# Patient Record
Sex: FEMALE | Race: WHITE | NOT HISPANIC OR LATINO | URBAN - METROPOLITAN AREA
[De-identification: names, ages, dates, MRNs, and addresses within clinical notes are randomized per-mention and may not be internally consistent; named-entity substitution may affect disease eponyms.]

---

## 2017-12-04 ENCOUNTER — OUTPATIENT (OUTPATIENT)
Dept: OUTPATIENT SERVICES | Facility: HOSPITAL | Age: 47
LOS: 1 days | Discharge: HOME | End: 2017-12-04

## 2017-12-04 ENCOUNTER — APPOINTMENT (OUTPATIENT)
Dept: INTERNAL MEDICINE | Facility: HOSPITAL | Age: 47
End: 2017-12-04

## 2017-12-04 VITALS
RESPIRATION RATE: 16 BRPM | BODY MASS INDEX: 26.06 KG/M2 | SYSTOLIC BLOOD PRESSURE: 108 MMHG | DIASTOLIC BLOOD PRESSURE: 68 MMHG | HEART RATE: 78 BPM | HEIGHT: 67.5 IN | WEIGHT: 168 LBS

## 2017-12-04 DIAGNOSIS — E11.9 TYPE 2 DIABETES MELLITUS WITHOUT COMPLICATIONS: ICD-10-CM

## 2017-12-04 DIAGNOSIS — E03.9 HYPOTHYROIDISM, UNSPECIFIED: ICD-10-CM

## 2017-12-04 DIAGNOSIS — Z80.3 FAMILY HISTORY OF MALIGNANT NEOPLASM OF BREAST: ICD-10-CM

## 2017-12-04 DIAGNOSIS — Z87.19 PERSONAL HISTORY OF OTHER DISEASES OF THE DIGESTIVE SYSTEM: ICD-10-CM

## 2017-12-04 DIAGNOSIS — J45.909 UNSPECIFIED ASTHMA, UNCOMPLICATED: ICD-10-CM

## 2017-12-04 RX ORDER — BLOOD-GLUCOSE METER
EACH MISCELLANEOUS
Qty: 100 | Refills: 0 | Status: DISCONTINUED | COMMUNITY
Start: 2017-03-31

## 2017-12-04 RX ORDER — INSULIN GLARGINE 100 [IU]/ML
100 INJECTION, SOLUTION SUBCUTANEOUS
Qty: 3 | Refills: 0 | Status: DISCONTINUED | COMMUNITY
Start: 2017-04-12

## 2017-12-04 RX ORDER — ASPIRIN ENTERIC COATED TABLETS 81 MG 81 MG/1
81 TABLET, DELAYED RELEASE ORAL
Qty: 30 | Refills: 0 | Status: DISCONTINUED | COMMUNITY
Start: 2017-03-31

## 2017-12-04 RX ORDER — FLUOXETINE HYDROCHLORIDE 40 MG/1
40 CAPSULE ORAL
Qty: 30 | Refills: 0 | Status: DISCONTINUED | COMMUNITY
Start: 2017-04-24

## 2017-12-04 RX ORDER — BLOOD-GLUCOSE METER
70 EACH MISCELLANEOUS
Qty: 100 | Refills: 0 | Status: DISCONTINUED | COMMUNITY
Start: 2017-03-31

## 2017-12-04 RX ORDER — BLOOD SUGAR DIAGNOSTIC
STRIP MISCELLANEOUS
Qty: 100 | Refills: 0 | Status: DISCONTINUED | COMMUNITY
Start: 2017-06-24

## 2018-01-23 ENCOUNTER — OUTPATIENT (OUTPATIENT)
Dept: OUTPATIENT SERVICES | Facility: HOSPITAL | Age: 48
LOS: 1 days | Discharge: HOME | End: 2018-01-23

## 2018-01-23 DIAGNOSIS — F11.20 OPIOID DEPENDENCE, UNCOMPLICATED: ICD-10-CM

## 2018-03-05 ENCOUNTER — APPOINTMENT (OUTPATIENT)
Dept: INTERNAL MEDICINE | Facility: HOSPITAL | Age: 48
End: 2018-03-05

## 2018-03-05 ENCOUNTER — OUTPATIENT (OUTPATIENT)
Dept: OUTPATIENT SERVICES | Facility: HOSPITAL | Age: 48
LOS: 1 days | Discharge: HOME | End: 2018-03-05

## 2018-03-05 VITALS
RESPIRATION RATE: 16 BRPM | HEART RATE: 80 BPM | HEIGHT: 67 IN | TEMPERATURE: 98.2 F | BODY MASS INDEX: 27 KG/M2 | SYSTOLIC BLOOD PRESSURE: 100 MMHG | WEIGHT: 172 LBS | DIASTOLIC BLOOD PRESSURE: 68 MMHG

## 2018-03-05 RX ORDER — GABAPENTIN 300 MG/1
300 CAPSULE ORAL
Refills: 0 | Status: DISCONTINUED | COMMUNITY
End: 2018-03-05

## 2018-03-05 RX ORDER — TRAZODONE HYDROCHLORIDE 100 MG/1
100 TABLET ORAL
Refills: 0 | Status: DISCONTINUED | COMMUNITY
Start: 2017-06-21 | End: 2018-03-05

## 2018-03-06 DIAGNOSIS — E03.9 HYPOTHYROIDISM, UNSPECIFIED: ICD-10-CM

## 2018-03-06 DIAGNOSIS — E11.9 TYPE 2 DIABETES MELLITUS WITHOUT COMPLICATIONS: ICD-10-CM

## 2018-03-12 LAB
ESTIMATED AVERAGE GLUCOSE: 137 MG/DL
HBA1C MFR BLD HPLC: 6.4 %

## 2018-03-14 ENCOUNTER — OTHER (OUTPATIENT)
Age: 48
End: 2018-03-14

## 2018-03-15 LAB
25(OH)D3 SERPL-MCNC: 7.7 NG/ML
ALBUMIN SERPL ELPH-MCNC: 4.5 G/DL
ALP BLD-CCNC: 75 U/L
ALT SERPL-CCNC: 6 U/L
ANION GAP SERPL CALC-SCNC: 17 MMOL/L
AST SERPL-CCNC: 26 U/L
BASOPHILS # BLD AUTO: 0.03 K/UL
BASOPHILS NFR BLD AUTO: 0.4 %
BILIRUB SERPL-MCNC: 0.4 MG/DL
BUN SERPL-MCNC: 10 MG/DL
CALCIUM SERPL-MCNC: 9.2 MG/DL
CHLORIDE SERPL-SCNC: 97 MMOL/L
CHOLEST SERPL-MCNC: 148 MG/DL
CHOLEST/HDLC SERPL: 3 RATIO
CO2 SERPL-SCNC: 25 MMOL/L
CREAT SERPL-MCNC: 0.9 MG/DL
EOSINOPHIL # BLD AUTO: 0.15 K/UL
EOSINOPHIL NFR BLD AUTO: 1.9 %
GLUCOSE SERPL-MCNC: 126 MG/DL
HCT VFR BLD CALC: 38.2 %
HDLC SERPL-MCNC: 49 MG/DL
HGB BLD-MCNC: 12.3 G/DL
IMM GRANULOCYTES NFR BLD AUTO: 0.1 %
LDLC SERPL CALC-MCNC: 84 MG/DL
LYMPHOCYTES # BLD AUTO: 1.45 K/UL
LYMPHOCYTES NFR BLD AUTO: 18.2 %
MAN DIFF?: NORMAL
MCHC RBC-ENTMCNC: 29.2 PG
MCHC RBC-ENTMCNC: 32.2 GM/DL
MCV RBC AUTO: 90.7 FL
MONOCYTES # BLD AUTO: 0.32 K/UL
MONOCYTES NFR BLD AUTO: 4 %
NEUTROPHILS # BLD AUTO: 5.99 K/UL
NEUTROPHILS NFR BLD AUTO: 75.4 %
PLATELET # BLD AUTO: 245 K/UL
POTASSIUM SERPL-SCNC: 4.3 MMOL/L
PROT SERPL-MCNC: 7.7 G/DL
RBC # BLD: 4.21 M/UL
RBC # FLD: 14.5 %
SODIUM SERPL-SCNC: 139 MMOL/L
TRIGL SERPL-MCNC: 145 MG/DL
TSH SERPL-ACNC: 46.46 UIU/ML
WBC # FLD AUTO: 7.95 K/UL

## 2018-04-17 ENCOUNTER — RX RENEWAL (OUTPATIENT)
Age: 48
End: 2018-04-17

## 2018-05-21 ENCOUNTER — RX RENEWAL (OUTPATIENT)
Age: 48
End: 2018-05-21

## 2018-06-02 ENCOUNTER — TRANSCRIPTION ENCOUNTER (OUTPATIENT)
Age: 48
End: 2018-06-02

## 2018-06-11 ENCOUNTER — TRANSCRIPTION ENCOUNTER (OUTPATIENT)
Age: 48
End: 2018-06-11

## 2018-10-15 ENCOUNTER — OUTPATIENT (OUTPATIENT)
Dept: OUTPATIENT SERVICES | Facility: HOSPITAL | Age: 48
LOS: 1 days | Discharge: HOME | End: 2018-10-15

## 2018-10-15 DIAGNOSIS — F11.20 OPIOID DEPENDENCE, UNCOMPLICATED: ICD-10-CM

## 2018-10-23 ENCOUNTER — APPOINTMENT (OUTPATIENT)
Dept: INTERNAL MEDICINE | Facility: HOSPITAL | Age: 48
End: 2018-10-23

## 2018-12-04 ENCOUNTER — OUTPATIENT (OUTPATIENT)
Dept: OUTPATIENT SERVICES | Facility: HOSPITAL | Age: 48
LOS: 1 days | Discharge: HOME | End: 2018-12-04

## 2018-12-04 ENCOUNTER — APPOINTMENT (OUTPATIENT)
Dept: INTERNAL MEDICINE | Facility: HOSPITAL | Age: 48
End: 2018-12-04

## 2018-12-04 VITALS
HEIGHT: 67 IN | HEART RATE: 60 BPM | SYSTOLIC BLOOD PRESSURE: 110 MMHG | DIASTOLIC BLOOD PRESSURE: 70 MMHG | BODY MASS INDEX: 27 KG/M2 | TEMPERATURE: 98.6 F | WEIGHT: 172 LBS

## 2018-12-04 DIAGNOSIS — E03.9 HYPOTHYROIDISM, UNSPECIFIED: ICD-10-CM

## 2018-12-04 DIAGNOSIS — B19.20 UNSPECIFIED VIRAL HEPATITIS C WITHOUT HEPATIC COMA: ICD-10-CM

## 2018-12-04 DIAGNOSIS — E11.9 TYPE 2 DIABETES MELLITUS WITHOUT COMPLICATIONS: ICD-10-CM

## 2018-12-04 DIAGNOSIS — E55.9 VITAMIN D DEFICIENCY, UNSPECIFIED: ICD-10-CM

## 2018-12-04 DIAGNOSIS — Z23 ENCOUNTER FOR IMMUNIZATION: ICD-10-CM

## 2018-12-04 DIAGNOSIS — B18.1 CHRONIC VIRAL HEPATITIS B WITHOUT DELTA-AGENT: ICD-10-CM

## 2018-12-04 DIAGNOSIS — Z87.898 PERSONAL HISTORY OF OTHER SPECIFIED CONDITIONS: ICD-10-CM

## 2018-12-04 DIAGNOSIS — Z87.891 PERSONAL HISTORY OF NICOTINE DEPENDENCE: ICD-10-CM

## 2018-12-04 RX ORDER — BUPRENORPHINE HYDROCHLORIDE, NALOXONE HYDROCHLORIDE 8; 2 MG/1; MG/1
8-2 FILM, SOLUBLE BUCCAL; SUBLINGUAL
Refills: 0 | Status: ACTIVE | COMMUNITY
Start: 2017-06-12

## 2018-12-04 RX ORDER — ERGOCALCIFEROL 1.25 MG/1
1.25 MG CAPSULE, LIQUID FILLED ORAL
Qty: 4 | Refills: 1 | Status: DISCONTINUED | COMMUNITY
Start: 2018-03-14 | End: 2018-12-04

## 2018-12-04 RX ORDER — BUDESONIDE AND FORMOTEROL FUMARATE DIHYDRATE 160; 4.5 UG/1; UG/1
160-4.5 AEROSOL RESPIRATORY (INHALATION)
Qty: 10 | Refills: 0 | Status: DISCONTINUED | COMMUNITY
Start: 2017-03-16 | End: 2018-12-04

## 2018-12-04 RX ORDER — ALBUTEROL SULFATE 2.5 MG/3ML
(2.5 MG/3ML) SOLUTION RESPIRATORY (INHALATION)
Qty: 300 | Refills: 0 | Status: DISCONTINUED | COMMUNITY
Start: 2017-06-01 | End: 2018-12-04

## 2018-12-04 RX ORDER — UBIDECARENONE/VIT E ACET 100MG-5
25 MCG CAPSULE ORAL
Qty: 30 | Refills: 2 | Status: DISCONTINUED | COMMUNITY
Start: 2018-03-14 | End: 2018-12-04

## 2018-12-04 NOTE — HISTORY OF PRESENT ILLNESS
[FreeTextEntry1] : Pt. is a 48yo female, was last seen by nurse practitioner 3/18.  Pt. non complaint with follow up, had appointment 10/23/18 which pt. did not show up for her appointment.  Pt. here for follow up her hypothyroidism, and diabetes.  Pt. has h/o DM, hypothyroidism, vitamin d deficiency, anxiety/depression, asthma, Hep. C, h/o substance abuse (heroin).  Pt. states she has been taking her levothyroxine 150mcg daily, and has been off her diabetic medication since her HgA1C level was in prediabetic range.  Pt. denies any chest pain, no shortness of breath, no palpitation, no polyuria/polydipsia/polyphagia, no tingling/numbness/weakness of b/l lower extremities, no abdominal pain, no n/v/diarrhea, no constipation.  Pt. was given screening mammogram and recommended pt. to see Ob/Gyn; however, pt. never completed task.

## 2018-12-04 NOTE — PHYSICAL EXAM
[No Acute Distress] : no acute distress [Well Nourished] : well nourished [Well Developed] : well developed [Well-Appearing] : well-appearing [Normal Sclera/Conjunctiva] : normal sclera/conjunctiva [PERRL] : pupils equal round and reactive to light [EOMI] : extraocular movements intact [Normal Outer Ear/Nose] : the outer ears and nose were normal in appearance [Normal Oropharynx] : the oropharynx was normal [Normal TMs] : both tympanic membranes were normal [Normal Nasal Mucosa] : the nasal mucosa was normal [No JVD] : no jugular venous distention [Supple] : supple [No Lymphadenopathy] : no lymphadenopathy [No Respiratory Distress] : no respiratory distress  [Clear to Auscultation] : lungs were clear to auscultation bilaterally [No Accessory Muscle Use] : no accessory muscle use [Normal Rate] : normal rate  [Regular Rhythm] : with a regular rhythm [Normal S1, S2] : normal S1 and S2 [No Carotid Bruits] : no carotid bruits [No Abdominal Bruit] : a ~M bruit was not heard ~T in the abdomen [Pedal Pulses Present] : the pedal pulses are present [No Edema] : there was no peripheral edema [No Extremity Clubbing/Cyanosis] : no extremity clubbing/cyanosis [No Palpable Aorta] : no palpable aorta [Soft] : abdomen soft [Non Tender] : non-tender [Non-distended] : non-distended [No Masses] : no abdominal mass palpated [No HSM] : no HSM [Normal Bowel Sounds] : normal bowel sounds [Normal Posterior Cervical Nodes] : no posterior cervical lymphadenopathy [Normal Anterior Cervical Nodes] : no anterior cervical lymphadenopathy [No CVA Tenderness] : no CVA  tenderness [No Spinal Tenderness] : no spinal tenderness [No Joint Swelling] : no joint swelling [Grossly Normal Strength/Tone] : grossly normal strength/tone [No Rash] : no rash [No Skin Lesions] : no skin lesions [Acne] : no acne [Normal Gait] : normal gait [Coordination Grossly Intact] : coordination grossly intact [No Focal Deficits] : no focal deficits [Deep Tendon Reflexes (DTR)] : deep tendon reflexes were 2+ and symmetric [Alert and Oriented x3] : oriented to person, place, and time

## 2018-12-04 NOTE — COUNSELING
[Weight management counseling provided] : Weight management [Healthy eating counseling provided] : healthy eating [Activity counseling provided] : activity [Low Fat Diet] : Low fat diet [Decrease Portions] : Decrease food portions [Low Salt Diet] : Low salt diet [Walking] : Walking [de-identified] : 1800 calories low sodium, low fat/chol. high fiber ADA diet\par

## 2018-12-04 NOTE — HEALTH RISK ASSESSMENT
[] : Yes [No falls in past year] : Patient reported no falls in the past year [de-identified] : advised pt. to quit marijuana

## 2018-12-04 NOTE — ASSESSMENT
[FreeTextEntry1] : 01.  Hypothyroidism: on levothyroxine 150mcg daily\par a.  continue levothyroxine 150mcg daily for now\par b.  fasting CMP, CBC, lipid profile, HgA1C, TSH, Hep. C PCR, Hep. B PCR, alpha fetoprotein, vitamin d\par c.  follow up visit in 2 weeks\par 02.  Diabetes mellitus: off medication, last HgA1C 6.4 (3/18)\par a.  urine microalbumin\par b.  1800 calories low sodium, low fat/chol. high fiber ADA diet\par c.  optometry referral\par 03.  Vitamin d deficiency: non compliant with vitamin d supplement\par a.  will check vitamin d level, if low, will restart vitamin d supplement\par 04.  h/o Hep. C/Hep. B\par a.  Hep. C PCR, Hep. B PCR, alpha feto protein\par 05.  HCM\par a.  flu vaccine ordered\par b.  screening mammogram\par c.  Ob/Gyn\par d.  EKG ordered

## 2018-12-11 LAB
AFP-TM SERPL-MCNC: 1.2 NG/ML
ALBUMIN SERPL ELPH-MCNC: 4.7 G/DL
ALP BLD-CCNC: 94 U/L
ALT SERPL-CCNC: 5 U/L
ANION GAP SERPL CALC-SCNC: 14 MMOL/L
AST SERPL-CCNC: 19 U/L
BASOPHILS # BLD AUTO: 0.05 K/UL
BASOPHILS NFR BLD AUTO: 0.5 %
BILIRUB SERPL-MCNC: 0.3 MG/DL
BUN SERPL-MCNC: 8 MG/DL
CALCIUM SERPL-MCNC: 9.2 MG/DL
CHLORIDE SERPL-SCNC: 101 MMOL/L
CHOLEST SERPL-MCNC: 149 MG/DL
CHOLEST/HDLC SERPL: 3.6 RATIO
CO2 SERPL-SCNC: 26 MMOL/L
CREAT SERPL-MCNC: 0.7 MG/DL
CREAT SPEC-SCNC: 272 MG/DL
EOSINOPHIL # BLD AUTO: 0.21 K/UL
EOSINOPHIL NFR BLD AUTO: 2.3 %
ESTIMATED AVERAGE GLUCOSE: 131 MG/DL
GLUCOSE SERPL-MCNC: 137 MG/DL
HBA1C MFR BLD HPLC: 6.2 %
HCT VFR BLD CALC: 37.5 %
HDLC SERPL-MCNC: 41 MG/DL
HGB BLD-MCNC: 12.3 G/DL
IMM GRANULOCYTES NFR BLD AUTO: 0.4 %
LDLC SERPL CALC-MCNC: 103 MG/DL
LYMPHOCYTES # BLD AUTO: 1.71 K/UL
LYMPHOCYTES NFR BLD AUTO: 18.4 %
MAN DIFF?: NORMAL
MCHC RBC-ENTMCNC: 28.9 PG
MCHC RBC-ENTMCNC: 32.8 G/DL
MCV RBC AUTO: 88 FL
MICROALBUMIN 24H UR DL<=1MG/L-MCNC: 1.2 MG/DL
MICROALBUMIN/CREAT 24H UR-RTO: 4 MG/G
MONOCYTES # BLD AUTO: 0.55 K/UL
MONOCYTES NFR BLD AUTO: 5.9 %
NEUTROPHILS # BLD AUTO: 6.75 K/UL
NEUTROPHILS NFR BLD AUTO: 72.5 %
PLATELET # BLD AUTO: 212 K/UL
POTASSIUM SERPL-SCNC: 4.1 MMOL/L
PROT SERPL-MCNC: 7.1 G/DL
RBC # BLD: 4.26 M/UL
RBC # FLD: 13.6 %
SODIUM SERPL-SCNC: 141 MMOL/L
T3 SERPL-MCNC: 89 NG/DL
T4 SERPL-MCNC: 7.5 UG/DL
TRIGL SERPL-MCNC: 107 MG/DL
TSH SERPL-ACNC: 1.8 UIU/ML
WBC # FLD AUTO: 9.31 K/UL

## 2018-12-12 LAB
25(OH)D3 SERPL-MCNC: 12 NG/ML
HBV DNA # SERPL NAA+PROBE: NOT DETECTED IU/ML
HEPB DNA PCR LOG: NOT DETECTED LOGIU/ML

## 2018-12-13 LAB
HCV RNA SERPL NAA DL=5-ACNC: NOT DETECTED IU/ML
HCV RNA SERPL NAA+PROBE-LOG IU: NOT DETECTED LOGIU/ML

## 2018-12-17 ENCOUNTER — OUTPATIENT (OUTPATIENT)
Dept: OUTPATIENT SERVICES | Facility: HOSPITAL | Age: 48
LOS: 1 days | Discharge: HOME | End: 2018-12-17

## 2018-12-17 DIAGNOSIS — F11.20 OPIOID DEPENDENCE, UNCOMPLICATED: ICD-10-CM

## 2018-12-17 DIAGNOSIS — J44.9 CHRONIC OBSTRUCTIVE PULMONARY DISEASE, UNSPECIFIED: ICD-10-CM

## 2018-12-17 DIAGNOSIS — K29.70 GASTRITIS, UNSPECIFIED, WITHOUT BLEEDING: ICD-10-CM

## 2018-12-17 DIAGNOSIS — F13.10 SEDATIVE, HYPNOTIC OR ANXIOLYTIC ABUSE, UNCOMPLICATED: ICD-10-CM

## 2018-12-17 DIAGNOSIS — K70.30 ALCOHOLIC CIRRHOSIS OF LIVER WITHOUT ASCITES: ICD-10-CM

## 2018-12-17 DIAGNOSIS — B19.10 UNSPECIFIED VIRAL HEPATITIS B WITHOUT HEPATIC COMA: ICD-10-CM

## 2018-12-17 DIAGNOSIS — K27.9 PEPTIC ULCER, SITE UNSPECIFIED, UNSPECIFIED AS ACUTE OR CHRONIC, WITHOUT HEMORRHAGE OR PERFORATION: ICD-10-CM

## 2018-12-17 DIAGNOSIS — E03.9 HYPOTHYROIDISM, UNSPECIFIED: ICD-10-CM

## 2018-12-19 ENCOUNTER — OUTPATIENT (OUTPATIENT)
Dept: OUTPATIENT SERVICES | Facility: HOSPITAL | Age: 48
LOS: 1 days | Discharge: HOME | End: 2018-12-19

## 2018-12-19 ENCOUNTER — APPOINTMENT (OUTPATIENT)
Dept: INTERNAL MEDICINE | Facility: HOSPITAL | Age: 48
End: 2018-12-19

## 2018-12-19 VITALS
SYSTOLIC BLOOD PRESSURE: 100 MMHG | BODY MASS INDEX: 27.62 KG/M2 | WEIGHT: 176 LBS | HEIGHT: 67 IN | TEMPERATURE: 97 F | DIASTOLIC BLOOD PRESSURE: 59 MMHG | HEART RATE: 68 BPM

## 2018-12-19 DIAGNOSIS — F32.9 ANXIETY DISORDER, UNSPECIFIED: ICD-10-CM

## 2018-12-19 DIAGNOSIS — F41.9 ANXIETY DISORDER, UNSPECIFIED: ICD-10-CM

## 2018-12-19 RX ORDER — CHOLECALCIFEROL (VITAMIN D3) 1250 MCG
1.25 MG CAPSULE ORAL
Qty: 8 | Refills: 0 | Status: COMPLETED | COMMUNITY
Start: 2018-12-19 | End: 2019-02-13

## 2018-12-19 NOTE — HISTORY OF PRESENT ILLNESS
[Family Member] : family member [Other: _____] : [unfilled] [FreeTextEntry1] : Pt. is a 49yo female, here for follow up for her hypothyroidism, anxiety/depression.  Pt. states she has been doing well with her anxiety/depression with fluoxetine, denies any suicidal/homicidal ideation.  And she has been comply with her levothyroxine, and other med.  Denies any chest pain, no shortness of breath, no polyuria/polydipsia/polyphagia.

## 2018-12-19 NOTE — PHYSICAL EXAM
[Normal Nasal Mucosa] : the nasal mucosa was normal [No Respiratory Distress] : no respiratory distress  [Clear to Auscultation] : lungs were clear to auscultation bilaterally [No Accessory Muscle Use] : no accessory muscle use [Normal Rate] : normal rate  [Regular Rhythm] : with a regular rhythm [Normal S1, S2] : normal S1 and S2 [No Carotid Bruits] : no carotid bruits [No Abdominal Bruit] : a ~M bruit was not heard ~T in the abdomen [Pedal Pulses Present] : the pedal pulses are present [No Edema] : there was no peripheral edema [No Extremity Clubbing/Cyanosis] : no extremity clubbing/cyanosis [No Palpable Aorta] : no palpable aorta [Soft] : abdomen soft [Non Tender] : non-tender [Non-distended] : non-distended [No Masses] : no abdominal mass palpated [No HSM] : no HSM [Normal Bowel Sounds] : normal bowel sounds [No Joint Swelling] : no joint swelling [Grossly Normal Strength/Tone] : grossly normal strength/tone

## 2018-12-19 NOTE — COUNSELING
[Weight management counseling provided] : Weight management [Healthy eating counseling provided] : healthy eating [Activity counseling provided] : activity [Low Fat Diet] : Low fat diet [Decrease Portions] : Decrease food portions [Low Salt Diet] : Low salt diet [Walking] : Walking [de-identified] : 1800 calories low sodium, low fat/chol. high fiber ADA diet

## 2018-12-19 NOTE — ASSESSMENT
[FreeTextEntry1] : 01.  Vitamin D deficiency\par a.  start Vitamin D2 44279 unit once weekly for 8 weeks; then\par b.  Vitamin D3 2000 unit daily after completed 8 weeks of Vitamin D2 32081 unit\par 02.  COPD/asthma: on ventolin prn\par a.  continue ventolin prn\par 03.  Anxiety/depression: stable on fluoxetine\par a.  continue fluoxetine\par b.  follow up visit in 3 months\par \par

## 2018-12-20 DIAGNOSIS — F41.9 ANXIETY DISORDER, UNSPECIFIED: ICD-10-CM

## 2018-12-20 DIAGNOSIS — J45.909 UNSPECIFIED ASTHMA, UNCOMPLICATED: ICD-10-CM

## 2018-12-20 DIAGNOSIS — E55.9 VITAMIN D DEFICIENCY, UNSPECIFIED: ICD-10-CM

## 2018-12-20 DIAGNOSIS — J44.9 CHRONIC OBSTRUCTIVE PULMONARY DISEASE, UNSPECIFIED: ICD-10-CM

## 2019-01-15 ENCOUNTER — OUTPATIENT (OUTPATIENT)
Dept: OUTPATIENT SERVICES | Facility: HOSPITAL | Age: 49
LOS: 1 days | Discharge: HOME | End: 2019-01-15

## 2019-01-15 DIAGNOSIS — F11.20 OPIOID DEPENDENCE, UNCOMPLICATED: ICD-10-CM

## 2019-02-15 ENCOUNTER — OUTPATIENT (OUTPATIENT)
Dept: OUTPATIENT SERVICES | Facility: HOSPITAL | Age: 49
LOS: 1 days | Discharge: HOME | End: 2019-02-15

## 2019-02-15 DIAGNOSIS — F11.20 OPIOID DEPENDENCE, UNCOMPLICATED: ICD-10-CM

## 2019-03-14 ENCOUNTER — OUTPATIENT (OUTPATIENT)
Dept: OUTPATIENT SERVICES | Facility: HOSPITAL | Age: 49
LOS: 1 days | Discharge: HOME | End: 2019-03-14

## 2019-03-14 ENCOUNTER — APPOINTMENT (OUTPATIENT)
Dept: INTERNAL MEDICINE | Facility: HOSPITAL | Age: 49
End: 2019-03-14

## 2019-03-14 VITALS
HEART RATE: 64 BPM | RESPIRATION RATE: 16 BRPM | TEMPERATURE: 97.9 F | DIASTOLIC BLOOD PRESSURE: 76 MMHG | BODY MASS INDEX: 26.53 KG/M2 | HEIGHT: 67 IN | WEIGHT: 169 LBS | SYSTOLIC BLOOD PRESSURE: 126 MMHG

## 2019-03-14 DIAGNOSIS — L70.0 ACNE VULGARIS: ICD-10-CM

## 2019-03-14 DIAGNOSIS — B18.1 CHRONIC VIRAL HEPATITIS B W/OUT DELTA-AGENT: ICD-10-CM

## 2019-03-14 DIAGNOSIS — Z86.19 PERSONAL HISTORY OF OTHER INFECTIOUS AND PARASITIC DISEASES: ICD-10-CM

## 2019-03-14 NOTE — ASSESSMENT
[FreeTextEntry1] : 01.  Asthma: stable on ventolin prn\par a.  continue current plan\par 02.  Hypothyroidism: on levothyroxine\par a.  continue current dose of levothyroxine\par b.  followup visit in 3 months\par 03.  Vitamin d deficiency: not taking vitamin d 2000unit daily\par a.  advised pt. to take vitamin d supplement\par 04.  HCM\par a.  pt. did not do mammogram, advised pt. to complete mammogram

## 2019-03-14 NOTE — HISTORY OF PRESENT ILLNESS
[FreeTextEntry1] : Pt. is a 47yo female, here for followup asthma and vitamin d.  Pt. completed 8 weeks of vitamin d 66268zlre weekly for 8 weeks.  Pt. did not start vitamin d3 2000 unit daily since unable to afford it.  Pt. denies any asthma attack, no shortness of breath, no wheezes, no chest pain, no palpitation, no n/v/diarrhea

## 2019-03-15 ENCOUNTER — OUTPATIENT (OUTPATIENT)
Dept: OUTPATIENT SERVICES | Facility: HOSPITAL | Age: 49
LOS: 1 days | Discharge: HOME | End: 2019-03-15

## 2019-03-15 DIAGNOSIS — F11.20 OPIOID DEPENDENCE, UNCOMPLICATED: ICD-10-CM

## 2019-03-18 DIAGNOSIS — F11.21 OPIOID DEPENDENCE, IN REMISSION: ICD-10-CM

## 2019-03-19 ENCOUNTER — RX RENEWAL (OUTPATIENT)
Age: 49
End: 2019-03-19

## 2019-03-19 RX ORDER — ERYTHROMYCIN AND BENZOYL PEROXIDE 3 %-5 %
5-3 KIT TOPICAL
Qty: 1 | Refills: 1 | Status: ACTIVE | COMMUNITY
Start: 1900-01-01 | End: 1900-01-01

## 2019-03-19 RX ORDER — FLUOXETINE HYDROCHLORIDE 40 MG/1
40 CAPSULE ORAL
Qty: 30 | Refills: 2 | Status: ACTIVE | COMMUNITY
Start: 1900-01-01 | End: 1900-01-01

## 2019-04-16 ENCOUNTER — OUTPATIENT (OUTPATIENT)
Dept: OUTPATIENT SERVICES | Facility: HOSPITAL | Age: 49
LOS: 1 days | Discharge: HOME | End: 2019-04-16

## 2019-04-16 DIAGNOSIS — F11.20 OPIOID DEPENDENCE, UNCOMPLICATED: ICD-10-CM

## 2019-05-14 ENCOUNTER — OUTPATIENT (OUTPATIENT)
Dept: OUTPATIENT SERVICES | Facility: HOSPITAL | Age: 49
LOS: 1 days | Discharge: HOME | End: 2019-05-14
Payer: MEDICAID

## 2019-05-14 DIAGNOSIS — F11.20 OPIOID DEPENDENCE, UNCOMPLICATED: ICD-10-CM

## 2019-05-14 PROCEDURE — 99212 OFFICE O/P EST SF 10 MIN: CPT

## 2019-06-11 ENCOUNTER — APPOINTMENT (OUTPATIENT)
Dept: INTERNAL MEDICINE | Facility: HOSPITAL | Age: 49
End: 2019-06-11

## 2019-06-11 ENCOUNTER — OUTPATIENT (OUTPATIENT)
Dept: OUTPATIENT SERVICES | Facility: HOSPITAL | Age: 49
LOS: 1 days | Discharge: HOME | End: 2019-06-11

## 2019-06-11 VITALS
RESPIRATION RATE: 16 BRPM | WEIGHT: 170 LBS | SYSTOLIC BLOOD PRESSURE: 124 MMHG | BODY MASS INDEX: 26.68 KG/M2 | HEIGHT: 67 IN | TEMPERATURE: 98.5 F | DIASTOLIC BLOOD PRESSURE: 62 MMHG | HEART RATE: 72 BPM

## 2019-06-11 DIAGNOSIS — R73.03 PREDIABETES: ICD-10-CM

## 2019-06-11 DIAGNOSIS — E55.9 VITAMIN D DEFICIENCY, UNSPECIFIED: ICD-10-CM

## 2019-06-11 DIAGNOSIS — R73.03 PREDIABETES.: ICD-10-CM

## 2019-06-11 DIAGNOSIS — Z00.00 ENCOUNTER FOR GENERAL ADULT MEDICAL EXAMINATION W/OUT ABNORMAL FINDINGS: ICD-10-CM

## 2019-06-11 DIAGNOSIS — J44.9 CHRONIC OBSTRUCTIVE PULMONARY DISEASE, UNSPECIFIED: ICD-10-CM

## 2019-06-11 DIAGNOSIS — J45.909 UNSPECIFIED ASTHMA, UNCOMPLICATED: ICD-10-CM

## 2019-06-11 DIAGNOSIS — E03.9 HYPOTHYROIDISM, UNSPECIFIED: ICD-10-CM

## 2019-06-11 RX ORDER — ALBUTEROL SULFATE 90 UG/1
108 (90 BASE) AEROSOL, METERED RESPIRATORY (INHALATION)
Qty: 1 | Refills: 2 | Status: ACTIVE | COMMUNITY
Start: 2017-06-01 | End: 1900-01-01

## 2019-06-11 RX ORDER — LEVOTHYROXINE SODIUM 0.15 MG/1
150 TABLET ORAL
Qty: 30 | Refills: 2 | Status: ACTIVE | COMMUNITY
Start: 2018-04-17 | End: 1900-01-01

## 2019-06-11 NOTE — PHYSICAL EXAM
[No Respiratory Distress] : no respiratory distress  [Normal Nasal Mucosa] : the nasal mucosa was normal [Clear to Auscultation] : lungs were clear to auscultation bilaterally [No Accessory Muscle Use] : no accessory muscle use [Normal Rate] : normal rate  [Regular Rhythm] : with a regular rhythm [Normal S1, S2] : normal S1 and S2 [No Abdominal Bruit] : a ~M bruit was not heard ~T in the abdomen [No Carotid Bruits] : no carotid bruits [Pedal Pulses Present] : the pedal pulses are present [No Edema] : there was no peripheral edema [Soft] : abdomen soft [No Palpable Aorta] : no palpable aorta [No Extremity Clubbing/Cyanosis] : no extremity clubbing/cyanosis [Non Tender] : non-tender [No HSM] : no HSM [Non-distended] : non-distended [No Masses] : no abdominal mass palpated [Normal Bowel Sounds] : normal bowel sounds [No Joint Swelling] : no joint swelling [Grossly Normal Strength/Tone] : grossly normal strength/tone

## 2019-06-11 NOTE — HISTORY OF PRESENT ILLNESS
[FreeTextEntry1] : Pt. is a 47yo female, here for follow up her COPD/asthma and hypothyroidism.  Pt. denies any complaint, no SOB, no wheezes, no palpitation, no n/v/diarrhea

## 2019-06-11 NOTE — COUNSELING
[Healthy eating counseling provided] : healthy eating [Weight management counseling provided] : Weight management [Activity counseling provided] : activity [Low Fat Diet] : Low fat diet [Decrease Portions] : Decrease food portions [Low Salt Diet] : Low salt diet [Walking] : Walking [de-identified] : 1800 calories low sodium, low fat/chol. high fiber ADA diet

## 2019-06-17 ENCOUNTER — OUTPATIENT (OUTPATIENT)
Dept: OUTPATIENT SERVICES | Facility: HOSPITAL | Age: 49
LOS: 1 days | Discharge: HOME | End: 2019-06-17

## 2019-06-17 DIAGNOSIS — F11.20 OPIOID DEPENDENCE, UNCOMPLICATED: ICD-10-CM

## 2019-07-11 ENCOUNTER — INPATIENT (INPATIENT)
Facility: HOSPITAL | Age: 49
LOS: 11 days | Discharge: HOME | End: 2019-07-23
Attending: STUDENT IN AN ORGANIZED HEALTH CARE EDUCATION/TRAINING PROGRAM | Admitting: STUDENT IN AN ORGANIZED HEALTH CARE EDUCATION/TRAINING PROGRAM
Payer: MEDICAID

## 2019-07-11 VITALS
TEMPERATURE: 98 F | RESPIRATION RATE: 23 BRPM | HEART RATE: 98 BPM | SYSTOLIC BLOOD PRESSURE: 120 MMHG | OXYGEN SATURATION: 78 % | DIASTOLIC BLOOD PRESSURE: 84 MMHG

## 2019-07-11 DIAGNOSIS — Z90.49 ACQUIRED ABSENCE OF OTHER SPECIFIED PARTS OF DIGESTIVE TRACT: Chronic | ICD-10-CM

## 2019-07-11 DIAGNOSIS — J44.9 CHRONIC OBSTRUCTIVE PULMONARY DISEASE, UNSPECIFIED: ICD-10-CM

## 2019-07-11 DIAGNOSIS — F19.11 OTHER PSYCHOACTIVE SUBSTANCE ABUSE, IN REMISSION: ICD-10-CM

## 2019-07-11 DIAGNOSIS — J18.1 LOBAR PNEUMONIA, UNSPECIFIED ORGANISM: ICD-10-CM

## 2019-07-11 DIAGNOSIS — E03.9 HYPOTHYROIDISM, UNSPECIFIED: ICD-10-CM

## 2019-07-11 DIAGNOSIS — Z90.89 ACQUIRED ABSENCE OF OTHER ORGANS: Chronic | ICD-10-CM

## 2019-07-11 LAB
ALBUMIN SERPL ELPH-MCNC: 4.3 G/DL — SIGNIFICANT CHANGE UP (ref 3.5–5.2)
ALP SERPL-CCNC: 114 U/L — SIGNIFICANT CHANGE UP (ref 30–115)
ALT FLD-CCNC: <5 U/L — SIGNIFICANT CHANGE UP (ref 0–41)
ANION GAP SERPL CALC-SCNC: 16 MMOL/L — HIGH (ref 7–14)
ANION GAP SERPL CALC-SCNC: 20 MMOL/L — HIGH (ref 7–14)
AST SERPL-CCNC: 22 U/L — SIGNIFICANT CHANGE UP (ref 0–41)
BASE EXCESS BLDV CALC-SCNC: 3.6 MMOL/L — HIGH (ref -2–2)
BASOPHILS # BLD AUTO: 0.01 K/UL — SIGNIFICANT CHANGE UP (ref 0–0.2)
BASOPHILS # BLD AUTO: 0.02 K/UL — SIGNIFICANT CHANGE UP (ref 0–0.2)
BASOPHILS NFR BLD AUTO: 0.1 % — SIGNIFICANT CHANGE UP (ref 0–1)
BASOPHILS NFR BLD AUTO: 0.1 % — SIGNIFICANT CHANGE UP (ref 0–1)
BILIRUB SERPL-MCNC: 0.8 MG/DL — SIGNIFICANT CHANGE UP (ref 0.2–1.2)
BUN SERPL-MCNC: 7 MG/DL — LOW (ref 10–20)
BUN SERPL-MCNC: 8 MG/DL — LOW (ref 10–20)
CA-I SERPL-SCNC: 1.09 MMOL/L — LOW (ref 1.12–1.3)
CALCIUM SERPL-MCNC: 8.8 MG/DL — SIGNIFICANT CHANGE UP (ref 8.5–10.1)
CALCIUM SERPL-MCNC: 8.9 MG/DL — SIGNIFICANT CHANGE UP (ref 8.5–10.1)
CHLORIDE SERPL-SCNC: 101 MMOL/L — SIGNIFICANT CHANGE UP (ref 98–110)
CHLORIDE SERPL-SCNC: 93 MMOL/L — LOW (ref 98–110)
CO2 SERPL-SCNC: 18 MMOL/L — SIGNIFICANT CHANGE UP (ref 17–32)
CO2 SERPL-SCNC: 27 MMOL/L — SIGNIFICANT CHANGE UP (ref 17–32)
CREAT SERPL-MCNC: 0.6 MG/DL — LOW (ref 0.7–1.5)
CREAT SERPL-MCNC: 0.8 MG/DL — SIGNIFICANT CHANGE UP (ref 0.7–1.5)
EOSINOPHIL # BLD AUTO: 0 K/UL — SIGNIFICANT CHANGE UP (ref 0–0.7)
EOSINOPHIL # BLD AUTO: 0 K/UL — SIGNIFICANT CHANGE UP (ref 0–0.7)
EOSINOPHIL NFR BLD AUTO: 0 % — SIGNIFICANT CHANGE UP (ref 0–8)
EOSINOPHIL NFR BLD AUTO: 0 % — SIGNIFICANT CHANGE UP (ref 0–8)
GAS PNL BLDV: 134 MMOL/L — LOW (ref 136–145)
GAS PNL BLDV: SIGNIFICANT CHANGE UP
GLUCOSE SERPL-MCNC: 201 MG/DL — HIGH (ref 70–99)
GLUCOSE SERPL-MCNC: 262 MG/DL — HIGH (ref 70–99)
HCO3 BLDV-SCNC: 27 MMOL/L — SIGNIFICANT CHANGE UP (ref 22–29)
HCT VFR BLD CALC: 37.3 % — SIGNIFICANT CHANGE UP (ref 37–47)
HCT VFR BLD CALC: 37.7 % — SIGNIFICANT CHANGE UP (ref 37–47)
HCT VFR BLDA CALC: 59.5 % — HIGH (ref 34–44)
HGB BLD CALC-MCNC: 19.4 G/DL — HIGH (ref 14–18)
HGB BLD-MCNC: 12.3 G/DL — SIGNIFICANT CHANGE UP (ref 12–16)
HGB BLD-MCNC: 12.4 G/DL — SIGNIFICANT CHANGE UP (ref 12–16)
HOROWITZ INDEX BLDV+IHG-RTO: 21 — SIGNIFICANT CHANGE UP
IMM GRANULOCYTES NFR BLD AUTO: 0.5 % — HIGH (ref 0.1–0.3)
IMM GRANULOCYTES NFR BLD AUTO: 0.6 % — HIGH (ref 0.1–0.3)
LACTATE BLDV-MCNC: 1.8 MMOL/L — HIGH (ref 0.5–1.6)
LYMPHOCYTES # BLD AUTO: 0.15 K/UL — LOW (ref 1.2–3.4)
LYMPHOCYTES # BLD AUTO: 0.64 K/UL — LOW (ref 1.2–3.4)
LYMPHOCYTES # BLD AUTO: 1.2 % — LOW (ref 20.5–51.1)
LYMPHOCYTES # BLD AUTO: 4.8 % — LOW (ref 20.5–51.1)
MCHC RBC-ENTMCNC: 28.1 PG — SIGNIFICANT CHANGE UP (ref 27–31)
MCHC RBC-ENTMCNC: 28.2 PG — SIGNIFICANT CHANGE UP (ref 27–31)
MCHC RBC-ENTMCNC: 32.6 G/DL — SIGNIFICANT CHANGE UP (ref 32–37)
MCHC RBC-ENTMCNC: 33.2 G/DL — SIGNIFICANT CHANGE UP (ref 32–37)
MCV RBC AUTO: 85 FL — SIGNIFICANT CHANGE UP (ref 81–99)
MCV RBC AUTO: 86.1 FL — SIGNIFICANT CHANGE UP (ref 81–99)
MONOCYTES # BLD AUTO: 0.18 K/UL — SIGNIFICANT CHANGE UP (ref 0.1–0.6)
MONOCYTES # BLD AUTO: 0.45 K/UL — SIGNIFICANT CHANGE UP (ref 0.1–0.6)
MONOCYTES NFR BLD AUTO: 1.5 % — LOW (ref 1.7–9.3)
MONOCYTES NFR BLD AUTO: 3.4 % — SIGNIFICANT CHANGE UP (ref 1.7–9.3)
NEUTROPHILS # BLD AUTO: 11.83 K/UL — HIGH (ref 1.4–6.5)
NEUTROPHILS # BLD AUTO: 12.2 K/UL — HIGH (ref 1.4–6.5)
NEUTROPHILS NFR BLD AUTO: 91.2 % — HIGH (ref 42.2–75.2)
NEUTROPHILS NFR BLD AUTO: 96.6 % — HIGH (ref 42.2–75.2)
NRBC # BLD: 0 /100 WBCS — SIGNIFICANT CHANGE UP (ref 0–0)
NRBC # BLD: 0 /100 WBCS — SIGNIFICANT CHANGE UP (ref 0–0)
NRBC # BLD: 0 /100 — SIGNIFICANT CHANGE UP (ref 0–0)
PCO2 BLDV: 37 MMHG — LOW (ref 41–51)
PH BLDV: 7.47 — HIGH (ref 7.26–7.43)
PLAT MORPH BLD: NORMAL — SIGNIFICANT CHANGE UP
PLATELET # BLD AUTO: 189 K/UL — SIGNIFICANT CHANGE UP (ref 130–400)
PLATELET # BLD AUTO: 262 K/UL — SIGNIFICANT CHANGE UP (ref 130–400)
PLATELET COUNT - ESTIMATE: NORMAL — SIGNIFICANT CHANGE UP
PO2 BLDV: 56 MMHG — HIGH (ref 20–40)
POTASSIUM BLDV-SCNC: 3.8 MMOL/L — SIGNIFICANT CHANGE UP (ref 3.3–5.6)
POTASSIUM SERPL-MCNC: 4.1 MMOL/L — SIGNIFICANT CHANGE UP (ref 3.5–5)
POTASSIUM SERPL-MCNC: 4.1 MMOL/L — SIGNIFICANT CHANGE UP (ref 3.5–5)
POTASSIUM SERPL-SCNC: 4.1 MMOL/L — SIGNIFICANT CHANGE UP (ref 3.5–5)
POTASSIUM SERPL-SCNC: 4.1 MMOL/L — SIGNIFICANT CHANGE UP (ref 3.5–5)
PROT SERPL-MCNC: 7.9 G/DL — SIGNIFICANT CHANGE UP (ref 6–8)
RBC # BLD: 4.38 M/UL — SIGNIFICANT CHANGE UP (ref 4.2–5.4)
RBC # BLD: 4.39 M/UL — SIGNIFICANT CHANGE UP (ref 4.2–5.4)
RBC # FLD: 12.9 % — SIGNIFICANT CHANGE UP (ref 11.5–14.5)
RBC # FLD: 13 % — SIGNIFICANT CHANGE UP (ref 11.5–14.5)
RBC BLD AUTO: NORMAL — SIGNIFICANT CHANGE UP
SAO2 % BLDV: 89 % — SIGNIFICANT CHANGE UP
SODIUM SERPL-SCNC: 136 MMOL/L — SIGNIFICANT CHANGE UP (ref 135–146)
SODIUM SERPL-SCNC: 139 MMOL/L — SIGNIFICANT CHANGE UP (ref 135–146)
TROPONIN T SERPL-MCNC: <0.01 NG/ML — SIGNIFICANT CHANGE UP
WBC # BLD: 12.24 K/UL — HIGH (ref 4.8–10.8)
WBC # BLD: 13.38 K/UL — HIGH (ref 4.8–10.8)
WBC # FLD AUTO: 12.24 K/UL — HIGH (ref 4.8–10.8)
WBC # FLD AUTO: 13.38 K/UL — HIGH (ref 4.8–10.8)

## 2019-07-11 PROCEDURE — 99285 EMERGENCY DEPT VISIT HI MDM: CPT | Mod: 25

## 2019-07-11 PROCEDURE — 71045 X-RAY EXAM CHEST 1 VIEW: CPT | Mod: 26

## 2019-07-11 PROCEDURE — 36410 VNPNXR 3YR/> PHY/QHP DX/THER: CPT

## 2019-07-11 RX ORDER — IPRATROPIUM/ALBUTEROL SULFATE 18-103MCG
3 AEROSOL WITH ADAPTER (GRAM) INHALATION ONCE
Refills: 0 | Status: COMPLETED | OUTPATIENT
Start: 2019-07-11 | End: 2019-07-11

## 2019-07-11 RX ORDER — CEFTRIAXONE 500 MG/1
1000 INJECTION, POWDER, FOR SOLUTION INTRAMUSCULAR; INTRAVENOUS EVERY 24 HOURS
Refills: 0 | Status: COMPLETED | OUTPATIENT
Start: 2019-07-12 | End: 2019-07-14

## 2019-07-11 RX ORDER — IPRATROPIUM/ALBUTEROL SULFATE 18-103MCG
3 AEROSOL WITH ADAPTER (GRAM) INHALATION
Qty: 0 | Refills: 0 | DISCHARGE

## 2019-07-11 RX ORDER — ALBUTEROL 90 UG/1
1 AEROSOL, METERED ORAL EVERY 6 HOURS
Refills: 0 | Status: DISCONTINUED | OUTPATIENT
Start: 2019-07-11 | End: 2019-07-11

## 2019-07-11 RX ORDER — AZITHROMYCIN 500 MG/1
500 TABLET, FILM COATED ORAL ONCE
Refills: 0 | Status: COMPLETED | OUTPATIENT
Start: 2019-07-11 | End: 2019-07-11

## 2019-07-11 RX ORDER — LEVOTHYROXINE SODIUM 125 MCG
25 TABLET ORAL DAILY
Refills: 0 | Status: DISCONTINUED | OUTPATIENT
Start: 2019-07-11 | End: 2019-07-23

## 2019-07-11 RX ORDER — AZITHROMYCIN 500 MG/1
500 TABLET, FILM COATED ORAL EVERY 24 HOURS
Refills: 0 | Status: DISCONTINUED | OUTPATIENT
Start: 2019-07-11 | End: 2019-07-16

## 2019-07-11 RX ORDER — ONDANSETRON 8 MG/1
4 TABLET, FILM COATED ORAL ONCE
Refills: 0 | Status: COMPLETED | OUTPATIENT
Start: 2019-07-11 | End: 2019-07-11

## 2019-07-11 RX ORDER — CEFTRIAXONE 500 MG/1
1000 INJECTION, POWDER, FOR SOLUTION INTRAMUSCULAR; INTRAVENOUS ONCE
Refills: 0 | Status: COMPLETED | OUTPATIENT
Start: 2019-07-11 | End: 2019-07-11

## 2019-07-11 RX ORDER — BUPRENORPHINE AND NALOXONE 2; .5 MG/1; MG/1
2 TABLET SUBLINGUAL DAILY
Refills: 0 | Status: DISCONTINUED | OUTPATIENT
Start: 2019-07-11 | End: 2019-07-18

## 2019-07-11 RX ORDER — SODIUM CHLORIDE 9 MG/ML
1000 INJECTION INTRAMUSCULAR; INTRAVENOUS; SUBCUTANEOUS ONCE
Refills: 0 | Status: COMPLETED | OUTPATIENT
Start: 2019-07-11 | End: 2019-07-11

## 2019-07-11 RX ORDER — SODIUM CHLORIDE 9 MG/ML
1000 INJECTION INTRAMUSCULAR; INTRAVENOUS; SUBCUTANEOUS
Refills: 0 | Status: DISCONTINUED | OUTPATIENT
Start: 2019-07-11 | End: 2019-07-16

## 2019-07-11 RX ORDER — IPRATROPIUM/ALBUTEROL SULFATE 18-103MCG
3 AEROSOL WITH ADAPTER (GRAM) INHALATION EVERY 6 HOURS
Refills: 0 | Status: DISCONTINUED | OUTPATIENT
Start: 2019-07-11 | End: 2019-07-23

## 2019-07-11 RX ORDER — IPRATROPIUM/ALBUTEROL SULFATE 18-103MCG
3 AEROSOL WITH ADAPTER (GRAM) INHALATION EVERY 4 HOURS
Refills: 0 | Status: DISCONTINUED | OUTPATIENT
Start: 2019-07-11 | End: 2019-07-23

## 2019-07-11 RX ADMIN — SODIUM CHLORIDE 1000 MILLILITER(S): 9 INJECTION INTRAMUSCULAR; INTRAVENOUS; SUBCUTANEOUS at 12:38

## 2019-07-11 RX ADMIN — SODIUM CHLORIDE 75 MILLILITER(S): 9 INJECTION INTRAMUSCULAR; INTRAVENOUS; SUBCUTANEOUS at 14:43

## 2019-07-11 RX ADMIN — Medication 3 MILLILITER(S): at 11:46

## 2019-07-11 RX ADMIN — Medication 3 MILLILITER(S): at 20:08

## 2019-07-11 RX ADMIN — Medication 125 MILLIGRAM(S): at 11:37

## 2019-07-11 RX ADMIN — AZITHROMYCIN 255 MILLIGRAM(S): 500 TABLET, FILM COATED ORAL at 13:45

## 2019-07-11 RX ADMIN — Medication 3 MILLILITER(S): at 11:37

## 2019-07-11 RX ADMIN — Medication 3 MILLILITER(S): at 11:25

## 2019-07-11 RX ADMIN — SODIUM CHLORIDE 1000 MILLILITER(S): 9 INJECTION INTRAMUSCULAR; INTRAVENOUS; SUBCUTANEOUS at 11:38

## 2019-07-11 RX ADMIN — CEFTRIAXONE 100 MILLIGRAM(S): 500 INJECTION, POWDER, FOR SOLUTION INTRAMUSCULAR; INTRAVENOUS at 13:15

## 2019-07-11 RX ADMIN — ONDANSETRON 4 MILLIGRAM(S): 8 TABLET, FILM COATED ORAL at 11:37

## 2019-07-11 NOTE — H&P ADULT - NSHPPHYSICALEXAM_GEN_ALL_CORE
Vital Signs Last 24 Hrs  T(C): 36.8 (11 Jul 2019 09:16), Max: 36.8 (11 Jul 2019 09:16)  T(F): 98.3 (11 Jul 2019 09:16), Max: 98.3 (11 Jul 2019 09:16)  HR: 105 (11 Jul 2019 13:19) (98 - 113)  BP: 146/80 (11 Jul 2019 11:52) (120/84 - 146/80)  BP(mean): --  RR: 18 (11 Jul 2019 13:19) (18 - 23)  SpO2: 94% (11 Jul 2019 13:19) (78% - 94%)    T(C): 36.8 (07-11-19 @ 09:16), Max: 36.8 (07-11-19 @ 09:16)  HR: 105 (07-11-19 @ 13:19) (98 - 113)  BP: 146/80 (07-11-19 @ 11:52) (120/84 - 146/80)  RR: 18 (07-11-19 @ 13:19) (18 - 23)  SpO2: 94% (07-11-19 @ 13:19) (78% - 94%)    PHYSICAL EXAM:  GENERAL: NAD, well-developed, well nourished, looks stated age  HEAD:  Atraumatic, Normocephalic  EYES: EOMI, PERRLA, conjunctiva and sclera clear  NECK: Supple, No JVD, no bruits, no masses, no thyroid enlargement  ENMT: No tonsillar erythema, exudated or enlargement, moist mucous membranes  CHEST/LUNG: + bibasilar crackles  HEART: S1,S2 Regular rate and rhythm; No murmurs, rubs, or gallops  ABDOMEN: Soft, nontender, nondistended, no rebound tenderness; No palpable masses, no hernias, no organomegaly; Bowel sounds present and normoactive  EXTREMITIES:  2+ peripheral pulses bilaterally and symmetrically, no clubbing, cyanosis, or edema  LYMPH: No lymphadenopathy  PSYCH: AAOx3, normal mood and affect  NEUROLOGY: non-focal, muscle strength 5/5 all extremities, DTRs 2+ symmetrically  SKIN: No rashes or lesions

## 2019-07-11 NOTE — ED ADULT NURSE NOTE - ED STAT RN HANDOFF DETAILS
Patient left unit with azithromycin infusing via pump through left 20 gauge IV access on left AC and 3 liters nasal cannula. Patient left in stable condition, satting 95% on 3 liters nasal cannula.

## 2019-07-11 NOTE — H&P ADULT - ASSESSMENT
49 yo female with h/o COPD presents with pneumonia                      D/W Dr Gregorio 49 yo female with h/o COPD presents with SOB and chest tightness and found to be hyoxic        ASSESSMENT and PLANS:    1. Acute hypoxic respiratory failure due to COPD exacerbation with underlying PNA  -IV Solumedrol  -Duoneb  -Pulse ox monitoring  -O2 supplement as needed    2. Suspect gram negative PNA  -IV Zithromax and ceftriaxone    3. Hypothyroidism  -Resume synthroid    4. Ex substance abuser  -monitor    DVT prophylaxis  Lovenox    #Progress Note Handoff  Pending (specify):  Consults_Pulmonologist  Family discussion: None  Disposition: Home __      D/W Dr Gregorio 49 yo female with h/o COPD presents with SOB and chest tightness and found to be hyoxic        ASSESSMENT and PLANS:    1. Acute hypoxic respiratory failure due to COPD exacerbation with underlying PNA  -IV Solumedrol  -Duoneb  -Pulse ox monitoring  -O2 supplement as needed    2. Suspect CAP  -IV Zithromax and ceftriaxone    3. Hypothyroidism  -Resume synthroid    4. Ex substance abuser  -monitor    DVT prophylaxis  Lovenox    #Progress Note Handoff  Pending (specify):  Consults_Pulmonologist  Family discussion: None  Disposition: Home __      D/W Dr Gregorio

## 2019-07-11 NOTE — ED PROVIDER NOTE - PROGRESS NOTE DETAILS
LASHAE: Multiple attempts were made by several nurses and this provider for IV access including ultrasound guidance. Patient a difficult stick, decision made to place central line for IV access, fluid, medications, and lab draw. During procedure, patient agitated, moving, asking for procedure to be stopped. Will give patient a break and assess for access again. No respiratory distress at this time, O2 sats 97% on NRB. LASHAE: Multiple attempts were made by several nurses and this provider for IV access including ultrasound guidance. Patient a difficult stick, decision made to place central line for IV access, fluid, medications, and lab draw. During procedure, patient agitated, moving, asking for procedure to be stopped. Will give patient a break and assess for access again. Repeat CXR ordered. No respiratory distress at this time, O2 sats 97% on NRB. IV access obtained, labs sent. antibiotics, fluids, steroids, zofran ordered. patient in no respiratory distress, 95% on NRB patient sitting comfortably, no respiratory distress, on 4 LPM NC sats 92-94%. lungs with increased air movement throughout. coarse breath sounds in b/l bases. labs pending. discussed cxr results w patient, likely admit for hypoxia and pneumonia. patient agreeable with plan of care.

## 2019-07-11 NOTE — ED PROVIDER NOTE - ATTENDING CONTRIBUTION TO CARE
Pt with cough/sob, on exam hypoxic and dyspneic with minimal exertion but comfortable at rest on 100% NRB, conj pink dry mm neck supple cor reg lungs with diffuse wheeze and rhonchi with fair air entry, abd snt calves nontender, ED course prolonged given difficult IV access including aborted right IJ attempt as pt noncompliant, eventual access obtained, pt feels better after nebs and steroids, still requiring supplemental O2, +pna, will admit, stable for floor

## 2019-07-11 NOTE — H&P ADULT - NSICDXPASTMEDICALHX_GEN_ALL_CORE_FT
PAST MEDICAL HISTORY:  Asthma with COPD     H/O: substance abuse     History of pancreatitis     Hypothyroidism PAST MEDICAL HISTORY:  Asthma with COPD     H/O: substance abuse     Hepatitis-C     History of pancreatitis     Hypothyroidism

## 2019-07-11 NOTE — ED PROVIDER NOTE - NS ED ROS FT
CONSTITUTIONAL: (+) tactile fevers, (+) chills, (+) decreased appetite  ENT: (-) congestion, (-) rhinorrhea, (-) sore throat  CARDIO: (+) chest pain, (-) palpitations, (-) edema  PULM: (+) cough, (+) sputum, (+) shortness of breath, (+) wheezing, (-) hemoptysis, (-) stridor  GI: (+) nausea, (+) vomiting, (-) diarrhea, (-) abdominal pain  : (-) dysuria, (-) hematuria, (-) frequency, (-) flank pain  MSK: (-) myalgias, (-) joint swelling, (-) joint stiffness  SKIN: (-) rashes, (-) wounds, (-) pallor, (-) ecchymosis, (-) jaundice  NEURO: (-) headache, (-) dizziness, (-) lightheadedness, (-) syncope, (-) weakness  *all other systems negative except as documented above and in the HPI*

## 2019-07-11 NOTE — ED PROVIDER NOTE - CARE PLAN
Principal Discharge DX:	Pneumonia  Secondary Diagnosis:	Dyspnea  Secondary Diagnosis:	COPD exacerbation Principal Discharge DX:	Pneumonia  Secondary Diagnosis:	Dyspnea  Secondary Diagnosis:	COPD exacerbation  Secondary Diagnosis:	Hypoxia

## 2019-07-11 NOTE — ED ADULT NURSE NOTE - NSIMPLEMENTINTERV_GEN_ALL_ED
Implemented All Fall Risk Interventions:  [place] to call system. Call bell, personal items and telephone within reach. Instruct patient to call for assistance. Room bathroom lighting operational. Non-slip footwear when patient is off stretcher. Physically safe environment: no spills, clutter or unnecessary equipment. Stretcher in lowest position, wheels locked, appropriate side rails in place. Provide visual cue, wrist band, yellow gown, etc. Monitor gait and stability. Monitor for mental status changes and reorient to person, place, and time. Review medications for side effects contributing to fall risk. Reinforce activity limits and safety measures with patient and family.

## 2019-07-11 NOTE — H&P ADULT - NSHPREVIEWOFSYSTEMS_GEN_ALL_CORE
REVIEW OF SYSTEMS:    CONSTITUTIONAL: POSITIVE weakness, fevers or chills  EYES/ENT: No visual changes;  No vertigo or throat pain   NECK: No pain or stiffness  RESPIRATORY: POSITIVE cough, wheezing, and shortness of breath  CARDIOVASCULAR: No chest pain or palpitations  GASTROINTESTINAL: No abdominal or epigastric pain. No nausea, vomiting, or hematemesis; No diarrhea or constipation. No melena or hematochezia.  GENITOURINARY: No dysuria, frequency or hematuria  NEUROLOGICAL: No numbness or weakness  SKIN: No itching, rashes

## 2019-07-11 NOTE — H&P ADULT - HISTORY OF PRESENT ILLNESS
49 yo female with PMHx of COPD/Asthma, hypothyroid and substance abuse presents with c/o 4 days of SOB and chest tightness. Pt has been using inhalers and nebs without much improvement. Pt admits to fever to 101 and nonproductive cough. Pt denies any sick close contacts.

## 2019-07-11 NOTE — H&P ADULT - NSHPLABSRESULTS_GEN_ALL_CORE
12.4   13.38 )-----------( 262      ( 11 Jul 2019 09:28 )             37.3       07-11    136  |  93<L>  |  8<L>  ----------------------------<  201<H>  4.1   |  27  |  0.6<L>    Ca    8.8      11 Jul 2019 09:28    TPro  7.9  /  Alb  4.3  /  TBili  0.8  /  DBili  x   /  AST  22  /  ALT  <5  /  AlkPhos  114  07-11                          Lactate Trend      CARDIAC MARKERS ( 11 Jul 2019 09:28 )  x     / <0.01 ng/mL / x     / x     / x          < from: Xray Chest 1 View-PORTABLE IMMEDIATE (07.11.19 @ 09:51) >      EXAM:  XR CHEST PORTABLE IMMED 1V            PROCEDURE DATE:  07/11/2019            INTERPRETATION:  Clinical History / Reason for exam: Shortness of breath   and chest pain    Comparison : Chest radiograph 6/11/2018.    Technique/Positioning: Single AP portable view.    Findings:    Support devices: None.    Cardiac/mediastinum/hilum: Stable.    Lung parenchyma/Pleura: Bibasilar airspace opacities suspicious for   pneumonia. No large effusion or pneumothorax.    Skeleton/soft tissues: Stable.    Impression:      Bibasilar opacities suspicious for pneumonia                  AIMEE BARNETT M.D., ATTENDING RADIOLOGIST  This document has been electronically signed. Jul 11 2019 12:19PM                < end of copied text >

## 2019-07-11 NOTE — ED PROVIDER NOTE - PHYSICAL EXAMINATION
VITALS:  I have reviewed the initial vital signs.  GENERAL: Well-developed, well-nourished, in no acute distress. On nonrebreather, nontoxic appearing.  HEENT: Sclera clear. No conjunctival injection. EOMI, PERRLA. Mucous membranes moist, oropharynx nonerythematous without swelling or lesions. Tonsils 2+ bilaterally and w/o exudate. Uvula midline and without edema.  NECK: supple w FROM. No cervical adenopathy.  CARDIO: RRR, nl S1 and S2. No murmurs, rubs, or gallops. No peripheral edema. 2+ radial pulses bilaterally. No chest wall tenderness.  PULM: Normal effort. No tachypnea or retractions. Expiratory wheezing with coarse rhonchi in the bases b/l. Decreased breath sounds throughout.  MSK: No joint swelling, erythema, or ttp.  GI: Abdomen soft and non-distended. Nontender in all four quadrants without rebound or guarding.  : No CVA tenderness b/l.  SKIN: Warm, dry. No pallor or rashes. Capillary refill <2 seconds.  NEURO: A&Ox3. Speech clear. No gross motor/sensory deficit.

## 2019-07-11 NOTE — ED ADULT NURSE NOTE - OBJECTIVE STATEMENT
Patient received at 11Am from nurse Quijano, alert and oriented x 4, on nonrebreather, with O2 sat of 92 % , sinus tachycardic on cardiac monitor, 20 gauge saline lock placed on left AC, blood drawn and sent including blood cultures. Medications administered as prescribed. Will follow up.

## 2019-07-11 NOTE — ED PROVIDER NOTE - OBJECTIVE STATEMENT
48 year old female w hx of COPD not on home O2, opioid dependence on Suboxone, hypothyroidism presents to the ED via EMS for evaluation of moderate, constant, progressively worsening shortness of breath x 2 days. Patient states her symptoms came on gradually with some non-radiating mid chest pain, as well as an increase in her usual cough with yellow sputum production. Yesterday patient began to experience nausea and vomiting, as well as subjective fevers and chills at home. Pt tried her albuterol neb at home w/o relief. Per EMS, patient was hypoxic in the 80s on room air upon their arrival, improved to 85-89 with 15 LPM NRB. Denies abd pain, diarrhea, leg pain/swelling, hemoptysis, recent travel/hospitalizations/immobilizations. Denies prior history of DVT/PE, MI/CAD.

## 2019-07-12 LAB
ALBUMIN SERPL ELPH-MCNC: 3.5 G/DL — SIGNIFICANT CHANGE UP (ref 3.5–5.2)
ALP SERPL-CCNC: 92 U/L — SIGNIFICANT CHANGE UP (ref 30–115)
ALT FLD-CCNC: <5 U/L — SIGNIFICANT CHANGE UP (ref 0–41)
ANION GAP SERPL CALC-SCNC: 15 MMOL/L — HIGH (ref 7–14)
AST SERPL-CCNC: 19 U/L — SIGNIFICANT CHANGE UP (ref 0–41)
BILIRUB SERPL-MCNC: 0.3 MG/DL — SIGNIFICANT CHANGE UP (ref 0.2–1.2)
BUN SERPL-MCNC: 8 MG/DL — LOW (ref 10–20)
CALCIUM SERPL-MCNC: 8.1 MG/DL — LOW (ref 8.5–10.1)
CHLORIDE SERPL-SCNC: 99 MMOL/L — SIGNIFICANT CHANGE UP (ref 98–110)
CO2 SERPL-SCNC: 23 MMOL/L — SIGNIFICANT CHANGE UP (ref 17–32)
CREAT SERPL-MCNC: 0.5 MG/DL — LOW (ref 0.7–1.5)
GLUCOSE SERPL-MCNC: 327 MG/DL — HIGH (ref 70–99)
HCT VFR BLD CALC: 32.1 % — LOW (ref 37–47)
HGB BLD-MCNC: 10.5 G/DL — LOW (ref 12–16)
MCHC RBC-ENTMCNC: 27.9 PG — SIGNIFICANT CHANGE UP (ref 27–31)
MCHC RBC-ENTMCNC: 32.7 G/DL — SIGNIFICANT CHANGE UP (ref 32–37)
MCV RBC AUTO: 85.1 FL — SIGNIFICANT CHANGE UP (ref 81–99)
NRBC # BLD: 0 /100 WBCS — SIGNIFICANT CHANGE UP (ref 0–0)
PLATELET # BLD AUTO: 228 K/UL — SIGNIFICANT CHANGE UP (ref 130–400)
POTASSIUM SERPL-MCNC: 3.6 MMOL/L — SIGNIFICANT CHANGE UP (ref 3.5–5)
POTASSIUM SERPL-SCNC: 3.6 MMOL/L — SIGNIFICANT CHANGE UP (ref 3.5–5)
PROT SERPL-MCNC: 6.6 G/DL — SIGNIFICANT CHANGE UP (ref 6–8)
RBC # BLD: 3.77 M/UL — LOW (ref 4.2–5.4)
RBC # FLD: 13.2 % — SIGNIFICANT CHANGE UP (ref 11.5–14.5)
SODIUM SERPL-SCNC: 137 MMOL/L — SIGNIFICANT CHANGE UP (ref 135–146)
WBC # BLD: 14.41 K/UL — HIGH (ref 4.8–10.8)
WBC # FLD AUTO: 14.41 K/UL — HIGH (ref 4.8–10.8)

## 2019-07-12 PROCEDURE — 99233 SBSQ HOSP IP/OBS HIGH 50: CPT

## 2019-07-12 RX ORDER — KETOROLAC TROMETHAMINE 30 MG/ML
30 SYRINGE (ML) INJECTION ONCE
Refills: 0 | Status: DISCONTINUED | OUTPATIENT
Start: 2019-07-12 | End: 2019-07-12

## 2019-07-12 RX ORDER — FLUOXETINE HCL 10 MG
40 CAPSULE ORAL DAILY
Refills: 0 | Status: DISCONTINUED | OUTPATIENT
Start: 2019-07-12 | End: 2019-07-23

## 2019-07-12 RX ORDER — ACETAMINOPHEN 500 MG
975 TABLET ORAL ONCE
Refills: 0 | Status: COMPLETED | OUTPATIENT
Start: 2019-07-12 | End: 2019-07-12

## 2019-07-12 RX ORDER — KETOROLAC TROMETHAMINE 30 MG/ML
30 SYRINGE (ML) INJECTION EVERY 6 HOURS
Refills: 0 | Status: DISCONTINUED | OUTPATIENT
Start: 2019-07-12 | End: 2019-07-17

## 2019-07-12 RX ORDER — TRAZODONE HCL 50 MG
25 TABLET ORAL ONCE
Refills: 0 | Status: COMPLETED | OUTPATIENT
Start: 2019-07-12 | End: 2019-07-13

## 2019-07-12 RX ORDER — ENOXAPARIN SODIUM 100 MG/ML
40 INJECTION SUBCUTANEOUS DAILY
Refills: 0 | Status: DISCONTINUED | OUTPATIENT
Start: 2019-07-12 | End: 2019-07-23

## 2019-07-12 RX ADMIN — Medication 30 MILLIGRAM(S): at 21:41

## 2019-07-12 RX ADMIN — SODIUM CHLORIDE 75 MILLILITER(S): 9 INJECTION INTRAMUSCULAR; INTRAVENOUS; SUBCUTANEOUS at 16:10

## 2019-07-12 RX ADMIN — SODIUM CHLORIDE 75 MILLILITER(S): 9 INJECTION INTRAMUSCULAR; INTRAVENOUS; SUBCUTANEOUS at 03:31

## 2019-07-12 RX ADMIN — AZITHROMYCIN 255 MILLIGRAM(S): 500 TABLET, FILM COATED ORAL at 16:10

## 2019-07-12 RX ADMIN — Medication 40 MILLIGRAM(S): at 21:42

## 2019-07-12 RX ADMIN — Medication 3 MILLILITER(S): at 07:30

## 2019-07-12 RX ADMIN — BUPRENORPHINE AND NALOXONE 2 TABLET(S): 2; .5 TABLET SUBLINGUAL at 11:43

## 2019-07-12 RX ADMIN — CEFTRIAXONE 100 MILLIGRAM(S): 500 INJECTION, POWDER, FOR SOLUTION INTRAMUSCULAR; INTRAVENOUS at 15:17

## 2019-07-12 RX ADMIN — Medication 3 MILLILITER(S): at 02:13

## 2019-07-12 RX ADMIN — ENOXAPARIN SODIUM 40 MILLIGRAM(S): 100 INJECTION SUBCUTANEOUS at 15:18

## 2019-07-12 RX ADMIN — Medication 25 MICROGRAM(S): at 05:03

## 2019-07-12 RX ADMIN — Medication 40 MILLIGRAM(S): at 15:17

## 2019-07-12 RX ADMIN — Medication 40 MILLIGRAM(S): at 05:03

## 2019-07-12 RX ADMIN — Medication 40 MILLIGRAM(S): at 16:38

## 2019-07-12 RX ADMIN — Medication 3 MILLILITER(S): at 13:18

## 2019-07-12 NOTE — PROGRESS NOTE ADULT - SUBJECTIVE AND OBJECTIVE BOX
EZRA BARCLAY  48y, Female  Allergy: No Known Allergies    HPI:  47 yo female with PMHx of COPD/Asthma, hypothyroid and substance abuse presents with c/o 4 days of SOB and chest tightness. Pt has been using inhalers and nebs without much improvement. Pt admits to fever to 101 and nonproductive cough. Pt denies any sick close contacts.     OVERNIGHT EVENTS:  Patient seen and evaluated earlier at bedside, she was complaining of pleuritic chest pain otherwise improving slowly.     PAST MEDICAL & SURGICAL HISTORY:  Hepatitis-C  History of pancreatitis  H/O: substance abuse  Hypothyroidism  Asthma with COPD  History of tonsillectomy  History of appendectomy      VITALS:  T(F): 96.9 (07-12-19 @ 05:44), Max: 97.4 (07-11-19 @ 14:01)  HR: 96 (07-12-19 @ 08:09)  BP: 122/67 (07-12-19 @ 05:44) (122/67 - 146/80)  RR: 16 (07-12-19 @ 08:09)  SpO2: 92% (07-12-19 @ 08:09)    General: WN/WD NAD,  Neurology: A&Ox3, nonfocal, LEMUS x 4  Eyes: PERRLA/ EOMI, Gross vision intact  ENT/Neck: Neck supple, trachea midline, No JVD, Gross hearing intact  Respiratory: scattered wheezes  CV: RRR, S1S2, no murmurs, rubs or gallops  Abdominal: Soft, NT, ND +BS,   Extremities: No edema, + peripheral pulses  Skin: No Rashes, Hematoma, Ecchymosis      TESTS & MEASUREMENTS:  Height (cm): 172.72 (07-11-19 @ 14:01)  Weight (kg): 74 (07-11-19 @ 14:01)  BMI (kg/m2): 24.8 (07-11-19 @ 14:01)                          10.5   14.41 )-----------( 228      ( 12 Jul 2019 09:34 )             32.1       07-12    137  |  99  |  8<L>  ----------------------------<  327<H>  3.6   |  23  |  0.5<L>    Ca    8.1<L>      12 Jul 2019 09:34    TPro  6.6  /  Alb  3.5  /  TBili  0.3  /  DBili  x   /  AST  19  /  ALT  <5  /  AlkPhos  92  07-12    LIVER FUNCTIONS - ( 12 Jul 2019 09:34 )  Alb: 3.5 g/dL / Pro: 6.6 g/dL / ALK PHOS: 92 U/L / ALT: <5 U/L / AST: 19 U/L / GGT: x           CARDIAC MARKERS ( 11 Jul 2019 09:28 )  x     / <0.01 ng/mL / x     / x     / x              RADIOLOGY & ADDITIONAL TESTS:    MEDICATIONS:  MEDICATIONS  (STANDING):  ALBUTerol/ipratropium for Nebulization 3 milliLiter(s) Nebulizer every 6 hours  azithromycin  IVPB 500 milliGRAM(s) IV Intermittent every 24 hours  buprenorphine 2 mG/naloxone 0.5 mG SL  Tablet 2 Tablet(s) SubLingual daily  buprenorphine 8 mG/naloxone 2 mG SL  Tablet 2 Tablet(s) SubLingual daily  cefTRIAXone   IVPB 1000 milliGRAM(s) IV Intermittent every 24 hours  levothyroxine 25 MICROGram(s) Oral daily  methylPREDNISolone sodium succinate Injectable 40 milliGRAM(s) IV Push every 8 hours  sodium chloride 0.9%. 1000 milliLiter(s) (75 mL/Hr) IV Continuous <Continuous>    MEDICATIONS  (PRN):  ALBUTerol/ipratropium for Nebulization. 3 milliLiter(s) Nebulizer every 4 hours PRN Shortness of Breath and/or Wheezing      HEALTH ISSUES - PROBLEM Dx:  H/O: substance abuse: H/O: substance abuse  Hypothyroidism, unspecified type: Hypothyroidism, unspecified type  Asthma with COPD: Asthma with COPD  Pneumonia of both lower lobes due to infectious organism: Pneumonia of both lower lobes due to infectious organism

## 2019-07-12 NOTE — CHART NOTE - NSCHARTNOTEFT_GEN_A_CORE
Patient complaining of severe chest pain especially with inspiration. Also now coughing up blood. Pulse ox during episode is 85% on N/C. Actually started looking better during conversation. Some blood streaking seen in napkin. Will give toradol and give VM oxygen for now. I reviewed CXR

## 2019-07-12 NOTE — PROGRESS NOTE ADULT - ASSESSMENT
49 yo female with h/o COPD presents with SOB and chest tightness and found to be hypoxic        ASSESSMENT and PLANS:    1. Acute hypoxic respiratory failure due to COPD exacerbation with underlying PNA  -C/w IV Solumedrol  -Duoneb  -Pulse ox monitoring  -O2 supplement as needed  -may require home O2 when ready for discharge  -Pulmonary consult pending, f/u    2. CAP  -C/w IV Zithromax and ceftriaxone  - monitor vitals    3. Hypothyroidism  -C/w synthroid    4. Ex-substance abuser  -monitor    DVT prophylaxis  Lovenox    #Progress Note Handoff  Pending (specify):  Consults_Pulmonologist  Family discussion: None  Disposition: Home when stable for discharge. Still acutely ill. 49 yo female with h/o COPD presents with SOB and chest tightness and found to be hypoxic        ASSESSMENT and PLANS:    1. Acute hypoxic respiratory failure due to COPD exacerbation with underlying PNA  -C/w IV Solumedrol  -Duoneb  -Pulse ox monitoring  -O2 supplement as needed  -may require home O2 when ready for discharge. ambulate patient on and off oxygen  -Pulmonary consult pending, f/u  -ID consulted    2. CAP  -C/w IV Zithromax and ceftriaxone  - monitor vitals    3. Hypothyroidism  -C/w synthroid    4. Ex-substance abuser  -monitor    DVT prophylaxis  Lovenox    #Progress Note Handoff  Pending (specify):  Consults_Pulmonologist and ID  Family discussion: None  Disposition: Home when stable for discharge. Still acutely ill.

## 2019-07-13 LAB
ALBUMIN SERPL ELPH-MCNC: 3.3 G/DL — LOW (ref 3.5–5.2)
ALP SERPL-CCNC: 89 U/L — SIGNIFICANT CHANGE UP (ref 30–115)
ALT FLD-CCNC: 8 U/L — SIGNIFICANT CHANGE UP (ref 0–41)
AMPHET UR-MCNC: NEGATIVE — SIGNIFICANT CHANGE UP
ANION GAP SERPL CALC-SCNC: 13 MMOL/L — SIGNIFICANT CHANGE UP (ref 7–14)
AST SERPL-CCNC: 29 U/L — SIGNIFICANT CHANGE UP (ref 0–41)
BARBITURATES UR SCN-MCNC: NEGATIVE — SIGNIFICANT CHANGE UP
BENZODIAZ UR-MCNC: NEGATIVE — SIGNIFICANT CHANGE UP
BILIRUB SERPL-MCNC: 0.2 MG/DL — SIGNIFICANT CHANGE UP (ref 0.2–1.2)
BUN SERPL-MCNC: 17 MG/DL — SIGNIFICANT CHANGE UP (ref 10–20)
CALCIUM SERPL-MCNC: 8.3 MG/DL — LOW (ref 8.5–10.1)
CHLORIDE SERPL-SCNC: 103 MMOL/L — SIGNIFICANT CHANGE UP (ref 98–110)
CO2 SERPL-SCNC: 24 MMOL/L — SIGNIFICANT CHANGE UP (ref 17–32)
COCAINE METAB.OTHER UR-MCNC: NEGATIVE — SIGNIFICANT CHANGE UP
CREAT SERPL-MCNC: 0.6 MG/DL — LOW (ref 0.7–1.5)
DRUG SCREEN 1, URINE RESULT: SIGNIFICANT CHANGE UP
GLUCOSE BLDC GLUCOMTR-MCNC: 105 MG/DL — HIGH (ref 70–99)
GLUCOSE BLDC GLUCOMTR-MCNC: 274 MG/DL — HIGH (ref 70–99)
GLUCOSE BLDC GLUCOMTR-MCNC: 359 MG/DL — HIGH (ref 70–99)
GLUCOSE BLDC GLUCOMTR-MCNC: 361 MG/DL — HIGH (ref 70–99)
GLUCOSE BLDC GLUCOMTR-MCNC: 423 MG/DL — HIGH (ref 70–99)
GLUCOSE SERPL-MCNC: 123 MG/DL — HIGH (ref 70–99)
GRAM STN FLD: SIGNIFICANT CHANGE UP
HCG UR QL: NEGATIVE — SIGNIFICANT CHANGE UP
HCT VFR BLD CALC: 29.3 % — LOW (ref 37–47)
HGB BLD-MCNC: 9.8 G/DL — LOW (ref 12–16)
MCHC RBC-ENTMCNC: 29 PG — SIGNIFICANT CHANGE UP (ref 27–31)
MCHC RBC-ENTMCNC: 33.4 G/DL — SIGNIFICANT CHANGE UP (ref 32–37)
MCV RBC AUTO: 86.7 FL — SIGNIFICANT CHANGE UP (ref 81–99)
METHADONE UR-MCNC: NEGATIVE — SIGNIFICANT CHANGE UP
NRBC # BLD: 0 /100 WBCS — SIGNIFICANT CHANGE UP (ref 0–0)
OPIATES UR-MCNC: NEGATIVE — SIGNIFICANT CHANGE UP
PCP UR-MCNC: NEGATIVE — SIGNIFICANT CHANGE UP
PLATELET # BLD AUTO: 261 K/UL — SIGNIFICANT CHANGE UP (ref 130–400)
POTASSIUM SERPL-MCNC: 3.5 MMOL/L — SIGNIFICANT CHANGE UP (ref 3.5–5)
POTASSIUM SERPL-SCNC: 3.5 MMOL/L — SIGNIFICANT CHANGE UP (ref 3.5–5)
PROPOXYPHENE QUALITATIVE URINE RESULT: NEGATIVE — SIGNIFICANT CHANGE UP
PROT SERPL-MCNC: 6.3 G/DL — SIGNIFICANT CHANGE UP (ref 6–8)
RBC # BLD: 3.38 M/UL — LOW (ref 4.2–5.4)
RBC # FLD: 13.3 % — SIGNIFICANT CHANGE UP (ref 11.5–14.5)
SODIUM SERPL-SCNC: 140 MMOL/L — SIGNIFICANT CHANGE UP (ref 135–146)
SPECIMEN SOURCE: SIGNIFICANT CHANGE UP
THC UR QL: POSITIVE
WBC # BLD: 12.51 K/UL — HIGH (ref 4.8–10.8)
WBC # FLD AUTO: 12.51 K/UL — HIGH (ref 4.8–10.8)

## 2019-07-13 PROCEDURE — 93970 EXTREMITY STUDY: CPT | Mod: 26

## 2019-07-13 PROCEDURE — 99233 SBSQ HOSP IP/OBS HIGH 50: CPT

## 2019-07-13 PROCEDURE — 99221 1ST HOSP IP/OBS SF/LOW 40: CPT

## 2019-07-13 PROCEDURE — 71250 CT THORAX DX C-: CPT | Mod: 26

## 2019-07-13 RX ORDER — DEXTROSE 50 % IN WATER 50 %
12.5 SYRINGE (ML) INTRAVENOUS ONCE
Refills: 0 | Status: DISCONTINUED | OUTPATIENT
Start: 2019-07-13 | End: 2019-07-23

## 2019-07-13 RX ORDER — INSULIN LISPRO 100/ML
VIAL (ML) SUBCUTANEOUS
Refills: 0 | Status: DISCONTINUED | OUTPATIENT
Start: 2019-07-13 | End: 2019-07-23

## 2019-07-13 RX ORDER — DEXTROSE 50 % IN WATER 50 %
25 SYRINGE (ML) INTRAVENOUS ONCE
Refills: 0 | Status: DISCONTINUED | OUTPATIENT
Start: 2019-07-13 | End: 2019-07-23

## 2019-07-13 RX ORDER — PANTOPRAZOLE SODIUM 20 MG/1
40 TABLET, DELAYED RELEASE ORAL
Refills: 0 | Status: DISCONTINUED | OUTPATIENT
Start: 2019-07-13 | End: 2019-07-23

## 2019-07-13 RX ORDER — SODIUM CHLORIDE 9 MG/ML
1000 INJECTION, SOLUTION INTRAVENOUS
Refills: 0 | Status: DISCONTINUED | OUTPATIENT
Start: 2019-07-13 | End: 2019-07-23

## 2019-07-13 RX ORDER — INSULIN LISPRO 100/ML
VIAL (ML) SUBCUTANEOUS AT BEDTIME
Refills: 0 | Status: DISCONTINUED | OUTPATIENT
Start: 2019-07-13 | End: 2019-07-23

## 2019-07-13 RX ORDER — INSULIN LISPRO 100/ML
6 VIAL (ML) SUBCUTANEOUS ONCE
Refills: 0 | Status: COMPLETED | OUTPATIENT
Start: 2019-07-13 | End: 2019-07-13

## 2019-07-13 RX ORDER — INSULIN LISPRO 100/ML
12 VIAL (ML) SUBCUTANEOUS ONCE
Refills: 0 | Status: COMPLETED | OUTPATIENT
Start: 2019-07-13 | End: 2019-07-13

## 2019-07-13 RX ORDER — DEXTROSE 50 % IN WATER 50 %
15 SYRINGE (ML) INTRAVENOUS ONCE
Refills: 0 | Status: DISCONTINUED | OUTPATIENT
Start: 2019-07-13 | End: 2019-07-23

## 2019-07-13 RX ORDER — GLUCAGON INJECTION, SOLUTION 0.5 MG/.1ML
1 INJECTION, SOLUTION SUBCUTANEOUS ONCE
Refills: 0 | Status: DISCONTINUED | OUTPATIENT
Start: 2019-07-13 | End: 2019-07-23

## 2019-07-13 RX ORDER — INSULIN LISPRO 100/ML
5 VIAL (ML) SUBCUTANEOUS
Refills: 0 | Status: DISCONTINUED | OUTPATIENT
Start: 2019-07-13 | End: 2019-07-16

## 2019-07-13 RX ADMIN — Medication 30 MILLIGRAM(S): at 13:13

## 2019-07-13 RX ADMIN — Medication 30 MILLIGRAM(S): at 21:23

## 2019-07-13 RX ADMIN — Medication 60 MILLIGRAM(S): at 21:19

## 2019-07-13 RX ADMIN — Medication 40 MILLIGRAM(S): at 06:56

## 2019-07-13 RX ADMIN — Medication 25 MICROGRAM(S): at 06:55

## 2019-07-13 RX ADMIN — SODIUM CHLORIDE 75 MILLILITER(S): 9 INJECTION INTRAMUSCULAR; INTRAVENOUS; SUBCUTANEOUS at 21:18

## 2019-07-13 RX ADMIN — Medication 60 MILLIGRAM(S): at 13:22

## 2019-07-13 RX ADMIN — ENOXAPARIN SODIUM 40 MILLIGRAM(S): 100 INJECTION SUBCUTANEOUS at 11:32

## 2019-07-13 RX ADMIN — BUPRENORPHINE AND NALOXONE 2 TABLET(S): 2; .5 TABLET SUBLINGUAL at 11:34

## 2019-07-13 RX ADMIN — Medication 5 UNIT(S): at 11:33

## 2019-07-13 RX ADMIN — Medication 40 MILLIGRAM(S): at 11:32

## 2019-07-13 RX ADMIN — Medication 3 MILLILITER(S): at 13:05

## 2019-07-13 RX ADMIN — Medication 25 MILLIGRAM(S): at 03:10

## 2019-07-13 RX ADMIN — BUPRENORPHINE AND NALOXONE 2 TABLET(S): 2; .5 TABLET SUBLINGUAL at 11:33

## 2019-07-13 RX ADMIN — Medication 3 MILLILITER(S): at 21:03

## 2019-07-13 RX ADMIN — PANTOPRAZOLE SODIUM 40 MILLIGRAM(S): 20 TABLET, DELAYED RELEASE ORAL at 09:01

## 2019-07-13 RX ADMIN — CEFTRIAXONE 100 MILLIGRAM(S): 500 INJECTION, POWDER, FOR SOLUTION INTRAMUSCULAR; INTRAVENOUS at 16:44

## 2019-07-13 RX ADMIN — SODIUM CHLORIDE 75 MILLILITER(S): 9 INJECTION INTRAMUSCULAR; INTRAVENOUS; SUBCUTANEOUS at 06:55

## 2019-07-13 RX ADMIN — Medication 10: at 11:33

## 2019-07-13 RX ADMIN — Medication 30 MILLIGRAM(S): at 20:09

## 2019-07-13 RX ADMIN — Medication 30 MILLIGRAM(S): at 03:09

## 2019-07-13 RX ADMIN — Medication 6 UNIT(S): at 09:48

## 2019-07-13 RX ADMIN — Medication 3 MILLILITER(S): at 08:56

## 2019-07-13 RX ADMIN — Medication 12 UNIT(S): at 21:19

## 2019-07-13 RX ADMIN — Medication 30 MILLIGRAM(S): at 11:45

## 2019-07-13 RX ADMIN — AZITHROMYCIN 255 MILLIGRAM(S): 500 TABLET, FILM COATED ORAL at 18:04

## 2019-07-13 NOTE — CONSULT NOTE ADULT - ASSESSMENT
ASSESSMENT  47 yo female with PMHx of HCV, COPD/Asthma, hypothyroid and substance abuse presents with c/o 4 days of SOB and chest tightness    IMPRESSION  Sepsis on admission Pulse>90, Resp Rate>20, WBC>12, due to suspected community acquired PNA    met alkalosis    CXR b/l opacificiations    Duplex neg DVT  #HCV, VL UD, Hep B core +, HBV DNA UD      RECOMMENDATIONS  - Ceftriaxone 1g q24h and azithro 500mg IV (x3 d)  - Pending CT chest  - HIV testing, not in sunrise  - When stable d/c on po vantin 200mg BID to complete 7 days total     Spectra [id number] ASSESSMENT  49 yo female with PMHx of HCV, COPD/Asthma, hypothyroid and substance abuse presents with c/o 4 days of SOB and chest tightness    IMPRESSION  Sepsis on admission Pulse>90, Resp Rate>20, WBC>12, due to suspected community acquired PNA    met alkalosis    CXR b/l opacificiations    Duplex neg DVT  #HCV, VL UD cleared without therapy, Hep B core +, HBV DNA UD    RECOMMENDATIONS  - Ceftriaxone 1g q24h and azithro 500mg IV (x3 d)  - Pending CT chest  - sputum cx  - HIV testing, not in sunrise  - When stable likely d/c on po vantin 200mg BID to complete 7 days total     Spectra [id number]

## 2019-07-13 NOTE — CONSULT NOTE ADULT - ASSESSMENT
SOB/ COUGH/ CHEST PAIN THAT INCREASE WITH PALPATION/ BIBASILAR INFILTRATES/ COPD EXACERBATION/ PNA/ ? ASPIRATION/ EKG REVIEWED/ TROP X1 NEG    - IV SOLUMEDROL 60 Q 8 H  - NEB Q 4 H  - IV ABX  - SPUTUM GRAM STAIN / CX  - CHEST CT NC  - LE DOPPLER  - KEEP SAO2 92%  - U TOX  - ECHO  - FS CONTROL  - DVT/ GI PROPHYLAXIS SOB/ COUGH/ CHEST PAIN THAT INCREASE WITH PALPATION/ BIBASILAR INFILTRATES/ COPD EXACERBATION/ PNA/ ? ASPIRATION/ EKG REVIEWED/ TROP X1 NEG    - IV SOLUMEDROL 60 Q 8 H  - NEB Q 4 H  - IV ABX  - SPUTUM GRAM STAIN / CX  - CHEST CT NC  - LE DOPPLER  - KEEP SAO2 92%  - U TOX/ LEGIONELLA/ STREP  - ECHO  - FS CONTROL  - DVT/ GI PROPHYLAXIS

## 2019-07-13 NOTE — CHART NOTE - NSCHARTNOTEFT_GEN_A_CORE
called in by RN that patient no cooperating to be transported to CT scan (several excuses including the current one of visitor in the room); explained the importance of CT scan but patient refused for now. Will continue to convince her for the test. Pt understands the importance of inpatient imaging studies.

## 2019-07-13 NOTE — CONSULT NOTE ADULT - SUBJECTIVE AND OBJECTIVE BOX
Patient is a 48y old  Female who presents with a chief complaint of sob (12 Jul 2019 10:57)      HPI:  47 yo female with PMHx of COPD/Asthma stopped smoking 2 years ago (no pulmonary), hypothyroid and substance abuse (ex heroin user) presents with c/o 4 days of SOB and chest tightness. Pt has been using inhalers and nebs without much improvement. Pt admits to fever to 101 and nonproductive cough. Pt denies any sick close contacts. (11 Jul 2019 13:40), her symptoms worsened came to ED, also reports cough with streaks of blood, was started on abx/ steroids nebulizer, called to evaluate. this AM still c/o chest pain that increase with palpation and productive cough, scant blood.      PAST MEDICAL & SURGICAL HISTORY:  Hepatitis-C  History of pancreatitis  H/O: substance abuse  Hypothyroidism  Asthma with COPD  History of tonsillectomy  History of appendectomy      SOCIAL HX:   Smoking     ex         ex drug abuse    FAMILY HISTORY:      REVIEW OF SYSTEM see hpi  	      Allergies    No Known Allergies    Intolerances        ALBUTerol/ipratropium for Nebulization 3 milliLiter(s) Nebulizer every 6 hours  ALBUTerol/ipratropium for Nebulization. 3 milliLiter(s) Nebulizer every 4 hours PRN  azithromycin  IVPB 500 milliGRAM(s) IV Intermittent every 24 hours  buprenorphine 2 mG/naloxone 0.5 mG SL  Tablet 2 Tablet(s) SubLingual daily  buprenorphine 8 mG/naloxone 2 mG SL  Tablet 2 Tablet(s) SubLingual daily  cefTRIAXone   IVPB 1000 milliGRAM(s) IV Intermittent every 24 hours  enoxaparin Injectable 40 milliGRAM(s) SubCutaneous daily  FLUoxetine 40 milliGRAM(s) Oral daily  ketorolac   Injectable 30 milliGRAM(s) IV Push every 6 hours PRN  levothyroxine 25 MICROGram(s) Oral daily  methylPREDNISolone sodium succinate Injectable 40 milliGRAM(s) IV Push every 8 hours  sodium chloride 0.9%. 1000 milliLiter(s) IV Continuous <Continuous>  : Home Meds:      PHYSICAL EXAM    ICU Vital Signs Last 24 Hrs  T(C): 36.9 (12 Jul 2019 21:50), Max: 36.9 (12 Jul 2019 21:50)  T(F): 98.5 (12 Jul 2019 21:50), Max: 98.5 (12 Jul 2019 21:50)  HR: 90 (12 Jul 2019 21:50) (79 - 96)  BP: 156/108 (12 Jul 2019 21:50) (122/67 - 156/108)  RR: 16 (12 Jul 2019 21:50) (16 - 18)  SpO2: 94% (12 Jul 2019 21:40) (82% - 94%)      General:  HEENT:  UBALDO              Lymph Nodes: No cervical LN   Lungs: Bilateral BS, bibasilar crackles, diffuse wheezing  Cardiovascular: Regular  Abdomen: Soft, Positive BS  Extremities: No clubbing  Skin: Warm  Neurological: Non focal         LABS:                          10.5   14.41 )-----------( 228      ( 12 Jul 2019 09:34 )             32.1                                               07-12    137  |  99  |  8<L>  ----------------------------<  327<H>  3.6   |  23  |  0.5<L>    Ca    8.1<L>      12 Jul 2019 09:34    TPro  6.6  /  Alb  3.5  /  TBili  0.3  /  DBili  x   /  AST  19  /  ALT  <5  /  AlkPhos  92  07-12                                                 CARDIAC MARKERS ( 11 Jul 2019 09:28 )  x     / <0.01 ng/mL / x     / x     / x                                                LIVER FUNCTIONS - ( 12 Jul 2019 09:34 )  Alb: 3.5 g/dL / Pro: 6.6 g/dL / ALK PHOS: 92 U/L / ALT: <5 U/L / AST: 19 U/L / GGT: x                                                  Culture - Blood (collected 11 Jul 2019 13:21)  Source: .Blood Blood-Peripheral  Preliminary Report (12 Jul 2019 22:01):    No growth to date.    Culture - Blood (collected 11 Jul 2019 13:21)  Source: .Blood Blood-Peripheral  Preliminary Report (12 Jul 2019 22:01):    No growth to date.                                                                                           X-Rays reviewed    MEDICATIONS  (STANDING):  ALBUTerol/ipratropium for Nebulization 3 milliLiter(s) Nebulizer every 6 hours  azithromycin  IVPB 500 milliGRAM(s) IV Intermittent every 24 hours  buprenorphine 2 mG/naloxone 0.5 mG SL  Tablet 2 Tablet(s) SubLingual daily  buprenorphine 8 mG/naloxone 2 mG SL  Tablet 2 Tablet(s) SubLingual daily  cefTRIAXone   IVPB 1000 milliGRAM(s) IV Intermittent every 24 hours  enoxaparin Injectable 40 milliGRAM(s) SubCutaneous daily  FLUoxetine 40 milliGRAM(s) Oral daily  levothyroxine 25 MICROGram(s) Oral daily  methylPREDNISolone sodium succinate Injectable 40 milliGRAM(s) IV Push every 8 hours  sodium chloride 0.9%. 1000 milliLiter(s) (75 mL/Hr) IV Continuous <Continuous>    MEDICATIONS  (PRN):  ALBUTerol/ipratropium for Nebulization. 3 milliLiter(s) Nebulizer every 4 hours PRN Shortness of Breath and/or Wheezing  ketorolac   Injectable 30 milliGRAM(s) IV Push every 6 hours PRN Moderate Pain (4 - 6)

## 2019-07-13 NOTE — PROVIDER CONTACT NOTE (OTHER) - SITUATION
Pt crying, c/o chest/pleuritic pain.  P.O. 85% on 4LNC.   Pt also coughing up blood tinged sputum.  Dr. Dinero notified and came to assess patient.

## 2019-07-13 NOTE — CONSULT NOTE ADULT - SUBJECTIVE AND OBJECTIVE BOX
EZRA BARCLAY  48y, Female  Allergy: No Known Allergies      CHIEF COMPLAINT: pna (13 Jul 2019 13:51)      HPI:  49 yo female with PMHx of COPD/Asthma, hypothyroid and substance abuse presents with c/o 4 days of SOB and chest tightness. Pt has been using inhalers and nebs without much improvement. Pt admits to fever to 101 and nonproductive cough. Pt denies any sick close contacts. (11 Jul 2019 13:40)    pt refusing CT    INFECTIOUS DISEASE HISTORY:    PAST MEDICAL & SURGICAL HISTORY:  Hepatitis-C  History of pancreatitis  H/O: substance abuse  Hypothyroidism  Asthma with COPD  History of tonsillectomy  History of appendectomy      FAMILY HISTORY      SOCIAL HISTORY  Social History (marital status, living situation, occupation, tobacco use, alcohol and drug use, and sexual history): denies ETOH, substance abuse - no use x 3 years on suboxone  no smoking      ROS  General: Denies rigors, nightsweats  HEENT: Denies headache, rhinorrhea, sore throat, eye pain  CV: Denies CP, palpitations  PULM: as noted above   GI: Denies abdominal pain, hematochezia/melena  : Denies dysuria, hematuria  MSK: Denies arthralgias, myalgias  SKIN: Denies rash, lesions  NEURO: Denies paresthesias, weakness  PSYCH: Denies depression, anxiety    VITALS:  T(F): 96.5, Max: 98.5 (07-12-19 @ 21:50)  HR: 93  BP: 134/67  RR: 16Vital Signs Last 24 Hrs  T(C): 35.8 (13 Jul 2019 14:43), Max: 36.9 (12 Jul 2019 21:50)  T(F): 96.5 (13 Jul 2019 14:43), Max: 98.5 (12 Jul 2019 21:50)  HR: 93 (13 Jul 2019 14:43) (68 - 93)  BP: 134/67 (13 Jul 2019 14:43) (116/64 - 156/108)  BP(mean): --  RR: 16 (13 Jul 2019 14:43) (16 - 16)  SpO2: 91% (13 Jul 2019 11:47) (85% - 94%)    PHYSICAL EXAM:  Gen: NAD, resting in bed  HEENT: Normocephalic, atraumatic  Neck: supple, no lymphadenopathy  CV: Regular rate & regular rhythm  Lungs: decreased BS at bases, no fremitus  Abdomen: Soft, BS present  Ext: Warm, well perfused  Neuro: non focal, awake  Skin: no rash, no erythema  Lines: no phlebitis    TESTS & MEASUREMENTS:                        10.5   14.41 )-----------( 228      ( 12 Jul 2019 09:34 )             32.1     07-12    137  |  99  |  8<L>  ----------------------------<  327<H>  3.6   |  23  |  0.5<L>    Ca    8.1<L>      12 Jul 2019 09:34    TPro  6.6  /  Alb  3.5  /  TBili  0.3  /  DBili  x   /  AST  19  /  ALT  <5  /  AlkPhos  92  07-12      LIVER FUNCTIONS - ( 12 Jul 2019 09:34 )  Alb: 3.5 g/dL / Pro: 6.6 g/dL / ALK PHOS: 92 U/L / ALT: <5 U/L / AST: 19 U/L / GGT: x               Culture - Blood (collected 07-11-19 @ 13:21)  Source: .Blood Blood-Peripheral  Preliminary Report (07-12-19 @ 22:01):    No growth to date.    Culture - Blood (collected 07-11-19 @ 13:21)  Source: .Blood Blood-Peripheral  Preliminary Report (07-12-19 @ 22:01):    No growth to date.        Blood Gas Venous - Lactate: 1.8 mmoL/L (07-11-19 @ 12:41)      INFECTIOUS DISEASES TESTING      RADIOLOGY & ADDITIONAL TESTS:  I have personally reviewed the last Chest xray  CXR      CT      CARDIOLOGY TESTING  12 Lead ECG:   Ventricular Rate 90 BPM    Atrial Rate 90 BPM    P-R Interval 130 ms    QRS Duration 100 ms    Q-T Interval 382 ms    QTC Calculation(Bezet) 467 ms    P Axis 52 degrees    R Axis 62 degrees    T Axis 42 degrees    Diagnosis Line Normal sinus rhythm  Incomplete right bundle branch block  ST & T wave abnormality, consider anterior ischemia  Abnormal ECG    Confirmed by TERRENCE NEWBY MD ([id number]) on 7/12/2019 9:20:47 AM (07-11-19 @ 22:31)  12 Lead ECG:   Ventricular Rate 83 BPM    Atrial Rate 83 BPM    P-R Interval 112 ms    QRS Duration 100 ms    Q-T Interval 396 ms    QTC Calculation(Bezet) 465 ms    P Axis -6 degrees    R Axis 79 degrees    T Axis 54 degrees    Diagnosis Line Normal sinus rhythm  ST & T wave abnormality, consider anterior ischemia    Confirmed by TERRENCE NEWBY MD ([id number]) on 7/11/2019 12:05:15 PM (07-11-19 @ 09:26)      MEDICATIONS  ALBUTerol/ipratropium for Nebulization 3  azithromycin  IVPB 500  buprenorphine 2 mG/naloxone 0.5 mG SL  Tablet 2  buprenorphine 8 mG/naloxone 2 mG SL  Tablet 2  cefTRIAXone   IVPB 1000  dextrose 5%. 1000  dextrose 50% Injectable 12.5  dextrose 50% Injectable 25  dextrose 50% Injectable 25  enoxaparin Injectable 40  FLUoxetine 40  insulin lispro (HumaLOG) corrective regimen sliding scale   insulin lispro (HumaLOG) corrective regimen sliding scale   insulin lispro Injectable (HumaLOG) 5  levothyroxine 25  methylPREDNISolone sodium succinate Injectable 60  pantoprazole    Tablet 40  sodium chloride 0.9%. 1000      ANTIBIOTICS:  azithromycin  IVPB 500 milliGRAM(s) IV Intermittent every 24 hours  cefTRIAXone   IVPB 1000 milliGRAM(s) IV Intermittent every 24 hours      No Known Allergies EZRA BARCLAY  48y, Female  Allergy: No Known Allergies      CHIEF COMPLAINT: pna (13 Jul 2019 13:51)      HPI:  47 yo female with PMHx of COPD/Asthma, hypothyroid and substance abuse presents with c/o 4 days of SOB and chest tightness. Pt has been using inhalers and nebs without much improvement. Pt admits to fever to 101 and nonproductive cough. Pt denies any sick close contacts. (11 Jul 2019 13:40)    pt refusing CT    INFECTIOUS DISEASE HISTORY:  Reports coughing with phlegm and fever. Reports 1 episode of diarrhea. Reports last night had an episode of hemoptysis. Neg TB testing in the past. No IVDU >3 yrs.   PAST MEDICAL & SURGICAL HISTORY:  Hepatitis-C  History of pancreatitis  H/O: substance abuse  Hypothyroidism  Asthma with COPD  History of tonsillectomy  History of appendectomy      FAMILY HISTORY      SOCIAL HISTORY  Social History (marital status, living situation, occupation, tobacco use, alcohol and drug use, and sexual history): denies ETOH, substance abuse - no use x 3 years on suboxone  no smoking      ROS  General: Denies rigors, nightsweats  HEENT: Denies headache, rhinorrhea, sore throat, eye pain  CV: Denies CP, palpitations  PULM: as noted above   GI: Denies abdominal pain, hematochezia/melena  : Denies dysuria, hematuria  MSK: Denies arthralgias, myalgias  SKIN: Denies rash, lesions  NEURO: Denies paresthesias, weakness  PSYCH: Denies depression, anxiety    VITALS:  T(F): 96.5, Max: 98.5 (07-12-19 @ 21:50)  HR: 93  BP: 134/67  RR: 16Vital Signs Last 24 Hrs  T(C): 35.8 (13 Jul 2019 14:43), Max: 36.9 (12 Jul 2019 21:50)  T(F): 96.5 (13 Jul 2019 14:43), Max: 98.5 (12 Jul 2019 21:50)  HR: 93 (13 Jul 2019 14:43) (68 - 93)  BP: 134/67 (13 Jul 2019 14:43) (116/64 - 156/108)  BP(mean): --  RR: 16 (13 Jul 2019 14:43) (16 - 16)  SpO2: 91% (13 Jul 2019 11:47) (85% - 94%)    PHYSICAL EXAM:  Gen: chronically ill appearing NAD, resting in bed  HEENT: Normocephalic, atraumatic  Neck: supple, no lymphadenopathy  CV: Regular rate & regular rhythm  Lungs: decreased BS at bases  Abdomen: Soft, BS present  Ext: Warm, well perfused  Neuro: non focal, awake  Skin: no rash, no erythema  Lines: no phlebitis    TESTS & MEASUREMENTS:                        10.5   14.41 )-----------( 228      ( 12 Jul 2019 09:34 )             32.1     07-12    137  |  99  |  8<L>  ----------------------------<  327<H>  3.6   |  23  |  0.5<L>    Ca    8.1<L>      12 Jul 2019 09:34    TPro  6.6  /  Alb  3.5  /  TBili  0.3  /  DBili  x   /  AST  19  /  ALT  <5  /  AlkPhos  92  07-12      LIVER FUNCTIONS - ( 12 Jul 2019 09:34 )  Alb: 3.5 g/dL / Pro: 6.6 g/dL / ALK PHOS: 92 U/L / ALT: <5 U/L / AST: 19 U/L / GGT: x               Culture - Blood (collected 07-11-19 @ 13:21)  Source: .Blood Blood-Peripheral  Preliminary Report (07-12-19 @ 22:01):    No growth to date.    Culture - Blood (collected 07-11-19 @ 13:21)  Source: .Blood Blood-Peripheral  Preliminary Report (07-12-19 @ 22:01):    No growth to date.        Blood Gas Venous - Lactate: 1.8 mmoL/L (07-11-19 @ 12:41)      INFECTIOUS DISEASES TESTING      RADIOLOGY & ADDITIONAL TESTS:  I have personally reviewed the last Chest xray  CXR      CT      CARDIOLOGY TESTING  12 Lead ECG:   Ventricular Rate 90 BPM    Atrial Rate 90 BPM    P-R Interval 130 ms    QRS Duration 100 ms    Q-T Interval 382 ms    QTC Calculation(Bezet) 467 ms    P Axis 52 degrees    R Axis 62 degrees    T Axis 42 degrees    Diagnosis Line Normal sinus rhythm  Incomplete right bundle branch block  ST & T wave abnormality, consider anterior ischemia  Abnormal ECG    Confirmed by KEYONNA HEATH, TERRENCE ([id number]) on 7/12/2019 9:20:47 AM (07-11-19 @ 22:31)  12 Lead ECG:   Ventricular Rate 83 BPM    Atrial Rate 83 BPM    P-R Interval 112 ms    QRS Duration 100 ms    Q-T Interval 396 ms    QTC Calculation(Bezet) 465 ms    P Axis -6 degrees    R Axis 79 degrees    T Axis 54 degrees    Diagnosis Line Normal sinus rhythm  ST & T wave abnormality, consider anterior ischemia    Confirmed by TERRENCE NEWBY MD ([id number]) on 7/11/2019 12:05:15 PM (07-11-19 @ 09:26)      MEDICATIONS  ALBUTerol/ipratropium for Nebulization 3  azithromycin  IVPB 500  buprenorphine 2 mG/naloxone 0.5 mG SL  Tablet 2  buprenorphine 8 mG/naloxone 2 mG SL  Tablet 2  cefTRIAXone   IVPB 1000  dextrose 5%. 1000  dextrose 50% Injectable 12.5  dextrose 50% Injectable 25  dextrose 50% Injectable 25  enoxaparin Injectable 40  FLUoxetine 40  insulin lispro (HumaLOG) corrective regimen sliding scale   insulin lispro (HumaLOG) corrective regimen sliding scale   insulin lispro Injectable (HumaLOG) 5  levothyroxine 25  methylPREDNISolone sodium succinate Injectable 60  pantoprazole    Tablet 40  sodium chloride 0.9%. 1000      ANTIBIOTICS:  azithromycin  IVPB 500 milliGRAM(s) IV Intermittent every 24 hours  cefTRIAXone   IVPB 1000 milliGRAM(s) IV Intermittent every 24 hours      No Known Allergies

## 2019-07-13 NOTE — PROGRESS NOTE ADULT - SUBJECTIVE AND OBJECTIVE BOX
EZRA BARCLAY  48y, Female  Allergy: No Known Allergies    47 yo female with PMHx of COPD/Asthma, hypothyroid and substance abuse presents with c/o 4 days of SOB and chest tightness. Pt has been using inhalers and nebs without much improvement. Pt admits to fever to 101 and nonproductive cough. Pt denies any sick close contacts.     OVERNIGHT EVENTS/HPI  pt seen and examined at bedside this morning and later in the afternoon  -dyspnea improved; speaking in full sentences  -Pulmonologist's input/recommendations noted from early this morning; LE Duplex done stat (negative for DVT); ordered CT chest NC but pt refusing for now even though explained and understands the importance of imaging study (re: company in the room and does not want to be disturbed)  -supplemental O2  -monitor inpatient   -NO d/c planning     PAST MEDICAL & SURGICAL HISTORY:  Hepatitis-C  History of pancreatitis  H/O: substance abuse  Hypothyroidism  Asthma with COPD  History of tonsillectomy  History of appendectomy      VITALS:  T(F): 96.9 (07-12-19 @ 05:44), Max: 97.4 (07-11-19 @ 14:01)  HR: 96 (07-12-19 @ 08:09)  BP: 122/67 (07-12-19 @ 05:44) (122/67 - 146/80)  RR: 16 (07-12-19 @ 08:09)  SpO2: 92% (07-12-19 @ 08:09)    PHYSICAL EXAM:  GENERAL: on supplemental O2; but speaking in full sentences   HEAD:  Atraumatic, Normocephalic  EYES: EOMI, PERRLA, conjunctiva and sclera clear  NERVOUS SYSTEM:  Alert & Oriented X 4, Good concentration; Motor Strength 5/5 B/L upper and lower extremities; DTRs 2+ intact and symmetric  CHEST/LUNG: wheezing b/l  CV/HEART: Regular rate and rhythm; No murmurs, rubs, or gallops  GI/ABDOMEN: Soft, Nontender, Nondistended; Bowel sounds present  EXTREMITIES:  2+ Peripheral Pulses, No clubbing, cyanosis, or edema  SKIN: No rashes or lesions    TESTS & MEASUREMENTS:  Height (cm): 172.72 (07-11-19 @ 14:01)  Weight (kg): 74 (07-11-19 @ 14:01)  BMI (kg/m2): 24.8 (07-11-19 @ 14:01)                     10.5   14.41 )-----------( 228      ( 12 Jul 2019 09:34 )             32.1       07-12    137  |  99  |  8<L>  ----------------------------<  327<H>  3.6   |  23  |  0.5<L>    Ca    8.1<L>      12 Jul 2019 09:34    TPro  6.6  /  Alb  3.5  /  TBili  0.3  /  DBili  x   /  AST  19  /  ALT  <5  /  AlkPhos  92  07-12    LIVER FUNCTIONS - ( 12 Jul 2019 09:34 )  Alb: 3.5 g/dL / Pro: 6.6 g/dL / ALK PHOS: 92 U/L / ALT: <5 U/L / AST: 19 U/L / GGT: x           CARDIAC MARKERS ( 11 Jul 2019 09:28 )  x     / <0.01 ng/mL / x     / x     / x          RADIOLOGY & ADDITIONAL TESTS:      HEALTH ISSUES - PROBLEM Dx:  H/O: substance abuse: H/O: substance abuse  Hypothyroidism, unspecified type: Hypothyroidism, unspecified type  Asthma with COPD: Asthma with COPD  Pneumonia of both lower lobes due to infectious organism: Pneumonia of both lower lobes due to infectious organism      MEDICATIONS  (STANDING):  ALBUTerol/ipratropium for Nebulization 3 milliLiter(s) Nebulizer every 6 hours  azithromycin  IVPB 500 milliGRAM(s) IV Intermittent every 24 hours  buprenorphine 2 mG/naloxone 0.5 mG SL  Tablet 2 Tablet(s) SubLingual daily  buprenorphine 8 mG/naloxone 2 mG SL  Tablet 2 Tablet(s) SubLingual daily  cefTRIAXone   IVPB 1000 milliGRAM(s) IV Intermittent every 24 hours  dextrose 5%. 1000 milliLiter(s) (50 mL/Hr) IV Continuous <Continuous>  dextrose 50% Injectable 12.5 Gram(s) IV Push once  dextrose 50% Injectable 25 Gram(s) IV Push once  dextrose 50% Injectable 25 Gram(s) IV Push once  enoxaparin Injectable 40 milliGRAM(s) SubCutaneous daily  FLUoxetine 40 milliGRAM(s) Oral daily  insulin lispro (HumaLOG) corrective regimen sliding scale   SubCutaneous three times a day before meals  insulin lispro (HumaLOG) corrective regimen sliding scale   SubCutaneous at bedtime  insulin lispro Injectable (HumaLOG) 5 Unit(s) SubCutaneous three times a day with meals  levothyroxine 25 MICROGram(s) Oral daily  methylPREDNISolone sodium succinate Injectable 60 milliGRAM(s) IV Push every 8 hours  pantoprazole    Tablet 40 milliGRAM(s) Oral before breakfast  sodium chloride 0.9%. 1000 milliLiter(s) (75 mL/Hr) IV Continuous <Continuous>    MEDICATIONS  (PRN):  ALBUTerol/ipratropium for Nebulization. 3 milliLiter(s) Nebulizer every 4 hours PRN Shortness of Breath and/or Wheezing  dextrose 40% Gel 15 Gram(s) Oral once PRN Blood Glucose LESS THAN 70 milliGRAM(s)/deciliter  glucagon  Injectable 1 milliGRAM(s) IntraMuscular once PRN Glucose LESS THAN 70 milligrams/deciliter  ketorolac   Injectable 30 milliGRAM(s) IV Push every 6 hours PRN Moderate Pain (4 - 6)

## 2019-07-13 NOTE — PROGRESS NOTE ADULT - ASSESSMENT
47 yo female with h/o COPD presents with SOB and chest tightness and found to be hypoxic        ASSESSMENT and PLANS:    1. Acute hypoxic respiratory failure due to COPD exacerbation with underlying PNA  -C/w IV Solumedrol --- increased dose to 60mg q 8 hrs   -Duoneb RTC  -Pulse ox monitoring  -O2 supplement as needed  -Pulmonary consult appreciated --- recommendations noted   -ID consult ordered this afternoon     2. CAP  -C/w IV Zithromax and ceftriaxone  - monitor vitals    3. Hypothyroidism  -C/w synthroid    4. Ex-substance abuser  -monitor  -stable on current suboxone dose 20mg    DVT prophylaxis  Lovenox sq      # Progress Note Handoff  PENDING as follows  consults: Pulm noted, ID consult ordered today   Test: CT chest Non contrast   Family discussion: discussed with patient who comprehends her medical care.  Disposition: Home clinically stable; not ready yet     Attending Physician Dr. Rianna Keating # [id number]

## 2019-07-14 LAB
GLUCOSE BLDC GLUCOMTR-MCNC: 134 MG/DL — HIGH (ref 70–99)
GLUCOSE BLDC GLUCOMTR-MCNC: 216 MG/DL — HIGH (ref 70–99)
GLUCOSE BLDC GLUCOMTR-MCNC: 223 MG/DL — HIGH (ref 70–99)
GLUCOSE BLDC GLUCOMTR-MCNC: 227 MG/DL — HIGH (ref 70–99)
GLUCOSE BLDC GLUCOMTR-MCNC: 66 MG/DL — LOW (ref 70–99)
HIV 1+2 AB+HIV1 P24 AG SERPL QL IA: SIGNIFICANT CHANGE UP
LEGIONELLA AG UR QL: NEGATIVE — SIGNIFICANT CHANGE UP

## 2019-07-14 PROCEDURE — 99233 SBSQ HOSP IP/OBS HIGH 50: CPT

## 2019-07-14 RX ADMIN — AZITHROMYCIN 255 MILLIGRAM(S): 500 TABLET, FILM COATED ORAL at 17:25

## 2019-07-14 RX ADMIN — ENOXAPARIN SODIUM 40 MILLIGRAM(S): 100 INJECTION SUBCUTANEOUS at 11:28

## 2019-07-14 RX ADMIN — Medication 3 MILLILITER(S): at 20:21

## 2019-07-14 RX ADMIN — BUPRENORPHINE AND NALOXONE 2 TABLET(S): 2; .5 TABLET SUBLINGUAL at 11:28

## 2019-07-14 RX ADMIN — PANTOPRAZOLE SODIUM 40 MILLIGRAM(S): 20 TABLET, DELAYED RELEASE ORAL at 06:13

## 2019-07-14 RX ADMIN — Medication 30 MILLIGRAM(S): at 13:33

## 2019-07-14 RX ADMIN — Medication 60 MILLIGRAM(S): at 22:07

## 2019-07-14 RX ADMIN — Medication 60 MILLIGRAM(S): at 17:26

## 2019-07-14 RX ADMIN — Medication 5 UNIT(S): at 11:31

## 2019-07-14 RX ADMIN — CEFTRIAXONE 100 MILLIGRAM(S): 500 INJECTION, POWDER, FOR SOLUTION INTRAMUSCULAR; INTRAVENOUS at 17:25

## 2019-07-14 RX ADMIN — Medication 25 MICROGRAM(S): at 06:13

## 2019-07-14 RX ADMIN — Medication 30 MILLIGRAM(S): at 19:43

## 2019-07-14 RX ADMIN — Medication 3 MILLILITER(S): at 07:59

## 2019-07-14 RX ADMIN — Medication 5 UNIT(S): at 08:07

## 2019-07-14 RX ADMIN — Medication 4: at 08:08

## 2019-07-14 RX ADMIN — Medication 40 MILLIGRAM(S): at 11:29

## 2019-07-14 RX ADMIN — Medication 4: at 11:31

## 2019-07-14 NOTE — CHART NOTE - NSCHARTNOTEFT_GEN_A_CORE
Patient's pulse ox in the 70's, NRB mask applied and patient feels better though still with pleuritic pain (on Toradol). Will monitor

## 2019-07-14 NOTE — PROVIDER CONTACT NOTE (OTHER) - ACTION/TREATMENT ORDERED:
Toradol ordered for pain.  40% VM placed, P.O. mid 90's.  Will continue to monitor.
No interventions at this time, will continue to monitor.

## 2019-07-14 NOTE — PROGRESS NOTE ADULT - ASSESSMENT
47 yo female with h/o COPD presents with SOB and chest tightness and found to be hypoxic    ASSESSMENT and PLANS:    1. Acute hypoxic respiratory failure due to COPD exacerbation with underlying PNA  -C/w IV Solumedrol --- increased dose to 60mg q 8 hrs   -Duoneb RTC  -Pulse ox monitoring  -O2 supplement as needed  -Pulmonary consult appreciated --- recommendations noted --- CT chest noted (check HIV)  -ID consult noted     2. CAP  -C/w IV Zithromax and ceftriaxone --- if no IV access will give oral dose of cefpodoxime   - monitor vitals    3. Hypothyroidism  -C/w synthroid    4. Ex-substance abuser  -monitor  -stable on current suboxone dose 20mg    DVT prophylaxis  Lovenox sq      # Progress Note Handoff  PENDING as follows  consults: ID noted   Test: CT chest Non contrast reviewed   Family discussion: discussed with patient who comprehends her medical care.  Disposition: Home clinically stable; not ready yet     Attending Physician Dr. Rianna Keating # [id number]

## 2019-07-14 NOTE — PROVIDER CONTACT NOTE (OTHER) - SITUATION
Pt hard stick, multiple attempts made by RNs and MORE Miller. Pt due for IV steroids and IV abx this afternoon.

## 2019-07-14 NOTE — PROVIDER CONTACT NOTE (OTHER) - SITUATION
Patient noted with SOB, O2 sat 40% venti 79-81%; patient placed on non-rebreather O2 Sat ^^ 92%. MD Rosette notified. Patient appears more comfortable with no immediate distress.

## 2019-07-15 LAB
ANION GAP SERPL CALC-SCNC: 13 MMOL/L — SIGNIFICANT CHANGE UP (ref 7–14)
BUN SERPL-MCNC: 17 MG/DL — SIGNIFICANT CHANGE UP (ref 10–20)
CALCIUM SERPL-MCNC: 7.5 MG/DL — LOW (ref 8.5–10.1)
CHLORIDE SERPL-SCNC: 98 MMOL/L — SIGNIFICANT CHANGE UP (ref 98–110)
CO2 SERPL-SCNC: 27 MMOL/L — SIGNIFICANT CHANGE UP (ref 17–32)
CREAT SERPL-MCNC: 0.6 MG/DL — LOW (ref 0.7–1.5)
CULTURE RESULTS: SIGNIFICANT CHANGE UP
ESTIMATED AVERAGE GLUCOSE: 140 MG/DL — HIGH (ref 68–114)
GLUCOSE BLDC GLUCOMTR-MCNC: 204 MG/DL — HIGH (ref 70–99)
GLUCOSE BLDC GLUCOMTR-MCNC: 225 MG/DL — HIGH (ref 70–99)
GLUCOSE BLDC GLUCOMTR-MCNC: 229 MG/DL — HIGH (ref 70–99)
GLUCOSE BLDC GLUCOMTR-MCNC: 295 MG/DL — HIGH (ref 70–99)
GLUCOSE SERPL-MCNC: 328 MG/DL — HIGH (ref 70–99)
HBA1C BLD-MCNC: 6.5 % — HIGH (ref 4–5.6)
HCT VFR BLD CALC: 31.5 % — LOW (ref 37–47)
HGB BLD-MCNC: 10.3 G/DL — LOW (ref 12–16)
MCHC RBC-ENTMCNC: 28 PG — SIGNIFICANT CHANGE UP (ref 27–31)
MCHC RBC-ENTMCNC: 32.7 G/DL — SIGNIFICANT CHANGE UP (ref 32–37)
MCV RBC AUTO: 85.6 FL — SIGNIFICANT CHANGE UP (ref 81–99)
NRBC # BLD: 0 /100 WBCS — SIGNIFICANT CHANGE UP (ref 0–0)
PLATELET # BLD AUTO: 292 K/UL — SIGNIFICANT CHANGE UP (ref 130–400)
POTASSIUM SERPL-MCNC: 4.7 MMOL/L — SIGNIFICANT CHANGE UP (ref 3.5–5)
POTASSIUM SERPL-SCNC: 4.7 MMOL/L — SIGNIFICANT CHANGE UP (ref 3.5–5)
RBC # BLD: 3.68 M/UL — LOW (ref 4.2–5.4)
RBC # FLD: 13.3 % — SIGNIFICANT CHANGE UP (ref 11.5–14.5)
S PNEUM AG UR QL: NEGATIVE — SIGNIFICANT CHANGE UP
SODIUM SERPL-SCNC: 138 MMOL/L — SIGNIFICANT CHANGE UP (ref 135–146)
SPECIMEN SOURCE: SIGNIFICANT CHANGE UP
WBC # BLD: 11.57 K/UL — HIGH (ref 4.8–10.8)
WBC # FLD AUTO: 11.57 K/UL — HIGH (ref 4.8–10.8)

## 2019-07-15 PROCEDURE — 99233 SBSQ HOSP IP/OBS HIGH 50: CPT

## 2019-07-15 RX ORDER — CEFTRIAXONE 500 MG/1
1000 INJECTION, POWDER, FOR SOLUTION INTRAMUSCULAR; INTRAVENOUS EVERY 24 HOURS
Refills: 0 | Status: DISCONTINUED | OUTPATIENT
Start: 2019-07-15 | End: 2019-07-16

## 2019-07-15 RX ADMIN — BUPRENORPHINE AND NALOXONE 2 TABLET(S): 2; .5 TABLET SUBLINGUAL at 11:53

## 2019-07-15 RX ADMIN — Medication 30 MILLIGRAM(S): at 11:53

## 2019-07-15 RX ADMIN — Medication 4: at 17:01

## 2019-07-15 RX ADMIN — CEFTRIAXONE 100 MILLIGRAM(S): 500 INJECTION, POWDER, FOR SOLUTION INTRAMUSCULAR; INTRAVENOUS at 23:18

## 2019-07-15 RX ADMIN — Medication 6: at 08:12

## 2019-07-15 RX ADMIN — Medication 30 MILLIGRAM(S): at 18:06

## 2019-07-15 RX ADMIN — Medication 4: at 11:54

## 2019-07-15 RX ADMIN — Medication 60 MILLIGRAM(S): at 21:58

## 2019-07-15 RX ADMIN — Medication 60 MILLIGRAM(S): at 15:00

## 2019-07-15 RX ADMIN — Medication 25 MICROGRAM(S): at 05:04

## 2019-07-15 RX ADMIN — Medication 5 UNIT(S): at 11:54

## 2019-07-15 RX ADMIN — Medication 40 MILLIGRAM(S): at 11:54

## 2019-07-15 RX ADMIN — Medication 5 UNIT(S): at 08:12

## 2019-07-15 RX ADMIN — Medication 5 UNIT(S): at 17:01

## 2019-07-15 RX ADMIN — Medication 3 MILLILITER(S): at 12:01

## 2019-07-15 RX ADMIN — Medication 60 MILLIGRAM(S): at 05:04

## 2019-07-15 RX ADMIN — Medication 30 MILLIGRAM(S): at 05:03

## 2019-07-15 RX ADMIN — AZITHROMYCIN 255 MILLIGRAM(S): 500 TABLET, FILM COATED ORAL at 17:00

## 2019-07-15 RX ADMIN — Medication 3 MILLILITER(S): at 20:16

## 2019-07-15 RX ADMIN — PANTOPRAZOLE SODIUM 40 MILLIGRAM(S): 20 TABLET, DELAYED RELEASE ORAL at 06:06

## 2019-07-15 NOTE — PROGRESS NOTE ADULT - SUBJECTIVE AND OBJECTIVE BOX
EZRA BARCLAY  48y, Female  Allergy: No Known Allergies    49 yo female with PMHx of COPD/Asthma, hypothyroid and substance abuse presents with c/o 4 days of SOB and chest tightness. Pt has been using inhalers and nebs without much improvement. Pt admits to fever to 101 and nonproductive cough. Pt denies any sick close contacts.     OVERNIGHT EVENTS/HPI  7/13/19:  pt seen and examined at bedside this morning and later in the afternoon  -dyspnea improved; speaking in full sentences  -Pulmonologist's input/recommendations noted from early this morning; LE Duplex done stat (negative for DVT); ordered CT chest NC but pt refusing for now even though explained and understands the importance of imaging study (re: company in the room and does not want to be disturbed)  -supplemental O2  -monitor inpatient   -NO d/c planning     7/14/19:  -pt seen and examined at bedside; sitting up in chair   -will continue with current medication regimen   -obtain IV access   -pulmonary follow up   -ID consult appreciated     7/15/19:  -pt seen and examined at bedside; on ventimask FM; weaned to 4 Liters NC; pt non compliant with her treatment and supplemental O2; giving hard time to nursing staff in complying with medical care   -continue with current medical regimen   -discussed with patient's PMD Dr. César Zimmerpulmaury and ID follow up     PAST MEDICAL & SURGICAL HISTORY:  Hepatitis-C  History of pancreatitis  H/O: substance abuse  Hypothyroidism  Asthma with COPD  History of tonsillectomy  History of appendectomy    Vital Signs Last 24 Hrs  T(C): 36.2 (15 Jul 2019 13:58), Max: 37.3 (14 Jul 2019 21:46)  T(F): 97.2 (15 Jul 2019 13:58), Max: 99.2 (14 Jul 2019 21:46)  HR: 105 (15 Jul 2019 13:58) (84 - 105)  BP: 115/74 (15 Jul 2019 13:58) (115/74 - 159/84)  RR: 16 (15 Jul 2019 13:58) (16 - 18)  SpO2: 94% (15 Jul 2019 08:37) (94% - 94%)    PHYSICAL EXAM:  GENERAL: on supplemental O2; but speaking in full sentences   HEAD:  Atraumatic, Normocephalic  EYES: EOMI, PERRLA, conjunctiva and sclera clear  NERVOUS SYSTEM:  Alert & Oriented X 4, Good concentration; Motor Strength 5/5 B/L upper and lower extremities; DTRs 2+ intact and symmetric  CHEST/LUNG: wheezing b/l  CV/HEART: Regular rate and rhythm; No murmurs, rubs, or gallops  GI/ABDOMEN: Soft, Nontender, Nondistended; Bowel sounds present  EXTREMITIES:  2+ Peripheral Pulses, No clubbing, cyanosis, or edema  SKIN: No rashes or lesions    TESTS & MEASUREMENTS:  Height (cm): 172.72 (07-11-19 @ 14:01)  Weight (kg): 74 (07-11-19 @ 14:01)  BMI (kg/m2): 24.8 (07-11-19 @ 14:01)                            10.3   11.57 )-----------( 292      ( 15 Jul 2019 08:30 )             31.5   07-15    138  |  98  |  17  ----------------------------<  328<H>  4.7   |  27  |  0.6<L>    Ca    7.5<L>      15 Jul 2019 08:30    TPro  6.3  /  Alb  3.3<L>  /  TBili  0.2  /  DBili  x   /  AST  29  /  ALT  8   /  AlkPhos  89  07-13      RADIOLOGY & ADDITIONAL TESTS:    CT Chest No Cont (07.13.19 @ 18:43)   IMPRESSION:    Diffuse bilateral groundglass opacities with associated scattered   thin-walled cysts, most prominent in the lower lobes. This likely   reflects infectious/inflammatory etiology; PCP pneumonia is included in   the differential diagnosis.      HEALTH ISSUES - PROBLEM Dx:  H/O: substance abuse: H/O: substance abuse  Hypothyroidism, unspecified type: Hypothyroidism, unspecified type  Asthma with COPD: Asthma with COPD  Pneumonia of both lower lobes due to infectious organism: Pneumonia of both lower lobes due to infectious organism      MEDICATIONS  (STANDING):  ALBUTerol/ipratropium for Nebulization 3 milliLiter(s) Nebulizer every 6 hours  azithromycin  IVPB 500 milliGRAM(s) IV Intermittent every 24 hours  buprenorphine 2 mG/naloxone 0.5 mG SL  Tablet 2 Tablet(s) SubLingual daily  buprenorphine 8 mG/naloxone 2 mG SL  Tablet 2 Tablet(s) SubLingual daily  dextrose 5%. 1000 milliLiter(s) (50 mL/Hr) IV Continuous <Continuous>  dextrose 50% Injectable 12.5 Gram(s) IV Push once  dextrose 50% Injectable 25 Gram(s) IV Push once  dextrose 50% Injectable 25 Gram(s) IV Push once  enoxaparin Injectable 40 milliGRAM(s) SubCutaneous daily  FLUoxetine 40 milliGRAM(s) Oral daily  insulin lispro (HumaLOG) corrective regimen sliding scale   SubCutaneous three times a day before meals  insulin lispro (HumaLOG) corrective regimen sliding scale   SubCutaneous at bedtime  insulin lispro Injectable (HumaLOG) 5 Unit(s) SubCutaneous three times a day with meals  levothyroxine 25 MICROGram(s) Oral daily  methylPREDNISolone sodium succinate Injectable 60 milliGRAM(s) IV Push every 8 hours  pantoprazole    Tablet 40 milliGRAM(s) Oral before breakfast  sodium chloride 0.9%. 1000 milliLiter(s) (75 mL/Hr) IV Continuous <Continuous>    MEDICATIONS  (PRN):  ALBUTerol/ipratropium for Nebulization. 3 milliLiter(s) Nebulizer every 4 hours PRN Shortness of Breath and/or Wheezing  dextrose 40% Gel 15 Gram(s) Oral once PRN Blood Glucose LESS THAN 70 milliGRAM(s)/deciliter  glucagon  Injectable 1 milliGRAM(s) IntraMuscular once PRN Glucose LESS THAN 70 milligrams/deciliter  ketorolac   Injectable 30 milliGRAM(s) IV Push every 6 hours PRN Moderate Pain (4 - 6)

## 2019-07-15 NOTE — PROGRESS NOTE ADULT - ASSESSMENT
47 yo female with h/o COPD presents with SOB and chest tightness and found to be hypoxic    ASSESSMENT and PLANS:    1. Acute hypoxic respiratory failure due to COPD exacerbation with underlying PNA  -C/w IV Solumedrol --- increased dose to 60mg q 8 hrs   -Duoneb RTC  -Pulse ox monitoring  -O2 supplement as needed  -Pulmonary consult appreciated --- recommendations noted --- CT chest noted; HIV negative   -ID follow up today     2. CAP  -C/w IV Zithromax and ceftriaxone  - monitor vitals    3. Hypothyroidism  -C/w synthroid    4. Ex-substance abuser  -monitor  -stable on current suboxone dose 20mg    DVT prophylaxis  Lovenox sq      # Progress Note Handoff  PENDING as follows  consults: ID and Pulm follow up   Test: CT chest Non contrast reviewed   Family discussion: discussed with patient who comprehends her medical care.  Disposition: Home clinically stable; not ready yet     discussed with patient's PMD Dr. Lindsey     Attending Physician Dr. Rianna Keating # [id number]

## 2019-07-15 NOTE — PROGRESS NOTE ADULT - ASSESSMENT
49 yo female with PMHx of HCV, COPD/Asthma, hypothyroid and substance abuse presents with c/o 4 days of SOB and chest tightness    IMPRESSION  Sepsis on admission Pulse>90, Resp Rate>20, WBC>12, due to suspected community acquired PNA    met alkalosis    CXR b/l opacificiations    Duplex neg DVT  #HCV, VL UD cleared without therapy, Hep B core +, HBV DNA UD    RECOMMENDATIONS  - Ceftriaxone 1g q24h and azithro 500mg IV (x3 d)  - Pending CT chest  - sputum cx  - HIV testing, not in sunrise 47 yo female with PMHx of HCV, COPD/Asthma, hypothyroid and substance abuse presents with c/o 4 days of SOB and chest tightness    IMPRESSION  Sepsis on admission Pulse>90, Resp Rate>20, WBC>12, due to suspected community acquired PNA    met alkalosis    CXR b/l opacificiations    Duplex neg DVT  #HCV, VL UD cleared without therapy, Hep B core +, HBV DNA UD      would recommend:    1. Supplemental oxygenation and bronchodilator as needed  2. Continue Ceftriaxone 1g q24h and azithro 500mg IV  3. May change to oral Abx on discharge     - d/w patient

## 2019-07-15 NOTE — PROGRESS NOTE ADULT - SUBJECTIVE AND OBJECTIVE BOX
Patient is seen and examined at the bed side, is afebrile.        REVIEW OF SYSTEMS: All other review systems are negative        Vital Signs Last 24 Hrs  T(C): 36.2 (15 Jul 2019 13:58), Max: 37.3 (14 Jul 2019 21:46)  T(F): 97.2 (15 Jul 2019 13:58), Max: 99.2 (14 Jul 2019 21:46)  HR: 105 (15 Jul 2019 13:58) (84 - 105)  BP: 115/74 (15 Jul 2019 13:58) (115/74 - 159/84)  BP(mean): --  RR: 16 (15 Jul 2019 13:58) (16 - 18)  SpO2: 88% (15 Jul 2019 15:13) (88% - 94%)        PHYSICAL EXAM:  GENERAL: NAD, well-groomed, well-developed  HEAD:  Atraumatic, Normocephalic  EYES: EOMI, PERRLA, conjunctiva and sclera clear  ENMT: No tonsillar erythema, exudates, or enlargement; Moist mucous membranes, Good dentition, No lesions  NECK: Supple, No JVD, Normal thyroid  NERVOUS SYSTEM:  Alert & Oriented X3, Good concentration; Motor Strength 5/5 B/L upper and lower extremities; DTRs 2+ intact and symmetric  CHEST/LUNG: Clear to percussion bilaterally; No rales, rhonchi, wheezing, or rubs  HEART: Regular rate and rhythm; No murmurs, rubs, or gallops  ABDOMEN: Soft, Nontender, Nondistended; Bowel sounds present  EXTREMITIES:  2+ Peripheral Pulses, No clubbing, cyanosis, or edema  LYMPH: No lymphadenopathy noted  SKIN: No rashes or lesions      MEDICATIONS  (STANDING):  ALBUTerol/ipratropium for Nebulization 3 milliLiter(s) Nebulizer every 6 hours  azithromycin  IVPB 500 milliGRAM(s) IV Intermittent every 24 hours  buprenorphine 2 mG/naloxone 0.5 mG SL  Tablet 2 Tablet(s) SubLingual daily  buprenorphine 8 mG/naloxone 2 mG SL  Tablet 2 Tablet(s) SubLingual daily  dextrose 5%. 1000 milliLiter(s) (50 mL/Hr) IV Continuous <Continuous>  dextrose 50% Injectable 12.5 Gram(s) IV Push once  dextrose 50% Injectable 25 Gram(s) IV Push once  dextrose 50% Injectable 25 Gram(s) IV Push once  enoxaparin Injectable 40 milliGRAM(s) SubCutaneous daily  FLUoxetine 40 milliGRAM(s) Oral daily  insulin lispro (HumaLOG) corrective regimen sliding scale   SubCutaneous three times a day before meals  insulin lispro (HumaLOG) corrective regimen sliding scale   SubCutaneous at bedtime  insulin lispro Injectable (HumaLOG) 5 Unit(s) SubCutaneous three times a day with meals  levothyroxine 25 MICROGram(s) Oral daily  methylPREDNISolone sodium succinate Injectable 60 milliGRAM(s) IV Push every 8 hours  pantoprazole    Tablet 40 milliGRAM(s) Oral before breakfast  sodium chloride 0.9%. 1000 milliLiter(s) (75 mL/Hr) IV Continuous <Continuous>    MEDICATIONS  (PRN):  ALBUTerol/ipratropium for Nebulization. 3 milliLiter(s) Nebulizer every 4 hours PRN Shortness of Breath and/or Wheezing  dextrose 40% Gel 15 Gram(s) Oral once PRN Blood Glucose LESS THAN 70 milliGRAM(s)/deciliter  glucagon  Injectable 1 milliGRAM(s) IntraMuscular once PRN Glucose LESS THAN 70 milligrams/deciliter  ketorolac   Injectable 30 milliGRAM(s) IV Push every 6 hours PRN Moderate Pain (4 - 6)        Allergies    No Known Allergies    Intolerances            LABS:                        10.3   11.57 )-----------( 292      ( 15 Jul 2019 08:30 )             31.5         07-15    138  |  98  |  17  ----------------------------<  328<H>  4.7   |  27  |  0.6<L>    Ca    7.5<L>      15 Jul 2019 08:30          CAPILLARY BLOOD GLUCOSE      POCT Blood Glucose.: 225 mg/dL (15 Jul 2019 20:50)  POCT Blood Glucose.: 229 mg/dL (15 Jul 2019 16:37)  POCT Blood Glucose.: 204 mg/dL (15 Jul 2019 11:40)  POCT Blood Glucose.: 295 mg/dL (15 Jul 2019 07:29)      RADIOLOGY & ADDITIONAL TESTS:    Imaging Personally Reviewed:  [ ] YES  [ ] NO    Consultant(s) Notes Reviewed:  [ ] YES  [ ] NO Patient is seen and examined at the bed side, is afebrile. She is still c/o pleuritic chest pain.         REVIEW OF SYSTEMS: All other review systems are negative        Vital Signs Last 24 Hrs  T(C): 36.2 (15 Jul 2019 13:58), Max: 37.3 (14 Jul 2019 21:46)  T(F): 97.2 (15 Jul 2019 13:58), Max: 99.2 (14 Jul 2019 21:46)  HR: 105 (15 Jul 2019 13:58) (84 - 105)  BP: 115/74 (15 Jul 2019 13:58) (115/74 - 159/84)  BP(mean): --  RR: 16 (15 Jul 2019 13:58) (16 - 18)  SpO2: 88% (15 Jul 2019 15:13) (88% - 94%)        PHYSICAL EXAM:  GENERAL: Not in distress, on VM  CHEST/LUNG:  Air enrty bilaterally  HEART: s1 and s2 present  ABDOMEN: Nontender, Nondistended  EXTREMITIES:  2+ Peripheral Pulses, No clubbing, cyanosis, or edema  CNS: Awake and  Alert          MEDICATIONS  (STANDING):  ALBUTerol/ipratropium for Nebulization 3 milliLiter(s) Nebulizer every 6 hours  azithromycin  IVPB 500 milliGRAM(s) IV Intermittent every 24 hours  buprenorphine 2 mG/naloxone 0.5 mG SL  Tablet 2 Tablet(s) SubLingual daily  buprenorphine 8 mG/naloxone 2 mG SL  Tablet 2 Tablet(s) SubLingual daily  dextrose 5%. 1000 milliLiter(s) (50 mL/Hr) IV Continuous <Continuous>  dextrose 50% Injectable 12.5 Gram(s) IV Push once  dextrose 50% Injectable 25 Gram(s) IV Push once  dextrose 50% Injectable 25 Gram(s) IV Push once  enoxaparin Injectable 40 milliGRAM(s) SubCutaneous daily  FLUoxetine 40 milliGRAM(s) Oral daily  insulin lispro (HumaLOG) corrective regimen sliding scale   SubCutaneous three times a day before meals  insulin lispro (HumaLOG) corrective regimen sliding scale   SubCutaneous at bedtime  insulin lispro Injectable (HumaLOG) 5 Unit(s) SubCutaneous three times a day with meals  levothyroxine 25 MICROGram(s) Oral daily  methylPREDNISolone sodium succinate Injectable 60 milliGRAM(s) IV Push every 8 hours  pantoprazole    Tablet 40 milliGRAM(s) Oral before breakfast  sodium chloride 0.9%. 1000 milliLiter(s) (75 mL/Hr) IV Continuous <Continuous>    MEDICATIONS  (PRN):  ALBUTerol/ipratropium for Nebulization. 3 milliLiter(s) Nebulizer every 4 hours PRN Shortness of Breath and/or Wheezing  dextrose 40% Gel 15 Gram(s) Oral once PRN Blood Glucose LESS THAN 70 milliGRAM(s)/deciliter  glucagon  Injectable 1 milliGRAM(s) IntraMuscular once PRN Glucose LESS THAN 70 milligrams/deciliter  ketorolac   Injectable 30 milliGRAM(s) IV Push every 6 hours PRN Moderate Pain (4 - 6)        Allergies    No Known Allergies          LABS:                        10.3   11.57 )-----------( 292      ( 15 Jul 2019 08:30 )             31.5         07-15    138  |  98  |  17  ----------------------------<  328<H>  4.7   |  27  |  0.6<L>    Ca    7.5<L>      15 Jul 2019 08:30          CAPILLARY BLOOD GLUCOSE  POCT Blood Glucose.: 225 mg/dL (15 Jul 2019 20:50)  POCT Blood Glucose.: 229 mg/dL (15 Jul 2019 16:37)  POCT Blood Glucose.: 204 mg/dL (15 Jul 2019 11:40)  POCT Blood Glucose.: 295 mg/dL (15 Jul 2019 07:29)        RADIOLOGY & ADDITIONAL TESTS:    < from: CT Chest No Cont (07.13.19 @ 18:43) >  Diffuse bilateral groundglass opacities with associated scattered   thin-walled cysts, most prominent in the lower lobes. This likely   reflects infectious/inflammatory etiology; PCP pneumonia is included in   the differential diagnosis.    < end of copied text >

## 2019-07-16 LAB
CULTURE RESULTS: SIGNIFICANT CHANGE UP
CULTURE RESULTS: SIGNIFICANT CHANGE UP
GLUCOSE BLDC GLUCOMTR-MCNC: 341 MG/DL — HIGH (ref 70–99)
GLUCOSE BLDC GLUCOMTR-MCNC: 362 MG/DL — HIGH (ref 70–99)
GLUCOSE BLDC GLUCOMTR-MCNC: 89 MG/DL — SIGNIFICANT CHANGE UP (ref 70–99)
SPECIMEN SOURCE: SIGNIFICANT CHANGE UP
SPECIMEN SOURCE: SIGNIFICANT CHANGE UP

## 2019-07-16 PROCEDURE — 99233 SBSQ HOSP IP/OBS HIGH 50: CPT

## 2019-07-16 RX ORDER — CEFTRIAXONE 500 MG/1
2000 INJECTION, POWDER, FOR SOLUTION INTRAMUSCULAR; INTRAVENOUS EVERY 24 HOURS
Refills: 0 | Status: DISCONTINUED | OUTPATIENT
Start: 2019-07-16 | End: 2019-07-19

## 2019-07-16 RX ORDER — INSULIN LISPRO 100/ML
7 VIAL (ML) SUBCUTANEOUS
Refills: 0 | Status: DISCONTINUED | OUTPATIENT
Start: 2019-07-16 | End: 2019-07-19

## 2019-07-16 RX ADMIN — Medication 30 MILLIGRAM(S): at 21:04

## 2019-07-16 RX ADMIN — Medication 25 MICROGRAM(S): at 05:25

## 2019-07-16 RX ADMIN — Medication 60 MILLIGRAM(S): at 05:25

## 2019-07-16 RX ADMIN — Medication 60 MILLIGRAM(S): at 21:05

## 2019-07-16 RX ADMIN — Medication 10: at 12:36

## 2019-07-16 RX ADMIN — Medication 40 MILLIGRAM(S): at 12:34

## 2019-07-16 RX ADMIN — Medication 3 MILLILITER(S): at 07:36

## 2019-07-16 RX ADMIN — CEFTRIAXONE 100 MILLIGRAM(S): 500 INJECTION, POWDER, FOR SOLUTION INTRAMUSCULAR; INTRAVENOUS at 12:38

## 2019-07-16 RX ADMIN — Medication 30 MILLIGRAM(S): at 07:51

## 2019-07-16 RX ADMIN — Medication 30 MILLIGRAM(S): at 21:40

## 2019-07-16 RX ADMIN — BUPRENORPHINE AND NALOXONE 2 TABLET(S): 2; .5 TABLET SUBLINGUAL at 12:34

## 2019-07-16 RX ADMIN — PANTOPRAZOLE SODIUM 40 MILLIGRAM(S): 20 TABLET, DELAYED RELEASE ORAL at 05:25

## 2019-07-16 RX ADMIN — Medication 3 MILLILITER(S): at 14:31

## 2019-07-16 RX ADMIN — Medication 30 MILLIGRAM(S): at 15:01

## 2019-07-16 RX ADMIN — Medication 8: at 07:56

## 2019-07-16 RX ADMIN — Medication 60 MILLIGRAM(S): at 15:02

## 2019-07-16 RX ADMIN — ENOXAPARIN SODIUM 40 MILLIGRAM(S): 100 INJECTION SUBCUTANEOUS at 12:34

## 2019-07-16 RX ADMIN — Medication 3 MILLILITER(S): at 20:08

## 2019-07-16 RX ADMIN — Medication 5 UNIT(S): at 12:32

## 2019-07-16 RX ADMIN — Medication 5 UNIT(S): at 07:55

## 2019-07-16 NOTE — PROGRESS NOTE ADULT - ASSESSMENT
47 yo female with h/o COPD presents with SOB and chest tightness and found to be hypoxic    ASSESSMENT and PLANS:    1. Acute hypoxic respiratory failure due to COPD exacerbation with underlying PNA  -C/w IV Solumedrol --- remains on 60mg q 8 hrs --- re-assess to taper in the next 24 hrs   -Duoneb RTC  -Pulse ox monitoring  -O2 supplement as needed  -Pulmonary consult appreciated --- recommendations noted --- CT chest noted; HIV negative   -ID follow up noted     2. CAP  -C/w IV Zithromax and ceftriaxone (oral vantin upon d/c)  - monitor vitals    3. Hypothyroidism  -C/w synthroid    4. Ex-substance abuser  -monitor  -stable on current suboxone dose 20mg    DVT prophylaxis  Lovenox sq      # Progress Note Handoff  PENDING as follows  consults: ID and Pulm follow up   Test: CT chest Non contrast reviewed   Family discussion: discussed with patient who comprehends her medical care.  Disposition: Home clinically stable; not ready yet     discussed with patient's PMD Dr. Lindsey     Attending Physician Dr. Rianna Keating # [id number]

## 2019-07-16 NOTE — PROGRESS NOTE ADULT - SUBJECTIVE AND OBJECTIVE BOX
EZRA BARCLAY  48y, Female  Allergy: No Known Allergies    49 yo female with PMHx of COPD/Asthma, hypothyroid and substance abuse presents with c/o 4 days of SOB and chest tightness. Pt has been using inhalers and nebs without much improvement. Pt admits to fever to 101 and nonproductive cough. Pt denies any sick close contacts.     OVERNIGHT EVENTS/HPI  7/13/19:  pt seen and examined at bedside this morning and later in the afternoon  -dyspnea improved; speaking in full sentences  -Pulmonologist's input/recommendations noted from early this morning; LE Duplex done stat (negative for DVT); ordered CT chest NC but pt refusing for now even though explained and understands the importance of imaging study (re: company in the room and does not want to be disturbed)  -supplemental O2  -monitor inpatient   -NO d/c planning     7/14/19:  -pt seen and examined at bedside; sitting up in chair   -will continue with current medication regimen   -obtain IV access   -pulmonary follow up   -ID consult appreciated     7/15/19:  -pt seen and examined at bedside; on ventimask FM; weaned to 4 Liters NC; pt non compliant with her treatment and supplemental O2; giving hard time to nursing staff in complying with medical care   -continue with current medical regimen   -discussed with patient's PMD Dr. Lindsey   -pulm and ID follow up     7/16/19:  -pt seen and examined at bedside; remains on supplemental O2 (non-compliant at times)  -continue with IV steroids; dyspneic when ambulates and or when attempts to go to bathroom by herself --- CT chest and LE duplex negative for clots  -pulm follow up  -ID f/u noted    PAST MEDICAL & SURGICAL HISTORY:  Hepatitis-C  History of pancreatitis  H/O: substance abuse  Hypothyroidism  Asthma with COPD  History of tonsillectomy  History of appendectomy    Vital Signs Last 24 Hrs  T(C): 35.9 (16 Jul 2019 05:12), Max: 36.7 (15 Jul 2019 22:39)  T(F): 96.6 (16 Jul 2019 05:12), Max: 98 (15 Jul 2019 22:39)  HR: 80 (16 Jul 2019 05:12) (80 - 105)  BP: 151/67 (16 Jul 2019 05:12) (115/74 - 151/67)  RR: 16 (16 Jul 2019 05:12) (16 - 16)  SpO2: 88% (15 Jul 2019 15:13) (88% - 88%)    PHYSICAL EXAM:  GENERAL: on supplemental O2; but speaking in full sentences   HEAD:  Atraumatic, Normocephalic  EYES: EOMI, PERRLA, conjunctiva and sclera clear  NERVOUS SYSTEM:  Alert & Oriented X 4, Good concentration; Motor Strength 5/5 B/L upper and lower extremities; DTRs 2+ intact and symmetric  CHEST/LUNG: wheezing b/l  CV/HEART: Regular rate and rhythm; No murmurs, rubs, or gallops  GI/ABDOMEN: Soft, Nontender, Nondistended; Bowel sounds present  EXTREMITIES:  2+ Peripheral Pulses, No clubbing, cyanosis, or edema  SKIN: No rashes or lesions    TESTS & MEASUREMENTS:  Height (cm): 172.72 (07-11-19 @ 14:01)  Weight (kg): 74 (07-11-19 @ 14:01)  BMI (kg/m2): 24.8 (07-11-19 @ 14:01)                            10.3   11.57 )-----------( 292      ( 15 Jul 2019 08:30 )             31.5   07-15    138  |  98  |  17  ----------------------------<  328<H>  4.7   |  27  |  0.6<L>    Ca    7.5<L>      15 Jul 2019 08:30    TPro  6.3  /  Alb  3.3<L>  /  TBili  0.2  /  DBili  x   /  AST  29  /  ALT  8   /  AlkPhos  89  07-13      RADIOLOGY & ADDITIONAL TESTS:    CT Chest No Cont (07.13.19 @ 18:43)   IMPRESSION:    Diffuse bilateral groundglass opacities with associated scattered   thin-walled cysts, most prominent in the lower lobes. This likely   reflects infectious/inflammatory etiology; PCP pneumonia is included in   the differential diagnosis.      HEALTH ISSUES - PROBLEM Dx:  H/O: substance abuse: H/O: substance abuse  Hypothyroidism, unspecified type: Hypothyroidism, unspecified type  Asthma with COPD: Asthma with COPD  Pneumonia of both lower lobes due to infectious organism: Pneumonia of both lower lobes due to infectious organism        MEDICATIONS  (STANDING):  ALBUTerol/ipratropium for Nebulization 3 milliLiter(s) Nebulizer every 6 hours  buprenorphine 2 mG/naloxone 0.5 mG SL  Tablet 2 Tablet(s) SubLingual daily  buprenorphine 8 mG/naloxone 2 mG SL  Tablet 2 Tablet(s) SubLingual daily  cefTRIAXone   IVPB 2000 milliGRAM(s) IV Intermittent every 24 hours  dextrose 5%. 1000 milliLiter(s) (50 mL/Hr) IV Continuous <Continuous>  dextrose 50% Injectable 12.5 Gram(s) IV Push once  dextrose 50% Injectable 25 Gram(s) IV Push once  dextrose 50% Injectable 25 Gram(s) IV Push once  enoxaparin Injectable 40 milliGRAM(s) SubCutaneous daily  FLUoxetine 40 milliGRAM(s) Oral daily  insulin lispro (HumaLOG) corrective regimen sliding scale   SubCutaneous three times a day before meals  insulin lispro (HumaLOG) corrective regimen sliding scale   SubCutaneous at bedtime  insulin lispro Injectable (HumaLOG) 7 Unit(s) SubCutaneous three times a day with meals  levothyroxine 25 MICROGram(s) Oral daily  methylPREDNISolone sodium succinate Injectable 60 milliGRAM(s) IV Push every 8 hours  pantoprazole    Tablet 40 milliGRAM(s) Oral before breakfast    MEDICATIONS  (PRN):  ALBUTerol/ipratropium for Nebulization. 3 milliLiter(s) Nebulizer every 4 hours PRN Shortness of Breath and/or Wheezing  dextrose 40% Gel 15 Gram(s) Oral once PRN Blood Glucose LESS THAN 70 milliGRAM(s)/deciliter  glucagon  Injectable 1 milliGRAM(s) IntraMuscular once PRN Glucose LESS THAN 70 milligrams/deciliter  ketorolac   Injectable 30 milliGRAM(s) IV Push every 6 hours PRN Moderate Pain (4 - 6)

## 2019-07-16 NOTE — PROGRESS NOTE ADULT - PROBLEM SELECTOR PLAN 1
acute illness  clinically fair  Reducing wbc / afebrile  DC planning per pulmonary / Primary team   at least 10 days total abx - PO Vantin 200 mg Q12 upon dc  recall if needed

## 2019-07-16 NOTE — PROGRESS NOTE ADULT - SUBJECTIVE AND OBJECTIVE BOX
COLBYEZRA OLIVEROS  48y, Female  Allergy: No Known Allergies      CHIEF COMPLAINT: pna (15 Jul 2019 21:44)      INTERVAL EVENTS/HPI  - No acute events overnight  - T(F): , Max: 98 (07-15-19 @ 22:39)  - Denies any worsening symptoms  - Tolerating medication  - irritable and difficult to examine     ROS  10 system review - cough +     SOCIAL HISTORY    Substance Use (  ) never used  (  ) IVDU (X  ) Other:  Tobacco Usage:  (   ) never smoked   (   ) former smoker   (  X ) current smoker   Alcohol Usage: (  X ) social  (   ) daily use (   ) denies  Sexual History: not relevant       FH noncontributory     VITALS:  T(F): 96.6, Max: 98 (07-15-19 @ 22:39)  HR: 80  BP: 151/67  RR: 16Vital Signs Last 24 Hrs  T(C): 35.9 (16 Jul 2019 05:12), Max: 36.7 (15 Jul 2019 22:39)  T(F): 96.6 (16 Jul 2019 05:12), Max: 98 (15 Jul 2019 22:39)  HR: 80 (16 Jul 2019 05:12) (80 - 105)  BP: 151/67 (16 Jul 2019 05:12) (115/74 - 151/67)  BP(mean): --  RR: 16 (16 Jul 2019 05:12) (16 - 16)  SpO2: 88% (15 Jul 2019 15:13) (88% - 88%)    PHYSICAL EXAM:  Gen: NAD, resting in bed  HEENT: Normocephalic, atraumatic  Neck: supple, no lymphadenopathy  CV: Regular rate & regular rhythm  Lungs: rhonchi +   Abdomen: Soft, BS present  Ext: Warm, well perfused  Neuro: non focal, awake  Skin: no rash      TESTS & MEASUREMENTS:                        10.3   11.57 )-----------( 292      ( 15 Jul 2019 08:30 )             31.5     07-15    138  |  98  |  17  ----------------------------<  328<H>  4.7   |  27  |  0.6<L>    Ca    7.5<L>      15 Jul 2019 08:30              Culture - Sputum (collected 07-13-19 @ 09:20)  Source: .Sputum Sputum  Gram Stain (07-13-19 @ 22:42):    No polymorphonuclear leukocytes per low power field    No Squamous epithelial cells per low power field    Rare Yeast like cells per oil power field  Final Report (07-15-19 @ 16:09):    Normal Respiratory Alyssa present    Culture - Blood (collected 07-11-19 @ 13:21)  Source: .Blood Blood-Peripheral  Preliminary Report (07-12-19 @ 22:01):    No growth to date.    Culture - Blood (collected 07-11-19 @ 13:21)  Source: .Blood Blood-Peripheral  Preliminary Report (07-12-19 @ 22:01):    No growth to date.        Blood Gas Venous - Lactate: 1.8 mmoL/L (07-11-19 @ 12:41)      INFECTIOUS DISEASES TESTING  HIV-1/2 Combo Result: Nonreact (07-14-19 @ 14:06)  Legionella Antigen, Urine: Negative (07-13-19 @ 09:00)  Streptococcus Pneumoniae Ag Urine: Negative (07-13-19 @ 09:00)      RADIOLOGY & ADDITIONAL TESTS:  I have personally reviewed the last Chest xray  CXR      CT  CT Chest No Cont:   EXAM:  CT CHEST            PROCEDURE DATE:  07/13/2019            INTERPRETATION:  Reason for Exam:  Dyspnea.    CT of the chest was performed from the thoracic inlet to the level of the   adrenal glands without contrast injection. Coronal and sagittal images   have been submitted.    Comparison: 1/23/2007    Findings:     Tubes/Lines: None.    Lungs, Pleura, and Airways: The trachea and central airways are patent.   There are extensive bilateral groundglass opacities which are most   prominentin the lower lobes, with associated small thin-walled cysts. No   pleural effusion or pneumothorax is present.     Mediastinum/Lymph Nodes: No evidence of mediastinal, axillary or   supraclavicular lymphadenopathy utilizing CT size criteria.    Heart/Great Vessels: Heart size is normal and there is no pericardial   effusion. Coronary artery calcifications are present. Visualization of   the interventricular septum suggests anemia.    Abdomen: Diffuse pancreatic calcifications compatible with chronic   pancreatitis.    Bones and soft tissues: None      IMPRESSION:    Diffuse bilateral groundglass opacities with associated scattered   thin-walled cysts, most prominent in the lower lobes. This likely   reflects infectious/inflammatory etiology; PCP pneumonia is included in   the differential diagnosis.                  YUMIKO CHENG M.D., ATTENDING RADIOLOGIST  This document has been electronically signed. Jul 14 2019 10:40AM             (07-13-19 @ 18:43)      CARDIOLOGY TESTING  12 Lead ECG:   Ventricular Rate 90 BPM    Atrial Rate 90 BPM    P-R Interval 130 ms    QRS Duration 100 ms    Q-T Interval 382 ms    QTC Calculation(Bezet) 467 ms    P Axis 52 degrees    R Axis 62 degrees    T Axis 42 degrees    Diagnosis Line Normal sinus rhythm  Incomplete right bundle branch block  ST & T wave abnormality, consider anterior ischemia  Abnormal ECG    Confirmed by TERRENCE NEWBY MD ([id number]) on 7/12/2019 9:20:47 AM (07-11-19 @ 22:31)  12 Lead ECG:   Ventricular Rate 83 BPM    Atrial Rate 83 BPM    P-R Interval 112 ms    QRS Duration 100 ms    Q-T Interval 396 ms    QTC Calculation(Bezet) 465 ms    P Axis -6 degrees    R Axis 79 degrees    T Axis 54 degrees    Diagnosis Line Normal sinus rhythm  ST & T wave abnormality, consider anterior ischemia    Confirmed by TERRENCE NEWBY MD ([id number]) on 7/11/2019 12:05:15 PM (07-11-19 @ 09:26)      MEDICATIONS  ALBUTerol/ipratropium for Nebulization 3  buprenorphine 2 mG/naloxone 0.5 mG SL  Tablet 2  buprenorphine 8 mG/naloxone 2 mG SL  Tablet 2  cefTRIAXone   IVPB 2000  dextrose 5%. 1000  dextrose 50% Injectable 12.5  dextrose 50% Injectable 25  dextrose 50% Injectable 25  enoxaparin Injectable 40  FLUoxetine 40  insulin lispro (HumaLOG) corrective regimen sliding scale   insulin lispro (HumaLOG) corrective regimen sliding scale   insulin lispro Injectable (HumaLOG) 5  levothyroxine 25  methylPREDNISolone sodium succinate Injectable 60  pantoprazole    Tablet 40  sodium chloride 0.9%. 1000      ANTIBIOTICS:  cefTRIAXone   IVPB 2000 milliGRAM(s) IV Intermittent every 24 hours

## 2019-07-17 LAB
ANION GAP SERPL CALC-SCNC: 13 MMOL/L — SIGNIFICANT CHANGE UP (ref 7–14)
BUN SERPL-MCNC: 24 MG/DL — HIGH (ref 10–20)
CALCIUM SERPL-MCNC: 7.6 MG/DL — LOW (ref 8.5–10.1)
CHLORIDE SERPL-SCNC: 93 MMOL/L — LOW (ref 98–110)
CO2 SERPL-SCNC: 27 MMOL/L — SIGNIFICANT CHANGE UP (ref 17–32)
CREAT SERPL-MCNC: 0.7 MG/DL — SIGNIFICANT CHANGE UP (ref 0.7–1.5)
GLUCOSE BLDC GLUCOMTR-MCNC: 190 MG/DL — HIGH (ref 70–99)
GLUCOSE BLDC GLUCOMTR-MCNC: 260 MG/DL — HIGH (ref 70–99)
GLUCOSE BLDC GLUCOMTR-MCNC: 383 MG/DL — HIGH (ref 70–99)
GLUCOSE BLDC GLUCOMTR-MCNC: 432 MG/DL — HIGH (ref 70–99)
GLUCOSE BLDC GLUCOMTR-MCNC: 78 MG/DL — SIGNIFICANT CHANGE UP (ref 70–99)
GLUCOSE SERPL-MCNC: 443 MG/DL — HIGH (ref 70–99)
HCT VFR BLD CALC: 32.4 % — LOW (ref 37–47)
HGB BLD-MCNC: 10.4 G/DL — LOW (ref 12–16)
MCHC RBC-ENTMCNC: 27.7 PG — SIGNIFICANT CHANGE UP (ref 27–31)
MCHC RBC-ENTMCNC: 32.1 G/DL — SIGNIFICANT CHANGE UP (ref 32–37)
MCV RBC AUTO: 86.2 FL — SIGNIFICANT CHANGE UP (ref 81–99)
NRBC # BLD: 0 /100 WBCS — SIGNIFICANT CHANGE UP (ref 0–0)
PLATELET # BLD AUTO: 301 K/UL — SIGNIFICANT CHANGE UP (ref 130–400)
POTASSIUM SERPL-MCNC: 4.8 MMOL/L — SIGNIFICANT CHANGE UP (ref 3.5–5)
POTASSIUM SERPL-SCNC: 4.8 MMOL/L — SIGNIFICANT CHANGE UP (ref 3.5–5)
RBC # BLD: 3.76 M/UL — LOW (ref 4.2–5.4)
RBC # FLD: 13.4 % — SIGNIFICANT CHANGE UP (ref 11.5–14.5)
SODIUM SERPL-SCNC: 133 MMOL/L — LOW (ref 135–146)
WBC # BLD: 13.12 K/UL — HIGH (ref 4.8–10.8)
WBC # FLD AUTO: 13.12 K/UL — HIGH (ref 4.8–10.8)

## 2019-07-17 PROCEDURE — 99233 SBSQ HOSP IP/OBS HIGH 50: CPT

## 2019-07-17 RX ORDER — BUDESONIDE AND FORMOTEROL FUMARATE DIHYDRATE 160; 4.5 UG/1; UG/1
2 AEROSOL RESPIRATORY (INHALATION)
Refills: 0 | Status: DISCONTINUED | OUTPATIENT
Start: 2019-07-17 | End: 2019-07-23

## 2019-07-17 RX ORDER — OXYCODONE AND ACETAMINOPHEN 5; 325 MG/1; MG/1
1 TABLET ORAL ONCE
Refills: 0 | Status: DISCONTINUED | OUTPATIENT
Start: 2019-07-17 | End: 2019-07-17

## 2019-07-17 RX ORDER — INSULIN HUMAN 100 [IU]/ML
8 INJECTION, SOLUTION SUBCUTANEOUS ONCE
Refills: 0 | Status: COMPLETED | OUTPATIENT
Start: 2019-07-17 | End: 2019-07-17

## 2019-07-17 RX ADMIN — OXYCODONE AND ACETAMINOPHEN 1 TABLET(S): 5; 325 TABLET ORAL at 23:41

## 2019-07-17 RX ADMIN — BUDESONIDE AND FORMOTEROL FUMARATE DIHYDRATE 2 PUFF(S): 160; 4.5 AEROSOL RESPIRATORY (INHALATION) at 20:13

## 2019-07-17 RX ADMIN — Medication 30 MILLIGRAM(S): at 05:30

## 2019-07-17 RX ADMIN — PANTOPRAZOLE SODIUM 40 MILLIGRAM(S): 20 TABLET, DELAYED RELEASE ORAL at 05:06

## 2019-07-17 RX ADMIN — Medication 7 UNIT(S): at 17:18

## 2019-07-17 RX ADMIN — BUPRENORPHINE AND NALOXONE 2 TABLET(S): 2; .5 TABLET SUBLINGUAL at 11:38

## 2019-07-17 RX ADMIN — Medication 10: at 08:09

## 2019-07-17 RX ADMIN — Medication 60 MILLIGRAM(S): at 17:19

## 2019-07-17 RX ADMIN — Medication 30 MILLIGRAM(S): at 04:46

## 2019-07-17 RX ADMIN — Medication 3 MILLILITER(S): at 07:51

## 2019-07-17 RX ADMIN — Medication 60 MILLIGRAM(S): at 21:42

## 2019-07-17 RX ADMIN — ENOXAPARIN SODIUM 40 MILLIGRAM(S): 100 INJECTION SUBCUTANEOUS at 11:38

## 2019-07-17 RX ADMIN — INSULIN HUMAN 8 UNIT(S): 100 INJECTION, SOLUTION SUBCUTANEOUS at 04:50

## 2019-07-17 RX ADMIN — Medication 30 MILLIGRAM(S): at 18:10

## 2019-07-17 RX ADMIN — Medication 25 MICROGRAM(S): at 05:06

## 2019-07-17 RX ADMIN — Medication 3 MILLILITER(S): at 13:06

## 2019-07-17 RX ADMIN — Medication 60 MILLIGRAM(S): at 05:06

## 2019-07-17 RX ADMIN — Medication 6: at 17:18

## 2019-07-17 RX ADMIN — Medication 40 MILLIGRAM(S): at 11:38

## 2019-07-17 RX ADMIN — Medication 3 MILLILITER(S): at 20:20

## 2019-07-17 RX ADMIN — Medication 30 MILLIGRAM(S): at 11:38

## 2019-07-17 RX ADMIN — Medication 7 UNIT(S): at 08:09

## 2019-07-17 RX ADMIN — CEFTRIAXONE 100 MILLIGRAM(S): 500 INJECTION, POWDER, FOR SOLUTION INTRAMUSCULAR; INTRAVENOUS at 11:46

## 2019-07-17 NOTE — CDI QUERY NOTE - NSCDIOTHERTXTBX_GEN_ALL_CORE_HH
Patient admitted for acute respiratory failure, copd exacerbation, pneumonia.  On IV antibiotics for CAP.     The ID consult and progress note on 7/15/19 indicate a diagnosis of sepsis POA with P > 90, WBC > 12, RR > 20.  Patient has been afebrile as per nursing flowsheets.    The attending hospitalists do not indicate a diagnosis of sepsis.       Can you please clarify:     - sepsis ruled out  - sepsis ruled in  - sepsis ruled in and resolved  - other  - unable to determine.       Thank you.

## 2019-07-17 NOTE — PROGRESS NOTE ADULT - SUBJECTIVE AND OBJECTIVE BOX
Progress Note:  Provider Speciality                            Hospitalist      EZRA BARCLAY MRN-[id number] 48y Female     CHIEF PRESENTING COMPLAINT:  Patient is a 48y old  Female who presents with a chief complaint of pna (16 Jul 2019 13:17)        SUBJECTIVE:  Patient was seen and examined at bedside.   reports she is ok at rest but cant catch her breath when trying to ambulate    No significant overnight events reported.     HISTORY OF PRESENTING ILLNESS:  HPI:  49 yo female with PMHx of COPD/Asthma, hypothyroid and substance abuse presents with c/o 4 days of SOB and chest tightness. Pt has been using inhalers and nebs without much improvement. Pt admits to fever to 101 and nonproductive cough. Pt denies any sick close contacts. (11 Jul 2019 13:40)      PAST MEDICAL & SURGICAL HISTORY:  PAST MEDICAL & SURGICAL HISTORY:  Hepatitis-C  History of pancreatitis  H/O: substance abuse  Hypothyroidism  Asthma with COPD  History of tonsillectomy  History of appendectomy      VITAL SIGNS:  Vital Signs Last 24 Hrs  T(C): 36.6 (17 Jul 2019 13:53), Max: 36.6 (17 Jul 2019 13:53)  T(F): 97.9 (17 Jul 2019 13:53), Max: 97.9 (17 Jul 2019 13:53)  HR: 78 (17 Jul 2019 13:53) (70 - 88)  BP: 118/74 (17 Jul 2019 13:53) (118/74 - 140/71)  BP(mean): --  RR: 16 (17 Jul 2019 13:53) (16 - 16)  SpO2: 95% (17 Jul 2019 08:02) (92% - 95%)    PHYSICAL EXAMINATION:    General: AAOx 3  , not in acute distress  HEENT:  UBALDO, EOMI  Heart: S1+S2 audible, no murmur  Lungs: bilateral  wheezes/ronchi  Abdomen: Soft, non-tender, non-distended   CNS: , no focal deficits.  Extremities:  No edema          REVIEW OF SYSTEMS:    At least 10 systems were reviewed in ROS. All systems reviewed  are within normal limits except for the complaints as described in Subjective.    CONSULTS:  Consultant(s) Notes Reviewed by me.   Care Discussed with Consultants/Other Providers where required.        MEDICATIONS:  MEDICATIONS  (STANDING):  ALBUTerol/ipratropium for Nebulization 3 milliLiter(s) Nebulizer every 6 hours  buprenorphine 2 mG/naloxone 0.5 mG SL  Tablet 2 Tablet(s) SubLingual daily  buprenorphine 8 mG/naloxone 2 mG SL  Tablet 2 Tablet(s) SubLingual daily  cefTRIAXone   IVPB 2000 milliGRAM(s) IV Intermittent every 24 hours  dextrose 5%. 1000 milliLiter(s) (50 mL/Hr) IV Continuous <Continuous>  dextrose 50% Injectable 12.5 Gram(s) IV Push once  dextrose 50% Injectable 25 Gram(s) IV Push once  dextrose 50% Injectable 25 Gram(s) IV Push once  enoxaparin Injectable 40 milliGRAM(s) SubCutaneous daily  FLUoxetine 40 milliGRAM(s) Oral daily  insulin lispro (HumaLOG) corrective regimen sliding scale   SubCutaneous three times a day before meals  insulin lispro (HumaLOG) corrective regimen sliding scale   SubCutaneous at bedtime  insulin lispro Injectable (HumaLOG) 7 Unit(s) SubCutaneous three times a day with meals  levothyroxine 25 MICROGram(s) Oral daily  methylPREDNISolone sodium succinate Injectable 60 milliGRAM(s) IV Push every 8 hours  pantoprazole    Tablet 40 milliGRAM(s) Oral before breakfast    MEDICATIONS  (PRN):  ALBUTerol/ipratropium for Nebulization. 3 milliLiter(s) Nebulizer every 4 hours PRN Shortness of Breath and/or Wheezing  dextrose 40% Gel 15 Gram(s) Oral once PRN Blood Glucose LESS THAN 70 milliGRAM(s)/deciliter  glucagon  Injectable 1 milliGRAM(s) IntraMuscular once PRN Glucose LESS THAN 70 milligrams/deciliter  ketorolac   Injectable 30 milliGRAM(s) IV Push every 6 hours PRN Moderate Pain (4 - 6)      LABOROTORY DATA/MICROBIOLOGY/I & O's:                        10.4   13.12 )-----------( 301      ( 17 Jul 2019 07:44 )             32.4     07-17    133<L>  |  93<L>  |  24<H>  ----------------------------<  443<H>  4.8   |  27  |  0.7    Ca    7.6<L>      17 Jul 2019 07:44          CAPILLARY BLOOD GLUCOSE      POCT Blood Glucose.: 78 mg/dL (17 Jul 2019 11:49)  POCT Blood Glucose.: 383 mg/dL (17 Jul 2019 08:01)  POCT Blood Glucose.: 432 mg/dL (17 Jul 2019 04:36)  POCT Blood Glucose.: 89 mg/dL (16 Jul 2019 16:28)                  ASSESSMENT and PLANS:      49 yo female with h/o COPD presents with SOB and chest tightness and found to be hypoxic.      1. Acute hypoxic respiratory failure due to COPD exacerbation with underlying PNA  still dyspneic with minimal exertion with hypoxia   -c/w supplemental O2 to maintain SPO2>92%  -C/w IV Solumedrol --- remains on 60mg q 8 hrs  -Duoneb RTC  -Pulse ox monitoring/check ambulatory SPO2  -Pulmonary follow up requested  -CT chest shows b/l opacities, HIV negative   -ID following-  at least 10 days of ABX    2. CAP  -C/w IVceftriaxone/completed azithromycin (oral vantin upon d/c)  - monitor vitals    3. Hypothyroidism  -C/w synthroid    4. Ex-substance abuser  -monitor  -stable on current suboxone dose 20mg    DVT prophylaxis  Lovenox sq      # Progress Note Handoff  PENDING : clinical improvement of respiratory status / pulm follow up   Family discussion: discussed with patient who comprehends her medical care.  Disposition: Home when  clinically stable        #Progress Note Handoff  Pending :    Disposition:    Discussion:

## 2019-07-17 NOTE — PROGRESS NOTE ADULT - ASSESSMENT
ASS: PNA ?? r/o opportunistic infection ?? heroin induce  copd exacerbation     plan   - continue IV SOLUMEDROL 60 Q 8 H  - NEB Q 4 H  - ABX as per ID   - SPUTUM GRAM STAIN / CX again check for PCP   - start symbicort 160 mg Q 12 hrs   nebulizer Q 4 hrs and prn   cxr today  - KEEP SAO2 92%  - ECHO  - FS CONTROL  - DVT/ GI PROPHYLAXIS  patient actively wheezing told about the risk of bronchoscopy she is refusing   case discussed with Hospitalist

## 2019-07-17 NOTE — PROGRESS NOTE ADULT - SUBJECTIVE AND OBJECTIVE BOX
Patient is a 48y old  Female who presents with a chief complaint of pna (17 Jul 2019 14:00)      INTERVAL HISTORY/overnight events  still wheezing SOB on exertion       Vital Signs Last 24 Hrs  T(C): 36.6 (17 Jul 2019 13:53), Max: 36.6 (17 Jul 2019 13:53)  T(F): 97.9 (17 Jul 2019 13:53), Max: 97.9 (17 Jul 2019 13:53)  HR: 78 (17 Jul 2019 13:53) (70 - 88)  BP: 118/74 (17 Jul 2019 13:53) (118/74 - 138/63)  BP(mean): --  RR: 16 (17 Jul 2019 13:53) (16 - 16)  SpO2: 92% (17 Jul 2019 08:02) (92% - 92%)  Daily     Daily   I&O's Summary      Physical Examination:    General: No acute distress.  Alert, oriented, interactive, nonfocal    HEENT: Pupils equal, reactive to light.  Symmetric.    PULM: b/l wheeze     CVS: Regular rate and rhythm, no murmurs, rubs, or gallops    ABD: Soft, nondistended, nontender, normoactive bowel sounds, no masses    EXT: No edema, nontender    SKIN: Warm and well perfused, no rashes noted.    Neurology:    Musculoskeletal : Move all extremety         Lab Results:                        10.4   13.12 )-----------( 301      ( 17 Jul 2019 07:44 )             32.4     17 Jul 2019 07:44    133    |  93     |  24     ----------------------------<  443    4.8     |  27     |  0.7      Ca    7.6        17 Jul 2019 07:44                Microbiology      Medication:  MEDICATIONS  (STANDING):  ALBUTerol/ipratropium for Nebulization 3 milliLiter(s) Nebulizer every 6 hours  buprenorphine 2 mG/naloxone 0.5 mG SL  Tablet 2 Tablet(s) SubLingual daily  buprenorphine 8 mG/naloxone 2 mG SL  Tablet 2 Tablet(s) SubLingual daily  cefTRIAXone   IVPB 2000 milliGRAM(s) IV Intermittent every 24 hours  dextrose 5%. 1000 milliLiter(s) (50 mL/Hr) IV Continuous <Continuous>  dextrose 50% Injectable 12.5 Gram(s) IV Push once  dextrose 50% Injectable 25 Gram(s) IV Push once  dextrose 50% Injectable 25 Gram(s) IV Push once  enoxaparin Injectable 40 milliGRAM(s) SubCutaneous daily  FLUoxetine 40 milliGRAM(s) Oral daily  insulin lispro (HumaLOG) corrective regimen sliding scale   SubCutaneous three times a day before meals  insulin lispro (HumaLOG) corrective regimen sliding scale   SubCutaneous at bedtime  insulin lispro Injectable (HumaLOG) 7 Unit(s) SubCutaneous three times a day with meals  levothyroxine 25 MICROGram(s) Oral daily  methylPREDNISolone sodium succinate Injectable 60 milliGRAM(s) IV Push every 8 hours  pantoprazole    Tablet 40 milliGRAM(s) Oral before breakfast    MEDICATIONS  (PRN):  ALBUTerol/ipratropium for Nebulization. 3 milliLiter(s) Nebulizer every 4 hours PRN Shortness of Breath and/or Wheezing  dextrose 40% Gel 15 Gram(s) Oral once PRN Blood Glucose LESS THAN 70 milliGRAM(s)/deciliter  glucagon  Injectable 1 milliGRAM(s) IntraMuscular once PRN Glucose LESS THAN 70 milligrams/deciliter  ketorolac   Injectable 30 milliGRAM(s) IV Push every 6 hours PRN Moderate Pain (4 - 6)        IMAGING STUDIES:

## 2019-07-18 LAB
GLUCOSE BLDC GLUCOMTR-MCNC: 247 MG/DL — HIGH (ref 70–99)
GLUCOSE BLDC GLUCOMTR-MCNC: 375 MG/DL — HIGH (ref 70–99)
GLUCOSE BLDC GLUCOMTR-MCNC: 399 MG/DL — HIGH (ref 70–99)
GLUCOSE BLDC GLUCOMTR-MCNC: 414 MG/DL — HIGH (ref 70–99)
GLUCOSE BLDC GLUCOMTR-MCNC: 489 MG/DL — CRITICAL HIGH (ref 70–99)
RAPID RVP RESULT: SIGNIFICANT CHANGE UP

## 2019-07-18 PROCEDURE — 71045 X-RAY EXAM CHEST 1 VIEW: CPT | Mod: 26

## 2019-07-18 PROCEDURE — 99233 SBSQ HOSP IP/OBS HIGH 50: CPT

## 2019-07-18 RX ORDER — KETOROLAC TROMETHAMINE 30 MG/ML
30 SYRINGE (ML) INJECTION ONCE
Refills: 0 | Status: DISCONTINUED | OUTPATIENT
Start: 2019-07-18 | End: 2019-07-18

## 2019-07-18 RX ORDER — IBUPROFEN 200 MG
400 TABLET ORAL EVERY 6 HOURS
Refills: 0 | Status: DISCONTINUED | OUTPATIENT
Start: 2019-07-18 | End: 2019-07-18

## 2019-07-18 RX ORDER — HYDROXYZINE HCL 10 MG
25 TABLET ORAL EVERY 8 HOURS
Refills: 0 | Status: DISCONTINUED | OUTPATIENT
Start: 2019-07-18 | End: 2019-07-23

## 2019-07-18 RX ADMIN — Medication 400 MILLIGRAM(S): at 12:55

## 2019-07-18 RX ADMIN — Medication 25 MICROGRAM(S): at 06:06

## 2019-07-18 RX ADMIN — Medication 7 UNIT(S): at 11:38

## 2019-07-18 RX ADMIN — Medication 3 MILLILITER(S): at 07:34

## 2019-07-18 RX ADMIN — CEFTRIAXONE 100 MILLIGRAM(S): 500 INJECTION, POWDER, FOR SOLUTION INTRAMUSCULAR; INTRAVENOUS at 13:38

## 2019-07-18 RX ADMIN — Medication 7 UNIT(S): at 17:10

## 2019-07-18 RX ADMIN — Medication 12: at 08:10

## 2019-07-18 RX ADMIN — PANTOPRAZOLE SODIUM 40 MILLIGRAM(S): 20 TABLET, DELAYED RELEASE ORAL at 06:06

## 2019-07-18 RX ADMIN — Medication 3 MILLILITER(S): at 13:38

## 2019-07-18 RX ADMIN — BUPRENORPHINE AND NALOXONE 2 TABLET(S): 2; .5 TABLET SUBLINGUAL at 11:35

## 2019-07-18 RX ADMIN — Medication 40 MILLIGRAM(S): at 11:40

## 2019-07-18 RX ADMIN — Medication 3 MILLILITER(S): at 01:19

## 2019-07-18 RX ADMIN — Medication 10: at 11:38

## 2019-07-18 RX ADMIN — Medication 400 MILLIGRAM(S): at 11:40

## 2019-07-18 RX ADMIN — Medication 25 MILLIGRAM(S): at 23:58

## 2019-07-18 RX ADMIN — Medication 400 MILLIGRAM(S): at 20:41

## 2019-07-18 RX ADMIN — OXYCODONE AND ACETAMINOPHEN 1 TABLET(S): 5; 325 TABLET ORAL at 00:15

## 2019-07-18 RX ADMIN — Medication 60 MILLIGRAM(S): at 22:40

## 2019-07-18 RX ADMIN — BUDESONIDE AND FORMOTEROL FUMARATE DIHYDRATE 2 PUFF(S): 160; 4.5 AEROSOL RESPIRATORY (INHALATION) at 08:11

## 2019-07-18 RX ADMIN — Medication 60 MILLIGRAM(S): at 13:46

## 2019-07-18 RX ADMIN — BUPRENORPHINE AND NALOXONE 2 TABLET(S): 2; .5 TABLET SUBLINGUAL at 11:36

## 2019-07-18 RX ADMIN — Medication 60 MILLIGRAM(S): at 06:06

## 2019-07-18 RX ADMIN — Medication 25 MILLIGRAM(S): at 08:38

## 2019-07-18 RX ADMIN — Medication 7 UNIT(S): at 08:10

## 2019-07-18 RX ADMIN — Medication 30 MILLIGRAM(S): at 23:56

## 2019-07-18 NOTE — DIETITIAN INITIAL EVALUATION ADULT. - PHYSICAL APPEARANCE
BMI: 24.8. Alert & oriented. Tearful. Last BM 7/15. No chewing/swallowing difficulty reported. Skin noted WDL.

## 2019-07-18 NOTE — DIETITIAN INITIAL EVALUATION ADULT. - PERTINENT LABORATORY DATA
7/18: gluc fingersticks-247 (16:25) vs. 375 (11:26) vs. 489 (7:53); 7/17: RBC-3.76, H/H-10.4/32.4, Na-133, Cl-93, BUN-24, gluc-443; 7/14: GyvS2P-4.5%

## 2019-07-18 NOTE — DIETITIAN INITIAL EVALUATION ADULT. - OTHER INFO
Pertinent Medical Information: p/w SOB and chest tightness and found to be hypoxic. Acute hypoxic respiratory failure due to COPD exacerbation with underlying PNA noted still dyspneic with minimal exertion with hypoxia. Sepsis- POA possibly due to underlying  CAP. Ex-substance abuser.    Pertinent Subjective Information: Pt appears anxious at time of RD visit & declined to provide nutrition history at this time. NKFA per chart. No response from patient contacts.

## 2019-07-18 NOTE — DIETITIAN INITIAL EVALUATION ADULT. - ADD RECOMMEND
Recommendation: Add Consistent Carbohydrate diet modifier (no snacks). Consider adjusting insulin regimen if blood glucose levels remain significantly elevated.

## 2019-07-18 NOTE — DIETITIAN INITIAL EVALUATION ADULT. - ENERGY NEEDS
Energy: 9694-6855 kcal/day (MSJx1.2-1.3 AF)    Protein: 59-74 g/day (0.8-1 g/kg ABW)    Fluids: 1 mL/kcal

## 2019-07-18 NOTE — PROGRESS NOTE ADULT - SUBJECTIVE AND OBJECTIVE BOX
Progress Note:  Provider Speciality                            Hospitalist      EZRA BARCLAY [id number] 48y Female     CHIEF PRESENTING COMPLAINT:  Patient is a 48y old  Female who presents with a chief complaint of pna (16 Jul 2019 13:17)        SUBJECTIVE:  Patient was seen and examined at bedside.   anxious/ emotional /upset about respiratory status not improving /    No significant overnight events reported.     HISTORY OF PRESENTING ILLNESS:  HPI:  47 yo female with PMHx of COPD/Asthma, hypothyroid and substance abuse presents with c/o 4 days of SOB and chest tightness. Pt has been using inhalers and nebs without much improvement. Pt admits to fever to 101 and nonproductive cough. Pt denies any sick close contacts. (11 Jul 2019 13:40)      PAST MEDICAL & SURGICAL HISTORY:  PAST MEDICAL & SURGICAL HISTORY:  Hepatitis-C  History of pancreatitis  H/O: substance abuse  Hypothyroidism  Asthma with COPD  History of tonsillectomy  History of appendectomy      VITAL SIGNS:  Vital Signs Last 24 Hrs  T(C): 36.6 (17 Jul 2019 13:53), Max: 36.6 (17 Jul 2019 13:53)  T(F): 97.9 (17 Jul 2019 13:53), Max: 97.9 (17 Jul 2019 13:53)  HR: 78 (17 Jul 2019 13:53) (70 - 88)  BP: 118/74 (17 Jul 2019 13:53) (118/74 - 140/71)  BP(mean): --  RR: 16 (17 Jul 2019 13:53) (16 - 16)  SpO2: 95% (17 Jul 2019 08:02) (92% - 95%)    PHYSICAL EXAMINATION:    General: AAOx 3  , not in acute distress  HEENT:  UBALDO, EOMI  Heart: S1+S2 audible, no murmur  Lungs: bilateral  wheezes/ronchi  Abdomen: Soft, non-tender, non-distended   CNS: , no focal deficits.  Extremities:  No edema          REVIEW OF SYSTEMS:    At least 10 systems were reviewed in ROS. All systems reviewed  are within normal limits except for the complaints as described in Subjective.    CONSULTS:  Consultant(s) Notes Reviewed by me.   Care Discussed with Consultants/Other Providers where required.        MEDICATIONS:  MEDICATIONS  (STANDING):  ALBUTerol/ipratropium for Nebulization 3 milliLiter(s) Nebulizer every 6 hours  buprenorphine 2 mG/naloxone 0.5 mG SL  Tablet 2 Tablet(s) SubLingual daily  buprenorphine 8 mG/naloxone 2 mG SL  Tablet 2 Tablet(s) SubLingual daily  cefTRIAXone   IVPB 2000 milliGRAM(s) IV Intermittent every 24 hours  dextrose 5%. 1000 milliLiter(s) (50 mL/Hr) IV Continuous <Continuous>  dextrose 50% Injectable 12.5 Gram(s) IV Push once  dextrose 50% Injectable 25 Gram(s) IV Push once  dextrose 50% Injectable 25 Gram(s) IV Push once  enoxaparin Injectable 40 milliGRAM(s) SubCutaneous daily  FLUoxetine 40 milliGRAM(s) Oral daily  insulin lispro (HumaLOG) corrective regimen sliding scale   SubCutaneous three times a day before meals  insulin lispro (HumaLOG) corrective regimen sliding scale   SubCutaneous at bedtime  insulin lispro Injectable (HumaLOG) 7 Unit(s) SubCutaneous three times a day with meals  levothyroxine 25 MICROGram(s) Oral daily  methylPREDNISolone sodium succinate Injectable 60 milliGRAM(s) IV Push every 8 hours  pantoprazole    Tablet 40 milliGRAM(s) Oral before breakfast    MEDICATIONS  (PRN):  ALBUTerol/ipratropium for Nebulization. 3 milliLiter(s) Nebulizer every 4 hours PRN Shortness of Breath and/or Wheezing  dextrose 40% Gel 15 Gram(s) Oral once PRN Blood Glucose LESS THAN 70 milliGRAM(s)/deciliter  glucagon  Injectable 1 milliGRAM(s) IntraMuscular once PRN Glucose LESS THAN 70 milligrams/deciliter  ketorolac   Injectable 30 milliGRAM(s) IV Push every 6 hours PRN Moderate Pain (4 - 6)      [facility] DATA/MICROBIOLOGY/I & O's:                        10.4   13.12 )-----------( 301      ( 17 Jul 2019 07:44 )             32.4     07-17    133<L>  |  93<L>  |  24<H>  ----------------------------<  443<H>  4.8   |  27  |  0.7    Ca    7.6<L>      17 Jul 2019 07:44          CAPILLARY BLOOD GLUCOSE      POCT Blood Glucose.: 78 mg/dL (17 Jul 2019 11:49)  POCT Blood Glucose.: 383 mg/dL (17 Jul 2019 08:01)  POCT Blood Glucose.: 432 mg/dL (17 Jul 2019 04:36)  POCT Blood Glucose.: 89 mg/dL (16 Jul 2019 16:28)                  ASSESSMENT and PLANS:      47 yo female with h/o COPD presents with SOB and chest tightness and found to be hypoxic.      1. Acute hypoxic respiratory failure due to COPD exacerbation with underlying PNA  still dyspneic with minimal exertion with hypoxia   -was on ventimask in the morning/ transitioned to 5L NC O2/ tried to walk yesterday/ SPO2 dropped to 70's / c/w supplemental O2 to maintain SPO2>92%  -C/w IV Solumedrol --- remains on 60mg q 8 hrs  -Duoneb RTC  -Pulse ox monitoring  -Pulmonary follow up appreciated- c/w IV steroids/ TTE  viral panel- negative / dc isolation   -CT chest shows b/l opacities, HIV negative   -ID following-  at least 10 days of ABX    2. CAP  -C/w IVceftriaxone/completed azithromycin (oral vantin upon d/c)  - monitor vitals    3. Hypothyroidism  -C/w synthroid    4. Ex-substance abuser  -monitor  -stable on current suboxone dose 20mg    DVT prophylaxis  Lovenox sq      # Progress Note Handoff  PENDING : clinical improvement of respiratory status / pulm follow up   Family discussion: discussed with patient who comprehends her medical care.  Disposition: Home when  clinically stable Progress Note:  Provider Speciality                            Hospitalist      EZRA BARCLAY [id number] 48y Female     CHIEF PRESENTING COMPLAINT:  Patient is a 48y old  Female who presents with a chief complaint of pna (16 Jul 2019 13:17)        SUBJECTIVE:  Patient was seen and examined at bedside.   anxious/ emotional /upset about respiratory status not improving /    No significant overnight events reported.     HISTORY OF PRESENTING ILLNESS:  HPI:  49 yo female with PMHx of COPD/Asthma, hypothyroid and substance abuse presents with c/o 4 days of SOB and chest tightness. Pt has been using inhalers and nebs without much improvement. Pt admits to fever to 101 and nonproductive cough. Pt denies any sick close contacts. (11 Jul 2019 13:40)      PAST MEDICAL & SURGICAL HISTORY:  PAST MEDICAL & SURGICAL HISTORY:  Hepatitis-C  History of pancreatitis  H/O: substance abuse  Hypothyroidism  Asthma with COPD  History of tonsillectomy  History of appendectomy      VITAL SIGNS:  Vital Signs Last 24 Hrs  T(C): 36.6 (17 Jul 2019 13:53), Max: 36.6 (17 Jul 2019 13:53)  T(F): 97.9 (17 Jul 2019 13:53), Max: 97.9 (17 Jul 2019 13:53)  HR: 78 (17 Jul 2019 13:53) (70 - 88)  BP: 118/74 (17 Jul 2019 13:53) (118/74 - 140/71)  BP(mean): --  RR: 16 (17 Jul 2019 13:53) (16 - 16)  SpO2: 95% (17 Jul 2019 08:02) (92% - 95%)    PHYSICAL EXAMINATION:    General: AAOx 3  , not in acute distress  HEENT:  UBALDO, EOMI  Heart: S1+S2 audible, no murmur  Lungs: bilateral  wheezes/ronchi  Abdomen: Soft, non-tender, non-distended   CNS: , no focal deficits.  Extremities:  No edema          REVIEW OF SYSTEMS:    At least 10 systems were reviewed in ROS. All systems reviewed  are within normal limits except for the complaints as described in Subjective.    CONSULTS:  Consultant(s) Notes Reviewed by me.   Care Discussed with Consultants/Other Providers where required.        MEDICATIONS:  MEDICATIONS  (STANDING):  ALBUTerol/ipratropium for Nebulization 3 milliLiter(s) Nebulizer every 6 hours  buprenorphine 2 mG/naloxone 0.5 mG SL  Tablet 2 Tablet(s) SubLingual daily  buprenorphine 8 mG/naloxone 2 mG SL  Tablet 2 Tablet(s) SubLingual daily  cefTRIAXone   IVPB 2000 milliGRAM(s) IV Intermittent every 24 hours  dextrose 5%. 1000 milliLiter(s) (50 mL/Hr) IV Continuous <Continuous>  dextrose 50% Injectable 12.5 Gram(s) IV Push once  dextrose 50% Injectable 25 Gram(s) IV Push once  dextrose 50% Injectable 25 Gram(s) IV Push once  enoxaparin Injectable 40 milliGRAM(s) SubCutaneous daily  FLUoxetine 40 milliGRAM(s) Oral daily  insulin lispro (HumaLOG) corrective regimen sliding scale   SubCutaneous three times a day before meals  insulin lispro (HumaLOG) corrective regimen sliding scale   SubCutaneous at bedtime  insulin lispro Injectable (HumaLOG) 7 Unit(s) SubCutaneous three times a day with meals  levothyroxine 25 MICROGram(s) Oral daily  methylPREDNISolone sodium succinate Injectable 60 milliGRAM(s) IV Push every 8 hours  pantoprazole    Tablet 40 milliGRAM(s) Oral before breakfast    MEDICATIONS  (PRN):  ALBUTerol/ipratropium for Nebulization. 3 milliLiter(s) Nebulizer every 4 hours PRN Shortness of Breath and/or Wheezing  dextrose 40% Gel 15 Gram(s) Oral once PRN Blood Glucose LESS THAN 70 milliGRAM(s)/deciliter  glucagon  Injectable 1 milliGRAM(s) IntraMuscular once PRN Glucose LESS THAN 70 milligrams/deciliter  ketorolac   Injectable 30 milliGRAM(s) IV Push every 6 hours PRN Moderate Pain (4 - 6)      [facility] DATA/MICROBIOLOGY/I & O's:                        10.4   13.12 )-----------( 301      ( 17 Jul 2019 07:44 )             32.4     07-17    133<L>  |  93<L>  |  24<H>  ----------------------------<  443<H>  4.8   |  27  |  0.7    Ca    7.6<L>      17 Jul 2019 07:44          CAPILLARY BLOOD GLUCOSE      POCT Blood Glucose.: 78 mg/dL (17 Jul 2019 11:49)  POCT Blood Glucose.: 383 mg/dL (17 Jul 2019 08:01)  POCT Blood Glucose.: 432 mg/dL (17 Jul 2019 04:36)  POCT Blood Glucose.: 89 mg/dL (16 Jul 2019 16:28)                  ASSESSMENT and PLANS:      49 yo female with h/o COPD presents with SOB and chest tightness and found to be hypoxic.      1. Acute hypoxic respiratory failure due to COPD exacerbation with underlying PNA  still dyspneic with minimal exertion with hypoxia   -was on ventimask in the morning/ transitioned to 5L NC O2/ tried to walk yesterday/ SPO2 dropped to 70's / c/w supplemental O2 to maintain SPO2>92%  -C/w IV Solumedrol --- remains on 60mg q 8 hrs  -Duoneb RTC  -Pulse ox monitoring  -Pulmonary follow up appreciated- c/w IV steroids  TTE completed- follow up report   sputum culture - pending   viral panel- negative / dc isolation   -CT chest shows b/l opacities, HIV negative   -ID following-  at least 10 days of ABX    2. sepsis- POA possibly due to underlying  CAP  -C/w IVceftriaxone/completed azithromycin (oral vantin upon d/c)  - monitor vitals    3. Hypothyroidism  -C/w synthroid    4. Ex-substance abuser  -monitor  -stable on current suboxone dose 20mg    DVT prophylaxis  Lovenox sq      # Progress Note Handoff  PENDING : clinical improvement of respiratory status / pulm follow up   Family discussion: discussed with patient who comprehends her medical care.  Disposition: Home when  clinically stable

## 2019-07-19 LAB
GLUCOSE BLDC GLUCOMTR-MCNC: 266 MG/DL — HIGH (ref 70–99)
GLUCOSE BLDC GLUCOMTR-MCNC: 394 MG/DL — HIGH (ref 70–99)
GLUCOSE BLDC GLUCOMTR-MCNC: 408 MG/DL — HIGH (ref 70–99)
GLUCOSE BLDC GLUCOMTR-MCNC: 438 MG/DL — HIGH (ref 70–99)
GLUCOSE BLDC GLUCOMTR-MCNC: 99 MG/DL — SIGNIFICANT CHANGE UP (ref 70–99)

## 2019-07-19 PROCEDURE — 99233 SBSQ HOSP IP/OBS HIGH 50: CPT

## 2019-07-19 RX ORDER — KETOROLAC TROMETHAMINE 30 MG/ML
15 SYRINGE (ML) INJECTION EVERY 6 HOURS
Refills: 0 | Status: DISCONTINUED | OUTPATIENT
Start: 2019-07-19 | End: 2019-07-19

## 2019-07-19 RX ORDER — INSULIN HUMAN 100 [IU]/ML
10 INJECTION, SOLUTION SUBCUTANEOUS ONCE
Refills: 0 | Status: COMPLETED | OUTPATIENT
Start: 2019-07-19 | End: 2019-07-19

## 2019-07-19 RX ORDER — BUPRENORPHINE AND NALOXONE 2; .5 MG/1; MG/1
2 TABLET SUBLINGUAL DAILY
Refills: 0 | Status: DISCONTINUED | OUTPATIENT
Start: 2019-07-19 | End: 2019-07-19

## 2019-07-19 RX ORDER — INSULIN LISPRO 100/ML
10 VIAL (ML) SUBCUTANEOUS
Refills: 0 | Status: DISCONTINUED | OUTPATIENT
Start: 2019-07-19 | End: 2019-07-20

## 2019-07-19 RX ORDER — BUPRENORPHINE AND NALOXONE 2; .5 MG/1; MG/1
2 TABLET SUBLINGUAL DAILY
Refills: 0 | Status: DISCONTINUED | OUTPATIENT
Start: 2019-07-19 | End: 2019-07-23

## 2019-07-19 RX ORDER — IBUPROFEN 200 MG
400 TABLET ORAL EVERY 8 HOURS
Refills: 0 | Status: DISCONTINUED | OUTPATIENT
Start: 2019-07-19 | End: 2019-07-23

## 2019-07-19 RX ORDER — INSULIN GLARGINE 100 [IU]/ML
10 INJECTION, SOLUTION SUBCUTANEOUS AT BEDTIME
Refills: 0 | Status: DISCONTINUED | OUTPATIENT
Start: 2019-07-19 | End: 2019-07-20

## 2019-07-19 RX ADMIN — BUDESONIDE AND FORMOTEROL FUMARATE DIHYDRATE 2 PUFF(S): 160; 4.5 AEROSOL RESPIRATORY (INHALATION) at 07:54

## 2019-07-19 RX ADMIN — Medication 15 MILLIGRAM(S): at 09:46

## 2019-07-19 RX ADMIN — INSULIN HUMAN 10 UNIT(S): 100 INJECTION, SOLUTION SUBCUTANEOUS at 01:37

## 2019-07-19 RX ADMIN — Medication 40 MILLIGRAM(S): at 11:26

## 2019-07-19 RX ADMIN — Medication 7 UNIT(S): at 11:27

## 2019-07-19 RX ADMIN — Medication 10: at 11:24

## 2019-07-19 RX ADMIN — Medication 8: at 00:03

## 2019-07-19 RX ADMIN — Medication 60 MILLIGRAM(S): at 17:04

## 2019-07-19 RX ADMIN — BUPRENORPHINE AND NALOXONE 2 TABLET(S): 2; .5 TABLET SUBLINGUAL at 11:24

## 2019-07-19 RX ADMIN — Medication 60 MILLIGRAM(S): at 05:31

## 2019-07-19 RX ADMIN — Medication 2: at 21:53

## 2019-07-19 RX ADMIN — PANTOPRAZOLE SODIUM 40 MILLIGRAM(S): 20 TABLET, DELAYED RELEASE ORAL at 05:32

## 2019-07-19 RX ADMIN — INSULIN GLARGINE 10 UNIT(S): 100 INJECTION, SOLUTION SUBCUTANEOUS at 21:41

## 2019-07-19 RX ADMIN — Medication 10 UNIT(S): at 17:03

## 2019-07-19 RX ADMIN — BUDESONIDE AND FORMOTEROL FUMARATE DIHYDRATE 2 PUFF(S): 160; 4.5 AEROSOL RESPIRATORY (INHALATION) at 00:49

## 2019-07-19 RX ADMIN — BUPRENORPHINE AND NALOXONE 2 TABLET(S): 2; .5 TABLET SUBLINGUAL at 11:32

## 2019-07-19 RX ADMIN — Medication 12: at 17:03

## 2019-07-19 RX ADMIN — Medication 15 MILLIGRAM(S): at 10:21

## 2019-07-19 RX ADMIN — Medication 3 MILLILITER(S): at 07:52

## 2019-07-19 RX ADMIN — BUDESONIDE AND FORMOTEROL FUMARATE DIHYDRATE 2 PUFF(S): 160; 4.5 AEROSOL RESPIRATORY (INHALATION) at 19:34

## 2019-07-19 RX ADMIN — CEFTRIAXONE 100 MILLIGRAM(S): 500 INJECTION, POWDER, FOR SOLUTION INTRAMUSCULAR; INTRAVENOUS at 13:46

## 2019-07-19 RX ADMIN — Medication 25 MICROGRAM(S): at 05:32

## 2019-07-19 RX ADMIN — Medication 3 MILLILITER(S): at 19:34

## 2019-07-19 NOTE — CHART NOTE - NSCHARTNOTEFT_GEN_A_CORE
Will try 1 more dose of Toradol for pain (cancel Motrin) and 10 units of reg insulin for FSBS constantly over 400

## 2019-07-19 NOTE — PROGRESS NOTE ADULT - SUBJECTIVE AND OBJECTIVE BOX
Patient is a 48y old  Female who presents with a chief complaint of pna (18 Jul 2019 16:27)      INTERVAL HISTORY/overnight events feel better less wheezing   cxr reviewed decrease the opacity bibasilar         Vital Signs Last 24 Hrs  T(C): 36.1 (19 Jul 2019 05:30), Max: 36.4 (18 Jul 2019 14:18)  T(F): 97 (19 Jul 2019 05:30), Max: 97.6 (18 Jul 2019 14:18)  HR: 62 (19 Jul 2019 05:30) (62 - 85)  BP: 155/78 (19 Jul 2019 05:30) (138/70 - 159/78)  BP(mean): --  RR: 18 (19 Jul 2019 05:30) (16 - 18)  SpO2: --  Daily Height in cm: 172.72 (18 Jul 2019 16:40)    Daily   I&O's Summary      Physical Examination:    General: No acute distress.  Alert, oriented, interactive, nonfocal    HEENT: Pupils equal, reactive to light.  Symmetric.    PULM: mild wheeze     CVS: Regular rate and rhythm, no murmurs, rubs, or gallops    ABD: Soft, nondistended, nontender, normoactive bowel sounds, no masses    EXT: No edema, nontender    SKIN: Warm and well perfused, no rashes noted.    Neurology:  no focal deficit   Musculoskeletal : Move all extremety         Lab Results:                    Microbiology      Medication:  MEDICATIONS  (STANDING):  ALBUTerol/ipratropium for Nebulization 3 milliLiter(s) Nebulizer every 6 hours  buDESOnide 160 MICROgram(s)/formoterol 4.5 MICROgram(s) Inhaler 2 Puff(s) Inhalation two times a day  buprenorphine 2 mG/naloxone 0.5 mG SL  Tablet 2 Tablet(s) SubLingual daily  buprenorphine 8 mG/naloxone 2 mG SL  Tablet 2 Tablet(s) SubLingual daily  cefTRIAXone   IVPB 2000 milliGRAM(s) IV Intermittent every 24 hours  dextrose 5%. 1000 milliLiter(s) (50 mL/Hr) IV Continuous <Continuous>  dextrose 50% Injectable 12.5 Gram(s) IV Push once  dextrose 50% Injectable 25 Gram(s) IV Push once  dextrose 50% Injectable 25 Gram(s) IV Push once  enoxaparin Injectable 40 milliGRAM(s) SubCutaneous daily  FLUoxetine 40 milliGRAM(s) Oral daily  insulin lispro (HumaLOG) corrective regimen sliding scale   SubCutaneous three times a day before meals  insulin lispro (HumaLOG) corrective regimen sliding scale   SubCutaneous at bedtime  insulin lispro Injectable (HumaLOG) 7 Unit(s) SubCutaneous three times a day with meals  levothyroxine 25 MICROGram(s) Oral daily  methylPREDNISolone sodium succinate Injectable 60 milliGRAM(s) IV Push every 8 hours  pantoprazole    Tablet 40 milliGRAM(s) Oral before breakfast    MEDICATIONS  (PRN):  ALBUTerol/ipratropium for Nebulization. 3 milliLiter(s) Nebulizer every 4 hours PRN Shortness of Breath and/or Wheezing  dextrose 40% Gel 15 Gram(s) Oral once PRN Blood Glucose LESS THAN 70 milliGRAM(s)/deciliter  glucagon  Injectable 1 milliGRAM(s) IntraMuscular once PRN Glucose LESS THAN 70 milligrams/deciliter  hydrOXYzine hydrochloride 25 milliGRAM(s) Oral every 8 hours PRN Anxiety  ketorolac   Injectable 15 milliGRAM(s) IV Push every 6 hours PRN Moderate Pain (4 - 6)        IMAGING STUDIES:

## 2019-07-19 NOTE — PROGRESS NOTE ADULT - ASSESSMENT
ASS: PNA ?? r/o opportunistic infection ?? heroin induce  copd exacerbation     plan   - decrease  IV SOLUMEDROL 60 Q 12 H  and switch to prednisone 60 mg marty and taper   - NEB Q 4 H  - ABX as per ID   t symbicort 160 mg Q 12 hrs   nebulizer Q 4 hrs and prn   - KEEP SAO2 92%  - ECHO  - FS CONTROL  - DVT/ GI PROPHYLAXIS

## 2019-07-19 NOTE — PROGRESS NOTE ADULT - SUBJECTIVE AND OBJECTIVE BOX
Progress Note:  Provider Speciality                            Hospitalist      EZRA BARCLAY MRN-[id number] 48y Female     CHIEF PRESENTING COMPLAINT:  Patient is a 48y old  Female who presents with a chief complaint of pna (16 Jul 2019 13:17)        SUBJECTIVE:  Patient was seen and examined at bedside.   very upset in the morning for not getting iv toradol yesterday as oral ibuprofen did not work for her pain/although suboxone was renewed last night.   still c/o sob when trying to walk a little bit.     No significant overnight events reported.     HISTORY OF PRESENTING ILLNESS:  HPI:  49 yo female with PMHx of COPD/Asthma, hypothyroid and substance abuse presents with c/o 4 days of SOB and chest tightness. Pt has been using inhalers and nebs without much improvement. Pt admits to fever to 101 and nonproductive cough. Pt denies any sick close contacts. (11 Jul 2019 13:40)      PAST MEDICAL & SURGICAL HISTORY:  PAST MEDICAL & SURGICAL HISTORY:  Hepatitis-C  History of pancreatitis  H/O: substance abuse  Hypothyroidism  Asthma with COPD  History of tonsillectomy  History of appendectomy      VITAL SIGNS:  Vital Signs Last 24 Hrs  T(C): 36.6 (17 Jul 2019 13:53), Max: 36.6 (17 Jul 2019 13:53)  T(F): 97.9 (17 Jul 2019 13:53), Max: 97.9 (17 Jul 2019 13:53)  HR: 78 (17 Jul 2019 13:53) (70 - 88)  BP: 118/74 (17 Jul 2019 13:53) (118/74 - 140/71)  BP(mean): --  RR: 16 (17 Jul 2019 13:53) (16 - 16)  SpO2: 95% (17 Jul 2019 08:02) (92% - 95%)    PHYSICAL EXAMINATION:    General: AAOx 3  , not in acute distress  HEENT:  UBALDO, EOMI  Heart: S1+S2 audible, no murmur  Lungs: bilateral  wheezes/ronchi- little better   Abdomen: Soft, non-tender, non-distended   CNS: , no focal deficits.  Extremities:  No edema          REVIEW OF SYSTEMS:    At least 10 systems were reviewed in ROS. All systems reviewed  are within normal limits except for the complaints as described in Subjective.    CONSULTS:  Consultant(s) Notes Reviewed by me.   Care Discussed with Consultants/Other Providers where required.        MEDICATIONS:  MEDICATIONS  (STANDING):  ALBUTerol/ipratropium for Nebulization 3 milliLiter(s) Nebulizer every 6 hours  buprenorphine 2 mG/naloxone 0.5 mG SL  Tablet 2 Tablet(s) SubLingual daily  buprenorphine 8 mG/naloxone 2 mG SL  Tablet 2 Tablet(s) SubLingual daily  cefTRIAXone   IVPB 2000 milliGRAM(s) IV Intermittent every 24 hours  dextrose 5%. 1000 milliLiter(s) (50 mL/Hr) IV Continuous <Continuous>  dextrose 50% Injectable 12.5 Gram(s) IV Push once  dextrose 50% Injectable 25 Gram(s) IV Push once  dextrose 50% Injectable 25 Gram(s) IV Push once  enoxaparin Injectable 40 milliGRAM(s) SubCutaneous daily  FLUoxetine 40 milliGRAM(s) Oral daily  insulin lispro (HumaLOG) corrective regimen sliding scale   SubCutaneous three times a day before meals  insulin lispro (HumaLOG) corrective regimen sliding scale   SubCutaneous at bedtime  insulin lispro Injectable (HumaLOG) 7 Unit(s) SubCutaneous three times a day with meals  levothyroxine 25 MICROGram(s) Oral daily  methylPREDNISolone sodium succinate Injectable 60 milliGRAM(s) IV Push every 8 hours  pantoprazole    Tablet 40 milliGRAM(s) Oral before breakfast    MEDICATIONS  (PRN):  ALBUTerol/ipratropium for Nebulization. 3 milliLiter(s) Nebulizer every 4 hours PRN Shortness of Breath and/or Wheezing  dextrose 40% Gel 15 Gram(s) Oral once PRN Blood Glucose LESS THAN 70 milliGRAM(s)/deciliter  glucagon  Injectable 1 milliGRAM(s) IntraMuscular once PRN Glucose LESS THAN 70 milligrams/deciliter  ketorolac   Injectable 30 milliGRAM(s) IV Push every 6 hours PRN Moderate Pain (4 - 6)      [facility] CUPS and some sugar DATA/MICROBIOLOGY/I & O's:                        10.4   13.12 )-----------( 301      ( 17 Jul 2019 07:44 )             32.4     07-17    133<L>  |  93<L>  |  24<H>  ----------------------------<  443<H>  4.8   |  27  |  0.7    Ca    7.6<L>      17 Jul 2019 07:44          CAPILLARY BLOOD GLUCOSE      POCT Blood Glucose.: 78 mg/dL (17 Jul 2019 11:49)  POCT Blood Glucose.: 383 mg/dL (17 Jul 2019 08:01)  POCT Blood Glucose.: 432 mg/dL (17 Jul 2019 04:36)  POCT Blood Glucose.: 89 mg/dL (16 Jul 2019 16:28)                  ASSESSMENT and PLANS:      49 yo female with h/o COPD presents with SOB and chest tightness and found to be hypoxic.      1. Acute hypoxic respiratory failure due to COPD exacerbation with underlying PNA  improving very slowly.   wean down from ventimask to NC O2 and check resting and ambulatory SPO2 and document.   pulm fu appreciated/ dec steroids and taper to po prednsione tmrw.    dec IV Solumedrol to  60mg q 12 hrs  -Duoneb RTC  TTE completed- NL LVSF/ mild MR  sputum culture - pending   viral panel- negative / off isolation   -CT chest shows b/l opacities, HIV negative   -ID evaluated / c/w ABX for at least 10 days.     2. sepsis- POA possibly due to underlying  CAP  -C/w IV ceftriaxone/completed azithromycin (oral vantin upon d/c)  - monitor vitals    3. Hypothyroidism  -C/w synthroid    4. Ex-substance abuser/chronic pain :   -monitor  -stable on current suboxone dose 20mg  - requesting Iv toradol for pain as oral ibuprofen does not work for her     5. Anxiety:   c.w hydroxyzine prn     steroid induced hyperglycemia:   cw FSG monitoring/ Bs elevated   ISS/ basal/ bolus insulin     DVT prophylaxis  Lovenox sq      # Progress Note Handoff  PENDING : clinical improvement of respiratory status   Family discussion: discussed with patient who comprehends her medical care.  Disposition: Home when  clinically stable

## 2019-07-20 LAB
GLUCOSE BLDC GLUCOMTR-MCNC: 132 MG/DL — HIGH (ref 70–99)
GLUCOSE BLDC GLUCOMTR-MCNC: 151 MG/DL — HIGH (ref 70–99)
GLUCOSE BLDC GLUCOMTR-MCNC: 355 MG/DL — HIGH (ref 70–99)
GLUCOSE BLDC GLUCOMTR-MCNC: 400 MG/DL — HIGH (ref 70–99)

## 2019-07-20 PROCEDURE — 99233 SBSQ HOSP IP/OBS HIGH 50: CPT

## 2019-07-20 RX ORDER — INSULIN LISPRO 100/ML
13 VIAL (ML) SUBCUTANEOUS
Refills: 0 | Status: DISCONTINUED | OUTPATIENT
Start: 2019-07-20 | End: 2019-07-23

## 2019-07-20 RX ORDER — INSULIN GLARGINE 100 [IU]/ML
12 INJECTION, SOLUTION SUBCUTANEOUS AT BEDTIME
Refills: 0 | Status: DISCONTINUED | OUTPATIENT
Start: 2019-07-20 | End: 2019-07-23

## 2019-07-20 RX ADMIN — Medication 400 MILLIGRAM(S): at 12:47

## 2019-07-20 RX ADMIN — BUPRENORPHINE AND NALOXONE 2 TABLET(S): 2; .5 TABLET SUBLINGUAL at 12:40

## 2019-07-20 RX ADMIN — Medication 10: at 12:27

## 2019-07-20 RX ADMIN — BUDESONIDE AND FORMOTEROL FUMARATE DIHYDRATE 2 PUFF(S): 160; 4.5 AEROSOL RESPIRATORY (INHALATION) at 08:21

## 2019-07-20 RX ADMIN — Medication 40 MILLIGRAM(S): at 12:47

## 2019-07-20 RX ADMIN — INSULIN GLARGINE 12 UNIT(S): 100 INJECTION, SOLUTION SUBCUTANEOUS at 22:03

## 2019-07-20 RX ADMIN — Medication 10 UNIT(S): at 08:14

## 2019-07-20 RX ADMIN — BUPRENORPHINE AND NALOXONE 2 TABLET(S): 2; .5 TABLET SUBLINGUAL at 12:45

## 2019-07-20 RX ADMIN — PANTOPRAZOLE SODIUM 40 MILLIGRAM(S): 20 TABLET, DELAYED RELEASE ORAL at 06:15

## 2019-07-20 RX ADMIN — Medication 13 UNIT(S): at 12:26

## 2019-07-20 RX ADMIN — Medication 400 MILLIGRAM(S): at 13:20

## 2019-07-20 RX ADMIN — Medication 13 UNIT(S): at 17:10

## 2019-07-20 RX ADMIN — Medication 25 MICROGRAM(S): at 06:15

## 2019-07-20 RX ADMIN — Medication 3 MILLILITER(S): at 07:30

## 2019-07-20 RX ADMIN — Medication 60 MILLIGRAM(S): at 06:15

## 2019-07-20 RX ADMIN — Medication 10: at 08:14

## 2019-07-20 RX ADMIN — Medication 2: at 17:10

## 2019-07-20 NOTE — PROGRESS NOTE ADULT - SUBJECTIVE AND OBJECTIVE BOX
Progress Note:  Provider Speciality                            Hospitalist      EZRA BARCLAY MRN-[id number] 48y Female     CHIEF PRESENTING COMPLAINT:  Patient is a 48y old  Female who presents with a chief complaint of pna (16 Jul 2019 13:17)        SUBJECTIVE:  Patient was seen and examined at bedside.   looked calm/sitting in bed today/ IV access is an issue/ could not be obtained yesterday/ has been switched to po steroid and ABX   still c/o getting short of breath even walking to commode at bedside/agreeable for home O2 if needed now    No significant overnight events reported.     HISTORY OF PRESENTING ILLNESS:  HPI:  47 yo female with PMHx of COPD/Asthma, hypothyroid and substance abuse presents with c/o 4 days of SOB and chest tightness. Pt has been using inhalers and nebs without much improvement. Pt admits to fever to 101 and nonproductive cough. Pt denies any sick close contacts. (11 Jul 2019 13:40)      PAST MEDICAL & SURGICAL HISTORY:  PAST MEDICAL & SURGICAL HISTORY:  Hepatitis-C  History of pancreatitis  H/O: substance abuse  Hypothyroidism  Asthma with COPD  History of tonsillectomy  History of appendectomy      VITAL SIGNS:  Vital Signs Last 24 Hrs  T(C): 36.6 (17 Jul 2019 13:53), Max: 36.6 (17 Jul 2019 13:53)  T(F): 97.9 (17 Jul 2019 13:53), Max: 97.9 (17 Jul 2019 13:53)  HR: 78 (17 Jul 2019 13:53) (70 - 88)  BP: 118/74 (17 Jul 2019 13:53) (118/74 - 140/71)  BP(mean): --  RR: 16 (17 Jul 2019 13:53) (16 - 16)  SpO2: 95% (17 Jul 2019 08:02) (92% - 95%)    PHYSICAL EXAMINATION:    General: AAOx 3  , not in acute distress  HEENT:  UBALDO, EOMI  Heart: S1+S2 audible, no murmur  Lungs: bilateral  wheezes/ronchi- improving slowly    Abdomen: Soft, non-tender, non-distended   CNS: , no focal deficits.  Extremities:  No edema          REVIEW OF SYSTEMS:    At least 10 systems were reviewed in ROS. All systems reviewed  are within normal limits except for the complaints as described in Subjective.    CONSULTS:  Consultant(s) Notes Reviewed by me.   Care Discussed with Consultants/Other Providers where required.        MEDICATIONS:  MEDICATIONS  (STANDING):  ALBUTerol/ipratropium for Nebulization 3 milliLiter(s) Nebulizer every 6 hours  buprenorphine 2 mG/naloxone 0.5 mG SL  Tablet 2 Tablet(s) SubLingual daily  buprenorphine 8 mG/naloxone 2 mG SL  Tablet 2 Tablet(s) SubLingual daily  cefTRIAXone   IVPB 2000 milliGRAM(s) IV Intermittent every 24 hours  dextrose 5%. 1000 milliLiter(s) (50 mL/Hr) IV Continuous <Continuous>  dextrose 50% Injectable 12.5 Gram(s) IV Push once  dextrose 50% Injectable 25 Gram(s) IV Push once  dextrose 50% Injectable 25 Gram(s) IV Push once  enoxaparin Injectable 40 milliGRAM(s) SubCutaneous daily  FLUoxetine 40 milliGRAM(s) Oral daily  insulin lispro (HumaLOG) corrective regimen sliding scale   SubCutaneous three times a day before meals  insulin lispro (HumaLOG) corrective regimen sliding scale   SubCutaneous at bedtime  insulin lispro Injectable (HumaLOG) 7 Unit(s) SubCutaneous three times a day with meals  levothyroxine 25 MICROGram(s) Oral daily  methylPREDNISolone sodium succinate Injectable 60 milliGRAM(s) IV Push every 8 hours  pantoprazole    Tablet 40 milliGRAM(s) Oral before breakfast    MEDICATIONS  (PRN):  ALBUTerol/ipratropium for Nebulization. 3 milliLiter(s) Nebulizer every 4 hours PRN Shortness of Breath and/or Wheezing  dextrose 40% Gel 15 Gram(s) Oral once PRN Blood Glucose LESS THAN 70 milliGRAM(s)/deciliter  glucagon  Injectable 1 milliGRAM(s) IntraMuscular once PRN Glucose LESS THAN 70 milligrams/deciliter  ketorolac   Injectable 30 milliGRAM(s) IV Push every 6 hours PRN Moderate Pain (4 - 6)      [facility] DATA/MICROBIOLOGY/I & O's:                        10.4   13.12 )-----------( 301      ( 17 Jul 2019 07:44 )             32.4     07-17    133<L>  |  93<L>  |  24<H>  ----------------------------<  443<H>  4.8   |  27  |  0.7    Ca    7.6<L>      17 Jul 2019 07:44          CAPILLARY BLOOD GLUCOSE      POCT Blood Glucose.: 78 mg/dL (17 Jul 2019 11:49)  POCT Blood Glucose.: 383 mg/dL (17 Jul 2019 08:01)  POCT Blood Glucose.: 432 mg/dL (17 Jul 2019 04:36)  POCT Blood Glucose.: 89 mg/dL (16 Jul 2019 16:28)                  ASSESSMENT and PLANS:      47 yo female with h/o COPD presents with SOB and chest tightness and found to be hypoxic.      1. Acute hypoxic respiratory failure due to COPD exacerbation with underlying PNA  improving very slowly.   on 3.5L NC O2 /check SPO2 resting and ambulatory to determine home O 2 need  pulm fu appreciated  switched to oral prednisone yesterday/ has no IV access  -Duoneb RTC  TTE completed- NL LVSF/ mild MR  sputum culture - pending /not needed anymore   viral panel- negative / off isolation   -CT chest shows b/l opacities, HIV negative   -ID evaluated / c/w ABX for at least 10 days/to complete 10 days today     2. sepsis- POA possibly due to underlying  CAP  -to complete 10 days of aBX today   - monitor vitals    3. Hypothyroidism  -C/w synthroid    4. Ex-substance abuser/chronic pain :   -monitor  -stable on current suboxone dose 20mg  - requesting Iv toradol for pain as oral ibuprofen does not work for her     5. Anxiety:   c.w hydroxyzine prn     steroid induced hyperglycemia:   cw FSG monitoring/ Bs elevated   ISS/ increase basal/ bolus insulin dose / BS still elevated      DVT prophylaxis  Lovenox sq      # Progress Note Handoff  PENDING : clinical improvement of respiratory status /home O2 arrangement   Family discussion: discussed with patient who comprehends her medical care.  Disposition: Home when  clinically stable

## 2019-07-21 LAB
ANION GAP SERPL CALC-SCNC: 12 MMOL/L — SIGNIFICANT CHANGE UP (ref 7–14)
BASOPHILS # BLD AUTO: 0.11 K/UL — SIGNIFICANT CHANGE UP (ref 0–0.2)
BASOPHILS NFR BLD AUTO: 0.6 % — SIGNIFICANT CHANGE UP (ref 0–1)
BUN SERPL-MCNC: 22 MG/DL — HIGH (ref 10–20)
CALCIUM SERPL-MCNC: 7.8 MG/DL — LOW (ref 8.5–10.1)
CHLORIDE SERPL-SCNC: 99 MMOL/L — SIGNIFICANT CHANGE UP (ref 98–110)
CO2 SERPL-SCNC: 25 MMOL/L — SIGNIFICANT CHANGE UP (ref 17–32)
CREAT SERPL-MCNC: 0.7 MG/DL — SIGNIFICANT CHANGE UP (ref 0.7–1.5)
EOSINOPHIL # BLD AUTO: 0.17 K/UL — SIGNIFICANT CHANGE UP (ref 0–0.7)
EOSINOPHIL NFR BLD AUTO: 1 % — SIGNIFICANT CHANGE UP (ref 0–8)
GLUCOSE BLDC GLUCOMTR-MCNC: 160 MG/DL — HIGH (ref 70–99)
GLUCOSE BLDC GLUCOMTR-MCNC: 241 MG/DL — HIGH (ref 70–99)
GLUCOSE BLDC GLUCOMTR-MCNC: 273 MG/DL — HIGH (ref 70–99)
GLUCOSE BLDC GLUCOMTR-MCNC: 84 MG/DL — SIGNIFICANT CHANGE UP (ref 70–99)
GLUCOSE SERPL-MCNC: 230 MG/DL — HIGH (ref 70–99)
HCT VFR BLD CALC: 37.2 % — SIGNIFICANT CHANGE UP (ref 37–47)
HGB BLD-MCNC: 11.6 G/DL — LOW (ref 12–16)
IMM GRANULOCYTES NFR BLD AUTO: 7 % — HIGH (ref 0.1–0.3)
LYMPHOCYTES # BLD AUTO: 1.31 K/UL — SIGNIFICANT CHANGE UP (ref 1.2–3.4)
LYMPHOCYTES # BLD AUTO: 7.4 % — LOW (ref 20.5–51.1)
MANUAL SMEAR VERIFICATION: SIGNIFICANT CHANGE UP
MCHC RBC-ENTMCNC: 27.8 PG — SIGNIFICANT CHANGE UP (ref 27–31)
MCHC RBC-ENTMCNC: 31.2 G/DL — LOW (ref 32–37)
MCV RBC AUTO: 89.2 FL — SIGNIFICANT CHANGE UP (ref 81–99)
METAMYELOCYTES # FLD: 1 % — HIGH (ref 0–0)
MONOCYTES # BLD AUTO: 0.44 K/UL — SIGNIFICANT CHANGE UP (ref 0.1–0.6)
MONOCYTES NFR BLD AUTO: 2.5 % — SIGNIFICANT CHANGE UP (ref 1.7–9.3)
NEUTROPHILS # BLD AUTO: 14.53 K/UL — HIGH (ref 1.4–6.5)
NEUTROPHILS NFR BLD AUTO: 81.5 % — HIGH (ref 42.2–75.2)
NEUTS BAND # BLD: 1 % — SIGNIFICANT CHANGE UP (ref 0–6)
NRBC # BLD: 0 /100 WBCS — SIGNIFICANT CHANGE UP (ref 0–0)
NRBC # BLD: 0 /100 — SIGNIFICANT CHANGE UP (ref 0–0)
PLAT MORPH BLD: NORMAL — SIGNIFICANT CHANGE UP
PLATELET # BLD AUTO: 210 K/UL — SIGNIFICANT CHANGE UP (ref 130–400)
POTASSIUM SERPL-MCNC: 4.6 MMOL/L — SIGNIFICANT CHANGE UP (ref 3.5–5)
POTASSIUM SERPL-SCNC: 4.6 MMOL/L — SIGNIFICANT CHANGE UP (ref 3.5–5)
RBC # BLD: 4.17 M/UL — LOW (ref 4.2–5.4)
RBC # FLD: 14.3 % — SIGNIFICANT CHANGE UP (ref 11.5–14.5)
RBC BLD AUTO: NORMAL — SIGNIFICANT CHANGE UP
SODIUM SERPL-SCNC: 136 MMOL/L — SIGNIFICANT CHANGE UP (ref 135–146)
WBC # BLD: 17.81 K/UL — HIGH (ref 4.8–10.8)
WBC # FLD AUTO: 17.81 K/UL — HIGH (ref 4.8–10.8)

## 2019-07-21 PROCEDURE — 99233 SBSQ HOSP IP/OBS HIGH 50: CPT

## 2019-07-21 RX ADMIN — Medication 4: at 08:02

## 2019-07-21 RX ADMIN — Medication 13 UNIT(S): at 17:17

## 2019-07-21 RX ADMIN — Medication 3 MILLILITER(S): at 07:36

## 2019-07-21 RX ADMIN — PANTOPRAZOLE SODIUM 40 MILLIGRAM(S): 20 TABLET, DELAYED RELEASE ORAL at 05:44

## 2019-07-21 RX ADMIN — BUPRENORPHINE AND NALOXONE 2 TABLET(S): 2; .5 TABLET SUBLINGUAL at 12:20

## 2019-07-21 RX ADMIN — Medication 25 MICROGRAM(S): at 05:44

## 2019-07-21 RX ADMIN — BUDESONIDE AND FORMOTEROL FUMARATE DIHYDRATE 2 PUFF(S): 160; 4.5 AEROSOL RESPIRATORY (INHALATION) at 07:35

## 2019-07-21 RX ADMIN — Medication 40 MILLIGRAM(S): at 12:25

## 2019-07-21 RX ADMIN — Medication 2: at 17:17

## 2019-07-21 RX ADMIN — Medication 13 UNIT(S): at 08:02

## 2019-07-21 RX ADMIN — Medication 13 UNIT(S): at 12:25

## 2019-07-21 RX ADMIN — Medication 60 MILLIGRAM(S): at 05:44

## 2019-07-21 RX ADMIN — Medication 25 MILLIGRAM(S): at 17:29

## 2019-07-21 RX ADMIN — BUPRENORPHINE AND NALOXONE 2 TABLET(S): 2; .5 TABLET SUBLINGUAL at 12:21

## 2019-07-21 NOTE — PROGRESS NOTE ADULT - SUBJECTIVE AND OBJECTIVE BOX
Progress Note:  Provider Speciality                            Hospitalist      EZRA BARCLAY MRN-[id number] 48y Female     CHIEF PRESENTING COMPLAINT:  Patient is a 48y old  Female who presents with a chief complaint of pna (16 Jul 2019 13:17)        SUBJECTIVE:  Patient was seen and examined at bedside.   looked calm/sitting in bed /c/o being sob when walking to commode and when encouraged to walk to bathroom - reports that would be too far away/ SPO2 also dropped yesterday on ambulation on 3L to 85%/ also reports hemoptysis with dark colored blood today   wants to try Iv access later today when discussed need for repeat CT scan     HISTORY OF PRESENTING ILLNESS:  HPI:  49 yo female with PMHx of COPD/Asthma, hypothyroid and substance abuse presents with c/o 4 days of SOB and chest tightness. Pt has been using inhalers and nebs without much improvement. Pt admits to fever to 101 and nonproductive cough. Pt denies any sick close contacts. (11 Jul 2019 13:40)      PAST MEDICAL & SURGICAL HISTORY:  PAST MEDICAL & SURGICAL HISTORY:  Hepatitis-C  History of pancreatitis  H/O: substance abuse  Hypothyroidism  Asthma with COPD  History of tonsillectomy  History of appendectomy      VITAL SIGNS:  Vital Signs Last 24 Hrs  T(C): 36.6 (17 Jul 2019 13:53), Max: 36.6 (17 Jul 2019 13:53)  T(F): 97.9 (17 Jul 2019 13:53), Max: 97.9 (17 Jul 2019 13:53)  HR: 78 (17 Jul 2019 13:53) (70 - 88)  BP: 118/74 (17 Jul 2019 13:53) (118/74 - 140/71)  BP(mean): --  RR: 16 (17 Jul 2019 13:53) (16 - 16)  SpO2: 95% (17 Jul 2019 08:02) (92% - 95%)    PHYSICAL EXAMINATION:    General: AAOx 3  , not in acute distress  HEENT:  UBALDO, EOMI  Heart: S1+S2 audible, no murmur  Lungs: bilateral  wheezes/ronchi more prominent lower lobes - improving slowly   Abdomen: Soft, non-tender, non-distended   CNS: , no focal deficits.  Extremities:  No edema          REVIEW OF SYSTEMS:    At least 10 systems were reviewed in ROS. All systems reviewed  are within normal limits except for the complaints as described in Subjective.    CONSULTS:  Consultant(s) Notes Reviewed by me.   Care Discussed with Consultants/Other Providers where required.        MEDICATIONS:  MEDICATIONS  (STANDING):  ALBUTerol/ipratropium for Nebulization 3 milliLiter(s) Nebulizer every 6 hours  buprenorphine 2 mG/naloxone 0.5 mG SL  Tablet 2 Tablet(s) SubLingual daily  buprenorphine 8 mG/naloxone 2 mG SL  Tablet 2 Tablet(s) SubLingual daily  cefTRIAXone   IVPB 2000 milliGRAM(s) IV Intermittent every 24 hours  dextrose 5%. 1000 milliLiter(s) (50 mL/Hr) IV Continuous <Continuous>  dextrose 50% Injectable 12.5 Gram(s) IV Push once  dextrose 50% Injectable 25 Gram(s) IV Push once  dextrose 50% Injectable 25 Gram(s) IV Push once  enoxaparin Injectable 40 milliGRAM(s) SubCutaneous daily  FLUoxetine 40 milliGRAM(s) Oral daily  insulin lispro (HumaLOG) corrective regimen sliding scale   SubCutaneous three times a day before meals  insulin lispro (HumaLOG) corrective regimen sliding scale   SubCutaneous at bedtime  insulin lispro Injectable (HumaLOG) 7 Unit(s) SubCutaneous three times a day with meals  levothyroxine 25 MICROGram(s) Oral daily  methylPREDNISolone sodium succinate Injectable 60 milliGRAM(s) IV Push every 8 hours  pantoprazole    Tablet 40 milliGRAM(s) Oral before breakfast    MEDICATIONS  (PRN):  ALBUTerol/ipratropium for Nebulization. 3 milliLiter(s) Nebulizer every 4 hours PRN Shortness of Breath and/or Wheezing  dextrose 40% Gel 15 Gram(s) Oral once PRN Blood Glucose LESS THAN 70 milliGRAM(s)/deciliter  glucagon  Injectable 1 milliGRAM(s) IntraMuscular once PRN Glucose LESS THAN 70 milligrams/deciliter  ketorolac   Injectable 30 milliGRAM(s) IV Push every 6 hours PRN Moderate Pain (4 - 6)      LABOROTORY DATA/MICROBIOLOGY/I & O's:                        10.4   13.12 )-----------( 301      ( 17 Jul 2019 07:44 )             32.4     07-17    133<L>  |  93<L>  |  24<H>  ----------------------------<  443<H>  4.8   |  27  |  0.7    Ca    7.6<L>      17 Jul 2019 07:44          CAPILLARY BLOOD GLUCOSE      POCT Blood Glucose.: 78 mg/dL (17 Jul 2019 11:49)  POCT Blood Glucose.: 383 mg/dL (17 Jul 2019 08:01)  POCT Blood Glucose.: 432 mg/dL (17 Jul 2019 04:36)  POCT Blood Glucose.: 89 mg/dL (16 Jul 2019 16:28)                  ASSESSMENT and PLANS:      49 yo female with h/o COPD presents with SOB and chest tightness and found to be hypoxic.      Acute hypoxic respiratory failure due to COPD exacerbation with underlying PNA  improving very slowly.   on 3.5L NC O2 /check SPO2 resting and ambulatory again today   discussed with pulmonary, Dr. Osorio today - would consider repeat  CT chest tomorrow morning as patient is not getting better.   c/w oral prednisone  -Duoneb RTC  TTE completed- NL LVSF/ mild MR  sputum culture - pending   viral panel- negative / off isolation   -CT chest initially  showed b/l opacities, HIV negative   -ID evaluated / c/w ABX for at least 10 days  will c/w levaquin for 3 more days to complete 2 weeks of ABX as patient shows no significant improvement     hemoptysis:   gave cup to collect specimen for analysis  cbc stable    Pulm initially discussed with patient need for bronchoscopy but patient refused when explained she could be difficult to wean off vent     leukocytosis possibly due to steroids and could be reactive to hemoptysis:   monitor with am labs     sepsis- POA possibly due to underlying  CAP  -c/w levaquin for 3 more days   - monitor vitals    Hypothyroidism  -C/w synthroid     Ex-substance abuser/chronic pain :   -monitor  -stable on current suboxone dose 20mg  - ibuprofen oral prn for pain - helps pain       Anxiety:   c.w hydroxyzine prn     steroid induced hyperglycemia:   cw FSG monitoring/ Bs better controlled today   ISS/ c/w insulin basal/bolus     DVT prophylaxis  Lovenox sq      # Progress Note Handoff  PENDING : clinical improvement of respiratory status/hemoptysis  /pulm follow up   Family discussion: discussed with patient who comprehends her medical care.  Disposition: Home when  clinically stable

## 2019-07-22 LAB
GLUCOSE BLDC GLUCOMTR-MCNC: 187 MG/DL — HIGH (ref 70–99)
GLUCOSE BLDC GLUCOMTR-MCNC: 206 MG/DL — HIGH (ref 70–99)
GLUCOSE BLDC GLUCOMTR-MCNC: 218 MG/DL — HIGH (ref 70–99)
GLUCOSE BLDC GLUCOMTR-MCNC: 426 MG/DL — HIGH (ref 70–99)
GLUCOSE BLDC GLUCOMTR-MCNC: 87 MG/DL — SIGNIFICANT CHANGE UP (ref 70–99)

## 2019-07-22 PROCEDURE — 99233 SBSQ HOSP IP/OBS HIGH 50: CPT

## 2019-07-22 PROCEDURE — 71250 CT THORAX DX C-: CPT | Mod: 26

## 2019-07-22 RX ADMIN — PANTOPRAZOLE SODIUM 40 MILLIGRAM(S): 20 TABLET, DELAYED RELEASE ORAL at 05:17

## 2019-07-22 RX ADMIN — INSULIN GLARGINE 12 UNIT(S): 100 INJECTION, SOLUTION SUBCUTANEOUS at 21:35

## 2019-07-22 RX ADMIN — BUDESONIDE AND FORMOTEROL FUMARATE DIHYDRATE 2 PUFF(S): 160; 4.5 AEROSOL RESPIRATORY (INHALATION) at 11:19

## 2019-07-22 RX ADMIN — BUPRENORPHINE AND NALOXONE 2 TABLET(S): 2; .5 TABLET SUBLINGUAL at 11:18

## 2019-07-22 RX ADMIN — ENOXAPARIN SODIUM 40 MILLIGRAM(S): 100 INJECTION SUBCUTANEOUS at 11:19

## 2019-07-22 RX ADMIN — Medication 12: at 11:42

## 2019-07-22 RX ADMIN — Medication 3 MILLILITER(S): at 07:55

## 2019-07-22 RX ADMIN — Medication 13 UNIT(S): at 11:42

## 2019-07-22 RX ADMIN — Medication 40 MILLIGRAM(S): at 11:19

## 2019-07-22 RX ADMIN — Medication 60 MILLIGRAM(S): at 05:17

## 2019-07-22 RX ADMIN — Medication 25 MICROGRAM(S): at 05:17

## 2019-07-22 RX ADMIN — BUPRENORPHINE AND NALOXONE 2 TABLET(S): 2; .5 TABLET SUBLINGUAL at 11:19

## 2019-07-22 RX ADMIN — Medication 3 MILLILITER(S): at 13:06

## 2019-07-22 NOTE — PROGRESS NOTE ADULT - SUBJECTIVE AND OBJECTIVE BOX
Progress Note:  Provider Speciality                            Hospitalist      EZRA BARCLAY MRN-[id number] 48y Female     CHIEF PRESENTING COMPLAINT:  Patient is a 48y old  Female who presents with a chief complaint of pna (16 Jul 2019 13:17)        SUBJECTIVE:  Patient was seen and examined at bedside.   anxious today morning/ still c/o sob walking a short distance  wants to try Iv access later today when discussed need for repeat CT scan     HISTORY OF PRESENTING ILLNESS:  HPI:  49 yo female with PMHx of COPD/Asthma, hypothyroid and substance abuse presents with c/o 4 days of SOB and chest tightness. Pt has been using inhalers and nebs without much improvement. Pt admits to fever to 101 and nonproductive cough. Pt denies any sick close contacts. (11 Jul 2019 13:40)      PAST MEDICAL & SURGICAL HISTORY:  PAST MEDICAL & SURGICAL HISTORY:  Hepatitis-C  History of pancreatitis  H/O: substance abuse  Hypothyroidism  Asthma with COPD  History of tonsillectomy  History of appendectomy      VITAL SIGNS:  Vital Signs Last 24 Hrs  T(C): 36.6 (17 Jul 2019 13:53), Max: 36.6 (17 Jul 2019 13:53)  T(F): 97.9 (17 Jul 2019 13:53), Max: 97.9 (17 Jul 2019 13:53)  HR: 78 (17 Jul 2019 13:53) (70 - 88)  BP: 118/74 (17 Jul 2019 13:53) (118/74 - 140/71)  BP(mean): --  RR: 16 (17 Jul 2019 13:53) (16 - 16)  SpO2: 95% (17 Jul 2019 08:02) (92% - 95%)    PHYSICAL EXAMINATION:    General: AAOx 3  , not in acute distress  HEENT:  UBALDO, EOMI  Heart: S1+S2 audible, no murmur  Lungs: bilateral  wheezes/ronchi -improving  Abdomen: Soft, non-tender, non-distended   CNS: , no focal deficits.  Extremities:  No edema          REVIEW OF SYSTEMS:    At least 10 systems were reviewed in ROS. All systems reviewed  are within normal limits except for the complaints as described in Subjective.    CONSULTS:  Consultant(s) Notes Reviewed by me.   Care Discussed with Consultants/Other Providers where required.        MEDICATIONS:  MEDICATIONS  (STANDING):  ALBUTerol/ipratropium for Nebulization 3 milliLiter(s) Nebulizer every 6 hours  buprenorphine 2 mG/naloxone 0.5 mG SL  Tablet 2 Tablet(s) SubLingual daily  buprenorphine 8 mG/naloxone 2 mG SL  Tablet 2 Tablet(s) SubLingual daily  cefTRIAXone   IVPB 2000 milliGRAM(s) IV Intermittent every 24 hours  dextrose 5%. 1000 milliLiter(s) (50 mL/Hr) IV Continuous <Continuous>  dextrose 50% Injectable 12.5 Gram(s) IV Push once  dextrose 50% Injectable 25 Gram(s) IV Push once  dextrose 50% Injectable 25 Gram(s) IV Push once  enoxaparin Injectable 40 milliGRAM(s) SubCutaneous daily  FLUoxetine 40 milliGRAM(s) Oral daily  insulin lispro (HumaLOG) corrective regimen sliding scale   SubCutaneous three times a day before meals  insulin lispro (HumaLOG) corrective regimen sliding scale   SubCutaneous at bedtime  insulin lispro Injectable (HumaLOG) 7 Unit(s) SubCutaneous three times a day with meals  levothyroxine 25 MICROGram(s) Oral daily  methylPREDNISolone sodium succinate Injectable 60 milliGRAM(s) IV Push every 8 hours  pantoprazole    Tablet 40 milliGRAM(s) Oral before breakfast    MEDICATIONS  (PRN):  ALBUTerol/ipratropium for Nebulization. 3 milliLiter(s) Nebulizer every 4 hours PRN Shortness of Breath and/or Wheezing  dextrose 40% Gel 15 Gram(s) Oral once PRN Blood Glucose LESS THAN 70 milliGRAM(s)/deciliter  glucagon  Injectable 1 milliGRAM(s) IntraMuscular once PRN Glucose LESS THAN 70 milligrams/deciliter  ketorolac   Injectable 30 milliGRAM(s) IV Push every 6 hours PRN Moderate Pain (4 - 6)      LABOROTORY DATA/MICROBIOLOGY/I & O's:                        10.4   13.12 )-----------( 301      ( 17 Jul 2019 07:44 )             32.4     07-17    133<L>  |  93<L>  |  24<H>  ----------------------------<  443<H>  4.8   |  27  |  0.7    Ca    7.6<L>      17 Jul 2019 07:44          CAPILLARY BLOOD GLUCOSE      POCT Blood Glucose.: 78 mg/dL (17 Jul 2019 11:49)  POCT Blood Glucose.: 383 mg/dL (17 Jul 2019 08:01)  POCT Blood Glucose.: 432 mg/dL (17 Jul 2019 04:36)  POCT Blood Glucose.: 89 mg/dL (16 Jul 2019 16:28)                  ASSESSMENT and PLANS:      49 yo female with h/o COPD presents with SOB and chest tightness and found to be hypoxic.      Acute hypoxic respiratory failure due to COPD exacerbation with underlying  possible GNR PNA  improving very slowly.   on 3L NC O2 / SPO 2 92% on 3L/ 89% on ambulation.    discussed with pulmonary, , CT scan shows significant improvement of PNA  from prior/ doubts PCP infection   c/w oral prednisone current dose as patient improving very slowly   -Duoneb RTC  TTE completed- NL LVSF/ mild MR  sputum culture - pending   viral panel- negative / off isolation   -CT chest initially  showed b/l opacities, HIV negative   -ID evaluated / c/w ABX for at least 10 days  will c/w levaquin for 3 more days to complete 2 weeks of ABX as patient improving very slowly - to complete on 7/24/19.     hemoptysis:   reported by patient one episode/ no further episodes noted.   cbc stable      leukocytosis possibly due to steroids/low suspicion for significant hemoptysis:   monitor with am labs     sepsis- POA possibly due to underlying  CAP  -c/w levaquin for 3 more days   - monitor vitals    Hypothyroidism  -C/w synthroid     Ex-substance abuser/chronic pain :   -monitor  -stable on current suboxone dose 20mg  - ibuprofen oral prn for pain - helps pain       Anxiety:   c.w hydroxyzine prn     steroid induced hyperglycemia:   cw FSG monitoring/ Bs labile/ low of 87 with high of >400  ISS/ c/w insulin basal/bolus     DVT prophylaxis  Lovenox sq      # Progress Note Handoff  PENDING : clinical improvement of respiratory status/improvement of ambulatory SPO2 on O2 for discharge. /needs home O2/ CM aware.   Family discussion: discussed with patient who comprehends her medical care.  Disposition: Home when  clinically stable possibly in 24-48 hours.

## 2019-07-23 ENCOUNTER — TRANSCRIPTION ENCOUNTER (OUTPATIENT)
Age: 49
End: 2019-07-23

## 2019-07-23 VITALS — OXYGEN SATURATION: 90 %

## 2019-07-23 LAB — GLUCOSE BLDC GLUCOMTR-MCNC: 193 MG/DL — HIGH (ref 70–99)

## 2019-07-23 PROCEDURE — 99239 HOSP IP/OBS DSCHRG MGMT >30: CPT

## 2019-07-23 RX ORDER — IPRATROPIUM/ALBUTEROL SULFATE 18-103MCG
3 AEROSOL WITH ADAPTER (GRAM) INHALATION
Qty: 200 | Refills: 0
Start: 2019-07-23 | End: 2019-08-21

## 2019-07-23 RX ORDER — IBUPROFEN 200 MG
1 TABLET ORAL
Qty: 21 | Refills: 0
Start: 2019-07-23 | End: 2019-07-29

## 2019-07-23 RX ORDER — PANTOPRAZOLE SODIUM 20 MG/1
1 TABLET, DELAYED RELEASE ORAL
Qty: 14 | Refills: 0
Start: 2019-07-23 | End: 2019-08-05

## 2019-07-23 RX ORDER — LEVOFLOXACIN 5 MG/ML
1 INJECTION, SOLUTION INTRAVENOUS
Qty: 2 | Refills: 0
Start: 2019-07-23 | End: 2019-07-24

## 2019-07-23 RX ORDER — FLUOXETINE HCL 10 MG
1 CAPSULE ORAL
Qty: 30 | Refills: 0
Start: 2019-07-23 | End: 2019-08-21

## 2019-07-23 RX ORDER — HYDROXYZINE HCL 10 MG
1 TABLET ORAL
Qty: 21 | Refills: 0
Start: 2019-07-23 | End: 2019-07-29

## 2019-07-23 RX ORDER — BUDESONIDE AND FORMOTEROL FUMARATE DIHYDRATE 160; 4.5 UG/1; UG/1
2 AEROSOL RESPIRATORY (INHALATION)
Qty: 1 | Refills: 0
Start: 2019-07-23 | End: 2019-08-21

## 2019-07-23 RX ORDER — IPRATROPIUM/ALBUTEROL SULFATE 18-103MCG
3 AEROSOL WITH ADAPTER (GRAM) INHALATION
Qty: 200 | Refills: 0
Start: 2019-07-23

## 2019-07-23 RX ORDER — ALBUTEROL 90 UG/1
1 AEROSOL, METERED ORAL
Qty: 0 | Refills: 0 | DISCHARGE

## 2019-07-23 RX ORDER — ALBUTEROL 90 UG/1
2 AEROSOL, METERED ORAL
Qty: 1 | Refills: 0
Start: 2019-07-23

## 2019-07-23 RX ORDER — LEVOTHYROXINE SODIUM 125 MCG
1 TABLET ORAL
Qty: 30 | Refills: 0
Start: 2019-07-23 | End: 2019-08-21

## 2019-07-23 RX ORDER — LEVOTHYROXINE SODIUM 125 MCG
1 TABLET ORAL
Qty: 0 | Refills: 0 | DISCHARGE

## 2019-07-23 RX ORDER — LEVOTHYROXINE SODIUM 125 MCG
1 TABLET ORAL
Qty: 30 | Refills: 0
Start: 2019-07-23 | End: 2020-02-04

## 2019-07-23 RX ORDER — IPRATROPIUM/ALBUTEROL SULFATE 18-103MCG
3 AEROSOL WITH ADAPTER (GRAM) INHALATION
Qty: 0 | Refills: 0 | DISCHARGE

## 2019-07-23 RX ORDER — ALBUTEROL 90 UG/1
1 AEROSOL, METERED ORAL
Qty: 1 | Refills: 0
Start: 2019-07-23 | End: 2019-08-21

## 2019-07-23 RX ADMIN — Medication 25 MICROGRAM(S): at 05:30

## 2019-07-23 RX ADMIN — Medication 60 MILLIGRAM(S): at 05:31

## 2019-07-23 RX ADMIN — PANTOPRAZOLE SODIUM 40 MILLIGRAM(S): 20 TABLET, DELAYED RELEASE ORAL at 06:30

## 2019-07-23 RX ADMIN — Medication 40 MILLIGRAM(S): at 12:14

## 2019-07-23 RX ADMIN — Medication 13 UNIT(S): at 08:06

## 2019-07-23 RX ADMIN — BUDESONIDE AND FORMOTEROL FUMARATE DIHYDRATE 2 PUFF(S): 160; 4.5 AEROSOL RESPIRATORY (INHALATION) at 08:07

## 2019-07-23 RX ADMIN — BUPRENORPHINE AND NALOXONE 2 TABLET(S): 2; .5 TABLET SUBLINGUAL at 12:14

## 2019-07-23 RX ADMIN — BUPRENORPHINE AND NALOXONE 2 TABLET(S): 2; .5 TABLET SUBLINGUAL at 12:13

## 2019-07-23 RX ADMIN — Medication 2: at 08:06

## 2019-07-23 NOTE — DISCHARGE NOTE PROVIDER - CARE PROVIDER_API CALL
Govind Lozoya (DO)  Critical Care Medicine; Pulmonary Disease; Sleep Medicine  [address] [place] [address], [place] [address]  Phone: [phone]  Fax: [phone]  Follow Up Time: 1 week

## 2019-07-23 NOTE — DISCHARGE NOTE PROVIDER - HOSPITAL COURSE
49 yo female with PMHx of COPD/Asthma, hypothyroid and substance abuse presents with c/o 4 days of SOB and chest tightness        Patient was admitted for COPD exacerbation due to multifocal pneumonia and treated with IV and later PO ABX for total of 14 days along with nebulizer treatments , IV steroids. Pulmonary evaluated patient and suggested to continue above mentioned management . Patient improved gradually slowly. Discussed with pulmonary, Dr. Lozoya, who reviewed CT chest repeat and does not think its PCP pneumonia . Patient improved clinically. SPO2 checked at rest and on ambulation, SPO2 stable around 96% on RA on rest and around 91% on ambulation on RA- does not meet criteria for home O2.         Patient seen and examined at bedside, reports doing better and ready for dc home today.             Vital Signs Last 24 Hrs    T(C): 35.9 (23 Jul 2019 05:05), Max: 36.8 (22 Jul 2019 14:12)    T(F): 96.7 (23 Jul 2019 05:05), Max: 98.3 (22 Jul 2019 14:12)    HR: 84 (23 Jul 2019 05:05) (68 - 84)    BP: 127/68 (23 Jul 2019 05:05) (106/72 - 135/80)    BP(mean): --    RR: 16 (23 Jul 2019 05:05) (16 - 16)    SpO2: 90% (23 Jul 2019 10:48) (90% - 94%)        Physical Examination:    General: AAO x  3  , NAD.    HEENT: PERRLA, EOMI. NO JVD.    CVS:  S1 & S2 appreciated, regular rate and rhythm, no murmurs.     Lungs: diminshed BS, minimal scattered wheezing- improved significantly     Abdominal Examination: soft, nontender, nondistended, +ve BS.    Neuro: AAO x 3    . no focal deficits.     Extremity Examination: No edema peripherally.        stable for dc home.

## 2019-07-23 NOTE — DISCHARGE NOTE PROVIDER - NSDCCPCAREPLAN_GEN_ALL_CORE_FT
PRINCIPAL DISCHARGE DIAGNOSIS  Diagnosis: Pneumonia  Assessment and Plan of Treatment: with COPD exacerbation. complete ABX /steroid taper at home. c/w inhalers. fu pulmonary as OP      SECONDARY DISCHARGE DIAGNOSES  Diagnosis: Hypoxia  Assessment and Plan of Treatment: due to COPD exacerbation- improved, does not meet criteria for home O2.    Diagnosis: COPD exacerbation  Assessment and Plan of Treatment: improved.

## 2019-07-23 NOTE — PROGRESS NOTE ADULT - PROVIDER SPECIALTY LIST ADULT
Hospitalist
Pulmonology
Infectious Disease
Hospitalist
Infectious Disease

## 2019-07-23 NOTE — PROGRESS NOTE ADULT - SUBJECTIVE AND OBJECTIVE BOX
Patient is a 48y old  Female who presents with a chief complaint of pna (23 Jul 2019 11:31)        Interval Events: No overnight events. Pt very angry and confrontational demands to go home with O2 regardless of saturations "just in case this happens" again.    REVIEW OF SYSTEMS:  Constitutional: No fevers or chills. No weight loss. No fatigue or generalized malaise.  Eyes: No itching or discharge from the eyes  ENT: No ear pain. No ear discharge. No nasal congestion. No post nasal drip. No epistaxis. No throat pain. No sore throat. No difficulty swallowing.   CV: No chest pain. No palpitations. No lightheadedness or dizziness.   Resp: No dyspnea at rest. No dyspnea on exertion. No orthopnea. No wheezing. No cough. No stridor. No sputum production. No chest pain with respiration.  GI: No nausea. No vomiting. No diarrhea.  MSK: No joint pain or pain in any extremities  Integumentary: No skin lesions. No pedal edema.  Neurological: No gross motor weakness. No sensory changes.      OBJECTIVE:  ICU Vital Signs Last 24 Hrs  T(C): 35.9 (23 Jul 2019 05:05), Max: 36.8 (22 Jul 2019 14:12)  T(F): 96.7 (23 Jul 2019 05:05), Max: 98.3 (22 Jul 2019 14:12)  HR: 84 (23 Jul 2019 05:05) (68 - 84)  BP: 127/68 (23 Jul 2019 05:05) (106/72 - 135/80)    RR: 16 (23 Jul 2019 05:05) (16 - 16)  SpO2: 90% (23 Jul 2019 10:48) (90% - 94%)        CAPILLARY BLOOD GLUCOSE      POCT Blood Glucose.: 193 mg/dL (23 Jul 2019 07:19)      PHYSICAL EXAM:  General: Awake, alert, oriented X 3.   HEENT: Atraumatic, normocephalic.                 Mallampatti Grade                 No nasal congestion.                No tonsillar or pharyngeal exudates.  Lymph Nodes: No palpable lymphadenopathy neck, supraclavicular region  Neck: No JVD. No carotid bruit, no thyromegally  Respiratory: Normal chest expansion                         Normal percussion                         Normal and equal air entry                         No wheeze, dry rales.  Cardiovascular: S1 S2 normal. No murmurs, rubs or gallops.   Abdomen: Soft, non-tender, non-distended. No organomegaly.  Extremities: Warm to touch. Peripheral pulse palpable. No pedal edema.   Skin: No rashes or skin lesions, warm dry  Neurological: Motor and sensory examination equal and normal in all four extremities.  Psychiatry: Appropriate mood and affect.    HOSPITAL MEDICATIONS:  MEDICATIONS  (STANDING):  ALBUTerol/ipratropium for Nebulization 3 milliLiter(s) Nebulizer every 6 hours  buDESOnide 160 MICROgram(s)/formoterol 4.5 MICROgram(s) Inhaler 2 Puff(s) Inhalation two times a day  buprenorphine 2 mG/naloxone 0.5 mG SL  Tablet 2 Tablet(s) SubLingual daily  buprenorphine 8 mG/naloxone 2 mG SL  Tablet 2 Tablet(s) SubLingual daily  dextrose 5%. 1000 milliLiter(s) (50 mL/Hr) IV Continuous <Continuous>  dextrose 50% Injectable 12.5 Gram(s) IV Push once  dextrose 50% Injectable 25 Gram(s) IV Push once  dextrose 50% Injectable 25 Gram(s) IV Push once  enoxaparin Injectable 40 milliGRAM(s) SubCutaneous daily  FLUoxetine 40 milliGRAM(s) Oral daily  insulin glargine Injectable (LANTUS) 12 Unit(s) SubCutaneous at bedtime  insulin lispro (HumaLOG) corrective regimen sliding scale   SubCutaneous three times a day before meals  insulin lispro (HumaLOG) corrective regimen sliding scale   SubCutaneous at bedtime  insulin lispro Injectable (HumaLOG) 13 Unit(s) SubCutaneous three times a day with meals  levoFLOXacin  Tablet 500 milliGRAM(s) Oral every 24 hours  levothyroxine 25 MICROGram(s) Oral daily  pantoprazole    Tablet 40 milliGRAM(s) Oral before breakfast  predniSONE   Tablet 60 milliGRAM(s) Oral daily    MEDICATIONS  (PRN):  ALBUTerol/ipratropium for Nebulization. 3 milliLiter(s) Nebulizer every 4 hours PRN Shortness of Breath and/or Wheezing  dextrose 40% Gel 15 Gram(s) Oral once PRN Blood Glucose LESS THAN 70 milliGRAM(s)/deciliter  glucagon  Injectable 1 milliGRAM(s) IntraMuscular once PRN Glucose LESS THAN 70 milligrams/deciliter  hydrOXYzine hydrochloride 25 milliGRAM(s) Oral every 8 hours PRN Anxiety  ibuprofen  Tablet. 400 milliGRAM(s) Oral every 8 hours PRN Temp greater or equal to 38C (100.4F), Moderate Pain (4 - 6)            RADIOLOGY: Radiology personally reviewed. Significant improvement in the current CXR.

## 2019-07-23 NOTE — PROGRESS NOTE ADULT - ASSESSMENT
ASS: improved infiltrates  PNA   doubt  opportunistic infection   ?? inhalation induced  copd exacerbation     Suggest:  oral prednisone 60 mg marty   finish ABX   symbicort 160 mg Q 12 hrs   nebulizer Q 4 hrs and prn    KEEP SAO2 92%  check PO on RA prior to D/C  needs O2 if SaO2 <88%    Stop smoking and using drugs

## 2019-07-23 NOTE — DISCHARGE NOTE NURSING/CASE MANAGEMENT/SOCIAL WORK - NSDCDPATPORTLINK_GEN_ALL_CORE
You can access the [facility] Patient Portal, offered by [facility], by registering with the following website: [email or web address]

## 2019-07-24 LAB
CARBOXYTHC UR CFM-MCNC: 5298 NG/ML — SIGNIFICANT CHANGE UP
CARBOXYTHC UR QL SCN: 228.8 MG/DL — SIGNIFICANT CHANGE UP
CARBOXYTHC UR QL SCN: 2316 — SIGNIFICANT CHANGE UP
THC CREATININE URINE: 228.8 MG/DL — SIGNIFICANT CHANGE UP
THC METABOLITE/CREAT URINE: 2316 — SIGNIFICANT CHANGE UP
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
## 2019-07-30 DIAGNOSIS — Z87.891 PERSONAL HISTORY OF NICOTINE DEPENDENCE: ICD-10-CM

## 2019-07-30 DIAGNOSIS — F11.20 OPIOID DEPENDENCE, UNCOMPLICATED: ICD-10-CM

## 2019-07-30 DIAGNOSIS — Z91.19 PATIENT'S NONCOMPLIANCE WITH OTHER MEDICAL TREATMENT AND REGIMEN: ICD-10-CM

## 2019-07-30 DIAGNOSIS — R04.2 HEMOPTYSIS: ICD-10-CM

## 2019-07-30 DIAGNOSIS — J44.1 CHRONIC OBSTRUCTIVE PULMONARY DISEASE WITH (ACUTE) EXACERBATION: ICD-10-CM

## 2019-07-30 DIAGNOSIS — T38.0X5A ADVERSE EFFECT OF GLUCOCORTICOIDS AND SYNTHETIC ANALOGUES, INITIAL ENCOUNTER: ICD-10-CM

## 2019-07-30 DIAGNOSIS — J18.9 PNEUMONIA, UNSPECIFIED ORGANISM: ICD-10-CM

## 2019-07-30 DIAGNOSIS — J96.01 ACUTE RESPIRATORY FAILURE WITH HYPOXIA: ICD-10-CM

## 2019-07-30 DIAGNOSIS — E87.3 ALKALOSIS: ICD-10-CM

## 2019-07-30 DIAGNOSIS — A41.9 SEPSIS, UNSPECIFIED ORGANISM: ICD-10-CM

## 2019-07-30 DIAGNOSIS — Z99.81 DEPENDENCE ON SUPPLEMENTAL OXYGEN: ICD-10-CM

## 2019-07-30 DIAGNOSIS — J15.9 UNSPECIFIED BACTERIAL PNEUMONIA: ICD-10-CM

## 2019-07-30 DIAGNOSIS — R73.9 HYPERGLYCEMIA, UNSPECIFIED: ICD-10-CM

## 2019-07-30 DIAGNOSIS — F41.9 ANXIETY DISORDER, UNSPECIFIED: ICD-10-CM

## 2019-07-30 DIAGNOSIS — E03.9 HYPOTHYROIDISM, UNSPECIFIED: ICD-10-CM

## 2019-08-02 ENCOUNTER — OUTPATIENT (OUTPATIENT)
Dept: OUTPATIENT SERVICES | Facility: HOSPITAL | Age: 49
LOS: 1 days | Discharge: HOME | End: 2019-08-02

## 2019-08-02 DIAGNOSIS — Z90.89 ACQUIRED ABSENCE OF OTHER ORGANS: Chronic | ICD-10-CM

## 2019-08-02 DIAGNOSIS — F11.20 OPIOID DEPENDENCE, UNCOMPLICATED: ICD-10-CM

## 2019-08-02 DIAGNOSIS — Z90.49 ACQUIRED ABSENCE OF OTHER SPECIFIED PARTS OF DIGESTIVE TRACT: Chronic | ICD-10-CM

## 2019-08-02 DIAGNOSIS — F12.20 CANNABIS DEPENDENCE, UNCOMPLICATED: ICD-10-CM

## 2019-08-02 PROBLEM — B19.20 UNSPECIFIED VIRAL HEPATITIS C WITHOUT HEPATIC COMA: Chronic | Status: ACTIVE | Noted: 2019-07-11

## 2019-08-02 PROBLEM — E03.9 HYPOTHYROIDISM, UNSPECIFIED: Chronic | Status: ACTIVE | Noted: 2019-07-11

## 2019-08-02 PROBLEM — Z87.19 PERSONAL HISTORY OF OTHER DISEASES OF THE DIGESTIVE SYSTEM: Chronic | Status: ACTIVE | Noted: 2019-07-11

## 2019-08-02 PROBLEM — J44.9 CHRONIC OBSTRUCTIVE PULMONARY DISEASE, UNSPECIFIED: Chronic | Status: ACTIVE | Noted: 2019-07-11

## 2019-08-16 NOTE — ED PROCEDURE NOTE - CPROC ED TIME OUT STATEMENT1
Subjective:    Patient ID:  Anum Arnold is a 68 y.o. female who presents for evaluation of No chief complaint on file.      HPI  Here to transfer care from Dr. Summers regarding PAF/CVA/DLP/HTN.Does all ADL's w/o angina. Patient denies palpitations, syncope, presyncope, lightheadedness or dizziness.  No sx's when in AF.       Review of Systems   Constitution: Negative for malaise/fatigue.   Eyes: Negative for blurred vision.   Cardiovascular: Negative for chest pain, claudication, cyanosis, dyspnea on exertion, irregular heartbeat, leg swelling, near-syncope, orthopnea, palpitations, paroxysmal nocturnal dyspnea and syncope.   Respiratory: Negative for cough and shortness of breath.    Hematologic/Lymphatic: Does not bruise/bleed easily.   Musculoskeletal: Negative for back pain, falls, joint pain, muscle cramps, muscle weakness and myalgias.   Gastrointestinal: Negative for abdominal pain, change in bowel habit, nausea and vomiting.   Genitourinary: Negative for urgency.   Neurological: Negative for dizziness, focal weakness and light-headedness.       Past Medical History:   Diagnosis Date    a Chronic Anticoagulation With Eliquis     Dr. Raimundo Summers Is Her Cardiologist    a Left Atrial Enlargement     Dr. Raimundo Summers Is Her Cardiologist; 1/11/16 Echo (Dr. MICHELLE Barrow) = Normal Except For Left Atrial Enlargement    a Paroxysmal Atrial Fibrillation With H/O RFA 02/2017     Dr. Raimundo Summers Is Her Cardiologist; Dr. Sabino Christie; 1/11/16 Echo (Dr. MICHELLE Barrow) = Normal Except For Left Atrial Enlargement    b Hypertension     1/10/19 RXd A Low Salt Diet    b Microalbuminuria     On Prinivil 40 Mg Daily    c Hypercholesterolemia With Low HDL     On Lipitor 40 Mg qHS    c Mild Hypertriglyceridemia     d Type 2 DM On Insulin     ** 12/13/17 Increased Levemir To 8 Units SQ qHS; 6/12/17 Increased 25 Unit Levemir To 35 Units qHS With Repeat HgA1c In 4 Months    e Multinodular Goiter     Dr. Briones  "Beatrous BXd This In 05/2017 And Was Benign; 07/2012 U/S Guided FNA Was Benign    Essential hypertension 1/2/2016    f Osteoporosis     3/27/19 (Phone Log) RXd Prolia 60 Mg SQ Every 6 Months; 3/17/19 RXd Fosamax 70 Mg Weekly (But This Caused S/Es), Continued Her OTC D3 10K IU Daily, And RXd OTC CaCO3 600 Mg Daily    g Recurrent Leukocytosis     i H/O 3/4 PPD X 25 YRs TUD, Quit In 2002     j H/O Sigmoid Polyp Carcinoma-In Situ On 5/10/13 TC     Dr. Alfredito Centeno (After Follow-Up 6/11/14 TC Was Free Of Polyps): "Repeat TC In 3 YRs"    j Sigmoid Diverticulosis     Dr. Jesus Chavez; TC on 10/26/17; Mild Diverticulosis; Repeat TC In 1 YR    j Small Internal Hemorrhoids     Dr. Jesus Chavez; TC On 10/26/17; Repeat TC In 1 YRs    k Bilateral Nephrolithiasis     4/6/17 Referred To Dr. Ramon Gaston; Has Had Lithotripsy And An Extraction In The Past    k H/O Breast Cancer S/P Left Mastectomy In 2007     k Right Breast Fibrocystic Changes     l Bilateral Left > Right Knee Osteoarthritis     Referred To Dr. Marshall headley Chronic Left Shoulder Pain     Referred To Dr. Olivares; She Has Had Outpatient PT For This But This Has Failed    l Chronic Pain Syndrome     4/6/17 Referred To Dr. 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     Physical Exam   Constitutional: She is oriented to person, place, and time. She appears well-developed and well-nourished.   HENT:   Head: Normocephalic.   Eyes: Conjunctivae " “Patient's name, , procedure and correct site were confirmed during the Cash Timeout.” are normal.   Neck: Normal range of motion. Neck supple. No JVD present.   Cardiovascular: Normal rate, regular rhythm, normal heart sounds and intact distal pulses.   Pulses:       Carotid pulses are 2+ on the right side, and 2+ on the left side.       Radial pulses are 2+ on the right side, and 2+ on the left side.        Dorsalis pedis pulses are 2+ on the right side, and 2+ on the left side.        Posterior tibial pulses are 2+ on the right side, and 2+ on the left side.   Pulmonary/Chest: Effort normal and breath sounds normal.   Abdominal: Soft. Bowel sounds are normal.   Musculoskeletal: She exhibits no edema or tenderness.   Neurological: She is alert and oriented to person, place, and time. Gait normal.   Skin: Skin is warm, dry and intact. No cyanosis. Nails show no clubbing.   Psychiatric: She has a normal mood and affect. Her speech is normal and behavior is normal. Thought content normal.   Nursing note and vitals reviewed.            ..    Chemistry        Component Value Date/Time     06/06/2016 1154    K 4.1 06/06/2016 1154     06/06/2016 1154    CO2 28 06/06/2016 1154    BUN 35 (H) 06/06/2016 1154    CREATININE 1.31 (H) 06/06/2016 1154    GLU 52 (L) 06/06/2016 1154        Component Value Date/Time    CALCIUM 9.4 06/06/2016 1154    ALKPHOS 64 06/06/2016 1154    AST 11 06/06/2016 1154    ALT 7 06/06/2016 1154    BILITOT 0.5 06/06/2016 1154    ESTGFRAFRICA 49 (L) 06/06/2016 1154    EGFRNONAA 43 (L) 06/06/2016 1154            ..  Lab Results   Component Value Date    CHOL 106 (L) 06/06/2016    CHOL 137 01/09/2016    CHOL 176 06/09/2015     Lab Results   Component Value Date    HDL 55 05/30/2017    HDL 37 (L) 06/06/2016    HDL 33 (L) 01/09/2016     Lab Results   Component Value Date    LDLCALC 51 05/30/2017    LDLCALC 45 06/06/2016    LDLCALC 70.6 01/09/2016     Lab Results   Component Value Date    TRIG 73 05/30/2017    TRIG 122 06/06/2016    TRIG 167 (H) 01/09/2016     Lab Results    Component Value Date    CHOLHDL 2.9 06/06/2016    CHOLHDL 24.1 01/09/2016    CHOLHDL 3.7 06/09/2015     ..  Lab Results   Component Value Date    WBC 9.33 05/15/2017    HGB 14.7 05/15/2017    HCT 42.8 05/15/2017    MCV 85 05/15/2017     05/15/2017       Test(s) Reviewed  I have reviewed the following in detail:  [] Stress test   [] Angiography   [x] Echocardiogram   [x] Labs   [x] Other:       Assessment:         ICD-10-CM ICD-9-CM   1. Hypercholesterolemia With Low HDL E78.00 272.0   2. Mild Hypertriglyceridemia E78.1 272.1   3. Paroxysmal Atrial Fibrillation With H/O RFA 02/2017 I48.0 427.31   4. Cognitive Deficits Due To CVA In 01/2016 I69.319 438.0   5. H/O 3/4 PPD X 25 YRs TUD, Quit In 2002 Z87.891 V15.82   6. 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 Heart healthy diet and risk factor modification.    See labs and med orders.  Get Dr Summers's records.  holter monitor.     Portions of this note may have been created with voice recognition software.  Grammatical, syntax and spelling errors may be inevitable.

## 2019-09-03 ENCOUNTER — OUTPATIENT (OUTPATIENT)
Dept: OUTPATIENT SERVICES | Facility: HOSPITAL | Age: 49
LOS: 1 days | Discharge: HOME | End: 2019-09-03

## 2019-09-03 DIAGNOSIS — Z90.89 ACQUIRED ABSENCE OF OTHER ORGANS: Chronic | ICD-10-CM

## 2019-09-03 DIAGNOSIS — F11.20 OPIOID DEPENDENCE, UNCOMPLICATED: ICD-10-CM

## 2019-09-03 DIAGNOSIS — Z90.49 ACQUIRED ABSENCE OF OTHER SPECIFIED PARTS OF DIGESTIVE TRACT: Chronic | ICD-10-CM

## 2019-09-10 ENCOUNTER — APPOINTMENT (OUTPATIENT)
Dept: INTERNAL MEDICINE | Facility: HOSPITAL | Age: 49
End: 2019-09-10

## 2019-09-17 ENCOUNTER — APPOINTMENT (OUTPATIENT)
Dept: INTERNAL MEDICINE | Facility: HOSPITAL | Age: 49
End: 2019-09-17

## 2019-09-30 ENCOUNTER — OUTPATIENT (OUTPATIENT)
Dept: OUTPATIENT SERVICES | Facility: HOSPITAL | Age: 49
LOS: 1 days | Discharge: HOME | End: 2019-09-30
Payer: MEDICAID

## 2019-09-30 DIAGNOSIS — F11.20 OPIOID DEPENDENCE, UNCOMPLICATED: ICD-10-CM

## 2019-09-30 DIAGNOSIS — Z90.49 ACQUIRED ABSENCE OF OTHER SPECIFIED PARTS OF DIGESTIVE TRACT: Chronic | ICD-10-CM

## 2019-09-30 DIAGNOSIS — Z90.89 ACQUIRED ABSENCE OF OTHER ORGANS: Chronic | ICD-10-CM

## 2019-09-30 PROCEDURE — 99213 OFFICE O/P EST LOW 20 MIN: CPT

## 2019-10-24 NOTE — PROGRESS NOTE ADULT - SUBJECTIVE AND OBJECTIVE BOX
EZRA BARCLAY  48y, Female  Allergy: No Known Allergies    49 yo female with PMHx of COPD/Asthma, hypothyroid and substance abuse presents with c/o 4 days of SOB and chest tightness. Pt has been using inhalers and nebs without much improvement. Pt admits to fever to 101 and nonproductive cough. Pt denies any sick close contacts.     OVERNIGHT EVENTS/HPI  7/13/19:  pt seen and examined at bedside this morning and later in the afternoon  -dyspnea improved; speaking in full sentences  -Pulmonologist's input/recommendations noted from early this morning; LE Duplex done stat (negative for DVT); ordered CT chest NC but pt refusing for now even though explained and understands the importance of imaging study (re: company in the room and does not want to be disturbed)  -supplemental O2  -monitor inpatient   -NO d/c planning     7/14/19:  -pt seen and examined at bedside; sitting up in chair   -will continue with current medication regimen   -obtain IV access   -pulmonary follow up   -ID consult appreciated     PAST MEDICAL & SURGICAL HISTORY:  Hepatitis-C  History of pancreatitis  H/O: substance abuse  Hypothyroidism  Asthma with COPD  History of tonsillectomy  History of appendectomy      Vital Signs Last 24 Hrs  T(C): 36.6 (14 Jul 2019 06:11), Max: 36.6 (14 Jul 2019 06:11)  T(F): 97.8 (14 Jul 2019 06:11), Max: 97.8 (14 Jul 2019 06:11)  HR: 65 (14 Jul 2019 06:11) (65 - 93)  BP: 133/84 (14 Jul 2019 06:11) (122/77 - 168/72)  RR: 16 (14 Jul 2019 06:11) (16 - 16)  SpO2: 95% (13 Jul 2019 22:16) (95% - 95%)    PHYSICAL EXAM:  GENERAL: on supplemental O2; but speaking in full sentences   HEAD:  Atraumatic, Normocephalic  EYES: EOMI, PERRLA, conjunctiva and sclera clear  NERVOUS SYSTEM:  Alert & Oriented X 4, Good concentration; Motor Strength 5/5 B/L upper and lower extremities; DTRs 2+ intact and symmetric  CHEST/LUNG: wheezing b/l  CV/HEART: Regular rate and rhythm; No murmurs, rubs, or gallops  GI/ABDOMEN: Soft, Nontender, Nondistended; Bowel sounds present  EXTREMITIES:  2+ Peripheral Pulses, No clubbing, cyanosis, or edema  SKIN: No rashes or lesions    TESTS & MEASUREMENTS:  Height (cm): 172.72 (07-11-19 @ 14:01)  Weight (kg): 74 (07-11-19 @ 14:01)  BMI (kg/m2): 24.8 (07-11-19 @ 14:01)                            9.8    12.51 )-----------( 261      ( 13 Jul 2019 15:08 )             29.3   07-13    140  |  103  |  17  ----------------------------<  123<H>  3.5   |  24  |  0.6<L>    Ca    8.3<L>      13 Jul 2019 15:08    TPro  6.3  /  Alb  3.3<L>  /  TBili  0.2  /  DBili  x   /  AST  29  /  ALT  8   /  AlkPhos  89  07-13        RADIOLOGY & ADDITIONAL TESTS:    CT Chest No Cont (07.13.19 @ 18:43)   IMPRESSION:    Diffuse bilateral groundglass opacities with associated scattered   thin-walled cysts, most prominent in the lower lobes. This likely   reflects infectious/inflammatory etiology; PCP pneumonia is included in   the differential diagnosis.      HEALTH ISSUES - PROBLEM Dx:  H/O: substance abuse: H/O: substance abuse  Hypothyroidism, unspecified type: Hypothyroidism, unspecified type  Asthma with COPD: Asthma with COPD  Pneumonia of both lower lobes due to infectious organism: Pneumonia of both lower lobes due to infectious organism      MEDICATIONS  (STANDING):  ALBUTerol/ipratropium for Nebulization 3 milliLiter(s) Nebulizer every 6 hours  azithromycin  IVPB 500 milliGRAM(s) IV Intermittent every 24 hours  buprenorphine 2 mG/naloxone 0.5 mG SL  Tablet 2 Tablet(s) SubLingual daily  buprenorphine 8 mG/naloxone 2 mG SL  Tablet 2 Tablet(s) SubLingual daily  cefTRIAXone   IVPB 1000 milliGRAM(s) IV Intermittent every 24 hours  dextrose 5%. 1000 milliLiter(s) (50 mL/Hr) IV Continuous <Continuous>  dextrose 50% Injectable 12.5 Gram(s) IV Push once  dextrose 50% Injectable 25 Gram(s) IV Push once  dextrose 50% Injectable 25 Gram(s) IV Push once  enoxaparin Injectable 40 milliGRAM(s) SubCutaneous daily  FLUoxetine 40 milliGRAM(s) Oral daily  insulin lispro (HumaLOG) corrective regimen sliding scale   SubCutaneous three times a day before meals  insulin lispro (HumaLOG) corrective regimen sliding scale   SubCutaneous at bedtime  insulin lispro Injectable (HumaLOG) 5 Unit(s) SubCutaneous three times a day with meals  levothyroxine 25 MICROGram(s) Oral daily  methylPREDNISolone sodium succinate Injectable 60 milliGRAM(s) IV Push every 8 hours  pantoprazole    Tablet 40 milliGRAM(s) Oral before breakfast  sodium chloride 0.9%. 1000 milliLiter(s) (75 mL/Hr) IV Continuous <Continuous>    MEDICATIONS  (PRN):  ALBUTerol/ipratropium for Nebulization. 3 milliLiter(s) Nebulizer every 4 hours PRN Shortness of Breath and/or Wheezing  dextrose 40% Gel 15 Gram(s) Oral once PRN Blood Glucose LESS THAN 70 milliGRAM(s)/deciliter  glucagon  Injectable 1 milliGRAM(s) IntraMuscular once PRN Glucose LESS THAN 70 milligrams/deciliter  ketorolac   Injectable 30 milliGRAM(s) IV Push every 6 hours PRN Moderate Pain (4 - 6) none

## 2019-10-30 ENCOUNTER — OUTPATIENT (OUTPATIENT)
Dept: OUTPATIENT SERVICES | Facility: HOSPITAL | Age: 49
LOS: 1 days | Discharge: HOME | End: 2019-10-30
Payer: MEDICAID

## 2019-10-30 DIAGNOSIS — Z90.49 ACQUIRED ABSENCE OF OTHER SPECIFIED PARTS OF DIGESTIVE TRACT: Chronic | ICD-10-CM

## 2019-10-30 DIAGNOSIS — F11.20 OPIOID DEPENDENCE, UNCOMPLICATED: ICD-10-CM

## 2019-10-30 DIAGNOSIS — Z90.89 ACQUIRED ABSENCE OF OTHER ORGANS: Chronic | ICD-10-CM

## 2019-10-30 PROCEDURE — 99212 OFFICE O/P EST SF 10 MIN: CPT

## 2019-11-27 ENCOUNTER — OUTPATIENT (OUTPATIENT)
Dept: OUTPATIENT SERVICES | Facility: HOSPITAL | Age: 49
LOS: 1 days | Discharge: HOME | End: 2019-11-27
Payer: MEDICAID

## 2019-11-27 ENCOUNTER — APPOINTMENT (OUTPATIENT)
Dept: INTERNAL MEDICINE | Facility: HOSPITAL | Age: 49
End: 2019-11-27

## 2019-11-27 DIAGNOSIS — F11.20 OPIOID DEPENDENCE, UNCOMPLICATED: ICD-10-CM

## 2019-11-27 DIAGNOSIS — Z90.89 ACQUIRED ABSENCE OF OTHER ORGANS: Chronic | ICD-10-CM

## 2019-11-27 DIAGNOSIS — Z90.49 ACQUIRED ABSENCE OF OTHER SPECIFIED PARTS OF DIGESTIVE TRACT: Chronic | ICD-10-CM

## 2019-11-27 PROCEDURE — 90792 PSYCH DIAG EVAL W/MED SRVCS: CPT

## 2019-12-23 ENCOUNTER — OUTPATIENT (OUTPATIENT)
Dept: OUTPATIENT SERVICES | Facility: HOSPITAL | Age: 49
LOS: 1 days | Discharge: HOME | End: 2019-12-23

## 2019-12-23 DIAGNOSIS — F11.20 OPIOID DEPENDENCE, UNCOMPLICATED: ICD-10-CM

## 2019-12-23 DIAGNOSIS — Z90.49 ACQUIRED ABSENCE OF OTHER SPECIFIED PARTS OF DIGESTIVE TRACT: Chronic | ICD-10-CM

## 2019-12-23 DIAGNOSIS — Z90.89 ACQUIRED ABSENCE OF OTHER ORGANS: Chronic | ICD-10-CM

## 2020-01-01 ENCOUNTER — OUTPATIENT (OUTPATIENT)
Dept: OUTPATIENT SERVICES | Facility: HOSPITAL | Age: 50
LOS: 1 days | End: 2020-01-01
Payer: MEDICAID

## 2020-01-01 DIAGNOSIS — Z90.49 ACQUIRED ABSENCE OF OTHER SPECIFIED PARTS OF DIGESTIVE TRACT: Chronic | ICD-10-CM

## 2020-01-01 DIAGNOSIS — Z90.89 ACQUIRED ABSENCE OF OTHER ORGANS: Chronic | ICD-10-CM

## 2020-01-01 PROCEDURE — G9001: CPT

## 2020-01-06 ENCOUNTER — EMERGENCY (EMERGENCY)
Facility: HOSPITAL | Age: 50
LOS: 0 days | Discharge: HOME | End: 2020-01-06
Attending: EMERGENCY MEDICINE | Admitting: EMERGENCY MEDICINE
Payer: MEDICAID

## 2020-01-06 VITALS
SYSTOLIC BLOOD PRESSURE: 126 MMHG | DIASTOLIC BLOOD PRESSURE: 76 MMHG | RESPIRATION RATE: 17 BRPM | TEMPERATURE: 96 F | HEART RATE: 58 BPM | OXYGEN SATURATION: 98 %

## 2020-01-06 VITALS
TEMPERATURE: 99 F | WEIGHT: 167.99 LBS | DIASTOLIC BLOOD PRESSURE: 72 MMHG | RESPIRATION RATE: 18 BRPM | HEART RATE: 80 BPM | OXYGEN SATURATION: 99 % | SYSTOLIC BLOOD PRESSURE: 145 MMHG

## 2020-01-06 DIAGNOSIS — Z90.89 ACQUIRED ABSENCE OF OTHER ORGANS: Chronic | ICD-10-CM

## 2020-01-06 DIAGNOSIS — Z90.49 ACQUIRED ABSENCE OF OTHER SPECIFIED PARTS OF DIGESTIVE TRACT: ICD-10-CM

## 2020-01-06 DIAGNOSIS — Z79.899 OTHER LONG TERM (CURRENT) DRUG THERAPY: ICD-10-CM

## 2020-01-06 DIAGNOSIS — R22.0 LOCALIZED SWELLING, MASS AND LUMP, HEAD: ICD-10-CM

## 2020-01-06 DIAGNOSIS — Z87.891 PERSONAL HISTORY OF NICOTINE DEPENDENCE: ICD-10-CM

## 2020-01-06 DIAGNOSIS — Z90.49 ACQUIRED ABSENCE OF OTHER SPECIFIED PARTS OF DIGESTIVE TRACT: Chronic | ICD-10-CM

## 2020-01-06 DIAGNOSIS — R59.0 LOCALIZED ENLARGED LYMPH NODES: ICD-10-CM

## 2020-01-06 DIAGNOSIS — Z79.1 LONG TERM (CURRENT) USE OF NON-STEROIDAL ANTI-INFLAMMATORIES (NSAID): ICD-10-CM

## 2020-01-06 LAB
ALBUMIN SERPL ELPH-MCNC: 4.6 G/DL — SIGNIFICANT CHANGE UP (ref 3.5–5.2)
ALP SERPL-CCNC: 88 U/L — SIGNIFICANT CHANGE UP (ref 30–115)
ALT FLD-CCNC: <5 U/L — SIGNIFICANT CHANGE UP (ref 0–41)
ANION GAP SERPL CALC-SCNC: 12 MMOL/L — SIGNIFICANT CHANGE UP (ref 7–14)
AST SERPL-CCNC: 19 U/L — SIGNIFICANT CHANGE UP (ref 0–41)
BASOPHILS # BLD AUTO: 0.03 K/UL — SIGNIFICANT CHANGE UP (ref 0–0.2)
BASOPHILS NFR BLD AUTO: 0.5 % — SIGNIFICANT CHANGE UP (ref 0–1)
BILIRUB SERPL-MCNC: 0.4 MG/DL — SIGNIFICANT CHANGE UP (ref 0.2–1.2)
BUN SERPL-MCNC: 6 MG/DL — LOW (ref 10–20)
CALCIUM SERPL-MCNC: 9.3 MG/DL — SIGNIFICANT CHANGE UP (ref 8.5–10.1)
CHLORIDE SERPL-SCNC: 98 MMOL/L — SIGNIFICANT CHANGE UP (ref 98–110)
CO2 SERPL-SCNC: 25 MMOL/L — SIGNIFICANT CHANGE UP (ref 17–32)
CREAT SERPL-MCNC: 0.7 MG/DL — SIGNIFICANT CHANGE UP (ref 0.7–1.5)
EOSINOPHIL # BLD AUTO: 0.12 K/UL — SIGNIFICANT CHANGE UP (ref 0–0.7)
EOSINOPHIL NFR BLD AUTO: 2 % — SIGNIFICANT CHANGE UP (ref 0–8)
GLUCOSE SERPL-MCNC: 149 MG/DL — HIGH (ref 70–99)
HCG SERPL QL: NEGATIVE — SIGNIFICANT CHANGE UP
HCT VFR BLD CALC: 34.4 % — LOW (ref 37–47)
HGB BLD-MCNC: 11.1 G/DL — LOW (ref 12–16)
IMM GRANULOCYTES NFR BLD AUTO: 0.3 % — SIGNIFICANT CHANGE UP (ref 0.1–0.3)
LACTATE SERPL-SCNC: 1.9 MMOL/L — SIGNIFICANT CHANGE UP (ref 0.7–2)
LYMPHOCYTES # BLD AUTO: 1.5 K/UL — SIGNIFICANT CHANGE UP (ref 1.2–3.4)
LYMPHOCYTES # BLD AUTO: 25.4 % — SIGNIFICANT CHANGE UP (ref 20.5–51.1)
MCHC RBC-ENTMCNC: 26.1 PG — LOW (ref 27–31)
MCHC RBC-ENTMCNC: 32.3 G/DL — SIGNIFICANT CHANGE UP (ref 32–37)
MCV RBC AUTO: 80.9 FL — LOW (ref 81–99)
MONOCYTES # BLD AUTO: 0.43 K/UL — SIGNIFICANT CHANGE UP (ref 0.1–0.6)
MONOCYTES NFR BLD AUTO: 7.3 % — SIGNIFICANT CHANGE UP (ref 1.7–9.3)
NEUTROPHILS # BLD AUTO: 3.81 K/UL — SIGNIFICANT CHANGE UP (ref 1.4–6.5)
NEUTROPHILS NFR BLD AUTO: 64.5 % — SIGNIFICANT CHANGE UP (ref 42.2–75.2)
NRBC # BLD: 0 /100 WBCS — SIGNIFICANT CHANGE UP (ref 0–0)
PLATELET # BLD AUTO: 271 K/UL — SIGNIFICANT CHANGE UP (ref 130–400)
POTASSIUM SERPL-MCNC: 3.8 MMOL/L — SIGNIFICANT CHANGE UP (ref 3.5–5)
POTASSIUM SERPL-SCNC: 3.8 MMOL/L — SIGNIFICANT CHANGE UP (ref 3.5–5)
PROT SERPL-MCNC: 7.2 G/DL — SIGNIFICANT CHANGE UP (ref 6–8)
RBC # BLD: 4.25 M/UL — SIGNIFICANT CHANGE UP (ref 4.2–5.4)
RBC # FLD: 15.4 % — HIGH (ref 11.5–14.5)
SODIUM SERPL-SCNC: 135 MMOL/L — SIGNIFICANT CHANGE UP (ref 135–146)
WBC # BLD: 5.91 K/UL — SIGNIFICANT CHANGE UP (ref 4.8–10.8)
WBC # FLD AUTO: 5.91 K/UL — SIGNIFICANT CHANGE UP (ref 4.8–10.8)

## 2020-01-06 PROCEDURE — 36000 PLACE NEEDLE IN VEIN: CPT

## 2020-01-06 PROCEDURE — 99284 EMERGENCY DEPT VISIT MOD MDM: CPT | Mod: 25

## 2020-01-06 PROCEDURE — 76937 US GUIDE VASCULAR ACCESS: CPT | Mod: 26

## 2020-01-06 PROCEDURE — 70491 CT SOFT TISSUE NECK W/DYE: CPT | Mod: 26

## 2020-01-06 RX ORDER — LEVOTHYROXINE SODIUM 125 MCG
1 TABLET ORAL
Qty: 10 | Refills: 0
Start: 2020-01-06 | End: 2020-01-15

## 2020-01-06 NOTE — ED PROVIDER NOTE - NSFOLLOWUPINSTRUCTIONS_ED_ALL_ED_FT
Lymphadenopathy    Lymphadenopathy means that your lymph glands are swollen or larger than normal (enlarged). Lymph glands, also called lymph nodes, are collections of tissue that filter bacteria, viruses, and waste from your bloodstream. They are part of your body's disease-fighting system (immune system), which protects your body from germs.    There may be different causes of lymphadenopathy, depending on where it is in your body. Some types go away on their own. Lymphadenopathy can occur anywhere that you have lymph glands, including these areas:  Neck (cervical lymphadenopathy).  Chest (mediastinal lymphadenopathy).  Lungs (hilar lymphadenopathy).  Underarms (axillary lymphadenopathy).  Groin (inguinal lymphadenopathy).  When your immune system responds to germs, infection-fighting cells and fluid build up in your lymph glands. This causes some swelling and enlargement. If the lymph glands do not go back to normal after you have an infection or disease, your health care provider may do tests. These tests help to monitor your condition and find the reason why the glands are still swollen and enlarged.    Follow these instructions at home:  Get plenty of rest.  Take over-the-counter and prescription medicines only as told by your health care provider. Your health care provider may recommend over-the-counter medicines for pain.  If directed, apply heat to swollen lymph glands as often as told by your health care provider. Use the heat source that your health care provider recommends, such as a moist heat pack or a heating pad.  Place a towel between your skin and the heat source.   Leave the heat on for 20–30 minutes.   Remove the heat if your skin turns bright red. This is especially important if you are unable to feel pain, heat, or cold. You may have a greater risk of getting burned.  Check your affected lymph glands every day for changes. Check other lymph gland areas as told by your health care provider. Check for changes such as:  More swelling.  Sudden increase in size.  Redness or pain.  Hardness.  Keep all follow-up visits as told by your health care provider. This is important.  Contact a health care provider if you have:  Swelling that gets worse or spreads to other areas.  Problems with breathing.  Lymph glands that:  Are still swollen after 2 weeks.  Have suddenly gotten bigger.  Are red, painful, or hard.  A fever or chills.  Fatigue.  A sore throat.  Pain in your abdomen.  Weight loss.  Night sweats.  Get help right away if you have:  Fluid leaking from an enlarged lymph gland.  Severe pain.  Chest pain.  Shortness of breath.  Summary  Lymphadenopathy means that your lymph glands are swollen or larger than normal (enlarged).  Lymph glands (also called lymph nodes) are collections of tissue that filter bacteria, viruses, and waste from the bloodstream. They are part of your body's disease-fighting system (immune system).  Lymphadenopathy can occur anywhere that you have lymph glands.  If your enlarged and swollen lymph glands do not go back to normal after you have an infection or disease, your health care provider may do tests to monitor your condition and find the reason why the glands are still swollen and enlarged.  Check your affected lymph glands every day for changes. Check other lymph gland areas as told by your health care provider.  This information is not intended to replace advice given to you by your health care provider. Make sure you discuss any questions you have with your health care provider.

## 2020-01-06 NOTE — ED ADULT NURSE NOTE - PMH
Asthma with COPD    H/O: substance abuse    Hepatitis-C    History of pancreatitis    Hypothyroidism

## 2020-01-06 NOTE — ED PROVIDER NOTE - OBJECTIVE STATEMENT
Patient is a 48 yo F w/ hx of pancreatitis, hypothyroid, Hep C, COPD p/w swelling under the chin. Patient endorses progressive swelling of the submental region x 2 days associated with minimal pain. No fevers, no trauma, no difficulty swallowing/eating, no SOB. Patient denies pain with range of motion of neck.    Also endorses running out of her synthroid x 1.5 weeks ago.

## 2020-01-06 NOTE — ED PROVIDER NOTE - ATTENDING CONTRIBUTION TO CARE
49y female with submental swelling/mild pain, no f/c, no difficulty speaking/swallowing, on exam vital signs appreciated, well appearing normal phonation no drooling no trismus, has poor dentition wtihout drainage or ttp, tongue, posterior op, and floor of mouth unremarkable, has discreet submental swelling, minimally tender, somewhat mobile, with overlying erythema, no crepitus, FROM of neck, will scan

## 2020-01-06 NOTE — ED ADULT TRIAGE NOTE - CHIEF COMPLAINT QUOTE
as per patient "I have a lump under my chin, it started as a bump and got bigger for two days and I've been loosing my hair". Denies fever

## 2020-01-06 NOTE — ED PROVIDER NOTE - NS ED ROS FT
Constitutional: No fevers.   Eyes:  No visual changes, eye pain or discharge.  ENMT:  +submental swelling.   Cardiac:  No chest pain, SOB or edema.   Respiratory:  No cough or respiratory distress. No hemoptysis. hx of COPD.   GI:  No nausea, vomiting, diarrhea or abdominal pain.  :  No dysuria, frequency or burning.  MS:  No myalgia, muscle weakness, joint pain or back pain.  Neuro:  No headache or weakness.  No LOC.  Skin:  hx of facial cysts.   Endo: hx of hypothyroid.

## 2020-01-06 NOTE — ED PROVIDER NOTE - NSFOLLOWUPCLINICS_GEN_ALL_ED_FT
[facility]  Dental  [address] [place] [id number]  Phone: [phone]  Fax:   Follow Up Time: 7-10 Days

## 2020-01-06 NOTE — ED PROVIDER NOTE - PATIENT PORTAL LINK FT
You can access the FollowMyHealth Patient Portal offered by [facility] by registering at the following website: [email or web address]. By joining [employer]’s FollowMyHealth portal, you will also be able to view your health information using other applications (apps) compatible with our system.

## 2020-01-06 NOTE — ED PROVIDER NOTE - CARE PROVIDER_API CALL
Ernesto Lindsey)  Internal Medicine  [address] [place] [address]  Phone: [phone]  Fax: [phone]  Follow Up Time: 1-3 Days

## 2020-01-06 NOTE — ED PROVIDER NOTE - CLINICAL SUMMARY MEDICAL DECISION MAKING FREE TEXT BOX
Pt with submental swelling with mild overlying erythema, labs and studies reviewed, will d/c on abx to f/u with PMD. Patient counseled regarding conditions which should prompt return.

## 2020-01-09 ENCOUNTER — APPOINTMENT (OUTPATIENT)
Dept: INTERNAL MEDICINE | Facility: HOSPITAL | Age: 50
End: 2020-01-09

## 2020-01-17 DIAGNOSIS — Z71.89 OTHER SPECIFIED COUNSELING: ICD-10-CM

## 2020-01-21 ENCOUNTER — OUTPATIENT (OUTPATIENT)
Dept: OUTPATIENT SERVICES | Facility: HOSPITAL | Age: 50
LOS: 1 days | Discharge: HOME | End: 2020-01-21

## 2020-01-21 ENCOUNTER — OUTPATIENT (OUTPATIENT)
Dept: OUTPATIENT SERVICES | Facility: HOSPITAL | Age: 50
LOS: 1 days | Discharge: HOME | End: 2020-01-21
Payer: MEDICAID

## 2020-01-21 DIAGNOSIS — F11.20 OPIOID DEPENDENCE, UNCOMPLICATED: ICD-10-CM

## 2020-01-21 DIAGNOSIS — Z90.49 ACQUIRED ABSENCE OF OTHER SPECIFIED PARTS OF DIGESTIVE TRACT: Chronic | ICD-10-CM

## 2020-01-21 DIAGNOSIS — Z90.89 ACQUIRED ABSENCE OF OTHER ORGANS: Chronic | ICD-10-CM

## 2020-01-21 PROCEDURE — 99214 OFFICE O/P EST MOD 30 MIN: CPT

## 2020-02-19 ENCOUNTER — OUTPATIENT (OUTPATIENT)
Dept: OUTPATIENT SERVICES | Facility: HOSPITAL | Age: 50
LOS: 1 days | Discharge: HOME | End: 2020-02-19
Payer: MEDICAID

## 2020-02-19 DIAGNOSIS — Z90.89 ACQUIRED ABSENCE OF OTHER ORGANS: Chronic | ICD-10-CM

## 2020-02-19 DIAGNOSIS — F11.20 OPIOID DEPENDENCE, UNCOMPLICATED: ICD-10-CM

## 2020-02-19 DIAGNOSIS — Z90.49 ACQUIRED ABSENCE OF OTHER SPECIFIED PARTS OF DIGESTIVE TRACT: Chronic | ICD-10-CM

## 2020-02-19 PROCEDURE — 99214 OFFICE O/P EST MOD 30 MIN: CPT

## 2020-03-31 ENCOUNTER — OUTPATIENT (OUTPATIENT)
Dept: OUTPATIENT SERVICES | Facility: HOSPITAL | Age: 50
LOS: 1 days | Discharge: HOME | End: 2020-03-31

## 2020-03-31 DIAGNOSIS — Z90.49 ACQUIRED ABSENCE OF OTHER SPECIFIED PARTS OF DIGESTIVE TRACT: Chronic | ICD-10-CM

## 2020-03-31 DIAGNOSIS — Z90.89 ACQUIRED ABSENCE OF OTHER ORGANS: Chronic | ICD-10-CM

## 2020-04-06 ENCOUNTER — OUTPATIENT (OUTPATIENT)
Dept: OUTPATIENT SERVICES | Facility: HOSPITAL | Age: 50
LOS: 1 days | Discharge: HOME | End: 2020-04-06

## 2020-04-06 DIAGNOSIS — Z90.49 ACQUIRED ABSENCE OF OTHER SPECIFIED PARTS OF DIGESTIVE TRACT: Chronic | ICD-10-CM

## 2020-04-06 DIAGNOSIS — Z90.89 ACQUIRED ABSENCE OF OTHER ORGANS: Chronic | ICD-10-CM

## 2020-04-08 DIAGNOSIS — F11.20 OPIOID DEPENDENCE, UNCOMPLICATED: ICD-10-CM

## 2020-04-09 DIAGNOSIS — F11.20 OPIOID DEPENDENCE, UNCOMPLICATED: ICD-10-CM

## 2020-05-04 ENCOUNTER — OUTPATIENT (OUTPATIENT)
Dept: OUTPATIENT SERVICES | Facility: HOSPITAL | Age: 50
LOS: 1 days | Discharge: HOME | End: 2020-05-04

## 2020-05-04 DIAGNOSIS — Z90.49 ACQUIRED ABSENCE OF OTHER SPECIFIED PARTS OF DIGESTIVE TRACT: Chronic | ICD-10-CM

## 2020-05-04 DIAGNOSIS — F11.20 OPIOID DEPENDENCE, UNCOMPLICATED: ICD-10-CM

## 2020-05-04 DIAGNOSIS — Z90.89 ACQUIRED ABSENCE OF OTHER ORGANS: Chronic | ICD-10-CM

## 2020-05-12 ENCOUNTER — OUTPATIENT (OUTPATIENT)
Dept: OUTPATIENT SERVICES | Facility: HOSPITAL | Age: 50
LOS: 1 days | Discharge: HOME | End: 2020-05-12

## 2020-05-12 DIAGNOSIS — Z90.49 ACQUIRED ABSENCE OF OTHER SPECIFIED PARTS OF DIGESTIVE TRACT: Chronic | ICD-10-CM

## 2020-05-12 DIAGNOSIS — Z90.89 ACQUIRED ABSENCE OF OTHER ORGANS: Chronic | ICD-10-CM

## 2020-05-13 DIAGNOSIS — F11.20 OPIOID DEPENDENCE, UNCOMPLICATED: ICD-10-CM

## 2020-06-03 ENCOUNTER — OUTPATIENT (OUTPATIENT)
Dept: OUTPATIENT SERVICES | Facility: HOSPITAL | Age: 50
LOS: 1 days | Discharge: HOME | End: 2020-06-03

## 2020-06-03 DIAGNOSIS — F11.20 OPIOID DEPENDENCE, UNCOMPLICATED: ICD-10-CM

## 2020-06-03 DIAGNOSIS — Z90.49 ACQUIRED ABSENCE OF OTHER SPECIFIED PARTS OF DIGESTIVE TRACT: Chronic | ICD-10-CM

## 2020-06-03 DIAGNOSIS — Z90.89 ACQUIRED ABSENCE OF OTHER ORGANS: Chronic | ICD-10-CM

## 2020-06-10 ENCOUNTER — OUTPATIENT (OUTPATIENT)
Dept: OUTPATIENT SERVICES | Facility: HOSPITAL | Age: 50
LOS: 1 days | Discharge: HOME | End: 2020-06-10

## 2020-06-10 DIAGNOSIS — Z90.49 ACQUIRED ABSENCE OF OTHER SPECIFIED PARTS OF DIGESTIVE TRACT: Chronic | ICD-10-CM

## 2020-06-10 DIAGNOSIS — F11.20 OPIOID DEPENDENCE, UNCOMPLICATED: ICD-10-CM

## 2020-06-10 DIAGNOSIS — Z90.89 ACQUIRED ABSENCE OF OTHER ORGANS: Chronic | ICD-10-CM

## 2020-06-22 ENCOUNTER — OUTPATIENT (OUTPATIENT)
Dept: OUTPATIENT SERVICES | Facility: HOSPITAL | Age: 50
LOS: 1 days | Discharge: HOME | End: 2020-06-22

## 2020-06-22 DIAGNOSIS — F11.20 OPIOID DEPENDENCE, UNCOMPLICATED: ICD-10-CM

## 2020-06-22 DIAGNOSIS — Z90.49 ACQUIRED ABSENCE OF OTHER SPECIFIED PARTS OF DIGESTIVE TRACT: Chronic | ICD-10-CM

## 2020-06-22 DIAGNOSIS — Z90.89 ACQUIRED ABSENCE OF OTHER ORGANS: Chronic | ICD-10-CM

## 2020-07-01 ENCOUNTER — OUTPATIENT (OUTPATIENT)
Dept: OUTPATIENT SERVICES | Facility: HOSPITAL | Age: 50
LOS: 1 days | Discharge: HOME | End: 2020-07-01
Payer: MEDICAID

## 2020-07-01 DIAGNOSIS — F11.20 OPIOID DEPENDENCE, UNCOMPLICATED: ICD-10-CM

## 2020-07-01 DIAGNOSIS — Z90.89 ACQUIRED ABSENCE OF OTHER ORGANS: Chronic | ICD-10-CM

## 2020-07-01 DIAGNOSIS — Z90.49 ACQUIRED ABSENCE OF OTHER SPECIFIED PARTS OF DIGESTIVE TRACT: Chronic | ICD-10-CM

## 2020-07-01 PROCEDURE — 99443: CPT

## 2020-07-02 ENCOUNTER — OUTPATIENT (OUTPATIENT)
Dept: OUTPATIENT SERVICES | Facility: HOSPITAL | Age: 50
LOS: 1 days | Discharge: HOME | End: 2020-07-02

## 2020-07-02 DIAGNOSIS — F11.20 OPIOID DEPENDENCE, UNCOMPLICATED: ICD-10-CM

## 2020-07-02 DIAGNOSIS — Z90.89 ACQUIRED ABSENCE OF OTHER ORGANS: Chronic | ICD-10-CM

## 2020-07-02 DIAGNOSIS — Z90.49 ACQUIRED ABSENCE OF OTHER SPECIFIED PARTS OF DIGESTIVE TRACT: Chronic | ICD-10-CM

## 2020-07-07 ENCOUNTER — INPATIENT (INPATIENT)
Facility: HOSPITAL | Age: 50
LOS: 5 days | Discharge: HOME | End: 2020-07-13
Attending: INTERNAL MEDICINE | Admitting: INTERNAL MEDICINE
Payer: MEDICAID

## 2020-07-07 DIAGNOSIS — Z90.89 ACQUIRED ABSENCE OF OTHER ORGANS: Chronic | ICD-10-CM

## 2020-07-07 DIAGNOSIS — Z90.49 ACQUIRED ABSENCE OF OTHER SPECIFIED PARTS OF DIGESTIVE TRACT: Chronic | ICD-10-CM

## 2020-07-07 PROCEDURE — 36000 PLACE NEEDLE IN VEIN: CPT

## 2020-07-07 PROCEDURE — 99285 EMERGENCY DEPT VISIT HI MDM: CPT | Mod: 25

## 2020-07-08 VITALS
HEART RATE: 68 BPM | RESPIRATION RATE: 19 BRPM | DIASTOLIC BLOOD PRESSURE: 81 MMHG | SYSTOLIC BLOOD PRESSURE: 214 MMHG | OXYGEN SATURATION: 98 % | TEMPERATURE: 97 F

## 2020-07-08 DIAGNOSIS — E03.9 HYPOTHYROIDISM, UNSPECIFIED: ICD-10-CM

## 2020-07-08 DIAGNOSIS — J44.9 CHRONIC OBSTRUCTIVE PULMONARY DISEASE, UNSPECIFIED: ICD-10-CM

## 2020-07-08 DIAGNOSIS — I21.4 NON-ST ELEVATION (NSTEMI) MYOCARDIAL INFARCTION: ICD-10-CM

## 2020-07-08 LAB
A1C WITH ESTIMATED AVERAGE GLUCOSE RESULT: 6.7 % — HIGH (ref 4–5.6)
ALBUMIN SERPL ELPH-MCNC: 4.4 G/DL — SIGNIFICANT CHANGE UP (ref 3.5–5.2)
ALP SERPL-CCNC: 66 U/L — SIGNIFICANT CHANGE UP (ref 30–115)
ALT FLD-CCNC: 8 U/L — SIGNIFICANT CHANGE UP (ref 0–41)
AMPHET UR-MCNC: NEGATIVE — SIGNIFICANT CHANGE UP
ANION GAP SERPL CALC-SCNC: 12 MMOL/L — SIGNIFICANT CHANGE UP (ref 7–14)
ANION GAP SERPL CALC-SCNC: 13 MMOL/L — SIGNIFICANT CHANGE UP (ref 7–14)
APTT BLD: 40.8 SEC — HIGH (ref 27–39.2)
APTT BLD: 49.6 SEC — HIGH (ref 27–39.2)
AST SERPL-CCNC: 68 U/L — HIGH (ref 0–41)
BARBITURATES UR SCN-MCNC: NEGATIVE — SIGNIFICANT CHANGE UP
BENZODIAZ UR-MCNC: NEGATIVE — SIGNIFICANT CHANGE UP
BILIRUB SERPL-MCNC: 0.3 MG/DL — SIGNIFICANT CHANGE UP (ref 0.2–1.2)
BUN SERPL-MCNC: 10 MG/DL — SIGNIFICANT CHANGE UP (ref 10–20)
BUN SERPL-MCNC: 9 MG/DL — LOW (ref 10–20)
CALCIUM SERPL-MCNC: 9 MG/DL — SIGNIFICANT CHANGE UP (ref 8.5–10.1)
CALCIUM SERPL-MCNC: 9.1 MG/DL — SIGNIFICANT CHANGE UP (ref 8.5–10.1)
CHLORIDE SERPL-SCNC: 94 MMOL/L — LOW (ref 98–110)
CHLORIDE SERPL-SCNC: 95 MMOL/L — LOW (ref 98–110)
CHOLEST SERPL-MCNC: 247 MG/DL — HIGH (ref 100–200)
CK MB CFR SERPL CALC: 31 NG/ML — HIGH (ref 0.6–6.3)
CK MB CFR SERPL CALC: 32.3 NG/ML — HIGH (ref 0.6–6.3)
CO2 SERPL-SCNC: 26 MMOL/L — SIGNIFICANT CHANGE UP (ref 17–32)
CO2 SERPL-SCNC: 29 MMOL/L — SIGNIFICANT CHANGE UP (ref 17–32)
COCAINE METAB.OTHER UR-MCNC: NEGATIVE — SIGNIFICANT CHANGE UP
CREAT SERPL-MCNC: 1 MG/DL — SIGNIFICANT CHANGE UP (ref 0.7–1.5)
CREAT SERPL-MCNC: 1.1 MG/DL — SIGNIFICANT CHANGE UP (ref 0.7–1.5)
D DIMER BLD IA.RAPID-MCNC: 144 NG/ML DDU — SIGNIFICANT CHANGE UP (ref 0–230)
ESTIMATED AVERAGE GLUCOSE: 146 MG/DL — HIGH (ref 68–114)
ETHANOL SERPL-MCNC: <10 MG/DL — SIGNIFICANT CHANGE UP
GLUCOSE BLDC GLUCOMTR-MCNC: 104 MG/DL — HIGH (ref 70–99)
GLUCOSE SERPL-MCNC: 108 MG/DL — HIGH (ref 70–99)
GLUCOSE SERPL-MCNC: 115 MG/DL — HIGH (ref 70–99)
HCG UR QL: NEGATIVE — SIGNIFICANT CHANGE UP
HCT VFR BLD CALC: 29.3 % — LOW (ref 37–47)
HCT VFR BLD CALC: 29.8 % — LOW (ref 37–47)
HDLC SERPL-MCNC: 31 MG/DL — LOW
HGB BLD-MCNC: 9.3 G/DL — LOW (ref 12–16)
HGB BLD-MCNC: 9.7 G/DL — LOW (ref 12–16)
INR BLD: 1.07 RATIO — SIGNIFICANT CHANGE UP (ref 0.65–1.3)
INR BLD: 1.08 RATIO — SIGNIFICANT CHANGE UP (ref 0.65–1.3)
LIDOCAIN IGE QN: 8 U/L — SIGNIFICANT CHANGE UP (ref 7–60)
LIPID PNL WITH DIRECT LDL SERPL: 165 MG/DL — HIGH (ref 4–129)
MAGNESIUM SERPL-MCNC: 1.9 MG/DL — SIGNIFICANT CHANGE UP (ref 1.8–2.4)
MCHC RBC-ENTMCNC: 28.8 PG — SIGNIFICANT CHANGE UP (ref 27–31)
MCHC RBC-ENTMCNC: 29 PG — SIGNIFICANT CHANGE UP (ref 27–31)
MCHC RBC-ENTMCNC: 31.7 G/DL — LOW (ref 32–37)
MCHC RBC-ENTMCNC: 32.6 G/DL — SIGNIFICANT CHANGE UP (ref 32–37)
MCV RBC AUTO: 89.2 FL — SIGNIFICANT CHANGE UP (ref 81–99)
MCV RBC AUTO: 90.7 FL — SIGNIFICANT CHANGE UP (ref 81–99)
METHADONE UR-MCNC: NEGATIVE — SIGNIFICANT CHANGE UP
NRBC # BLD: 0 /100 WBCS — SIGNIFICANT CHANGE UP (ref 0–0)
NRBC # BLD: 0 /100 WBCS — SIGNIFICANT CHANGE UP (ref 0–0)
NT-PROBNP SERPL-SCNC: 36 PG/ML — SIGNIFICANT CHANGE UP (ref 0–300)
OPIATES UR-MCNC: NEGATIVE — SIGNIFICANT CHANGE UP
PCP SPEC-MCNC: SIGNIFICANT CHANGE UP
PLATELET # BLD AUTO: 199 K/UL — SIGNIFICANT CHANGE UP (ref 130–400)
PLATELET # BLD AUTO: 208 K/UL — SIGNIFICANT CHANGE UP (ref 130–400)
POTASSIUM SERPL-MCNC: 3.9 MMOL/L — SIGNIFICANT CHANGE UP (ref 3.5–5)
POTASSIUM SERPL-MCNC: 4 MMOL/L — SIGNIFICANT CHANGE UP (ref 3.5–5)
POTASSIUM SERPL-SCNC: 3.9 MMOL/L — SIGNIFICANT CHANGE UP (ref 3.5–5)
POTASSIUM SERPL-SCNC: 4 MMOL/L — SIGNIFICANT CHANGE UP (ref 3.5–5)
PROPOXYPHENE QUALITATIVE URINE RESULT: NEGATIVE — SIGNIFICANT CHANGE UP
PROT SERPL-MCNC: 7.6 G/DL — SIGNIFICANT CHANGE UP (ref 6–8)
PROTHROM AB SERPL-ACNC: 12.3 SEC — SIGNIFICANT CHANGE UP (ref 9.95–12.87)
PROTHROM AB SERPL-ACNC: 12.4 SEC — SIGNIFICANT CHANGE UP (ref 9.95–12.87)
RBC # BLD: 3.23 M/UL — LOW (ref 4.2–5.4)
RBC # BLD: 3.34 M/UL — LOW (ref 4.2–5.4)
RBC # FLD: 14.7 % — HIGH (ref 11.5–14.5)
RBC # FLD: 14.7 % — HIGH (ref 11.5–14.5)
SARS-COV-2 RNA SPEC QL NAA+PROBE: SIGNIFICANT CHANGE UP
SODIUM SERPL-SCNC: 134 MMOL/L — LOW (ref 135–146)
SODIUM SERPL-SCNC: 135 MMOL/L — SIGNIFICANT CHANGE UP (ref 135–146)
T4 FREE SERPL-MCNC: <0.1 NG/DL — LOW (ref 0.9–1.8)
TOTAL CHOLESTEROL/HDL RATIO MEASUREMENT: 8 RATIO — HIGH (ref 4–5.5)
TRIGL SERPL-MCNC: 330 MG/DL — HIGH (ref 10–149)
TROPONIN T SERPL-MCNC: 0.1 NG/ML — CRITICAL HIGH
TROPONIN T SERPL-MCNC: 0.12 NG/ML — CRITICAL HIGH
TROPONIN T SERPL-MCNC: 0.13 NG/ML — CRITICAL HIGH
TSH SERPL-MCNC: 207 UIU/ML — HIGH (ref 0.27–4.2)
WBC # BLD: 5.66 K/UL — SIGNIFICANT CHANGE UP (ref 4.8–10.8)
WBC # BLD: 6.85 K/UL — SIGNIFICANT CHANGE UP (ref 4.8–10.8)
WBC # FLD AUTO: 5.66 K/UL — SIGNIFICANT CHANGE UP (ref 4.8–10.8)
WBC # FLD AUTO: 6.85 K/UL — SIGNIFICANT CHANGE UP (ref 4.8–10.8)

## 2020-07-08 PROCEDURE — 70450 CT HEAD/BRAIN W/O DYE: CPT | Mod: 26

## 2020-07-08 PROCEDURE — 99233 SBSQ HOSP IP/OBS HIGH 50: CPT

## 2020-07-08 PROCEDURE — 93970 EXTREMITY STUDY: CPT | Mod: 26

## 2020-07-08 PROCEDURE — 71045 X-RAY EXAM CHEST 1 VIEW: CPT | Mod: 26

## 2020-07-08 PROCEDURE — 99222 1ST HOSP IP/OBS MODERATE 55: CPT

## 2020-07-08 RX ORDER — ATORVASTATIN CALCIUM 80 MG/1
20 TABLET, FILM COATED ORAL AT BEDTIME
Refills: 0 | Status: DISCONTINUED | OUTPATIENT
Start: 2020-07-08 | End: 2020-07-08

## 2020-07-08 RX ORDER — FLUOXETINE HCL 10 MG
40 CAPSULE ORAL DAILY
Refills: 0 | Status: DISCONTINUED | OUTPATIENT
Start: 2020-07-08 | End: 2020-07-13

## 2020-07-08 RX ORDER — ASPIRIN/CALCIUM CARB/MAGNESIUM 324 MG
162 TABLET ORAL ONCE
Refills: 0 | Status: COMPLETED | OUTPATIENT
Start: 2020-07-08 | End: 2020-07-08

## 2020-07-08 RX ORDER — ENOXAPARIN SODIUM 100 MG/ML
60 INJECTION SUBCUTANEOUS ONCE
Refills: 0 | Status: COMPLETED | OUTPATIENT
Start: 2020-07-08 | End: 2020-07-08

## 2020-07-08 RX ORDER — LEVOTHYROXINE SODIUM 125 MCG
150 TABLET ORAL DAILY
Refills: 0 | Status: DISCONTINUED | OUTPATIENT
Start: 2020-07-08 | End: 2020-07-13

## 2020-07-08 RX ORDER — METOPROLOL TARTRATE 50 MG
25 TABLET ORAL EVERY 12 HOURS
Refills: 0 | Status: DISCONTINUED | OUTPATIENT
Start: 2020-07-08 | End: 2020-07-11

## 2020-07-08 RX ORDER — BUPRENORPHINE AND NALOXONE 2; .5 MG/1; MG/1
2 TABLET SUBLINGUAL DAILY
Refills: 0 | Status: DISCONTINUED | OUTPATIENT
Start: 2020-07-09 | End: 2020-07-13

## 2020-07-08 RX ORDER — PANTOPRAZOLE SODIUM 20 MG/1
40 TABLET, DELAYED RELEASE ORAL
Refills: 0 | Status: DISCONTINUED | OUTPATIENT
Start: 2020-07-08 | End: 2020-07-13

## 2020-07-08 RX ORDER — BUPRENORPHINE AND NALOXONE 2; .5 MG/1; MG/1
2.5 TABLET SUBLINGUAL DAILY
Refills: 0 | Status: DISCONTINUED | OUTPATIENT
Start: 2020-07-08 | End: 2020-07-08

## 2020-07-08 RX ORDER — ASPIRIN/CALCIUM CARB/MAGNESIUM 324 MG
325 TABLET ORAL ONCE
Refills: 0 | Status: COMPLETED | OUTPATIENT
Start: 2020-07-08 | End: 2020-07-08

## 2020-07-08 RX ORDER — NITROGLYCERIN 6.5 MG
0.4 CAPSULE, EXTENDED RELEASE ORAL
Refills: 0 | Status: DISCONTINUED | OUTPATIENT
Start: 2020-07-08 | End: 2020-07-11

## 2020-07-08 RX ORDER — ASPIRIN/CALCIUM CARB/MAGNESIUM 324 MG
81 TABLET ORAL DAILY
Refills: 0 | Status: DISCONTINUED | OUTPATIENT
Start: 2020-07-08 | End: 2020-07-13

## 2020-07-08 RX ORDER — BUPRENORPHINE AND NALOXONE 2; .5 MG/1; MG/1
1 TABLET SUBLINGUAL ONCE
Refills: 0 | Status: DISCONTINUED | OUTPATIENT
Start: 2020-07-08 | End: 2020-07-08

## 2020-07-08 RX ORDER — CLOPIDOGREL BISULFATE 75 MG/1
75 TABLET, FILM COATED ORAL DAILY
Refills: 0 | Status: DISCONTINUED | OUTPATIENT
Start: 2020-07-09 | End: 2020-07-13

## 2020-07-08 RX ORDER — LOSARTAN POTASSIUM 100 MG/1
25 TABLET, FILM COATED ORAL DAILY
Refills: 0 | Status: DISCONTINUED | OUTPATIENT
Start: 2020-07-08 | End: 2020-07-11

## 2020-07-08 RX ORDER — CLOPIDOGREL BISULFATE 75 MG/1
300 TABLET, FILM COATED ORAL ONCE
Refills: 0 | Status: COMPLETED | OUTPATIENT
Start: 2020-07-08 | End: 2020-07-08

## 2020-07-08 RX ORDER — CHLORHEXIDINE GLUCONATE 213 G/1000ML
1 SOLUTION TOPICAL EVERY 12 HOURS
Refills: 0 | Status: DISCONTINUED | OUTPATIENT
Start: 2020-07-08 | End: 2020-07-13

## 2020-07-08 RX ORDER — ENOXAPARIN SODIUM 100 MG/ML
60 INJECTION SUBCUTANEOUS EVERY 12 HOURS
Refills: 0 | Status: DISCONTINUED | OUTPATIENT
Start: 2020-07-08 | End: 2020-07-08

## 2020-07-08 RX ORDER — ATORVASTATIN CALCIUM 80 MG/1
80 TABLET, FILM COATED ORAL AT BEDTIME
Refills: 0 | Status: DISCONTINUED | OUTPATIENT
Start: 2020-07-08 | End: 2020-07-11

## 2020-07-08 RX ORDER — ENOXAPARIN SODIUM 100 MG/ML
80 INJECTION SUBCUTANEOUS
Refills: 0 | Status: DISCONTINUED | OUTPATIENT
Start: 2020-07-08 | End: 2020-07-10

## 2020-07-08 RX ORDER — BUPRENORPHINE AND NALOXONE 2; .5 MG/1; MG/1
1 TABLET SUBLINGUAL DAILY
Refills: 0 | Status: DISCONTINUED | OUTPATIENT
Start: 2020-07-08 | End: 2020-07-08

## 2020-07-08 RX ADMIN — Medication 40 MILLIGRAM(S): at 11:05

## 2020-07-08 RX ADMIN — ATORVASTATIN CALCIUM 80 MILLIGRAM(S): 80 TABLET, FILM COATED ORAL at 21:46

## 2020-07-08 RX ADMIN — CHLORHEXIDINE GLUCONATE 1 APPLICATION(S): 213 SOLUTION TOPICAL at 17:03

## 2020-07-08 RX ADMIN — Medication 150 MICROGRAM(S): at 05:18

## 2020-07-08 RX ADMIN — Medication 325 MILLIGRAM(S): at 02:56

## 2020-07-08 RX ADMIN — Medication 81 MILLIGRAM(S): at 11:05

## 2020-07-08 RX ADMIN — CLOPIDOGREL BISULFATE 300 MILLIGRAM(S): 75 TABLET, FILM COATED ORAL at 05:20

## 2020-07-08 RX ADMIN — Medication 25 MILLIGRAM(S): at 05:18

## 2020-07-08 RX ADMIN — PANTOPRAZOLE SODIUM 40 MILLIGRAM(S): 20 TABLET, DELAYED RELEASE ORAL at 06:31

## 2020-07-08 RX ADMIN — BUPRENORPHINE AND NALOXONE 1 TABLET(S): 2; .5 TABLET SUBLINGUAL at 14:57

## 2020-07-08 RX ADMIN — CHLORHEXIDINE GLUCONATE 1 APPLICATION(S): 213 SOLUTION TOPICAL at 06:31

## 2020-07-08 RX ADMIN — BUPRENORPHINE AND NALOXONE 1 TABLET(S): 2; .5 TABLET SUBLINGUAL at 11:05

## 2020-07-08 RX ADMIN — LOSARTAN POTASSIUM 25 MILLIGRAM(S): 100 TABLET, FILM COATED ORAL at 06:31

## 2020-07-08 RX ADMIN — ENOXAPARIN SODIUM 60 MILLIGRAM(S): 100 INJECTION SUBCUTANEOUS at 05:18

## 2020-07-08 NOTE — ED PROVIDER NOTE - OBJECTIVE STATEMENT
49 year old female past medical history of COPD, substance abuse comes to emergency room for chest pain since yesterday intermittently. patient states she has had also had for the last 2 months leg swelling and generalized weakness which is getting worse. Pt also states she feels like her speech has been slurred for last 2 months.

## 2020-07-08 NOTE — PROGRESS NOTE ADULT - SUBJECTIVE AND OBJECTIVE BOX
DAILY PROGRESS NOTE  ===========================================================    Patient Information:  ERZA BARCLAY  /  49y  /  Female  /  MRN#: [id number]    Hospital Day:  1    |:::::::::::::::::::::::::::| SUBJECTIVE |:::::::::::::::::::::::::::|    OVERNIGHT EVENTS:  None reported; See H+P  TODAY: Patient was seen today at bedside. The patient is comfortable in bed this morning. Her chest pain is mild, but reports it really hurts her when she exerts herself. The pain has been occurring for approx. 3 days time and associated with increased BL LE edema x2-3 months. Her chest pain was described as pressure-like, left-sided, and alleviated with rest. There was no radiation. We discussed the possibilities of this being cardiac vs. pulmonary etiology. Review of systems is otherwise negative.    |:::::::::::::::::::::::::::| OBJECTIVE |:::::::::::::::::::::::::::|    VITAL SIGNS: Last 24 Hours  T(C): 36.1 (08 Jul 2020 07:01), Max: 36.6 (08 Jul 2020 04:42)  T(F): 97 (08 Jul 2020 07:01), Max: 97.8 (08 Jul 2020 04:42)  HR: 64 (08 Jul 2020 04:42) (58 - 72)  BP: 122/63 (08 Jul 2020 04:42) (91/59 - 214/81)  BP(mean): 85 (08 Jul 2020 04:42) (85 - 85)  RR: 19 (08 Jul 2020 07:01) (13 - 20)  SpO2: 98% (08 Jul 2020 04:42) (94% - 98%)    07-07-20 @ 07:01  -  07-08-20 @ 07:00  --------------------------------------------------------  IN: 100 mL / OUT: 150 mL / NET: -50 mL    PHYSICAL EXAM:  GENERAL:   Awake, alert; NAD.  HEENT:  Head NC/AT; Conjunctivae pink, Sclera anicteric; Oral mucosa moist.  CARDIO:   Regular rate; Regular rhythm; S1 & S2.  RESP:   Slight BL end-expiratory wheeze.   GI:   Soft; NT/ND; BS; No guarding; No rebound tenderness.  EXT:   Strength UE 5/5; Strength LE 5/5; BL LE pitting edema.   SKIN:   Intact.    LAB RESULTS:                        9.7    6.85  )-----------( 208      ( 08 Jul 2020 05:16 )             29.8     134<L>  |  95<L>  |  9<L>  ----------------------------<  115<H>  3.9   |  26  |  1.0    Ca    9.1      08 Jul 2020 05:16  Mg     1.9     07-08    TPro  7.6  /  Alb  4.4  /  TBili  0.3  /  DBili  x   /  AST  68<H>  /  ALT  8   /  AlkPhos  66  07-08    PT/INR - ( 08 Jul 2020 00:20 )   PT: 12.40 sec;   INR: 1.08 ratio    PTT - ( 08 Jul 2020 00:20 )  PTT:40.8 sec    Troponin T, Serum: 0.13 ng/mL <HH> (07-08-20 @ 00:20)    MICROBIOLOGY:  None to report    RADIOLOGY:  Reviewed; CT Head negative    ALLERGIES:  No Known Allergies    ===========================================================

## 2020-07-08 NOTE — H&P ADULT - NSHPLABSRESULTS_GEN_ALL_CORE
9.3    5.66  )-----------( 199      ( 08 Jul 2020 00:20 )             29.3       07-08    135  |  94<L>  |  10  ----------------------------<  108<H>  4.0   |  29  |  1.1    Ca    9.0      08 Jul 2020 00:20  Mg     1.9     07-08    TPro  7.6  /  Alb  4.4  /  TBili  0.3  /  DBili  x   /  AST  68<H>  /  ALT  8   /  AlkPhos  66  07-08                      Lactate Trend      CARDIAC MARKERS ( 08 Jul 2020 00:20 )  x     / 0.13 ng/mL / x     / x     / x            CAPILLARY BLOOD GLUCOSE      POCT Blood Glucose.: 113 mg/dL (07 Jul 2020 23:39)

## 2020-07-08 NOTE — ED ADULT NURSE REASSESSMENT NOTE - NS ED NURSE REASSESS COMMENT FT1
Pt BIBA, noted with weakness and pallor, Pt stated she has felt weak x 2 mos, with slurred speech x 2 mos which is worsening, along with chest pressure at time of arrival. This writer attempted to place IV line x 2 unsuccessful, pt was placed on cardiac monitor , EKG performed, endorsed to Primary RN., PA aware of inability to obtain IV access.

## 2020-07-08 NOTE — ED PROVIDER NOTE - CARE PLAN
Principal Discharge DX:	Chest pain  Secondary Diagnosis:	Elevated troponin  Secondary Diagnosis:	Slurred speech

## 2020-07-08 NOTE — ED PROVIDER NOTE - CLINICAL SUMMARY MEDICAL DECISION MAKING FREE TEXT BOX
48 yo F, history of polysubstance abuse, now sober, pancreatitis, Hep C, COPD, hypothyroidism, here for assessment of progressively worsening malaise, LE edema, intermittent slurred speech and now CP -- CP is new over last 48 hours, other symptoms ongoing for greater than 1 month. No recent illness, med change, drug use, trauma, sick contacts, travel.     CP described as pressure radiating outward from the center of her chest, associated with cold feeling in both hands and feet. No nausea, diaphoresis, dyspnea. Pain does not radiate to back.     VS initially with markedly elevated BP but rapidly improved. EKG without acute ischemic changes. On exam patient is pale, in no distress, has clear lungs, RRR, soft, NT, ND abdomen, 1+ pitting edema to mid shin, no calf swelling, pain, tenderness.     Labs notable for trop 0.13, previously normal. normal Cr, normal BNP. mild anemia with Hgb 9.3. CXR no widened mediastinum, unchanged bilateral haziness.   Bedside US with good EF, no RH strain, trace effusion.     Unclear etiology of positive trop -- given CP could be ischemic. No signs/sx of PE, no evidence of dissection, no large pericardial effusion. Despite remote IVDA, could still be endocarditis. Would benefit from admission for monitoring, serial trops, EKGs, consideration of formal echo, possibly provocative testing.

## 2020-07-08 NOTE — ED ADULT TRIAGE NOTE - CHIEF COMPLAINT QUOTE
BIBA for bilateral leg edema, and chest pressure 9has not taken her Synthroid in over a month dose 150 mcg)

## 2020-07-08 NOTE — CONSULT NOTE ADULT - ASSESSMENT
Patient with no cardiac history. Hx drug use in past. Three days hoyt. . Recent pleuritic chest pain. All day. Still pleuritic pain. Check thyroid. Check d-dimer. R/O mi. Echo . Lovenox.  May need cta r/o pe. Further rx after above

## 2020-07-08 NOTE — PROGRESS NOTE ADULT - ASSESSMENT
Chest pain; Etiology unclear; Pulmonary embolism vs. ACS  - Patient presenting symptoms non-specific for coronary event, but has some risk factors  - EKG this morning shows TWI in anterior leads, which may also be explained by PE  - Cardiac enzymes were positive; Follow up this morning's CE  - Will continue Plavix, BB, Statin, and ARB; Also, continue Lovenox  - Will get D-dimer, Lipid panel, A1c, TSH/T4  - Follow up with Cardiology recommendations    Hx of Illicit drug abuse; IV drug abuse  - Reports sobriety  - Will obtain Utox and EtOH level    Hx of Former Smoker  - Reports cessation 6 years ago; 30 pack year smoker    Hx of Hypothyroidism  - Stopped taking medications a few months ago  - Obtain TSH and T4; Restarted Levothyroxine on admission    Hx of Hepatitis C  Hx of Pancreatitis  Hx of Depression    GI ppx:   Not indicated  DVT ppx:   Lovenox as above  Activity:   As Tolerated   DISPO:  Patient to be discharged when condition(s) optimized.    CODE STATUS:   FULL

## 2020-07-08 NOTE — PROGRESS NOTE ADULT - ASSESSMENT
50 Y/O F with a past medical history of COPD, hypothyroidism, substance abuse and cigarette use presented to the hospital c/o a few days of substernal pleuritic chest pain.     possible NSTEMI      - aspirin, Lipitor, Lovenox, Lopressor    - check 2DECHO   - check urine tox screen   - monitor in CCU   - cardiology following    - resume home meds, pt is non-compliant    - doubt PE as d-dimer and doppler of LE is negative 48 Y/O F with a past medical history of COPD, hypothyroidism, substance abuse and cigarette use presented to the hospital c/o a few days of substernal pleuritic chest pain.     possible NSTEMI / Hyperlipidemia      - aspirin, Lipitor, Lovenox, Lopressor    - check 2DECHO   - check urine tox screen   - monitor in CCU   - cardiology following    - resume home meds, pt is non-compliant    - doubt PE as d-dimer and doppler of LE is negative

## 2020-07-08 NOTE — CONSULT NOTE ADULT - SUBJECTIVE AND OBJECTIVE BOX
CARDIOLOGY CONSULT NOTE     CHIEF COMPLAINT/REASON FOR CONSULT:    HPI:  50yo W female w/ PMH as noted below.  Pt c/o- 2-3 days progressive left sided CP.  Pain is 7/10 pressure like in nature. that waxes and wanes, it's exacerbated by exertion. Pt denies-chest trauma, active drug or alcohol abuse, fever, chills, hemeoptysis,  ECG shows questionable agonal rhythm. (08 Jul 2020 03:04)      PAST MEDICAL & SURGICAL HISTORY:  Hepatitis-C  History of pancreatitis  H/O: substance abuse: no longer using  Hypothyroidism  Asthma with COPD  History of tonsillectomy  History of appendectomy      Cardiac Risks:   [ ]HTN, [ ] DM, [ ] Smoking, [x ] FH,  [ ] Lipids        MEDICATIONS:  MEDICATIONS  (STANDING):  atorvastatin 20 milliGRAM(s) Oral at bedtime  buprenorphine 8 mG/naloxone 2 mG SL  Tablet 1 Tablet(s) SubLingual daily  chlorhexidine 4% Liquid 1 Application(s) Topical every 12 hours  enoxaparin Injectable 60 milliGRAM(s) SubCutaneous every 12 hours  FLUoxetine 40 milliGRAM(s) Oral daily  levothyroxine 150 MICROGram(s) Oral daily  losartan 25 milliGRAM(s) Oral daily  metoprolol tartrate 25 milliGRAM(s) Oral every 12 hours  pantoprazole    Tablet 40 milliGRAM(s) Oral before breakfast            SOCIAL HISTORY:      [ ] Marital status single  Allergies    No Known Allergies    Intolerances    	    REVIEW OF SYSTEMS:  CONSTITUTIONAL: No fever, weight loss, or fatigue  EYES: No eye pain, visual disturbances, or discharge  ENMT:  No difficulty hearing, tinnitus, vertigo; No sinus or throat pain  NECK: No pain or stiffness  RESPIRATORY: No cough, wheezing, chills or hemoptysis; No Shortness of Breath  CARDIOVASCULAR: See above  GASTROINTESTINAL: No abdominal or epigastric pain. No nausea, vomiting, or hematemesis; No diarrhea or constipation. No melena or hematochezia.  GENITOURINARY: No dysuria, frequency, hematuria, or incontinence  NEUROLOGICAL: No headaches, memory loss, loss of strength, numbness, or tremors  SKIN: No itching, burning, rashes, or lesions   	      PHYSICAL EXAM:  T(C): 36.1 (07-08-20 @ 07:01), Max: 36.6 (07-08-20 @ 04:42)  HR: 64 (07-08-20 @ 04:42) (58 - 72)  BP: 122/63 (07-08-20 @ 04:42) (91/59 - 214/81)  RR: 19 (07-08-20 @ 07:01) (13 - 20)  SpO2: 98% (07-08-20 @ 04:42) (94% - 98%)  Wt(kg): --  I&O's Summary    07 Jul 2020 07:01  -  08 Jul 2020 07:00  --------------------------------------------------------  IN: 100 mL / OUT: 150 mL / NET: -50 mL        Appearance: Normal	  Psychiatry: A & O x 3, Mood & affect appropriate  HEENT:   Normal oral mucosa, PERRL, EOMI	  Lymphatic: No lymphadenopathy  Cardiovascular: Normal S1 S2,RRR, No JVD, No murmurs  Respiratory: Lungs clear to auscultation	  Gastrointestinal:  Soft, Non-tender, + BS	  Skin: No rashes, No ecchymoses, No cyanosis	  Neurologic: Non-focal  Extremities: Normal range of motion, No clubbing, cyanosis or edema  Vascular: Peripheral pulses palpable 2+ bilaterally      ECG:  	nsr ns st     	  LABS:	 	    CARDIAC MARKERS:          Serum Pro-Brain Natriuretic Peptide: 36 pg/mL (07-08 @ 00:20)                            9.7    6.85  )-----------( 208      ( 08 Jul 2020 05:16 )             29.8     07-08    134<L>  |  95<L>  |  9<L>  ----------------------------<  115<H>  3.9   |  26  |  1.0    Ca    9.1      08 Jul 2020 05:16  Mg     1.9     07-08    TPro  7.6  /  Alb  4.4  /  TBili  0.3  /  DBili  x   /  AST  68<H>  /  ALT  8   /  AlkPhos  66  07-08    PT/INR - ( 08 Jul 2020 00:20 )   PT: 12.40 sec;   INR: 1.08 ratio         PTT - ( 08 Jul 2020 00:20 )  PTT:40.8 sec  proBNP: Serum Pro-Brain Natriuretic Peptide: 36 pg/mL (07-08 @ 00:20)

## 2020-07-08 NOTE — ED ADULT NURSE NOTE - CHIEF COMPLAINT QUOTE
BIBA for bilateral leg edema, and chest pressure (has not taken her Synthroid in over a month dose 150 mcg)

## 2020-07-08 NOTE — ED PROVIDER NOTE - NS ED ROS FT
Constitutional: (-) fever  Eyes/ENT: (-) blurry vision, (-) epistaxis  Cardiovascular: (+) chest pain, (-) syncope  Respiratory: (-) cough  Gastrointestinal: (-) vomiting, (-) diarrhea  Musculoskeletal: (-) neck pain, (-) back pain, (-) joint pain  Integumentary: (-) rash, (-) edema  Neurological: (-) headache, (-) altered mental status  Psychiatric: (-) hallucinations  Allergic/Immunologic: (-) pruritus

## 2020-07-08 NOTE — PROGRESS NOTE ADULT - SUBJECTIVE AND OBJECTIVE BOX
Patient denies CP today      T(F): 97 (07-08-20 @ 07:01), Max: 97.8 (07-08-20 @ 04:42)  HR: 59 (07-08-20 @ 07:30)  BP: 110/78 (07-08-20 @ 07:30)  RR: 15 (07-08-20 @ 07:30)  SpO2: 98% (07-08-20 @ 07:30) (94% - 98%)    PHYSICAL EXAM:  GENERAL: NAD  HEAD:  Atraumatic, Normocephalic  NERVOUS SYSTEM:  Alert & Oriented X3, no focal deficits   CHEST/LUNG: Clear to percussion bilaterally; No rales, rhonchi, wheezing, or rubs  HEART: Regular rate and rhythm; No murmurs, rubs, or gallops  ABDOMEN: Soft, Nontender, Nondistended; Bowel sounds present  EXTREMITIES:  2+ Peripheral Pulses, No clubbing, cyanosis, or edema  LYMPH: No lymphadenopathy noted  SKIN: No rashes or lesions    LABS  07-08    134<L>  |  95<L>  |  9<L>  ----------------------------<  115<H>  3.9   |  26  |  1.0    Ca    9.1      08 Jul 2020 05:16  Mg     1.9     07-08    TPro  7.6  /  Alb  4.4  /  TBili  0.3  /  DBili  x   /  AST  68<H>  /  ALT  8   /  AlkPhos  66  07-08                          9.7    6.85  )-----------( 208      ( 08 Jul 2020 05:16 )             29.8     PT/INR - ( 08 Jul 2020 08:12 )   PT: 12.30 sec;   INR: 1.07 ratio         PTT - ( 08 Jul 2020 08:12 )  PTT:49.6 sec    CARDIAC ENZYMES    CKMB Units: 32.3 (07-08 @ 08:12)    Troponin T, Serum: 0.12 ng/mL (07-08-20 @ 08:12)  Troponin T, Serum: 0.13 ng/mL (07-08-20 @ 00:20)    < from: 12 Lead ECG (07.08.20 @ 01:00) >  Diagnosis Line Sinus bradycardia  Nonspecific ST and T wave abnormality  Abnormal ECG    < end of copied text >    D-Dimer Assay, Quantitative (07.08.20 @ 08:12)    D-Dimer Assay, Quantitative: 144      RADIOLOGY  < from: [facility] Duplex Lower Ext Vein Scan, Bilat (07.08.20 @ 12:12) >  Impression:    No evidence of deep venous thrombosis in the bilateral lower extremities.    < end of copied text >  < from: Xray Chest 1 View-PORTABLE IMMEDIATE (07.08.20 @ 02:03) >  Impression:      Bilateral lower lung field predominant opacities with small cysts, similar appearance compared with 7/18/2019. No pleural effusion or pneumothorax.    < end of copied text >  < from: CT Head No Cont (07.08.20 @ 01:05) >    IMPRESSION:     No CT evidence of acute intracranial pathology.       < end of copied text >    MEDICATIONS  (STANDING):  aspirin enteric coated 81 milliGRAM(s) Oral daily  buprenorphine 8 mG/naloxone 2 mG SL  Tablet 1 Tablet(s) SubLingual daily  chlorhexidine 4% Liquid 1 Application(s) Topical every 12 hours  enoxaparin Injectable 80 milliGRAM(s) SubCutaneous two times a day  FLUoxetine 40 milliGRAM(s) Oral daily  levothyroxine 150 MICROGram(s) Oral daily  losartan 25 milliGRAM(s) Oral daily  metoprolol tartrate 25 milliGRAM(s) Oral every 12 hours  pantoprazole    Tablet 40 milliGRAM(s) Oral before breakfast    MEDICATIONS  (PRN):  nitroglycerin     SubLingual 0.4 milliGRAM(s) SubLingual every 5 minutes PRN Chest Pain Patient denies CP today      T(F): 97 (07-08-20 @ 07:01), Max: 97.8 (07-08-20 @ 04:42)  HR: 59 (07-08-20 @ 07:30)  BP: 110/78 (07-08-20 @ 07:30)  RR: 15 (07-08-20 @ 07:30)  SpO2: 98% (07-08-20 @ 07:30) (94% - 98%)    PHYSICAL EXAM:  GENERAL: NAD  HEAD:  Atraumatic, Normocephalic  NERVOUS SYSTEM:  Alert & Oriented X3, no focal deficits   CHEST/LUNG: Clear to percussion bilaterally; No rales, rhonchi, wheezing, or rubs  HEART: Regular rate and rhythm; No murmurs, rubs, or gallops  ABDOMEN: Soft, Nontender, Nondistended; Bowel sounds present  EXTREMITIES:  2+ Peripheral Pulses, No clubbing, cyanosis, or edema  LYMPH: No lymphadenopathy noted  SKIN: No rashes or lesions    LABS  07-08    134<L>  |  95<L>  |  9<L>  ----------------------------<  115<H>  3.9   |  26  |  1.0    Ca    9.1      08 Jul 2020 05:16  Mg     1.9     07-08    TPro  7.6  /  Alb  4.4  /  TBili  0.3  /  DBili  x   /  AST  68<H>  /  ALT  8   /  AlkPhos  66  07-08                          9.7    6.85  )-----------( 208      ( 08 Jul 2020 05:16 )             29.8     PT/INR - ( 08 Jul 2020 08:12 )   PT: 12.30 sec;   INR: 1.07 ratio         PTT - ( 08 Jul 2020 08:12 )  PTT:49.6 sec    Lipid Profile (07.08.20 @ 05:16)    Total Cholesterol/HDL Ratio Measurement: 8.0 Ratio    Cholesterol, Serum: 247 mg/dL    Triglycerides, Serum: 330 mg/dL    HDL Cholesterol, Serum: 31    Direct LDL: 165      CARDIAC ENZYMES    CKMB Units: 32.3 (07-08 @ 08:12)    Troponin T, Serum: 0.12 ng/mL (07-08-20 @ 08:12)  Troponin T, Serum: 0.13 ng/mL (07-08-20 @ 00:20)    < from: 12 Lead ECG (07.08.20 @ 01:00) >  Diagnosis Line Sinus bradycardia  Nonspecific ST and T wave abnormality  Abnormal ECG    < end of copied text >    D-Dimer Assay, Quantitative (07.08.20 @ 08:12)    D-Dimer Assay, Quantitative: 144      RADIOLOGY  < from: [facility] Duplex Lower Ext Vein Scan, Bilat (07.08.20 @ 12:12) >  Impression:    No evidence of deep venous thrombosis in the bilateral lower extremities.    < end of copied text >  < from: Xray Chest 1 View-PORTABLE IMMEDIATE (07.08.20 @ 02:03) >  Impression:      Bilateral lower lung field predominant opacities with small cysts, similar appearance compared with 7/18/2019. No pleural effusion or pneumothorax.    < end of copied text >  < from: CT Head No Cont (07.08.20 @ 01:05) >    IMPRESSION:     No CT evidence of acute intracranial pathology.       < end of copied text >    MEDICATIONS  (STANDING):  aspirin enteric coated 81 milliGRAM(s) Oral daily  buprenorphine 8 mG/naloxone 2 mG SL  Tablet 1 Tablet(s) SubLingual daily  chlorhexidine 4% Liquid 1 Application(s) Topical every 12 hours  enoxaparin Injectable 80 milliGRAM(s) SubCutaneous two times a day  FLUoxetine 40 milliGRAM(s) Oral daily  levothyroxine 150 MICROGram(s) Oral daily  losartan 25 milliGRAM(s) Oral daily  metoprolol tartrate 25 milliGRAM(s) Oral every 12 hours  pantoprazole    Tablet 40 milliGRAM(s) Oral before breakfast    MEDICATIONS  (PRN):  nitroglycerin     SubLingual 0.4 milliGRAM(s) SubLingual every 5 minutes PRN Chest Pain

## 2020-07-08 NOTE — ED PROCEDURE NOTE - PROCEDURE ADDITIONAL DETAILS
no signs of RH strain, EPSS of .51 is upper limit of normal, PC effusion is trace- small without signs of tamponade

## 2020-07-08 NOTE — H&P ADULT - NSICDXPASTMEDICALHX_GEN_ALL_CORE_FT
PAST MEDICAL HISTORY:  Asthma with COPD     H/O: substance abuse no longer using    Hepatitis-C     History of pancreatitis     Hypothyroidism

## 2020-07-08 NOTE — H&P ADULT - HISTORY OF PRESENT ILLNESS
50yo W female w/ PMH as noted below.  Pt c/o- 2-3 days progressive left sided CP.  Pain is 7/10 pressure like in nature. that waxes and wanes, it's exacerbated by exertion. Pt denies-chest trauma, active drug or alcohol abuse, fever, chills, hemeoptysis,  ECG shows 48yo W female w/ PMH as noted below.  Pt c/o- 2-3 days progressive left sided CP.  Pain is 7/10 pressure like in nature. that waxes and wanes, it's exacerbated by exertion. Pt denies-chest trauma, active drug or alcohol abuse, fever, chills, hemeoptysis,  ECG shows questionable agonal rhythm.

## 2020-07-09 LAB
AMPHET UR-MCNC: NEGATIVE — SIGNIFICANT CHANGE UP
ANION GAP SERPL CALC-SCNC: 13 MMOL/L — SIGNIFICANT CHANGE UP (ref 7–14)
APPEARANCE UR: CLEAR — SIGNIFICANT CHANGE UP
BARBITURATES UR SCN-MCNC: NEGATIVE — SIGNIFICANT CHANGE UP
BASOPHILS # BLD AUTO: 0.07 K/UL — SIGNIFICANT CHANGE UP (ref 0–0.2)
BASOPHILS NFR BLD AUTO: 1.4 % — HIGH (ref 0–1)
BENZODIAZ UR-MCNC: NEGATIVE — SIGNIFICANT CHANGE UP
BILIRUB UR-MCNC: NEGATIVE — SIGNIFICANT CHANGE UP
BUN SERPL-MCNC: 7 MG/DL — LOW (ref 10–20)
CALCIUM SERPL-MCNC: 9.6 MG/DL — SIGNIFICANT CHANGE UP (ref 8.5–10.1)
CHLORIDE SERPL-SCNC: 96 MMOL/L — LOW (ref 98–110)
CK MB CFR SERPL CALC: 32.4 NG/ML — HIGH (ref 0.6–6.3)
CK MB CFR SERPL CALC: 42.5 NG/ML — HIGH (ref 0.6–6.3)
CK SERPL-CCNC: 1061 U/L — HIGH (ref 0–225)
CK SERPL-CCNC: 1340 U/L — HIGH (ref 0–225)
CO2 SERPL-SCNC: 25 MMOL/L — SIGNIFICANT CHANGE UP (ref 17–32)
COCAINE METAB.OTHER UR-MCNC: NEGATIVE — SIGNIFICANT CHANGE UP
COLOR SPEC: YELLOW — SIGNIFICANT CHANGE UP
CREAT SERPL-MCNC: 0.9 MG/DL — SIGNIFICANT CHANGE UP (ref 0.7–1.5)
DIFF PNL FLD: NEGATIVE — SIGNIFICANT CHANGE UP
DRUG SCREEN 1, URINE RESULT: SIGNIFICANT CHANGE UP
EOSINOPHIL # BLD AUTO: 0.07 K/UL — SIGNIFICANT CHANGE UP (ref 0–0.7)
EOSINOPHIL NFR BLD AUTO: 1.4 % — SIGNIFICANT CHANGE UP (ref 0–8)
GLUCOSE BLDC GLUCOMTR-MCNC: 218 MG/DL — HIGH (ref 70–99)
GLUCOSE SERPL-MCNC: 115 MG/DL — HIGH (ref 70–99)
GLUCOSE UR QL: NEGATIVE MG/DL — SIGNIFICANT CHANGE UP
HCT VFR BLD CALC: 34.7 % — LOW (ref 37–47)
HGB BLD-MCNC: 11 G/DL — LOW (ref 12–16)
IMM GRANULOCYTES NFR BLD AUTO: 0.4 % — HIGH (ref 0.1–0.3)
KETONES UR-MCNC: NEGATIVE — SIGNIFICANT CHANGE UP
LEUKOCYTE ESTERASE UR-ACNC: NEGATIVE — SIGNIFICANT CHANGE UP
LYMPHOCYTES # BLD AUTO: 1.55 K/UL — SIGNIFICANT CHANGE UP (ref 1.2–3.4)
LYMPHOCYTES # BLD AUTO: 30.3 % — SIGNIFICANT CHANGE UP (ref 20.5–51.1)
MAGNESIUM SERPL-MCNC: 2.2 MG/DL — SIGNIFICANT CHANGE UP (ref 1.8–2.4)
MCHC RBC-ENTMCNC: 28.4 PG — SIGNIFICANT CHANGE UP (ref 27–31)
MCHC RBC-ENTMCNC: 31.7 G/DL — LOW (ref 32–37)
MCV RBC AUTO: 89.4 FL — SIGNIFICANT CHANGE UP (ref 81–99)
METHADONE UR-MCNC: NEGATIVE — SIGNIFICANT CHANGE UP
MONOCYTES # BLD AUTO: 0.23 K/UL — SIGNIFICANT CHANGE UP (ref 0.1–0.6)
MONOCYTES NFR BLD AUTO: 4.5 % — SIGNIFICANT CHANGE UP (ref 1.7–9.3)
NEUTROPHILS # BLD AUTO: 3.18 K/UL — SIGNIFICANT CHANGE UP (ref 1.4–6.5)
NEUTROPHILS NFR BLD AUTO: 62 % — SIGNIFICANT CHANGE UP (ref 42.2–75.2)
NITRITE UR-MCNC: NEGATIVE — SIGNIFICANT CHANGE UP
NRBC # BLD: 0 /100 WBCS — SIGNIFICANT CHANGE UP (ref 0–0)
OPIATES UR-MCNC: NEGATIVE — SIGNIFICANT CHANGE UP
PCP UR-MCNC: NEGATIVE — SIGNIFICANT CHANGE UP
PH UR: 6.5 — SIGNIFICANT CHANGE UP (ref 5–8)
PHOSPHATE SERPL-MCNC: 3.3 MG/DL — SIGNIFICANT CHANGE UP (ref 2.1–4.9)
PLATELET # BLD AUTO: 263 K/UL — SIGNIFICANT CHANGE UP (ref 130–400)
POTASSIUM SERPL-MCNC: 4.5 MMOL/L — SIGNIFICANT CHANGE UP (ref 3.5–5)
POTASSIUM SERPL-SCNC: 4.5 MMOL/L — SIGNIFICANT CHANGE UP (ref 3.5–5)
PROPOXYPHENE QUALITATIVE URINE RESULT: NEGATIVE — SIGNIFICANT CHANGE UP
PROT UR-MCNC: NEGATIVE MG/DL — SIGNIFICANT CHANGE UP
RBC # BLD: 3.88 M/UL — LOW (ref 4.2–5.4)
RBC # FLD: 15 % — HIGH (ref 11.5–14.5)
SARS-COV-2 IGG SERPL QL IA: NEGATIVE — SIGNIFICANT CHANGE UP
SARS-COV-2 IGM SERPL IA-ACNC: <0.1 INDEX — SIGNIFICANT CHANGE UP
SODIUM SERPL-SCNC: 134 MMOL/L — LOW (ref 135–146)
SP GR SPEC: 1.02 — SIGNIFICANT CHANGE UP (ref 1.01–1.03)
THC UR QL: POSITIVE
TROPONIN T SERPL-MCNC: 0.15 NG/ML — CRITICAL HIGH
TROPONIN T SERPL-MCNC: 0.15 NG/ML — CRITICAL HIGH
UROBILINOGEN FLD QL: 0.2 MG/DL — SIGNIFICANT CHANGE UP (ref 0.2–0.2)
WBC # BLD: 5.12 K/UL — SIGNIFICANT CHANGE UP (ref 4.8–10.8)
WBC # FLD AUTO: 5.12 K/UL — SIGNIFICANT CHANGE UP (ref 4.8–10.8)

## 2020-07-09 PROCEDURE — 99233 SBSQ HOSP IP/OBS HIGH 50: CPT

## 2020-07-09 RX ORDER — ONDANSETRON 8 MG/1
4 TABLET, FILM COATED ORAL EVERY 8 HOURS
Refills: 0 | Status: DISCONTINUED | OUTPATIENT
Start: 2020-07-09 | End: 2020-07-13

## 2020-07-09 RX ORDER — SENNA PLUS 8.6 MG/1
2 TABLET ORAL AT BEDTIME
Refills: 0 | Status: DISCONTINUED | OUTPATIENT
Start: 2020-07-09 | End: 2020-07-13

## 2020-07-09 RX ADMIN — Medication 150 MICROGRAM(S): at 06:26

## 2020-07-09 RX ADMIN — ATORVASTATIN CALCIUM 80 MILLIGRAM(S): 80 TABLET, FILM COATED ORAL at 21:29

## 2020-07-09 RX ADMIN — LOSARTAN POTASSIUM 25 MILLIGRAM(S): 100 TABLET, FILM COATED ORAL at 06:26

## 2020-07-09 RX ADMIN — CLOPIDOGREL BISULFATE 75 MILLIGRAM(S): 75 TABLET, FILM COATED ORAL at 11:36

## 2020-07-09 RX ADMIN — CHLORHEXIDINE GLUCONATE 1 APPLICATION(S): 213 SOLUTION TOPICAL at 17:21

## 2020-07-09 RX ADMIN — ONDANSETRON 4 MILLIGRAM(S): 8 TABLET, FILM COATED ORAL at 14:13

## 2020-07-09 RX ADMIN — CHLORHEXIDINE GLUCONATE 1 APPLICATION(S): 213 SOLUTION TOPICAL at 11:35

## 2020-07-09 RX ADMIN — SENNA PLUS 2 TABLET(S): 8.6 TABLET ORAL at 21:29

## 2020-07-09 RX ADMIN — BUPRENORPHINE AND NALOXONE 2 TABLET(S): 2; .5 TABLET SUBLINGUAL at 11:36

## 2020-07-09 RX ADMIN — ENOXAPARIN SODIUM 80 MILLIGRAM(S): 100 INJECTION SUBCUTANEOUS at 17:22

## 2020-07-09 RX ADMIN — Medication 81 MILLIGRAM(S): at 11:35

## 2020-07-09 RX ADMIN — Medication 10 MILLIGRAM(S): at 14:12

## 2020-07-09 RX ADMIN — PANTOPRAZOLE SODIUM 40 MILLIGRAM(S): 20 TABLET, DELAYED RELEASE ORAL at 06:26

## 2020-07-09 RX ADMIN — Medication 40 MILLIGRAM(S): at 11:37

## 2020-07-09 NOTE — PROGRESS NOTE ADULT - ASSESSMENT
48 Y/O F with a past medical history of COPD, hypothyroidism, substance abuse and cigarette use presented to the hospital c/o a few days of substernal pleuritic chest pain.     possible NSTEMI / Hyperlipidemia / hypothyroidism      - aspirin, Lipitor, Lovenox, Lopressor    -  2DECHO is normal   -  urine tox screen positive for THC    - cardiology following    - resume home meds, pt is non-compliant    - doubt PE as d-dimer and doppler of LE is negative

## 2020-07-09 NOTE — PROGRESS NOTE ADULT - SUBJECTIVE AND OBJECTIVE BOX
Patient is a 49y old  Female who presents with a chief complaint of chest pain/ NSTEMI (08 Jul 2020 12:50)      T(F): 95.5 (07-09-20 @ 02:51), Max: 96.5 (07-08-20 @ 15:00)  HR: 51 (07-09-20 @ 06:05)  BP: 123/77 (07-09-20 @ 06:05)  RR: 17 (07-09-20 @ 06:05)  SpO2: 97% (07-08-20 @ 23:13) (95% - 99%)    PHYSICAL EXAM:  GENERAL: NAD, well-groomed, well-developed  HEAD:  Atraumatic, Normocephalic  EYES: EOMI, PERRLA, conjunctiva and sclera clear  ENMT: No tonsillar erythema, exudates, or enlargement; Moist mucous membranes, Good dentition, No lesions  NECK: Supple, No JVD, Normal thyroid  NERVOUS SYSTEM:  Alert & Oriented X3,  Motor Strength 5/5 B/L upper and lower extremities  CHEST/LUNG: Clear to percussion bilaterally; No rales, rhonchi, wheezing, or rubs  HEART: Regular rate and rhythm; No murmurs, rubs, or gallops  ABDOMEN: Soft, Nontender, Nondistended; Bowel sounds present  EXTREMITIES:   No clubbing, cyanosis, or edema  LYMPH: No lymphadenopathy noted  SKIN: No rashes or lesions    labs  07-08    134<L>  |  95<L>  |  9<L>  ----------------------------<  115<H>  3.9   |  26  |  1.0    Ca    9.1      08 Jul 2020 05:16  Mg     1.9     07-08    TPro  7.6  /  Alb  4.4  /  TBili  0.3  /  DBili  x   /  AST  68<H>  /  ALT  8   /  AlkPhos  66  07-08                          11.0   5.12  )-----------( 263      ( 09 Jul 2020 05:30 )             34.7       PT/INR - ( 08 Jul 2020 08:12 )   PT: 12.30 sec;   INR: 1.07 ratio         PTT - ( 08 Jul 2020 08:12 )  PTT:49.6 sec    Troponin T, Serum: 0.10 ng/mL <HH> (07-08-20 @ 15:35)  Troponin T, Serum: 0.12 ng/mL <HH> (07-08-20 @ 08:12)      aspirin enteric coated 81 milliGRAM(s) Oral daily  atorvastatin 80 milliGRAM(s) Oral at bedtime  buprenorphine 8 mG/naloxone 2 mG SL  Tablet 2 Tablet(s) SubLingual daily  chlorhexidine 4% Liquid 1 Application(s) Topical every 12 hours  clopidogrel Tablet 75 milliGRAM(s) Oral daily  enoxaparin Injectable 80 milliGRAM(s) SubCutaneous two times a day  FLUoxetine 40 milliGRAM(s) Oral daily  levothyroxine 150 MICROGram(s) Oral daily  losartan 25 milliGRAM(s) Oral daily  metoprolol tartrate 25 milliGRAM(s) Oral every 12 hours  nitroglycerin     SubLingual 0.4 milliGRAM(s) SubLingual every 5 minutes PRN  pantoprazole    Tablet 40 milliGRAM(s) Oral before breakfast

## 2020-07-09 NOTE — PROGRESS NOTE ADULT - SUBJECTIVE AND OBJECTIVE BOX
Patient is a 49y old  Female who presents with a chief complaint of NSTEMI (09 Jul 2020 07:33)      T(F): 95.7 (07-09-20 @ 07:01), Max: 96 (07-08-20 @ 23:13)  HR: 63 (07-09-20 @ 13:36)  BP: 125/72 (07-09-20 @ 13:36)  RR: 18 (07-09-20 @ 13:36)  SpO2: 98% (07-09-20 @ 08:40) (97% - 99%)    PHYSICAL EXAM:  GENERAL: NAD, well-groomed, well-developed  HEAD:  Atraumatic, Normocephalic  EYES: EOMI, PERRLA, conjunctiva and sclera clear  ENMT: No tonsillar erythema, exudates, or enlargement; Moist mucous membranes, Good dentition, No lesions  NECK: Supple, No JVD, Normal thyroid  NERVOUS SYSTEM:  Alert & Oriented X3, Good concentration; Motor Strength 5/5 B/L upper and lower extremities; DTRs 2+ intact and symmetric  CHEST/LUNG: Clear to percussion bilaterally; No rales, rhonchi, wheezing, or rubs  HEART: Regular rate and rhythm; No murmurs, rubs, or gallops  ABDOMEN: Soft, Nontender, Nondistended; Bowel sounds present  EXTREMITIES:  2+ Peripheral Pulses, No clubbing, cyanosis, or edema  LYMPH: No lymphadenopathy noted  SKIN: No rashes or lesions    LABS  07-09    134<L>  |  96<L>  |  7<L>  ----------------------------<  115<H>  4.5   |  25  |  0.9    Ca    9.6      09 Jul 2020 05:30  Phos  3.3     07-09  Mg     2.2     07-09    TPro  7.6  /  Alb  4.4  /  TBili  0.3  /  DBili  x   /  AST  68<H>  /  ALT  8   /  AlkPhos  66  07-08                          11.0   5.12  )-----------( 263      ( 09 Jul 2020 05:30 )             34.7     PT/INR - ( 08 Jul 2020 08:12 )   PT: 12.30 sec;   INR: 1.07 ratio         PTT - ( 08 Jul 2020 08:12 )  PTT:49.6 sec    CARDIAC ENZYMES  Creatine Kinase, Serum: 1061 (07-09 @ 11:42)    CKMB Units: 32.4 (07-09 @ 11:42)  CKMB Units: 31.0 (07-08 @ 15:35)  CKMB Units: 32.3 (07-08 @ 08:12)    Troponin T, Serum: 0.15 ng/mL (07-09-20 @ 11:42)  Troponin T, Serum: 0.10 ng/mL (07-08-20 @ 15:35)  Troponin T, Serum: 0.12 ng/mL (07-08-20 @ 08:12)  Troponin T, Serum: 0.13 ng/mL (07-08-20 @ 00:20)        RADIOLOGY    MEDICATIONS  (STANDING):  aspirin enteric coated 81 milliGRAM(s) Oral daily  atorvastatin 80 milliGRAM(s) Oral at bedtime  bisacodyl 10 milliGRAM(s) Oral at bedtime  buprenorphine 8 mG/naloxone 2 mG SL  Tablet 2 Tablet(s) SubLingual daily  chlorhexidine 4% Liquid 1 Application(s) Topical every 12 hours  clopidogrel Tablet 75 milliGRAM(s) Oral daily  enoxaparin Injectable 80 milliGRAM(s) SubCutaneous two times a day  FLUoxetine 40 milliGRAM(s) Oral daily  levothyroxine 150 MICROGram(s) Oral daily  losartan 25 milliGRAM(s) Oral daily  metoprolol tartrate 25 milliGRAM(s) Oral every 12 hours  pantoprazole    Tablet 40 milliGRAM(s) Oral before breakfast  senna 2 Tablet(s) Oral at bedtime    MEDICATIONS  (PRN):  nitroglycerin     SubLingual 0.4 milliGRAM(s) SubLingual every 5 minutes PRN Chest Pain  ondansetron Injectable 4 milliGRAM(s) IV Push every 8 hours PRN Nausea and/or Vomiting Patient denies CP today       T(F): 95.7 (07-09-20 @ 07:01), Max: 96 (07-08-20 @ 23:13)  HR: 63 (07-09-20 @ 13:36)  BP: 125/72 (07-09-20 @ 13:36)  RR: 18 (07-09-20 @ 13:36)  SpO2: 98% (07-09-20 @ 08:40) (97% - 99%)    PHYSICAL EXAM:  GENERAL: NAD  HEAD:  Atraumatic, Normocephalic  NERVOUS SYSTEM:  no focal deficits   CHEST/LUNG: Clear to percussion bilaterally; No rales, rhonchi, wheezing, or rubs  HEART: Regular rate and rhythm; No murmurs, rubs, or gallops  ABDOMEN: Soft, Nontender, Nondistended; Bowel sounds present  EXTREMITIES:  2+ Peripheral Pulses, No clubbing, cyanosis, or edema  LYMPH: No lymphadenopathy noted  SKIN: No rashes or lesions    LABS  07-09    THC, Urine Qualitative (07.09.20 @ 07:00)    THC, Urine Qualitative: Positive      134<L>  |  96<L>  |  7<L>  ----------------------------<  115<H>  4.5   |  25  |  0.9    Ca    9.6      09 Jul 2020 05:30  Phos  3.3     07-09  Mg     2.2     07-09    TPro  7.6  /  Alb  4.4  /  TBili  0.3  /  DBili  x   /  AST  68<H>  /  ALT  8   /  AlkPhos  66  07-08                          11.0   5.12  )-----------( 263      ( 09 Jul 2020 05:30 )             34.7     PT/INR - ( 08 Jul 2020 08:12 )   PT: 12.30 sec;   INR: 1.07 ratio         PTT - ( 08 Jul 2020 08:12 )  PTT:49.6 sec    CARDIAC ENZYMES  Creatine Kinase, Serum: 1061 (07-09 @ 11:42)    CKMB Units: 32.4 (07-09 @ 11:42)  CKMB Units: 31.0 (07-08 @ 15:35)  CKMB Units: 32.3 (07-08 @ 08:12)    Troponin T, Serum: 0.15 ng/mL (07-09-20 @ 11:42)  Troponin T, Serum: 0.10 ng/mL (07-08-20 @ 15:35)  Troponin T, Serum: 0.12 ng/mL (07-08-20 @ 08:12)  Troponin T, Serum: 0.13 ng/mL (07-08-20 @ 00:20)    Thyroid Stimulating Hormone, Serum (07.08.20 @ 05:16)    Thyroid Stimulating Hormone, Serum: 207.00: Test Repeated uIU/mL    Free Thyroxine, Serum in AM (07.08.20 @ 05:16)    Free Thyroxine, Serum: <0.1: Test Repeated ng/dL    < from: 12 Lead ECG (07.08.20 @ 07:29) >  Diagnosis Line Sinus bradycardia  Low voltage QRS  Poor R wave progression  Nonspecific ST-T changes    < end of copied text >    2DECHO - normal LV size and function    RADIOLOGY  < from: Xray Chest 1 View-PORTABLE IMMEDIATE (07.08.20 @ 02:03) >  Impression:      Bilateral lower lung field predominant opacities with small cysts, similar appearance compared with 7/18/2019. No pleural effusion or pneumothorax.      < end of copied text >    MEDICATIONS  (STANDING):  aspirin enteric coated 81 milliGRAM(s) Oral daily  atorvastatin 80 milliGRAM(s) Oral at bedtime  bisacodyl 10 milliGRAM(s) Oral at bedtime  buprenorphine 8 mG/naloxone 2 mG SL  Tablet 2 Tablet(s) SubLingual daily  chlorhexidine 4% Liquid 1 Application(s) Topical every 12 hours  clopidogrel Tablet 75 milliGRAM(s) Oral daily  enoxaparin Injectable 80 milliGRAM(s) SubCutaneous two times a day  FLUoxetine 40 milliGRAM(s) Oral daily  levothyroxine 150 MICROGram(s) Oral daily  losartan 25 milliGRAM(s) Oral daily  metoprolol tartrate 25 milliGRAM(s) Oral every 12 hours  pantoprazole    Tablet 40 milliGRAM(s) Oral before breakfast  senna 2 Tablet(s) Oral at bedtime    MEDICATIONS  (PRN):  nitroglycerin     SubLingual 0.4 milliGRAM(s) SubLingual every 5 minutes PRN Chest Pain  ondansetron Injectable 4 milliGRAM(s) IV Push every 8 hours PRN Nausea and/or Vomiting

## 2020-07-09 NOTE — PROGRESS NOTE ADULT - SUBJECTIVE AND OBJECTIVE BOX
DAILY PROGRESS NOTE  ===========================================================    Patient Information:  EZRA BARCLAY  /  49y  /  Female  /  MRN#: [id number]    Hospital Day: 1d     |:::::::::::::::::::::::::::| SUBJECTIVE |:::::::::::::::::::::::::::|    OVERNIGHT EVENTS: None to report  TODAY: Patient was seen today at bedside. Chest pain is improved. Patient is feeling OK, but has not moved much to be able to tell if her condition is any better. We discussed her thyroid levels. A1c, and cholesterol.     |:::::::::::::::::::::::::::| OBJECTIVE |:::::::::::::::::::::::::::|    VITAL SIGNS: Last 24 Hours  T(C): 35.4 (09 Jul 2020 07:01), Max: 35.8 (08 Jul 2020 15:00)  T(F): 95.7 (09 Jul 2020 07:01), Max: 96.5 (08 Jul 2020 15:00)  HR: 51 (09 Jul 2020 06:05) (48 - 69)  BP: 123/77 (09 Jul 2020 06:05) (111/72 - 130/79)  BP(mean): 92 (09 Jul 2020 06:05) (88 - 100)  RR: 17 (09 Jul 2020 07:01) (10 - 23)  SpO2: 97% (08 Jul 2020 23:13) (95% - 99%)    07-08-20 @ 07:01  -  07-09-20 @ 07:00  --------------------------------------------------------  IN: 240 mL / OUT: 225 mL / NET: 15 mL    PHYSICAL EXAM:  GENERAL:   Awake, alert; NAD.  HEENT:  Head NC/AT; Conjunctivae pink, Sclera anicteric; Oral mucosa moist.  CARDIO:   Regular-slow rate; Regular rhythm; S1 & S2.  RESP:   No rales, wheezing, or rhonchi appreciated.  GI:   Soft; NT/ND; BS; No guarding; No rebound tenderness.  EXT:   Strength UE 5/5; Strength LE 5/5; BL LE edema.   SKIN:   Intact.    LAB RESULTS:                        11.0   5.12  )-----------( 263      ( 09 Jul 2020 05:30 )             34.7     07-08    134<L>  |  95<L>  |  9<L>  ----------------------------<  115<H>  3.9   |  26  |  1.0    Ca    9.1      08 Jul 2020 05:16  Mg     1.9     07-08    TPro  7.6  /  Alb  4.4  /  TBili  0.3  /  DBili  x   /  AST  68<H>  /  ALT  8   /  AlkPhos  66  07-08    PT/INR - ( 08 Jul 2020 08:12 )   PT: 12.30 sec;   INR: 1.07 ratio    PTT - ( 08 Jul 2020 08:12 )  PTT:49.6 sec    Troponin T, Serum: 0.10 ng/mL <HH> (07-08-20 @ 15:35)  Troponin T, Serum: 0.12 ng/mL <HH> (07-08-20 @ 08:12)    CARDIAC MARKERS ( 08 Jul 2020 15:35 )  x     / 0.10 ng/mL / x     / x     / 31.0 ng/mL  CARDIAC MARKERS ( 08 Jul 2020 08:12 )  x     / 0.12 ng/mL / x     / x     / 32.3 ng/mL  CARDIAC MARKERS ( 08 Jul 2020 00:20 )  x     / 0.13 ng/mL / x     / x     / x        MICROBIOLOGY:  None to report    RADIOLOGY:  reviewed    ALLERGIES:  No Known Allergies  ===========================================================

## 2020-07-09 NOTE — PROGRESS NOTE ADULT - ASSESSMENT
Chest pain; Likely ACS; possibly late presentation  - Patient has risk factors for CVD; Former smoker, high cholesterol, diabetes  - EKG on presentation showed TWI in anterior leads  - Cardiac enzymes were positive; Stable and elevated  - Will continue Plavix, BB, Statin, and ARB; Also, continue Lovenox  - Follow up with Cardiology recommendations    Severe Hypothyroidism; Hx of Hypothyroidism  - TSH >200 with low T4  - Patient was non-compliant with her medications for some time   - Continue her Synthroid at outpatient dose and follow up repeat TSH as outpatient    Type II Diabetes Mellitus  - Hemoglobin A1c 6.7   - Will need to start Metformin upon discharge  - Advised patient to follow low cholesterol diet  - Advised patient to lose weight via diet/exercise as tolerated when improved    Hyperlipidemia  - Not reported in history but likely chronically elevated  - Continue high dose statin    Hx of Illicit drug abuse; IV drug abuse  - Reports sobriety; Utox negative; EtOH negative    Hx of Former Smoker  - Reports cessation 6 years ago; 30 pack year smoker    Hx of Hepatitis C  Hx of Pancreatitis  Hx of Depression    GI ppx:   Not indicated  DVT ppx:   Lovenox as above  Activity:   As Tolerated   DISPO:  Patient to be discharged when condition(s) optimized.    CODE STATUS:   FULL Chest pain; Likely ACS; possibly late presentation  - Patient has risk factors for CVD; Former smoker, high cholesterol, diabetes  - EKG on presentation showed TWI in anterior leads  - Cardiac enzymes were positive; Stable and elevated  - Will continue Plavix, BB, Statin, and ARB; Also, continue Lovenox  - Follow up with Cardiology recommendations    Severe Hypothyroidism; Hx of Hypothyroidism  - TSH >200 with low T4  - Patient was non-compliant with her medications for some time   - Continue her Synthroid at outpatient dose and follow up repeat TSH as outpatient    Type II Diabetes Mellitus  - Hemoglobin A1c 6.7   - Previously on Metformin, then switched to Lantus; Stopped Lantus after hypoglycemic seizure; Now not taking any medications  - Can try to resume Metformin after discharge with OP follow up with Endo/PCP  - Advised patient to lose weight via diet/exercise as tolerated when improved    Hyperlipidemia  - Not reported in history, but likely chronically elevated vs. 2/2 hypothyroidism  - Continue high dose statin    Hx of Illicit drug abuse; IV drug abuse  - Reports sobriety; Utox negative; EtOH negative    Hx of Former Smoker  - Reports cessation 6 years ago; 30 pack year smoker    Hx of Hepatitis C  Hx of Pancreatitis  Hx of Depression    GI ppx:   Not indicated  DVT ppx:   Lovenox as above  Activity:   As Tolerated   DISPO:  Patient to be discharged when condition(s) optimized.    CODE STATUS:   FULL

## 2020-07-09 NOTE — PROGRESS NOTE ADULT - ASSESSMENT
Patient profoundly hypothyroid. Temp low. Chest pain better. MB increased. Need check cpk . Echo lv normal size and function. Check ekg todayContinue  meds.

## 2020-07-10 LAB
ALBUMIN SERPL ELPH-MCNC: 4.8 G/DL — SIGNIFICANT CHANGE UP (ref 3.5–5.2)
ALP SERPL-CCNC: 80 U/L — SIGNIFICANT CHANGE UP (ref 30–115)
ALT FLD-CCNC: 8 U/L — SIGNIFICANT CHANGE UP (ref 0–41)
ANION GAP SERPL CALC-SCNC: 13 MMOL/L — SIGNIFICANT CHANGE UP (ref 7–14)
APTT BLD: 48.7 SEC — HIGH (ref 27–39.2)
AST SERPL-CCNC: 65 U/L — HIGH (ref 0–41)
BASOPHILS # BLD AUTO: 0.05 K/UL — SIGNIFICANT CHANGE UP (ref 0–0.2)
BASOPHILS NFR BLD AUTO: 0.6 % — SIGNIFICANT CHANGE UP (ref 0–1)
BILIRUB SERPL-MCNC: 0.5 MG/DL — SIGNIFICANT CHANGE UP (ref 0.2–1.2)
BLD GP AB SCN SERPL QL: SIGNIFICANT CHANGE UP
BUN SERPL-MCNC: 10 MG/DL — SIGNIFICANT CHANGE UP (ref 10–20)
CALCIUM SERPL-MCNC: 9.4 MG/DL — SIGNIFICANT CHANGE UP (ref 8.5–10.1)
CHLORIDE SERPL-SCNC: 95 MMOL/L — LOW (ref 98–110)
CK MB CFR SERPL CALC: 39.6 NG/ML — HIGH (ref 0.6–6.3)
CK SERPL-CCNC: 1254 U/L — HIGH (ref 0–225)
CO2 SERPL-SCNC: 27 MMOL/L — SIGNIFICANT CHANGE UP (ref 17–32)
CREAT SERPL-MCNC: 1.3 MG/DL — SIGNIFICANT CHANGE UP (ref 0.7–1.5)
EOSINOPHIL # BLD AUTO: 0.06 K/UL — SIGNIFICANT CHANGE UP (ref 0–0.7)
EOSINOPHIL NFR BLD AUTO: 0.7 % — SIGNIFICANT CHANGE UP (ref 0–8)
GLUCOSE SERPL-MCNC: 108 MG/DL — HIGH (ref 70–99)
HCT VFR BLD CALC: 35.7 % — LOW (ref 37–47)
HGB BLD-MCNC: 11.1 G/DL — LOW (ref 12–16)
IMM GRANULOCYTES NFR BLD AUTO: 0.2 % — SIGNIFICANT CHANGE UP (ref 0.1–0.3)
INR BLD: 1.09 RATIO — SIGNIFICANT CHANGE UP (ref 0.65–1.3)
LYMPHOCYTES # BLD AUTO: 1.01 K/UL — LOW (ref 1.2–3.4)
LYMPHOCYTES # BLD AUTO: 12.5 % — LOW (ref 20.5–51.1)
MAGNESIUM SERPL-MCNC: 2.2 MG/DL — SIGNIFICANT CHANGE UP (ref 1.8–2.4)
MCHC RBC-ENTMCNC: 28.4 PG — SIGNIFICANT CHANGE UP (ref 27–31)
MCHC RBC-ENTMCNC: 31.1 G/DL — LOW (ref 32–37)
MCV RBC AUTO: 91.3 FL — SIGNIFICANT CHANGE UP (ref 81–99)
MONOCYTES # BLD AUTO: 0.17 K/UL — SIGNIFICANT CHANGE UP (ref 0.1–0.6)
MONOCYTES NFR BLD AUTO: 2.1 % — SIGNIFICANT CHANGE UP (ref 1.7–9.3)
NEUTROPHILS # BLD AUTO: 6.74 K/UL — HIGH (ref 1.4–6.5)
NEUTROPHILS NFR BLD AUTO: 83.9 % — HIGH (ref 42.2–75.2)
NRBC # BLD: 0 /100 WBCS — SIGNIFICANT CHANGE UP (ref 0–0)
PLATELET # BLD AUTO: 269 K/UL — SIGNIFICANT CHANGE UP (ref 130–400)
POTASSIUM SERPL-MCNC: 3.9 MMOL/L — SIGNIFICANT CHANGE UP (ref 3.5–5)
POTASSIUM SERPL-SCNC: 3.9 MMOL/L — SIGNIFICANT CHANGE UP (ref 3.5–5)
PROT SERPL-MCNC: 8.2 G/DL — HIGH (ref 6–8)
PROTHROM AB SERPL-ACNC: 12.5 SEC — SIGNIFICANT CHANGE UP (ref 9.95–12.87)
RBC # BLD: 3.91 M/UL — LOW (ref 4.2–5.4)
RBC # FLD: 15.3 % — HIGH (ref 11.5–14.5)
SODIUM SERPL-SCNC: 135 MMOL/L — SIGNIFICANT CHANGE UP (ref 135–146)
TROPONIN T SERPL-MCNC: 0.15 NG/ML — CRITICAL HIGH
WBC # BLD: 8.05 K/UL — SIGNIFICANT CHANGE UP (ref 4.8–10.8)
WBC # FLD AUTO: 8.05 K/UL — SIGNIFICANT CHANGE UP (ref 4.8–10.8)

## 2020-07-10 PROCEDURE — 99233 SBSQ HOSP IP/OBS HIGH 50: CPT

## 2020-07-10 RX ORDER — ACETAMINOPHEN 500 MG
650 TABLET ORAL EVERY 6 HOURS
Refills: 0 | Status: DISCONTINUED | OUTPATIENT
Start: 2020-07-10 | End: 2020-07-13

## 2020-07-10 RX ORDER — ENOXAPARIN SODIUM 100 MG/ML
40 INJECTION SUBCUTANEOUS DAILY
Refills: 0 | Status: DISCONTINUED | OUTPATIENT
Start: 2020-07-11 | End: 2020-07-13

## 2020-07-10 RX ADMIN — Medication 150 MICROGRAM(S): at 05:32

## 2020-07-10 RX ADMIN — Medication 650 MILLIGRAM(S): at 23:32

## 2020-07-10 RX ADMIN — PANTOPRAZOLE SODIUM 40 MILLIGRAM(S): 20 TABLET, DELAYED RELEASE ORAL at 06:05

## 2020-07-10 RX ADMIN — CLOPIDOGREL BISULFATE 75 MILLIGRAM(S): 75 TABLET, FILM COATED ORAL at 11:31

## 2020-07-10 RX ADMIN — Medication 25 MILLIGRAM(S): at 17:33

## 2020-07-10 RX ADMIN — BUPRENORPHINE AND NALOXONE 2 TABLET(S): 2; .5 TABLET SUBLINGUAL at 11:32

## 2020-07-10 RX ADMIN — ATORVASTATIN CALCIUM 80 MILLIGRAM(S): 80 TABLET, FILM COATED ORAL at 21:27

## 2020-07-10 RX ADMIN — Medication 10 MILLIGRAM(S): at 21:27

## 2020-07-10 RX ADMIN — LOSARTAN POTASSIUM 25 MILLIGRAM(S): 100 TABLET, FILM COATED ORAL at 05:32

## 2020-07-10 RX ADMIN — CHLORHEXIDINE GLUCONATE 1 APPLICATION(S): 213 SOLUTION TOPICAL at 17:33

## 2020-07-10 RX ADMIN — CHLORHEXIDINE GLUCONATE 1 APPLICATION(S): 213 SOLUTION TOPICAL at 05:31

## 2020-07-10 RX ADMIN — Medication 25 MILLIGRAM(S): at 06:16

## 2020-07-10 RX ADMIN — SENNA PLUS 2 TABLET(S): 8.6 TABLET ORAL at 21:27

## 2020-07-10 RX ADMIN — Medication 40 MILLIGRAM(S): at 11:31

## 2020-07-10 RX ADMIN — Medication 81 MILLIGRAM(S): at 11:31

## 2020-07-10 RX ADMIN — ENOXAPARIN SODIUM 80 MILLIGRAM(S): 100 INJECTION SUBCUTANEOUS at 05:31

## 2020-07-10 NOTE — PROGRESS NOTE ADULT - SUBJECTIVE AND OBJECTIVE BOX
DAILY PROGRESS NOTE  ===========================================================    Patient Information:  EZRA BARCLAY  /  49y  /  Female  /  MRN#: [id number]    Hospital Day: 2d     |:::::::::::::::::::::::::::| SUBJECTIVE |:::::::::::::::::::::::::::|    OVERNIGHT EVENTS: None reported  TODAY: Patient was seen today at bedside. Her chest pain is improved, however, she still feels "lousy". She understands that her symptoms can be attributed to her severely low thyroid, but also may have underlying cardiovascular disease. ROS was otherwise negative.    |:::::::::::::::::::::::::::| OBJECTIVE |:::::::::::::::::::::::::::|    VITAL SIGNS: Last 24 Hours  T(C): 36.6 (10 Jul 2020 07:14), Max: 36.6 (10 Jul 2020 07:14)  T(F): 97.9 (10 Jul 2020 07:14), Max: 97.9 (10 Jul 2020 07:14)  HR: 70 (10 Jul 2020 07:59) (55 - 77)  BP: 100/64 (10 Jul 2020 07:14) (86/54 - 136/95)  BP(mean): 78 (10 Jul 2020 07:14) (65 - 110)  RR: 11 (10 Jul 2020 07:59) (7 - 37)  SpO2: 94% (10 Jul 2020 07:59) (94% - 97%)    20 @ 07:01  -  07-10-20 @ 07:00  --------------------------------------------------------  IN: 840 mL / OUT: 0 mL / NET: 840 mL    PHYSICAL EXAM:  GENERAL:   Awake, alert; NAD.  HEENT:  Head NC/AT; Conjunctivae pink, Sclera anicteric; Oral mucosa moist.  CARDIO:   Regular rate; Regular rhythm; S1 & S2.  RESP:   No rales, wheezing, or rhonchi appreciated.  GI:   Soft; NT/ND; BS; No guarding; No rebound tenderness.  EXT:   Strength UE 5/5; Strength LE 5/5; LE edema; BL elbow swelling L>R (?bursitis)  SKIN:   Intact.    LAB RESULTS:                        11.1   8.05  )-----------( 269      ( 10 Jul 2020 06:10 )             35.7     135  |  95<L>  |  10  ----------------------------<  108<H>  3.9   |  27  |  1.3    Ca    9.4      10 Jul 2020 06:10  Phos  3.3       Mg     2.2     07-10    TPro  8.2<H>  /  Alb  4.8  /  TBili  0.5  /  DBili  x   /  AST  65<H>  /  ALT  8   /  AlkPhos  80     PT/INR - ( 10 Jul 2020 06:10 )   PT: 12.50 sec;   INR: 1.09 ratio    PTT - ( 10 Jul 2020 06:10 )  PTT:48.7 sec    Urinalysis Basic - ( 2020 07:30 )  Color: Yellow / Appearance: Clear / S.025 / pH: x  Gluc: x / Ketone: Negative  / Bili: Negative / Urobili: 0.2 mg/dL   Blood: x / Protein: Negative mg/dL / Nitrite: Negative   Leuk Esterase: Negative / RBC: x / WBC x   Sq Epi: x / Non Sq Epi: x / Bacteria: x    Troponin T, Serum: 0.15 ng/mL <HH> (07-10-20 @ 06:10)  Creatine Kinase, Serum: 1254 U/L <H> (07-10-20 @ 06:10)  Troponin T, Serum: 0.15 ng/mL <HH> (20 @ 19:38)  Creatine Kinase, Serum: 1340 U/L <H> (20 @ 19:38)  Creatine Kinase, Serum: 1061 U/L <H> (20 @ 11:42)  Troponin T, Serum: 0.15 ng/mL <HH> (20 @ 11:42)    CARDIAC MARKERS ( 10 Jul 2020 06:10 )  x     / 0.15 ng/mL / 1254 U/L / x     / 39.6 ng/mL  CARDIAC MARKERS ( 2020 19:38 )  x     / 0.15 ng/mL / 1340 U/L / x     / 42.5 ng/mL  CARDIAC MARKERS ( 2020 11:42 )  x     / 0.15 ng/mL / 1061 U/L / x     / 32.4 ng/mL  CARDIAC MARKERS ( 2020 15:35 )  x     / 0.10 ng/mL / x     / x     / 31.0 ng/mL      MICROBIOLOGY:  None to report    RADIOLOGY:  None to report    ALLERGIES:  No Known Allergies  ===========================================================

## 2020-07-10 NOTE — PROGRESS NOTE ADULT - SUBJECTIVE AND OBJECTIVE BOX
Patient is a 49y old  Female who presents with a chief complaint of chest pain/ NSTEMI (09 Jul 2020 15:07)      T(F): 95.8 (07-09-20 @ 23:13), Max: 96.5 (07-09-20 @ 15:05)  HR: 77 (07-10-20 @ 06:11)  BP: 119/73 (07-10-20 @ 06:11)  RR: 37 (07-10-20 @ 06:11)  SpO2: 97% (07-09-20 @ 16:54) (97% - 98%)    PHYSICAL EXAM:  GENERAL: NAD, well-groomed, well-developed  HEAD:  Atraumatic, Normocephalic  EYES: EOMI, PERRLA, conjunctiva and sclera clear  ENMT: No tonsillar erythema, exudates, or enlargement; Moist mucous membranes, Good dentition, No lesions  NECK: Supple, No JVD, Normal thyroid  NERVOUS SYSTEM:  Alert & Oriented X3,  Motor Strength 5/5 B/L upper and lower extremities  CHEST/LUNG: Clear to percussion bilaterally; No rales, rhonchi, wheezing, or rubs  HEART: Regular rate and rhythm; No murmurs, rubs, or gallops  ABDOMEN: Soft, Nontender, Nondistended; Bowel sounds present  EXTREMITIES:   No clubbing, cyanosis, or edema  LYMPH: No lymphadenopathy noted  SKIN: No rashes or lesions    labs  07-09    134<L>  |  96<L>  |  7<L>  ----------------------------<  115<H>  4.5   |  25  |  0.9    Ca    9.6      09 Jul 2020 05:30  Phos  3.3     07-09  Mg     2.2     07-09                            11.1   8.05  )-----------( 269      ( 10 Jul 2020 06:10 )             35.7       PT/INR - ( 10 Jul 2020 06:10 )   PT: 12.50 sec;   INR: 1.09 ratio         PTT - ( 10 Jul 2020 06:10 )  PTT:48.7 sec    Troponin T, Serum: 0.15 ng/mL <HH> (07-09-20 @ 19:38)  Creatine Kinase, Serum: 1340 U/L <H> (07-09-20 @ 19:38)  Creatine Kinase, Serum: 1061 U/L <H> (07-09-20 @ 11:42)  Troponin T, Serum: 0.15 ng/mL <HH> (07-09-20 @ 11:42)      aspirin enteric coated 81 milliGRAM(s) Oral daily  atorvastatin 80 milliGRAM(s) Oral at bedtime  bisacodyl 10 milliGRAM(s) Oral at bedtime  buprenorphine 8 mG/naloxone 2 mG SL  Tablet 2 Tablet(s) SubLingual daily  chlorhexidine 4% Liquid 1 Application(s) Topical every 12 hours  clopidogrel Tablet 75 milliGRAM(s) Oral daily  enoxaparin Injectable 80 milliGRAM(s) SubCutaneous two times a day  FLUoxetine 40 milliGRAM(s) Oral daily  levothyroxine 150 MICROGram(s) Oral daily  losartan 25 milliGRAM(s) Oral daily  metoprolol tartrate 25 milliGRAM(s) Oral every 12 hours  nitroglycerin     SubLingual 0.4 milliGRAM(s) SubLingual every 5 minutes PRN  ondansetron Injectable 4 milliGRAM(s) IV Push every 8 hours PRN  pantoprazole    Tablet 40 milliGRAM(s) Oral before breakfast  senna 2 Tablet(s) Oral at bedtime

## 2020-07-10 NOTE — PROGRESS NOTE ADULT - ASSESSMENT
48 Y/O F with a past medical history of COPD, hypothyroidism, substance abuse and cigarette use presented to the hospital c/o a few days of substernal pleuritic chest pain.     possible NSTEMI / Hyperlipidemia / severe hypothyroidism secondary to non compliance with meds       - aspirin, Lipitor, Lovenox, Lopressor    -  2DECHO is normal   -  urine tox screen positive for THC    - synthroid resumed    - cardiology following    -  home meds resumed   - doubt PE as d-dimer and doppler of LE is negative

## 2020-07-10 NOTE — PROGRESS NOTE ADULT - ASSESSMENT
Chest pain; ACS (possibly late presentation) vs. Severe Hypothyroidism  - Patient has risk factors for CVD; Former smoker, high cholesterol, diabetes  - EKG on presentation showed TWI in anterior leads  - CE and CK elevated at same level, which may be attributable to low thyroid more than acute coronary event  - Will continue Plavix, BB, Statin, and ARB; Also, continue Lovenox  - Follow up with Cardiology recommendations about whether or not cath is needed    Severe Hypothyroidism; Hx of Hypothyroidism  - TSH >200 with low T4  - Patient was non-compliant with her medications for some time   - Continue her Synthroid at outpatient dose and follow up repeat TSH as outpatient  - Will refer to Endocrinology for outpatient management    Type II Diabetes Mellitus  - Hemoglobin A1c 6.7   - Previously on Metformin, then switched to Lantus; Stopped Lantus after hypoglycemic seizure; Now not taking any medications  - Can try to resume Metformin after discharge with OP follow up with Endo/PCP  - Advised patient to lose weight via diet/exercise as tolerated when improved    Hyperlipidemia  - Not reported in history, but likely chronically elevated vs. 2/2 hypothyroidism  - Continue high dose statin    Hx of Illicit drug abuse; IV drug abuse  - Reports sobriety; Utox negative; EtOH negative    Hx of Former Smoker  - Reports cessation 6 years ago; 30 pack year smoker    Hx of Hepatitis C  Hx of Pancreatitis  Hx of Depression    GI ppx:   Not indicated  DVT ppx:   Lovenox as above  Activity:   As Tolerated   DISPO:  Patient to be discharged when condition(s) optimized.    CODE STATUS:   FULL Chest pain; ACS (possibly late presentation) vs. Severe Hypothyroidism  - Patient has risk factors for CVD; Former smoker, high cholesterol, diabetes  - EKG on presentation showed TWI in anterior leads  - CE and CK elevated at same level, which may be attributable to low thyroid more than acute coronary event  - Will continue Plavix, BB, Statin, and ARB; Also, continue Lovenox  - Follow up with Cardiology recommendations about whether or not cath is needed    Severe Hypothyroidism; Hx of Hypothyroidism  - TSH >200 with low T4  - Patient was non-compliant with her medications for some time   - Continue her Synthroid at outpatient dose and follow up repeat TSH as outpatient  - Will refer to Endocrinology for outpatient management    BL Bursitis at elbow  - Concerning for autoimmune disease given her hypothyroidism  - Will screen for AI diseases; Monitoring for pain    Type II Diabetes Mellitus  - Hemoglobin A1c 6.7   - Previously on Metformin, then switched to Lantus; Stopped Lantus after hypoglycemic seizure; Now not taking any medications  - Can try to resume Metformin after discharge with OP follow up with Endo/PCP  - Advised patient to lose weight via diet/exercise as tolerated when improved    Hyperlipidemia  - Not reported in history, but likely chronically elevated vs. 2/2 hypothyroidism  - Continue high dose statin    Hx of Illicit drug abuse; IV drug abuse  - Reports sobriety; Utox negative; EtOH negative    Hx of Former Smoker  - Reports cessation 6 years ago; 30 pack year smoker    Hx of Hepatitis C  Hx of Pancreatitis  Hx of Depression    GI ppx:   Not indicated  DVT ppx:   Lovenox as above  Activity:   As Tolerated   DISPO:  Patient to be discharged when condition(s) optimized.    CODE STATUS:   FULL

## 2020-07-10 NOTE — PROGRESS NOTE ADULT - SUBJECTIVE AND OBJECTIVE BOX
Patient is a 49y old  Female who presents with a chief complaint of chest pain/ NSTEMI (10 Jul 2020 09:01)      T(F): 97.9 (07-10-20 @ 07:14), Max: 97.9 (07-10-20 @ 07:14)  HR: 70 (07-10-20 @ 07:59)  BP: 100/64 (07-10-20 @ 07:14)  RR: 16  SpO2: 94% (07-10-20 @ 07:59) (94% - 97%)    PHYSICAL EXAM:  GENERAL: NAD  HEAD:  Atraumatic, Normocephalic  NERVOUS SYSTEM:  Alert & Oriented X3, no focal deficits   CHEST/LUNG: Clear to percussion bilaterally; No rales, rhonchi, wheezing, or rubs  HEART: Regular rate and rhythm; No murmurs, rubs, or gallops  ABDOMEN: Soft, Nontender, Nondistended; Bowel sounds present  EXTREMITIES:  2+ Peripheral Pulses, No clubbing, cyanosis, or edema  LYMPH: No lymphadenopathy noted  SKIN: No rashes or lesions    LABS  07-10    135  |  95<L>  |  10  ----------------------------<  108<H>  3.9   |  27  |  1.3    Ca    9.4      10 Jul 2020 06:10  Phos  3.3     07-09  Mg     2.2     07-10    TPro  8.2<H>  /  Alb  4.8  /  TBili  0.5  /  DBili  x   /  AST  65<H>  /  ALT  8   /  AlkPhos  80  07-10                          11.1   8.05  )-----------( 269      ( 10 Jul 2020 06:10 )             35.7     PT/INR - ( 10 Jul 2020 06:10 )   PT: 12.50 sec;   INR: 1.09 ratio         PTT - ( 10 Jul 2020 06:10 )  PTT:48.7 sec    CARDIAC ENZYMES  Creatine Kinase, Serum: 1254 (07-10 @ 06:10)  Creatine Kinase, Serum: 1340 (07-09 @ 19:38)  Creatine Kinase, Serum: 1061 (07-09 @ 11:42)    CKMB Units: 39.6 (07-10 @ 06:10)  CKMB Units: 42.5 (07-09 @ 19:38)  CKMB Units: 32.4 (07-09 @ 11:42)  CKMB Units: 31.0 (07-08 @ 15:35)    Troponin T, Serum: 0.15 ng/mL (07-10-20 @ 06:10)  Troponin T, Serum: 0.15 ng/mL (07-09-20 @ 19:38)  Troponin T, Serum: 0.15 ng/mL (07-09-20 @ 11:42)  Troponin T, Serum: 0.10 ng/mL (07-08-20 @ 15:35)  Troponin T, Serum: 0.12 ng/mL (07-08-20 @ 08:12)  Troponin T, Serum: 0.13 ng/mL (07-08-20 @ 00:20)    < from: 12 Lead ECG (07.09.20 @ 07:28) >  Diagnosis Line Sinus bradycardia  Nonspecific T wave abnormality  Abnormal ECG    < end of copied text >    MEDICATIONS  (STANDING):  aspirin enteric coated 81 milliGRAM(s) Oral daily  atorvastatin 80 milliGRAM(s) Oral at bedtime  bisacodyl 10 milliGRAM(s) Oral at bedtime  buprenorphine 8 mG/naloxone 2 mG SL  Tablet 2 Tablet(s) SubLingual daily  chlorhexidine 4% Liquid 1 Application(s) Topical every 12 hours  clopidogrel Tablet 75 milliGRAM(s) Oral daily  enoxaparin Injectable 80 milliGRAM(s) SubCutaneous two times a day  FLUoxetine 40 milliGRAM(s) Oral daily  levothyroxine 150 MICROGram(s) Oral daily  losartan 25 milliGRAM(s) Oral daily  metoprolol tartrate 25 milliGRAM(s) Oral every 12 hours  pantoprazole    Tablet 40 milliGRAM(s) Oral before breakfast  senna 2 Tablet(s) Oral at bedtime    MEDICATIONS  (PRN):  nitroglycerin     SubLingual 0.4 milliGRAM(s) SubLingual every 5 minutes PRN Chest Pain  ondansetron Injectable 4 milliGRAM(s) IV Push every 8 hours PRN Nausea and/or Vomiting

## 2020-07-10 NOTE — PROGRESS NOTE ADULT - ASSESSMENT
Patient profoundly hypothyroid. Cardiac enzymes remain high. Repeat. Ambulate . Check ekg today . Continue meds

## 2020-07-11 LAB
ALBUMIN SERPL ELPH-MCNC: 4.4 G/DL — SIGNIFICANT CHANGE UP (ref 3.5–5.2)
ALP SERPL-CCNC: 81 U/L — SIGNIFICANT CHANGE UP (ref 30–115)
ALT FLD-CCNC: 7 U/L — SIGNIFICANT CHANGE UP (ref 0–41)
ANION GAP SERPL CALC-SCNC: 14 MMOL/L — SIGNIFICANT CHANGE UP (ref 7–14)
AST SERPL-CCNC: 63 U/L — HIGH (ref 0–41)
BILIRUB SERPL-MCNC: 0.5 MG/DL — SIGNIFICANT CHANGE UP (ref 0.2–1.2)
BUN SERPL-MCNC: 10 MG/DL — SIGNIFICANT CHANGE UP (ref 10–20)
CALCIUM SERPL-MCNC: 9.2 MG/DL — SIGNIFICANT CHANGE UP (ref 8.5–10.1)
CHLORIDE SERPL-SCNC: 95 MMOL/L — LOW (ref 98–110)
CK MB CFR SERPL CALC: 24.3 NG/ML — HIGH (ref 0.6–6.3)
CK SERPL-CCNC: 1207 U/L — HIGH (ref 0–225)
CO2 SERPL-SCNC: 27 MMOL/L — SIGNIFICANT CHANGE UP (ref 17–32)
CORTIS AM PEAK SERPL-MCNC: 16.1 UG/DL — SIGNIFICANT CHANGE UP (ref 6–18.4)
CREAT SERPL-MCNC: 1.3 MG/DL — SIGNIFICANT CHANGE UP (ref 0.7–1.5)
GLUCOSE SERPL-MCNC: 180 MG/DL — HIGH (ref 70–99)
POTASSIUM SERPL-MCNC: 4.3 MMOL/L — SIGNIFICANT CHANGE UP (ref 3.5–5)
POTASSIUM SERPL-SCNC: 4.3 MMOL/L — SIGNIFICANT CHANGE UP (ref 3.5–5)
PROT SERPL-MCNC: 7.9 G/DL — SIGNIFICANT CHANGE UP (ref 6–8)
RHEUMATOID FACT SERPL-ACNC: 14 IU/ML — HIGH (ref 0–13)
SODIUM SERPL-SCNC: 136 MMOL/L — SIGNIFICANT CHANGE UP (ref 135–146)
THYROPEROXIDASE AB SERPL-ACNC: 1010 IU/ML — HIGH
TROPONIN T SERPL-MCNC: 0.16 NG/ML — CRITICAL HIGH

## 2020-07-11 PROCEDURE — 99233 SBSQ HOSP IP/OBS HIGH 50: CPT

## 2020-07-11 RX ORDER — LIOTHYRONINE SODIUM 25 UG/1
25 TABLET ORAL
Refills: 0 | Status: DISCONTINUED | OUTPATIENT
Start: 2020-07-11 | End: 2020-07-13

## 2020-07-11 RX ORDER — HYDROCORTISONE 20 MG
100 TABLET ORAL EVERY 8 HOURS
Refills: 0 | Status: DISCONTINUED | OUTPATIENT
Start: 2020-07-11 | End: 2020-07-12

## 2020-07-11 RX ORDER — SODIUM CHLORIDE 9 MG/ML
500 INJECTION INTRAMUSCULAR; INTRAVENOUS; SUBCUTANEOUS ONCE
Refills: 0 | Status: COMPLETED | OUTPATIENT
Start: 2020-07-11 | End: 2020-07-11

## 2020-07-11 RX ADMIN — Medication 100 MILLIGRAM(S): at 21:39

## 2020-07-11 RX ADMIN — Medication 100 MILLIGRAM(S): at 12:20

## 2020-07-11 RX ADMIN — Medication 150 MICROGRAM(S): at 05:35

## 2020-07-11 RX ADMIN — ENOXAPARIN SODIUM 40 MILLIGRAM(S): 100 INJECTION SUBCUTANEOUS at 13:06

## 2020-07-11 RX ADMIN — LIOTHYRONINE SODIUM 25 MICROGRAM(S): 25 TABLET ORAL at 17:03

## 2020-07-11 RX ADMIN — CHLORHEXIDINE GLUCONATE 1 APPLICATION(S): 213 SOLUTION TOPICAL at 17:02

## 2020-07-11 RX ADMIN — BUPRENORPHINE AND NALOXONE 2 TABLET(S): 2; .5 TABLET SUBLINGUAL at 12:22

## 2020-07-11 RX ADMIN — LIOTHYRONINE SODIUM 25 MICROGRAM(S): 25 TABLET ORAL at 23:08

## 2020-07-11 RX ADMIN — PANTOPRAZOLE SODIUM 40 MILLIGRAM(S): 20 TABLET, DELAYED RELEASE ORAL at 06:04

## 2020-07-11 RX ADMIN — Medication 81 MILLIGRAM(S): at 12:18

## 2020-07-11 RX ADMIN — SODIUM CHLORIDE 1000 MILLILITER(S): 9 INJECTION INTRAMUSCULAR; INTRAVENOUS; SUBCUTANEOUS at 10:22

## 2020-07-11 RX ADMIN — CLOPIDOGREL BISULFATE 75 MILLIGRAM(S): 75 TABLET, FILM COATED ORAL at 12:24

## 2020-07-11 RX ADMIN — Medication 40 MILLIGRAM(S): at 12:24

## 2020-07-11 RX ADMIN — LIOTHYRONINE SODIUM 25 MICROGRAM(S): 25 TABLET ORAL at 13:08

## 2020-07-11 NOTE — PROGRESS NOTE ADULT - ASSESSMENT
50 Y/O F with a past medical history of COPD, hypothyroidism, substance abuse and cigarette use presented to the hospital c/o a few days of substernal pleuritic chest pain.     possible NSTEMI / Hyperlipidemia / severe hypothyroidism secondary to non compliance with meds       - aspirin, Lipitor, Lovenox   -  2DECHO is normal   -  urine tox screen positive for THC    - synthroid resumed    - cardiology following   - home meds resumed  - Endocrine consult requested  - DC losartan and lorpessor for now  - f/u cortisol levels  - BCX, UCx   - doubt PE as d-dimer and doppler of LE is negative 50 Y/O F with a past medical history of COPD, hypothyroidism, substance abuse and cigarette use presented to the hospital c/o a few days of substernal pleuritic chest pain.     possible NSTEMI / Hyperlipidemia / severe hypothyroidism secondary to non compliance with meds       - aspirin, Lipitor, Lovenox   -  2DECHO is normal   -  urine tox screen positive for THC    - synthroid resumed    - cardiology following   - home meds resumed  - Endocrine consult requested  - DC losartan and lorpessor for now  - f/u cortisol levels, start hydrocortisone 100q8h if persistently hypotensive  - BCX, UCx   - doubt PE as d-dimer and doppler of LE is negative

## 2020-07-11 NOTE — PROGRESS NOTE ADULT - SUBJECTIVE AND OBJECTIVE BOX
DENYEZRA  49y, Female  Allergy: No Known Allergies    Hospital Day: 3d    Patient seen and examined earlier today. No acute events overnight.    PMH/PSH:  PAST MEDICAL & SURGICAL HISTORY:  Hepatitis-C  History of pancreatitis  H/O: substance abuse: no longer using  Hypothyroidism  Asthma with COPD  History of tonsillectomy  History of appendectomy    VITALS:  T(F): 98.5 (07-11-20 @ 07:01), Max: 100.5 (07-10-20 @ 23:00)  HR: 69 (07-11-20 @ 07:26)  BP: 88/47 (07-11-20 @ 07:26) (75/52 - 128/71)  RR: 18 (07-11-20 @ 07:26)  SpO2: 93% (07-10-20 @ 19:00)    TESTS & MEASUREMENTS:  Weight (Kg):   BMI (kg/m2): 31.7 (07-08)    07-09-20 @ 07:01  -  07-10-20 @ 07:00  --------------------------------------------------------  IN: 840 mL / OUT: 0 mL / NET: 840 mL    07-10-20 @ 07:01  -  07-11-20 @ 07:00  --------------------------------------------------------  IN: 100 mL / OUT: 0 mL / NET: 100 mL               11.1   8.05  )-----------( 269      ( 10 Jul 2020 06:10 )             35.7     PT/INR - ( 10 Jul 2020 06:10 )   PT: 12.50 sec;   INR: 1.09 ratio         PTT - ( 10 Jul 2020 06:10 )  PTT:48.7 sec  07-11    136  |  95<L>  |  10  ----------------------------<  180<H>  4.3   |  27  |  1.3    Ca    9.2      11 Jul 2020 06:01  Mg     2.2     07-10    TPro  7.9  /  Alb  4.4  /  TBili  0.5  /  DBili  x   /  AST  63<H>  /  ALT  7   /  AlkPhos  81  07-11    LIVER FUNCTIONS - ( 11 Jul 2020 06:01 )  Alb: 4.4 g/dL / Pro: 7.9 g/dL / ALK PHOS: 81 U/L / ALT: 7 U/L / AST: 63 U/L / GGT: x           CARDIAC MARKERS ( 11 Jul 2020 06:01 )  x     / 0.16 ng/mL / 1207 U/L / x     / 24.3 ng/mL  CARDIAC MARKERS ( 10 Jul 2020 06:10 )  x     / 0.15 ng/mL / 1254 U/L / x     / 39.6 ng/mL  CARDIAC MARKERS ( 09 Jul 2020 19:38 )  x     / 0.15 ng/mL / 1340 U/L / x     / 42.5 ng/mL  CARDIAC MARKERS ( 09 Jul 2020 11:42 )  x     / 0.15 ng/mL / 1061 U/L / x     / 32.4 ng/mL    RECENT DIAGNOSTIC ORDERS:  12 Lead ECG: Repeat From: 10-Jul-2020 14:01 To: 12-Jul-2020 00:00, Every 1 day(s) (07-10-20 @ 14:01)  12 Lead ECG (07-10-20 @ 14:01)  Anti-Nuclear Antibody: AM Sched. Collection: 11-Jul-2020 04:30 (07-10-20 @ 21:27)  Rheumatoid Factor Quant, Serum or Plasma: AM Sched. Collection: 11-Jul-2020 04:30 (07-10-20 @ 21:27)  Cyclic Citrullinated Peptide AB: AM Sched. Collection: 11-Jul-2020 04:30 (07-10-20 @ 21:27)  HLA B27 Antigen Screen: AM Sched. Collection: 11-Jul-2020 04:30 (07-10-20 @ 21:27)  Lupus Profile: AM Sched. Collection: 11-Jul-2020 04:30 (07-10-20 @ 21:27)  Double Stranded DNA Antibody: AM Sched. Collection: 11-Jul-2020 04:30 (07-10-20 @ 21:27)  Thyroperoxidase Antibody: AM Sched. Collection: 11-Jul-2020 04:30 (07-10-20 @ 21:27)  12 Lead ECG (07-11-20 @ 00:01)  12 Lead ECG: Repeat From: 11-Jul-2020 06:46 To: 13-Jul-2020 00:00, Every 1 day(s)  Provider's Contact #: [phone] (07-11-20 @ 06:48)  12 Lead ECG:   Provider's Contact #: [phone] (07-11-20 @ 06:48)  Cortisol AM, Serum: STAT (07-11-20 @ 06:48)  Complete Blood Count: AM Sched. Collection: 12-Jul-2020 04:30 (07-11-20 @ 06:48)  Comprehensive Metabolic Panel: AM Sched. Collection: 12-Jul-2020 04:30 (07-11-20 @ 06:48)  Complete Blood Count + Automated Diff: AM Sched. Collection: 12-Jul-2020 04:30 (07-11-20 @ 09:05)  Basic Metabolic Panel: AM Sched. Collection: 12-Jul-2020 04:30 (07-11-20 @ 09:05)  Magnesium, Serum: AM Sched. Collection: 12-Jul-2020 04:30 (07-11-20 @ 09:05)  ABO Rh Confirmatory Specimen: AM  Sched. Collection:12-Jul-2020 04:30  Addl Info: Conditional: ABO Rh Confirmatory Specimen (07-11-20 @ 09:05)    MEDICATIONS:  MEDICATIONS  (STANDING):  aspirin enteric coated 81 milliGRAM(s) Oral daily  atorvastatin 80 milliGRAM(s) Oral at bedtime  bisacodyl 10 milliGRAM(s) Oral at bedtime  buprenorphine 8 mG/naloxone 2 mG SL  Tablet 2 Tablet(s) SubLingual daily  chlorhexidine 4% Liquid 1 Application(s) Topical every 12 hours  clopidogrel Tablet 75 milliGRAM(s) Oral daily  enoxaparin Injectable 40 milliGRAM(s) SubCutaneous daily  FLUoxetine 40 milliGRAM(s) Oral daily  levothyroxine 150 MICROGram(s) Oral daily  losartan 25 milliGRAM(s) Oral daily  metoprolol tartrate 25 milliGRAM(s) Oral every 12 hours  pantoprazole    Tablet 40 milliGRAM(s) Oral before breakfast  senna 2 Tablet(s) Oral at bedtime  sodium chloride 0.9% Bolus 500 milliLiter(s) IV Bolus once    MEDICATIONS  (PRN):  acetaminophen   Tablet .. 650 milliGRAM(s) Oral every 6 hours PRN Temp greater or equal to 38C (100.4F)  ondansetron Injectable 4 milliGRAM(s) IV Push every 8 hours PRN Nausea and/or Vomiting      HOME MEDICATIONS:  Suboxone 8 mg-2 mg sublingual tablet (01-06)      REVIEW OF SYSTEMS:  All other review of systems is negative unless indicated above.     PHYSICAL EXAM:  GENERAL: NAD  HEENT: No Swelling  CHEST/LUNG: Good air entry, No wheezing  HEART: RRR, No murmurs  ABDOMEN: Soft, Bowel sounds present  EXTREMITIES:  No clubbing

## 2020-07-11 NOTE — PROGRESS NOTE ADULT - ASSESSMENT
Patient profoundly hypothyroid. Cardiac enzymes remain high. Repeat. Ambulate . Patient febrile. Watch for infection. Check cortisol level

## 2020-07-11 NOTE — PROGRESS NOTE ADULT - SUBJECTIVE AND OBJECTIVE BOX
Patient is a 49y old  Female who presents with a chief complaint of chest pain /  NSTEMI (10 Jul 2020 10:13)      T(F): 97.9 (07-11-20 @ 06:00), Max: 100.5 (07-10-20 @ 23:00)  HR: 75 (07-10-20 @ 23:00)  BP: 128/71 (07-10-20 @ 23:01)  RR: 20 (07-11-20 @ 06:00)  SpO2: 93% (07-10-20 @ 19:00) (93% - 96%)    PHYSICAL EXAM:  GENERAL: NAD, well-groomed, well-developed  HEAD:  Atraumatic, Normocephalic  EYES: EOMI, PERRLA, conjunctiva and sclera clear  ENMT: No tonsillar erythema, exudates, or enlargement; Moist mucous membranes, Good dentition, No lesions  NECK: Supple, No JVD, Normal thyroid  NERVOUS SYSTEM:  Alert & Oriented X3,  Motor Strength 5/5 B/L upper and lower extremities  CHEST/LUNG: Clear to percussion bilaterally; No rales, rhonchi, wheezing, or rubs  HEART: Regular rate and rhythm; No murmurs, rubs, or gallops  ABDOMEN: Soft, Nontender, Nondistended; Bowel sounds present  EXTREMITIES:   No clubbing, cyanosis, or edema  LYMPH: No lymphadenopathy noted  SKIN: No rashes or lesions    labs  07-10    135  |  95<L>  |  10  ----------------------------<  108<H>  3.9   |  27  |  1.3    Ca    9.4      10 Jul 2020 06:10  Mg     2.2     07-10    TPro  8.2<H>  /  Alb  4.8  /  TBili  0.5  /  DBili  x   /  AST  65<H>  /  ALT  8   /  AlkPhos  80  07-10                          11.1   8.05  )-----------( 269      ( 10 Jul 2020 06:10 )             35.7       PT/INR - ( 10 Jul 2020 06:10 )   PT: 12.50 sec;   INR: 1.09 ratio         PTT - ( 10 Jul 2020 06:10 )  PTT:48.7 sec        acetaminophen   Tablet .. 650 milliGRAM(s) Oral every 6 hours PRN  aspirin enteric coated 81 milliGRAM(s) Oral daily  atorvastatin 80 milliGRAM(s) Oral at bedtime  bisacodyl 10 milliGRAM(s) Oral at bedtime  buprenorphine 8 mG/naloxone 2 mG SL  Tablet 2 Tablet(s) SubLingual daily  chlorhexidine 4% Liquid 1 Application(s) Topical every 12 hours  clopidogrel Tablet 75 milliGRAM(s) Oral daily  enoxaparin Injectable 40 milliGRAM(s) SubCutaneous daily  FLUoxetine 40 milliGRAM(s) Oral daily  levothyroxine 150 MICROGram(s) Oral daily  losartan 25 milliGRAM(s) Oral daily  metoprolol tartrate 25 milliGRAM(s) Oral every 12 hours  nitroglycerin     SubLingual 0.4 milliGRAM(s) SubLingual every 5 minutes PRN  ondansetron Injectable 4 milliGRAM(s) IV Push every 8 hours PRN  pantoprazole    Tablet 40 milliGRAM(s) Oral before breakfast  senna 2 Tablet(s) Oral at bedtime

## 2020-07-11 NOTE — CHART NOTE - NSCHARTNOTEFT_GEN_A_CORE
patient is persistently hypotensive with MAP of 56.  had an episode of fever 100.5 yesterday, so signs of infection.  patient is mentating well, has no complain, eating breakfast.  s/p 500 cc NS bolus.  discussed with Intensivist and Endocrinology, started on hydrocortisone 100 mg IV q 8 hrs and liothyronine 25 q 8 hrs PO for 3 days for concern of symptomatic hypothyroidism.   blood culture are ordered. patient is persistently hypotensive with MAP of 56.  had an episode of fever 100.5 yesterday, so signs of infection.  patient is mentating well, has no complain, eating breakfast.  s/p 500 cc NS bolus.  discussed with Intensivist and Dr. Ireland Endocrinology, started on hydrocortisone 100 mg IV q 8 hrs and liothyronine 25 q 8 hrs PO for 3 days for concern of symptomatic hypothyroidism.   blood culture are ordered.

## 2020-07-11 NOTE — CONSULT NOTE ADULT - ASSESSMENT
severe hypothyroidism due to non compliance with l-thyroxine. the severe hypothyroidism can cause a myositis, and statin therapy can cause a myositis

## 2020-07-11 NOTE — CONSULT NOTE ADULT - ATTENDING COMMENTS
try liothyronine 3 times daily for 3 days, then stop, continue l-thyroxine 150 mcg. daily. most likely does not have adrenal insufficiency, so most likely hydrocortisone can be stopped. needs more compliance as an outpatient. stop atorvastatin as this can cause worsening myosotis when patient is hypothyroid.

## 2020-07-12 LAB
ABO RH CONFIRMATION: SIGNIFICANT CHANGE UP
ALBUMIN SERPL ELPH-MCNC: 4.3 G/DL — SIGNIFICANT CHANGE UP (ref 3.5–5.2)
ALP SERPL-CCNC: 74 U/L — SIGNIFICANT CHANGE UP (ref 30–115)
ALT FLD-CCNC: 8 U/L — SIGNIFICANT CHANGE UP (ref 0–41)
ANION GAP SERPL CALC-SCNC: 13 MMOL/L — SIGNIFICANT CHANGE UP (ref 7–14)
AST SERPL-CCNC: 57 U/L — HIGH (ref 0–41)
BASOPHILS # BLD AUTO: 0.01 K/UL — SIGNIFICANT CHANGE UP (ref 0–0.2)
BASOPHILS NFR BLD AUTO: 0.1 % — SIGNIFICANT CHANGE UP (ref 0–1)
BILIRUB SERPL-MCNC: 0.3 MG/DL — SIGNIFICANT CHANGE UP (ref 0.2–1.2)
BUN SERPL-MCNC: 15 MG/DL — SIGNIFICANT CHANGE UP (ref 10–20)
CALCIUM SERPL-MCNC: 9.1 MG/DL — SIGNIFICANT CHANGE UP (ref 8.5–10.1)
CHLORIDE SERPL-SCNC: 96 MMOL/L — LOW (ref 98–110)
CK MB CFR SERPL CALC: 21.2 NG/ML — HIGH (ref 0.6–6.3)
CK SERPL-CCNC: 917 U/L — HIGH (ref 0–225)
CO2 SERPL-SCNC: 27 MMOL/L — SIGNIFICANT CHANGE UP (ref 17–32)
CREAT SERPL-MCNC: 1.4 MG/DL — SIGNIFICANT CHANGE UP (ref 0.7–1.5)
EOSINOPHIL # BLD AUTO: 0 K/UL — SIGNIFICANT CHANGE UP (ref 0–0.7)
EOSINOPHIL NFR BLD AUTO: 0 % — SIGNIFICANT CHANGE UP (ref 0–8)
GLUCOSE BLDC GLUCOMTR-MCNC: 146 MG/DL — HIGH (ref 70–99)
GLUCOSE BLDC GLUCOMTR-MCNC: 223 MG/DL — HIGH (ref 70–99)
GLUCOSE BLDC GLUCOMTR-MCNC: 266 MG/DL — HIGH (ref 70–99)
GLUCOSE SERPL-MCNC: 195 MG/DL — HIGH (ref 70–99)
HCT VFR BLD CALC: 29.1 % — LOW (ref 37–47)
HGB BLD-MCNC: 9.1 G/DL — LOW (ref 12–16)
IMM GRANULOCYTES NFR BLD AUTO: 0.3 % — SIGNIFICANT CHANGE UP (ref 0.1–0.3)
LYMPHOCYTES # BLD AUTO: 0.88 K/UL — LOW (ref 1.2–3.4)
LYMPHOCYTES # BLD AUTO: 12.8 % — LOW (ref 20.5–51.1)
MAGNESIUM SERPL-MCNC: 2.2 MG/DL — SIGNIFICANT CHANGE UP (ref 1.8–2.4)
MCHC RBC-ENTMCNC: 28.7 PG — SIGNIFICANT CHANGE UP (ref 27–31)
MCHC RBC-ENTMCNC: 31.3 G/DL — LOW (ref 32–37)
MCV RBC AUTO: 91.8 FL — SIGNIFICANT CHANGE UP (ref 81–99)
MONOCYTES # BLD AUTO: 0.08 K/UL — LOW (ref 0.1–0.6)
MONOCYTES NFR BLD AUTO: 1.2 % — LOW (ref 1.7–9.3)
NEUTROPHILS # BLD AUTO: 5.88 K/UL — SIGNIFICANT CHANGE UP (ref 1.4–6.5)
NEUTROPHILS NFR BLD AUTO: 85.6 % — HIGH (ref 42.2–75.2)
NRBC # BLD: 0 /100 WBCS — SIGNIFICANT CHANGE UP (ref 0–0)
PLATELET # BLD AUTO: 239 K/UL — SIGNIFICANT CHANGE UP (ref 130–400)
POTASSIUM SERPL-MCNC: 4.6 MMOL/L — SIGNIFICANT CHANGE UP (ref 3.5–5)
POTASSIUM SERPL-SCNC: 4.6 MMOL/L — SIGNIFICANT CHANGE UP (ref 3.5–5)
PROT SERPL-MCNC: 7.7 G/DL — SIGNIFICANT CHANGE UP (ref 6–8)
RBC # BLD: 3.17 M/UL — LOW (ref 4.2–5.4)
RBC # FLD: 15.8 % — HIGH (ref 11.5–14.5)
SODIUM SERPL-SCNC: 136 MMOL/L — SIGNIFICANT CHANGE UP (ref 135–146)
TROPONIN T SERPL-MCNC: 0.09 NG/ML — CRITICAL HIGH
WBC # BLD: 6.87 K/UL — SIGNIFICANT CHANGE UP (ref 4.8–10.8)
WBC # FLD AUTO: 6.87 K/UL — SIGNIFICANT CHANGE UP (ref 4.8–10.8)

## 2020-07-12 PROCEDURE — 99232 SBSQ HOSP IP/OBS MODERATE 35: CPT

## 2020-07-12 RX ORDER — METFORMIN HYDROCHLORIDE 850 MG/1
500 TABLET ORAL
Refills: 0 | Status: DISCONTINUED | OUTPATIENT
Start: 2020-07-12 | End: 2020-07-13

## 2020-07-12 RX ADMIN — METFORMIN HYDROCHLORIDE 500 MILLIGRAM(S): 850 TABLET ORAL at 17:45

## 2020-07-12 NOTE — PROGRESS NOTE ADULT - ASSESSMENT
Patient profoundly hypothyroid. Cardiac enzymes remain high. Cortisol level good. Bp low yesterday. Better this am. Afebrile now.  Cardiac enzymes not consistent with mi. Try ambulate

## 2020-07-12 NOTE — PROGRESS NOTE ADULT - SUBJECTIVE AND OBJECTIVE BOX
Patient had low blood pressures overnight, pt feels good, no complaints       T(F): 96.1 (07-11-20 @ 23:10), Max: 97.6 (07-11-20 @ 15:00)  HR: 67 (07-12-20 @ 07:13)  BP: 103/65 (07-12-20 @ 07:13)  RR: 15 (07-12-20 @ 07:13)  SpO2: 99% (07-12-20 @ 07:13) (99% - 99%)    PHYSICAL EXAM:  GENERAL: NAD  HEAD:  Atraumatic, Normocephalic  NERVOUS SYSTEM:  no focal deficits   CHEST/LUNG: Clear to percussion bilaterally; No rales, rhonchi, wheezing, or rubs  HEART: Regular rate and rhythm; No murmurs, rubs, or gallops  ABDOMEN: Soft, Nontender, Nondistended; Bowel sounds present  EXTREMITIES:  2+ Peripheral Pulses, No clubbing, cyanosis, or edema  LYMPH: No lymphadenopathy noted  SKIN: No rashes or lesions    LABS  07-12    136  |  96<L>  |  15  ----------------------------<  195<H>  4.6   |  27  |  1.4    Ca    9.1      12 Jul 2020 05:46  Mg     2.2     07-12    TPro  7.7  /  Alb  4.3  /  TBili  0.3  /  DBili  x   /  AST  57<H>  /  ALT  8   /  AlkPhos  74  07-12                          9.1    6.87  )-----------( 239      ( 12 Jul 2020 05:46 )             29.1         CARDIAC ENZYMES  Creatine Kinase, Serum: 917 (07-12 @ 05:46)  Creatine Kinase, Serum: 1207 (07-11 @ 06:01)  Creatine Kinase, Serum: 1254 (07-10 @ 06:10)  Creatine Kinase, Serum: 1340 (07-09 @ 19:38)    CKMB Units: 21.2 (07-12 @ 05:46)  CKMB Units: 24.3 (07-11 @ 06:01)  CKMB Units: 39.6 (07-10 @ 06:10)  CKMB Units: 42.5 (07-09 @ 19:38)    Troponin T, Serum: 0.09 ng/mL (07-12-20 @ 05:46)  Troponin T, Serum: 0.16 ng/mL (07-11-20 @ 06:01)  Troponin T, Serum: 0.15 ng/mL (07-10-20 @ 06:10)  Troponin T, Serum: 0.15 ng/mL (07-09-20 @ 19:38)  Troponin T, Serum: 0.15 ng/mL (07-09-20 @ 11:42)    < from: 12 Lead ECG (07.11.20 @ 08:32) >  Diagnosis Line Normal sinus rhythm  Low voltage QRS  Nonspecific T wave abnormality  Abnormal ECG    < end of copied text >      MEDICATIONS  (STANDING):  aspirin enteric coated 81 milliGRAM(s) Oral daily  bisacodyl 10 milliGRAM(s) Oral at bedtime  buprenorphine 8 mG/naloxone 2 mG SL  Tablet 2 Tablet(s) SubLingual daily  chlorhexidine 4% Liquid 1 Application(s) Topical every 12 hours  clopidogrel Tablet 75 milliGRAM(s) Oral daily  enoxaparin Injectable 40 milliGRAM(s) SubCutaneous daily  FLUoxetine 40 milliGRAM(s) Oral daily  levothyroxine 150 MICROGram(s) Oral daily  liothyronine 25 MICROGram(s) Oral <User Schedule>  pantoprazole    Tablet 40 milliGRAM(s) Oral before breakfast  senna 2 Tablet(s) Oral at bedtime    MEDICATIONS  (PRN):  acetaminophen   Tablet .. 650 milliGRAM(s) Oral every 6 hours PRN Temp greater or equal to 38C (100.4F)  ondansetron Injectable 4 milliGRAM(s) IV Push every 8 hours PRN Nausea and/or Vomiting

## 2020-07-12 NOTE — PROGRESS NOTE ADULT - SUBJECTIVE AND OBJECTIVE BOX
Reason for Endocrinology Consult: Diabetes    HPI: 49y Female      PAST MEDICAL & SURGICAL HISTORY:  Hepatitis-C  History of pancreatitis  H/O: substance abuse: no longer using  Hypothyroidism  Asthma with COPD  History of tonsillectomy  History of appendectomy    FAMILY HISTORY:      SH:  Smoking  Etoh:  Recreational Drugs:    Home Medications:  Suboxone 8 mg-2 mg sublingual tablet: 2.5 film(s) sublingual once a day (06 Jan 2020 07:40)      Current (Non-Endocrine) Meds:  acetaminophen   Tablet .. 650 milliGRAM(s) Oral every 6 hours PRN  aspirin enteric coated 81 milliGRAM(s) Oral daily  bisacodyl 10 milliGRAM(s) Oral at bedtime  buprenorphine 8 mG/naloxone 2 mG SL  Tablet 2 Tablet(s) SubLingual daily  chlorhexidine 4% Liquid 1 Application(s) Topical every 12 hours  clopidogrel Tablet 75 milliGRAM(s) Oral daily  enoxaparin Injectable 40 milliGRAM(s) SubCutaneous daily  FLUoxetine 40 milliGRAM(s) Oral daily  ondansetron Injectable 4 milliGRAM(s) IV Push every 8 hours PRN  pantoprazole    Tablet 40 milliGRAM(s) Oral before breakfast  senna 2 Tablet(s) Oral at bedtime      Current Endocrine Meds:   levothyroxine 150 MICROGram(s) Oral daily  liothyronine 25 MICROGram(s) Oral <User Schedule>      Allergies:  No Known Allergies      ROS:  Denies the following except as indicated.    General: weight loss/weight gain, decreased appetite, fatigue, fever  Eyes: blurry vision, double vision  ENT: neck swelling, dysphagia, voice changes   CV: palpitations, SOB, chest pain, cough  GI: nausea, vomiting, diarrhea, constipation, abdominal pain  : nocturia,  polyuria, dysuria  Endo: decreased libido, heat/cold intolerance, jitteriness  MSK: arthralgias, myalgias  Skin: rash, dryness, diaphoresis  Neuro: pedal numbness,pedal paresthesias, pedal pain    Height (cm): 162.6 (07-08 @ 04:42)  Weight (kg): 83.9 (07-08 @ 04:42)  BMI (kg/m2): 31.7 (07-08 @ 04:42)    Vital Signs Last 24 Hrs  T(C): 35.6 (11 Jul 2020 23:10), Max: 36.4 (11 Jul 2020 15:00)  T(F): 96.1 (11 Jul 2020 23:10), Max: 97.6 (11 Jul 2020 15:00)  HR: 67 (12 Jul 2020 07:13) (54 - 71)  BP: 103/65 (12 Jul 2020 07:13) (76/43 - 110/72)  BP(mean): 80 (12 Jul 2020 07:13) (55 - 85)  RR: 15 (12 Jul 2020 07:13) (15 - 19)  SpO2: 99% (12 Jul 2020 07:13) (99% - 99%)  Constitutional: WN/WD in NAD.   Neck: no thyromegaly or palpable thyroid nodules   Respiratory: lungs CTAB.  Cardiovascular: regular rate and rhythm, normal S1 and S2, no audible murmurs  GI: soft, NT/ND, no masses/HSM appreciated.  Ext: no edema, no ulcers, pedal pulses palpable bilaterally  Neurology: no tremor, monofilament sensation intact in feet  Psychiatric: A&O x 3, normal affect/mood.        LABS:                        9.1    6.87  )-----------( 239      ( 12 Jul 2020 05:46 )             29.1     07-12    136  |  96<L>  |  15  ----------------------------<  195<H>  4.6   |  27  |  1.4    Ca    9.1      12 Jul 2020 05:46  Mg     2.2     07-12    TPro  7.7  /  Alb  4.3  /  TBili  0.3  /  DBili  x   /  AST  57<H>  /  ALT  8   /  AlkPhos  74  07-12                               Thyroid Stimulating Hormone, Serum: 207.00 (07-08 @ 05:16)    Free Thyroxine, Serum: <0.1 ng/dL (07-08-20 @ 05:16)        RADIOLOGY & ADDITIONAL STUDIES:    A/P:49yFemale

## 2020-07-12 NOTE — PROGRESS NOTE ADULT - ASSESSMENT
50 Y/O F with a past medical history of COPD, hypothyroidism, substance abuse and cigarette use presented to the hospital c/o a few days of substernal pleuritic chest pain.     possible NSTEMI / Hyperlipidemia / severe hypothyroidism secondary to non compliance with meds       - aspirin, Lipitor, Lovenox   -  2DECHO is normal   -  urine tox screen positive for THC    - synthroid resumed    - cardiology following   - home meds resumed  - Endocrine consult appreciated   - dc hydrocortisone   - doubt PE as d-dimer and doppler of LE is negative

## 2020-07-12 NOTE — PROGRESS NOTE ADULT - SUBJECTIVE AND OBJECTIVE BOX
Patient is a 49y old  Female who presents with a chief complaint of chest pain/ NSTEMI (11 Jul 2020 17:28)      T(F): 96.1 (07-11-20 @ 23:10), Max: 98.5 (07-11-20 @ 07:01)  HR: 71 (07-12-20 @ 05:48)  BP: 110/72 (07-12-20 @ 05:48)  RR: 15 (07-11-20 @ 23:10)  SpO2: --    PHYSICAL EXAM:  GENERAL: NAD, well-groomed, well-developed  HEAD:  Atraumatic, Normocephalic  EYES: EOMI, PERRLA, conjunctiva and sclera clear  ENMT: No tonsillar erythema, exudates, or enlargement; Moist mucous membranes, Good dentition, No lesions  NECK: Supple, No JVD, Normal thyroid  NERVOUS SYSTEM:  Alert & Oriented X3,  Motor Strength 5/5 B/L upper and lower extremities  CHEST/LUNG: Clear to percussion bilaterally; No rales, rhonchi, wheezing, or rubs  HEART: Regular rate and rhythm; No murmurs, rubs, or gallops  ABDOMEN: Soft, Nontender, Nondistended; Bowel sounds present  EXTREMITIES:   No clubbing, cyanosis, or edema  LYMPH: No lymphadenopathy noted  SKIN: No rashes or lesions    labs  07-11    136  |  95<L>  |  10  ----------------------------<  180<H>  4.3   |  27  |  1.3    Ca    9.2      11 Jul 2020 06:01    TPro  7.9  /  Alb  4.4  /  TBili  0.5  /  DBili  x   /  AST  63<H>  /  ALT  7   /  AlkPhos  81  07-11                acetaminophen   Tablet .. 650 milliGRAM(s) Oral every 6 hours PRN  aspirin enteric coated 81 milliGRAM(s) Oral daily  bisacodyl 10 milliGRAM(s) Oral at bedtime  buprenorphine 8 mG/naloxone 2 mG SL  Tablet 2 Tablet(s) SubLingual daily  chlorhexidine 4% Liquid 1 Application(s) Topical every 12 hours  clopidogrel Tablet 75 milliGRAM(s) Oral daily  enoxaparin Injectable 40 milliGRAM(s) SubCutaneous daily  FLUoxetine 40 milliGRAM(s) Oral daily  hydrocortisone sodium succinate Injectable 100 milliGRAM(s) IV Push every 8 hours  levothyroxine 150 MICROGram(s) Oral daily  liothyronine 25 MICROGram(s) Oral <User Schedule>  ondansetron Injectable 4 milliGRAM(s) IV Push every 8 hours PRN  pantoprazole    Tablet 40 milliGRAM(s) Oral before breakfast  senna 2 Tablet(s) Oral at bedtime

## 2020-07-12 NOTE — PROGRESS NOTE ADULT - ATTENDING COMMENTS
stop hydrocortisone, continue l-thyroxine. if glucoses remain high, consider starting metformin. anemia probably partly due to hypothyroidism, needs follow up. stop hydrocortisone, continue l-thyroxine. if glucoses remain high, consider starting metformin. anemia probably partly due to hypothyroidism, needs follow up.  high cpk's are due to hypothyroidism, no need to repeat.

## 2020-07-13 ENCOUNTER — TRANSCRIPTION ENCOUNTER (OUTPATIENT)
Age: 50
End: 2020-07-13

## 2020-07-13 VITALS — DIASTOLIC BLOOD PRESSURE: 58 MMHG | SYSTOLIC BLOOD PRESSURE: 98 MMHG | HEART RATE: 90 BPM

## 2020-07-13 LAB
ANION GAP SERPL CALC-SCNC: 15 MMOL/L — HIGH (ref 7–14)
BASOPHILS # BLD AUTO: 0.02 K/UL — SIGNIFICANT CHANGE UP (ref 0–0.2)
BASOPHILS NFR BLD AUTO: 0.2 % — SIGNIFICANT CHANGE UP (ref 0–1)
BUN SERPL-MCNC: 15 MG/DL — SIGNIFICANT CHANGE UP (ref 10–20)
CALCIUM SERPL-MCNC: 9.1 MG/DL — SIGNIFICANT CHANGE UP (ref 8.5–10.1)
CCP IGG SERPL-ACNC: <8 UNITS — SIGNIFICANT CHANGE UP
CHLORIDE SERPL-SCNC: 99 MMOL/L — SIGNIFICANT CHANGE UP (ref 98–110)
CO2 SERPL-SCNC: 25 MMOL/L — SIGNIFICANT CHANGE UP (ref 17–32)
CREAT SERPL-MCNC: 1 MG/DL — SIGNIFICANT CHANGE UP (ref 0.7–1.5)
DSDNA AB SER-ACNC: <12 IU/ML — SIGNIFICANT CHANGE UP
EOSINOPHIL # BLD AUTO: 0.01 K/UL — SIGNIFICANT CHANGE UP (ref 0–0.7)
EOSINOPHIL NFR BLD AUTO: 0.1 % — SIGNIFICANT CHANGE UP (ref 0–8)
GLUCOSE BLDC GLUCOMTR-MCNC: 144 MG/DL — HIGH (ref 70–99)
GLUCOSE SERPL-MCNC: 136 MG/DL — HIGH (ref 70–99)
HCT VFR BLD CALC: 27.9 % — LOW (ref 37–47)
HGB BLD-MCNC: 8.9 G/DL — LOW (ref 12–16)
IMM GRANULOCYTES NFR BLD AUTO: 0.5 % — HIGH (ref 0.1–0.3)
LYMPHOCYTES # BLD AUTO: 1.46 K/UL — SIGNIFICANT CHANGE UP (ref 1.2–3.4)
LYMPHOCYTES # BLD AUTO: 16 % — LOW (ref 20.5–51.1)
MAGNESIUM SERPL-MCNC: 2 MG/DL — SIGNIFICANT CHANGE UP (ref 1.8–2.4)
MCHC RBC-ENTMCNC: 29.1 PG — SIGNIFICANT CHANGE UP (ref 27–31)
MCHC RBC-ENTMCNC: 31.9 G/DL — LOW (ref 32–37)
MCV RBC AUTO: 91.2 FL — SIGNIFICANT CHANGE UP (ref 81–99)
MONOCYTES # BLD AUTO: 0.2 K/UL — SIGNIFICANT CHANGE UP (ref 0.1–0.6)
MONOCYTES NFR BLD AUTO: 2.2 % — SIGNIFICANT CHANGE UP (ref 1.7–9.3)
NEUTROPHILS # BLD AUTO: 7.38 K/UL — HIGH (ref 1.4–6.5)
NEUTROPHILS NFR BLD AUTO: 81 % — HIGH (ref 42.2–75.2)
NRBC # BLD: 0 /100 WBCS — SIGNIFICANT CHANGE UP (ref 0–0)
PHOSPHATE SERPL-MCNC: 2.6 MG/DL — SIGNIFICANT CHANGE UP (ref 2.1–4.9)
PLATELET # BLD AUTO: 239 K/UL — SIGNIFICANT CHANGE UP (ref 130–400)
POTASSIUM SERPL-MCNC: 3.7 MMOL/L — SIGNIFICANT CHANGE UP (ref 3.5–5)
POTASSIUM SERPL-SCNC: 3.7 MMOL/L — SIGNIFICANT CHANGE UP (ref 3.5–5)
RBC # BLD: 3.06 M/UL — LOW (ref 4.2–5.4)
RBC # FLD: 15.9 % — HIGH (ref 11.5–14.5)
RF+CCP IGG SER-IMP: NEGATIVE — SIGNIFICANT CHANGE UP
SODIUM SERPL-SCNC: 139 MMOL/L — SIGNIFICANT CHANGE UP (ref 135–146)
WBC # BLD: 9.12 K/UL — SIGNIFICANT CHANGE UP (ref 4.8–10.8)
WBC # FLD AUTO: 9.12 K/UL — SIGNIFICANT CHANGE UP (ref 4.8–10.8)

## 2020-07-13 PROCEDURE — 99239 HOSP IP/OBS DSCHRG MGMT >30: CPT

## 2020-07-13 RX ORDER — LEVOTHYROXINE SODIUM 125 MCG
1 TABLET ORAL
Qty: 30 | Refills: 2
Start: 2020-07-13 | End: 2020-10-10

## 2020-07-13 RX ORDER — ASPIRIN/CALCIUM CARB/MAGNESIUM 324 MG
1 TABLET ORAL
Qty: 0 | Refills: 0 | DISCHARGE
Start: 2020-07-13

## 2020-07-13 RX ORDER — METFORMIN HYDROCHLORIDE 850 MG/1
1 TABLET ORAL
Qty: 60 | Refills: 2
Start: 2020-07-13 | End: 2020-10-10

## 2020-07-13 RX ORDER — LIOTHYRONINE SODIUM 25 UG/1
1 TABLET ORAL
Qty: 2 | Refills: 0
Start: 2020-07-13

## 2020-07-13 RX ADMIN — METFORMIN HYDROCHLORIDE 500 MILLIGRAM(S): 850 TABLET ORAL at 07:27

## 2020-07-13 NOTE — DISCHARGE NOTE PROVIDER - CARE PROVIDER_API CALL
Navi Angel  Internal Medicine  [address] [facility], [address]  [place] [id number]  Phone: [phone]  Fax: [phone]  Follow Up Time:     Ellen Dowell)  Internal Medicine  [address] [place] [address]  Phone: [phone]  Fax: [phone]  Follow Up Time:     Terry Ireland  Endocrinology, Diabetes and Metabolism  [address] [place] [address]  Phone: [phone]  Fax: [phone]  Follow Up Time: Navi Angel  Internal Medicine  [address] [facility], [place] [address]  Phone: [phone]  Fax: [phone]  Follow Up Time:     Ellen Dowell)  Internal Medicine  [address] [place] [address]  Phone: [phone]  Fax: [phone]  Follow Up Time:     Terry Ireland  Endocrinology, Diabetes and Metabolism  [address] [place] [address]  Phone: [phone]  Fax: [phone]  Follow Up Time:     Nubia Lindsey  [facility]  [address] [facility]; [place] [address]  [place] [address]  Phone: [phone]  Fax: (   )    -  Established Patient  Scheduled Appointment: 07/16/2020 01:30 PM

## 2020-07-13 NOTE — DISCHARGE NOTE NURSING/CASE MANAGEMENT/SOCIAL WORK - PATIENT PORTAL LINK FT
You can access the FollowMyHealth Patient Portal offered by [facility] by registering at the following website: [email or web address]. By joining [employer]’s FollowMyHealth portal, you will also be able to view your health information using other applications (apps) compatible with our system.

## 2020-07-13 NOTE — DISCHARGE NOTE PROVIDER - NSDCMRMEDTOKEN_GEN_ALL_CORE_FT
albuterol 90 mcg/inh inhalation powder: 2 puff(s) inhaled every 6 hours, As Needed -for bronchospasm   amoxicillin-clavulanate 875 mg-125 mg oral tablet: 1 tab(s) orally every 12 hours   budesonide-formoterol 160 mcg-4.5 mcg/inh inhalation aerosol: 2 puff(s) inhaled 2 times a day  FLUoxetine 40 mg oral capsule: 1 cap(s) orally once a day  hydrOXYzine hydrochloride 25 mg oral tablet: 1 tab(s) orally every 8 hours, As needed, Anxiety  ibuprofen 400 mg oral tablet: 1 tab(s) orally every 8 hours, As needed, Temp greater or equal to 38C (100.4F), Moderate Pain (4 - 6)  ipratropium-albuterol 0.5 mg-2.5 mg/3 mLinhalation solution: 3 milliliter(s) by nebulizer every 4 hours, As Needed   pantoprazole 40 mg oral delayed release tablet: 1 tab(s) orally once a day (before a meal)  Suboxone 8 mg-2 mg sublingual tablet: 2.5 film(s) sublingual once a day  Synthroid 150 mcg (0.15 mg) oral tablet: 1 tab(s) orally once a day   Synthroid 25 mcg (0.025 mg) oral tablet: 1 tab(s) orally once a day albuterol 90 mcg/inh inhalation powder: 2 puff(s) inhaled every 6 hours, As Needed -for bronchospasm   aspirin 81 mg oral delayed release tablet: 1 tab(s) orally once a day  budesonide-formoterol 160 mcg-4.5 mcg/inh inhalation aerosol: 2 puff(s) inhaled 2 times a day  FLUoxetine 40 mg oral capsule: 1 cap(s) orally once a day  ipratropium-albuterol 0.5 mg-2.5 mg/3 mLinhalation solution: 3 milliliter(s) by nebulizer every 4 hours, As Needed   levothyroxine 150 mcg (0.15 mg) oral tablet: 1 tab(s) orally once a day  liothyronine 25 mcg oral tablet: 1 tab at 6PM (7/13), followed by 1 tab 12AM  metFORMIN 500 mg oral tablet: 1 tab(s) orally 2 times a day  pantoprazole 40 mg oral delayed release tablet: 1 tab(s) orally once a day (before a meal)  Suboxone 8 mg-2 mg sublingual tablet: 2.5 film(s) sublingual once a day

## 2020-07-13 NOTE — PROGRESS NOTE ADULT - SUBJECTIVE AND OBJECTIVE BOX
Patient feels good, no cp no sob      T(F): 99 (07-13-20 @ 07:01), Max: 99 (07-13-20 @ 07:01)  HR: 90 (07-13-20 @ 07:15)  BP: 98/58 (07-13-20 @ 07:15)  RR: 18 (07-13-20 @ 07:01)  SpO2: 98% (07-12-20 @ 22:47) (98% - 98%)    PHYSICAL EXAM:  GENERAL: NAD  HEAD:  Atraumatic, Normocephalic  NERVOUS SYSTEM:  Alert & Oriented X3, no focal deficits   CHEST/LUNG: Clear to percussion bilaterally; No rales, rhonchi, wheezing, or rubs  HEART: Regular rate and rhythm; No murmurs, rubs, or gallops  ABDOMEN: Soft, Nontender, Nondistended; Bowel sounds present  EXTREMITIES:  2+ Peripheral Pulses, No clubbing, cyanosis, or edema  LYMPH: No lymphadenopathy noted  SKIN: No rashes or lesions    LABS  07-13    139  |  99  |  15  ----------------------------<  136<H>  3.7   |  25  |  1.0    Ca    9.1      13 Jul 2020 05:51  Phos  2.6     07-13  Mg     2.0     07-13    TPro  7.7  /  Alb  4.3  /  TBili  0.3  /  DBili  x   /  AST  57<H>  /  ALT  8   /  AlkPhos  74  07-12                          8.9    9.12  )-----------( 239      ( 13 Jul 2020 05:51 )             27.9         CARDIAC ENZYMES  Creatine Kinase, Serum: 917 (07-12 @ 05:46)  Creatine Kinase, Serum: 1207 (07-11 @ 06:01)    CKMB Units: 21.2 (07-12 @ 05:46)  CKMB Units: 24.3 (07-11 @ 06:01)    Troponin T, Serum: 0.09 ng/mL (07-12-20 @ 05:46)  Troponin T, Serum: 0.16 ng/mL (07-11-20 @ 06:01)    < from: 12 Lead ECG (07.11.20 @ 08:32) >    Diagnosis Line Normal sinus rhythm  Low voltage QRS  Nonspecific T wave abnormality  Abnormal ECG    < end of copied text >      RADIOLOGY  < from: VA Duplex Lower Ext Vein Scan, Bilat (07.08.20 @ 12:12) >  Impression:    No evidence of deep venous thrombosis in the bilateral lower extremities.      < end of copied text >    MEDICATIONS  (STANDING):  aspirin enteric coated 81 milliGRAM(s) Oral daily  bisacodyl 10 milliGRAM(s) Oral at bedtime  buprenorphine 8 mG/naloxone 2 mG SL  Tablet 2 Tablet(s) SubLingual daily  chlorhexidine 4% Liquid 1 Application(s) Topical every 12 hours  clopidogrel Tablet 75 milliGRAM(s) Oral daily  enoxaparin Injectable 40 milliGRAM(s) SubCutaneous daily  FLUoxetine 40 milliGRAM(s) Oral daily  levothyroxine 150 MICROGram(s) Oral daily  liothyronine 25 MICROGram(s) Oral <User Schedule>  metFORMIN 500 milliGRAM(s) Oral two times a day  pantoprazole    Tablet 40 milliGRAM(s) Oral before breakfast  senna 2 Tablet(s) Oral at bedtime    MEDICATIONS  (PRN):  acetaminophen   Tablet .. 650 milliGRAM(s) Oral every 6 hours PRN Temp greater or equal to 38C (100.4F)  ondansetron Injectable 4 milliGRAM(s) IV Push every 8 hours PRN Nausea and/or Vomiting

## 2020-07-13 NOTE — DISCHARGE NOTE PROVIDER - PROVIDER TOKENS
PROVIDER:[TOKEN:[[id number]]],PROVIDER:[TOKEN:[[id number]]],PROVIDER:[TOKEN:[[id number]]] PROVIDER:[TOKEN:[[id number]]],PROVIDER:[TOKEN:[[id number]]],PROVIDER:[TOKEN:[[id number]]],FREE:[LAST:[Lindsey],FIRST:[[place]],PHONE:[phone]],FAX:[(   )    -],ADDRESS:[[address]; [address]  [place] [address]],SCHEDULEDAPPT:[07/16/2020],SCHEDULEDAPPTTIME:[01:30 PM],ESTABLISHEDPATIENT:[T]]

## 2020-07-13 NOTE — PROGRESS NOTE ADULT - ASSESSMENT
50 Y/O F with a past medical history of COPD, hypothyroidism, substance abuse and cigarette use presented to the hospital c/o a few days of substernal pleuritic chest pain.     possible NSTEMI / Hyperlipidemia / severe hypothyroidism secondary to non compliance with meds       - I discussed case with cardiology, endocrinology   - I made pt a follow up appointment with PMD Dr Lindsey for this week   - compliance with medications and medical f/u stressed   -  aspirin, Lipitor, Lovenox   -  2DECHO is normal   -  urine tox screen positive for THC    - synthroid resumed   - home meds resumed   - doubt PE as d-dimer and doppler of LE is negative     - may DC home today 45min spent on DC

## 2020-07-13 NOTE — DISCHARGE NOTE PROVIDER - CARE PROVIDERS DIRECT ADDRESSES
,DirectAddress_Unknown,[email or web address],DirectAddress_Unknown ,DirectAddress_Unknown,[email or web address],DirectAddress_Unknown,DirectAddress_Unknown

## 2020-07-13 NOTE — PROGRESS NOTE ADULT - SUBJECTIVE AND OBJECTIVE BOX
Patient is a 49y old  Female who presents with a chief complaint of chest pain/ NSTEMI (12 Jul 2020 11:20)      T(F): 97.3 (07-12-20 @ 15:49), Max: 97.3 (07-12-20 @ 15:49)  HR: 90 (07-13-20 @ 05:51)  BP: 97/57 (07-13-20 @ 05:51)  RR: 19 (07-12-20 @ 22:47)  SpO2: 98% (07-12-20 @ 22:47) (98% - 99%)    PHYSICAL EXAM:  GENERAL: NAD, well-groomed, well-developed  HEAD:  Atraumatic, Normocephalic  EYES: EOMI, PERRLA, conjunctiva and sclera clear  ENMT: No tonsillar erythema, exudates, or enlargement; Moist mucous membranes, Good dentition, No lesions  NECK: Supple, No JVD, Normal thyroid  NERVOUS SYSTEM:  Alert & Oriented X3,  Motor Strength 5/5 B/L upper and lower extremities  CHEST/LUNG: Clear to percussion bilaterally; No rales, rhonchi, wheezing, or rubs  HEART: Regular rate and rhythm; No murmurs, rubs, or gallops  ABDOMEN: Soft, Nontender, Nondistended; Bowel sounds present  EXTREMITIES:   No clubbing, cyanosis, or edema  LYMPH: No lymphadenopathy noted  SKIN: No rashes or lesions    labs  07-12    136  |  96<L>  |  15  ----------------------------<  195<H>  4.6   |  27  |  1.4    Ca    9.1      12 Jul 2020 05:46  Mg     2.2     07-12    TPro  7.7  /  Alb  4.3  /  TBili  0.3  /  DBili  x   /  AST  57<H>  /  ALT  8   /  AlkPhos  74  07-12                          9.1    6.87  )-----------( 239      ( 12 Jul 2020 05:46 )             29.1               acetaminophen   Tablet .. 650 milliGRAM(s) Oral every 6 hours PRN  aspirin enteric coated 81 milliGRAM(s) Oral daily  bisacodyl 10 milliGRAM(s) Oral at bedtime  buprenorphine 8 mG/naloxone 2 mG SL  Tablet 2 Tablet(s) SubLingual daily  chlorhexidine 4% Liquid 1 Application(s) Topical every 12 hours  clopidogrel Tablet 75 milliGRAM(s) Oral daily  enoxaparin Injectable 40 milliGRAM(s) SubCutaneous daily  FLUoxetine 40 milliGRAM(s) Oral daily  levothyroxine 150 MICROGram(s) Oral daily  liothyronine 25 MICROGram(s) Oral <User Schedule>  metFORMIN 500 milliGRAM(s) Oral two times a day  ondansetron Injectable 4 milliGRAM(s) IV Push every 8 hours PRN  pantoprazole    Tablet 40 milliGRAM(s) Oral before breakfast  senna 2 Tablet(s) Oral at bedtime

## 2020-07-13 NOTE — PROGRESS NOTE ADULT - ASSESSMENT
No complaints. Bp better . Ambulating. Consider in 4- 6 weeks out patient dobutamine se or adenocard thallium

## 2020-07-13 NOTE — DISCHARGE NOTE PROVIDER - NSDCCPCAREPLAN_GEN_ALL_CORE_FT
PRINCIPAL DISCHARGE DIAGNOSIS  Diagnosis: Severe hypothyroidism  Assessment and Plan of Treatment: You presented with symnptoms of lethargy and chest pain. Altjhough your have risk factors for cardiovascular disease (Diabetes, Former smoker), it was determined that your symptoms were most likely due to your extremely low thyroid levels. You were restarted on your Synthroid. Please continue your medication daily and follow up with your PMD shortly after discharge. You should also make an appointment to follow up with Endocrinology to help manage your Thyroid Disorder.  -----  Please follow up with the auto-immune work up ordered that has not resulted by the time of your discharge.      SECONDARY DISCHARGE DIAGNOSES  Diagnosis: Chest pain  Assessment and Plan of Treatment: Although your chest pain was unlikely to be caused by a heart problem, we cannot rule out the possibility of underlying cardiovascular disease given your history of smoking and diabetes. Please follow up with Cardiology for a possible stress test and further evaluation within 6 weeks of discharge.    Diagnosis: Type 2 diabetes mellitus  Assessment and Plan of Treatment: You were previously treated for diabtes in the past with Metformin, and then changed to Insulin. The insulin was stopped due to hypoglycemic seizure. We have resumed Metformin 500mg two times daily as your Hemoglobin A1c was 6.7. Please follow up with your primary care doctor for continued management of your Diabetes.

## 2020-07-13 NOTE — DISCHARGE NOTE PROVIDER - HOSPITAL COURSE
This is a 49 year old female who presented to the ED with a cc/o CHEST PAIN. She described her pain as left-sided and non-radiating. Upon admission, her CE were elevated, however, they remained elevated and stable with non-specific EKG changes; TTE unremarkable. She was also found     to have profound hypothyroidism. She was evaluated by Cardiology and Endocrine, and it was determine that her symptoms and lab findings were likely due to her hypothyroidism. She improved with resuming her Synthroid that she was not taking, as well as with a short course of Cytomel. She is to be discharge with Endocrine and PMD follow up. This is a 49 year old female who presented to the ED with a cc/o CHEST PAIN. She described her pain as left-sided and non-radiating. Upon admission, her CE were elevated, however, they remained elevated and stable with non-specific EKG changes; TTE unremarkable. She was also found to have profound hypothyroidism. She was evaluated by Cardiology and Endocrine, and it was determine that her symptoms and lab findings were likely due to her hypothyroidism. She improved with resuming her Synthroid that she was not taking, as well as with a short course of Cytomel. She is to be discharge with Endocrine and PMD follow up.

## 2020-07-13 NOTE — PROGRESS NOTE ADULT - REASON FOR ADMISSION
chest pain/ NSTEMI
Chest pain
NSTEMI
chest pain /  NSTEMI
chest pain/ NSTEMI

## 2020-07-14 LAB
ANA TITR SER: NEGATIVE — SIGNIFICANT CHANGE UP
HLA-B27 QL: SIGNIFICANT CHANGE UP

## 2020-07-15 ENCOUNTER — OUTPATIENT (OUTPATIENT)
Dept: OUTPATIENT SERVICES | Facility: HOSPITAL | Age: 50
LOS: 1 days | Discharge: HOME | End: 2020-07-15
Payer: MEDICAID

## 2020-07-15 DIAGNOSIS — F11.20 OPIOID DEPENDENCE, UNCOMPLICATED: ICD-10-CM

## 2020-07-15 DIAGNOSIS — Z90.49 ACQUIRED ABSENCE OF OTHER SPECIFIED PARTS OF DIGESTIVE TRACT: Chronic | ICD-10-CM

## 2020-07-15 DIAGNOSIS — Z90.89 ACQUIRED ABSENCE OF OTHER ORGANS: Chronic | ICD-10-CM

## 2020-07-15 PROCEDURE — 99443: CPT

## 2020-07-16 ENCOUNTER — APPOINTMENT (OUTPATIENT)
Dept: INTERNAL MEDICINE | Facility: CLINIC | Age: 50
End: 2020-07-16

## 2020-07-16 LAB
CARBOXYTHC UR CFM-MCNC: 5071 NG/ML — SIGNIFICANT CHANGE UP
CARBOXYTHC UR QL SCN: 140.9 MG/DL — SIGNIFICANT CHANGE UP
CARBOXYTHC UR QL SCN: 3599 — SIGNIFICANT CHANGE UP
DRVVT SCREEN TO CONFIRM RATIO: SIGNIFICANT CHANGE UP
LA NT DPL PPP QL: SIGNIFICANT CHANGE UP
NORMALIZED SCT PPP-RTO: 0.77 RATIO — SIGNIFICANT CHANGE UP (ref 0–1.16)
NORMALIZED SCT PPP-RTO: SIGNIFICANT CHANGE UP
THC CREATININE URINE: 140.9 MG/DL — SIGNIFICANT CHANGE UP
THC METABOLITE/CREAT URINE: 3599 — SIGNIFICANT CHANGE UP

## 2020-07-17 LAB
CULTURE RESULTS: SIGNIFICANT CHANGE UP
SPECIMEN SOURCE: SIGNIFICANT CHANGE UP

## 2020-07-20 DIAGNOSIS — M70.31 OTHER BURSITIS OF ELBOW, RIGHT ELBOW: ICD-10-CM

## 2020-07-20 DIAGNOSIS — R79.89 OTHER SPECIFIED ABNORMAL FINDINGS OF BLOOD CHEMISTRY: ICD-10-CM

## 2020-07-20 DIAGNOSIS — T38.1X6A UNDERDOSING OF THYROID HORMONES AND SUBSTITUTES, INITIAL ENCOUNTER: ICD-10-CM

## 2020-07-20 DIAGNOSIS — Z87.891 PERSONAL HISTORY OF NICOTINE DEPENDENCE: ICD-10-CM

## 2020-07-20 DIAGNOSIS — Z87.898 PERSONAL HISTORY OF OTHER SPECIFIED CONDITIONS: ICD-10-CM

## 2020-07-20 DIAGNOSIS — J44.9 CHRONIC OBSTRUCTIVE PULMONARY DISEASE, UNSPECIFIED: ICD-10-CM

## 2020-07-20 DIAGNOSIS — T38.0X5A ADVERSE EFFECT OF GLUCOCORTICOIDS AND SYNTHETIC ANALOGUES, INITIAL ENCOUNTER: ICD-10-CM

## 2020-07-20 DIAGNOSIS — Z79.899 OTHER LONG TERM (CURRENT) DRUG THERAPY: ICD-10-CM

## 2020-07-20 DIAGNOSIS — E78.00 PURE HYPERCHOLESTEROLEMIA, UNSPECIFIED: ICD-10-CM

## 2020-07-20 DIAGNOSIS — R07.81 PLEURODYNIA: ICD-10-CM

## 2020-07-20 DIAGNOSIS — D63.8 ANEMIA IN OTHER CHRONIC DISEASES CLASSIFIED ELSEWHERE: ICD-10-CM

## 2020-07-20 DIAGNOSIS — Z91.128 PATIENT'S INTENTIONAL UNDERDOSING OF MEDICATION REGIMEN FOR OTHER REASON: ICD-10-CM

## 2020-07-20 DIAGNOSIS — M70.32 OTHER BURSITIS OF ELBOW, LEFT ELBOW: ICD-10-CM

## 2020-07-20 DIAGNOSIS — T38.3X6A UNDERDOSING OF INSULIN AND ORAL HYPOGLYCEMIC [ANTIDIABETIC] DRUGS, INITIAL ENCOUNTER: ICD-10-CM

## 2020-07-20 DIAGNOSIS — E03.9 HYPOTHYROIDISM, UNSPECIFIED: ICD-10-CM

## 2020-07-20 DIAGNOSIS — F32.9 MAJOR DEPRESSIVE DISORDER, SINGLE EPISODE, UNSPECIFIED: ICD-10-CM

## 2020-07-20 DIAGNOSIS — E11.65 TYPE 2 DIABETES MELLITUS WITH HYPERGLYCEMIA: ICD-10-CM

## 2020-07-20 DIAGNOSIS — Z79.51 LONG TERM (CURRENT) USE OF INHALED STEROIDS: ICD-10-CM

## 2020-07-22 ENCOUNTER — OUTPATIENT (OUTPATIENT)
Dept: OUTPATIENT SERVICES | Facility: HOSPITAL | Age: 50
LOS: 1 days | Discharge: HOME | End: 2020-07-22

## 2020-07-22 DIAGNOSIS — F11.20 OPIOID DEPENDENCE, UNCOMPLICATED: ICD-10-CM

## 2020-07-22 DIAGNOSIS — Z90.89 ACQUIRED ABSENCE OF OTHER ORGANS: Chronic | ICD-10-CM

## 2020-07-22 DIAGNOSIS — Z90.49 ACQUIRED ABSENCE OF OTHER SPECIFIED PARTS OF DIGESTIVE TRACT: Chronic | ICD-10-CM

## 2020-07-28 ENCOUNTER — OUTPATIENT (OUTPATIENT)
Dept: OUTPATIENT SERVICES | Facility: HOSPITAL | Age: 50
LOS: 1 days | Discharge: HOME | End: 2020-07-28
Payer: MEDICAID

## 2020-07-28 DIAGNOSIS — F11.20 OPIOID DEPENDENCE, UNCOMPLICATED: ICD-10-CM

## 2020-07-28 DIAGNOSIS — Z90.89 ACQUIRED ABSENCE OF OTHER ORGANS: Chronic | ICD-10-CM

## 2020-07-28 DIAGNOSIS — Z90.49 ACQUIRED ABSENCE OF OTHER SPECIFIED PARTS OF DIGESTIVE TRACT: Chronic | ICD-10-CM

## 2020-07-28 PROBLEM — F19.11 OTHER PSYCHOACTIVE SUBSTANCE ABUSE, IN REMISSION: Chronic | Status: ACTIVE | Noted: 2019-07-11

## 2020-07-28 PROCEDURE — 99443: CPT

## 2020-07-29 NOTE — ED PROVIDER NOTE - PHYSICAL EXAMINATION
Discussed lid hygiene, warm compress and eyelid wash. CONSTITUTIONAL: Well-developed; well-nourished; in no acute distress.   SKIN: warm, dry.  HEAD: Normocephalic; atraumatic.  EYES: PERRL, EOMI, no conjunctival erythema.  ENMT: No nasal discharge; airway clear. Floor of mouth is not raised; non tender, soft. poor dentician without abscesses.   NECK: Supple; non tender. 3x4 cm firm, minimally tender, mobile mass in the submental region slightly left of midline.   CARD: S1, S2 normal; no murmurs, gallops, or rubs. Regular rate and rhythm.   RESP: No wheezes, rales or rhonchi.  ABD: soft ntnd  EXT: Normal ROM.  No clubbing, cyanosis or edema.   NEURO: Alert, oriented, grossly unremarkable  PSYCH: Cooperative, appropriate.

## 2020-08-19 ENCOUNTER — OUTPATIENT (OUTPATIENT)
Dept: OUTPATIENT SERVICES | Facility: HOSPITAL | Age: 50
LOS: 1 days | Discharge: HOME | End: 2020-08-19

## 2020-08-19 DIAGNOSIS — F11.20 OPIOID DEPENDENCE, UNCOMPLICATED: ICD-10-CM

## 2020-08-19 DIAGNOSIS — Z90.49 ACQUIRED ABSENCE OF OTHER SPECIFIED PARTS OF DIGESTIVE TRACT: Chronic | ICD-10-CM

## 2020-08-19 DIAGNOSIS — Z90.89 ACQUIRED ABSENCE OF OTHER ORGANS: Chronic | ICD-10-CM

## 2020-09-08 ENCOUNTER — OUTPATIENT (OUTPATIENT)
Dept: OUTPATIENT SERVICES | Facility: HOSPITAL | Age: 50
LOS: 1 days | Discharge: HOME | End: 2020-09-08

## 2020-09-08 DIAGNOSIS — Z90.89 ACQUIRED ABSENCE OF OTHER ORGANS: Chronic | ICD-10-CM

## 2020-09-08 DIAGNOSIS — F11.20 OPIOID DEPENDENCE, UNCOMPLICATED: ICD-10-CM

## 2020-09-08 DIAGNOSIS — Z90.49 ACQUIRED ABSENCE OF OTHER SPECIFIED PARTS OF DIGESTIVE TRACT: Chronic | ICD-10-CM

## 2020-09-18 ENCOUNTER — OUTPATIENT (OUTPATIENT)
Dept: OUTPATIENT SERVICES | Facility: HOSPITAL | Age: 50
LOS: 1 days | Discharge: HOME | End: 2020-09-18

## 2020-09-18 DIAGNOSIS — Z90.49 ACQUIRED ABSENCE OF OTHER SPECIFIED PARTS OF DIGESTIVE TRACT: Chronic | ICD-10-CM

## 2020-09-18 DIAGNOSIS — F11.20 OPIOID DEPENDENCE, UNCOMPLICATED: ICD-10-CM

## 2020-09-18 DIAGNOSIS — Z90.89 ACQUIRED ABSENCE OF OTHER ORGANS: Chronic | ICD-10-CM

## 2020-10-19 ENCOUNTER — OUTPATIENT (OUTPATIENT)
Dept: OUTPATIENT SERVICES | Facility: HOSPITAL | Age: 50
LOS: 1 days | Discharge: HOME | End: 2020-10-19

## 2020-10-19 DIAGNOSIS — Z90.49 ACQUIRED ABSENCE OF OTHER SPECIFIED PARTS OF DIGESTIVE TRACT: Chronic | ICD-10-CM

## 2020-10-19 DIAGNOSIS — F11.20 OPIOID DEPENDENCE, UNCOMPLICATED: ICD-10-CM

## 2020-10-19 DIAGNOSIS — Z90.89 ACQUIRED ABSENCE OF OTHER ORGANS: Chronic | ICD-10-CM

## 2020-10-20 ENCOUNTER — OUTPATIENT (OUTPATIENT)
Dept: OUTPATIENT SERVICES | Facility: HOSPITAL | Age: 50
LOS: 1 days | Discharge: HOME | End: 2020-10-20

## 2020-10-20 DIAGNOSIS — Z90.49 ACQUIRED ABSENCE OF OTHER SPECIFIED PARTS OF DIGESTIVE TRACT: Chronic | ICD-10-CM

## 2020-10-20 DIAGNOSIS — Z90.89 ACQUIRED ABSENCE OF OTHER ORGANS: Chronic | ICD-10-CM

## 2020-10-20 DIAGNOSIS — F11.20 OPIOID DEPENDENCE, UNCOMPLICATED: ICD-10-CM

## 2020-11-03 ENCOUNTER — OUTPATIENT (OUTPATIENT)
Dept: OUTPATIENT SERVICES | Facility: HOSPITAL | Age: 50
LOS: 1 days | Discharge: HOME | End: 2020-11-03

## 2020-11-03 DIAGNOSIS — F11.20 OPIOID DEPENDENCE, UNCOMPLICATED: ICD-10-CM

## 2020-11-03 DIAGNOSIS — Z90.89 ACQUIRED ABSENCE OF OTHER ORGANS: Chronic | ICD-10-CM

## 2020-11-03 DIAGNOSIS — Z90.49 ACQUIRED ABSENCE OF OTHER SPECIFIED PARTS OF DIGESTIVE TRACT: Chronic | ICD-10-CM

## 2020-11-18 ENCOUNTER — OUTPATIENT (OUTPATIENT)
Dept: OUTPATIENT SERVICES | Facility: HOSPITAL | Age: 50
LOS: 1 days | Discharge: HOME | End: 2020-11-18
Payer: MEDICAID

## 2020-11-18 ENCOUNTER — APPOINTMENT (OUTPATIENT)
Dept: PSYCHIATRY | Facility: CLINIC | Age: 50
End: 2020-11-18

## 2020-11-18 DIAGNOSIS — F11.20 OPIOID DEPENDENCE, UNCOMPLICATED: ICD-10-CM

## 2020-11-18 DIAGNOSIS — Z90.89 ACQUIRED ABSENCE OF OTHER ORGANS: Chronic | ICD-10-CM

## 2020-11-18 DIAGNOSIS — Z90.49 ACQUIRED ABSENCE OF OTHER SPECIFIED PARTS OF DIGESTIVE TRACT: Chronic | ICD-10-CM

## 2020-11-18 PROCEDURE — 99443: CPT

## 2020-12-09 ENCOUNTER — OUTPATIENT (OUTPATIENT)
Dept: OUTPATIENT SERVICES | Facility: HOSPITAL | Age: 50
LOS: 1 days | Discharge: HOME | End: 2020-12-09

## 2020-12-09 DIAGNOSIS — Z90.89 ACQUIRED ABSENCE OF OTHER ORGANS: Chronic | ICD-10-CM

## 2020-12-09 DIAGNOSIS — F11.20 OPIOID DEPENDENCE, UNCOMPLICATED: ICD-10-CM

## 2020-12-09 DIAGNOSIS — Z90.49 ACQUIRED ABSENCE OF OTHER SPECIFIED PARTS OF DIGESTIVE TRACT: Chronic | ICD-10-CM

## 2020-12-10 ENCOUNTER — APPOINTMENT (OUTPATIENT)
Dept: PSYCHIATRY | Facility: CLINIC | Age: 50
End: 2020-12-10

## 2020-12-16 ENCOUNTER — APPOINTMENT (OUTPATIENT)
Dept: PSYCHIATRY | Facility: CLINIC | Age: 50
End: 2020-12-16

## 2020-12-16 ENCOUNTER — OUTPATIENT (OUTPATIENT)
Dept: OUTPATIENT SERVICES | Facility: HOSPITAL | Age: 50
LOS: 1 days | Discharge: HOME | End: 2020-12-16

## 2020-12-16 ENCOUNTER — OUTPATIENT (OUTPATIENT)
Dept: OUTPATIENT SERVICES | Facility: HOSPITAL | Age: 50
LOS: 1 days | Discharge: HOME | End: 2020-12-16
Payer: COMMERCIAL

## 2020-12-16 DIAGNOSIS — Z90.89 ACQUIRED ABSENCE OF OTHER ORGANS: Chronic | ICD-10-CM

## 2020-12-16 DIAGNOSIS — F11.20 OPIOID DEPENDENCE, UNCOMPLICATED: ICD-10-CM

## 2020-12-16 DIAGNOSIS — Z90.49 ACQUIRED ABSENCE OF OTHER SPECIFIED PARTS OF DIGESTIVE TRACT: Chronic | ICD-10-CM

## 2020-12-16 PROCEDURE — 99443: CPT

## 2020-12-24 ENCOUNTER — OUTPATIENT (OUTPATIENT)
Dept: OUTPATIENT SERVICES | Facility: HOSPITAL | Age: 50
LOS: 1 days | Discharge: HOME | End: 2020-12-24

## 2020-12-24 DIAGNOSIS — F11.20 OPIOID DEPENDENCE, UNCOMPLICATED: ICD-10-CM

## 2020-12-24 DIAGNOSIS — Z90.49 ACQUIRED ABSENCE OF OTHER SPECIFIED PARTS OF DIGESTIVE TRACT: Chronic | ICD-10-CM

## 2020-12-24 DIAGNOSIS — Z90.89 ACQUIRED ABSENCE OF OTHER ORGANS: Chronic | ICD-10-CM

## 2020-12-31 ENCOUNTER — OUTPATIENT (OUTPATIENT)
Dept: OUTPATIENT SERVICES | Facility: HOSPITAL | Age: 50
LOS: 1 days | Discharge: HOME | End: 2020-12-31

## 2020-12-31 DIAGNOSIS — Z90.49 ACQUIRED ABSENCE OF OTHER SPECIFIED PARTS OF DIGESTIVE TRACT: Chronic | ICD-10-CM

## 2020-12-31 DIAGNOSIS — F11.20 OPIOID DEPENDENCE, UNCOMPLICATED: ICD-10-CM

## 2020-12-31 DIAGNOSIS — Z90.89 ACQUIRED ABSENCE OF OTHER ORGANS: Chronic | ICD-10-CM

## 2021-01-12 ENCOUNTER — APPOINTMENT (OUTPATIENT)
Dept: PSYCHIATRY | Facility: CLINIC | Age: 51
End: 2021-01-12

## 2021-01-12 ENCOUNTER — OUTPATIENT (OUTPATIENT)
Dept: OUTPATIENT SERVICES | Facility: HOSPITAL | Age: 51
LOS: 1 days | Discharge: HOME | End: 2021-01-12
Payer: COMMERCIAL

## 2021-01-12 DIAGNOSIS — F11.20 OPIOID DEPENDENCE, UNCOMPLICATED: ICD-10-CM

## 2021-01-12 DIAGNOSIS — Z90.89 ACQUIRED ABSENCE OF OTHER ORGANS: Chronic | ICD-10-CM

## 2021-01-12 DIAGNOSIS — Z90.49 ACQUIRED ABSENCE OF OTHER SPECIFIED PARTS OF DIGESTIVE TRACT: Chronic | ICD-10-CM

## 2021-01-12 PROCEDURE — 99443: CPT

## 2021-01-21 ENCOUNTER — OUTPATIENT (OUTPATIENT)
Dept: OUTPATIENT SERVICES | Facility: HOSPITAL | Age: 51
LOS: 1 days | Discharge: HOME | End: 2021-01-21

## 2021-01-21 DIAGNOSIS — Z90.49 ACQUIRED ABSENCE OF OTHER SPECIFIED PARTS OF DIGESTIVE TRACT: Chronic | ICD-10-CM

## 2021-01-21 DIAGNOSIS — F11.20 OPIOID DEPENDENCE, UNCOMPLICATED: ICD-10-CM

## 2021-01-21 DIAGNOSIS — Z90.89 ACQUIRED ABSENCE OF OTHER ORGANS: Chronic | ICD-10-CM

## 2021-02-03 ENCOUNTER — OUTPATIENT (OUTPATIENT)
Dept: OUTPATIENT SERVICES | Facility: HOSPITAL | Age: 51
LOS: 1 days | Discharge: HOME | End: 2021-02-03

## 2021-02-03 DIAGNOSIS — Z90.89 ACQUIRED ABSENCE OF OTHER ORGANS: Chronic | ICD-10-CM

## 2021-02-03 DIAGNOSIS — F11.20 OPIOID DEPENDENCE, UNCOMPLICATED: ICD-10-CM

## 2021-02-03 DIAGNOSIS — Z90.49 ACQUIRED ABSENCE OF OTHER SPECIFIED PARTS OF DIGESTIVE TRACT: Chronic | ICD-10-CM

## 2021-02-08 ENCOUNTER — APPOINTMENT (OUTPATIENT)
Dept: PSYCHIATRY | Facility: CLINIC | Age: 51
End: 2021-02-08

## 2021-02-09 ENCOUNTER — OUTPATIENT (OUTPATIENT)
Dept: OUTPATIENT SERVICES | Facility: HOSPITAL | Age: 51
LOS: 1 days | Discharge: HOME | End: 2021-02-09
Payer: COMMERCIAL

## 2021-02-09 ENCOUNTER — APPOINTMENT (OUTPATIENT)
Dept: PSYCHIATRY | Facility: CLINIC | Age: 51
End: 2021-02-09

## 2021-02-09 DIAGNOSIS — Z90.89 ACQUIRED ABSENCE OF OTHER ORGANS: Chronic | ICD-10-CM

## 2021-02-09 DIAGNOSIS — F11.20 OPIOID DEPENDENCE, UNCOMPLICATED: ICD-10-CM

## 2021-02-09 DIAGNOSIS — Z90.49 ACQUIRED ABSENCE OF OTHER SPECIFIED PARTS OF DIGESTIVE TRACT: Chronic | ICD-10-CM

## 2021-02-09 PROCEDURE — 99212 OFFICE O/P EST SF 10 MIN: CPT

## 2021-02-17 ENCOUNTER — OUTPATIENT (OUTPATIENT)
Dept: OUTPATIENT SERVICES | Facility: HOSPITAL | Age: 51
LOS: 1 days | Discharge: HOME | End: 2021-02-17

## 2021-02-17 DIAGNOSIS — Z90.49 ACQUIRED ABSENCE OF OTHER SPECIFIED PARTS OF DIGESTIVE TRACT: Chronic | ICD-10-CM

## 2021-02-17 DIAGNOSIS — F11.20 OPIOID DEPENDENCE, UNCOMPLICATED: ICD-10-CM

## 2021-02-17 DIAGNOSIS — Z90.89 ACQUIRED ABSENCE OF OTHER ORGANS: Chronic | ICD-10-CM

## 2021-03-01 ENCOUNTER — OUTPATIENT (OUTPATIENT)
Dept: OUTPATIENT SERVICES | Facility: HOSPITAL | Age: 51
LOS: 1 days | Discharge: HOME | End: 2021-03-01
Payer: COMMERCIAL

## 2021-03-01 ENCOUNTER — APPOINTMENT (OUTPATIENT)
Dept: PSYCHIATRY | Facility: CLINIC | Age: 51
End: 2021-03-01

## 2021-03-01 DIAGNOSIS — F11.20 OPIOID DEPENDENCE, UNCOMPLICATED: ICD-10-CM

## 2021-03-01 DIAGNOSIS — Z90.49 ACQUIRED ABSENCE OF OTHER SPECIFIED PARTS OF DIGESTIVE TRACT: Chronic | ICD-10-CM

## 2021-03-01 DIAGNOSIS — Z90.89 ACQUIRED ABSENCE OF OTHER ORGANS: Chronic | ICD-10-CM

## 2021-03-01 PROCEDURE — 99443: CPT

## 2021-03-10 ENCOUNTER — OUTPATIENT (OUTPATIENT)
Dept: OUTPATIENT SERVICES | Facility: HOSPITAL | Age: 51
LOS: 1 days | Discharge: HOME | End: 2021-03-10

## 2021-03-10 DIAGNOSIS — Z90.49 ACQUIRED ABSENCE OF OTHER SPECIFIED PARTS OF DIGESTIVE TRACT: Chronic | ICD-10-CM

## 2021-03-10 DIAGNOSIS — Z90.89 ACQUIRED ABSENCE OF OTHER ORGANS: Chronic | ICD-10-CM

## 2021-03-10 DIAGNOSIS — F11.20 OPIOID DEPENDENCE, UNCOMPLICATED: ICD-10-CM

## 2021-03-30 ENCOUNTER — APPOINTMENT (OUTPATIENT)
Dept: PSYCHIATRY | Facility: CLINIC | Age: 51
End: 2021-03-30

## 2021-03-30 ENCOUNTER — OUTPATIENT (OUTPATIENT)
Dept: OUTPATIENT SERVICES | Facility: HOSPITAL | Age: 51
LOS: 1 days | Discharge: HOME | End: 2021-03-30
Payer: MEDICAID

## 2021-03-30 DIAGNOSIS — F11.20 OPIOID DEPENDENCE, UNCOMPLICATED: ICD-10-CM

## 2021-03-30 DIAGNOSIS — Z90.89 ACQUIRED ABSENCE OF OTHER ORGANS: Chronic | ICD-10-CM

## 2021-03-30 DIAGNOSIS — Z90.49 ACQUIRED ABSENCE OF OTHER SPECIFIED PARTS OF DIGESTIVE TRACT: Chronic | ICD-10-CM

## 2021-03-30 PROCEDURE — 99443: CPT

## 2021-03-31 ENCOUNTER — OUTPATIENT (OUTPATIENT)
Dept: OUTPATIENT SERVICES | Facility: HOSPITAL | Age: 51
LOS: 1 days | Discharge: HOME | End: 2021-03-31

## 2021-03-31 DIAGNOSIS — Z90.49 ACQUIRED ABSENCE OF OTHER SPECIFIED PARTS OF DIGESTIVE TRACT: Chronic | ICD-10-CM

## 2021-03-31 DIAGNOSIS — Z90.89 ACQUIRED ABSENCE OF OTHER ORGANS: Chronic | ICD-10-CM

## 2021-03-31 DIAGNOSIS — F11.20 OPIOID DEPENDENCE, UNCOMPLICATED: ICD-10-CM

## 2021-04-09 ENCOUNTER — APPOINTMENT (OUTPATIENT)
Dept: PSYCHIATRY | Facility: CLINIC | Age: 51
End: 2021-04-09

## 2021-04-14 ENCOUNTER — APPOINTMENT (OUTPATIENT)
Dept: PSYCHIATRY | Facility: CLINIC | Age: 51
End: 2021-04-14

## 2021-04-14 ENCOUNTER — OUTPATIENT (OUTPATIENT)
Dept: OUTPATIENT SERVICES | Facility: HOSPITAL | Age: 51
LOS: 1 days | Discharge: HOME | End: 2021-04-14

## 2021-04-14 DIAGNOSIS — Z90.89 ACQUIRED ABSENCE OF OTHER ORGANS: Chronic | ICD-10-CM

## 2021-04-14 DIAGNOSIS — F11.20 OPIOID DEPENDENCE, UNCOMPLICATED: ICD-10-CM

## 2021-04-14 DIAGNOSIS — Z90.49 ACQUIRED ABSENCE OF OTHER SPECIFIED PARTS OF DIGESTIVE TRACT: Chronic | ICD-10-CM

## 2021-04-27 ENCOUNTER — APPOINTMENT (OUTPATIENT)
Dept: PSYCHIATRY | Facility: CLINIC | Age: 51
End: 2021-04-27

## 2021-04-27 ENCOUNTER — OUTPATIENT (OUTPATIENT)
Dept: OUTPATIENT SERVICES | Facility: HOSPITAL | Age: 51
LOS: 1 days | Discharge: HOME | End: 2021-04-27
Payer: MEDICAID

## 2021-04-27 DIAGNOSIS — F11.20 OPIOID DEPENDENCE, UNCOMPLICATED: ICD-10-CM

## 2021-04-27 DIAGNOSIS — Z90.49 ACQUIRED ABSENCE OF OTHER SPECIFIED PARTS OF DIGESTIVE TRACT: Chronic | ICD-10-CM

## 2021-04-27 DIAGNOSIS — Z90.89 ACQUIRED ABSENCE OF OTHER ORGANS: Chronic | ICD-10-CM

## 2021-04-27 PROCEDURE — 99442: CPT

## 2021-04-29 ENCOUNTER — OUTPATIENT (OUTPATIENT)
Dept: OUTPATIENT SERVICES | Facility: HOSPITAL | Age: 51
LOS: 1 days | Discharge: HOME | End: 2021-04-29

## 2021-04-29 ENCOUNTER — APPOINTMENT (OUTPATIENT)
Dept: PSYCHIATRY | Facility: CLINIC | Age: 51
End: 2021-04-29

## 2021-04-29 DIAGNOSIS — Z90.89 ACQUIRED ABSENCE OF OTHER ORGANS: Chronic | ICD-10-CM

## 2021-04-29 DIAGNOSIS — Z90.49 ACQUIRED ABSENCE OF OTHER SPECIFIED PARTS OF DIGESTIVE TRACT: Chronic | ICD-10-CM

## 2021-04-29 DIAGNOSIS — F11.20 OPIOID DEPENDENCE, UNCOMPLICATED: ICD-10-CM

## 2021-05-12 ENCOUNTER — APPOINTMENT (OUTPATIENT)
Dept: PSYCHIATRY | Facility: CLINIC | Age: 51
End: 2021-05-12

## 2021-05-13 ENCOUNTER — OUTPATIENT (OUTPATIENT)
Dept: OUTPATIENT SERVICES | Facility: HOSPITAL | Age: 51
LOS: 1 days | Discharge: HOME | End: 2021-05-13

## 2021-05-13 ENCOUNTER — APPOINTMENT (OUTPATIENT)
Dept: PSYCHIATRY | Facility: CLINIC | Age: 51
End: 2021-05-13

## 2021-05-13 DIAGNOSIS — F11.20 OPIOID DEPENDENCE, UNCOMPLICATED: ICD-10-CM

## 2021-05-13 DIAGNOSIS — Z90.89 ACQUIRED ABSENCE OF OTHER ORGANS: Chronic | ICD-10-CM

## 2021-05-13 DIAGNOSIS — Z90.49 ACQUIRED ABSENCE OF OTHER SPECIFIED PARTS OF DIGESTIVE TRACT: Chronic | ICD-10-CM

## 2021-05-18 ENCOUNTER — OUTPATIENT (OUTPATIENT)
Dept: OUTPATIENT SERVICES | Facility: HOSPITAL | Age: 51
LOS: 1 days | Discharge: HOME | End: 2021-05-18

## 2021-05-18 ENCOUNTER — APPOINTMENT (OUTPATIENT)
Dept: PSYCHIATRY | Facility: CLINIC | Age: 51
End: 2021-05-18

## 2021-05-18 DIAGNOSIS — Z90.89 ACQUIRED ABSENCE OF OTHER ORGANS: Chronic | ICD-10-CM

## 2021-05-18 DIAGNOSIS — F11.20 OPIOID DEPENDENCE, UNCOMPLICATED: ICD-10-CM

## 2021-05-18 DIAGNOSIS — Z90.49 ACQUIRED ABSENCE OF OTHER SPECIFIED PARTS OF DIGESTIVE TRACT: Chronic | ICD-10-CM

## 2021-05-25 ENCOUNTER — OUTPATIENT (OUTPATIENT)
Dept: OUTPATIENT SERVICES | Facility: HOSPITAL | Age: 51
LOS: 1 days | Discharge: HOME | End: 2021-05-25
Payer: MEDICAID

## 2021-05-25 ENCOUNTER — APPOINTMENT (OUTPATIENT)
Dept: PSYCHIATRY | Facility: CLINIC | Age: 51
End: 2021-05-25

## 2021-05-25 DIAGNOSIS — Z90.89 ACQUIRED ABSENCE OF OTHER ORGANS: Chronic | ICD-10-CM

## 2021-05-25 DIAGNOSIS — Z90.49 ACQUIRED ABSENCE OF OTHER SPECIFIED PARTS OF DIGESTIVE TRACT: Chronic | ICD-10-CM

## 2021-05-25 DIAGNOSIS — F11.20 OPIOID DEPENDENCE, UNCOMPLICATED: ICD-10-CM

## 2021-05-25 PROCEDURE — 99442: CPT

## 2021-06-09 ENCOUNTER — APPOINTMENT (OUTPATIENT)
Dept: PSYCHIATRY | Facility: CLINIC | Age: 51
End: 2021-06-09

## 2021-06-09 ENCOUNTER — OUTPATIENT (OUTPATIENT)
Dept: OUTPATIENT SERVICES | Facility: HOSPITAL | Age: 51
LOS: 1 days | Discharge: HOME | End: 2021-06-09

## 2021-06-09 DIAGNOSIS — Z90.89 ACQUIRED ABSENCE OF OTHER ORGANS: Chronic | ICD-10-CM

## 2021-06-09 DIAGNOSIS — F11.20 OPIOID DEPENDENCE, UNCOMPLICATED: ICD-10-CM

## 2021-06-09 DIAGNOSIS — Z90.49 ACQUIRED ABSENCE OF OTHER SPECIFIED PARTS OF DIGESTIVE TRACT: Chronic | ICD-10-CM

## 2021-06-21 ENCOUNTER — APPOINTMENT (OUTPATIENT)
Dept: PSYCHIATRY | Facility: CLINIC | Age: 51
End: 2021-06-21

## 2021-06-23 ENCOUNTER — APPOINTMENT (OUTPATIENT)
Dept: PSYCHIATRY | Facility: CLINIC | Age: 51
End: 2021-06-23

## 2021-06-30 ENCOUNTER — APPOINTMENT (OUTPATIENT)
Dept: PSYCHIATRY | Facility: CLINIC | Age: 51
End: 2021-06-30

## 2021-06-30 ENCOUNTER — OUTPATIENT (OUTPATIENT)
Dept: OUTPATIENT SERVICES | Facility: HOSPITAL | Age: 51
LOS: 1 days | Discharge: HOME | End: 2021-06-30
Payer: MEDICAID

## 2021-06-30 DIAGNOSIS — F11.20 OPIOID DEPENDENCE, UNCOMPLICATED: ICD-10-CM

## 2021-06-30 DIAGNOSIS — Z90.49 ACQUIRED ABSENCE OF OTHER SPECIFIED PARTS OF DIGESTIVE TRACT: Chronic | ICD-10-CM

## 2021-06-30 DIAGNOSIS — Z90.89 ACQUIRED ABSENCE OF OTHER ORGANS: Chronic | ICD-10-CM

## 2021-06-30 PROCEDURE — 99214 OFFICE O/P EST MOD 30 MIN: CPT | Mod: 95

## 2021-07-28 ENCOUNTER — OUTPATIENT (OUTPATIENT)
Dept: OUTPATIENT SERVICES | Facility: HOSPITAL | Age: 51
LOS: 1 days | Discharge: HOME | End: 2021-07-28
Payer: MEDICAID

## 2021-07-28 ENCOUNTER — APPOINTMENT (OUTPATIENT)
Dept: PSYCHIATRY | Facility: CLINIC | Age: 51
End: 2021-07-28

## 2021-07-28 DIAGNOSIS — F11.20 OPIOID DEPENDENCE, UNCOMPLICATED: ICD-10-CM

## 2021-07-28 DIAGNOSIS — Z90.49 ACQUIRED ABSENCE OF OTHER SPECIFIED PARTS OF DIGESTIVE TRACT: Chronic | ICD-10-CM

## 2021-07-28 DIAGNOSIS — Z90.89 ACQUIRED ABSENCE OF OTHER ORGANS: Chronic | ICD-10-CM

## 2021-07-28 PROCEDURE — 99214 OFFICE O/P EST MOD 30 MIN: CPT

## 2021-08-27 ENCOUNTER — OUTPATIENT (OUTPATIENT)
Dept: OUTPATIENT SERVICES | Facility: HOSPITAL | Age: 51
LOS: 1 days | Discharge: HOME | End: 2021-08-27

## 2021-08-27 ENCOUNTER — APPOINTMENT (OUTPATIENT)
Dept: PSYCHIATRY | Facility: CLINIC | Age: 51
End: 2021-08-27

## 2021-08-27 DIAGNOSIS — F11.20 OPIOID DEPENDENCE, UNCOMPLICATED: ICD-10-CM

## 2021-08-27 DIAGNOSIS — Z90.89 ACQUIRED ABSENCE OF OTHER ORGANS: Chronic | ICD-10-CM

## 2021-08-27 DIAGNOSIS — Z90.49 ACQUIRED ABSENCE OF OTHER SPECIFIED PARTS OF DIGESTIVE TRACT: Chronic | ICD-10-CM

## 2021-08-30 ENCOUNTER — APPOINTMENT (OUTPATIENT)
Dept: PSYCHIATRY | Facility: CLINIC | Age: 51
End: 2021-08-30

## 2021-08-30 ENCOUNTER — OUTPATIENT (OUTPATIENT)
Dept: OUTPATIENT SERVICES | Facility: HOSPITAL | Age: 51
LOS: 1 days | Discharge: HOME | End: 2021-08-30
Payer: COMMERCIAL

## 2021-08-30 DIAGNOSIS — Z90.89 ACQUIRED ABSENCE OF OTHER ORGANS: Chronic | ICD-10-CM

## 2021-08-30 DIAGNOSIS — F11.20 OPIOID DEPENDENCE, UNCOMPLICATED: ICD-10-CM

## 2021-08-30 DIAGNOSIS — Z90.49 ACQUIRED ABSENCE OF OTHER SPECIFIED PARTS OF DIGESTIVE TRACT: Chronic | ICD-10-CM

## 2021-08-30 PROCEDURE — 99214 OFFICE O/P EST MOD 30 MIN: CPT | Mod: 95

## 2021-09-22 ENCOUNTER — APPOINTMENT (OUTPATIENT)
Dept: PSYCHIATRY | Facility: CLINIC | Age: 51
End: 2021-09-22

## 2021-09-22 ENCOUNTER — OUTPATIENT (OUTPATIENT)
Dept: OUTPATIENT SERVICES | Facility: HOSPITAL | Age: 51
LOS: 1 days | Discharge: HOME | End: 2021-09-22
Payer: MEDICAID

## 2021-09-22 DIAGNOSIS — F11.20 OPIOID DEPENDENCE, UNCOMPLICATED: ICD-10-CM

## 2021-09-22 DIAGNOSIS — Z90.89 ACQUIRED ABSENCE OF OTHER ORGANS: Chronic | ICD-10-CM

## 2021-09-22 DIAGNOSIS — Z90.49 ACQUIRED ABSENCE OF OTHER SPECIFIED PARTS OF DIGESTIVE TRACT: Chronic | ICD-10-CM

## 2021-09-22 PROCEDURE — 99214 OFFICE O/P EST MOD 30 MIN: CPT | Mod: 95

## 2021-09-24 ENCOUNTER — APPOINTMENT (OUTPATIENT)
Dept: PSYCHIATRY | Facility: CLINIC | Age: 51
End: 2021-09-24

## 2021-09-24 ENCOUNTER — OUTPATIENT (OUTPATIENT)
Dept: OUTPATIENT SERVICES | Facility: HOSPITAL | Age: 51
LOS: 1 days | Discharge: HOME | End: 2021-09-24

## 2021-09-24 DIAGNOSIS — F11.20 OPIOID DEPENDENCE, UNCOMPLICATED: ICD-10-CM

## 2021-09-24 DIAGNOSIS — Z90.49 ACQUIRED ABSENCE OF OTHER SPECIFIED PARTS OF DIGESTIVE TRACT: Chronic | ICD-10-CM

## 2021-09-24 DIAGNOSIS — Z90.89 ACQUIRED ABSENCE OF OTHER ORGANS: Chronic | ICD-10-CM

## 2021-10-22 ENCOUNTER — OUTPATIENT (OUTPATIENT)
Dept: OUTPATIENT SERVICES | Facility: HOSPITAL | Age: 51
LOS: 1 days | Discharge: HOME | End: 2021-10-22

## 2021-10-22 ENCOUNTER — APPOINTMENT (OUTPATIENT)
Dept: PSYCHIATRY | Facility: CLINIC | Age: 51
End: 2021-10-22

## 2021-10-22 DIAGNOSIS — F11.20 OPIOID DEPENDENCE, UNCOMPLICATED: ICD-10-CM

## 2021-10-22 DIAGNOSIS — Z90.49 ACQUIRED ABSENCE OF OTHER SPECIFIED PARTS OF DIGESTIVE TRACT: Chronic | ICD-10-CM

## 2021-10-22 DIAGNOSIS — Z90.89 ACQUIRED ABSENCE OF OTHER ORGANS: Chronic | ICD-10-CM

## 2021-10-25 ENCOUNTER — OUTPATIENT (OUTPATIENT)
Dept: OUTPATIENT SERVICES | Facility: HOSPITAL | Age: 51
LOS: 1 days | Discharge: HOME | End: 2021-10-25
Payer: MEDICAID

## 2021-10-25 ENCOUNTER — APPOINTMENT (OUTPATIENT)
Dept: PSYCHIATRY | Facility: CLINIC | Age: 51
End: 2021-10-25

## 2021-10-25 DIAGNOSIS — Z90.49 ACQUIRED ABSENCE OF OTHER SPECIFIED PARTS OF DIGESTIVE TRACT: Chronic | ICD-10-CM

## 2021-10-25 DIAGNOSIS — Z90.89 ACQUIRED ABSENCE OF OTHER ORGANS: Chronic | ICD-10-CM

## 2021-10-25 DIAGNOSIS — F11.20 OPIOID DEPENDENCE, UNCOMPLICATED: ICD-10-CM

## 2021-10-25 PROCEDURE — 99214 OFFICE O/P EST MOD 30 MIN: CPT | Mod: 95

## 2021-11-22 ENCOUNTER — APPOINTMENT (OUTPATIENT)
Dept: PSYCHIATRY | Facility: CLINIC | Age: 51
End: 2021-11-22

## 2021-11-22 ENCOUNTER — OUTPATIENT (OUTPATIENT)
Dept: OUTPATIENT SERVICES | Facility: HOSPITAL | Age: 51
LOS: 1 days | Discharge: HOME | End: 2021-11-22
Payer: MEDICAID

## 2021-11-22 DIAGNOSIS — Z90.89 ACQUIRED ABSENCE OF OTHER ORGANS: Chronic | ICD-10-CM

## 2021-11-22 DIAGNOSIS — Z90.49 ACQUIRED ABSENCE OF OTHER SPECIFIED PARTS OF DIGESTIVE TRACT: Chronic | ICD-10-CM

## 2021-11-22 DIAGNOSIS — F11.20 OPIOID DEPENDENCE, UNCOMPLICATED: ICD-10-CM

## 2021-11-22 PROCEDURE — 99214 OFFICE O/P EST MOD 30 MIN: CPT | Mod: 95

## 2021-11-26 ENCOUNTER — APPOINTMENT (OUTPATIENT)
Dept: PSYCHIATRY | Facility: CLINIC | Age: 51
End: 2021-11-26

## 2021-11-26 ENCOUNTER — OUTPATIENT (OUTPATIENT)
Dept: OUTPATIENT SERVICES | Facility: HOSPITAL | Age: 51
LOS: 1 days | Discharge: HOME | End: 2021-11-26

## 2021-11-26 DIAGNOSIS — F11.20 OPIOID DEPENDENCE, UNCOMPLICATED: ICD-10-CM

## 2021-11-26 DIAGNOSIS — Z90.89 ACQUIRED ABSENCE OF OTHER ORGANS: Chronic | ICD-10-CM

## 2021-11-26 DIAGNOSIS — Z90.49 ACQUIRED ABSENCE OF OTHER SPECIFIED PARTS OF DIGESTIVE TRACT: Chronic | ICD-10-CM

## 2021-11-29 NOTE — ED PROCEDURE NOTE - PROCEDURE DATE TIME, MLM
Cisco Zimmerman is a 13 month old female here for 17 month well examination with mother and father. Parental concerns include: Tried to get appointment with [facility] for the nasolacrimal duct obstruction and no one has called her back. .    Past Medical History/Medications/Allergies reviewed and updated per EPIC. DEVELOPMENT:  Finger feeds self?  yes Scribbles? in process  Has functional understanding of objects? yes  5-15 words? yes  Uses gestures to communicate? yes  Communicates pleasure or displeasure? yes    DIET:  on table food and bottle fed without bottle propping  Sippy cup? Yes and regular    ELIMINATION PATTERNS:  Normal wet diapers and bowel movements. SLEEP:  No concerns. Smoke exposure:  none. Social history/Family history reviewed and updated/accepted per EPIC. SAFETY:  Is in a backseat, rear-facing car seat in the car. There are smoke alarms in the home. They are aware of falling, choking, electrocution, drowning, and ingestion hazards, sun and bug safety. PHYSICAL EXAM:  There were no vitals taken for this visit. The baby is alert and vocalizing. The fontanelle is open and soft. There is no significant plagiocephaly. The baby has a conjugate gaze. Cover and uncover test is normal.  Red reflex is normal bilaterally. Conjunctivae appear normal with neither icterus nor subconjunctival hemorrhage. The does have bilateral nasolacrimal duct obstruction. The ear canals and tympanic membranes are normal.  The nasal mucosa is normal.  There is no swelling or discharge from the nose. The mouth is normal, with no thrush. There are no tender lymph nodes in the neck. Thyroid is not palpable. Lungs are clear, there is good air exchange with no increased work of breathing. The heart is regular rate and rhythm. There is no murmur. Femoral pulses are normal.  The abdomen has normal bowel sounds, is soft and non tender, no HSM, no mass, no umbilical hernia.   The genitalia are normal.  The vaginal mucosa is thin and red. There is not a diaper rash. Hips are stable with good range of motion. There is no skin rash. The baby moves all extremities symmetrically. There is normal tone. Deep tendon reflexes are normal and symmetric. Back is straight. ASSESSMENT:  Overall healthy 17 month old with normal growth and development. Bilateral nasolacrimal duct obstruction    PLAN:  Routine anticipatory guidance regarding safety and development of an 21 month old was discussed. Mom will call [facility] again to get appointment with ophthalmology. Patient information handout given. Follow up at 25months of age, sooner if concerns. The supervising physician for this visit is Dr. Florida King. 03-Aug-2019 06:54

## 2021-12-03 ENCOUNTER — RESULT REVIEW (OUTPATIENT)
Age: 51
End: 2021-12-03

## 2021-12-07 ENCOUNTER — APPOINTMENT (OUTPATIENT)
Dept: PSYCHIATRY | Facility: CLINIC | Age: 51
End: 2021-12-07

## 2021-12-22 ENCOUNTER — OUTPATIENT (OUTPATIENT)
Dept: OUTPATIENT SERVICES | Facility: HOSPITAL | Age: 51
LOS: 1 days | Discharge: HOME | End: 2021-12-22
Payer: MEDICAID

## 2021-12-22 ENCOUNTER — APPOINTMENT (OUTPATIENT)
Dept: PSYCHIATRY | Facility: CLINIC | Age: 51
End: 2021-12-22

## 2021-12-22 DIAGNOSIS — F11.20 OPIOID DEPENDENCE, UNCOMPLICATED: ICD-10-CM

## 2021-12-22 DIAGNOSIS — Z90.49 ACQUIRED ABSENCE OF OTHER SPECIFIED PARTS OF DIGESTIVE TRACT: Chronic | ICD-10-CM

## 2021-12-22 DIAGNOSIS — Z90.89 ACQUIRED ABSENCE OF OTHER ORGANS: Chronic | ICD-10-CM

## 2021-12-22 PROCEDURE — 99214 OFFICE O/P EST MOD 30 MIN: CPT | Mod: 95

## 2022-01-18 ENCOUNTER — APPOINTMENT (OUTPATIENT)
Dept: PSYCHIATRY | Facility: CLINIC | Age: 52
End: 2022-01-18

## 2022-01-18 ENCOUNTER — OUTPATIENT (OUTPATIENT)
Dept: OUTPATIENT SERVICES | Facility: HOSPITAL | Age: 52
LOS: 1 days | Discharge: HOME | End: 2022-01-18
Payer: MEDICAID

## 2022-01-18 DIAGNOSIS — Z90.89 ACQUIRED ABSENCE OF OTHER ORGANS: Chronic | ICD-10-CM

## 2022-01-18 DIAGNOSIS — F11.20 OPIOID DEPENDENCE, UNCOMPLICATED: ICD-10-CM

## 2022-01-18 DIAGNOSIS — Z90.49 ACQUIRED ABSENCE OF OTHER SPECIFIED PARTS OF DIGESTIVE TRACT: Chronic | ICD-10-CM

## 2022-01-18 PROCEDURE — 99214 OFFICE O/P EST MOD 30 MIN: CPT | Mod: 95

## 2022-01-21 ENCOUNTER — OUTPATIENT (OUTPATIENT)
Dept: OUTPATIENT SERVICES | Facility: HOSPITAL | Age: 52
LOS: 1 days | Discharge: HOME | End: 2022-01-21

## 2022-01-21 ENCOUNTER — APPOINTMENT (OUTPATIENT)
Dept: PSYCHIATRY | Facility: CLINIC | Age: 52
End: 2022-01-21

## 2022-01-21 DIAGNOSIS — Z90.89 ACQUIRED ABSENCE OF OTHER ORGANS: Chronic | ICD-10-CM

## 2022-01-21 DIAGNOSIS — F11.20 OPIOID DEPENDENCE, UNCOMPLICATED: ICD-10-CM

## 2022-01-21 DIAGNOSIS — Z90.49 ACQUIRED ABSENCE OF OTHER SPECIFIED PARTS OF DIGESTIVE TRACT: Chronic | ICD-10-CM

## 2022-02-14 ENCOUNTER — OUTPATIENT (OUTPATIENT)
Dept: OUTPATIENT SERVICES | Facility: HOSPITAL | Age: 52
LOS: 1 days | Discharge: HOME | End: 2022-02-14
Payer: MEDICAID

## 2022-02-14 ENCOUNTER — APPOINTMENT (OUTPATIENT)
Dept: PSYCHIATRY | Facility: CLINIC | Age: 52
End: 2022-02-14

## 2022-02-14 DIAGNOSIS — F11.20 OPIOID DEPENDENCE, UNCOMPLICATED: ICD-10-CM

## 2022-02-14 DIAGNOSIS — Z90.49 ACQUIRED ABSENCE OF OTHER SPECIFIED PARTS OF DIGESTIVE TRACT: Chronic | ICD-10-CM

## 2022-02-14 DIAGNOSIS — Z90.89 ACQUIRED ABSENCE OF OTHER ORGANS: Chronic | ICD-10-CM

## 2022-02-14 PROCEDURE — 99214 OFFICE O/P EST MOD 30 MIN: CPT | Mod: 95

## 2022-02-25 ENCOUNTER — APPOINTMENT (OUTPATIENT)
Dept: PSYCHIATRY | Facility: CLINIC | Age: 52
End: 2022-02-25

## 2022-02-25 ENCOUNTER — OUTPATIENT (OUTPATIENT)
Dept: OUTPATIENT SERVICES | Facility: HOSPITAL | Age: 52
LOS: 1 days | Discharge: HOME | End: 2022-02-25

## 2022-02-25 DIAGNOSIS — F11.20 OPIOID DEPENDENCE, UNCOMPLICATED: ICD-10-CM

## 2022-02-25 DIAGNOSIS — Z90.89 ACQUIRED ABSENCE OF OTHER ORGANS: Chronic | ICD-10-CM

## 2022-02-25 DIAGNOSIS — Z90.49 ACQUIRED ABSENCE OF OTHER SPECIFIED PARTS OF DIGESTIVE TRACT: Chronic | ICD-10-CM

## 2022-03-15 ENCOUNTER — OUTPATIENT (OUTPATIENT)
Dept: OUTPATIENT SERVICES | Facility: HOSPITAL | Age: 52
LOS: 1 days | Discharge: HOME | End: 2022-03-15
Payer: MEDICAID

## 2022-03-15 ENCOUNTER — APPOINTMENT (OUTPATIENT)
Dept: PSYCHIATRY | Facility: CLINIC | Age: 52
End: 2022-03-15

## 2022-03-15 DIAGNOSIS — F10.20 ALCOHOL DEPENDENCE, UNCOMPLICATED: ICD-10-CM

## 2022-03-15 DIAGNOSIS — Z90.49 ACQUIRED ABSENCE OF OTHER SPECIFIED PARTS OF DIGESTIVE TRACT: Chronic | ICD-10-CM

## 2022-03-15 DIAGNOSIS — Z90.89 ACQUIRED ABSENCE OF OTHER ORGANS: Chronic | ICD-10-CM

## 2022-03-15 PROCEDURE — 99214 OFFICE O/P EST MOD 30 MIN: CPT | Mod: 95

## 2022-04-01 ENCOUNTER — OUTPATIENT (OUTPATIENT)
Dept: OUTPATIENT SERVICES | Facility: HOSPITAL | Age: 52
LOS: 1 days | Discharge: HOME | End: 2022-04-01

## 2022-04-01 ENCOUNTER — APPOINTMENT (OUTPATIENT)
Dept: PSYCHIATRY | Facility: CLINIC | Age: 52
End: 2022-04-01

## 2022-04-01 DIAGNOSIS — F11.20 OPIOID DEPENDENCE, UNCOMPLICATED: ICD-10-CM

## 2022-04-01 DIAGNOSIS — Z90.89 ACQUIRED ABSENCE OF OTHER ORGANS: Chronic | ICD-10-CM

## 2022-04-01 DIAGNOSIS — Z90.49 ACQUIRED ABSENCE OF OTHER SPECIFIED PARTS OF DIGESTIVE TRACT: Chronic | ICD-10-CM

## 2022-04-15 NOTE — ED ADULT TRIAGE NOTE - HEART RATE (BEATS/MIN)
68
acetaminophen 325 mg oral tablet: 2 tab(s) orally every 6 hours, As needed, Temp greater or equal to 38C (100.4F), Mild Pain (1 - 3)  aluminum hydroxide-magnesium hydroxide 200 mg-200 mg/5 mL oral suspension: 30 milliliter(s) orally every 4 hours, As needed, Dyspepsia  amLODIPine 5 mg oral tablet: 1 tab(s) orally once a day  atenolol 25 mg oral tablet: 1 tab(s) orally once a day  melatonin 3 mg oral tablet: 1 tab(s) orally once a day (at bedtime), As needed, Insomnia

## 2022-04-18 ENCOUNTER — APPOINTMENT (OUTPATIENT)
Dept: PSYCHIATRY | Facility: CLINIC | Age: 52
End: 2022-04-18

## 2022-04-18 ENCOUNTER — OUTPATIENT (OUTPATIENT)
Dept: OUTPATIENT SERVICES | Facility: HOSPITAL | Age: 52
LOS: 1 days | Discharge: HOME | End: 2022-04-18
Payer: MEDICAID

## 2022-04-18 DIAGNOSIS — Z90.49 ACQUIRED ABSENCE OF OTHER SPECIFIED PARTS OF DIGESTIVE TRACT: Chronic | ICD-10-CM

## 2022-04-18 DIAGNOSIS — Z90.89 ACQUIRED ABSENCE OF OTHER ORGANS: Chronic | ICD-10-CM

## 2022-04-18 DIAGNOSIS — F11.20 OPIOID DEPENDENCE, UNCOMPLICATED: ICD-10-CM

## 2022-04-18 PROCEDURE — 99214 OFFICE O/P EST MOD 30 MIN: CPT | Mod: 95

## 2022-05-06 ENCOUNTER — APPOINTMENT (OUTPATIENT)
Dept: PSYCHIATRY | Facility: CLINIC | Age: 52
End: 2022-05-06

## 2022-05-13 ENCOUNTER — APPOINTMENT (OUTPATIENT)
Dept: PSYCHIATRY | Facility: CLINIC | Age: 52
End: 2022-05-13

## 2022-05-16 ENCOUNTER — OUTPATIENT (OUTPATIENT)
Dept: OUTPATIENT SERVICES | Facility: HOSPITAL | Age: 52
LOS: 1 days | Discharge: HOME | End: 2022-05-16
Payer: MEDICAID

## 2022-05-16 ENCOUNTER — APPOINTMENT (OUTPATIENT)
Dept: PSYCHIATRY | Facility: CLINIC | Age: 52
End: 2022-05-16

## 2022-05-16 DIAGNOSIS — F11.20 OPIOID DEPENDENCE, UNCOMPLICATED: ICD-10-CM

## 2022-05-16 DIAGNOSIS — Z90.89 ACQUIRED ABSENCE OF OTHER ORGANS: Chronic | ICD-10-CM

## 2022-05-16 DIAGNOSIS — Z90.49 ACQUIRED ABSENCE OF OTHER SPECIFIED PARTS OF DIGESTIVE TRACT: Chronic | ICD-10-CM

## 2022-05-16 PROCEDURE — 99214 OFFICE O/P EST MOD 30 MIN: CPT | Mod: 95

## 2022-06-13 ENCOUNTER — APPOINTMENT (OUTPATIENT)
Dept: PSYCHIATRY | Facility: CLINIC | Age: 52
End: 2022-06-13

## 2022-06-13 ENCOUNTER — OUTPATIENT (OUTPATIENT)
Dept: OUTPATIENT SERVICES | Facility: HOSPITAL | Age: 52
LOS: 1 days | Discharge: HOME | End: 2022-06-13
Payer: MEDICAID

## 2022-06-13 DIAGNOSIS — F11.20 OPIOID DEPENDENCE, UNCOMPLICATED: ICD-10-CM

## 2022-06-13 DIAGNOSIS — Z90.89 ACQUIRED ABSENCE OF OTHER ORGANS: Chronic | ICD-10-CM

## 2022-06-13 DIAGNOSIS — Z90.49 ACQUIRED ABSENCE OF OTHER SPECIFIED PARTS OF DIGESTIVE TRACT: Chronic | ICD-10-CM

## 2022-06-13 PROCEDURE — 99214 OFFICE O/P EST MOD 30 MIN: CPT | Mod: 95

## 2022-06-29 ENCOUNTER — OUTPATIENT (OUTPATIENT)
Dept: OUTPATIENT SERVICES | Facility: HOSPITAL | Age: 52
LOS: 1 days | Discharge: HOME | End: 2022-06-29

## 2022-06-29 ENCOUNTER — APPOINTMENT (OUTPATIENT)
Dept: PSYCHIATRY | Facility: CLINIC | Age: 52
End: 2022-06-29

## 2022-06-29 DIAGNOSIS — Z90.49 ACQUIRED ABSENCE OF OTHER SPECIFIED PARTS OF DIGESTIVE TRACT: Chronic | ICD-10-CM

## 2022-06-29 DIAGNOSIS — F11.20 OPIOID DEPENDENCE, UNCOMPLICATED: ICD-10-CM

## 2022-06-29 DIAGNOSIS — Z90.89 ACQUIRED ABSENCE OF OTHER ORGANS: Chronic | ICD-10-CM

## 2022-07-12 ENCOUNTER — APPOINTMENT (OUTPATIENT)
Dept: PSYCHIATRY | Facility: CLINIC | Age: 52
End: 2022-07-12

## 2022-07-12 ENCOUNTER — OUTPATIENT (OUTPATIENT)
Dept: OUTPATIENT SERVICES | Facility: HOSPITAL | Age: 52
LOS: 1 days | Discharge: HOME | End: 2022-07-12

## 2022-07-12 DIAGNOSIS — F11.20 OPIOID DEPENDENCE, UNCOMPLICATED: ICD-10-CM

## 2022-07-12 DIAGNOSIS — Z90.49 ACQUIRED ABSENCE OF OTHER SPECIFIED PARTS OF DIGESTIVE TRACT: Chronic | ICD-10-CM

## 2022-07-12 DIAGNOSIS — Z90.89 ACQUIRED ABSENCE OF OTHER ORGANS: Chronic | ICD-10-CM

## 2022-07-12 PROCEDURE — 99214 OFFICE O/P EST MOD 30 MIN: CPT | Mod: 95

## 2022-07-27 ENCOUNTER — APPOINTMENT (OUTPATIENT)
Dept: PSYCHIATRY | Facility: CLINIC | Age: 52
End: 2022-07-27

## 2022-07-27 ENCOUNTER — OUTPATIENT (OUTPATIENT)
Dept: OUTPATIENT SERVICES | Facility: HOSPITAL | Age: 52
LOS: 1 days | Discharge: HOME | End: 2022-07-27

## 2022-07-27 DIAGNOSIS — F11.20 OPIOID DEPENDENCE, UNCOMPLICATED: ICD-10-CM

## 2022-07-27 DIAGNOSIS — Z90.49 ACQUIRED ABSENCE OF OTHER SPECIFIED PARTS OF DIGESTIVE TRACT: Chronic | ICD-10-CM

## 2022-07-27 DIAGNOSIS — Z90.89 ACQUIRED ABSENCE OF OTHER ORGANS: Chronic | ICD-10-CM

## 2022-08-09 ENCOUNTER — APPOINTMENT (OUTPATIENT)
Dept: PSYCHIATRY | Facility: CLINIC | Age: 52
End: 2022-08-09

## 2022-08-09 ENCOUNTER — OUTPATIENT (OUTPATIENT)
Dept: OUTPATIENT SERVICES | Facility: HOSPITAL | Age: 52
LOS: 1 days | Discharge: HOME | End: 2022-08-09

## 2022-08-09 DIAGNOSIS — Z90.49 ACQUIRED ABSENCE OF OTHER SPECIFIED PARTS OF DIGESTIVE TRACT: Chronic | ICD-10-CM

## 2022-08-09 DIAGNOSIS — F11.20 OPIOID DEPENDENCE, UNCOMPLICATED: ICD-10-CM

## 2022-08-09 DIAGNOSIS — Z90.89 ACQUIRED ABSENCE OF OTHER ORGANS: Chronic | ICD-10-CM

## 2022-08-09 PROCEDURE — 99214 OFFICE O/P EST MOD 30 MIN: CPT | Mod: 95

## 2022-08-24 ENCOUNTER — APPOINTMENT (OUTPATIENT)
Dept: PSYCHIATRY | Facility: CLINIC | Age: 52
End: 2022-08-24

## 2022-08-30 ENCOUNTER — OUTPATIENT (OUTPATIENT)
Dept: OUTPATIENT SERVICES | Facility: HOSPITAL | Age: 52
LOS: 1 days | Discharge: HOME | End: 2022-08-30

## 2022-08-30 ENCOUNTER — APPOINTMENT (OUTPATIENT)
Dept: PSYCHIATRY | Facility: CLINIC | Age: 52
End: 2022-08-30

## 2022-08-30 DIAGNOSIS — Z90.89 ACQUIRED ABSENCE OF OTHER ORGANS: Chronic | ICD-10-CM

## 2022-08-30 DIAGNOSIS — F11.20 OPIOID DEPENDENCE, UNCOMPLICATED: ICD-10-CM

## 2022-08-30 DIAGNOSIS — Z90.49 ACQUIRED ABSENCE OF OTHER SPECIFIED PARTS OF DIGESTIVE TRACT: Chronic | ICD-10-CM

## 2022-09-06 ENCOUNTER — OUTPATIENT (OUTPATIENT)
Dept: OUTPATIENT SERVICES | Facility: HOSPITAL | Age: 52
LOS: 1 days | Discharge: HOME | End: 2022-09-06

## 2022-09-06 ENCOUNTER — APPOINTMENT (OUTPATIENT)
Dept: PSYCHIATRY | Facility: CLINIC | Age: 52
End: 2022-09-06

## 2022-09-06 DIAGNOSIS — Z90.49 ACQUIRED ABSENCE OF OTHER SPECIFIED PARTS OF DIGESTIVE TRACT: Chronic | ICD-10-CM

## 2022-09-06 DIAGNOSIS — F11.20 OPIOID DEPENDENCE, UNCOMPLICATED: ICD-10-CM

## 2022-09-06 DIAGNOSIS — Z90.89 ACQUIRED ABSENCE OF OTHER ORGANS: Chronic | ICD-10-CM

## 2022-09-06 PROCEDURE — 99214 OFFICE O/P EST MOD 30 MIN: CPT | Mod: 95

## 2022-10-04 ENCOUNTER — OUTPATIENT (OUTPATIENT)
Dept: OUTPATIENT SERVICES | Facility: HOSPITAL | Age: 52
LOS: 1 days | Discharge: HOME | End: 2022-10-04

## 2022-10-04 ENCOUNTER — APPOINTMENT (OUTPATIENT)
Dept: PSYCHIATRY | Facility: CLINIC | Age: 52
End: 2022-10-04

## 2022-10-04 DIAGNOSIS — F11.20 OPIOID DEPENDENCE, UNCOMPLICATED: ICD-10-CM

## 2022-10-04 DIAGNOSIS — Z90.49 ACQUIRED ABSENCE OF OTHER SPECIFIED PARTS OF DIGESTIVE TRACT: Chronic | ICD-10-CM

## 2022-10-04 DIAGNOSIS — Z90.89 ACQUIRED ABSENCE OF OTHER ORGANS: Chronic | ICD-10-CM

## 2022-10-04 PROCEDURE — 99214 OFFICE O/P EST MOD 30 MIN: CPT | Mod: 95

## 2022-11-07 ENCOUNTER — APPOINTMENT (OUTPATIENT)
Dept: PSYCHIATRY | Facility: CLINIC | Age: 52
End: 2022-11-07

## 2022-11-07 ENCOUNTER — OUTPATIENT (OUTPATIENT)
Dept: OUTPATIENT SERVICES | Facility: HOSPITAL | Age: 52
LOS: 1 days | Discharge: HOME | End: 2022-11-07

## 2022-11-07 DIAGNOSIS — Z90.89 ACQUIRED ABSENCE OF OTHER ORGANS: Chronic | ICD-10-CM

## 2022-11-07 DIAGNOSIS — Z90.49 ACQUIRED ABSENCE OF OTHER SPECIFIED PARTS OF DIGESTIVE TRACT: Chronic | ICD-10-CM

## 2022-11-07 DIAGNOSIS — F11.20 OPIOID DEPENDENCE, UNCOMPLICATED: ICD-10-CM

## 2022-11-07 PROCEDURE — 99214 OFFICE O/P EST MOD 30 MIN: CPT | Mod: 95

## 2022-11-08 ENCOUNTER — APPOINTMENT (OUTPATIENT)
Dept: PSYCHIATRY | Facility: CLINIC | Age: 52
End: 2022-11-08

## 2022-11-08 ENCOUNTER — OUTPATIENT (OUTPATIENT)
Dept: OUTPATIENT SERVICES | Facility: HOSPITAL | Age: 52
LOS: 1 days | Discharge: HOME | End: 2022-11-08

## 2022-11-08 DIAGNOSIS — F11.20 OPIOID DEPENDENCE, UNCOMPLICATED: ICD-10-CM

## 2022-11-08 DIAGNOSIS — Z90.89 ACQUIRED ABSENCE OF OTHER ORGANS: Chronic | ICD-10-CM

## 2022-11-08 DIAGNOSIS — Z90.49 ACQUIRED ABSENCE OF OTHER SPECIFIED PARTS OF DIGESTIVE TRACT: Chronic | ICD-10-CM

## 2022-11-09 NOTE — ED PROVIDER NOTE - ATTESTATION, MLM
c/w home med simvastatin Hx of HTN  home meds - Toprol, amlodipine, olmesartan, Lasix  BP monitoring  continue Toprol, Amlodipine, Losartan  hold Lasix for now.  monitor vs. I have reviewed and confirmed nurses' notes for patient's medications, allergies, medical history, and surgical history.

## 2022-12-06 ENCOUNTER — APPOINTMENT (OUTPATIENT)
Dept: PSYCHIATRY | Facility: CLINIC | Age: 52
End: 2022-12-06

## 2022-12-06 ENCOUNTER — OUTPATIENT (OUTPATIENT)
Dept: OUTPATIENT SERVICES | Facility: HOSPITAL | Age: 52
LOS: 1 days | Discharge: HOME | End: 2022-12-06

## 2022-12-06 DIAGNOSIS — Z90.89 ACQUIRED ABSENCE OF OTHER ORGANS: Chronic | ICD-10-CM

## 2022-12-06 DIAGNOSIS — F11.20 OPIOID DEPENDENCE, UNCOMPLICATED: ICD-10-CM

## 2022-12-06 DIAGNOSIS — Z90.49 ACQUIRED ABSENCE OF OTHER SPECIFIED PARTS OF DIGESTIVE TRACT: Chronic | ICD-10-CM

## 2022-12-07 ENCOUNTER — APPOINTMENT (OUTPATIENT)
Dept: PSYCHIATRY | Facility: CLINIC | Age: 52
End: 2022-12-07

## 2022-12-07 ENCOUNTER — OUTPATIENT (OUTPATIENT)
Dept: OUTPATIENT SERVICES | Facility: HOSPITAL | Age: 52
LOS: 1 days | Discharge: HOME | End: 2022-12-07

## 2022-12-07 DIAGNOSIS — Z90.49 ACQUIRED ABSENCE OF OTHER SPECIFIED PARTS OF DIGESTIVE TRACT: Chronic | ICD-10-CM

## 2022-12-07 DIAGNOSIS — Z90.89 ACQUIRED ABSENCE OF OTHER ORGANS: Chronic | ICD-10-CM

## 2022-12-07 DIAGNOSIS — F11.20 OPIOID DEPENDENCE, UNCOMPLICATED: ICD-10-CM

## 2022-12-07 PROCEDURE — 99214 OFFICE O/P EST MOD 30 MIN: CPT | Mod: 95

## 2023-01-03 ENCOUNTER — OUTPATIENT (OUTPATIENT)
Dept: OUTPATIENT SERVICES | Facility: HOSPITAL | Age: 53
LOS: 1 days | Discharge: HOME | End: 2023-01-03

## 2023-01-03 ENCOUNTER — APPOINTMENT (OUTPATIENT)
Dept: PSYCHIATRY | Facility: CLINIC | Age: 53
End: 2023-01-03

## 2023-01-03 DIAGNOSIS — F11.20 OPIOID DEPENDENCE, UNCOMPLICATED: ICD-10-CM

## 2023-01-03 DIAGNOSIS — Z90.89 ACQUIRED ABSENCE OF OTHER ORGANS: Chronic | ICD-10-CM

## 2023-01-03 DIAGNOSIS — Z90.49 ACQUIRED ABSENCE OF OTHER SPECIFIED PARTS OF DIGESTIVE TRACT: Chronic | ICD-10-CM

## 2023-01-04 ENCOUNTER — OUTPATIENT (OUTPATIENT)
Dept: OUTPATIENT SERVICES | Facility: HOSPITAL | Age: 53
LOS: 1 days | Discharge: HOME | End: 2023-01-04
Payer: MEDICAID

## 2023-01-04 ENCOUNTER — APPOINTMENT (OUTPATIENT)
Dept: PSYCHIATRY | Facility: CLINIC | Age: 53
End: 2023-01-04

## 2023-01-04 DIAGNOSIS — Z90.49 ACQUIRED ABSENCE OF OTHER SPECIFIED PARTS OF DIGESTIVE TRACT: Chronic | ICD-10-CM

## 2023-01-04 DIAGNOSIS — F10.20 ALCOHOL DEPENDENCE, UNCOMPLICATED: ICD-10-CM

## 2023-01-04 DIAGNOSIS — Z90.89 ACQUIRED ABSENCE OF OTHER ORGANS: Chronic | ICD-10-CM

## 2023-01-04 PROCEDURE — 99214 OFFICE O/P EST MOD 30 MIN: CPT | Mod: 95

## 2023-02-01 ENCOUNTER — OUTPATIENT (OUTPATIENT)
Dept: OUTPATIENT SERVICES | Facility: HOSPITAL | Age: 53
LOS: 1 days | Discharge: HOME | End: 2023-02-01
Payer: MEDICAID

## 2023-02-01 ENCOUNTER — APPOINTMENT (OUTPATIENT)
Dept: PSYCHIATRY | Facility: CLINIC | Age: 53
End: 2023-02-01

## 2023-02-01 DIAGNOSIS — F11.20 OPIOID DEPENDENCE, UNCOMPLICATED: ICD-10-CM

## 2023-02-01 DIAGNOSIS — Z90.49 ACQUIRED ABSENCE OF OTHER SPECIFIED PARTS OF DIGESTIVE TRACT: Chronic | ICD-10-CM

## 2023-02-01 DIAGNOSIS — Z90.89 ACQUIRED ABSENCE OF OTHER ORGANS: Chronic | ICD-10-CM

## 2023-02-01 PROCEDURE — 99214 OFFICE O/P EST MOD 30 MIN: CPT | Mod: 95

## 2023-02-07 ENCOUNTER — APPOINTMENT (OUTPATIENT)
Dept: PSYCHIATRY | Facility: CLINIC | Age: 53
End: 2023-02-07

## 2023-02-07 ENCOUNTER — OUTPATIENT (OUTPATIENT)
Dept: OUTPATIENT SERVICES | Facility: HOSPITAL | Age: 53
LOS: 1 days | End: 2023-02-07
Payer: COMMERCIAL

## 2023-02-07 ENCOUNTER — APPOINTMENT (OUTPATIENT)
Age: 53
End: 2023-02-07

## 2023-02-07 DIAGNOSIS — Z90.89 ACQUIRED ABSENCE OF OTHER ORGANS: Chronic | ICD-10-CM

## 2023-02-07 DIAGNOSIS — F11.20 OPIOID DEPENDENCE, UNCOMPLICATED: ICD-10-CM

## 2023-02-07 DIAGNOSIS — F10.20 ALCOHOL DEPENDENCE, UNCOMPLICATED: ICD-10-CM

## 2023-02-07 DIAGNOSIS — Z90.49 ACQUIRED ABSENCE OF OTHER SPECIFIED PARTS OF DIGESTIVE TRACT: Chronic | ICD-10-CM

## 2023-02-07 PROCEDURE — 90832 PSYTX W PT 30 MINUTES: CPT | Mod: 95

## 2023-02-28 ENCOUNTER — APPOINTMENT (OUTPATIENT)
Dept: PSYCHIATRY | Facility: CLINIC | Age: 53
End: 2023-02-28

## 2023-03-01 ENCOUNTER — APPOINTMENT (OUTPATIENT)
Dept: PSYCHIATRY | Facility: CLINIC | Age: 53
End: 2023-03-01
Payer: COMMERCIAL

## 2023-03-01 ENCOUNTER — APPOINTMENT (OUTPATIENT)
Dept: PSYCHIATRY | Facility: CLINIC | Age: 53
End: 2023-03-01

## 2023-03-01 ENCOUNTER — OUTPATIENT (OUTPATIENT)
Dept: OUTPATIENT SERVICES | Facility: HOSPITAL | Age: 53
LOS: 1 days | End: 2023-03-01
Payer: COMMERCIAL

## 2023-03-01 DIAGNOSIS — Z90.89 ACQUIRED ABSENCE OF OTHER ORGANS: Chronic | ICD-10-CM

## 2023-03-01 DIAGNOSIS — F19.10 OTHER PSYCHOACTIVE SUBSTANCE ABUSE, UNCOMPLICATED: ICD-10-CM

## 2023-03-01 DIAGNOSIS — F11.20 OPIOID DEPENDENCE, UNCOMPLICATED: ICD-10-CM

## 2023-03-01 DIAGNOSIS — Z90.49 ACQUIRED ABSENCE OF OTHER SPECIFIED PARTS OF DIGESTIVE TRACT: Chronic | ICD-10-CM

## 2023-03-01 PROCEDURE — 99214 OFFICE O/P EST MOD 30 MIN: CPT | Mod: 95

## 2023-03-02 DIAGNOSIS — F11.20 OPIOID DEPENDENCE, UNCOMPLICATED: ICD-10-CM

## 2023-03-08 ENCOUNTER — OUTPATIENT (OUTPATIENT)
Dept: OUTPATIENT SERVICES | Facility: HOSPITAL | Age: 53
LOS: 1 days | End: 2023-03-08
Payer: COMMERCIAL

## 2023-03-08 ENCOUNTER — APPOINTMENT (OUTPATIENT)
Dept: PSYCHIATRY | Facility: CLINIC | Age: 53
End: 2023-03-08

## 2023-03-08 DIAGNOSIS — F11.20 OPIOID DEPENDENCE, UNCOMPLICATED: ICD-10-CM

## 2023-03-08 DIAGNOSIS — Z90.89 ACQUIRED ABSENCE OF OTHER ORGANS: Chronic | ICD-10-CM

## 2023-03-08 DIAGNOSIS — Z90.49 ACQUIRED ABSENCE OF OTHER SPECIFIED PARTS OF DIGESTIVE TRACT: Chronic | ICD-10-CM

## 2023-03-08 PROCEDURE — 90832 PSYTX W PT 30 MINUTES: CPT | Mod: 95

## 2023-03-28 ENCOUNTER — OUTPATIENT (OUTPATIENT)
Dept: OUTPATIENT SERVICES | Facility: HOSPITAL | Age: 53
LOS: 1 days | End: 2023-03-28
Payer: COMMERCIAL

## 2023-03-28 ENCOUNTER — APPOINTMENT (OUTPATIENT)
Dept: PSYCHIATRY | Facility: CLINIC | Age: 53
End: 2023-03-28

## 2023-03-28 DIAGNOSIS — F11.20 OPIOID DEPENDENCE, UNCOMPLICATED: ICD-10-CM

## 2023-03-28 DIAGNOSIS — Z90.89 ACQUIRED ABSENCE OF OTHER ORGANS: Chronic | ICD-10-CM

## 2023-03-28 DIAGNOSIS — Z90.49 ACQUIRED ABSENCE OF OTHER SPECIFIED PARTS OF DIGESTIVE TRACT: Chronic | ICD-10-CM

## 2023-03-28 PROCEDURE — 90832 PSYTX W PT 30 MINUTES: CPT | Mod: 95

## 2023-03-29 ENCOUNTER — APPOINTMENT (OUTPATIENT)
Dept: PSYCHIATRY | Facility: CLINIC | Age: 53
End: 2023-03-29
Payer: COMMERCIAL

## 2023-03-29 ENCOUNTER — OUTPATIENT (OUTPATIENT)
Dept: OUTPATIENT SERVICES | Facility: HOSPITAL | Age: 53
LOS: 1 days | End: 2023-03-29
Payer: COMMERCIAL

## 2023-03-29 DIAGNOSIS — F19.10 OTHER PSYCHOACTIVE SUBSTANCE ABUSE, UNCOMPLICATED: ICD-10-CM

## 2023-03-29 DIAGNOSIS — F11.20 OPIOID DEPENDENCE, UNCOMPLICATED: ICD-10-CM

## 2023-03-29 PROCEDURE — 99214 OFFICE O/P EST MOD 30 MIN: CPT | Mod: 95

## 2023-03-30 DIAGNOSIS — F19.10 OTHER PSYCHOACTIVE SUBSTANCE ABUSE, UNCOMPLICATED: ICD-10-CM

## 2023-04-26 ENCOUNTER — OUTPATIENT (OUTPATIENT)
Dept: OUTPATIENT SERVICES | Facility: HOSPITAL | Age: 53
LOS: 1 days | End: 2023-04-26
Payer: COMMERCIAL

## 2023-04-26 ENCOUNTER — APPOINTMENT (OUTPATIENT)
Dept: PSYCHIATRY | Facility: CLINIC | Age: 53
End: 2023-04-26
Payer: COMMERCIAL

## 2023-04-26 DIAGNOSIS — Z90.89 ACQUIRED ABSENCE OF OTHER ORGANS: Chronic | ICD-10-CM

## 2023-04-26 DIAGNOSIS — Z90.49 ACQUIRED ABSENCE OF OTHER SPECIFIED PARTS OF DIGESTIVE TRACT: Chronic | ICD-10-CM

## 2023-04-26 DIAGNOSIS — F10.20 ALCOHOL DEPENDENCE, UNCOMPLICATED: ICD-10-CM

## 2023-04-26 DIAGNOSIS — F19.10 OTHER PSYCHOACTIVE SUBSTANCE ABUSE, UNCOMPLICATED: ICD-10-CM

## 2023-04-26 PROCEDURE — 99214 OFFICE O/P EST MOD 30 MIN: CPT | Mod: 95

## 2023-04-27 DIAGNOSIS — F10.20 ALCOHOL DEPENDENCE, UNCOMPLICATED: ICD-10-CM

## 2023-05-02 ENCOUNTER — APPOINTMENT (OUTPATIENT)
Dept: PSYCHIATRY | Facility: CLINIC | Age: 53
End: 2023-05-02

## 2023-05-04 ENCOUNTER — APPOINTMENT (OUTPATIENT)
Dept: PSYCHIATRY | Facility: CLINIC | Age: 53
End: 2023-05-04

## 2023-05-04 ENCOUNTER — OUTPATIENT (OUTPATIENT)
Dept: OUTPATIENT SERVICES | Facility: HOSPITAL | Age: 53
LOS: 1 days | End: 2023-05-04
Payer: COMMERCIAL

## 2023-05-04 DIAGNOSIS — Z90.49 ACQUIRED ABSENCE OF OTHER SPECIFIED PARTS OF DIGESTIVE TRACT: Chronic | ICD-10-CM

## 2023-05-04 DIAGNOSIS — F10.20 ALCOHOL DEPENDENCE, UNCOMPLICATED: ICD-10-CM

## 2023-05-04 DIAGNOSIS — Z90.89 ACQUIRED ABSENCE OF OTHER ORGANS: Chronic | ICD-10-CM

## 2023-05-04 DIAGNOSIS — F11.20 OPIOID DEPENDENCE, UNCOMPLICATED: ICD-10-CM

## 2023-05-04 PROCEDURE — 90832 PSYTX W PT 30 MINUTES: CPT | Mod: 95

## 2023-05-05 DIAGNOSIS — F11.20 OPIOID DEPENDENCE, UNCOMPLICATED: ICD-10-CM

## 2023-05-10 ENCOUNTER — EMERGENCY (EMERGENCY)
Facility: HOSPITAL | Age: 53
LOS: 0 days | Discharge: HOME | End: 2023-05-10
Attending: EMERGENCY MEDICINE
Payer: MEDICAID

## 2023-05-10 VITALS
OXYGEN SATURATION: 97 % | DIASTOLIC BLOOD PRESSURE: 68 MMHG | RESPIRATION RATE: 18 BRPM | HEART RATE: 58 BPM | SYSTOLIC BLOOD PRESSURE: 115 MMHG

## 2023-05-10 VITALS
TEMPERATURE: 97 F | WEIGHT: 205.91 LBS | OXYGEN SATURATION: 97 % | HEIGHT: 68 IN | RESPIRATION RATE: 18 BRPM | HEART RATE: 74 BPM | DIASTOLIC BLOOD PRESSURE: 85 MMHG | SYSTOLIC BLOOD PRESSURE: 116 MMHG

## 2023-05-10 DIAGNOSIS — Z87.19 PERSONAL HISTORY OF OTHER DISEASES OF THE DIGESTIVE SYSTEM: ICD-10-CM

## 2023-05-10 DIAGNOSIS — E03.9 HYPOTHYROIDISM, UNSPECIFIED: ICD-10-CM

## 2023-05-10 DIAGNOSIS — R00.1 BRADYCARDIA, UNSPECIFIED: ICD-10-CM

## 2023-05-10 DIAGNOSIS — Z79.891 LONG TERM (CURRENT) USE OF OPIATE ANALGESIC: ICD-10-CM

## 2023-05-10 DIAGNOSIS — J44.9 CHRONIC OBSTRUCTIVE PULMONARY DISEASE, UNSPECIFIED: ICD-10-CM

## 2023-05-10 DIAGNOSIS — M79.89 OTHER SPECIFIED SOFT TISSUE DISORDERS: ICD-10-CM

## 2023-05-10 DIAGNOSIS — Z90.89 ACQUIRED ABSENCE OF OTHER ORGANS: Chronic | ICD-10-CM

## 2023-05-10 DIAGNOSIS — Z86.19 PERSONAL HISTORY OF OTHER INFECTIOUS AND PARASITIC DISEASES: ICD-10-CM

## 2023-05-10 DIAGNOSIS — Z86.59 PERSONAL HISTORY OF OTHER MENTAL AND BEHAVIORAL DISORDERS: ICD-10-CM

## 2023-05-10 DIAGNOSIS — Z79.82 LONG TERM (CURRENT) USE OF ASPIRIN: ICD-10-CM

## 2023-05-10 DIAGNOSIS — Z90.49 ACQUIRED ABSENCE OF OTHER SPECIFIED PARTS OF DIGESTIVE TRACT: Chronic | ICD-10-CM

## 2023-05-10 DIAGNOSIS — R60.0 LOCALIZED EDEMA: ICD-10-CM

## 2023-05-10 DIAGNOSIS — Z79.84 LONG TERM (CURRENT) USE OF ORAL HYPOGLYCEMIC DRUGS: ICD-10-CM

## 2023-05-10 LAB
ALBUMIN SERPL ELPH-MCNC: 4.2 G/DL — SIGNIFICANT CHANGE UP (ref 3.5–5.2)
ALP SERPL-CCNC: 124 U/L — HIGH (ref 30–115)
ALT FLD-CCNC: 6 U/L — SIGNIFICANT CHANGE UP (ref 0–41)
ANION GAP SERPL CALC-SCNC: 12 MMOL/L — SIGNIFICANT CHANGE UP (ref 7–14)
APPEARANCE UR: CLEAR — SIGNIFICANT CHANGE UP
AST SERPL-CCNC: 19 U/L — SIGNIFICANT CHANGE UP (ref 0–41)
BACTERIA # UR AUTO: ABNORMAL /HPF
BASOPHILS # BLD AUTO: 0.04 K/UL — SIGNIFICANT CHANGE UP (ref 0–0.2)
BASOPHILS NFR BLD AUTO: 0.6 % — SIGNIFICANT CHANGE UP (ref 0–1)
BILIRUB SERPL-MCNC: 0.3 MG/DL — SIGNIFICANT CHANGE UP (ref 0.2–1.2)
BILIRUB UR-MCNC: NEGATIVE — SIGNIFICANT CHANGE UP
BUN SERPL-MCNC: 7 MG/DL — LOW (ref 10–20)
CALCIUM SERPL-MCNC: 8.9 MG/DL — SIGNIFICANT CHANGE UP (ref 8.4–10.5)
CHLORIDE SERPL-SCNC: 102 MMOL/L — SIGNIFICANT CHANGE UP (ref 98–110)
CO2 SERPL-SCNC: 26 MMOL/L — SIGNIFICANT CHANGE UP (ref 17–32)
COLOR SPEC: YELLOW — SIGNIFICANT CHANGE UP
CREAT SERPL-MCNC: 0.8 MG/DL — SIGNIFICANT CHANGE UP (ref 0.7–1.5)
DIFF PNL FLD: NEGATIVE — SIGNIFICANT CHANGE UP
EGFR: 89 ML/MIN/1.73M2 — SIGNIFICANT CHANGE UP
EOSINOPHIL # BLD AUTO: 0.16 K/UL — SIGNIFICANT CHANGE UP (ref 0–0.7)
EOSINOPHIL NFR BLD AUTO: 2.4 % — SIGNIFICANT CHANGE UP (ref 0–8)
EPI CELLS # UR: ABNORMAL /HPF (ref 0–5)
GLUCOSE SERPL-MCNC: 187 MG/DL — HIGH (ref 70–99)
GLUCOSE UR QL: NEGATIVE MG/DL — SIGNIFICANT CHANGE UP
HCT VFR BLD CALC: 34.6 % — LOW (ref 37–47)
HGB BLD-MCNC: 11.6 G/DL — LOW (ref 12–16)
IMM GRANULOCYTES NFR BLD AUTO: 0.3 % — SIGNIFICANT CHANGE UP (ref 0.1–0.3)
KETONES UR-MCNC: NEGATIVE — SIGNIFICANT CHANGE UP
LEUKOCYTE ESTERASE UR-ACNC: NEGATIVE — SIGNIFICANT CHANGE UP
LYMPHOCYTES # BLD AUTO: 1.88 K/UL — SIGNIFICANT CHANGE UP (ref 1.2–3.4)
LYMPHOCYTES # BLD AUTO: 28.5 % — SIGNIFICANT CHANGE UP (ref 20.5–51.1)
MAGNESIUM SERPL-MCNC: 1.7 MG/DL — LOW (ref 1.8–2.4)
MCHC RBC-ENTMCNC: 28.3 PG — SIGNIFICANT CHANGE UP (ref 27–31)
MCHC RBC-ENTMCNC: 33.5 G/DL — SIGNIFICANT CHANGE UP (ref 32–37)
MCV RBC AUTO: 84.4 FL — SIGNIFICANT CHANGE UP (ref 81–99)
MONOCYTES # BLD AUTO: 0.36 K/UL — SIGNIFICANT CHANGE UP (ref 0.1–0.6)
MONOCYTES NFR BLD AUTO: 5.5 % — SIGNIFICANT CHANGE UP (ref 1.7–9.3)
NEUTROPHILS # BLD AUTO: 4.14 K/UL — SIGNIFICANT CHANGE UP (ref 1.4–6.5)
NEUTROPHILS NFR BLD AUTO: 62.7 % — SIGNIFICANT CHANGE UP (ref 42.2–75.2)
NITRITE UR-MCNC: NEGATIVE — SIGNIFICANT CHANGE UP
NRBC # BLD: 0 /100 WBCS — SIGNIFICANT CHANGE UP (ref 0–0)
NT-PROBNP SERPL-SCNC: 48 PG/ML — SIGNIFICANT CHANGE UP (ref 0–300)
PH UR: 7 — SIGNIFICANT CHANGE UP (ref 5–8)
PLATELET # BLD AUTO: 183 K/UL — SIGNIFICANT CHANGE UP (ref 130–400)
PMV BLD: 9.9 FL — SIGNIFICANT CHANGE UP (ref 7.4–10.4)
POTASSIUM SERPL-MCNC: 4.4 MMOL/L — SIGNIFICANT CHANGE UP (ref 3.5–5)
POTASSIUM SERPL-SCNC: 4.4 MMOL/L — SIGNIFICANT CHANGE UP (ref 3.5–5)
PROT SERPL-MCNC: 7.2 G/DL — SIGNIFICANT CHANGE UP (ref 6–8)
PROT UR-MCNC: 30 MG/DL
RBC # BLD: 4.1 M/UL — LOW (ref 4.2–5.4)
RBC # FLD: 13.1 % — SIGNIFICANT CHANGE UP (ref 11.5–14.5)
RBC CASTS # UR COMP ASSIST: SIGNIFICANT CHANGE UP /HPF (ref 0–4)
SODIUM SERPL-SCNC: 140 MMOL/L — SIGNIFICANT CHANGE UP (ref 135–146)
SP GR SPEC: 1.02 — SIGNIFICANT CHANGE UP (ref 1.01–1.03)
UROBILINOGEN FLD QL: 0.2 MG/DL — SIGNIFICANT CHANGE UP
WBC # BLD: 6.6 K/UL — SIGNIFICANT CHANGE UP (ref 4.8–10.8)
WBC # FLD AUTO: 6.6 K/UL — SIGNIFICANT CHANGE UP (ref 4.8–10.8)
WBC UR QL: SIGNIFICANT CHANGE UP /HPF (ref 0–5)

## 2023-05-10 PROCEDURE — 99284 EMERGENCY DEPT VISIT MOD MDM: CPT

## 2023-05-10 PROCEDURE — 81001 URINALYSIS AUTO W/SCOPE: CPT

## 2023-05-10 PROCEDURE — 87086 URINE CULTURE/COLONY COUNT: CPT

## 2023-05-10 PROCEDURE — 93005 ELECTROCARDIOGRAM TRACING: CPT

## 2023-05-10 PROCEDURE — 83880 ASSAY OF NATRIURETIC PEPTIDE: CPT

## 2023-05-10 PROCEDURE — 36415 COLL VENOUS BLD VENIPUNCTURE: CPT

## 2023-05-10 PROCEDURE — 93010 ELECTROCARDIOGRAM REPORT: CPT

## 2023-05-10 PROCEDURE — 85025 COMPLETE CBC W/AUTO DIFF WBC: CPT

## 2023-05-10 PROCEDURE — 93970 EXTREMITY STUDY: CPT

## 2023-05-10 PROCEDURE — 83735 ASSAY OF MAGNESIUM: CPT

## 2023-05-10 PROCEDURE — 80053 COMPREHEN METABOLIC PANEL: CPT

## 2023-05-10 PROCEDURE — 99284 EMERGENCY DEPT VISIT MOD MDM: CPT | Mod: 25

## 2023-05-10 PROCEDURE — 93970 EXTREMITY STUDY: CPT | Mod: 26

## 2023-05-10 RX ORDER — FUROSEMIDE 40 MG
1 TABLET ORAL
Qty: 7 | Refills: 0
Start: 2023-05-10 | End: 2023-05-16

## 2023-05-10 NOTE — ED PROVIDER NOTE - NSFOLLOWUPINSTRUCTIONS_ED_ALL_ED_FT
Take medication as prescribed. You MUST follow up with your doctor this week for further management.     The prescribed medication will hopefully help decrease your swelling however it may lead to dizziness. If you begin to feel dizzy or lightheaded please stop it immediately and come to the ED.     Peripheral Edema    Peripheral edema is swelling that is caused by a buildup of fluid. Peripheral edema most often affects the lower legs, ankles, and feet. It can also develop in the arms, hands, and face. The area of the body that has peripheral edema will look swollen. It may also feel heavy or warm. Your clothes may start to feel tight. Pressing on the area may make a temporary dent in your skin. You may not be able to move your arm or leg as much as usual.     There are many causes of peripheral edema. It can be a complication of other diseases, such as congestive heart failure, kidney disease, or a problem with your blood circulation. It also can be a side effect of certain medicines. It often happens to women during pregnancy. Sometimes, the cause is not known. Treating the underlying condition is often the only treatment for peripheral edema.    HOME CARE INSTRUCTIONS  Pay attention to any changes in your symptoms. Take these actions to help with your discomfort:    Raise (elevate) your legs while you are sitting or lying down.  Move around often to prevent stiffness and to lessen swelling. Do not sit or stand for long periods of time.  Wear support stockings as told by your health care provider.  Follow instructions from your health care provider about limiting salt (sodium) in your diet. Sometimes eating less salt can reduce swelling.  Take over-the-counter and prescription medicines only as told by your health care provider. Your health care provider may prescribe medicine to help your body get rid of excess water (diuretic).  Keep all follow-up visits as told by your health care provider. This is important.    SEEK MEDICAL CARE IF:  You have a fever.  Your edema starts suddenly or is getting worse, especially if you are pregnant or have a medical condition.  You have swelling in only one leg.  You have increased swelling and pain in your legs.    SEEK IMMEDIATE MEDICAL CARE IF:  You develop shortness of breath, especially when you are lying down.  You have pain in your chest or abdomen.  You feel weak.  You faint.

## 2023-05-10 NOTE — ED ADULT NURSE NOTE - NSFALLUNIVINTERV_ED_ALL_ED
Bed/Stretcher in lowest position, wheels locked, appropriate side rails in place/Call bell, personal items and telephone in reach/Instruct patient to call for assistance before getting out of bed/chair/stretcher/Non-slip footwear applied when patient is off stretcher/[place] to call system/Physically safe environment - no spills, clutter or unnecessary equipment/Purposeful proactive rounding/Room/bathroom lighting operational, light cord in reach

## 2023-05-10 NOTE — ED ADULT TRIAGE NOTE - CHIEF COMPLAINT QUOTE
Pt states " I have B/L leg swelling that has been getting worse over the past couple weeks. My  told me to come to the hospital"

## 2023-05-10 NOTE — ED PROVIDER NOTE - CLINICAL SUMMARY MEDICAL DECISION MAKING FREE TEXT BOX
53 yo F, hx of former substance dependence sent to ED for gradually worsening LE edema and weight gait over the last 12 weeks -- no CP, dyspnea.     VS normal, on exam has clear lungs, RRR, soft, NT, ND abdomen, significant pitting edema to above knees, good pulses.    Labs reviewed -- good renal function, hepatic function, protein. Bilateral LE duplex negative.    Likely peripheral edema, BNP is negative and has no lung edema to suggest CHF.    Will give brief course of low dose lasix, advised on risk of hyptoension/dizziness, need for close monitoring, return precautions, follow up.

## 2023-05-10 NOTE — ED PROVIDER NOTE - OBJECTIVE STATEMENT
Patient sent from  for lower extremity swelling and 30 lb wt gain for past 3 mos, no abd  pain, no chest pain, no SOB,   no difficulty urinating,

## 2023-05-10 NOTE — ED PROVIDER NOTE - PATIENT PORTAL LINK FT
You can access the FollowMyHealth Patient Portal offered by [facility] by registering at the following website: [email or web address]. By joining [employer]’s FollowMyHealth portal, you will also be able to view your health information using other applications (apps) compatible with our system.

## 2023-05-12 LAB
CULTURE RESULTS: SIGNIFICANT CHANGE UP
SPECIMEN SOURCE: SIGNIFICANT CHANGE UP

## 2023-05-24 ENCOUNTER — OUTPATIENT (OUTPATIENT)
Dept: OUTPATIENT SERVICES | Facility: HOSPITAL | Age: 53
LOS: 1 days | End: 2023-05-24
Payer: COMMERCIAL

## 2023-05-24 ENCOUNTER — APPOINTMENT (OUTPATIENT)
Dept: PSYCHIATRY | Facility: CLINIC | Age: 53
End: 2023-05-24
Payer: COMMERCIAL

## 2023-05-24 DIAGNOSIS — Z90.89 ACQUIRED ABSENCE OF OTHER ORGANS: Chronic | ICD-10-CM

## 2023-05-24 DIAGNOSIS — Z90.49 ACQUIRED ABSENCE OF OTHER SPECIFIED PARTS OF DIGESTIVE TRACT: Chronic | ICD-10-CM

## 2023-05-24 DIAGNOSIS — F19.10 OTHER PSYCHOACTIVE SUBSTANCE ABUSE, UNCOMPLICATED: ICD-10-CM

## 2023-05-24 DIAGNOSIS — F11.20 OPIOID DEPENDENCE, UNCOMPLICATED: ICD-10-CM

## 2023-05-24 PROCEDURE — 99214 OFFICE O/P EST MOD 30 MIN: CPT | Mod: 95

## 2023-05-25 DIAGNOSIS — F11.20 OPIOID DEPENDENCE, UNCOMPLICATED: ICD-10-CM

## 2023-06-13 ENCOUNTER — APPOINTMENT (OUTPATIENT)
Dept: PSYCHIATRY | Facility: CLINIC | Age: 53
End: 2023-06-13

## 2023-06-13 ENCOUNTER — OUTPATIENT (OUTPATIENT)
Dept: OUTPATIENT SERVICES | Facility: HOSPITAL | Age: 53
LOS: 1 days | End: 2023-06-13
Payer: COMMERCIAL

## 2023-06-13 DIAGNOSIS — Z90.49 ACQUIRED ABSENCE OF OTHER SPECIFIED PARTS OF DIGESTIVE TRACT: Chronic | ICD-10-CM

## 2023-06-13 DIAGNOSIS — F11.20 OPIOID DEPENDENCE, UNCOMPLICATED: ICD-10-CM

## 2023-06-13 DIAGNOSIS — Z90.89 ACQUIRED ABSENCE OF OTHER ORGANS: Chronic | ICD-10-CM

## 2023-06-13 PROCEDURE — 90832 PSYTX W PT 30 MINUTES: CPT | Mod: 95

## 2023-06-14 DIAGNOSIS — F11.20 OPIOID DEPENDENCE, UNCOMPLICATED: ICD-10-CM

## 2023-06-27 ENCOUNTER — OUTPATIENT (OUTPATIENT)
Dept: OUTPATIENT SERVICES | Facility: HOSPITAL | Age: 53
LOS: 1 days | End: 2023-06-27
Payer: COMMERCIAL

## 2023-06-27 ENCOUNTER — APPOINTMENT (OUTPATIENT)
Dept: PSYCHIATRY | Facility: CLINIC | Age: 53
End: 2023-06-27
Payer: COMMERCIAL

## 2023-06-27 DIAGNOSIS — F19.10 OTHER PSYCHOACTIVE SUBSTANCE ABUSE, UNCOMPLICATED: ICD-10-CM

## 2023-06-27 DIAGNOSIS — Z90.89 ACQUIRED ABSENCE OF OTHER ORGANS: Chronic | ICD-10-CM

## 2023-06-27 DIAGNOSIS — F11.20 OPIOID DEPENDENCE, UNCOMPLICATED: ICD-10-CM

## 2023-06-27 DIAGNOSIS — Z90.49 ACQUIRED ABSENCE OF OTHER SPECIFIED PARTS OF DIGESTIVE TRACT: Chronic | ICD-10-CM

## 2023-06-27 PROCEDURE — 99214 OFFICE O/P EST MOD 30 MIN: CPT | Mod: 95

## 2023-06-28 DIAGNOSIS — F11.20 OPIOID DEPENDENCE, UNCOMPLICATED: ICD-10-CM

## 2023-07-18 ENCOUNTER — APPOINTMENT (OUTPATIENT)
Dept: PSYCHIATRY | Facility: CLINIC | Age: 53
End: 2023-07-18

## 2023-08-01 ENCOUNTER — OUTPATIENT (OUTPATIENT)
Dept: OUTPATIENT SERVICES | Facility: HOSPITAL | Age: 53
LOS: 1 days | End: 2023-08-01
Payer: COMMERCIAL

## 2023-08-01 ENCOUNTER — APPOINTMENT (OUTPATIENT)
Dept: PSYCHIATRY | Facility: CLINIC | Age: 53
End: 2023-08-01

## 2023-08-01 DIAGNOSIS — Z90.89 ACQUIRED ABSENCE OF OTHER ORGANS: Chronic | ICD-10-CM

## 2023-08-01 DIAGNOSIS — Z90.49 ACQUIRED ABSENCE OF OTHER SPECIFIED PARTS OF DIGESTIVE TRACT: Chronic | ICD-10-CM

## 2023-08-01 DIAGNOSIS — F11.20 OPIOID DEPENDENCE, UNCOMPLICATED: ICD-10-CM

## 2023-08-01 PROCEDURE — 90832 PSYTX W PT 30 MINUTES: CPT | Mod: 95

## 2023-08-02 DIAGNOSIS — F11.20 OPIOID DEPENDENCE, UNCOMPLICATED: ICD-10-CM

## 2023-08-14 ENCOUNTER — APPOINTMENT (OUTPATIENT)
Dept: PSYCHIATRY | Facility: CLINIC | Age: 53
End: 2023-08-14
Payer: COMMERCIAL

## 2023-08-14 ENCOUNTER — OUTPATIENT (OUTPATIENT)
Dept: OUTPATIENT SERVICES | Facility: HOSPITAL | Age: 53
LOS: 1 days | End: 2023-08-14
Payer: COMMERCIAL

## 2023-08-14 DIAGNOSIS — Z90.49 ACQUIRED ABSENCE OF OTHER SPECIFIED PARTS OF DIGESTIVE TRACT: Chronic | ICD-10-CM

## 2023-08-14 DIAGNOSIS — F19.10 OTHER PSYCHOACTIVE SUBSTANCE ABUSE, UNCOMPLICATED: ICD-10-CM

## 2023-08-14 DIAGNOSIS — F11.20 OPIOID DEPENDENCE, UNCOMPLICATED: ICD-10-CM

## 2023-08-14 DIAGNOSIS — Z90.89 ACQUIRED ABSENCE OF OTHER ORGANS: Chronic | ICD-10-CM

## 2023-08-14 PROCEDURE — 99214 OFFICE O/P EST MOD 30 MIN: CPT | Mod: 95

## 2023-08-15 DIAGNOSIS — F11.20 OPIOID DEPENDENCE, UNCOMPLICATED: ICD-10-CM

## 2023-09-05 ENCOUNTER — APPOINTMENT (OUTPATIENT)
Dept: PSYCHIATRY | Facility: CLINIC | Age: 53
End: 2023-09-05

## 2023-09-05 ENCOUNTER — OUTPATIENT (OUTPATIENT)
Dept: OUTPATIENT SERVICES | Facility: HOSPITAL | Age: 53
LOS: 1 days | End: 2023-09-05
Payer: COMMERCIAL

## 2023-09-05 DIAGNOSIS — F11.20 OPIOID DEPENDENCE, UNCOMPLICATED: ICD-10-CM

## 2023-09-05 DIAGNOSIS — Z90.89 ACQUIRED ABSENCE OF OTHER ORGANS: Chronic | ICD-10-CM

## 2023-09-05 PROCEDURE — H0004: CPT | Mod: 95

## 2023-09-06 DIAGNOSIS — F11.20 OPIOID DEPENDENCE, UNCOMPLICATED: ICD-10-CM

## 2023-09-25 ENCOUNTER — OUTPATIENT (OUTPATIENT)
Dept: OUTPATIENT SERVICES | Facility: HOSPITAL | Age: 53
LOS: 1 days | End: 2023-09-25
Payer: COMMERCIAL

## 2023-09-25 ENCOUNTER — APPOINTMENT (OUTPATIENT)
Dept: PSYCHIATRY | Facility: CLINIC | Age: 53
End: 2023-09-25
Payer: COMMERCIAL

## 2023-09-25 DIAGNOSIS — F19.10 OTHER PSYCHOACTIVE SUBSTANCE ABUSE, UNCOMPLICATED: ICD-10-CM

## 2023-09-25 DIAGNOSIS — Z90.89 ACQUIRED ABSENCE OF OTHER ORGANS: Chronic | ICD-10-CM

## 2023-09-25 DIAGNOSIS — Z90.49 ACQUIRED ABSENCE OF OTHER SPECIFIED PARTS OF DIGESTIVE TRACT: Chronic | ICD-10-CM

## 2023-09-25 PROCEDURE — 99214 OFFICE O/P EST MOD 30 MIN: CPT | Mod: 95

## 2023-09-27 DIAGNOSIS — F19.10 OTHER PSYCHOACTIVE SUBSTANCE ABUSE, UNCOMPLICATED: ICD-10-CM

## 2023-10-17 ENCOUNTER — OUTPATIENT (OUTPATIENT)
Dept: OUTPATIENT SERVICES | Facility: HOSPITAL | Age: 53
LOS: 1 days | End: 2023-10-17
Payer: COMMERCIAL

## 2023-10-17 ENCOUNTER — APPOINTMENT (OUTPATIENT)
Dept: PSYCHIATRY | Facility: CLINIC | Age: 53
End: 2023-10-17

## 2023-10-17 DIAGNOSIS — Z90.89 ACQUIRED ABSENCE OF OTHER ORGANS: Chronic | ICD-10-CM

## 2023-10-17 DIAGNOSIS — F10.20 ALCOHOL DEPENDENCE, UNCOMPLICATED: ICD-10-CM

## 2023-10-17 DIAGNOSIS — Z90.49 ACQUIRED ABSENCE OF OTHER SPECIFIED PARTS OF DIGESTIVE TRACT: Chronic | ICD-10-CM

## 2023-10-17 PROCEDURE — H0004: CPT | Mod: 95

## 2023-10-18 DIAGNOSIS — F10.20 ALCOHOL DEPENDENCE, UNCOMPLICATED: ICD-10-CM

## 2023-10-25 ENCOUNTER — EMERGENCY (EMERGENCY)
Facility: HOSPITAL | Age: 53
LOS: 0 days | Discharge: HOME | End: 2023-10-25
Attending: EMERGENCY MEDICINE
Payer: MEDICAID

## 2023-10-25 VITALS
SYSTOLIC BLOOD PRESSURE: 130 MMHG | OXYGEN SATURATION: 98 % | HEART RATE: 60 BPM | RESPIRATION RATE: 20 BRPM | DIASTOLIC BLOOD PRESSURE: 68 MMHG

## 2023-10-25 VITALS
HEART RATE: 64 BPM | WEIGHT: 175.05 LBS | RESPIRATION RATE: 20 BRPM | SYSTOLIC BLOOD PRESSURE: 135 MMHG | HEIGHT: 69 IN | TEMPERATURE: 98 F | OXYGEN SATURATION: 98 % | DIASTOLIC BLOOD PRESSURE: 66 MMHG

## 2023-10-25 DIAGNOSIS — Z90.49 ACQUIRED ABSENCE OF OTHER SPECIFIED PARTS OF DIGESTIVE TRACT: ICD-10-CM

## 2023-10-25 DIAGNOSIS — Z87.891 PERSONAL HISTORY OF NICOTINE DEPENDENCE: ICD-10-CM

## 2023-10-25 DIAGNOSIS — Z90.89 ACQUIRED ABSENCE OF OTHER ORGANS: ICD-10-CM

## 2023-10-25 DIAGNOSIS — Z90.89 ACQUIRED ABSENCE OF OTHER ORGANS: Chronic | ICD-10-CM

## 2023-10-25 DIAGNOSIS — J44.9 CHRONIC OBSTRUCTIVE PULMONARY DISEASE, UNSPECIFIED: ICD-10-CM

## 2023-10-25 DIAGNOSIS — E03.9 HYPOTHYROIDISM, UNSPECIFIED: ICD-10-CM

## 2023-10-25 DIAGNOSIS — M79.89 OTHER SPECIFIED SOFT TISSUE DISORDERS: ICD-10-CM

## 2023-10-25 DIAGNOSIS — Z87.19 PERSONAL HISTORY OF OTHER DISEASES OF THE DIGESTIVE SYSTEM: ICD-10-CM

## 2023-10-25 DIAGNOSIS — R60.0 LOCALIZED EDEMA: ICD-10-CM

## 2023-10-25 DIAGNOSIS — Z87.898 PERSONAL HISTORY OF OTHER SPECIFIED CONDITIONS: ICD-10-CM

## 2023-10-25 DIAGNOSIS — Z79.82 LONG TERM (CURRENT) USE OF ASPIRIN: ICD-10-CM

## 2023-10-25 DIAGNOSIS — Z90.49 ACQUIRED ABSENCE OF OTHER SPECIFIED PARTS OF DIGESTIVE TRACT: Chronic | ICD-10-CM

## 2023-10-25 DIAGNOSIS — Z79.84 LONG TERM (CURRENT) USE OF ORAL HYPOGLYCEMIC DRUGS: ICD-10-CM

## 2023-10-25 DIAGNOSIS — Z86.19 PERSONAL HISTORY OF OTHER INFECTIOUS AND PARASITIC DISEASES: ICD-10-CM

## 2023-10-25 LAB
ALBUMIN SERPL ELPH-MCNC: 4.3 G/DL — SIGNIFICANT CHANGE UP (ref 3.5–5.2)
ALBUMIN SERPL ELPH-MCNC: 4.3 G/DL — SIGNIFICANT CHANGE UP (ref 3.5–5.2)
ALP SERPL-CCNC: 115 U/L — SIGNIFICANT CHANGE UP (ref 30–115)
ALP SERPL-CCNC: 115 U/L — SIGNIFICANT CHANGE UP (ref 30–115)
ALT FLD-CCNC: <5 U/L — SIGNIFICANT CHANGE UP (ref 0–41)
ALT FLD-CCNC: <5 U/L — SIGNIFICANT CHANGE UP (ref 0–41)
ANION GAP SERPL CALC-SCNC: 12 MMOL/L — SIGNIFICANT CHANGE UP (ref 7–14)
ANION GAP SERPL CALC-SCNC: 12 MMOL/L — SIGNIFICANT CHANGE UP (ref 7–14)
AST SERPL-CCNC: 22 U/L — SIGNIFICANT CHANGE UP (ref 0–41)
AST SERPL-CCNC: 22 U/L — SIGNIFICANT CHANGE UP (ref 0–41)
BASOPHILS # BLD AUTO: 0.04 K/UL — SIGNIFICANT CHANGE UP (ref 0–0.2)
BASOPHILS # BLD AUTO: 0.04 K/UL — SIGNIFICANT CHANGE UP (ref 0–0.2)
BASOPHILS NFR BLD AUTO: 0.6 % — SIGNIFICANT CHANGE UP (ref 0–1)
BASOPHILS NFR BLD AUTO: 0.6 % — SIGNIFICANT CHANGE UP (ref 0–1)
BILIRUB SERPL-MCNC: 0.3 MG/DL — SIGNIFICANT CHANGE UP (ref 0.2–1.2)
BILIRUB SERPL-MCNC: 0.3 MG/DL — SIGNIFICANT CHANGE UP (ref 0.2–1.2)
BUN SERPL-MCNC: 11 MG/DL — SIGNIFICANT CHANGE UP (ref 10–20)
BUN SERPL-MCNC: 11 MG/DL — SIGNIFICANT CHANGE UP (ref 10–20)
CALCIUM SERPL-MCNC: 9.5 MG/DL — SIGNIFICANT CHANGE UP (ref 8.4–10.5)
CALCIUM SERPL-MCNC: 9.5 MG/DL — SIGNIFICANT CHANGE UP (ref 8.4–10.5)
CHLORIDE SERPL-SCNC: 100 MMOL/L — SIGNIFICANT CHANGE UP (ref 98–110)
CHLORIDE SERPL-SCNC: 100 MMOL/L — SIGNIFICANT CHANGE UP (ref 98–110)
CO2 SERPL-SCNC: 26 MMOL/L — SIGNIFICANT CHANGE UP (ref 17–32)
CO2 SERPL-SCNC: 26 MMOL/L — SIGNIFICANT CHANGE UP (ref 17–32)
CREAT SERPL-MCNC: 0.7 MG/DL — SIGNIFICANT CHANGE UP (ref 0.7–1.5)
CREAT SERPL-MCNC: 0.7 MG/DL — SIGNIFICANT CHANGE UP (ref 0.7–1.5)
EGFR: 104 ML/MIN/1.73M2 — SIGNIFICANT CHANGE UP
EGFR: 104 ML/MIN/1.73M2 — SIGNIFICANT CHANGE UP
EOSINOPHIL # BLD AUTO: 0.16 K/UL — SIGNIFICANT CHANGE UP (ref 0–0.7)
EOSINOPHIL # BLD AUTO: 0.16 K/UL — SIGNIFICANT CHANGE UP (ref 0–0.7)
EOSINOPHIL NFR BLD AUTO: 2.4 % — SIGNIFICANT CHANGE UP (ref 0–8)
EOSINOPHIL NFR BLD AUTO: 2.4 % — SIGNIFICANT CHANGE UP (ref 0–8)
GLUCOSE SERPL-MCNC: 107 MG/DL — HIGH (ref 70–99)
GLUCOSE SERPL-MCNC: 107 MG/DL — HIGH (ref 70–99)
HCT VFR BLD CALC: 34 % — LOW (ref 37–47)
HCT VFR BLD CALC: 34 % — LOW (ref 37–47)
HGB BLD-MCNC: 10.9 G/DL — LOW (ref 12–16)
HGB BLD-MCNC: 10.9 G/DL — LOW (ref 12–16)
IMM GRANULOCYTES NFR BLD AUTO: 0.3 % — SIGNIFICANT CHANGE UP (ref 0.1–0.3)
IMM GRANULOCYTES NFR BLD AUTO: 0.3 % — SIGNIFICANT CHANGE UP (ref 0.1–0.3)
LYMPHOCYTES # BLD AUTO: 1.6 K/UL — SIGNIFICANT CHANGE UP (ref 1.2–3.4)
LYMPHOCYTES # BLD AUTO: 1.6 K/UL — SIGNIFICANT CHANGE UP (ref 1.2–3.4)
LYMPHOCYTES # BLD AUTO: 24.4 % — SIGNIFICANT CHANGE UP (ref 20.5–51.1)
LYMPHOCYTES # BLD AUTO: 24.4 % — SIGNIFICANT CHANGE UP (ref 20.5–51.1)
MAGNESIUM SERPL-MCNC: 1.9 MG/DL — SIGNIFICANT CHANGE UP (ref 1.8–2.4)
MAGNESIUM SERPL-MCNC: 1.9 MG/DL — SIGNIFICANT CHANGE UP (ref 1.8–2.4)
MCHC RBC-ENTMCNC: 27.9 PG — SIGNIFICANT CHANGE UP (ref 27–31)
MCHC RBC-ENTMCNC: 27.9 PG — SIGNIFICANT CHANGE UP (ref 27–31)
MCHC RBC-ENTMCNC: 32.1 G/DL — SIGNIFICANT CHANGE UP (ref 32–37)
MCHC RBC-ENTMCNC: 32.1 G/DL — SIGNIFICANT CHANGE UP (ref 32–37)
MCV RBC AUTO: 87 FL — SIGNIFICANT CHANGE UP (ref 81–99)
MCV RBC AUTO: 87 FL — SIGNIFICANT CHANGE UP (ref 81–99)
MONOCYTES # BLD AUTO: 0.32 K/UL — SIGNIFICANT CHANGE UP (ref 0.1–0.6)
MONOCYTES # BLD AUTO: 0.32 K/UL — SIGNIFICANT CHANGE UP (ref 0.1–0.6)
MONOCYTES NFR BLD AUTO: 4.9 % — SIGNIFICANT CHANGE UP (ref 1.7–9.3)
MONOCYTES NFR BLD AUTO: 4.9 % — SIGNIFICANT CHANGE UP (ref 1.7–9.3)
NEUTROPHILS # BLD AUTO: 4.42 K/UL — SIGNIFICANT CHANGE UP (ref 1.4–6.5)
NEUTROPHILS # BLD AUTO: 4.42 K/UL — SIGNIFICANT CHANGE UP (ref 1.4–6.5)
NEUTROPHILS NFR BLD AUTO: 67.4 % — SIGNIFICANT CHANGE UP (ref 42.2–75.2)
NEUTROPHILS NFR BLD AUTO: 67.4 % — SIGNIFICANT CHANGE UP (ref 42.2–75.2)
NRBC # BLD: 0 /100 WBCS — SIGNIFICANT CHANGE UP (ref 0–0)
NRBC # BLD: 0 /100 WBCS — SIGNIFICANT CHANGE UP (ref 0–0)
NT-PROBNP SERPL-SCNC: 110 PG/ML — SIGNIFICANT CHANGE UP (ref 0–300)
NT-PROBNP SERPL-SCNC: 110 PG/ML — SIGNIFICANT CHANGE UP (ref 0–300)
PLATELET # BLD AUTO: 206 K/UL — SIGNIFICANT CHANGE UP (ref 130–400)
PLATELET # BLD AUTO: 206 K/UL — SIGNIFICANT CHANGE UP (ref 130–400)
PMV BLD: 9.7 FL — SIGNIFICANT CHANGE UP (ref 7.4–10.4)
PMV BLD: 9.7 FL — SIGNIFICANT CHANGE UP (ref 7.4–10.4)
POTASSIUM SERPL-MCNC: 5 MMOL/L — SIGNIFICANT CHANGE UP (ref 3.5–5)
POTASSIUM SERPL-MCNC: 5 MMOL/L — SIGNIFICANT CHANGE UP (ref 3.5–5)
POTASSIUM SERPL-SCNC: 5 MMOL/L — SIGNIFICANT CHANGE UP (ref 3.5–5)
POTASSIUM SERPL-SCNC: 5 MMOL/L — SIGNIFICANT CHANGE UP (ref 3.5–5)
PROT SERPL-MCNC: 7.5 G/DL — SIGNIFICANT CHANGE UP (ref 6–8)
PROT SERPL-MCNC: 7.5 G/DL — SIGNIFICANT CHANGE UP (ref 6–8)
RBC # BLD: 3.91 M/UL — LOW (ref 4.2–5.4)
RBC # BLD: 3.91 M/UL — LOW (ref 4.2–5.4)
RBC # FLD: 14.1 % — SIGNIFICANT CHANGE UP (ref 11.5–14.5)
RBC # FLD: 14.1 % — SIGNIFICANT CHANGE UP (ref 11.5–14.5)
SODIUM SERPL-SCNC: 138 MMOL/L — SIGNIFICANT CHANGE UP (ref 135–146)
SODIUM SERPL-SCNC: 138 MMOL/L — SIGNIFICANT CHANGE UP (ref 135–146)
WBC # BLD: 6.56 K/UL — SIGNIFICANT CHANGE UP (ref 4.8–10.8)
WBC # BLD: 6.56 K/UL — SIGNIFICANT CHANGE UP (ref 4.8–10.8)
WBC # FLD AUTO: 6.56 K/UL — SIGNIFICANT CHANGE UP (ref 4.8–10.8)
WBC # FLD AUTO: 6.56 K/UL — SIGNIFICANT CHANGE UP (ref 4.8–10.8)

## 2023-10-25 PROCEDURE — 93970 EXTREMITY STUDY: CPT | Mod: 26

## 2023-10-25 PROCEDURE — 99284 EMERGENCY DEPT VISIT MOD MDM: CPT

## 2023-10-25 PROCEDURE — 99284 EMERGENCY DEPT VISIT MOD MDM: CPT | Mod: 25

## 2023-10-25 PROCEDURE — 83880 ASSAY OF NATRIURETIC PEPTIDE: CPT

## 2023-10-25 PROCEDURE — 36415 COLL VENOUS BLD VENIPUNCTURE: CPT

## 2023-10-25 PROCEDURE — 85025 COMPLETE CBC W/AUTO DIFF WBC: CPT

## 2023-10-25 PROCEDURE — 80053 COMPREHEN METABOLIC PANEL: CPT

## 2023-10-25 PROCEDURE — 93970 EXTREMITY STUDY: CPT

## 2023-10-25 PROCEDURE — 96374 THER/PROPH/DIAG INJ IV PUSH: CPT

## 2023-10-25 PROCEDURE — 83735 ASSAY OF MAGNESIUM: CPT

## 2023-10-25 RX ORDER — FUROSEMIDE 40 MG
40 TABLET ORAL ONCE
Refills: 0 | Status: COMPLETED | OUTPATIENT
Start: 2023-10-25 | End: 2023-10-25

## 2023-10-25 RX ADMIN — Medication 40 MILLIGRAM(S): at 15:30

## 2023-10-25 NOTE — ED PROVIDER NOTE - NSFOLLOWUPINSTRUCTIONS_ED_ALL_ED_FT
increase your lasix to 40 mg a day,, use compressive stockings, elevate legs ,  Follow up with PMD this week

## 2023-10-25 NOTE — ED PROVIDER NOTE - OBJECTIVE STATEMENT
patient c/o worsening leg swelling past 2 weeks, Both legs, and feet, Seen by PMD given lasix 20 mg QD not helping, No fever, no chest pain, no SOB

## 2023-10-25 NOTE — ED PROVIDER NOTE - CLINICAL SUMMARY MEDICAL DECISION MAKING FREE TEXT BOX
52yF p/w worsening b/l leg swelling.  Pt hemodynamically stable and w/o resp distress or hypoperfusion.  Labs reassuring, including normal electrolytes and BNP.  US w/o DVT.  Recommend supportive care, inc dose lasix, o/p f/u, return precautions.

## 2023-10-25 NOTE — ED PROVIDER NOTE - PATIENT PORTAL LINK FT
You can access the FollowMyHealth Patient Portal offered by [facility] by registering at the following website: [email or web address]. By joining [employer]’s FollowMyHealth portal, you will also be able to view your health information using other applications (apps) compatible with our system.

## 2023-10-25 NOTE — ED PROVIDER NOTE - ATTENDING APP SHARED VISIT CONTRIBUTION OF CARE
52yF p/w worsening b/l leg swelling - has been on lasix 20mg daily for 3mo but feels like her legs are getting worse - reports that she elevated her legs and watches her salt intake and doesn't know what else to do - says over the past 2d or so, her lower legs are hurting and her feet are too swollen to wear shoes and it is hard to walk.

## 2023-10-30 ENCOUNTER — APPOINTMENT (OUTPATIENT)
Dept: PSYCHIATRY | Facility: CLINIC | Age: 53
End: 2023-10-30
Payer: COMMERCIAL

## 2023-10-30 ENCOUNTER — OUTPATIENT (OUTPATIENT)
Dept: OUTPATIENT SERVICES | Facility: HOSPITAL | Age: 53
LOS: 1 days | End: 2023-10-30
Payer: COMMERCIAL

## 2023-10-30 DIAGNOSIS — Z90.49 ACQUIRED ABSENCE OF OTHER SPECIFIED PARTS OF DIGESTIVE TRACT: Chronic | ICD-10-CM

## 2023-10-30 DIAGNOSIS — F10.20 ALCOHOL DEPENDENCE, UNCOMPLICATED: ICD-10-CM

## 2023-10-30 DIAGNOSIS — Z90.89 ACQUIRED ABSENCE OF OTHER ORGANS: Chronic | ICD-10-CM

## 2023-10-30 PROCEDURE — 99214 OFFICE O/P EST MOD 30 MIN: CPT

## 2023-10-30 PROCEDURE — 99214 OFFICE O/P EST MOD 30 MIN: CPT | Mod: 95

## 2023-10-31 DIAGNOSIS — F10.20 ALCOHOL DEPENDENCE, UNCOMPLICATED: ICD-10-CM

## 2023-11-21 ENCOUNTER — APPOINTMENT (OUTPATIENT)
Dept: PSYCHIATRY | Facility: CLINIC | Age: 53
End: 2023-11-21

## 2023-11-21 ENCOUNTER — OUTPATIENT (OUTPATIENT)
Dept: OUTPATIENT SERVICES | Facility: HOSPITAL | Age: 53
LOS: 1 days | End: 2023-11-21
Payer: COMMERCIAL

## 2023-11-21 DIAGNOSIS — Z90.49 ACQUIRED ABSENCE OF OTHER SPECIFIED PARTS OF DIGESTIVE TRACT: Chronic | ICD-10-CM

## 2023-11-21 DIAGNOSIS — Z90.89 ACQUIRED ABSENCE OF OTHER ORGANS: Chronic | ICD-10-CM

## 2023-11-21 DIAGNOSIS — F10.20 ALCOHOL DEPENDENCE, UNCOMPLICATED: ICD-10-CM

## 2023-11-21 PROCEDURE — H0004: CPT | Mod: 95

## 2023-11-22 DIAGNOSIS — F10.20 ALCOHOL DEPENDENCE, UNCOMPLICATED: ICD-10-CM

## 2023-12-11 ENCOUNTER — APPOINTMENT (OUTPATIENT)
Dept: PSYCHIATRY | Facility: CLINIC | Age: 53
End: 2023-12-11
Payer: COMMERCIAL

## 2023-12-11 ENCOUNTER — OUTPATIENT (OUTPATIENT)
Dept: OUTPATIENT SERVICES | Facility: HOSPITAL | Age: 53
LOS: 1 days | End: 2023-12-11
Payer: COMMERCIAL

## 2023-12-11 DIAGNOSIS — Z90.89 ACQUIRED ABSENCE OF OTHER ORGANS: Chronic | ICD-10-CM

## 2023-12-11 DIAGNOSIS — Z90.49 ACQUIRED ABSENCE OF OTHER SPECIFIED PARTS OF DIGESTIVE TRACT: Chronic | ICD-10-CM

## 2023-12-11 DIAGNOSIS — F10.20 ALCOHOL DEPENDENCE, UNCOMPLICATED: ICD-10-CM

## 2023-12-11 PROCEDURE — 99214 OFFICE O/P EST MOD 30 MIN: CPT

## 2023-12-12 DIAGNOSIS — F10.20 ALCOHOL DEPENDENCE, UNCOMPLICATED: ICD-10-CM

## 2023-12-19 ENCOUNTER — OUTPATIENT (OUTPATIENT)
Dept: OUTPATIENT SERVICES | Facility: HOSPITAL | Age: 53
LOS: 1 days | End: 2023-12-19
Payer: COMMERCIAL

## 2023-12-19 ENCOUNTER — APPOINTMENT (OUTPATIENT)
Dept: PSYCHIATRY | Facility: CLINIC | Age: 53
End: 2023-12-19

## 2023-12-19 DIAGNOSIS — Z90.49 ACQUIRED ABSENCE OF OTHER SPECIFIED PARTS OF DIGESTIVE TRACT: Chronic | ICD-10-CM

## 2023-12-19 DIAGNOSIS — Z90.89 ACQUIRED ABSENCE OF OTHER ORGANS: Chronic | ICD-10-CM

## 2023-12-19 DIAGNOSIS — F10.20 ALCOHOL DEPENDENCE, UNCOMPLICATED: ICD-10-CM

## 2023-12-19 PROCEDURE — H0004: CPT | Mod: 95

## 2023-12-20 DIAGNOSIS — F10.20 ALCOHOL DEPENDENCE, UNCOMPLICATED: ICD-10-CM

## 2024-01-09 ENCOUNTER — NON-APPOINTMENT (OUTPATIENT)
Age: 54
End: 2024-01-09

## 2024-01-09 ENCOUNTER — INPATIENT (INPATIENT)
Facility: HOSPITAL | Age: 54
LOS: 1 days | Discharge: HOME | DRG: 346 | End: 2024-01-11
Attending: INTERNAL MEDICINE | Admitting: INTERNAL MEDICINE
Payer: MEDICAID

## 2024-01-09 VITALS
DIASTOLIC BLOOD PRESSURE: 76 MMHG | OXYGEN SATURATION: 100 % | RESPIRATION RATE: 18 BRPM | WEIGHT: 179.9 LBS | HEIGHT: 68 IN | HEART RATE: 62 BPM | SYSTOLIC BLOOD PRESSURE: 139 MMHG | TEMPERATURE: 98 F

## 2024-01-09 DIAGNOSIS — Z90.89 ACQUIRED ABSENCE OF OTHER ORGANS: Chronic | ICD-10-CM

## 2024-01-09 DIAGNOSIS — F11.20 OPIOID DEPENDENCE, UNCOMPLICATED: ICD-10-CM

## 2024-01-09 DIAGNOSIS — Z90.49 ACQUIRED ABSENCE OF OTHER SPECIFIED PARTS OF DIGESTIVE TRACT: Chronic | ICD-10-CM

## 2024-01-09 DIAGNOSIS — I89.0 LYMPHEDEMA, NOT ELSEWHERE CLASSIFIED: ICD-10-CM

## 2024-01-09 DIAGNOSIS — R23.3 SPONTANEOUS ECCHYMOSES: ICD-10-CM

## 2024-01-09 DIAGNOSIS — E03.9 HYPOTHYROIDISM, UNSPECIFIED: ICD-10-CM

## 2024-01-09 DIAGNOSIS — I77.6 ARTERITIS, UNSPECIFIED: ICD-10-CM

## 2024-01-09 DIAGNOSIS — E87.6 HYPOKALEMIA: ICD-10-CM

## 2024-01-09 DIAGNOSIS — L03.90 CELLULITIS, UNSPECIFIED: ICD-10-CM

## 2024-01-09 DIAGNOSIS — I87.8 OTHER SPECIFIED DISORDERS OF VEINS: ICD-10-CM

## 2024-01-09 DIAGNOSIS — E11.9 TYPE 2 DIABETES MELLITUS WITHOUT COMPLICATIONS: ICD-10-CM

## 2024-01-09 LAB
ALBUMIN SERPL ELPH-MCNC: 4.5 G/DL — SIGNIFICANT CHANGE UP (ref 3.5–5.2)
ALBUMIN SERPL ELPH-MCNC: 4.5 G/DL — SIGNIFICANT CHANGE UP (ref 3.5–5.2)
ALP SERPL-CCNC: 108 U/L — SIGNIFICANT CHANGE UP (ref 30–115)
ALP SERPL-CCNC: 108 U/L — SIGNIFICANT CHANGE UP (ref 30–115)
ALT FLD-CCNC: 7 U/L — SIGNIFICANT CHANGE UP (ref 0–41)
ALT FLD-CCNC: 7 U/L — SIGNIFICANT CHANGE UP (ref 0–41)
ANION GAP SERPL CALC-SCNC: 12 MMOL/L — SIGNIFICANT CHANGE UP (ref 7–14)
ANION GAP SERPL CALC-SCNC: 12 MMOL/L — SIGNIFICANT CHANGE UP (ref 7–14)
AST SERPL-CCNC: 22 U/L — SIGNIFICANT CHANGE UP (ref 0–41)
AST SERPL-CCNC: 22 U/L — SIGNIFICANT CHANGE UP (ref 0–41)
BASOPHILS # BLD AUTO: 0.04 K/UL — SIGNIFICANT CHANGE UP (ref 0–0.2)
BASOPHILS # BLD AUTO: 0.04 K/UL — SIGNIFICANT CHANGE UP (ref 0–0.2)
BASOPHILS NFR BLD AUTO: 0.5 % — SIGNIFICANT CHANGE UP (ref 0–1)
BASOPHILS NFR BLD AUTO: 0.5 % — SIGNIFICANT CHANGE UP (ref 0–1)
BILIRUB SERPL-MCNC: 0.3 MG/DL — SIGNIFICANT CHANGE UP (ref 0.2–1.2)
BILIRUB SERPL-MCNC: 0.3 MG/DL — SIGNIFICANT CHANGE UP (ref 0.2–1.2)
BUN SERPL-MCNC: 11 MG/DL — SIGNIFICANT CHANGE UP (ref 10–20)
BUN SERPL-MCNC: 11 MG/DL — SIGNIFICANT CHANGE UP (ref 10–20)
CALCIUM SERPL-MCNC: 9.6 MG/DL — SIGNIFICANT CHANGE UP (ref 8.4–10.5)
CALCIUM SERPL-MCNC: 9.6 MG/DL — SIGNIFICANT CHANGE UP (ref 8.4–10.5)
CHLORIDE SERPL-SCNC: 92 MMOL/L — LOW (ref 98–110)
CHLORIDE SERPL-SCNC: 92 MMOL/L — LOW (ref 98–110)
CO2 SERPL-SCNC: 34 MMOL/L — HIGH (ref 17–32)
CO2 SERPL-SCNC: 34 MMOL/L — HIGH (ref 17–32)
CREAT SERPL-MCNC: 0.9 MG/DL — SIGNIFICANT CHANGE UP (ref 0.7–1.5)
CREAT SERPL-MCNC: 0.9 MG/DL — SIGNIFICANT CHANGE UP (ref 0.7–1.5)
EGFR: 76 ML/MIN/1.73M2 — SIGNIFICANT CHANGE UP
EGFR: 76 ML/MIN/1.73M2 — SIGNIFICANT CHANGE UP
EOSINOPHIL # BLD AUTO: 0.13 K/UL — SIGNIFICANT CHANGE UP (ref 0–0.7)
EOSINOPHIL # BLD AUTO: 0.13 K/UL — SIGNIFICANT CHANGE UP (ref 0–0.7)
EOSINOPHIL NFR BLD AUTO: 1.5 % — SIGNIFICANT CHANGE UP (ref 0–8)
EOSINOPHIL NFR BLD AUTO: 1.5 % — SIGNIFICANT CHANGE UP (ref 0–8)
GLUCOSE BLDC GLUCOMTR-MCNC: 136 MG/DL — HIGH (ref 70–99)
GLUCOSE BLDC GLUCOMTR-MCNC: 136 MG/DL — HIGH (ref 70–99)
GLUCOSE SERPL-MCNC: 144 MG/DL — HIGH (ref 70–99)
GLUCOSE SERPL-MCNC: 144 MG/DL — HIGH (ref 70–99)
HCT VFR BLD CALC: 36.5 % — LOW (ref 37–47)
HCT VFR BLD CALC: 36.5 % — LOW (ref 37–47)
HGB BLD-MCNC: 11.8 G/DL — LOW (ref 12–16)
HGB BLD-MCNC: 11.8 G/DL — LOW (ref 12–16)
IMM GRANULOCYTES NFR BLD AUTO: 0.3 % — SIGNIFICANT CHANGE UP (ref 0.1–0.3)
IMM GRANULOCYTES NFR BLD AUTO: 0.3 % — SIGNIFICANT CHANGE UP (ref 0.1–0.3)
LACTATE SERPL-SCNC: 3 MMOL/L — HIGH (ref 0.7–2)
LACTATE SERPL-SCNC: 3 MMOL/L — HIGH (ref 0.7–2)
LYMPHOCYTES # BLD AUTO: 2 K/UL — SIGNIFICANT CHANGE UP (ref 1.2–3.4)
LYMPHOCYTES # BLD AUTO: 2 K/UL — SIGNIFICANT CHANGE UP (ref 1.2–3.4)
LYMPHOCYTES # BLD AUTO: 22.7 % — SIGNIFICANT CHANGE UP (ref 20.5–51.1)
LYMPHOCYTES # BLD AUTO: 22.7 % — SIGNIFICANT CHANGE UP (ref 20.5–51.1)
MCHC RBC-ENTMCNC: 27.9 PG — SIGNIFICANT CHANGE UP (ref 27–31)
MCHC RBC-ENTMCNC: 27.9 PG — SIGNIFICANT CHANGE UP (ref 27–31)
MCHC RBC-ENTMCNC: 32.3 G/DL — SIGNIFICANT CHANGE UP (ref 32–37)
MCHC RBC-ENTMCNC: 32.3 G/DL — SIGNIFICANT CHANGE UP (ref 32–37)
MCV RBC AUTO: 86.3 FL — SIGNIFICANT CHANGE UP (ref 81–99)
MCV RBC AUTO: 86.3 FL — SIGNIFICANT CHANGE UP (ref 81–99)
MONOCYTES # BLD AUTO: 0.51 K/UL — SIGNIFICANT CHANGE UP (ref 0.1–0.6)
MONOCYTES # BLD AUTO: 0.51 K/UL — SIGNIFICANT CHANGE UP (ref 0.1–0.6)
MONOCYTES NFR BLD AUTO: 5.8 % — SIGNIFICANT CHANGE UP (ref 1.7–9.3)
MONOCYTES NFR BLD AUTO: 5.8 % — SIGNIFICANT CHANGE UP (ref 1.7–9.3)
NEUTROPHILS # BLD AUTO: 6.09 K/UL — SIGNIFICANT CHANGE UP (ref 1.4–6.5)
NEUTROPHILS # BLD AUTO: 6.09 K/UL — SIGNIFICANT CHANGE UP (ref 1.4–6.5)
NEUTROPHILS NFR BLD AUTO: 69.2 % — SIGNIFICANT CHANGE UP (ref 42.2–75.2)
NEUTROPHILS NFR BLD AUTO: 69.2 % — SIGNIFICANT CHANGE UP (ref 42.2–75.2)
NRBC # BLD: 0 /100 WBCS — SIGNIFICANT CHANGE UP (ref 0–0)
NRBC # BLD: 0 /100 WBCS — SIGNIFICANT CHANGE UP (ref 0–0)
PLATELET # BLD AUTO: 298 K/UL — SIGNIFICANT CHANGE UP (ref 130–400)
PLATELET # BLD AUTO: 298 K/UL — SIGNIFICANT CHANGE UP (ref 130–400)
PMV BLD: 9.4 FL — SIGNIFICANT CHANGE UP (ref 7.4–10.4)
PMV BLD: 9.4 FL — SIGNIFICANT CHANGE UP (ref 7.4–10.4)
POTASSIUM SERPL-MCNC: 3.3 MMOL/L — LOW (ref 3.5–5)
POTASSIUM SERPL-MCNC: 3.3 MMOL/L — LOW (ref 3.5–5)
POTASSIUM SERPL-SCNC: 3.3 MMOL/L — LOW (ref 3.5–5)
POTASSIUM SERPL-SCNC: 3.3 MMOL/L — LOW (ref 3.5–5)
PROT SERPL-MCNC: 8.2 G/DL — HIGH (ref 6–8)
PROT SERPL-MCNC: 8.2 G/DL — HIGH (ref 6–8)
RBC # BLD: 4.23 M/UL — SIGNIFICANT CHANGE UP (ref 4.2–5.4)
RBC # BLD: 4.23 M/UL — SIGNIFICANT CHANGE UP (ref 4.2–5.4)
RBC # FLD: 14.2 % — SIGNIFICANT CHANGE UP (ref 11.5–14.5)
RBC # FLD: 14.2 % — SIGNIFICANT CHANGE UP (ref 11.5–14.5)
SODIUM SERPL-SCNC: 138 MMOL/L — SIGNIFICANT CHANGE UP (ref 135–146)
SODIUM SERPL-SCNC: 138 MMOL/L — SIGNIFICANT CHANGE UP (ref 135–146)
WBC # BLD: 8.8 K/UL — SIGNIFICANT CHANGE UP (ref 4.8–10.8)
WBC # BLD: 8.8 K/UL — SIGNIFICANT CHANGE UP (ref 4.8–10.8)
WBC # FLD AUTO: 8.8 K/UL — SIGNIFICANT CHANGE UP (ref 4.8–10.8)
WBC # FLD AUTO: 8.8 K/UL — SIGNIFICANT CHANGE UP (ref 4.8–10.8)

## 2024-01-09 PROCEDURE — 85730 THROMBOPLASTIN TIME PARTIAL: CPT

## 2024-01-09 PROCEDURE — 86704 HEP B CORE ANTIBODY TOTAL: CPT

## 2024-01-09 PROCEDURE — 80048 BASIC METABOLIC PNL TOTAL CA: CPT

## 2024-01-09 PROCEDURE — 84165 PROTEIN E-PHORESIS SERUM: CPT

## 2024-01-09 PROCEDURE — 86147 CARDIOLIPIN ANTIBODY EA IG: CPT

## 2024-01-09 PROCEDURE — 83605 ASSAY OF LACTIC ACID: CPT

## 2024-01-09 PROCEDURE — 86200 CCP ANTIBODY: CPT

## 2024-01-09 PROCEDURE — 81001 URINALYSIS AUTO W/SCOPE: CPT

## 2024-01-09 PROCEDURE — 87340 HEPATITIS B SURFACE AG IA: CPT

## 2024-01-09 PROCEDURE — 86332 IMMUNE COMPLEX ASSAY: CPT

## 2024-01-09 PROCEDURE — 93010 ELECTROCARDIOGRAM REPORT: CPT

## 2024-01-09 PROCEDURE — 88305 TISSUE EXAM BY PATHOLOGIST: CPT

## 2024-01-09 PROCEDURE — 82784 ASSAY IGA/IGD/IGG/IGM EACH: CPT

## 2024-01-09 PROCEDURE — 83036 HEMOGLOBIN GLYCOSYLATED A1C: CPT

## 2024-01-09 PROCEDURE — 86803 HEPATITIS C AB TEST: CPT

## 2024-01-09 PROCEDURE — 97162 PT EVAL MOD COMPLEX 30 MIN: CPT | Mod: GP

## 2024-01-09 PROCEDURE — 86036 ANCA SCREEN EACH ANTIBODY: CPT

## 2024-01-09 PROCEDURE — 36000 PLACE NEEDLE IN VEIN: CPT

## 2024-01-09 PROCEDURE — 85025 COMPLETE CBC W/AUTO DIFF WBC: CPT

## 2024-01-09 PROCEDURE — 93970 EXTREMITY STUDY: CPT | Mod: 26

## 2024-01-09 PROCEDURE — 85027 COMPLETE CBC AUTOMATED: CPT

## 2024-01-09 PROCEDURE — 86038 ANTINUCLEAR ANTIBODIES: CPT

## 2024-01-09 PROCEDURE — 86706 HEP B SURFACE ANTIBODY: CPT

## 2024-01-09 PROCEDURE — 87521 HEPATITIS C PROBE&RVRS TRNSC: CPT

## 2024-01-09 PROCEDURE — 80053 COMPREHEN METABOLIC PANEL: CPT

## 2024-01-09 PROCEDURE — 86225 DNA ANTIBODY NATIVE: CPT

## 2024-01-09 PROCEDURE — 85652 RBC SED RATE AUTOMATED: CPT

## 2024-01-09 PROCEDURE — 36415 COLL VENOUS BLD VENIPUNCTURE: CPT

## 2024-01-09 PROCEDURE — 82962 GLUCOSE BLOOD TEST: CPT

## 2024-01-09 PROCEDURE — 87389 HIV-1 AG W/HIV-1&-2 AB AG IA: CPT

## 2024-01-09 PROCEDURE — 84155 ASSAY OF PROTEIN SERUM: CPT

## 2024-01-09 PROCEDURE — 85613 RUSSELL VIPER VENOM DILUTED: CPT

## 2024-01-09 PROCEDURE — 86235 NUCLEAR ANTIGEN ANTIBODY: CPT

## 2024-01-09 PROCEDURE — 84145 PROCALCITONIN (PCT): CPT

## 2024-01-09 PROCEDURE — 99285 EMERGENCY DEPT VISIT HI MDM: CPT

## 2024-01-09 PROCEDURE — 99222 1ST HOSP IP/OBS MODERATE 55: CPT

## 2024-01-09 PROCEDURE — 76937 US GUIDE VASCULAR ACCESS: CPT | Mod: 26

## 2024-01-09 PROCEDURE — 83615 LACTATE (LD) (LDH) ENZYME: CPT

## 2024-01-09 PROCEDURE — 93005 ELECTROCARDIOGRAM TRACING: CPT

## 2024-01-09 PROCEDURE — 86160 COMPLEMENT ANTIGEN: CPT

## 2024-01-09 PROCEDURE — 86431 RHEUMATOID FACTOR QUANT: CPT

## 2024-01-09 PROCEDURE — 82595 ASSAY OF CRYOGLOBULIN: CPT

## 2024-01-09 PROCEDURE — 86037 ANCA TITER EACH ANTIBODY: CPT

## 2024-01-09 PROCEDURE — 86334 IMMUNOFIX E-PHORESIS SERUM: CPT

## 2024-01-09 PROCEDURE — 86146 BETA-2 GLYCOPROTEIN ANTIBODY: CPT

## 2024-01-09 RX ORDER — DEXTROSE 50 % IN WATER 50 %
12.5 SYRINGE (ML) INTRAVENOUS ONCE
Refills: 0 | Status: DISCONTINUED | OUTPATIENT
Start: 2024-01-09 | End: 2024-01-11

## 2024-01-09 RX ORDER — LEVOTHYROXINE SODIUM 125 MCG
150 TABLET ORAL DAILY
Refills: 0 | Status: DISCONTINUED | OUTPATIENT
Start: 2024-01-09 | End: 2024-01-11

## 2024-01-09 RX ORDER — TRAMADOL HYDROCHLORIDE 50 MG/1
50 TABLET ORAL EVERY 4 HOURS
Refills: 0 | Status: DISCONTINUED | OUTPATIENT
Start: 2024-01-09 | End: 2024-01-11

## 2024-01-09 RX ORDER — BUPRENORPHINE AND NALOXONE 2; .5 MG/1; MG/1
1 TABLET SUBLINGUAL DAILY
Refills: 0 | Status: DISCONTINUED | OUTPATIENT
Start: 2024-01-09 | End: 2024-01-10

## 2024-01-09 RX ORDER — LANOLIN ALCOHOL/MO/W.PET/CERES
3 CREAM (GRAM) TOPICAL AT BEDTIME
Refills: 0 | Status: DISCONTINUED | OUTPATIENT
Start: 2024-01-09 | End: 2024-01-11

## 2024-01-09 RX ORDER — SODIUM CHLORIDE 9 MG/ML
1000 INJECTION, SOLUTION INTRAVENOUS
Refills: 0 | Status: DISCONTINUED | OUTPATIENT
Start: 2024-01-09 | End: 2024-01-11

## 2024-01-09 RX ORDER — KETOROLAC TROMETHAMINE 30 MG/ML
15 SYRINGE (ML) INJECTION ONCE
Refills: 0 | Status: DISCONTINUED | OUTPATIENT
Start: 2024-01-09 | End: 2024-01-11

## 2024-01-09 RX ORDER — ACETAMINOPHEN 500 MG
650 TABLET ORAL EVERY 6 HOURS
Refills: 0 | Status: DISCONTINUED | OUTPATIENT
Start: 2024-01-09 | End: 2024-01-11

## 2024-01-09 RX ORDER — VANCOMYCIN HCL 1 G
1250 VIAL (EA) INTRAVENOUS EVERY 12 HOURS
Refills: 0 | Status: DISCONTINUED | OUTPATIENT
Start: 2024-01-09 | End: 2024-01-10

## 2024-01-09 RX ORDER — ALBUTEROL 90 UG/1
2 AEROSOL, METERED ORAL EVERY 6 HOURS
Refills: 0 | Status: DISCONTINUED | OUTPATIENT
Start: 2024-01-09 | End: 2024-01-11

## 2024-01-09 RX ORDER — DEXTROSE 50 % IN WATER 50 %
25 SYRINGE (ML) INTRAVENOUS ONCE
Refills: 0 | Status: DISCONTINUED | OUTPATIENT
Start: 2024-01-09 | End: 2024-01-11

## 2024-01-09 RX ORDER — VANCOMYCIN HCL 1 G
1000 VIAL (EA) INTRAVENOUS ONCE
Refills: 0 | Status: COMPLETED | OUTPATIENT
Start: 2024-01-09 | End: 2024-01-09

## 2024-01-09 RX ORDER — FUROSEMIDE 40 MG
20 TABLET ORAL ONCE
Refills: 0 | Status: COMPLETED | OUTPATIENT
Start: 2024-01-09 | End: 2024-01-09

## 2024-01-09 RX ORDER — HEPARIN SODIUM 5000 [USP'U]/ML
5000 INJECTION INTRAVENOUS; SUBCUTANEOUS EVERY 12 HOURS
Refills: 0 | Status: DISCONTINUED | OUTPATIENT
Start: 2024-01-09 | End: 2024-01-11

## 2024-01-09 RX ORDER — DEXTROSE 50 % IN WATER 50 %
15 SYRINGE (ML) INTRAVENOUS ONCE
Refills: 0 | Status: DISCONTINUED | OUTPATIENT
Start: 2024-01-09 | End: 2024-01-11

## 2024-01-09 RX ORDER — SODIUM CHLORIDE 9 MG/ML
1000 INJECTION, SOLUTION INTRAVENOUS ONCE
Refills: 0 | Status: COMPLETED | OUTPATIENT
Start: 2024-01-09 | End: 2024-01-09

## 2024-01-09 RX ORDER — GLUCAGON INJECTION, SOLUTION 0.5 MG/.1ML
1 INJECTION, SOLUTION SUBCUTANEOUS ONCE
Refills: 0 | Status: DISCONTINUED | OUTPATIENT
Start: 2024-01-09 | End: 2024-01-11

## 2024-01-09 RX ORDER — FLUOXETINE HCL 10 MG
40 CAPSULE ORAL DAILY
Refills: 0 | Status: DISCONTINUED | OUTPATIENT
Start: 2024-01-09 | End: 2024-01-11

## 2024-01-09 RX ORDER — ASPIRIN/CALCIUM CARB/MAGNESIUM 324 MG
81 TABLET ORAL DAILY
Refills: 0 | Status: DISCONTINUED | OUTPATIENT
Start: 2024-01-09 | End: 2024-01-10

## 2024-01-09 RX ORDER — INSULIN LISPRO 100/ML
VIAL (ML) SUBCUTANEOUS
Refills: 0 | Status: DISCONTINUED | OUTPATIENT
Start: 2024-01-09 | End: 2024-01-11

## 2024-01-09 RX ORDER — FUROSEMIDE 40 MG
20 TABLET ORAL DAILY
Refills: 0 | Status: DISCONTINUED | OUTPATIENT
Start: 2024-01-09 | End: 2024-01-11

## 2024-01-09 RX ORDER — POTASSIUM CHLORIDE 20 MEQ
20 PACKET (EA) ORAL ONCE
Refills: 0 | Status: COMPLETED | OUTPATIENT
Start: 2024-01-09 | End: 2024-01-09

## 2024-01-09 RX ORDER — INFLUENZA VIRUS VACCINE 15; 15; 15; 15 UG/.5ML; UG/.5ML; UG/.5ML; UG/.5ML
0.5 SUSPENSION INTRAMUSCULAR ONCE
Refills: 0 | Status: DISCONTINUED | OUTPATIENT
Start: 2024-01-09 | End: 2024-01-11

## 2024-01-09 RX ORDER — PANTOPRAZOLE SODIUM 20 MG/1
40 TABLET, DELAYED RELEASE ORAL
Refills: 0 | Status: DISCONTINUED | OUTPATIENT
Start: 2024-01-09 | End: 2024-01-11

## 2024-01-09 RX ADMIN — Medication 20 MILLIGRAM(S): at 22:23

## 2024-01-09 RX ADMIN — SODIUM CHLORIDE 1000 MILLILITER(S): 9 INJECTION, SOLUTION INTRAVENOUS at 20:35

## 2024-01-09 RX ADMIN — Medication 250 MILLIGRAM(S): at 19:28

## 2024-01-09 RX ADMIN — Medication 50 MILLIEQUIVALENT(S): at 21:33

## 2024-01-09 NOTE — PATIENT PROFILE ADULT - NSPROPASSIVESMOKEEXPOSURE_GEN_A_NUR
I met with Nemo Duckworth at his visit with Dr Sethi Organ today  He's familiar with me from prior visits  Questions answered, support provided  No

## 2024-01-09 NOTE — H&P ADULT - NSHPREVIEWOFSYSTEMS_GEN_ALL_CORE
Constitutional: (-) fever (-) chills   Eyes: (-) visual changes  ENMT: (-) nasal or chest congestion (-) runny nose (-) sore throat (-) neck pain (-) neck stiffness  Cardiac: (-) chest pain (-) syncope  Respiratory: (-) cough (-) SOB  GI: (-) nausea (-) vomiting (-) diarrhea  : (-) incontinence  MS:(-) back pain   Neuro: (-) head injury (-) headache (-) dizziness (+) numbness/tingling to extremities B/L (-) LOC   Skin: (-) abrasion (-) rash (-) laceration  Except as documented in the HPI, all other systems are negative.

## 2024-01-09 NOTE — PATIENT PROFILE ADULT - FUNCTIONAL ASSESSMENT - BASIC MOBILITY 6.
3-calculated by average/Not able to assess (calculate score using [facility] averaging method)  3-calculated by average/Not able to assess (calculate score using [facility] averaging method)

## 2024-01-09 NOTE — H&P ADULT - NSHPLABSRESULTS_GEN_ALL_CORE
11.8   8.80  )-----------( 298      ( 09 Jan 2024 18:11 )             36.5   Urinalysis Basic - ( 09 Jan 2024 18:11 )    Color: x / Appearance: x / SG: x / pH: x  Gluc: 144 mg/dL / Ketone: x  / Bili: x / Urobili: x   Blood: x / Protein: x / Nitrite: x   Leuk Esterase: x / RBC: x / WBC x   Sq Epi: x / Non Sq Epi: x / Bacteria: x    01-09    138  |  92<L>  |  11  ----------------------------<  144<H>  3.3<L>   |  34<H>  |  0.9    Ca    9.6      09 Jan 2024 18:11    TPro  8.2<H>  /  Alb  4.5  /  TBili  0.3  /  DBili  x   /  AST  22  /  ALT  7   /  AlkPhos  108  01-09

## 2024-01-09 NOTE — ED PROVIDER NOTE - PHYSICAL EXAMINATION
CONST: in NAD  CARD: Normal S1 S2; Normal rate and rhythm  RESP: Equal BS B/L, No wheezes, rhonchi or rales. No distress  GI: Soft, non-tender, non-distended.  MS: Normal ROM in all extremities. No midline spinal tenderness.  SKIN: Warm, dry, Good turgor, +2 pitting edema b/l LE w dusky skin changes; nonblanching erythematous rash to mid lower legs b/l  NEURO: A&Ox3, No focal deficits. Strength 5/5 with no sensory deficits. Steady gait

## 2024-01-09 NOTE — ED ADULT NURSE NOTE - NSFALLUNIVINTERV_ED_ALL_ED
Bed/Stretcher in lowest position, wheels locked, appropriate side rails in place/Call bell, personal items and telephone in reach/Instruct patient to call for assistance before getting out of bed/chair/stretcher/Non-slip footwear applied when patient is off stretcher/[place] to call system/Physically safe environment - no spills, clutter or unnecessary equipment/Purposeful proactive rounding/Room/bathroom lighting operational, light cord in reach Bed/Stretcher in lowest position, wheels locked, appropriate side rails in place/Call bell, personal items and telephone in reach/Instruct patient to call for assistance before getting out of bed/chair/stretcher/Non-slip footwear applied when patient is off stretcher/[place] to call system/Physically safe environment - no spills, clutter or unnecessary equipment/Purposeful proactive rounding/Room/bathroom lighting operational, light cord in reach

## 2024-01-09 NOTE — ED PROVIDER NOTE - CLINICAL SUMMARY MEDICAL DECISION MAKING FREE TEXT BOX
patient presents for evaluation of bilateral lower extremity swelling although the patient has no evidence of vascular compromise as pedal pulses are normal capillary refills normal considering her history of substance abuse given IV antibiotics empirically I do not appreciate murmurs on the cardiac exam at this time not consistent with endocarditis however patient treated with antibiotics I will admit for further evaluation at this time patient was updated and agreed with the plan of care    Assessment plan

## 2024-01-09 NOTE — H&P ADULT - HISTORY OF PRESENT ILLNESS
patient is a 54 yo female PMHx COPD, DM, hypothyroidism, HepC, h/o IVDA on Suboxone presents to ER c/o LE swelling and rash. Pt states she has had worsening lymphedema of b/l LE for months however noticed skin changes over the past 3 days. Pt also c/o burning pain to the b/l LE and difficulty walking. Notes numbness of toes, +sensation, + pulses. Pt presented to Urgent care today for evaluation but was told to go to ER for suspected cellulitis. Denies fever, chills, cp, sob, n/v/d, abdominal pain.

## 2024-01-09 NOTE — PATIENT PROFILE ADULT - FALL HARM RISK - RISK INTERVENTIONS
Assistance OOB with selected safe patient handling equipment/Communicate Fall Risk and Risk Factors to all staff, patient, and family/Discuss with provider need for PT consult/Monitor gait and stability/Provide patient with walking aids - walker, cane, crutches/Reinforce activity limits and safety measures with patient and family/Visual Cue: Yellow wristband/Bed in lowest position, wheels locked, appropriate side rails in place/Call bell, personal items and telephone in reach/Instruct patient to call for assistance before getting out of bed or chair/Non-slip footwear when patient is out of bed/[place] to call system/Physically safe environment - no spills, clutter or unnecessary equipment/Purposeful Proactive Rounding/Room/bathroom lighting operational, light cord in reach Assistance OOB with selected safe patient handling equipment/Communicate Fall Risk and Risk Factors to all staff, patient, and family/Discuss with provider need for PT consult/Monitor gait and stability/Provide patient with walking aids - walker, cane, crutches/Reinforce activity limits and safety measures with patient and family/Visual Cue: Yellow wristband/Bed in lowest position, wheels locked, appropriate side rails in place/Call bell, personal items and telephone in reach/Instruct patient to call for assistance before getting out of bed or chair/Non-slip footwear when patient is out of bed/[place] to call system/Physically safe environment - no spills, clutter or unnecessary equipment/Purposeful Proactive Rounding/Room/bathroom lighting operational, light cord in reach

## 2024-01-09 NOTE — ED PROVIDER NOTE - OBJECTIVE STATEMENT
patient is a 52yo female PMH COPD, substance use complaining of bilateral lower leg swelling, erythema, and rash. pt has noted swelling to both lower legs x weeks, noted darkening of skin and red splotchy rash x days. having difficulty ambulating due to pain. denies chest pain, shortness of breath, abdominal pain, fever, numbness. patient is a 54yo female PMH COPD, substance use complaining of bilateral lower leg swelling, erythema, and rash. pt has noted swelling to both lower legs x weeks, noted darkening of skin and red splotchy rash x days. having difficulty ambulating due to pain. denies chest pain, shortness of breath, abdominal pain, fever, numbness.

## 2024-01-10 ENCOUNTER — RESULT REVIEW (OUTPATIENT)
Age: 54
End: 2024-01-10

## 2024-01-10 LAB
ANION GAP SERPL CALC-SCNC: 10 MMOL/L — SIGNIFICANT CHANGE UP (ref 7–14)
ANION GAP SERPL CALC-SCNC: 10 MMOL/L — SIGNIFICANT CHANGE UP (ref 7–14)
BASOPHILS # BLD AUTO: 0.03 K/UL — SIGNIFICANT CHANGE UP (ref 0–0.2)
BASOPHILS # BLD AUTO: 0.03 K/UL — SIGNIFICANT CHANGE UP (ref 0–0.2)
BASOPHILS NFR BLD AUTO: 0.5 % — SIGNIFICANT CHANGE UP (ref 0–1)
BASOPHILS NFR BLD AUTO: 0.5 % — SIGNIFICANT CHANGE UP (ref 0–1)
BUN SERPL-MCNC: 10 MG/DL — SIGNIFICANT CHANGE UP (ref 10–20)
BUN SERPL-MCNC: 10 MG/DL — SIGNIFICANT CHANGE UP (ref 10–20)
CALCIUM SERPL-MCNC: 8.9 MG/DL — SIGNIFICANT CHANGE UP (ref 8.4–10.5)
CALCIUM SERPL-MCNC: 8.9 MG/DL — SIGNIFICANT CHANGE UP (ref 8.4–10.5)
CHLORIDE SERPL-SCNC: 95 MMOL/L — LOW (ref 98–110)
CHLORIDE SERPL-SCNC: 95 MMOL/L — LOW (ref 98–110)
CO2 SERPL-SCNC: 32 MMOL/L — SIGNIFICANT CHANGE UP (ref 17–32)
CO2 SERPL-SCNC: 32 MMOL/L — SIGNIFICANT CHANGE UP (ref 17–32)
CREAT SERPL-MCNC: 0.8 MG/DL — SIGNIFICANT CHANGE UP (ref 0.7–1.5)
CREAT SERPL-MCNC: 0.8 MG/DL — SIGNIFICANT CHANGE UP (ref 0.7–1.5)
EGFR: 88 ML/MIN/1.73M2 — SIGNIFICANT CHANGE UP
EGFR: 88 ML/MIN/1.73M2 — SIGNIFICANT CHANGE UP
EOSINOPHIL # BLD AUTO: 0.11 K/UL — SIGNIFICANT CHANGE UP (ref 0–0.7)
EOSINOPHIL # BLD AUTO: 0.11 K/UL — SIGNIFICANT CHANGE UP (ref 0–0.7)
EOSINOPHIL NFR BLD AUTO: 1.8 % — SIGNIFICANT CHANGE UP (ref 0–8)
EOSINOPHIL NFR BLD AUTO: 1.8 % — SIGNIFICANT CHANGE UP (ref 0–8)
ERYTHROCYTE [SEDIMENTATION RATE] IN BLOOD: 30 MM/HR — HIGH (ref 0–20)
ERYTHROCYTE [SEDIMENTATION RATE] IN BLOOD: 30 MM/HR — HIGH (ref 0–20)
GLUCOSE BLDC GLUCOMTR-MCNC: 114 MG/DL — HIGH (ref 70–99)
GLUCOSE BLDC GLUCOMTR-MCNC: 114 MG/DL — HIGH (ref 70–99)
GLUCOSE BLDC GLUCOMTR-MCNC: 148 MG/DL — HIGH (ref 70–99)
GLUCOSE BLDC GLUCOMTR-MCNC: 148 MG/DL — HIGH (ref 70–99)
GLUCOSE BLDC GLUCOMTR-MCNC: 166 MG/DL — HIGH (ref 70–99)
GLUCOSE BLDC GLUCOMTR-MCNC: 166 MG/DL — HIGH (ref 70–99)
GLUCOSE SERPL-MCNC: 149 MG/DL — HIGH (ref 70–99)
GLUCOSE SERPL-MCNC: 149 MG/DL — HIGH (ref 70–99)
HCT VFR BLD CALC: 33.5 % — LOW (ref 37–47)
HCT VFR BLD CALC: 33.5 % — LOW (ref 37–47)
HGB BLD-MCNC: 10.6 G/DL — LOW (ref 12–16)
HGB BLD-MCNC: 10.6 G/DL — LOW (ref 12–16)
IMM GRANULOCYTES NFR BLD AUTO: 0.3 % — SIGNIFICANT CHANGE UP (ref 0.1–0.3)
IMM GRANULOCYTES NFR BLD AUTO: 0.3 % — SIGNIFICANT CHANGE UP (ref 0.1–0.3)
LDH SERPL L TO P-CCNC: 230 — SIGNIFICANT CHANGE UP (ref 50–242)
LDH SERPL L TO P-CCNC: 230 — SIGNIFICANT CHANGE UP (ref 50–242)
LYMPHOCYTES # BLD AUTO: 1.91 K/UL — SIGNIFICANT CHANGE UP (ref 1.2–3.4)
LYMPHOCYTES # BLD AUTO: 1.91 K/UL — SIGNIFICANT CHANGE UP (ref 1.2–3.4)
LYMPHOCYTES # BLD AUTO: 31 % — SIGNIFICANT CHANGE UP (ref 20.5–51.1)
LYMPHOCYTES # BLD AUTO: 31 % — SIGNIFICANT CHANGE UP (ref 20.5–51.1)
MCHC RBC-ENTMCNC: 27.4 PG — SIGNIFICANT CHANGE UP (ref 27–31)
MCHC RBC-ENTMCNC: 27.4 PG — SIGNIFICANT CHANGE UP (ref 27–31)
MCHC RBC-ENTMCNC: 31.6 G/DL — LOW (ref 32–37)
MCHC RBC-ENTMCNC: 31.6 G/DL — LOW (ref 32–37)
MCV RBC AUTO: 86.6 FL — SIGNIFICANT CHANGE UP (ref 81–99)
MCV RBC AUTO: 86.6 FL — SIGNIFICANT CHANGE UP (ref 81–99)
MONOCYTES # BLD AUTO: 0.45 K/UL — SIGNIFICANT CHANGE UP (ref 0.1–0.6)
MONOCYTES # BLD AUTO: 0.45 K/UL — SIGNIFICANT CHANGE UP (ref 0.1–0.6)
MONOCYTES NFR BLD AUTO: 7.3 % — SIGNIFICANT CHANGE UP (ref 1.7–9.3)
MONOCYTES NFR BLD AUTO: 7.3 % — SIGNIFICANT CHANGE UP (ref 1.7–9.3)
NEUTROPHILS # BLD AUTO: 3.64 K/UL — SIGNIFICANT CHANGE UP (ref 1.4–6.5)
NEUTROPHILS # BLD AUTO: 3.64 K/UL — SIGNIFICANT CHANGE UP (ref 1.4–6.5)
NEUTROPHILS NFR BLD AUTO: 59.1 % — SIGNIFICANT CHANGE UP (ref 42.2–75.2)
NEUTROPHILS NFR BLD AUTO: 59.1 % — SIGNIFICANT CHANGE UP (ref 42.2–75.2)
NRBC # BLD: 0 /100 WBCS — SIGNIFICANT CHANGE UP (ref 0–0)
NRBC # BLD: 0 /100 WBCS — SIGNIFICANT CHANGE UP (ref 0–0)
PLATELET # BLD AUTO: 203 K/UL — SIGNIFICANT CHANGE UP (ref 130–400)
PLATELET # BLD AUTO: 203 K/UL — SIGNIFICANT CHANGE UP (ref 130–400)
PMV BLD: 10.7 FL — HIGH (ref 7.4–10.4)
PMV BLD: 10.7 FL — HIGH (ref 7.4–10.4)
POTASSIUM SERPL-MCNC: 3.7 MMOL/L — SIGNIFICANT CHANGE UP (ref 3.5–5)
POTASSIUM SERPL-MCNC: 3.7 MMOL/L — SIGNIFICANT CHANGE UP (ref 3.5–5)
POTASSIUM SERPL-SCNC: 3.7 MMOL/L — SIGNIFICANT CHANGE UP (ref 3.5–5)
POTASSIUM SERPL-SCNC: 3.7 MMOL/L — SIGNIFICANT CHANGE UP (ref 3.5–5)
RBC # BLD: 3.87 M/UL — LOW (ref 4.2–5.4)
RBC # BLD: 3.87 M/UL — LOW (ref 4.2–5.4)
RBC # FLD: 14.2 % — SIGNIFICANT CHANGE UP (ref 11.5–14.5)
RBC # FLD: 14.2 % — SIGNIFICANT CHANGE UP (ref 11.5–14.5)
SODIUM SERPL-SCNC: 137 MMOL/L — SIGNIFICANT CHANGE UP (ref 135–146)
SODIUM SERPL-SCNC: 137 MMOL/L — SIGNIFICANT CHANGE UP (ref 135–146)
WBC # BLD: 6.16 K/UL — SIGNIFICANT CHANGE UP (ref 4.8–10.8)
WBC # BLD: 6.16 K/UL — SIGNIFICANT CHANGE UP (ref 4.8–10.8)
WBC # FLD AUTO: 6.16 K/UL — SIGNIFICANT CHANGE UP (ref 4.8–10.8)
WBC # FLD AUTO: 6.16 K/UL — SIGNIFICANT CHANGE UP (ref 4.8–10.8)

## 2024-01-10 PROCEDURE — 93010 ELECTROCARDIOGRAM REPORT: CPT

## 2024-01-10 PROCEDURE — 99232 SBSQ HOSP IP/OBS MODERATE 35: CPT

## 2024-01-10 PROCEDURE — 11104 PUNCH BX SKIN SINGLE LESION: CPT

## 2024-01-10 PROCEDURE — 88305 TISSUE EXAM BY PATHOLOGIST: CPT | Mod: 26

## 2024-01-10 RX ORDER — BUPRENORPHINE AND NALOXONE 2; .5 MG/1; MG/1
2 TABLET SUBLINGUAL DAILY
Refills: 0 | Status: DISCONTINUED | OUTPATIENT
Start: 2024-01-11 | End: 2024-01-11

## 2024-01-10 RX ORDER — BUPRENORPHINE AND NALOXONE 2; .5 MG/1; MG/1
6 TABLET SUBLINGUAL ONCE
Refills: 0 | Status: DISCONTINUED | OUTPATIENT
Start: 2024-01-10 | End: 2024-01-10

## 2024-01-10 RX ORDER — LIDOCAINE HYDROCHLORIDE AND EPINEPHRINE 10; 10 MG/ML; UG/ML
20 INJECTION, SOLUTION INFILTRATION; PERINEURAL ONCE
Refills: 0 | Status: COMPLETED | OUTPATIENT
Start: 2024-01-10 | End: 2024-01-10

## 2024-01-10 RX ORDER — SENNA PLUS 8.6 MG/1
1 TABLET ORAL
Refills: 0 | Status: DISCONTINUED | OUTPATIENT
Start: 2024-01-10 | End: 2024-01-11

## 2024-01-10 RX ADMIN — HEPARIN SODIUM 5000 UNIT(S): 5000 INJECTION INTRAVENOUS; SUBCUTANEOUS at 05:34

## 2024-01-10 RX ADMIN — BUPRENORPHINE AND NALOXONE 1 TABLET(S): 2; .5 TABLET SUBLINGUAL at 12:08

## 2024-01-10 RX ADMIN — BUPRENORPHINE AND NALOXONE 6 TABLET(S): 2; .5 TABLET SUBLINGUAL at 15:42

## 2024-01-10 RX ADMIN — LIDOCAINE HYDROCHLORIDE AND EPINEPHRINE 20 MILLILITER(S): 10; 10 INJECTION, SOLUTION INFILTRATION; PERINEURAL at 15:10

## 2024-01-10 RX ADMIN — HEPARIN SODIUM 5000 UNIT(S): 5000 INJECTION INTRAVENOUS; SUBCUTANEOUS at 17:28

## 2024-01-10 RX ADMIN — Medication 166.67 MILLIGRAM(S): at 05:35

## 2024-01-10 RX ADMIN — Medication 40 MILLIGRAM(S): at 15:40

## 2024-01-10 RX ADMIN — PANTOPRAZOLE SODIUM 40 MILLIGRAM(S): 20 TABLET, DELAYED RELEASE ORAL at 05:33

## 2024-01-10 RX ADMIN — Medication 150 MICROGRAM(S): at 05:33

## 2024-01-10 NOTE — CONSULT NOTE ADULT - ASSESSMENT
ID is consulted for cellulitis 54 yo female PMHx COPD, DM, hypothyroidism, HepC, h/o IVDA on Suboxone presents to ER c/o LE swelling and rash.    ID is consulted for cellulitis  Afebrile, no leukocytosis  BCx pending  LE duplex no DVT    Antibiotics:  Vancomycin 1/9 ->      IMPRESSION:  Possible vasculitis  Bilateral venous stasis  Hx Hep C    RECOMMENDATIONS:  - No clinical signs of cellulitis, D/C antibiotics  - Follow up BCx  - Follow up punch biopsy result  - Derm and Rheum follow up  - Offloading and frequent position changes  - Trend WBC, fever curve, transaminases, creatinine daily      Snow Saleh D.O.  Attending Physician  Division of Infectious Diseases  [facility]  Please contact me via [employer] Teams   54 yo female PMHx COPD, DM, hypothyroidism, HepC, h/o IVDA on Suboxone presents to ER c/o LE swelling and rash.    ID is consulted for cellulitis  Afebrile, no leukocytosis  BCx pending  LE duplex no DVT    Antibiotics:  Vancomycin 1/9 ->      IMPRESSION:  Possible vasculitis  Bilateral venous stasis  Hx Hep C    RECOMMENDATIONS:  - No clinical signs of cellulitis, D/C antibiotics  - Follow up BCx  - Follow up punch biopsy result  - Derm and Rheum follow up  - Offloading and frequent position changes  - Trend WBC, fever curve, transaminases, creatinine daily      Snow Saleh D.O.  Attending Physician  Division of Infectious Diseases  [facility]  Please contact me via [employer] Teams

## 2024-01-10 NOTE — CONSULT NOTE ADULT - SUBJECTIVE AND OBJECTIVE BOX
Patient admitted c/o leg pain.  Photos show erythematous and petichial rash composed of coalesced macules on both LEs.  Platelet count normal.    Rash is most consistent with vasculitis, suggest Rheum w/u and surgical consult for skin biopsy.

## 2024-01-10 NOTE — PHYSICAL THERAPY INITIAL EVALUATION ADULT - PERTINENT HX OF CURRENT PROBLEM, REHAB EVAL
Reason for Admission: b/l LE burning pain and swelling  History of Present Illness:   patient is a 54 yo female PMHx COPD, DM, hypothyroidism, HepC, h/o IVDA on Suboxone presents to ER c/o LE swelling and rash. Pt states she has had worsening lymphedema of b/l LE for months however noticed skin changes over the past 3 days. Pt also c/o burning pain to the b/l LE and difficulty walking. Notes numbness of toes, +sensation, + pulses. Pt presented to Urgent care today for evaluation but was told to go to ER for suspected cellulitis. Denies fever, chills, cp, sob, n/v/d, abdominal pain. Reason for Admission: b/l LE burning pain and swelling  History of Present Illness:   patient is a 52 yo female PMHx COPD, DM, hypothyroidism, HepC, h/o IVDA on Suboxone presents to ER c/o LE swelling and rash. Pt states she has had worsening lymphedema of b/l LE for months however noticed skin changes over the past 3 days. Pt also c/o burning pain to the b/l LE and difficulty walking. Notes numbness of toes, +sensation, + pulses. Pt presented to Urgent care today for evaluation but was told to go to ER for suspected cellulitis. Denies fever, chills, cp, sob, n/v/d, abdominal pain.

## 2024-01-10 NOTE — PHYSICAL THERAPY INITIAL EVALUATION ADULT - ADDITIONAL COMMENTS
Pt reports she lives with her boyfriend in house with [address] then level living. Pt has RW but usually amb without device independently.

## 2024-01-10 NOTE — CHART NOTE - NSCHARTNOTEFT_GEN_A_CORE
PROCEDURE NOTE.     Procedure: Skin biopsy    Indication: Evaluation of suspected vasculitis    Anatomic location: Right lower extremity.    Patient skin was cleaned with antiseptic solution, local anesthesia was administered to the biopsy site.  Punch biopsy x2 was used.  Hemostasis achieved, Wound closed with nylon suture.   Specimen labeled and sent to pathology    No immediate complications were noted.     Follow up pathology results. PROCEDURE NOTE.     Procedure: Skin biopsy    Indication: Evaluation of suspected vasculitis    Anatomic location: Right lower extremity.    Patient skin was cleaned with antiseptic solution, local anesthesia was administered to the biopsy site.  Punch biopsy 3mm  x2 was used.  Hemostasis achieved, Wound closed with 3-0 nylon suture.   Specimen labeled and sent to pathology    No immediate complications were noted.     Follow up pathology results.      I was present, assisted and supervised the procedure

## 2024-01-10 NOTE — CONSULT NOTE ADULT - ASSESSMENT
ASSESSMENT:  52 yo female PMHx COPD, DM, hypothyroidism, HepC, 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    Risks, benefits and complications of skin biopsy discussed including but not limited to pain, bleeding, infection. Patient verbalized understanding and wishes to proceed.     PLAN:  -Skin biopsy at bedside done without acute complications  -Follow skin biopsy results  -Rest per primary.        Above plan discussed with Attending Surgeon Dr. Nelson  , patient, patient family, and Primary team ASSESSMENT:  54 yo female PMHx COPD, DM, hypothyroidism, HepC, 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    Risks, benefits and complications of skin biopsy discussed including but not limited to pain, bleeding, infection. Patient verbalized understanding and wishes to proceed.     PLAN:  -Skin biopsy at bedside done without acute complications  -Follow skin biopsy results  -Rest per primary.        Above plan discussed with Attending Surgeon Dr. Nelson  , patient, patient family, and Primary team

## 2024-01-10 NOTE — PROGRESS NOTE ADULT - SUBJECTIVE AND OBJECTIVE BOX
EZRA BARCLAY  53y  [facility]-S 4I (Back) 426 3      Patient is a 53y old  Female who presents with a chief complaint of b/l LE burning pain and swelling (09 Jan 2024 20:31)      INTERVAL HPI/OVERNIGHT EVENTS:        REVIEW OF SYSTEMS:        FAMILY HISTORY:    T(C): 35.6 (01-10-24 @ 05:26), Max: 36.6 (01-09-24 @ 21:07)  HR: 64 (01-10-24 @ 05:26) (60 - 64)  BP: 93/55 (01-10-24 @ 05:26) (93/55 - 139/76)  RR: 18 (01-10-24 @ 05:26) (18 - 18)  SpO2: 95% (01-10-24 @ 05:26) (95% - 100%)  Wt(kg): --Vital Signs Last 24 Hrs  T(C): 35.6 (10 Chan 2024 05:26), Max: 36.6 (09 Jan 2024 21:07)  T(F): 96 (10 Chan 2024 05:26), Max: 97.9 (09 Jan 2024 21:07)  HR: 64 (10 Chan 2024 05:26) (60 - 64)  BP: 93/55 (10 Chan 2024 05:26) (93/55 - 139/76)  BP(mean): --  RR: 18 (10 Chan 2024 05:26) (18 - 18)  SpO2: 95% (10 Chan 2024 05:26) (95% - 100%)    Parameters below as of 10 Chan 2024 05:26  Patient On (Oxygen Delivery Method): room air        PHYSICAL EXAM:  GENERAL: NAD, well-groomed, well-developed  HEAD:  Atraumatic, Normocephalic  EYES: EOMI, PERRLA, conjunctiva and sclera clear  ENMT: No tonsillar erythema, exudates, or enlargement; Moist mucous membranes, Good dentition, No lesions  NECK: Supple, No JVD, Normal thyroid  NERVOUS SYSTEM:  Alert & Oriented X3, Good concentration; Motor Strength 5/5 B/L upper and lower extremities; DTRs 2+ intact and symmetric  PULM: Clear to auscultation bilaterally  CARDIAC: Regular rate and rhythm; No murmurs, rubs, or gallops  GI: Soft, Nontender, Nondistended; Bowel sounds present  EXTREMITIES:  2+ Peripheral Pulses, No clubbing, cyanosis, or edema  LYMPH: No lymphadenopathy noted  SKIN: No rashes or lesions    Consultant(s) Notes Reviewed:  [x ] YES  [ ] NO  Care Discussed with Consultants/Other Providers [ x] YES  [ ] NO    LABS:                            11.8   8.80  )-----------( 298      ( 09 Jan 2024 18:11 )             36.5   01-09    138  |  92<L>  |  11  ----------------------------<  144<H>  3.3<L>   |  34<H>  |  0.9    Ca    9.6      09 Jan 2024 18:11    TPro  8.2<H>  /  Alb  4.5  /  TBili  0.3  /  DBili  x   /  AST  22  /  ALT  7   /  AlkPhos  108  01-09            acetaminophen     Tablet .. 650 milliGRAM(s) Oral every 6 hours PRN  albuterol    90 MICROgram(s) HFA Inhaler 2 Puff(s) Inhalation every 6 hours PRN  aluminum hydroxide/magnesium hydroxide/simethicone Suspension 30 milliLiter(s) Oral every 4 hours PRN  aspirin enteric coated 81 milliGRAM(s) Oral daily  buprenorphine 8 mG/naloxone 2 mG SL  Tablet 1 Tablet(s) SubLingual daily  dextrose 5%. 1000 milliLiter(s) IV Continuous <Continuous>  dextrose 5%. 1000 milliLiter(s) IV Continuous <Continuous>  dextrose 50% Injectable 25 Gram(s) IV Push once  dextrose 50% Injectable 25 Gram(s) IV Push once  dextrose 50% Injectable 12.5 Gram(s) IV Push once  dextrose Oral Gel 15 Gram(s) Oral once PRN  FLUoxetine 40 milliGRAM(s) Oral daily  furosemide    Tablet 20 milliGRAM(s) Oral daily  glucagon  Injectable 1 milliGRAM(s) IntraMuscular once  heparin   Injectable 5000 Unit(s) SubCutaneous every 12 hours  influenza   Vaccine 0.5 milliLiter(s) IntraMuscular once  insulin lispro (ADMELOG) corrective regimen sliding scale   SubCutaneous three times a day before meals  ketorolac   Injectable 15 milliGRAM(s) IV Push once  levothyroxine 150 MICROGram(s) Oral daily  melatonin 3 milliGRAM(s) Oral at bedtime PRN  pantoprazole    Tablet 40 milliGRAM(s) Oral before breakfast  traMADol 50 milliGRAM(s) Oral every 4 hours PRN  vancomycin  IVPB 1250 milliGRAM(s) IV Intermittent every 12 hours      54 yo female PMHx COPD, DM, hypothyroidism, HepC, 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    1. Bilateral lower leg swelling and erythema.  hx of lymphedema   - admit to medicine              -DULCE:                 - ESR:            - Pt: nl   - IV abx   - Blood cx:   - elevate LE   - c/w lasix   - Pain control   - Venous duplex:pending   - pain control   - ID consult    2. elevated lactate  - IVF, repeat      3. hypokalemia  - replete and repeat     4. H/O DM   - FS AS QHS, ISS, A1C     5. H/O hypothyroidism  - c/w levothyroxine     6. H/O hep C / H/O IVDA c/w Suboxone       diet - CCHO  dvt ppx   gi ppx   pt/rehab       EZRA BARCLAY  53y  [facility] (Back) 426 3      Patient is a 53y old  Female who presents with a chief complaint of b/l LE burning pain and swelling (09 Jan 2024 20:31)      INTERVAL HPI/OVERNIGHT EVENTS:        REVIEW OF SYSTEMS:        FAMILY HISTORY:    T(C): 35.6 (01-10-24 @ 05:26), Max: 36.6 (01-09-24 @ 21:07)  HR: 64 (01-10-24 @ 05:26) (60 - 64)  BP: 93/55 (01-10-24 @ 05:26) (93/55 - 139/76)  RR: 18 (01-10-24 @ 05:26) (18 - 18)  SpO2: 95% (01-10-24 @ 05:26) (95% - 100%)  Wt(kg): --Vital Signs Last 24 Hrs  T(C): 35.6 (10 Chan 2024 05:26), Max: 36.6 (09 Jan 2024 21:07)  T(F): 96 (10 Chan 2024 05:26), Max: 97.9 (09 Jan 2024 21:07)  HR: 64 (10 Chan 2024 05:26) (60 - 64)  BP: 93/55 (10 Chan 2024 05:26) (93/55 - 139/76)  BP(mean): --  RR: 18 (10 Chan 2024 05:26) (18 - 18)  SpO2: 95% (10 Chan 2024 05:26) (95% - 100%)    Parameters below as of 10 Chan 2024 05:26  Patient On (Oxygen Delivery Method): room air        PHYSICAL EXAM:  GENERAL: NAD, well-groomed, well-developed  HEAD:  Atraumatic, Normocephalic  EYES: EOMI, PERRLA, conjunctiva and sclera clear  ENMT: No tonsillar erythema, exudates, or enlargement; Moist mucous membranes, Good dentition, No lesions  NECK: Supple, No JVD, Normal thyroid  NERVOUS SYSTEM:  Alert & Oriented X3, Good concentration; Motor Strength 5/5 B/L upper and lower extremities; DTRs 2+ intact and symmetric  PULM: Clear to auscultation bilaterally  CARDIAC: Regular rate and rhythm; No murmurs, rubs, or gallops  GI: Soft, Nontender, Nondistended; Bowel sounds present  EXTREMITIES:  2+ Peripheral Pulses, No clubbing, cyanosis, or edema  LYMPH: No lymphadenopathy noted  SKIN: No rashes or lesions    Consultant(s) Notes Reviewed:  [x ] YES  [ ] NO  Care Discussed with Consultants/Other Providers [ x] YES  [ ] NO    LABS:                            11.8   8.80  )-----------( 298      ( 09 Jan 2024 18:11 )             36.5   01-09    138  |  92<L>  |  11  ----------------------------<  144<H>  3.3<L>   |  34<H>  |  0.9    Ca    9.6      09 Jan 2024 18:11    TPro  8.2<H>  /  Alb  4.5  /  TBili  0.3  /  DBili  x   /  AST  22  /  ALT  7   /  AlkPhos  108  01-09            acetaminophen     Tablet .. 650 milliGRAM(s) Oral every 6 hours PRN  albuterol    90 MICROgram(s) HFA Inhaler 2 Puff(s) Inhalation every 6 hours PRN  aluminum hydroxide/magnesium hydroxide/simethicone Suspension 30 milliLiter(s) Oral every 4 hours PRN  aspirin enteric coated 81 milliGRAM(s) Oral daily  buprenorphine 8 mG/naloxone 2 mG SL  Tablet 1 Tablet(s) SubLingual daily  dextrose 5%. 1000 milliLiter(s) IV Continuous <Continuous>  dextrose 5%. 1000 milliLiter(s) IV Continuous <Continuous>  dextrose 50% Injectable 25 Gram(s) IV Push once  dextrose 50% Injectable 25 Gram(s) IV Push once  dextrose 50% Injectable 12.5 Gram(s) IV Push once  dextrose Oral Gel 15 Gram(s) Oral once PRN  FLUoxetine 40 milliGRAM(s) Oral daily  furosemide    Tablet 20 milliGRAM(s) Oral daily  glucagon  Injectable 1 milliGRAM(s) IntraMuscular once  heparin   Injectable 5000 Unit(s) SubCutaneous every 12 hours  influenza   Vaccine 0.5 milliLiter(s) IntraMuscular once  insulin lispro (ADMELOG) corrective regimen sliding scale   SubCutaneous three times a day before meals  ketorolac   Injectable 15 milliGRAM(s) IV Push once  levothyroxine 150 MICROGram(s) Oral daily  melatonin 3 milliGRAM(s) Oral at bedtime PRN  pantoprazole    Tablet 40 milliGRAM(s) Oral before breakfast  traMADol 50 milliGRAM(s) Oral every 4 hours PRN  vancomycin  IVPB 1250 milliGRAM(s) IV Intermittent every 12 hours      54 yo female PMHx COPD, DM, hypothyroidism, HepC, 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    1. Bilateral lower leg swelling and erythema.  hx of lymphedema   - admit to medicine              -DULCE:                 - ESR:            - Pt: nl   - IV abx   - Blood cx:   - elevate LE   - c/w lasix   - Pain control   - Venous duplex:pending   - pain control   - ID consult    2. elevated lactate  - IVF, repeat      3. hypokalemia  - replete and repeat     4. H/O DM   - FS AS QHS, ISS, A1C     5. H/O hypothyroidism  - c/w levothyroxine     6. H/O hep C / H/O IVDA c/w Suboxone       diet - CCHO  dvt ppx   gi ppx   pt/rehab       BELKYS BARCLAYLE  53y  [facility] (Back) 426 3      Patient is a 53y old  Female who presents with a chief complaint of b/l LE burning pain and swelling (09 Jan 2024 20:31)      INTERVAL HPI/OVERNIGHT EVENTS:  Patient feels well. She has no active sx except subjective chills?   She denies any fever, joint pain, recent transfusion, platelets disorders  no other events noted         FAMILY HISTORY:    T(C): 35.6 (01-10-24 @ 05:26), Max: 36.6 (01-09-24 @ 21:07)  HR: 64 (01-10-24 @ 05:26) (60 - 64)  BP: 93/55 (01-10-24 @ 05:26) (93/55 - 139/76)  RR: 18 (01-10-24 @ 05:26) (18 - 18)  SpO2: 95% (01-10-24 @ 05:26) (95% - 100%)  Wt(kg): --Vital Signs Last 24 Hrs  T(C): 35.6 (10 Chan 2024 05:26), Max: 36.6 (09 Jan 2024 21:07)  T(F): 96 (10 Chan 2024 05:26), Max: 97.9 (09 Jan 2024 21:07)  HR: 64 (10 Chan 2024 05:26) (60 - 64)  BP: 93/55 (10 Chan 2024 05:26) (93/55 - 139/76)  BP(mean): --  RR: 18 (10 Chan 2024 05:26) (18 - 18)  SpO2: 95% (10 Chan 2024 05:26) (95% - 100%)    Parameters below as of 10 Chan 2024 05:26  Patient On (Oxygen Delivery Method): room air        PHYSICAL EXAM:  GENERAL: NAD, well-groomed, well-developed  PULM: Clear to auscultation bilaterally  CARDIAC: Regular rate and rhythm;   GI: Soft, Nontender, Nondistended; Bowel sounds present  EXTREMITIES: Petechia rash non-blanchable. one or two spot were large     Consultant(s) Notes Reviewed:  [x ] YES  [ ] NO  Care Discussed with Consultants/Other Providers [ x] YES  [ ] NO    LABS:                            11.8   8.80  )-----------( 298      ( 09 Jan 2024 18:11 )             36.5   01-09    138  |  92<L>  |  11  ----------------------------<  144<H>  3.3<L>   |  34<H>  |  0.9    Ca    9.6      09 Jan 2024 18:11    TPro  8.2<H>  /  Alb  4.5  /  TBili  0.3  /  DBili  x   /  AST  22  /  ALT  7   /  AlkPhos  108  01-09            acetaminophen     Tablet .. 650 milliGRAM(s) Oral every 6 hours PRN  albuterol    90 MICROgram(s) HFA Inhaler 2 Puff(s) Inhalation every 6 hours PRN  aluminum hydroxide/magnesium hydroxide/simethicone Suspension 30 milliLiter(s) Oral every 4 hours PRN  aspirin enteric coated 81 milliGRAM(s) Oral daily  buprenorphine 8 mG/naloxone 2 mG SL  Tablet 1 Tablet(s) SubLingual daily  dextrose 5%. 1000 milliLiter(s) IV Continuous <Continuous>  dextrose 5%. 1000 milliLiter(s) IV Continuous <Continuous>  dextrose 50% Injectable 25 Gram(s) IV Push once  dextrose 50% Injectable 25 Gram(s) IV Push once  dextrose 50% Injectable 12.5 Gram(s) IV Push once  dextrose Oral Gel 15 Gram(s) Oral once PRN  FLUoxetine 40 milliGRAM(s) Oral daily  furosemide    Tablet 20 milliGRAM(s) Oral daily  glucagon  Injectable 1 milliGRAM(s) IntraMuscular once  heparin   Injectable 5000 Unit(s) SubCutaneous every 12 hours  influenza   Vaccine 0.5 milliLiter(s) IntraMuscular once  insulin lispro (ADMELOG) corrective regimen sliding scale   SubCutaneous three times a day before meals  ketorolac   Injectable 15 milliGRAM(s) IV Push once  levothyroxine 150 MICROGram(s) Oral daily  melatonin 3 milliGRAM(s) Oral at bedtime PRN  pantoprazole    Tablet 40 milliGRAM(s) Oral before breakfast  traMADol 50 milliGRAM(s) Oral every 4 hours PRN  vancomycin  IVPB 1250 milliGRAM(s) IV Intermittent every 12 hours      54 yo female PMHx COPD, DM, hypothyroidism, HepC, 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    1. Bilateral lower leg swelling and erythema seems more like vasculitis .  hx of lymphedema   - admit to medicine              -DULCE: pending        - C3/C4:pending      -Procalcitonin:pending          - ESR:30            - Pt: nl   - Hold aspirin in the setting of petechia  -  Cont IV  abs awaiting final blood cx   - Blood cx:pending   - elevate LE   - c/w home dose of  lasix   - Venous duplex:WNL   - Dermatology consult:  a) Looks more like vasculitis. recommend rheum and gen surg consult  - Rheumatology consult:pending  - Gen surg consult:pending   - ID consult:pending   - PT eval appreciated     2. elevated lactate  - Repeat LA    3. hypokalemia  - replete and repeat     4. H/O DM   - FS AS QHS, ISS, A1C     5. H/O hypothyroidism  - c/w levothyroxine     6. H/O hep C / H/O IVDA c/w Suboxone       diet - CCHO  dvt ppx   gi ppx          BELKYS BARCLAYLE  53y  [facility] (Back) 426 3      Patient is a 53y old  Female who presents with a chief complaint of b/l LE burning pain and swelling (09 Jan 2024 20:31)      INTERVAL HPI/OVERNIGHT EVENTS:  Patient feels well. She has no active sx except subjective chills?   She denies any fever, joint pain, recent transfusion, platelets disorders  no other events noted         FAMILY HISTORY:    T(C): 35.6 (01-10-24 @ 05:26), Max: 36.6 (01-09-24 @ 21:07)  HR: 64 (01-10-24 @ 05:26) (60 - 64)  BP: 93/55 (01-10-24 @ 05:26) (93/55 - 139/76)  RR: 18 (01-10-24 @ 05:26) (18 - 18)  SpO2: 95% (01-10-24 @ 05:26) (95% - 100%)  Wt(kg): --Vital Signs Last 24 Hrs  T(C): 35.6 (10 Chan 2024 05:26), Max: 36.6 (09 Jan 2024 21:07)  T(F): 96 (10 Chan 2024 05:26), Max: 97.9 (09 Jan 2024 21:07)  HR: 64 (10 Chan 2024 05:26) (60 - 64)  BP: 93/55 (10 Chan 2024 05:26) (93/55 - 139/76)  BP(mean): --  RR: 18 (10 Chan 2024 05:26) (18 - 18)  SpO2: 95% (10 Chan 2024 05:26) (95% - 100%)    Parameters below as of 10 Chan 2024 05:26  Patient On (Oxygen Delivery Method): room air        PHYSICAL EXAM:  GENERAL: NAD, well-groomed, well-developed  PULM: Clear to auscultation bilaterally  CARDIAC: Regular rate and rhythm;   GI: Soft, Nontender, Nondistended; Bowel sounds present  EXTREMITIES: Petechia rash non-blanchable. one or two spot were large     Consultant(s) Notes Reviewed:  [x ] YES  [ ] NO  Care Discussed with Consultants/Other Providers [ x] YES  [ ] NO    LABS:                            11.8   8.80  )-----------( 298      ( 09 Jan 2024 18:11 )             36.5   01-09    138  |  92<L>  |  11  ----------------------------<  144<H>  3.3<L>   |  34<H>  |  0.9    Ca    9.6      09 Jan 2024 18:11    TPro  8.2<H>  /  Alb  4.5  /  TBili  0.3  /  DBili  x   /  AST  22  /  ALT  7   /  AlkPhos  108  01-09            acetaminophen     Tablet .. 650 milliGRAM(s) Oral every 6 hours PRN  albuterol    90 MICROgram(s) HFA Inhaler 2 Puff(s) Inhalation every 6 hours PRN  aluminum hydroxide/magnesium hydroxide/simethicone Suspension 30 milliLiter(s) Oral every 4 hours PRN  aspirin enteric coated 81 milliGRAM(s) Oral daily  buprenorphine 8 mG/naloxone 2 mG SL  Tablet 1 Tablet(s) SubLingual daily  dextrose 5%. 1000 milliLiter(s) IV Continuous <Continuous>  dextrose 5%. 1000 milliLiter(s) IV Continuous <Continuous>  dextrose 50% Injectable 25 Gram(s) IV Push once  dextrose 50% Injectable 25 Gram(s) IV Push once  dextrose 50% Injectable 12.5 Gram(s) IV Push once  dextrose Oral Gel 15 Gram(s) Oral once PRN  FLUoxetine 40 milliGRAM(s) Oral daily  furosemide    Tablet 20 milliGRAM(s) Oral daily  glucagon  Injectable 1 milliGRAM(s) IntraMuscular once  heparin   Injectable 5000 Unit(s) SubCutaneous every 12 hours  influenza   Vaccine 0.5 milliLiter(s) IntraMuscular once  insulin lispro (ADMELOG) corrective regimen sliding scale   SubCutaneous three times a day before meals  ketorolac   Injectable 15 milliGRAM(s) IV Push once  levothyroxine 150 MICROGram(s) Oral daily  melatonin 3 milliGRAM(s) Oral at bedtime PRN  pantoprazole    Tablet 40 milliGRAM(s) Oral before breakfast  traMADol 50 milliGRAM(s) Oral every 4 hours PRN  vancomycin  IVPB 1250 milliGRAM(s) IV Intermittent every 12 hours      52 yo female PMHx COPD, DM, hypothyroidism, HepC, 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    1. Bilateral lower leg swelling and erythema seems more like vasculitis .  hx of lymphedema   - admit to medicine              -DULCE: pending        - C3/C4:pending      -Procalcitonin:pending          - ESR:30            - Pt: nl   - Hold aspirin in the setting of petechia  -  Cont IV  abs awaiting final blood cx   - Blood cx:pending   - elevate LE   - c/w home dose of  lasix   - Venous duplex:WNL   - Dermatology consult:  a) Looks more like vasculitis. recommend rheum and gen surg consult  - Rheumatology consult:pending  - Gen surg consult:pending   - ID consult:pending   - PT eval appreciated     2. elevated lactate  - Repeat LA    3. hypokalemia  - replete and repeat     4. H/O DM   - FS AS QHS, ISS, A1C     5. H/O hypothyroidism  - c/w levothyroxine     6. H/O hep C / H/O IVDA c/w Suboxone       diet - CCHO  dvt ppx   gi ppx          BELKYS BARCLAYLE  53y  [facility]-S [facility] (Back) 426 3      Patient is a 53y old  Female who presents with a chief complaint of b/l LE burning pain and swelling (09 Jan 2024 20:31)      INTERVAL HPI/OVERNIGHT EVENTS:  Patient feels well. She has no active sx except subjective chills?   She denies any fever, joint pain, recent transfusion, platelets disorders  no other events noted         FAMILY HISTORY:    T(C): 35.6 (01-10-24 @ 05:26), Max: 36.6 (01-09-24 @ 21:07)  HR: 64 (01-10-24 @ 05:26) (60 - 64)  BP: 93/55 (01-10-24 @ 05:26) (93/55 - 139/76)  RR: 18 (01-10-24 @ 05:26) (18 - 18)  SpO2: 95% (01-10-24 @ 05:26) (95% - 100%)  Wt(kg): --Vital Signs Last 24 Hrs  T(C): 35.6 (10 Chan 2024 05:26), Max: 36.6 (09 Jan 2024 21:07)  T(F): 96 (10 Chan 2024 05:26), Max: 97.9 (09 Jan 2024 21:07)  HR: 64 (10 Chan 2024 05:26) (60 - 64)  BP: 93/55 (10 Chan 2024 05:26) (93/55 - 139/76)  BP(mean): --  RR: 18 (10 Chan 2024 05:26) (18 - 18)  SpO2: 95% (10 Chan 2024 05:26) (95% - 100%)    Parameters below as of 10 Chan 2024 05:26  Patient On (Oxygen Delivery Method): room air        PHYSICAL EXAM:  GENERAL: NAD, well-groomed, well-developed  PULM: Clear to auscultation bilaterally  CARDIAC: Regular rate and rhythm;   GI: Soft, Nontender, Nondistended; Bowel sounds present  EXTREMITIES: Petechia rash non-blanchable. one or two spot were large     Consultant(s) Notes Reviewed:  [x ] YES  [ ] NO  Care Discussed with Consultants/Other Providers [ x] YES  [ ] NO    LABS:                            11.8   8.80  )-----------( 298      ( 09 Jan 2024 18:11 )             36.5   01-09    138  |  92<L>  |  11  ----------------------------<  144<H>  3.3<L>   |  34<H>  |  0.9    Ca    9.6      09 Jan 2024 18:11    TPro  8.2<H>  /  Alb  4.5  /  TBili  0.3  /  DBili  x   /  AST  22  /  ALT  7   /  AlkPhos  108  01-09            acetaminophen     Tablet .. 650 milliGRAM(s) Oral every 6 hours PRN  albuterol    90 MICROgram(s) HFA Inhaler 2 Puff(s) Inhalation every 6 hours PRN  aluminum hydroxide/magnesium hydroxide/simethicone Suspension 30 milliLiter(s) Oral every 4 hours PRN  aspirin enteric coated 81 milliGRAM(s) Oral daily  buprenorphine 8 mG/naloxone 2 mG SL  Tablet 1 Tablet(s) SubLingual daily  dextrose 5%. 1000 milliLiter(s) IV Continuous <Continuous>  dextrose 5%. 1000 milliLiter(s) IV Continuous <Continuous>  dextrose 50% Injectable 25 Gram(s) IV Push once  dextrose 50% Injectable 25 Gram(s) IV Push once  dextrose 50% Injectable 12.5 Gram(s) IV Push once  dextrose Oral Gel 15 Gram(s) Oral once PRN  FLUoxetine 40 milliGRAM(s) Oral daily  furosemide    Tablet 20 milliGRAM(s) Oral daily  glucagon  Injectable 1 milliGRAM(s) IntraMuscular once  heparin   Injectable 5000 Unit(s) SubCutaneous every 12 hours  influenza   Vaccine 0.5 milliLiter(s) IntraMuscular once  insulin lispro (ADMELOG) corrective regimen sliding scale   SubCutaneous three times a day before meals  ketorolac   Injectable 15 milliGRAM(s) IV Push once  levothyroxine 150 MICROGram(s) Oral daily  melatonin 3 milliGRAM(s) Oral at bedtime PRN  pantoprazole    Tablet 40 milliGRAM(s) Oral before breakfast  traMADol 50 milliGRAM(s) Oral every 4 hours PRN  vancomycin  IVPB 1250 milliGRAM(s) IV Intermittent every 12 hours      54 yo female PMHx COPD, DM, hypothyroidism, HepC, 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    1. Bilateral lower leg swelling and erythema seems more like vasculitis .  hx of lymphedema   - admit to medicine              -DULCE: pending        - C3/C4:pending      -Procalcitonin:pending          - ESR:30            - Pt: nl   - HepB/C :pending               - ANCA:pending        - Serum protein electrophoresis:pending  - SJogren abs:pending         - Lupus anticoag:pending   - Hold aspirin in the setting of petechia  -  Was on vancomycin that was stopped   - Blood cx:pending   - elevate LE   - c/w home dose of  lasix   - Venous duplex:WNL   - Dermatology consult:  a) Looks more like vasculitis. recommend rheum and gen surg consult  - Rheumatology consult:pending  - Gen surg consult:pending   - ID consult:pending   - PT eval appreciated     2. elevated lactate  - Repeat LA    3. hypokalemia  - replete and repeat     4. H/O DM   - FS AS QHS, ISS, A1C     5. H/O hypothyroidism  - c/w levothyroxine     6. H/O hep C / H/O IVDA c/w Suboxone       diet - CCHO  dvt ppx   gi ppx          BELKYS BARCLAYLE  53y  [facility]-S 4I (Back) 426 3      Patient is a 53y old  Female who presents with a chief complaint of b/l LE burning pain and swelling (09 Jan 2024 20:31)      INTERVAL HPI/OVERNIGHT EVENTS:  Patient feels well. She has no active sx except subjective chills?   She denies any fever, joint pain, recent transfusion, platelets disorders  no other events noted         FAMILY HISTORY:    T(C): 35.6 (01-10-24 @ 05:26), Max: 36.6 (01-09-24 @ 21:07)  HR: 64 (01-10-24 @ 05:26) (60 - 64)  BP: 93/55 (01-10-24 @ 05:26) (93/55 - 139/76)  RR: 18 (01-10-24 @ 05:26) (18 - 18)  SpO2: 95% (01-10-24 @ 05:26) (95% - 100%)  Wt(kg): --Vital Signs Last 24 Hrs  T(C): 35.6 (10 Chan 2024 05:26), Max: 36.6 (09 Jan 2024 21:07)  T(F): 96 (10 Chan 2024 05:26), Max: 97.9 (09 Jan 2024 21:07)  HR: 64 (10 Chan 2024 05:26) (60 - 64)  BP: 93/55 (10 Chan 2024 05:26) (93/55 - 139/76)  BP(mean): --  RR: 18 (10 Chan 2024 05:26) (18 - 18)  SpO2: 95% (10 Chan 2024 05:26) (95% - 100%)    Parameters below as of 10 Chan 2024 05:26  Patient On (Oxygen Delivery Method): room air        PHYSICAL EXAM:  GENERAL: NAD, well-groomed, well-developed  PULM: Clear to auscultation bilaterally  CARDIAC: Regular rate and rhythm;   GI: Soft, Nontender, Nondistended; Bowel sounds present  EXTREMITIES: Petechia rash non-blanchable. one or two spot were large     Consultant(s) Notes Reviewed:  [x ] YES  [ ] NO  Care Discussed with Consultants/Other Providers [ x] YES  [ ] NO    LABS:                            11.8   8.80  )-----------( 298      ( 09 Jan 2024 18:11 )             36.5   01-09    138  |  92<L>  |  11  ----------------------------<  144<H>  3.3<L>   |  34<H>  |  0.9    Ca    9.6      09 Jan 2024 18:11    TPro  8.2<H>  /  Alb  4.5  /  TBili  0.3  /  DBili  x   /  AST  22  /  ALT  7   /  AlkPhos  108  01-09            acetaminophen     Tablet .. 650 milliGRAM(s) Oral every 6 hours PRN  albuterol    90 MICROgram(s) HFA Inhaler 2 Puff(s) Inhalation every 6 hours PRN  aluminum hydroxide/magnesium hydroxide/simethicone Suspension 30 milliLiter(s) Oral every 4 hours PRN  aspirin enteric coated 81 milliGRAM(s) Oral daily  buprenorphine 8 mG/naloxone 2 mG SL  Tablet 1 Tablet(s) SubLingual daily  dextrose 5%. 1000 milliLiter(s) IV Continuous <Continuous>  dextrose 5%. 1000 milliLiter(s) IV Continuous <Continuous>  dextrose 50% Injectable 25 Gram(s) IV Push once  dextrose 50% Injectable 25 Gram(s) IV Push once  dextrose 50% Injectable 12.5 Gram(s) IV Push once  dextrose Oral Gel 15 Gram(s) Oral once PRN  FLUoxetine 40 milliGRAM(s) Oral daily  furosemide    Tablet 20 milliGRAM(s) Oral daily  glucagon  Injectable 1 milliGRAM(s) IntraMuscular once  heparin   Injectable 5000 Unit(s) SubCutaneous every 12 hours  influenza   Vaccine 0.5 milliLiter(s) IntraMuscular once  insulin lispro (ADMELOG) corrective regimen sliding scale   SubCutaneous three times a day before meals  ketorolac   Injectable 15 milliGRAM(s) IV Push once  levothyroxine 150 MICROGram(s) Oral daily  melatonin 3 milliGRAM(s) Oral at bedtime PRN  pantoprazole    Tablet 40 milliGRAM(s) Oral before breakfast  traMADol 50 milliGRAM(s) Oral every 4 hours PRN  vancomycin  IVPB 1250 milliGRAM(s) IV Intermittent every 12 hours      54 yo female PMHx COPD, DM, hypothyroidism, HepC, 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    1. Bilateral lower leg swelling and erythema seems more like vasculitis .  hx of lymphedema   - admit to medicine              -DULCE: pending        - C3/C4:pending      -Procalcitonin:pending          - ESR:30            - Pt: nl   - HepB/C :pending               - ANCA:pending        - Serum protein electrophoresis:pending  - SJogren abs:pending         - Lupus anticoag:pending   - Hold aspirin in the setting of petechia  -  Was on vancomycin that was stopped   - Blood cx:pending   - elevate LE   - c/w home dose of  lasix   - Venous duplex:WNL   - Dermatology consult:  a) Looks more like vasculitis. recommend rheum and gen surg consult  - Rheumatology consult:pending  - Gen surg consult:pending   - ID consult:pending   - PT eval appreciated     2. elevated lactate  - Repeat LA    3. hypokalemia  - replete and repeat     4. H/O DM   - FS AS QHS, ISS, A1C     5. H/O hypothyroidism  - c/w levothyroxine     6. H/O hep C / H/O IVDA c/w Suboxone       diet - CCHO  dvt ppx   gi ppx

## 2024-01-10 NOTE — PHYSICAL THERAPY INITIAL EVALUATION ADULT - GENERAL OBSERVATIONS, REHAB EVAL
11:20-11:43 Chart reviewed. Patient available to be seen for physical therapy, denies pain, confirmed with RN.  Pt rec'd in bed in NAD.

## 2024-01-10 NOTE — PHYSICAL THERAPY INITIAL EVALUATION ADULT - LEVEL OF INDEPENDENCE: STAND/SIT, REHAB EVAL
November 14, 2020      Re:   Víctor Gaspar  [address] [place] [address]           This is to certify that Víctor Gaspar has been under my care Saturday November 14, 2020. He tested positive COVID-19.  He needs to quarantine until Friday November 20, 2020 when he may return to work if able.    RESTRICTIONS:     REMARKS:       SIGNATURE: ___________________________________________,   11/14/2020      Boni Black, DO Boni Black, DO [facility]  [address] [facility] [address] [phone]   independent

## 2024-01-10 NOTE — CONSULT NOTE ADULT - SUBJECTIVE AND OBJECTIVE BOX
GENERAL SURGERY CONSULT NOTE    Patient: EZRA BARCLAY , 53y ([birth year])Female   MRN: [id number]  Location: [address] ([address]) [address] [address]  Visit: 01-09-24 Inpatient  Date: 01-10-24 @ 15:22    HPI:  patient is a 52 yo female PMHx COPD, DM, hypothyroidism, HepC, h/o IVDA on Suboxone presents to ER c/o LE swelling and rash. Pt states she has had worsening lymphedema of b/l LE for months however noticed skin changes over the past 3 days. Pt also c/o burning pain to the b/l LE and difficulty walking. Notes numbness of toes, +sensation, + pulses. Pt presented to Urgent care today for evaluation but was told to go to ER for suspected cellulitis. Admitted for further evaluation. General Surgery consulted for skin biopsy.       PAST MEDICAL & SURGICAL HISTORY:  Asthma with COPD      Hypothyroidism      H/O: substance abuse  no longer using      History of pancreatitis      Hepatitis-C      History of appendectomy      History of tonsillectomy          Home Medications:  aspirin 81 mg oral delayed release tablet: 1 tab(s) orally once a day (13 Jul 2020 09:33)  Suboxone 8 mg-2 mg sublingual tablet: 2.5 film(s) sublingual once a day (06 Jan 2020 07:40)        VITALS:  T(F): 96.6 (01-10-24 @ 13:58), Max: 97.9 (01-09-24 @ 21:07)  HR: 65 (01-10-24 @ 13:58) (60 - 65)  BP: 89/50 (01-10-24 @ 13:58) (89/50 - 139/76)  RR: 18 (01-10-24 @ 13:58) (18 - 18)  SpO2: 98% (01-10-24 @ 13:58) (95% - 100%)    PHYSICAL EXAM:  General: NAD, AAOx3, calm and cooperative  HEENT: NCAT, UBALDO, EOMI, Trachea ML, Neck supple  Cardiac: RRR   Respiratory: CTAB, normal respiratory effort, breaths  Abdomen: Soft, non-distended, non-tender, no rebound, no guarding. +BS.  Musculoskeletal: Strength 5/5 BL UE/LE, ROM intact,  Skin: Bilateral lower extremity with erythematous and petechial rash composed of coalesced macules/      MEDICATIONS  (STANDING):  buprenorphine 8 mG/naloxone 2 mG SL  Tablet 1 Tablet(s) SubLingual daily  dextrose 5%. 1000 milliLiter(s) (50 mL/Hr) IV Continuous <Continuous>  dextrose 5%. 1000 milliLiter(s) (100 mL/Hr) IV Continuous <Continuous>  dextrose 50% Injectable 25 Gram(s) IV Push once  dextrose 50% Injectable 25 Gram(s) IV Push once  dextrose 50% Injectable 12.5 Gram(s) IV Push once  FLUoxetine 40 milliGRAM(s) Oral daily  furosemide    Tablet 20 milliGRAM(s) Oral daily  glucagon  Injectable 1 milliGRAM(s) IntraMuscular once  heparin   Injectable 5000 Unit(s) SubCutaneous every 12 hours  influenza   Vaccine 0.5 milliLiter(s) IntraMuscular once  insulin lispro (ADMELOG) corrective regimen sliding scale   SubCutaneous three times a day before meals  ketorolac   Injectable 15 milliGRAM(s) IV Push once  levothyroxine 150 MICROGram(s) Oral daily  pantoprazole    Tablet 40 milliGRAM(s) Oral before breakfast  senna 1 Tablet(s) Oral two times a day    MEDICATIONS  (PRN):  acetaminophen     Tablet .. 650 milliGRAM(s) Oral every 6 hours PRN Temp greater or equal to 38C (100.4F), Mild Pain (1 - 3)  albuterol    90 MICROgram(s) HFA Inhaler 2 Puff(s) Inhalation every 6 hours PRN Shortness of Breath  aluminum hydroxide/magnesium hydroxide/simethicone Suspension 30 milliLiter(s) Oral every 4 hours PRN Dyspepsia  dextrose Oral Gel 15 Gram(s) Oral once PRN Blood Glucose LESS THAN 70 milliGRAM(s)/deciliter  melatonin 3 milliGRAM(s) Oral at bedtime PRN Insomnia  traMADol 50 milliGRAM(s) Oral every 4 hours PRN Moderate Pain (4 - 6)      LAB/STUDIES:                        10.6   6.16  )-----------( 203      ( 10 Chan 2024 06:55 )             33.5     01-10    137  |  95<L>  |  10  ----------------------------<  149<H>  3.7   |  32  |  0.8    Ca    8.9      10 Chan 2024 06:55    TPro  8.2<H>  /  Alb  4.5  /  TBili  0.3  /  DBili  x   /  AST  22  /  ALT  7   /  AlkPhos  108  01-09      LIVER FUNCTIONS - ( 09 Jan 2024 18:11 )  Alb: 4.5 g/dL / Pro: 8.2 g/dL / ALK PHOS: 108 U/L / ALT: 7 U/L / AST: 22 U/L / GGT: x           Urinalysis Basic - ( 10 Chan 2024 06:55 )    Color: x / Appearance: x / SG: x / pH: x  Gluc: 149 mg/dL / Ketone: x  / Bili: x / Urobili: x   Blood: x / Protein: x / Nitrite: x   Leuk Esterase: x / RBC: x / WBC x   Sq Epi: x / Non Sq Epi: x / Bacteria: x                  IMAGING:      ACCESS DEVICES:  [ ] Peripheral IV  [ ] Central Venous Line	[ ] R	[ ] L	[ ] IJ	[ ] Fem	[ ] SC	Placed:   [ ] Arterial Line		[ ] R	[ ] L	[ ] Fem	[ ] Rad	[ ] Ax	Placed:   [ ] PICC:					[ ] Mediport  [ ] Urinary Catheter, Date Placed:       01-10-24 @ 15:22   GENERAL SURGERY CONSULT NOTE    Patient: EZRA BARCLAY , 53y ([birth year])Female   MRN: [id number]  Location: [address] ([address]) [address]  Visit: 01-09-24 Inpatient  Date: 01-10-24 @ 15:22    HPI:  patient is a 54 yo female PMHx COPD, DM, hypothyroidism, HepC, h/o IVDA on Suboxone presents to ER c/o LE swelling and rash. Pt states she has had worsening lymphedema of b/l LE for months however noticed skin changes over the past 3 days. Pt also c/o burning pain to the b/l LE and difficulty walking. Notes numbness of toes, +sensation, + pulses. Pt presented to Urgent care today for evaluation but was told to go to ER for suspected cellulitis. Admitted for further evaluation. General Surgery consulted for skin biopsy.       PAST MEDICAL & SURGICAL HISTORY:  Asthma with COPD      Hypothyroidism      H/O: substance abuse  no longer using      History of pancreatitis      Hepatitis-C      History of appendectomy      History of tonsillectomy          Home Medications:  aspirin 81 mg oral delayed release tablet: 1 tab(s) orally once a day (13 Jul 2020 09:33)  Suboxone 8 mg-2 mg sublingual tablet: 2.5 film(s) sublingual once a day (06 Jan 2020 07:40)        VITALS:  T(F): 96.6 (01-10-24 @ 13:58), Max: 97.9 (01-09-24 @ 21:07)  HR: 65 (01-10-24 @ 13:58) (60 - 65)  BP: 89/50 (01-10-24 @ 13:58) (89/50 - 139/76)  RR: 18 (01-10-24 @ 13:58) (18 - 18)  SpO2: 98% (01-10-24 @ 13:58) (95% - 100%)    PHYSICAL EXAM:  General: NAD, AAOx3, calm and cooperative  HEENT: NCAT, UBALDO, EOMI, Trachea ML, Neck supple  Cardiac: RRR   Respiratory: CTAB, normal respiratory effort, breaths  Abdomen: Soft, non-distended, non-tender, no rebound, no guarding. +BS.  Musculoskeletal: Strength 5/5 BL UE/LE, ROM intact,  Skin: Bilateral lower extremity with erythematous and petechial rash composed of coalesced macules/      MEDICATIONS  (STANDING):  buprenorphine 8 mG/naloxone 2 mG SL  Tablet 1 Tablet(s) SubLingual daily  dextrose 5%. 1000 milliLiter(s) (50 mL/Hr) IV Continuous <Continuous>  dextrose 5%. 1000 milliLiter(s) (100 mL/Hr) IV Continuous <Continuous>  dextrose 50% Injectable 25 Gram(s) IV Push once  dextrose 50% Injectable 25 Gram(s) IV Push once  dextrose 50% Injectable 12.5 Gram(s) IV Push once  FLUoxetine 40 milliGRAM(s) Oral daily  furosemide    Tablet 20 milliGRAM(s) Oral daily  glucagon  Injectable 1 milliGRAM(s) IntraMuscular once  heparin   Injectable 5000 Unit(s) SubCutaneous every 12 hours  influenza   Vaccine 0.5 milliLiter(s) IntraMuscular once  insulin lispro (ADMELOG) corrective regimen sliding scale   SubCutaneous three times a day before meals  ketorolac   Injectable 15 milliGRAM(s) IV Push once  levothyroxine 150 MICROGram(s) Oral daily  pantoprazole    Tablet 40 milliGRAM(s) Oral before breakfast  senna 1 Tablet(s) Oral two times a day    MEDICATIONS  (PRN):  acetaminophen     Tablet .. 650 milliGRAM(s) Oral every 6 hours PRN Temp greater or equal to 38C (100.4F), Mild Pain (1 - 3)  albuterol    90 MICROgram(s) HFA Inhaler 2 Puff(s) Inhalation every 6 hours PRN Shortness of Breath  aluminum hydroxide/magnesium hydroxide/simethicone Suspension 30 milliLiter(s) Oral every 4 hours PRN Dyspepsia  dextrose Oral Gel 15 Gram(s) Oral once PRN Blood Glucose LESS THAN 70 milliGRAM(s)/deciliter  melatonin 3 milliGRAM(s) Oral at bedtime PRN Insomnia  traMADol 50 milliGRAM(s) Oral every 4 hours PRN Moderate Pain (4 - 6)      LAB/STUDIES:                        10.6   6.16  )-----------( 203      ( 10 Chan 2024 06:55 )             33.5     01-10    137  |  95<L>  |  10  ----------------------------<  149<H>  3.7   |  32  |  0.8    Ca    8.9      10 Chan 2024 06:55    TPro  8.2<H>  /  Alb  4.5  /  TBili  0.3  /  DBili  x   /  AST  22  /  ALT  7   /  AlkPhos  108  01-09      LIVER FUNCTIONS - ( 09 Jan 2024 18:11 )  Alb: 4.5 g/dL / Pro: 8.2 g/dL / ALK PHOS: 108 U/L / ALT: 7 U/L / AST: 22 U/L / GGT: x           Urinalysis Basic - ( 10 Chan 2024 06:55 )    Color: x / Appearance: x / SG: x / pH: x  Gluc: 149 mg/dL / Ketone: x  / Bili: x / Urobili: x   Blood: x / Protein: x / Nitrite: x   Leuk Esterase: x / RBC: x / WBC x   Sq Epi: x / Non Sq Epi: x / Bacteria: x                  IMAGING:      ACCESS DEVICES:  [ ] Peripheral IV  [ ] Central Venous Line	[ ] R	[ ] L	[ ] IJ	[ ] Fem	[ ] SC	Placed:   [ ] Arterial Line		[ ] R	[ ] L	[ ] Fem	[ ] Rad	[ ] Ax	Placed:   [ ] PICC:					[ ] Mediport  [ ] Urinary Catheter, Date Placed:       01-10-24 @ 15:22

## 2024-01-10 NOTE — CONSULT NOTE ADULT - SUBJECTIVE AND OBJECTIVE BOX
INFECTIOUS DISEASE CONSULT NOTE    Patient is a 53y old  Female who presents with a chief complaint of b/l LE burning pain and swelling (10 Chan 2024 07:44)    HPI:  patient is a 54 yo female PMHx COPD, DM, hypothyroidism, HepC, h/o IVDA on Suboxone presents to ER c/o LE swelling and rash. Pt states she has had worsening lymphedema of b/l LE for months however noticed skin changes over the past 3 days. Pt also c/o burning pain to the b/l LE and difficulty walking. Notes numbness of toes, +sensation, + pulses. Pt presented to Urgent care today for evaluation but was told to go to ER for suspected cellulitis. Denies fever, chills, cp, sob, n/v/d, abdominal pain.  (09 Jan 2024 20:31)         Prior hospital charts reviewed [Yes]  Primary team notes reviewed [Yes]  Other consultant notes reviewed [Yes]    REVIEW OF SYSTEMS:      PAST MEDICAL & SURGICAL HISTORY:  Asthma with COPD      Hypothyroidism      H/O: substance abuse  no longer using      History of pancreatitis      Hepatitis-C      History of appendectomy      History of tonsillectomy          SOCIAL HISTORY:  - Born in _____, migrated [place] in 19XX  - Currently working as / Retired  - Lives with _____; no pets  - No recent travel  - Denies tobacco use  - Denies alcohol use  - Denies illicit drug use  - Currently sexually active, has one male/female sexual partner    FAMILY HISTORY:      Allergies:  No Known Allergies      ANTIMICROBIALS:  vancomycin  IVPB 1250 every 12 hours      ANTIMICROBIALS (past 90 days):  MEDICATIONS  (STANDING):  vancomycin  IVPB   166.67 mL/Hr IV Intermittent (01-10-24 @ 05:35)    vancomycin  IVPB.   250 mL/Hr IV Intermittent (01-09-24 @ 19:28)        OTHER MEDS:   MEDICATIONS  (STANDING):  acetaminophen     Tablet .. 650 every 6 hours PRN  albuterol    90 MICROgram(s) HFA Inhaler 2 every 6 hours PRN  aluminum hydroxide/magnesium hydroxide/simethicone Suspension 30 every 4 hours PRN  aspirin enteric coated 81 daily  buprenorphine 8 mG/naloxone 2 mG SL  Tablet 1 daily  dextrose 50% Injectable 25 once  dextrose 50% Injectable 25 once  dextrose 50% Injectable 12.5 once  dextrose Oral Gel 15 once PRN  FLUoxetine 40 daily  furosemide    Tablet 20 daily  glucagon  Injectable 1 once  heparin   Injectable 5000 every 12 hours  influenza   Vaccine 0.5 once  insulin lispro (ADMELOG) corrective regimen sliding scale  three times a day before meals  ketorolac   Injectable 15 once  levothyroxine 150 daily  melatonin 3 at bedtime PRN  pantoprazole    Tablet 40 before breakfast  traMADol 50 every 4 hours PRN      VITALS:  Vital Signs Last 24 Hrs  T(F): 96 (01-10-24 @ 05:26), Max: 97.9 (01-09-24 @ 21:07)    Vital Signs Last 24 Hrs  HR: 64 (01-10-24 @ 05:26) (60 - 64)  BP: 93/55 (01-10-24 @ 05:26) (93/55 - 139/76)  RR: 18 (01-10-24 @ 05:26)  SpO2: 95% (01-10-24 @ 05:26) (95% - 100%)  Wt(kg): --    EXAM:    Labs:                        10.6   6.16  )-----------( 203      ( 10 Chan 2024 06:55 )             33.5     01-10    137  |  95<L>  |  10  ----------------------------<  149<H>  3.7   |  32  |  0.8    Ca    8.9      10 Chan 2024 06:55    TPro  8.2<H>  /  Alb  4.5  /  TBili  0.3  /  DBili  x   /  AST  22  /  ALT  7   /  AlkPhos  108  01-09      WBC Trend:  WBC Count: 6.16 (01-10-24 @ 06:55)  WBC Count: 8.80 (01-09-24 @ 18:11)      Auto Neutrophil #: 3.64 K/uL (01-10-24 @ 06:55)  Auto Neutrophil #: 6.09 K/uL (01-09-24 @ 18:11)  Auto Neutrophil #: 4.42 K/uL (10-25-23 @ 15:22)  Auto Neutrophil #: 4.14 K/uL (05-10-23 @ 19:06)      Creatine Trend:  Creatinine: 0.8 (01-10)  Creatinine: 0.9 (01-09)      Liver Biochemical Testing Trend:  Alanine Aminotransferase (ALT/SGPT): 7 (01-09)  Alanine Aminotransferase (ALT/SGPT): <5 (10-25)  Alanine Aminotransferase (ALT/SGPT): 6 (05-10)  Aspartate Aminotransferase (AST/SGOT): 22 (01-09-24 @ 18:11)  Aspartate Aminotransferase (AST/SGOT): 22 (10-25-23 @ 15:22)  Aspartate Aminotransferase (AST/SGOT): 19 (05-10-23 @ 19:06)  Bilirubin Total: 0.3 (01-09)  Bilirubin Total: 0.3 (10-25)  Bilirubin Total, Serum: 0.3 (05-10)      Trend LDH  01-10-24 @ 06:55  230      Auto Eosinophil %: 1.8 % (01-10-24 @ 06:55)  Auto Eosinophil %: 1.5 % (01-09-24 @ 18:11)      Urinalysis Basic - ( 10 Chan 2024 06:55 )    Color: x / Appearance: x / SG: x / pH: x  Gluc: 149 mg/dL / Ketone: x  / Bili: x / Urobili: x   Blood: x / Protein: x / Nitrite: x   Leuk Esterase: x / RBC: x / WBC x   Sq Epi: x / Non Sq Epi: x / Bacteria: x        MICROBIOLOGY:                                                Lactate Dehydrogenase, Serum: 230 (01-10)        Lactate, Blood: 3.0 (01-09 @ 18:11)        RADIOLOGY:  imaging below personally reviewed   INFECTIOUS DISEASE CONSULT NOTE    Patient is a 53y old  Female who presents with a chief complaint of b/l LE burning pain and swelling (10 Chan 2024 07:44)    HPI:  patient is a 52 yo female PMHx COPD, DM, hypothyroidism, HepC, h/o IVDA on Suboxone presents to ER c/o LE swelling and rash. Pt states she has had worsening lymphedema of b/l LE for months however noticed skin changes over the past 3 days. Pt also c/o burning pain to the b/l LE and difficulty walking. Notes numbness of toes, +sensation, + pulses. Pt presented to Urgent care today for evaluation but was told to go to ER for suspected cellulitis. Denies fever, chills, cp, sob, n/v/d, abdominal pain.  (09 Jan 2024 20:31)         Prior hospital charts reviewed [Yes]  Primary team notes reviewed [Yes]  Other consultant notes reviewed [Yes]    REVIEW OF SYSTEMS:      PAST MEDICAL & SURGICAL HISTORY:  Asthma with COPD      Hypothyroidism      H/O: substance abuse  no longer using      History of pancreatitis      Hepatitis-C      History of appendectomy      History of tonsillectomy          SOCIAL HISTORY:  - Born in _____, [place] in 19XX  - Currently working as / Retired  - Lives with _____; no pets  - No recent travel  - Denies tobacco use  - Denies alcohol use  - Denies illicit drug use  - Currently sexually active, has one male/female sexual partner    FAMILY HISTORY:      Allergies:  No Known Allergies      ANTIMICROBIALS:  vancomycin  IVPB 1250 every 12 hours      ANTIMICROBIALS (past 90 days):  MEDICATIONS  (STANDING):  vancomycin  IVPB   166.67 mL/Hr IV Intermittent (01-10-24 @ 05:35)    vancomycin  IVPB.   250 mL/Hr IV Intermittent (01-09-24 @ 19:28)        OTHER MEDS:   MEDICATIONS  (STANDING):  acetaminophen     Tablet .. 650 every 6 hours PRN  albuterol    90 MICROgram(s) HFA Inhaler 2 every 6 hours PRN  aluminum hydroxide/magnesium hydroxide/simethicone Suspension 30 every 4 hours PRN  aspirin enteric coated 81 daily  buprenorphine 8 mG/naloxone 2 mG SL  Tablet 1 daily  dextrose 50% Injectable 25 once  dextrose 50% Injectable 25 once  dextrose 50% Injectable 12.5 once  dextrose Oral Gel 15 once PRN  FLUoxetine 40 daily  furosemide    Tablet 20 daily  glucagon  Injectable 1 once  heparin   Injectable 5000 every 12 hours  influenza   Vaccine 0.5 once  insulin lispro (ADMELOG) corrective regimen sliding scale  three times a day before meals  ketorolac   Injectable 15 once  levothyroxine 150 daily  melatonin 3 at bedtime PRN  pantoprazole    Tablet 40 before breakfast  traMADol 50 every 4 hours PRN      VITALS:  Vital Signs Last 24 Hrs  T(F): 96 (01-10-24 @ 05:26), Max: 97.9 (01-09-24 @ 21:07)    Vital Signs Last 24 Hrs  HR: 64 (01-10-24 @ 05:26) (60 - 64)  BP: 93/55 (01-10-24 @ 05:26) (93/55 - 139/76)  RR: 18 (01-10-24 @ 05:26)  SpO2: 95% (01-10-24 @ 05:26) (95% - 100%)  Wt(kg): --    EXAM:    Labs:                        10.6   6.16  )-----------( 203      ( 10 Chan 2024 06:55 )             33.5     01-10    137  |  95<L>  |  10  ----------------------------<  149<H>  3.7   |  32  |  0.8    Ca    8.9      10 Chan 2024 06:55    TPro  8.2<H>  /  Alb  4.5  /  TBili  0.3  /  DBili  x   /  AST  22  /  ALT  7   /  AlkPhos  108  01-09      WBC Trend:  WBC Count: 6.16 (01-10-24 @ 06:55)  WBC Count: 8.80 (01-09-24 @ 18:11)      Auto Neutrophil #: 3.64 K/uL (01-10-24 @ 06:55)  Auto Neutrophil #: 6.09 K/uL (01-09-24 @ 18:11)  Auto Neutrophil #: 4.42 K/uL (10-25-23 @ 15:22)  Auto Neutrophil #: 4.14 K/uL (05-10-23 @ 19:06)      Creatine Trend:  Creatinine: 0.8 (01-10)  Creatinine: 0.9 (01-09)      Liver Biochemical Testing Trend:  Alanine Aminotransferase (ALT/SGPT): 7 (01-09)  Alanine Aminotransferase (ALT/SGPT): <5 (10-25)  Alanine Aminotransferase (ALT/SGPT): 6 (05-10)  Aspartate Aminotransferase (AST/SGOT): 22 (01-09-24 @ 18:11)  Aspartate Aminotransferase (AST/SGOT): 22 (10-25-23 @ 15:22)  Aspartate Aminotransferase (AST/SGOT): 19 (05-10-23 @ 19:06)  Bilirubin Total: 0.3 (01-09)  Bilirubin Total: 0.3 (10-25)  Bilirubin Total, Serum: 0.3 (05-10)      Trend LDH  01-10-24 @ 06:55  230      Auto Eosinophil %: 1.8 % (01-10-24 @ 06:55)  Auto Eosinophil %: 1.5 % (01-09-24 @ 18:11)      Urinalysis Basic - ( 10 Chan 2024 06:55 )    Color: x / Appearance: x / SG: x / pH: x  Gluc: 149 mg/dL / Ketone: x  / Bili: x / Urobili: x   Blood: x / Protein: x / Nitrite: x   Leuk Esterase: x / RBC: x / WBC x   Sq Epi: x / Non Sq Epi: x / Bacteria: x        MICROBIOLOGY:                                                Lactate Dehydrogenase, Serum: 230 (01-10)        Lactate, Blood: 3.0 (01-09 @ 18:11)        RADIOLOGY:  imaging below personally reviewed   INFECTIOUS DISEASE CONSULT NOTE    Patient is a 53y old  Female who presents with a chief complaint of b/l LE burning pain and swelling (10 Chan 2024 07:44)    HPI:  patient is a 52 yo female PMHx COPD, DM, hypothyroidism, HepC, h/o IVDA on Suboxone presents to ER c/o LE swelling and rash. Pt states she has had worsening lymphedema of b/l LE for months however noticed skin changes over the past 3 days. Pt also c/o burning pain to the b/l LE and difficulty walking. Notes numbness of toes, +sensation, + pulses. Pt presented to Urgent care today for evaluation but was told to go to ER for suspected cellulitis. Denies fever, chills, cp, sob, n/v/d, abdominal pain.  (09 Jan 2024 20:31)     Prior hospital charts reviewed [Yes]  Primary team notes reviewed [Yes]  Other consultant notes reviewed [Yes]    REVIEW OF SYSTEMS:  CONSTITUTIONAL: No fever or chills  HEAD: No lesion on scalp  EYES: No visual disturbance  ENT: No sore throat  RESPIRATORY: No cough, no shortness of breath  CARDIOVASCULAR: No chest pain or palpitations  GASTROINTESTINAL: No abdominal or epigastric pain  GENITOURINARY: No dysuria  NEUROLOGICAL: No headache/dizziness  MUSCULOSKELETAL: No joint pain, erythema, or swelling; no back pain  SKIN: Petechiae on bilateral LEs   All other ROS negative except noted above      PAST MEDICAL & SURGICAL HISTORY:  Asthma with COPD      Hypothyroidism      H/O: substance abuse  no longer using      History of pancreatitis      Hepatitis-C      History of appendectomy      History of tonsillectomy          SOCIAL HISTORY:  - No recent travel  - Denies tobacco use  - Denies alcohol use  - Denies illicit drug use now, on Suboxone    FAMILY HISTORY:  Family history of asthma    Allergies:  No Known Allergies      ANTIMICROBIALS:  vancomycin  IVPB 1250 every 12 hours      ANTIMICROBIALS (past 90 days):  MEDICATIONS  (STANDING):  vancomycin  IVPB   166.67 mL/Hr IV Intermittent (01-10-24 @ 05:35)    vancomycin  IVPB.   250 mL/Hr IV Intermittent (01-09-24 @ 19:28)        OTHER MEDS:   MEDICATIONS  (STANDING):  acetaminophen     Tablet .. 650 every 6 hours PRN  albuterol    90 MICROgram(s) HFA Inhaler 2 every 6 hours PRN  aluminum hydroxide/magnesium hydroxide/simethicone Suspension 30 every 4 hours PRN  aspirin enteric coated 81 daily  buprenorphine 8 mG/naloxone 2 mG SL  Tablet 1 daily  dextrose 50% Injectable 25 once  dextrose 50% Injectable 25 once  dextrose 50% Injectable 12.5 once  dextrose Oral Gel 15 once PRN  FLUoxetine 40 daily  furosemide    Tablet 20 daily  glucagon  Injectable 1 once  heparin   Injectable 5000 every 12 hours  influenza   Vaccine 0.5 once  insulin lispro (ADMELOG) corrective regimen sliding scale  three times a day before meals  ketorolac   Injectable 15 once  levothyroxine 150 daily  melatonin 3 at bedtime PRN  pantoprazole    Tablet 40 before breakfast  traMADol 50 every 4 hours PRN      VITALS:  Vital Signs Last 24 Hrs  T(F): 96 (01-10-24 @ 05:26), Max: 97.9 (01-09-24 @ 21:07)    Vital Signs Last 24 Hrs  HR: 64 (01-10-24 @ 05:26) (60 - 64)  BP: 93/55 (01-10-24 @ 05:26) (93/55 - 139/76)  RR: 18 (01-10-24 @ 05:26)  SpO2: 95% (01-10-24 @ 05:26) (95% - 100%)  Wt(kg): --    EXAM:  GENERAL: NAD, lying in bed  HEAD: No head lesions  EYES: Conjunctiva pink and cornea white  EAR, NOSE, MOUTH, THROAT: Normal external ears and nose, no discharges; moist mucous membranes  NECK: Supple, nontender to palpation; no JVD  RESPIRATORY: Clear to auscultation bilaterally  CARDIOVASCULAR: S1 S2  GASTROINTESTINAL: Soft, nontender, nondistended; normoactive bowel sounds  EXTREMITIES: No clubbing, cyanosis, 2+ LE edema  NERVOUS SYSTEM: Alert and oriented to person, time, place and situation, speech clear. No focal deficits   MUSCULOSKELETAL: No joint erythema, swelling or pain  SKIN: Bilateral petechiae migrating up to knees, no superficial thrombophlebitis  PSYCH: Normal affect        Labs:                        10.6   6.16  )-----------( 203      ( 10 Chan 2024 06:55 )             33.5     01-10    137  |  95<L>  |  10  ----------------------------<  149<H>  3.7   |  32  |  0.8    Ca    8.9      10 Chan 2024 06:55    TPro  8.2<H>  /  Alb  4.5  /  TBili  0.3  /  DBili  x   /  AST  22  /  ALT  7   /  AlkPhos  108  01-09      WBC Trend:  WBC Count: 6.16 (01-10-24 @ 06:55)  WBC Count: 8.80 (01-09-24 @ 18:11)      Auto Neutrophil #: 3.64 K/uL (01-10-24 @ 06:55)  Auto Neutrophil #: 6.09 K/uL (01-09-24 @ 18:11)  Auto Neutrophil #: 4.42 K/uL (10-25-23 @ 15:22)  Auto Neutrophil #: 4.14 K/uL (05-10-23 @ 19:06)      Creatine Trend:  Creatinine: 0.8 (01-10)  Creatinine: 0.9 (01-09)      Liver Biochemical Testing Trend:  Alanine Aminotransferase (ALT/SGPT): 7 (01-09)  Alanine Aminotransferase (ALT/SGPT): <5 (10-25)  Alanine Aminotransferase (ALT/SGPT): 6 (05-10)  Aspartate Aminotransferase (AST/SGOT): 22 (01-09-24 @ 18:11)  Aspartate Aminotransferase (AST/SGOT): 22 (10-25-23 @ 15:22)  Aspartate Aminotransferase (AST/SGOT): 19 (05-10-23 @ 19:06)  Bilirubin Total: 0.3 (01-09)  Bilirubin Total: 0.3 (10-25)  Bilirubin Total, Serum: 0.3 (05-10)      Trend LDH  01-10-24 @ 06:55  230      Auto Eosinophil %: 1.8 % (01-10-24 @ 06:55)  Auto Eosinophil %: 1.5 % (01-09-24 @ 18:11)      Urinalysis Basic - ( 10 Chan 2024 06:55 )    Color: x / Appearance: x / SG: x / pH: x  Gluc: 149 mg/dL / Ketone: x  / Bili: x / Urobili: x   Blood: x / Protein: x / Nitrite: x   Leuk Esterase: x / RBC: x / WBC x   Sq Epi: x / Non Sq Epi: x / Bacteria: x        MICROBIOLOGY:      Lactate Dehydrogenase, Serum: 230 (01-10)        Lactate, Blood: 3.0 (01-09 @ 18:11)        RADIOLOGY:  imaging below personally reviewed      < from: [facility] Duplex Lower Ext Vein Scan, Bilat (01.09.24 @ 18:43) >  FINDINGS:    RIGHT:  Normal compressibility of the RIGHT common femoral, femoral and popliteal   veins.  Doppler examination shows normal spontaneous and phasicflow.  No RIGHT calf vein thrombosis is detected.    LEFT:  Normal compressibility of the LEFT common femoral, femoral and popliteal   veins.  Doppler examination shows normal spontaneous and phasic flow.  No LEFT calf vein thrombosis is detected.    IMPRESSION:  No evidence of deep venous thrombosis in either lower extremity.      < end of copied text >

## 2024-01-11 ENCOUNTER — TRANSCRIPTION ENCOUNTER (OUTPATIENT)
Age: 54
End: 2024-01-11

## 2024-01-11 VITALS
OXYGEN SATURATION: 95 % | RESPIRATION RATE: 18 BRPM | SYSTOLIC BLOOD PRESSURE: 115 MMHG | HEART RATE: 55 BPM | TEMPERATURE: 98 F | DIASTOLIC BLOOD PRESSURE: 72 MMHG

## 2024-01-11 LAB
ALBUMIN SERPL ELPH-MCNC: 4 G/DL — SIGNIFICANT CHANGE UP (ref 3.5–5.2)
ALBUMIN SERPL ELPH-MCNC: 4 G/DL — SIGNIFICANT CHANGE UP (ref 3.5–5.2)
ALP SERPL-CCNC: 129 U/L — HIGH (ref 30–115)
ALP SERPL-CCNC: 129 U/L — HIGH (ref 30–115)
ALT FLD-CCNC: 6 U/L — SIGNIFICANT CHANGE UP (ref 0–41)
ALT FLD-CCNC: 6 U/L — SIGNIFICANT CHANGE UP (ref 0–41)
ANION GAP SERPL CALC-SCNC: 11 MMOL/L — SIGNIFICANT CHANGE UP (ref 7–14)
ANION GAP SERPL CALC-SCNC: 11 MMOL/L — SIGNIFICANT CHANGE UP (ref 7–14)
APPEARANCE UR: ABNORMAL
APPEARANCE UR: ABNORMAL
APTT BLD: 36 SEC — SIGNIFICANT CHANGE UP (ref 27–39.2)
APTT BLD: 36 SEC — SIGNIFICANT CHANGE UP (ref 27–39.2)
AST SERPL-CCNC: 20 U/L — SIGNIFICANT CHANGE UP (ref 0–41)
AST SERPL-CCNC: 20 U/L — SIGNIFICANT CHANGE UP (ref 0–41)
BACTERIA # UR AUTO: ABNORMAL /HPF
BACTERIA # UR AUTO: ABNORMAL /HPF
BILIRUB SERPL-MCNC: 0.3 MG/DL — SIGNIFICANT CHANGE UP (ref 0.2–1.2)
BILIRUB SERPL-MCNC: 0.3 MG/DL — SIGNIFICANT CHANGE UP (ref 0.2–1.2)
BILIRUB UR-MCNC: NEGATIVE — SIGNIFICANT CHANGE UP
BILIRUB UR-MCNC: NEGATIVE — SIGNIFICANT CHANGE UP
BUN SERPL-MCNC: 12 MG/DL — SIGNIFICANT CHANGE UP (ref 10–20)
BUN SERPL-MCNC: 12 MG/DL — SIGNIFICANT CHANGE UP (ref 10–20)
CALCIUM SERPL-MCNC: 9.2 MG/DL — SIGNIFICANT CHANGE UP (ref 8.4–10.5)
CALCIUM SERPL-MCNC: 9.2 MG/DL — SIGNIFICANT CHANGE UP (ref 8.4–10.5)
CHLORIDE SERPL-SCNC: 95 MMOL/L — LOW (ref 98–110)
CHLORIDE SERPL-SCNC: 95 MMOL/L — LOW (ref 98–110)
CO2 SERPL-SCNC: 30 MMOL/L — SIGNIFICANT CHANGE UP (ref 17–32)
CO2 SERPL-SCNC: 30 MMOL/L — SIGNIFICANT CHANGE UP (ref 17–32)
COLOR SPEC: SIGNIFICANT CHANGE UP
COLOR SPEC: SIGNIFICANT CHANGE UP
CREAT SERPL-MCNC: 1.1 MG/DL — SIGNIFICANT CHANGE UP (ref 0.7–1.5)
CREAT SERPL-MCNC: 1.1 MG/DL — SIGNIFICANT CHANGE UP (ref 0.7–1.5)
DIFF PNL FLD: NEGATIVE — SIGNIFICANT CHANGE UP
DIFF PNL FLD: NEGATIVE — SIGNIFICANT CHANGE UP
EGFR: 60 ML/MIN/1.73M2 — SIGNIFICANT CHANGE UP
EGFR: 60 ML/MIN/1.73M2 — SIGNIFICANT CHANGE UP
EPI CELLS # UR: PRESENT
EPI CELLS # UR: PRESENT
GLUCOSE BLDC GLUCOMTR-MCNC: 141 MG/DL — HIGH (ref 70–99)
GLUCOSE BLDC GLUCOMTR-MCNC: 141 MG/DL — HIGH (ref 70–99)
GLUCOSE BLDC GLUCOMTR-MCNC: 203 MG/DL — HIGH (ref 70–99)
GLUCOSE BLDC GLUCOMTR-MCNC: 203 MG/DL — HIGH (ref 70–99)
GLUCOSE BLDC GLUCOMTR-MCNC: 304 MG/DL — HIGH (ref 70–99)
GLUCOSE BLDC GLUCOMTR-MCNC: 304 MG/DL — HIGH (ref 70–99)
GLUCOSE SERPL-MCNC: 234 MG/DL — HIGH (ref 70–99)
GLUCOSE SERPL-MCNC: 234 MG/DL — HIGH (ref 70–99)
GLUCOSE UR QL: NEGATIVE MG/DL — SIGNIFICANT CHANGE UP
GLUCOSE UR QL: NEGATIVE MG/DL — SIGNIFICANT CHANGE UP
HCT VFR BLD CALC: 34.1 % — LOW (ref 37–47)
HCT VFR BLD CALC: 34.1 % — LOW (ref 37–47)
HGB BLD-MCNC: 11.2 G/DL — LOW (ref 12–16)
HGB BLD-MCNC: 11.2 G/DL — LOW (ref 12–16)
KETONES UR-MCNC: ABNORMAL MG/DL
KETONES UR-MCNC: ABNORMAL MG/DL
LACTATE SERPL-SCNC: 1.3 MMOL/L — SIGNIFICANT CHANGE UP (ref 0.7–2)
LACTATE SERPL-SCNC: 1.3 MMOL/L — SIGNIFICANT CHANGE UP (ref 0.7–2)
LEUKOCYTE ESTERASE UR-ACNC: NEGATIVE — SIGNIFICANT CHANGE UP
LEUKOCYTE ESTERASE UR-ACNC: NEGATIVE — SIGNIFICANT CHANGE UP
MCHC RBC-ENTMCNC: 28.1 PG — SIGNIFICANT CHANGE UP (ref 27–31)
MCHC RBC-ENTMCNC: 28.1 PG — SIGNIFICANT CHANGE UP (ref 27–31)
MCHC RBC-ENTMCNC: 32.8 G/DL — SIGNIFICANT CHANGE UP (ref 32–37)
MCHC RBC-ENTMCNC: 32.8 G/DL — SIGNIFICANT CHANGE UP (ref 32–37)
MCV RBC AUTO: 85.7 FL — SIGNIFICANT CHANGE UP (ref 81–99)
MCV RBC AUTO: 85.7 FL — SIGNIFICANT CHANGE UP (ref 81–99)
NITRITE UR-MCNC: NEGATIVE — SIGNIFICANT CHANGE UP
NITRITE UR-MCNC: NEGATIVE — SIGNIFICANT CHANGE UP
NRBC # BLD: 0 /100 WBCS — SIGNIFICANT CHANGE UP (ref 0–0)
NRBC # BLD: 0 /100 WBCS — SIGNIFICANT CHANGE UP (ref 0–0)
PH UR: 6 — SIGNIFICANT CHANGE UP (ref 5–8)
PH UR: 6 — SIGNIFICANT CHANGE UP (ref 5–8)
PLATELET # BLD AUTO: 242 K/UL — SIGNIFICANT CHANGE UP (ref 130–400)
PLATELET # BLD AUTO: 242 K/UL — SIGNIFICANT CHANGE UP (ref 130–400)
PMV BLD: 10.4 FL — SIGNIFICANT CHANGE UP (ref 7.4–10.4)
PMV BLD: 10.4 FL — SIGNIFICANT CHANGE UP (ref 7.4–10.4)
POTASSIUM SERPL-MCNC: 4.4 MMOL/L — SIGNIFICANT CHANGE UP (ref 3.5–5)
POTASSIUM SERPL-MCNC: 4.4 MMOL/L — SIGNIFICANT CHANGE UP (ref 3.5–5)
POTASSIUM SERPL-SCNC: 4.4 MMOL/L — SIGNIFICANT CHANGE UP (ref 3.5–5)
POTASSIUM SERPL-SCNC: 4.4 MMOL/L — SIGNIFICANT CHANGE UP (ref 3.5–5)
PROT SERPL-MCNC: 7.3 G/DL — SIGNIFICANT CHANGE UP (ref 6–8)
PROT SERPL-MCNC: 7.3 G/DL — SIGNIFICANT CHANGE UP (ref 6–8)
PROT UR-MCNC: 30 MG/DL
PROT UR-MCNC: 30 MG/DL
RBC # BLD: 3.98 M/UL — LOW (ref 4.2–5.4)
RBC # BLD: 3.98 M/UL — LOW (ref 4.2–5.4)
RBC # FLD: 14.4 % — SIGNIFICANT CHANGE UP (ref 11.5–14.5)
RBC # FLD: 14.4 % — SIGNIFICANT CHANGE UP (ref 11.5–14.5)
RBC CASTS # UR COMP ASSIST: 1 /HPF — SIGNIFICANT CHANGE UP (ref 0–4)
RBC CASTS # UR COMP ASSIST: 1 /HPF — SIGNIFICANT CHANGE UP (ref 0–4)
SODIUM SERPL-SCNC: 136 MMOL/L — SIGNIFICANT CHANGE UP (ref 135–146)
SODIUM SERPL-SCNC: 136 MMOL/L — SIGNIFICANT CHANGE UP (ref 135–146)
SP GR SPEC: >1.03 — HIGH (ref 1–1.03)
SP GR SPEC: >1.03 — HIGH (ref 1–1.03)
UROBILINOGEN FLD QL: 1 MG/DL — SIGNIFICANT CHANGE UP (ref 0.2–1)
UROBILINOGEN FLD QL: 1 MG/DL — SIGNIFICANT CHANGE UP (ref 0.2–1)
WBC # BLD: 5.36 K/UL — SIGNIFICANT CHANGE UP (ref 4.8–10.8)
WBC # BLD: 5.36 K/UL — SIGNIFICANT CHANGE UP (ref 4.8–10.8)
WBC # FLD AUTO: 5.36 K/UL — SIGNIFICANT CHANGE UP (ref 4.8–10.8)
WBC # FLD AUTO: 5.36 K/UL — SIGNIFICANT CHANGE UP (ref 4.8–10.8)
WBC UR QL: 1 /HPF — SIGNIFICANT CHANGE UP (ref 0–5)
WBC UR QL: 1 /HPF — SIGNIFICANT CHANGE UP (ref 0–5)

## 2024-01-11 PROCEDURE — 99239 HOSP IP/OBS DSCHRG MGMT >30: CPT

## 2024-01-11 PROCEDURE — 99254 IP/OBS CNSLTJ NEW/EST MOD 60: CPT

## 2024-01-11 RX ADMIN — Medication 150 MICROGRAM(S): at 05:39

## 2024-01-11 RX ADMIN — HEPARIN SODIUM 5000 UNIT(S): 5000 INJECTION INTRAVENOUS; SUBCUTANEOUS at 05:39

## 2024-01-11 RX ADMIN — BUPRENORPHINE AND NALOXONE 2 TABLET(S): 2; .5 TABLET SUBLINGUAL at 12:10

## 2024-01-11 RX ADMIN — Medication 4: at 17:16

## 2024-01-11 RX ADMIN — Medication 2: at 08:05

## 2024-01-11 RX ADMIN — SENNA PLUS 1 TABLET(S): 8.6 TABLET ORAL at 05:39

## 2024-01-11 RX ADMIN — Medication 40 MILLIGRAM(S): at 12:11

## 2024-01-11 RX ADMIN — PANTOPRAZOLE SODIUM 40 MILLIGRAM(S): 20 TABLET, DELAYED RELEASE ORAL at 05:39

## 2024-01-11 NOTE — MEDICAL STUDENT PROGRESS NOTE(EDUCATION) - ASSESSMENT
53 year old female with PMHx of COPD, DM, hypothyroid, Hep C admitted for bilateral lower extremity rash , pain and swelling. s/p left lower extremity punch biopsy 1/10. No evidence of DVT on LE duplex

## 2024-01-11 NOTE — MEDICAL STUDENT PROGRESS NOTE(EDUCATION) - SUBJECTIVE AND OBJECTIVE BOX
S: 53 year old female with PMHx COPD, DM, hypothyroid, Hep C admitted for bilateral lower extremity rash , pain and swelling. s/p left lower extremity punch biopsy 1/10. Hx of IVDA. Pt states pain is 8/10    O: ICU Vital Signs Last 24 Hrs  T(C): 36.7 (11 Jan 2024 04:55), Max: 36.7 (11 Jan 2024 04:55)  T(F): 98 (11 Jan 2024 04:55), Max: 98 (11 Jan 2024 04:55)  HR: 59 (11 Jan 2024 04:55) (59 - 65)  BP: 90/52 (11 Jan 2024 04:55) (89/50 - 99/60)  BP(mean): --  ABP: --  ABP(mean): --  RR: 18 (11 Jan 2024 04:55) (18 - 18)  SpO2: 93% (11 Jan 2024 04:55) (93% - 98%)    O2 Parameters below as of 11 Jan 2024 04:55  Patient On (Oxygen Delivery Method): room air      MEDICATIONS  (STANDING):  buprenorphine 2 mG/naloxone 0.5 mG SL  Tablet 2 Tablet(s) SubLingual daily  buprenorphine 8 mG/naloxone 2 mG SL  Tablet 2 Tablet(s) SubLingual daily  dextrose 5%. 1000 milliLiter(s) (50 mL/Hr) IV Continuous <Continuous>  dextrose 5%. 1000 milliLiter(s) (100 mL/Hr) IV Continuous <Continuous>  dextrose 50% Injectable 25 Gram(s) IV Push once  dextrose 50% Injectable 25 Gram(s) IV Push once  dextrose 50% Injectable 12.5 Gram(s) IV Push once  FLUoxetine 40 milliGRAM(s) Oral daily  furosemide    Tablet 20 milliGRAM(s) Oral daily  glucagon  Injectable 1 milliGRAM(s) IntraMuscular once  heparin   Injectable 5000 Unit(s) SubCutaneous every 12 hours  influenza   Vaccine 0.5 milliLiter(s) IntraMuscular once  insulin lispro (ADMELOG) corrective regimen sliding scale   SubCutaneous three times a day before meals  ketorolac   Injectable 15 milliGRAM(s) IV Push once  levothyroxine 150 MICROGram(s) Oral daily  pantoprazole    Tablet 40 milliGRAM(s) Oral before breakfast  senna 1 Tablet(s) Oral two times a day    MEDICATIONS  (PRN):  acetaminophen     Tablet .. 650 milliGRAM(s) Oral every 6 hours PRN Temp greater or equal to 38C (100.4F), Mild Pain (1 - 3)  albuterol    90 MICROgram(s) HFA Inhaler 2 Puff(s) Inhalation every 6 hours PRN Shortness of Breath  aluminum hydroxide/magnesium hydroxide/simethicone Suspension 30 milliLiter(s) Oral every 4 hours PRN Dyspepsia  dextrose Oral Gel 15 Gram(s) Oral once PRN Blood Glucose LESS THAN 70 milliGRAM(s)/deciliter  melatonin 3 milliGRAM(s) Oral at bedtime PRN Insomnia  traMADol 50 milliGRAM(s) Oral every 4 hours PRN Moderate Pain (4 - 6)    Physical Exam:  Constitutional: Pt is AAOx3  Cardiovascular: RRR, S1 S2 heard  Respiratory: CTAB  Extremities: Lower extremity bilateral edema, incision site clean and dry, no drainage    CBC:            10.6   6.16  )-----------( 203      ( 01-10-24 @ 06:55 )             33.5         Chem:         ( 01-10-24 @ 06:55 )    137  |  95<L>  |  10  ----------------------------<  149<H>  3.7   |  32  |  0.8        Liver Functions: ( 01-09-24 @ 18:11 )  Alb: 4.5 g/dL / Pro: 8.2 g/dL / ALK PHOS: 108 U/L / ALT: 7 U/L / AST: 22 U/L / GGT: x          Imaging:  Lower extremity duplex  IMPRESSION:  No evidence of deep venous thrombosis in either lower extremity.

## 2024-01-11 NOTE — DISCHARGE NOTE PROVIDER - CARE PROVIDERS DIRECT ADDRESSES
,DirectAddress_Unknown,[email or web address] ,DirectAddress_Unknown,[email or web address]

## 2024-01-11 NOTE — CONSULT NOTE ADULT - ASSESSMENT
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 Pt does not have any systemic symptoms to suggest an underlying systemic process to cause her vasculitis. The differential diagnosis for cutaneous vasculitis includes ANCA vasculitis, cryoglobulinemic vasculitis, PAN, Behcet's, HSP, hypocomplementemic urticarial vasculitis, also non-vasculitis conditions including stasis dermatitis, scurvy, septic emboli, cholesterol emboli, amyloid, drug reaction. No recent antibiotic use prior to the onset of these symptoms.  - s/p skin biopsy yesterday, f/u pathology  - f/u labs sent yesterday: ANCA, cryoglobulin, quantitative IgA, RF, CCP, DULCE, Sjogren's antibodies, dsDNA, complements C3 and C4, JUNE, lupus anticoagulant, beta 2 GP 1 IgG and IgM, anticardiolipin IgG and IgM, immune complex assay by C1q binding, hep B titers, SPEP, serum immunofixation, HIV, hepatitis C titers  - Please send urinalysis  - Consider outpatient TTE and vascular evaluation  - If vasculitis is confirmed on biopsy and pt's rash persists, can consider a trial of prednisone. This can be done as an outpatient, from rheum standpoint pt does not have any acute findings that would require continued hospital admission

## 2024-01-11 NOTE — DISCHARGE NOTE PROVIDER - NSDCFUSCHEDAPPT_GEN_ALL_CORE_FT
Javier Soto  [facility] PreAdmits  Scheduled Appointment: 01/22/2024    Javier Soto  [facility]  PSYCHIATRY  [address]  Scheduled Appointment: 01/22/2024    Javier Soto  [facility] PreAdmits  Scheduled Appointment: 01/23/2024    [facility]  PSYCHIATRY  [address]  Scheduled Appointment: 01/23/2024     Javier Soto  [facility] PreAdmits  Scheduled Appointment: 01/22/2024    Javier Soto  [facility]  PSYCHIATRY  [address]  Scheduled Appointment: 01/22/2024    Javier Soto  [facility] PreAdmits  Scheduled Appointment: 01/23/2024    [facility]  PSYCHIATRY  [address]  Scheduled Appointment: 01/23/2024

## 2024-01-11 NOTE — CONSULT NOTE ADULT - SUBJECTIVE AND OBJECTIVE BOX
52 y/o woman admitted with b/l LE edema, rheumatology consulted for LE findings concerning for cutaneous vasculitis. Pt reports approx 2 months of b/l LE edema. Several days before her current admission, she also noticed erythematous changes in her distal LEs, prompting her to present to the ED. + Discomfort in LEs related to her edema. + Nail changes in the past several months, she has noticed ridges on her nails. + Chronic dyspnea which has been stable. She has not noticed any other symptoms, denies hematuria, hematochezia, eye pain, sinus problems, Raynaud's.    Physical exam: GEN: Pleasant, AAO woman sitting on side of hospital bed in NAD  SKIN: + Non-blanching petechiae on R calf, + non-blanching purpuric patches noted on b/l feet, + venous stasis changes b/l  MOUTH: Moist mucous membranes, no oral ulcers, normal oral aperture  ENT: No LAD  PULM: Clear to auscultation b/l  CV: Regular rate and rhythm, no murmurs  MSK:  Shoulders: Full ROM b/l  Elbows: Full ROM b/l, no effusions  Wrists; Full ROM b/l, no effusions  Hands: No synovitis  Hips; Full ROM b/l  Knees: no effusions, full ROM b/l  Ankles; + Soft tissue edema b/l, full ROM b/l  Feet: + soft tissue edema  EXT: + Significant LE edema b/l to shins, skin changes in LEs as above, normal nailfold capillaries 54 y/o woman admitted with b/l LE edema, rheumatology consulted for LE findings concerning for cutaneous vasculitis. Pt reports approx 2 months of b/l LE edema. Several days before her current admission, she also noticed erythematous changes in her distal LEs, prompting her to present to the ED. + Discomfort in LEs related to her edema. + Nail changes in the past several months, she has noticed ridges on her nails. + Chronic dyspnea which has been stable. She has not noticed any other symptoms, denies hematuria, hematochezia, eye pain, sinus problems, Raynaud's.    Physical exam: GEN: Pleasant, AAO woman sitting on side of hospital bed in NAD  SKIN: + Non-blanching petechiae on R calf, + non-blanching purpuric patches noted on b/l feet, + venous stasis changes b/l  MOUTH: Moist mucous membranes, no oral ulcers, normal oral aperture  ENT: No LAD  PULM: Clear to auscultation b/l  CV: Regular rate and rhythm, no murmurs  MSK:  Shoulders: Full ROM b/l  Elbows: Full ROM b/l, no effusions  Wrists; Full ROM b/l, no effusions  Hands: No synovitis  Hips; Full ROM b/l  Knees: no effusions, full ROM b/l  Ankles; + Soft tissue edema b/l, full ROM b/l  Feet: + soft tissue edema  EXT: + Significant LE edema b/l to shins, skin changes in LEs as above, normal nailfold capillaries

## 2024-01-11 NOTE — CONSULT NOTE ADULT - REASON FOR ADMISSION
b/l LE burning pain and swelling

## 2024-01-11 NOTE — DISCHARGE NOTE PROVIDER - NSDCCPCAREPLAN_GEN_ALL_CORE_FT
PRINCIPAL DISCHARGE DIAGNOSIS  Diagnosis: Petechial rash  Assessment and Plan of Treatment: Follow up with rheumatology blood test and Skin bx with PMD      SECONDARY DISCHARGE DIAGNOSES  Diagnosis: Lower leg edema  Assessment and Plan of Treatment:

## 2024-01-11 NOTE — DISCHARGE NOTE PROVIDER - NSDCMRMEDTOKEN_GEN_ALL_CORE_FT
albuterol 90 mcg/inh inhalation powder: 2 puff(s) inhaled every 6 hours, As Needed -for bronchospasm   budesonide-formoterol 160 mcg-4.5 mcg/inh inhalation aerosol: 2 puff(s) inhaled 2 times a day  FLUoxetine 40 mg oral capsule: 1 cap(s) orally once a day  furosemide 20 mg oral tablet: 1 tab(s) orally once a day  ipratropium-albuterol 0.5 mg-2.5 mg/3 mLinhalation solution: 3 milliliter(s) by nebulizer every 4 hours, As Needed   levothyroxine 150 mcg (0.15 mg) oral tablet: 1 tab(s) orally once a day  metFORMIN 500 mg oral tablet: 1 tab(s) orally 2 times a day  pantoprazole 40 mg oral delayed release tablet: 1 tab(s) orally once a day (before a meal)  Suboxone 8 mg-2 mg sublingual tablet: 2.5 film(s) sublingual once a day

## 2024-01-11 NOTE — DISCHARGE NOTE NURSING/CASE MANAGEMENT/SOCIAL WORK - PATIENT PORTAL LINK FT
You can access the FollowMyHealth Patient Portal offered by [facility] by registering at the following website: [email or web address]. By joining [facility]’s FollowMyHealth portal, you will also be able to view your health information using other applications (apps) compatible with our system. You can access the FollowMyHealth Patient Portal offered by [facility] by registering at the following website: [email or web address]. By joining [facility]’s FollowMyHealth portal, you will also be able to view your health information using other applications (apps) compatible with our system.

## 2024-01-11 NOTE — DISCHARGE NOTE PROVIDER - ATTENDING DISCHARGE PHYSICAL EXAMINATION:
VITALS:   T(C): 36.7 (01-11-24 @ 04:55), Max: 36.7 (01-11-24 @ 04:55)  HR: 59 (01-11-24 @ 04:55) (59 - 65)  BP: 90/52 (01-11-24 @ 04:55) (89/50 - 99/60)  RR: 18 (01-11-24 @ 04:55) (18 - 18)  SpO2: 93% (01-11-24 @ 04:55) (93% - 98%)    GENERAL: NAD, lying in bed comfortably  HEART: Regular rate and rhythm,  LUNGS: Unlabored respirations.  Clear to auscultation bilaterally,  ABDOMEN: Soft, nontender, nondistended, +BS  EXTREMITIES: 2+ peripheral pulses bilaterally. petechia rash   NERVOUS SYSTEM:  A&Ox3,

## 2024-01-11 NOTE — MEDICAL STUDENT PROGRESS NOTE(EDUCATION) - PLAN 1
- Follow up Blood culture  - Follow up punch biopsy result  - Follow up with derm  - Encouraged to ambulate  - Trend WBC, fever curve, transaminases, creatinine daily  - Call surgery if needed

## 2024-01-11 NOTE — PROGRESS NOTE ADULT - SUBJECTIVE AND OBJECTIVE BOX
EZRA BARCLAY  53y  [facility] (Back) [phone]      Patient is a 53y old  Female who presents with a chief complaint of b/l LE burning pain and swelling (10 Chan 2024 15:21)      INTERVAL HPI/OVERNIGHT EVENTS:        REVIEW OF SYSTEMS:        FAMILY HISTORY:    T(C): 36.7 (01-11-24 @ 04:55), Max: 36.7 (01-11-24 @ 04:55)  HR: 59 (01-11-24 @ 04:55) (59 - 65)  BP: 90/52 (01-11-24 @ 04:55) (89/50 - 99/60)  RR: 18 (01-11-24 @ 04:55) (18 - 18)  SpO2: 93% (01-11-24 @ 04:55) (93% - 98%)  Wt(kg): --Vital Signs Last 24 Hrs  T(C): 36.7 (11 Jan 2024 04:55), Max: 36.7 (11 Jan 2024 04:55)  T(F): 98 (11 Jan 2024 04:55), Max: 98 (11 Jan 2024 04:55)  HR: 59 (11 Jan 2024 04:55) (59 - 65)  BP: 90/52 (11 Jan 2024 04:55) (89/50 - 99/60)  BP(mean): --  RR: 18 (11 Jan 2024 04:55) (18 - 18)  SpO2: 93% (11 Jan 2024 04:55) (93% - 98%)    Parameters below as of 11 Jan 2024 04:55  Patient On (Oxygen Delivery Method): room air        PHYSICAL EXAM:  GENERAL: NAD, well-groomed, well-developed  HEAD:  Atraumatic, Normocephalic  EYES: EOMI, PERRLA, conjunctiva and sclera clear  ENMT: No tonsillar erythema, exudates, or enlargement; Moist mucous membranes, Good dentition, No lesions  NECK: Supple, No JVD, Normal thyroid  NERVOUS SYSTEM:  Alert & Oriented X3, Good concentration; Motor Strength 5/5 B/L upper and lower extremities; DTRs 2+ intact and symmetric  PULM: Clear to auscultation bilaterally  CARDIAC: Regular rate and rhythm; No murmurs, rubs, or gallops  GI: Soft, Nontender, Nondistended; Bowel sounds present  EXTREMITIES:  2+ Peripheral Pulses, No clubbing, cyanosis, or edema  LYMPH: No lymphadenopathy noted  SKIN: No rashes or lesions    Consultant(s) Notes Reviewed:  [x ] YES  [ ] NO  Care Discussed with Consultants/Other Providers [ x] YES  [ ] NO    LABS:                            10.6   6.16  )-----------( 203      ( 10 Chan 2024 06:55 )             33.5   01-10    137  |  95<L>  |  10  ----------------------------<  149<H>  3.7   |  32  |  0.8    Ca    8.9      10 Chan 2024 06:55    TPro  8.2<H>  /  Alb  4.5  /  TBili  0.3  /  DBili  x   /  AST  22  /  ALT  7   /  AlkPhos  108  01-09            Culture - Blood (collected 09 Jan 2024 18:11)  Source: .Blood Blood  Preliminary Report (11 Jan 2024 03:02):    No growth at 24 hours    Culture - Blood (collected 09 Jan 2024 18:11)  Source: .Blood Blood  Preliminary Report (11 Jan 2024 03:02):    No growth at 24 hours      acetaminophen     Tablet .. 650 milliGRAM(s) Oral every 6 hours PRN  albuterol    90 MICROgram(s) HFA Inhaler 2 Puff(s) Inhalation every 6 hours PRN  aluminum hydroxide/magnesium hydroxide/simethicone Suspension 30 milliLiter(s) Oral every 4 hours PRN  buprenorphine 2 mG/naloxone 0.5 mG SL  Tablet 2 Tablet(s) SubLingual daily  buprenorphine 8 mG/naloxone 2 mG SL  Tablet 2 Tablet(s) SubLingual daily  dextrose 5%. 1000 milliLiter(s) IV Continuous <Continuous>  dextrose 5%. 1000 milliLiter(s) IV Continuous <Continuous>  dextrose 50% Injectable 25 Gram(s) IV Push once  dextrose 50% Injectable 25 Gram(s) IV Push once  dextrose 50% Injectable 12.5 Gram(s) IV Push once  dextrose Oral Gel 15 Gram(s) Oral once PRN  FLUoxetine 40 milliGRAM(s) Oral daily  furosemide    Tablet 20 milliGRAM(s) Oral daily  glucagon  Injectable 1 milliGRAM(s) IntraMuscular once  heparin   Injectable 5000 Unit(s) SubCutaneous every 12 hours  influenza   Vaccine 0.5 milliLiter(s) IntraMuscular once  insulin lispro (ADMELOG) corrective regimen sliding scale   SubCutaneous three times a day before meals  ketorolac   Injectable 15 milliGRAM(s) IV Push once  levothyroxine 150 MICROGram(s) Oral daily  melatonin 3 milliGRAM(s) Oral at bedtime PRN  pantoprazole    Tablet 40 milliGRAM(s) Oral before breakfast  senna 1 Tablet(s) Oral two times a day  traMADol 50 milliGRAM(s) Oral every 4 hours PRN      52 yo female PMHx COPD, DM, hypothyroidism, HepC, 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    1. Bilateral lower leg swelling and erythema seems more like vasculitis .  hx of lymphedema   - admit to medicine              -DULCE: pending        - C3/C4:pending      -Procalcitonin:pending          - ESR:30            - Pt: nl   - HepB/C :pending               - ANCA:pending        - Serum protein electrophoresis:pending  - SJogren abs:pending         - Lupus anticoag:pending    - Skin bx:pending   - Hold aspirin in the setting of petechia  -  Was on vancomycin that was stopped   - Blood cx:pending   - elevate LE   - c/w home dose of  lasix   - Venous duplex:WNL   - Dermatology consult:  a) Looks more like vasculitis. recommend rheum and gen surg consult  - Rheumatology consult:pending  - Gen surg consult appreciated   - ID consult appreciated   - PT eval appreciated     2. elevated lactate  - Repeat LA    3. hypokalemia  - replete and repeat     4. H/O DM   - FS AS QHS, ISS, A1C     5. H/O hypothyroidism  - c/w levothyroxine     6. H/O hep C / H/O IVDA c/w Suboxone       diet - CCHO  dvt ppx   gi ppx      EZRA BARCLAY  53y  [facility] (Back) [address]      Patient is a 53y old  Female who presents with a chief complaint of b/l LE burning pain and swelling (10 Chan 2024 15:21)      INTERVAL HPI/OVERNIGHT EVENTS:        REVIEW OF SYSTEMS:        FAMILY HISTORY:    T(C): 36.7 (01-11-24 @ 04:55), Max: 36.7 (01-11-24 @ 04:55)  HR: 59 (01-11-24 @ 04:55) (59 - 65)  BP: 90/52 (01-11-24 @ 04:55) (89/50 - 99/60)  RR: 18 (01-11-24 @ 04:55) (18 - 18)  SpO2: 93% (01-11-24 @ 04:55) (93% - 98%)  Wt(kg): --Vital Signs Last 24 Hrs  T(C): 36.7 (11 Jan 2024 04:55), Max: 36.7 (11 Jan 2024 04:55)  T(F): 98 (11 Jan 2024 04:55), Max: 98 (11 Jan 2024 04:55)  HR: 59 (11 Jan 2024 04:55) (59 - 65)  BP: 90/52 (11 Jan 2024 04:55) (89/50 - 99/60)  BP(mean): --  RR: 18 (11 Jan 2024 04:55) (18 - 18)  SpO2: 93% (11 Jan 2024 04:55) (93% - 98%)    Parameters below as of 11 Jan 2024 04:55  Patient On (Oxygen Delivery Method): room air        PHYSICAL EXAM:  GENERAL: NAD, well-groomed, well-developed  HEAD:  Atraumatic, Normocephalic  EYES: EOMI, PERRLA, conjunctiva and sclera clear  ENMT: No tonsillar erythema, exudates, or enlargement; Moist mucous membranes, Good dentition, No lesions  NECK: Supple, No JVD, Normal thyroid  NERVOUS SYSTEM:  Alert & Oriented X3, Good concentration; Motor Strength 5/5 B/L upper and lower extremities; DTRs 2+ intact and symmetric  PULM: Clear to auscultation bilaterally  CARDIAC: Regular rate and rhythm; No murmurs, rubs, or gallops  GI: Soft, Nontender, Nondistended; Bowel sounds present  EXTREMITIES:  2+ Peripheral Pulses, No clubbing, cyanosis, or edema  LYMPH: No lymphadenopathy noted  SKIN: No rashes or lesions    Consultant(s) Notes Reviewed:  [x ] YES  [ ] NO  Care Discussed with Consultants/Other Providers [ x] YES  [ ] NO    LABS:                            10.6   6.16  )-----------( 203      ( 10 Chan 2024 06:55 )             33.5   01-10    137  |  95<L>  |  10  ----------------------------<  149<H>  3.7   |  32  |  0.8    Ca    8.9      10 Chan 2024 06:55    TPro  8.2<H>  /  Alb  4.5  /  TBili  0.3  /  DBili  x   /  AST  22  /  ALT  7   /  AlkPhos  108  01-09            Culture - Blood (collected 09 Jan 2024 18:11)  Source: .Blood Blood  Preliminary Report (11 Jan 2024 03:02):    No growth at 24 hours    Culture - Blood (collected 09 Jan 2024 18:11)  Source: .Blood Blood  Preliminary Report (11 Jan 2024 03:02):    No growth at 24 hours      acetaminophen     Tablet .. 650 milliGRAM(s) Oral every 6 hours PRN  albuterol    90 MICROgram(s) HFA Inhaler 2 Puff(s) Inhalation every 6 hours PRN  aluminum hydroxide/magnesium hydroxide/simethicone Suspension 30 milliLiter(s) Oral every 4 hours PRN  buprenorphine 2 mG/naloxone 0.5 mG SL  Tablet 2 Tablet(s) SubLingual daily  buprenorphine 8 mG/naloxone 2 mG SL  Tablet 2 Tablet(s) SubLingual daily  dextrose 5%. 1000 milliLiter(s) IV Continuous <Continuous>  dextrose 5%. 1000 milliLiter(s) IV Continuous <Continuous>  dextrose 50% Injectable 25 Gram(s) IV Push once  dextrose 50% Injectable 25 Gram(s) IV Push once  dextrose 50% Injectable 12.5 Gram(s) IV Push once  dextrose Oral Gel 15 Gram(s) Oral once PRN  FLUoxetine 40 milliGRAM(s) Oral daily  furosemide    Tablet 20 milliGRAM(s) Oral daily  glucagon  Injectable 1 milliGRAM(s) IntraMuscular once  heparin   Injectable 5000 Unit(s) SubCutaneous every 12 hours  influenza   Vaccine 0.5 milliLiter(s) IntraMuscular once  insulin lispro (ADMELOG) corrective regimen sliding scale   SubCutaneous three times a day before meals  ketorolac   Injectable 15 milliGRAM(s) IV Push once  levothyroxine 150 MICROGram(s) Oral daily  melatonin 3 milliGRAM(s) Oral at bedtime PRN  pantoprazole    Tablet 40 milliGRAM(s) Oral before breakfast  senna 1 Tablet(s) Oral two times a day  traMADol 50 milliGRAM(s) Oral every 4 hours PRN      52 yo female PMHx COPD, DM, hypothyroidism, HepC, 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    1. Bilateral lower leg swelling and erythema seems more like vasculitis .  hx of lymphedema   - admit to medicine              -DULCE: pending        - C3/C4:pending      -Procalcitonin:pending          - ESR:30            - Pt: nl   - HepB/C :pending               - ANCA:pending        - Serum protein electrophoresis:pending  - SJogren abs:pending         - Lupus anticoag:pending    - Skin bx:pending   - Hold aspirin in the setting of petechia  -  Was on vancomycin that was stopped   - Blood cx:pending   - elevate LE   - c/w home dose of  lasix   - Venous duplex:WNL   - Dermatology consult:  a) Looks more like vasculitis. recommend rheum and gen surg consult  - Rheumatology consult:pending  - Gen surg consult appreciated   - ID consult appreciated   - PT eval appreciated     2. elevated lactate  - Repeat LA    3. hypokalemia  - replete and repeat     4. H/O DM   - FS AS QHS, ISS, A1C     5. H/O hypothyroidism  - c/w levothyroxine     6. H/O hep C / H/O IVDA c/w Suboxone       diet - CCHO  dvt ppx   gi ppx

## 2024-01-11 NOTE — DISCHARGE NOTE PROVIDER - PROVIDER TOKENS
PROVIDER:[TOKEN:[[id number]],FOLLOWUP:[1 week]],PROVIDER:[TOKEN:[[id number]],FOLLOWUP:[2 weeks]] PROVIDER:[TOKEN:[[id number]],FOLLOWUP:[1 week]],PROVIDER:[TOKEN:[[id number]],FOLLOWUP:[2 weeks]]

## 2024-01-11 NOTE — DISCHARGE NOTE PROVIDER - CARE PROVIDER_API CALL
Naye Mills  Rheumatology  [address] [place] [address]  Phone: [phone]  Fax: [phone]  Follow Up Time: 1 week    Hung Atkins  Dermatology  [address] [place] [id number]  Phone: [phone]  Fax: [phone]  Follow Up Time: 2 weeks   Naye Mills  Rheumatology  [address] [place] [id number]  Phone: [phone]  Fax: [phone]  Follow Up Time: 1 week    Hung Atkins  Dermatology  [address] [place] [id number]  Phone: [phone]  Fax: [phone]  Follow Up Time: 2 weeks

## 2024-01-11 NOTE — DISCHARGE NOTE PROVIDER - HOSPITAL COURSE
54 yo female PMHx COPD, DM, hypothyroidism, HepC, 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    1. Bilateral lower leg swelling and erythema seems more like vasculitis .  hx of lymphedema   - admit to medicine              -DULCE: pending        - C3/C4:pending      -Procalcitonin:pending          - ESR:30            - Pt: nl   - HepB/C :pending               - ANCA:pending        - Serum protein electrophoresis:pending  - SJogren abs:pending         - Lupus anticoag:pending    - Skin bx:pending   - Hold aspirin in the setting of petechia  -  Was on vancomycin that was stopped   - Blood cx:NTD   - elevate LE   - c/w home dose of  lasix   - Venous duplex:WNL   - Dermatology consult:  a) Looks more like vasculitis. recommend rheum and gen surg consult  - Rheumatology consult:  - Gen surg consult appreciated   - ID consult appreciated   - PT eval appreciated     2. elevated lactate  - Repeat LA    3. hypokalemia  - replete and repeat     4. H/O DM   - FS AS QHS, ISS, A1C     5. H/O hypothyroidism  - c/w levothyroxine     6. H/O hep C / H/O IVDA c/w Suboxone       Patient feels well. She has no complains. no overnight events. rash is stable. denies any pain.     She is agreeable with dc with follow up with PMD and rheumatology 52 yo female PMHx COPD, DM, hypothyroidism, HepC, 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    1. Bilateral lower leg swelling and erythema seems more like vasculitis .  hx of lymphedema   - admit to medicine              -DULCE: pending        - C3/C4:pending      -Procalcitonin:pending          - ESR:30            - Pt: nl   - HepB/C :pending               - ANCA:pending        - Serum protein electrophoresis:pending  - SJogren abs:pending         - Lupus anticoag:pending    - Skin bx:pending   - Hold aspirin in the setting of petechia  -  Was on vancomycin that was stopped   - Blood cx:NTD   - elevate LE   - c/w home dose of  lasix   - Venous duplex:WNL   - Dermatology consult:  a) Looks more like vasculitis. recommend rheum and gen surg consult  - Rheumatology consult:  - Gen surg consult appreciated   - ID consult appreciated   - PT eval appreciated     2. elevated lactate  - Repeat LA    3. hypokalemia  - replete and repeat     4. H/O DM   - FS AS QHS, ISS, A1C     5. H/O hypothyroidism  - c/w levothyroxine     6. H/O hep C / H/O IVDA c/w Suboxone       Patient feels well. She has no complains. no overnight events. rash is stable. denies any pain.     She is agreeable with dc with follow up with PMD and rheumatology

## 2024-01-11 NOTE — DISCHARGE NOTE NURSING/CASE MANAGEMENT/SOCIAL WORK - NSDCPEFALRISK_GEN_ALL_CORE
For information on Fall & Injury Prevention, visit: [email or web address] OR  [email or web address] OR  https://www.cdc.gov/steadi/patient.html For information on Fall & Injury Prevention, visit: [email or web address] OR  [email or web address] OR  https://www.cdc.gov/steadi/patient.html

## 2024-01-12 LAB
ANA TITR SER: NEGATIVE — SIGNIFICANT CHANGE UP
ANA TITR SER: NEGATIVE — SIGNIFICANT CHANGE UP
C3 SERPL-MCNC: 143 MG/DL — SIGNIFICANT CHANGE UP (ref 81–157)
C3 SERPL-MCNC: 143 MG/DL — SIGNIFICANT CHANGE UP (ref 81–157)
C4 SERPL-MCNC: 32 MG/DL — SIGNIFICANT CHANGE UP (ref 13–39)
C4 SERPL-MCNC: 32 MG/DL — SIGNIFICANT CHANGE UP (ref 13–39)
HBV CORE AB SER-ACNC: REACTIVE
HBV CORE AB SER-ACNC: REACTIVE
HBV SURFACE AB SER-ACNC: SIGNIFICANT CHANGE UP
HBV SURFACE AB SER-ACNC: SIGNIFICANT CHANGE UP
HBV SURFACE AG SER-ACNC: SIGNIFICANT CHANGE UP
HBV SURFACE AG SER-ACNC: SIGNIFICANT CHANGE UP
HIV 1+2 AB+HIV1 P24 AG SERPL QL IA: SIGNIFICANT CHANGE UP
HIV 1+2 AB+HIV1 P24 AG SERPL QL IA: SIGNIFICANT CHANGE UP
IGA FLD-MCNC: 356 MG/DL — SIGNIFICANT CHANGE UP (ref 84–499)
IGA FLD-MCNC: 356 MG/DL — SIGNIFICANT CHANGE UP (ref 84–499)
IGG FLD-MCNC: 1522 MG/DL — SIGNIFICANT CHANGE UP (ref 610–1660)
IGG FLD-MCNC: 1522 MG/DL — SIGNIFICANT CHANGE UP (ref 610–1660)
IGM SERPL-MCNC: 141 MG/DL — SIGNIFICANT CHANGE UP (ref 35–242)
IGM SERPL-MCNC: 141 MG/DL — SIGNIFICANT CHANGE UP (ref 35–242)
KAPPA LC SER QL IFE: 3.38 MG/DL — HIGH (ref 0.33–1.94)
KAPPA LC SER QL IFE: 3.38 MG/DL — HIGH (ref 0.33–1.94)
KAPPA/LAMBDA FREE LIGHT CHAIN RATIO, SERUM: 1.8 RATIO — HIGH (ref 0.26–1.65)
KAPPA/LAMBDA FREE LIGHT CHAIN RATIO, SERUM: 1.8 RATIO — HIGH (ref 0.26–1.65)
LAMBDA LC SER QL IFE: 1.88 MG/DL — SIGNIFICANT CHANGE UP (ref 0.57–2.63)
LAMBDA LC SER QL IFE: 1.88 MG/DL — SIGNIFICANT CHANGE UP (ref 0.57–2.63)
PROCALCITONIN SERPL-MCNC: 0.03 NG/ML — SIGNIFICANT CHANGE UP (ref 0.02–0.1)
PROCALCITONIN SERPL-MCNC: 0.03 NG/ML — SIGNIFICANT CHANGE UP (ref 0.02–0.1)
PROT SERPL-MCNC: 7.4 G/DL — SIGNIFICANT CHANGE UP (ref 6–8.3)
PROT SERPL-MCNC: 7.4 G/DL — SIGNIFICANT CHANGE UP (ref 6–8.3)
RHEUMATOID FACT SERPL-ACNC: <10 IU/ML — SIGNIFICANT CHANGE UP (ref 0–13)
RHEUMATOID FACT SERPL-ACNC: <10 IU/ML — SIGNIFICANT CHANGE UP (ref 0–13)

## 2024-01-13 LAB
% ALBUMIN: 49.8 % — SIGNIFICANT CHANGE UP
% ALBUMIN: 49.8 % — SIGNIFICANT CHANGE UP
% ALPHA 1: 5 % — SIGNIFICANT CHANGE UP
% ALPHA 1: 5 % — SIGNIFICANT CHANGE UP
% ALPHA 2: 12 % — SIGNIFICANT CHANGE UP
% ALPHA 2: 12 % — SIGNIFICANT CHANGE UP
% BETA: 12.4 % — SIGNIFICANT CHANGE UP
% BETA: 12.4 % — SIGNIFICANT CHANGE UP
% GAMMA: 20.8 % — SIGNIFICANT CHANGE UP
% GAMMA: 20.8 % — SIGNIFICANT CHANGE UP
A1C WITH ESTIMATED AVERAGE GLUCOSE RESULT: 6.5 % — HIGH (ref 4–5.6)
A1C WITH ESTIMATED AVERAGE GLUCOSE RESULT: 6.5 % — HIGH (ref 4–5.6)
ALBUMIN SERPL ELPH-MCNC: 3.7 G/DL — SIGNIFICANT CHANGE UP (ref 3.6–5.5)
ALBUMIN SERPL ELPH-MCNC: 3.7 G/DL — SIGNIFICANT CHANGE UP (ref 3.6–5.5)
ALBUMIN/GLOB SERPL ELPH: 1 RATIO — SIGNIFICANT CHANGE UP
ALBUMIN/GLOB SERPL ELPH: 1 RATIO — SIGNIFICANT CHANGE UP
ALPHA1 GLOB SERPL ELPH-MCNC: 0.4 G/DL — SIGNIFICANT CHANGE UP (ref 0.1–0.4)
ALPHA1 GLOB SERPL ELPH-MCNC: 0.4 G/DL — SIGNIFICANT CHANGE UP (ref 0.1–0.4)
ALPHA2 GLOB SERPL ELPH-MCNC: 0.9 G/DL — SIGNIFICANT CHANGE UP (ref 0.5–1)
ALPHA2 GLOB SERPL ELPH-MCNC: 0.9 G/DL — SIGNIFICANT CHANGE UP (ref 0.5–1)
B-GLOBULIN SERPL ELPH-MCNC: 0.9 G/DL — SIGNIFICANT CHANGE UP (ref 0.5–1)
B-GLOBULIN SERPL ELPH-MCNC: 0.9 G/DL — SIGNIFICANT CHANGE UP (ref 0.5–1)
DSDNA AB SER-ACNC: <12 IU/ML — SIGNIFICANT CHANGE UP
DSDNA AB SER-ACNC: <12 IU/ML — SIGNIFICANT CHANGE UP
ESTIMATED AVERAGE GLUCOSE: 140 MG/DL — HIGH (ref 68–114)
ESTIMATED AVERAGE GLUCOSE: 140 MG/DL — HIGH (ref 68–114)
GAMMA GLOBULIN: 1.5 G/DL — SIGNIFICANT CHANGE UP (ref 0.6–1.6)
GAMMA GLOBULIN: 1.5 G/DL — SIGNIFICANT CHANGE UP (ref 0.6–1.6)
HCV AB S/CO SERPL IA: 69.2 COI — HIGH
HCV AB S/CO SERPL IA: 69.2 COI — HIGH
HCV AB SERPL-IMP: REACTIVE
HCV AB SERPL-IMP: REACTIVE
INTERPRETATION SERPL IFE-IMP: SIGNIFICANT CHANGE UP
INTERPRETATION SERPL IFE-IMP: SIGNIFICANT CHANGE UP
PROT PATTERN SERPL ELPH-IMP: SIGNIFICANT CHANGE UP
PROT PATTERN SERPL ELPH-IMP: SIGNIFICANT CHANGE UP
PROT SERPL-MCNC: 7.4 G/DL — SIGNIFICANT CHANGE UP (ref 6–8.3)
PROT SERPL-MCNC: 7.4 G/DL — SIGNIFICANT CHANGE UP (ref 6–8.3)

## 2024-01-15 LAB
ANTI-RIBONUCLEAR PROTEIN: 0.2 AI — SIGNIFICANT CHANGE UP
ANTI-RIBONUCLEAR PROTEIN: 0.2 AI — SIGNIFICANT CHANGE UP
CULTURE RESULTS: SIGNIFICANT CHANGE UP
DSDNA AB FLD-ACNC: <0.2 AI — SIGNIFICANT CHANGE UP
DSDNA AB FLD-ACNC: <0.2 AI — SIGNIFICANT CHANGE UP
ENA SM AB FLD QL: <0.2 AI — SIGNIFICANT CHANGE UP
ENA SM AB FLD QL: <0.2 AI — SIGNIFICANT CHANGE UP
ENA SS-A AB FLD IA-ACNC: <0.2 AI — SIGNIFICANT CHANGE UP
ENA SS-A AB FLD IA-ACNC: <0.2 AI — SIGNIFICANT CHANGE UP
HCV RNA FLD QL NAA+PROBE: SIGNIFICANT CHANGE UP
HCV RNA FLD QL NAA+PROBE: SIGNIFICANT CHANGE UP
HCV RNA SPEC QL PROBE+SIG AMP: SIGNIFICANT CHANGE UP
HCV RNA SPEC QL PROBE+SIG AMP: SIGNIFICANT CHANGE UP
SPECIMEN SOURCE: SIGNIFICANT CHANGE UP

## 2024-01-15 NOTE — CDI QUERY NOTE - NSCDINOTECODERNU_GEN_A_CORE_FT
[Gradual] : gradual [3] : 3 [Dull/Aching] : dull/aching x 1155 [Intermittent] : intermittent [Rest] : rest [Injection therapy] : injection therapy [Stairs] : stairs [Retired] : Work status: retired [1] : 1 [Gelsyn] : Gelsyn [de-identified] : The patient has continued pain in both knees.  Mild pain standing and walking.  Mild to moderate pain after sitting and getting up. He has continued pain in his lower back.  He has mild to moderate pain in his back when standing and walking.  Pain radiates to his thighs.  No weakness.  ] He had neurosurgery evaluation with Dr. Aureliano Snyder.  He is tentatively scheduled for decompression and fusion on February 29 [] : Post Surgical Visit: no [FreeTextEntry1] : B Knees [de-identified] : 1/3/2024 [de-identified] : Dr. Hoover  [de-identified] : 7/13/2023

## 2024-01-16 LAB
B2 GLYCOPROT1 AB SER QL: POSITIVE
B2 GLYCOPROT1 IGA SER QL: 9.4 SAU — SIGNIFICANT CHANGE UP
B2 GLYCOPROT1 IGG SER-ACNC: <5 SGU — SIGNIFICANT CHANGE UP
B2 GLYCOPROT1 IGM SER-ACNC: <5 SMU — SIGNIFICANT CHANGE UP
CCP IGG SERPL-ACNC: <8 UNITS — SIGNIFICANT CHANGE UP
DRVVT RATIO: 0.95 RATIO — SIGNIFICANT CHANGE UP (ref 0–1.21)
DRVVT SCREEN TO CONFIRM RATIO: SIGNIFICANT CHANGE UP
NORMALIZED SCT PPP-RTO: 0.85 RATIO — SIGNIFICANT CHANGE UP (ref 0–1.16)
NORMALIZED SCT PPP-RTO: SIGNIFICANT CHANGE UP
RF+CCP IGG SER-IMP: NEGATIVE — SIGNIFICANT CHANGE UP

## 2024-01-17 LAB — CRYOGLOB SERPL-MCNC: NEGATIVE — SIGNIFICANT CHANGE UP

## 2024-01-18 LAB
AUTO DIFF PNL BLD: NEGATIVE — SIGNIFICANT CHANGE UP
C-ANCA SER-ACNC: NEGATIVE — SIGNIFICANT CHANGE UP
CARDIOLIPIN AB SER-ACNC: NEGATIVE — SIGNIFICANT CHANGE UP
IC SERPL C1Q BIND-ACNC: 1.5 UG EQ/ML — SIGNIFICANT CHANGE UP
IC SERPL RAJI CELL-ACNC: 8.7 UG EQ/ML — SIGNIFICANT CHANGE UP
O2 CT VFR BLD CALC: ABNORMAL
P-ANCA SER-ACNC: POSITIVE

## 2024-01-22 ENCOUNTER — APPOINTMENT (OUTPATIENT)
Dept: PSYCHIATRY | Facility: CLINIC | Age: 54
End: 2024-01-22
Payer: COMMERCIAL

## 2024-01-22 ENCOUNTER — OUTPATIENT (OUTPATIENT)
Dept: OUTPATIENT SERVICES | Facility: HOSPITAL | Age: 54
LOS: 1 days | End: 2024-01-22
Payer: COMMERCIAL

## 2024-01-22 DIAGNOSIS — Z90.49 ACQUIRED ABSENCE OF OTHER SPECIFIED PARTS OF DIGESTIVE TRACT: Chronic | ICD-10-CM

## 2024-01-22 DIAGNOSIS — F10.20 ALCOHOL DEPENDENCE, UNCOMPLICATED: ICD-10-CM

## 2024-01-22 DIAGNOSIS — Z90.89 ACQUIRED ABSENCE OF OTHER ORGANS: Chronic | ICD-10-CM

## 2024-01-22 PROCEDURE — 99214 OFFICE O/P EST MOD 30 MIN: CPT | Mod: 95

## 2024-01-23 DIAGNOSIS — F10.20 ALCOHOL DEPENDENCE, UNCOMPLICATED: ICD-10-CM

## 2024-01-24 NOTE — CDI QUERY NOTE - NSCDIOTHERTXTBX_GEN_ALL_CORE_HH
Query:                                                                                   Based on your clinical judgment and consideration of the below clinical indicators, please clarify if the indication of Suboxone prescription:  • Suboxone maintenance was for opioid dependence   • Other (please specify).  • Unable to determine.                                          ===========================================    Documentation:  ** 1/11 Discharge Note:        h/o IVDA on Suboxone     Discharge Medication: Suboxone 8 mg-2 mg sublingual tablet: 2.5 film(s) sublingual once a day    Orders:   - 1/9-11: Suboxone I tab sublingual daily

## 2024-01-24 NOTE — CDI QUERY NOTE - NSCDIOTHERTXTBX2_GEN_ALL_CORE_FT
Query:                                                                                   Based on your clinical judgment and consideration of the below clinical indicators, please clarify the clinical significance of POCT of 234, 203, 304:  • POCT of 234, 203, 304 is associated with hyperglycemia  • POCT of 234, 203, 304 is clinically insignificant.	  • Other (please specify).  • Unable to determine.                                          ===========================================  Clinical Indicators:  Documentation:  ** 1/9 H&P:       ....PMHx ....., DM,       Home Medication:        metFORMIN 500 mg oral tablet: 1 tab(s) orally 2 times a day    Lab:  Glucose: 234 mg/dL (01.11.24 @ 06:04)  POCT Blood Glucose.: 203 mg/dL (01.11.24 @ 07:47)  POCT Blood Glucose.: 141 mg/dL (01.11.24 @ 11:31)  POCT Blood Glucose.: 304 mg/dL (01.11.24 @ 16:51)    A1: 6.5 (01.10.24 @ 06:55)    Orders:   - 1/9-11: Insulin Lispro corrective regimen sliding scale

## 2024-01-30 ENCOUNTER — INPATIENT (INPATIENT)
Facility: HOSPITAL | Age: 54
LOS: 2 days | Discharge: HOME | DRG: 346 | End: 2024-02-02
Attending: STUDENT IN AN ORGANIZED HEALTH CARE EDUCATION/TRAINING PROGRAM | Admitting: FAMILY MEDICINE
Payer: MEDICAID

## 2024-01-30 ENCOUNTER — APPOINTMENT (OUTPATIENT)
Dept: PSYCHIATRY | Facility: CLINIC | Age: 54
End: 2024-01-30

## 2024-01-30 ENCOUNTER — OUTPATIENT (OUTPATIENT)
Dept: OUTPATIENT SERVICES | Facility: HOSPITAL | Age: 54
LOS: 1 days | End: 2024-01-30
Payer: COMMERCIAL

## 2024-01-30 VITALS
OXYGEN SATURATION: 100 % | HEIGHT: 68 IN | RESPIRATION RATE: 18 BRPM | TEMPERATURE: 98 F | DIASTOLIC BLOOD PRESSURE: 79 MMHG | WEIGHT: 145.06 LBS | HEART RATE: 88 BPM | SYSTOLIC BLOOD PRESSURE: 126 MMHG

## 2024-01-30 DIAGNOSIS — Z90.89 ACQUIRED ABSENCE OF OTHER ORGANS: Chronic | ICD-10-CM

## 2024-01-30 DIAGNOSIS — Z90.49 ACQUIRED ABSENCE OF OTHER SPECIFIED PARTS OF DIGESTIVE TRACT: Chronic | ICD-10-CM

## 2024-01-30 DIAGNOSIS — F10.20 ALCOHOL DEPENDENCE, UNCOMPLICATED: ICD-10-CM

## 2024-01-30 DIAGNOSIS — R60.0 LOCALIZED EDEMA: ICD-10-CM

## 2024-01-30 LAB
ALBUMIN SERPL ELPH-MCNC: 4 G/DL — SIGNIFICANT CHANGE UP (ref 3.5–5.2)
ALP SERPL-CCNC: 77 U/L — SIGNIFICANT CHANGE UP (ref 30–115)
ALT FLD-CCNC: 7 U/L — SIGNIFICANT CHANGE UP (ref 0–41)
ANION GAP SERPL CALC-SCNC: 10 MMOL/L — SIGNIFICANT CHANGE UP (ref 7–14)
AST SERPL-CCNC: 25 U/L — SIGNIFICANT CHANGE UP (ref 0–41)
BASOPHILS # BLD AUTO: 0.02 K/UL — SIGNIFICANT CHANGE UP (ref 0–0.2)
BASOPHILS NFR BLD AUTO: 0.3 % — SIGNIFICANT CHANGE UP (ref 0–1)
BILIRUB SERPL-MCNC: 0.4 MG/DL — SIGNIFICANT CHANGE UP (ref 0.2–1.2)
BUN SERPL-MCNC: 7 MG/DL — LOW (ref 10–20)
CALCIUM SERPL-MCNC: 9.2 MG/DL — SIGNIFICANT CHANGE UP (ref 8.4–10.5)
CHLORIDE SERPL-SCNC: 94 MMOL/L — LOW (ref 98–110)
CO2 SERPL-SCNC: 33 MMOL/L — HIGH (ref 17–32)
CREAT SERPL-MCNC: 0.8 MG/DL — SIGNIFICANT CHANGE UP (ref 0.7–1.5)
EGFR: 88 ML/MIN/1.73M2 — SIGNIFICANT CHANGE UP
EOSINOPHIL # BLD AUTO: 0.15 K/UL — SIGNIFICANT CHANGE UP (ref 0–0.7)
EOSINOPHIL NFR BLD AUTO: 2.6 % — SIGNIFICANT CHANGE UP (ref 0–8)
GLUCOSE SERPL-MCNC: 220 MG/DL — HIGH (ref 70–99)
HCT VFR BLD CALC: 29.5 % — LOW (ref 37–47)
HGB BLD-MCNC: 9.7 G/DL — LOW (ref 12–16)
IMM GRANULOCYTES NFR BLD AUTO: 0.2 % — SIGNIFICANT CHANGE UP (ref 0.1–0.3)
LYMPHOCYTES # BLD AUTO: 1.36 K/UL — SIGNIFICANT CHANGE UP (ref 1.2–3.4)
LYMPHOCYTES # BLD AUTO: 23.7 % — SIGNIFICANT CHANGE UP (ref 20.5–51.1)
MAGNESIUM SERPL-MCNC: 2 MG/DL — SIGNIFICANT CHANGE UP (ref 1.8–2.4)
MCHC RBC-ENTMCNC: 27.6 PG — SIGNIFICANT CHANGE UP (ref 27–31)
MCHC RBC-ENTMCNC: 32.9 G/DL — SIGNIFICANT CHANGE UP (ref 32–37)
MCV RBC AUTO: 84 FL — SIGNIFICANT CHANGE UP (ref 81–99)
MONOCYTES # BLD AUTO: 0.35 K/UL — SIGNIFICANT CHANGE UP (ref 0.1–0.6)
MONOCYTES NFR BLD AUTO: 6.1 % — SIGNIFICANT CHANGE UP (ref 1.7–9.3)
NEUTROPHILS # BLD AUTO: 3.85 K/UL — SIGNIFICANT CHANGE UP (ref 1.4–6.5)
NEUTROPHILS NFR BLD AUTO: 67.1 % — SIGNIFICANT CHANGE UP (ref 42.2–75.2)
NRBC # BLD: 0 /100 WBCS — SIGNIFICANT CHANGE UP (ref 0–0)
PLATELET # BLD AUTO: 203 K/UL — SIGNIFICANT CHANGE UP (ref 130–400)
PMV BLD: 10.9 FL — HIGH (ref 7.4–10.4)
POTASSIUM SERPL-MCNC: 3.3 MMOL/L — LOW (ref 3.5–5)
POTASSIUM SERPL-SCNC: 3.3 MMOL/L — LOW (ref 3.5–5)
PROT SERPL-MCNC: 7.2 G/DL — SIGNIFICANT CHANGE UP (ref 6–8)
RBC # BLD: 3.51 M/UL — LOW (ref 4.2–5.4)
RBC # FLD: 14 % — SIGNIFICANT CHANGE UP (ref 11.5–14.5)
SODIUM SERPL-SCNC: 137 MMOL/L — SIGNIFICANT CHANGE UP (ref 135–146)
WBC # BLD: 5.74 K/UL — SIGNIFICANT CHANGE UP (ref 4.8–10.8)
WBC # FLD AUTO: 5.74 K/UL — SIGNIFICANT CHANGE UP (ref 4.8–10.8)

## 2024-01-30 PROCEDURE — 85027 COMPLETE CBC AUTOMATED: CPT

## 2024-01-30 PROCEDURE — 93925 LOWER EXTREMITY STUDY: CPT

## 2024-01-30 PROCEDURE — 80053 COMPREHEN METABOLIC PANEL: CPT

## 2024-01-30 PROCEDURE — 93970 EXTREMITY STUDY: CPT

## 2024-01-30 PROCEDURE — 83735 ASSAY OF MAGNESIUM: CPT

## 2024-01-30 PROCEDURE — 86160 COMPLEMENT ANTIGEN: CPT

## 2024-01-30 PROCEDURE — 87522 HEPATITIS C REVRS TRNSCRPJ: CPT

## 2024-01-30 PROCEDURE — 86706 HEP B SURFACE ANTIBODY: CPT

## 2024-01-30 PROCEDURE — 87521 HEPATITIS C PROBE&RVRS TRNSC: CPT

## 2024-01-30 PROCEDURE — 86705 HEP B CORE ANTIBODY IGM: CPT

## 2024-01-30 PROCEDURE — 84100 ASSAY OF PHOSPHORUS: CPT

## 2024-01-30 PROCEDURE — 80202 ASSAY OF VANCOMYCIN: CPT

## 2024-01-30 PROCEDURE — 87340 HEPATITIS B SURFACE AG IA: CPT

## 2024-01-30 PROCEDURE — 86803 HEPATITIS C AB TEST: CPT

## 2024-01-30 PROCEDURE — 36415 COLL VENOUS BLD VENIPUNCTURE: CPT

## 2024-01-30 PROCEDURE — 99285 EMERGENCY DEPT VISIT HI MDM: CPT

## 2024-01-30 PROCEDURE — 85025 COMPLETE CBC W/AUTO DIFF WBC: CPT

## 2024-01-30 PROCEDURE — H0004: CPT | Mod: 95

## 2024-01-30 PROCEDURE — 87517 HEPATITIS B DNA QUANT: CPT

## 2024-01-30 PROCEDURE — 86704 HEP B CORE ANTIBODY TOTAL: CPT

## 2024-01-30 PROCEDURE — 82595 ASSAY OF CRYOGLOBULIN: CPT

## 2024-01-30 PROCEDURE — 82962 GLUCOSE BLOOD TEST: CPT

## 2024-01-30 PROCEDURE — 82784 ASSAY IGA/IGD/IGG/IGM EACH: CPT

## 2024-01-30 RX ORDER — POTASSIUM CHLORIDE 20 MEQ
40 PACKET (EA) ORAL ONCE
Refills: 0 | Status: COMPLETED | OUTPATIENT
Start: 2024-01-30 | End: 2024-01-30

## 2024-01-30 RX ORDER — ACETAMINOPHEN 500 MG
975 TABLET ORAL ONCE
Refills: 0 | Status: COMPLETED | OUTPATIENT
Start: 2024-01-30 | End: 2024-01-30

## 2024-01-30 RX ORDER — CEFAZOLIN SODIUM 1 G
2000 VIAL (EA) INJECTION ONCE
Refills: 0 | Status: COMPLETED | OUTPATIENT
Start: 2024-01-30 | End: 2024-01-30

## 2024-01-30 RX ADMIN — Medication 100 MILLIGRAM(S): at 23:45

## 2024-01-30 RX ADMIN — Medication 40 MILLIEQUIVALENT(S): at 23:44

## 2024-01-30 RX ADMIN — Medication 975 MILLIGRAM(S): at 23:44

## 2024-01-30 NOTE — ED ADULT TRIAGE NOTE - WEIGHT IN KG
Left message for patient to call office back. Would like to know who he uses for home care so I can contact them to find out when they plan on calling patient.   65.8

## 2024-01-30 NOTE — ED PROVIDER NOTE - OBJECTIVE STATEMENT
53 year old female past medical history of COPD, hep C, drug abuse, pancreatitis comes to emergency room for b/l leg swelling with worsening pain and redness. patient states was recently admitted to hospital told had vasculitis and was suppose to f/u with RA doctor but has not just yet. patient states that since leaving the hospital the sores and redness having been getting worse and she cannot walk at all today due to pain and swelling. patient states that usually she walks with walker and she couldn't walk at all today and called 911. no fever/chills. no chest pain no shortness of breath.

## 2024-01-30 NOTE — ED PROVIDER NOTE - ATTENDING APP SHARED VISIT CONTRIBUTION OF CARE
53yF p/w leg wounds - was admitted recently for rash, though to be vasculitis - is still awaiting follow up with rheum - says she has worsening wounds to b/l feet, which are so swollen, she cannot get her socks on.

## 2024-01-30 NOTE — ED PROVIDER NOTE - CLINICAL SUMMARY MEDICAL DECISION MAKING FREE TEXT BOX
53yF p/w worsened b/l LE edema and new wounds to anterior feet, especially left.  Pt has not yet been able to see rheum and now has swelling so bad she cannot put her sock/shoes on or walk.  Labs reassuring.  No clinical concern for CHF, cirrhosis or renal failure causing volume overload.  US ordered at request of hospitalist and will adm for further care.

## 2024-01-30 NOTE — ED PROVIDER NOTE - PHYSICAL EXAMINATION
Physical Exam    Vital Signs: I have reviewed the initial vital signs.  Constitutional:  no acute distress  Eyes: Conjunctiva pink, Sclera clear, PERRLA, EOMI.  Cardiovascular: S1 and S2, regular rate, regular rhythm, well-perfused extremities, radial pulses equal and 2+  Respiratory: unlabored respiratory effort, clear to auscultation bilaterally no wheezing, rales and rhonchi  Gastrointestinal: soft, non-tender abdomen, no pulsatile mass, normal bowl sounds  Musculoskeletal: supple neck, + lower extremity edema, no midline tenderness  Integumentary: warm, dry, +b/l lower leg redness and crusted over areas with warmth and generalized tenderness some areas are weeping Left > RT. DP are equal b/l   Neurologic: awake, alert, cranial nerves II-XII grossly intact, extremities’ motor and sensory functions grossly intact  Psychiatric: appropriate mood, appropriate affect

## 2024-01-30 NOTE — ED ADULT NURSE NOTE - NSFALLRISKINTERV_ED_ALL_ED
Assistance OOB with selected safe patient handling equipment if applicable/Assistance with ambulation/Communicate fall risk and risk factors to all staff, patient, and family/Monitor gait and stability/Provide visual cue: yellow wristband, yellow gown, etc/Reinforce activity limits and safety measures with patient and family/Call bell, personal items and telephone in reach/Instruct patient to call for assistance before getting out of bed/chair/stretcher/Non-slip footwear applied when patient is off stretcher/[place] to call system/Physically safe environment - no spills, clutter or unnecessary equipment/Purposeful Proactive Rounding/Room/bathroom lighting operational, light cord in reach

## 2024-01-31 DIAGNOSIS — F10.20 ALCOHOL DEPENDENCE, UNCOMPLICATED: ICD-10-CM

## 2024-01-31 LAB
ALBUMIN SERPL ELPH-MCNC: 3.8 G/DL — SIGNIFICANT CHANGE UP (ref 3.5–5.2)
ALP SERPL-CCNC: 73 U/L — SIGNIFICANT CHANGE UP (ref 30–115)
ALT FLD-CCNC: 6 U/L — SIGNIFICANT CHANGE UP (ref 0–41)
ANION GAP SERPL CALC-SCNC: 14 MMOL/L — SIGNIFICANT CHANGE UP (ref 7–14)
AST SERPL-CCNC: 22 U/L — SIGNIFICANT CHANGE UP (ref 0–41)
BILIRUB SERPL-MCNC: 0.3 MG/DL — SIGNIFICANT CHANGE UP (ref 0.2–1.2)
BUN SERPL-MCNC: 7 MG/DL — LOW (ref 10–20)
CALCIUM SERPL-MCNC: 9 MG/DL — SIGNIFICANT CHANGE UP (ref 8.4–10.5)
CHLORIDE SERPL-SCNC: 96 MMOL/L — LOW (ref 98–110)
CO2 SERPL-SCNC: 28 MMOL/L — SIGNIFICANT CHANGE UP (ref 17–32)
CREAT SERPL-MCNC: 0.7 MG/DL — SIGNIFICANT CHANGE UP (ref 0.7–1.5)
EGFR: 103 ML/MIN/1.73M2 — SIGNIFICANT CHANGE UP
GLUCOSE BLDC GLUCOMTR-MCNC: 127 MG/DL — HIGH (ref 70–99)
GLUCOSE BLDC GLUCOMTR-MCNC: 134 MG/DL — HIGH (ref 70–99)
GLUCOSE BLDC GLUCOMTR-MCNC: 141 MG/DL — HIGH (ref 70–99)
GLUCOSE BLDC GLUCOMTR-MCNC: 175 MG/DL — HIGH (ref 70–99)
GLUCOSE SERPL-MCNC: 163 MG/DL — HIGH (ref 70–99)
HCT VFR BLD CALC: 29.1 % — LOW (ref 37–47)
HGB BLD-MCNC: 9.3 G/DL — LOW (ref 12–16)
MAGNESIUM SERPL-MCNC: 2.1 MG/DL — SIGNIFICANT CHANGE UP (ref 1.8–2.4)
MCHC RBC-ENTMCNC: 27.4 PG — SIGNIFICANT CHANGE UP (ref 27–31)
MCHC RBC-ENTMCNC: 32 G/DL — SIGNIFICANT CHANGE UP (ref 32–37)
MCV RBC AUTO: 85.6 FL — SIGNIFICANT CHANGE UP (ref 81–99)
NRBC # BLD: 0 /100 WBCS — SIGNIFICANT CHANGE UP (ref 0–0)
PHOSPHATE SERPL-MCNC: 3.4 MG/DL — SIGNIFICANT CHANGE UP (ref 2.1–4.9)
PLATELET # BLD AUTO: 172 K/UL — SIGNIFICANT CHANGE UP (ref 130–400)
PMV BLD: 11 FL — HIGH (ref 7.4–10.4)
POTASSIUM SERPL-MCNC: 3.9 MMOL/L — SIGNIFICANT CHANGE UP (ref 3.5–5)
POTASSIUM SERPL-SCNC: 3.9 MMOL/L — SIGNIFICANT CHANGE UP (ref 3.5–5)
PROT SERPL-MCNC: 6.8 G/DL — SIGNIFICANT CHANGE UP (ref 6–8)
RBC # BLD: 3.4 M/UL — LOW (ref 4.2–5.4)
RBC # FLD: 13.9 % — SIGNIFICANT CHANGE UP (ref 11.5–14.5)
SODIUM SERPL-SCNC: 138 MMOL/L — SIGNIFICANT CHANGE UP (ref 135–146)
WBC # BLD: 4.38 K/UL — LOW (ref 4.8–10.8)
WBC # FLD AUTO: 4.38 K/UL — LOW (ref 4.8–10.8)

## 2024-01-31 PROCEDURE — 93925 LOWER EXTREMITY STUDY: CPT | Mod: 26

## 2024-01-31 PROCEDURE — 93970 EXTREMITY STUDY: CPT | Mod: 26

## 2024-01-31 RX ORDER — LEVOTHYROXINE SODIUM 125 MCG
150 TABLET ORAL DAILY
Refills: 0 | Status: DISCONTINUED | OUTPATIENT
Start: 2024-01-31 | End: 2024-02-02

## 2024-01-31 RX ORDER — HEPARIN SODIUM 5000 [USP'U]/ML
5000 INJECTION INTRAVENOUS; SUBCUTANEOUS EVERY 12 HOURS
Refills: 0 | Status: DISCONTINUED | OUTPATIENT
Start: 2024-01-31 | End: 2024-02-02

## 2024-01-31 RX ORDER — FUROSEMIDE 40 MG
20 TABLET ORAL DAILY
Refills: 0 | Status: DISCONTINUED | OUTPATIENT
Start: 2024-01-31 | End: 2024-02-02

## 2024-01-31 RX ORDER — BUDESONIDE AND FORMOTEROL FUMARATE DIHYDRATE 160; 4.5 UG/1; UG/1
2 AEROSOL RESPIRATORY (INHALATION)
Refills: 0 | Status: DISCONTINUED | OUTPATIENT
Start: 2024-01-31 | End: 2024-02-02

## 2024-01-31 RX ORDER — INFLUENZA VIRUS VACCINE 15; 15; 15; 15 UG/.5ML; UG/.5ML; UG/.5ML; UG/.5ML
0.5 SUSPENSION INTRAMUSCULAR ONCE
Refills: 0 | Status: DISCONTINUED | OUTPATIENT
Start: 2024-01-31 | End: 2024-02-02

## 2024-01-31 RX ORDER — SODIUM CHLORIDE 9 MG/ML
1000 INJECTION, SOLUTION INTRAVENOUS
Refills: 0 | Status: DISCONTINUED | OUTPATIENT
Start: 2024-01-31 | End: 2024-02-02

## 2024-01-31 RX ORDER — PANTOPRAZOLE SODIUM 20 MG/1
40 TABLET, DELAYED RELEASE ORAL
Refills: 0 | Status: DISCONTINUED | OUTPATIENT
Start: 2024-01-31 | End: 2024-02-02

## 2024-01-31 RX ORDER — FLUOXETINE HCL 10 MG
40 CAPSULE ORAL DAILY
Refills: 0 | Status: DISCONTINUED | OUTPATIENT
Start: 2024-01-31 | End: 2024-02-02

## 2024-01-31 RX ORDER — GLUCAGON INJECTION, SOLUTION 0.5 MG/.1ML
1 INJECTION, SOLUTION SUBCUTANEOUS ONCE
Refills: 0 | Status: DISCONTINUED | OUTPATIENT
Start: 2024-01-31 | End: 2024-02-02

## 2024-01-31 RX ORDER — ALBUTEROL 90 UG/1
2 AEROSOL, METERED ORAL EVERY 6 HOURS
Refills: 0 | Status: DISCONTINUED | OUTPATIENT
Start: 2024-01-31 | End: 2024-02-02

## 2024-01-31 RX ORDER — DEXTROSE 50 % IN WATER 50 %
12.5 SYRINGE (ML) INTRAVENOUS ONCE
Refills: 0 | Status: DISCONTINUED | OUTPATIENT
Start: 2024-01-31 | End: 2024-02-02

## 2024-01-31 RX ORDER — VANCOMYCIN HCL 1 G
750 VIAL (EA) INTRAVENOUS EVERY 12 HOURS
Refills: 0 | Status: DISCONTINUED | OUTPATIENT
Start: 2024-01-31 | End: 2024-01-31

## 2024-01-31 RX ORDER — CHLORHEXIDINE GLUCONATE 213 G/1000ML
1 SOLUTION TOPICAL
Refills: 0 | Status: DISCONTINUED | OUTPATIENT
Start: 2024-01-31 | End: 2024-02-02

## 2024-01-31 RX ORDER — DEXTROSE 50 % IN WATER 50 %
25 SYRINGE (ML) INTRAVENOUS ONCE
Refills: 0 | Status: DISCONTINUED | OUTPATIENT
Start: 2024-01-31 | End: 2024-02-02

## 2024-01-31 RX ORDER — DEXTROSE 50 % IN WATER 50 %
15 SYRINGE (ML) INTRAVENOUS ONCE
Refills: 0 | Status: DISCONTINUED | OUTPATIENT
Start: 2024-01-31 | End: 2024-02-02

## 2024-01-31 RX ORDER — BUPRENORPHINE AND NALOXONE 2; .5 MG/1; MG/1
2.5 TABLET SUBLINGUAL DAILY
Refills: 0 | Status: DISCONTINUED | OUTPATIENT
Start: 2024-01-31 | End: 2024-02-02

## 2024-01-31 RX ORDER — VANCOMYCIN HCL 1 G
1000 VIAL (EA) INTRAVENOUS EVERY 12 HOURS
Refills: 0 | Status: DISCONTINUED | OUTPATIENT
Start: 2024-01-31 | End: 2024-02-02

## 2024-01-31 RX ORDER — INSULIN LISPRO 100/ML
VIAL (ML) SUBCUTANEOUS
Refills: 0 | Status: DISCONTINUED | OUTPATIENT
Start: 2024-01-31 | End: 2024-02-02

## 2024-01-31 RX ORDER — ACETAMINOPHEN 500 MG
650 TABLET ORAL EVERY 6 HOURS
Refills: 0 | Status: DISCONTINUED | OUTPATIENT
Start: 2024-01-31 | End: 2024-02-01

## 2024-01-31 RX ADMIN — Medication 250 MILLIGRAM(S): at 05:23

## 2024-01-31 RX ADMIN — Medication 250 MILLIGRAM(S): at 17:10

## 2024-01-31 RX ADMIN — Medication 650 MILLIGRAM(S): at 08:40

## 2024-01-31 RX ADMIN — Medication 40 MILLIGRAM(S): at 12:15

## 2024-01-31 RX ADMIN — Medication 1: at 17:06

## 2024-01-31 RX ADMIN — PANTOPRAZOLE SODIUM 40 MILLIGRAM(S): 20 TABLET, DELAYED RELEASE ORAL at 05:22

## 2024-01-31 RX ADMIN — BUDESONIDE AND FORMOTEROL FUMARATE DIHYDRATE 2 PUFF(S): 160; 4.5 AEROSOL RESPIRATORY (INHALATION) at 19:55

## 2024-01-31 RX ADMIN — Medication 20 MILLIGRAM(S): at 05:22

## 2024-01-31 RX ADMIN — Medication 150 MICROGRAM(S): at 05:22

## 2024-01-31 RX ADMIN — HEPARIN SODIUM 5000 UNIT(S): 5000 INJECTION INTRAVENOUS; SUBCUTANEOUS at 05:23

## 2024-01-31 RX ADMIN — BUPRENORPHINE AND NALOXONE 2.5 TABLET(S): 2; .5 TABLET SUBLINGUAL at 12:15

## 2024-01-31 RX ADMIN — Medication 650 MILLIGRAM(S): at 07:59

## 2024-01-31 NOTE — H&P ADULT - NSHPLABSRESULTS_GEN_ALL_CORE
LABS             9.7    5.74  )-----------( 203      ( 30 Jan 2024 21:22 )             29.5     01-30    137  |  94<L>  |  7<L>  ----------------------------<  220<H>  3.3<L>   |  33<H>  |  0.8    Ca    9.2      30 Jan 2024 21:22  Mg     2.0     01-30    TPro  7.2  /  Alb  4.0  /  TBili  0.4  /  DBili  x   /  AST  25  /  ALT  7   /  AlkPhos  77  01-30    LIVER FUNCTIONS - ( 30 Jan 2024 21:22 )  Alb: 4.0 g/dL / Pro: 7.2 g/dL / ALK PHOS: 77 U/L / ALT: 7 U/L / AST: 25 U/L / GGT: x             Urinalysis Basic - ( 30 Jan 2024 21:22 )    Color: x / Appearance: x / SG: x / pH: x  Gluc: 220 mg/dL / Ketone: x  / Bili: x / Urobili: x   Blood: x / Protein: x / Nitrite: x   Leuk Esterase: x / RBC: x / WBC x   Sq Epi: x / Non Sq Epi: x / Bacteria: x

## 2024-01-31 NOTE — CONSULT NOTE ADULT - ASSESSMENT
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 Patient was recently admitted and discharged on 1/11/24 for similar symptoms and states since being discharged symptoms have progressively worsened prompting evaluation to the ER. Denies relieving factors. States hasn't followed up with rheumatology yet. Denies fever, chills, n/v/d, chest pain, SOB.  Pt was seen by Rheumatology on 1/11/2024 and states: - If vasculitis is confirmed on biopsy and pt's rash persists, can consider a trial of prednisone.  Bx was obtained on 1/10/2024 and sent to Lab.  Awaiting final report.         Recommendations:  -   -  Assessment:   Patient is a 54 yo female PMHx COPD, DM, hypothyroidism, HepC, h/o IVDA on Suboxone who presents complaining of swelling and lesions to bilateral lower extremities associated with difficulty walking x 2 weeks. Patient was recently admitted and discharged on 1/11/24 for similar symptoms and states since being discharged symptoms have progressively worsened prompting evaluation to the ER. Denies relieving factors. States hasn't followed up with rheumatology yet. Denies fever, chills, n/v/d, chest pain, SOB.  Pt was seen by Rheumatology on 1/11/2024 and states: - If vasculitis is confirmed on biopsy and pt's rash persists, can consider a trial of prednisone.  Bx was obtained on 1/10/2024 and sent to Lab.  Awaiting final report.         Recommendations:  - Bx obtained on 1/10/2024 for vasculitis not yet resulted  - will need to contact pathology for results. 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 Patient was recently admitted and discharged on 1/11/24 for similar symptoms and states since being discharged symptoms have progressively worsened prompting evaluation to the ER. Denies relieving factors. States hasn't followed up with rheumatology yet. Denies fever, chills, n/v/d, chest pain, SOB.  Pt was seen by Rheumatology on 1/11/2024 and states: - If vasculitis is confirmed on biopsy and pt's rash persists, can consider a trial of prednisone.  Bx was obtained on 1/10/2024 and sent to Lab.  Awaiting final report.         Recommendations:  - Bx obtained on 1/10/2024 for vasculitis   - will need to contact pathology for results. 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 Patient was recently admitted and discharged on 1/11/24 for similar symptoms and states since being discharged symptoms have progressively worsened prompting evaluation to the ER. Denies relieving factors. States hasn't followed up with rheumatology yet. Denies fever, chills, n/v/d, chest pain, SOB.  Pt was seen by Rheumatology on 1/11/2024 and states: - If vasculitis is confirmed on biopsy and pt's rash persists, can consider a trial of prednisone.  Bx was obtained on 1/10/2024 and sent to Lab.  Awaiting final report.     D/W Dr Novak.   Recommends the following...    Recommendations:  - Arterial Duplex  - Bx obtained on 1/10/2024 for vasculitis   - will need to contact pathology for results.  - Pending results agree with Rheumatology previous recommendation of Prednisone.

## 2024-01-31 NOTE — H&P ADULT - NSHPSOCIALHISTORY_GEN_ALL_CORE
Substance Use (street drugs): ( x ) never used  (  ) other:  Tobacco Usage:  ( x  ) never smoked   (   ) former smoker   (   ) current smoker  (     ) pack year  Alcohol Usage: None Substance Use (street drugs): Former drug user  Tobacco Usage:  ( x  ) never smoked   (   ) former smoker   (   ) current smoker  (     ) pack year  Alcohol Usage: None

## 2024-01-31 NOTE — H&P ADULT - HISTORY OF PRESENT ILLNESS
Patient is a 54 yo female PMHx COPD, DM, hypothyroidism, HepC, h/o IVDA on Suboxone who presents complaining of swelling and lesions to bilateral lower extremities associated with difficulty walking x 2 weeks. Patient was recently admitted and discharged on 1/11/24 for similar symptoms and states since being discharged symptoms have progressively worsened prompting evaluation to the ER. Denies relieving factors. States hasn't followed up with rheumatology yet. Denies fever, chills, n/v/d, chest pain, SOB,

## 2024-01-31 NOTE — PATIENT PROFILE ADULT - FUNCTIONAL ASSESSMENT - DAILY ACTIVITY 2.
Writer responded via [employer].    RASHEED AlejandreN, RN-BC  [facility]     4 = No assist / stand by assistance

## 2024-01-31 NOTE — CONSULT NOTE ADULT - SUBJECTIVE AND OBJECTIVE BOX
Vascular Surgical Attending:  Dr Fuchs              HPI:  Patient is a 52 yo female PMHx COPD, DM, hypothyroidism, HepC, h/o IVDA on Suboxone who presents complaining of swelling and lesions to bilateral lower extremities associated with difficulty walking x 2 weeks. Patient was recently admitted and discharged on 1/11/24 for similar symptoms and states since being discharged symptoms have progressively worsened prompting evaluation to the ER. Denies relieving factors. States hasn't followed up with rheumatology yet. Denies fever, chills, n/v/d, chest pain, SOB.  Pt was seen by Rheumatology on       PAST MEDICAL & SURGICAL HISTORY:  Asthma with COPD      Hypothyroidism      H/O: substance abuse  no longer using      History of pancreatitis      Hepatitis-C      History of appendectomy      History of tonsillectomy          MEDICATIONS  (STANDING):  budesonide 160 MICROgram(s)/formoterol 4.5 MICROgram(s) Inhaler 2 Puff(s) Inhalation two times a day  buprenorphine 8 mG/naloxone 2 mG SL  Tablet 2.5 Tablet(s) SubLingual daily  chlorhexidine 2% Cloths 1 Application(s) Topical <User Schedule>  dextrose 5%. 1000 milliLiter(s) (100 mL/Hr) IV Continuous <Continuous>  dextrose 5%. 1000 milliLiter(s) (50 mL/Hr) IV Continuous <Continuous>  dextrose 50% Injectable 12.5 Gram(s) IV Push once  dextrose 50% Injectable 25 Gram(s) IV Push once  dextrose 50% Injectable 25 Gram(s) IV Push once  FLUoxetine 40 milliGRAM(s) Oral daily  furosemide    Tablet 20 milliGRAM(s) Oral daily  glucagon  Injectable 1 milliGRAM(s) IntraMuscular once  heparin   Injectable 5000 Unit(s) SubCutaneous every 12 hours  insulin lispro (ADMELOG) corrective regimen sliding scale   SubCutaneous three times a day before meals  levothyroxine 150 MICROGram(s) Oral daily  pantoprazole    Tablet 40 milliGRAM(s) Oral before breakfast  vancomycin  IVPB 750 milliGRAM(s) IV Intermittent every 12 hours    MEDICATIONS  (PRN):  albuterol    90 MICROgram(s) HFA Inhaler 2 Puff(s) Inhalation every 6 hours PRN Shortness of Breath and/or Wheezing  dextrose Oral Gel 15 Gram(s) Oral once PRN Blood Glucose LESS THAN 70 milliGRAM(s)/deciliter      Allergies    No Known Allergies    Intolerances        SOCIAL HISTORY:  Etoh:                                    Drugs:                                   Smoke:      Vital Signs Last 24 Hrs  T(C): 35.7 (30 Jan 2024 23:58), Max: 36.7 (30 Jan 2024 20:32)  T(F): 96.3 (30 Jan 2024 23:58), Max: 98.1 (30 Jan 2024 20:32)  HR: 57 (30 Jan 2024 23:58) (57 - 88)  BP: 102/50 (30 Jan 2024 23:58) (102/50 - 126/79)  BP(mean): --  RR: 18 (30 Jan 2024 23:58) (18 - 18)  SpO2: 95% (30 Jan 2024 23:58) (95% - 100%)    Parameters below as of 30 Jan 2024 23:58  Patient On (Oxygen Delivery Method): room air        I&O's Summary      Physical Exam:  General: NAD    Abdominal: +BS, Soft, ND/NT, no organomegaly, no rebound, no guarding.    HEENT: NC/AT, EOMI, normal hearing, no oral lesions, no LAD, neck supple  Pulmonary: normal resp effort, CTA-B  Cardiovascular: NSR, no murmurs  Extremities: WWP, normal strength, no clubbing/cyanosis/edema  Neuro: A/O x 3, CNs II-XII grossly intact, normal sensation, no focal deficits  Pulses: palpable distal pulses    Lines/drains/tubes:    LABS:                        9.7    5.74  )-----------( 203      ( 30 Jan 2024 21:22 )             29.5     01-30    137  |  94<L>  |  7<L>  ----------------------------<  220<H>  3.3<L>   |  33<H>  |  0.8    Ca    9.2      30 Jan 2024 21:22  Mg     2.0     01-30    TPro  7.2  /  Alb  4.0  /  TBili  0.4  /  DBili  x   /  AST  25  /  ALT  7   /  AlkPhos  77  01-30      Urinalysis Basic - ( 30 Jan 2024 21:22 )    Color: x / Appearance: x / SG: x / pH: x  Gluc: 220 mg/dL / Ketone: x  / Bili: x / Urobili: x   Blood: x / Protein: x / Nitrite: x   Leuk Esterase: x / RBC: x / WBC x   Sq Epi: x / Non Sq Epi: x / Bacteria: x      CAPILLARY BLOOD GLUCOSE        LIVER FUNCTIONS - ( 30 Jan 2024 21:22 )  Alb: 4.0 g/dL / Pro: 7.2 g/dL / ALK PHOS: 77 U/L / ALT: 7 U/L / AST: 25 U/L / GGT: x             Cultures:      RADIOLOGY & ADDITIONAL STUDIES:      Assessment: 53y Female    Recommendations:  -   -                                                                                     Vascular Surgical Attending:  Dr Fuchs              HPI:  Patient is a 54 yo female PMHx COPD, DM, hypothyroidism, HepC, h/o IVDA on Suboxone who presents complaining of swelling and lesions to bilateral lower extremities associated with difficulty walking x 2 weeks. Patient was recently admitted and discharged on 1/11/24 for similar symptoms and states since being discharged symptoms have progressively worsened prompting evaluation to the ER. Denies relieving factors. States hasn't followed up with rheumatology yet. Denies fever, chills, n/v/d, chest pain, SOB.  Pt was seen by Rheumatology on 1/11/2024 and states: - If vasculitis is confirmed on biopsy and pt's rash persists, can consider a trial of prednisone.  Bx was obtained on 1/10/2024 and sent to Lab.  Awaiting final report.        PAST MEDICAL & SURGICAL HISTORY:  Asthma with COPD      Hypothyroidism      H/O: substance abuse  no longer using      History of pancreatitis      Hepatitis-C      History of appendectomy      History of tonsillectomy          MEDICATIONS  (STANDING):  budesonide 160 MICROgram(s)/formoterol 4.5 MICROgram(s) Inhaler 2 Puff(s) Inhalation two times a day  buprenorphine 8 mG/naloxone 2 mG SL  Tablet 2.5 Tablet(s) SubLingual daily  chlorhexidine 2% Cloths 1 Application(s) Topical <User Schedule>  dextrose 5%. 1000 milliLiter(s) (100 mL/Hr) IV Continuous <Continuous>  dextrose 5%. 1000 milliLiter(s) (50 mL/Hr) IV Continuous <Continuous>  dextrose 50% Injectable 12.5 Gram(s) IV Push once  dextrose 50% Injectable 25 Gram(s) IV Push once  dextrose 50% Injectable 25 Gram(s) IV Push once  FLUoxetine 40 milliGRAM(s) Oral daily  furosemide    Tablet 20 milliGRAM(s) Oral daily  glucagon  Injectable 1 milliGRAM(s) IntraMuscular once  heparin   Injectable 5000 Unit(s) SubCutaneous every 12 hours  insulin lispro (ADMELOG) corrective regimen sliding scale   SubCutaneous three times a day before meals  levothyroxine 150 MICROGram(s) Oral daily  pantoprazole    Tablet 40 milliGRAM(s) Oral before breakfast  vancomycin  IVPB 750 milliGRAM(s) IV Intermittent every 12 hours    MEDICATIONS  (PRN):  albuterol    90 MICROgram(s) HFA Inhaler 2 Puff(s) Inhalation every 6 hours PRN Shortness of Breath and/or Wheezing  dextrose Oral Gel 15 Gram(s) Oral once PRN Blood Glucose LESS THAN 70 milliGRAM(s)/deciliter      Allergies: No Known Allergies        Vital Signs Last 24 Hrs  T(C): 35.7 (30 Jan 2024 23:58), Max: 36.7 (30 Jan 2024 20:32)  T(F): 96.3 (30 Jan 2024 23:58), Max: 98.1 (30 Jan 2024 20:32)  HR: 57 (30 Jan 2024 23:58) (57 - 88)  BP: 102/50 (30 Jan 2024 23:58) (102/50 - 126/79)  BP(mean): --  RR: 18 (30 Jan 2024 23:58) (18 - 18)  SpO2: 95% (30 Jan 2024 23:58) (95% - 100%)  Parameters below as of 30 Jan 2024 23:58  Patient On (Oxygen Delivery Method): room air        Physical Exam:  General: NAD  Abdominal: +BS, Soft, ND/NT, no organomegaly, no rebound, no guarding.  HEENT: NC/AT, EOMI, normal hearing, no oral lesions, no LAD, neck supple  Pulmonary: normal resp effort, CTA-B  Cardiovascular: NSR, no murmurs  Extremities:   Neuro: A/O x 3, CNs II-XII grossly intact, normal sensation, no focal deficits  Pulses: palpable distal pulses        LABS:                        9.7    5.74  )-----------( 203      ( 30 Jan 2024 21:22 )             29.5     01-30    137  |  94<L>  |  7<L>  ----------------------------<  220<H>  3.3<L>   |  33<H>  |  0.8    Ca    9.2      30 Jan 2024 21:22  Mg     2.0     01-30    TPro  7.2  /  Alb  4.0  /  TBili  0.4  /  DBili  x   /  AST  25  /  ALT  7   /  AlkPhos  77  01-30      Urinalysis Basic - ( 30 Jan 2024 21:22 )    Color: x / Appearance: x / SG: x / pH: x  Gluc: 220 mg/dL / Ketone: x  / Bili: x / Urobili: x   Blood: x / Protein: x / Nitrite: x   Leuk Esterase: x / RBC: x / WBC x   Sq Epi: x / Non Sq Epi: x / Bacteria: x      CAPILLARY BLOOD GLUCOSE        LIVER FUNCTIONS - ( 30 Jan 2024 21:22 )  Alb: 4.0 g/dL / Pro: 7.2 g/dL / ALK PHOS: 77 U/L / ALT: 7 U/L / AST: 25 U/L / GGT: x             Cultures:      RADIOLOGY & ADDITIONAL STUDIES:                                                                                                Vascular Surgical Attending:  Dr Fuchs              HPI:  Patient is a 54 yo female PMHx COPD, DM, hypothyroidism, HepC, h/o IVDA on Suboxone who presents complaining of swelling and lesions to bilateral lower extremities associated with difficulty walking x 2 weeks. Patient was recently admitted and discharged on 1/11/24 for similar symptoms and states since being discharged symptoms have progressively worsened prompting evaluation to the ER. Denies relieving factors. States hasn't followed up with rheumatology yet. Denies fever, chills, n/v/d, chest pain, SOB.  Pt was seen by Rheumatology on 1/11/2024 and states: - If vasculitis is confirmed on biopsy and pt's rash persists, can consider a trial of prednisone.  Bx was obtained on 1/10/2024 and sent to Lab.  Awaiting final report.        PAST MEDICAL & SURGICAL HISTORY:  Asthma with COPD      Hypothyroidism      H/O: substance abuse  no longer using      History of pancreatitis      Hepatitis-C      History of appendectomy      History of tonsillectomy          MEDICATIONS  (STANDING):  budesonide 160 MICROgram(s)/formoterol 4.5 MICROgram(s) Inhaler 2 Puff(s) Inhalation two times a day  buprenorphine 8 mG/naloxone 2 mG SL  Tablet 2.5 Tablet(s) SubLingual daily  chlorhexidine 2% Cloths 1 Application(s) Topical <User Schedule>  dextrose 5%. 1000 milliLiter(s) (100 mL/Hr) IV Continuous <Continuous>  dextrose 5%. 1000 milliLiter(s) (50 mL/Hr) IV Continuous <Continuous>  dextrose 50% Injectable 12.5 Gram(s) IV Push once  dextrose 50% Injectable 25 Gram(s) IV Push once  dextrose 50% Injectable 25 Gram(s) IV Push once  FLUoxetine 40 milliGRAM(s) Oral daily  furosemide    Tablet 20 milliGRAM(s) Oral daily  glucagon  Injectable 1 milliGRAM(s) IntraMuscular once  heparin   Injectable 5000 Unit(s) SubCutaneous every 12 hours  insulin lispro (ADMELOG) corrective regimen sliding scale   SubCutaneous three times a day before meals  levothyroxine 150 MICROGram(s) Oral daily  pantoprazole    Tablet 40 milliGRAM(s) Oral before breakfast  vancomycin  IVPB 750 milliGRAM(s) IV Intermittent every 12 hours    MEDICATIONS  (PRN):  albuterol    90 MICROgram(s) HFA Inhaler 2 Puff(s) Inhalation every 6 hours PRN Shortness of Breath and/or Wheezing  dextrose Oral Gel 15 Gram(s) Oral once PRN Blood Glucose LESS THAN 70 milliGRAM(s)/deciliter      Allergies: No Known Allergies        Vital Signs Last 24 Hrs  T(C): 35.7 (30 Jan 2024 23:58), Max: 36.7 (30 Jan 2024 20:32)  T(F): 96.3 (30 Jan 2024 23:58), Max: 98.1 (30 Jan 2024 20:32)  HR: 57 (30 Jan 2024 23:58) (57 - 88)  BP: 102/50 (30 Jan 2024 23:58) (102/50 - 126/79)  BP(mean): --  RR: 18 (30 Jan 2024 23:58) (18 - 18)  SpO2: 95% (30 Jan 2024 23:58) (95% - 100%)  Parameters below as of 30 Jan 2024 23:58  Patient On (Oxygen Delivery Method): room air        Physical Exam:  General: NAD  Abdominal: +BS, Soft, ND/NT, no organomegaly, no rebound, no guarding.  HEENT: NC/AT, EOMI, normal hearing, no oral lesions, no LAD, neck supple  Pulmonary: normal resp effort, CTA-B  Cardiovascular: NSR, no murmurs  Extremities:  2+ Peripheral Pulses, <2sec cap refill, No clubbing, cyanosis, or edema  Neuro: A/O x 3, CNs II-XII grossly intact, normal sensation, no focal deficits  Pulses: palpable distal pulses        LABS:                        9.7    5.74  )-----------( 203      ( 30 Jan 2024 21:22 )             29.5     01-30    137  |  94<L>  |  7<L>  ----------------------------<  220<H>  3.3<L>   |  33<H>  |  0.8    Ca    9.2      30 Jan 2024 21:22  Mg     2.0     01-30    TPro  7.2  /  Alb  4.0  /  TBili  0.4  /  DBili  x   /  AST  25  /  ALT  7   /  AlkPhos  77  01-30      Urinalysis Basic - ( 30 Jan 2024 21:22 )    Color: x / Appearance: x / SG: x / pH: x  Gluc: 220 mg/dL / Ketone: x  / Bili: x / Urobili: x   Blood: x / Protein: x / Nitrite: x   Leuk Esterase: x / RBC: x / WBC x   Sq Epi: x / Non Sq Epi: x / Bacteria: x      CAPILLARY BLOOD GLUCOSE        LIVER FUNCTIONS - ( 30 Jan 2024 21:22 )  Alb: 4.0 g/dL / Pro: 7.2 g/dL / ALK PHOS: 77 U/L / ALT: 7 U/L / AST: 25 U/L / GGT: x             Cultures:      RADIOLOGY & ADDITIONAL STUDIES:                                                                                                Vascular Surgical Attending:  Dr Fuchs              HPI:  Patient is a 52 yo female PMHx COPD, DM, hypothyroidism, HepC, h/o IVDA on Suboxone who presents complaining of swelling and lesions to bilateral lower extremities associated with difficulty walking x 2 weeks. Patient was recently admitted and discharged on 1/11/24 for similar symptoms and states since being discharged symptoms have progressively worsened prompting evaluation to the ER. Denies relieving factors. States hasn't followed up with rheumatology yet. Denies fever, chills, n/v/d, chest pain, SOB.  Pt was seen by Rheumatology on 1/11/2024 and states: - If vasculitis is confirmed on biopsy and pt's rash persists, can consider a trial of prednisone.  Bx was obtained on 1/10/2024 and sent to Lab.  Awaiting final report.        PAST MEDICAL & SURGICAL HISTORY:  Asthma with COPD      Hypothyroidism      H/O: substance abuse  no longer using      History of pancreatitis      Hepatitis-C      History of appendectomy      History of tonsillectomy          MEDICATIONS  (STANDING):  budesonide 160 MICROgram(s)/formoterol 4.5 MICROgram(s) Inhaler 2 Puff(s) Inhalation two times a day  buprenorphine 8 mG/naloxone 2 mG SL  Tablet 2.5 Tablet(s) SubLingual daily  chlorhexidine 2% Cloths 1 Application(s) Topical <User Schedule>  dextrose 5%. 1000 milliLiter(s) (100 mL/Hr) IV Continuous <Continuous>  dextrose 5%. 1000 milliLiter(s) (50 mL/Hr) IV Continuous <Continuous>  dextrose 50% Injectable 12.5 Gram(s) IV Push once  dextrose 50% Injectable 25 Gram(s) IV Push once  dextrose 50% Injectable 25 Gram(s) IV Push once  FLUoxetine 40 milliGRAM(s) Oral daily  furosemide    Tablet 20 milliGRAM(s) Oral daily  glucagon  Injectable 1 milliGRAM(s) IntraMuscular once  heparin   Injectable 5000 Unit(s) SubCutaneous every 12 hours  insulin lispro (ADMELOG) corrective regimen sliding scale   SubCutaneous three times a day before meals  levothyroxine 150 MICROGram(s) Oral daily  pantoprazole    Tablet 40 milliGRAM(s) Oral before breakfast  vancomycin  IVPB 750 milliGRAM(s) IV Intermittent every 12 hours    MEDICATIONS  (PRN):  albuterol    90 MICROgram(s) HFA Inhaler 2 Puff(s) Inhalation every 6 hours PRN Shortness of Breath and/or Wheezing  dextrose Oral Gel 15 Gram(s) Oral once PRN Blood Glucose LESS THAN 70 milliGRAM(s)/deciliter      Allergies: No Known Allergies        Vital Signs Last 24 Hrs  T(C): 35.7 (30 Jan 2024 23:58), Max: 36.7 (30 Jan 2024 20:32)  T(F): 96.3 (30 Jan 2024 23:58), Max: 98.1 (30 Jan 2024 20:32)  HR: 57 (30 Jan 2024 23:58) (57 - 88)  BP: 102/50 (30 Jan 2024 23:58) (102/50 - 126/79)  BP(mean): --  RR: 18 (30 Jan 2024 23:58) (18 - 18)  SpO2: 95% (30 Jan 2024 23:58) (95% - 100%)  Parameters below as of 30 Jan 2024 23:58  Patient On (Oxygen Delivery Method): room air        Physical Exam:  General: NAD  Abdominal: +BS, Soft, ND/NT, no organomegaly, no rebound, no guarding.  HEENT: NC/AT, EOMI, normal hearing, no oral lesions, no LAD, neck supple  Pulmonary: normal resp effort, CTA-B  Cardiovascular: NSR, no murmurs  Neuro: A/O x 3, CNs II-XII grossly intact, normal sensation, no focal deficits  Extremities:  2+ Peripheral Pulses, <2sec cap refill, 3+ pitting edema b/l edema,  + palpable pulses +petechia and purpura over b/l LE worse over dorsum of feet and posterior lower leg.         LABS:                        9.7    5.74  )-----------( 203      ( 30 Jan 2024 21:22 )             29.5     01-30    137  |  94<L>  |  7<L>  ----------------------------<  220<H>  3.3<L>   |  33<H>  |  0.8    Ca    9.2      30 Jan 2024 21:22  Mg     2.0     01-30    TPro  7.2  /  Alb  4.0  /  TBili  0.4  /  DBili  x   /  AST  25  /  ALT  7   /  AlkPhos  77  01-30      Urinalysis Basic - ( 30 Jan 2024 21:22 )    Color: x / Appearance: x / SG: x / pH: x  Gluc: 220 mg/dL / Ketone: x  / Bili: x / Urobili: x   Blood: x / Protein: x / Nitrite: x   Leuk Esterase: x / RBC: x / WBC x   Sq Epi: x / Non Sq Epi: x / Bacteria: x      CAPILLARY BLOOD GLUCOSE        LIVER FUNCTIONS - ( 30 Jan 2024 21:22 )  Alb: 4.0 g/dL / Pro: 7.2 g/dL / ALK PHOS: 77 U/L / ALT: 7 U/L / AST: 25 U/L / GGT: x             Cultures:      RADIOLOGY & ADDITIONAL STUDIES:                                                                           Vascular Surgical Attending:  Dr Fuchs              HPI:  Patient is a 54 yo female PMHx COPD, DM, hypothyroidism, HepC, h/o IVDA on Suboxone who presents complaining of swelling and lesions to bilateral lower extremities associated with difficulty walking x 2 weeks. Patient was recently admitted and discharged on 1/11/24 for similar symptoms and states since being discharged symptoms have progressively worsened prompting evaluation to the ER. Denies relieving factors. States hasn't followed up with rheumatology yet. Denies fever, chills, n/v/d, chest pain, SOB.  Pt was seen by Rheumatology on 1/11/2024 and states: - If vasculitis is confirmed on biopsy and pt's rash persists, can consider a trial of prednisone.  Bx was obtained on 1/10/2024 and sent to Lab.  Awaiting final report.        PAST MEDICAL & SURGICAL HISTORY:  Asthma with COPD      Hypothyroidism      H/O: substance abuse  no longer using      History of pancreatitis      Hepatitis-C      History of appendectomy      History of tonsillectomy          MEDICATIONS  (STANDING):  budesonide 160 MICROgram(s)/formoterol 4.5 MICROgram(s) Inhaler 2 Puff(s) Inhalation two times a day  buprenorphine 8 mG/naloxone 2 mG SL  Tablet 2.5 Tablet(s) SubLingual daily  chlorhexidine 2% Cloths 1 Application(s) Topical <User Schedule>  dextrose 5%. 1000 milliLiter(s) (100 mL/Hr) IV Continuous <Continuous>  dextrose 5%. 1000 milliLiter(s) (50 mL/Hr) IV Continuous <Continuous>  dextrose 50% Injectable 12.5 Gram(s) IV Push once  dextrose 50% Injectable 25 Gram(s) IV Push once  dextrose 50% Injectable 25 Gram(s) IV Push once  FLUoxetine 40 milliGRAM(s) Oral daily  furosemide    Tablet 20 milliGRAM(s) Oral daily  glucagon  Injectable 1 milliGRAM(s) IntraMuscular once  heparin   Injectable 5000 Unit(s) SubCutaneous every 12 hours  insulin lispro (ADMELOG) corrective regimen sliding scale   SubCutaneous three times a day before meals  levothyroxine 150 MICROGram(s) Oral daily  pantoprazole    Tablet 40 milliGRAM(s) Oral before breakfast  vancomycin  IVPB 750 milliGRAM(s) IV Intermittent every 12 hours    MEDICATIONS  (PRN):  albuterol    90 MICROgram(s) HFA Inhaler 2 Puff(s) Inhalation every 6 hours PRN Shortness of Breath and/or Wheezing  dextrose Oral Gel 15 Gram(s) Oral once PRN Blood Glucose LESS THAN 70 milliGRAM(s)/deciliter      Allergies: No Known Allergies        Vital Signs Last 24 Hrs  T(C): 35.7 (30 Jan 2024 23:58), Max: 36.7 (30 Jan 2024 20:32)  T(F): 96.3 (30 Jan 2024 23:58), Max: 98.1 (30 Jan 2024 20:32)  HR: 57 (30 Jan 2024 23:58) (57 - 88)  BP: 102/50 (30 Jan 2024 23:58) (102/50 - 126/79)  BP(mean): --  RR: 18 (30 Jan 2024 23:58) (18 - 18)  SpO2: 95% (30 Jan 2024 23:58) (95% - 100%)  Parameters below as of 30 Jan 2024 23:58  Patient On (Oxygen Delivery Method): room air        Physical Exam:  General: NAD  Abdominal: +BS, Soft, ND/NT, no organomegaly, no rebound, no guarding.  HEENT: NC/AT, EOMI, normal hearing, no oral lesions, no LAD, neck supple  Pulmonary: normal resp effort, CTA-B  Cardiovascular: NSR, no murmurs  Neuro: A/O x 3, CNs II-XII grossly intact, normal sensation, no focal deficits  Extremities:  Peripheral Pulses via doppler , 3+ pitting edema b/l edema, +petechia and purpura over b/l LE worse over dorsum of feet and posterior lower leg.  scabs noted to left lower extremity.         LABS:                        9.7    5.74  )-----------( 203      ( 30 Jan 2024 21:22 )             29.5     01-30    137  |  94<L>  |  7<L>  ----------------------------<  220<H>  3.3<L>   |  33<H>  |  0.8    Ca    9.2      30 Jan 2024 21:22  Mg     2.0     01-30    TPro  7.2  /  Alb  4.0  /  TBili  0.4  /  DBili  x   /  AST  25  /  ALT  7   /  AlkPhos  77  01-30      Urinalysis Basic - ( 30 Jan 2024 21:22 )    Color: x / Appearance: x / SG: x / pH: x  Gluc: 220 mg/dL / Ketone: x  / Bili: x / Urobili: x   Blood: x / Protein: x / Nitrite: x   Leuk Esterase: x / RBC: x / WBC x   Sq Epi: x / Non Sq Epi: x / Bacteria: x      CAPILLARY BLOOD GLUCOSE        LIVER FUNCTIONS - ( 30 Jan 2024 21:22 )  Alb: 4.0 g/dL / Pro: 7.2 g/dL / ALK PHOS: 77 U/L / ALT: 7 U/L / AST: 25 U/L / GGT: x             Cultures:      RADIOLOGY & ADDITIONAL STUDIES:

## 2024-01-31 NOTE — CHART NOTE - NSCHARTNOTEFT_GEN_A_CORE
Rheumatology was consulted for management of pt's LE skin changes, concerning for vasculitis. Pt was previously seen by myself on 1/11/2024 during her previous admission. During that admission, pt had a skin biopsy, the results of which are still pending. If her LE skin changes are worsening, recommend starting prednisone 30 mg daily.
Pt seen by nocturnist. Pt states her legs are so heavy and in pain. She has 3+ pitting edema bl with sores with eschar over them that she states are new but they do look subacute vs chronic. Denies any systemic features of infection. Increased vancomycin dose to 1000mg as per pharm. Vascular duplex ordered. Vasc and rheum recs appreciated. Started prednisone 30mg qd.

## 2024-01-31 NOTE — H&P ADULT - NSHPPHYSICALEXAM_GEN_ALL_CORE
Vital Signs Last 24 Hrs  T(C): 35.7 (30 Jan 2024 23:58), Max: 36.7 (30 Jan 2024 20:32)  T(F): 96.3 (30 Jan 2024 23:58), Max: 98.1 (30 Jan 2024 20:32)  HR: 57 (30 Jan 2024 23:58) (57 - 88)  BP: 102/50 (30 Jan 2024 23:58) (102/50 - 126/79)  RR: 18 (30 Jan 2024 23:58) (18 - 18)  SpO2: 95% (30 Jan 2024 23:58) (95% - 100%)    Parameters below as of 30 Jan 2024 23:58  Patient On (Oxygen Delivery Method): room air    VITALS:     GENERAL: NAD, lying in bed comfortably  HEAD:  Atraumatic, Normocephalic  EYES: EOMI, PERRLA, conjunctiva and sclera clear  ENT: Moist mucous membranes  NECK: Supple, No JVD  CHEST/LUNG: Clear to auscultation bilaterally; No rales, rhonchi, wheezing, or rubs. Unlabored respirations  HEART: Regular rate and rhythm; No murmurs, rubs, or gallops  ABDOMEN: Bowel sounds present; Soft, Nontender, Nondistended. No hepatomegally  EXTREMITIES:  2+ Peripheral Pulses, brisk capillary refill. No clubbing, cyanosis, or edema  NERVOUS SYSTEM:  Alert & Oriented X3, speech clear. No deficits   MSK: FROM all 4 extremities, full and equal strength  SKIN: No rashes or lesions Vital Signs Last 24 Hrs  T(C): 35.7 (30 Jan 2024 23:58), Max: 36.7 (30 Jan 2024 20:32)  T(F): 96.3 (30 Jan 2024 23:58), Max: 98.1 (30 Jan 2024 20:32)  HR: 57 (30 Jan 2024 23:58) (57 - 88)  BP: 102/50 (30 Jan 2024 23:58) (102/50 - 126/79)  RR: 18 (30 Jan 2024 23:58) (18 - 18)  SpO2: 95% (30 Jan 2024 23:58) (95% - 100%)    Parameters below as of 30 Jan 2024 23:58  Patient On (Oxygen Delivery Method): room air    VITALS:     GENERAL: NAD, lying in bed comfortably  HEAD:  Atraumatic, Normocephalic  EYES: EOMI, PERRLA, conjunctiva and sclera clear  ENT: Moist mucous membranes  NECK: Supple, No JVD  CHEST/LUNG: Clear to auscultation bilaterally; No rales, rhonchi, wheezing, or rubs. Unlabored respirations  HEART: Regular rate and rhythm; No murmurs, rubs, or gallops  ABDOMEN: Bowel sounds present; Soft, Nontender, Nondistended. No hepatomegally  EXTREMITIES:  2+ Peripheral Pulses, brisk capillary refill. No clubbing, cyanosis, or edema  NERVOUS SYSTEM:  Alert & Oriented X3, speech clear. No deficits   MSK: Neck supple, nontender, nl ROM, no stepoff. Chest nontender. Back nontender in TLS spine or to b/l bony structures or flanks. 3+ pitting edema b/l edema,  + palpable pulses +petechia and purpura over b/l LE worse over dorsum of feet and posterior lower leg.   SKIN: No rashes or lesions Vital Signs Last 24 Hrs  T(C): 35.7 (30 Jan 2024 23:58), Max: 36.7 (30 Jan 2024 20:32)  T(F): 96.3 (30 Jan 2024 23:58), Max: 98.1 (30 Jan 2024 20:32)  HR: 57 (30 Jan 2024 23:58) (57 - 88)  BP: 102/50 (30 Jan 2024 23:58) (102/50 - 126/79)  RR: 18 (30 Jan 2024 23:58) (18 - 18)  SpO2: 95% (30 Jan 2024 23:58) (95% - 100%)    Parameters below as of 30 Jan 2024 23:58  Patient On (Oxygen Delivery Method): room air    VITALS:     GENERAL: NAD, lying in bed comfortably  HEAD:  Atraumatic, Normocephalic  EYES: EOMI, PERRLA, conjunctiva and sclera clear  ENT: Moist mucous membranes  NECK: Supple, No JVD  CHEST/LUNG: Clear to auscultation bilaterally; No rales, rhonchi, wheezing, or rubs. Unlabored respirations  HEART: Regular rate and rhythm; No murmurs, rubs, or gallops  ABDOMEN: Bowel sounds present; Soft, Nontender, Nondistended. No hepatomegally  EXTREMITIES:  2+ Peripheral Pulses, brisk capillary refill. No clubbing, cyanosis, . 3+ pitting edema b/l edema,  + palpable pulses +petechia and purpura over b/l LE worse over dorsum of feet and posterior lower leg.   NERVOUS SYSTEM:  Alert & Oriented X3, speech clear. No deficits   MSK: Neck supple, nontender, nl ROM, no stepoff. Chest nontender. Back nontender in TLS spine or to b/l bony structures or flanks.    SKIN: No rashes or lesions

## 2024-01-31 NOTE — H&P ADULT - ASSESSMENT
Assessment:      Plan:  Admit to inpatient level of care-med/surg  Diet: CCHO  DVT ppx:  GI ppx:       # Cutaneous vasculitis   #Difficulty ambulating   Rheumatology consult   Patient evaluated by rheum during previous admission on 1/11/24 who recommended   Vascular consult  Physical therapy consult  Ambulate with assistance.  Elevate LE  pain control      # Hypokalemia  Replete and repeat     # H/O DM   FS AS QHS, ISS, A1C     # H/O hypothyroidism  Continue with levothyroxine     #H/O hep C   #H/O IVDA c/w Suboxone             Assessment:  patient is a 54 yo female PMHx COPD, DM, hypothyroidism, HepC, h/o IVDA on Suboxone who presents complaining of swelling and lesions to bilateral lower extremities associated with difficulty walking x 2 weeks    Plan:  Admit to inpatient level of care-med/surg  Diet: CCHO  DVT ppx: Heparin  GI ppx: not indicated      # Cutaneous vasculitis   #Difficulty ambulating   Rheumatology consult   Patient evaluated by rheum during previous admission on 1/11/24 who recommended   Surgery team evaluated patient during previous admission. Skin biopsy at that time was obtained. Pending results.   Vascular consult  Physical therapy consult  Ambulate with assistance.  Elevate LE  pain control  Vascular consult  Continue with vancomycin        # H/O DM   FS AS QHS, ISS, A1C       # H/O hypothyroidism  Continue with levothyroxine       #H/O hep C   #H/O IVDA c/w Suboxone               Above discussed with Dr. Snyder    Assessment:  patient is a 52 yo female PMHx COPD, DM, hypothyroidism, HepC, h/o IVDA on Suboxone who presents complaining of swelling and lesions to bilateral lower extremities associated with difficulty walking x 2 weeks    Plan:  Admit to inpatient level of care-med/surg  Diet: CCHO  DVT ppx: Heparin  GI ppx: not indicated      # LE cellulitis.  LE ulcerative lesions  #Difficulty ambulating   Rheumatology consult   Patient evaluated by rheum during previous admission on 1/11/24 who recommended   Surgery team evaluated patient during previous admission. Skin biopsy at that time was obtained. Pending results.   Vascular consult  Physical therapy consult  Ambulate with assistance.  Elevate LE  pain control  Vascular consult  Continue with vancomycin        # H/O DM   FS AS QHS, ISS, A1C       # H/O hypothyroidism  Continue with levothyroxine       #H/O hep C   #H/O IVDA c/w Suboxone   ISTOP checked    #Progress Note Handoff  Pending (specify):  as above   Family discussion:  plan of care was discussed with patient   in details.  all questions were answered.  seems to understand, and in agreement  Disposition:  home           Above discussed with Dr. Snyder

## 2024-01-31 NOTE — PATIENT PROFILE ADULT - FALL HARM RISK - RISK INTERVENTIONS

## 2024-02-01 LAB
ALBUMIN SERPL ELPH-MCNC: 3.9 G/DL — SIGNIFICANT CHANGE UP (ref 3.5–5.2)
ALP SERPL-CCNC: 83 U/L — SIGNIFICANT CHANGE UP (ref 30–115)
ALT FLD-CCNC: 7 U/L — SIGNIFICANT CHANGE UP (ref 0–41)
ANION GAP SERPL CALC-SCNC: 12 MMOL/L — SIGNIFICANT CHANGE UP (ref 7–14)
AST SERPL-CCNC: 21 U/L — SIGNIFICANT CHANGE UP (ref 0–41)
BASOPHILS # BLD AUTO: 0.02 K/UL — SIGNIFICANT CHANGE UP (ref 0–0.2)
BASOPHILS NFR BLD AUTO: 0.5 % — SIGNIFICANT CHANGE UP (ref 0–1)
BILIRUB SERPL-MCNC: 0.3 MG/DL — SIGNIFICANT CHANGE UP (ref 0.2–1.2)
BUN SERPL-MCNC: 7 MG/DL — LOW (ref 10–20)
CALCIUM SERPL-MCNC: 9.4 MG/DL — SIGNIFICANT CHANGE UP (ref 8.4–10.5)
CHLORIDE SERPL-SCNC: 98 MMOL/L — SIGNIFICANT CHANGE UP (ref 98–110)
CO2 SERPL-SCNC: 29 MMOL/L — SIGNIFICANT CHANGE UP (ref 17–32)
CREAT SERPL-MCNC: 0.8 MG/DL — SIGNIFICANT CHANGE UP (ref 0.7–1.5)
EGFR: 88 ML/MIN/1.73M2 — SIGNIFICANT CHANGE UP
EOSINOPHIL # BLD AUTO: 0.11 K/UL — SIGNIFICANT CHANGE UP (ref 0–0.7)
EOSINOPHIL NFR BLD AUTO: 2.7 % — SIGNIFICANT CHANGE UP (ref 0–8)
GLUCOSE BLDC GLUCOMTR-MCNC: 241 MG/DL — HIGH (ref 70–99)
GLUCOSE BLDC GLUCOMTR-MCNC: 275 MG/DL — HIGH (ref 70–99)
GLUCOSE BLDC GLUCOMTR-MCNC: 329 MG/DL — HIGH (ref 70–99)
GLUCOSE BLDC GLUCOMTR-MCNC: 374 MG/DL — HIGH (ref 70–99)
GLUCOSE SERPL-MCNC: 227 MG/DL — HIGH (ref 70–99)
HCT VFR BLD CALC: 32.6 % — LOW (ref 37–47)
HGB BLD-MCNC: 10.6 G/DL — LOW (ref 12–16)
IMM GRANULOCYTES NFR BLD AUTO: 0.5 % — HIGH (ref 0.1–0.3)
LYMPHOCYTES # BLD AUTO: 1.55 K/UL — SIGNIFICANT CHANGE UP (ref 1.2–3.4)
LYMPHOCYTES # BLD AUTO: 38.6 % — SIGNIFICANT CHANGE UP (ref 20.5–51.1)
MCHC RBC-ENTMCNC: 27.5 PG — SIGNIFICANT CHANGE UP (ref 27–31)
MCHC RBC-ENTMCNC: 32.5 G/DL — SIGNIFICANT CHANGE UP (ref 32–37)
MCV RBC AUTO: 84.5 FL — SIGNIFICANT CHANGE UP (ref 81–99)
MONOCYTES # BLD AUTO: 0.25 K/UL — SIGNIFICANT CHANGE UP (ref 0.1–0.6)
MONOCYTES NFR BLD AUTO: 6.2 % — SIGNIFICANT CHANGE UP (ref 1.7–9.3)
NEUTROPHILS # BLD AUTO: 2.07 K/UL — SIGNIFICANT CHANGE UP (ref 1.4–6.5)
NEUTROPHILS NFR BLD AUTO: 51.5 % — SIGNIFICANT CHANGE UP (ref 42.2–75.2)
NRBC # BLD: 0 /100 WBCS — SIGNIFICANT CHANGE UP (ref 0–0)
PLATELET # BLD AUTO: 227 K/UL — SIGNIFICANT CHANGE UP (ref 130–400)
PMV BLD: 10.7 FL — HIGH (ref 7.4–10.4)
POTASSIUM SERPL-MCNC: 4.1 MMOL/L — SIGNIFICANT CHANGE UP (ref 3.5–5)
POTASSIUM SERPL-SCNC: 4.1 MMOL/L — SIGNIFICANT CHANGE UP (ref 3.5–5)
PROT SERPL-MCNC: 7.2 G/DL — SIGNIFICANT CHANGE UP (ref 6–8)
RBC # BLD: 3.86 M/UL — LOW (ref 4.2–5.4)
RBC # FLD: 14.1 % — SIGNIFICANT CHANGE UP (ref 11.5–14.5)
SODIUM SERPL-SCNC: 139 MMOL/L — SIGNIFICANT CHANGE UP (ref 135–146)
WBC # BLD: 4.02 K/UL — LOW (ref 4.8–10.8)
WBC # FLD AUTO: 4.02 K/UL — LOW (ref 4.8–10.8)

## 2024-02-01 PROCEDURE — 99232 SBSQ HOSP IP/OBS MODERATE 35: CPT

## 2024-02-01 RX ORDER — ASCORBIC ACID 60 MG
500 TABLET,CHEWABLE ORAL DAILY
Refills: 0 | Status: DISCONTINUED | OUTPATIENT
Start: 2024-02-01 | End: 2024-02-02

## 2024-02-01 RX ORDER — ACETAMINOPHEN 500 MG
1000 TABLET ORAL EVERY 8 HOURS
Refills: 0 | Status: DISCONTINUED | OUTPATIENT
Start: 2024-02-01 | End: 2024-02-02

## 2024-02-01 RX ADMIN — Medication 250 MILLIGRAM(S): at 05:35

## 2024-02-01 RX ADMIN — Medication 250 MILLIGRAM(S): at 18:11

## 2024-02-01 RX ADMIN — Medication 4: at 16:50

## 2024-02-01 RX ADMIN — Medication 40 MILLIGRAM(S): at 12:04

## 2024-02-01 RX ADMIN — Medication 5: at 12:03

## 2024-02-01 RX ADMIN — Medication 150 MICROGRAM(S): at 05:34

## 2024-02-01 RX ADMIN — Medication 30 MILLIGRAM(S): at 05:35

## 2024-02-01 RX ADMIN — BUPRENORPHINE AND NALOXONE 2.5 TABLET(S): 2; .5 TABLET SUBLINGUAL at 12:04

## 2024-02-01 RX ADMIN — Medication 2: at 07:36

## 2024-02-01 RX ADMIN — PANTOPRAZOLE SODIUM 40 MILLIGRAM(S): 20 TABLET, DELAYED RELEASE ORAL at 05:35

## 2024-02-01 RX ADMIN — Medication 1000 MILLIGRAM(S): at 23:00

## 2024-02-01 RX ADMIN — BUDESONIDE AND FORMOTEROL FUMARATE DIHYDRATE 2 PUFF(S): 160; 4.5 AEROSOL RESPIRATORY (INHALATION) at 20:28

## 2024-02-01 RX ADMIN — Medication 1000 MILLIGRAM(S): at 22:01

## 2024-02-01 RX ADMIN — CHLORHEXIDINE GLUCONATE 1 APPLICATION(S): 213 SOLUTION TOPICAL at 05:33

## 2024-02-01 RX ADMIN — Medication 1000 MILLIGRAM(S): at 18:11

## 2024-02-01 RX ADMIN — BUDESONIDE AND FORMOTEROL FUMARATE DIHYDRATE 2 PUFF(S): 160; 4.5 AEROSOL RESPIRATORY (INHALATION) at 07:37

## 2024-02-01 NOTE — PROGRESS NOTE ADULT - SUBJECTIVE AND OBJECTIVE BOX
Patient seen at bedside. No acute complaints at time. No acute events overnight. Reports feet feel heavy and is still panful when applying pressure on it. Denies fever/chills/calf pain.         Vital Signs Last 24 Hrs  T(C): 36.1 (01 Feb 2024 13:31), Max: 36.2 (01 Feb 2024 05:04)  T(F): 97 (01 Feb 2024 13:31), Max: 97.2 (01 Feb 2024 05:04)  HR: 60 (01 Feb 2024 13:31) (48 - 61)  BP: 140/66 (01 Feb 2024 13:31) (96/52 - 140/66)  RR: 18 (01 Feb 2024 13:31) (18 - 18)  SpO2: 96% (01 Feb 2024 05:04) (96% - 98%)    Parameters below as of 01 Feb 2024 05:04  Patient On (Oxygen Delivery Method): room air        General Appearance: NAD  Chest: CTA B/L  CV: S1 S2  Extremities:  Peripheral Pulses via doppler , 3+ pitting edema b/l edema, +petechia and purpura over b/l LE worse over dorsum of feet and posterior lower leg.  scabs noted to left lower extremity.  CNS: A/O x 3, normal sensation, no focal deficits    MEDICATIONS  (STANDING):  budesonide 160 MICROgram(s)/formoterol 4.5 MICROgram(s) Inhaler 2 Puff(s) Inhalation two times a day  buprenorphine 8 mG/naloxone 2 mG SL  Tablet 2.5 Tablet(s) SubLingual daily  chlorhexidine 2% Cloths 1 Application(s) Topical <User Schedule>  dextrose 5%. 1000 milliLiter(s) (100 mL/Hr) IV Continuous <Continuous>  dextrose 5%. 1000 milliLiter(s) (50 mL/Hr) IV Continuous <Continuous>  dextrose 50% Injectable 25 Gram(s) IV Push once  dextrose 50% Injectable 25 Gram(s) IV Push once  dextrose 50% Injectable 12.5 Gram(s) IV Push once  FLUoxetine 40 milliGRAM(s) Oral daily  furosemide    Tablet 20 milliGRAM(s) Oral daily  glucagon  Injectable 1 milliGRAM(s) IntraMuscular once  heparin   Injectable 5000 Unit(s) SubCutaneous every 12 hours  influenza   Vaccine 0.5 milliLiter(s) IntraMuscular once  insulin lispro (ADMELOG) corrective regimen sliding scale   SubCutaneous three times a day before meals  levothyroxine 150 MICROGram(s) Oral daily  pantoprazole    Tablet 40 milliGRAM(s) Oral before breakfast  predniSONE   Tablet 30 milliGRAM(s) Oral daily  vancomycin  IVPB 1000 milliGRAM(s) IV Intermittent every 12 hours    MEDICATIONS  (PRN):  acetaminophen     Tablet .. 650 milliGRAM(s) Oral every 6 hours PRN Moderate Pain (4 - 6)  albuterol    90 MICROgram(s) HFA Inhaler 2 Puff(s) Inhalation every 6 hours PRN Shortness of Breath and/or Wheezing  dextrose Oral Gel 15 Gram(s) Oral once PRN Blood Glucose LESS THAN 70 milliGRAM(s)/deciliter      LABS:                        10.6   4.02  )-----------( 227      ( 01 Feb 2024 06:47 )             32.6     02-01    139  |  98  |  7<L>  ----------------------------<  227<H>  4.1   |  29  |  0.8    Ca    9.4      01 Feb 2024 06:47  Phos  3.4     01-31  Mg     2.1     01-31    TPro  7.2  /  Alb  3.9  /  TBili  0.3  /  DBili  x   /  AST  21  /  ALT  7   /  AlkPhos  83  02-01      Urinalysis Basic - ( 01 Feb 2024 06:47 )    Color: x / Appearance: x / SG: x / pH: x  Gluc: 227 mg/dL / Ketone: x  / Bili: x / Urobili: x   Blood: x / Protein: x / Nitrite: x   Leuk Esterase: x / RBC: x / WBC x   Sq Epi: x / Non Sq Epi: x / Bacteria: x        RADIOLOGY & ADDITIONAL STUDIES: Reviewed

## 2024-02-01 NOTE — PROGRESS NOTE ADULT - SUBJECTIVE AND OBJECTIVE BOX
SUBJECTIVE:    Patient is a 53y old Female who presents with a chief complaint of sores (01 Feb 2024 13:34)      HPI:  Patient is a 52 yo female PMHx COPD, DM, hypothyroidism, HepC, h/o IVDA on Suboxone who presents complaining of swelling and lesions to bilateral lower extremities associated with difficulty walking x 2 weeks. Patient was recently admitted and discharged on 1/11/24 for similar symptoms and states since being discharged symptoms have progressively worsened prompting evaluation to the ER. Denies relieving factors. States hasn't followed up with rheumatology yet. Denies fever, chills, n/v/d, chest pain, SOB,  (31 Jan 2024 00:30)      Currently admitted to medicine with the primary diagnosis of Peripheral edema     states she has pain in her feet more often with weightbearing    Besides the pertinent positives and negatives described above, the ROS was within normal limits.    PAST MEDICAL & SURGICAL HISTORY  Asthma with COPD    Hypothyroidism    H/O: substance abuse  no longer using    History of pancreatitis    Hepatitis-C    History of appendectomy    History of tonsillectomy      SOCIAL HISTORY:    ALLERGIES:  No Known Allergies    MEDICATIONS:  STANDING MEDICATIONS  budesonide 160 MICROgram(s)/formoterol 4.5 MICROgram(s) Inhaler 2 Puff(s) Inhalation two times a day  buprenorphine 8 mG/naloxone 2 mG SL  Tablet 2.5 Tablet(s) SubLingual daily  chlorhexidine 2% Cloths 1 Application(s) Topical <User Schedule>  dextrose 5%. 1000 milliLiter(s) IV Continuous <Continuous>  dextrose 5%. 1000 milliLiter(s) IV Continuous <Continuous>  dextrose 50% Injectable 25 Gram(s) IV Push once  dextrose 50% Injectable 25 Gram(s) IV Push once  dextrose 50% Injectable 12.5 Gram(s) IV Push once  FLUoxetine 40 milliGRAM(s) Oral daily  furosemide    Tablet 20 milliGRAM(s) Oral daily  glucagon  Injectable 1 milliGRAM(s) IntraMuscular once  heparin   Injectable 5000 Unit(s) SubCutaneous every 12 hours  influenza   Vaccine 0.5 milliLiter(s) IntraMuscular once  insulin lispro (ADMELOG) corrective regimen sliding scale   SubCutaneous three times a day before meals  levothyroxine 150 MICROGram(s) Oral daily  pantoprazole    Tablet 40 milliGRAM(s) Oral before breakfast  predniSONE   Tablet 30 milliGRAM(s) Oral daily  vancomycin  IVPB 1000 milliGRAM(s) IV Intermittent every 12 hours    PRN MEDICATIONS  acetaminophen     Tablet .. 650 milliGRAM(s) Oral every 6 hours PRN  albuterol    90 MICROgram(s) HFA Inhaler 2 Puff(s) Inhalation every 6 hours PRN  dextrose Oral Gel 15 Gram(s) Oral once PRN    VITALS:   T(F): 97  HR: 60  BP: 140/66  RR: 18  SpO2: 96%    LABS:                        10.6   4.02  )-----------( 227      ( 01 Feb 2024 06:47 )             32.6     02-01    139  |  98  |  7<L>  ----------------------------<  227<H>  4.1   |  29  |  0.8    Ca    9.4      01 Feb 2024 06:47  Phos  3.4     01-31  Mg     2.1     01-31    TPro  7.2  /  Alb  3.9  /  TBili  0.3  /  DBili  x   /  AST  21  /  ALT  7   /  AlkPhos  83  02-01      Urinalysis Basic - ( 01 Feb 2024 06:47 )    Color: x / Appearance: x / SG: x / pH: x  Gluc: 227 mg/dL / Ketone: x  / Bili: x / Urobili: x   Blood: x / Protein: x / Nitrite: x   Leuk Esterase: x / RBC: x / WBC x   Sq Epi: x / Non Sq Epi: x / Bacteria: x                Peripheral edema      RADIOLOGY:    PHYSICAL EXAM:  General: WN/WD NAD  Neurology: A&Ox3, nonfocal, LEMUS x 4  Head:  Normocephalic, atraumatic  ENT:  Mucosa moist, no ulcerations  Neck:  Supple, no sinuses or palpable masses  Lymphatic:  No palpable cervical, supraclavicular, axillary or inguinal adenopathy  Respiratory: CTA B/L  CV: RRR, S1S2, no murmur  Abdominal: Soft, NT, ND no palpable mass  MSK: eschars on left foot, one eschar on right shin  Incisions: intact, no erythema or drainage    Intravenous access: yes  NG tube: no  Tom Catheter: no

## 2024-02-01 NOTE — PROGRESS NOTE ADULT - ASSESSMENT
Patient is a 54 yo female PMHx COPD, DM, hypothyroidism, HepC, h/o IVDA on Suboxone who presents complaining of swelling and lesions to bilateral lower extremities associated with difficulty walking x 2 weeks. Patient was recently admitted and discharged on 1/11/24 for similar symptoms and states since being discharged symptoms have progressively worsened prompting evaluation to the ER. Denies relieving factors. States hasn't followed up with rheumatology yet. Denies fever, chills, n/v/d, chest pain, SOB.  Pt was seen by Rheumatology on 1/11/2024 and states: - If vasculitis is confirmed on biopsy and pt's rash persists, can consider a trial of prednisone.  Bx was obtained on 1/10/2024 and sent to Lab.  Awaiting final report.     Recommendations:  - Venous Duplex noted   - Arterial Duplex  - Bx obtained on 1/10/2024 for vasculitis   - will need to contact pathology for results.  - No acute vascular intervention needed  - c/w care per rheumatology/medical team  - call back prn     Above discussed w/ Dr. Kishore Castellanos  
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 (awaiting biopsy results) in patient with untreated hep C, doubt vitamin C deficiency, being treated for possible cellulitis  ·	Untreated Hepatitis C  ·	COPD  ·	DM  ·	Hypothyroidism  ·	IVDA on suboxone  ·	Depression    Plan    - will continue with vancomycin for now, f/u MRSA swab and ID, will continue with prednisone, awaiting biopsy // f/u immunoglobulins, C3, and cryoglobulins, patient states she never had treatment for hepatitis C, if confirmed leukocytoclastic vasculitis she would benefit from treatment however it's mainly outpatient treatment, f/u hepatitis C  - c/w symbicort  - c/w insulin ss  - c/w synthroid, f/u TSH with reflex  - suboxone  - fluoxetine    Pending: f/u ID, f/u biopsy, c/w vanc for now, f/u mrsa swab    # DVT PPX: heparin sc

## 2024-02-02 ENCOUNTER — TRANSCRIPTION ENCOUNTER (OUTPATIENT)
Age: 54
End: 2024-02-02

## 2024-02-02 VITALS
RESPIRATION RATE: 18 BRPM | DIASTOLIC BLOOD PRESSURE: 54 MMHG | HEART RATE: 81 BPM | SYSTOLIC BLOOD PRESSURE: 96 MMHG | TEMPERATURE: 97 F | OXYGEN SATURATION: 96 %

## 2024-02-02 LAB
ALBUMIN SERPL ELPH-MCNC: 4 G/DL — SIGNIFICANT CHANGE UP (ref 3.5–5.2)
ALP SERPL-CCNC: 91 U/L — SIGNIFICANT CHANGE UP (ref 30–115)
ALT FLD-CCNC: <5 U/L — SIGNIFICANT CHANGE UP (ref 0–41)
ANION GAP SERPL CALC-SCNC: 12 MMOL/L — SIGNIFICANT CHANGE UP (ref 7–14)
AST SERPL-CCNC: 16 U/L — SIGNIFICANT CHANGE UP (ref 0–41)
BASOPHILS # BLD AUTO: 0.02 K/UL — SIGNIFICANT CHANGE UP (ref 0–0.2)
BASOPHILS NFR BLD AUTO: 0.3 % — SIGNIFICANT CHANGE UP (ref 0–1)
BILIRUB SERPL-MCNC: <0.2 MG/DL — SIGNIFICANT CHANGE UP (ref 0.2–1.2)
BUN SERPL-MCNC: 11 MG/DL — SIGNIFICANT CHANGE UP (ref 10–20)
CALCIUM SERPL-MCNC: 9.3 MG/DL — SIGNIFICANT CHANGE UP (ref 8.4–10.5)
CHLORIDE SERPL-SCNC: 96 MMOL/L — LOW (ref 98–110)
CO2 SERPL-SCNC: 27 MMOL/L — SIGNIFICANT CHANGE UP (ref 17–32)
CREAT SERPL-MCNC: 0.8 MG/DL — SIGNIFICANT CHANGE UP (ref 0.7–1.5)
EGFR: 88 ML/MIN/1.73M2 — SIGNIFICANT CHANGE UP
EOSINOPHIL # BLD AUTO: 0.02 K/UL — SIGNIFICANT CHANGE UP (ref 0–0.7)
EOSINOPHIL NFR BLD AUTO: 0.3 % — SIGNIFICANT CHANGE UP (ref 0–8)
GLUCOSE BLDC GLUCOMTR-MCNC: 271 MG/DL — HIGH (ref 70–99)
GLUCOSE BLDC GLUCOMTR-MCNC: 282 MG/DL — HIGH (ref 70–99)
GLUCOSE SERPL-MCNC: 335 MG/DL — HIGH (ref 70–99)
HCT VFR BLD CALC: 31.3 % — LOW (ref 37–47)
HCV AB S/CO SERPL IA: 86.32 COI — HIGH
HCV AB SERPL-IMP: REACTIVE
HGB BLD-MCNC: 10.1 G/DL — LOW (ref 12–16)
IMM GRANULOCYTES NFR BLD AUTO: 0.8 % — HIGH (ref 0.1–0.3)
LYMPHOCYTES # BLD AUTO: 1.54 K/UL — SIGNIFICANT CHANGE UP (ref 1.2–3.4)
LYMPHOCYTES # BLD AUTO: 25.8 % — SIGNIFICANT CHANGE UP (ref 20.5–51.1)
MAGNESIUM SERPL-MCNC: 2 MG/DL — SIGNIFICANT CHANGE UP (ref 1.8–2.4)
MCHC RBC-ENTMCNC: 27 PG — SIGNIFICANT CHANGE UP (ref 27–31)
MCHC RBC-ENTMCNC: 32.3 G/DL — SIGNIFICANT CHANGE UP (ref 32–37)
MCV RBC AUTO: 83.7 FL — SIGNIFICANT CHANGE UP (ref 81–99)
MONOCYTES # BLD AUTO: 0.28 K/UL — SIGNIFICANT CHANGE UP (ref 0.1–0.6)
MONOCYTES NFR BLD AUTO: 4.7 % — SIGNIFICANT CHANGE UP (ref 1.7–9.3)
NEUTROPHILS # BLD AUTO: 4.06 K/UL — SIGNIFICANT CHANGE UP (ref 1.4–6.5)
NEUTROPHILS NFR BLD AUTO: 68.1 % — SIGNIFICANT CHANGE UP (ref 42.2–75.2)
NRBC # BLD: 0 /100 WBCS — SIGNIFICANT CHANGE UP (ref 0–0)
PLATELET # BLD AUTO: 251 K/UL — SIGNIFICANT CHANGE UP (ref 130–400)
PMV BLD: 10.8 FL — HIGH (ref 7.4–10.4)
POTASSIUM SERPL-MCNC: 3.9 MMOL/L — SIGNIFICANT CHANGE UP (ref 3.5–5)
POTASSIUM SERPL-SCNC: 3.9 MMOL/L — SIGNIFICANT CHANGE UP (ref 3.5–5)
PROT SERPL-MCNC: 7.5 G/DL — SIGNIFICANT CHANGE UP (ref 6–8)
RBC # BLD: 3.74 M/UL — LOW (ref 4.2–5.4)
RBC # FLD: 14.2 % — SIGNIFICANT CHANGE UP (ref 11.5–14.5)
SODIUM SERPL-SCNC: 135 MMOL/L — SIGNIFICANT CHANGE UP (ref 135–146)
VANCOMYCIN TROUGH SERPL-MCNC: 8.5 UG/ML — SIGNIFICANT CHANGE UP (ref 5–10)
WBC # BLD: 5.97 K/UL — SIGNIFICANT CHANGE UP (ref 4.8–10.8)
WBC # FLD AUTO: 5.97 K/UL — SIGNIFICANT CHANGE UP (ref 4.8–10.8)

## 2024-02-02 PROCEDURE — 99239 HOSP IP/OBS DSCHRG MGMT >30: CPT

## 2024-02-02 RX ADMIN — Medication 1000 MILLIGRAM(S): at 05:22

## 2024-02-02 RX ADMIN — Medication 1000 MILLIGRAM(S): at 05:59

## 2024-02-02 RX ADMIN — Medication 3: at 08:19

## 2024-02-02 RX ADMIN — BUDESONIDE AND FORMOTEROL FUMARATE DIHYDRATE 2 PUFF(S): 160; 4.5 AEROSOL RESPIRATORY (INHALATION) at 08:13

## 2024-02-02 RX ADMIN — Medication 3: at 11:57

## 2024-02-02 RX ADMIN — Medication 250 MILLIGRAM(S): at 05:19

## 2024-02-02 RX ADMIN — Medication 500 MILLIGRAM(S): at 11:16

## 2024-02-02 RX ADMIN — Medication 30 MILLIGRAM(S): at 05:21

## 2024-02-02 RX ADMIN — PANTOPRAZOLE SODIUM 40 MILLIGRAM(S): 20 TABLET, DELAYED RELEASE ORAL at 05:21

## 2024-02-02 RX ADMIN — CHLORHEXIDINE GLUCONATE 1 APPLICATION(S): 213 SOLUTION TOPICAL at 05:20

## 2024-02-02 RX ADMIN — Medication 20 MILLIGRAM(S): at 05:21

## 2024-02-02 RX ADMIN — Medication 40 MILLIGRAM(S): at 11:17

## 2024-02-02 RX ADMIN — Medication 150 MICROGRAM(S): at 05:21

## 2024-02-02 RX ADMIN — BUPRENORPHINE AND NALOXONE 2.5 TABLET(S): 2; .5 TABLET SUBLINGUAL at 11:16

## 2024-02-02 NOTE — DISCHARGE NOTE PROVIDER - NSDCFUADDAPPT_GEN_ALL_CORE_FT
APPTS ARE READY TO BE MADE: [x ] YES    Best Family or Patient Contact (if needed): patient    Additional Information about above appointments (if needed):    1: rheumatology as soon as possible  2: infectious disease as soon as possible, [place]  3:     Other comments or requests:

## 2024-02-02 NOTE — DISCHARGE NOTE PROVIDER - NSDCFUSCHEDAPPT_GEN_ALL_CORE_FT
[facility]  [facility]mits  Scheduled Appointment: 02/14/2024    [facility]  [facility]  PSYCHIATRY  [place]  Scheduled Appointment: 02/14/2024    [facility]  [facility] PreAdmits  Scheduled Appointment: 03/05/2024    [facility]  PSYCHIATRY  [place]  Scheduled Appointment: 03/05/2024    [place]  [facility]  RHEUM [address]  Scheduled Appointment: 04/11/2024

## 2024-02-02 NOTE — CONSULT NOTE ADULT - SUBJECTIVE AND OBJECTIVE BOX
INFECTIOUS DISEASE CONSULT NOTE    Patient is a 53y old  Female who presents with a chief complaint of sores (01 Feb 2024 16:53)    HPI:  Patient is a 54 yo female PMHx COPD, DM, hypothyroidism, HepC, h/o IVDA on Suboxone who presents complaining of swelling and lesions to bilateral lower extremities associated with difficulty walking x 2 weeks. Patient was recently admitted and discharged on 1/11/24 for similar symptoms and states since being discharged symptoms have progressively worsened prompting evaluation to the ER. Denies relieving factors. States hasn't followed up with rheumatology yet. Denies fever, chills, n/v/d, chest pain, SOB,  (31 Jan 2024 00:30)         Prior hospital charts reviewed [Yes]  Primary team notes reviewed [Yes]  Other consultant notes reviewed [Yes]    REVIEW OF SYSTEMS:      PAST MEDICAL & SURGICAL HISTORY:  Asthma with COPD      Hypothyroidism      H/O: substance abuse  no longer using      History of pancreatitis      Hepatitis-C      History of appendectomy      History of tonsillectomy          SOCIAL HISTORY:  - Born in _____, migrated to  in 19XX  - Currently working as / Retired  - Lives with _____; no pets  - No recent travel  - Denies tobacco use  - Denies alcohol use  - Denies illicit drug use  - Currently sexually active, has one male/female sexual partner    FAMILY HISTORY:  No pertinent family history in first degree relatives        Allergies:  No Known Allergies      ANTIMICROBIALS:  vancomycin  IVPB 1000 every 12 hours      ANTIMICROBIALS (past 90 days):  MEDICATIONS  (STANDING):  ceFAZolin   IVPB   100 mL/Hr IV Intermittent (01-30-24 @ 23:45)    vancomycin  IVPB   250 mL/Hr IV Intermittent (01-31-24 @ 17:10)   250 mL/Hr IV Intermittent (01-31-24 @ 05:23)    vancomycin  IVPB   250 mL/Hr IV Intermittent (02-02-24 @ 05:19)   250 mL/Hr IV Intermittent (02-01-24 @ 18:11)   250 mL/Hr IV Intermittent (02-01-24 @ 05:35)        OTHER MEDS:   MEDICATIONS  (STANDING):  acetaminophen     Tablet .. 1000 every 8 hours  albuterol    90 MICROgram(s) HFA Inhaler 2 every 6 hours PRN  budesonide 160 MICROgram(s)/formoterol 4.5 MICROgram(s) Inhaler 2 two times a day  buprenorphine 8 mG/naloxone 2 mG SL  Tablet 2.5 daily  dextrose 50% Injectable 25 once  dextrose 50% Injectable 25 once  dextrose 50% Injectable 12.5 once  dextrose Oral Gel 15 once PRN  FLUoxetine 40 daily  furosemide    Tablet 20 daily  glucagon  Injectable 1 once  heparin   Injectable 5000 every 12 hours  influenza   Vaccine 0.5 once  insulin lispro (ADMELOG) corrective regimen sliding scale  three times a day before meals  levothyroxine 150 daily  pantoprazole    Tablet 40 before breakfast  predniSONE   Tablet 30 daily      VITALS:  Vital Signs Last 24 Hrs  T(F): 96.7 (02-02-24 @ 05:35), Max: 98.1 (01-30-24 @ 20:32)    Vital Signs Last 24 Hrs  HR: 81 (02-02-24 @ 05:35) (60 - 81)  BP: 96/54 (02-02-24 @ 05:35) (96/54 - 140/66)  RR: 18 (02-02-24 @ 05:35)  SpO2: 96% (02-02-24 @ 05:35) (96% - 98%)  Wt(kg): --    EXAM:    Labs:                        10.1   5.97  )-----------( 251      ( 02 Feb 2024 06:07 )             31.3     02-01    139  |  98  |  7<L>  ----------------------------<  227<H>  4.1   |  29  |  0.8    Ca    9.4      01 Feb 2024 06:47    TPro  7.2  /  Alb  3.9  /  TBili  0.3  /  DBili  x   /  AST  21  /  ALT  7   /  AlkPhos  83  02-01      WBC Trend:  WBC Count: 5.97 (02-02-24 @ 06:07)  WBC Count: 4.02 (02-01-24 @ 06:47)  WBC Count: 4.38 (01-31-24 @ 04:30)  WBC Count: 5.74 (01-30-24 @ 21:22)      Auto Neutrophil #: 4.06 K/uL (02-02-24 @ 06:07)  Auto Neutrophil #: 2.07 K/uL (02-01-24 @ 06:47)  Auto Neutrophil #: 3.85 K/uL (01-30-24 @ 21:22)  Auto Neutrophil #: 3.64 K/uL (01-10-24 @ 06:55)  Auto Neutrophil #: 6.09 K/uL (01-09-24 @ 18:11)      Creatine Trend:  Creatinine: 0.8 (02-01)  Creatinine: 0.7 (01-31)  Creatinine: 0.8 (01-30)      Liver Biochemical Testing Trend:  Alanine Aminotransferase (ALT/SGPT): 7 (02-01)  Alanine Aminotransferase (ALT/SGPT): 6 (01-31)  Alanine Aminotransferase (ALT/SGPT): 7 (01-30)  Alanine Aminotransferase (ALT/SGPT): 6 (01-11)  Alanine Aminotransferase (ALT/SGPT): 7 (01-09)  Aspartate Aminotransferase (AST/SGOT): 21 (02-01-24 @ 06:47)  Aspartate Aminotransferase (AST/SGOT): 22 (01-31-24 @ 04:30)  Aspartate Aminotransferase (AST/SGOT): 25 (01-30-24 @ 21:22)  Aspartate Aminotransferase (AST/SGOT): 20 (01-11-24 @ 06:04)  Aspartate Aminotransferase (AST/SGOT): 22 (01-09-24 @ 18:11)  Bilirubin Total: 0.3 (02-01)  Bilirubin Total: 0.3 (01-31)  Bilirubin Total: 0.4 (01-30)  Bilirubin Total: 0.3 (01-11)  Bilirubin Total: 0.3 (01-09)      Trend LDH  01-10-24 @ 06:55  230      Auto Eosinophil %: 0.3 % (02-02-24 @ 06:07)  Auto Eosinophil %: 2.7 % (02-01-24 @ 06:47)  Auto Eosinophil %: 2.6 % (01-30-24 @ 21:22)      Urinalysis Basic - ( 01 Feb 2024 06:47 )    Color: x / Appearance: x / SG: x / pH: x  Gluc: 227 mg/dL / Ketone: x  / Bili: x / Urobili: x   Blood: x / Protein: x / Nitrite: x   Leuk Esterase: x / RBC: x / WBC x   Sq Epi: x / Non Sq Epi: x / Bacteria: x        MICROBIOLOGY:  Vancomycin Level, Trough: 8.5 (02-01 @ 16:43)    HIV-1/2 Combo Result: Nonreact (01-11-24 @ 13:40)    A1C with Estimated Average Glucose Result: 6.5 % (01-10-24 @ 06:55)      RADIOLOGY:  imaging below personally reviewed    < from: [facility] Duplex Lower Extrem Arterial, Bilat (01.31.24 @ 20:48) >  Right:  Patent external iliac, common femoral, profunda, SFA, popliteal,   posterior tibial, anterior tibial arteries.    Left:  Patent external iliac, common femoral, profunda, SFA, popliteal,   posterior tibial, anterior tibial arteries.    IMPRESSION:  Normal blood flow is visualized in bilateral lower extremity arterial   system.    < end of copied text >    < from: [facility] Duplex Lower Ext Vein Scan, Bilat (01.31.24 @ 00:12) >  IMPRESSION:  No evidence of deep venous thrombosis in either lower extremity.    < end of copied text >   INFECTIOUS DISEASE CONSULT NOTE    Patient is a 53y old  Female who presents with a chief complaint of sores (01 Feb 2024 16:53)    HPI:  Patient is a 52 yo female PMHx COPD, DM, hypothyroidism, HepC, h/o IVDA on Suboxone who presents complaining of swelling and lesions to bilateral lower extremities associated with difficulty walking x 2 weeks. Patient was recently admitted and discharged on 1/11/24 for similar symptoms and states since being discharged symptoms have progressively worsened prompting evaluation to the ER. Denies relieving factors. States hasn't followed up with rheumatology yet. Denies fever, chills, n/v/d, chest pain, SOB,  (31 Jan 2024 00:30)       Prior hospital charts reviewed [Yes]  Primary team notes reviewed [Yes]  Other consultant notes reviewed [Yes]    REVIEW OF SYSTEMS:  CONSTITUTIONAL: No fever or chills  HEAD: No lesion on scalp  EYES: No visual disturbance  ENT: No sore throat  RESPIRATORY: No cough, no shortness of breath  CARDIOVASCULAR: No chest pain or palpitations  GASTROINTESTINAL: No abdominal or epigastric pain  GENITOURINARY: No dysuria  NEUROLOGICAL: No headache/dizziness  MUSCULOSKELETAL: No joint pain, erythema, or swelling; no back pain  SKIN: Bilateral lE swelling with dry scabs, erythema without warmth  All other ROS negative except noted above      PAST MEDICAL & SURGICAL HISTORY:  Asthma with COPD      Hypothyroidism      H/O: substance abuse  no longer using      History of pancreatitis      Hepatitis-C      History of appendectomy      History of tonsillectomy          SOCIAL HISTORY:  - No recent travel  - Denies tobacco use  - Denies alcohol use  - Denies illicit drug use    FAMILY HISTORY:  No pertinent family history in first degree relatives    Allergies:  No Known Allergies      ANTIMICROBIALS:  vancomycin  IVPB 1000 every 12 hours      ANTIMICROBIALS (past 90 days):  MEDICATIONS  (STANDING):  ceFAZolin   IVPB   100 mL/Hr IV Intermittent (01-30-24 @ 23:45)    vancomycin  IVPB   250 mL/Hr IV Intermittent (01-31-24 @ 17:10)   250 mL/Hr IV Intermittent (01-31-24 @ 05:23)    vancomycin  IVPB   250 mL/Hr IV Intermittent (02-02-24 @ 05:19)   250 mL/Hr IV Intermittent (02-01-24 @ 18:11)   250 mL/Hr IV Intermittent (02-01-24 @ 05:35)        OTHER MEDS:   MEDICATIONS  (STANDING):  acetaminophen     Tablet .. 1000 every 8 hours  albuterol    90 MICROgram(s) HFA Inhaler 2 every 6 hours PRN  budesonide 160 MICROgram(s)/formoterol 4.5 MICROgram(s) Inhaler 2 two times a day  buprenorphine 8 mG/naloxone 2 mG SL  Tablet 2.5 daily  dextrose 50% Injectable 25 once  dextrose 50% Injectable 25 once  dextrose 50% Injectable 12.5 once  dextrose Oral Gel 15 once PRN  FLUoxetine 40 daily  furosemide    Tablet 20 daily  glucagon  Injectable 1 once  heparin   Injectable 5000 every 12 hours  influenza   Vaccine 0.5 once  insulin lispro (ADMELOG) corrective regimen sliding scale  three times a day before meals  levothyroxine 150 daily  pantoprazole    Tablet 40 before breakfast  predniSONE   Tablet 30 daily      VITALS:  Vital Signs Last 24 Hrs  T(F): 96.7 (02-02-24 @ 05:35), Max: 98.1 (01-30-24 @ 20:32)    Vital Signs Last 24 Hrs  HR: 81 (02-02-24 @ 05:35) (60 - 81)  BP: 96/54 (02-02-24 @ 05:35) (96/54 - 140/66)  RR: 18 (02-02-24 @ 05:35)  SpO2: 96% (02-02-24 @ 05:35) (96% - 98%)  Wt(kg): --    EXAM:  GENERAL: NAD, lying in bed  HEAD: No head lesions  EYES: Conjunctiva pink and cornea white  EAR, NOSE, MOUTH, THROAT: Normal external ears and nose, no discharges; moist mucous membranes  NECK: Supple, nontender to palpation; no JVD  RESPIRATORY: Clear to auscultation bilaterally  CARDIOVASCULAR: S1 S2  GASTROINTESTINAL: Soft, nontender, nondistended; normoactive bowel sounds  EXTREMITIES: No clubbing, cyanosis, bilateral 2+ petal edema  NERVOUS SYSTEM: Alert and oriented to person, time, place and situation, speech clear. No focal deficits   MUSCULOSKELETAL: No joint erythema, swelling or pain  SKIN: Erythematous LEs, mostly at her feet, with multiple dry scabs, no warmth no superficial thrombophlebitis  PSYCH: Normal affect      Labs:                        10.1   5.97  )-----------( 251      ( 02 Feb 2024 06:07 )             31.3     02-01    139  |  98  |  7<L>  ----------------------------<  227<H>  4.1   |  29  |  0.8    Ca    9.4      01 Feb 2024 06:47    TPro  7.2  /  Alb  3.9  /  TBili  0.3  /  DBili  x   /  AST  21  /  ALT  7   /  AlkPhos  83  02-01      WBC Trend:  WBC Count: 5.97 (02-02-24 @ 06:07)  WBC Count: 4.02 (02-01-24 @ 06:47)  WBC Count: 4.38 (01-31-24 @ 04:30)  WBC Count: 5.74 (01-30-24 @ 21:22)      Auto Neutrophil #: 4.06 K/uL (02-02-24 @ 06:07)  Auto Neutrophil #: 2.07 K/uL (02-01-24 @ 06:47)  Auto Neutrophil #: 3.85 K/uL (01-30-24 @ 21:22)  Auto Neutrophil #: 3.64 K/uL (01-10-24 @ 06:55)  Auto Neutrophil #: 6.09 K/uL (01-09-24 @ 18:11)      Creatine Trend:  Creatinine: 0.8 (02-01)  Creatinine: 0.7 (01-31)  Creatinine: 0.8 (01-30)      Liver Biochemical Testing Trend:  Alanine Aminotransferase (ALT/SGPT): 7 (02-01)  Alanine Aminotransferase (ALT/SGPT): 6 (01-31)  Alanine Aminotransferase (ALT/SGPT): 7 (01-30)  Alanine Aminotransferase (ALT/SGPT): 6 (01-11)  Alanine Aminotransferase (ALT/SGPT): 7 (01-09)  Aspartate Aminotransferase (AST/SGOT): 21 (02-01-24 @ 06:47)  Aspartate Aminotransferase (AST/SGOT): 22 (01-31-24 @ 04:30)  Aspartate Aminotransferase (AST/SGOT): 25 (01-30-24 @ 21:22)  Aspartate Aminotransferase (AST/SGOT): 20 (01-11-24 @ 06:04)  Aspartate Aminotransferase (AST/SGOT): 22 (01-09-24 @ 18:11)  Bilirubin Total: 0.3 (02-01)  Bilirubin Total: 0.3 (01-31)  Bilirubin Total: 0.4 (01-30)  Bilirubin Total: 0.3 (01-11)  Bilirubin Total: 0.3 (01-09)      Trend LDH  01-10-24 @ 06:55  230      Auto Eosinophil %: 0.3 % (02-02-24 @ 06:07)  Auto Eosinophil %: 2.7 % (02-01-24 @ 06:47)  Auto Eosinophil %: 2.6 % (01-30-24 @ 21:22)      Urinalysis Basic - ( 01 Feb 2024 06:47 )    Color: x / Appearance: x / SG: x / pH: x  Gluc: 227 mg/dL / Ketone: x  / Bili: x / Urobili: x   Blood: x / Protein: x / Nitrite: x   Leuk Esterase: x / RBC: x / WBC x   Sq Epi: x / Non Sq Epi: x / Bacteria: x        MICROBIOLOGY:  Vancomycin Level, Trough: 8.5 (02-01 @ 16:43)    HIV-1/2 Combo Result: Nonreact (01-11-24 @ 13:40)    A1C with Estimated Average Glucose Result: 6.5 % (01-10-24 @ 06:55)      RADIOLOGY:  imaging below personally reviewed    < from: VA Duplex Lower Extrem Arterial, Bilat (01.31.24 @ 20:48) >  Right:  Patent external iliac, common femoral, profunda, SFA, popliteal,   posterior tibial, anterior tibial arteries.    Left:  Patent external iliac, common femoral, profunda, SFA, popliteal,   posterior tibial, anterior tibial arteries.    IMPRESSION:  Normal blood flow is visualized in bilateral lower extremity arterial   system.    < end of copied text >    < from: VA Duplex Lower Ext Vein Scan, Bilat (01.31.24 @ 00:12) >  IMPRESSION:  No evidence of deep venous thrombosis in either lower extremity.    < end of copied text >

## 2024-02-02 NOTE — DISCHARGE NOTE PROVIDER - NSDCMRMEDTOKEN_GEN_ALL_CORE_FT
albuterol 90 mcg/inh inhalation powder: 2 puff(s) inhaled every 6 hours, As Needed -for bronchospasm   budesonide-formoterol 160 mcg-4.5 mcg/inh inhalation aerosol: 2 puff(s) inhaled 2 times a day  FLUoxetine 40 mg oral capsule: 1 cap(s) orally once a day  furosemide 20 mg oral tablet: 1 tab(s) orally once a day  ipratropium-albuterol 0.5 mg-2.5 mg/3 mLinhalation solution: 3 milliliter(s) by nebulizer every 4 hours, As Needed   levothyroxine 150 mcg (0.15 mg) oral tablet: 1 tab(s) orally once a day  metFORMIN 500 mg oral tablet: 1 tab(s) orally 2 times a day  pantoprazole 40 mg oral delayed release tablet: 1 tab(s) orally once a day (before a meal)  Suboxone 8 mg-2 mg sublingual tablet: 2.5 film(s) sublingual once a day   albuterol 90 mcg/inh inhalation powder: 2 puff(s) inhaled every 6 hours, As Needed -for bronchospasm   budesonide-formoterol 160 mcg-4.5 mcg/inh inhalation aerosol: 2 puff(s) inhaled 2 times a day  FLUoxetine 40 mg oral capsule: 1 cap(s) orally once a day  furosemide 20 mg oral tablet: 1 tab(s) orally once a day  ipratropium-albuterol 0.5 mg-2.5 mg/3 mLinhalation solution: 3 milliliter(s) by nebulizer every 4 hours, As Needed   levothyroxine 150 mcg (0.15 mg) oral tablet: 1 tab(s) orally once a day  metFORMIN 500 mg oral tablet: 1 tab(s) orally 2 times a day  pantoprazole 40 mg oral delayed release tablet: 1 tab(s) orally once a day (before a meal)  predniSONE 10 mg oral tablet: 3 tab(s) orally once a day  Suboxone 8 mg-2 mg sublingual tablet: 2.5 film(s) sublingual once a day

## 2024-02-02 NOTE — DISCHARGE NOTE PROVIDER - NSDCCPCAREPLAN_GEN_ALL_CORE_FT
PRINCIPAL DISCHARGE DIAGNOSIS  Diagnosis: Leukocytoclastic vasculitis  Assessment and Plan of Treatment: Your biopsy came back confirming leukocytoclastic vasculitis which is an autoimmune issue that can improve on its own.  As we discussed, you may benefit from treatment of your hepatitis, if not it would increase the chances for these ulcers to recur.  In the meantime please continue with the prednisone and follow up with rheumatology as well as infectious disease.  Contact information has been provided.  Please also follow up with your primary care doctor.  The infectious disease office is at   [address] [place] [address]  [phone] or [phone]

## 2024-02-02 NOTE — DISCHARGE NOTE PROVIDER - HOSPITAL COURSE
54 yo female PMHx COPD, DM, hypothyroidism, HepC, h/o IVDA on Suboxone who presents complaining of swelling and lesions to bilateral lower extremities associated with difficulty walking x 2 weeks    Assessment    Bilateral lower extremity ulcers with black eschars likely secondary to leukocytoclastic vasculitis (awaiting biopsy results) in patient with untreated hep C, doubt vitamin C deficiency, being treated for possible cellulitis  Untreated Hepatitis C  COPD  DM  Hypothyroidism  IVDA on suboxone  Depression    Plan    - will continue with vancomycin for now, f/u MRSA swab and ID, will continue with prednisone, awaiting biopsy // f/u immunoglobulins, C3, and cryoglobulins, patient states she never had treatment for hepatitis C, if confirmed leukocytoclastic vasculitis she would benefit from treatment however it's mainly outpatient treatment, f/u hepatitis C  - c/w symbicort  - c/w insulin ss  - c/w synthroid, f/u TSH with reflex  - suboxone  - fluoxetine    Pending: f/u ID, f/u biopsy, c/w vanc for now, f/u mrsa swab    # DVT PPX: heparin sc   54 yo female PMHx COPD, DM, hypothyroidism, HepC, h/o IVDA on Suboxone who presents complaining of swelling and lesions to bilateral lower extremities associated with difficulty walking x 2 weeks    Assessment    Bilateral lower extremity ulcers with black eschars likely secondary to leukocytoclastic vasculitis (awaiting biopsy results) in patient with untreated hep C, doubt vitamin C deficiency, being treated for possible cellulitis  Untreated Hepatitis C, hx of hepatitis B  COPD  DM  Hypothyroidism  IVDA on suboxone  Depression    Plan    - biopsy confirmed leukocytoclastic vasculitis, discussed with ID and stopping abx, will continue with prednisone, f/u immunoglobulins, C3, and cryoglobulins as outpatient, patient states she never had treatment for hepatitis C, would benefit from treatment of hepatitis C can be done at ID office, patient has positive hep B core ab but negative hep B surface antibody, likely past infection with decreased immunity  - c/w symbicort  - c/w insulin ss  - c/w synthroid, f/u TSH with reflex as outpatient  - suboxone  - fluoxetine    outpatient ID follow up and rheum

## 2024-02-02 NOTE — CONSULT NOTE ADULT - ASSESSMENT
54 yo female PMHx COPD, DM, hypothyroidism, HepC, h/o IVDA on Suboxone who presents complaining of swelling and lesions to bilateral lower extremities associated with difficulty walking x 2 weeks.    ID is consulted for LE cellulitis  Afebrile, no leukocytosis  s/p skin biopsy on 1/10, path is pending      IMPRESSION:      RECOMMENDATIONS:        * THIS IS AN INCOMPLETE NOTE. FINAL RECOMMENDATION IS PENDING *   54 yo female PMHx COPD, DM, hypothyroidism, HepC, h/o IVDA on Suboxone who presents complaining of swelling and lesions to bilateral lower extremities associated with difficulty walking x 2 weeks.    ID is consulted for LE cellulitis  Afebrile, no leukocytosis  s/p skin biopsy on 1/10, path confirms leukocytoclastic vasculitis  Hepatitis C Ab+, PCR undetectable 1/11  Hepatitis B core+, HBsAb -, HBsAg -    IMPRESSION:  Leukocytoclastic vasculitis  Hepatitis C  LE edema    RECOMMENDATIONS:  - D/C antibiotics, no clinical signs of cellulitis  - Hepatitis C PCR undetectable on 1/11, can continue to monitor  - Can try ACE wrap and leg elevation to reduce swelling  - Management of vasculitis as per rheumatology, outpatient follow up  - Patient can see me at [facility], [address] [place] [id number] for Hepatitis C follow up      Snow Saleh D.O.  Attending Physician  Division of Infectious Diseases  [facility]  Please contact me via [employer] Teams

## 2024-02-02 NOTE — DISCHARGE NOTE NURSING/CASE MANAGEMENT/SOCIAL WORK - NSDCFUADDAPPT_GEN_ALL_CORE_FT
APPTS ARE READY TO BE MADE: [x ] YES    Best Family or Patient Contact (if needed): patient    Additional Information about above appointments (if needed):    1: rheumatology as soon as possible  2: infectious disease as soon as possible, [place]  3:     Other comments or requests:

## 2024-02-02 NOTE — PHARMACOTHERAPY INTERVENTION NOTE - COMMENTS
Recommended to adjust vancomycin dose from 750 mg IV q12h to 1000 mg IV q12h. As per PrecisePK calculations, the steady-state vancomycin AUC/REED on the former dose is 324.07 mg/L*h, which is less than the therapeutic range of 400 - 600 mg/L*h. Increasing the dose is predicted to yield an AUC/REED of 432.09 mg/L*h.    Esther Zhang, PharmD, BCIDP  Clinical Pharmacy Specialist, Infectious Diseases  Tele-Antimicrobial Stewardship Program (Tele-ASP)  Tele-ASP Phone: [phone]  
Recommended to continue vancomycin 1000 mg IV q12h. The vancomycin level 2/1 was 8.5 mg/L. As per PrecisePK calculations, current vancomycin AUC/REED is 440 mg/L*h, which is within the therapeutic range 400 - 600 mg/L*h. Serum creatinine is 0.8 mg/dL.      Edie SalvadorD   Clinical Pharmacy Specialist, Infectious Diseases  Tele-Antimicrobial Stewardship Program (Tele-ASP)  Tele-ASP Phone: [phone]  
As per policy, ordered a vancomycin trough for 2/1 at 3pm in order to assist with vancomycin pharmacokinetic monitoring.    Esther Zhang, PharmD, [facility] CenterDP  Clinical Pharmacy Specialist, Infectious Diseases  Tele-Antimicrobial Stewardship Program (Tele-ASP)  Tele-ASP Phone: [phone]

## 2024-02-02 NOTE — DISCHARGE NOTE NURSING/CASE MANAGEMENT/SOCIAL WORK - PATIENT PORTAL LINK FT
You can access the FollowMyHealth Patient Portal offered by [facility] by registering at the following website: [email or web address]. By joining [employer]’s FollowMyHealth portal, you will also be able to view your health information using other applications (apps) compatible with our system.

## 2024-02-02 NOTE — DISCHARGE NOTE PROVIDER - ATTENDING DISCHARGE PHYSICAL EXAMINATION:
General: WN/WD NAD  Neurology: A&Ox3, nonfocal, LEMUS x 4  Head:  Normocephalic, atraumatic  ENT:  Mucosa moist, no ulcerations  Neck:  Supple, no sinuses or palpable masses  Lymphatic:  No palpable cervical, supraclavicular, axillary or inguinal adenopathy  Respiratory: CTA B/L  CV: RRR, S1S2, no murmur  Abdominal: Soft, NT, ND no palpable mass  MSK: eschar both lower legs, mild erythema improving

## 2024-02-02 NOTE — CONSULT NOTE ADULT - TIME BILLING
I have personally seen and examined this patient prior to her discharge.  I have reviewed all pertinent clinical information and reviewed all relevant imaging and diagnostic studies personally.   I discussed recommendations with the primary team.

## 2024-02-02 NOTE — DISCHARGE NOTE PROVIDER - CARE PROVIDER_API CALL
Naye Mills  Rheumatology  [address] [place] [id number]  Phone: [phone]  Fax: [phone]  Established Patient  Follow Up Time: 1-3 days

## 2024-02-03 LAB
HBV CORE AB SER-ACNC: REACTIVE
HBV CORE IGM SER-ACNC: SIGNIFICANT CHANGE UP
HBV SURFACE AB SER-ACNC: 3.6 MIU/ML — LOW
HBV SURFACE AG SER-ACNC: SIGNIFICANT CHANGE UP
HCV RNA SPEC NAA+PROBE-LOG IU: SIGNIFICANT CHANGE UP IU/ML
HCV RNA SPEC NAA+PROBE-LOG IU: SIGNIFICANT CHANGE UP LOGIU/ML

## 2024-02-05 LAB
C3 SERPL-MCNC: 174 MG/DL — HIGH (ref 81–157)
HBV DNA # SERPL NAA+PROBE: SIGNIFICANT CHANGE UP IU/ML
HBV DNA SERPL NAA+PROBE-LOG#: SIGNIFICANT CHANGE UP LOGIU/ML
IGA FLD-MCNC: 329 MG/DL — SIGNIFICANT CHANGE UP (ref 84–499)
IGG FLD-MCNC: 1436 MG/DL — SIGNIFICANT CHANGE UP (ref 610–1660)
IGM SERPL-MCNC: 126 MG/DL — SIGNIFICANT CHANGE UP (ref 35–242)
KAPPA LC SER QL IFE: 3.53 MG/DL — HIGH (ref 0.33–1.94)
KAPPA/LAMBDA FREE LIGHT CHAIN RATIO, SERUM: 1.97 RATIO — HIGH (ref 0.26–1.65)
LAMBDA LC SER QL IFE: 1.79 MG/DL — SIGNIFICANT CHANGE UP (ref 0.57–2.63)

## 2024-02-06 LAB — CRYOGLOB SERPL-MCNC: NEGATIVE — SIGNIFICANT CHANGE UP

## 2024-02-09 DIAGNOSIS — L97.929 NON-PRESSURE CHRONIC ULCER OF UNSPECIFIED PART OF LEFT LOWER LEG WITH UNSPECIFIED SEVERITY: ICD-10-CM

## 2024-02-09 DIAGNOSIS — L03.116 CELLULITIS OF LEFT LOWER LIMB: ICD-10-CM

## 2024-02-09 DIAGNOSIS — L97.919 NON-PRESSURE CHRONIC ULCER OF UNSPECIFIED PART OF RIGHT LOWER LEG WITH UNSPECIFIED SEVERITY: ICD-10-CM

## 2024-02-09 DIAGNOSIS — M31.0 HYPERSENSITIVITY ANGIITIS: ICD-10-CM

## 2024-02-09 DIAGNOSIS — F32.A DEPRESSION, UNSPECIFIED: ICD-10-CM

## 2024-02-09 DIAGNOSIS — Z79.4 LONG TERM (CURRENT) USE OF INSULIN: ICD-10-CM

## 2024-02-09 DIAGNOSIS — F11.20 OPIOID DEPENDENCE, UNCOMPLICATED: ICD-10-CM

## 2024-02-09 DIAGNOSIS — E03.9 HYPOTHYROIDISM, UNSPECIFIED: ICD-10-CM

## 2024-02-09 DIAGNOSIS — B19.20 UNSPECIFIED VIRAL HEPATITIS C WITHOUT HEPATIC COMA: ICD-10-CM

## 2024-02-09 DIAGNOSIS — E11.9 TYPE 2 DIABETES MELLITUS WITHOUT COMPLICATIONS: ICD-10-CM

## 2024-02-09 DIAGNOSIS — L03.115 CELLULITIS OF RIGHT LOWER LIMB: ICD-10-CM

## 2024-02-09 DIAGNOSIS — J44.9 CHRONIC OBSTRUCTIVE PULMONARY DISEASE, UNSPECIFIED: ICD-10-CM

## 2024-02-09 DIAGNOSIS — Z79.890 HORMONE REPLACEMENT THERAPY: ICD-10-CM

## 2024-02-14 ENCOUNTER — OUTPATIENT (OUTPATIENT)
Dept: OUTPATIENT SERVICES | Facility: HOSPITAL | Age: 54
LOS: 1 days | End: 2024-02-14
Payer: COMMERCIAL

## 2024-02-14 ENCOUNTER — APPOINTMENT (OUTPATIENT)
Dept: PSYCHIATRY | Facility: CLINIC | Age: 54
End: 2024-02-14
Payer: COMMERCIAL

## 2024-02-14 DIAGNOSIS — Z90.89 ACQUIRED ABSENCE OF OTHER ORGANS: Chronic | ICD-10-CM

## 2024-02-14 DIAGNOSIS — F10.20 ALCOHOL DEPENDENCE, UNCOMPLICATED: ICD-10-CM

## 2024-02-14 PROCEDURE — 99214 OFFICE O/P EST MOD 30 MIN: CPT | Mod: 95

## 2024-02-15 DIAGNOSIS — F10.20 ALCOHOL DEPENDENCE, UNCOMPLICATED: ICD-10-CM

## 2024-03-12 ENCOUNTER — OUTPATIENT (OUTPATIENT)
Dept: OUTPATIENT SERVICES | Facility: HOSPITAL | Age: 54
LOS: 1 days | End: 2024-03-12
Payer: COMMERCIAL

## 2024-03-12 ENCOUNTER — APPOINTMENT (OUTPATIENT)
Dept: PSYCHIATRY | Facility: CLINIC | Age: 54
End: 2024-03-12

## 2024-03-12 DIAGNOSIS — F11.20 OPIOID DEPENDENCE, UNCOMPLICATED: ICD-10-CM

## 2024-03-12 DIAGNOSIS — Z90.89 ACQUIRED ABSENCE OF OTHER ORGANS: Chronic | ICD-10-CM

## 2024-03-12 DIAGNOSIS — Z90.49 ACQUIRED ABSENCE OF OTHER SPECIFIED PARTS OF DIGESTIVE TRACT: Chronic | ICD-10-CM

## 2024-03-12 PROCEDURE — H0004: CPT | Mod: 95

## 2024-03-13 ENCOUNTER — OUTPATIENT (OUTPATIENT)
Dept: OUTPATIENT SERVICES | Facility: HOSPITAL | Age: 54
LOS: 1 days | End: 2024-03-13
Payer: COMMERCIAL

## 2024-03-13 ENCOUNTER — APPOINTMENT (OUTPATIENT)
Dept: PSYCHIATRY | Facility: CLINIC | Age: 54
End: 2024-03-13
Payer: COMMERCIAL

## 2024-03-13 DIAGNOSIS — F11.20 OPIOID DEPENDENCE, UNCOMPLICATED: ICD-10-CM

## 2024-03-13 DIAGNOSIS — Z90.49 ACQUIRED ABSENCE OF OTHER SPECIFIED PARTS OF DIGESTIVE TRACT: Chronic | ICD-10-CM

## 2024-03-13 DIAGNOSIS — Z90.89 ACQUIRED ABSENCE OF OTHER ORGANS: Chronic | ICD-10-CM

## 2024-03-13 DIAGNOSIS — F10.20 ALCOHOL DEPENDENCE, UNCOMPLICATED: ICD-10-CM

## 2024-03-13 PROCEDURE — 99214 OFFICE O/P EST MOD 30 MIN: CPT

## 2024-03-13 PROCEDURE — 99214 OFFICE O/P EST MOD 30 MIN: CPT | Mod: 95

## 2024-03-14 DIAGNOSIS — F10.20 ALCOHOL DEPENDENCE, UNCOMPLICATED: ICD-10-CM

## 2024-03-15 ENCOUNTER — APPOINTMENT (OUTPATIENT)
Dept: INTERNAL MEDICINE | Facility: CLINIC | Age: 54
End: 2024-03-15

## 2024-03-18 ENCOUNTER — INPATIENT (INPATIENT)
Facility: HOSPITAL | Age: 54
LOS: 4 days | Discharge: HOME | DRG: 346 | End: 2024-03-23
Attending: STUDENT IN AN ORGANIZED HEALTH CARE EDUCATION/TRAINING PROGRAM | Admitting: INTERNAL MEDICINE
Payer: MEDICAID

## 2024-03-18 VITALS
RESPIRATION RATE: 18 BRPM | WEIGHT: 119.93 LBS | HEART RATE: 75 BPM | SYSTOLIC BLOOD PRESSURE: 105 MMHG | TEMPERATURE: 97 F | HEIGHT: 68 IN | DIASTOLIC BLOOD PRESSURE: 77 MMHG | OXYGEN SATURATION: 99 %

## 2024-03-18 DIAGNOSIS — Z90.89 ACQUIRED ABSENCE OF OTHER ORGANS: Chronic | ICD-10-CM

## 2024-03-18 DIAGNOSIS — Z90.49 ACQUIRED ABSENCE OF OTHER SPECIFIED PARTS OF DIGESTIVE TRACT: Chronic | ICD-10-CM

## 2024-03-18 LAB
ALBUMIN SERPL ELPH-MCNC: 4 G/DL — SIGNIFICANT CHANGE UP (ref 3.5–5.2)
ALP SERPL-CCNC: 109 U/L — SIGNIFICANT CHANGE UP (ref 30–115)
ALT FLD-CCNC: <5 U/L — SIGNIFICANT CHANGE UP (ref 0–41)
ANION GAP SERPL CALC-SCNC: 13 MMOL/L — SIGNIFICANT CHANGE UP (ref 7–14)
AST SERPL-CCNC: 18 U/L — SIGNIFICANT CHANGE UP (ref 0–41)
BASOPHILS # BLD AUTO: 0.05 K/UL — SIGNIFICANT CHANGE UP (ref 0–0.2)
BASOPHILS NFR BLD AUTO: 0.5 % — SIGNIFICANT CHANGE UP (ref 0–1)
BILIRUB SERPL-MCNC: 0.4 MG/DL — SIGNIFICANT CHANGE UP (ref 0.2–1.2)
BUN SERPL-MCNC: 8 MG/DL — LOW (ref 10–20)
CALCIUM SERPL-MCNC: 9 MG/DL — SIGNIFICANT CHANGE UP (ref 8.4–10.5)
CHLORIDE SERPL-SCNC: 92 MMOL/L — LOW (ref 98–110)
CO2 SERPL-SCNC: 29 MMOL/L — SIGNIFICANT CHANGE UP (ref 17–32)
CREAT SERPL-MCNC: 0.8 MG/DL — SIGNIFICANT CHANGE UP (ref 0.7–1.5)
EGFR: 88 ML/MIN/1.73M2 — SIGNIFICANT CHANGE UP
EOSINOPHIL # BLD AUTO: 0.09 K/UL — SIGNIFICANT CHANGE UP (ref 0–0.7)
EOSINOPHIL NFR BLD AUTO: 0.9 % — SIGNIFICANT CHANGE UP (ref 0–8)
GLUCOSE SERPL-MCNC: 351 MG/DL — HIGH (ref 70–99)
HCT VFR BLD CALC: 30 % — LOW (ref 37–47)
HGB BLD-MCNC: 9.9 G/DL — LOW (ref 12–16)
IMM GRANULOCYTES NFR BLD AUTO: 0.3 % — SIGNIFICANT CHANGE UP (ref 0.1–0.3)
LACTATE SERPL-SCNC: 1.6 MMOL/L — SIGNIFICANT CHANGE UP (ref 0.7–2)
LYMPHOCYTES # BLD AUTO: 1.48 K/UL — SIGNIFICANT CHANGE UP (ref 1.2–3.4)
LYMPHOCYTES # BLD AUTO: 14.2 % — LOW (ref 20.5–51.1)
MCHC RBC-ENTMCNC: 27.3 PG — SIGNIFICANT CHANGE UP (ref 27–31)
MCHC RBC-ENTMCNC: 33 G/DL — SIGNIFICANT CHANGE UP (ref 32–37)
MCV RBC AUTO: 82.6 FL — SIGNIFICANT CHANGE UP (ref 81–99)
MONOCYTES # BLD AUTO: 0.57 K/UL — SIGNIFICANT CHANGE UP (ref 0.1–0.6)
MONOCYTES NFR BLD AUTO: 5.5 % — SIGNIFICANT CHANGE UP (ref 1.7–9.3)
NEUTROPHILS # BLD AUTO: 8.18 K/UL — HIGH (ref 1.4–6.5)
NEUTROPHILS NFR BLD AUTO: 78.6 % — HIGH (ref 42.2–75.2)
NRBC # BLD: 0 /100 WBCS — SIGNIFICANT CHANGE UP (ref 0–0)
PLATELET # BLD AUTO: 226 K/UL — SIGNIFICANT CHANGE UP (ref 130–400)
PMV BLD: 10.8 FL — HIGH (ref 7.4–10.4)
POTASSIUM SERPL-MCNC: 4.2 MMOL/L — SIGNIFICANT CHANGE UP (ref 3.5–5)
POTASSIUM SERPL-SCNC: 4.2 MMOL/L — SIGNIFICANT CHANGE UP (ref 3.5–5)
PROT SERPL-MCNC: 7 G/DL — SIGNIFICANT CHANGE UP (ref 6–8)
RBC # BLD: 3.63 M/UL — LOW (ref 4.2–5.4)
RBC # FLD: 14.8 % — HIGH (ref 11.5–14.5)
SODIUM SERPL-SCNC: 134 MMOL/L — LOW (ref 135–146)
WBC # BLD: 10.4 K/UL — SIGNIFICANT CHANGE UP (ref 4.8–10.8)
WBC # FLD AUTO: 10.4 K/UL — SIGNIFICANT CHANGE UP (ref 4.8–10.8)

## 2024-03-18 PROCEDURE — 99285 EMERGENCY DEPT VISIT HI MDM: CPT

## 2024-03-18 RX ORDER — VANCOMYCIN HCL 1 G
1000 VIAL (EA) INTRAVENOUS ONCE
Refills: 0 | Status: COMPLETED | OUTPATIENT
Start: 2024-03-18 | End: 2024-03-18

## 2024-03-18 RX ORDER — KETOROLAC TROMETHAMINE 30 MG/ML
15 SYRINGE (ML) INJECTION ONCE
Refills: 0 | Status: DISCONTINUED | OUTPATIENT
Start: 2024-03-18 | End: 2024-03-18

## 2024-03-18 RX ADMIN — Medication 250 MILLIGRAM(S): at 23:29

## 2024-03-18 RX ADMIN — Medication 15 MILLIGRAM(S): at 21:58

## 2024-03-18 NOTE — ED ADULT NURSE NOTE - NSFALLHARMRISKINTERV_ED_ALL_ED
Assistance OOB with selected safe patient handling equipment if applicable/Assistance with ambulation/Communicate risk of Fall with Harm to all staff, patient, and family/Monitor gait and stability/Provide visual cue: red socks, yellow wristband, yellow gown, etc/Reinforce activity limits and safety measures with patient and family/Bed in lowest position, wheels locked, appropriate side rails in place/Call bell, personal items and telephone in reach/Instruct patient to call for assistance before getting out of bed/chair/stretcher/Non-slip footwear applied when patient is off stretcher/[place] to call system/Physically safe environment - no spills, clutter or unnecessary equipment/Purposeful Proactive Rounding/Room/bathroom lighting operational, light cord in reach

## 2024-03-19 DIAGNOSIS — L03.90 CELLULITIS, UNSPECIFIED: ICD-10-CM

## 2024-03-19 LAB
A1C WITH ESTIMATED AVERAGE GLUCOSE RESULT: 9.7 % — HIGH (ref 4–5.6)
ALBUMIN SERPL ELPH-MCNC: 3.5 G/DL — SIGNIFICANT CHANGE UP (ref 3.5–5.2)
ALP SERPL-CCNC: 96 U/L — SIGNIFICANT CHANGE UP (ref 30–115)
ALT FLD-CCNC: <5 U/L — SIGNIFICANT CHANGE UP (ref 0–41)
ANION GAP SERPL CALC-SCNC: 9 MMOL/L — SIGNIFICANT CHANGE UP (ref 7–14)
AST SERPL-CCNC: 10 U/L — SIGNIFICANT CHANGE UP (ref 0–41)
BILIRUB SERPL-MCNC: 0.4 MG/DL — SIGNIFICANT CHANGE UP (ref 0.2–1.2)
BUN SERPL-MCNC: 10 MG/DL — SIGNIFICANT CHANGE UP (ref 10–20)
CALCIUM SERPL-MCNC: 8.7 MG/DL — SIGNIFICANT CHANGE UP (ref 8.4–10.5)
CHLORIDE SERPL-SCNC: 97 MMOL/L — LOW (ref 98–110)
CO2 SERPL-SCNC: 28 MMOL/L — SIGNIFICANT CHANGE UP (ref 17–32)
CREAT SERPL-MCNC: 0.8 MG/DL — SIGNIFICANT CHANGE UP (ref 0.7–1.5)
EGFR: 88 ML/MIN/1.73M2 — SIGNIFICANT CHANGE UP
ESTIMATED AVERAGE GLUCOSE: 232 MG/DL — HIGH (ref 68–114)
GLUCOSE BLDC GLUCOMTR-MCNC: 354 MG/DL — HIGH (ref 70–99)
GLUCOSE BLDC GLUCOMTR-MCNC: 394 MG/DL — HIGH (ref 70–99)
GLUCOSE SERPL-MCNC: 270 MG/DL — HIGH (ref 70–99)
HCT VFR BLD CALC: 28.2 % — LOW (ref 37–47)
HGB BLD-MCNC: 9.4 G/DL — LOW (ref 12–16)
MCHC RBC-ENTMCNC: 27.3 PG — SIGNIFICANT CHANGE UP (ref 27–31)
MCHC RBC-ENTMCNC: 33.3 G/DL — SIGNIFICANT CHANGE UP (ref 32–37)
MCV RBC AUTO: 82 FL — SIGNIFICANT CHANGE UP (ref 81–99)
NRBC # BLD: 0 /100 WBCS — SIGNIFICANT CHANGE UP (ref 0–0)
PLATELET # BLD AUTO: 238 K/UL — SIGNIFICANT CHANGE UP (ref 130–400)
PMV BLD: 10.2 FL — SIGNIFICANT CHANGE UP (ref 7.4–10.4)
POTASSIUM SERPL-MCNC: 3.7 MMOL/L — SIGNIFICANT CHANGE UP (ref 3.5–5)
POTASSIUM SERPL-SCNC: 3.7 MMOL/L — SIGNIFICANT CHANGE UP (ref 3.5–5)
PROT SERPL-MCNC: 6.3 G/DL — SIGNIFICANT CHANGE UP (ref 6–8)
RBC # BLD: 3.44 M/UL — LOW (ref 4.2–5.4)
RBC # FLD: 15 % — HIGH (ref 11.5–14.5)
SODIUM SERPL-SCNC: 134 MMOL/L — LOW (ref 135–146)
WBC # BLD: 7.72 K/UL — SIGNIFICANT CHANGE UP (ref 4.8–10.8)
WBC # FLD AUTO: 7.72 K/UL — SIGNIFICANT CHANGE UP (ref 4.8–10.8)

## 2024-03-19 PROCEDURE — 97162 PT EVAL MOD COMPLEX 30 MIN: CPT | Mod: GP

## 2024-03-19 PROCEDURE — 80048 BASIC METABOLIC PNL TOTAL CA: CPT

## 2024-03-19 PROCEDURE — 83735 ASSAY OF MAGNESIUM: CPT

## 2024-03-19 PROCEDURE — 76937 US GUIDE VASCULAR ACCESS: CPT | Mod: 26

## 2024-03-19 PROCEDURE — 97116 GAIT TRAINING THERAPY: CPT | Mod: GP

## 2024-03-19 PROCEDURE — 76770 US EXAM ABDO BACK WALL COMP: CPT

## 2024-03-19 PROCEDURE — 76770 US EXAM ABDO BACK WALL COMP: CPT | Mod: 26

## 2024-03-19 PROCEDURE — 83036 HEMOGLOBIN GLYCOSYLATED A1C: CPT

## 2024-03-19 PROCEDURE — 36415 COLL VENOUS BLD VENIPUNCTURE: CPT

## 2024-03-19 PROCEDURE — 80202 ASSAY OF VANCOMYCIN: CPT

## 2024-03-19 PROCEDURE — 99222 1ST HOSP IP/OBS MODERATE 55: CPT

## 2024-03-19 PROCEDURE — 83516 IMMUNOASSAY NONANTIBODY: CPT

## 2024-03-19 PROCEDURE — 85027 COMPLETE CBC AUTOMATED: CPT

## 2024-03-19 PROCEDURE — 97110 THERAPEUTIC EXERCISES: CPT | Mod: GP

## 2024-03-19 PROCEDURE — 80053 COMPREHEN METABOLIC PANEL: CPT

## 2024-03-19 PROCEDURE — 99233 SBSQ HOSP IP/OBS HIGH 50: CPT

## 2024-03-19 PROCEDURE — 36000 PLACE NEEDLE IN VEIN: CPT

## 2024-03-19 PROCEDURE — 82962 GLUCOSE BLOOD TEST: CPT

## 2024-03-19 RX ORDER — SODIUM CHLORIDE 9 MG/ML
1000 INJECTION, SOLUTION INTRAVENOUS
Refills: 0 | Status: DISCONTINUED | OUTPATIENT
Start: 2024-03-19 | End: 2024-03-23

## 2024-03-19 RX ORDER — INSULIN LISPRO 100/ML
4 VIAL (ML) SUBCUTANEOUS ONCE
Refills: 0 | Status: COMPLETED | OUTPATIENT
Start: 2024-03-19 | End: 2024-03-19

## 2024-03-19 RX ORDER — SODIUM CHLORIDE 9 MG/ML
1000 INJECTION INTRAMUSCULAR; INTRAVENOUS; SUBCUTANEOUS ONCE
Refills: 0 | Status: DISCONTINUED | OUTPATIENT
Start: 2024-03-19 | End: 2024-03-23

## 2024-03-19 RX ORDER — FLUOXETINE HCL 10 MG
40 CAPSULE ORAL DAILY
Refills: 0 | Status: DISCONTINUED | OUTPATIENT
Start: 2024-03-19 | End: 2024-03-23

## 2024-03-19 RX ORDER — GLUCAGON INJECTION, SOLUTION 0.5 MG/.1ML
1 INJECTION, SOLUTION SUBCUTANEOUS ONCE
Refills: 0 | Status: DISCONTINUED | OUTPATIENT
Start: 2024-03-19 | End: 2024-03-23

## 2024-03-19 RX ORDER — HYDROCORTISONE 1 %
1 OINTMENT (GRAM) TOPICAL THREE TIMES A DAY
Refills: 0 | Status: DISCONTINUED | OUTPATIENT
Start: 2024-03-19 | End: 2024-03-23

## 2024-03-19 RX ORDER — IPRATROPIUM/ALBUTEROL SULFATE 18-103MCG
3 AEROSOL WITH ADAPTER (GRAM) INHALATION EVERY 6 HOURS
Refills: 0 | Status: DISCONTINUED | OUTPATIENT
Start: 2024-03-19 | End: 2024-03-23

## 2024-03-19 RX ORDER — FUROSEMIDE 40 MG
20 TABLET ORAL DAILY
Refills: 0 | Status: DISCONTINUED | OUTPATIENT
Start: 2024-03-19 | End: 2024-03-20

## 2024-03-19 RX ORDER — AMPICILLIN SODIUM AND SULBACTAM SODIUM 250; 125 MG/ML; MG/ML
1.5 INJECTION, POWDER, FOR SUSPENSION INTRAMUSCULAR; INTRAVENOUS ONCE
Refills: 0 | Status: DISCONTINUED | OUTPATIENT
Start: 2024-03-19 | End: 2024-03-23

## 2024-03-19 RX ORDER — VANCOMYCIN HCL 1 G
1000 VIAL (EA) INTRAVENOUS EVERY 12 HOURS
Refills: 0 | Status: DISCONTINUED | OUTPATIENT
Start: 2024-03-19 | End: 2024-03-19

## 2024-03-19 RX ORDER — BUPRENORPHINE AND NALOXONE 2; .5 MG/1; MG/1
10 TABLET SUBLINGUAL
Refills: 0 | Status: DISCONTINUED | OUTPATIENT
Start: 2024-03-19 | End: 2024-03-19

## 2024-03-19 RX ORDER — DEXTROSE 50 % IN WATER 50 %
25 SYRINGE (ML) INTRAVENOUS ONCE
Refills: 0 | Status: DISCONTINUED | OUTPATIENT
Start: 2024-03-19 | End: 2024-03-23

## 2024-03-19 RX ORDER — BUPRENORPHINE AND NALOXONE 2; .5 MG/1; MG/1
2.5 TABLET SUBLINGUAL DAILY
Refills: 0 | Status: DISCONTINUED | OUTPATIENT
Start: 2024-03-19 | End: 2024-03-19

## 2024-03-19 RX ORDER — DEXTROSE 50 % IN WATER 50 %
12.5 SYRINGE (ML) INTRAVENOUS ONCE
Refills: 0 | Status: DISCONTINUED | OUTPATIENT
Start: 2024-03-19 | End: 2024-03-23

## 2024-03-19 RX ORDER — BUDESONIDE AND FORMOTEROL FUMARATE DIHYDRATE 160; 4.5 UG/1; UG/1
2 AEROSOL RESPIRATORY (INHALATION)
Refills: 0 | Status: DISCONTINUED | OUTPATIENT
Start: 2024-03-19 | End: 2024-03-23

## 2024-03-19 RX ORDER — DEXTROSE 50 % IN WATER 50 %
15 SYRINGE (ML) INTRAVENOUS ONCE
Refills: 0 | Status: DISCONTINUED | OUTPATIENT
Start: 2024-03-19 | End: 2024-03-23

## 2024-03-19 RX ORDER — AMPICILLIN SODIUM AND SULBACTAM SODIUM 250; 125 MG/ML; MG/ML
1.5 INJECTION, POWDER, FOR SUSPENSION INTRAMUSCULAR; INTRAVENOUS EVERY 6 HOURS
Refills: 0 | Status: DISCONTINUED | OUTPATIENT
Start: 2024-03-19 | End: 2024-03-23

## 2024-03-19 RX ORDER — LEVOTHYROXINE SODIUM 125 MCG
150 TABLET ORAL
Refills: 0 | Status: DISCONTINUED | OUTPATIENT
Start: 2024-03-19 | End: 2024-03-23

## 2024-03-19 RX ORDER — INSULIN LISPRO 100/ML
VIAL (ML) SUBCUTANEOUS
Refills: 0 | Status: DISCONTINUED | OUTPATIENT
Start: 2024-03-19 | End: 2024-03-20

## 2024-03-19 RX ORDER — BUPRENORPHINE AND NALOXONE 2; .5 MG/1; MG/1
2 TABLET SUBLINGUAL
Refills: 0 | Status: DISCONTINUED | OUTPATIENT
Start: 2024-03-19 | End: 2024-03-20

## 2024-03-19 RX ORDER — AMPICILLIN SODIUM AND SULBACTAM SODIUM 250; 125 MG/ML; MG/ML
INJECTION, POWDER, FOR SUSPENSION INTRAMUSCULAR; INTRAVENOUS
Refills: 0 | Status: DISCONTINUED | OUTPATIENT
Start: 2024-03-19 | End: 2024-03-23

## 2024-03-19 RX ORDER — KETOROLAC TROMETHAMINE 30 MG/ML
15 SYRINGE (ML) INJECTION EVERY 4 HOURS
Refills: 0 | Status: DISCONTINUED | OUTPATIENT
Start: 2024-03-19 | End: 2024-03-23

## 2024-03-19 RX ORDER — VANCOMYCIN HCL 1 G
750 VIAL (EA) INTRAVENOUS EVERY 12 HOURS
Refills: 0 | Status: DISCONTINUED | OUTPATIENT
Start: 2024-03-19 | End: 2024-03-23

## 2024-03-19 RX ADMIN — Medication 1 APPLICATION(S): at 15:16

## 2024-03-19 RX ADMIN — Medication 15 MILLIGRAM(S): at 17:56

## 2024-03-19 RX ADMIN — BUDESONIDE AND FORMOTEROL FUMARATE DIHYDRATE 2 PUFF(S): 160; 4.5 AEROSOL RESPIRATORY (INHALATION) at 19:25

## 2024-03-19 RX ADMIN — AMPICILLIN SODIUM AND SULBACTAM SODIUM 100 GRAM(S): 250; 125 INJECTION, POWDER, FOR SUSPENSION INTRAMUSCULAR; INTRAVENOUS at 23:31

## 2024-03-19 RX ADMIN — Medication 250 MILLIGRAM(S): at 17:56

## 2024-03-19 RX ADMIN — Medication 20 MILLIGRAM(S): at 06:53

## 2024-03-19 RX ADMIN — Medication 5: at 16:45

## 2024-03-19 RX ADMIN — AMPICILLIN SODIUM AND SULBACTAM SODIUM 100 GRAM(S): 250; 125 INJECTION, POWDER, FOR SUSPENSION INTRAMUSCULAR; INTRAVENOUS at 17:55

## 2024-03-19 RX ADMIN — Medication 150 MICROGRAM(S): at 06:52

## 2024-03-19 RX ADMIN — Medication 40 MILLIGRAM(S): at 13:17

## 2024-03-19 RX ADMIN — Medication 15 MILLIGRAM(S): at 23:58

## 2024-03-19 RX ADMIN — BUPRENORPHINE AND NALOXONE 2 TABLET(S): 2; .5 TABLET SUBLINGUAL at 13:15

## 2024-03-19 RX ADMIN — Medication 15 MILLIGRAM(S): at 10:17

## 2024-03-19 RX ADMIN — Medication 1 APPLICATION(S): at 20:58

## 2024-03-19 RX ADMIN — Medication 15 MILLIGRAM(S): at 18:20

## 2024-03-19 RX ADMIN — Medication 4 UNIT(S): at 21:16

## 2024-03-19 RX ADMIN — Medication 30 MILLIGRAM(S): at 06:53

## 2024-03-19 NOTE — PHYSICAL THERAPY INITIAL EVALUATION ADULT - PERTINENT HX OF CURRENT PROBLEM, REHAB EVAL
bilateral leg cellulitis vs Leukocytoclastic vasculitis (Both?) - for now continue vancomycin along with meds from home (prednisone) with ID consult

## 2024-03-19 NOTE — CHART NOTE - NSCHARTNOTEFT_GEN_A_CORE
BP is on the soft side, seen at bedside. She is awake and conversing normally. Bilateral radial pulses strong but patient says she hasn't urinated recently. Will order a fluid bolus and monitor (also order bladder sonogram) BP is on the soft side, seen at bedside. She is awake and conversing normally. Bilateral radial pulses strong but patient says she hasn't urinated recently. Will order a fluid bolus and monitor (also order bladder sonogram). Also closest we can do on Suboxone is 2 x 8mg dose (which will schedule later due to BP issue) BP is on the soft side, seen at bedside. She is awake and conversing normally. Bilateral radial pulses strong but patient says she hasn't urinated recently. Will order a fluid bolus and monitor (also order bladder sonogram). Also closest we can do on Suboxone is 2 x 8mg dose (which will schedule later due to BP issue) Patient informed

## 2024-03-19 NOTE — PATIENT PROFILE ADULT - FALL HARM RISK - HARM RISK INTERVENTIONS

## 2024-03-19 NOTE — H&P ADULT - SKIN COMMENTS
areas of black eschars to feet , erythema to both legs areas of black eschars to feet , erythema to both swollen legs

## 2024-03-19 NOTE — H&P ADULT - NSHPLABSRESULTS_GEN_ALL_CORE
9.9    10.40 )-----------( 226      ( 18 Mar 2024 21:35 )             30.0     03-18    134<L>  |  92<L>  |  8<L>  ----------------------------<  351<H>  4.2   |  29  |  0.8    Ca    9.0      18 Mar 2024 21:35    TPro  7.0  /  Alb  4.0  /  TBili  0.4  /  DBili  x   /  AST  18  /  ALT  <5  /  AlkPhos  109  03-18          Urinalysis Basic - ( 18 Mar 2024 21:35 )    Color: x / Appearance: x / SG: x / pH: x  Gluc: 351 mg/dL / Ketone: x  / Bili: x / Urobili: x   Blood: x / Protein: x / Nitrite: x   Leuk Esterase: x / RBC: x / WBC x   Sq Epi: x / Non Sq Epi: x / Bacteria: x        Lactate Trend  03-18 @ 21:35 Lactate:1.6

## 2024-03-19 NOTE — PHYSICAL THERAPY INITIAL EVALUATION ADULT - CRITERIA FOR SKILLED THERAPEUTIC INTERVENTIONS
functional limitations in following categories/therapy frequency/predicted duration of therapy intervention/anticipated equipment needs at discharge/anticipated discharge recommendation

## 2024-03-19 NOTE — ED PROVIDER NOTE - OBJECTIVE STATEMENT
No 53 year old female past medical history of substance abuse (last use years ago), COPD, Hep C with recent admission to hospital for swelling. patient states that she was diagnosis with vasculitis and placed on steroids which she completed and has appt with vascular and RA but not till next month. patient left hospital using walker. patient states that over the last week increasing swelling again with wheeping, redness and purulent drainage from some areas. no fever/chills.

## 2024-03-19 NOTE — CONSULT NOTE ADULT - ASSESSMENT
54yo female whose PMH includes COPD, DM, hypothyroid, IVDA on Suboxone, and evidence for hepatitis B, (untreated) Cand who was hospitalized last month for suspected leg cellulitis (though final diagnosis seems to have been Leukocytoclastic vasculitis) presents to the ER with bilateral leg swelling and redness.    ID is consulted for cellulitis  Afebrile, no leuckocytosis  BCx x2 pending    Antibiotics:  Vancomycin 3/19 ->      IMPRESSION:  Leukocytoclastic vasculitis  Hx Hepatitis C    RECOMMENDATIONS:        * THIS IS AN INCOMPLETE NOTE. FINAL RECOMMENDATION IS PENDING *   54yo female whose PMH includes COPD, DM, hypothyroid, IVDA on Suboxone, and evidence for hepatitis B, (untreated) Cand who was hospitalized last month for suspected leg cellulitis (though final diagnosis seems to have been Leukocytoclastic vasculitis) presents to the ER with bilateral leg swelling and redness.    ID is consulted for cellulitis  Afebrile, no leuckocytosis  BCx x2 pending  On exam, there are multiple open wounds with active thick drainage. A few wounds are crusted. All lesions are below knees. Bilateral LEs with chronic skin changes with erythema but minimal warmth  Bilateral LEs are at risk for superimposed cellulitis  Patient is not on steroid since early Feb, still waiting for her vascular appointment    Antibiotics:  Vancomycin 3/19 ->      IMPRESSION:  Bilateral LE wounds  Leukocytoclastic vasculitis  Hx Hepatitis C    RECOMMENDATIONS:  - Continue IV vancomycin 750mg q12hrs; add IV Unasyn 1.5g q6hrs  - Monitor vancomycin trough 30 minutes prior 4th dose, keep trough around 10; monitor Cr daily  - Follow up BCx  - Vascular consult for management of vasculitis and wound care  - Leg elevation  - Offloading and frequent position changes, aspiration precaution  - Trend WBC, fever curve, transaminases, creatinine daily      Snow Saleh D.O.  Attending Physician  Division of Infectious Diseases  [facility]  Please contact me via [employer] Teams       52yo female whose PMH includes COPD, DM, hypothyroid, IVDA on Suboxone, and evidence for hepatitis B, (untreated) Cand who was hospitalized last month for suspected leg cellulitis (though final diagnosis seems to have been Leukocytoclastic vasculitis) presents to the ER with bilateral leg swelling and redness.    ID is consulted for cellulitis  Afebrile, no leuckocytosis  BCx x2 pending  On exam, there are multiple open wounds with active thick drainage. A few wounds are crusted. All lesions are below knees. Bilateral LEs with chronic skin changes with erythema but minimal warmth  Bilateral LEs are at risk for superimposed cellulitis  Patient is not on steroid since early Feb, still waiting for her vascular appointment    Antibiotics:  Vancomycin 3/19 ->      IMPRESSION:  Bilateral LE wounds  Leukocytoclastic vasculitis  Hx Hepatitis C  Venous stasis dermatitis    RECOMMENDATIONS:  - Continue IV vancomycin 750mg q12hrs; add IV Unasyn 1.5g q6hrs  - Monitor vancomycin trough 30 minutes prior 4th dose, keep trough around 10; monitor Cr daily  - Follow up BCx  - Vascular consult for management of vasculitis and wound care  - Leg elevation  - Offloading and frequent position changes, aspiration precaution  - Trend WBC, fever curve, transaminases, creatinine daily      Snow Saleh D.O.  Attending Physician  Division of Infectious Diseases  [facility]  Please contact me via [employer] Teams

## 2024-03-19 NOTE — PHYSICAL THERAPY INITIAL EVALUATION ADULT - ADDITIONAL COMMENTS
pt lives with her boyfriend in an upstairs apartment with 5 steps to the front door with rails and 14 steps to the apartment with rails, one level inside.  Pt was able to amb with RW prior to admission, pt is unable to negotiate stairs, has to bump on the buttocks

## 2024-03-19 NOTE — H&P ADULT - HISTORY OF PRESENT ILLNESS
54yo female whose PMH includes COPD, DM, hypothyroid, IVDA on Suboxone, and evidence for hepatitis B, (untreated) Cand who was hospitalized last month for suspected leg cellulitis (though final diagnosis seems to have been Leukocytoclastic vasculitis) presents to the ER with bilateral leg swelling and redness. Associate with pain and difficulty walking

## 2024-03-19 NOTE — ED PROVIDER NOTE - PHYSICAL EXAMINATION
Physical Exam    Vital Signs: I have reviewed the initial vital signs.  Constitutional: well-nourished, appears stated age, no acute distress  Eyes: Conjunctiva pink, Sclera clear, PERRLA, EOMI.  Cardiovascular: S1 and S2, regular rate, regular rhythm, well-perfused extremities, radial pulses equal and 2+  Respiratory: unlabored respiratory effort, clear to auscultation bilaterally no wheezing, rales and rhonchi  Gastrointestinal: soft, non-tender abdomen, no pulsatile mass, normal bowl sounds  Musculoskeletal: supple neck, + b/l lower extremity edema, no midline tenderness  Integumentary: warm, dry, + b/l redness and swelling with with warmth and tenderness and b/l ulcers some wheeping and some with purlent drainage. + DP pulse and cap refill <2second s  Neurologic: awake, alert, cranial nerves II-XII grossly intact, extremities’ motor and sensory functions grossly intact  Psychiatric: appropriate mood, appropriate affect

## 2024-03-19 NOTE — ED PROVIDER NOTE - ATTENDING APP SHARED VISIT CONTRIBUTION OF CARE
52 yo F PMHx noted presents  with worsening LE swelling with wound to RLE. Pt states that she is usually able to ambulate with a walker, but her legs feel too heavy and she is unable to walk, Pt admitted last month and diagnosed with vasculitis after biopsy, no fever. on exam pt in NAD AAO x 3, + b/l LE swelling with erythema, + ulceration to rt leg, + weeping,

## 2024-03-19 NOTE — H&P ADULT - ASSESSMENT
bilateral leg cellulitis vs Leukocytoclastic vasculitis (Both?)  bilateral leg cellulitis vs Leukocytoclastic vasculitis (Both?)     Unable to ambulate     COPD (asthma too)    DM type 2 - will use ISS, not metformin    hypothyroid    Opioid dependence on Suboxone bilateral leg cellulitis vs Leukocytoclastic vasculitis (Both?) - for now continue vancomycin along with meds from home (prednisone) with ID consult    Unable to ambulate - rehab consult    COPD (asthma too) - continue usual inahaler with prn Duoneb    DM type 2 - will use ISS, not metformin    hypothyroid - synthroid    Opioid dependence on Suboxone - ordered using 10 tabs of low dose Suboxone

## 2024-03-19 NOTE — PHYSICAL THERAPY INITIAL EVALUATION ADULT - GENERAL OBSERVATIONS, REHAB EVAL
13;45-14;10 pt was seen for PT IE at bed side, pt is agreeable, chart thoroughly reviewed, RN Sarah is aware.  Pt received and left sitting EOB, in no apparent distress, +IV, +call bell within reach, bed side table at reach

## 2024-03-19 NOTE — CONSULT NOTE ADULT - SUBJECTIVE AND OBJECTIVE BOX
INFECTIOUS DISEASE CONSULT NOTE    Patient is a 53y old  Female who presents with a chief complaint of   HPI:  54yo female whose PMH includes COPD, DM, hypothyroid, IVDA on Suboxone, and evidence for hepatitis B, (untreated) Cand who was hospitalized last month for suspected leg cellulitis (though final diagnosis seems to have been Leukocytoclastic vasculitis) presents to the ER with bilateral leg swelling and redness. Associate with pain and difficulty walking  (19 Mar 2024 01:33)       Prior hospital charts reviewed [Yes]  Primary team notes reviewed [Yes]  Other consultant notes reviewed [Yes]    REVIEW OF SYSTEMS:      PAST MEDICAL & SURGICAL HISTORY:  Asthma with COPD      Hypothyroidism      H/O: substance abuse  no longer using      History of pancreatitis      Hepatitis-C      History of appendectomy      History of tonsillectomy          SOCIAL HISTORY:  - Born in [place], migrated [place] in 19XX  - Currently working as / Retired  - Lives with _____; no pets  - No recent travel  - Denies tobacco use  - Denies alcohol use  - Denies illicit drug use  - Currently sexually active, has one male/female sexual partner    FAMILY HISTORY:      Allergies:  No Known Allergies      ANTIMICROBIALS:  vancomycin  IVPB 750 every 12 hours      ANTIMICROBIALS (past 90 days):  MEDICATIONS  (STANDING):    vancomycin  IVPB.   250 mL/Hr IV Intermittent (03-18-24 @ 23:29)        OTHER MEDS:   MEDICATIONS  (STANDING):  albuterol/ipratropium for Nebulization 3 every 6 hours PRN  budesonide 160 MICROgram(s)/formoterol 4.5 MICROgram(s) Inhaler 2 two times a day  buprenorphine 8 mG/naloxone 2 mG SL  Tablet 2 <User Schedule>  dextrose 50% Injectable 25 once  dextrose 50% Injectable 12.5 once  dextrose 50% Injectable 25 once  dextrose Oral Gel 15 once PRN  FLUoxetine 40 daily  furosemide    Tablet 20 daily  glucagon  Injectable 1 once  insulin lispro (ADMELOG) corrective regimen sliding scale  three times a day before meals  ketorolac   Injectable 15 every 4 hours PRN  levothyroxine 150 <User Schedule>  predniSONE   Tablet 30 daily      VITALS:  Vital Signs Last 24 Hrs  T(F): 96 (03-19-24 @ 06:37), Max: 97 (03-18-24 @ 20:53)    Vital Signs Last 24 Hrs  HR: 65 (03-19-24 @ 06:37) (65 - 75)  BP: 89/58 (03-19-24 @ 06:37) (89/58 - 105/77)  RR: 16 (03-19-24 @ 06:37)  SpO2: 95% (03-19-24 @ 06:37) (95% - 99%)  Wt(kg): --    EXAM:    Labs:                        9.4    7.72  )-----------( 238      ( 19 Mar 2024 06:00 )             28.2     03-19    134<L>  |  97<L>  |  10  ----------------------------<  270<H>  3.7   |  28  |  0.8    Ca    8.7      19 Mar 2024 06:00    TPro  6.3  /  Alb  3.5  /  TBili  0.4  /  DBili  x   /  AST  10  /  ALT  <5  /  AlkPhos  96  03-19      WBC Trend:  WBC Count: 7.72 (03-19-24 @ 06:00)  WBC Count: 10.40 (03-18-24 @ 21:35)      Auto Neutrophil #: 8.18 K/uL (03-18-24 @ 21:35)  Auto Neutrophil #: 4.06 K/uL (02-02-24 @ 06:07)  Auto Neutrophil #: 2.07 K/uL (02-01-24 @ 06:47)  Auto Neutrophil #: 3.85 K/uL (01-30-24 @ 21:22)  Auto Neutrophil #: 3.64 K/uL (01-10-24 @ 06:55)      Creatine Trend:  Creatinine: 0.8 (03-19)  Creatinine: 0.8 (03-18)      Liver Biochemical Testing Trend:  Alanine Aminotransferase (ALT/SGPT): <5 (03-19)  Alanine Aminotransferase (ALT/SGPT): <5 (03-18)  Alanine Aminotransferase (ALT/SGPT): <5 (02-02)  Alanine Aminotransferase (ALT/SGPT): 7 (02-01)  Alanine Aminotransferase (ALT/SGPT): 6 (01-31)  Aspartate Aminotransferase (AST/SGOT): 10 (03-19-24 @ 06:00)  Aspartate Aminotransferase (AST/SGOT): 18 (03-18-24 @ 21:35)  Aspartate Aminotransferase (AST/SGOT): 16 (02-02-24 @ 06:07)  Aspartate Aminotransferase (AST/SGOT): 21 (02-01-24 @ 06:47)  Aspartate Aminotransferase (AST/SGOT): 22 (01-31-24 @ 04:30)  Bilirubin Total: 0.4 (03-19)  Bilirubin Total: 0.4 (03-18)  Bilirubin Total: <0.2 (02-02)  Bilirubin Total: 0.3 (02-01)  Bilirubin Total: 0.3 (01-31)      Trend LDH  01-10-24 @ 06:55  230      Auto Eosinophil %: 0.9 % (03-18-24 @ 21:35)      Urinalysis Basic - ( 19 Mar 2024 06:00 )    Color: x / Appearance: x / SG: x / pH: x  Gluc: 270 mg/dL / Ketone: x  / Bili: x / Urobili: x   Blood: x / Protein: x / Nitrite: x   Leuk Esterase: x / RBC: x / WBC x   Sq Epi: x / Non Sq Epi: x / Bacteria: x        MICROBIOLOGY:              HIV-1/2 Combo Result: Nonreact (01-11-24 @ 13:40)                                          Lactate, Blood: 1.6 (03-18 @ 21:35)    A1C with Estimated Average Glucose Result: 6.5 % (01-10-24 @ 06:55)      RADIOLOGY:  imaging below personally reviewed   INFECTIOUS DISEASE CONSULT NOTE    Patient is a 53y old  Female who presents with a chief complaint of   HPI:  54yo female whose PMH includes COPD, DM, hypothyroid, IVDA on Suboxone, and evidence for hepatitis B, (untreated) Cand who was hospitalized last month for suspected leg cellulitis (though final diagnosis seems to have been Leukocytoclastic vasculitis) presents to the ER with bilateral leg swelling and redness. Associate with pain and difficulty walking      (19 Mar 2024 01:33)     Prior hospital charts reviewed [Yes]  Primary team notes reviewed [Yes]  Other consultant notes reviewed [Yes]    REVIEW OF SYSTEMS:  CONSTITUTIONAL: + chills  HEAD: No lesion on scalp  EYES: No visual disturbance  ENT: No sore throat  RESPIRATORY: No cough, no shortness of breath  CARDIOVASCULAR: No chest pain or palpitations  GASTROINTESTINAL: No abdominal or epigastric pain  GENITOURINARY: No dysuria  NEUROLOGICAL: No headache/dizziness  MUSCULOSKELETAL: No joint pain, erythema, or swelling; no back pain  SKIN: Multiple open wounds with drainage, swollen bilateral LEs  All other ROS negative except noted above      PAST MEDICAL & SURGICAL HISTORY:  Asthma with COPD      Hypothyroidism      H/O: substance abuse  no longer using      History of pancreatitis      Hepatitis-C      History of appendectomy      History of tonsillectomy          SOCIAL HISTORY:  - No recent travel  - Denies tobacco use  - Denies alcohol use  - Denies illicit drug use    FAMILY HISTORY:  NO family history of autoimmune diseases or vasculitis    Allergies:  No Known Allergies      ANTIMICROBIALS:  vancomycin  IVPB 750 every 12 hours      ANTIMICROBIALS (past 90 days):  MEDICATIONS  (STANDING):    vancomycin  IVPB.   250 mL/Hr IV Intermittent (03-18-24 @ 23:29)        OTHER MEDS:   MEDICATIONS  (STANDING):  albuterol/ipratropium for Nebulization 3 every 6 hours PRN  budesonide 160 MICROgram(s)/formoterol 4.5 MICROgram(s) Inhaler 2 two times a day  buprenorphine 8 mG/naloxone 2 mG SL  Tablet 2 <User Schedule>  dextrose 50% Injectable 25 once  dextrose 50% Injectable 12.5 once  dextrose 50% Injectable 25 once  dextrose Oral Gel 15 once PRN  FLUoxetine 40 daily  furosemide    Tablet 20 daily  glucagon  Injectable 1 once  insulin lispro (ADMELOG) corrective regimen sliding scale  three times a day before meals  ketorolac   Injectable 15 every 4 hours PRN  levothyroxine 150 <User Schedule>  predniSONE   Tablet 30 daily      VITALS:  Vital Signs Last 24 Hrs  T(F): 96 (03-19-24 @ 06:37), Max: 97 (03-18-24 @ 20:53)    Vital Signs Last 24 Hrs  HR: 65 (03-19-24 @ 06:37) (65 - 75)  BP: 89/58 (03-19-24 @ 06:37) (89/58 - 105/77)  RR: 16 (03-19-24 @ 06:37)  SpO2: 95% (03-19-24 @ 06:37) (95% - 99%)  Wt(kg): --    EXAM:  GENERAL: NAD, lying in bed  HEAD: No head lesions  EYES: Conjunctiva pink and cornea white  EAR, NOSE, MOUTH, THROAT: Normal external ears and nose, no discharges; moist mucous membranes  NECK: Supple, nontender to palpation; no JVD  RESPIRATORY: Clear to auscultation bilaterally  CARDIOVASCULAR: S1 S2  GASTROINTESTINAL: Soft, nontender, nondistended; normoactive bowel sounds  EXTREMITIES: No clubbing, cyanosis, Bilateral LE edema  NERVOUS SYSTEM: Alert and oriented to person, time, place and situation, speech clear. No focal deficits   MUSCULOSKELETAL: No joint erythema, swelling or pain  SKIN: Multiple open wounds with drainage. several crusted wounds, no significant warmth, but very tender as per patient, no superficial thrombophlebitis  PSYCH: Normal affect      Labs:                        9.4    7.72  )-----------( 238      ( 19 Mar 2024 06:00 )             28.2     03-19    134<L>  |  97<L>  |  10  ----------------------------<  270<H>  3.7   |  28  |  0.8    Ca    8.7      19 Mar 2024 06:00    TPro  6.3  /  Alb  3.5  /  TBili  0.4  /  DBili  x   /  AST  10  /  ALT  <5  /  AlkPhos  96  03-19      WBC Trend:  WBC Count: 7.72 (03-19-24 @ 06:00)  WBC Count: 10.40 (03-18-24 @ 21:35)      Auto Neutrophil #: 8.18 K/uL (03-18-24 @ 21:35)  Auto Neutrophil #: 4.06 K/uL (02-02-24 @ 06:07)  Auto Neutrophil #: 2.07 K/uL (02-01-24 @ 06:47)  Auto Neutrophil #: 3.85 K/uL (01-30-24 @ 21:22)  Auto Neutrophil #: 3.64 K/uL (01-10-24 @ 06:55)      Creatine Trend:  Creatinine: 0.8 (03-19)  Creatinine: 0.8 (03-18)      Liver Biochemical Testing Trend:  Alanine Aminotransferase (ALT/SGPT): <5 (03-19)  Alanine Aminotransferase (ALT/SGPT): <5 (03-18)  Alanine Aminotransferase (ALT/SGPT): <5 (02-02)  Alanine Aminotransferase (ALT/SGPT): 7 (02-01)  Alanine Aminotransferase (ALT/SGPT): 6 (01-31)  Aspartate Aminotransferase (AST/SGOT): 10 (03-19-24 @ 06:00)  Aspartate Aminotransferase (AST/SGOT): 18 (03-18-24 @ 21:35)  Aspartate Aminotransferase (AST/SGOT): 16 (02-02-24 @ 06:07)  Aspartate Aminotransferase (AST/SGOT): 21 (02-01-24 @ 06:47)  Aspartate Aminotransferase (AST/SGOT): 22 (01-31-24 @ 04:30)  Bilirubin Total: 0.4 (03-19)  Bilirubin Total: 0.4 (03-18)  Bilirubin Total: <0.2 (02-02)  Bilirubin Total: 0.3 (02-01)  Bilirubin Total: 0.3 (01-31)      Trend LDH  01-10-24 @ 06:55  230      Auto Eosinophil %: 0.9 % (03-18-24 @ 21:35)      Urinalysis Basic - ( 19 Mar 2024 06:00 )    Color: x / Appearance: x / SG: x / pH: x  Gluc: 270 mg/dL / Ketone: x  / Bili: x / Urobili: x   Blood: x / Protein: x / Nitrite: x   Leuk Esterase: x / RBC: x / WBC x   Sq Epi: x / Non Sq Epi: x / Bacteria: x        MICROBIOLOGY:      HIV-1/2 Combo Result: Nonreact (01-11-24 @ 13:40)    Lactate, Blood: 1.6 (03-18 @ 21:35)    A1C with Estimated Average Glucose Result: 6.5 % (01-10-24 @ 06:55)      RADIOLOGY:  imaging below personally reviewed    < from: US Kidney and Bladder (03.19.24 @ 08:54) >  IMPRESSION:    Normal renal ultrasound.    Mildly distended urinary bladder.    < end of copied text >      < from: [facility] Duplex Lower Extrem Arterial, Bilat (01.31.24 @ 20:48) >  IMPRESSION:  Normal blood flow is visualized in bilateral lower extremity arterial   system.    < end of copied text >

## 2024-03-20 LAB
GLUCOSE BLDC GLUCOMTR-MCNC: 284 MG/DL — HIGH (ref 70–99)
GLUCOSE BLDC GLUCOMTR-MCNC: 335 MG/DL — HIGH (ref 70–99)
GLUCOSE BLDC GLUCOMTR-MCNC: 393 MG/DL — HIGH (ref 70–99)
GLUCOSE BLDC GLUCOMTR-MCNC: 430 MG/DL — HIGH (ref 70–99)
VANCOMYCIN TROUGH SERPL-MCNC: 9.2 UG/ML — SIGNIFICANT CHANGE UP (ref 5–10)

## 2024-03-20 PROCEDURE — 99233 SBSQ HOSP IP/OBS HIGH 50: CPT

## 2024-03-20 RX ORDER — INSULIN LISPRO 100/ML
VIAL (ML) SUBCUTANEOUS
Refills: 0 | Status: DISCONTINUED | OUTPATIENT
Start: 2024-03-20 | End: 2024-03-23

## 2024-03-20 RX ORDER — FUROSEMIDE 40 MG
40 TABLET ORAL
Refills: 0 | Status: DISCONTINUED | OUTPATIENT
Start: 2024-03-20 | End: 2024-03-22

## 2024-03-20 RX ORDER — INSULIN GLARGINE 100 [IU]/ML
9 INJECTION, SOLUTION SUBCUTANEOUS AT BEDTIME
Refills: 0 | Status: DISCONTINUED | OUTPATIENT
Start: 2024-03-20 | End: 2024-03-23

## 2024-03-20 RX ORDER — INSULIN LISPRO 100/ML
6 VIAL (ML) SUBCUTANEOUS
Refills: 0 | Status: DISCONTINUED | OUTPATIENT
Start: 2024-03-20 | End: 2024-03-23

## 2024-03-20 RX ORDER — INSULIN LISPRO 100/ML
3 VIAL (ML) SUBCUTANEOUS ONCE
Refills: 0 | Status: COMPLETED | OUTPATIENT
Start: 2024-03-20 | End: 2024-03-20

## 2024-03-20 RX ORDER — BUPRENORPHINE AND NALOXONE 2; .5 MG/1; MG/1
2 TABLET SUBLINGUAL DAILY
Refills: 0 | Status: DISCONTINUED | OUTPATIENT
Start: 2024-03-20 | End: 2024-03-23

## 2024-03-20 RX ORDER — BUPRENORPHINE AND NALOXONE 2; .5 MG/1; MG/1
10 TABLET SUBLINGUAL DAILY
Refills: 0 | Status: DISCONTINUED | OUTPATIENT
Start: 2024-03-20 | End: 2024-03-20

## 2024-03-20 RX ORDER — INSULIN LISPRO 100/ML
6 VIAL (ML) SUBCUTANEOUS
Refills: 0 | Status: DISCONTINUED | OUTPATIENT
Start: 2024-03-21 | End: 2024-03-23

## 2024-03-20 RX ADMIN — Medication 40 MILLIGRAM(S): at 21:42

## 2024-03-20 RX ADMIN — Medication 150 MICROGRAM(S): at 05:35

## 2024-03-20 RX ADMIN — Medication 15 MILLIGRAM(S): at 04:02

## 2024-03-20 RX ADMIN — Medication 3 UNIT(S): at 21:42

## 2024-03-20 RX ADMIN — Medication 15 MILLIGRAM(S): at 19:50

## 2024-03-20 RX ADMIN — Medication 40 MILLIGRAM(S): at 10:54

## 2024-03-20 RX ADMIN — Medication 6: at 08:17

## 2024-03-20 RX ADMIN — Medication 20 MILLIGRAM(S): at 05:34

## 2024-03-20 RX ADMIN — BUPRENORPHINE AND NALOXONE 2 TABLET(S): 2; .5 TABLET SUBLINGUAL at 10:54

## 2024-03-20 RX ADMIN — AMPICILLIN SODIUM AND SULBACTAM SODIUM 100 GRAM(S): 250; 125 INJECTION, POWDER, FOR SUSPENSION INTRAMUSCULAR; INTRAVENOUS at 05:41

## 2024-03-20 RX ADMIN — Medication 15 MILLIGRAM(S): at 08:32

## 2024-03-20 RX ADMIN — Medication 6 UNIT(S): at 11:56

## 2024-03-20 RX ADMIN — Medication 1 APPLICATION(S): at 21:44

## 2024-03-20 RX ADMIN — AMPICILLIN SODIUM AND SULBACTAM SODIUM 100 GRAM(S): 250; 125 INJECTION, POWDER, FOR SUSPENSION INTRAMUSCULAR; INTRAVENOUS at 11:56

## 2024-03-20 RX ADMIN — BUDESONIDE AND FORMOTEROL FUMARATE DIHYDRATE 2 PUFF(S): 160; 4.5 AEROSOL RESPIRATORY (INHALATION) at 19:50

## 2024-03-20 RX ADMIN — Medication 1 APPLICATION(S): at 07:15

## 2024-03-20 RX ADMIN — Medication 250 MILLIGRAM(S): at 17:04

## 2024-03-20 RX ADMIN — Medication 250 MILLIGRAM(S): at 05:36

## 2024-03-20 RX ADMIN — Medication 3: at 17:03

## 2024-03-20 RX ADMIN — AMPICILLIN SODIUM AND SULBACTAM SODIUM 100 GRAM(S): 250; 125 INJECTION, POWDER, FOR SUSPENSION INTRAMUSCULAR; INTRAVENOUS at 23:46

## 2024-03-20 RX ADMIN — Medication 15 MILLIGRAM(S): at 08:17

## 2024-03-20 RX ADMIN — BUDESONIDE AND FORMOTEROL FUMARATE DIHYDRATE 2 PUFF(S): 160; 4.5 AEROSOL RESPIRATORY (INHALATION) at 08:17

## 2024-03-20 RX ADMIN — Medication 15 MILLIGRAM(S): at 20:20

## 2024-03-20 RX ADMIN — Medication 1 APPLICATION(S): at 13:33

## 2024-03-20 RX ADMIN — Medication 40 MILLIGRAM(S): at 11:57

## 2024-03-20 RX ADMIN — INSULIN GLARGINE 9 UNIT(S): 100 INJECTION, SOLUTION SUBCUTANEOUS at 21:42

## 2024-03-20 RX ADMIN — AMPICILLIN SODIUM AND SULBACTAM SODIUM 100 GRAM(S): 250; 125 INJECTION, POWDER, FOR SUSPENSION INTRAMUSCULAR; INTRAVENOUS at 17:03

## 2024-03-20 RX ADMIN — Medication 5: at 11:57

## 2024-03-20 RX ADMIN — Medication 6 UNIT(S): at 17:03

## 2024-03-20 NOTE — PHARMACY COMMUNICATION NOTE - COMMENTS
as per dr Nickerson patient on Suboxone 8mg film, 2.5 film daily. Switched to Suboxone SL tablets 20mg as per MD

## 2024-03-20 NOTE — CONSULT NOTE ADULT - SUBJECTIVE AND OBJECTIVE BOX
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 Associate with pain and difficulty walking  .ptn seen and  exam  at bed  side  c.o  bobbi  le  weakness  and  pain  ptn  labs  , imaging and  medical and  rehab  notes are appreciated  and reviewed     PTN  REFERRED TO ACUTE  REHAB  FOR  EVAL AND  TX   PAST MEDICAL & SURGICAL HISTORY:  Asthma with COPD      Hypothyroidism      H/O: substance abuse  no longer using      History of pancreatitis      Hepatitis-C      History of appendectomy      History of tonsillectomy          Hospital Course:    TODAY'S SUBJECTIVE & REVIEW OF SYMPTOMS:     Constitutional WNL   Cardio WNL   Resp WNL   GI WNL  Heme WNL  Endo WNL  Skin WNL  MSK WNL  Neuro WNL  Cognitive WNL  Psych WNL      MEDICATIONS  (STANDING):  ampicillin/sulbactam  IVPB 1.5 Gram(s) IV Intermittent once  ampicillin/sulbactam  IVPB 1.5 Gram(s) IV Intermittent every 6 hours  ampicillin/sulbactam  IVPB      budesonide 160 MICROgram(s)/formoterol 4.5 MICROgram(s) Inhaler 2 Puff(s) Inhalation two times a day  buprenorphine 8 mG/naloxone 2 mG SL  Tablet 2 Tablet(s) SubLingual <User Schedule>  dextrose 5%. 1000 milliLiter(s) (100 mL/Hr) IV Continuous <Continuous>  dextrose 5%. 1000 milliLiter(s) (50 mL/Hr) IV Continuous <Continuous>  dextrose 50% Injectable 25 Gram(s) IV Push once  dextrose 50% Injectable 12.5 Gram(s) IV Push once  dextrose 50% Injectable 25 Gram(s) IV Push once  FLUoxetine 40 milliGRAM(s) Oral daily  furosemide   Injectable 40 milliGRAM(s) IV Push two times a day  glucagon  Injectable 1 milliGRAM(s) IntraMuscular once  hydrocortisone 1% Cream 1 Application(s) Topical three times a day  insulin glargine Injectable (LANTUS) 9 Unit(s) SubCutaneous at bedtime  insulin lispro (ADMELOG) corrective regimen sliding scale   SubCutaneous three times a day before meals  insulin lispro Injectable (ADMELOG) 6 Unit(s) SubCutaneous before lunch  insulin lispro Injectable (ADMELOG) 6 Unit(s) SubCutaneous before dinner  levothyroxine 150 MICROGram(s) Oral <User Schedule>  sodium chloride 0.9% Bolus 1000 milliLiter(s) IV Bolus once  vancomycin  IVPB 750 milliGRAM(s) IV Intermittent every 12 hours    MEDICATIONS  (PRN):  albuterol/ipratropium for Nebulization 3 milliLiter(s) Nebulizer every 6 hours PRN Shortness of Breath and/or Wheezing  dextrose Oral Gel 15 Gram(s) Oral once PRN Blood Glucose LESS THAN 70 milliGRAM(s)/deciliter  ketorolac   Injectable 15 milliGRAM(s) IV Push every 4 hours PRN Moderate Pain (4 - 6)      FAMILY HISTORY:      Allergies    No Known Allergies    Intolerances        SOCIAL HISTORY:    [  ] Etoh  [  ] Smoking  [  ] Substance abuse     Home Environment:  [  ] Home Alone  [ x ] Lives with Family  [  ] Home Health Aid    Dwelling:  [  ] Apartment  [ x ] Private House  [  ] Adult Home  [  ] Skilled Nursing Facility      [  ] Short Term  [  ] Long Term  [ x ] Stairs   out side     Elevator [  ]    FUNCTIONAL STATUS PTA: (Check all that apply)  Ambulation: [ x  ]Independent    [  ] Dependent     [  ] Non-Ambulatory  Assistive Device: [  ] SA Cane  [  ]  Q Cane  [ x ] Walker  [  ]  Wheelchair  ADL : [ x ] Independent  [  ]  Dependent       Vital Signs Last 24 Hrs  T(C): 36.3 (20 Mar 2024 06:40), Max: 36.3 (19 Mar 2024 14:20)  T(F): 97.4 (20 Mar 2024 06:40), Max: 97.4 (20 Mar 2024 06:40)  HR: 72 (20 Mar 2024 06:40) (61 - 72)  BP: 136/78 (20 Mar 2024 06:40) (107/64 - 136/78)  BP(mean): --  RR: 18 (20 Mar 2024 06:40) (16 - 18)  SpO2: 97% (20 Mar 2024 06:40) (95% - 98%)    Parameters below as of 20 Mar 2024 06:40  Patient On (Oxygen Delivery Method): room air          PHYSICAL EXAM: Alert & Oriented X3  GENERAL: NAD, well-groomed, well-developed  HEAD:  Atraumatic, Normocephalic  EYES: EOMI, PERRLA, conjunctiva and sclera clear  NECK: Supple, No JVD, Normal thyroid  CHEST/LUNG: Clear to percussion bilaterally; No rales, rhonchi, wheezing, or rubs  HEART: Regular rate and rhythm; No murmurs, rubs, or gallops  ABDOMEN: Soft, Nontender, Nondistended; Bowel sounds present  EXTREMITIES:  2+ Peripheral Pulses, No clubbing, cyanosis, or edema    NERVOUS SYSTEM:  Cranial Nerves 2-12 intact [x  ] Abnormal  [  ]  ROM: WFL all extremities [  ]  Abnormal [ x ]  Motor Strength: WFL all extremities  [  3-4/5  bobbi  le  ]  Abnormal [  ]  Sensation: intact to light touch [ x ] Abnormal [  ]  Reflexes: Symmetric [x  ]  Abnormal [  ]    FUNCTIONAL STATUS:  Bed Mobility: Independent [  ]  Supervision [  ]  Needs Assistance [ x ]  N/A [  ]  Transfers: Independent [  ]  Supervision [  ]  Needs Assistance [  x]  N/A [  ]   Ambulation: Independent [  ]  Supervision [  ]  Needs Assistance [ x ]  N/A [  ]  ADL: Independent [  ] Requires Assistance [  ] N/A [  x]  SEE PT/OT IE NOTES    LABS:                        9.4    7.72  )-----------( 238      ( 19 Mar 2024 06:00 )             28.2     03-19    134<L>  |  97<L>  |  10  ----------------------------<  270<H>  3.7   |  28  |  0.8    Ca    8.7      19 Mar 2024 06:00    TPro  6.3  /  Alb  3.5  /  TBili  0.4  /  DBili  x   /  AST  10  /  ALT  <5  /  AlkPhos  96  03-19      Urinalysis Basic - ( 19 Mar 2024 06:00 )    Color: x / Appearance: x / SG: x / pH: x  Gluc: 270 mg/dL / Ketone: x  / Bili: x / Urobili: x   Blood: x / Protein: x / Nitrite: x   Leuk Esterase: x / RBC: x / WBC x   Sq Epi: x / Non Sq Epi: x / Bacteria: x        RADIOLOGY & ADDITIONAL STUDIES:    Assesment:

## 2024-03-20 NOTE — CONSULT NOTE ADULT - ASSESSMENT
IMPRESSION: Rehab of 54 y/o  f  rehab  for  gd  bobbi  le  swelling  cellulitis     PRECAUTIONS: [  ] Cardiac  [  ] Respiratory  [  ] Seizures [  ] Contact Isolation  [  ] Droplet Isolation  [ FALL ] Other    Weight Bearing Status:     RECOMMENDATION:    Out of Bed to Chair     DVT/Decubiti Prophylaxis    REHAB PLAN:     [ xx  ] Bedside P/T 3-5 times a week   [   ]   Bedside O/T  2-3 times a week             [   ] No Rehab Therapy Indicated                   [   ]  Speech Therapy   Conditioning/ROM                                    ADL  Bed Mobility                                               Conditioning/ROM  Transfers                                                     Bed Mobility  Sitting /Standing Balance                         Transfers                                        Gait Training                                               Sitting/Standing Balance  Stair Training [   ]Applicable                    Home equipment Eval                                                                        Splinting  [   ] Only      GOALS:   ADL   [ x ]   Independent                    Transfers  [  x ] Independent                          Ambulation  [x  ] Independent     [  x  ] With device                            [x   ]  CG                                                         [  x ]  CG                                                                  [ x  ] CG                            [    ] Min A                                                   [   ] Min A                                                              [   ] Min  A          DISCHARGE PLAN:   [   ]  Good candidate for Intensive Rehabilitation/Hospital based-[facility]A [facility]                                             Will tolerate 3hrs Intensive Rehab Daily                                       [  xx  ]  Short Term Rehab in Skilled Nursing Facility and  cont iv  abx  and  [facility]                                        [    ]  Home with Outpatient or VN services                                         [    ]  Possible Candidate for Intensive Hospital based Rehab

## 2024-03-21 LAB
ANION GAP SERPL CALC-SCNC: 11 MMOL/L — SIGNIFICANT CHANGE UP (ref 7–14)
BUN SERPL-MCNC: 14 MG/DL — SIGNIFICANT CHANGE UP (ref 10–20)
CALCIUM SERPL-MCNC: 9.4 MG/DL — SIGNIFICANT CHANGE UP (ref 8.4–10.5)
CHLORIDE SERPL-SCNC: 96 MMOL/L — LOW (ref 98–110)
CO2 SERPL-SCNC: 32 MMOL/L — SIGNIFICANT CHANGE UP (ref 17–32)
CREAT SERPL-MCNC: 1 MG/DL — SIGNIFICANT CHANGE UP (ref 0.7–1.5)
EGFR: 67 ML/MIN/1.73M2 — SIGNIFICANT CHANGE UP
GLUCOSE BLDC GLUCOMTR-MCNC: 135 MG/DL — HIGH (ref 70–99)
GLUCOSE BLDC GLUCOMTR-MCNC: 215 MG/DL — HIGH (ref 70–99)
GLUCOSE BLDC GLUCOMTR-MCNC: 227 MG/DL — HIGH (ref 70–99)
GLUCOSE BLDC GLUCOMTR-MCNC: 276 MG/DL — HIGH (ref 70–99)
GLUCOSE SERPL-MCNC: 208 MG/DL — HIGH (ref 70–99)
MAGNESIUM SERPL-MCNC: 1.9 MG/DL — SIGNIFICANT CHANGE UP (ref 1.8–2.4)
POTASSIUM SERPL-MCNC: 3.9 MMOL/L — SIGNIFICANT CHANGE UP (ref 3.5–5)
POTASSIUM SERPL-SCNC: 3.9 MMOL/L — SIGNIFICANT CHANGE UP (ref 3.5–5)
SODIUM SERPL-SCNC: 139 MMOL/L — SIGNIFICANT CHANGE UP (ref 135–146)

## 2024-03-21 PROCEDURE — 99232 SBSQ HOSP IP/OBS MODERATE 35: CPT

## 2024-03-21 PROCEDURE — 99221 1ST HOSP IP/OBS SF/LOW 40: CPT

## 2024-03-21 RX ADMIN — AMPICILLIN SODIUM AND SULBACTAM SODIUM 100 GRAM(S): 250; 125 INJECTION, POWDER, FOR SUSPENSION INTRAMUSCULAR; INTRAVENOUS at 05:10

## 2024-03-21 RX ADMIN — Medication 15 MILLIGRAM(S): at 20:23

## 2024-03-21 RX ADMIN — BUDESONIDE AND FORMOTEROL FUMARATE DIHYDRATE 2 PUFF(S): 160; 4.5 AEROSOL RESPIRATORY (INHALATION) at 07:45

## 2024-03-21 RX ADMIN — Medication 15 MILLIGRAM(S): at 03:24

## 2024-03-21 RX ADMIN — Medication 2: at 07:44

## 2024-03-21 RX ADMIN — Medication 250 MILLIGRAM(S): at 17:23

## 2024-03-21 RX ADMIN — Medication 150 MICROGRAM(S): at 05:13

## 2024-03-21 RX ADMIN — INSULIN GLARGINE 9 UNIT(S): 100 INJECTION, SOLUTION SUBCUTANEOUS at 21:20

## 2024-03-21 RX ADMIN — AMPICILLIN SODIUM AND SULBACTAM SODIUM 100 GRAM(S): 250; 125 INJECTION, POWDER, FOR SUSPENSION INTRAMUSCULAR; INTRAVENOUS at 23:13

## 2024-03-21 RX ADMIN — Medication 40 MILLIGRAM(S): at 13:24

## 2024-03-21 RX ADMIN — BUPRENORPHINE AND NALOXONE 2 TABLET(S): 2; .5 TABLET SUBLINGUAL at 11:19

## 2024-03-21 RX ADMIN — Medication 40 MILLIGRAM(S): at 05:12

## 2024-03-21 RX ADMIN — AMPICILLIN SODIUM AND SULBACTAM SODIUM 100 GRAM(S): 250; 125 INJECTION, POWDER, FOR SUSPENSION INTRAMUSCULAR; INTRAVENOUS at 17:23

## 2024-03-21 RX ADMIN — Medication 1 APPLICATION(S): at 17:24

## 2024-03-21 RX ADMIN — Medication 6 UNIT(S): at 17:22

## 2024-03-21 RX ADMIN — Medication 250 MILLIGRAM(S): at 05:12

## 2024-03-21 RX ADMIN — AMPICILLIN SODIUM AND SULBACTAM SODIUM 100 GRAM(S): 250; 125 INJECTION, POWDER, FOR SUSPENSION INTRAMUSCULAR; INTRAVENOUS at 11:15

## 2024-03-21 RX ADMIN — Medication 1 APPLICATION(S): at 05:11

## 2024-03-21 RX ADMIN — Medication 2: at 17:22

## 2024-03-21 RX ADMIN — Medication 6 UNIT(S): at 07:44

## 2024-03-21 RX ADMIN — Medication 15 MILLIGRAM(S): at 03:54

## 2024-03-21 RX ADMIN — Medication 40 MILLIGRAM(S): at 11:19

## 2024-03-21 RX ADMIN — BUDESONIDE AND FORMOTEROL FUMARATE DIHYDRATE 2 PUFF(S): 160; 4.5 AEROSOL RESPIRATORY (INHALATION) at 20:07

## 2024-03-21 RX ADMIN — Medication 15 MILLIGRAM(S): at 20:17

## 2024-03-21 NOTE — CONSULT NOTE ADULT - SUBJECTIVE AND OBJECTIVE BOX
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 Associate with pain and difficulty walking.         PAST MEDICAL & SURGICAL HISTORY:  Asthma with COPD  Hypothyroidism  H/O: substance abuse  no longer using  History of pancreatitis  Hepatitis-C  History of appendectomy  History of tonsillectomy      REVIEW OF SYSTEMS:  All other systems negative,  unless indicated in HPI    MEDICATIONS  (STANDING):  ampicillin/sulbactam  IVPB 1.5 Gram(s) IV Intermittent once  ampicillin/sulbactam  IVPB 1.5 Gram(s) IV Intermittent every 6 hours  ampicillin/sulbactam  IVPB      budesonide 160 MICROgram(s)/formoterol 4.5 MICROgram(s) Inhaler 2 Puff(s) Inhalation two times a day  buprenorphine 2 mG/naloxone 0.5 mG SL  Tablet 2 Tablet(s) SubLingual daily  buprenorphine 8 mG/naloxone 2 mG SL  Tablet 2 Tablet(s) SubLingual daily  dextrose 5%. 1000 milliLiter(s) (100 mL/Hr) IV Continuous <Continuous>  dextrose 5%. 1000 milliLiter(s) (50 mL/Hr) IV Continuous <Continuous>  dextrose 50% Injectable 25 Gram(s) IV Push once  dextrose 50% Injectable 12.5 Gram(s) IV Push once  dextrose 50% Injectable 25 Gram(s) IV Push once  FLUoxetine 40 milliGRAM(s) Oral daily  furosemide   Injectable 40 milliGRAM(s) IV Push two times a day  glucagon  Injectable 1 milliGRAM(s) IntraMuscular once  hydrocortisone 1% Cream 1 Application(s) Topical three times a day  insulin glargine Injectable (LANTUS) 9 Unit(s) SubCutaneous at bedtime  insulin lispro (ADMELOG) corrective regimen sliding scale   SubCutaneous three times a day before meals  insulin lispro Injectable (ADMELOG) 6 Unit(s) SubCutaneous before breakfast  insulin lispro Injectable (ADMELOG) 6 Unit(s) SubCutaneous before lunch  insulin lispro Injectable (ADMELOG) 6 Unit(s) SubCutaneous before dinner  levothyroxine 150 MICROGram(s) Oral <User Schedule>  sodium chloride 0.9% Bolus 1000 milliLiter(s) IV Bolus once  vancomycin  IVPB 750 milliGRAM(s) IV Intermittent every 12 hours    MEDICATIONS  (PRN):  albuterol/ipratropium for Nebulization 3 milliLiter(s) Nebulizer every 6 hours PRN Shortness of Breath and/or Wheezing  dextrose Oral Gel 15 Gram(s) Oral once PRN Blood Glucose LESS THAN 70 milliGRAM(s)/deciliter  ketorolac   Injectable 15 milliGRAM(s) IV Push every 4 hours PRN Moderate Pain (4 - 6)      Allergies  No Known Allergies  Intolerances      SOCIAL HISTORY:   Lives with family     FAMILY HISTORY:   No pertinent family history     PHYSICAL EXAM:  Vital Signs Last 24 Hrs  T(C): 36.4 (21 Mar 2024 04:30), Max: 36.4 (21 Mar 2024 04:30)  T(F): 97.6 (21 Mar 2024 04:30), Max: 97.6 (21 Mar 2024 04:30)  HR: 62 (21 Mar 2024 04:30) (62 - 67)  BP: 127/79 (21 Mar 2024 04:30) (119/72 - 127/79)  BP(mean): --  RR: 19 (21 Mar 2024 04:30) (18 - 19)  SpO2: 96% (21 Mar 2024 04:30) (95% - 96%)    CONSTITUTIONAL: + chills  HEAD: No lesion on scalp  EYES: No visual disturbance  ENT: No sore throat  RESPIRATORY: No cough, no shortness of breath  CARDIOVASCULAR: No chest pain or palpitations  GASTROINTESTINAL: No abdominal or epigastric pain  GENITOURINARY: No dysuria  NEUROLOGICAL: No headache/dizziness  MUSCULOSKELETAL: No joint pain, erythema, or swelling; no back pain  SKIN: : B/L lower extremity,  multiple  open wounds with drainage and edema       LABS/ CULTURES/ RADIOLOGY:      139  |  96  |  14  ----------------------------<  208      [03-21-24 @ 08:09]  3.9   |  32  |  1.0        Ca     9.4     [03-21-24 @ 08:09]      Mg     1.9     [03-21-24 @ 08:09]                Culture - Blood (collected 03-18-24 @ 21:39)  Source: .Blood Blood  Preliminary Report (03-20-24 @ 17:02):    No growth at 24 hours    Culture - Blood (collected 03-18-24 @ 21:39)  Source: .Blood Blood  Preliminary Report (03-20-24 @ 17:02):    No growth at 24 hours

## 2024-03-21 NOTE — CONSULT NOTE ADULT - ASSESSMENT
Skin assessed-  B/L lower extremity,  multiple  open wounds with drainage and edema and erythema                                                High risk for pressure injury development or progression           Plan:  Wound and skin care   Clean lower extremity wounds with soap and water, pat dry then apply silvadene dressing    Elevate lower extremity when sitting dependent   Pressure injury  prevention   skin  and incontinence care   Assess wound and inform primary provider of any changes   Case discussed with primary Rn  Wound/ ostomy specialist  to f/u as needed     Offloading: [x ] Frequent position changes [ ] Devices/Equipment  Cleansing: [ ] Saline [x ] Soap/Water [ ] Other: ______  Topicals: [ ] Barrier Cream [x ] Antimicrobial [ ] Enzymatic Wound Debridement  Dressings: [ ] Dry, sterile [ ] Allevyn  Foam [ ] Absorbant Pads [ ] Collagenase    Other Recs.    per primary team      Total time for bedside assessment , review of medical records  and  discussion of plan of care with primary team greater than 35 min

## 2024-03-22 ENCOUNTER — TRANSCRIPTION ENCOUNTER (OUTPATIENT)
Age: 54
End: 2024-03-22

## 2024-03-22 LAB
GLUCOSE BLDC GLUCOMTR-MCNC: 128 MG/DL — HIGH (ref 70–99)
GLUCOSE BLDC GLUCOMTR-MCNC: 158 MG/DL — HIGH (ref 70–99)
GLUCOSE BLDC GLUCOMTR-MCNC: 182 MG/DL — HIGH (ref 70–99)
GLUCOSE BLDC GLUCOMTR-MCNC: 230 MG/DL — HIGH (ref 70–99)

## 2024-03-22 PROCEDURE — 99232 SBSQ HOSP IP/OBS MODERATE 35: CPT

## 2024-03-22 RX ORDER — FUROSEMIDE 40 MG
40 TABLET ORAL DAILY
Refills: 0 | Status: DISCONTINUED | OUTPATIENT
Start: 2024-03-23 | End: 2024-03-23

## 2024-03-22 RX ADMIN — AMPICILLIN SODIUM AND SULBACTAM SODIUM 100 GRAM(S): 250; 125 INJECTION, POWDER, FOR SUSPENSION INTRAMUSCULAR; INTRAVENOUS at 12:04

## 2024-03-22 RX ADMIN — BUPRENORPHINE AND NALOXONE 2 TABLET(S): 2; .5 TABLET SUBLINGUAL at 12:00

## 2024-03-22 RX ADMIN — Medication 1 APPLICATION(S): at 05:07

## 2024-03-22 RX ADMIN — Medication 2: at 17:07

## 2024-03-22 RX ADMIN — Medication 150 MICROGRAM(S): at 05:08

## 2024-03-22 RX ADMIN — Medication 6 UNIT(S): at 08:10

## 2024-03-22 RX ADMIN — Medication 1: at 08:11

## 2024-03-22 RX ADMIN — Medication 250 MILLIGRAM(S): at 17:09

## 2024-03-22 RX ADMIN — Medication 6 UNIT(S): at 17:07

## 2024-03-22 RX ADMIN — AMPICILLIN SODIUM AND SULBACTAM SODIUM 100 GRAM(S): 250; 125 INJECTION, POWDER, FOR SUSPENSION INTRAMUSCULAR; INTRAVENOUS at 05:06

## 2024-03-22 RX ADMIN — BUDESONIDE AND FORMOTEROL FUMARATE DIHYDRATE 2 PUFF(S): 160; 4.5 AEROSOL RESPIRATORY (INHALATION) at 08:25

## 2024-03-22 RX ADMIN — AMPICILLIN SODIUM AND SULBACTAM SODIUM 100 GRAM(S): 250; 125 INJECTION, POWDER, FOR SUSPENSION INTRAMUSCULAR; INTRAVENOUS at 17:09

## 2024-03-22 RX ADMIN — Medication 40 MILLIGRAM(S): at 05:08

## 2024-03-22 RX ADMIN — BUDESONIDE AND FORMOTEROL FUMARATE DIHYDRATE 2 PUFF(S): 160; 4.5 AEROSOL RESPIRATORY (INHALATION) at 21:26

## 2024-03-22 RX ADMIN — Medication 1 APPLICATION(S): at 17:08

## 2024-03-22 RX ADMIN — Medication 40 MILLIGRAM(S): at 11:59

## 2024-03-22 RX ADMIN — INSULIN GLARGINE 9 UNIT(S): 100 INJECTION, SOLUTION SUBCUTANEOUS at 21:26

## 2024-03-22 RX ADMIN — Medication 250 MILLIGRAM(S): at 05:08

## 2024-03-22 NOTE — DISCHARGE NOTE PROVIDER - CARE PROVIDER_API CALL
Javier Perez  Geriatric Medicine  [address] [place] [address]  Phone: [phone]  Fax: [phone]  Follow Up Time:     Naye Mills  Rheumatology  [address] [place] [address], [address]  [place] [address]  Phone: [phone]  Fax: [phone]  Follow Up Time:

## 2024-03-22 NOTE — DISCHARGE NOTE PROVIDER - CARE PROVIDERS DIRECT ADDRESSES
none ,[email or web address]

## 2024-03-22 NOTE — PROGRESS NOTE ADULT - ASSESSMENT
#cellulitis/lower extremity vasculitis   - on iv vanco , unasyn - dose increased by ID - follow up trough after 4th dose - ordered for 3/20 @ 1500 - 9.2 - will switch to doxy 100mg q12 and augmentin 875 q12 for 7 days total up dc  - spoke with Dr opitz - pathology - will add addendum for skin biopsy  - final dx of leukocytoclastic vasculitis  - spoke with Dr soria - [facility] - hold prednisone for now since she has been off of it for now - will need to restart pred 20mg after infection resolves and follow up in office  - requested myeloperoxidase ab to be collected    will change iv lasix to po  for edema - wound care rn eval - vns for home , darco to ambulate with legs wrapped   elevate legs  Plan:  Wound and skin care   Clean lower extremity wounds with soap and water, pat dry then apply silvadene dressing    Elevate lower extremity when sitting dependent   Pressure injury  prevention   skin  and incontinence care     #hx of copd - no sob, cont home inhalers     #hx of ivda - clean now - on suboxone - confirmed with pharmacy 20mg total a day  # hxof hep b/c    prepare for dc in am     
#cellulitis/lower extremity vasculitis   - on iv vanco , unasyn - dose increased by ID - follow up trough after 4th dose   - will need final pathology dx and likely rheumatology follow up once infection clears  will ad iv lasix for tense edema - wound care rn eval     #hx of copd - no longer on steroids - will dc     #hx of ivda - clean now - on suboxone   # hxof hep b/c    
#cellulitis/lower extremity vasculitis   - on iv vanco , unasyn - dose increased by ID - follow up trough after 4th dose - ordered for 3/20 @ 1500  - spoke with Dr opitz - pathology - will add addendum - final dx of leukocytoclastic vasculitis  - spoke with Dr soria - R[facility]um - hold prednisone for now since she has been off of it for now - will need to restart pred 20mg after infection resolves and follow up in office  - requested myeloperoxidase ab to be collected    will ad iv lasix for tense edema - wound care rn eddie , monitor bmp and mag    #hx of copd - no sob, cont home inhalers     #hx of ivda - clean now - on suboxone - confirmed with pharmacy 20mg total a day  # hxof hep b/c    
#cellulitis/lower extremity vasculitis   - on iv vanco , unasyn - dose increased by ID - follow up trough after 4th dose - ordered for 3/20 @ 1500 - 9.2 - will follow up with ID if plan to switch  - spoke with Dr opitz - pathology - will add addendum - final dx of leukocytoclastic vasculitis  - spoke with Dr soria - R[facility]um - hold prednisone for now since she has been off of it for now - will need to restart pred 20mg after infection resolves and follow up in office  - requested myeloperoxidase ab to be collected    will cont iv lasix for edema - wound care rn eval , monitor bmp and mag  elevate legs  Plan:  Wound and skin care   Clean lower extremity wounds with soap and water, pat dry then apply silvadene dressing    Elevate lower extremity when sitting dependent   Pressure injury  prevention   skin  and incontinence care     #hx of copd - no sob, cont home inhalers     #hx of ivda - clean now - on suboxone - confirmed with pharmacy 20mg total a day  # hxof hep b/c    dc planning 48 hrs - ID follow up     
52yo female whose PMH includes COPD, DM, hypothyroid, IVDA on Suboxone, and evidence for hepatitis B, (untreated) Cand who was hospitalized last month for suspected leg cellulitis (though final diagnosis seems to have been Leukocytoclastic vasculitis) presents to the ER with bilateral leg swelling and redness.    ID is following for cellulitis  Afebrile, no leucocytosis  BCx x2 pending  On exam, there are multiple open wounds with active thick drainage. A few wounds are crusted. All lesions are below knees. Bilateral LEs with chronic skin changes with erythema but minimal warmth  Bilateral LEs are at risk for superimposed cellulitis  Patient is not on steroid since early Feb, still waiting for her vascular appointment    Antibiotics:  Vancomycin 3/19 ->  Unasyn 3/19 ->      IMPRESSION:  Bilateral LE wounds  Leukocytoclastic vasculitis  Hx Hepatitis C  Venous stasis dermatitis    RECOMMENDATIONS:  - Continue IV vancomycin 750mg q12hrs and continue IV Unasyn 1.5g q6hrs  - Monitor vancomycin trough 30 minutes prior 4th dose, keep trough around 10; monitor Cr daily  - Follow up vancomycin trough  - Follow up BCx  - Wound care follow up  - Leg elevation  - Offloading and frequent position changes, aspiration precaution  - Trend WBC, fever curve, transaminases, creatinine daily      Snow Saleh D.O.  Attending Physician  Division of Infectious Diseases  [facility] - [facility]  Please contact me via Microsoft Teams      
52yo female whose PMH includes COPD, DM, hypothyroid, IVDA on Suboxone, and evidence for hepatitis B, (untreated) Cand who was hospitalized last month for suspected leg cellulitis (though final diagnosis seems to have been Leukocytoclastic vasculitis) presents to the ER with bilateral leg swelling and redness.    ID is following for cellulitis  Afebrile, no leucocytosis  BCx x2 NGTD  On exam, there are multiple open wounds with active thick drainage. A few wounds are crusted. All lesions are below knees. Bilateral LEs with chronic skin changes with erythema but minimal warmth  Bilateral LEs are at risk for superimposed cellulitis  Patient is not on steroid since early Feb, still waiting for her vascular appointment    Antibiotics:  Vancomycin 3/19 ->  Unasyn 3/19 ->      IMPRESSION:  Bilateral LE wounds  Leukocytoclastic vasculitis  Hx Hepatitis C  Venous stasis dermatitis    RECOMMENDATIONS:  - Continue IV vancomycin 750mg q12hrs and continue IV Unasyn 1.5g q6hrs  - Can de-escalate to PO doxycycline 100mg q12hrs and PO Augmentin 875mg q12hrs to complete 7-day treatment (until 3/25)  - Local wound care  - Leg elevation  - Outpatient Rheum and Derm follow up  - Trend WBC, fever curve, transaminases, creatinine daily      Snow Saleh D.O.  Attending Physician  Division of Infectious Diseases  [facility] - [facility]  Please contact me via [employer] Teams

## 2024-03-22 NOTE — DISCHARGE NOTE PROVIDER - NSDCFUSCHEDAPPT_GEN_ALL_CORE_FT
Javier Soto  [facility] PreAdmits  Scheduled Appointment: 04/02/2024    [facility]  [facility]  PSYCHIATRY  [place]  Scheduled Appointment: 04/02/2024    Naye Mills  [facility] Physician [facility]  RHEUM [id number]  Scheduled Appointment: 04/11/2024    Javier Soto  [facility] PreAdmits  Scheduled Appointment: 04/16/2024    [facility]  PSYCHIATRY  [place]  Scheduled Appointment: 04/16/2024

## 2024-03-22 NOTE — DISCHARGE NOTE PROVIDER - ATTENDING DISCHARGE PHYSICAL EXAMINATION:
PHYSICAL EXAM:  GENERAL: NAD, lying in bed comfortably  HEAD:  Atraumatic, Normocephalic  EYES: EOMI, PERRLA, conjunctiva and sclera clear  ENT: Moist mucous membranes  NECK: Supple, No JVD  CHEST/LUNG: Clear to auscultation bilaterally; No rales, rhonchi, wheezing, or rubs. Unlabored respirations  HEART: Regular rate and rhythm; No murmurs, rubs, or gallops  ABDOMEN: Bowel sounds present; Soft, Nontender, Nondistended. No hepatomegally  EXTREMITIES:  2+ Peripheral Pulses, brisk capillary refill. No clubbing, cyanosis, or edema  NERVOUS SYSTEM:  Alert & Oriented X3, speech clear. No deficits   MSK: warm ,dressing clean dry and intact

## 2024-03-22 NOTE — PROGRESS NOTE ADULT - SUBJECTIVE AND OBJECTIVE BOX
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 Associate with pain and difficulty walking  (19 Mar 2024 01:33)    HPI:    FAMILY HISTORY:    PAST MEDICAL & SURGICAL HISTORY:  Asthma with COPD      Hypothyroidism      H/O: substance abuse  no longer using      History of pancreatitis      Hepatitis-C      History of appendectomy      History of tonsillectomy          SOCIAL HISTORY  Social History:  former smoker, no alcohol, on Suboxone (19 Mar 2024 01:33)      Home Medications:  Suboxone 8 mg-2 mg sublingual tablet: 2.5 film(s) sublingual once a day (19 Mar 2024 01:13)      ROS  General: Denies fevers, chills, nightsweats, weight loss  HEENT: Denies headache, rhinorrhea, sore throat, eye pain  CV: Denies CP, palpitations  PULM: Denies SOB, cough  GI: Denies abdominal pain, diarrhea  : Denies dysuria, hematuria  MSK: Denies arthralgias  SKIN: Denies rash   NEURO: Denies paresthesias, weakness  PSYCH: Denies depression    VITALS:  T(F): 97.3, Max: 97.3 (03-19-24 @ 14:20)  HR: 66  BP: 132/82  RR: 16Vital Signs Last 24 Hrs  T(C): 36.3 (19 Mar 2024 14:20), Max: 36.3 (19 Mar 2024 14:20)  T(F): 97.3 (19 Mar 2024 14:20), Max: 97.3 (19 Mar 2024 14:20)  HR: 66 (19 Mar 2024 14:20) (65 - 75)  BP: 132/82 (19 Mar 2024 14:20) (89/58 - 132/82)  BP(mean): --  RR: 16 (19 Mar 2024 14:20) (16 - 18)  SpO2: 98% (19 Mar 2024 14:20) (95% - 99%)    Parameters below as of 19 Mar 2024 14:20  Patient On (Oxygen Delivery Method): room air        PHYSICAL EXAM:  Gen: NAD, resting in bed  HEENT: Normocephalic, atraumatic  Neck: supple, no lymphadenopathy  CV: Regular rate & regular rhythm  Lungs: CTABL no wheeze  Abdomen: Soft, NTND+ BS present  Ext: tense edema , purura, petchia, open blister, purulence   Neuro: non focal, awake, CN II-XII intact   Skin: no rash, no erythema  Psych: no SI, HI, Hallucination     TESTS & MEASUREMENTS:                        9.4    7.72  )-----------( 238      ( 19 Mar 2024 06:00 )             28.2     03-19    134<L>  |  97<L>  |  10  ----------------------------<  270<H>  3.7   |  28  |  0.8    Ca    8.7      19 Mar 2024 06:00    TPro  6.3  /  Alb  3.5  /  TBili  0.4  /  DBili  x   /  AST  10  /  ALT  <5  /  AlkPhos  96  03-19      LIVER FUNCTIONS - ( 19 Mar 2024 06:00 )  Alb: 3.5 g/dL / Pro: 6.3 g/dL / ALK PHOS: 96 U/L / ALT: <5 U/L / AST: 10 U/L / GGT: x           Urinalysis Basic - ( 19 Mar 2024 06:00 )    Color: x / Appearance: x / SG: x / pH: x  Gluc: 270 mg/dL / Ketone: x  / Bili: x / Urobili: x   Blood: x / Protein: x / Nitrite: x   Leuk Esterase: x / RBC: x / WBC x   Sq Epi: x / Non Sq Epi: x / Bacteria: x          Lactate, Blood: 1.6 mmol/L (03-18-24 @ 21:35)    QRS axis to [] ° and NSR at a rate of [] BPM. There was no atrial enlargement. There was no ventricular hypertrophy. There were no ST-T changes and all intervals were normal.      INFECTIOUS DISEASES TESTING  Hepatitis B Surface Antigen: Nonreact (02-02-24 @ 06:07)  Hepatitis B Surface Antigen: Nonreact (01-11-24 @ 13:40)  HIV-1/2 Combo Result: Nonreact (01-11-24 @ 13:40)      RADIOLOGY & ADDITIONAL TESTS:  I have personally reviewed the last Chest xray  CXR      CT      CARDIOLOGY TESTING      MEDICATIONS  (STANDING):  ampicillin/sulbactam  IVPB 1.5 Gram(s) IV Intermittent once  ampicillin/sulbactam  IVPB 1.5 Gram(s) IV Intermittent every 6 hours  ampicillin/sulbactam  IVPB      budesonide 160 MICROgram(s)/formoterol 4.5 MICROgram(s) Inhaler 2 Puff(s) Inhalation two times a day  buprenorphine 8 mG/naloxone 2 mG SL  Tablet 2 Tablet(s) SubLingual <User Schedule>  dextrose 5%. 1000 milliLiter(s) (100 mL/Hr) IV Continuous <Continuous>  dextrose 5%. 1000 milliLiter(s) (50 mL/Hr) IV Continuous <Continuous>  dextrose 50% Injectable 25 Gram(s) IV Push once  dextrose 50% Injectable 12.5 Gram(s) IV Push once  dextrose 50% Injectable 25 Gram(s) IV Push once  FLUoxetine 40 milliGRAM(s) Oral daily  furosemide    Tablet 20 milliGRAM(s) Oral daily  glucagon  Injectable 1 milliGRAM(s) IntraMuscular once  hydrocortisone 1% Cream 1 Application(s) Topical three times a day  insulin lispro (ADMELOG) corrective regimen sliding scale   SubCutaneous three times a day before meals  levothyroxine 150 MICROGram(s) Oral <User Schedule>  predniSONE   Tablet 30 milliGRAM(s) Oral daily  sodium chloride 0.9% Bolus 1000 milliLiter(s) IV Bolus once  vancomycin  IVPB 750 milliGRAM(s) IV Intermittent every 12 hours    MEDICATIONS  (PRN):  albuterol/ipratropium for Nebulization 3 milliLiter(s) Nebulizer every 6 hours PRN Shortness of Breath and/or Wheezing  dextrose Oral Gel 15 Gram(s) Oral once PRN Blood Glucose LESS THAN 70 milliGRAM(s)/deciliter  ketorolac   Injectable 15 milliGRAM(s) IV Push every 4 hours PRN Moderate Pain (4 - 6)      ANTIBIOTICS:  ampicillin/sulbactam  IVPB 1.5 Gram(s) IV Intermittent once  ampicillin/sulbactam  IVPB 1.5 Gram(s) IV Intermittent every 6 hours  ampicillin/sulbactam  IVPB      vancomycin  IVPB 750 milliGRAM(s) IV Intermittent every 12 hours      All available historical data has been reviewed    ASSESSMENT  53y F admitted with Cellulitis        PROBLEMS  
    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 Associate with pain and difficulty walking  (19 Mar 2024 01:33)    HPI:    FAMILY HISTORY:    PAST MEDICAL & SURGICAL HISTORY:  Asthma with COPD      Hypothyroidism      H/O: substance abuse  no longer using      History of pancreatitis      Hepatitis-C      History of appendectomy      History of tonsillectomy          SOCIAL HISTORY  Social History:  former smoker, no alcohol, on Suboxone (19 Mar 2024 01:33)      Home Medications:  Suboxone 8 mg-2 mg sublingual tablet: 2.5 film(s) sublingual once a day (19 Mar 2024 01:13)      ROS  General: Denies fevers, chills, nightsweats, weight loss  HEENT: Denies headache, rhinorrhea, sore throat, eye pain  CV: Denies CP, palpitations  PULM: Denies SOB, cough  GI: Denies abdominal pain, diarrhea  : Denies dysuria, hematuria  MSK: Denies arthralgias  SKIN: weeping. purulence   NEURO: Denies paresthesias, weakness  PSYCH: Denies depression    VITALS:  Vital Signs Last 24 Hrs  T(C): 36.3 (03-20-24 @ 06:40), Max: 36.3 (03-19-24 @ 14:20)  T(F): 97.4 (03-20-24 @ 06:40), Max: 97.4 (03-20-24 @ 06:40)  HR: 70 (03-20-24 @ 11:00) (61 - 72)  BP: 116/64 (03-20-24 @ 11:00) (107/64 - 136/78)  BP(mean): --  RR: 18 (03-20-24 @ 06:40) (16 - 18)  SpO2: 97% (03-20-24 @ 06:40) (95% - 98%)            PHYSICAL EXAM:  Gen: NAD, resting in bed  HEENT: Normocephalic, atraumatic  Neck: supple, no lymphadenopathy  CV: Regular rate & regular rhythm  Lungs: CTABL no wheeze  Abdomen: Soft, NTND+ BS present  Ext: tense edema , purura, petchia, open blister, purulence   Neuro: non focal, awake, CN II-XII intact   Skin: no rash, no erythema  Psych: no SI, HI, Hallucination     TESTS & MEASUREMENTS:                        9.4    7.72  )-----------( 238      ( 19 Mar 2024 06:00 )             28.2     03-19    134<L>  |  97<L>  |  10  ----------------------------<  270<H>  3.7   |  28  |  0.8    Ca    8.7      19 Mar 2024 06:00    TPro  6.3  /  Alb  3.5  /  TBili  0.4  /  DBili  x   /  AST  10  /  ALT  <5  /  AlkPhos  96  03-19      LIVER FUNCTIONS - ( 19 Mar 2024 06:00 )  Alb: 3.5 g/dL / Pro: 6.3 g/dL / ALK PHOS: 96 U/L / ALT: <5 U/L / AST: 10 U/L / GGT: x           Urinalysis Basic - ( 19 Mar 2024 06:00 )    Color: x / Appearance: x / SG: x / pH: x  Gluc: 270 mg/dL / Ketone: x  / Bili: x / Urobili: x   Blood: x / Protein: x / Nitrite: x   Leuk Esterase: x / RBC: x / WBC x   Sq Epi: x / Non Sq Epi: x / Bacteria: x          Lactate, Blood: 1.6 mmol/L (03-18-24 @ 21:35)    QRS axis to [] ° and NSR at a rate of [] BPM. There was no atrial enlargement. There was no ventricular hypertrophy. There were no ST-T changes and all intervals were normal.      INFECTIOUS DISEASES TESTING  Hepatitis B Surface Antigen: Nonreact (02-02-24 @ 06:07)  Hepatitis B Surface Antigen: Nonreact (01-11-24 @ 13:40)  HIV-1/2 Combo Result: Nonreact (01-11-24 @ 13:40)      RADIOLOGY & ADDITIONAL TESTS:  I have personally reviewed the last Chest xray  CXR      CT      CARDIOLOGY TESTING      MEDICATIONS  (STANDING):  ampicillin/sulbactam  IVPB 1.5 Gram(s) IV Intermittent once  ampicillin/sulbactam  IVPB 1.5 Gram(s) IV Intermittent every 6 hours  ampicillin/sulbactam  IVPB      budesonide 160 MICROgram(s)/formoterol 4.5 MICROgram(s) Inhaler 2 Puff(s) Inhalation two times a day  buprenorphine 8 mG/naloxone 2 mG SL  Tablet 2 Tablet(s) SubLingual <User Schedule>  dextrose 5%. 1000 milliLiter(s) (100 mL/Hr) IV Continuous <Continuous>  dextrose 5%. 1000 milliLiter(s) (50 mL/Hr) IV Continuous <Continuous>  dextrose 50% Injectable 25 Gram(s) IV Push once  dextrose 50% Injectable 12.5 Gram(s) IV Push once  dextrose 50% Injectable 25 Gram(s) IV Push once  FLUoxetine 40 milliGRAM(s) Oral daily  furosemide    Tablet 20 milliGRAM(s) Oral daily  glucagon  Injectable 1 milliGRAM(s) IntraMuscular once  hydrocortisone 1% Cream 1 Application(s) Topical three times a day  insulin lispro (ADMELOG) corrective regimen sliding scale   SubCutaneous three times a day before meals  levothyroxine 150 MICROGram(s) Oral <User Schedule>  predniSONE   Tablet 30 milliGRAM(s) Oral daily  sodium chloride 0.9% Bolus 1000 milliLiter(s) IV Bolus once  vancomycin  IVPB 750 milliGRAM(s) IV Intermittent every 12 hours    MEDICATIONS  (PRN):  albuterol/ipratropium for Nebulization 3 milliLiter(s) Nebulizer every 6 hours PRN Shortness of Breath and/or Wheezing  dextrose Oral Gel 15 Gram(s) Oral once PRN Blood Glucose LESS THAN 70 milliGRAM(s)/deciliter  ketorolac   Injectable 15 milliGRAM(s) IV Push every 4 hours PRN Moderate Pain (4 - 6)      ANTIBIOTICS:  ampicillin/sulbactam  IVPB 1.5 Gram(s) IV Intermittent once  ampicillin/sulbactam  IVPB 1.5 Gram(s) IV Intermittent every 6 hours  ampicillin/sulbactam  IVPB      vancomycin  IVPB 750 milliGRAM(s) IV Intermittent every 12 hours      All available historical data has been reviewed    ASSESSMENT  53y F admitted with Cellulitis        PROBLEMS  
    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 Associate with pain and difficulty walking  (19 Mar 2024 01:33)    HPI:    FAMILY HISTORY:    PAST MEDICAL & SURGICAL HISTORY:  Asthma with COPD      Hypothyroidism      H/O: substance abuse  no longer using      History of pancreatitis      Hepatitis-C      History of appendectomy      History of tonsillectomy          SOCIAL HISTORY  Social History:  former smoker, no alcohol, on Suboxone (19 Mar 2024 01:33)      Home Medications:  Suboxone 8 mg-2 mg sublingual tablet: 2.5 film(s) sublingual once a day (19 Mar 2024 01:13)      ROS  General: Denies fevers, chills, nightsweats, weight loss  HEENT: Denies headache, rhinorrhea, sore throat, eye pain  CV: Denies CP, palpitations  PULM: Denies SOB, cough  GI: Denies abdominal pain, diarrhea  : Denies dysuria, hematuria  MSK: Denies arthralgias  SKIN: weeping. purulence   NEURO: Denies paresthesias, weakness  PSYCH: Denies depression    VITALS:  ICU Vital Signs Last 24 Hrs  T(C): 36.4 (21 Mar 2024 14:00), Max: 36.4 (21 Mar 2024 04:30)  T(F): 97.5 (21 Mar 2024 14:00), Max: 97.6 (21 Mar 2024 04:30)  HR: 73 (21 Mar 2024 14:00) (62 - 73)  BP: 116/76 (21 Mar 2024 14:00) (116/76 - 127/79)  BP(mean): --  ABP: --  ABP(mean): --  RR: 18 (21 Mar 2024 14:00) (18 - 19)  SpO2: 94% (21 Mar 2024 14:00) (94% - 96%)    O2 Parameters below as of 21 Mar 2024 14:00  Patient On (Oxygen Delivery Method): room air                  PHYSICAL EXAM:  Gen: NAD, resting in bed  HEENT: Normocephalic, atraumatic  Neck: supple, no lymphadenopathy  CV: Regular rate & regular rhythm  Lungs: CTABL no wheeze  Abdomen: Soft, NTND+ BS present  Ext: edema improved, weeping improving , wrapped now   Neuro: non focal, awake, CN II-XII intact   Skin: no rash, no erythema  Psych: no SI, HI, Hallucination     TESTS & MEASUREMENTS:                        9.4    7.72  )-----------( 238      ( 19 Mar 2024 06:00 )             28.2     03-19    134<L>  |  97<L>  |  10  ----------------------------<  270<H>  3.7   |  28  |  0.8    Ca    8.7      19 Mar 2024 06:00    TPro  6.3  /  Alb  3.5  /  TBili  0.4  /  DBili  x   /  AST  10  /  ALT  <5  /  AlkPhos  96  03-19      LIVER FUNCTIONS - ( 19 Mar 2024 06:00 )  Alb: 3.5 g/dL / Pro: 6.3 g/dL / ALK PHOS: 96 U/L / ALT: <5 U/L / AST: 10 U/L / GGT: x           Urinalysis Basic - ( 19 Mar 2024 06:00 )    Color: x / Appearance: x / SG: x / pH: x  Gluc: 270 mg/dL / Ketone: x  / Bili: x / Urobili: x   Blood: x / Protein: x / Nitrite: x   Leuk Esterase: x / RBC: x / WBC x   Sq Epi: x / Non Sq Epi: x / Bacteria: x          Lactate, Blood: 1.6 mmol/L (03-18-24 @ 21:35)    QRS axis to [] ° and NSR at a rate of [] BPM. There was no atrial enlargement. There was no ventricular hypertrophy. There were no ST-T changes and all intervals were normal.      INFECTIOUS DISEASES TESTING  Hepatitis B Surface Antigen: Nonreact (02-02-24 @ 06:07)  Hepatitis B Surface Antigen: Nonreact (01-11-24 @ 13:40)  HIV-1/2 Combo Result: Nonreact (01-11-24 @ 13:40)      RADIOLOGY & ADDITIONAL TESTS:  I have personally reviewed the last Chest xray  CXR      CT      CARDIOLOGY TESTING      MEDICATIONS  (STANDING):  ampicillin/sulbactam  IVPB 1.5 Gram(s) IV Intermittent once  ampicillin/sulbactam  IVPB 1.5 Gram(s) IV Intermittent every 6 hours  ampicillin/sulbactam  IVPB      budesonide 160 MICROgram(s)/formoterol 4.5 MICROgram(s) Inhaler 2 Puff(s) Inhalation two times a day  buprenorphine 8 mG/naloxone 2 mG SL  Tablet 2 Tablet(s) SubLingual <User Schedule>  dextrose 5%. 1000 milliLiter(s) (100 mL/Hr) IV Continuous <Continuous>  dextrose 5%. 1000 milliLiter(s) (50 mL/Hr) IV Continuous <Continuous>  dextrose 50% Injectable 25 Gram(s) IV Push once  dextrose 50% Injectable 12.5 Gram(s) IV Push once  dextrose 50% Injectable 25 Gram(s) IV Push once  FLUoxetine 40 milliGRAM(s) Oral daily  furosemide    Tablet 20 milliGRAM(s) Oral daily  glucagon  Injectable 1 milliGRAM(s) IntraMuscular once  hydrocortisone 1% Cream 1 Application(s) Topical three times a day  insulin lispro (ADMELOG) corrective regimen sliding scale   SubCutaneous three times a day before meals  levothyroxine 150 MICROGram(s) Oral <User Schedule>  predniSONE   Tablet 30 milliGRAM(s) Oral daily  sodium chloride 0.9% Bolus 1000 milliLiter(s) IV Bolus once  vancomycin  IVPB 750 milliGRAM(s) IV Intermittent every 12 hours    MEDICATIONS  (PRN):  albuterol/ipratropium for Nebulization 3 milliLiter(s) Nebulizer every 6 hours PRN Shortness of Breath and/or Wheezing  dextrose Oral Gel 15 Gram(s) Oral once PRN Blood Glucose LESS THAN 70 milliGRAM(s)/deciliter  ketorolac   Injectable 15 milliGRAM(s) IV Push every 4 hours PRN Moderate Pain (4 - 6)      ANTIBIOTICS:  ampicillin/sulbactam  IVPB 1.5 Gram(s) IV Intermittent once  ampicillin/sulbactam  IVPB 1.5 Gram(s) IV Intermittent every 6 hours  ampicillin/sulbactam  IVPB      vancomycin  IVPB 750 milliGRAM(s) IV Intermittent every 12 hours      All available historical data has been reviewed    ASSESSMENT  53y F admitted with Cellulitis        PROBLEMS  
    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 Associate with pain and difficulty walking  (19 Mar 2024 01:33)    HPI:    FAMILY HISTORY:    PAST MEDICAL & SURGICAL HISTORY:  Asthma with COPD      Hypothyroidism      H/O: substance abuse  no longer using      History of pancreatitis      Hepatitis-C      History of appendectomy      History of tonsillectomy          SOCIAL HISTORY  Social History:  former smoker, no alcohol, on Suboxone (19 Mar 2024 01:33)      Home Medications:  Suboxone 8 mg-2 mg sublingual tablet: 2.5 film(s) sublingual once a day (19 Mar 2024 01:13)      ROS  General: Denies fevers, chills, nightsweats, weight loss  HEENT: Denies headache, rhinorrhea, sore throat, eye pain  CV: Denies CP, palpitations  PULM: Denies SOB, cough  GI: Denies abdominal pain, diarrhea  : Denies dysuria, hematuria  MSK: Denies arthralgias  SKIN: weeping. purulence   NEURO: Denies paresthesias, weakness  PSYCH: Denies depression    VITALS:  ICU Vital Signs Last 24 Hrs  T(C): 35.6 (22 Mar 2024 04:33), Max: 36.4 (21 Mar 2024 14:00)  T(F): 96.1 (22 Mar 2024 04:33), Max: 97.5 (21 Mar 2024 14:00)  HR: 65 (22 Mar 2024 04:33) (65 - 96)  BP: 96/54 (22 Mar 2024 04:33) (96/54 - 138/63)  BP(mean): --  ABP: --  ABP(mean): --  RR: 18 (22 Mar 2024 04:33) (16 - 18)  SpO2: 93% (22 Mar 2024 04:33) (93% - 97%)    O2 Parameters below as of 22 Mar 2024 04:33  Patient On (Oxygen Delivery Method): room air                      PHYSICAL EXAM:  Gen: NAD, resting in bed  HEENT: Normocephalic, atraumatic  Neck: supple, no lymphadenopathy  CV: Regular rate & regular rhythm  Lungs: CTABL no wheeze  Abdomen: Soft, NTND+ BS present  Ext: edema improved, weeping improving , wrapped now   Neuro: non focal, awake, CN II-XII intact   Skin: no rash, no erythema  Psych: no SI, HI, Hallucination     TESTS & MEASUREMENTS:                        9.4    7.72  )-----------( 238      ( 19 Mar 2024 06:00 )             28.2     03-19    134<L>  |  97<L>  |  10  ----------------------------<  270<H>  3.7   |  28  |  0.8    Ca    8.7      19 Mar 2024 06:00    TPro  6.3  /  Alb  3.5  /  TBili  0.4  /  DBili  x   /  AST  10  /  ALT  <5  /  AlkPhos  96  03-19      LIVER FUNCTIONS - ( 19 Mar 2024 06:00 )  Alb: 3.5 g/dL / Pro: 6.3 g/dL / ALK PHOS: 96 U/L / ALT: <5 U/L / AST: 10 U/L / GGT: x           Urinalysis Basic - ( 19 Mar 2024 06:00 )    Color: x / Appearance: x / SG: x / pH: x  Gluc: 270 mg/dL / Ketone: x  / Bili: x / Urobili: x   Blood: x / Protein: x / Nitrite: x   Leuk Esterase: x / RBC: x / WBC x   Sq Epi: x / Non Sq Epi: x / Bacteria: x          Lactate, Blood: 1.6 mmol/L (03-18-24 @ 21:35)    QRS axis to [] ° and NSR at a rate of [] BPM. There was no atrial enlargement. There was no ventricular hypertrophy. There were no ST-T changes and all intervals were normal.      INFECTIOUS DISEASES TESTING  Hepatitis B Surface Antigen: Nonreact (02-02-24 @ 06:07)  Hepatitis B Surface Antigen: Nonreact (01-11-24 @ 13:40)  HIV-1/2 Combo Result: Nonreact (01-11-24 @ 13:40)      RADIOLOGY & ADDITIONAL TESTS:  I have personally reviewed the last Chest xray  CXR      CT      CARDIOLOGY TESTING      MEDICATIONS  (STANDING):  ampicillin/sulbactam  IVPB 1.5 Gram(s) IV Intermittent once  ampicillin/sulbactam  IVPB 1.5 Gram(s) IV Intermittent every 6 hours  ampicillin/sulbactam  IVPB      budesonide 160 MICROgram(s)/formoterol 4.5 MICROgram(s) Inhaler 2 Puff(s) Inhalation two times a day  buprenorphine 8 mG/naloxone 2 mG SL  Tablet 2 Tablet(s) SubLingual <User Schedule>  dextrose 5%. 1000 milliLiter(s) (100 mL/Hr) IV Continuous <Continuous>  dextrose 5%. 1000 milliLiter(s) (50 mL/Hr) IV Continuous <Continuous>  dextrose 50% Injectable 25 Gram(s) IV Push once  dextrose 50% Injectable 12.5 Gram(s) IV Push once  dextrose 50% Injectable 25 Gram(s) IV Push once  FLUoxetine 40 milliGRAM(s) Oral daily  furosemide    Tablet 20 milliGRAM(s) Oral daily  glucagon  Injectable 1 milliGRAM(s) IntraMuscular once  hydrocortisone 1% Cream 1 Application(s) Topical three times a day  insulin lispro (ADMELOG) corrective regimen sliding scale   SubCutaneous three times a day before meals  levothyroxine 150 MICROGram(s) Oral <User Schedule>  predniSONE   Tablet 30 milliGRAM(s) Oral daily  sodium chloride 0.9% Bolus 1000 milliLiter(s) IV Bolus once  vancomycin  IVPB 750 milliGRAM(s) IV Intermittent every 12 hours    MEDICATIONS  (PRN):  albuterol/ipratropium for Nebulization 3 milliLiter(s) Nebulizer every 6 hours PRN Shortness of Breath and/or Wheezing  dextrose Oral Gel 15 Gram(s) Oral once PRN Blood Glucose LESS THAN 70 milliGRAM(s)/deciliter  ketorolac   Injectable 15 milliGRAM(s) IV Push every 4 hours PRN Moderate Pain (4 - 6)      ANTIBIOTICS:  ampicillin/sulbactam  IVPB 1.5 Gram(s) IV Intermittent once  ampicillin/sulbactam  IVPB 1.5 Gram(s) IV Intermittent every 6 hours  ampicillin/sulbactam  IVPB      vancomycin  IVPB 750 milliGRAM(s) IV Intermittent every 12 hours      All available historical data has been reviewed    ASSESSMENT  53y F admitted with Cellulitis        PROBLEMS  
INFECTIOUS DISEASE FOLLOW UP NOTE:    Interval History/ROS: Patient is a 53y old  Female who presents with a chief complaint of Bilateral LE pain (19 Mar 2024 09:24)      Overnight events: No overnight event. Wounds are still draining. Remind patient to keep legs elevated as much as possible.    REVIEW OF SYSTEMS:  CONSTITUTIONAL: + chills  HEAD: No lesion on scalp  EYES: No visual disturbance  ENT: No sore throat  RESPIRATORY: No cough, no shortness of breath  CARDIOVASCULAR: No chest pain or palpitations  GASTROINTESTINAL: No abdominal or epigastric pain  GENITOURINARY: No dysuria  NEUROLOGICAL: No headache/dizziness  MUSCULOSKELETAL: No joint pain, erythema, or swelling; no back pain  SKIN: Multiple open wounds with drainage, swollen bilateral LEs  All other ROS negative except noted above      Prior hospital charts reviewed [Yes]  Primary team notes reviewed [Yes]  Other consultant notes reviewed [Yes]    Allergies:  No Known Allergies      ANTIMICROBIALS:   ampicillin/sulbactam  IVPB 1.5 once  ampicillin/sulbactam  IVPB 1.5 every 6 hours  ampicillin/sulbactam  IVPB    vancomycin  IVPB 750 every 12 hours      OTHER MEDS: MEDICATIONS  (STANDING):  albuterol/ipratropium for Nebulization 3 every 6 hours PRN  budesonide 160 MICROgram(s)/formoterol 4.5 MICROgram(s) Inhaler 2 two times a day  buprenorphine 2 mG/naloxone 0.5 mG SL  Tablet 2 daily  buprenorphine 8 mG/naloxone 2 mG SL  Tablet 2 daily  dextrose 50% Injectable 25 once  dextrose 50% Injectable 12.5 once  dextrose 50% Injectable 25 once  dextrose Oral Gel 15 once PRN  FLUoxetine 40 daily  furosemide   Injectable 40 two times a day  glucagon  Injectable 1 once  insulin glargine Injectable (LANTUS) 9 at bedtime  insulin lispro (ADMELOG) corrective regimen sliding scale  three times a day before meals  insulin lispro Injectable (ADMELOG) 6 before lunch  insulin lispro Injectable (ADMELOG) 6 before dinner  ketorolac   Injectable 15 every 4 hours PRN  levothyroxine 150 <User Schedule>      Vital Signs Last 24 Hrs  T(F): 96.5 (03-20-24 @ 13:40), Max: 97.4 (03-20-24 @ 06:40)    Vital Signs Last 24 Hrs  HR: 62 (03-20-24 @ 13:40) (61 - 72)  BP: 121/74 (03-20-24 @ 13:40) (116/64 - 136/78)  RR: 18 (03-20-24 @ 13:40)  SpO2: 96% (03-20-24 @ 13:40) (96% - 98%)  Wt(kg): --    EXAM:  GENERAL: NAD, lying in bed  HEAD: No head lesions  EYES: Conjunctiva pink and cornea white  EAR, NOSE, MOUTH, THROAT: Normal external ears and nose, no discharges; moist mucous membranes  NECK: Supple, nontender to palpation; no JVD  RESPIRATORY: Clear to auscultation bilaterally  CARDIOVASCULAR: S1 S2  GASTROINTESTINAL: Soft, nontender, nondistended; normoactive bowel sounds  EXTREMITIES: No clubbing, cyanosis, Bilateral LE edema  NERVOUS SYSTEM: Alert and oriented to person, time, place and situation, speech clear. No focal deficits   MUSCULOSKELETAL: No joint erythema, swelling or pain  SKIN: Multiple open wounds with drainage. several crusted wounds, no significant warmth, but very tender as per patient, no superficial thrombophlebitis  PSYCH: Normal affect    Labs:                        9.4    7.72  )-----------( 238      ( 19 Mar 2024 06:00 )             28.2     03-19    134<L>  |  97<L>  |  10  ----------------------------<  270<H>  3.7   |  28  |  0.8    Ca    8.7      19 Mar 2024 06:00    TPro  6.3  /  Alb  3.5  /  TBili  0.4  /  DBili  x   /  AST  10  /  ALT  <5  /  AlkPhos  96  03-19      WBC Trend:  WBC Count: 7.72 (03-19-24 @ 06:00)  WBC Count: 10.40 (03-18-24 @ 21:35)      Creatine Trend:  Creatinine: 0.8 (03-19)  Creatinine: 0.8 (03-18)      Liver Biochemical Testing Trend:  Alanine Aminotransferase (ALT/SGPT): <5 (03-19)  Alanine Aminotransferase (ALT/SGPT): <5 (03-18)  Alanine Aminotransferase (ALT/SGPT): <5 (02-02)  Alanine Aminotransferase (ALT/SGPT): 7 (02-01)  Alanine Aminotransferase (ALT/SGPT): 6 (01-31)  Aspartate Aminotransferase (AST/SGOT): 10 (03-19-24 @ 06:00)  Aspartate Aminotransferase (AST/SGOT): 18 (03-18-24 @ 21:35)  Aspartate Aminotransferase (AST/SGOT): 16 (02-02-24 @ 06:07)  Aspartate Aminotransferase (AST/SGOT): 21 (02-01-24 @ 06:47)  Aspartate Aminotransferase (AST/SGOT): 22 (01-31-24 @ 04:30)  Bilirubin Total: 0.4 (03-19)  Bilirubin Total: 0.4 (03-18)  Bilirubin Total: <0.2 (02-02)  Bilirubin Total: 0.3 (02-01)  Bilirubin Total: 0.3 (01-31)      Trend LDH  01-10-24 @ 06:55  230      Urinalysis Basic - ( 19 Mar 2024 06:00 )    Color: x / Appearance: x / SG: x / pH: x  Gluc: 270 mg/dL / Ketone: x  / Bili: x / Urobili: x   Blood: x / Protein: x / Nitrite: x   Leuk Esterase: x / RBC: x / WBC x   Sq Epi: x / Non Sq Epi: x / Bacteria: x        MICROBIOLOGY:        Culture - Blood (collected 09 Jan 2024 18:11)  Source: .Blood Blood  Final Report:    No growth at 5 days    Culture - Blood (collected 09 Jan 2024 18:11)  Source: .Blood Blood  Final Report:    No growth at 5 days    Culture - Urine (collected 10 May 2023 19:06)  Source: Clean Catch Clean Catch (Midstream)  Final Report:    <10,000 CFU/mL Normal Urogenital Alyssa      HIV-1/2 Combo Result: Nonreact (01-11-24 @ 13:40)      Lactate, Blood: 1.6 (03-18 @ 21:35)      RADIOLOGY:  imaging below personally reviewed    < from: US Kidney and Bladder (03.19.24 @ 08:54) >  IMPRESSION:    Normal renal ultrasound.    Mildly distended urinary bladder.    < end of copied text >    < from: [facility] Duplex Lower Extrem Arterial, Bilat (01.31.24 @ 20:48) >  IMPRESSION:  Normal blood flow is visualized in bilateral lower extremity arterial   system.    < end of copied text >  
INFECTIOUS DISEASE FOLLOW UP NOTE:    Interval History/ROS: Patient is a 53y old  Female who presents with a chief complaint of cellulitis (20 Mar 2024 14:59)      Overnight events: No overnight event.     REVIEW OF SYSTEMS:  CONSTITUTIONAL: + chills  HEAD: No lesion on scalp  EYES: No visual disturbance  ENT: No sore throat  RESPIRATORY: No cough, no shortness of breath  CARDIOVASCULAR: No chest pain or palpitations  GASTROINTESTINAL: No abdominal or epigastric pain  GENITOURINARY: No dysuria  NEUROLOGICAL: No headache/dizziness  MUSCULOSKELETAL: No joint pain, erythema, or swelling; no back pain  SKIN: Multiple open wounds with drainage, swollen bilateral LEs  All other ROS negative except noted above      Prior hospital charts reviewed [Yes]  Primary team notes reviewed [Yes]  Other consultant notes reviewed [Yes]    Allergies:  No Known Allergies      ANTIMICROBIALS:   ampicillin/sulbactam  IVPB 1.5 once  ampicillin/sulbactam  IVPB 1.5 every 6 hours  ampicillin/sulbactam  IVPB    vancomycin  IVPB 750 every 12 hours      OTHER MEDS: MEDICATIONS  (STANDING):  albuterol/ipratropium for Nebulization 3 every 6 hours PRN  budesonide 160 MICROgram(s)/formoterol 4.5 MICROgram(s) Inhaler 2 two times a day  buprenorphine 2 mG/naloxone 0.5 mG SL  Tablet 2 daily  buprenorphine 8 mG/naloxone 2 mG SL  Tablet 2 daily  dextrose 50% Injectable 25 once  dextrose 50% Injectable 12.5 once  dextrose 50% Injectable 25 once  dextrose Oral Gel 15 once PRN  FLUoxetine 40 daily  glucagon  Injectable 1 once  insulin glargine Injectable (LANTUS) 9 at bedtime  insulin lispro (ADMELOG) corrective regimen sliding scale  three times a day before meals  insulin lispro Injectable (ADMELOG) 6 before breakfast  insulin lispro Injectable (ADMELOG) 6 before lunch  insulin lispro Injectable (ADMELOG) 6 before dinner  ketorolac   Injectable 15 every 4 hours PRN  levothyroxine 150 <User Schedule>      Vital Signs Last 24 Hrs  T(F): 96.9 (03-22-24 @ 21:09), Max: 97.6 (03-21-24 @ 04:30)    Vital Signs Last 24 Hrs  HR: 65 (03-22-24 @ 21:09) (65 - 65)  BP: 110/59 (03-22-24 @ 21:09) (96/54 - 110/59)  RR: 18 (03-22-24 @ 21:09)  SpO2: 96% (03-22-24 @ 21:09) (93% - 96%)  Wt(kg): --    EXAM:  GENERAL: NAD, lying in bed  HEAD: No head lesions  EYES: Conjunctiva pink and cornea white  EAR, NOSE, MOUTH, THROAT: Normal external ears and nose, no discharges; moist mucous membranes  NECK: Supple, nontender to palpation; no JVD  RESPIRATORY: Clear to auscultation bilaterally  CARDIOVASCULAR: S1 S2  GASTROINTESTINAL: Soft, nontender, nondistended; normoactive bowel sounds  EXTREMITIES: No clubbing, cyanosis, Bilateral LE edema  NERVOUS SYSTEM: Alert and oriented to person, time, place and situation, speech clear. No focal deficits   MUSCULOSKELETAL: No joint erythema, swelling or pain  SKIN: Multiple open wounds with drainage. several crusted wounds, no significant warmth, Wrapped with dressing, no superficial thrombophlebitis  PSYCH: Normal affect    Labs:    03-21    139  |  96<L>  |  14  ----------------------------<  208<H>  3.9   |  32  |  1.0    Ca    9.4      21 Mar 2024 08:09  Mg     1.9     03-21        WBC Trend:  WBC Count: 7.72 (03-19-24 @ 06:00)  WBC Count: 10.40 (03-18-24 @ 21:35)      Creatine Trend:  Creatinine: 1.0 (03-21)  Creatinine: 0.8 (03-19)  Creatinine: 0.8 (03-18)      Liver Biochemical Testing Trend:  Alanine Aminotransferase (ALT/SGPT): <5 (03-19)  Alanine Aminotransferase (ALT/SGPT): <5 (03-18)  Alanine Aminotransferase (ALT/SGPT): <5 (02-02)  Alanine Aminotransferase (ALT/SGPT): 7 (02-01)  Alanine Aminotransferase (ALT/SGPT): 6 (01-31)  Aspartate Aminotransferase (AST/SGOT): 10 (03-19-24 @ 06:00)  Aspartate Aminotransferase (AST/SGOT): 18 (03-18-24 @ 21:35)  Aspartate Aminotransferase (AST/SGOT): 16 (02-02-24 @ 06:07)  Aspartate Aminotransferase (AST/SGOT): 21 (02-01-24 @ 06:47)  Aspartate Aminotransferase (AST/SGOT): 22 (01-31-24 @ 04:30)  Bilirubin Total: 0.4 (03-19)  Bilirubin Total: 0.4 (03-18)  Bilirubin Total: <0.2 (02-02)  Bilirubin Total: 0.3 (02-01)  Bilirubin Total: 0.3 (01-31)      Trend LDH  01-10-24 @ 06:55  230      Urinalysis Basic - ( 21 Mar 2024 08:09 )    Color: x / Appearance: x / SG: x / pH: x  Gluc: 208 mg/dL / Ketone: x  / Bili: x / Urobili: x   Blood: x / Protein: x / Nitrite: x   Leuk Esterase: x / RBC: x / WBC x   Sq Epi: x / Non Sq Epi: x / Bacteria: x        MICROBIOLOGY:  Vancomycin Level, Trough: 9.2 (03-20 @ 14:55)        Culture - Blood (collected 18 Mar 2024 21:39)  Source: .Blood Blood  Preliminary Report:    No growth at 72 Hours    Culture - Blood (collected 18 Mar 2024 21:39)  Source: .Blood Blood  Preliminary Report:    No growth at 72 Hours    Culture - Blood (collected 09 Jan 2024 18:11)  Source: .Blood Blood  Final Report:    No growth at 5 days    Culture - Blood (collected 09 Jan 2024 18:11)  Source: .Blood Blood  Final Report:    No growth at 5 days    Culture - Urine (collected 10 May 2023 19:06)  Source: Clean Catch Clean Catch (Midstream)  Final Report:    <10,000 CFU/mL Normal Urogenital Alyssa      HIV-1/2 Combo Result: Nonreact (01-11-24 @ 13:40)      RADIOLOGY:  imaging below personally reviewed      < from: US Kidney and Bladder (03.19.24 @ 08:54) >  IMPRESSION:    Normal renal ultrasound.    Mildly distended urinary bladder.    < end of copied text >

## 2024-03-22 NOTE — DISCHARGE NOTE PROVIDER - NSDCCPCAREPLAN_GEN_ALL_CORE_FT
PRINCIPAL DISCHARGE DIAGNOSIS  Diagnosis: Cellulitis  Assessment and Plan of Treatment: secondary skin infection with underlying venous stasis and ulceration due to vasculitis - complete antibiotic course , continue local wound care, follow up with rheumatology when to start steroids and disease modifying medications      SECONDARY DISCHARGE DIAGNOSES  Diagnosis: Unable to ambulate  Assessment and Plan of Treatment:      PRINCIPAL DISCHARGE DIAGNOSIS  Diagnosis: Cellulitis  Assessment and Plan of Treatment: secondary skin infection with underlying venous stasis and ulceration due to vasculitis - complete antibiotic course , continue local wound care, follow up with rheumatology when to start steroids and disease modifying medications ; lasix changed to 40 mg daily given edema , outpatient primary care follow up to taper lasix when improved   wound care- Wound and skin care   Clean lower extremity wounds with soap and water, pat dry then apply silvadene dressing; Elevate lower extremity when sitting dependent

## 2024-03-22 NOTE — DISCHARGE NOTE PROVIDER - HOSPITAL COURSE
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 Associate with pain and difficulty walking  (19 Mar 2024 01:33)PAST MEDICAL & SURGICAL HISTORY:  Asthma with COPD      Hypothyroidism      H/O: substance abuse  no longer using      History of pancreatitis      Hepatitis-C      History of appendectomy      History of tonsillectomy  #cellulitis/lower extremity vasculitis   - on iv vanco , unasyn - dose increased by ID - follow up trough after 4th dose - ordered for 3/20 @ 1500 - 9.2 - will switch to doxy 100mg q12 and augmentin 875 q12 for 7 days total up dc  - spoke with Dr opitz - pathology - will add addendum for skin biopsy  - final dx of leukocytoclastic vasculitis  - spoke with Dr soria - [facility] - hold prednisone for now since she has been off of it for now - will need to restart pred 20mg after infection resolves and follow up in office  - requested myeloperoxidase ab to be collected    will change iv lasix to po  for edema - wound care rn eddie - vns for home , darco to ambulate with legs wrapped   elevate legs  Plan:  Wound and skin care   Clean lower extremity wounds with soap and water, pat dry then apply silvadene dressing    Elevate lower extremity when sitting dependent   Pressure injury  prevention   skin  and incontinence care     #hx of copd - no sob, cont home inhalers     #hx of ivda - clean now - on suboxone - confirmed with pharmacy 20mg total a day  # hxof hep b/c

## 2024-03-22 NOTE — DISCHARGE NOTE PROVIDER - NSDCFUADDAPPT_GEN_ALL_CORE_FT
APPTS ARE READY TO BE MADE: [x ] YES    Best Family or Patient Contact (if needed):    Additional Information about above appointments (if needed):    1: Chela - rheumatology - 2 weeks   2:   3:     Other comments or requests:

## 2024-03-22 NOTE — DISCHARGE NOTE PROVIDER - NSDCMRMEDTOKEN_GEN_ALL_CORE_FT
albuterol 90 mcg/inh inhalation powder: 2 puff(s) inhaled every 6 hours, As Needed -for bronchospasm   budesonide-formoterol 160 mcg-4.5 mcg/inh inhalation aerosol: 2 puff(s) inhaled 2 times a day  FLUoxetine 40 mg oral capsule: 1 cap(s) orally once a day  furosemide 20 mg oral tablet: 1 tab(s) orally once a day  ipratropium-albuterol 0.5 mg-2.5 mg/3 mLinhalation solution: 3 milliliter(s) by nebulizer every 4 hours, As Needed   levothyroxine 150 mcg (0.15 mg) oral tablet: 1 tab(s) orally once a day  metFORMIN 500 mg oral tablet: 1 tab(s) orally 2 times a day  pantoprazole 40 mg oral delayed release tablet: 1 tab(s) orally once a day (before a meal)  predniSONE 10 mg oral tablet: 3 tab(s) orally once a day  Suboxone 8 mg-2 mg sublingual tablet: 2.5 film(s) sublingual once a day   albuterol 90 mcg/inh inhalation powder: 2 puff(s) inhaled every 6 hours, As Needed -for bronchospasm   budesonide-formoterol 160 mcg-4.5 mcg/inh inhalation aerosol: 2 puff(s) inhaled 2 times a day  FLUoxetine 40 mg oral capsule: 1 cap(s) orally once a day  furosemide 40 mg oral tablet: 1 tab(s) orally once a day  ipratropium-albuterol 0.5 mg-2.5 mg/3 mLinhalation solution: 3 milliliter(s) by nebulizer every 4 hours, As Needed   Lantus 100 units/mL subcutaneous solution: 30 unit(s) subcutaneous once a day (at bedtime)  levothyroxine 150 mcg (0.15 mg) oral tablet: 1 tab(s) orally once a day  metFORMIN 500 mg oral tablet: 1 tab(s) orally 2 times a day  pantoprazole 40 mg oral delayed release tablet: 1 tab(s) orally once a day (before a meal)  Suboxone 8 mg-2 mg sublingual tablet: 2.5 film(s) sublingual once a day

## 2024-03-23 ENCOUNTER — TRANSCRIPTION ENCOUNTER (OUTPATIENT)
Age: 54
End: 2024-03-23

## 2024-03-23 VITALS
TEMPERATURE: 97 F | SYSTOLIC BLOOD PRESSURE: 127 MMHG | HEART RATE: 63 BPM | RESPIRATION RATE: 16 BRPM | DIASTOLIC BLOOD PRESSURE: 72 MMHG

## 2024-03-23 LAB
GLUCOSE BLDC GLUCOMTR-MCNC: 341 MG/DL — HIGH (ref 70–99)
MYELOPEROXIDASE AB SER-ACNC: <5 UNITS — SIGNIFICANT CHANGE UP
MYELOPEROXIDASE CELLS FLD QL: NEGATIVE — SIGNIFICANT CHANGE UP

## 2024-03-23 PROCEDURE — 99239 HOSP IP/OBS DSCHRG MGMT >30: CPT

## 2024-03-23 RX ORDER — FUROSEMIDE 40 MG
1 TABLET ORAL
Qty: 30 | Refills: 0
Start: 2024-03-23 | End: 2024-04-21

## 2024-03-23 RX ADMIN — Medication 6 UNIT(S): at 07:51

## 2024-03-23 RX ADMIN — Medication 150 MICROGRAM(S): at 05:18

## 2024-03-23 RX ADMIN — Medication 250 MILLIGRAM(S): at 05:54

## 2024-03-23 RX ADMIN — AMPICILLIN SODIUM AND SULBACTAM SODIUM 100 GRAM(S): 250; 125 INJECTION, POWDER, FOR SUSPENSION INTRAMUSCULAR; INTRAVENOUS at 05:18

## 2024-03-23 RX ADMIN — Medication 1 APPLICATION(S): at 07:01

## 2024-03-23 RX ADMIN — AMPICILLIN SODIUM AND SULBACTAM SODIUM 100 GRAM(S): 250; 125 INJECTION, POWDER, FOR SUSPENSION INTRAMUSCULAR; INTRAVENOUS at 00:06

## 2024-03-23 RX ADMIN — Medication 4: at 07:51

## 2024-03-23 RX ADMIN — Medication 40 MILLIGRAM(S): at 05:18

## 2024-03-23 RX ADMIN — BUDESONIDE AND FORMOTEROL FUMARATE DIHYDRATE 2 PUFF(S): 160; 4.5 AEROSOL RESPIRATORY (INHALATION) at 07:50

## 2024-03-23 RX ADMIN — Medication 15 MILLIGRAM(S): at 06:36

## 2024-03-23 RX ADMIN — Medication 15 MILLIGRAM(S): at 01:00

## 2024-03-23 NOTE — DISCHARGE NOTE NURSING/CASE MANAGEMENT/SOCIAL WORK - PATIENT PORTAL LINK FT
You can access the FollowMyHealth Patient Portal offered by [facility] by registering at the following website: [email or web address]. By joining [facility]’s FollowMyHealth portal, you will also be able to view your health information using other applications (apps) compatible with our system.

## 2024-03-24 LAB
CULTURE RESULTS: SIGNIFICANT CHANGE UP
CULTURE RESULTS: SIGNIFICANT CHANGE UP
SPECIMEN SOURCE: SIGNIFICANT CHANGE UP
SPECIMEN SOURCE: SIGNIFICANT CHANGE UP

## 2024-03-28 DIAGNOSIS — J44.9 CHRONIC OBSTRUCTIVE PULMONARY DISEASE, UNSPECIFIED: ICD-10-CM

## 2024-03-28 DIAGNOSIS — F11.21 OPIOID DEPENDENCE, IN REMISSION: ICD-10-CM

## 2024-03-28 DIAGNOSIS — E03.9 HYPOTHYROIDISM, UNSPECIFIED: ICD-10-CM

## 2024-03-28 DIAGNOSIS — L97.819 NON-PRESSURE CHRONIC ULCER OF OTHER PART OF RIGHT LOWER LEG WITH UNSPECIFIED SEVERITY: ICD-10-CM

## 2024-03-28 DIAGNOSIS — E11.9 TYPE 2 DIABETES MELLITUS WITHOUT COMPLICATIONS: ICD-10-CM

## 2024-03-28 DIAGNOSIS — Z79.891 LONG TERM (CURRENT) USE OF OPIATE ANALGESIC: ICD-10-CM

## 2024-03-28 DIAGNOSIS — Z87.891 PERSONAL HISTORY OF NICOTINE DEPENDENCE: ICD-10-CM

## 2024-03-28 DIAGNOSIS — L03.115 CELLULITIS OF RIGHT LOWER LIMB: ICD-10-CM

## 2024-03-28 DIAGNOSIS — I83.218 VARICOSE VEINS OF RIGHT LOWER EXTREMITY WITH BOTH ULCER OF OTHER PART OF LOWER EXTREMITY AND INFLAMMATION: ICD-10-CM

## 2024-03-28 DIAGNOSIS — M31.0 HYPERSENSITIVITY ANGIITIS: ICD-10-CM

## 2024-03-28 DIAGNOSIS — R26.2 DIFFICULTY IN WALKING, NOT ELSEWHERE CLASSIFIED: ICD-10-CM

## 2024-03-28 DIAGNOSIS — L03.116 CELLULITIS OF LEFT LOWER LIMB: ICD-10-CM

## 2024-03-28 DIAGNOSIS — B19.10 UNSPECIFIED VIRAL HEPATITIS B WITHOUT HEPATIC COMA: ICD-10-CM

## 2024-04-02 ENCOUNTER — APPOINTMENT (OUTPATIENT)
Dept: PSYCHIATRY | Facility: CLINIC | Age: 54
End: 2024-04-02

## 2024-04-03 DIAGNOSIS — L03.119 CELLULITIS OF UNSPECIFIED PART OF LIMB: ICD-10-CM

## 2024-04-03 RX ORDER — SULFAMETHOXAZOLE AND TRIMETHOPRIM 800; 160 MG/1; MG/1
800-160 TABLET ORAL TWICE DAILY
Qty: 56 | Refills: 0 | Status: ACTIVE | COMMUNITY
Start: 2024-04-03 | End: 1900-01-01

## 2024-04-04 ENCOUNTER — INPATIENT (INPATIENT)
Facility: HOSPITAL | Age: 54
LOS: 1 days | Discharge: HOME | DRG: 346 | End: 2024-04-06
Attending: HOSPITALIST | Admitting: INTERNAL MEDICINE
Payer: MEDICAID

## 2024-04-04 VITALS
WEIGHT: 164.91 LBS | OXYGEN SATURATION: 99 % | SYSTOLIC BLOOD PRESSURE: 133 MMHG | RESPIRATION RATE: 18 BRPM | DIASTOLIC BLOOD PRESSURE: 65 MMHG | TEMPERATURE: 98 F | HEART RATE: 69 BPM | HEIGHT: 68 IN

## 2024-04-04 DIAGNOSIS — Z90.49 ACQUIRED ABSENCE OF OTHER SPECIFIED PARTS OF DIGESTIVE TRACT: Chronic | ICD-10-CM

## 2024-04-04 DIAGNOSIS — L03.90 CELLULITIS, UNSPECIFIED: ICD-10-CM

## 2024-04-04 DIAGNOSIS — Z90.89 ACQUIRED ABSENCE OF OTHER ORGANS: Chronic | ICD-10-CM

## 2024-04-04 LAB
ALBUMIN SERPL ELPH-MCNC: 4.4 G/DL — SIGNIFICANT CHANGE UP (ref 3.5–5.2)
ALBUMIN SERPL ELPH-MCNC: 4.5 G/DL — SIGNIFICANT CHANGE UP (ref 3.5–5.2)
ALP SERPL-CCNC: 117 U/L — HIGH (ref 30–115)
ALP SERPL-CCNC: 123 U/L — HIGH (ref 30–115)
ALT FLD-CCNC: 6 U/L — SIGNIFICANT CHANGE UP (ref 0–41)
ALT FLD-CCNC: <5 U/L — SIGNIFICANT CHANGE UP (ref 0–41)
ANION GAP SERPL CALC-SCNC: 11 MMOL/L — SIGNIFICANT CHANGE UP (ref 7–14)
ANION GAP SERPL CALC-SCNC: 12 MMOL/L — SIGNIFICANT CHANGE UP (ref 7–14)
AST SERPL-CCNC: 17 U/L — SIGNIFICANT CHANGE UP (ref 0–41)
AST SERPL-CCNC: 32 U/L — SIGNIFICANT CHANGE UP (ref 0–41)
BASOPHILS # BLD AUTO: 0.05 K/UL — SIGNIFICANT CHANGE UP (ref 0–0.2)
BASOPHILS NFR BLD AUTO: 0.7 % — SIGNIFICANT CHANGE UP (ref 0–1)
BILIRUB SERPL-MCNC: 0.3 MG/DL — SIGNIFICANT CHANGE UP (ref 0.2–1.2)
BILIRUB SERPL-MCNC: 0.3 MG/DL — SIGNIFICANT CHANGE UP (ref 0.2–1.2)
BUN SERPL-MCNC: 8 MG/DL — LOW (ref 10–20)
BUN SERPL-MCNC: 8 MG/DL — LOW (ref 10–20)
CALCIUM SERPL-MCNC: 9.3 MG/DL — SIGNIFICANT CHANGE UP (ref 8.4–10.5)
CALCIUM SERPL-MCNC: 9.4 MG/DL — SIGNIFICANT CHANGE UP (ref 8.4–10.5)
CHLORIDE SERPL-SCNC: 94 MMOL/L — LOW (ref 98–110)
CHLORIDE SERPL-SCNC: 94 MMOL/L — LOW (ref 98–110)
CO2 SERPL-SCNC: 29 MMOL/L — SIGNIFICANT CHANGE UP (ref 17–32)
CO2 SERPL-SCNC: 31 MMOL/L — SIGNIFICANT CHANGE UP (ref 17–32)
CREAT SERPL-MCNC: 0.7 MG/DL — SIGNIFICANT CHANGE UP (ref 0.7–1.5)
CREAT SERPL-MCNC: 0.8 MG/DL — SIGNIFICANT CHANGE UP (ref 0.7–1.5)
CRP SERPL-MCNC: 15.3 MG/L — HIGH
EGFR: 103 ML/MIN/1.73M2 — SIGNIFICANT CHANGE UP
EGFR: 88 ML/MIN/1.73M2 — SIGNIFICANT CHANGE UP
EOSINOPHIL # BLD AUTO: 0.18 K/UL — SIGNIFICANT CHANGE UP (ref 0–0.7)
EOSINOPHIL NFR BLD AUTO: 2.5 % — SIGNIFICANT CHANGE UP (ref 0–8)
ERYTHROCYTE [SEDIMENTATION RATE] IN BLOOD: 40 MM/HR — HIGH (ref 0–20)
GLUCOSE BLDC GLUCOMTR-MCNC: 217 MG/DL — HIGH (ref 70–99)
GLUCOSE BLDC GLUCOMTR-MCNC: 306 MG/DL — HIGH (ref 70–99)
GLUCOSE SERPL-MCNC: 174 MG/DL — HIGH (ref 70–99)
GLUCOSE SERPL-MCNC: 189 MG/DL — HIGH (ref 70–99)
HCT VFR BLD CALC: 35.3 % — LOW (ref 37–47)
HGB BLD-MCNC: 11.3 G/DL — LOW (ref 12–16)
IMM GRANULOCYTES NFR BLD AUTO: 0.3 % — SIGNIFICANT CHANGE UP (ref 0.1–0.3)
LYMPHOCYTES # BLD AUTO: 2.14 K/UL — SIGNIFICANT CHANGE UP (ref 1.2–3.4)
LYMPHOCYTES # BLD AUTO: 30.3 % — SIGNIFICANT CHANGE UP (ref 20.5–51.1)
MCHC RBC-ENTMCNC: 26.8 PG — LOW (ref 27–31)
MCHC RBC-ENTMCNC: 32 G/DL — SIGNIFICANT CHANGE UP (ref 32–37)
MCV RBC AUTO: 83.8 FL — SIGNIFICANT CHANGE UP (ref 81–99)
MONOCYTES # BLD AUTO: 0.33 K/UL — SIGNIFICANT CHANGE UP (ref 0.1–0.6)
MONOCYTES NFR BLD AUTO: 4.7 % — SIGNIFICANT CHANGE UP (ref 1.7–9.3)
NEUTROPHILS # BLD AUTO: 4.35 K/UL — SIGNIFICANT CHANGE UP (ref 1.4–6.5)
NEUTROPHILS NFR BLD AUTO: 61.5 % — SIGNIFICANT CHANGE UP (ref 42.2–75.2)
NRBC # BLD: 0 /100 WBCS — SIGNIFICANT CHANGE UP (ref 0–0)
PLATELET # BLD AUTO: 284 K/UL — SIGNIFICANT CHANGE UP (ref 130–400)
PMV BLD: 9.9 FL — SIGNIFICANT CHANGE UP (ref 7.4–10.4)
POTASSIUM SERPL-MCNC: 3.6 MMOL/L — SIGNIFICANT CHANGE UP (ref 3.5–5)
POTASSIUM SERPL-MCNC: 5.5 MMOL/L — HIGH (ref 3.5–5)
POTASSIUM SERPL-SCNC: 3.6 MMOL/L — SIGNIFICANT CHANGE UP (ref 3.5–5)
POTASSIUM SERPL-SCNC: 5.5 MMOL/L — HIGH (ref 3.5–5)
PROT SERPL-MCNC: 8 G/DL — SIGNIFICANT CHANGE UP (ref 6–8)
PROT SERPL-MCNC: 8.1 G/DL — HIGH (ref 6–8)
RBC # BLD: 4.21 M/UL — SIGNIFICANT CHANGE UP (ref 4.2–5.4)
RBC # FLD: 14.8 % — HIGH (ref 11.5–14.5)
SODIUM SERPL-SCNC: 134 MMOL/L — LOW (ref 135–146)
SODIUM SERPL-SCNC: 137 MMOL/L — SIGNIFICANT CHANGE UP (ref 135–146)
WBC # BLD: 7.07 K/UL — SIGNIFICANT CHANGE UP (ref 4.8–10.8)
WBC # FLD AUTO: 7.07 K/UL — SIGNIFICANT CHANGE UP (ref 4.8–10.8)

## 2024-04-04 PROCEDURE — G0378: CPT

## 2024-04-04 PROCEDURE — 36415 COLL VENOUS BLD VENIPUNCTURE: CPT

## 2024-04-04 PROCEDURE — 82962 GLUCOSE BLOOD TEST: CPT

## 2024-04-04 PROCEDURE — 99285 EMERGENCY DEPT VISIT HI MDM: CPT

## 2024-04-04 PROCEDURE — 85652 RBC SED RATE AUTOMATED: CPT

## 2024-04-04 PROCEDURE — 80053 COMPREHEN METABOLIC PANEL: CPT

## 2024-04-04 PROCEDURE — 86140 C-REACTIVE PROTEIN: CPT

## 2024-04-04 PROCEDURE — 99254 IP/OBS CNSLTJ NEW/EST MOD 60: CPT

## 2024-04-04 PROCEDURE — 99223 1ST HOSP IP/OBS HIGH 75: CPT

## 2024-04-04 PROCEDURE — 85025 COMPLETE CBC W/AUTO DIFF WBC: CPT

## 2024-04-04 RX ORDER — ALBUTEROL 90 UG/1
2 AEROSOL, METERED ORAL EVERY 6 HOURS
Refills: 0 | Status: DISCONTINUED | OUTPATIENT
Start: 2024-04-04 | End: 2024-04-06

## 2024-04-04 RX ORDER — VANCOMYCIN HCL 1 G
750 VIAL (EA) INTRAVENOUS EVERY 12 HOURS
Refills: 0 | Status: DISCONTINUED | OUTPATIENT
Start: 2024-04-04 | End: 2024-04-05

## 2024-04-04 RX ORDER — ACETAMINOPHEN 500 MG
650 TABLET ORAL EVERY 6 HOURS
Refills: 0 | Status: DISCONTINUED | OUTPATIENT
Start: 2024-04-04 | End: 2024-04-06

## 2024-04-04 RX ORDER — LEVOTHYROXINE SODIUM 125 MCG
150 TABLET ORAL DAILY
Refills: 0 | Status: DISCONTINUED | OUTPATIENT
Start: 2024-04-04 | End: 2024-04-06

## 2024-04-04 RX ORDER — BUPRENORPHINE AND NALOXONE 2; .5 MG/1; MG/1
1 TABLET SUBLINGUAL
Refills: 0 | Status: DISCONTINUED | OUTPATIENT
Start: 2024-04-04 | End: 2024-04-06

## 2024-04-04 RX ORDER — KETOROLAC TROMETHAMINE 30 MG/ML
30 SYRINGE (ML) INJECTION ONCE
Refills: 0 | Status: DISCONTINUED | OUTPATIENT
Start: 2024-04-04 | End: 2024-04-04

## 2024-04-04 RX ORDER — ENOXAPARIN SODIUM 100 MG/ML
40 INJECTION SUBCUTANEOUS EVERY 24 HOURS
Refills: 0 | Status: DISCONTINUED | OUTPATIENT
Start: 2024-04-04 | End: 2024-04-06

## 2024-04-04 RX ORDER — INSULIN LISPRO 100/ML
VIAL (ML) SUBCUTANEOUS
Refills: 0 | Status: DISCONTINUED | OUTPATIENT
Start: 2024-04-04 | End: 2024-04-06

## 2024-04-04 RX ORDER — PANTOPRAZOLE SODIUM 20 MG/1
40 TABLET, DELAYED RELEASE ORAL
Refills: 0 | Status: DISCONTINUED | OUTPATIENT
Start: 2024-04-04 | End: 2024-04-06

## 2024-04-04 RX ORDER — INSULIN GLARGINE 100 [IU]/ML
30 INJECTION, SOLUTION SUBCUTANEOUS
Refills: 0 | DISCHARGE

## 2024-04-04 RX ORDER — FUROSEMIDE 40 MG
40 TABLET ORAL DAILY
Refills: 0 | Status: DISCONTINUED | OUTPATIENT
Start: 2024-04-04 | End: 2024-04-06

## 2024-04-04 RX ORDER — INSULIN LISPRO 100/ML
VIAL (ML) SUBCUTANEOUS
Refills: 0 | Status: DISCONTINUED | OUTPATIENT
Start: 2024-04-04 | End: 2024-04-05

## 2024-04-04 RX ORDER — AMPICILLIN SODIUM AND SULBACTAM SODIUM 250; 125 MG/ML; MG/ML
1.5 INJECTION, POWDER, FOR SUSPENSION INTRAMUSCULAR; INTRAVENOUS EVERY 6 HOURS
Refills: 0 | Status: DISCONTINUED | OUTPATIENT
Start: 2024-04-04 | End: 2024-04-05

## 2024-04-04 RX ORDER — COLCHICINE 0.6 MG
0.6 TABLET ORAL
Refills: 0 | Status: DISCONTINUED | OUTPATIENT
Start: 2024-04-04 | End: 2024-04-06

## 2024-04-04 RX ADMIN — Medication 2: at 22:30

## 2024-04-04 RX ADMIN — Medication 250 MILLIGRAM(S): at 13:48

## 2024-04-04 RX ADMIN — Medication 250 MILLIGRAM(S): at 17:53

## 2024-04-04 RX ADMIN — BUPRENORPHINE AND NALOXONE 1 TABLET(S): 2; .5 TABLET SUBLINGUAL at 18:21

## 2024-04-04 RX ADMIN — Medication 0.6 MILLIGRAM(S): at 18:23

## 2024-04-04 RX ADMIN — AMPICILLIN SODIUM AND SULBACTAM SODIUM 100 GRAM(S): 250; 125 INJECTION, POWDER, FOR SUSPENSION INTRAMUSCULAR; INTRAVENOUS at 13:11

## 2024-04-04 RX ADMIN — Medication 2: at 16:28

## 2024-04-04 RX ADMIN — AMPICILLIN SODIUM AND SULBACTAM SODIUM 100 GRAM(S): 250; 125 INJECTION, POWDER, FOR SUSPENSION INTRAMUSCULAR; INTRAVENOUS at 18:22

## 2024-04-04 RX ADMIN — Medication 30 MILLIGRAM(S): at 16:28

## 2024-04-04 RX ADMIN — Medication 30 MILLIGRAM(S): at 13:48

## 2024-04-04 NOTE — ED PROVIDER NOTE - PRO INTERPRETER NEED 2
[facility]   [address], [facility] [place] [facility], [facility]  Phone: [phone]   [phone]       Nephrology Progress Note  Sherron Hagan     1967     [id number]  Date of Admission : 12/21/2020 01/04/21    CC: Follow up for YISSEL    Assessment and Plan   YISSEL  - initial insult from ATN  - recurrent YISSEL from diuetics + contrast on 12/31  - UA : trace blood and 3+ protein --> serologic w/u neg except + gammopathy screen  - renal US : unremarkable except for benign cyst   - alkalotic and hypercalcemic --> hold diuretics. Repeat CXR. 4.8L UOP in last 24 hrs    - reduced LR to 50 cc/hr until he can take po and stop   - daily labs and I/Os    Hypercalcemia   Contraction alkalosis   - hold diuretics     Haemophilus/Streptococcus pneumonia:  - abx per ID    Resp failure:  - from PNA and volume  - LE dopplers neg and CTA neg on 12/31  - extubated 1/2/21     Malignant HTN   Hypertensive Urgency   - negative UDS  - BP needs better control --> meds adjusted      Anemia:  - hgb stable  - + M spike along with elevated K light chains and IgM levels  - will need heme eval eventually     Interval History:  Seen and examined. ON HIGH FLOW O2. BP elevated. Cr better. 4.8L UOP   Reports improving SOB     Review of Systems: Review of systems not obtained due to patient factors.     Current Medications:   Current Facility-Administered Medications   Medication Dose Route Frequency    labetaloL (NORMODYNE;TRANDATE) injection 40 mg  40 mg IntraVENous Q6H PRN    albuterol-ipratropium (DUO-NEB) 2.5 MG-0.5 MG/3 ML  3 mL Nebulization QID RT    carvediloL (COREG) tablet 12.5 mg  12.5 mg Oral BID WITH MEALS    ampicillin-sulbactam (UNASYN) 3 g in 0.9% sodium chloride (MBP/ADV) 100 mL MBP  3 g IntraVENous Q6H    lactated Ringers infusion  50 mL/hr IntraVENous CONTINUOUS    famotidine (PF) (PEPCID) 20 mg in 0.9% sodium chloride 10 mL injection  20 mg IntraVENous K13H    multivit-folic acid-herbal 433 (Pleasant Radha PLUS) oral liquid 30 mL  30 mL Oral DAILY    0.9% sodium chloride infusion  3 mL/hr IntraVENous CONTINUOUS    0.9% sodium chloride infusion  5 mL/hr IntraVENous CONTINUOUS    docusate (COLACE) 50 mg/5 mL oral liquid 100 mg  100 mg Oral DAILY    acetaminophen (TYLENOL) solution 650 mg  650 mg Per NG tube Q6H PRN    nicotine (NICODERM CQ) 14 mg/24 hr patch 1 Patch  1 Patch TransDERmal DAILY    hydrALAZINE (APRESOLINE) 20 mg/mL injection 20 mg  20 mg IntraVENous Q6H PRN    amLODIPine (NORVASC) tablet 5 mg  5 mg Oral DAILY    cloNIDine HCL (CATAPRES) tablet 0.1 mg  0.1 mg Oral BID    enoxaparin (LOVENOX) injection 40 mg  40 mg SubCUTAneous DAILY    senna (SENOKOT) tablet 17.2 mg  2 Tab Oral DAILY    sodium chloride (NS) flush 5-40 mL  5-40 mL IntraVENous Q8H    sodium chloride (NS) flush 5-40 mL  5-40 mL IntraVENous PRN    acetaminophen (TYLENOL) tablet 650 mg  650 mg Oral Q6H PRN    Or    acetaminophen (TYLENOL) suppository 650 mg  650 mg Rectal Q6H PRN    polyethylene glycol (MIRALAX) packet 17 g  17 g Oral DAILY PRN    promethazine (PHENERGAN) tablet 12.5 mg  12.5 mg Oral Q6H PRN    Or    ondansetron (ZOFRAN) injection 4 mg  4 mg IntraVENous Q6H PRN      No Known Allergies    Objective:  Vitals:    Vitals:    01/04/21 0300 01/04/21 0400 01/04/21 0500 01/04/21 0600   BP: (!) 144/91 (!) 144/92 (!) 152/91 (!) 148/92   Pulse: 92 92 97 95   Resp: 16 16 17 16   Temp:  99.7 °F (37.6 °C)     SpO2: 97% 97% 94% 95%   Weight:       Height:         Intake and Output:  01/03 1901 - 01/04 0700  In: 755 [I.V.:755]  Out: 250 [facility] [address] [Urine:1575]  01/02 0701 - 01/03 1900  In: 4930.8 [I.V.:4570.8]  Out: 59049 [Urine:07775; Drains:50]    Physical Examination:  General:  on high flow,  HEENT: Frontal lipoma   Lungs : decreased basilar breath sounds   CVS: RRR,s1,s2, normal, No murmur   Extremities: no LE edema  Neurologic: awake, laert, following commands  Psych: not agitated, calm and slight confusion        []    High complexity decision making was performed  []    Patient is at high-risk of decompensation with multiple organ involvement    Lab Data Personally Reviewed: I have reviewed all the pertinent labs, microbiology data and radiology studies during assessment. Recent Labs     01/04/21 0451 01/03/21 0358 01/02/21  1540 01/02/21 0359    143 142 142   K 4.0 2.8* 3.6 4.6    110* 112* 118*   CO2 32 27 24 21   * 121* 109* 133*   BUN 29* 36* 42* 37*   CREA 1.42* 1.59* 1.42* 1.08   CA 10.3* 9.9 9.8 9.1   MG  --  1.9 2.0 2.2   PHOS  --  3.6  --  3.0     Recent Labs     01/04/21 0451 01/03/21 0358 01/02/21 0359   WBC 10.6 12.0* 9.1   HGB 13.4 12.7 12.4   HCT 43.1 40.7 39.3   * 423* 440*     No results found for: SDES  Lab Results   Component Value Date/Time    Culture result: SCANT NORMAL RESPIRATORY ALDO 12/28/2020 04:56 PM    Culture result: MRSA NOT PRESENT 12/24/2020 09:20 AM    Culture result:  12/24/2020 09:20 AM         Screening of patient nares for MRSA is for surveillance purposes and, if positive, to facilitate isolation considerations in high risk settings. It is not intended for automatic decolonization interventions per se as regimens are not sufficiently effective to warrant routine use. Culture result: NO GROWTH 2 DAYS 12/23/2020 04:16 PM    Culture result: (A) 12/22/2020 08:45 AM     RARE HAEMOPHILUS INFLUENZAE BETA LACTAMASE NEGATIVE    Culture result: RARE NORMAL RESPIRATORY ALDO 12/22/2020 08:45 AM     Recent Results (from the past 24 hour(s))   CBC W/O DIFF    Collection Time: 01/04/21  4:51 AM   Result Value Ref Range    WBC 10.6 4.1 - 11.1 K/uL    RBC 5.03 4. 10 - 5.70 M/uL    HGB 13.4 12.1 - 17.0 g/dL    HCT 43.1 36.6 - 50.3 %    MCV 85.7 80.0 - 99.0 FL    MCH 26.6 26.0 - 34.0 PG    MCHC 31.1 30.0 - 36.5 g/dL    RDW 15.1 (H) 11.5 - 14.5 %    PLATELET 551 (H) 835 - 400 K/uL    MPV 9.3 8.9 - 12.9 FL    NRBC 0.0 0  WBC    ABSOLUTE NRBC 0.00 0.00 - 0.01 K/uL METABOLIC PANEL, BASIC    Collection Time: 01/04/21  4:51 AM   Result Value Ref Range    Sodium 140 136 - 145 mmol/L    Potassium 4.0 3.5 - 5.1 mmol/L    Chloride 105 97 - 108 mmol/L    CO2 32 21 - 32 mmol/L    Anion gap 3 (L) 5 - 15 mmol/L    Glucose 134 (H) 65 - 100 mg/dL    BUN 29 (H) 6 - 20 MG/DL    Creatinine 1.42 (H) 0.70 - 1.30 MG/DL    BUN/Creatinine ratio 20 12 - 20      GFR est AA >60 >60 ml/min/1.73m2    GFR est non-AA 52 (L) >60 ml/min/1.73m2    Calcium 10.3 (H) 8.5 - 10.1 MG/DL           Total time spent with patient:  xxx   min. Care Plan discussed with:  Patient     Family      RN      Consulting Physician [facility]        I have reviewed the flowsheets. Chart and Pertinent Notes have been reviewed. No change in PMH ,family and social history from Consult note.       Shantelle Harman MD English

## 2024-04-04 NOTE — ED ADULT NURSE NOTE - ED STAT RN HANDOFF TIME
Pt presents to urgent care today with cough and sinus congestion for about 1 week.    OTC - mucinex DM    Patient would like communication of their results via:        Cell Phone:   Telephone Information:   Mobile [phone]     Okay to leave a message containing results? Yes    Covid/ flu obtained - but pt is requesting not to run the test unless it is covered by insurance.   15:40

## 2024-04-04 NOTE — H&P ADULT - NSHPPHYSICALEXAM_GEN_ALL_CORE
Vital Signs Last 24 Hrs  T(C): 36.4 (04 Apr 2024 12:00), Max: 36.4 (04 Apr 2024 12:00)  T(F): 97.5 (04 Apr 2024 12:00), Max: 97.5 (04 Apr 2024 12:00)  HR: 69 (04 Apr 2024 12:00) (69 - 69)  BP: 133/65 (04 Apr 2024 12:00) (133/65 - 133/65)  BP(mean): --  RR: 18 (04 Apr 2024 12:00) (18 - 18)  SpO2: 99% (04 Apr 2024 12:00) (99% - 99%)    Parameters below as of 04 Apr 2024 12:00  Patient On (Oxygen Delivery Method): room air    PHYSICAL EXAM:  GENERAL: NAD, well-developed  HEAD:  Atraumatic, Normocephalic  EYES: EOMI, PERRLA, conjunctiva and sclera clear  CHEST/LUNG: Clear to auscultation bilaterally; No wheeze  HEART: Regular rate and rhythm; No murmurs, rubs, or gallops. distal pulses 2+ and equal bilateral  ABDOMEN: Soft, Nontender, Nondistended; Bowel sounds present  EXTREMITIES:  bilateral LE with edema, weeping, crusting, and sloughing of skin below knees  CNS: AAOx3

## 2024-04-04 NOTE — CONSULT NOTE ADULT - SUBJECTIVE AND OBJECTIVE BOX
Pt initially developed b/l LE edema around November 2023. In January 2024, she was admitted with an erythematous rash on her calves. She was noted to have a purpuric and petechial rash, and had a skin biopsy which demonstrated cutaneous vasculitis. She was treated for cellulitis and was discharged, but a few weeks later was readmitted with worsening of her rash. She was discharged on prednisone 30 mg daily, which she says helped but it is unclear how long she took it. She was readmitted 3/2024 for the same symptoms and was treated for cellulitis. This is her third readmission for the same symptoms. Pt notes three new weeping lesions on her LEs, with worsening swelling and erythema. She was also noted to have blood glucose 350 fasting the day before her admission. Pt denies any new symptoms.    Physical exam: GEN: Pleasant, AAO woman sitting on side of hospital bed in NAD  SKIN: Interval improvement of petechial and purpuric rash compared to 1/2024. + Verrucous skin changes and scattered ulcerations noted on b/l LEs, four have not been crusted over  MOUTH: Moist mucous membranes, no oral ulcers, normal oral aperture  ENT: No LAD  PULM: Clear to auscultation b/l  CV: Regular rate and rhythm, no murmurs  MSK:  Shoulders: Full ROM b/l  Elbows: Full ROM b/l, no effusions  Wrists; Full ROM b/l, no effusions  Hands: No synovitis  Hips; Full ROM b/l  Knees: no effusions, full ROM b/l  Ankles: + Soft tissue edema b/l, full ROM b/l  Feet: + soft tissue edema  EXT: + LE edema b/l to shins, skin changes in LEs as above, normal nailfold capillaries

## 2024-04-04 NOTE — H&P ADULT - HISTORY OF PRESENT ILLNESS
Patient is a 53 year old female with hx of hypothyroidism, COPD, DM2, leukocytoclastic vasculitis, untreated Hep C, on suboxone presenting with worsening LE edema, weeping, and skin breakdown since being discharged from [facility] on 3/23/24. She was being treated for LE cellulitis and dc on PO abx with outpatient rheumatology f/u. She is on a wait list for an appointment.   Patient denies fever, chills, sob, cp, abd pain, n/v/d.

## 2024-04-04 NOTE — H&P ADULT - NSICDXPASTMEDICALHX_GEN_ALL_CORE_FT
PAST MEDICAL HISTORY:  Asthma with COPD     H/O: substance abuse no longer using    Hepatitis-C     History of pancreatitis     Hypothyroidism     Leukocytoclastic vasculitis

## 2024-04-04 NOTE — ED PROVIDER NOTE - ATTENDING CONTRIBUTION TO CARE
53 yr old f w/ a pmh significant for COPD, DM, hypothyrodism, IVDA on suboxone, hep b who presents with lower extremity swelling. Pt states that since she was discharged and completed her course of antibiotics, she has been noticing worsening extremity swelling, and discharge. Pt denies any other medical complaints.     VITAL SIGNS: I have reviewed nursing notes and confirm.  CONSTITUTIONAL: non-toxic, well appearing  SKIN: no rash, no petechiae.  EYES: EOMI, pink conjunctiva, anicteric  ENT: tongue midline, no exudates, MMM  NECK: Supple; no meningismus, no JVD  CARD: RRR, no murmurs, equal radial pulses bilaterally 2+  RESP: CTAB, no respiratory distress  ABD: Soft, non-tender, non-distended, no peritoneal signs, no HSM, no CVA tenderness  EXT: Normal ROM x4. 3+ pitting edema, honey crust (lesions) like discharge noted to the lower extremity     53 yr old f that presents with lower extremity swelling, concerned for super imposed infection. labs, will give iv antibiotics. consider admission.

## 2024-04-04 NOTE — H&P ADULT - ASSESSMENT
Patient is a 53 year old female with hx of hypothyroidism, COPD, DM2, leukocytoclastic vasculitis, untreated Hep C, on suboxone presenting with worsening LE edema, weeping, and skin breakdown, admitted for management of leukocytoclastic vasculitis.      # leukocytoclastic vasculitis +/- cellulitis of LE  - case d/w rheumatology: start prednisone 30 mg QD, colchicine 0.6 mg BID  - obtain formal rheumatology consult  - ID consult  - continue unasyn 1.5 q6 and vancomycin 750 q12 (trough after 4th dose)  - elevate LE  - MRSA swab    # DM2  - last A1C up to 9s from 6s likely 2/2 steroid use  - monitor FS glucose, SSI    # untreated hep c  - vasculitis can be related to hep c  - ID consult    # copd  - albuterol prn    # hypothyroidism  - cont synthroid 150 mcg    Diet: dash/ccho  Activity: oob  DVT PPX: lovenox  GI PPX: ppi  CHG:  not indicated  Code status: full code  Dispo: acute from home

## 2024-04-04 NOTE — CONSULT NOTE ADULT - ASSESSMENT
54 y/o woman with h/o hepatitis B and hepatitis C (negative DNA and RNA titers recently), IV drug abuse on Suboxone and diabetes readmitted with b/l LE edema and erythema, rheumatology consulted for leukocytoclastic vasculitis, found on a skin biopsy on a previous admission. Pt's skin changes are different in appearance compared to prior. She no longer has the petechial and purpuric skin changes that were seen on her 1/2024 admission. She does still have multiple ulcerations, including non-crusted ulcerations with areas of active weeping. No other symptoms or exam findings to suggest systemic vasculitis, and pt's prior lab workup for vasculitis demonstrated p-ANCA 1:40 but with negative MPO and was otherwise negative.  - Please start prednisone 30 mg q day  - Please start colchicine 0.6 mg BID given persistence of pt's symptoms  - Consider wound care consult for recommendations on proper care of active open wounds

## 2024-04-04 NOTE — H&P ADULT - NS ATTEND AMEND GEN_ALL_CORE FT
53 year old female with hx of hypothyroidism, COPD, DM2, recently diagnosed leukocytoclastic vasculitis, ?Hep C, on suboxone presenting with worsening  b/l LE edema, weeping, erythema warmth. Concern for vasculitis vs cellulitis. On exam b/l LE with erythema, edema, warmth, with healing old ulcers. Cont IV abx. D/w Rheumatology who recommended prednisone and colchicine, appreciate recs. Consult ID. Watch BG closely.

## 2024-04-04 NOTE — ED PROVIDER NOTE - CLINICAL SUMMARY MEDICAL DECISION MAKING FREE TEXT BOX
53 yr old f that presents with lower extremity swelling, concerned for super imposed infection. labs, will give iv antibiotics. consider admission. Labs were ordered and reviewed.  Imaging was ordered and reviewed by me.  Appropriate medications for patient's presenting complaints were ordered and effects were reassessed.  Patient's records (prior hospital, ED visit, and/or nursing home notes if available) were reviewed.  Additional history was obtained from EMS, family, and/or PCP (where available).  Escalation to admission/observation was considered.   Patient requires inpatient hospitalization - medicine.

## 2024-04-04 NOTE — ED PROVIDER NOTE - OBJECTIVE STATEMENT
53-year-old female past medical history of hepatitis C, IV drug use on Suboxone, asthma, hypothyroidism, bilateral lower extremity edema thought to be leukocytoclastic vasculitis who presents for lower extremity pain.  Was recently admitted and discharged for bilateral lower extremity cellulitis.  She was given IV antibiotics at the time, transition to oral doxycycline outpatient which she has since completed.  States for the past few days she has had worsening bilateral lower extremity swelling with overlying erythema and worsening lesions.  Denies fevers, chills, chest pain, shortness of breath, nausea, vomiting, abdominal pain, weakness, numbness, tingling.  Denies recent IV drug use. Has an upcoming appointment with Rheum Dr. York at the end of this month.

## 2024-04-04 NOTE — ED PROVIDER NOTE - PHYSICAL EXAMINATION
VITAL SIGNS: I have reviewed nursing notes and confirm.  CONSTITUTIONAL: well-appearing, non-toxic, NAD  SKIN: Warm dry, normal skin turgor  HEAD: NCAT  EYES: EOMI, PERRLA, no scleral icterus  ENT: Moist mucous membranes, normal pharynx with no erythema or exudates  NECK: Supple; non tender. Full ROM. No cervical LAD  CARD: RRR, no murmurs, rubs or gallops  RESP: clear to ausculation b/l.  No rales, rhonchi, or wheezing.  ABD: soft, + BS, non-tender, non-distended, no rebound or guarding. No CVA tenderness  EXT: Full ROM, no bony tenderness, Bilateral lower extremity with 3+ pitting pedal edema, overlying erythema present with honeycrusted tender lesions present. Pulses palpable bilaterally.   NEURO: normal motor. normal sensory. CN II-XII intact. Cerebellar testing normal. Normal gait.  PSYCH: Cooperative, appropriate.

## 2024-04-04 NOTE — H&P ADULT - NSHPLABSRESULTS_GEN_ALL_CORE
11.3   7.07  )-----------( 284      ( 04 Apr 2024 12:19 )             35.3       04-04    137  |  94<L>  |  8<L>  ----------------------------<  189<H>  3.6   |  31  |  0.7    Ca    9.3      04 Apr 2024 14:50    TPro  8.0  /  Alb  4.5  /  TBili  0.3  /  DBili  x   /  AST  17  /  ALT  <5  /  AlkPhos  117<H>  04-04                  Urinalysis Basic - ( 04 Apr 2024 14:50 )    Color: x / Appearance: x / SG: x / pH: x  Gluc: 189 mg/dL / Ketone: x  / Bili: x / Urobili: x   Blood: x / Protein: x / Nitrite: x   Leuk Esterase: x / RBC: x / WBC x   Sq Epi: x / Non Sq Epi: x / Bacteria: x          Culture Results:   No growth at 5 days (03-18 @ 21:39)  Culture Results:   No growth at 5 days (03-18 @ 21:39)

## 2024-04-05 LAB
ALBUMIN SERPL ELPH-MCNC: 4.1 G/DL — SIGNIFICANT CHANGE UP (ref 3.5–5.2)
ALP SERPL-CCNC: 110 U/L — SIGNIFICANT CHANGE UP (ref 30–115)
ALT FLD-CCNC: <5 U/L — SIGNIFICANT CHANGE UP (ref 0–41)
ANION GAP SERPL CALC-SCNC: 10 MMOL/L — SIGNIFICANT CHANGE UP (ref 7–14)
AST SERPL-CCNC: 17 U/L — SIGNIFICANT CHANGE UP (ref 0–41)
BASOPHILS # BLD AUTO: 0.02 K/UL — SIGNIFICANT CHANGE UP (ref 0–0.2)
BASOPHILS NFR BLD AUTO: 0.3 % — SIGNIFICANT CHANGE UP (ref 0–1)
BILIRUB SERPL-MCNC: 0.3 MG/DL — SIGNIFICANT CHANGE UP (ref 0.2–1.2)
BUN SERPL-MCNC: 11 MG/DL — SIGNIFICANT CHANGE UP (ref 10–20)
CALCIUM SERPL-MCNC: 9.2 MG/DL — SIGNIFICANT CHANGE UP (ref 8.4–10.5)
CHLORIDE SERPL-SCNC: 96 MMOL/L — LOW (ref 98–110)
CO2 SERPL-SCNC: 27 MMOL/L — SIGNIFICANT CHANGE UP (ref 17–32)
CREAT SERPL-MCNC: 1 MG/DL — SIGNIFICANT CHANGE UP (ref 0.7–1.5)
EGFR: 67 ML/MIN/1.73M2 — SIGNIFICANT CHANGE UP
EOSINOPHIL # BLD AUTO: 0 K/UL — SIGNIFICANT CHANGE UP (ref 0–0.7)
EOSINOPHIL NFR BLD AUTO: 0 % — SIGNIFICANT CHANGE UP (ref 0–8)
GLUCOSE BLDC GLUCOMTR-MCNC: 333 MG/DL — HIGH (ref 70–99)
GLUCOSE SERPL-MCNC: 379 MG/DL — HIGH (ref 70–99)
HCT VFR BLD CALC: 33.6 % — LOW (ref 37–47)
HGB BLD-MCNC: 11.1 G/DL — LOW (ref 12–16)
IMM GRANULOCYTES NFR BLD AUTO: 0.6 % — HIGH (ref 0.1–0.3)
LYMPHOCYTES # BLD AUTO: 1 K/UL — LOW (ref 1.2–3.4)
LYMPHOCYTES # BLD AUTO: 13.9 % — LOW (ref 20.5–51.1)
MCHC RBC-ENTMCNC: 27.3 PG — SIGNIFICANT CHANGE UP (ref 27–31)
MCHC RBC-ENTMCNC: 33 G/DL — SIGNIFICANT CHANGE UP (ref 32–37)
MCV RBC AUTO: 82.6 FL — SIGNIFICANT CHANGE UP (ref 81–99)
MONOCYTES # BLD AUTO: 0.15 K/UL — SIGNIFICANT CHANGE UP (ref 0.1–0.6)
MONOCYTES NFR BLD AUTO: 2.1 % — SIGNIFICANT CHANGE UP (ref 1.7–9.3)
NEUTROPHILS # BLD AUTO: 5.98 K/UL — SIGNIFICANT CHANGE UP (ref 1.4–6.5)
NEUTROPHILS NFR BLD AUTO: 83.1 % — HIGH (ref 42.2–75.2)
NRBC # BLD: 0 /100 WBCS — SIGNIFICANT CHANGE UP (ref 0–0)
PLATELET # BLD AUTO: 288 K/UL — SIGNIFICANT CHANGE UP (ref 130–400)
PMV BLD: 10.4 FL — SIGNIFICANT CHANGE UP (ref 7.4–10.4)
POTASSIUM SERPL-MCNC: 4.5 MMOL/L — SIGNIFICANT CHANGE UP (ref 3.5–5)
POTASSIUM SERPL-SCNC: 4.5 MMOL/L — SIGNIFICANT CHANGE UP (ref 3.5–5)
PROT SERPL-MCNC: 7.5 G/DL — SIGNIFICANT CHANGE UP (ref 6–8)
RBC # BLD: 4.07 M/UL — LOW (ref 4.2–5.4)
RBC # FLD: 14.6 % — HIGH (ref 11.5–14.5)
SODIUM SERPL-SCNC: 133 MMOL/L — LOW (ref 135–146)
WBC # BLD: 7.19 K/UL — SIGNIFICANT CHANGE UP (ref 4.8–10.8)
WBC # FLD AUTO: 7.19 K/UL — SIGNIFICANT CHANGE UP (ref 4.8–10.8)

## 2024-04-05 PROCEDURE — 99232 SBSQ HOSP IP/OBS MODERATE 35: CPT

## 2024-04-05 RX ORDER — INSULIN GLARGINE 100 [IU]/ML
10 INJECTION, SOLUTION SUBCUTANEOUS ONCE
Refills: 0 | Status: COMPLETED | OUTPATIENT
Start: 2024-04-05 | End: 2024-04-05

## 2024-04-05 RX ORDER — INSULIN GLARGINE 100 [IU]/ML
10 INJECTION, SOLUTION SUBCUTANEOUS EVERY MORNING
Refills: 0 | Status: DISCONTINUED | OUTPATIENT
Start: 2024-04-05 | End: 2024-04-06

## 2024-04-05 RX ADMIN — Medication 2: at 07:55

## 2024-04-05 RX ADMIN — BUPRENORPHINE AND NALOXONE 1 TABLET(S): 2; .5 TABLET SUBLINGUAL at 18:01

## 2024-04-05 RX ADMIN — AMPICILLIN SODIUM AND SULBACTAM SODIUM 100 GRAM(S): 250; 125 INJECTION, POWDER, FOR SUSPENSION INTRAMUSCULAR; INTRAVENOUS at 05:09

## 2024-04-05 RX ADMIN — INSULIN GLARGINE 10 UNIT(S): 100 INJECTION, SOLUTION SUBCUTANEOUS at 11:34

## 2024-04-05 RX ADMIN — Medication 250 MILLIGRAM(S): at 06:18

## 2024-04-05 RX ADMIN — Medication 650 MILLIGRAM(S): at 10:52

## 2024-04-05 RX ADMIN — PANTOPRAZOLE SODIUM 40 MILLIGRAM(S): 20 TABLET, DELAYED RELEASE ORAL at 05:10

## 2024-04-05 RX ADMIN — AMPICILLIN SODIUM AND SULBACTAM SODIUM 100 GRAM(S): 250; 125 INJECTION, POWDER, FOR SUSPENSION INTRAMUSCULAR; INTRAVENOUS at 00:26

## 2024-04-05 RX ADMIN — BUPRENORPHINE AND NALOXONE 1 TABLET(S): 2; .5 TABLET SUBLINGUAL at 07:56

## 2024-04-05 RX ADMIN — Medication 0.6 MILLIGRAM(S): at 17:21

## 2024-04-05 RX ADMIN — Medication 4: at 17:22

## 2024-04-05 RX ADMIN — Medication 4: at 07:56

## 2024-04-05 RX ADMIN — Medication 30 MILLIGRAM(S): at 05:10

## 2024-04-05 RX ADMIN — Medication 3: at 12:30

## 2024-04-05 RX ADMIN — Medication 40 MILLIGRAM(S): at 05:09

## 2024-04-05 RX ADMIN — Medication 150 MICROGRAM(S): at 05:10

## 2024-04-05 RX ADMIN — Medication 0.6 MILLIGRAM(S): at 05:10

## 2024-04-05 NOTE — PROGRESS NOTE ADULT - SUBJECTIVE AND OBJECTIVE BOX
EZRA BARCLAY  53y  Female      Patient is a 53y old  Female who presents with a chief complaint of LE vasculitis/cellulitis (05 Apr 2024 09:13)      INTERVAL HPI/OVERNIGHT EVENTS:  Pt seen and examined. She still feels legs are swollen.     Vital Signs Last 24 Hrs  T(C): 35.6 (05 Apr 2024 13:00), Max: 36.7 (05 Apr 2024 05:08)  T(F): 96 (05 Apr 2024 13:00), Max: 98 (05 Apr 2024 05:08)  HR: 53 (05 Apr 2024 13:00) (50 - 61)  BP: 114/55 (05 Apr 2024 13:00) (105/64 - 124/56)  BP(mean): --  RR: 18 (05 Apr 2024 13:00) (18 - 18)  SpO2: 97% (05 Apr 2024 05:08) (97% - 97%)    Parameters below as of 05 Apr 2024 05:08  Patient On (Oxygen Delivery Method): room air        PHYSICAL EXAM:  Gen: NAD, resting in bed  HEENT: Normocephalic, atraumatic  Neck: supple, no lymphadenopathy  CV: Regular rate & regular rhythm  Lungs: CTABL no wheeze  Abdomen: Soft, NTND+ BS present  Ext: bilateral LE with edema, weeping, crusting, and sloughing of skin below knees  Neuro: non focal, awake, CN II-XII intact   Skin: no rash, no erythema  Psych: no SI, HI, Hallucination     Consultant(s) Notes Reviewed:  [x ] YES  [ ] NO  Care Discussed with Consultants/Other Providers [ x] YES  [ ] NO    LABS:                        11.1   7.19  )-----------( 288      ( 05 Apr 2024 08:05 )             33.6     04-05    133<L>  |  96<L>  |  11  ----------------------------<  379<H>  4.5   |  27  |  1.0    Ca    9.2      05 Apr 2024 08:05    TPro  7.5  /  Alb  4.1  /  TBili  0.3  /  DBili  x   /  AST  17  /  ALT  <5  /  AlkPhos  110  04-05      Urinalysis Basic - ( 05 Apr 2024 08:05 )    Color: x / Appearance: x / SG: x / pH: x  Gluc: 379 mg/dL / Ketone: x  / Bili: x / Urobili: x   Blood: x / Protein: x / Nitrite: x   Leuk Esterase: x / RBC: x / WBC x   Sq Epi: x / Non Sq Epi: x / Bacteria: x        CAPILLARY BLOOD GLUCOSE      POCT Blood Glucose.: 259 mg/dL (05 Apr 2024 11:42)  POCT Blood Glucose.: 333 mg/dL (05 Apr 2024 07:32)  POCT Blood Glucose.: 306 mg/dL (04 Apr 2024 22:22)  POCT Blood Glucose.: 217 mg/dL (04 Apr 2024 16:00)      RADIOLOGY & ADDITIONAL TESTS:    Imaging Personally Reviewed:  [ ] YES  [ ] NO    Assessment	and Plan:  Patient is a 53 year old female with hx of hypothyroidism, COPD, DM2, leukocytoclastic vasculitis, untreated Hep C, on suboxone presenting with worsening LE edema, weeping, and skin breakdown, admitted for management of leukocytoclastic vasculitis.      # leukocytoclastic vasculitis    - case d/w rheumatology: start prednisone 30 mg QD, colchicine 0.6 mg BID  - appreciate rheumatology consult  - appreciate ID consult, unlikely cellulitis, d/c atbx  - elevate LE  - MRSA swab    # DM2  - last A1C up to 9s from 6s likely 2/2 steroid use  - monitor FS glucose, SSI  - start lantus    # untreated hep c  - vasculitis can be related to hep c  - ID consult    # copd  - albuterol prn    # hypothyroidism  - cont synthroid 150 mcg    Diet: dash/ccho  Activity: oob  DVT PPX: lovenox  GI PPX: ppi  CHG:  not indicated  Code status: full code  Dispo: acute from home, likely d/c in 24 hrs

## 2024-04-05 NOTE — CONSULT NOTE ADULT - SUBJECTIVE AND OBJECTIVE BOX
INFECTIOUS DISEASE CONSULT NOTE    Patient is a 53y old  Female who presents with a chief complaint of LE vasculitis/cellulitis (04 Apr 2024 17:58)    HPI:  Patient is a 53 year old female with hx of hypothyroidism, COPD, DM2, leukocytoclastic vasculitis, untreated Hep C, on suboxone presenting with worsening LE edema, weeping, and skin breakdown since being discharged from [facility] on 3/23/24. She was being treated for LE cellulitis and dc on PO abx with outpatient rheumatology f/u. She is on a wait list for an appointment.   Patient denies fever, chills, sob, cp, abd pain, n/v/d. (04 Apr 2024 15:53)         Prior hospital charts reviewed [Yes]  Primary team notes reviewed [Yes]  Other consultant notes reviewed [Yes]    REVIEW OF SYSTEMS:      PAST MEDICAL & SURGICAL HISTORY:  Asthma with COPD      Hypothyroidism      H/O: substance abuse  no longer using      History of pancreatitis      Hepatitis-C      Leukocytoclastic vasculitis      History of appendectomy      History of tonsillectomy          SOCIAL HISTORY:  - Born in _____, migrated to  in 19XX  - Currently working as / Retired  - Lives with _____; no pets  - No recent travel  - Denies tobacco use  - Denies alcohol use  - Denies illicit drug use  - Currently sexually active, has one male/female sexual partner    FAMILY HISTORY:      Allergies:  No Known Allergies      ANTIMICROBIALS:  ampicillin/sulbactam  IVPB 1.5 every 6 hours  vancomycin  IVPB 750 every 12 hours  vancomycin  IVPB 750 every 12 hours      ANTIMICROBIALS (past 90 days):  MEDICATIONS  (STANDING):  ampicillin/sulbactam  IVPB   100 mL/Hr IV Intermittent (04-05-24 @ 05:09)   100 mL/Hr IV Intermittent (04-05-24 @ 00:26)   100 mL/Hr IV Intermittent (04-04-24 @ 18:22)   100 mL/Hr IV Intermittent (04-04-24 @ 13:11)    vancomycin  IVPB   250 mL/Hr IV Intermittent (04-04-24 @ 13:48)    vancomycin  IVPB   250 mL/Hr IV Intermittent (04-05-24 @ 06:18)   250 mL/Hr IV Intermittent (04-04-24 @ 17:53)        OTHER MEDS:   MEDICATIONS  (STANDING):  acetaminophen     Tablet .. 650 every 6 hours PRN  albuterol    90 MICROgram(s) HFA Inhaler 2 every 6 hours PRN  buprenorphine 2 mG/naloxone 0.5 mG SL  Tablet 1 <User Schedule>  buprenorphine 8 mG/naloxone 2 mG SL  Tablet 1 <User Schedule>  colchicine 0.6 two times a day  enoxaparin Injectable 40 every 24 hours  furosemide    Tablet 40 daily  insulin glargine Injectable (LANTUS) 10 every morning  insulin glargine Injectable (LANTUS) 10 once  insulin lispro (ADMELOG) corrective regimen sliding scale  three times a day before meals  insulin lispro (ADMELOG) corrective regimen sliding scale  three times a day before meals  levothyroxine 150 daily  pantoprazole    Tablet 40 before breakfast  predniSONE   Tablet 30 daily      VITALS:  Vital Signs Last 24 Hrs  T(F): 98 (04-05-24 @ 05:08), Max: 98 (04-05-24 @ 05:08)    Vital Signs Last 24 Hrs  HR: 52 (04-05-24 @ 05:08) (50 - 69)  BP: 116/69 (04-05-24 @ 05:08) (105/64 - 133/65)  RR: 18 (04-05-24 @ 05:08)  SpO2: 97% (04-05-24 @ 05:08) (97% - 99%)  Wt(kg): --    EXAM:    Labs:                        11.1   7.19  )-----------( 288      ( 05 Apr 2024 08:05 )             33.6     04-05    133<L>  |  96<L>  |  11  ----------------------------<  379<H>  4.5   |  27  |  1.0    Ca    9.2      05 Apr 2024 08:05    TPro  7.5  /  Alb  4.1  /  TBili  0.3  /  DBili  x   /  AST  17  /  ALT  <5  /  AlkPhos  110  04-05      WBC Trend:  WBC Count: 7.19 (04-05-24 @ 08:05)  WBC Count: 7.07 (04-04-24 @ 12:19)      Auto Neutrophil #: 5.98 K/uL (04-05-24 @ 08:05)  Auto Neutrophil #: 4.35 K/uL (04-04-24 @ 12:19)  Auto Neutrophil #: 8.18 K/uL (03-18-24 @ 21:35)  Auto Neutrophil #: 4.06 K/uL (02-02-24 @ 06:07)  Auto Neutrophil #: 2.07 K/uL (02-01-24 @ 06:47)      Creatine Trend:  Creatinine: 1.0 (04-05)  Creatinine: 0.7 (04-04)  Creatinine: 0.8 (04-04)      Liver Biochemical Testing Trend:  Alanine Aminotransferase (ALT/SGPT): <5 (04-05)  Alanine Aminotransferase (ALT/SGPT): <5 (04-04)  Alanine Aminotransferase (ALT/SGPT): 6 (04-04)  Alanine Aminotransferase (ALT/SGPT): <5 (03-19)  Alanine Aminotransferase (ALT/SGPT): <5 (03-18)  Aspartate Aminotransferase (AST/SGOT): 17 (04-05-24 @ 08:05)  Aspartate Aminotransferase (AST/SGOT): 17 (04-04-24 @ 14:50)  Aspartate Aminotransferase (AST/SGOT): 32 (04-04-24 @ 12:19)  Aspartate Aminotransferase (AST/SGOT): 10 (03-19-24 @ 06:00)  Aspartate Aminotransferase (AST/SGOT): 18 (03-18-24 @ 21:35)  Bilirubin Total: 0.3 (04-05)  Bilirubin Total: 0.3 (04-04)  Bilirubin Total: 0.3 (04-04)  Bilirubin Total: 0.4 (03-19)  Bilirubin Total: 0.4 (03-18)      Trend LDH  01-10-24 @ 06:55  230      Auto Eosinophil %: 0.0 % (04-05-24 @ 08:05)  Auto Eosinophil %: 2.5 % (04-04-24 @ 12:19)      Urinalysis Basic - ( 05 Apr 2024 08:05 )    Color: x / Appearance: x / SG: x / pH: x  Gluc: 379 mg/dL / Ketone: x  / Bili: x / Urobili: x   Blood: x / Protein: x / Nitrite: x   Leuk Esterase: x / RBC: x / WBC x   Sq Epi: x / Non Sq Epi: x / Bacteria: x        MICROBIOLOGY:  Vancomycin Level, Trough: 9.2 (03-20 @ 14:55)    HIV-1/2 Combo Result: Nonreact (01-11-24 @ 13:40)        C-Reactive Protein, Serum: 15.3 (04-04)      A1C with Estimated Average Glucose Result: 9.7 % (03-19-24 @ 06:00)  A1C with Estimated Average Glucose Result: 6.5 % (01-10-24 @ 06:55)      RADIOLOGY:  imaging below personally reviewed    < from: US Kidney and Bladder (03.19.24 @ 08:54) >  IMPRESSION:    Normal renal ultrasound.    Mildly distended urinary bladder.    < end of copied text >   INFECTIOUS DISEASE CONSULT NOTE    Patient is a 53y old  Female who presents with a chief complaint of LE vasculitis/cellulitis (04 Apr 2024 17:58)    HPI:  Patient is a 53 year old female with hx of hypothyroidism, COPD, DM2, leukocytoclastic vasculitis, untreated Hep C, on suboxone presenting with worsening LE edema, weeping, and skin breakdown since being discharged from [facility] on 3/23/24. She was being treated for LE cellulitis and dc on PO abx with outpatient rheumatology f/u. She is on a wait list for an appointment.   Patient denies fever, chills, sob, cp, abd pain, n/v/d. (04 Apr 2024 15:53)    Prior hospital charts reviewed [Yes]  Primary team notes reviewed [Yes]  Other consultant notes reviewed [Yes]    REVIEW OF SYSTEMS:  CONSTITUTIONAL: No fever or chills  HEAD: No lesion on scalp  EYES: No visual disturbance  ENT: No sore throat  RESPIRATORY: No cough, no shortness of breath  CARDIOVASCULAR: No chest pain or palpitations  GASTROINTESTINAL: No abdominal or epigastric pain  GENITOURINARY: No dysuria  NEUROLOGICAL: No headache/dizziness  MUSCULOSKELETAL: No joint pain, erythema, or swelling; no back pain  SKIN: Significant LE edema with multiple crusted lesions. minimal erythema, no warmth.   All other ROS negative except noted above    PAST MEDICAL & SURGICAL HISTORY:  Asthma with COPD    Hypothyroidism    H/O: substance abuse  no longer using    History of pancreatitis    Hepatitis-C    Leukocytoclastic vasculitis    History of appendectomy    History of tonsillectomy      SOCIAL HISTORY:  - No recent travel  - Denies tobacco use  - Denies alcohol use  - Denies illicit drug use    FAMILY HISTORY:  NO family history of autoimmune diseases or vasculitis      Allergies:  No Known Allergies      ANTIMICROBIALS:  ampicillin/sulbactam  IVPB 1.5 every 6 hours  vancomycin  IVPB 750 every 12 hours  vancomycin  IVPB 750 every 12 hours      ANTIMICROBIALS (past 90 days):  MEDICATIONS  (STANDING):  ampicillin/sulbactam  IVPB   100 mL/Hr IV Intermittent (04-05-24 @ 05:09)   100 mL/Hr IV Intermittent (04-05-24 @ 00:26)   100 mL/Hr IV Intermittent (04-04-24 @ 18:22)   100 mL/Hr IV Intermittent (04-04-24 @ 13:11)    vancomycin  IVPB   250 mL/Hr IV Intermittent (04-04-24 @ 13:48)    vancomycin  IVPB   250 mL/Hr IV Intermittent (04-05-24 @ 06:18)   250 mL/Hr IV Intermittent (04-04-24 @ 17:53)        OTHER MEDS:   MEDICATIONS  (STANDING):  acetaminophen     Tablet .. 650 every 6 hours PRN  albuterol    90 MICROgram(s) HFA Inhaler 2 every 6 hours PRN  buprenorphine 2 mG/naloxone 0.5 mG SL  Tablet 1 <User Schedule>  buprenorphine 8 mG/naloxone 2 mG SL  Tablet 1 <User Schedule>  colchicine 0.6 two times a day  enoxaparin Injectable 40 every 24 hours  furosemide    Tablet 40 daily  insulin glargine Injectable (LANTUS) 10 every morning  insulin glargine Injectable (LANTUS) 10 once  insulin lispro (ADMELOG) corrective regimen sliding scale  three times a day before meals  insulin lispro (ADMELOG) corrective regimen sliding scale  three times a day before meals  levothyroxine 150 daily  pantoprazole    Tablet 40 before breakfast  predniSONE   Tablet 30 daily      VITALS:  Vital Signs Last 24 Hrs  T(F): 98 (04-05-24 @ 05:08), Max: 98 (04-05-24 @ 05:08)    Vital Signs Last 24 Hrs  HR: 52 (04-05-24 @ 05:08) (50 - 69)  BP: 116/69 (04-05-24 @ 05:08) (105/64 - 133/65)  RR: 18 (04-05-24 @ 05:08)  SpO2: 97% (04-05-24 @ 05:08) (97% - 99%)  Wt(kg): --    EXAM:  GENERAL: NAD, lying in bed  HEAD: No head lesions  EYES: Conjunctiva pink and cornea white  EAR, NOSE, MOUTH, THROAT: Normal external ears and nose, no discharges; moist mucous membranes  NECK: Supple, nontender to palpation; no JVD  RESPIRATORY: Clear to auscultation bilaterally  CARDIOVASCULAR: S1 S2  GASTROINTESTINAL: Soft, nontender, nondistended; normoactive bowel sounds  EXTREMITIES: No clubbing, cyanosis, severe bilateral 2+ petal edema  NERVOUS SYSTEM: Alert and oriented to person, time, place and situation, speech clear. No focal deficits   MUSCULOSKELETAL: No joint erythema, swelling or pain  SKIN: Multiple crusted lesions and some open lesions, no purulence, no superficial thrombophlebitis  PSYCH: Normal affect      Labs:                        11.1   7.19  )-----------( 288      ( 05 Apr 2024 08:05 )             33.6     04-05    133<L>  |  96<L>  |  11  ----------------------------<  379<H>  4.5   |  27  |  1.0    Ca    9.2      05 Apr 2024 08:05    TPro  7.5  /  Alb  4.1  /  TBili  0.3  /  DBili  x   /  AST  17  /  ALT  <5  /  AlkPhos  110  04-05      WBC Trend:  WBC Count: 7.19 (04-05-24 @ 08:05)  WBC Count: 7.07 (04-04-24 @ 12:19)      Auto Neutrophil #: 5.98 K/uL (04-05-24 @ 08:05)  Auto Neutrophil #: 4.35 K/uL (04-04-24 @ 12:19)  Auto Neutrophil #: 8.18 K/uL (03-18-24 @ 21:35)  Auto Neutrophil #: 4.06 K/uL (02-02-24 @ 06:07)  Auto Neutrophil #: 2.07 K/uL (02-01-24 @ 06:47)      Creatine Trend:  Creatinine: 1.0 (04-05)  Creatinine: 0.7 (04-04)  Creatinine: 0.8 (04-04)      Liver Biochemical Testing Trend:  Alanine Aminotransferase (ALT/SGPT): <5 (04-05)  Alanine Aminotransferase (ALT/SGPT): <5 (04-04)  Alanine Aminotransferase (ALT/SGPT): 6 (04-04)  Alanine Aminotransferase (ALT/SGPT): <5 (03-19)  Alanine Aminotransferase (ALT/SGPT): <5 (03-18)  Aspartate Aminotransferase (AST/SGOT): 17 (04-05-24 @ 08:05)  Aspartate Aminotransferase (AST/SGOT): 17 (04-04-24 @ 14:50)  Aspartate Aminotransferase (AST/SGOT): 32 (04-04-24 @ 12:19)  Aspartate Aminotransferase (AST/SGOT): 10 (03-19-24 @ 06:00)  Aspartate Aminotransferase (AST/SGOT): 18 (03-18-24 @ 21:35)  Bilirubin Total: 0.3 (04-05)  Bilirubin Total: 0.3 (04-04)  Bilirubin Total: 0.3 (04-04)  Bilirubin Total: 0.4 (03-19)  Bilirubin Total: 0.4 (03-18)      Trend LDH  01-10-24 @ 06:55  230      Auto Eosinophil %: 0.0 % (04-05-24 @ 08:05)  Auto Eosinophil %: 2.5 % (04-04-24 @ 12:19)      Urinalysis Basic - ( 05 Apr 2024 08:05 )    Color: x / Appearance: x / SG: x / pH: x  Gluc: 379 mg/dL / Ketone: x  / Bili: x / Urobili: x   Blood: x / Protein: x / Nitrite: x   Leuk Esterase: x / RBC: x / WBC x   Sq Epi: x / Non Sq Epi: x / Bacteria: x        MICROBIOLOGY:  Vancomycin Level, Trough: 9.2 (03-20 @ 14:55)    HIV-1/2 Combo Result: Nonreact (01-11-24 @ 13:40)        C-Reactive Protein, Serum: 15.3 (04-04)      A1C with Estimated Average Glucose Result: 9.7 % (03-19-24 @ 06:00)  A1C with Estimated Average Glucose Result: 6.5 % (01-10-24 @ 06:55)      RADIOLOGY:  imaging below personally reviewed    < from: US Kidney and Bladder (03.19.24 @ 08:54) >  IMPRESSION:    Normal renal ultrasound.    Mildly distended urinary bladder.    < end of copied text >

## 2024-04-05 NOTE — CONSULT NOTE ADULT - ASSESSMENT
53 year old female with hx of hypothyroidism, COPD, DM2, leukocytoclastic vasculitis, untreated Hep C, on suboxone presenting with worsening LE edema, weeping, and skin breakdown.    ID is consulted for cellulitis  Afebrile, no leukocytosis  Recently treated for cellulitis with Doxy and Augmentin until 3/25 and discharged to home wound care  Rheum recs: Prednisone 30mg q24hrs and colchicine    Antibiotics:  Vancomycin 4/4 ->  Unasyn 4/4 ->      IMPRESSION:      RECOMMENDATIONS:   53 year old female with hx of hypothyroidism, COPD, DM2, leukocytoclastic vasculitis, untreated Hep C, on suboxone presenting with worsening LE edema, weeping, and skin breakdown.    ID is consulted for cellulitis  Afebrile, no leukocytosis  Recently treated for cellulitis with Doxy and Augmentin until 3/25 and discharged to home wound care  Rheum recs: Prednisone 30mg q24hrs and colchicine    Antibiotics:  Vancomycin 4/4 ->  Unasyn 4/4 ->      IMPRESSION:  Bilateral LE leukocytoclastic vasculitis  Bilateral LE wounds  Hx Hepatitis C infection    RECOMMENDATIONS:  - Low suspicion for cellulitis, but with significant edema and having multiple open wounds  - D/C vancomycin and Unasyn  - Follow up BCx  - Local wound care, wound care consult  - Leg elevation  - Appreciated Rheum recs: Prednisone and colchicine  - Aspiration precaution  - Trend WBC, fever curve, transaminases, creatinine daily      Snow Saleh D.O.  Attending Physician  Division of Infectious Diseases  [facility]  Please contact me via [employer] Teams

## 2024-04-06 ENCOUNTER — TRANSCRIPTION ENCOUNTER (OUTPATIENT)
Age: 54
End: 2024-04-06

## 2024-04-06 VITALS
RESPIRATION RATE: 18 BRPM | TEMPERATURE: 97 F | HEART RATE: 60 BPM | DIASTOLIC BLOOD PRESSURE: 59 MMHG | SYSTOLIC BLOOD PRESSURE: 119 MMHG

## 2024-04-06 PROCEDURE — 99239 HOSP IP/OBS DSCHRG MGMT >30: CPT

## 2024-04-06 RX ORDER — COLCHICINE 0.6 MG
1 TABLET ORAL
Qty: 60 | Refills: 0
Start: 2024-04-06 | End: 2024-05-05

## 2024-04-06 RX ORDER — PANTOPRAZOLE SODIUM 20 MG/1
1 TABLET, DELAYED RELEASE ORAL
Qty: 21 | Refills: 0
Start: 2024-04-06 | End: 2024-04-26

## 2024-04-06 RX ADMIN — Medication 2: at 08:00

## 2024-04-06 RX ADMIN — BUPRENORPHINE AND NALOXONE 1 TABLET(S): 2; .5 TABLET SUBLINGUAL at 06:08

## 2024-04-06 RX ADMIN — Medication 150 MICROGRAM(S): at 06:09

## 2024-04-06 RX ADMIN — INSULIN GLARGINE 10 UNIT(S): 100 INJECTION, SOLUTION SUBCUTANEOUS at 08:00

## 2024-04-06 RX ADMIN — Medication 40 MILLIGRAM(S): at 06:09

## 2024-04-06 RX ADMIN — Medication 30 MILLIGRAM(S): at 06:09

## 2024-04-06 RX ADMIN — Medication 0.6 MILLIGRAM(S): at 06:09

## 2024-04-06 RX ADMIN — PANTOPRAZOLE SODIUM 40 MILLIGRAM(S): 20 TABLET, DELAYED RELEASE ORAL at 08:00

## 2024-04-06 NOTE — DISCHARGE NOTE PROVIDER - NSDCFUSCHEDAPPT_GEN_ALL_CORE_FT
Naye Mills  [facility] Physician [facility]  RHEUM [address]  Scheduled Appointment: 04/11/2024    Javier Soto  [facility] PreAdmits  Scheduled Appointment: 04/15/2024    Javier Soto  [facility]  PSYCHIATRY  Leela  Scheduled Appointment: 04/15/2024    Javier Soto  [facility] PreAdmits  Scheduled Appointment: 04/16/2024    [facility]  PSYCHIATRY  Leela  Scheduled Appointment: 04/16/2024

## 2024-04-06 NOTE — DISCHARGE NOTE PROVIDER - HOSPITAL COURSE
54 y/o woman with h/o hepatitis B and hepatitis C (negative DNA and RNA titers recently), IV drug abuse on Suboxone and diabetes readmitted with b/l LE edema and erythema. Prior recent skin biopsy showed leukocytoclastic vasculitis. Rheumatology and ID were consulted. Initially on IV abx for concern for cellulitis, ID said no evidence of cellulitis and atbx were discontinued. Rheumatology recommended prednisone and colchicine. There was mild improvement in symptoms including improvement in LE edema and erythema. Rheumatology said unclear if this is related to leukocytoclastic vasculitis or other dermatological condition and recommended outpatient Dermatology follow-up. She was discharged with instructions to follow-up with Dermatology. She was given Rx for prednisone taper and colchicine. 52 y/o woman with h/o hepatitis B and hepatitis C (negative DNA and RNA titers recently), IV drug abuse on Suboxone and diabetes readmitted with b/l LE edema and erythema. Prior recent skin biopsy showed leukocytoclastic vasculitis. Rheumatology and ID were consulted. Initially on IV abx for concern for cellulitis, ID said no evidence of cellulitis and atbx were discontinued. Rheumatology recommended prednisone and colchicine. There was mild improvement in symptoms including improvement in LE edema and erythema. Rheumatology said unclear if this is related to leukocytoclastic vasculitis or other dermatological condition and recommended to continue prednisone & colchicine and outpatient Dermatology follow-up. She was discharged with instructions to follow-up with Dermatology. She was given Rx for prednisone taper and colchicine.     Wound care recs:  elevate legs  Clean lower extremity wounds with soap and water, pat dry then apply silvadene dressing    Elevate lower extremity when sitting dependent   Pressure injury  prevention   skin  and incontinence care

## 2024-04-06 NOTE — DISCHARGE NOTE NURSING/CASE MANAGEMENT/SOCIAL WORK - NSDCPEFALRISK_GEN_ALL_CORE
For information on Fall & Injury Prevention, visit: [email or web address] OR  [email or web address] OR  https://www.cdc.gov/steadi/patient.html

## 2024-04-06 NOTE — DISCHARGE NOTE NURSING/CASE MANAGEMENT/SOCIAL WORK - PATIENT PORTAL LINK FT
You can access the FollowMyHealth Patient Portal offered by [facility] by registering at the following website: [email or web address]. By joining [employer]’s FollowMyHealth portal, you will also be able to view your health information using other applications (apps) compatible with our system.

## 2024-04-06 NOTE — DISCHARGE NOTE PROVIDER - CARE PROVIDER_API CALL
Hung Atkins  Dermatology  [address] [place] [id number]  Phone: [phone]  Fax: [phone]  Follow Up Time: 1-3 days

## 2024-04-06 NOTE — DISCHARGE NOTE PROVIDER - ATTENDING DISCHARGE PHYSICAL EXAMINATION:
Gen: NAD, resting in bed  HEENT: Normocephalic, atraumatic  CV: Regular rate & regular rhythm  Lungs: CTABL no wheeze  Abdomen: Soft, NTND+ BS present  Ext: Warm, well perfused  Neuro: non focal, awake, CN II-XII intact   Skin: no visible rash, no erythema  Ext: bilateral LE with edema, mild erythema  Psych: no SI, HI, Hallucination

## 2024-04-06 NOTE — DISCHARGE NOTE PROVIDER - NSDCCPCAREPLAN_GEN_ALL_CORE_FT
PRINCIPAL DISCHARGE DIAGNOSIS  Diagnosis: Leukocytoclastic vasculitis  Assessment and Plan of Treatment: You were treated for leukocytoclastic vasculitis with prednisone and colcihine. Continue taking prednisone and colchicine and follow-up with Dermatologist. Watch your blood sugars carefully while on prednisone and avoid concentrated sweets.

## 2024-04-09 LAB
CULTURE RESULTS: SIGNIFICANT CHANGE UP
SPECIMEN SOURCE: SIGNIFICANT CHANGE UP

## 2024-04-10 DIAGNOSIS — Z79.890 HORMONE REPLACEMENT THERAPY: ICD-10-CM

## 2024-04-10 DIAGNOSIS — E03.9 HYPOTHYROIDISM, UNSPECIFIED: ICD-10-CM

## 2024-04-10 DIAGNOSIS — L03.116 CELLULITIS OF LEFT LOWER LIMB: ICD-10-CM

## 2024-04-10 DIAGNOSIS — B19.20 UNSPECIFIED VIRAL HEPATITIS C WITHOUT HEPATIC COMA: ICD-10-CM

## 2024-04-10 DIAGNOSIS — M31.0 HYPERSENSITIVITY ANGIITIS: ICD-10-CM

## 2024-04-10 DIAGNOSIS — E11.9 TYPE 2 DIABETES MELLITUS WITHOUT COMPLICATIONS: ICD-10-CM

## 2024-04-10 DIAGNOSIS — Z79.84 LONG TERM (CURRENT) USE OF ORAL HYPOGLYCEMIC DRUGS: ICD-10-CM

## 2024-04-10 DIAGNOSIS — J44.9 CHRONIC OBSTRUCTIVE PULMONARY DISEASE, UNSPECIFIED: ICD-10-CM

## 2024-04-11 ENCOUNTER — APPOINTMENT (OUTPATIENT)
Dept: RHEUMATOLOGY | Facility: CLINIC | Age: 54
End: 2024-04-11
Payer: MEDICAID

## 2024-04-11 VITALS
HEART RATE: 65 BPM | WEIGHT: 175 LBS | TEMPERATURE: 98.3 F | DIASTOLIC BLOOD PRESSURE: 70 MMHG | HEIGHT: 68 IN | OXYGEN SATURATION: 96 % | SYSTOLIC BLOOD PRESSURE: 110 MMHG | BODY MASS INDEX: 26.52 KG/M2

## 2024-04-11 DIAGNOSIS — R60.0 LOCALIZED EDEMA: ICD-10-CM

## 2024-04-11 DIAGNOSIS — L95.9 VASCULITIS LIMITED TO THE SKIN, UNSPECIFIED: ICD-10-CM

## 2024-04-11 PROCEDURE — 99204 OFFICE O/P NEW MOD 45 MIN: CPT

## 2024-04-11 PROCEDURE — 99214 OFFICE O/P EST MOD 30 MIN: CPT

## 2024-04-11 RX ORDER — SILVER SULFADIAZINE 10 MG/G
1 CREAM TOPICAL TWICE DAILY
Qty: 1 | Refills: 5 | Status: ACTIVE | COMMUNITY
Start: 2024-04-11 | End: 1900-01-01

## 2024-04-11 RX ORDER — PREDNISONE 10 MG/1
10 TABLET ORAL
Qty: 11 | Refills: 0 | Status: ACTIVE | COMMUNITY
Start: 2024-04-11 | End: 1900-01-01

## 2024-04-11 RX ORDER — COLCHICINE 0.6 MG/1
0.6 TABLET ORAL
Qty: 30 | Refills: 5 | Status: ACTIVE | COMMUNITY
Start: 2024-04-11 | End: 1900-01-01

## 2024-04-11 NOTE — ASSESSMENT
[FreeTextEntry1] : 52 y/o woman with h/o hepatitis B and hepatitis C (negative DNA and RNA titers recently), IV drug abuse on Suboxone and diabetes readmitted with b/l LE edema and erythema, rheumatology consulted for leukocytoclastic vasculitis, found on a skin biopsy on a previous admission. Pt's skin changes are different in appearance compared to prior. Last week she had no purpuric or petechial changes whatsoever; this week she has ?small area of petechiae in the LLE which started on prednisone and colchicine, but overall her LE swelling appears to have worsened on prednisone, which can be a/w LE edema. It is unclear whether she is being treated for the correct condition at this point. No other symptoms or exam findings to suggest systemic vasculitis, and pt's prior lab workup for vasculitis demonstrated p-ANCA 1:40 but with negative MPO and was otherwise negative. - Decrease prednisone to 20 mg x 3 days -> 10 mg x 3 days -> 5 mg x 3 days - Decrease colchicine to 0.6 mg q day given pt's diarrhea, I would continue it for now - Pt has appt with dermatology in 2 weeks - Also referred to vascular given persistent LE edema - Pt has wound care at home  f/u in 1 month as virtual visit

## 2024-04-11 NOTE — HISTORY OF PRESENT ILLNESS
[FreeTextEntry1] : Pt initially developed b/l LE edema around November 2023. In January 2024, she was admitted with an erythematous rash on her calves. She was noted to have a purpuric and petechial rash, and had a skin biopsy which demonstrated cutaneous vasculitis. She was treated for cellulitis and was discharged, but a few weeks later was readmitted with worsening of her rash. She was discharged on prednisone 30 mg daily, which she says helped but it is unclear how long she took it. She was readmitted 3/2024 for the same symptoms and was treated for cellulitis. In 4/2024, pt was noted to have worse swelling in her LEs again, and was readmitted again to [facility], also with worse erythema. She was started on prednisone and colchicine for cutaneous vasculitis, although her LE skin appearance was different from prior. Since she was discharged last week, pt has noted progressive worsening of her LE swelling. Also + some diarrhea x several days, pt denies any other new symptoms.    Physical exam: GEN: Pleasant, AAO woman sitting in wheelchair in NAD SKIN: +? Small area of petechiae in proximal L LE, new compared to last week. + Worse erythema in b/l LEs compared to last week, also with ulcerations and verrucous skin changes MOUTH: Moist mucous membranes, no oral ulcers, normal oral aperture ENT: No LAD PULM: Clear to auscultation b/l CV: Regular rate and rhythm, no murmurs MSK: Shoulders: Full ROM b/l Elbows: Full ROM b/l, no effusions Wrists; Full ROM b/l, no effusions Hands: No synovitis Hips; Full ROM b/l Knees: no effusions, full ROM b/l Ankles: + Soft tissue edema b/l, full ROM b/l Feet: + soft tissue edema EXT: + LE edema b/l to shins, skin changes in LEs as above, normal nailfold capillaries

## 2024-04-14 ENCOUNTER — EMERGENCY (EMERGENCY)
Facility: HOSPITAL | Age: 54
LOS: 0 days | Discharge: HOME | End: 2024-04-14
Attending: EMERGENCY MEDICINE
Payer: MEDICAID

## 2024-04-14 VITALS
OXYGEN SATURATION: 97 % | HEIGHT: 68 IN | RESPIRATION RATE: 18 BRPM | HEART RATE: 70 BPM | DIASTOLIC BLOOD PRESSURE: 66 MMHG | SYSTOLIC BLOOD PRESSURE: 124 MMHG | TEMPERATURE: 97 F

## 2024-04-14 VITALS — HEART RATE: 69 BPM | SYSTOLIC BLOOD PRESSURE: 118 MMHG | DIASTOLIC BLOOD PRESSURE: 67 MMHG

## 2024-04-14 DIAGNOSIS — R60.0 LOCALIZED EDEMA: ICD-10-CM

## 2024-04-14 DIAGNOSIS — Z87.19 PERSONAL HISTORY OF OTHER DISEASES OF THE DIGESTIVE SYSTEM: ICD-10-CM

## 2024-04-14 DIAGNOSIS — E11.65 TYPE 2 DIABETES MELLITUS WITH HYPERGLYCEMIA: ICD-10-CM

## 2024-04-14 DIAGNOSIS — Z79.4 LONG TERM (CURRENT) USE OF INSULIN: ICD-10-CM

## 2024-04-14 DIAGNOSIS — Z90.89 ACQUIRED ABSENCE OF OTHER ORGANS: Chronic | ICD-10-CM

## 2024-04-14 DIAGNOSIS — Z90.49 ACQUIRED ABSENCE OF OTHER SPECIFIED PARTS OF DIGESTIVE TRACT: Chronic | ICD-10-CM

## 2024-04-14 DIAGNOSIS — R00.1 BRADYCARDIA, UNSPECIFIED: ICD-10-CM

## 2024-04-14 DIAGNOSIS — Z79.84 LONG TERM (CURRENT) USE OF ORAL HYPOGLYCEMIC DRUGS: ICD-10-CM

## 2024-04-14 PROBLEM — M31.0 HYPERSENSITIVITY ANGIITIS: Chronic | Status: ACTIVE | Noted: 2024-04-04

## 2024-04-14 LAB
ALBUMIN SERPL ELPH-MCNC: 4.7 G/DL — SIGNIFICANT CHANGE UP (ref 3.5–5.2)
ALP SERPL-CCNC: 159 U/L — HIGH (ref 30–115)
ALT FLD-CCNC: 5 U/L — SIGNIFICANT CHANGE UP (ref 0–41)
ANION GAP SERPL CALC-SCNC: 11 MMOL/L — SIGNIFICANT CHANGE UP (ref 7–14)
APPEARANCE UR: CLEAR — SIGNIFICANT CHANGE UP
AST SERPL-CCNC: 15 U/L — SIGNIFICANT CHANGE UP (ref 0–41)
B-OH-BUTYR SERPL-SCNC: <0.2 MMOL/L — SIGNIFICANT CHANGE UP
BASE EXCESS BLDV CALC-SCNC: 4.8 MMOL/L — HIGH (ref -2–3)
BASOPHILS # BLD AUTO: 0.01 K/UL — SIGNIFICANT CHANGE UP (ref 0–0.2)
BASOPHILS NFR BLD AUTO: 0.2 % — SIGNIFICANT CHANGE UP (ref 0–1)
BILIRUB SERPL-MCNC: 0.5 MG/DL — SIGNIFICANT CHANGE UP (ref 0.2–1.2)
BILIRUB UR-MCNC: NEGATIVE — SIGNIFICANT CHANGE UP
BUN SERPL-MCNC: 14 MG/DL — SIGNIFICANT CHANGE UP (ref 10–20)
CA-I SERPL-SCNC: 1.15 MMOL/L — SIGNIFICANT CHANGE UP (ref 1.15–1.33)
CALCIUM SERPL-MCNC: 9 MG/DL — SIGNIFICANT CHANGE UP (ref 8.4–10.5)
CHLORIDE SERPL-SCNC: 88 MMOL/L — LOW (ref 98–110)
CO2 SERPL-SCNC: 27 MMOL/L — SIGNIFICANT CHANGE UP (ref 17–32)
COLOR SPEC: YELLOW — SIGNIFICANT CHANGE UP
CREAT SERPL-MCNC: 0.9 MG/DL — SIGNIFICANT CHANGE UP (ref 0.7–1.5)
DIFF PNL FLD: NEGATIVE — SIGNIFICANT CHANGE UP
EGFR: 76 ML/MIN/1.73M2 — SIGNIFICANT CHANGE UP
EOSINOPHIL # BLD AUTO: 0.02 K/UL — SIGNIFICANT CHANGE UP (ref 0–0.7)
EOSINOPHIL NFR BLD AUTO: 0.3 % — SIGNIFICANT CHANGE UP (ref 0–8)
GAS PNL BLDV: 123 MMOL/L — LOW (ref 136–145)
GAS PNL BLDV: SIGNIFICANT CHANGE UP
GLUCOSE BLDC GLUCOMTR-MCNC: 382 MG/DL — HIGH (ref 70–99)
GLUCOSE BLDC GLUCOMTR-MCNC: 451 MG/DL — CRITICAL HIGH (ref 70–99)
GLUCOSE BLDC GLUCOMTR-MCNC: 523 MG/DL — CRITICAL HIGH (ref 70–99)
GLUCOSE BLDC GLUCOMTR-MCNC: 582 MG/DL — CRITICAL HIGH (ref 70–99)
GLUCOSE BLDC GLUCOMTR-MCNC: >600 MG/DL — CRITICAL HIGH (ref 70–99)
GLUCOSE SERPL-MCNC: 638 MG/DL — CRITICAL HIGH (ref 70–99)
GLUCOSE UR QL: >=1000 MG/DL
HCO3 BLDV-SCNC: 32 MMOL/L — HIGH (ref 22–29)
HCT VFR BLD CALC: 33.3 % — LOW (ref 37–47)
HCT VFR BLDA CALC: 39 % — SIGNIFICANT CHANGE UP (ref 34.5–46.5)
HGB BLD CALC-MCNC: 12.9 G/DL — SIGNIFICANT CHANGE UP (ref 11.7–16.1)
HGB BLD-MCNC: 10.9 G/DL — LOW (ref 12–16)
IMM GRANULOCYTES NFR BLD AUTO: 0.3 % — SIGNIFICANT CHANGE UP (ref 0.1–0.3)
KETONES UR-MCNC: NEGATIVE MG/DL — SIGNIFICANT CHANGE UP
LACTATE BLDV-MCNC: 2 MMOL/L — SIGNIFICANT CHANGE UP (ref 0.5–2)
LACTATE SERPL-SCNC: 1.8 MMOL/L — SIGNIFICANT CHANGE UP (ref 0.7–2)
LEUKOCYTE ESTERASE UR-ACNC: NEGATIVE — SIGNIFICANT CHANGE UP
LYMPHOCYTES # BLD AUTO: 0.77 K/UL — LOW (ref 1.2–3.4)
LYMPHOCYTES # BLD AUTO: 12.8 % — LOW (ref 20.5–51.1)
MCHC RBC-ENTMCNC: 27 PG — SIGNIFICANT CHANGE UP (ref 27–31)
MCHC RBC-ENTMCNC: 32.7 G/DL — SIGNIFICANT CHANGE UP (ref 32–37)
MCV RBC AUTO: 82.6 FL — SIGNIFICANT CHANGE UP (ref 81–99)
MONOCYTES # BLD AUTO: 0.1 K/UL — SIGNIFICANT CHANGE UP (ref 0.1–0.6)
MONOCYTES NFR BLD AUTO: 1.7 % — SIGNIFICANT CHANGE UP (ref 1.7–9.3)
NEUTROPHILS # BLD AUTO: 5.08 K/UL — SIGNIFICANT CHANGE UP (ref 1.4–6.5)
NEUTROPHILS NFR BLD AUTO: 84.7 % — HIGH (ref 42.2–75.2)
NITRITE UR-MCNC: NEGATIVE — SIGNIFICANT CHANGE UP
NRBC # BLD: 0 /100 WBCS — SIGNIFICANT CHANGE UP (ref 0–0)
OSMOLALITY SERPL: 307 MOS/KG — HIGH (ref 275–300)
PCO2 BLDV: 58 MMHG — HIGH (ref 39–42)
PH BLDV: 7.35 — SIGNIFICANT CHANGE UP (ref 7.32–7.43)
PH UR: 7 — SIGNIFICANT CHANGE UP (ref 5–8)
PLATELET # BLD AUTO: 215 K/UL — SIGNIFICANT CHANGE UP (ref 130–400)
PMV BLD: 10.9 FL — HIGH (ref 7.4–10.4)
PO2 BLDV: 26 MMHG — SIGNIFICANT CHANGE UP (ref 25–45)
POTASSIUM BLDV-SCNC: 4.6 MMOL/L — SIGNIFICANT CHANGE UP (ref 3.5–5.1)
POTASSIUM SERPL-MCNC: 4.9 MMOL/L — SIGNIFICANT CHANGE UP (ref 3.5–5)
POTASSIUM SERPL-SCNC: 4.9 MMOL/L — SIGNIFICANT CHANGE UP (ref 3.5–5)
PROT SERPL-MCNC: 7.3 G/DL — SIGNIFICANT CHANGE UP (ref 6–8)
PROT UR-MCNC: NEGATIVE MG/DL — SIGNIFICANT CHANGE UP
RBC # BLD: 4.03 M/UL — LOW (ref 4.2–5.4)
RBC # FLD: 15 % — HIGH (ref 11.5–14.5)
SAO2 % BLDV: 36.4 % — LOW (ref 67–88)
SODIUM SERPL-SCNC: 126 MMOL/L — LOW (ref 135–146)
SP GR SPEC: >1.03 — HIGH (ref 1–1.03)
UROBILINOGEN FLD QL: 0.2 MG/DL — SIGNIFICANT CHANGE UP (ref 0.2–1)
WBC # BLD: 6 K/UL — SIGNIFICANT CHANGE UP (ref 4.8–10.8)
WBC # FLD AUTO: 6 K/UL — SIGNIFICANT CHANGE UP (ref 4.8–10.8)

## 2024-04-14 PROCEDURE — 83930 ASSAY OF BLOOD OSMOLALITY: CPT

## 2024-04-14 PROCEDURE — 93005 ELECTROCARDIOGRAM TRACING: CPT

## 2024-04-14 PROCEDURE — 99285 EMERGENCY DEPT VISIT HI MDM: CPT

## 2024-04-14 PROCEDURE — 85025 COMPLETE CBC W/AUTO DIFF WBC: CPT

## 2024-04-14 PROCEDURE — 82803 BLOOD GASES ANY COMBINATION: CPT

## 2024-04-14 PROCEDURE — 80053 COMPREHEN METABOLIC PANEL: CPT

## 2024-04-14 PROCEDURE — 85018 HEMOGLOBIN: CPT

## 2024-04-14 PROCEDURE — 84295 ASSAY OF SERUM SODIUM: CPT

## 2024-04-14 PROCEDURE — 84132 ASSAY OF SERUM POTASSIUM: CPT

## 2024-04-14 PROCEDURE — 87186 SC STD MICRODIL/AGAR DIL: CPT

## 2024-04-14 PROCEDURE — 99283 EMERGENCY DEPT VISIT LOW MDM: CPT | Mod: 25

## 2024-04-14 PROCEDURE — 82010 KETONE BODYS QUAN: CPT

## 2024-04-14 PROCEDURE — 93010 ELECTROCARDIOGRAM REPORT: CPT

## 2024-04-14 PROCEDURE — 36415 COLL VENOUS BLD VENIPUNCTURE: CPT

## 2024-04-14 PROCEDURE — 87086 URINE CULTURE/COLONY COUNT: CPT

## 2024-04-14 PROCEDURE — 81003 URINALYSIS AUTO W/O SCOPE: CPT

## 2024-04-14 PROCEDURE — 82962 GLUCOSE BLOOD TEST: CPT

## 2024-04-14 PROCEDURE — 83605 ASSAY OF LACTIC ACID: CPT

## 2024-04-14 PROCEDURE — 85014 HEMATOCRIT: CPT

## 2024-04-14 PROCEDURE — 82330 ASSAY OF CALCIUM: CPT

## 2024-04-14 RX ORDER — INSULIN HUMAN 100 [IU]/ML
5 INJECTION, SOLUTION SUBCUTANEOUS ONCE
Refills: 0 | Status: COMPLETED | OUTPATIENT
Start: 2024-04-14 | End: 2024-04-14

## 2024-04-14 RX ORDER — SODIUM CHLORIDE 9 MG/ML
1500 INJECTION, SOLUTION INTRAVENOUS ONCE
Refills: 0 | Status: COMPLETED | OUTPATIENT
Start: 2024-04-14 | End: 2024-04-14

## 2024-04-14 RX ADMIN — SODIUM CHLORIDE 1500 MILLILITER(S): 9 INJECTION, SOLUTION INTRAVENOUS at 13:54

## 2024-04-14 RX ADMIN — INSULIN HUMAN 5 UNIT(S): 100 INJECTION, SOLUTION SUBCUTANEOUS at 14:03

## 2024-04-14 RX ADMIN — INSULIN HUMAN 5 UNIT(S): 100 INJECTION, SOLUTION SUBCUTANEOUS at 15:59

## 2024-04-14 NOTE — ED ADULT TRIAGE NOTE - BEFAST SCREENING
Pt left msg on nurse line, is pt cleared for dental implants? ? Wants to know when is she to stop plavix  Please advise Negative

## 2024-04-14 NOTE — ED ADULT NURSE NOTE - NSFALLUNIVINTERV_ED_ALL_ED
Bed/Stretcher in lowest position, wheels locked, appropriate side rails in place/Call bell, personal items and telephone in reach/Instruct patient to call for assistance before getting out of bed/chair/stretcher/Non-slip footwear applied when patient is off stretcher/[place] to call system/Physically safe environment - no spills, clutter or unnecessary equipment/Purposeful proactive rounding/Room/bathroom lighting operational, light cord in reach

## 2024-04-14 NOTE — ED PROVIDER NOTE - CLINICAL SUMMARY MEDICAL DECISION MAKING FREE TEXT BOX
Labs were obtained.  No evidence of DKA.  Patient does have elevated blood sugar, normal anion gap.  Patient treated with IV fluids and insulin.  Glucose downtrending in the ED.  Patient to continue to monitor at home and follow-up with her doctor as planned. Labs were obtained.  No evidence of DKA.  Patient does have elevated blood sugar, normal anion gap. Likely secondary to steroid use.  Patient treated with IV fluids and insulin.  Glucose downtrending in the ED.  Patient to continue to monitor at home and follow-up with her doctor as planned.

## 2024-04-14 NOTE — ED PROVIDER NOTE - ATTENDING CONTRIBUTION TO CARE
53-year-old female past medical history of diabetes, history of leukocytoclastic vasculitis, has been on prednisone, history of hepatitis, presents for elevated glucose.  Patient states that she has been on steroids and did not feel well so she checked her sugar today and could not get a reading.  Patient is compliant with her metformin and Lantus.  No fever or chills, feels her legs are looking better than they have been.  On exam patient in NAD, AAOx3, pleasant, abdomen soft nontender nondistended, lungs CTA B/L, positive bilateral lower extremity edema with chronic skin changes, no warmth

## 2024-04-14 NOTE — ED PROVIDER NOTE - PATIENT PORTAL LINK FT
You can access the FollowMyHealth Patient Portal offered by [facility] by registering at the following website: [email or web address]. By joining [facility]’s FollowMyHealth portal, you will also be able to view your health information using other applications (apps) compatible with our system.

## 2024-04-14 NOTE — ED PROVIDER NOTE - NSFOLLOWUPINSTRUCTIONS_ED_ALL_ED_FT
Our Emergency Department Referral Coordinators will be reaching out to you in the next 24-48 hours from 9:00am to 5:00pm with a follow up appointment. Please expect a phone call from the hospital in that time frame. If you do not receive a call or if you have any questions or concerns, you can reach them at   [phone]      Hyperglycemia  Hyperglycemia occurs when the level of sugar (glucose) in the blood is too high. Glucose is a type of sugar that provides the body's main source of energy. Certain hormones (insulin and glucagon) control the level of glucose in the blood. Insulin lowers blood glucose, and glucagon increases blood glucose. Hyperglycemia can result from having too little insulin in the bloodstream, or from the body not responding normally to insulin.    Hyperglycemia occurs most often in people who have diabetes (diabetes mellitus), but it can happen in people who do not have diabetes. It can develop quickly, and it can be life-threatening if it causes you to become severely dehydrated (diabetic ketoacidosis or hyperglycemic hyperosmolar state). Severe hyperglycemia is a medical emergency.    What are the causes?  If you have diabetes, hyperglycemia may be caused by:  Diabetes medicine.Medicines that increase blood glucose or affect your diabetes control.Not eating enough, or not eating often enough.Changes in physical activity level.Being sick or having an infection.If you have prediabetes or undiagnosed diabetes:  Hyperglycemia may be caused by those conditions.If you do not have diabetes, hyperglycemia may be caused by:  Certain medicines, including steroid medicines, beta-blockers, epinephrine, and thiazide diuretics.  Stress.  Serious illness.  Surgery.  Diseases of the pancreas.  Infection.    What increases the risk?  Hyperglycemia is more likely to develop in people who have risk factors for diabetes, such as:  Having a family member with diabetes.Having a gene for type 1 diabetes that is passed from parent to child (inherited).Living in an area with cold weather conditions.Exposure to certain viruses.Certain conditions in which the body's disease-fighting (immune) system attacks itself (autoimmune disorders).Being overweight or obese.Having an inactive (sedentary) lifestyle.Having been diagnosed with insulin resistance.Having a history of prediabetes, gestational diabetes, or polycystic ovarian syndrome (PCOS).Being of American-, -American, /, or / descent.    What are the signs or symptoms?  Hyperglycemia may not cause any symptoms. If you do have symptoms, they may include early warning signs, such as:  Increased thirst.Hunger.Feeling very tired.Needing to urinate more often than usual.Blurry vision.    Other symptoms may develop if hyperglycemia gets worse, such as:  Dry mouth.Loss of appetite.Fruity-smelling breath.Weakness.Unexpected or rapid weight gain or weight loss.Tingling or numbness in the hands or feet.Headache.Skin that does not quickly return to normal after being lightly pinched and released (poor skin turgor).Abdominal pain.Cuts or bruises that are slow to heal.How is this diagnosed?    Hyperglycemia is diagnosed with a blood test to measure your blood glucose level. This blood test is usually done while you are having symptoms. Your health care provider may also do a physical exam and review your medical history.  You may have more tests to determine the cause of your hyperglycemia, such as:  A fasting blood glucose (FBG) test. You will not be allowed to eat (you will fast) for at least 8 hours before a blood sample is taken.An A1c (hemoglobin A1c) blood test. This provides information about blood glucose control over the previous 2–3 months.An oral glucose tolerance test (OGTT). This measures your blood glucose at two times:  After fasting. This is your baseline blood glucose level.Two hours after drinking a beverage that contains glucose.    How is this treated?  Treatment depends on the cause of your hyperglycemia.     Treatment may include:  Taking medicine to regulate your blood glucose levels. If you take insulin or other diabetes medicines, your medicine or dosage may be adjusted.Lifestyle changes, such as exercising more, eating healthier foods, or losing weight.Treating an illness or infection, if this caused your hyperglycemia.Checking your blood glucose more often.Stopping or reducing steroid medicines, if these caused your hyperglycemia.If your hyperglycemia becomes severe and it results in hyperglycemic hyperosmolar state, you must be hospitalized and given IV fluids.  Follow these instructions at home:     General instructions     Take over-the-counter and prescription medicines only as told by your health care provider.Do not use any products that contain nicotine or tobacco, such as cigarettes and e-cigarettes. If you need help quitting, ask your health care provider.Limit alcohol intake to no more than 1 drink per day for nonpregnant women and 2 drinks per day for men. One drink equals 12 oz of beer, 5 oz of wine, or 1½ oz of hard liquor.Learn to manage stress. If you need help with this, ask your health care provider.Keep all follow-up visits as told by your health care provider. This is important.Eating and drinking        Maintain a healthy weight.Exercise regularly, as directed by your health care provider.Stay hydrated, especially when you exercise, get sick, or spend time in hot temperatures.Eat healthy foods, such as:  Lean proteins.Complex carbohydrates.Fresh fruits and vegetables.Low-fat dairy products.Healthy fats.Drink enough fluid to keep your urine clear or pale yellow.If you have diabetes:     Make sure you know the symptoms of hyperglycemia.Follow your diabetes management plan, as told by your health care provider. Make sure you:  Take your insulin and medicines as directed.Follow your exercise plan.Follow your meal plan. Eat on time, and do not skip meals.Check your blood glucose as often as directed. Make sure to check your blood glucose before and after exercise. If you exercise longer or in a different way than usual, check your blood glucose more often.Follow your sick day plan whenever you cannot eat or drink normally. Make this plan in advance with your health care provider.Share your diabetes management plan with people in your workplace, school, and household.Check your urine for ketones when you are ill and as told by your health care provider.Carry a medical alert card or wear medical alert jewelry.    Contact a health care provider if:  Your blood glucose is at or above 240 mg/dL (13.3 mmol/L) for 2 days in a row.You have problems keeping your blood glucose in your target range.You have frequent episodes of hyperglycemia.    Get help right away if:  You have difficulty breathing.You have a change in how you think, feel, or act (mental status).You have nausea or vomiting that does not go away.These symptoms may represent a serious problem that is an emergency. Do not wait to see if the symptoms will go away. Get medical help right away. Call your local emergency services (911 in the [place] Do not drive yourself to the hospital.     Summary  Hyperglycemia occurs when the level of sugar (glucose) in the blood is too high.Hyperglycemia is diagnosed with a blood test to measure your blood glucose level. This blood test is usually done while you are having symptoms. Your health care provider may also do a physical exam and review your medical history.If you have diabetes, follow your diabetes management plan as told by your health care provider.Contact your health care provider if you have problems keeping your blood glucose in your target range.This information is not intended to replace advice given to you by your health care provider. Make sure you discuss any questions you have with your health care provider.

## 2024-04-14 NOTE — ED PROVIDER NOTE - OBJECTIVE STATEMENT
53-year-old female past medical history of hepatitis pancreatitis diabetes on metformin and insulin IV drug use in the past on Suboxone coming in here for hyperglycemia.  Patient checked her glucose levels and was found to be in the 600s/unreadable.  Recently started a course of steroids for concern for lower leg edema through rheumatology.  Does not have an endocrine doctor for diabetes management.

## 2024-04-14 NOTE — ED PROVIDER NOTE - NSPTACCESSSVCSAPPTDETAILS_ED_ALL_ED_FT
Complicated diabetic patient.  On steroids.  Hyperglycemic incident in the ED due to high-dose of steroids

## 2024-04-15 ENCOUNTER — APPOINTMENT (OUTPATIENT)
Dept: PSYCHIATRY | Facility: CLINIC | Age: 54
End: 2024-04-15

## 2024-04-16 ENCOUNTER — APPOINTMENT (OUTPATIENT)
Dept: PSYCHIATRY | Facility: CLINIC | Age: 54
End: 2024-04-16
Payer: COMMERCIAL

## 2024-04-16 ENCOUNTER — OUTPATIENT (OUTPATIENT)
Dept: OUTPATIENT SERVICES | Facility: HOSPITAL | Age: 54
LOS: 1 days | End: 2024-04-16
Payer: COMMERCIAL

## 2024-04-16 DIAGNOSIS — Z90.89 ACQUIRED ABSENCE OF OTHER ORGANS: Chronic | ICD-10-CM

## 2024-04-16 DIAGNOSIS — F11.20 OPIOID DEPENDENCE, UNCOMPLICATED: ICD-10-CM

## 2024-04-16 DIAGNOSIS — Z90.49 ACQUIRED ABSENCE OF OTHER SPECIFIED PARTS OF DIGESTIVE TRACT: Chronic | ICD-10-CM

## 2024-04-16 PROCEDURE — 99214 OFFICE O/P EST MOD 30 MIN: CPT | Mod: 95

## 2024-04-17 DIAGNOSIS — F11.20 OPIOID DEPENDENCE, UNCOMPLICATED: ICD-10-CM

## 2024-04-17 NOTE — DISCHARGE NOTE PROVIDER - NSDCQMCOGNITION_NEU_ALL_CORE
No difficulties What Type Of Note Output Would You Prefer (Optional)?: Bullet Format Have Your Spot(S) Been Treated In The Past?: has not been treated Hpi Title: Evaluation of Skin Lesions

## 2024-04-18 ENCOUNTER — NON-APPOINTMENT (OUTPATIENT)
Age: 54
End: 2024-04-18

## 2024-04-18 RX ORDER — CEFUROXIME AXETIL 250 MG
1 TABLET ORAL
Qty: 14 | Refills: 0
Start: 2024-04-18 | End: 2024-04-24

## 2024-04-19 NOTE — DIETITIAN INITIAL EVALUATION ADULT. - RD TO REMAIN AVAILABLE
Spoke to patient. New to dexcom. Patient stated he does not recheck his blood sugars with a finger stick when he get an abnormal reading with his dexcom. Patient stated he did recheck it once and there was a 20 pt difference. 98 rechecked 118 with finger stick.    Patient stated he does have symptoms of light headedness and sweats.     Patient stated he drank a soda and cookies with little change in his readings.    Patient stated he reduced his metformin from 2 to 1.     Please advise.   Nutrition goals: Pt to demonstrate tolerance & adherence to diet order with at least 75% po intake over next 7 days. RD to assess readiness for nutrition education. Nutrition Intervention: Meals & Snacks, Nutrition Related Medication Management. Monitoring/Evaluation: Energy intake, glucose profile, renal profile, nutrition focused physical findings, body composition./yes

## 2024-05-03 ENCOUNTER — INPATIENT (INPATIENT)
Facility: HOSPITAL | Age: 54
LOS: 7 days | Discharge: HOME HEALTH | DRG: 383 | End: 2024-05-11
Attending: INTERNAL MEDICINE | Admitting: INTERNAL MEDICINE
Payer: MEDICAID

## 2024-05-03 VITALS
HEIGHT: 68 IN | OXYGEN SATURATION: 99 % | RESPIRATION RATE: 20 BRPM | HEART RATE: 64 BPM | SYSTOLIC BLOOD PRESSURE: 143 MMHG | TEMPERATURE: 99 F | DIASTOLIC BLOOD PRESSURE: 76 MMHG | WEIGHT: 190.04 LBS

## 2024-05-03 DIAGNOSIS — Z90.89 ACQUIRED ABSENCE OF OTHER ORGANS: Chronic | ICD-10-CM

## 2024-05-03 DIAGNOSIS — L03.90 CELLULITIS, UNSPECIFIED: ICD-10-CM

## 2024-05-03 DIAGNOSIS — Z90.49 ACQUIRED ABSENCE OF OTHER SPECIFIED PARTS OF DIGESTIVE TRACT: Chronic | ICD-10-CM

## 2024-05-03 LAB
ALBUMIN SERPL ELPH-MCNC: 4.3 G/DL — SIGNIFICANT CHANGE UP (ref 3.5–5.2)
ALP SERPL-CCNC: 105 U/L — SIGNIFICANT CHANGE UP (ref 30–115)
ALT FLD-CCNC: 5 U/L — SIGNIFICANT CHANGE UP (ref 0–41)
ANION GAP SERPL CALC-SCNC: 13 MMOL/L — SIGNIFICANT CHANGE UP (ref 7–14)
AST SERPL-CCNC: 22 U/L — SIGNIFICANT CHANGE UP (ref 0–41)
BASOPHILS # BLD AUTO: 0.04 K/UL — SIGNIFICANT CHANGE UP (ref 0–0.2)
BASOPHILS NFR BLD AUTO: 0.5 % — SIGNIFICANT CHANGE UP (ref 0–1)
BILIRUB SERPL-MCNC: 0.5 MG/DL — SIGNIFICANT CHANGE UP (ref 0.2–1.2)
BUN SERPL-MCNC: 10 MG/DL — SIGNIFICANT CHANGE UP (ref 10–20)
CALCIUM SERPL-MCNC: 9.3 MG/DL — SIGNIFICANT CHANGE UP (ref 8.4–10.4)
CHLORIDE SERPL-SCNC: 95 MMOL/L — LOW (ref 98–110)
CO2 SERPL-SCNC: 26 MMOL/L — SIGNIFICANT CHANGE UP (ref 17–32)
CREAT SERPL-MCNC: 0.9 MG/DL — SIGNIFICANT CHANGE UP (ref 0.7–1.5)
CRP SERPL-MCNC: 44.1 MG/L — HIGH
EGFR: 76 ML/MIN/1.73M2 — SIGNIFICANT CHANGE UP
EOSINOPHIL # BLD AUTO: 0.11 K/UL — SIGNIFICANT CHANGE UP (ref 0–0.7)
EOSINOPHIL NFR BLD AUTO: 1.3 % — SIGNIFICANT CHANGE UP (ref 0–8)
ERYTHROCYTE [SEDIMENTATION RATE] IN BLOOD: 47 MM/HR — HIGH (ref 0–20)
GLUCOSE SERPL-MCNC: 143 MG/DL — HIGH (ref 70–99)
HCT VFR BLD CALC: 34.8 % — LOW (ref 37–47)
HGB BLD-MCNC: 11.1 G/DL — LOW (ref 12–16)
IMM GRANULOCYTES NFR BLD AUTO: 0.4 % — HIGH (ref 0.1–0.3)
LACTATE SERPL-SCNC: 1.5 MMOL/L — SIGNIFICANT CHANGE UP (ref 0.7–2)
LYMPHOCYTES # BLD AUTO: 1.78 K/UL — SIGNIFICANT CHANGE UP (ref 1.2–3.4)
LYMPHOCYTES # BLD AUTO: 21.4 % — SIGNIFICANT CHANGE UP (ref 20.5–51.1)
MCHC RBC-ENTMCNC: 26.9 PG — LOW (ref 27–31)
MCHC RBC-ENTMCNC: 31.9 G/DL — LOW (ref 32–37)
MCV RBC AUTO: 84.5 FL — SIGNIFICANT CHANGE UP (ref 81–99)
MONOCYTES # BLD AUTO: 0.52 K/UL — SIGNIFICANT CHANGE UP (ref 0.1–0.6)
MONOCYTES NFR BLD AUTO: 6.3 % — SIGNIFICANT CHANGE UP (ref 1.7–9.3)
NEUTROPHILS # BLD AUTO: 5.83 K/UL — SIGNIFICANT CHANGE UP (ref 1.4–6.5)
NEUTROPHILS NFR BLD AUTO: 70.1 % — SIGNIFICANT CHANGE UP (ref 42.2–75.2)
NRBC # BLD: 0 /100 WBCS — SIGNIFICANT CHANGE UP (ref 0–0)
PLATELET # BLD AUTO: 309 K/UL — SIGNIFICANT CHANGE UP (ref 130–400)
PMV BLD: 9.5 FL — SIGNIFICANT CHANGE UP (ref 7.4–10.4)
POTASSIUM SERPL-MCNC: 3.8 MMOL/L — SIGNIFICANT CHANGE UP (ref 3.5–5)
POTASSIUM SERPL-SCNC: 3.8 MMOL/L — SIGNIFICANT CHANGE UP (ref 3.5–5)
PROT SERPL-MCNC: 7.6 G/DL — SIGNIFICANT CHANGE UP (ref 6–8)
RBC # BLD: 4.12 M/UL — LOW (ref 4.2–5.4)
RBC # FLD: 15 % — HIGH (ref 11.5–14.5)
SODIUM SERPL-SCNC: 134 MMOL/L — LOW (ref 135–146)
WBC # BLD: 8.31 K/UL — SIGNIFICANT CHANGE UP (ref 4.8–10.8)
WBC # FLD AUTO: 8.31 K/UL — SIGNIFICANT CHANGE UP (ref 4.8–10.8)

## 2024-05-03 PROCEDURE — 87077 CULTURE AEROBIC IDENTIFY: CPT

## 2024-05-03 PROCEDURE — 82962 GLUCOSE BLOOD TEST: CPT

## 2024-05-03 PROCEDURE — 83036 HEMOGLOBIN GLYCOSYLATED A1C: CPT

## 2024-05-03 PROCEDURE — 85027 COMPLETE CBC AUTOMATED: CPT

## 2024-05-03 PROCEDURE — 97116 GAIT TRAINING THERAPY: CPT | Mod: GP

## 2024-05-03 PROCEDURE — 87070 CULTURE OTHR SPECIMN AEROBIC: CPT

## 2024-05-03 PROCEDURE — 99285 EMERGENCY DEPT VISIT HI MDM: CPT

## 2024-05-03 PROCEDURE — 97110 THERAPEUTIC EXERCISES: CPT | Mod: GP

## 2024-05-03 PROCEDURE — 87641 MR-STAPH DNA AMP PROBE: CPT

## 2024-05-03 PROCEDURE — 87186 SC STD MICRODIL/AGAR DIL: CPT

## 2024-05-03 PROCEDURE — 80048 BASIC METABOLIC PNL TOTAL CA: CPT

## 2024-05-03 PROCEDURE — 80053 COMPREHEN METABOLIC PANEL: CPT

## 2024-05-03 PROCEDURE — 73590 X-RAY EXAM OF LOWER LEG: CPT | Mod: 26,50

## 2024-05-03 PROCEDURE — 97162 PT EVAL MOD COMPLEX 30 MIN: CPT | Mod: GP

## 2024-05-03 PROCEDURE — 80202 ASSAY OF VANCOMYCIN: CPT

## 2024-05-03 PROCEDURE — 83735 ASSAY OF MAGNESIUM: CPT

## 2024-05-03 PROCEDURE — 87640 STAPH A DNA AMP PROBE: CPT

## 2024-05-03 PROCEDURE — 93970 EXTREMITY STUDY: CPT | Mod: 26

## 2024-05-03 PROCEDURE — 36415 COLL VENOUS BLD VENIPUNCTURE: CPT

## 2024-05-03 RX ORDER — SODIUM CHLORIDE 9 MG/ML
1000 INJECTION INTRAMUSCULAR; INTRAVENOUS; SUBCUTANEOUS ONCE
Refills: 0 | Status: COMPLETED | OUTPATIENT
Start: 2024-05-03 | End: 2024-05-03

## 2024-05-03 RX ORDER — VANCOMYCIN HCL 1 G
1000 VIAL (EA) INTRAVENOUS ONCE
Refills: 0 | Status: COMPLETED | OUTPATIENT
Start: 2024-05-03 | End: 2024-05-03

## 2024-05-03 RX ORDER — CEFEPIME 1 G/1
2000 INJECTION, POWDER, FOR SOLUTION INTRAMUSCULAR; INTRAVENOUS ONCE
Refills: 0 | Status: COMPLETED | OUTPATIENT
Start: 2024-05-03 | End: 2024-05-03

## 2024-05-03 RX ADMIN — CEFEPIME 100 MILLIGRAM(S): 1 INJECTION, POWDER, FOR SOLUTION INTRAMUSCULAR; INTRAVENOUS at 17:35

## 2024-05-03 RX ADMIN — SODIUM CHLORIDE 2000 MILLILITER(S): 9 INJECTION INTRAMUSCULAR; INTRAVENOUS; SUBCUTANEOUS at 17:35

## 2024-05-03 RX ADMIN — Medication 250 MILLIGRAM(S): at 17:35

## 2024-05-03 NOTE — ED PROVIDER NOTE - ATTENDING APP SHARED VISIT CONTRIBUTION OF CARE
52 yo F with hx of hep B and C, asthma, COPD, pancreatitis, hypothyroidism, leukocytoclastic vasculitis, prior hx of IVDU on suboxone who presents with BLE swelling, redness, pain, pus x4 mo. Pt was admitted in the past for similar sx and initially sx were presumed to be 2/2 cellulitis however after ID workup sx were deemed 2/2 leukocytoclastic vasculitis. Was on course of steroids and colchicine but pt was lost to f/u. Says pain has been worsening and now unable to walk 2/2 pain. No fever, cp, sob, hx of clots, hormone use, recent immobilization/surg, malignancy, hemoptysis.    PMD Dr. Perez    CONSTITUTIONAL: well developed, nontoxic appearing, in no acute distress, speaking in full sentences  SKIN: warm, dry, cap refill < 2 seconds  HEENT: normocephalic, atraumatic, no conjunctival erythema, moist mucous membranes, patent airway  NECK: supple  CV:  regular rate, regular rhythm, 2+ radial pulses bilaterally  RESP: no wheezes, no rales, no rhonchi, normal work of breathing  ABD: soft, no tenderness, nondistended, no rebound, no guarding  MSK: normal ROM, no cyanosis, 3+ pitting edema to BLE with extensive erythema/induration/multiple open weeping sores with discharge, 2+ DP pulses, no crepitus  NEURO: alert, oriented, grossly unremarkable  PSYCH: cooperative, appropriate    MDM:  Pt here with BLE edema, swelling, pain, ulcerations likely flare of leukocytoclastic vasculitis but appears to have infected wounds. Unable to walk 2/2 pain. Neurovascularly intact. Plan for labs, imaging r/o DVT, osteomyelitis, nec fasc. Will start abx empirically.

## 2024-05-03 NOTE — ED PROVIDER NOTE - DIFFERENTIAL DIAGNOSIS
flare of leukocytoclastic vasculitis, cellulitis DVT, osteomyelitis, nec fasc Differential Diagnosis

## 2024-05-03 NOTE — ED PROVIDER NOTE - PHYSICAL EXAMINATION
Vital Signs: I have reviewed the initial vital signs.  Constitutional: appears stated age, no acute distress  Eyes: Sclera clear, EOMI.  Cardiovascular: S1 and S2, regular rate, regular rhythm, well-perfused extremities, radial pulses equal and 2+, pedal pulses 1+ and equal  Respiratory: unlabored respiratory effort, clear to auscultation bilaterally no wheezing, rales, or rhonchi  Gastrointestinal:  abdomen soft, non-tender  Musculoskeletal: supple neck, + bilateral lower extremity edema with open wounds with some purulent discharge  Integumentary: warm, dry, no rash  Neurologic: awake, alert, oriented x3, extremities’ motor and sensory functions grossly intact

## 2024-05-03 NOTE — ED PROVIDER NOTE - CLINICAL SUMMARY MEDICAL DECISION MAKING FREE TEXT BOX
Pt here with BLE edema, swelling, pain, ulcerations likely flare of leukocytoclastic vasculitis but appears to have infected wounds. Unable to walk 2/2 pain. Neurovascularly intact. Labs notable for elevated inflammatory markers, nonspecific, likely 2/2 vasculitis but already covered with abx empirically. Xrays without obvious e/ osteomyelitis or nec fasc. Duplex prelim read negative for DVT per vascular tech although below knees was limited view. Pt requires admission for further management of leukocytoclastic vasculitis as she is unable to f/u outpatient as well as inability to ambulate.

## 2024-05-03 NOTE — ED PROVIDER NOTE - OBJECTIVE STATEMENT
53-year-old female past medical history DM on metformin, hepatitis B&C, pancreatitis, IV drug user in the past on Suboxone currently.  Presents with complaint of worsening lower extremity swelling and erythema for the past 4 months.  Patient was admitted in the past for leukocytoclastic vasculitis.  Reports lower extremity edema now has open sores.  Denies fever/chills, chest pain, shortness of breath, abdominal pain, nausea/vomiting/diarrhea, change in bowel/bladder habits, dysuria/leukocyte esterase states hematuria, lightheadedness, dizziness, focal numbness/weakness.

## 2024-05-03 NOTE — ED ADULT NURSE NOTE - IN ACCORDANCE WITH [place] STATE LAW, WE OFFER EVERY PATIENT A HEPATITIS C TEST. WOULD YOU LIKE TO BE TESTED TODAY?
Length To Time In Minutes Device Was In Place: 10 Ftsg Text: The defect edges were debeveled with a #15 scalpel blade.  Given the location of the defect, shape of the defect and the proximity to free margins a full thickness skin graft was deemed most appropriate.  Using a sterile surgical marker, the primary defect shape was transferred to the donor site. The area thus outlined was incised deep to adipose tissue with a #15 scalpel blade.  The harvested graft was then trimmed of adipose tissue until only dermis and epidermis was left.  The skin margins of the secondary defect were undermined to an appropriate distance in all directions utilizing iris scissors.  The secondary defect was closed with interrupted buried subcutaneous sutures.  The skin edges were then re-apposed with running  sutures.  The skin graft was then placed in the primary defect and oriented appropriately. Primary Defect Width (In Cm): 0 Dermal Autograft Text: The defect edges were debeveled with a #15 scalpel blade.  Given the location of the defect, shape of the defect and the proximity to free margins a dermal autograft was deemed most appropriate.  Using a sterile surgical marker, the primary defect shape was transferred to the donor site. The area thus outlined was incised deep to adipose tissue with a #15 scalpel blade.  The harvested graft was then trimmed of adipose and epidermal tissue until only dermis was left.  The skin graft was then placed in the primary defect and oriented appropriately. Helical Rim Advancement Flap Text: The defect edges were debeveled with a #15 blade scalpel.  Given the location of the defect and the proximity to free margins (helical rim) a double helical rim advancement flap was deemed most appropriate.  Using a sterile surgical marker, the appropriate advancement flaps were drawn incorporating the defect and placing the expected incisions between the helical rim and antihelix where possible.  The area thus outlined was incised through and through with a #15 scalpel blade.  With a skin hook and iris scissors, the flaps were gently and sharply undermined and freed up. Trilobed Flap Text: The defect edges were debeveled with a #15 scalpel blade.  Given the location of the defect and the proximity to free margins a trilobed flap was deemed most appropriate.  Using a sterile surgical marker, an appropriate trilobed flap drawn around the defect.    The area thus outlined was incised deep to adipose tissue with a #15 scalpel blade.  The skin margins were undermined to an appropriate distance in all directions utilizing iris scissors. Complex Repair And Epidermal Autograft Text: The defect edges were debeveled with a #15 scalpel blade.  The primary defect was closed partially with a complex linear closure.  Given the location of the defect, shape of the defect and the proximity to free margins an epidermal autograft was deemed most appropriate to repair the remaining defect.  The graft was trimmed to fit the size of the remaining defect.  The graft was then placed in the primary defect, oriented appropriately, and sutured into place. Render The Repair Note As A Separate Paragraph: No Melolabial Transposition Flap Text: The defect edges were debeveled with a #15 scalpel blade.  Given the location of the defect and the proximity to free margins a melolabial flap was deemed most appropriate.  Using a sterile surgical marker, an appropriate melolabial transposition flap was drawn incorporating the defect.    The area thus outlined was incised deep to adipose tissue with a #15 scalpel blade.  The skin margins were undermined to an appropriate distance in all directions utilizing iris scissors. Mercedes Flap Text: The defect edges were debeveled with a #15 scalpel blade.  Given the location of the defect, shape of the defect and the proximity to free margins a Mercedes flap was deemed most appropriate.  Using a sterile surgical marker, an appropriate advancement flap was drawn incorporating the defect and placing the expected incisions within the relaxed skin tension lines where possible. The area thus outlined was incised deep to adipose tissue with a #15 scalpel blade.  The skin margins were undermined to an appropriate distance in all directions utilizing iris scissors. Star Wedge Flap Text: The defect edges were debeveled with a #15 scalpel blade.  Given the location of the defect, shape of the defect and the proximity to free margins a star wedge flap was deemed most appropriate.  Using a sterile surgical marker, an appropriate rotation flap was drawn incorporating the defect and placing the expected incisions within the relaxed skin tension lines where possible. The area thus outlined was incised deep to adipose tissue with a #15 scalpel blade.  The skin margins were undermined to an appropriate distance in all directions utilizing iris scissors. Complex Repair And Rotation Flap Text: The defect edges were debeveled with a #15 scalpel blade.  The primary defect was closed partially with a complex linear closure.  Given the location of the remaining defect, shape of the defect and the proximity to free margins a rotation flap was deemed most appropriate for complete closure of the defect.  Using a sterile surgical marker, an appropriate advancement flap was drawn incorporating the defect and placing the expected incisions within the relaxed skin tension lines where possible.    The area thus outlined was incised deep to adipose tissue with a #15 scalpel blade.  The skin margins were undermined to an appropriate distance in all directions utilizing iris scissors. Scalpel Size: 15 blade Slit Excision Additional Text (Leave Blank If You Do Not Want): A linear line was drawn on the skin overlying the lesion. An incision was made slowly until the lesion was visualized.  Once visualized, the lesion was removed with blunt dissection. Helical Rim Text: The closure involved the helical rim. Lazy S Intermediate Repair Preamble Text (Leave Blank If You Do Not Want): Undermining was performed with blunt dissection. Cheek-To-Nose Interpolation Flap Text: A decision was made to reconstruct the defect utilizing an interpolation axial flap and a staged reconstruction.  A telfa template was made of the defect.  This telfa template was then used to outline the Cheek-To-Nose Interpolation flap.  The donor area for the pedicle flap was then injected with anesthesia.  The flap was excised through the skin and subcutaneous tissue down to the layer of the underlying musculature.  The interpolation flap was carefully excised within this deep plane to maintain its blood supply.  The edges of the donor site were undermined.   The donor site was closed in a primary fashion.  The pedicle was then rotated into position and sutured.  Once the tube was sutured into place, adequate blood supply was confirmed with blanching and refill.  The pedicle was then wrapped with xeroform gauze and dressed appropriately with a telfa and gauze bandage to ensure continued blood supply and protect the attached pedicle. Complex Repair And Xenograft Text: The defect edges were debeveled with a #15 scalpel blade.  The primary defect was closed partially with a complex linear closure.  Given the location of the defect, shape of the defect and the proximity to free margins a xenograft was deemed most appropriate to repair the remaining defect.  The graft was trimmed to fit the size of the remaining defect.  The graft was then placed in the primary defect, oriented appropriately, and sutured into place. Repair Performed By Another Provider Text (Leave Blank If You Do Not Want): After the tissue was excised the defect was repaired by another provider. Crescentic Advancement Flap Text: The defect edges were debeveled with a #15 scalpel blade.  Given the location of the defect and the proximity to free margins a crescentic advancement flap was deemed most appropriate.  Using a sterile surgical marker, the appropriate advancement flap was drawn incorporating the defect and placing the expected incisions within the relaxed skin tension lines where possible.    The area thus outlined was incised deep to adipose tissue with a #15 scalpel blade.  The skin margins were undermined to an appropriate distance in all directions utilizing iris scissors. Complex Repair Preamble Text (Leave Blank If You Do Not Want): Extensive wide undermining was performed. Complex Repair And Double Advancement Flap Text: The defect edges were debeveled with a #15 scalpel blade.  The primary defect was closed partially with a complex linear closure.  Given the location of the remaining defect, shape of the defect and the proximity to free margins a double advancement flap was deemed most appropriate for complete closure of the defect.  Using a sterile surgical marker, an appropriate advancement flap was drawn incorporating the defect and placing the expected incisions within the relaxed skin tension lines where possible.    The area thus outlined was incised deep to adipose tissue with a #15 scalpel blade.  The skin margins were undermined to an appropriate distance in all directions utilizing iris scissors. Complex Repair And Bilobe Flap Text: The defect edges were debeveled with a #15 scalpel blade.  The primary defect was closed partially with a complex linear closure.  Given the location of the remaining defect, shape of the defect and the proximity to free margins a bilobe flap was deemed most appropriate for complete closure of the defect.  Using a sterile surgical marker, an appropriate advancement flap was drawn incorporating the defect and placing the expected incisions within the relaxed skin tension lines where possible.    The area thus outlined was incised deep to adipose tissue with a #15 scalpel blade.  The skin margins were undermined to an appropriate distance in all directions utilizing iris scissors. Rotation Flap Text: The defect edges were debeveled with a #15 scalpel blade.  Given the location of the defect, shape of the defect and the proximity to free margins a rotation flap was deemed most appropriate.  Using a sterile surgical marker, an appropriate rotation flap was drawn incorporating the defect and placing the expected incisions within the relaxed skin tension lines where possible.    The area thus outlined was incised deep to adipose tissue with a #15 scalpel blade.  The skin margins were undermined to an appropriate distance in all directions utilizing iris scissors. Complex Repair And Single Advancement Flap Text: The defect edges were debeveled with a #15 scalpel blade.  The primary defect was closed partially with a complex linear closure.  Given the location of the remaining defect, shape of the defect and the proximity to free margins a single advancement flap was deemed most appropriate for complete closure of the defect.  Using a sterile surgical marker, an appropriate advancement flap was drawn incorporating the defect and placing the expected incisions within the relaxed skin tension lines where possible.    The area thus outlined was incised deep to adipose tissue with a #15 scalpel blade.  The skin margins were undermined to an appropriate distance in all directions utilizing iris scissors. Show Accession Variable: Yes Double Island Pedicle Flap Text: The defect edges were debeveled with a #15 scalpel blade.  Given the location of the defect, shape of the defect and the proximity to free margins a double island pedicle advancement flap was deemed most appropriate.  Using a sterile surgical marker, an appropriate advancement flap was drawn incorporating the defect, outlining the appropriate donor tissue and placing the expected incisions within the relaxed skin tension lines where possible.    The area thus outlined was incised deep to adipose tissue with a #15 scalpel blade.  The skin margins were undermined to an appropriate distance in all directions around the primary defect and laterally outward around the island pedicle utilizing iris scissors.  There was minimal undermining beneath the pedicle flap. V-Y Plasty Text: The defect edges were debeveled with a #15 scalpel blade.  Given the location of the defect, shape of the defect and the proximity to free margins an V-Y advancement flap was deemed most appropriate.  Using a sterile surgical marker, an appropriate advancement flap was drawn incorporating the defect and placing the expected incisions within the relaxed skin tension lines where possible.    The area thus outlined was incised deep to adipose tissue with a #15 scalpel blade.  The skin margins were undermined to an appropriate distance in all directions utilizing iris scissors. Previously positive Cheek Interpolation Flap Text: A decision was made to reconstruct the defect utilizing an interpolation axial flap and a staged reconstruction.  A telfa template was made of the defect.  This telfa template was then used to outline the Cheek Interpolation flap.  The donor area for the pedicle flap was then injected with anesthesia.  The flap was excised through the skin and subcutaneous tissue down to the layer of the underlying musculature.  The interpolation flap was carefully excised within this deep plane to maintain its blood supply.  The edges of the donor site were undermined.   The donor site was closed in a primary fashion.  The pedicle was then rotated into position and sutured.  Once the tube was sutured into place, adequate blood supply was confirmed with blanching and refill.  The pedicle was then wrapped with xeroform gauze and dressed appropriately with a telfa and gauze bandage to ensure continued blood supply and protect the attached pedicle. Rhomboid Transposition Flap Text: The defect edges were debeveled with a #15 scalpel blade.  Given the location of the defect and the proximity to free margins a rhomboid transposition flap was deemed most appropriate.  Using a sterile surgical marker, an appropriate rhomboid flap was drawn incorporating the defect.    The area thus outlined was incised deep to adipose tissue with a #15 scalpel blade.  The skin margins were undermined to an appropriate distance in all directions utilizing iris scissors. Paramedian Forehead Flap Text: A decision was made to reconstruct the defect utilizing an interpolation axial flap and a staged reconstruction.  A telfa template was made of the defect.  This telfa template was then used to outline the paramedian forehead pedicle flap.  The donor area for the pedicle flap was then injected with anesthesia.  The flap was excised through the skin and subcutaneous tissue down to the layer of the underlying musculature.  The pedicle flap was carefully excised within this deep plane to maintain its blood supply.  The edges of the donor site were undermined.   The donor site was closed in a primary fashion.  The pedicle was then rotated into position and sutured.  Once the tube was sutured into place, adequate blood supply was confirmed with blanching and refill.  The pedicle was then wrapped with xeroform gauze and dressed appropriately with a telfa and gauze bandage to ensure continued blood supply and protect the attached pedicle. Saucerization Excision Additional Text (Leave Blank If You Do Not Want): The margin was drawn around the clinically apparent lesion.  Incisions were then made along these lines, in a tangential fashion, to the appropriate tissue plane and the lesion was extirpated. Bilobed Flap Text: The defect edges were debeveled with a #15 scalpel blade.  Given the location of the defect and the proximity to free margins a bilobe flap was deemed most appropriate.  Using a sterile surgical marker, an appropriate bilobe flap drawn around the defect.    The area thus outlined was incised deep to adipose tissue with a #15 scalpel blade.  The skin margins were undermined to an appropriate distance in all directions utilizing iris scissors. Composite Graft Text: The defect edges were debeveled with a #15 scalpel blade.  Given the location of the defect, shape of the defect, the proximity to free margins and the fact the defect was full thickness a composite graft was deemed most appropriate.  The defect was outline and then transferred to the donor site.  A full thickness graft was then excised from the donor site. The graft was then placed in the primary defect, oriented appropriately and then sutured into place.  The secondary defect was then repaired using a primary closure. Double O-Z Flap Text: The defect edges were debeveled with a #15 scalpel blade.  Given the location of the defect, shape of the defect and the proximity to free margins a Double O-Z flap was deemed most appropriate.  Using a sterile surgical marker, an appropriate transposition flap was drawn incorporating the defect and placing the expected incisions within the relaxed skin tension lines where possible. The area thus outlined was incised deep to adipose tissue with a #15 scalpel blade.  The skin margins were undermined to an appropriate distance in all directions utilizing iris scissors. Chonodrocutaneous Helical Advancement Flap Text: The defect edges were debeveled with a #15 scalpel blade.  Given the location of the defect and the proximity to free margins a chondrocutaneous helical advancement flap was deemed most appropriate.  Using a sterile surgical marker, the appropriate advancement flap was drawn incorporating the defect and placing the expected incisions within the relaxed skin tension lines where possible.    The area thus outlined was incised deep to adipose tissue with a #15 scalpel blade.  The skin margins were undermined to an appropriate distance in all directions utilizing iris scissors. Banner Transposition Flap Text: The defect edges were debeveled with a #15 scalpel blade.  Given the location of the defect and the proximity to free margins a Banner transposition flap was deemed most appropriate.  Using a sterile surgical marker, an appropriate flap drawn around the defect. The area thus outlined was incised deep to adipose tissue with a #15 scalpel blade.  The skin margins were undermined to an appropriate distance in all directions utilizing iris scissors. Posterior Auricular Interpolation Flap Text: A decision was made to reconstruct the defect utilizing an interpolation axial flap and a staged reconstruction.  A telfa template was made of the defect.  This telfa template was then used to outline the posterior auricular interpolation flap.  The donor area for the pedicle flap was then injected with anesthesia.  The flap was excised through the skin and subcutaneous tissue down to the layer of the underlying musculature.  The pedicle flap was carefully excised within this deep plane to maintain its blood supply.  The edges of the donor site were undermined.   The donor site was closed in a primary fashion.  The pedicle was then rotated into position and sutured.  Once the tube was sutured into place, adequate blood supply was confirmed with blanching and refill.  The pedicle was then wrapped with xeroform gauze and dressed appropriately with a telfa and gauze bandage to ensure continued blood supply and protect the attached pedicle. Eliptical Excision Additional Text (Leave Blank If You Do Not Want): The margin was drawn around the clinically apparent lesion.  An elliptical shape was then drawn on the skin incorporating the lesion and margins.  Incisions were then made along these lines to the appropriate tissue plane and the lesion was extirpated. Burow's Advancement Flap Text: The defect edges were debeveled with a #15 scalpel blade.  Given the location of the defect and the proximity to free margins a Burow's advancement flap was deemed most appropriate.  Using a sterile surgical marker, the appropriate advancement flap was drawn incorporating the defect and placing the expected incisions within the relaxed skin tension lines where possible.    The area thus outlined was incised deep to adipose tissue with a #15 scalpel blade.  The skin margins were undermined to an appropriate distance in all directions utilizing iris scissors. Cartilage Graft Text: The defect edges were debeveled with a #15 scalpel blade.  Given the location of the defect, shape of the defect, the fact the defect involved a full thickness cartilage defect a cartilage graft was deemed most appropriate.  An appropriate donor site was identified, cleansed, and anesthetized. The cartilage graft was then harvested and transferred to the recipient site, oriented appropriately and then sutured into place.  The secondary defect was then repaired using a primary closure. O-Z Flap Text: The defect edges were debeveled with a #15 scalpel blade.  Given the location of the defect, shape of the defect and the proximity to free margins an O-Z flap was deemed most appropriate.  Using a sterile surgical marker, an appropriate transposition flap was drawn incorporating the defect and placing the expected incisions within the relaxed skin tension lines where possible. The area thus outlined was incised deep to adipose tissue with a #15 scalpel blade.  The skin margins were undermined to an appropriate distance in all directions utilizing iris scissors. Purse String (Simple) Text: Given the location of the defect and the characteristics of the surrounding skin a purse string simple closure was deemed most appropriate.  Undermining was performed circumferentially around the surgical defect.  A purse string suture was then placed and tightened. Hatchet Flap Text: The defect edges were debeveled with a #15 scalpel blade.  Given the location of the defect, shape of the defect and the proximity to free margins a hatchet flap was deemed most appropriate.  Using a sterile surgical marker, an appropriate hatchet flap was drawn incorporating the defect and placing the expected incisions within the relaxed skin tension lines where possible.    The area thus outlined was incised deep to adipose tissue with a #15 scalpel blade.  The skin margins were undermined to an appropriate distance in all directions utilizing iris scissors. Complex Repair And O-L Flap Text: The defect edges were debeveled with a #15 scalpel blade.  The primary defect was closed partially with a complex linear closure.  Given the location of the remaining defect, shape of the defect and the proximity to free margins an O-L flap was deemed most appropriate for complete closure of the defect.  Using a sterile surgical marker, an appropriate flap was drawn incorporating the defect and placing the expected incisions within the relaxed skin tension lines where possible.    The area thus outlined was incised deep to adipose tissue with a #15 scalpel blade.  The skin margins were undermined to an appropriate distance in all directions utilizing iris scissors. Complex Repair And V-Y Plasty Text: The defect edges were debeveled with a #15 scalpel blade.  The primary defect was closed partially with a complex linear closure.  Given the location of the remaining defect, shape of the defect and the proximity to free margins a V-Y plasty was deemed most appropriate for complete closure of the defect.  Using a sterile surgical marker, an appropriate advancement flap was drawn incorporating the defect and placing the expected incisions within the relaxed skin tension lines where possible.    The area thus outlined was incised deep to adipose tissue with a #15 scalpel blade.  The skin margins were undermined to an appropriate distance in all directions utilizing iris scissors. Billing Type: Third-Party Bill Muscle Hinge Flap Text: The defect edges were debeveled with a #15 scalpel blade.  Given the size, depth and location of the defect and the proximity to free margins a muscle hinge flap was deemed most appropriate.  Using a sterile surgical marker, an appropriate hinge flap was drawn incorporating the defect. The area thus outlined was incised with a #15 scalpel blade.  The skin margins were undermined to an appropriate distance in all directions utilizing iris scissors. Complex Repair And Transposition Flap Text: The defect edges were debeveled with a #15 scalpel blade.  The primary defect was closed partially with a complex linear closure.  Given the location of the remaining defect, shape of the defect and the proximity to free margins a transposition flap was deemed most appropriate for complete closure of the defect.  Using a sterile surgical marker, an appropriate advancement flap was drawn incorporating the defect and placing the expected incisions within the relaxed skin tension lines where possible.    The area thus outlined was incised deep to adipose tissue with a #15 scalpel blade.  The skin margins were undermined to an appropriate distance in all directions utilizing iris scissors. Complex Repair And Split-Thickness Skin Graft Text: The defect edges were debeveled with a #15 scalpel blade.  The primary defect was closed partially with a complex linear closure.  Given the location of the defect, shape of the defect and the proximity to free margins a split thickness skin graft was deemed most appropriate to repair the remaining defect.  The graft was trimmed to fit the size of the remaining defect.  The graft was then placed in the primary defect, oriented appropriately, and sutured into place. Hemigard Intro: Due to skin fragility and wound tension, it was decided to use HEMIGARD adhesive retention suture devices to permit a linear closure. The skin was cleaned and dried for a 6cm distance away from the wound. Excessive hair, if present, was removed to allow for adhesion. Where Do You Want The Question To Include Opioid Counseling Located?: Case Summary Tab Zygomaticofacial Flap Text: Given the location of the defect, shape of the defect and the proximity to free margins a zygomaticofacial flap was deemed most appropriate for repair.  Using a sterile surgical marker, the appropriate flap was drawn incorporating the defect and placing the expected incisions within the relaxed skin tension lines where possible. The area thus outlined was incised deep to adipose tissue with a #15 scalpel blade with preservation of a vascular pedicle.  The skin margins were undermined to an appropriate distance in all directions utilizing iris scissors.  The flap was then placed into the defect and anchored with interrupted buried subcutaneous sutures. Estimated Blood Loss (Cc): minimal Xenograft Text: The defect edges were debeveled with a #15 scalpel blade.  Given the location of the defect, shape of the defect and the proximity to free margins a xenograft was deemed most appropriate.  The graft was then trimmed to fit the size of the defect.  The graft was then placed in the primary defect and oriented appropriately. Post-Care Instructions: I reviewed with the patient in detail post-care instructions. Patient is not to engage in any heavy lifting, exercise, or swimming for the next 14 days. Should the patient develop any fevers, chills, bleeding, severe pain patient will contact the office immediately. Complex Repair And A-T Advancement Flap Text: The defect edges were debeveled with a #15 scalpel blade.  The primary defect was closed partially with a complex linear closure.  Given the location of the remaining defect, shape of the defect and the proximity to free margins an A-T advancement flap was deemed most appropriate for complete closure of the defect.  Using a sterile surgical marker, an appropriate advancement flap was drawn incorporating the defect and placing the expected incisions within the relaxed skin tension lines where possible.    The area thus outlined was incised deep to adipose tissue with a #15 scalpel blade.  The skin margins were undermined to an appropriate distance in all directions utilizing iris scissors. Dressing: dry sterile dressing Repair Type: Intermediate Ear Star Wedge Flap Text: The defect edges were debeveled with a #15 blade scalpel.  Given the location of the defect and the proximity to free margins (helical rim) an ear star wedge flap was deemed most appropriate.  Using a sterile surgical marker, the appropriate flap was drawn incorporating the defect and placing the expected incisions between the helical rim and antihelix where possible.  The area thus outlined was incised through and through with a #15 scalpel blade. Burow's Graft Text: The defect edges were debeveled with a #15 scalpel blade.  Given the location of the defect, shape of the defect, the proximity to free margins and the presence of a standing cone deformity a Burow's skin graft was deemed most appropriate. The standing cone was removed and this tissue was then trimmed to the shape of the primary defect. The adipose tissue was also removed until only dermis and epidermis were left.  The skin margins of the secondary defect were undermined to an appropriate distance in all directions utilizing iris scissors.  The secondary defect was closed with interrupted buried subcutaneous sutures.  The skin edges were then re-apposed with running  sutures.  The skin graft was then placed in the primary defect and oriented appropriately. Additional Anesthesia Volume In Cc: 6 Island Pedicle Flap Text: The defect edges were debeveled with a #15 scalpel blade.  Given the location of the defect, shape of the defect and the proximity to free margins an island pedicle advancement flap was deemed most appropriate.  Using a sterile surgical marker, an appropriate advancement flap was drawn incorporating the defect, outlining the appropriate donor tissue and placing the expected incisions within the relaxed skin tension lines where possible.    The area thus outlined was incised deep to adipose tissue with a #15 scalpel blade.  The skin margins were undermined to an appropriate distance in all directions around the primary defect and laterally outward around the island pedicle utilizing iris scissors.  There was minimal undermining beneath the pedicle flap. Tissue Cultured Epidermal Autograft Text: The defect edges were debeveled with a #15 scalpel blade.  Given the location of the defect, shape of the defect and the proximity to free margins a tissue cultured epidermal autograft was deemed most appropriate.  The graft was then trimmed to fit the size of the defect.  The graft was then placed in the primary defect and oriented appropriately. O-T Plasty Text: The defect edges were debeveled with a #15 scalpel blade.  Given the location of the defect, shape of the defect and the proximity to free margins an O-T plasty was deemed most appropriate.  Using a sterile surgical marker, an appropriate O-T plasty was drawn incorporating the defect and placing the expected incisions within the relaxed skin tension lines where possible.    The area thus outlined was incised deep to adipose tissue with a #15 scalpel blade.  The skin margins were undermined to an appropriate distance in all directions utilizing iris scissors. Keystone Flap Text: The defect edges were debeveled with a #15 scalpel blade.  Given the location of the defect, shape of the defect a keystone flap was deemed most appropriate.  Using a sterile surgical marker, an appropriate keystone flap was drawn incorporating the defect, outlining the appropriate donor tissue and placing the expected incisions within the relaxed skin tension lines where possible. The area thus outlined was incised deep to adipose tissue with a #15 scalpel blade.  The skin margins were undermined to an appropriate distance in all directions around the primary defect and laterally outward around the flap utilizing iris scissors. Information: Selecting Yes will display possible errors in your note based on the variables you have selected. This validation is only offered as a suggestion for you. PLEASE NOTE THAT THE VALIDATION TEXT WILL BE REMOVED WHEN YOU FINALIZE YOUR NOTE. IF YOU WANT TO FAX A PRELIMINARY NOTE YOU WILL NEED TO TOGGLE THIS TO 'NO' IF YOU DO NOT WANT IT IN YOUR FAXED NOTE. W Plasty Text: The lesion was extirpated to the level of the fat with a #15 scalpel blade.  Given the location of the defect, shape of the defect and the proximity to free margins a W-plasty was deemed most appropriate for repair.  Using a sterile surgical marker, the appropriate transposition arms of the W-plasty were drawn incorporating the defect and placing the expected incisions within the relaxed skin tension lines where possible.    The area thus outlined was incised deep to adipose tissue with a #15 scalpel blade.  The skin margins were undermined to an appropriate distance in all directions utilizing iris scissors.  The opposing transposition arms were then transposed into place in opposite direction and anchored with interrupted buried subcutaneous sutures. Complex Repair And Tissue Cultured Epidermal Autograft Text: The defect edges were debeveled with a #15 scalpel blade.  The primary defect was closed partially with a complex linear closure.  Given the location of the defect, shape of the defect and the proximity to free margins an tissue cultured epidermal autograft was deemed most appropriate to repair the remaining defect.  The graft was trimmed to fit the size of the remaining defect.  The graft was then placed in the primary defect, oriented appropriately, and sutured into place. Nostril Rim Text: The closure involved the nostril rim. Melolabial Interpolation Flap Text: A decision was made to reconstruct the defect utilizing an interpolation axial flap and a staged reconstruction.  A telfa template was made of the defect.  This telfa template was then used to outline the melolabial interpolation flap.  The donor area for the pedicle flap was then injected with anesthesia.  The flap was excised through the skin and subcutaneous tissue down to the layer of the underlying musculature.  The pedicle flap was carefully excised within this deep plane to maintain its blood supply.  The edges of the donor site were undermined.   The donor site was closed in a primary fashion.  The pedicle was then rotated into position and sutured.  Once the tube was sutured into place, adequate blood supply was confirmed with blanching and refill.  The pedicle was then wrapped with xeroform gauze and dressed appropriately with a telfa and gauze bandage to ensure continued blood supply and protect the attached pedicle. Graft Donor Site Bandage (Optional-Leave Blank If You Don't Want In Note): Steri-strips and a pressure bandage were applied to the donor site. Advancement Flap (Single) Text: The defect edges were debeveled with a #15 scalpel blade.  Given the location of the defect and the proximity to free margins a single advancement flap was deemed most appropriate.  Using a sterile surgical marker, an appropriate advancement flap was drawn incorporating the defect and placing the expected incisions within the relaxed skin tension lines where possible.    The area thus outlined was incised deep to adipose tissue with a #15 scalpel blade.  The skin margins were undermined to an appropriate distance in all directions utilizing iris scissors. Suturegard Body: The suture ends were repeatedly re-tightened and re-clamped to achieve the desired tissue expansion. Epidermal Closure: running locked O-T Advancement Flap Text: The defect edges were debeveled with a #15 scalpel blade.  Given the location of the defect, shape of the defect and the proximity to free margins an O-T advancement flap was deemed most appropriate.  Using a sterile surgical marker, an appropriate advancement flap was drawn incorporating the defect and placing the expected incisions within the relaxed skin tension lines where possible.    The area thus outlined was incised deep to adipose tissue with a #15 scalpel blade.  The skin margins were undermined to an appropriate distance in all directions utilizing iris scissors. H Plasty Text: Given the location of the defect, shape of the defect and the proximity to free margins a H-plasty was deemed most appropriate for repair.  Using a sterile surgical marker, the appropriate advancement arms of the H-plasty were drawn incorporating the defect and placing the expected incisions within the relaxed skin tension lines where possible. The area thus outlined was incised deep to adipose tissue with a #15 scalpel blade. The skin margins were undermined to an appropriate distance in all directions utilizing iris scissors.  The opposing advancement arms were then advanced into place in opposite direction and anchored with interrupted buried subcutaneous sutures. Epidermal Sutures: 4-0 Prolene Deep Sutures: 4-0 PGLC Vermilion Border Text: The closure involved the vermilion border. Interpolation Flap Text: A decision was made to reconstruct the defect utilizing an interpolation axial flap and a staged reconstruction.  A telfa template was made of the defect.  This telfa template was then used to outline the interpolation flap.  The donor area for the pedicle flap was then injected with anesthesia.  The flap was excised through the skin and subcutaneous tissue down to the layer of the underlying musculature.  The interpolation flap was carefully excised within this deep plane to maintain its blood supply.  The edges of the donor site were undermined.   The donor site was closed in a primary fashion.  The pedicle was then rotated into position and sutured.  Once the tube was sutured into place, adequate blood supply was confirmed with blanching and refill.  The pedicle was then wrapped with xeroform gauze and dressed appropriately with a telfa and gauze bandage to ensure continued blood supply and protect the attached pedicle. Complex Repair And Double M Plasty Text: The defect edges were debeveled with a #15 scalpel blade.  The primary defect was closed partially with a complex linear closure.  Given the location of the remaining defect, shape of the defect and the proximity to free margins a double M plasty was deemed most appropriate for complete closure of the defect.  Using a sterile surgical marker, an appropriate advancement flap was drawn incorporating the defect and placing the expected incisions within the relaxed skin tension lines where possible.    The area thus outlined was incised deep to adipose tissue with a #15 scalpel blade.  The skin margins were undermined to an appropriate distance in all directions utilizing iris scissors. Epidermal Closure Graft Donor Site (Optional): simple interrupted No Repair - Repaired With Adjacent Surgical Defect Text (Leave Blank If You Do Not Want): After the excision the defect was repaired concurrently with another surgical defect which was in close approximation. Suturegard Intro: Intraoperative tissue expansion was performed, utilizing the SUTUREGARD device, in order to reduce wound tension. A-T Advancement Flap Text: The defect edges were debeveled with a #15 scalpel blade.  Given the location of the defect, shape of the defect and the proximity to free margins an A-T advancement flap was deemed most appropriate.  Using a sterile surgical marker, an appropriate advancement flap was drawn incorporating the defect and placing the expected incisions within the relaxed skin tension lines where possible.    The area thus outlined was incised deep to adipose tissue with a #15 scalpel blade.  The skin margins were undermined to an appropriate distance in all directions utilizing iris scissors. Complex Repair And Modified Advancement Flap Text: The defect edges were debeveled with a #15 scalpel blade.  The primary defect was closed partially with a complex linear closure.  Given the location of the remaining defect, shape of the defect and the proximity to free margins a modified advancement flap was deemed most appropriate for complete closure of the defect.  Using a sterile surgical marker, an appropriate advancement flap was drawn incorporating the defect and placing the expected incisions within the relaxed skin tension lines where possible.    The area thus outlined was incised deep to adipose tissue with a #15 scalpel blade.  The skin margins were undermined to an appropriate distance in all directions utilizing iris scissors. V-Y Flap Text: The defect edges were debeveled with a #15 scalpel blade.  Given the location of the defect, shape of the defect and the proximity to free margins a V-Y flap was deemed most appropriate.  Using a sterile surgical marker, an appropriate advancement flap was drawn incorporating the defect and placing the expected incisions within the relaxed skin tension lines where possible.    The area thus outlined was incised deep to adipose tissue with a #15 scalpel blade.  The skin margins were undermined to an appropriate distance in all directions utilizing iris scissors. Number Of Hemigard Strips Per Side: 1 Hemostasis: Electrocautery Orbicularis Oris Muscle Flap Text: The defect edges were debeveled with a #15 scalpel blade.  Given that the defect affected the competency of the oral sphincter an orbicularis oris muscle flap was deemed most appropriate to restore this competency and normal muscle function.  Using a sterile surgical marker, an appropriate flap was drawn incorporating the defect. The area thus outlined was incised with a #15 scalpel blade. Spiral Flap Text: The defect edges were debeveled with a #15 scalpel blade.  Given the location of the defect, shape of the defect and the proximity to free margins a spiral flap was deemed most appropriate.  Using a sterile surgical marker, an appropriate rotation flap was drawn incorporating the defect and placing the expected incisions within the relaxed skin tension lines where possible. The area thus outlined was incised deep to adipose tissue with a #15 scalpel blade.  The skin margins were undermined to an appropriate distance in all directions utilizing iris scissors. Complex Repair And Melolabial Flap Text: The defect edges were debeveled with a #15 scalpel blade.  The primary defect was closed partially with a complex linear closure.  Given the location of the remaining defect, shape of the defect and the proximity to free margins a melolabial flap was deemed most appropriate for complete closure of the defect.  Using a sterile surgical marker, an appropriate advancement flap was drawn incorporating the defect and placing the expected incisions within the relaxed skin tension lines where possible.    The area thus outlined was incised deep to adipose tissue with a #15 scalpel blade.  The skin margins were undermined to an appropriate distance in all directions utilizing iris scissors. Medical Necessity Information: It is in your best interest to select a reason for this procedure from the list below. All of these items fulfill various CMS LCD requirements except lesion extends to a margin. Size Of Lesion In Cm: 1.7 Wound Care: Petrolatum Complex Repair And Ftsg Text: The defect edges were debeveled with a #15 scalpel blade.  The primary defect was closed partially with a complex linear closure.  Given the location of the defect, shape of the defect and the proximity to free margins a full thickness skin graft was deemed most appropriate to repair the remaining defect.  The graft was trimmed to fit the size of the remaining defect.  The graft was then placed in the primary defect, oriented appropriately, and sutured into place. Island Pedicle Flap-Requiring Vessel Identification Text: The defect edges were debeveled with a #15 scalpel blade.  Given the location of the defect, shape of the defect and the proximity to free margins an island pedicle advancement flap was deemed most appropriate.  Using a sterile surgical marker, an appropriate advancement flap was drawn, based on the axial vessel mentioned above, incorporating the defect, outlining the appropriate donor tissue and placing the expected incisions within the relaxed skin tension lines where possible.    The area thus outlined was incised deep to adipose tissue with a #15 scalpel blade.  The skin margins were undermined to an appropriate distance in all directions around the primary defect and laterally outward around the island pedicle utilizing iris scissors.  There was minimal undermining beneath the pedicle flap. Hemigard Retention Suture: 2-0 Nylon Complex Repair And Dorsal Nasal Flap Text: The defect edges were debeveled with a #15 scalpel blade.  The primary defect was closed partially with a complex linear closure.  Given the location of the remaining defect, shape of the defect and the proximity to free margins a dorsal nasal flap was deemed most appropriate for complete closure of the defect.  Using a sterile surgical marker, an appropriate flap was drawn incorporating the defect and placing the expected incisions within the relaxed skin tension lines where possible.    The area thus outlined was incised deep to adipose tissue with a #15 scalpel blade.  The skin margins were undermined to an appropriate distance in all directions utilizing iris scissors. Anesthesia Type: 1% lidocaine with epinephrine Lip Wedge Excision Repair Text: Given the location of the defect and the proximity to free margins a full thickness wedge repair was deemed most appropriate.  Using a sterile surgical marker, the appropriate repair was drawn incorporating the defect and placing the expected incisions perpendicular to the vermilion border.  The vermilion border was also meticulously outlined to ensure appropriate reapproximation during the repair.  The area thus outlined was incised through and through with a #15 scalpel blade.  The muscularis and dermis were reaproximated with deep sutures following hemostasis. Care was taken to realign the vermilion border before proceeding with the superficial closure.  Once the vermilion was realigned the superfical and mucosal closure was finished. Bi-Rhombic Flap Text: The defect edges were debeveled with a #15 scalpel blade.  Given the location of the defect and the proximity to free margins a bi-rhombic flap was deemed most appropriate.  Using a sterile surgical marker, an appropriate rhombic flap was drawn incorporating the defect. The area thus outlined was incised deep to adipose tissue with a #15 scalpel blade.  The skin margins were undermined to an appropriate distance in all directions utilizing iris scissors. Bilobed Transposition Flap Text: The defect edges were debeveled with a #15 scalpel blade.  Given the location of the defect and the proximity to free margins a bilobed transposition flap was deemed most appropriate.  Using a sterile surgical marker, an appropriate bilobe flap drawn around the defect.    The area thus outlined was incised deep to adipose tissue with a #15 scalpel blade.  The skin margins were undermined to an appropriate distance in all directions utilizing iris scissors. Epidermal Autograft Text: The defect edges were debeveled with a #15 scalpel blade.  Given the location of the defect, shape of the defect and the proximity to free margins an epidermal autograft was deemed most appropriate.  Using a sterile surgical marker, the primary defect shape was transferred to the donor site. The epidermal graft was then harvested.  The skin graft was then placed in the primary defect and oriented appropriately. Complex Repair And M Plasty Text: The defect edges were debeveled with a #15 scalpel blade.  The primary defect was closed partially with a complex linear closure.  Given the location of the remaining defect, shape of the defect and the proximity to free margins an M plasty was deemed most appropriate for complete closure of the defect.  Using a sterile surgical marker, an appropriate advancement flap was drawn incorporating the defect and placing the expected incisions within the relaxed skin tension lines where possible.    The area thus outlined was incised deep to adipose tissue with a #15 scalpel blade.  The skin margins were undermined to an appropriate distance in all directions utilizing iris scissors. Retention Suture Bite Size: 3 mm Consent was obtained from the patient. The risks and benefits to therapy were discussed in detail. Specifically, the risks of infection, scarring, bleeding, prolonged wound healing, incomplete removal, allergy to anesthesia, nerve injury and recurrence were addressed. Prior to the procedure, the treatment site was clearly identified and confirmed by the patient. All components of Universal Protocol/PAUSE Rule completed. Alar Island Pedicle Flap Text: The defect edges were debeveled with a #15 scalpel blade.  Given the location of the defect, shape of the defect and the proximity to the alar rim an island pedicle advancement flap was deemed most appropriate.  Using a sterile surgical marker, an appropriate advancement flap was drawn incorporating the defect, outlining the appropriate donor tissue and placing the expected incisions within the nasal ala running parallel to the alar rim. The area thus outlined was incised with a #15 scalpel blade.  The skin margins were undermined minimally to an appropriate distance in all directions around the primary defect and laterally outward around the island pedicle utilizing iris scissors.  There was minimal undermining beneath the pedicle flap. Nasalis-Muscle-Based Myocutaneous Island Pedicle Flap Text: Using a #15 blade, an incision was made around the donor flap to the level of the nasalis muscle. Wide lateral undermining was then performed in both the subcutaneous plane above the nasalis muscle, and in a submuscular plane just above periosteum. This allowed the formation of a free nasalis muscle axial pedicle (based on the angular artery) which was still attached to the actual cutaneous flap, increasing its mobility and vascular viability. Hemostasis was obtained with pinpoint electrocoagulation. The flap was mobilized into position and the pivotal anchor points positioned and stabilized with buried interrupted sutures. Subcutaneous and dermal tissues were closed in a multilayered fashion with sutures. Tissue redundancies were excised, and the epidermal edges were apposed without significant tension and sutured with sutures. Double O-Z Plasty Text: The defect edges were debeveled with a #15 scalpel blade.  Given the location of the defect, shape of the defect and the proximity to free margins a Double O-Z plasty (double transposition flap) was deemed most appropriate.  Using a sterile surgical marker, the appropriate transposition flaps were drawn incorporating the defect and placing the expected incisions within the relaxed skin tension lines where possible. The area thus outlined was incised deep to adipose tissue with a #15 scalpel blade.  The skin margins were undermined to an appropriate distance in all directions utilizing iris scissors.  Hemostasis was achieved with electrocautery.  The flaps were then transposed into place, one clockwise and the other counterclockwise, and anchored with interrupted buried subcutaneous sutures. Medical Necessity Clause: This procedure was medically necessary because the lesion that was treated was: Excision Depth: adipose tissue Retention Suture Text: Retention sutures were placed to support the closure and prevent dehiscence. Island Pedicle Flap With Canthal Suspension Text: The defect edges were debeveled with a #15 scalpel blade.  Given the location of the defect, shape of the defect and the proximity to free margins an island pedicle advancement flap was deemed most appropriate.  Using a sterile surgical marker, an appropriate advancement flap was drawn incorporating the defect, outlining the appropriate donor tissue and placing the expected incisions within the relaxed skin tension lines where possible. The area thus outlined was incised deep to adipose tissue with a #15 scalpel blade.  The skin margins were undermined to an appropriate distance in all directions around the primary defect and laterally outward around the island pedicle utilizing iris scissors.  There was minimal undermining beneath the pedicle flap. A suspension suture was placed in the canthal tendon to prevent tension and prevent ectropion. Home Suture Removal Text: Patient was provided a home suture removal kit and will remove their sutures at home.  If they have any questions or difficulties they will call the office. Complex Repair And Skin Substitute Graft Text: The defect edges were debeveled with a #15 scalpel blade.  The primary defect was closed partially with a complex linear closure.  Given the location of the remaining defect, shape of the defect and the proximity to free margins a skin substitute graft was deemed most appropriate to repair the remaining defect.  The graft was trimmed to fit the size of the remaining defect.  The graft was then placed in the primary defect, oriented appropriately, and sutured into place. Undermining Type: Entire Wound Nasal Turnover Hinge Flap Text: The defect edges were debeveled with a #15 scalpel blade.  Given the size, depth, location of the defect and the defect being full thickness a nasal turnover hinge flap was deemed most appropriate.  Using a sterile surgical marker, an appropriate hinge flap was drawn incorporating the defect. The area thus outlined was incised with a #15 scalpel blade. The flap was designed to recreate the nasal mucosal lining and the alar rim. The skin margins were undermined to an appropriate distance in all directions utilizing iris scissors. O-Z Plasty Text: The defect edges were debeveled with a #15 scalpel blade.  Given the location of the defect, shape of the defect and the proximity to free margins an O-Z plasty (double transposition flap) was deemed most appropriate.  Using a sterile surgical marker, the appropriate transposition flaps were drawn incorporating the defect and placing the expected incisions within the relaxed skin tension lines where possible.    The area thus outlined was incised deep to adipose tissue with a #15 scalpel blade.  The skin margins were undermined to an appropriate distance in all directions utilizing iris scissors.  Hemostasis was achieved with electrocautery.  The flaps were then transposed into place, one clockwise and the other counterclockwise, and anchored with interrupted buried subcutaneous sutures. Complex Repair And Dermal Autograft Text: The defect edges were debeveled with a #15 scalpel blade.  The primary defect was closed partially with a complex linear closure.  Given the location of the defect, shape of the defect and the proximity to free margins an dermal autograft was deemed most appropriate to repair the remaining defect.  The graft was trimmed to fit the size of the remaining defect.  The graft was then placed in the primary defect, oriented appropriately, and sutured into place. Excision Method: Elliptical Perilesional Excision Additional Text (Leave Blank If You Do Not Want): The margin was drawn around the clinically apparent lesion. Incisions were then made along these lines to the appropriate tissue plane and the lesion was extirpated. Hemigard Postcare Instructions: The HEMIGARD strips are to remain completely dry for at least 5-7 days. Advancement Flap (Double) Text: The defect edges were debeveled with a #15 scalpel blade.  Given the location of the defect and the proximity to free margins a double advancement flap was deemed most appropriate.  Using a sterile surgical marker, the appropriate advancement flaps were drawn incorporating the defect and placing the expected incisions within the relaxed skin tension lines where possible.    The area thus outlined was incised deep to adipose tissue with a #15 scalpel blade.  The skin margins were undermined to an appropriate distance in all directions utilizing iris scissors. O-L Flap Text: The defect edges were debeveled with a #15 scalpel blade.  Given the location of the defect, shape of the defect and the proximity to free margins an O-L flap was deemed most appropriate.  Using a sterile surgical marker, an appropriate advancement flap was drawn incorporating the defect and placing the expected incisions within the relaxed skin tension lines where possible.    The area thus outlined was incised deep to adipose tissue with a #15 scalpel blade.  The skin margins were undermined to an appropriate distance in all directions utilizing iris scissors. Purse String (Intermediate) Text: Given the location of the defect and the characteristics of the surrounding skin a purse string intermediate closure was deemed most appropriate.  Undermining was performed circumferentially around the surgical defect.  A purse string suture was then placed and tightened. Mucosal Advancement Flap Text: Given the location of the defect, shape of the defect and the proximity to free margins a mucosal advancement flap was deemed most appropriate. Incisions were made with a 15 blade scalpel in the appropriate fashion along the cutaneous vermillion border and the mucosal lip. The remaining actinically damaged mucosal tissue was excised.  The mucosal advancement flap was then elevated to the gingival sulcus with care taken to preserve the neurovascular structures and advanced into the primary defect. Care was taken to ensure that precise realignment of the vermilion border was achieved. Complex Repair And O-T Advancement Flap Text: The defect edges were debeveled with a #15 scalpel blade.  The primary defect was closed partially with a complex linear closure.  Given the location of the remaining defect, shape of the defect and the proximity to free margins an O-T advancement flap was deemed most appropriate for complete closure of the defect.  Using a sterile surgical marker, an appropriate advancement flap was drawn incorporating the defect and placing the expected incisions within the relaxed skin tension lines where possible.    The area thus outlined was incised deep to adipose tissue with a #15 scalpel blade.  The skin margins were undermined to an appropriate distance in all directions utilizing iris scissors. Complex Repair And Z Plasty Text: The defect edges were debeveled with a #15 scalpel blade.  The primary defect was closed partially with a complex linear closure.  Given the location of the remaining defect, shape of the defect and the proximity to free margins a Z plasty was deemed most appropriate for complete closure of the defect.  Using a sterile surgical marker, an appropriate advancement flap was drawn incorporating the defect and placing the expected incisions within the relaxed skin tension lines where possible.    The area thus outlined was incised deep to adipose tissue with a #15 scalpel blade.  The skin margins were undermined to an appropriate distance in all directions utilizing iris scissors. Complex Repair And W Plasty Text: The defect edges were debeveled with a #15 scalpel blade.  The primary defect was closed partially with a complex linear closure.  Given the location of the remaining defect, shape of the defect and the proximity to free margins a W plasty was deemed most appropriate for complete closure of the defect.  Using a sterile surgical marker, an appropriate advancement flap was drawn incorporating the defect and placing the expected incisions within the relaxed skin tension lines where possible.    The area thus outlined was incised deep to adipose tissue with a #15 scalpel blade.  The skin margins were undermined to an appropriate distance in all directions utilizing iris scissors. Complex Repair And Rhombic Flap Text: The defect edges were debeveled with a #15 scalpel blade.  The primary defect was closed partially with a complex linear closure.  Given the location of the remaining defect, shape of the defect and the proximity to free margins a rhombic flap was deemed most appropriate for complete closure of the defect.  Using a sterile surgical marker, an appropriate advancement flap was drawn incorporating the defect and placing the expected incisions within the relaxed skin tension lines where possible.    The area thus outlined was incised deep to adipose tissue with a #15 scalpel blade.  The skin margins were undermined to an appropriate distance in all directions utilizing iris scissors. Rhombic Flap Text: The defect edges were debeveled with a #15 scalpel blade.  Given the location of the defect and the proximity to free margins a rhombic flap was deemed most appropriate.  Using a sterile surgical marker, an appropriate rhombic flap was drawn incorporating the defect.    The area thus outlined was incised deep to adipose tissue with a #15 scalpel blade.  The skin margins were undermined to an appropriate distance in all directions utilizing iris scissors. Intermediate / Complex Repair - Final Wound Length In Cm: 1.9 Z Plasty Text: The lesion was extirpated to the level of the fat with a #15 scalpel blade.  Given the location of the defect, shape of the defect and the proximity to free margins a Z-plasty was deemed most appropriate for repair.  Using a sterile surgical marker, the appropriate transposition arms of the Z-plasty were drawn incorporating the defect and placing the expected incisions within the relaxed skin tension lines where possible.    The area thus outlined was incised deep to adipose tissue with a #15 scalpel blade.  The skin margins were undermined to an appropriate distance in all directions utilizing iris scissors.  The opposing transposition arms were then transposed into place in opposite direction and anchored with interrupted buried subcutaneous sutures. Excisional Biopsy Additional Text (Leave Blank If You Do Not Want): The margin was drawn around the clinically apparent lesion. An elliptical shape was then drawn on the skin incorporating the lesion and margins.  Incisions were then made along these lines to the appropriate tissue plane and the lesion was extirpated. Fusiform Excision Additional Text (Leave Blank If You Do Not Want): The margin was drawn around the clinically apparent lesion.  A fusiform shape was then drawn on the skin incorporating the lesion and margins.  Incisions were then made along these lines to the appropriate tissue plane and the lesion was extirpated. Debridement Text: The wound edges were debrided prior to proceeding with the closure to facilitate wound healing. Suture Removal: 8 days Mastoid Interpolation Flap Text: A decision was made to reconstruct the defect utilizing an interpolation axial flap and a staged reconstruction.  A telfa template was made of the defect.  This telfa template was then used to outline the mastoid interpolation flap.  The donor area for the pedicle flap was then injected with anesthesia.  The flap was excised through the skin and subcutaneous tissue down to the layer of the underlying musculature.  The pedicle flap was carefully excised within this deep plane to maintain its blood supply.  The edges of the donor site were undermined.   The donor site was closed in a primary fashion.  The pedicle was then rotated into position and sutured.  Once the tube was sutured into place, adequate blood supply was confirmed with blanching and refill.  The pedicle was then wrapped with xeroform gauze and dressed appropriately with a telfa and gauze bandage to ensure continued blood supply and protect the attached pedicle. Detail Level: Detailed Skin Substitute Text: The defect edges were debeveled with a #15 scalpel blade.  Given the location of the defect, shape of the defect and the proximity to free margins a skin substitute graft was deemed most appropriate.  The graft material was trimmed to fit the size of the defect. The graft was then placed in the primary defect and oriented appropriately. Complex Repair And Burow's Graft Text: The defect edges were debeveled with a #15 scalpel blade.  The primary defect was closed partially with a complex linear closure.  Given the location of the defect, shape of the defect, the proximity to free margins and the presence of a standing cone deformity a Burow's graft was deemed most appropriate to repair the remaining defect.  The graft was trimmed to fit the size of the remaining defect.  The graft was then placed in the primary defect, oriented appropriately, and sutured into place. Bilateral Helical Rim Advancement Flap Text: The defect edges were debeveled with a #15 blade scalpel.  Given the location of the defect and the proximity to free margins (helical rim) a bilateral helical rim advancement flap was deemed most appropriate.  Using a sterile surgical marker, the appropriate advancement flaps were drawn incorporating the defect and placing the expected incisions between the helical rim and antihelix where possible.  The area thus outlined was incised through and through with a #15 scalpel blade.  With a skin hook and iris scissors, the flaps were gently and sharply undermined and freed up. Split-Thickness Skin Graft Text: The defect edges were debeveled with a #15 scalpel blade.  Given the location of the defect, shape of the defect and the proximity to free margins a split thickness skin graft was deemed most appropriate.  Using a sterile surgical marker, the primary defect shape was transferred to the donor site. The split thickness graft was then harvested.  The skin graft was then placed in the primary defect and oriented appropriately. Dorsal Nasal Flap Text: The defect edges were debeveled with a #15 scalpel blade.  Given the location of the defect and the proximity to free margins a dorsal nasal flap was deemed most appropriate.  Using a sterile surgical marker, an appropriate dorsal nasal flap was drawn around the defect.    The area thus outlined was incised deep to adipose tissue with a #15 scalpel blade.  The skin margins were undermined to an appropriate distance in all directions utilizing iris scissors. Modified Advancement Flap Text: The defect edges were debeveled with a #15 scalpel blade.  Given the location of the defect, shape of the defect and the proximity to free margins a modified advancement flap was deemed most appropriate.  Using a sterile surgical marker, an appropriate advancement flap was drawn incorporating the defect and placing the expected incisions within the relaxed skin tension lines where possible.    The area thus outlined was incised deep to adipose tissue with a #15 scalpel blade.  The skin margins were undermined to an appropriate distance in all directions utilizing iris scissors. Transposition Flap Text: The defect edges were debeveled with a #15 scalpel blade.  Given the location of the defect and the proximity to free margins a transposition flap was deemed most appropriate.  Using a sterile surgical marker, an appropriate transposition flap was drawn incorporating the defect.    The area thus outlined was incised deep to adipose tissue with a #15 scalpel blade.  The skin margins were undermined to an appropriate distance in all directions utilizing iris scissors.

## 2024-05-04 LAB
GLUCOSE BLDC GLUCOMTR-MCNC: 149 MG/DL — HIGH (ref 70–99)
GLUCOSE BLDC GLUCOMTR-MCNC: 190 MG/DL — HIGH (ref 70–99)
GLUCOSE BLDC GLUCOMTR-MCNC: 222 MG/DL — HIGH (ref 70–99)
GLUCOSE BLDC GLUCOMTR-MCNC: 72 MG/DL — SIGNIFICANT CHANGE UP (ref 70–99)
MRSA PCR RESULT.: NEGATIVE — SIGNIFICANT CHANGE UP

## 2024-05-04 PROCEDURE — 99223 1ST HOSP IP/OBS HIGH 75: CPT

## 2024-05-04 RX ORDER — METRONIDAZOLE 500 MG
500 TABLET ORAL THREE TIMES A DAY
Refills: 0 | Status: DISCONTINUED | OUTPATIENT
Start: 2024-05-05 | End: 2024-05-05

## 2024-05-04 RX ORDER — ENOXAPARIN SODIUM 100 MG/ML
40 INJECTION SUBCUTANEOUS EVERY 24 HOURS
Refills: 0 | Status: DISCONTINUED | OUTPATIENT
Start: 2024-05-04 | End: 2024-05-11

## 2024-05-04 RX ORDER — DEXTROSE 50 % IN WATER 50 %
12.5 SYRINGE (ML) INTRAVENOUS ONCE
Refills: 0 | Status: DISCONTINUED | OUTPATIENT
Start: 2024-05-04 | End: 2024-05-11

## 2024-05-04 RX ORDER — SODIUM CHLORIDE 9 MG/ML
1000 INJECTION, SOLUTION INTRAVENOUS
Refills: 0 | Status: DISCONTINUED | OUTPATIENT
Start: 2024-05-04 | End: 2024-05-11

## 2024-05-04 RX ORDER — LEVOTHYROXINE SODIUM 125 MCG
150 TABLET ORAL DAILY
Refills: 0 | Status: DISCONTINUED | OUTPATIENT
Start: 2024-05-04 | End: 2024-05-11

## 2024-05-04 RX ORDER — BUPRENORPHINE AND NALOXONE 2; .5 MG/1; MG/1
1 TABLET SUBLINGUAL DAILY
Refills: 0 | Status: DISCONTINUED | OUTPATIENT
Start: 2024-05-04 | End: 2024-05-05

## 2024-05-04 RX ORDER — GLUCAGON INJECTION, SOLUTION 0.5 MG/.1ML
1 INJECTION, SOLUTION SUBCUTANEOUS ONCE
Refills: 0 | Status: DISCONTINUED | OUTPATIENT
Start: 2024-05-04 | End: 2024-05-11

## 2024-05-04 RX ORDER — INFLUENZA VIRUS VACCINE 15; 15; 15; 15 UG/.5ML; UG/.5ML; UG/.5ML; UG/.5ML
0.5 SUSPENSION INTRAMUSCULAR ONCE
Refills: 0 | Status: DISCONTINUED | OUTPATIENT
Start: 2024-05-04 | End: 2024-05-11

## 2024-05-04 RX ORDER — INSULIN LISPRO 100/ML
VIAL (ML) SUBCUTANEOUS
Refills: 0 | Status: DISCONTINUED | OUTPATIENT
Start: 2024-05-04 | End: 2024-05-11

## 2024-05-04 RX ORDER — CEFEPIME 1 G/1
2000 INJECTION, POWDER, FOR SOLUTION INTRAMUSCULAR; INTRAVENOUS EVERY 8 HOURS
Refills: 0 | Status: DISCONTINUED | OUTPATIENT
Start: 2024-05-04 | End: 2024-05-08

## 2024-05-04 RX ORDER — VANCOMYCIN HCL 1 G
1250 VIAL (EA) INTRAVENOUS EVERY 12 HOURS
Refills: 0 | Status: DISCONTINUED | OUTPATIENT
Start: 2024-05-04 | End: 2024-05-05

## 2024-05-04 RX ORDER — METRONIDAZOLE 500 MG
TABLET ORAL
Refills: 0 | Status: DISCONTINUED | OUTPATIENT
Start: 2024-05-04 | End: 2024-05-04

## 2024-05-04 RX ORDER — DEXTROSE 10 % IN WATER 10 %
125 INTRAVENOUS SOLUTION INTRAVENOUS ONCE
Refills: 0 | Status: DISCONTINUED | OUTPATIENT
Start: 2024-05-04 | End: 2024-05-11

## 2024-05-04 RX ORDER — PANTOPRAZOLE SODIUM 20 MG/1
40 TABLET, DELAYED RELEASE ORAL
Refills: 0 | Status: DISCONTINUED | OUTPATIENT
Start: 2024-05-04 | End: 2024-05-11

## 2024-05-04 RX ORDER — METRONIDAZOLE 500 MG
500 TABLET ORAL THREE TIMES A DAY
Refills: 0 | Status: DISCONTINUED | OUTPATIENT
Start: 2024-05-04 | End: 2024-05-04

## 2024-05-04 RX ORDER — DEXTROSE 50 % IN WATER 50 %
15 SYRINGE (ML) INTRAVENOUS ONCE
Refills: 0 | Status: DISCONTINUED | OUTPATIENT
Start: 2024-05-04 | End: 2024-05-11

## 2024-05-04 RX ORDER — METRONIDAZOLE 500 MG
500 TABLET ORAL ONCE
Refills: 0 | Status: COMPLETED | OUTPATIENT
Start: 2024-05-04 | End: 2024-05-04

## 2024-05-04 RX ORDER — DEXTROSE 50 % IN WATER 50 %
25 SYRINGE (ML) INTRAVENOUS ONCE
Refills: 0 | Status: DISCONTINUED | OUTPATIENT
Start: 2024-05-04 | End: 2024-05-11

## 2024-05-04 RX ORDER — FUROSEMIDE 40 MG
40 TABLET ORAL DAILY
Refills: 0 | Status: DISCONTINUED | OUTPATIENT
Start: 2024-05-04 | End: 2024-05-07

## 2024-05-04 RX ORDER — METRONIDAZOLE 500 MG
500 TABLET ORAL EVERY 8 HOURS
Refills: 0 | Status: DISCONTINUED | OUTPATIENT
Start: 2024-05-04 | End: 2024-05-04

## 2024-05-04 RX ORDER — ALBUTEROL 90 UG/1
2 AEROSOL, METERED ORAL EVERY 6 HOURS
Refills: 0 | Status: DISCONTINUED | OUTPATIENT
Start: 2024-05-04 | End: 2024-05-11

## 2024-05-04 RX ADMIN — Medication 166.67 MILLIGRAM(S): at 05:37

## 2024-05-04 RX ADMIN — CEFEPIME 100 MILLIGRAM(S): 1 INJECTION, POWDER, FOR SOLUTION INTRAMUSCULAR; INTRAVENOUS at 12:09

## 2024-05-04 RX ADMIN — BUPRENORPHINE AND NALOXONE 1 TABLET(S): 2; .5 TABLET SUBLINGUAL at 12:09

## 2024-05-04 RX ADMIN — Medication 40 MILLIGRAM(S): at 05:36

## 2024-05-04 RX ADMIN — PANTOPRAZOLE SODIUM 40 MILLIGRAM(S): 20 TABLET, DELAYED RELEASE ORAL at 05:37

## 2024-05-04 RX ADMIN — Medication 150 MICROGRAM(S): at 05:36

## 2024-05-04 RX ADMIN — ENOXAPARIN SODIUM 40 MILLIGRAM(S): 100 INJECTION SUBCUTANEOUS at 05:36

## 2024-05-04 RX ADMIN — CEFEPIME 100 MILLIGRAM(S): 1 INJECTION, POWDER, FOR SOLUTION INTRAMUSCULAR; INTRAVENOUS at 05:36

## 2024-05-04 RX ADMIN — Medication 100 MILLIGRAM(S): at 01:50

## 2024-05-04 RX ADMIN — Medication 100 MILLIGRAM(S): at 13:16

## 2024-05-04 RX ADMIN — Medication 500 MILLIGRAM(S): at 21:04

## 2024-05-04 RX ADMIN — Medication 4: at 12:08

## 2024-05-04 NOTE — H&P ADULT - NSHPLABSRESULTS_GEN_ALL_CORE
.  LABS:                         11.1   8.31  )-----------( 309      ( 03 May 2024 17:21 )             34.8     05-03    134<L>  |  95<L>  |  10  ----------------------------<  143<H>  3.8   |  26  |  0.9    Ca    9.3      03 May 2024 17:21    TPro  7.6  /  Alb  4.3  /  TBili  0.5  /  DBili  x   /  AST  22  /  ALT  5   /  AlkPhos  105  05-03      Urinalysis Basic - ( 03 May 2024 17:21 )    Color: x / Appearance: x / SG: x / pH: x  Gluc: 143 mg/dL / Ketone: x  / Bili: x / Urobili: x   Blood: x / Protein: x / Nitrite: x   Leuk Esterase: x / RBC: x / WBC x   Sq Epi: x / Non Sq Epi: x / Bacteria: x        Lactate, Blood: 1.5 mmol/L (05-03 @ 17:21)      RADIOLOGY, EKG & ADDITIONAL TESTS: Reviewed.

## 2024-05-04 NOTE — CONSULT NOTE ADULT - SUBJECTIVE AND OBJECTIVE BOX
INFECTIOUS DISEASE CONSULT NOTE    Patient is a 53y old  Female who presents with a chief complaint of cellulitis (04 May 2024 00:25)    HPI:  A 53-year-old female with pmh of DM on metformin, hepatitis B&C, asthma, COPD, hypothyroidism, pancreatitis, IV drug user in the past on Suboxone currently, presents to the ED with complaint of worsening lower extremity swelling, erythema and ulcerations.  Pt was admitted in the past for similar sx and initially sx were presumed to be 2/2 cellulitis however after ID workup sx were deemed 2/2 leukocytoclastic vasculitis. Was on course of steroids and colchicine but pt was lost to f/u. Patient reports that pain has been worsening and now unable to walk 2/2 pain. Denies fever/chills, chest pain, shortness of breath, abdominal pain, nausea/vomiting/diarrhea, change in bowel/bladder habits, dysuria/leukocyte esterase states hematuria, lightheadedness, dizziness, focal numbness/weakness    In ED VS stable. Labs unremarkable except for elevated inflammatory markers.  Xrays without obvious osteomyelitis  Duplex negative for DVT    Pt requires admitted for further management of leukocytoclastic vasculitis flare and infected wounds. (04 May 2024 00:25)         Prior hospital charts reviewed [Yes]  Primary team notes reviewed [Yes]  Other consultant notes reviewed [Yes]    REVIEW OF SYSTEMS:      PAST MEDICAL & SURGICAL HISTORY:  Asthma with COPD      Hypothyroidism      H/O: substance abuse  no longer using      History of pancreatitis      Hepatitis-C      Leukocytoclastic vasculitis      History of appendectomy      History of tonsillectomy          SOCIAL HISTORY:  - Born in [place], migrated [place] in 19XX  - Currently working as / Retired  - Lives with _____; no pets  - No recent travel  - Denies tobacco use  - Denies alcohol use  - Denies illicit drug use  - Currently sexually active, has one male/female sexual partner    FAMILY HISTORY:      Allergies:  No Known Allergies      ANTIMICROBIALS:  cefepime   IVPB 2000 every 8 hours  metroNIDAZOLE  IVPB    metroNIDAZOLE  IVPB 500 every 8 hours  vancomycin  IVPB 1250 every 12 hours      ANTIMICROBIALS (past 90 days):  MEDICATIONS  (STANDING):  cefepime   IVPB   100 mL/Hr IV Intermittent (05-03-24 @ 17:35)    cefepime   IVPB   100 mL/Hr IV Intermittent (05-04-24 @ 05:36)    metroNIDAZOLE  IVPB   100 mL/Hr IV Intermittent (05-04-24 @ 05:37)    metroNIDAZOLE  IVPB   100 mL/Hr IV Intermittent (05-04-24 @ 01:50)    vancomycin  IVPB   166.67 mL/Hr IV Intermittent (05-04-24 @ 05:37)    vancomycin  IVPB.   250 mL/Hr IV Intermittent (05-03-24 @ 17:35)        OTHER MEDS:   MEDICATIONS  (STANDING):  albuterol    90 MICROgram(s) HFA Inhaler 2 every 6 hours PRN  buprenorphine 8 mG/naloxone 2 mG SL  Tablet 1 daily  dextrose 50% Injectable 25 once  dextrose 50% Injectable 12.5 once  dextrose Oral Gel 15 once PRN  enoxaparin Injectable 40 every 24 hours  furosemide    Tablet 40 daily  glucagon  Injectable 1 once  influenza   Vaccine 0.5 once  insulin lispro (ADMELOG) corrective regimen sliding scale  three times a day before meals  levothyroxine 150 daily  pantoprazole    Tablet 40 before breakfast      VITALS:  Vital Signs Last 24 Hrs  T(F): 97.8 (05-04-24 @ 05:00), Max: 98.9 (05-03-24 @ 13:05)    Vital Signs Last 24 Hrs  HR: 68 (05-04-24 @ 05:00) (64 - 69)  BP: 98/61 (05-04-24 @ 05:00) (98/61 - 165/72)  RR: 18 (05-04-24 @ 05:00)  SpO2: 100% (05-04-24 @ 05:00) (99% - 100%)  Wt(kg): --    EXAM:    Labs:                        11.1   8.31  )-----------( 309      ( 03 May 2024 17:21 )             34.8     05-03    134<L>  |  95<L>  |  10  ----------------------------<  143<H>  3.8   |  26  |  0.9    Ca    9.3      03 May 2024 17:21    TPro  7.6  /  Alb  4.3  /  TBili  0.5  /  DBili  x   /  AST  22  /  ALT  5   /  AlkPhos  105  05-03      WBC Trend:  WBC Count: 8.31 (05-03-24 @ 17:21)      Auto Neutrophil #: 5.83 K/uL (05-03-24 @ 17:21)  Auto Neutrophil #: 5.08 K/uL (04-14-24 @ 13:45)  Auto Neutrophil #: 5.98 K/uL (04-05-24 @ 08:05)  Auto Neutrophil #: 4.35 K/uL (04-04-24 @ 12:19)  Auto Neutrophil #: 8.18 K/uL (03-18-24 @ 21:35)      Creatine Trend:  Creatinine: 0.9 (05-03)      Liver Biochemical Testing Trend:  Alanine Aminotransferase (ALT/SGPT): 5 (05-03)  Alanine Aminotransferase (ALT/SGPT): 5 (04-14)  Alanine Aminotransferase (ALT/SGPT): <5 (04-05)  Alanine Aminotransferase (ALT/SGPT): <5 (04-04)  Alanine Aminotransferase (ALT/SGPT): 6 (04-04)  Aspartate Aminotransferase (AST/SGOT): 22 (05-03-24 @ 17:21)  Aspartate Aminotransferase (AST/SGOT): 15 (04-14-24 @ 13:45)  Aspartate Aminotransferase (AST/SGOT): 17 (04-05-24 @ 08:05)  Aspartate Aminotransferase (AST/SGOT): 17 (04-04-24 @ 14:50)  Aspartate Aminotransferase (AST/SGOT): 32 (04-04-24 @ 12:19)  Bilirubin Total: 0.5 (05-03)  Bilirubin Total: 0.5 (04-14)  Bilirubin Total: 0.3 (04-05)  Bilirubin Total: 0.3 (04-04)  Bilirubin Total: 0.3 (04-04)      Trend LDH  01-10-24 @ 06:55  230      Auto Eosinophil %: 1.3 % (05-03-24 @ 17:21)      Urinalysis Basic - ( 03 May 2024 17:21 )    Color: x / Appearance: x / SG: x / pH: x  Gluc: 143 mg/dL / Ketone: x  / Bili: x / Urobili: x   Blood: x / Protein: x / Nitrite: x   Leuk Esterase: x / RBC: x / WBC x   Sq Epi: x / Non Sq Epi: x / Bacteria: x        MICROBIOLOGY:    HIV-1/2 Combo Result: Nonreact (01-11-24 @ 13:40)      C-Reactive Protein: 44.1 (05-03)      Lactate, Blood: 1.5 (05-03 @ 17:21)    A1C with Estimated Average Glucose Result: 9.7 % (03-19-24 @ 06:00)  A1C with Estimated Average Glucose Result: 6.5 % (01-10-24 @ 06:55)      RADIOLOGY:  imaging below personally reviewed     INFECTIOUS DISEASE CONSULT NOTE    Patient is a 53y old  Female who presents with a chief complaint of cellulitis (04 May 2024 00:25)    HPI:  A 53-year-old female with pmh of DM on metformin, hepatitis B&C, asthma, COPD, hypothyroidism, pancreatitis, IV drug user in the past on Suboxone currently, presents to the ED with complaint of worsening lower extremity swelling, erythema and ulcerations.  Pt was admitted in the past for similar sx and initially sx were presumed to be 2/2 cellulitis however after ID workup sx were deemed 2/2 leukocytoclastic vasculitis. Was on course of steroids and colchicine but pt was lost to f/u. Patient reports that pain has been worsening and now unable to walk 2/2 pain. Denies fever/chills, chest pain, shortness of breath, abdominal pain, nausea/vomiting/diarrhea, change in bowel/bladder habits, dysuria/leukocyte esterase states hematuria, lightheadedness, dizziness, focal numbness/weakness    In ED VS stable. Labs unremarkable except for elevated inflammatory markers.  Xrays without obvious osteomyelitis  Duplex negative for DVT    Pt requires admitted for further management of leukocytoclastic vasculitis flare and infected wounds. (04 May 2024 00:25)         Prior hospital charts reviewed [Yes]  Primary team notes reviewed [Yes]  Other consultant notes reviewed [Yes]    REVIEW OF SYSTEMS:  CONSTITUTIONAL: No fever or chills  HEAD: No lesion on scalp  EYES: No visual disturbance  ENT: No sore throat  RESPIRATORY: No cough, no shortness of breath  CARDIOVASCULAR: No chest pain or palpitations  GASTROINTESTINAL: No abdominal or epigastric pain  GENITOURINARY: No dysuria  NEUROLOGICAL: No headache/dizziness  MUSCULOSKELETAL: No joint pain, erythema, or swelling; no back pain  SKIN: Multiple LE wounds with significant erythema  All other ROS negative except noted above      PAST MEDICAL & SURGICAL HISTORY:  Asthma with COPD      Hypothyroidism      H/O: substance abuse  no longer using      History of pancreatitis      Hepatitis-C      Leukocytoclastic vasculitis      History of appendectomy      History of tonsillectomy          SOCIAL HISTORY:  - No recent travel  - Denies tobacco use  - Denies alcohol use  - Denies illicit drug use    FAMILY HISTORY:  NO family history of autoimmune diseases or vasculitis    Allergies:  No Known Allergies      ANTIMICROBIALS:  cefepime   IVPB 2000 every 8 hours  metroNIDAZOLE  IVPB    metroNIDAZOLE  IVPB 500 every 8 hours  vancomycin  IVPB 1250 every 12 hours      ANTIMICROBIALS (past 90 days):  MEDICATIONS  (STANDING):  cefepime   IVPB   100 mL/Hr IV Intermittent (05-03-24 @ 17:35)    cefepime   IVPB   100 mL/Hr IV Intermittent (05-04-24 @ 05:36)    metroNIDAZOLE  IVPB   100 mL/Hr IV Intermittent (05-04-24 @ 05:37)    metroNIDAZOLE  IVPB   100 mL/Hr IV Intermittent (05-04-24 @ 01:50)    vancomycin  IVPB   166.67 mL/Hr IV Intermittent (05-04-24 @ 05:37)    vancomycin  IVPB.   250 mL/Hr IV Intermittent (05-03-24 @ 17:35)        OTHER MEDS:   MEDICATIONS  (STANDING):  albuterol    90 MICROgram(s) HFA Inhaler 2 every 6 hours PRN  buprenorphine 8 mG/naloxone 2 mG SL  Tablet 1 daily  dextrose 50% Injectable 25 once  dextrose 50% Injectable 12.5 once  dextrose Oral Gel 15 once PRN  enoxaparin Injectable 40 every 24 hours  furosemide    Tablet 40 daily  glucagon  Injectable 1 once  influenza   Vaccine 0.5 once  insulin lispro (ADMELOG) corrective regimen sliding scale  three times a day before meals  levothyroxine 150 daily  pantoprazole    Tablet 40 before breakfast      VITALS:  Vital Signs Last 24 Hrs  T(F): 97.8 (05-04-24 @ 05:00), Max: 98.9 (05-03-24 @ 13:05)    Vital Signs Last 24 Hrs  HR: 68 (05-04-24 @ 05:00) (64 - 69)  BP: 98/61 (05-04-24 @ 05:00) (98/61 - 165/72)  RR: 18 (05-04-24 @ 05:00)  SpO2: 100% (05-04-24 @ 05:00) (99% - 100%)  Wt(kg): --    EXAM:  GENERAL: NAD, lying in bed  HEAD: No head lesions  EYES: Conjunctiva pink and cornea white  EAR, NOSE, MOUTH, THROAT: Normal external ears and nose, no discharges; moist mucous membranes  NECK: Supple, nontender to palpation; no JVD  RESPIRATORY: Clear to auscultation bilaterally  CARDIOVASCULAR: S1 S2  GASTROINTESTINAL: Soft, nontender, nondistended; normoactive bowel sounds  EXTREMITIES: No clubbing, cyanosis, or petal edema  NERVOUS SYSTEM: Alert and oriented to person, time, place and situation, speech clear. No focal deficits   MUSCULOSKELETAL: No joint erythema, swelling or pain  SKIN: Significant erythema on bilateral LE kneedown; with multiple wounds with drainage, no malodor, no superficial thrombophlebitis  PSYCH: Normal affect      Labs:                        11.1   8.31  )-----------( 309      ( 03 May 2024 17:21 )             34.8     05-03    134<L>  |  95<L>  |  10  ----------------------------<  143<H>  3.8   |  26  |  0.9    Ca    9.3      03 May 2024 17:21    TPro  7.6  /  Alb  4.3  /  TBili  0.5  /  DBili  x   /  AST  22  /  ALT  5   /  AlkPhos  105  05-03      WBC Trend:  WBC Count: 8.31 (05-03-24 @ 17:21)      Auto Neutrophil #: 5.83 K/uL (05-03-24 @ 17:21)  Auto Neutrophil #: 5.08 K/uL (04-14-24 @ 13:45)  Auto Neutrophil #: 5.98 K/uL (04-05-24 @ 08:05)  Auto Neutrophil #: 4.35 K/uL (04-04-24 @ 12:19)  Auto Neutrophil #: 8.18 K/uL (03-18-24 @ 21:35)      Creatine Trend:  Creatinine: 0.9 (05-03)      Liver Biochemical Testing Trend:  Alanine Aminotransferase (ALT/SGPT): 5 (05-03)  Alanine Aminotransferase (ALT/SGPT): 5 (04-14)  Alanine Aminotransferase (ALT/SGPT): <5 (04-05)  Alanine Aminotransferase (ALT/SGPT): <5 (04-04)  Alanine Aminotransferase (ALT/SGPT): 6 (04-04)  Aspartate Aminotransferase (AST/SGOT): 22 (05-03-24 @ 17:21)  Aspartate Aminotransferase (AST/SGOT): 15 (04-14-24 @ 13:45)  Aspartate Aminotransferase (AST/SGOT): 17 (04-05-24 @ 08:05)  Aspartate Aminotransferase (AST/SGOT): 17 (04-04-24 @ 14:50)  Aspartate Aminotransferase (AST/SGOT): 32 (04-04-24 @ 12:19)  Bilirubin Total: 0.5 (05-03)  Bilirubin Total: 0.5 (04-14)  Bilirubin Total: 0.3 (04-05)  Bilirubin Total: 0.3 (04-04)  Bilirubin Total: 0.3 (04-04)      Trend LDH  01-10-24 @ 06:55  230      Auto Eosinophil %: 1.3 % (05-03-24 @ 17:21)      Urinalysis Basic - ( 03 May 2024 17:21 )    Color: x / Appearance: x / SG: x / pH: x  Gluc: 143 mg/dL / Ketone: x  / Bili: x / Urobili: x   Blood: x / Protein: x / Nitrite: x   Leuk Esterase: x / RBC: x / WBC x   Sq Epi: x / Non Sq Epi: x / Bacteria: x        MICROBIOLOGY:    HIV-1/2 Combo Result: Nonreact (01-11-24 @ 13:40)      C-Reactive Protein: 44.1 (05-03)      Lactate, Blood: 1.5 (05-03 @ 17:21)    A1C with Estimated Average Glucose Result: 9.7 % (03-19-24 @ 06:00)  A1C with Estimated Average Glucose Result: 6.5 % (01-10-24 @ 06:55)      RADIOLOGY:  imaging below personally reviewed    < from: [facility] Duplex Lower Ext Vein Scan, Bilat (05.03.24 @ 18:28) >    IMPRESSION:  No evidence of deep venous thrombosis in either lower extremity. Calf   veins not visualized bilaterally.    < end of copied text >    < from: Xray Tibia + Fibula 2 Views, Bilateral (05.03.24 @ 18:02) >  Findings/  Impression:    Right tibia-fibula: No acute fracture or dislocation.  venous stasis calcifications      Left tibia-fibula: No acute fracture or dislocation.     No radiopaque foreign bodies.      < end of copied text >

## 2024-05-04 NOTE — PHYSICAL THERAPY INITIAL EVALUATION ADULT - PERTINENT HX OF CURRENT PROBLEM, REHAB EVAL
A 53-year-old female with pmh of DM on metformin, hepatitis B&C, asthma, COPD, hypothyroidism, pancreatitis, IV drug user in the past on Suboxone currently, presents to the ED with complaint of worsening lower extremity swelling, erythema and ulcerations.  Pt was admitted in the past for similar sx and initially sx were presumed to be 2/2 cellulitis however after ID workup sx were deemed 2/2 leukocytoclastic vasculitis. Was on course of steroids and colchicine but pt was lost to f/u. Patient reports that pain has been worsening and now unable to walk 2/2 pain. Denies fever/chills, chest pain, shortness of breath, abdominal pain, nausea/vomiting/diarrhea, change in bowel/bladder habits, dysuria/leukocyte esterase states hematuria, lightheadedness, dizziness, focal numbness/weakness

## 2024-05-04 NOTE — PHYSICAL THERAPY INITIAL EVALUATION ADULT - GAIT DEVIATIONS NOTED, PT EVAL
breathing is unlabored without accessory muscle use. decreased grace/increased time in double stance

## 2024-05-04 NOTE — H&P ADULT - ASSESSMENT
A 53-year-old female with pmh of DM on metformin, hepatitis B&C, asthma, COPD, hypothyroidism, pancreatitis, IV drug user in the past on Suboxone currently, presents to the ED with complaint of worsening lower extremity swelling, erythema and ulcerations for the past 4 months. Admitted for management of leukocytoclastic vasculitis and cellulitis.    # Bilateral LE leukocytoclastic vasculitis  # Bilateral LE cellulitis  - c/w prednisone 30 mg QD, colchicine 0.6 mg BID  - f/u ID consult  - c/w unasyn 1.5 q6 and vancomycin 750 q12  - elevate LE  - MRSA swab  - f/u bcx  - xray   - Elevated ESR, CRP  - Local wound care, wound care consult  - Trend WBC, fever curve, transaminases, creatinine daily  - PT cslt    # DM2  - On metformin  - monitor FS glucose  - SSI    # untreated hep c  - vasculitis can be related to hep c  - ID consult    # copd  - albuterol prn    # hypothyroidism  - cont synthroid 150 mcg    Diet: DASH/CC  Activity: AAT  DVT PPX: lovenox  GI PPX: PPI   A 53-year-old female with pmh of DM on metformin, hepatitis B&C, asthma, COPD, hypothyroidism, pancreatitis, IV drug user in the past on Suboxone currently, presents to the ED with complaint of worsening lower extremity swelling, erythema and ulcerations. Admitted for management of leukocytoclastic vasculitis and cellulitis.    # Bilateral LE leukocytoclastic vasculitis complicated by skin infection  - LE swelling and erythema and multiple new open weeping sores with discharge  - Not septic  - WBC 8.3  - Elevated ESR/CRP  - Rheumatology recommended prednisone and colchicine last admission, patient did not follow OP  - c/w cefepime, flagyl and vanc  - c/w Local wound care  - Elevate LE  - f/u MRSA swab  - f/u bcx  - f/u procal  - Rheum cslt  - ID consult  - wound care consult  - Trend WBC, fever curve  - PT cslt    # DM2  - On metformin  - monitor FS glucose  - SSI    #Hx Hep B&C  - OP f/u    # copd  - albuterol prn    # hypothyroidism  - c/w synthroid 150 mcg    Diet: DASH/CC  Activity: AAT  DVT PPX: lovenox

## 2024-05-04 NOTE — PHARMACOTHERAPY INTERVENTION NOTE - COMMENTS
Recommended to initiate metronidazole 500 mg PO TID for SSTI as per ID.         Karolina Shook, PharmD   Clinical Pharmacy Specialist, Infectious Diseases  Tele-Antimicrobial Stewardship Program (Tele-ASP)  Tele-ASP Phone: [phone]

## 2024-05-04 NOTE — PATIENT PROFILE ADULT - FALL HARM RISK - HARM RISK INTERVENTIONS

## 2024-05-04 NOTE — PROGRESS NOTE ADULT - ASSESSMENT
A 53-year-old female with pmh of DM on metformin, hepatitis B&C, asthma, COPD, hypothyroidism, pancreatitis, IV drug user in the past on Suboxone currently, presents to the ED with complaint of worsening lower extremity swelling, erythema and ulcerations. Admitted for management of leukocytoclastic vasculitis and cellulitis.    # Bilateral LE leukocytoclastic vasculitis complicated by skin infection  - LE swelling and erythema and multiple new open weeping sores with discharge  - Not septic  - WBC 8.3  - Elevated ESR/CRP  - Rheumatology recommended prednisone and colchicine last admission, patient did not follow OP  - c/w cefepime, flagyl and vanc for now  - c/w Local wound care  - Elevate LE  - MRSA swab  +.   - f/u bcx  - f/u procal  - Rheum cslt  - ID consult  - wound care consult  - Trend WBC, fever curve  - PT cslt    # DM2  - On metformin  - monitor FS glucose  - SSI    #Hx Hep B&C  - OP f/u    # copd  - albuterol prn    # hypothyroidism  - c/w synthroid 150 mcg    Diet: DASH/CC  Activity: AAT  DVT PPX: lovenox

## 2024-05-04 NOTE — PHARMACOTHERAPY INTERVENTION NOTE - COMMENTS
Recommended continuing vancomycin 1250mg IV q12h predicted to result in a steady-state vancomycin AUC of ~580mg*hr/mL. Ordered vancomycin trough prior to the 4th dose of vancomycin.    Trung Faust, PharmD, [facility]  Clinical Pharmacy Specialist, Infectious Diseases  Tele-Antimicrobial Stewardship Program (Tele-ASP)  Tele-ASP Phone: [phone]

## 2024-05-04 NOTE — PHYSICAL THERAPY INITIAL EVALUATION ADULT - GENERAL OBSERVATIONS, REHAB EVAL
2:30-3:00pe encountered in bed inNAD, IV Locked by RN, +erythema and lesions bilateral legs. Patient performed bed mobility, transfers, and ambulated with assistance, limited by leg pain and generalized weakness.  Pt would benefit from continued PT to restore prior level of mobility and safety.

## 2024-05-04 NOTE — PHARMACOTHERAPY INTERVENTION NOTE - COMMENTS
As per policy, ordered a vancomycin trough level for 5/5 AM.    Trung Faust, PharmD, BCIDP  Clinical Pharmacy Specialist, Infectious Diseases  Tele-Antimicrobial Stewardship Program (Tele-ASP)  Tele-ASP Phone: [phone]

## 2024-05-04 NOTE — CONSULT NOTE ADULT - ASSESSMENT
A 53-year-old female with pmh of DM on metformin, hepatitis B&C, asthma, COPD, hypothyroidism, pancreatitis, IV drug user in the past on Suboxone currently, presents to the ED with complaint of worsening lower extremity swelling, erythema and ulcerations.    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 FINAL RECOMMENDATION IS PENDING *   A 53-year-old female with pmh of DM on metformin, hepatitis B&C, asthma, COPD, hypothyroidism, pancreatitis, IV drug user in the past on Suboxone currently, presents to the ED with complaint of worsening lower extremity swelling, erythema and ulcerations.    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  Attending Physician  Division of Infectious Diseases  [facility]  Please contact me via [employer] Teams   A 53-year-old female with pmh of DM on metformin, hepatitis B&C, asthma, COPD, hypothyroidism, pancreatitis, IV drug user in the past on Suboxone currently, presents to the ED with complaint of worsening lower extremity swelling, erythema and ulcerations.    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  Attending Physician  Division of Infectious Diseases  [facility]  Please contact me via [employer] Teams

## 2024-05-04 NOTE — PROGRESS NOTE ADULT - SUBJECTIVE AND OBJECTIVE BOX
S: B/l LE pain/redness. weeping sores persist      All other pertinent ROS negative.      Vital Signs Last 24 Hrs  T(C): 36.6 (04 May 2024 05:00), Max: 37.2 (03 May 2024 13:05)  T(F): 97.8 (04 May 2024 05:00), Max: 98.9 (03 May 2024 13:05)  HR: 68 (04 May 2024 05:00) (64 - 69)  BP: 98/61 (04 May 2024 05:00) (98/61 - 165/72)  BP(mean): --  RR: 18 (04 May 2024 05:00) (18 - 20)  SpO2: 100% (04 May 2024 05:00) (99% - 100%)    Parameters below as of 03 May 2024 15:57  Patient On (Oxygen Delivery Method): room air      PHYSICAL EXAM:    Constitutional: NAD, awake and alert  HEENT: PERR, EOMI, Normal Hearing, MMM  Neck: Soft and supple, No LAD, No JVD  Respiratory: Breath sounds are clear bilaterally, No wheezing, rales or rhonchi  Cardiovascular: S1 and S2, regular rate and rhythm, no Murmurs, gallops or rubs  Gastrointestinal: Bowel Sounds present, soft, nontender, nondistended, no guarding, no rebound  Extremities: b/l LE 3 + edema. erythematous. tender. weeping sores noted.       MEDICATIONS:  MEDICATIONS  (STANDING):  buprenorphine 8 mG/naloxone 2 mG SL  Tablet 1 Tablet(s) SubLingual daily  cefepime   IVPB 2000 milliGRAM(s) IV Intermittent every 8 hours  dextrose 10% Bolus 125 milliLiter(s) IV Bolus once  dextrose 5%. 1000 milliLiter(s) (100 mL/Hr) IV Continuous <Continuous>  dextrose 5%. 1000 milliLiter(s) (50 mL/Hr) IV Continuous <Continuous>  dextrose 50% Injectable 25 Gram(s) IV Push once  dextrose 50% Injectable 12.5 Gram(s) IV Push once  enoxaparin Injectable 40 milliGRAM(s) SubCutaneous every 24 hours  furosemide    Tablet 40 milliGRAM(s) Oral daily  glucagon  Injectable 1 milliGRAM(s) IntraMuscular once  influenza   Vaccine 0.5 milliLiter(s) IntraMuscular once  insulin lispro (ADMELOG) corrective regimen sliding scale   SubCutaneous three times a day before meals  levothyroxine 150 MICROGram(s) Oral daily  metroNIDAZOLE  IVPB      metroNIDAZOLE  IVPB 500 milliGRAM(s) IV Intermittent every 8 hours  pantoprazole    Tablet 40 milliGRAM(s) Oral before breakfast  vancomycin  IVPB 1250 milliGRAM(s) IV Intermittent every 12 hours      LABS: All Labs Reviewed:                        11.1   8.31  )-----------( 309      ( 03 May 2024 17:21 )             34.8     05-03    134<L>  |  95<L>  |  10  ----------------------------<  143<H>  3.8   |  26  |  0.9    Ca    9.3      03 May 2024 17:21    TPro  7.6  /  Alb  4.3  /  TBili  0.5  /  DBili  x   /  AST  22  /  ALT  5   /  AlkPhos  105  05-03          Blood Culture:     Radiology: reviewed

## 2024-05-04 NOTE — H&P ADULT - HISTORY OF PRESENT ILLNESS
A 53-year-old female with pmh of DM on metformin, hepatitis B&C, asthma, COPD, hypothyroidism, pancreatitis, IV drug user in the past on Suboxone currently, presents to the ED with complaint of worsening lower extremity swelling, erythema and ulcerations for the past 4 months.  Pt was admitted in the past for similar sx and initially sx were presumed to be 2/2 cellulitis however after ID workup sx were deemed 2/2 leukocytoclastic vasculitis. Was on course of steroids and colchicine but pt was lost to f/u. Patient reports that pain has been worsening and now unable to walk 2/2 pain. Denies fever/chills, chest pain, shortness of breath, abdominal pain, nausea/vomiting/diarrhea, change in bowel/bladder habits, dysuria/leukocyte esterase states hematuria, lightheadedness, dizziness, focal numbness/weakness    In ED VS stable. Labs unremarkable except for elevated inflammatory markers.  Xrays without obvious osteomyelitis or nec fasc.   Duplex negative for DVT    Pt requires admitted for further management of leukocytoclastic vasculitis flare c/b infected wounds. A 53-year-old female with pmh of DM on metformin, hepatitis B&C, asthma, COPD, hypothyroidism, pancreatitis, IV drug user in the past on Suboxone currently, presents to the ED with complaint of worsening lower extremity swelling, erythema and ulcerations.  Pt was admitted in the past for similar sx and initially sx were presumed to be 2/2 cellulitis however after ID workup sx were deemed 2/2 leukocytoclastic vasculitis. Was on course of steroids and colchicine but pt was lost to f/u. Patient reports that pain has been worsening and now unable to walk 2/2 pain. Denies fever/chills, chest pain, shortness of breath, abdominal pain, nausea/vomiting/diarrhea, change in bowel/bladder habits, dysuria/leukocyte esterase states hematuria, lightheadedness, dizziness, focal numbness/weakness    In ED VS stable. Labs unremarkable except for elevated inflammatory markers.  Xrays without obvious osteomyelitis  Duplex negative for DVT    Pt requires admitted for further management of leukocytoclastic vasculitis flare and infected wounds.

## 2024-05-04 NOTE — H&P ADULT - ATTENDING COMMENTS
Patient seen and examined at bedside independently of the residents. I read the resident's note and agree with the plan with the additions and corrections as noted below. My note supersedes the resident's note.     REVIEW OF SYSTEMS:  Negative except in HPI.     PMH: DM II, Hepatitis B/C, IVDA (currently on suboxone) and leukocytoclastic vasculitis, COPD, Hypothyroidism     FHx: Reviewed. No fhx of asthma/copd, No fhx of liver and pulmonary disease. No fhx of hematological disorder.     Physical Exam:  GEN: No acute distress. Awake, Alert and oriented x 3.   Head: Atraumatic, Normocephalic.   Eye: PEERLA. No sclera icterus. EOMI.   ENT: Normal oropharynx, no thyromegaly, no mass, no lymphadenopathy.   LUNGS: Clear to auscultation bilaterally. No wheeze/rales/crackles.   HEART: Normal. S1/S2 present. RRR. No murmur/gallops.   ABD: Soft, non-tender, non-distended. Bowel sounds present.   EXT: B/L LE swelling with erythema and tenderness. + multiple superficial open wound with purulent drainage.   Integumentary: No rash, No sore, No petechia.   NEURO: CN III-XII intact. Strength: 5/5 b/l ULE. Sensory intact b/l ULE.     Vital Signs Last 24 Hrs  T(C): 36.6 (04 May 2024 01:01), Max: 37.2 (03 May 2024 13:05)  T(F): 97.9 (04 May 2024 01:01), Max: 98.9 (03 May 2024 13:05)  HR: 69 (04 May 2024 01:01) (64 - 69)  BP: 120/70 (04 May 2024 01:01) (120/70 - 165/72)  BP(mean): --  RR: 19 (04 May 2024 01:01) (19 - 20)  SpO2: 100% (04 May 2024 01:01) (99% - 100%)    Parameters below as of 03 May 2024 15:57  Patient On (Oxygen Delivery Method): room air      Please see the above notes for Labs and radiology.     Assessment and Plan:     54 yo F with hx of DM II, Hepatitis B/C, IVDA (currently on suboxone) and leukocytoclastic vasculitis presents to ED for worsening LE swelling and redness x 4 months.     B/L LE wound infection in the setting of leukocytoclastic vasculitis   - No evidence of sepsis on admission.   - s/p Vanc and Cefepime given in ED.   - c/w Vanc, Cefepime and Flagyl for now.   - f/u BCx, Wound Cx, ESR, CRP and Procalcitonin  - check duplex LE   - ID consult.   - Rheumatology consult.   - Wound care consult.     Hx of IVDA - on Suboxone.   DM II - monitor FS AC HS. Insulin regimen. Check HbA1c.   Hepatitis B and C - follow up outpatient.   Hypothyroidism - c/w synthroid.  COPD - c/w home med.      DVT ppx: Lovenox SC  GI ppx: not indicated.  Diet: DASH/CC diet  Activity: as tolerated.    Date seen by the attendin2024  Total time spent: 75 minutes.

## 2024-05-04 NOTE — H&P ADULT - NSHPPHYSICALEXAM_GEN_ALL_CORE
GENERAL: NAD, lying in bed comfortably  HEAD:  Atraumatic, normocephalic  EYES: EOMI, PERRLA, conjunctiva and sclera clear  NECK: Supple, trachea midline, no JVD  HEART: Regular rate and rhythm, no murmurs, rubs, or gallops  LUNGS: Unlabored respirations.  Clear to auscultation bilaterally, no crackles, wheezing, or rhonchi  ABDOMEN: Soft, nontender, nondistended, +BS  EXTREMITIES: 2+ peripheral pulses bilaterally. No clubbing, cyanosis, 3+ pitting edema to BLE with extensive erythema/induration/multiple open weeping sores with discharge  NERVOUS SYSTEM:  A&Ox3, moving all extremities, no focal deficits.

## 2024-05-05 ENCOUNTER — TRANSCRIPTION ENCOUNTER (OUTPATIENT)
Age: 54
End: 2024-05-05

## 2024-05-05 LAB
ALBUMIN SERPL ELPH-MCNC: 4.2 G/DL — SIGNIFICANT CHANGE UP (ref 3.5–5.2)
ALP SERPL-CCNC: 104 U/L — SIGNIFICANT CHANGE UP (ref 30–115)
ALT FLD-CCNC: 6 U/L — SIGNIFICANT CHANGE UP (ref 0–41)
ANION GAP SERPL CALC-SCNC: 16 MMOL/L — HIGH (ref 7–14)
AST SERPL-CCNC: 28 U/L — SIGNIFICANT CHANGE UP (ref 0–41)
BILIRUB SERPL-MCNC: 0.4 MG/DL — SIGNIFICANT CHANGE UP (ref 0.2–1.2)
BUN SERPL-MCNC: 10 MG/DL — SIGNIFICANT CHANGE UP (ref 10–20)
CALCIUM SERPL-MCNC: 9.1 MG/DL — SIGNIFICANT CHANGE UP (ref 8.4–10.5)
CHLORIDE SERPL-SCNC: 99 MMOL/L — SIGNIFICANT CHANGE UP (ref 98–110)
CO2 SERPL-SCNC: 22 MMOL/L — SIGNIFICANT CHANGE UP (ref 17–32)
CREAT SERPL-MCNC: 1 MG/DL — SIGNIFICANT CHANGE UP (ref 0.7–1.5)
EGFR: 67 ML/MIN/1.73M2 — SIGNIFICANT CHANGE UP
GLUCOSE BLDC GLUCOMTR-MCNC: 172 MG/DL — HIGH (ref 70–99)
GLUCOSE BLDC GLUCOMTR-MCNC: 192 MG/DL — HIGH (ref 70–99)
GLUCOSE BLDC GLUCOMTR-MCNC: 232 MG/DL — HIGH (ref 70–99)
GLUCOSE BLDC GLUCOMTR-MCNC: 362 MG/DL — HIGH (ref 70–99)
GLUCOSE SERPL-MCNC: 219 MG/DL — HIGH (ref 70–99)
HCT VFR BLD CALC: 38.8 % — SIGNIFICANT CHANGE UP (ref 37–47)
HGB BLD-MCNC: 11.8 G/DL — LOW (ref 12–16)
MAGNESIUM SERPL-MCNC: 2 MG/DL — SIGNIFICANT CHANGE UP (ref 1.8–2.4)
MCHC RBC-ENTMCNC: 26.5 PG — LOW (ref 27–31)
MCHC RBC-ENTMCNC: 30.4 G/DL — LOW (ref 32–37)
MCV RBC AUTO: 87.2 FL — SIGNIFICANT CHANGE UP (ref 81–99)
NRBC # BLD: 0 /100 WBCS — SIGNIFICANT CHANGE UP (ref 0–0)
PLATELET # BLD AUTO: 282 K/UL — SIGNIFICANT CHANGE UP (ref 130–400)
PMV BLD: 10.2 FL — SIGNIFICANT CHANGE UP (ref 7.4–10.4)
POTASSIUM SERPL-MCNC: 4.8 MMOL/L — SIGNIFICANT CHANGE UP (ref 3.5–5)
POTASSIUM SERPL-SCNC: 4.8 MMOL/L — SIGNIFICANT CHANGE UP (ref 3.5–5)
PROT SERPL-MCNC: 7.4 G/DL — SIGNIFICANT CHANGE UP (ref 6–8)
RBC # BLD: 4.45 M/UL — SIGNIFICANT CHANGE UP (ref 4.2–5.4)
RBC # FLD: 15.2 % — HIGH (ref 11.5–14.5)
SODIUM SERPL-SCNC: 137 MMOL/L — SIGNIFICANT CHANGE UP (ref 135–146)
VANCOMYCIN FLD-MCNC: 11.4 UG/ML — HIGH (ref 5–10)
VANCOMYCIN TROUGH SERPL-MCNC: 13.1 UG/ML — HIGH (ref 5–10)
VANCOMYCIN TROUGH SERPL-MCNC: 26 UG/ML — HIGH (ref 5–10)
WBC # BLD: 6.87 K/UL — SIGNIFICANT CHANGE UP (ref 4.8–10.8)
WBC # FLD AUTO: 6.87 K/UL — SIGNIFICANT CHANGE UP (ref 4.8–10.8)

## 2024-05-05 PROCEDURE — 99233 SBSQ HOSP IP/OBS HIGH 50: CPT

## 2024-05-05 RX ORDER — VANCOMYCIN HCL 1 G
750 VIAL (EA) INTRAVENOUS EVERY 12 HOURS
Refills: 0 | Status: DISCONTINUED | OUTPATIENT
Start: 2024-05-06 | End: 2024-05-08

## 2024-05-05 RX ORDER — BUPRENORPHINE AND NALOXONE 2; .5 MG/1; MG/1
2.5 TABLET SUBLINGUAL DAILY
Refills: 0 | Status: DISCONTINUED | OUTPATIENT
Start: 2024-05-05 | End: 2024-05-11

## 2024-05-05 RX ORDER — ACETAMINOPHEN 500 MG
650 TABLET ORAL EVERY 6 HOURS
Refills: 0 | Status: DISCONTINUED | OUTPATIENT
Start: 2024-05-06 | End: 2024-05-07

## 2024-05-05 RX ORDER — ACETAMINOPHEN 500 MG
975 TABLET ORAL ONCE
Refills: 0 | Status: COMPLETED | OUTPATIENT
Start: 2024-05-05 | End: 2024-05-05

## 2024-05-05 RX ORDER — METRONIDAZOLE 500 MG
500 TABLET ORAL EVERY 12 HOURS
Refills: 0 | Status: DISCONTINUED | OUTPATIENT
Start: 2024-05-05 | End: 2024-05-08

## 2024-05-05 RX ADMIN — Medication 975 MILLIGRAM(S): at 21:22

## 2024-05-05 RX ADMIN — Medication 40 MILLIGRAM(S): at 05:19

## 2024-05-05 RX ADMIN — Medication 2: at 17:38

## 2024-05-05 RX ADMIN — CEFEPIME 100 MILLIGRAM(S): 1 INJECTION, POWDER, FOR SOLUTION INTRAMUSCULAR; INTRAVENOUS at 01:57

## 2024-05-05 RX ADMIN — Medication 975 MILLIGRAM(S): at 21:52

## 2024-05-05 RX ADMIN — Medication 150 MICROGRAM(S): at 05:19

## 2024-05-05 RX ADMIN — CEFEPIME 100 MILLIGRAM(S): 1 INJECTION, POWDER, FOR SOLUTION INTRAMUSCULAR; INTRAVENOUS at 09:50

## 2024-05-05 RX ADMIN — PANTOPRAZOLE SODIUM 40 MILLIGRAM(S): 20 TABLET, DELAYED RELEASE ORAL at 05:19

## 2024-05-05 RX ADMIN — Medication 500 MILLIGRAM(S): at 13:05

## 2024-05-05 RX ADMIN — Medication 4: at 08:25

## 2024-05-05 RX ADMIN — Medication 500 MILLIGRAM(S): at 23:28

## 2024-05-05 RX ADMIN — ENOXAPARIN SODIUM 40 MILLIGRAM(S): 100 INJECTION SUBCUTANEOUS at 05:25

## 2024-05-05 RX ADMIN — Medication 166.67 MILLIGRAM(S): at 19:04

## 2024-05-05 RX ADMIN — BUPRENORPHINE AND NALOXONE 2.5 TABLET(S): 2; .5 TABLET SUBLINGUAL at 13:06

## 2024-05-05 RX ADMIN — Medication 2: at 12:08

## 2024-05-05 RX ADMIN — CEFEPIME 100 MILLIGRAM(S): 1 INJECTION, POWDER, FOR SOLUTION INTRAMUSCULAR; INTRAVENOUS at 17:44

## 2024-05-05 RX ADMIN — Medication 500 MILLIGRAM(S): at 05:19

## 2024-05-05 RX ADMIN — Medication 166.67 MILLIGRAM(S): at 05:18

## 2024-05-05 NOTE — PHARMACOTHERAPY INTERVENTION NOTE - COMMENTS
As per policy, discontinued duplicative vancomycin trough order.      Karolina Shook, PharmD   Clinical Pharmacy Specialist, Infectious Diseases  Tele-Antimicrobial Stewardship Program (Tele-ASP)  Tele-ASP Phone: [phone]

## 2024-05-05 NOTE — DISCHARGE NOTE NURSING/CASE MANAGEMENT/SOCIAL WORK - PATIENT PORTAL LINK FT
You can access the FollowMyHealth Patient Portal offered by [facility] by registering at the following website: [email or web address]. By joining [facility]’s FollowMyHealth portal, you will also be able to view your health information using other applications (apps) compatible with our system.

## 2024-05-05 NOTE — PHARMACOTHERAPY INTERVENTION NOTE - COMMENTS
As per policy, adjusted vancomycin dose from 1250 mg IV q12h to 750 mg IV q12h since the vancomycin level today, 5/5 was 13.1 mg/L. As per PrecisePK calculations, predicted vancomycin AUC/REED at steady state is 894 mg/L*h, which is _ than the therapeutic range of 400 - 600 mg/L*h. Decreasing the dose is predicted to yield an AUC/REED of 536 mg/L*h.      Karolina Shook, PharmD   Clinical Pharmacy Specialist, Infectious Diseases  Tele-Antimicrobial Stewardship Program (Tele-ASP)  Tele-ASP Phone: [phone]   As per policy, adjusted vancomycin dose from 1250 mg IV q12h to 750 mg IV q12h since the vancomycin level today, 5/5 was 13.1 mg/L. As per PrecisePK calculations, predicted vancomycin AUC/REED at steady state is 894 mg/L*h, which is _ than the therapeutic range of 400 - 600 mg/L*h. Decreasing the dose is predicted to yield an AUC/REED of 536 mg/L*h. Scr 1.0.      Karolina Shook PharmD   Clinical Pharmacy Specialist, Infectious Diseases  Tele-Antimicrobial Stewardship Program (Tele-ASP)  Tele-ASP Phone: [phone]

## 2024-05-05 NOTE — PHARMACOTHERAPY INTERVENTION NOTE - COMMENTS
As per policy, ordered a vancomycin trough for 5/7 @1600 pre-4th dose for vancomycin 750 mg q12h  in order to assist with vancomycin pharmacokinetic monitoring.        Edie SalvadorD   Clinical Pharmacy Specialist, Infectious Diseases  Tele-Antimicrobial Stewardship Program (Tele-ASP)  Tele-ASP Phone: [phone]

## 2024-05-05 NOTE — PROGRESS NOTE ADULT - SUBJECTIVE AND OBJECTIVE BOX
S: No new events  LE pain persists.      All other pertinent ROS negative.      Vital Signs Last 24 Hrs  T(C): 36.4 (05 May 2024 13:58), Max: 36.7 (04 May 2024 19:02)  T(F): 97.6 (05 May 2024 13:58), Max: 98.1 (04 May 2024 19:02)  HR: 60 (05 May 2024 13:58) (60 - 69)  BP: 120/78 (05 May 2024 13:58) (89/55 - 121/74)  BP(mean): --  RR: 16 (05 May 2024 13:58) (16 - 18)  SpO2: 100% (05 May 2024 13:58) (100% - 100%)      PHYSICAL EXAM:    Constitutional: NAD, awake and alert  HEENT: PERR, EOMI, Normal Hearing, MMM  Neck: Soft and supple, No LAD, No JVD  Respiratory: Breath sounds are clear bilaterally, No wheezing, rales or rhonchi  Cardiovascular: S1 and S2, regular rate and rhythm, no Murmurs, gallops or rubs  Gastrointestinal: Bowel Sounds present, soft, nontender, nondistended, no guarding, no rebound  Extremities: 3+ edema, red, tender, weeping sores      MEDICATIONS:  MEDICATIONS  (STANDING):  buprenorphine 8 mG/naloxone 2 mG SL  Tablet 2.5 Tablet(s) SubLingual daily  cefepime   IVPB 2000 milliGRAM(s) IV Intermittent every 8 hours  dextrose 10% Bolus 125 milliLiter(s) IV Bolus once  dextrose 5%. 1000 milliLiter(s) (100 mL/Hr) IV Continuous <Continuous>  dextrose 5%. 1000 milliLiter(s) (50 mL/Hr) IV Continuous <Continuous>  dextrose 50% Injectable 25 Gram(s) IV Push once  dextrose 50% Injectable 12.5 Gram(s) IV Push once  enoxaparin Injectable 40 milliGRAM(s) SubCutaneous every 24 hours  furosemide    Tablet 40 milliGRAM(s) Oral daily  glucagon  Injectable 1 milliGRAM(s) IntraMuscular once  influenza   Vaccine 0.5 milliLiter(s) IntraMuscular once  insulin lispro (ADMELOG) corrective regimen sliding scale   SubCutaneous three times a day before meals  levothyroxine 150 MICROGram(s) Oral daily  metroNIDAZOLE    Tablet 500 milliGRAM(s) Oral three times a day  pantoprazole    Tablet 40 milliGRAM(s) Oral before breakfast  vancomycin  IVPB 1250 milliGRAM(s) IV Intermittent every 12 hours      LABS: All Labs Reviewed:                        11.8   6.87  )-----------( 282      ( 05 May 2024 08:58 )             38.8     05-05    137  |  99  |  10  ----------------------------<  219<H>  4.8   |  22  |  1.0    Ca    9.1      05 May 2024 08:58  Mg     2.0     05-05    TPro  7.4  /  Alb  4.2  /  TBili  0.4  /  DBili  x   /  AST  28  /  ALT  6   /  AlkPhos  104  05-05          Blood Culture: 05-03 @ 17:21  Organism --  Gram Stain Blood -- Gram Stain --  Specimen Source .Blood Blood-Peripheral  Culture-Blood --        Radiology: reviewed

## 2024-05-05 NOTE — PHARMACOTHERAPY INTERVENTION NOTE - COMMENTS
As per policy, ordered a vancomycin trough level for 5/5 @16:00 to assist with further vancomycin dosing optimization.    Trung Faust, PharmD, BCIDP  Clinical Pharmacy Specialist, Infectious Diseases  Tele-Antimicrobial Stewardship Program (Tele-ASP)  Tele-ASP Phone: [phone]

## 2024-05-05 NOTE — DISCHARGE NOTE NURSING/CASE MANAGEMENT/SOCIAL WORK - NSDCPEFALRISK_GEN_ALL_CORE
For information on Fall & Injury Prevention, visit: [email or web address] OR  [email or web address] OR  https://www.cdc.gov/steadi/patient.html

## 2024-05-05 NOTE — PROGRESS NOTE ADULT - ASSESSMENT
A 53-year-old female with pmh of DM on metformin, hepatitis B&C, asthma, COPD, hypothyroidism, pancreatitis, IV drug user in the past on Suboxone currently, presents to the ED with complaint of worsening lower extremity swelling, erythema and ulcerations. Admitted for management of leukocytoclastic vasculitis and cellulitis.    # Bilateral LE leukocytoclastic vasculitis complicated by skin infection  - LE swelling and erythema and multiple new open weeping sores with discharge  - Not septic  - WBC 8.3  - Elevated ESR/CRP  - Rheumatology recommended prednisone and colchicine last admission, patient did not follow OP  - ID recs (5/4):   Continue IV vancomycin 1250mg q12hrs, IV cefepime 2g q8hrs; change Flagyl to PO 500mg q12hrs  Monitor vancomycin trough 30 minutes prior every 4th dose, keep trough around 10; monitor Cr daily  -checking trough on 5/6 AM.  - c/w Local wound care. BURN C/s, Wound Care C/s **  - Elevate LE  - MRSA swab  +.   - f/u bcx  - f/u procal  - Rheum cslt **  - PT cslt    # DM2  - On metformin  - monitor FS glucose  - SSI only for now.     #Hx Hep B&C  - OP f/u    # copd  - albuterol prn    # hypothyroidism  - c/w synthroid 150 mcg    Diet: DASH/CC  Activity: AAT  DVT PPX: lovenox

## 2024-05-06 LAB
GLUCOSE BLDC GLUCOMTR-MCNC: 189 MG/DL — HIGH (ref 70–99)
GLUCOSE BLDC GLUCOMTR-MCNC: 212 MG/DL — HIGH (ref 70–99)
GLUCOSE BLDC GLUCOMTR-MCNC: 280 MG/DL — HIGH (ref 70–99)
GLUCOSE BLDC GLUCOMTR-MCNC: 298 MG/DL — HIGH (ref 70–99)

## 2024-05-06 PROCEDURE — 99221 1ST HOSP IP/OBS SF/LOW 40: CPT | Mod: GC

## 2024-05-06 PROCEDURE — 99233 SBSQ HOSP IP/OBS HIGH 50: CPT

## 2024-05-06 RX ORDER — DIPHENHYDRAMINE HCL 50 MG
25 CAPSULE ORAL ONCE
Refills: 0 | Status: COMPLETED | OUTPATIENT
Start: 2024-05-06 | End: 2024-05-06

## 2024-05-06 RX ORDER — INSULIN GLARGINE 100 [IU]/ML
15 INJECTION, SOLUTION SUBCUTANEOUS AT BEDTIME
Refills: 0 | Status: DISCONTINUED | OUTPATIENT
Start: 2024-05-06 | End: 2024-05-08

## 2024-05-06 RX ORDER — ONDANSETRON 8 MG/1
4 TABLET, FILM COATED ORAL EVERY 8 HOURS
Refills: 0 | Status: DISCONTINUED | OUTPATIENT
Start: 2024-05-06 | End: 2024-05-11

## 2024-05-06 RX ORDER — KETOROLAC TROMETHAMINE 30 MG/ML
15 SYRINGE (ML) INJECTION EVERY 12 HOURS
Refills: 0 | Status: DISCONTINUED | OUTPATIENT
Start: 2024-05-06 | End: 2024-05-10

## 2024-05-06 RX ADMIN — BUPRENORPHINE AND NALOXONE 2.5 TABLET(S): 2; .5 TABLET SUBLINGUAL at 11:38

## 2024-05-06 RX ADMIN — Medication 1 APPLICATION(S): at 17:28

## 2024-05-06 RX ADMIN — CEFEPIME 100 MILLIGRAM(S): 1 INJECTION, POWDER, FOR SOLUTION INTRAMUSCULAR; INTRAVENOUS at 11:16

## 2024-05-06 RX ADMIN — Medication 250 MILLIGRAM(S): at 06:08

## 2024-05-06 RX ADMIN — Medication 2: at 11:19

## 2024-05-06 RX ADMIN — Medication 4: at 17:26

## 2024-05-06 RX ADMIN — ONDANSETRON 4 MILLIGRAM(S): 8 TABLET, FILM COATED ORAL at 14:02

## 2024-05-06 RX ADMIN — Medication 250 MILLIGRAM(S): at 18:06

## 2024-05-06 RX ADMIN — Medication 25 MILLIGRAM(S): at 21:23

## 2024-05-06 RX ADMIN — CEFEPIME 100 MILLIGRAM(S): 1 INJECTION, POWDER, FOR SOLUTION INTRAMUSCULAR; INTRAVENOUS at 17:27

## 2024-05-06 RX ADMIN — Medication 150 MICROGRAM(S): at 05:20

## 2024-05-06 RX ADMIN — Medication 500 MILLIGRAM(S): at 11:16

## 2024-05-06 RX ADMIN — Medication 40 MILLIGRAM(S): at 05:19

## 2024-05-06 RX ADMIN — CEFEPIME 100 MILLIGRAM(S): 1 INJECTION, POWDER, FOR SOLUTION INTRAMUSCULAR; INTRAVENOUS at 02:16

## 2024-05-06 RX ADMIN — ENOXAPARIN SODIUM 40 MILLIGRAM(S): 100 INJECTION SUBCUTANEOUS at 05:19

## 2024-05-06 RX ADMIN — Medication 6: at 08:01

## 2024-05-06 RX ADMIN — INSULIN GLARGINE 15 UNIT(S): 100 INJECTION, SOLUTION SUBCUTANEOUS at 21:23

## 2024-05-06 RX ADMIN — PANTOPRAZOLE SODIUM 40 MILLIGRAM(S): 20 TABLET, DELAYED RELEASE ORAL at 05:20

## 2024-05-06 RX ADMIN — Medication 500 MILLIGRAM(S): at 23:37

## 2024-05-06 NOTE — CONSULT NOTE ADULT - ASSESSMENT
# Stasis Dermatitis   # Bilateral LE Lymphedema   # HO of leukocytic vasculitis     Plan:   - Lesions ad skin changes are most likely associated with stasis dermatitis and lymphedema. Lesions do not seem to be f vasculitis in nature   - Burn team on board with no plan for acute intervention   - Would suggest coverage with abx therapy for possible infection of ulcers   - Obtain Cultures directly from wounds   - Discussed with Dr Selena Kilpatrick MD  Internal Medicine Resident PGY3 # Stasis Dermatitis   # Bilateral LE Lymphedema   # HO of leukocytic vasculitis     Plan:   - Eription is most consistent with a stasis dermatitis and possible secondary pyoderma. This is not the typical appearance of leukocytoclastic vasculitis.  - Burn team on board with no plan for acute intervention   - Would suggest coverage with abx therapy for possible infection of ulcers   - Obtain Cultures directly from wounds   - Triamcinolone 0.1% cream BID  - If no improvement would consider re-biopsy    Abbey Kilpatrick MD  Internal Medicine Resident PGY3

## 2024-05-06 NOTE — CONSULT NOTE ADULT - SUBJECTIVE AND OBJECTIVE BOX
HPI:  53-year-old female with PMH of DM on metformin, hepatitis B&C, asthma, COPD, hypothyroidism, pancreatitis, IV drug user in the past on Suboxone currently, admitted to medicine for worsening lower extremity cellulitis in the context of swelling, erythema and ulcerations.     Vital Signs Last 24 Hrs  T(C): 36.3 (06 May 2024 05:00), Max: 36.9 (05 May 2024 20:06)  T(F): 97.4 (06 May 2024 05:00), Max: 98.4 (05 May 2024 20:06)  HR: 50 (06 May 2024 05:00) (50 - 66)  BP: 113/75 (06 May 2024 05:00) (113/75 - 139/76)  BP(mean): --  RR: 18 (06 May 2024 05:00) (16 - 18)  SpO2: 98% (06 May 2024 05:00) (98% - 100%)    MEDICATIONS  (STANDING):  buprenorphine 8 mG/naloxone 2 mG SL  Tablet 2.5 Tablet(s) SubLingual daily  cefepime   IVPB 2000 milliGRAM(s) IV Intermittent every 8 hours  dextrose 10% Bolus 125 milliLiter(s) IV Bolus once  dextrose 5%. 1000 milliLiter(s) (100 mL/Hr) IV Continuous <Continuous>  dextrose 5%. 1000 milliLiter(s) (50 mL/Hr) IV Continuous <Continuous>  dextrose 50% Injectable 25 Gram(s) IV Push once  dextrose 50% Injectable 12.5 Gram(s) IV Push once  enoxaparin Injectable 40 milliGRAM(s) SubCutaneous every 24 hours  furosemide    Tablet 40 milliGRAM(s) Oral daily  glucagon  Injectable 1 milliGRAM(s) IntraMuscular once  influenza   Vaccine 0.5 milliLiter(s) IntraMuscular once  insulin glargine Injectable (LANTUS) 15 Unit(s) SubCutaneous at bedtime  insulin lispro (ADMELOG) corrective regimen sliding scale   SubCutaneous three times a day before meals  levothyroxine 150 MICROGram(s) Oral daily  metroNIDAZOLE    Tablet 500 milliGRAM(s) Oral every 12 hours  pantoprazole    Tablet 40 milliGRAM(s) Oral before breakfast  silver sulfADIAZINE 1% Cream 1 Application(s) Topical two times a day  vancomycin  IVPB 750 milliGRAM(s) IV Intermittent every 12 hours    MEDICATIONS  (PRN):  acetaminophen     Tablet .. 650 milliGRAM(s) Oral every 6 hours PRN Moderate Pain (4 - 6)  albuterol    90 MICROgram(s) HFA Inhaler 2 Puff(s) Inhalation every 6 hours PRN Bronchospasm  dextrose Oral Gel 15 Gram(s) Oral once PRN Blood Glucose LESS THAN 70 milliGRAM(s)/deciliter  ketorolac   Injectable 15 milliGRAM(s) IV Push every 12 hours PRN Moderate Pain (4 - 6)    Allergies  No Known Allergies    Intolerances    Lab Results:                        11.8   6.87  )-----------( 282      ( 05 May 2024 08:58 )             38.8     05-05    137  |  99  |  10  ----------------------------<  219<H>  4.8   |  22  |  1.0    Ca    9.1      05 May 2024 08:58  Mg     2.0     05-05    TPro  7.4  /  Alb  4.2  /  TBili  0.4  /  DBili  x   /  AST  28  /  ALT  6   /  AlkPhos  104  05-05    PHYSICAL EXAM:  Gen: NAD, AOx3  Skin: Bilateral multiple LE wounds with significant erythema and swelling, warm skin, and left distal LE yellowish drainage (sent for culture)

## 2024-05-06 NOTE — CONSULT NOTE ADULT - ASSESSMENT
IMPRESSION:  53-year-old female with bilateral LE cellulitis, cultures sent.    PLAN:  - BID dressing change with Silvadene, Kerlix, and ACE  - Follow up culture results  - Rest of care as per primary team  - Mayen to follow    Seen and examined with attending, Dr. Guidry. IMPRESSION:  53-year-old female with bilateral LE cellulitis, cultures sent.    PLAN:  - BID dressing change with Silvadene, abd, Kerlix, and ACE  - Follow up culture results  - Rest of care as per primary team  - Mayen to follow    Seen and examined with attending, Dr. Guidry.

## 2024-05-06 NOTE — PROGRESS NOTE ADULT - ASSESSMENT
A 53-year-old female with pmh of DM on metformin, hepatitis B&C, asthma, COPD, hypothyroidism, pancreatitis, IV drug user in the past on Suboxone currently, presents to the ED with complaint of worsening lower extremity swelling, erythema and ulcerations. Admitted for management of leukocytoclastic vasculitis and cellulitis.    # Bilateral LE leukocytoclastic vasculitis complicated by skin infection  - LE swelling and erythema and multiple new open weeping sores with discharge  - Not septic  - WBC 8.3  - Elevated ESR/CRP  - Rheumatology recommended prednisone and colchicine last admission, patient did not follow OP  - ID recs (5/4):   Continue IV vancomycin 1250mg q12hrs, IV cefepime 2g q8hrs; change Flagyl to PO 500mg q12hrs  Monitor vancomycin trough 30 minutes prior every 4th dose, keep trough around 10; monitor Cr daily  - c/w Local wound care.   - BURN C/s Noted (5/6): Cultures sent from LE. f/u results. - BID dressing change with Silvadene, abd, Kerlix, and ACE  - Elevate LE  - MRSA swab  +.   - f/u bcx  - f/u procal  - Rheum cslt : discussed over phone. Likely current presentation not related to vasculitis. advise derm consult. f/u **  - PT cslt    # DM2  - On metformin  - monitor FS glucose  - SSI only for now.     #Hx Hep B&C  - OP f/u    # copd  - albuterol prn    # hypothyroidism  - c/w synthroid 150 mcg    Diet: DASH/CC  Activity: AAT  DVT PPX: lovenox

## 2024-05-06 NOTE — PROGRESS NOTE ADULT - SUBJECTIVE AND OBJECTIVE BOX
S: LE pain/redness/swelling persists.    All other pertinent ROS negative.      Vital Signs Last 24 Hrs  T(C): 36.8 (06 May 2024 13:15), Max: 36.9 (05 May 2024 20:06)  T(F): 98.3 (06 May 2024 13:15), Max: 98.4 (05 May 2024 20:06)  HR: 72 (06 May 2024 13:15) (50 - 72)  BP: 125/80 (06 May 2024 13:15) (113/75 - 139/76)  BP(mean): --  RR: 18 (06 May 2024 13:15) (18 - 18)  SpO2: 99% (06 May 2024 13:15) (98% - 99%)      PHYSICAL EXAM:    Constitutional: NAD, awake and alert  HEENT: PERR, EOMI, Normal Hearing, MMM  Neck: Soft and supple, No LAD, No JVD  Respiratory: Breath sounds are clear bilaterally, No wheezing, rales or rhonchi  Cardiovascular: S1 and S2, regular rate and rhythm, no Murmurs, gallops or rubs  Gastrointestinal: Bowel Sounds present, soft, nontender, nondistended, no guarding, no rebound  Extremities: LE pain/redness/swelling persists.    MEDICATIONS:  MEDICATIONS  (STANDING):  buprenorphine 8 mG/naloxone 2 mG SL  Tablet 2.5 Tablet(s) SubLingual daily  cefepime   IVPB 2000 milliGRAM(s) IV Intermittent every 8 hours  dextrose 10% Bolus 125 milliLiter(s) IV Bolus once  dextrose 5%. 1000 milliLiter(s) (100 mL/Hr) IV Continuous <Continuous>  dextrose 5%. 1000 milliLiter(s) (50 mL/Hr) IV Continuous <Continuous>  dextrose 50% Injectable 25 Gram(s) IV Push once  dextrose 50% Injectable 12.5 Gram(s) IV Push once  enoxaparin Injectable 40 milliGRAM(s) SubCutaneous every 24 hours  furosemide    Tablet 40 milliGRAM(s) Oral daily  glucagon  Injectable 1 milliGRAM(s) IntraMuscular once  influenza   Vaccine 0.5 milliLiter(s) IntraMuscular once  insulin glargine Injectable (LANTUS) 15 Unit(s) SubCutaneous at bedtime  insulin lispro (ADMELOG) corrective regimen sliding scale   SubCutaneous three times a day before meals  levothyroxine 150 MICROGram(s) Oral daily  metroNIDAZOLE    Tablet 500 milliGRAM(s) Oral every 12 hours  pantoprazole    Tablet 40 milliGRAM(s) Oral before breakfast  silver sulfADIAZINE 1% Cream 1 Application(s) Topical two times a day  silver sulfADIAZINE 1% Cream 1 Application(s) Topical two times a day  vancomycin  IVPB 750 milliGRAM(s) IV Intermittent every 12 hours      LABS: All Labs Reviewed:                        11.8   6.87  )-----------( 282      ( 05 May 2024 08:58 )             38.8     05-05    137  |  99  |  10  ----------------------------<  219<H>  4.8   |  22  |  1.0    Ca    9.1      05 May 2024 08:58  Mg     2.0     05-05    TPro  7.4  /  Alb  4.2  /  TBili  0.4  /  DBili  x   /  AST  28  /  ALT  6   /  AlkPhos  104  05-05          Blood Culture: 05-03 @ 17:21  Organism --  Gram Stain Blood -- Gram Stain --  Specimen Source .Blood Blood-Peripheral  Culture-Blood --        Radiology: reviewed

## 2024-05-06 NOTE — CONSULT NOTE ADULT - SUBJECTIVE AND OBJECTIVE BOX
HPI:  A 53-year-old female with pmh of DM on metformin, hepatitis B&C, asthma, COPD, hypothyroidism, pancreatitis, IV drug user in the past on Suboxone currently, presents to the ED with complaint of worsening lower extremity swelling, erythema and ulcerations.  Pt was admitted in the past for similar sx and initially sx were presumed to be 2/2 cellulitis however after ID workup sx were deemed 2/2 leukocytoclastic vasculitis. Was on course of steroids and colchicine but pt was lost to f/u. Patient reports that pain has been worsening and now unable to walk 2/2 pain. Denies fever/chills, chest pain, shortness of breath, abdominal pain, nausea/vomiting/diarrhea, change in bowel/bladder habits, dysuria/leukocyte esterase states hematuria, lightheadedness, dizziness, focal numbness/weakness    In ED VS stable. Labs unremarkable except for elevated inflammatory markers.  Xrays without obvious osteomyelitis  Duplex negative for DVT    Pt requires admitted for further management of leukocytoclastic vasculitis flare and infected wounds. (04 May 2024 00:25)      PAST MEDICAL & SURGICAL HISTORY:  Asthma with COPD      Hypothyroidism      H/O: substance abuse  no longer using      History of pancreatitis      Hepatitis-C      Leukocytoclastic vasculitis      History of appendectomy      History of tonsillectomy            MEDICATIONS  (STANDING):  buprenorphine 8 mG/naloxone 2 mG SL  Tablet 2.5 Tablet(s) SubLingual daily  cefepime   IVPB 2000 milliGRAM(s) IV Intermittent every 8 hours  dextrose 10% Bolus 125 milliLiter(s) IV Bolus once  dextrose 5%. 1000 milliLiter(s) (100 mL/Hr) IV Continuous <Continuous>  dextrose 5%. 1000 milliLiter(s) (50 mL/Hr) IV Continuous <Continuous>  dextrose 50% Injectable 25 Gram(s) IV Push once  dextrose 50% Injectable 12.5 Gram(s) IV Push once  enoxaparin Injectable 40 milliGRAM(s) SubCutaneous every 24 hours  furosemide    Tablet 40 milliGRAM(s) Oral daily  glucagon  Injectable 1 milliGRAM(s) IntraMuscular once  influenza   Vaccine 0.5 milliLiter(s) IntraMuscular once  insulin glargine Injectable (LANTUS) 15 Unit(s) SubCutaneous at bedtime  insulin lispro (ADMELOG) corrective regimen sliding scale   SubCutaneous three times a day before meals  levothyroxine 150 MICROGram(s) Oral daily  metroNIDAZOLE    Tablet 500 milliGRAM(s) Oral every 12 hours  pantoprazole    Tablet 40 milliGRAM(s) Oral before breakfast  silver sulfADIAZINE 1% Cream 1 Application(s) Topical two times a day  silver sulfADIAZINE 1% Cream 1 Application(s) Topical two times a day  vancomycin  IVPB 750 milliGRAM(s) IV Intermittent every 12 hours    MEDICATIONS  (PRN):  acetaminophen     Tablet .. 650 milliGRAM(s) Oral every 6 hours PRN Moderate Pain (4 - 6)  albuterol    90 MICROgram(s) HFA Inhaler 2 Puff(s) Inhalation every 6 hours PRN Bronchospasm  dextrose Oral Gel 15 Gram(s) Oral once PRN Blood Glucose LESS THAN 70 milliGRAM(s)/deciliter  ketorolac   Injectable 15 milliGRAM(s) IV Push every 12 hours PRN Moderate Pain (4 - 6)  ondansetron Injectable 4 milliGRAM(s) IV Push every 8 hours PRN Nausea and/or Vomiting      Allergies    No Known Allergies    Intolerances        SOCIAL HISTORY:    FAMILY HISTORY:      Vital Signs Last 24 Hrs  T(C): 36.8 (06 May 2024 13:15), Max: 36.9 (05 May 2024 20:06)  T(F): 98.3 (06 May 2024 13:15), Max: 98.4 (05 May 2024 20:06)  HR: 72 (06 May 2024 13:15) (50 - 72)  BP: 125/80 (06 May 2024 13:15) (113/75 - 139/76)  BP(mean): --  RR: 18 (06 May 2024 13:15) (16 - 18)  SpO2: 99% (06 May 2024 13:15) (98% - 100%)          LABS:                        11.8   6.87  )-----------( 282      ( 05 May 2024 08:58 )             38.8     05-05    137  |  99  |  10  ----------------------------<  219<H>  4.8   |  22  |  1.0    Ca    9.1      05 May 2024 08:58  Mg     2.0     05-05    TPro  7.4  /  Alb  4.2  /  TBili  0.4  /  DBili  x   /  AST  28  /  ALT  6   /  AlkPhos  104  05-05      Urinalysis Basic - ( 05 May 2024 08:58 )    Color: x / Appearance: x / SG: x / pH: x  Gluc: 219 mg/dL / Ketone: x  / Bili: x / Urobili: x   Blood: x / Protein: x / Nitrite: x   Leuk Esterase: x / RBC: x / WBC x   Sq Epi: x / Non Sq Epi: x / Bacteria: x        RADIOLOGY & ADDITIONAL STUDIES: HPI:  A 53-year-old female with pmh of DM on metformin, hepatitis B&C, asthma, COPD, hypothyroidism, pancreatitis, IV drug user in the past on Suboxone currently, presents to the ED with complaint of worsening lower extremity swelling, erythema and ulcerations.  Pt was admitted in the past for similar sx and initially sx were presumed to be 2/2 cellulitis however after ID workup sx were deemed 2/2 leukocytoclastic vasculitis. Was on course of steroids and colchicine but pt was lost to f/u. Patient reports that pain has been worsening and now unable to walk 2/2 pain. Denies fever/chills, chest pain, shortness of breath, abdominal pain, nausea/vomiting/diarrhea, change in bowel/bladder habits, dysuria/leukocyte esterase states hematuria, lightheadedness, dizziness, focal numbness/weakness. Patient was admitted for possible vasculitis flare.    Patient was seeing Dr York outpatient with BBx done with purpuric lesion in the lower extremities that were positive for vasculitis At that time patient did not have ulcerations along her leg. PAtient describes a worsening LE edema that has made LE lesion worse and limited her ambulation. Denies fevers or weight changes, describes mild discharge from some of the anterior shin ulcers.    PAST MEDICAL & SURGICAL HISTORY:  Asthma with COPD      Hypothyroidism      H/O: substance abuse  no longer using      History of pancreatitis      Hepatitis-C      Leukocytoclastic vasculitis      History of appendectomy      History of tonsillectomy            MEDICATIONS  (STANDING):  buprenorphine 8 mG/naloxone 2 mG SL  Tablet 2.5 Tablet(s) SubLingual daily  cefepime   IVPB 2000 milliGRAM(s) IV Intermittent every 8 hours  dextrose 10% Bolus 125 milliLiter(s) IV Bolus once  dextrose 5%. 1000 milliLiter(s) (100 mL/Hr) IV Continuous <Continuous>  dextrose 5%. 1000 milliLiter(s) (50 mL/Hr) IV Continuous <Continuous>  dextrose 50% Injectable 25 Gram(s) IV Push once  dextrose 50% Injectable 12.5 Gram(s) IV Push once  enoxaparin Injectable 40 milliGRAM(s) SubCutaneous every 24 hours  furosemide    Tablet 40 milliGRAM(s) Oral daily  glucagon  Injectable 1 milliGRAM(s) IntraMuscular once  influenza   Vaccine 0.5 milliLiter(s) IntraMuscular once  insulin glargine Injectable (LANTUS) 15 Unit(s) SubCutaneous at bedtime  insulin lispro (ADMELOG) corrective regimen sliding scale   SubCutaneous three times a day before meals  levothyroxine 150 MICROGram(s) Oral daily  metroNIDAZOLE    Tablet 500 milliGRAM(s) Oral every 12 hours  pantoprazole    Tablet 40 milliGRAM(s) Oral before breakfast  silver sulfADIAZINE 1% Cream 1 Application(s) Topical two times a day  silver sulfADIAZINE 1% Cream 1 Application(s) Topical two times a day  vancomycin  IVPB 750 milliGRAM(s) IV Intermittent every 12 hours    MEDICATIONS  (PRN):  acetaminophen     Tablet .. 650 milliGRAM(s) Oral every 6 hours PRN Moderate Pain (4 - 6)  albuterol    90 MICROgram(s) HFA Inhaler 2 Puff(s) Inhalation every 6 hours PRN Bronchospasm  dextrose Oral Gel 15 Gram(s) Oral once PRN Blood Glucose LESS THAN 70 milliGRAM(s)/deciliter  ketorolac   Injectable 15 milliGRAM(s) IV Push every 12 hours PRN Moderate Pain (4 - 6)  ondansetron Injectable 4 milliGRAM(s) IV Push every 8 hours PRN Nausea and/or Vomiting      Allergies    No Known Allergies    Intolerances        SOCIAL HISTORY:    FAMILY HISTORY:      Vital Signs Last 24 Hrs  T(C): 36.8 (06 May 2024 13:15), Max: 36.9 (05 May 2024 20:06)  T(F): 98.3 (06 May 2024 13:15), Max: 98.4 (05 May 2024 20:06)  HR: 72 (06 May 2024 13:15) (50 - 72)  BP: 125/80 (06 May 2024 13:15) (113/75 - 139/76)  BP(mean): --  RR: 18 (06 May 2024 13:15) (16 - 18)  SpO2: 99% (06 May 2024 13:15) (98% - 100%)      CONSTITUTIONAL: Well groomed, no apparent distress  EYES: PERRLA and symmetric, EOMI, No conjunctival or scleral injection, non-icteric  RESP: No respiratory distress, no use of accessory muscles; CTA b/l, no WRR  CV: RRR, +S1S2, no MRG; no JVD; Bilateral LE nonpitting peripheral edema extending to knee level  GI: Soft, NT, ND, no rebound, no guarding; no palpable masses; no hepatosplenomegaly; no hernia palpated  SKIN: diffused erythema of bilateral LE up to mid shin level. Associated bilateral anterior shin ulcers ! 1cm in diameter. Some of ulcerations crusted and other with mild serious drainage.   NEURO: CN II-XII intact; normal reflexes in upper and lower extremities, sensation intact in upper and lower extremities b/l to light touch   PSYCH: Appropriate insight/judgment; A+O x 3, mood and affect appropriate, recent/remote memory intact    LABS:                        11.8   6.87  )-----------( 282      ( 05 May 2024 08:58 )             38.8     05-05    137  |  99  |  10  ----------------------------<  219<H>  4.8   |  22  |  1.0    Ca    9.1      05 May 2024 08:58  Mg     2.0     05-05    TPro  7.4  /  Alb  4.2  /  TBili  0.4  /  DBili  x   /  AST  28  /  ALT  6   /  AlkPhos  104  05-05      Urinalysis Basic - ( 05 May 2024 08:58 )    Color: x / Appearance: x / SG: x / pH: x  Gluc: 219 mg/dL / Ketone: x  / Bili: x / Urobili: x   Blood: x / Protein: x / Nitrite: x   Leuk Esterase: x / RBC: x / WBC x   Sq Epi: x / Non Sq Epi: x / Bacteria: x        RADIOLOGY & ADDITIONAL STUDIES: HPI:  A 53-year-old female with pmh of DM on metformin, hepatitis B&C, asthma, COPD, hypothyroidism, pancreatitis, IV drug user in the past on Suboxone currently, presents to the ED with complaint of worsening lower extremity swelling, erythema and ulcerations.  Pt was admitted in the past for similar sx and initially sx were presumed to be 2/2 cellulitis however after ID workup sx were deemed 2/2 leukocytoclastic vasculitis. Was on course of steroids and colchicine but pt was lost to f/u. Patient reports that pain has been worsening and now unable to walk 2/2 pain. Denies fever/chills, chest pain, shortness of breath, abdominal pain, nausea/vomiting/diarrhea, change in bowel/bladder habits, dysuria/leukocyte esterase states hematuria, lightheadedness, dizziness, focal numbness/weakness. Patient was admitted for possible vasculitis flare.    Patient was seeing Dr York outpatient with BBx done with purpuric lesion in the lower extremities that were positive for vasculitis At that time patient did not have ulcerations along her leg. PAtient describes a worsening LE edema that has made LE lesion worse and limited her ambulation. Denies fevers or weight changes, describes mild discharge from some of the anterior shin ulcers.    PAST MEDICAL & SURGICAL HISTORY:  Asthma with COPD      Hypothyroidism      H/O: substance abuse  no longer using      History of pancreatitis      Hepatitis-C      Leukocytoclastic vasculitis      History of appendectomy      History of tonsillectomy            MEDICATIONS  (STANDING):  buprenorphine 8 mG/naloxone 2 mG SL  Tablet 2.5 Tablet(s) SubLingual daily  cefepime   IVPB 2000 milliGRAM(s) IV Intermittent every 8 hours  dextrose 10% Bolus 125 milliLiter(s) IV Bolus once  dextrose 5%. 1000 milliLiter(s) (100 mL/Hr) IV Continuous <Continuous>  dextrose 5%. 1000 milliLiter(s) (50 mL/Hr) IV Continuous <Continuous>  dextrose 50% Injectable 25 Gram(s) IV Push once  dextrose 50% Injectable 12.5 Gram(s) IV Push once  enoxaparin Injectable 40 milliGRAM(s) SubCutaneous every 24 hours  furosemide    Tablet 40 milliGRAM(s) Oral daily  glucagon  Injectable 1 milliGRAM(s) IntraMuscular once  influenza   Vaccine 0.5 milliLiter(s) IntraMuscular once  insulin glargine Injectable (LANTUS) 15 Unit(s) SubCutaneous at bedtime  insulin lispro (ADMELOG) corrective regimen sliding scale   SubCutaneous three times a day before meals  levothyroxine 150 MICROGram(s) Oral daily  metroNIDAZOLE    Tablet 500 milliGRAM(s) Oral every 12 hours  pantoprazole    Tablet 40 milliGRAM(s) Oral before breakfast  silver sulfADIAZINE 1% Cream 1 Application(s) Topical two times a day  silver sulfADIAZINE 1% Cream 1 Application(s) Topical two times a day  vancomycin  IVPB 750 milliGRAM(s) IV Intermittent every 12 hours    MEDICATIONS  (PRN):  acetaminophen     Tablet .. 650 milliGRAM(s) Oral every 6 hours PRN Moderate Pain (4 - 6)  albuterol    90 MICROgram(s) HFA Inhaler 2 Puff(s) Inhalation every 6 hours PRN Bronchospasm  dextrose Oral Gel 15 Gram(s) Oral once PRN Blood Glucose LESS THAN 70 milliGRAM(s)/deciliter  ketorolac   Injectable 15 milliGRAM(s) IV Push every 12 hours PRN Moderate Pain (4 - 6)  ondansetron Injectable 4 milliGRAM(s) IV Push every 8 hours PRN Nausea and/or Vomiting      Allergies    No Known Allergies    Intolerances        SOCIAL HISTORY:    FAMILY HISTORY:      Vital Signs Last 24 Hrs  T(C): 36.8 (06 May 2024 13:15), Max: 36.9 (05 May 2024 20:06)  T(F): 98.3 (06 May 2024 13:15), Max: 98.4 (05 May 2024 20:06)  HR: 72 (06 May 2024 13:15) (50 - 72)  BP: 125/80 (06 May 2024 13:15) (113/75 - 139/76)  BP(mean): --  RR: 18 (06 May 2024 13:15) (16 - 18)  SpO2: 99% (06 May 2024 13:15) (98% - 100%)      CONSTITUTIONAL: Well groomed, no apparent distress  EYES: PERRLA and symmetric, EOMI, No conjunctival or scleral injection, non-icteric  RESP: No respiratory distress, no use of accessory muscles; CTA b/l, no WRR  CV: RRR, +S1S2, no MRG; no JVD; Bilateral LE nonpitting peripheral edema extending to knee level  GI: Soft, NT, ND, no rebound, no guarding; no palpable masses; no hepatosplenomegaly; no hernia palpated  SKIN: (photos reviewed)diffused erythema of bilateral LE up to mid shin level with edema. Associated bilateral anterior shin ulcers ! 1cm in diameter. Some of ulcerations crusted and other with mild serious drainage.   NEURO: CN II-XII intact; normal reflexes in upper and lower extremities, sensation intact in upper and lower extremities b/l to light touch   PSYCH: Appropriate insight/judgment; A+O x 3, mood and affect appropriate, recent/remote memory intact    LABS:                        11.8   6.87  )-----------( 282      ( 05 May 2024 08:58 )             38.8     05-05    137  |  99  |  10  ----------------------------<  219<H>  4.8   |  22  |  1.0    Ca    9.1      05 May 2024 08:58  Mg     2.0     05-05    TPro  7.4  /  Alb  4.2  /  TBili  0.4  /  DBili  x   /  AST  28  /  ALT  6   /  AlkPhos  104  05-05      Urinalysis Basic - ( 05 May 2024 08:58 )    Color: x / Appearance: x / SG: x / pH: x  Gluc: 219 mg/dL / Ketone: x  / Bili: x / Urobili: x   Blood: x / Protein: x / Nitrite: x   Leuk Esterase: x / RBC: x / WBC x   Sq Epi: x / Non Sq Epi: x / Bacteria: x        RADIOLOGY & ADDITIONAL STUDIES:

## 2024-05-07 LAB
ANION GAP SERPL CALC-SCNC: 14 MMOL/L — SIGNIFICANT CHANGE UP (ref 7–14)
BUN SERPL-MCNC: 9 MG/DL — LOW (ref 10–20)
CALCIUM SERPL-MCNC: 8.7 MG/DL — SIGNIFICANT CHANGE UP (ref 8.4–10.4)
CHLORIDE SERPL-SCNC: 91 MMOL/L — LOW (ref 98–110)
CO2 SERPL-SCNC: 26 MMOL/L — SIGNIFICANT CHANGE UP (ref 17–32)
CREAT SERPL-MCNC: 1 MG/DL — SIGNIFICANT CHANGE UP (ref 0.7–1.5)
EGFR: 67 ML/MIN/1.73M2 — SIGNIFICANT CHANGE UP
GLUCOSE BLDC GLUCOMTR-MCNC: 126 MG/DL — HIGH (ref 70–99)
GLUCOSE BLDC GLUCOMTR-MCNC: 197 MG/DL — HIGH (ref 70–99)
GLUCOSE BLDC GLUCOMTR-MCNC: 205 MG/DL — HIGH (ref 70–99)
GLUCOSE BLDC GLUCOMTR-MCNC: 240 MG/DL — HIGH (ref 70–99)
GLUCOSE SERPL-MCNC: 266 MG/DL — HIGH (ref 70–99)
HCT VFR BLD CALC: 34.4 % — LOW (ref 37–47)
HGB BLD-MCNC: 10.9 G/DL — LOW (ref 12–16)
MCHC RBC-ENTMCNC: 26.7 PG — LOW (ref 27–31)
MCHC RBC-ENTMCNC: 31.7 G/DL — LOW (ref 32–37)
MCV RBC AUTO: 84.3 FL — SIGNIFICANT CHANGE UP (ref 81–99)
NRBC # BLD: 0 /100 WBCS — SIGNIFICANT CHANGE UP (ref 0–0)
PLATELET # BLD AUTO: 285 K/UL — SIGNIFICANT CHANGE UP (ref 130–400)
PMV BLD: 9.9 FL — SIGNIFICANT CHANGE UP (ref 7.4–10.4)
POTASSIUM SERPL-MCNC: 3.5 MMOL/L — SIGNIFICANT CHANGE UP (ref 3.5–5)
POTASSIUM SERPL-SCNC: 3.5 MMOL/L — SIGNIFICANT CHANGE UP (ref 3.5–5)
RBC # BLD: 4.08 M/UL — LOW (ref 4.2–5.4)
RBC # FLD: 14.9 % — HIGH (ref 11.5–14.5)
SODIUM SERPL-SCNC: 131 MMOL/L — LOW (ref 135–146)
VANCOMYCIN TROUGH SERPL-MCNC: 12.4 UG/ML — HIGH (ref 5–10)
WBC # BLD: 6.23 K/UL — SIGNIFICANT CHANGE UP (ref 4.8–10.8)
WBC # FLD AUTO: 6.23 K/UL — SIGNIFICANT CHANGE UP (ref 4.8–10.8)

## 2024-05-07 PROCEDURE — 99233 SBSQ HOSP IP/OBS HIGH 50: CPT

## 2024-05-07 RX ORDER — FUROSEMIDE 40 MG
40 TABLET ORAL DAILY
Refills: 0 | Status: DISCONTINUED | OUTPATIENT
Start: 2024-05-07 | End: 2024-05-10

## 2024-05-07 RX ORDER — ACETAMINOPHEN 500 MG
650 TABLET ORAL EVERY 6 HOURS
Refills: 0 | Status: DISCONTINUED | OUTPATIENT
Start: 2024-05-08 | End: 2024-05-11

## 2024-05-07 RX ADMIN — BUPRENORPHINE AND NALOXONE 2.5 TABLET(S): 2; .5 TABLET SUBLINGUAL at 12:53

## 2024-05-07 RX ADMIN — CEFEPIME 100 MILLIGRAM(S): 1 INJECTION, POWDER, FOR SOLUTION INTRAMUSCULAR; INTRAVENOUS at 10:48

## 2024-05-07 RX ADMIN — Medication 40 MILLIGRAM(S): at 05:05

## 2024-05-07 RX ADMIN — Medication 250 MILLIGRAM(S): at 06:12

## 2024-05-07 RX ADMIN — CEFEPIME 100 MILLIGRAM(S): 1 INJECTION, POWDER, FOR SOLUTION INTRAMUSCULAR; INTRAVENOUS at 02:22

## 2024-05-07 RX ADMIN — Medication 1 APPLICATION(S): at 18:11

## 2024-05-07 RX ADMIN — Medication 1 APPLICATION(S): at 06:12

## 2024-05-07 RX ADMIN — Medication 15 MILLIGRAM(S): at 16:58

## 2024-05-07 RX ADMIN — CEFEPIME 100 MILLIGRAM(S): 1 INJECTION, POWDER, FOR SOLUTION INTRAMUSCULAR; INTRAVENOUS at 18:10

## 2024-05-07 RX ADMIN — Medication 1 APPLICATION(S): at 18:10

## 2024-05-07 RX ADMIN — Medication 500 MILLIGRAM(S): at 23:30

## 2024-05-07 RX ADMIN — Medication 250 MILLIGRAM(S): at 21:05

## 2024-05-07 RX ADMIN — INSULIN GLARGINE 15 UNIT(S): 100 INJECTION, SOLUTION SUBCUTANEOUS at 21:45

## 2024-05-07 RX ADMIN — PANTOPRAZOLE SODIUM 40 MILLIGRAM(S): 20 TABLET, DELAYED RELEASE ORAL at 05:05

## 2024-05-07 RX ADMIN — Medication 150 MICROGRAM(S): at 05:05

## 2024-05-07 RX ADMIN — Medication 2: at 08:24

## 2024-05-07 RX ADMIN — Medication 500 MILLIGRAM(S): at 12:58

## 2024-05-07 RX ADMIN — ENOXAPARIN SODIUM 40 MILLIGRAM(S): 100 INJECTION SUBCUTANEOUS at 05:05

## 2024-05-07 RX ADMIN — Medication 4: at 18:09

## 2024-05-07 NOTE — PHARMACOTHERAPY INTERVENTION NOTE - NSPHARMCOMMASP
ASP - Dose optimization/Non-Renal dose adjustment
ASP - Lab/ test recommended
ASP - Therapy recommended/ Alternative therapy
ASP - Lab/ test recommended
ASP - Dose optimization/Non-Renal dose adjustment
ASP - Lab/ test recommended
ASP - Therapy recommended/ Alternative therapy
ASP - Dose optimization/Non-Renal dose adjustment

## 2024-05-07 NOTE — PROGRESS NOTE ADULT - ASSESSMENT
A 53-year-old female with pmh of DM on metformin, hepatitis B&C, asthma, COPD, hypothyroidism, pancreatitis, IV drug user in the past on Suboxone currently, presents to the ED with complaint of worsening lower extremity swelling, erythema and ulcerations. Admitted for management of leukocytoclastic vasculitis and cellulitis.    # Bilateral LE edema + Purulent Cellulitis  - LE swelling and erythema and multiple new open weeping sores with discharge  - Not septic  - WBC 8.3  - Elevated ESR/CRP  - Athough patient has a h/o leukocytoclastic vasculitis (Rheumatology recommended prednisone and colchicine last admission, patient did not follow OP), Current presentation is not related to vasculitis as per Rheum and Derm.   - ID recs (5/4):   Continue IV vancomycin 1250mg q12hrs, IV cefepime 2g q8hrs; change Flagyl to PO 500mg q12hrs  Monitor vancomycin trough 30 minutes prior every 4th dose, keep trough around 10; monitor Cr daily  - c/w Local wound care.   - BURN C/s Noted (5/6): Cultures sent from LE. f/u results. - BID dressing change with Silvadene, abd, Kerlix, and ACE  - Elevate LE  - MRSA swab  +.   - f/u bcx  - f/u procal  - Rheum cslt : discussed over phone. Likely current presentation not related to vasculitis. advise derm consult.   - Derm consult noted:   Eruption is most consistent with a stasis dermatitis and possible secondary pyoderma. This is not the typical appearance of leukocytoclastic vasculitis.  Would suggest coverage with abx therapy for possible infection of ulcers   Triamcinolone 0.1% cream BID  If no improvement would consider re-biopsy  - PT cslt  5/7: Changed from Home dose PO lasix 40mg QD to IV 40mg QD. To help with edema. Strict I/O.    # DM2  - On metformin  - monitor FS glucose  - SSI only for now.     #Hx Hep B&C  - OP f/u    # copd  - albuterol prn    # hypothyroidism  - c/w synthroid 150 mcg    Diet: DASH/CC  Activity: AAT  DVT PPX: lovenox

## 2024-05-07 NOTE — PROGRESS NOTE ADULT - SUBJECTIVE AND OBJECTIVE BOX
S: LE pain persists      All other pertinent ROS negative.      Vital Signs Last 24 Hrs  T(C): 36.6 (07 May 2024 12:00), Max: 36.7 (07 May 2024 05:00)  T(F): 97.9 (07 May 2024 12:00), Max: 98.1 (07 May 2024 05:00)  HR: 73 (07 May 2024 12:00) (70 - 75)  BP: 109/70 (07 May 2024 12:00) (98/65 - 109/70)  BP(mean): --  RR: 18 (07 May 2024 12:00) (18 - 18)  SpO2: 97% (07 May 2024 12:00) (97% - 98%)    Parameters below as of 06 May 2024 20:33  Patient On (Oxygen Delivery Method): room air      PHYSICAL EXAM:    Constitutional: NAD, awake and alert  HEENT: PERR, EOMI, Normal Hearing, MMM  Neck: Soft and supple, No LAD, No JVD  Respiratory: Breath sounds are clear bilaterally, No wheezing, rales or rhonchi  Cardiovascular: S1 and S2, regular rate and rhythm, no Murmurs, gallops or rubs  Gastrointestinal: Bowel Sounds present, soft, nontender, nondistended, no guarding, no rebound  Extremities: b/l LE 3+ edema/redness. weeping sores noted.      MEDICATIONS:  MEDICATIONS  (STANDING):  buprenorphine 8 mG/naloxone 2 mG SL  Tablet 2.5 Tablet(s) SubLingual daily  cefepime   IVPB 2000 milliGRAM(s) IV Intermittent every 8 hours  dextrose 10% Bolus 125 milliLiter(s) IV Bolus once  dextrose 5%. 1000 milliLiter(s) (100 mL/Hr) IV Continuous <Continuous>  dextrose 5%. 1000 milliLiter(s) (50 mL/Hr) IV Continuous <Continuous>  dextrose 50% Injectable 12.5 Gram(s) IV Push once  dextrose 50% Injectable 25 Gram(s) IV Push once  enoxaparin Injectable 40 milliGRAM(s) SubCutaneous every 24 hours  furosemide   Injectable 40 milliGRAM(s) IV Push daily  glucagon  Injectable 1 milliGRAM(s) IntraMuscular once  influenza   Vaccine 0.5 milliLiter(s) IntraMuscular once  insulin glargine Injectable (LANTUS) 15 Unit(s) SubCutaneous at bedtime  insulin lispro (ADMELOG) corrective regimen sliding scale   SubCutaneous three times a day before meals  levothyroxine 150 MICROGram(s) Oral daily  metroNIDAZOLE    Tablet 500 milliGRAM(s) Oral every 12 hours  pantoprazole    Tablet 40 milliGRAM(s) Oral before breakfast  silver sulfADIAZINE 1% Cream 1 Application(s) Topical two times a day  silver sulfADIAZINE 1% Cream 1 Application(s) Topical two times a day  triamcinolone 0.1% Cream 1 Application(s) Topical two times a day  vancomycin  IVPB 750 milliGRAM(s) IV Intermittent every 12 hours      LABS: All Labs Reviewed:                        10.9   6.23  )-----------( 285      ( 07 May 2024 06:14 )             34.4     05-07    131<L>  |  91<L>  |  9<L>  ----------------------------<  266<H>  3.5   |  26  |  1.0    Ca    8.7      07 May 2024 06:14            Blood Culture: 05-03 @ 17:21  Organism --  Gram Stain Blood -- Gram Stain --  Specimen Source .Blood Blood-Peripheral  Culture-Blood --        Radiology: reviewed

## 2024-05-07 NOTE — PHARMACOTHERAPY INTERVENTION NOTE - COMMENTS
Recommended to continue vancomycin 750 mg IV q12h. The vancomycin level today, 5/7 was 12.4 mg/L. As per Precise[employer] calculations, the steady-state vancomycin AUC/REED is 480 mg/L*h, which is within the therapeutic range of 400 - 600 mg/L*h. Serum creatinine is 1.0 mg/dL.    Esther Zhang, PharmD, [facility]  Clinical Pharmacy Specialist, Infectious Diseases  Tele-Antimicrobial Stewardship Program (Tele-ASP)  Tele-ASP Phone: [phone]

## 2024-05-08 LAB
A1C WITH ESTIMATED AVERAGE GLUCOSE RESULT: 9.7 % — HIGH (ref 4–5.6)
ANION GAP SERPL CALC-SCNC: 16 MMOL/L — HIGH (ref 7–14)
BUN SERPL-MCNC: 16 MG/DL — SIGNIFICANT CHANGE UP (ref 10–20)
CALCIUM SERPL-MCNC: 9.8 MG/DL — SIGNIFICANT CHANGE UP (ref 8.4–10.5)
CHLORIDE SERPL-SCNC: 87 MMOL/L — LOW (ref 98–110)
CO2 SERPL-SCNC: 32 MMOL/L — SIGNIFICANT CHANGE UP (ref 17–32)
CREAT SERPL-MCNC: 1 MG/DL — SIGNIFICANT CHANGE UP (ref 0.7–1.5)
CULTURE RESULTS: SIGNIFICANT CHANGE UP
CULTURE RESULTS: SIGNIFICANT CHANGE UP
EGFR: 67 ML/MIN/1.73M2 — SIGNIFICANT CHANGE UP
ESTIMATED AVERAGE GLUCOSE: 232 MG/DL — HIGH (ref 68–114)
GLUCOSE BLDC GLUCOMTR-MCNC: 206 MG/DL — HIGH (ref 70–99)
GLUCOSE BLDC GLUCOMTR-MCNC: 209 MG/DL — HIGH (ref 70–99)
GLUCOSE BLDC GLUCOMTR-MCNC: 221 MG/DL — HIGH (ref 70–99)
GLUCOSE BLDC GLUCOMTR-MCNC: 238 MG/DL — HIGH (ref 70–99)
GLUCOSE BLDC GLUCOMTR-MCNC: 246 MG/DL — HIGH (ref 70–99)
GLUCOSE SERPL-MCNC: 252 MG/DL — HIGH (ref 70–99)
HCT VFR BLD CALC: 40 % — SIGNIFICANT CHANGE UP (ref 37–47)
HGB BLD-MCNC: 12.3 G/DL — SIGNIFICANT CHANGE UP (ref 12–16)
MAGNESIUM SERPL-MCNC: 2 MG/DL — SIGNIFICANT CHANGE UP (ref 1.8–2.4)
MCHC RBC-ENTMCNC: 26.2 PG — LOW (ref 27–31)
MCHC RBC-ENTMCNC: 30.8 G/DL — LOW (ref 32–37)
MCV RBC AUTO: 85.3 FL — SIGNIFICANT CHANGE UP (ref 81–99)
NRBC # BLD: 0 /100 WBCS — SIGNIFICANT CHANGE UP (ref 0–0)
PLATELET # BLD AUTO: 317 K/UL — SIGNIFICANT CHANGE UP (ref 130–400)
PMV BLD: 10.7 FL — HIGH (ref 7.4–10.4)
POTASSIUM SERPL-MCNC: 4.1 MMOL/L — SIGNIFICANT CHANGE UP (ref 3.5–5)
POTASSIUM SERPL-SCNC: 4.1 MMOL/L — SIGNIFICANT CHANGE UP (ref 3.5–5)
RBC # BLD: 4.69 M/UL — SIGNIFICANT CHANGE UP (ref 4.2–5.4)
RBC # FLD: 15.3 % — HIGH (ref 11.5–14.5)
SODIUM SERPL-SCNC: 135 MMOL/L — SIGNIFICANT CHANGE UP (ref 135–146)
SPECIMEN SOURCE: SIGNIFICANT CHANGE UP
SPECIMEN SOURCE: SIGNIFICANT CHANGE UP
WBC # BLD: 8.38 K/UL — SIGNIFICANT CHANGE UP (ref 4.8–10.8)
WBC # FLD AUTO: 8.38 K/UL — SIGNIFICANT CHANGE UP (ref 4.8–10.8)

## 2024-05-08 PROCEDURE — 99232 SBSQ HOSP IP/OBS MODERATE 35: CPT

## 2024-05-08 RX ORDER — CEFPODOXIME PROXETIL 100 MG
200 TABLET ORAL EVERY 12 HOURS
Refills: 0 | Status: DISCONTINUED | OUTPATIENT
Start: 2024-05-08 | End: 2024-05-08

## 2024-05-08 RX ORDER — CEFEPIME 1 G/1
2000 INJECTION, POWDER, FOR SOLUTION INTRAMUSCULAR; INTRAVENOUS EVERY 8 HOURS
Refills: 0 | Status: DISCONTINUED | OUTPATIENT
Start: 2024-05-08 | End: 2024-05-11

## 2024-05-08 RX ORDER — METRONIDAZOLE 500 MG
500 TABLET ORAL EVERY 8 HOURS
Refills: 0 | Status: DISCONTINUED | OUTPATIENT
Start: 2024-05-08 | End: 2024-05-11

## 2024-05-08 RX ORDER — INSULIN GLARGINE 100 [IU]/ML
20 INJECTION, SOLUTION SUBCUTANEOUS AT BEDTIME
Refills: 0 | Status: DISCONTINUED | OUTPATIENT
Start: 2024-05-08 | End: 2024-05-09

## 2024-05-08 RX ADMIN — Medication 4: at 08:40

## 2024-05-08 RX ADMIN — Medication 1 APPLICATION(S): at 05:54

## 2024-05-08 RX ADMIN — Medication 1 APPLICATION(S): at 17:02

## 2024-05-08 RX ADMIN — CEFEPIME 100 MILLIGRAM(S): 1 INJECTION, POWDER, FOR SOLUTION INTRAMUSCULAR; INTRAVENOUS at 15:32

## 2024-05-08 RX ADMIN — ENOXAPARIN SODIUM 40 MILLIGRAM(S): 100 INJECTION SUBCUTANEOUS at 05:51

## 2024-05-08 RX ADMIN — Medication 250 MILLIGRAM(S): at 07:32

## 2024-05-08 RX ADMIN — Medication 1 APPLICATION(S): at 05:53

## 2024-05-08 RX ADMIN — CEFEPIME 100 MILLIGRAM(S): 1 INJECTION, POWDER, FOR SOLUTION INTRAMUSCULAR; INTRAVENOUS at 02:18

## 2024-05-08 RX ADMIN — Medication 4: at 12:04

## 2024-05-08 RX ADMIN — Medication 40 MILLIGRAM(S): at 05:52

## 2024-05-08 RX ADMIN — INSULIN GLARGINE 20 UNIT(S): 100 INJECTION, SOLUTION SUBCUTANEOUS at 21:26

## 2024-05-08 RX ADMIN — Medication 500 MILLIGRAM(S): at 21:27

## 2024-05-08 RX ADMIN — PANTOPRAZOLE SODIUM 40 MILLIGRAM(S): 20 TABLET, DELAYED RELEASE ORAL at 05:52

## 2024-05-08 RX ADMIN — BUPRENORPHINE AND NALOXONE 2.5 TABLET(S): 2; .5 TABLET SUBLINGUAL at 14:14

## 2024-05-08 RX ADMIN — Medication 150 MICROGRAM(S): at 05:52

## 2024-05-08 RX ADMIN — CEFEPIME 100 MILLIGRAM(S): 1 INJECTION, POWDER, FOR SOLUTION INTRAMUSCULAR; INTRAVENOUS at 21:27

## 2024-05-08 RX ADMIN — Medication 4: at 17:03

## 2024-05-08 NOTE — PROGRESS NOTE ADULT - SUBJECTIVE AND OBJECTIVE BOX
S: LE pain persists      All other pertinent ROS negative.    Vital Signs Last 24 Hrs  T(C): 36.4 (08 May 2024 12:00), Max: 36.8 (07 May 2024 20:00)  T(F): 97.6 (08 May 2024 12:00), Max: 98.3 (07 May 2024 20:00)  HR: 71 (08 May 2024 12:00) (70 - 75)  BP: 104/72 (08 May 2024 12:00) (104/70 - 105/67)  RR: 18 (08 May 2024 12:00) (18 - 18)  SpO2: 97% (08 May 2024 12:00) (93% - 100%)    Parameters below as of 08 May 2024 08:33  Patient On (Oxygen Delivery Method): room air      PHYSICAL EXAM:    Constitutional: NAD, awake and alert  HEENT: PERR, EOMI, Normal Hearing, MMM  Neck: Soft and supple, No LAD, No JVD  Respiratory: Breath sounds are clear bilaterally, No wheezing, rales or rhonchi  Cardiovascular: S1 and S2, regular rate and rhythm, no Murmurs, gallops or rubs  Gastrointestinal: Bowel Sounds present, soft, nontender, nondistended, no guarding, no rebound  Extremities: b/l LE 3+ edema/redness. weeping sores noted.      MEDICATIONS  (STANDING):  buprenorphine 8 mG/naloxone 2 mG SL  Tablet 2.5 Tablet(s) SubLingual daily  cefepime   IVPB 2000 milliGRAM(s) IV Intermittent every 8 hours  dextrose 10% Bolus 125 milliLiter(s) IV Bolus once  dextrose 5%. 1000 milliLiter(s) (100 mL/Hr) IV Continuous <Continuous>  dextrose 5%. 1000 milliLiter(s) (50 mL/Hr) IV Continuous <Continuous>  dextrose 50% Injectable 12.5 Gram(s) IV Push once  dextrose 50% Injectable 25 Gram(s) IV Push once  enoxaparin Injectable 40 milliGRAM(s) SubCutaneous every 24 hours  furosemide   Injectable 40 milliGRAM(s) IV Push daily  glucagon  Injectable 1 milliGRAM(s) IntraMuscular once  influenza   Vaccine 0.5 milliLiter(s) IntraMuscular once  insulin glargine Injectable (LANTUS) 15 Unit(s) SubCutaneous at bedtime  insulin lispro (ADMELOG) corrective regimen sliding scale   SubCutaneous three times a day before meals  levothyroxine 150 MICROGram(s) Oral daily  metroNIDAZOLE    Tablet 500 milliGRAM(s) Oral every 8 hours  pantoprazole    Tablet 40 milliGRAM(s) Oral before breakfast  silver sulfADIAZINE 1% Cream 1 Application(s) Topical two times a day  silver sulfADIAZINE 1% Cream 1 Application(s) Topical two times a day  triamcinolone 0.1% Cream 1 Application(s) Topical two times a day  vancomycin  IVPB 750 milliGRAM(s) IV Intermittent every 12 hours STOPPED ON 5/8/24      LABS: All Labs Reviewed:                            12.3   8.38  )-----------( 317      ( 08 May 2024 08:04 )             40.0     05-08    135  |  87<L>  |  16  ----------------------------<  252<H>  4.1   |  32  |  1.0    Ca    9.8      08 May 2024 08:04  Mg     2.0     05-08                        10.9   6.23  )-----------( 285      ( 07 May 2024 06:14 )             34.4     05-07    131<L>  |  91<L>  |  9<L>  ----------------------------<  266<H>  3.5   |  26  |  1.0    Ca    8.7      07 May 2024 06:14      Blood Culture: 05-03 @ 17:21  Organism --  Gram Stain Blood -- Gram Stain --  Specimen Source .Blood Blood-Peripheral  Culture-Blood --        Radiology: reviewed

## 2024-05-08 NOTE — PROGRESS NOTE ADULT - ASSESSMENT
A 53-year-old female with pmh of DM on metformin, hepatitis B&C, asthma, COPD, hypothyroidism, pancreatitis, IV drug user in the past on Suboxone currently, presents to the ED with complaint of worsening lower extremity swelling, erythema and ulcerations. Admitted for management of leukocytoclastic vasculitis and cellulitis.    # Bilateral LE edema + Purulent Cellulitis  - LE swelling and erythema and multiple new open weeping sores with discharge  - Not septic  - WBC 8.3  - Elevated ESR/CRP  - Athough patient has a h/o leukocytoclastic vasculitis (Rheumatology recommended prednisone and colchicine last admission, patient did not follow OP), Current presentation is not related to vasculitis as per Rheum and Derm.   - ID recs 5/8/24: IV cefepime 2g q8hrs; change Flagyl to PO 500mg q8hrs  - STOP VANCOMYCIN 5/8/24   - c/w Local wound care.   - BURN C/s Noted (5/6): Cultures sent from LE. f/u results. - BID dressing change with Silvadene, abd, Kerlix, and ACE  - Elevate LE  - MRSA swab NEG   - Blood cxs NEG  - f/u procal  - Rheum cslt : discussed over phone. Likely current presentation not related to vasculitis. advise derm consult.   - Derm consult noted:   Eruption is most consistent with a stasis dermatitis and possible secondary pyoderma. This is not the typical appearance of leukocytoclastic vasculitis.  Would suggest coverage with abx therapy for possible infection of ulcers   Triamcinolone 0.1% cream BID  If no improvement would consider re-biopsy  - PT cslt  5/7: Changed from Home dose PO lasix 40mg QD to IV 40mg QD. To help with edema. Strict I/O.    # DM2  - On metformin  - monitor FS glucose  - SSI only for now.     #Hx Hep B&C  - OP f/u    # copd  - albuterol prn    # hypothyroidism  - c/w synthroid 150 mcg    Diet: DASH/CC  Activity: AAT  DVT PPX: lovenox    Dispo: Acute c/w Cefepime and Flagyl / IV Lasix / f/u clinical improvement likely here for 3 more days for IV abx

## 2024-05-08 NOTE — PROGRESS NOTE ADULT - SUBJECTIVE AND OBJECTIVE BOX
HEIDIEZRA SOTO  53y, Female  Allergy: No Known Allergies      LOS  5d    CHIEF COMPLAINT: cellulitis (07 May 2024 18:48)      INTERVAL EVENTS/HPI  - No acute events overnight WCX NG  - T(F): , Max: 98.3 (05-07-24 @ 20:00)  - Tolerating medication  - WBC Count: 8.38 (05-08-24 @ 08:04)  WBC Count: 6.23 (05-07-24 @ 06:14)     - Creatinine: 1.0 (05-08-24 @ 08:04)  Creatinine: 1.0 (05-07-24 @ 06:14)       ROS  ***    VITALS:  T(F): 97.9, Max: 98.3 (05-07-24 @ 20:00)  HR: 75  BP: 105/67  RR: 18Vital Signs Last 24 Hrs  T(C): 36.6 (08 May 2024 04:35), Max: 36.8 (07 May 2024 20:00)  T(F): 97.9 (08 May 2024 04:35), Max: 98.3 (07 May 2024 20:00)  HR: 75 (08 May 2024 04:35) (70 - 75)  BP: 105/67 (08 May 2024 04:35) (104/70 - 109/70)  BP(mean): --  RR: 18 (08 May 2024 04:35) (18 - 18)  SpO2: 96% (08 May 2024 08:33) (93% - 100%)    Parameters below as of 08 May 2024 08:33  Patient On (Oxygen Delivery Method): room air        PHYSICAL EXAM:  ***    FH: Non-contributory  Social Hx: Non-contributory    TESTS & MEASUREMENTS:                        12.3   8.38  )-----------( 317      ( 08 May 2024 08:04 )             40.0     05-08    135  |  87<L>  |  16  ----------------------------<  252<H>  4.1   |  32  |  1.0    Ca    9.8      08 May 2024 08:04  Mg     2.0     05-08          Urinalysis Basic - ( 08 May 2024 08:04 )    Color: x / Appearance: x / SG: x / pH: x  Gluc: 252 mg/dL / Ketone: x  / Bili: x / Urobili: x   Blood: x / Protein: x / Nitrite: x   Leuk Esterase: x / RBC: x / WBC x   Sq Epi: x / Non Sq Epi: x / Bacteria: x        Culture - Other (collected 05-06-24 @ 16:16)  Source: .Other RLE  Preliminary Report (05-07-24 @ 22:49):    Normal skin sumit isolated    Culture - Other (collected 05-06-24 @ 16:16)  Source: .Other LLE  Preliminary Report (05-07-24 @ 21:59):    Normal skin sumit isolated    Culture - Blood (collected 05-03-24 @ 17:21)  Source: .Blood Blood-Peripheral  Preliminary Report (05-07-24 @ 23:01):    No growth at 4 days    Culture - Blood (collected 05-03-24 @ 17:21)  Source: .Blood Blood-Peripheral  Preliminary Report (05-07-24 @ 23:01):    No growth at 4 days    Culture - Urine (collected 04-14-24 @ 13:55)  Source: Clean Catch Clean Catch (Midstream)  Final Report (04-17-24 @ 17:56):    >100,000 CFU/ml Escherichia coli  Organism: Escherichia coli (04-17-24 @ 17:56)  Organism: Escherichia coli (04-17-24 @ 17:56)      -  Levofloxacin: S <=0.5      -  Tobramycin: S <=2      -  Nitrofurantoin: S <=32 Should not be used to treat pyelonephritis      -  Aztreonam: S <=4      -  Gentamicin: S <=2      -  Cefazolin: R >16 For uncomplicated UTI with K. pneumoniae, E. coli, or P. mirablis: REED <=16 is sensitive and REED >=32 is resistant. This also predicts results for oral agents cefaclor, cefdinir, cefpodoxime, cefprozil, cefuroxime axetil, cephalexin and locarbef for uncomplicated UTI. Note that some isolates may be susceptible to these agents while testing resistant to cefazolin.      -  Cefepime: S <=2      -  Piperacillin/Tazobactam: S <=8      -  Ciprofloxacin: S <=0.25      -  Imipenem: S <=1      -  Ceftriaxone: S <=1      -  Ampicillin: R >16 These ampicillin results predict results for amoxicillin      Method Type: REED      -  Meropenem: S <=1      -  Ampicillin/Sulbactam: R >16/8      -  Cefoxitin: S <=8      -  Cefuroxime: I 16      -  Amoxicillin/Clavulanic Acid: R >16/8      -  Trimethoprim/Sulfamethoxazole: S 2/38      -  Ertapenem: S <=0.5        Lactate, Blood: 1.5 mmol/L (05-03-24 @ 17:21)      INFECTIOUS DISEASES TESTING  Vancomycin Level, Trough: 12.4 (05-07-24 @ 16:34)  Vancomycin Level, Trough: 13.1 (05-05-24 @ 17:59)  Vancomycin Level, Trough: 26.0 (05-05-24 @ 08:58)  Vancomycin Level, Random: 11.4 (05-05-24 @ 02:09)  MRSA PCR Result.: Negative (05-04-24 @ 07:00)  Vancomycin Level, Trough: 9.2 (03-20-24 @ 14:55)  Vancomycin Level, Trough: 8.5 (02-01-24 @ 16:43)  Procalcitonin, Serum: 0.03 (01-11-24 @ 13:40)  HIV-1/2 Combo Result: Nonreact (01-11-24 @ 13:40)  strept    INFLAMMATORY MARKERS  Sedimentation Rate, Erythrocyte: 47 mm/Hr (05-03-24 @ 17:21)  C-Reactive Protein: 44.1 mg/L (05-03-24 @ 17:21)  Sedimentation Rate, Erythrocyte: 40 mm/Hr (04-04-24 @ 15:45)  C-Reactive Protein, Serum: 15.3 mg/L (04-04-24 @ 15:45)      RADIOLOGY & ADDITIONAL TESTS:  I have personally reviewed the last available Chest xray  CXR      CT      CARDIOLOGY TESTING      MEDICATIONS  buprenorphine 8 mG/naloxone 2 mG SL  Tablet 2.5 SubLingual daily  dextrose 10% Bolus 125 IV Bolus once  dextrose 5%. 1000 IV Continuous <Continuous>  dextrose 5%. 1000 IV Continuous <Continuous>  dextrose 50% Injectable 25 IV Push once  dextrose 50% Injectable 12.5 IV Push once  enoxaparin Injectable 40 SubCutaneous every 24 hours  furosemide   Injectable 40 IV Push daily  glucagon  Injectable 1 IntraMuscular once  influenza   Vaccine 0.5 IntraMuscular once  insulin glargine Injectable (LANTUS) 15 SubCutaneous at bedtime  insulin lispro (ADMELOG) corrective regimen sliding scale  SubCutaneous three times a day before meals  levothyroxine 150 Oral daily  pantoprazole    Tablet 40 Oral before breakfast  triamcinolone 0.1% Cream 1 Topical two times a day      WEIGHT  Weight (kg): 86.2 (05-03-24 @ 13:05)  Creatinine: 1.0 mg/dL (05-08-24 @ 08:04)      ANTIBIOTICS:      All available historical records have been reviewed       HEIDIEZRA SOTO  53y, Female  Allergy: No Known Allergies      LOS  5d    CHIEF COMPLAINT: cellulitis (07 May 2024 18:48)      INTERVAL EVENTS/HPI  - No acute events overnight WCX NG  - T(F): , Max: 98.3 (05-07-24 @ 20:00)  - Tolerating medication  - WBC Count: 8.38 (05-08-24 @ 08:04)  WBC Count: 6.23 (05-07-24 @ 06:14)     - Creatinine: 1.0 (05-08-24 @ 08:04)  Creatinine: 1.0 (05-07-24 @ 06:14)       ROS  General: Denies rigors, nightsweats  HEENT: Denies headache, rhinorrhea, sore throat, eye pain  CV: Denies CP, palpitations  PULM: Denies wheezing, hemoptysis  GI: Denies hematemesis, hematochezia, melena  : Denies discharge, hematuria  MSK: Denies arthralgias, myalgias  SKIN: Denies rash, lesions  NEURO: Denies paresthesias, weakness  PSYCH: Denies depression, anxiety     VITALS:  T(F): 97.9, Max: 98.3 (05-07-24 @ 20:00)  HR: 75  BP: 105/67  RR: 18Vital Signs Last 24 Hrs  T(C): 36.6 (08 May 2024 04:35), Max: 36.8 (07 May 2024 20:00)  T(F): 97.9 (08 May 2024 04:35), Max: 98.3 (07 May 2024 20:00)  HR: 75 (08 May 2024 04:35) (70 - 75)  BP: 105/67 (08 May 2024 04:35) (104/70 - 109/70)  BP(mean): --  RR: 18 (08 May 2024 04:35) (18 - 18)  SpO2: 96% (08 May 2024 08:33) (93% - 100%)    Parameters below as of 08 May 2024 08:33  Patient On (Oxygen Delivery Method): room air        PHYSICAL EXAM:  Gen: NAD, resting in bed chronically ill appearing   HEENT: Normocephalic, atraumatic  Neck: supple, no lymphadenopathy  CV: Regular rate & regular rhythm  Lungs: decreased BS at bases, no fremitus  Abdomen: Soft, BS present  Ext: Warm, well perfused  Neuro: non focal, awake  Skin: RLE shallow ulcers, clean, LLE edema >RLE, erythema, draining ulcer, some greenish drainage  Lines: no phlebitis     FH: Non-contributory  Social Hx: Non-contributory    TESTS & MEASUREMENTS:                        12.3   8.38  )-----------( 317      ( 08 May 2024 08:04 )             40.0     05-08    135  |  87<L>  |  16  ----------------------------<  252<H>  4.1   |  32  |  1.0    Ca    9.8      08 May 2024 08:04  Mg     2.0     05-08          Urinalysis Basic - ( 08 May 2024 08:04 )    Color: x / Appearance: x / SG: x / pH: x  Gluc: 252 mg/dL / Ketone: x  / Bili: x / Urobili: x   Blood: x / Protein: x / Nitrite: x   Leuk Esterase: x / RBC: x / WBC x   Sq Epi: x / Non Sq Epi: x / Bacteria: x        Culture - Other (collected 05-06-24 @ 16:16)  Source: .Other RLE  Preliminary Report (05-07-24 @ 22:49):    Normal skin sumit isolated    Culture - Other (collected 05-06-24 @ 16:16)  Source: .Other LLE  Preliminary Report (05-07-24 @ 21:59):    Normal skin sumit isolated    Culture - Blood (collected 05-03-24 @ 17:21)  Source: .Blood Blood-Peripheral  Preliminary Report (05-07-24 @ 23:01):    No growth at 4 days    Culture - Blood (collected 05-03-24 @ 17:21)  Source: .Blood Blood-Peripheral  Preliminary Report (05-07-24 @ 23:01):    No growth at 4 days    Culture - Urine (collected 04-14-24 @ 13:55)  Source: Clean Catch Clean Catch (Midstream)  Final Report (04-17-24 @ 17:56):    >100,000 CFU/ml Escherichia coli  Organism: Escherichia coli (04-17-24 @ 17:56)  Organism: Escherichia coli (04-17-24 @ 17:56)      -  Levofloxacin: S <=0.5      -  Tobramycin: S <=2      -  Nitrofurantoin: S <=32 Should not be used to treat pyelonephritis      -  Aztreonam: S <=4      -  Gentamicin: S <=2      -  Cefazolin: R >16 For uncomplicated UTI with K. pneumoniae, E. coli, or P. mirablis: REED <=16 is sensitive and REED >=32 is resistant. This also predicts results for oral agents cefaclor, cefdinir, cefpodoxime, cefprozil, cefuroxime axetil, cephalexin and locarbef for uncomplicated UTI. Note that some isolates may be susceptible to these agents while testing resistant to cefazolin.      -  Cefepime: S <=2      -  Piperacillin/Tazobactam: S <=8      -  Ciprofloxacin: S <=0.25      -  Imipenem: S <=1      -  Ceftriaxone: S <=1      -  Ampicillin: R >16 These ampicillin results predict results for amoxicillin      Method Type: REED      -  Meropenem: S <=1      -  Ampicillin/Sulbactam: R >16/8      -  Cefoxitin: S <=8      -  Cefuroxime: I 16      -  Amoxicillin/Clavulanic Acid: R >16/8      -  Trimethoprim/Sulfamethoxazole: S 2/38      -  Ertapenem: S <=0.5        Lactate, Blood: 1.5 mmol/L (05-03-24 @ 17:21)      INFECTIOUS DISEASES TESTING  Vancomycin Level, Trough: 12.4 (05-07-24 @ 16:34)  Vancomycin Level, Trough: 13.1 (05-05-24 @ 17:59)  Vancomycin Level, Trough: 26.0 (05-05-24 @ 08:58)  Vancomycin Level, Random: 11.4 (05-05-24 @ 02:09)  MRSA PCR Result.: Negative (05-04-24 @ 07:00)  Vancomycin Level, Trough: 9.2 (03-20-24 @ 14:55)  Vancomycin Level, Trough: 8.5 (02-01-24 @ 16:43)  Procalcitonin, Serum: 0.03 (01-11-24 @ 13:40)  HIV-1/2 Combo Result: Nonreact (01-11-24 @ 13:40)  strept    INFLAMMATORY MARKERS  Sedimentation Rate, Erythrocyte: 47 mm/Hr (05-03-24 @ 17:21)  C-Reactive Protein: 44.1 mg/L (05-03-24 @ 17:21)  Sedimentation Rate, Erythrocyte: 40 mm/Hr (04-04-24 @ 15:45)  C-Reactive Protein, Serum: 15.3 mg/L (04-04-24 @ 15:45)      RADIOLOGY & ADDITIONAL TESTS:  I have personally reviewed the last available Chest xray  CXR      CT      CARDIOLOGY TESTING      MEDICATIONS  buprenorphine 8 mG/naloxone 2 mG SL  Tablet 2.5 SubLingual daily  dextrose 10% Bolus 125 IV Bolus once  dextrose 5%. 1000 IV Continuous <Continuous>  dextrose 5%. 1000 IV Continuous <Continuous>  dextrose 50% Injectable 25 IV Push once  dextrose 50% Injectable 12.5 IV Push once  enoxaparin Injectable 40 SubCutaneous every 24 hours  furosemide   Injectable 40 IV Push daily  glucagon  Injectable 1 IntraMuscular once  influenza   Vaccine 0.5 IntraMuscular once  insulin glargine Injectable (LANTUS) 15 SubCutaneous at bedtime  insulin lispro (ADMELOG) corrective regimen sliding scale  SubCutaneous three times a day before meals  levothyroxine 150 Oral daily  pantoprazole    Tablet 40 Oral before breakfast  triamcinolone 0.1% Cream 1 Topical two times a day      WEIGHT  Weight (kg): 86.2 (05-03-24 @ 13:05)  Creatinine: 1.0 mg/dL (05-08-24 @ 08:04)      ANTIBIOTICS:      All available historical records have been reviewed

## 2024-05-08 NOTE — PROGRESS NOTE ADULT - ASSESSMENT
A 53-year-old female with pmh of DM on metformin, hepatitis B&C, asthma, COPD, hypothyroidism, pancreatitis, IV drug user in the past on Suboxone currently, presents to the ED with complaint of worsening lower extremity swelling, erythema and ulcerations.    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 which could result in poor clinical outcome    RECOMMENDATIONS:  - PO vantin 200mg BID + PO doxy 100mg BID x 10 more days  - f/u Rheum/Burn   - Local wound care  - Remains at risk of recurrent infection without treatment of underlying skin condition     If any questions, please text or call on [employer] Teams  Please continue to update ID with any pertinent new clinical, laboratory or radiographic findings  A 53-year-old female with pmh of DM on metformin, hepatitis B&C, asthma, COPD, hypothyroidism, pancreatitis, IV drug user in the past on Suboxone currently, presents to the ED with complaint of worsening lower extremity swelling, erythema and ulcerations.    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

## 2024-05-09 LAB
-  CLINDAMYCIN: SIGNIFICANT CHANGE UP
-  CLINDAMYCIN: SIGNIFICANT CHANGE UP
-  ERYTHROMYCIN: SIGNIFICANT CHANGE UP
-  ERYTHROMYCIN: SIGNIFICANT CHANGE UP
-  GENTAMICIN: SIGNIFICANT CHANGE UP
-  GENTAMICIN: SIGNIFICANT CHANGE UP
-  OXACILLIN: SIGNIFICANT CHANGE UP
-  OXACILLIN: SIGNIFICANT CHANGE UP
-  PENICILLIN: SIGNIFICANT CHANGE UP
-  PENICILLIN: SIGNIFICANT CHANGE UP
-  RIFAMPIN: SIGNIFICANT CHANGE UP
-  RIFAMPIN: SIGNIFICANT CHANGE UP
-  TETRACYCLINE: SIGNIFICANT CHANGE UP
-  TETRACYCLINE: SIGNIFICANT CHANGE UP
-  TRIMETHOPRIM/SULFAMETHOXAZOLE: SIGNIFICANT CHANGE UP
-  TRIMETHOPRIM/SULFAMETHOXAZOLE: SIGNIFICANT CHANGE UP
-  VANCOMYCIN: SIGNIFICANT CHANGE UP
-  VANCOMYCIN: SIGNIFICANT CHANGE UP
A1C WITH ESTIMATED AVERAGE GLUCOSE RESULT: 9.4 % — HIGH (ref 4–5.6)
ANION GAP SERPL CALC-SCNC: 14 MMOL/L — SIGNIFICANT CHANGE UP (ref 7–14)
BUN SERPL-MCNC: 18 MG/DL — SIGNIFICANT CHANGE UP (ref 10–20)
CALCIUM SERPL-MCNC: 9.8 MG/DL — SIGNIFICANT CHANGE UP (ref 8.4–10.5)
CHLORIDE SERPL-SCNC: 88 MMOL/L — LOW (ref 98–110)
CO2 SERPL-SCNC: 32 MMOL/L — SIGNIFICANT CHANGE UP (ref 17–32)
CREAT SERPL-MCNC: 1.1 MG/DL — SIGNIFICANT CHANGE UP (ref 0.7–1.5)
CULTURE RESULTS: ABNORMAL
CULTURE RESULTS: ABNORMAL
EGFR: 60 ML/MIN/1.73M2 — SIGNIFICANT CHANGE UP
ESTIMATED AVERAGE GLUCOSE: 223 MG/DL — HIGH (ref 68–114)
GLUCOSE BLDC GLUCOMTR-MCNC: 102 MG/DL — HIGH (ref 70–99)
GLUCOSE BLDC GLUCOMTR-MCNC: 233 MG/DL — HIGH (ref 70–99)
GLUCOSE BLDC GLUCOMTR-MCNC: 252 MG/DL — HIGH (ref 70–99)
GLUCOSE BLDC GLUCOMTR-MCNC: 89 MG/DL — SIGNIFICANT CHANGE UP (ref 70–99)
GLUCOSE SERPL-MCNC: 259 MG/DL — HIGH (ref 70–99)
HCT VFR BLD CALC: 37.9 % — SIGNIFICANT CHANGE UP (ref 37–47)
HGB BLD-MCNC: 12.1 G/DL — SIGNIFICANT CHANGE UP (ref 12–16)
MAGNESIUM SERPL-MCNC: 2 MG/DL — SIGNIFICANT CHANGE UP (ref 1.8–2.4)
MCHC RBC-ENTMCNC: 26.4 PG — LOW (ref 27–31)
MCHC RBC-ENTMCNC: 31.9 G/DL — LOW (ref 32–37)
MCV RBC AUTO: 82.6 FL — SIGNIFICANT CHANGE UP (ref 81–99)
METHOD TYPE: SIGNIFICANT CHANGE UP
METHOD TYPE: SIGNIFICANT CHANGE UP
NRBC # BLD: 0 /100 WBCS — SIGNIFICANT CHANGE UP (ref 0–0)
ORGANISM # SPEC MICROSCOPIC CNT: ABNORMAL
ORGANISM # SPEC MICROSCOPIC CNT: ABNORMAL
ORGANISM # SPEC MICROSCOPIC CNT: SIGNIFICANT CHANGE UP
ORGANISM # SPEC MICROSCOPIC CNT: SIGNIFICANT CHANGE UP
PLATELET # BLD AUTO: 363 K/UL — SIGNIFICANT CHANGE UP (ref 130–400)
PMV BLD: 10.4 FL — SIGNIFICANT CHANGE UP (ref 7.4–10.4)
POTASSIUM SERPL-MCNC: 3.5 MMOL/L — SIGNIFICANT CHANGE UP (ref 3.5–5)
POTASSIUM SERPL-SCNC: 3.5 MMOL/L — SIGNIFICANT CHANGE UP (ref 3.5–5)
RBC # BLD: 4.59 M/UL — SIGNIFICANT CHANGE UP (ref 4.2–5.4)
RBC # FLD: 15.3 % — HIGH (ref 11.5–14.5)
SODIUM SERPL-SCNC: 134 MMOL/L — LOW (ref 135–146)
SPECIMEN SOURCE: SIGNIFICANT CHANGE UP
SPECIMEN SOURCE: SIGNIFICANT CHANGE UP
WBC # BLD: 8.25 K/UL — SIGNIFICANT CHANGE UP (ref 4.8–10.8)
WBC # FLD AUTO: 8.25 K/UL — SIGNIFICANT CHANGE UP (ref 4.8–10.8)

## 2024-05-09 PROCEDURE — 99232 SBSQ HOSP IP/OBS MODERATE 35: CPT

## 2024-05-09 PROCEDURE — 99253 IP/OBS CNSLTJ NEW/EST LOW 45: CPT | Mod: GC

## 2024-05-09 RX ORDER — INSULIN LISPRO 100/ML
8 VIAL (ML) SUBCUTANEOUS
Refills: 0 | Status: DISCONTINUED | OUTPATIENT
Start: 2024-05-09 | End: 2024-05-10

## 2024-05-09 RX ORDER — INSULIN GLARGINE 100 [IU]/ML
28 INJECTION, SOLUTION SUBCUTANEOUS AT BEDTIME
Refills: 0 | Status: DISCONTINUED | OUTPATIENT
Start: 2024-05-09 | End: 2024-05-09

## 2024-05-09 RX ORDER — INSULIN LISPRO 100/ML
VIAL (ML) SUBCUTANEOUS AT BEDTIME
Refills: 0 | Status: DISCONTINUED | OUTPATIENT
Start: 2024-05-09 | End: 2024-05-11

## 2024-05-09 RX ORDER — INSULIN GLARGINE 100 [IU]/ML
23 INJECTION, SOLUTION SUBCUTANEOUS AT BEDTIME
Refills: 0 | Status: DISCONTINUED | OUTPATIENT
Start: 2024-05-09 | End: 2024-05-09

## 2024-05-09 RX ORDER — INSULIN LISPRO 100/ML
4 VIAL (ML) SUBCUTANEOUS
Refills: 0 | Status: DISCONTINUED | OUTPATIENT
Start: 2024-05-09 | End: 2024-05-09

## 2024-05-09 RX ORDER — INSULIN GLARGINE 100 [IU]/ML
23 INJECTION, SOLUTION SUBCUTANEOUS AT BEDTIME
Refills: 0 | Status: DISCONTINUED | OUTPATIENT
Start: 2024-05-09 | End: 2024-05-10

## 2024-05-09 RX ADMIN — Medication 500 MILLIGRAM(S): at 13:08

## 2024-05-09 RX ADMIN — BUPRENORPHINE AND NALOXONE 2.5 TABLET(S): 2; .5 TABLET SUBLINGUAL at 12:47

## 2024-05-09 RX ADMIN — CEFEPIME 100 MILLIGRAM(S): 1 INJECTION, POWDER, FOR SOLUTION INTRAMUSCULAR; INTRAVENOUS at 13:08

## 2024-05-09 RX ADMIN — Medication 4: at 08:14

## 2024-05-09 RX ADMIN — PANTOPRAZOLE SODIUM 40 MILLIGRAM(S): 20 TABLET, DELAYED RELEASE ORAL at 05:25

## 2024-05-09 RX ADMIN — Medication 40 MILLIGRAM(S): at 05:24

## 2024-05-09 RX ADMIN — Medication 4 UNIT(S): at 12:06

## 2024-05-09 RX ADMIN — Medication 150 MICROGRAM(S): at 05:25

## 2024-05-09 RX ADMIN — Medication 1 APPLICATION(S): at 05:25

## 2024-05-09 RX ADMIN — Medication 500 MILLIGRAM(S): at 21:23

## 2024-05-09 RX ADMIN — Medication 500 MILLIGRAM(S): at 05:24

## 2024-05-09 RX ADMIN — CEFEPIME 100 MILLIGRAM(S): 1 INJECTION, POWDER, FOR SOLUTION INTRAMUSCULAR; INTRAVENOUS at 21:23

## 2024-05-09 RX ADMIN — CEFEPIME 100 MILLIGRAM(S): 1 INJECTION, POWDER, FOR SOLUTION INTRAMUSCULAR; INTRAVENOUS at 05:25

## 2024-05-09 RX ADMIN — Medication 8 UNIT(S): at 17:33

## 2024-05-09 RX ADMIN — Medication 1 APPLICATION(S): at 17:34

## 2024-05-09 RX ADMIN — Medication 6: at 12:05

## 2024-05-09 RX ADMIN — INSULIN GLARGINE 23 UNIT(S): 100 INJECTION, SOLUTION SUBCUTANEOUS at 21:59

## 2024-05-09 NOTE — PROGRESS NOTE ADULT - ASSESSMENT
A 53-year-old female with pmh of DM on metformin, hepatitis B&C, asthma, COPD, hypothyroidism, pancreatitis, IV drug user in the past on Suboxone currently, presents to the ED with complaint of worsening lower extremity swelling, erythema and ulcerations. Admitted for management of leukocytoclastic vasculitis and cellulitis.    # Bilateral LE edema + Purulent Cellulitis  - LE swelling and erythema and multiple new open weeping sores with discharge  - Not septic  - WBC 8.3  - Elevated ESR/CRP  - Athough patient has a h/o leukocytoclastic vasculitis (Rheumatology recommended prednisone and colchicine last admission, patient did not follow OP)  - Current presentation is not related to vasculitis as per Rheum and Derm.   - ID recs 5/8/24: IV cefepime 2g q8hrs; change Flagyl to PO 500mg q8hrs  - STOP VANCOMYCIN 5/8/24   - c/w Local wound care.   - BURN C/s Noted (5/6): Cultures sent from LE. f/u results. - BID dressing change with Silvadene, abd, Kerlix, and ACE  - Elevate LE  - MRSA swab NEG   - Blood cxs NEG  - Rheum cslt : discussed over phone. Likely current presentation not related to vasculitis. advise derm consult.   - Derm consult noted:   Eruption is most consistent with a stasis dermatitis and possible secondary pyoderma. This is not the typical appearance of leukocytoclastic vasculitis.  Would suggest coverage with abx therapy for possible infection of ulcers   Triamcinolone 0.1% cream BID  If no improvement would consider re-biopsy  - PT cslt  5/7: Changed from Home dose PO lasix 40mg QD to IV 40mg QD. To help with edema. Strict I/O.    # DM2  - On metformin  - monitor FS glucose  - SSI only for now.     #Hx Hep B&C  - OP f/u    # copd  - albuterol prn    # hypothyroidism  - c/w synthroid 150 mcg    Diet: DASH/CC  Activity: AAT  DVT PPX: lovenox    Dispo: Acute c/w Cefepime and Flagyl / IV Lasix / f/u clinical improvement likely here for 2-3 more days for IV abx /lasix A 53-year-old female with pmh of DM on metformin, hepatitis B&C, asthma, COPD, hypothyroidism, pancreatitis, IV drug user in the past on Suboxone currently, presents to the ED with complaint of worsening lower extremity swelling, erythema and ulcerations. Admitted for management of leukocytoclastic vasculitis and cellulitis.    # Bilateral LE edema + Purulent Cellulitis  - LE swelling and erythema and multiple new open weeping sores with discharge  - Sepsis ruled out   - WBC 8.3  - Elevated ESR/CRP  - Athough patient has a h/o leukocytoclastic vasculitis (Rheumatology recommended prednisone and colchicine last admission, patient did not follow OP)  - Current presentation is not related to vasculitis as per Rheum and Derm.   - ID recs 5/8/24: IV cefepime 2g q8hrs; change Flagyl to PO 500mg q8hrs  - STOP VANCOMYCIN 5/8/24   - c/w Local wound care.   - BURN C/s Noted (5/6): Cultures sent from LE. f/u results. - BID dressing change with Silvadene, abd, Kerlix, and ACE  - Elevate LE  - MRSA swab NEG   - Blood cxs NEG  - Rheum cslt : discussed over phone. Likely current presentation not related to vasculitis. advise derm consult.   - Derm consult noted:   Eruption is most consistent with a stasis dermatitis and possible secondary pyoderma. This is not the typical appearance of leukocytoclastic vasculitis.  Would suggest coverage with abx therapy for possible infection of ulcers   Triamcinolone 0.1% cream BID  If no improvement would consider re-biopsy    5/7: Changed from Home dose PO lasix 40mg QD to IV 40mg QD. To help with edema. Strict I/O.    # Poorly Controlled DM2  - On metformin 500mg po bid at home   - monitor FS glucose  - Lantus / Lispro for now   - Increase Lantus by 3Units at nights   - f/u Endo     #Hx Hep B&C  - OP f/u    # copd  - albuterol prn    # hypothyroidism  - c/w synthroid 150 mcg    Diet: DASH/CC  Activity: AAT  DVT PPX: lovenox    Dispo: Acute c/w Cefepime and Flagyl / IV Lasix / f/u clinical improvement likely here for 2-3 more days for IV abx /lasix

## 2024-05-09 NOTE — CONSULT NOTE ADULT - SUBJECTIVE AND OBJECTIVE BOX
Patient is a 53y old  Female who presents with a chief complaint of cellulitis (08 May 2024 17:26)    HPI:  A 53-year-old female with pmh of DM on metformin, hepatitis B&C, asthma, COPD, hypothyroidism, pancreatitis, IV drug user in the past on Suboxone currently, presents to the ED with complaint of worsening lower extremity swelling, erythema and ulcerations.  Pt was admitted in the past for similar sx and initially sx were presumed to be 2/2 cellulitis however after ID workup sx were deemed 2/2 leukocytoclastic vasculitis. Was on course of steroids and colchicine but pt was lost to f/u. Patient reports that pain has been worsening and now unable to walk 2/2 pain. Denies fever/chills, chest pain, shortness of breath, abdominal pain, nausea/vomiting/diarrhea, change in bowel/bladder habits, dysuria/leukocyte esterase states hematuria, lightheadedness, dizziness, focal numbness/weakness      FAMILY HISTORY:    PAST MEDICAL & SURGICAL HISTORY:  Asthma with COPD  Hypothyroidism  H/O: substance abuse  no longer using  History of pancreatitis  Hepatitis-C  Leukocytoclastic vasculitis  History of appendectomy  History of tonsillectomy    Birth History:  Developmental History:    Review of Systems:  All review of systems negative, except for those marked:  General:		[] Abnormal:  Pulmonary:		[] Abnormal:  Cardiac:		[] Abnormal:  Gastrointestinal:	[] Abnormal:  ENT:			[] Abnormal:  Renal/Urologic:		[] Abnormal:  Musculoskeletal:	[] Abnormal:  Endocrine:		[] Abnormal:  Hematologic:		[] Abnormal:  Neurologic:		[] Abnormal:  Skin:			[] Abnormal:  Allergy/Immune:	[] Abnormal:  Psychiatric:		[] Abnormal:    Allergies    No Known Allergies    Intolerances      MEDICATIONS  (STANDING):  buprenorphine 8 mG/naloxone 2 mG SL  Tablet 2.5 Tablet(s) SubLingual daily  cefepime   IVPB 2000 milliGRAM(s) IV Intermittent every 8 hours  dextrose 10% Bolus 125 milliLiter(s) IV Bolus once  dextrose 5%. 1000 milliLiter(s) (100 mL/Hr) IV Continuous <Continuous>  dextrose 5%. 1000 milliLiter(s) (50 mL/Hr) IV Continuous <Continuous>  dextrose 50% Injectable 25 Gram(s) IV Push once  dextrose 50% Injectable 12.5 Gram(s) IV Push once  enoxaparin Injectable 40 milliGRAM(s) SubCutaneous every 24 hours  furosemide   Injectable 40 milliGRAM(s) IV Push daily  glucagon  Injectable 1 milliGRAM(s) IntraMuscular once  influenza   Vaccine 0.5 milliLiter(s) IntraMuscular once  insulin glargine Injectable (LANTUS) 23 Unit(s) SubCutaneous at bedtime  insulin lispro (ADMELOG) corrective regimen sliding scale   SubCutaneous three times a day before meals  insulin lispro Injectable (ADMELOG) 4 Unit(s) SubCutaneous three times a day before meals  levothyroxine 150 MICROGram(s) Oral daily  metroNIDAZOLE    Tablet 500 milliGRAM(s) Oral every 8 hours  pantoprazole    Tablet 40 milliGRAM(s) Oral before breakfast  triamcinolone 0.1% Cream 1 Application(s) Topical two times a day    MEDICATIONS  (PRN):  acetaminophen     Tablet .. 650 milliGRAM(s) Oral every 6 hours PRN Mild Pain (1 - 3)  albuterol    90 MICROgram(s) HFA Inhaler 2 Puff(s) Inhalation every 6 hours PRN Bronchospasm  dextrose Oral Gel 15 Gram(s) Oral once PRN Blood Glucose LESS THAN 70 milliGRAM(s)/deciliter  ketorolac   Injectable 15 milliGRAM(s) IV Push every 12 hours PRN Moderate Pain (4 - 6)  ondansetron Injectable 4 milliGRAM(s) IV Push every 8 hours PRN Nausea and/or Vomiting      Vital Signs Last 24 Hrs  T(C): 36.4 (09 May 2024 05:00), Max: 36.6 (08 May 2024 20:10)  T(F): 97.5 (09 May 2024 05:00), Max: 97.9 (08 May 2024 20:10)  HR: 80 (09 May 2024 05:00) (71 - 80)  BP: 107/73 (09 May 2024 05:00) (104/72 - 107/74)  BP(mean): --  RR: 18 (09 May 2024 05:00) (18 - 18)  SpO2: 94% (09 May 2024 05:00) (94% - 100%)    Parameters below as of 08 May 2024 20:10  Patient On (Oxygen Delivery Method): room air        CONSTITUTIONAL: Well groomed, no apparent distress  EYES: PERRLA and symmetric, EOMI, No conjunctival or scleral injection, non-icteric  RESP: No respiratory distress, no use of accessory muscles; CTA b/l, no WRR  CV: RRR, +S1S2, no MRG; no JVD; Bilateral LE nonpitting peripheral edema extending to knee level  GI: Soft, NT, ND, no rebound, no guarding; no palpable masses; no hepatosplenomegaly; no hernia palpated  SKIN: (photos reviewed)diffused erythema of bilateral LE up to mid shin level with edema. Associated bilateral anterior shin ulcers ! 1cm in diameter. Some of ulcerations crusted and other with mild serious drainage.   NEURO: CN II-XII intact; normal reflexes in upper and lower extremities, sensation intact in upper and lower extremities b/l to light touch   PSYCH: Appropriate insight/judgment; A+O x 3, mood and affect appropriate, recent/remote memory intact      LABS                          12.1   8.25  )-----------( 363      ( 09 May 2024 07:48 )             37.9     05-09    134<L>  |  88<L>  |  18  ----------------------------<  259<H>  3.5   |  32  |  1.1    Ca    9.8      09 May 2024 07:48  Mg     2.0     05-09          CAPILLARY BLOOD GLUCOSE      POCT Blood Glucose.: 233 mg/dL (09 May 2024 07:31)  POCT Blood Glucose.: 209 mg/dL (08 May 2024 21:03)  POCT Blood Glucose.: 206 mg/dL (08 May 2024 16:49)  POCT Blood Glucose.: 238 mg/dL (08 May 2024 11:47)  POCT Blood Glucose.: 221 mg/dL (08 May 2024 11:26)     Patient is a 53y old  Female who presents with a chief complaint of cellulitis (08 May 2024 17:26)    HPI:  A 53-year-old female with pmh of DM on metformin, hepatitis B&C, asthma, COPD, hypothyroidism, pancreatitis, IVDU in the past on Suboxone currently, presents to the ED with complaint of worsening lower extremity swelling, erythema and ulcerations.  Pt was admitted in the past for similar sx and initially sx were presumed to be 2/2 cellulitis however after ID workup sx were deemed 2/2 leukocytoclastic vasculitis. Was on course of steroids and colchicine but pt was lost to f/u. Patient reports that pain has been worsening and now unable to walk 2/2 pain. Denies fever/chills, chest pain, shortness of breath, abdominal pain, nausea/vomiting/diarrhea, change in bowel/bladder habits, dysuria/leukocyte esterase states hematuria, lightheadedness, dizziness, focal numbness/weakness      FAMILY HISTORY:    PAST MEDICAL & SURGICAL HISTORY:  Asthma with COPD  Hypothyroidism  H/O: substance abuse  no longer using  History of pancreatitis  Hepatitis-C  Leukocytoclastic vasculitis  History of appendectomy  History of tonsillectomy    Birth History:  Developmental History:    Review of Systems:  All review of systems negative, except for those marked:  General:		[] Abnormal:  Pulmonary:		[] Abnormal:  Cardiac:		[] Abnormal:  Gastrointestinal:	[] Abnormal:  ENT:			[] Abnormal:  Renal/Urologic:		[] Abnormal:  Musculoskeletal:	[] Abnormal:  Endocrine:		[] Abnormal:  Hematologic:		[] Abnormal:  Neurologic:		[] Abnormal:  Skin:			[] Abnormal:  Allergy/Immune:	[] Abnormal:  Psychiatric:		[] Abnormal:    Allergies    No Known Allergies    Intolerances      MEDICATIONS  (STANDING):  buprenorphine 8 mG/naloxone 2 mG SL  Tablet 2.5 Tablet(s) SubLingual daily  cefepime   IVPB 2000 milliGRAM(s) IV Intermittent every 8 hours  dextrose 10% Bolus 125 milliLiter(s) IV Bolus once  dextrose 5%. 1000 milliLiter(s) (100 mL/Hr) IV Continuous <Continuous>  dextrose 5%. 1000 milliLiter(s) (50 mL/Hr) IV Continuous <Continuous>  dextrose 50% Injectable 25 Gram(s) IV Push once  dextrose 50% Injectable 12.5 Gram(s) IV Push once  enoxaparin Injectable 40 milliGRAM(s) SubCutaneous every 24 hours  furosemide   Injectable 40 milliGRAM(s) IV Push daily  glucagon  Injectable 1 milliGRAM(s) IntraMuscular once  influenza   Vaccine 0.5 milliLiter(s) IntraMuscular once  insulin glargine Injectable (LANTUS) 23 Unit(s) SubCutaneous at bedtime  insulin lispro (ADMELOG) corrective regimen sliding scale   SubCutaneous three times a day before meals  insulin lispro Injectable (ADMELOG) 4 Unit(s) SubCutaneous three times a day before meals  levothyroxine 150 MICROGram(s) Oral daily  metroNIDAZOLE    Tablet 500 milliGRAM(s) Oral every 8 hours  pantoprazole    Tablet 40 milliGRAM(s) Oral before breakfast  triamcinolone 0.1% Cream 1 Application(s) Topical two times a day    MEDICATIONS  (PRN):  acetaminophen     Tablet .. 650 milliGRAM(s) Oral every 6 hours PRN Mild Pain (1 - 3)  albuterol    90 MICROgram(s) HFA Inhaler 2 Puff(s) Inhalation every 6 hours PRN Bronchospasm  dextrose Oral Gel 15 Gram(s) Oral once PRN Blood Glucose LESS THAN 70 milliGRAM(s)/deciliter  ketorolac   Injectable 15 milliGRAM(s) IV Push every 12 hours PRN Moderate Pain (4 - 6)  ondansetron Injectable 4 milliGRAM(s) IV Push every 8 hours PRN Nausea and/or Vomiting      Vital Signs Last 24 Hrs  T(C): 36.4 (09 May 2024 05:00), Max: 36.6 (08 May 2024 20:10)  T(F): 97.5 (09 May 2024 05:00), Max: 97.9 (08 May 2024 20:10)  HR: 80 (09 May 2024 05:00) (71 - 80)  BP: 107/73 (09 May 2024 05:00) (104/72 - 107/74)  BP(mean): --  RR: 18 (09 May 2024 05:00) (18 - 18)  SpO2: 94% (09 May 2024 05:00) (94% - 100%)    Parameters below as of 08 May 2024 20:10  Patient On (Oxygen Delivery Method): room air        GENERAL: NAD, lying in bed comfortably  HEAD:  Atraumatic, Normocephalic  CHEST/LUNG: Clear to auscultation bilaterally; Unlabored respirations  HEART: Regular rate and rhythm; No murmurs, rubs, or gallops  ABDOMEN: BS present; Soft, nontender, nondistended  EXTREMITIES:  + B/L LE edema w/ ACE wrap  NERVOUS SYSTEM:  A&Ox3, no focal deficits         LABS                          12.1   8.25  )-----------( 363      ( 09 May 2024 07:48 )             37.9     05-09    134<L>  |  88<L>  |  18  ----------------------------<  259<H>  3.5   |  32  |  1.1    Ca    9.8      09 May 2024 07:48  Mg     2.0     05-09          CAPILLARY BLOOD GLUCOSE      POCT Blood Glucose.: 233 mg/dL (09 May 2024 07:31)  POCT Blood Glucose.: 209 mg/dL (08 May 2024 21:03)  POCT Blood Glucose.: 206 mg/dL (08 May 2024 16:49)  POCT Blood Glucose.: 238 mg/dL (08 May 2024 11:47)  POCT Blood Glucose.: 221 mg/dL (08 May 2024 11:26)     Patient is a 53y old  Female who presents with a chief complaint of cellulitis (08 May 2024 17:26)    HPI:  A 53-year-old female with pmh of DM on metformin, hepatitis B&C, asthma, COPD, hypothyroidism, pancreatitis, IVDU in the past on Suboxone currently, presents to the ED with complaint of worsening lower extremity swelling, erythema and ulcerations.  Pt was admitted in the past for similar sx and initially sx were presumed to be 2/2 cellulitis however after ID workup sx were deemed 2/2 leukocytoclastic vasculitis. Was on course of steroids and colchicine but pt was lost to f/u. Patient reports that pain has been worsening and now unable to walk 2/2 pain. Denies fever/chills, chest pain, shortness of breath, abdominal pain, nausea/vomiting/diarrhea, change in bowel/bladder habits, dysuria/leukocyte esterase states hematuria, lightheadedness, dizziness, focal numbness/weakness      FAMILY HISTORY:    PAST MEDICAL & SURGICAL HISTORY:  Asthma with COPD  Hypothyroidism  H/O: substance abuse  no longer using  History of pancreatitis  Hepatitis-C  Leukocytoclastic vasculitis  History of appendectomy  History of tonsillectomy    Birth History:  Developmental History:    Review of Systems:  All review of systems negative, except for those marked:  General:		[] Abnormal:  Pulmonary:		[] Abnormal:  Cardiac:		[] Abnormal:  Gastrointestinal:	[] Abnormal:  ENT:			[] Abnormal:  Renal/Urologic:		[] Abnormal:  Musculoskeletal:	[] Abnormal:  Endocrine:		[] Abnormal:  Hematologic:		[] Abnormal:  Neurologic:		[] Abnormal:  Skin:			[] Abnormal:  Allergy/Immune:	[] Abnormal:  Psychiatric:		[] Abnormal:    Allergies    No Known Allergies    Intolerances      MEDICATIONS  (STANDING):  buprenorphine 8 mG/naloxone 2 mG SL  Tablet 2.5 Tablet(s) SubLingual daily  cefepime   IVPB 2000 milliGRAM(s) IV Intermittent every 8 hours  dextrose 10% Bolus 125 milliLiter(s) IV Bolus once  dextrose 5%. 1000 milliLiter(s) (100 mL/Hr) IV Continuous <Continuous>  dextrose 5%. 1000 milliLiter(s) (50 mL/Hr) IV Continuous <Continuous>  dextrose 50% Injectable 25 Gram(s) IV Push once  dextrose 50% Injectable 12.5 Gram(s) IV Push once  enoxaparin Injectable 40 milliGRAM(s) SubCutaneous every 24 hours  furosemide   Injectable 40 milliGRAM(s) IV Push daily  glucagon  Injectable 1 milliGRAM(s) IntraMuscular once  influenza   Vaccine 0.5 milliLiter(s) IntraMuscular once  insulin glargine Injectable (LANTUS) 23 Unit(s) SubCutaneous at bedtime  insulin lispro (ADMELOG) corrective regimen sliding scale   SubCutaneous three times a day before meals  insulin lispro Injectable (ADMELOG) 4 Unit(s) SubCutaneous three times a day before meals  levothyroxine 150 MICROGram(s) Oral daily  metroNIDAZOLE    Tablet 500 milliGRAM(s) Oral every 8 hours  pantoprazole    Tablet 40 milliGRAM(s) Oral before breakfast  triamcinolone 0.1% Cream 1 Application(s) Topical two times a day    MEDICATIONS  (PRN):  acetaminophen     Tablet .. 650 milliGRAM(s) Oral every 6 hours PRN Mild Pain (1 - 3)  albuterol    90 MICROgram(s) HFA Inhaler 2 Puff(s) Inhalation every 6 hours PRN Bronchospasm  dextrose Oral Gel 15 Gram(s) Oral once PRN Blood Glucose LESS THAN 70 milliGRAM(s)/deciliter  ketorolac   Injectable 15 milliGRAM(s) IV Push every 12 hours PRN Moderate Pain (4 - 6)  ondansetron Injectable 4 milliGRAM(s) IV Push every 8 hours PRN Nausea and/or Vomiting      Vital Signs Last 24 Hrs  T(C): 36.4 (09 May 2024 05:00), Max: 36.6 (08 May 2024 20:10)  T(F): 97.5 (09 May 2024 05:00), Max: 97.9 (08 May 2024 20:10)  HR: 80 (09 May 2024 05:00) (71 - 80)  BP: 107/73 (09 May 2024 05:00) (104/72 - 107/74)  BP(mean): --  RR: 18 (09 May 2024 05:00) (18 - 18)  SpO2: 94% (09 May 2024 05:00) (94% - 100%)    Parameters below as of 08 May 2024 20:10  Patient On (Oxygen Delivery Method): room air        GENERAL: NAD, lying in bed comfortably  HEAD:  Atraumatic, Normocephalic  CHEST/LUNG: Clear to auscultation bilaterally; Unlabored respirations  HEART: Regular rate and rhythm; No murmurs, rubs, or gallops  ABDOMEN: BS present; Soft, nontender, nondistended  EXTREMITIES:  + B/L LE edema w/ ACE wrap  NERVOUS SYSTEM:  A&Ox3, no focal deficits         LABS                          12.1   8.25  )-----------( 363      ( 09 May 2024 07:48 )             37.9     05-09    134<L>  |  88<L>  |  18  ----------------------------<  259<H>  3.5   |  32  |  1.1    Ca    9.8      09 May 2024 07:48  Mg     2.0     05-09          CAPILLARY BLOOD GLUCOSE      POCT Blood Glucose.: 233 mg/dL (09 May 2024 07:31)  POCT Blood Glucose.: 209 mg/dL (08 May 2024 21:03)  POCT Blood Glucose.: 206 mg/dL (08 May 2024 16:49)  POCT Blood Glucose.: 238 mg/dL (08 May 2024 11:47)  POCT Blood Glucose.: 221 mg/dL (08 May 2024 11:26)      A1C with Estimated Average Glucose Result: 9.4: Method: Immunoassay

## 2024-05-09 NOTE — PROGRESS NOTE ADULT - SUBJECTIVE AND OBJECTIVE BOX
S: LE pain persists    All other pertinent ROS negative.    Vital Signs Last 24 Hrs  T(C): 36.4 (09 May 2024 05:00), Max: 36.6 (08 May 2024 20:10)  T(F): 97.5 (09 May 2024 05:00), Max: 97.9 (08 May 2024 20:10)  HR: 80 (09 May 2024 05:00) (80 - 80)  BP: 107/73 (09 May 2024 05:00) (107/73 - 107/74)  RR: 18 (09 May 2024 05:00) (18 - 18)  SpO2: 94% (09 May 2024 05:00) (94% - 100%)    Parameters below as of 08 May 2024 20:10  Patient On (Oxygen Delivery Method): room air      PHYSICAL EXAM:    Constitutional: NAD, awake and alert  HEENT: PERR, EOMI, Normal Hearing, MMM  Neck: Soft and supple, No LAD, No JVD  Respiratory: Breath sounds are clear bilaterally, No wheezing, rales or rhonchi  Cardiovascular: S1 and S2, regular rate and rhythm, no Murmurs, gallops or rubs  Gastrointestinal: Bowel Sounds present, soft, nontender, nondistended, no guarding, no rebound  Extremities: b/l LE 3+ edema/redness. weeping sores noted.      MEDICATIONS  (STANDING):  buprenorphine 8 mG/naloxone 2 mG SL  Tablet 2.5 Tablet(s) SubLingual daily  cefepime   IVPB 2000 milliGRAM(s) IV Intermittent every 8 hours  dextrose 10% Bolus 125 milliLiter(s) IV Bolus once  dextrose 5%. 1000 milliLiter(s) (100 mL/Hr) IV Continuous <Continuous>  dextrose 5%. 1000 milliLiter(s) (50 mL/Hr) IV Continuous <Continuous>  dextrose 50% Injectable 12.5 Gram(s) IV Push once  dextrose 50% Injectable 25 Gram(s) IV Push once  enoxaparin Injectable 40 milliGRAM(s) SubCutaneous every 24 hours  furosemide   Injectable 40 milliGRAM(s) IV Push daily  glucagon  Injectable 1 milliGRAM(s) IntraMuscular once  influenza   Vaccine 0.5 milliLiter(s) IntraMuscular once  insulin glargine Injectable (LANTUS) 15 Unit(s) SubCutaneous at bedtime  insulin lispro (ADMELOG) corrective regimen sliding scale   SubCutaneous three times a day before meals  levothyroxine 150 MICROGram(s) Oral daily  metroNIDAZOLE    Tablet 500 milliGRAM(s) Oral every 8 hours  pantoprazole    Tablet 40 milliGRAM(s) Oral before breakfast  silver sulfADIAZINE 1% Cream 1 Application(s) Topical two times a day  silver sulfADIAZINE 1% Cream 1 Application(s) Topical two times a day  triamcinolone 0.1% Cream 1 Application(s) Topical two times a day  vancomycin  IVPB 750 milliGRAM(s) IV Intermittent every 12 hours STOPPED ON 5/8/24    LABS: All Labs Reviewed:                        12.1   8.25  )-----------( 363      ( 09 May 2024 07:48 )             37.9     05-09    134<L>  |  88<L>  |  18  ----------------------------<  259<H>  3.5   |  32  |  1.1    Ca    9.8      09 May 2024 07:48    Mg     2.0     05-09                        12.3   8.38  )-----------( 317      ( 08 May 2024 08:04 )             40.0     05-08    135  |  87<L>  |  16  ----------------------------<  252<H>  4.1   |  32  |  1.0    Ca    9.8      08 May 2024 08:04  Mg     2.0     05-08                        10.9   6.23  )-----------( 285      ( 07 May 2024 06:14 )             34.4     05-07    131<L>  |  91<L>  |  9<L>  ----------------------------<  266<H>  3.5   |  26  |  1.0    Ca    8.7      07 May 2024 06:14    Blood Culture: 05-03 @ 17:21  Organism --  Gram Stain Blood -- Gram Stain --  Specimen Source .Blood Blood-Peripheral  Culture-Blood --    Radiology: reviewed     S: LE pain persists    All other pertinent ROS negative.    Vital Signs Last 24 Hrs  T(C): 36.4 (09 May 2024 05:00), Max: 36.6 (08 May 2024 20:10)  T(F): 97.5 (09 May 2024 05:00), Max: 97.9 (08 May 2024 20:10)  HR: 80 (09 May 2024 05:00) (80 - 80)  BP: 107/73 (09 May 2024 05:00) (107/73 - 107/74)  RR: 18 (09 May 2024 05:00) (18 - 18)  SpO2: 94% (09 May 2024 05:00) (94% - 100%)    Parameters below as of 08 May 2024 20:10  Patient On (Oxygen Delivery Method): room air      PHYSICAL EXAM:    Constitutional: NAD, awake and alert  HEENT: PERR, EOMI, Normal Hearing, MMM  Neck: Soft and supple, No LAD, No JVD  Respiratory: Breath sounds are clear bilaterally, No wheezing, rales or rhonchi  Cardiovascular: S1 and S2, regular rate and rhythm, no Murmurs, gallops or rubs  Gastrointestinal: Bowel Sounds present, soft, nontender, nondistended, no guarding, no rebound  Extremities: b/l LE 3+ edema/redness. weeping sores noted.      MEDICATIONS  (STANDING):  buprenorphine 8 mG/naloxone 2 mG SL  Tablet 2.5 Tablet(s) SubLingual daily  cefepime   IVPB 2000 milliGRAM(s) IV Intermittent every 8 hours  dextrose 10% Bolus 125 milliLiter(s) IV Bolus once  dextrose 5%. 1000 milliLiter(s) (100 mL/Hr) IV Continuous <Continuous>  dextrose 5%. 1000 milliLiter(s) (50 mL/Hr) IV Continuous <Continuous>  dextrose 50% Injectable 12.5 Gram(s) IV Push once  dextrose 50% Injectable 25 Gram(s) IV Push once  enoxaparin Injectable 40 milliGRAM(s) SubCutaneous every 24 hours  furosemide   Injectable 40 milliGRAM(s) IV Push daily  glucagon  Injectable 1 milliGRAM(s) IntraMuscular once  influenza   Vaccine 0.5 milliLiter(s) IntraMuscular once  insulin glargine Injectable (LANTUS) 15 Unit(s) SubCutaneous at bedtime  insulin lispro (ADMELOG) corrective regimen sliding scale   SubCutaneous three times a day before meals  levothyroxine 150 MICROGram(s) Oral daily  metroNIDAZOLE    Tablet 500 milliGRAM(s) Oral every 8 hours  pantoprazole    Tablet 40 milliGRAM(s) Oral before breakfast  silver sulfADIAZINE 1% Cream 1 Application(s) Topical two times a day  silver sulfADIAZINE 1% Cream 1 Application(s) Topical two times a day  triamcinolone 0.1% Cream 1 Application(s) Topical two times a day  vancomycin  IVPB 750 milliGRAM(s) IV Intermittent every 12 hours STOPPED ON 5/8/24    LABS: All Labs Reviewed:    HA1C 9.2 Avg glucose 230's    CAPILLARY BLOOD GLUCOSE  POCT Blood Glucose.: 252 mg/dL (09 May 2024 11:31)  POCT Blood Glucose.: 233 mg/dL (09 May 2024 07:31)  POCT Blood Glucose.: 209 mg/dL (08 May 2024 21:03)  POCT Blood Glucose.: 206 mg/dL (08 May 2024 16:49)                          12.1   8.25  )-----------( 363      ( 09 May 2024 07:48 )             37.9     05-09    134<L>  |  88<L>  |  18  ----------------------------<  259<H>  3.5   |  32  |  1.1    Ca    9.8      09 May 2024 07:48    Mg     2.0     05-09                        12.3   8.38  )-----------( 317      ( 08 May 2024 08:04 )             40.0     05-08    135  |  87<L>  |  16  ----------------------------<  252<H>  4.1   |  32  |  1.0    Ca    9.8      08 May 2024 08:04  Mg     2.0     05-08                        10.9   6.23  )-----------( 285      ( 07 May 2024 06:14 )             34.4     05-07    131<L>  |  91<L>  |  9<L>  ----------------------------<  266<H>  3.5   |  26  |  1.0    Ca    8.7      07 May 2024 06:14    Blood Culture: 05-03 @ 17:21  Organism --  Gram Stain Blood -- Gram Stain --  Specimen Source .Blood Blood-Peripheral  Culture-Blood --    Radiology: reviewed

## 2024-05-09 NOTE — CONSULT NOTE ADULT - ATTENDING COMMENTS
52 y/o F w/ PMHx of DMII, hepatitis B&C, asthma, COPD, hypothyroidism, pancreatitis, IVDU (on Suboxone currently), presents to the ED with complaint of worsening lower extremity swelling, erythema and ulcerations. Admitted for management of leukocytoclastic vasculitis and cellulitis. Endocrinology consulted for management of T2DM.     #T2DM, uncontrolled   - A1c 9.7% (5/8/24)   at home on Currently on Lantus 38 units in the morning & metformin 500 mg BID   - POC reviewed above target  On Lantus 23 units qhs + Lispro 4 units TID + +2SS inpatient  - Increase Lantus to 28 units qhs  - Increase Lispro to 8 units TID  - C/w +2 SS before meals and at bedtime  - will  need basal / bolus insulin on discharge , will determine dose based n control     # Hypothyroidism   - On Levothyroxine 150 mcg qd x years, reports compliance   - C/w home levothyroxine 150 mcg qd  - monitor as outpatient

## 2024-05-09 NOTE — CONSULT NOTE ADULT - ASSESSMENT
# DM2 - Uncontrolled   # Hypothyroidism     Plan:       Abbey Kilpatrick MD  Internal Medicine Resident PGY3   52 y/o F w/ PMHx of DMII, hepatitis B&C, asthma, COPD, hypothyroidism, pancreatitis, IVDU (on Suboxone currently), presents to the ED with complaint of worsening lower extremity swelling, erythema and ulcerations. Admitted for management of leukocytoclastic vasculitis and cellulitis.    #T2DM, uncontrolled   - A1c 9.7% (3/19/24, 5/8/24)  - Currently on Lantus 38 units in the morning & metformin 500 mg BID @ home  - Reports diarrhea w/ metformin  - Reports FS in the morning, fasting ~ 200-300s; No episode of hypoglycemia  - FS inpatient ranging ~ 200-300  - No steroids this admission  - On Lantus 23 units qhs + Lispro 4 units TID + +2SS inpatient  - No endocrinologist; DM managed by PCP Chris  Plan:  - On discharge,     # Hypothyroidism   - On Levothyroxine 150 mcg qd x years  - No prior TFTs in the system  Plan:  - C/w home levothyroxine 150 mcg qd  - Check TFTs OP      52 y/o F w/ PMHx of DMII, hepatitis B&C, asthma, COPD, hypothyroidism, pancreatitis, IVDU (on Suboxone currently), presents to the ED with complaint of worsening lower extremity swelling, erythema and ulcerations. Admitted for management of leukocytoclastic vasculitis and cellulitis. Endocrinology consulted for management of T2DM.     #T2DM, uncontrolled   - A1c 9.7% (3/19/24, 5/8/24)  - Currently on Lantus 38 units in the morning & metformin 500 mg BID @ home  - Reports diarrhea w/ metformin  - Reports FS in the morning, fasting ~ 200-300s; No episode of hypoglycemia  - FS inpatient ranging ~ 200-300  - No steroids this admission  - On Lantus 23 units qhs + Lispro 4 units TID + +2SS inpatient  - No endocrinologist; DM managed by PCP Chris  Plan:  - Increase Lantus to 28 units qhs  - Increase Lispro to 8 units TID  - C/w +2 SS before meals and at bedtime  - Plan to monitor FS inpatient and decide on discharge regimen tomorrow; will d/c on metformin 500 mg BID in addition to basal-bolus regimen (exact doses to be determined)    # Hypothyroidism   - On Levothyroxine 150 mcg qd x years  - No prior TFTs in the system  Plan:  - C/w home levothyroxine 150 mcg qd  - Check TFTs OP      52 y/o F w/ PMHx of DMII, hepatitis B&C, asthma, COPD, hypothyroidism, pancreatitis, IVDU (on Suboxone currently), presents to the ED with complaint of worsening lower extremity swelling, erythema and ulcerations. Admitted for management of leukocytoclastic vasculitis and cellulitis. Endocrinology consulted for management of T2DM.     #T2DM, uncontrolled   - A1c 9.7% (3/19/24, 5/8/24)  - Currently on Lantus 38 units in the morning & metformin 500 mg BID @ home  - Reports diarrhea w/ metformin  - Reports FS in the morning, fasting ~ 200-300s; No episode of hypoglycemia  - FS inpatient ranging ~ 200-300  - No steroids this admission  - On Lantus 23 units qhs + Lispro 4 units TID + +2SS inpatient  - No endocrinologist; DM managed by PCP Chris  Plan:  - Increase Lantus to 28 units qhs  - Increase Lispro to 8 units TID  - C/w +2 SS before meals and at bedtime  - Plan to monitor FS inpatient and decide on discharge regimen tomorrow; will d/c on metformin 500 mg BID in addition to basal-bolus regimen (exact doses to be determined)    # Hypothyroidism   - On Levothyroxine 150 mcg qd x years  - C/w home levothyroxine 150 mcg qd

## 2024-05-10 ENCOUNTER — APPOINTMENT (OUTPATIENT)
Dept: VASCULAR SURGERY | Facility: CLINIC | Age: 54
End: 2024-05-10

## 2024-05-10 ENCOUNTER — TRANSCRIPTION ENCOUNTER (OUTPATIENT)
Age: 54
End: 2024-05-10

## 2024-05-10 LAB
ANION GAP SERPL CALC-SCNC: 14 MMOL/L — SIGNIFICANT CHANGE UP (ref 7–14)
BUN SERPL-MCNC: 22 MG/DL — HIGH (ref 10–20)
CALCIUM SERPL-MCNC: 9.4 MG/DL — SIGNIFICANT CHANGE UP (ref 8.4–10.4)
CHLORIDE SERPL-SCNC: 89 MMOL/L — LOW (ref 98–110)
CO2 SERPL-SCNC: 32 MMOL/L — SIGNIFICANT CHANGE UP (ref 17–32)
CREAT SERPL-MCNC: 1.2 MG/DL — SIGNIFICANT CHANGE UP (ref 0.7–1.5)
EGFR: 54 ML/MIN/1.73M2 — LOW
GLUCOSE BLDC GLUCOMTR-MCNC: 256 MG/DL — HIGH (ref 70–99)
GLUCOSE BLDC GLUCOMTR-MCNC: 273 MG/DL — HIGH (ref 70–99)
GLUCOSE BLDC GLUCOMTR-MCNC: 283 MG/DL — HIGH (ref 70–99)
GLUCOSE BLDC GLUCOMTR-MCNC: 73 MG/DL — SIGNIFICANT CHANGE UP (ref 70–99)
GLUCOSE BLDC GLUCOMTR-MCNC: 75 MG/DL — SIGNIFICANT CHANGE UP (ref 70–99)
GLUCOSE BLDC GLUCOMTR-MCNC: 91 MG/DL — SIGNIFICANT CHANGE UP (ref 70–99)
GLUCOSE SERPL-MCNC: 272 MG/DL — HIGH (ref 70–99)
HCT VFR BLD CALC: 36.7 % — LOW (ref 37–47)
HGB BLD-MCNC: 11.6 G/DL — LOW (ref 12–16)
MAGNESIUM SERPL-MCNC: 2.3 MG/DL — SIGNIFICANT CHANGE UP (ref 1.8–2.4)
MCHC RBC-ENTMCNC: 26.8 PG — LOW (ref 27–31)
MCHC RBC-ENTMCNC: 31.6 G/DL — LOW (ref 32–37)
MCV RBC AUTO: 84.8 FL — SIGNIFICANT CHANGE UP (ref 81–99)
NRBC # BLD: 0 /100 WBCS — SIGNIFICANT CHANGE UP (ref 0–0)
PLATELET # BLD AUTO: 291 K/UL — SIGNIFICANT CHANGE UP (ref 130–400)
PMV BLD: 10.4 FL — SIGNIFICANT CHANGE UP (ref 7.4–10.4)
POTASSIUM SERPL-MCNC: 3.5 MMOL/L — SIGNIFICANT CHANGE UP (ref 3.5–5)
POTASSIUM SERPL-SCNC: 3.5 MMOL/L — SIGNIFICANT CHANGE UP (ref 3.5–5)
RBC # BLD: 4.33 M/UL — SIGNIFICANT CHANGE UP (ref 4.2–5.4)
RBC # FLD: 15.4 % — HIGH (ref 11.5–14.5)
SODIUM SERPL-SCNC: 135 MMOL/L — SIGNIFICANT CHANGE UP (ref 135–146)
WBC # BLD: 6.18 K/UL — SIGNIFICANT CHANGE UP (ref 4.8–10.8)
WBC # FLD AUTO: 6.18 K/UL — SIGNIFICANT CHANGE UP (ref 4.8–10.8)

## 2024-05-10 PROCEDURE — 99232 SBSQ HOSP IP/OBS MODERATE 35: CPT

## 2024-05-10 RX ORDER — INSULIN LISPRO 100/ML
6 VIAL (ML) SUBCUTANEOUS
Refills: 0 | Status: DISCONTINUED | OUTPATIENT
Start: 2024-05-10 | End: 2024-05-11

## 2024-05-10 RX ORDER — ACETAMINOPHEN 500 MG
2 TABLET ORAL
Qty: 0 | Refills: 0 | DISCHARGE
Start: 2024-05-10

## 2024-05-10 RX ORDER — INSULIN GLARGINE 100 [IU]/ML
28 INJECTION, SOLUTION SUBCUTANEOUS AT BEDTIME
Refills: 0 | Status: DISCONTINUED | OUTPATIENT
Start: 2024-05-10 | End: 2024-05-11

## 2024-05-10 RX ORDER — ISOPROPYL ALCOHOL, BENZOCAINE .7; .06 ML/ML; ML/ML
0 SWAB TOPICAL
Qty: 100 | Refills: 1
Start: 2024-05-10

## 2024-05-10 RX ORDER — LEVOFLOXACIN 5 MG/ML
1 INJECTION, SOLUTION INTRAVENOUS
Qty: 8 | Refills: 0
Start: 2024-05-10 | End: 2024-05-17

## 2024-05-10 RX ADMIN — Medication 6: at 08:30

## 2024-05-10 RX ADMIN — Medication 6: at 12:04

## 2024-05-10 RX ADMIN — INSULIN GLARGINE 28 UNIT(S): 100 INJECTION, SOLUTION SUBCUTANEOUS at 21:27

## 2024-05-10 RX ADMIN — Medication 40 MILLIGRAM(S): at 05:16

## 2024-05-10 RX ADMIN — Medication 15 MILLIGRAM(S): at 02:15

## 2024-05-10 RX ADMIN — Medication 6 UNIT(S): at 12:04

## 2024-05-10 RX ADMIN — Medication 500 MILLIGRAM(S): at 05:16

## 2024-05-10 RX ADMIN — Medication 500 MILLIGRAM(S): at 13:38

## 2024-05-10 RX ADMIN — Medication 150 MICROGRAM(S): at 05:16

## 2024-05-10 RX ADMIN — Medication 2: at 21:26

## 2024-05-10 RX ADMIN — Medication 1 APPLICATION(S): at 05:16

## 2024-05-10 RX ADMIN — Medication 15 MILLIGRAM(S): at 01:45

## 2024-05-10 RX ADMIN — BUPRENORPHINE AND NALOXONE 2.5 TABLET(S): 2; .5 TABLET SUBLINGUAL at 12:48

## 2024-05-10 RX ADMIN — Medication 500 MILLIGRAM(S): at 21:26

## 2024-05-10 RX ADMIN — PANTOPRAZOLE SODIUM 40 MILLIGRAM(S): 20 TABLET, DELAYED RELEASE ORAL at 05:16

## 2024-05-10 RX ADMIN — ENOXAPARIN SODIUM 40 MILLIGRAM(S): 100 INJECTION SUBCUTANEOUS at 05:16

## 2024-05-10 RX ADMIN — CEFEPIME 100 MILLIGRAM(S): 1 INJECTION, POWDER, FOR SOLUTION INTRAMUSCULAR; INTRAVENOUS at 05:15

## 2024-05-10 RX ADMIN — CEFEPIME 100 MILLIGRAM(S): 1 INJECTION, POWDER, FOR SOLUTION INTRAMUSCULAR; INTRAVENOUS at 13:37

## 2024-05-10 RX ADMIN — CEFEPIME 100 MILLIGRAM(S): 1 INJECTION, POWDER, FOR SOLUTION INTRAMUSCULAR; INTRAVENOUS at 21:26

## 2024-05-10 RX ADMIN — Medication 8 UNIT(S): at 07:55

## 2024-05-10 NOTE — DISCHARGE NOTE PROVIDER - NSDCMRMEDTOKEN_GEN_ALL_CORE_FT
acetaminophen 325 mg oral tablet: 2 tab(s) orally every 6 hours As needed Mild Pain (1 - 3)  albuterol 90 mcg/inh inhalation powder: 2 puff(s) inhaled every 6 hours, As Needed -for bronchospasm   colchicine 0.6 mg oral tablet: 1 tab(s) orally 2 times a day  doxycycline hyclate 100 mg oral capsule: 1 cap(s) orally 2 times a day  furosemide 40 mg oral tablet: 1 tab(s) orally once a day  levoFLOXacin 750 mg oral tablet: 1 tab(s) orally once a day  levothyroxine 150 mcg (0.15 mg) oral tablet: 1 tab(s) orally once a day  metFORMIN 500 mg oral tablet: 1 tab(s) orally 2 times a day  pantoprazole 40 mg oral delayed release tablet: 1 tab(s) orally once a day (before a meal) Take once a day while taking prednisone  Suboxone 8 mg-2 mg sublingual tablet: 2.5 film(s) sublingual once a day  triamcinolone 0.1% topical cream: Apply topically to affected area 2 times a day 1 Apply topically to affected area 2 times a day   acetaminophen 325 mg oral tablet: 2 tab(s) orally every 6 hours As needed Mild Pain (1 - 3)  albuterol 90 mcg/inh inhalation powder: 2 puff(s) inhaled every 6 hours, As Needed -for bronchospasm   alcohol swabs: Apply topically to affected area 4 times a day  colchicine 0.6 mg oral tablet: 1 tab(s) orally 2 times a day  doxycycline hyclate 100 mg oral capsule: 1 cap(s) orally 2 times a day  Freestyle Precision Toni Strips: Test blood sugar four times a day when you see check blood glucose symbol or when symptoms don&#x27;t match Tawanna reading.  furosemide 40 mg oral tablet: 1 tab(s) orally once a day  glucometer (per patient&#x27;s insurance): Test blood sugars four times a day. Dispense #1 glucometer.  Insulin Pen Needles, 4mm: 1 application subcutaneously 4 times a day. ** Use with insulin pen **  lancets: 1 application subcutaneously 4 times a day  levoFLOXacin 750 mg oral tablet: 1 tab(s) orally once a day  levothyroxine 150 mcg (0.15 mg) oral tablet: 1 tab(s) orally once a day  metFORMIN 500 mg oral tablet: 1 tab(s) orally 2 times a day  pantoprazole 40 mg oral delayed release tablet: 1 tab(s) orally once a day (before a meal) Take once a day while taking prednisone  Suboxone 8 mg-2 mg sublingual tablet: 2.5 film(s) sublingual once a day  test strips (per patient&#x27;s insurance): 1 application subcutaneously 4 times a day. ** Compatible with patient&#x27;s glucometer **  triamcinolone 0.1% topical cream: Apply topically to affected area 2 times a day 1 Apply topically to affected area 2 times a day  U-100 Insulin Syringe, 1/2 mL: 1 application subcutaneously 2 times a day ** 1/2 mL holds up to 50 units of insulin **   acetaminophen 325 mg oral tablet: 2 tab(s) orally every 6 hours As needed Mild Pain (1 - 3)  albuterol 90 mcg/inh inhalation powder: 2 puff(s) inhaled every 6 hours, As Needed -for bronchospasm   alcohol swabs: Apply topically to affected area 4 times a day  colchicine 0.6 mg oral tablet: 1 tab(s) orally 2 times a day  doxycycline hyclate 100 mg oral capsule: 1 cap(s) orally 2 times a day  Freestyle Precision Toni Strips: Test blood sugar four times a day when you see check blood glucose symbol or when symptoms don&#x27;t match Tawanna reading.  furosemide 40 mg oral tablet: 1 tab(s) orally once a day  glucometer (per patient&#x27;s insurance): Test blood sugars four times a day. Dispense #1 glucometer.  Insulin Pen Needles, 4mm: 1 application subcutaneously 4 times a day. ** Use with insulin pen **  lancets: 1 application subcutaneously 4 times a day  Lantus 100 units/mL subcutaneous solution: 28 unit(s) subcutaneous once a day (at bedtime)  levoFLOXacin 750 mg oral tablet: 1 tab(s) orally once a day  levothyroxine 150 mcg (0.15 mg) oral tablet: 1 tab(s) orally once a day  metFORMIN 500 mg oral tablet: 2 tab(s) orally 2 times a day  pantoprazole 40 mg oral delayed release tablet: 1 tab(s) orally once a day (before a meal) Take once a day while taking prednisone  pioglitazone 30 mg oral tablet: 1 tab(s) orally once a day  Suboxone 8 mg-2 mg sublingual tablet: 2.5 film(s) sublingual once a day  test strips (per patient&#x27;s insurance): 1 application subcutaneously 4 times a day. ** Compatible with patient&#x27;s glucometer **  triamcinolone 0.1% topical cream: Apply topically to affected area 2 times a day 1 Apply topically to affected area 2 times a day  U-100 Insulin Syringe, 1/2 mL: 1 application subcutaneously 2 times a day ** 1/2 mL holds up to 50 units of insulin **

## 2024-05-10 NOTE — DISCHARGE NOTE PROVIDER - CARE PROVIDER_API CALL
Naye Mills  Rheumatology  [address] [place] [address]  Phone: [phone]  Fax: [phone]  Follow Up Time:     Leonardo Walters  [address] [address]  Phone: [phone]  Fax: [phone]  Follow Up Time:     Luzma De Los Santos  Endocrinology/Metab/Diabetes  [address] [place] [address], [address]  [place] [address]  Phone: [phone]  Fax: [phone]  Follow Up Time:

## 2024-05-10 NOTE — PROGRESS NOTE ADULT - ASSESSMENT
A 53-year-old female with pmh of DM on metformin, hepatitis B&C, asthma, COPD, hypothyroidism, pancreatitis, IV drug user in the past on Suboxone currently, presents to the ED with complaint of worsening lower extremity swelling, erythema and ulcerations. Admitted for management of leukocytoclastic vasculitis and cellulitis.    # Bilateral LE edema + Purulent Cellulitis  - LE swelling and erythema and multiple new open weeping sores with discharge  - Sepsis ruled out   - WBC 8.3  - Elevated ESR/CRP  - Athough patient has a h/o leukocytoclastic vasculitis (Rheumatology recommended prednisone and colchicine last admission, patient did not follow OP)  - Current presentation is not related to vasculitis as per Rheum and Derm.   - ID recs 5/8/24: IV cefepime 2g q8hrs; change Flagyl to PO 500mg q8hrs  - STOP VANCOMYCIN 5/8/24   - c/w Local wound care.   - BURN C/s Noted (5/6): Cultures sent from LE. f/u results. - BID dressing change with Silvadene, abd, Kerlix, and ACE  - Elevate LE  - MRSA swab NEG   - Blood cxs NEG  - Rheum cslt : discussed over phone. Likely current presentation not related to vasculitis. advise derm consult.   - Derm consult noted:   Eruption is most consistent with a stasis dermatitis and possible secondary pyoderma. This is not the typical appearance of leukocytoclastic vasculitis.  Would suggest coverage with abx therapy for possible infection of ulcers   Triamcinolone 0.1% cream BID  If no improvement would consider re-biopsy    5/7: Changed from Home dose PO lasix 40mg QD to IV 40mg QD. To help with edema. Strict I/O.    # Poorly Controlled DM2  - On metformin 500mg po bid at home   - monitor FS glucose  - Increase Lantus / Lispro for now per Endo recs   - will follow control     #Hx Hep B&C  - OP f/u    # copd  - albuterol prn    # hypothyroidism  - c/w synthroid 150 mcg    Diet: DASH/CC  Activity: AAT  DVT PPX: lovenox    Stopped IV Lasix after today - at home on 40mg po daily - will need to resume on d/c if Cr is good.     Dispo: Acute c/w Cefepime and Flagyl / f/u clinical improvement likely here for 1-2days / f/u CR am

## 2024-05-10 NOTE — DISCHARGE NOTE PROVIDER - NSDCCPCAREPLAN_GEN_ALL_CORE_FT
PRINCIPAL DISCHARGE DIAGNOSIS  Diagnosis: Cellulitis  Assessment and Plan of Treatment: you had cellulitis likely from pooling of fluid in your legs. please continue meds as prescribed. please follow up with rheumatology and dermatology  you also have uncontrolled diabetes. please follow up with endocrinology      SECONDARY DISCHARGE DIAGNOSES  Diagnosis: History of vasculitis  Assessment and Plan of Treatment:      PRINCIPAL DISCHARGE DIAGNOSIS  Diagnosis: Cellulitis  Assessment and Plan of Treatment: BILATERAL LOWER EXTREMITY CELLULITIS   BILATERAL LEGS LEUKOCYTOCLASTIC VASCULITIS W/ SUPPER IMPOSED CELLULITIS  YOU HAD IV ABX IN THE HOSPITAL YOU IMPROVED AND NEED TO COMPLETE ORAL ANTIBIOTICS AT HOME. YOU WERE ALSO SEEN BY ID AND ENDOCRINE DOCTORS.   YOU NEED TO TAKE ALL YOUR MEDICATIONS AS PRESCRIBED HERE  MUST FOLLOW UP WITH ENDOCRINE AND PMD AND RHEUMATOLOGIST IN THE OFFICE IN 1-2WEEKS.   ELEVATE YOUR LEGS AT NIGHTS AND KEEP SKIN CLEAN TO AVOID INFECTION   SENDING YOU HOME WITH HOME CARE TO HELP YOU MANAGE YOUR HEALTH  EAT HEALTHY DIET AND CONTROL BLOOD SUGAR  you had cellulitis likely from pooling of fluid in your legs. please continue meds as prescribed. please follow up with rheumatology and dermatology  you also have uncontrolled diabetes. please follow up with endocrinology      SECONDARY DISCHARGE DIAGNOSES  Diagnosis: History of vasculitis  Assessment and Plan of Treatment:

## 2024-05-10 NOTE — PROGRESS NOTE ADULT - SUBJECTIVE AND OBJECTIVE BOX
S: LE pain persists    All other pertinent ROS negative.    Vital Signs Last 24 Hrs  T(C): 35.9 (10 May 2024 13:31), Max: 37.1 (10 May 2024 04:22)  T(F): 96.7 (10 May 2024 13:31), Max: 98.7 (10 May 2024 04:22)  HR: 60 (10 May 2024 13:31) (60 - 97)  BP: 114/75 (10 May 2024 13:31) (102/65 - 114/75)  RR: 16 (10 May 2024 13:31) (16 - 19)  SpO2: 97% (10 May 2024 13:31) (96% - 100%)    Parameters below as of 10 May 2024 04:22  Patient On (Oxygen Delivery Method): room air    PHYSICAL EXAM:    Constitutional: NAD, awake and alert  HEENT: PERR, EOMI, Normal Hearing, MMM  Neck: Soft and supple, No LAD, No JVD  Respiratory: Breath sounds are clear bilaterally, No wheezing, rales or rhonchi  Cardiovascular: S1 and S2, regular rate and rhythm, no Murmurs, gallops or rubs  Gastrointestinal: Bowel Sounds present, soft, nontender, nondistended, no guarding, no rebound  Extremities: b/l LE 3+ edema/redness. weeping sores noted.      MEDICATIONS  (STANDING):  buprenorphine 8 mG/naloxone 2 mG SL  Tablet 2.5 Tablet(s) SubLingual daily  cefepime   IVPB 2000 milliGRAM(s) IV Intermittent every 8 hours  dextrose 10% Bolus 125 milliLiter(s) IV Bolus once  dextrose 5%. 1000 milliLiter(s) (100 mL/Hr) IV Continuous <Continuous>  dextrose 5%. 1000 milliLiter(s) (50 mL/Hr) IV Continuous <Continuous>  dextrose 50% Injectable 12.5 Gram(s) IV Push once  dextrose 50% Injectable 25 Gram(s) IV Push once  enoxaparin Injectable 40 milliGRAM(s) SubCutaneous every 24 hours  furosemide   Injectable 40 milliGRAM(s) IV Push daily  glucagon  Injectable 1 milliGRAM(s) IntraMuscular once  influenza   Vaccine 0.5 milliLiter(s) IntraMuscular once  insulin glargine Injectable (LANTUS) 15 Unit(s) SubCutaneous at bedtime  insulin lispro (ADMELOG) corrective regimen sliding scale   SubCutaneous three times a day before meals  levothyroxine 150 MICROGram(s) Oral daily  metroNIDAZOLE    Tablet 500 milliGRAM(s) Oral every 8 hours  pantoprazole    Tablet 40 milliGRAM(s) Oral before breakfast  silver sulfADIAZINE 1% Cream 1 Application(s) Topical two times a day  silver sulfADIAZINE 1% Cream 1 Application(s) Topical two times a day  triamcinolone 0.1% Cream 1 Application(s) Topical two times a day  vancomycin  IVPB 750 milliGRAM(s) IV Intermittent every 12 hours STOPPED ON 5/8/24    LABS: All Labs Reviewed:    HA1C 9.2 Avg glucose 230's    Vital Signs Last 24 Hrs  T(C): 35.9 (10 May 2024 13:31), Max: 37.1 (10 May 2024 04:22)  T(F): 96.7 (10 May 2024 13:31), Max: 98.7 (10 May 2024 04:22)  HR: 60 (10 May 2024 13:31) (60 - 97)  BP: 114/75 (10 May 2024 13:31) (102/65 - 114/75)  RR: 16 (10 May 2024 13:31) (16 - 19)  SpO2: 97% (10 May 2024 13:31) (96% - 100%)    Parameters below as of 10 May 2024 04:22  Patient On (Oxygen Delivery Method): room air    LABS:                        11.6   6.18  )-----------( 291      ( 10 May 2024 06:37 )             36.7     05-10    135  |  89<L>  |  22<H>  ----------------------------<  272<H>  3.5   |  32  |  1.2    Ca    9.4      10 May 2024 06:37    Mg     2.3     05-10                          12.1   8.25  )-----------( 363      ( 09 May 2024 07:48 )             37.9     05-09    134<L>  |  88<L>  |  18  ----------------------------<  259<H>  3.5   |  32  |  1.1    Ca    9.8      09 May 2024 07:48    Mg     2.0     05-09                        12.3   8.38  )-----------( 317      ( 08 May 2024 08:04 )             40.0     05-08    135  |  87<L>  |  16  ----------------------------<  252<H>  4.1   |  32  |  1.0    Ca    9.8      08 May 2024 08:04  Mg     2.0     05-08                        10.9   6.23  )-----------( 285      ( 07 May 2024 06:14 )             34.4     05-07    131<L>  |  91<L>  |  9<L>  ----------------------------<  266<H>  3.5   |  26  |  1.0    Ca    8.7      07 May 2024 06:14    Blood Culture: 05-03 @ 17:21  Organism --  Gram Stain Blood -- Gram Stain --  Specimen Source .Blood Blood-Peripheral  Culture-Blood --    Radiology: reviewed

## 2024-05-10 NOTE — DISCHARGE NOTE PROVIDER - NSDCFUSCHEDAPPT_GEN_ALL_CORE_FT
Javier Soto  [facility] PreAdmits  Scheduled Appointment: 05/13/2024    Javier Soto  [facility]  PSYCHIATRY [facility] [place]  Scheduled Appointment: 05/13/2024    Javier Soto  [facility] PreAdmits  Scheduled Appointment: 05/14/2024    [facility]  PSYCHIATRY [facility] [place]  Scheduled Appointment: 05/14/2024

## 2024-05-10 NOTE — DISCHARGE NOTE PROVIDER - HOSPITAL COURSE
A 53-year-old female with pmh of DM on metformin, hepatitis B&C, asthma, COPD, hypothyroidism, pancreatitis, IV drug user in the past on Suboxone currently, presents to the ED with complaint of worsening lower extremity swelling, erythema and ulcerations. Admitted for management of leukocytoclastic vasculitis and cellulitis.    # Bilateral LE edema + Purulent Cellulitis  - LE swelling and erythema and multiple new open weeping sores with discharge  - Sepsis ruled out   - Elevated ESR/CRP  - Athough patient has a h/o leukocytoclastic vasculitis (Rheumatology recommended prednisone and colchicine last admission, patient did not follow OP)  - Current presentation is not related to vasculitis as per Rheum and Derm.   - ID recs 5/8/24: IV cefepime 2g q8hrs; change Flagyl to PO 500mg q8hrs  - STOP VANCOMYCIN 5/8/24   - c/w Local wound care.   - BURN C/s Noted (5/6): Cultures sent from LE. f/u results- no growth- BID dressing change with Silvadene, abd, Kerlix, and ACE  - Elevate LE  - MRSA swab NEG   - Blood cxs NEG  - Rheum cslt : discussed over phone. Likely current presentation not related to vasculitis. advise derm consult.   - Derm consult noted:   Eruption is most consistent with a stasis dermatitis and possible secondary pyoderma. This is not the typical appearance of leukocytoclastic vasculitis.  Would suggest coverage with abx therapy for possible infection of ulcers   Triamcinolone 0.1% cream BID  If no improvement would consider re-biopsy but there was improvement    5/7: Changed from Home dose PO lasix 40mg QD to IV 40mg QD. To help with edema. Held IV lasix 5/10 due to increase in creatinine. DC on oral lasix    # Poorly Controlled DM2  - On metformin 500mg po bid at home   - monitor FS glucose  - Lantus / Lispro for now   - f/u Endo recs    #Hx Hep B&C  - OP f/u    # copd  - albuterol prn    # hypothyroidism  - c/w synthroid 150 mcg    Pt stable for DC

## 2024-05-10 NOTE — DISCHARGE NOTE PROVIDER - CARE PROVIDERS DIRECT ADDRESSES
[email or web address]

## 2024-05-10 NOTE — DISCHARGE NOTE PROVIDER - PROVIDER TOKENS
PROVIDER:[TOKEN:[[id number]]],PROVIDER:[TOKEN:[[id number]]],PROVIDER:[TOKEN:[[id number]]]

## 2024-05-11 VITALS
RESPIRATION RATE: 18 BRPM | HEART RATE: 67 BPM | TEMPERATURE: 97 F | SYSTOLIC BLOOD PRESSURE: 107 MMHG | DIASTOLIC BLOOD PRESSURE: 71 MMHG | OXYGEN SATURATION: 98 %

## 2024-05-11 LAB
ANION GAP SERPL CALC-SCNC: 18 MMOL/L — HIGH (ref 7–14)
BUN SERPL-MCNC: 20 MG/DL — SIGNIFICANT CHANGE UP (ref 10–20)
CALCIUM SERPL-MCNC: 9.5 MG/DL — SIGNIFICANT CHANGE UP (ref 8.4–10.5)
CHLORIDE SERPL-SCNC: 92 MMOL/L — LOW (ref 98–110)
CO2 SERPL-SCNC: 25 MMOL/L — SIGNIFICANT CHANGE UP (ref 17–32)
CREAT SERPL-MCNC: 0.9 MG/DL — SIGNIFICANT CHANGE UP (ref 0.7–1.5)
EGFR: 76 ML/MIN/1.73M2 — SIGNIFICANT CHANGE UP
GLUCOSE BLDC GLUCOMTR-MCNC: 204 MG/DL — HIGH (ref 70–99)
GLUCOSE BLDC GLUCOMTR-MCNC: 248 MG/DL — HIGH (ref 70–99)
GLUCOSE SERPL-MCNC: 222 MG/DL — HIGH (ref 70–99)
POTASSIUM SERPL-MCNC: 3.9 MMOL/L — SIGNIFICANT CHANGE UP (ref 3.5–5)
POTASSIUM SERPL-SCNC: 3.9 MMOL/L — SIGNIFICANT CHANGE UP (ref 3.5–5)
SODIUM SERPL-SCNC: 135 MMOL/L — SIGNIFICANT CHANGE UP (ref 135–146)

## 2024-05-11 PROCEDURE — 99239 HOSP IP/OBS DSCHRG MGMT >30: CPT

## 2024-05-11 PROCEDURE — 99233 SBSQ HOSP IP/OBS HIGH 50: CPT

## 2024-05-11 RX ORDER — PIOGLITAZONE HYDROCHLORIDE 15 MG/1
1 TABLET ORAL
Qty: 30 | Refills: 0
Start: 2024-05-11 | End: 2024-06-09

## 2024-05-11 RX ORDER — METFORMIN HYDROCHLORIDE 850 MG/1
2 TABLET ORAL
Qty: 120 | Refills: 0
Start: 2024-05-11 | End: 2024-06-09

## 2024-05-11 RX ORDER — INSULIN GLARGINE 100 [IU]/ML
28 INJECTION, SOLUTION SUBCUTANEOUS
Qty: 10 | Refills: 0
Start: 2024-05-11 | End: 2024-06-09

## 2024-05-11 RX ORDER — ISOPROPYL ALCOHOL, BENZOCAINE .7; .06 ML/ML; ML/ML
0 SWAB TOPICAL
Qty: 100 | Refills: 1
Start: 2024-05-11

## 2024-05-11 RX ADMIN — CEFEPIME 100 MILLIGRAM(S): 1 INJECTION, POWDER, FOR SOLUTION INTRAMUSCULAR; INTRAVENOUS at 05:29

## 2024-05-11 RX ADMIN — Medication 1 APPLICATION(S): at 05:30

## 2024-05-11 RX ADMIN — Medication 500 MILLIGRAM(S): at 05:30

## 2024-05-11 RX ADMIN — Medication 4: at 08:31

## 2024-05-11 RX ADMIN — Medication 4: at 11:51

## 2024-05-11 RX ADMIN — PANTOPRAZOLE SODIUM 40 MILLIGRAM(S): 20 TABLET, DELAYED RELEASE ORAL at 05:30

## 2024-05-11 RX ADMIN — ENOXAPARIN SODIUM 40 MILLIGRAM(S): 100 INJECTION SUBCUTANEOUS at 05:30

## 2024-05-11 RX ADMIN — BUPRENORPHINE AND NALOXONE 2.5 TABLET(S): 2; .5 TABLET SUBLINGUAL at 11:51

## 2024-05-11 RX ADMIN — Medication 150 MICROGRAM(S): at 05:30

## 2024-05-11 RX ADMIN — Medication 6 UNIT(S): at 11:51

## 2024-05-11 RX ADMIN — Medication 500 MILLIGRAM(S): at 13:02

## 2024-05-11 RX ADMIN — CEFEPIME 100 MILLIGRAM(S): 1 INJECTION, POWDER, FOR SOLUTION INTRAMUSCULAR; INTRAVENOUS at 13:01

## 2024-05-11 RX ADMIN — Medication 6 UNIT(S): at 08:31

## 2024-05-11 NOTE — PROGRESS NOTE ADULT - PROVIDER SPECIALTY LIST ADULT
Internal Medicine
Endocrinology
Internal Medicine
Hospitalist
Infectious Disease
Internal Medicine
Internal Medicine
Hospitalist

## 2024-05-11 NOTE — PROGRESS NOTE ADULT - TIME BILLING
direct pt care and interdisciplinary rounds
I have personally seen and examined this patient.    I have reviewed all pertinent clinical information and reviewed all relevant imaging and diagnostic studies personally.   I discussed recommendations with the primary team.

## 2024-05-11 NOTE — PROGRESS NOTE ADULT - ASSESSMENT
54 y/o F w/ PMHx of DMII, hepatitis B&C, asthma, COPD, hypothyroidism, pancreatitis, IVDU (on Suboxone currently), presents to the ED with complaint of worsening lower extremity swelling, erythema and ulcerations. Admitted for management of leukocytoclastic vasculitis and cellulitis. Endocrinology consulted for management of T2DM.     #T2DM, uncontrolled   - A1c 9.7% (5/8/24)   at home on Currently on Lantus 38 units in the morning & metformin 500 mg BID   - POC reviewed tight yesterday and high this am   - continue Lantus 28 units at bedtime   - as inpatient lower admelog to 3 units TIDAC , hold if nPO   - for discharge , continue Lantus 28 units at bedtime, metformin 1000 mg BID and add pioglitazone 30 mg daily       # Hypothyroidism   - On Levothyroxine 150 mcg qd x years, reports compliance   - C/w home levothyroxine 150 mcg qd  - monitor as outpatient .

## 2024-05-11 NOTE — PROGRESS NOTE ADULT - ASSESSMENT
A 53-year-old female with pmh of DM on metformin, hepatitis B&C, asthma, COPD, hypothyroidism, pancreatitis, IV drug user in the past on Suboxone currently, presents to the ED with complaint of worsening lower extremity swelling, erythema and ulcerations. Admitted for management of leukocytoclastic vasculitis and cellulitis.    # Bilateral LE edema + Purulent Cellulitis  - LE swelling and erythema and multiple new open weeping sores with discharge  - Sepsis ruled out   - WBC 8.3  - Elevated ESR/CRP  - Athough patient has a h/o leukocytoclastic vasculitis (Rheumatology recommended prednisone and colchicine last admission, patient did not follow OP)  - Current presentation is not related to vasculitis as per Rheum and Derm.   - ID recs 5/8/24: IV cefepime 2g q8hrs; change Flagyl to PO 500mg q8hrs  - STOP VANCOMYCIN 5/8/24   - c/w Local wound care.   - BURN C/s Noted (5/6): Cultures sent from LE. f/u results. - BID dressing change with Silvadene, abd, Kerlix, and ACE  - Elevate LE  - MRSA swab NEG   - Blood cxs NEG  - Rheum cslt : discussed over phone. Likely current presentation not related to vasculitis. advise derm consult.   - Derm consult noted:   Eruption is most consistent with a stasis dermatitis and possible secondary pyoderma. This is not the typical appearance of leukocytoclastic vasculitis.  Would suggest coverage with abx therapy for possible infection of ulcers   Triamcinolone 0.1% cream BID  If no improvement would consider re-biopsy    5/7: Changed from Home dose PO Lasix 40mg QD to IV 40mg QD. To help with edema. Strict I/O    # Poorly Controlled DM2  - On metformin 500mg po bid at home   - monitor FS glucose  - Increase Lantus / Lispro for now per Endo recs   - will follow control     #Hx Hep B&C  - OP f/u    # copd  - albuterol prn    # hypothyroidism  - c/w synthroid 150 mcg    Diet: DASH/CC  Activity: AAT  DVT PPX: lovenox    Stopped IV Lasix after today - at home on 40mg po daily - will need to resume on d/c if Cr is good.     Dispo: d/c planning

## 2024-05-11 NOTE — PROGRESS NOTE ADULT - SUBJECTIVE AND OBJECTIVE BOX
S: FS reviewed  O:Vital Signs Last 24 Hrs  T(C): 36.3 (11 May 2024 05:00), Max: 36.6 (10 May 2024 20:31)  T(F): 97.3 (11 May 2024 05:00), Max: 97.8 (10 May 2024 20:31)  HR: 67 (11 May 2024 05:00) (60 - 80)  BP: 107/71 (11 May 2024 05:00) (107/71 - 115/78)  BP(mean): --  RR: 18 (11 May 2024 05:00) (16 - 18)  SpO2: 98% (11 May 2024 05:00) (97% - 99%)    Parameters below as of 10 May 2024 20:31  Patient On (Oxygen Delivery Method): room air        CAPILLARY BLOOD GLUCOSE      POCT Blood Glucose.: 248 mg/dL (11 May 2024 11:11)  POCT Blood Glucose.: 204 mg/dL (11 May 2024 08:12)  POCT Blood Glucose.: 256 mg/dL (10 May 2024 21:02)  POCT Blood Glucose.: 91 mg/dL (10 May 2024 17:42)  POCT Blood Glucose.: 75 mg/dL (10 May 2024 17:03)  POCT Blood Glucose.: 73 mg/dL (10 May 2024 16:17)      MEDICATIONS  (STANDING):  buprenorphine 8 mG/naloxone 2 mG SL  Tablet 2.5 Tablet(s) SubLingual daily  cefepime   IVPB 2000 milliGRAM(s) IV Intermittent every 8 hours  dextrose 10% Bolus 125 milliLiter(s) IV Bolus once  dextrose 5%. 1000 milliLiter(s) (100 mL/Hr) IV Continuous <Continuous>  dextrose 5%. 1000 milliLiter(s) (50 mL/Hr) IV Continuous <Continuous>  dextrose 50% Injectable 25 Gram(s) IV Push once  dextrose 50% Injectable 12.5 Gram(s) IV Push once  enoxaparin Injectable 40 milliGRAM(s) SubCutaneous every 24 hours  glucagon  Injectable 1 milliGRAM(s) IntraMuscular once  influenza   Vaccine 0.5 milliLiter(s) IntraMuscular once  insulin glargine Injectable (LANTUS) 28 Unit(s) SubCutaneous at bedtime  insulin lispro (ADMELOG) corrective regimen sliding scale   SubCutaneous at bedtime  insulin lispro (ADMELOG) corrective regimen sliding scale   SubCutaneous three times a day before meals  insulin lispro Injectable (ADMELOG) 6 Unit(s) SubCutaneous three times a day before meals  levothyroxine 150 MICROGram(s) Oral daily  metroNIDAZOLE    Tablet 500 milliGRAM(s) Oral every 8 hours  pantoprazole    Tablet 40 milliGRAM(s) Oral before breakfast  triamcinolone 0.1% Cream 1 Application(s) Topical two times a day    MEDICATIONS  (PRN):  acetaminophen     Tablet .. 650 milliGRAM(s) Oral every 6 hours PRN Mild Pain (1 - 3)  albuterol    90 MICROgram(s) HFA Inhaler 2 Puff(s) Inhalation every 6 hours PRN Bronchospasm  dextrose Oral Gel 15 Gram(s) Oral once PRN Blood Glucose LESS THAN 70 milliGRAM(s)/deciliter  ketorolac   Injectable 15 milliGRAM(s) IV Push every 12 hours PRN Moderate Pain (4 - 6)  ondansetron Injectable 4 milliGRAM(s) IV Push every 8 hours PRN Nausea and/or Vomiting

## 2024-05-11 NOTE — PROGRESS NOTE ADULT - REASON FOR ADMISSION
cellulitis

## 2024-05-11 NOTE — PROGRESS NOTE ADULT - SUBJECTIVE AND OBJECTIVE BOX
S: LE pain persists    All other pertinent ROS negative.    05-11    135  |  92<L>  |  20  ----------------------------<  222<H>  3.9   |  25  |  0.9    Ca    9.5      11 May 2024 09:13  Mg     2.3     05-10    CBC Full  -  ( 10 May 2024 06:37 )  WBC Count : 6.18 K/uL  Hemoglobin : 11.6 g/dL  Hematocrit : 36.7 %  Platelet Count - Automated : 291 K/uL  Mean Cell Volume : 84.8 fL  Mean Cell Hemoglobin : 26.8 pg  Mean Cell Hemoglobin Concentration : 31.6 g/dL    PHYSICAL EXAM:    Constitutional: NAD, awake and alert  HEENT: PERR, EOMI, Normal Hearing, MMM  Neck: Soft and supple, No LAD, No JVD  Respiratory: Breath sounds are clear bilaterally, No wheezing, rales or rhonchi  Cardiovascular: S1 and S2, regular rate and rhythm, no Murmurs, gallops or rubs  Gastrointestinal: Bowel Sounds present, soft, nontender, nondistended, no guarding, no rebound  Extremities: b/l LE 3+ edema/redness. weeping sores noted.      MEDICATIONS  (STANDING):   buprenorphine 8 mG/naloxone 2 mG SL  Tablet 2.5 Tablet(s) SubLingual daily  Cefepime   IVPB 2000 milliGRAM(s) IV Intermittent every 8 hours  dextrose 10% Bolus 125 milliLiter(s) IV Bolus once  dextrose 5%. 1000 milliLiter(s) (100 mL/Hr) IV Continuous <Continuous>  dextrose 5%. 1000 milliLiter(s) (50 mL/Hr) IV Continuous <Continuous>  dextrose 50% Injectable 12.5 Gram(s) IV Push once  dextrose 50% Injectable 25 Gram(s) IV Push once  enoxaparin Injectable 40 milliGRAM(s) SubCutaneous every 24 hours  furosemide   Injectable 40 milliGRAM(s) IV Push daily  glucagon  Injectable 1 milliGRAM(s) IntraMuscular once  influenza   Vaccine 0.5 milliLiter(s) IntraMuscular once  insulin glargine Injectable (LANTUS) 15 Unit(s) SubCutaneous at bedtime  insulin lispro (ADMELOG) corrective regimen sliding scale   SubCutaneous three times a day before meals  levothyroxine 150 MICROGram(s) Oral daily  MetroNIDAZOLE    Tablet 500 milliGRAM(s) Oral every 8 hours  pantoprazole    Tablet 40 milliGRAM(s) Oral before breakfast  silver sulfADIAZINE 1% Cream 1 Application(s) Topical two times a day  silver sulfADIAZINE 1% Cream 1 Application(s) Topical two times a day  triamcinolone 0.1% Cream 1 Application(s) Topical two times a day  Vancomycin  IVPB 750 milliGRAM(s) IV Intermittent every 12 hours STOPPED ON 5/8/24    LABS: All Labs Reviewed:    HA1C 9.2 Avg glucose 230's    Vital Signs Last 24 Hrs  T(C): 36.3 (11 May 2024 05:00), Max: 36.6 (10 May 2024 20:31)  T(F): 97.3 (11 May 2024 05:00), Max: 97.8 (10 May 2024 20:31)  HR: 67 (11 May 2024 05:00) (60 - 80)  BP: 107/71 (11 May 2024 05:00) (107/71 - 115/78)  RR: 18 (11 May 2024 05:00) (16 - 18)  SpO2: 98% (11 May 2024 05:00) (97% - 99%)    Parameters below as of 10 May 2024 20:31  Patient On (Oxygen Delivery Method): room air      LABS:                        11.6   6.18  )-----------( 291      ( 10 May 2024 06:37 )             36.7     05-10    135  |  89<L>  |  22<H>  ----------------------------<  272<H>  3.5   |  32  |  1.2    Ca    9.4      10 May 2024 06:37    Mg     2.3     05-10                        12.1   8.25  )-----------( 363      ( 09 May 2024 07:48 )             37.9     05-09    134<L>  |  88<L>  |  18  ----------------------------<  259<H>  3.5   |  32  |  1.1    Ca    9.8      09 May 2024 07:48    Mg     2.0     05-09                        12.3   8.38  )-----------( 317      ( 08 May 2024 08:04 )             40.0     05-08    135  |  87<L>  |  16  ----------------------------<  252<H>  4.1   |  32  |  1.0    Ca    9.8      08 May 2024 08:04  Mg     2.0     05-08                        10.9   6.23  )-----------( 285      ( 07 May 2024 06:14 )             34.4     05-07    131<L>  |  91<L>  |  9<L>  ----------------------------<  266<H>  3.5   |  26  |  1.0    Ca    8.7      07 May 2024 06:14    Blood Culture: 05-03 @ 17:21  Organism --  Gram Stain Blood -- Gram Stain --  Specimen Source .Blood Blood-Peripheral  Culture-Blood --    Radiology: reviewed

## 2024-05-13 ENCOUNTER — OUTPATIENT (OUTPATIENT)
Dept: OUTPATIENT SERVICES | Facility: HOSPITAL | Age: 54
LOS: 1 days | End: 2024-05-13
Payer: MEDICAID

## 2024-05-13 ENCOUNTER — APPOINTMENT (OUTPATIENT)
Dept: PSYCHIATRY | Facility: CLINIC | Age: 54
End: 2024-05-13
Payer: MEDICAID

## 2024-05-13 DIAGNOSIS — Z90.49 ACQUIRED ABSENCE OF OTHER SPECIFIED PARTS OF DIGESTIVE TRACT: Chronic | ICD-10-CM

## 2024-05-13 DIAGNOSIS — Z90.89 ACQUIRED ABSENCE OF OTHER ORGANS: Chronic | ICD-10-CM

## 2024-05-13 DIAGNOSIS — F10.20 ALCOHOL DEPENDENCE, UNCOMPLICATED: ICD-10-CM

## 2024-05-13 PROCEDURE — 99214 OFFICE O/P EST MOD 30 MIN: CPT | Mod: 95

## 2024-05-14 ENCOUNTER — OUTPATIENT (OUTPATIENT)
Dept: OUTPATIENT SERVICES | Facility: HOSPITAL | Age: 54
LOS: 1 days | End: 2024-05-14
Payer: COMMERCIAL

## 2024-05-14 ENCOUNTER — APPOINTMENT (OUTPATIENT)
Dept: PSYCHIATRY | Facility: CLINIC | Age: 54
End: 2024-05-14

## 2024-05-14 DIAGNOSIS — F10.20 ALCOHOL DEPENDENCE, UNCOMPLICATED: ICD-10-CM

## 2024-05-14 DIAGNOSIS — Z90.89 ACQUIRED ABSENCE OF OTHER ORGANS: Chronic | ICD-10-CM

## 2024-05-14 PROCEDURE — H0004: CPT | Mod: 95

## 2024-05-15 DIAGNOSIS — F10.20 ALCOHOL DEPENDENCE, UNCOMPLICATED: ICD-10-CM

## 2024-05-20 DIAGNOSIS — Z79.52 LONG TERM (CURRENT) USE OF SYSTEMIC STEROIDS: ICD-10-CM

## 2024-05-20 DIAGNOSIS — Z79.890 HORMONE REPLACEMENT THERAPY: ICD-10-CM

## 2024-05-20 DIAGNOSIS — M31.0 HYPERSENSITIVITY ANGIITIS: ICD-10-CM

## 2024-05-20 DIAGNOSIS — L03.115 CELLULITIS OF RIGHT LOWER LIMB: ICD-10-CM

## 2024-05-20 DIAGNOSIS — E03.9 HYPOTHYROIDISM, UNSPECIFIED: ICD-10-CM

## 2024-05-20 DIAGNOSIS — F11.20 OPIOID DEPENDENCE, UNCOMPLICATED: ICD-10-CM

## 2024-05-20 DIAGNOSIS — E11.69 TYPE 2 DIABETES MELLITUS WITH OTHER SPECIFIED COMPLICATION: ICD-10-CM

## 2024-05-20 DIAGNOSIS — L08.89 OTHER SPECIFIED LOCAL INFECTIONS OF THE SKIN AND SUBCUTANEOUS TISSUE: ICD-10-CM

## 2024-05-20 DIAGNOSIS — Z79.84 LONG TERM (CURRENT) USE OF ORAL HYPOGLYCEMIC DRUGS: ICD-10-CM

## 2024-05-20 DIAGNOSIS — Z90.89 ACQUIRED ABSENCE OF OTHER ORGANS: ICD-10-CM

## 2024-05-20 DIAGNOSIS — L08.0 PYODERMA: ICD-10-CM

## 2024-05-20 DIAGNOSIS — J44.9 CHRONIC OBSTRUCTIVE PULMONARY DISEASE, UNSPECIFIED: ICD-10-CM

## 2024-05-20 DIAGNOSIS — L97.919 NON-PRESSURE CHRONIC ULCER OF UNSPECIFIED PART OF RIGHT LOWER LEG WITH UNSPECIFIED SEVERITY: ICD-10-CM

## 2024-05-20 DIAGNOSIS — L97.929 NON-PRESSURE CHRONIC ULCER OF UNSPECIFIED PART OF LEFT LOWER LEG WITH UNSPECIFIED SEVERITY: ICD-10-CM

## 2024-05-20 DIAGNOSIS — I87.2 VENOUS INSUFFICIENCY (CHRONIC) (PERIPHERAL): ICD-10-CM

## 2024-05-20 DIAGNOSIS — L03.116 CELLULITIS OF LEFT LOWER LIMB: ICD-10-CM

## 2024-05-20 DIAGNOSIS — D84.9 IMMUNODEFICIENCY, UNSPECIFIED: ICD-10-CM

## 2024-05-20 DIAGNOSIS — Z86.19 PERSONAL HISTORY OF OTHER INFECTIOUS AND PARASITIC DISEASES: ICD-10-CM

## 2024-05-27 ENCOUNTER — INPATIENT (INPATIENT)
Facility: HOSPITAL | Age: 54
LOS: 11 days | Discharge: NURSING, REHAB OR LONG-TERM CARE | DRG: 383 | End: 2024-06-08
Attending: STUDENT IN AN ORGANIZED HEALTH CARE EDUCATION/TRAINING PROGRAM | Admitting: INTERNAL MEDICINE
Payer: MEDICAID

## 2024-05-27 VITALS
OXYGEN SATURATION: 96 % | RESPIRATION RATE: 16 BRPM | HEIGHT: 68 IN | HEART RATE: 66 BPM | TEMPERATURE: 97 F | WEIGHT: 190.04 LBS | SYSTOLIC BLOOD PRESSURE: 118 MMHG | DIASTOLIC BLOOD PRESSURE: 83 MMHG

## 2024-05-27 DIAGNOSIS — Z90.89 ACQUIRED ABSENCE OF OTHER ORGANS: Chronic | ICD-10-CM

## 2024-05-27 DIAGNOSIS — Z90.49 ACQUIRED ABSENCE OF OTHER SPECIFIED PARTS OF DIGESTIVE TRACT: Chronic | ICD-10-CM

## 2024-05-27 LAB
ALBUMIN SERPL ELPH-MCNC: 4.1 G/DL — SIGNIFICANT CHANGE UP (ref 3.5–5.2)
ALP SERPL-CCNC: 81 U/L — SIGNIFICANT CHANGE UP (ref 30–115)
ALT FLD-CCNC: <5 U/L — SIGNIFICANT CHANGE UP (ref 0–41)
ANION GAP SERPL CALC-SCNC: 11 MMOL/L — SIGNIFICANT CHANGE UP (ref 7–14)
APTT BLD: 37.2 SEC — SIGNIFICANT CHANGE UP (ref 27–39.2)
AST SERPL-CCNC: 25 U/L — SIGNIFICANT CHANGE UP (ref 0–41)
BASOPHILS # BLD AUTO: 0.06 K/UL — SIGNIFICANT CHANGE UP (ref 0–0.2)
BASOPHILS NFR BLD AUTO: 0.7 % — SIGNIFICANT CHANGE UP (ref 0–1)
BILIRUB SERPL-MCNC: 0.4 MG/DL — SIGNIFICANT CHANGE UP (ref 0.2–1.2)
BUN SERPL-MCNC: 6 MG/DL — LOW (ref 10–20)
CALCIUM SERPL-MCNC: 9.1 MG/DL — SIGNIFICANT CHANGE UP (ref 8.4–10.5)
CHLORIDE SERPL-SCNC: 98 MMOL/L — SIGNIFICANT CHANGE UP (ref 98–110)
CO2 SERPL-SCNC: 27 MMOL/L — SIGNIFICANT CHANGE UP (ref 17–32)
CREAT SERPL-MCNC: 0.7 MG/DL — SIGNIFICANT CHANGE UP (ref 0.7–1.5)
EGFR: 103 ML/MIN/1.73M2 — SIGNIFICANT CHANGE UP
EOSINOPHIL # BLD AUTO: 0.14 K/UL — SIGNIFICANT CHANGE UP (ref 0–0.7)
EOSINOPHIL NFR BLD AUTO: 1.6 % — SIGNIFICANT CHANGE UP (ref 0–8)
GLUCOSE SERPL-MCNC: 207 MG/DL — HIGH (ref 70–99)
HCT VFR BLD CALC: 31.2 % — LOW (ref 37–47)
HGB BLD-MCNC: 10.3 G/DL — LOW (ref 12–16)
IMM GRANULOCYTES NFR BLD AUTO: 0.3 % — SIGNIFICANT CHANGE UP (ref 0.1–0.3)
INR BLD: 1.15 RATIO — SIGNIFICANT CHANGE UP (ref 0.65–1.3)
LACTATE SERPL-SCNC: 1.2 MMOL/L — SIGNIFICANT CHANGE UP (ref 0.7–2)
LYMPHOCYTES # BLD AUTO: 1.33 K/UL — SIGNIFICANT CHANGE UP (ref 1.2–3.4)
LYMPHOCYTES # BLD AUTO: 14.9 % — LOW (ref 20.5–51.1)
MCHC RBC-ENTMCNC: 27 PG — SIGNIFICANT CHANGE UP (ref 27–31)
MCHC RBC-ENTMCNC: 33 G/DL — SIGNIFICANT CHANGE UP (ref 32–37)
MCV RBC AUTO: 81.9 FL — SIGNIFICANT CHANGE UP (ref 81–99)
MONOCYTES # BLD AUTO: 0.71 K/UL — HIGH (ref 0.1–0.6)
MONOCYTES NFR BLD AUTO: 7.9 % — SIGNIFICANT CHANGE UP (ref 1.7–9.3)
NEUTROPHILS # BLD AUTO: 6.68 K/UL — HIGH (ref 1.4–6.5)
NEUTROPHILS NFR BLD AUTO: 74.6 % — SIGNIFICANT CHANGE UP (ref 42.2–75.2)
NRBC # BLD: 0 /100 WBCS — SIGNIFICANT CHANGE UP (ref 0–0)
PLATELET # BLD AUTO: 332 K/UL — SIGNIFICANT CHANGE UP (ref 130–400)
PMV BLD: 10 FL — SIGNIFICANT CHANGE UP (ref 7.4–10.4)
POTASSIUM SERPL-MCNC: 3.9 MMOL/L — SIGNIFICANT CHANGE UP (ref 3.5–5)
POTASSIUM SERPL-SCNC: 3.9 MMOL/L — SIGNIFICANT CHANGE UP (ref 3.5–5)
PROT SERPL-MCNC: 7.3 G/DL — SIGNIFICANT CHANGE UP (ref 6–8)
PROTHROM AB SERPL-ACNC: 13.1 SEC — HIGH (ref 9.95–12.87)
RBC # BLD: 3.81 M/UL — LOW (ref 4.2–5.4)
RBC # FLD: 15.4 % — HIGH (ref 11.5–14.5)
SODIUM SERPL-SCNC: 136 MMOL/L — SIGNIFICANT CHANGE UP (ref 135–146)
TROPONIN T, HIGH SENSITIVITY RESULT: 36 NG/L — HIGH (ref 6–13)
WBC # BLD: 8.95 K/UL — SIGNIFICANT CHANGE UP (ref 4.8–10.8)
WBC # FLD AUTO: 8.95 K/UL — SIGNIFICANT CHANGE UP (ref 4.8–10.8)

## 2024-05-27 PROCEDURE — 99285 EMERGENCY DEPT VISIT HI MDM: CPT

## 2024-05-27 PROCEDURE — 71045 X-RAY EXAM CHEST 1 VIEW: CPT | Mod: 26

## 2024-05-27 NOTE — ED PROVIDER NOTE - WR ORDER DATE AND TIME 1
Patient calls to cancel PT appointment. He needs to figure out the pain so he is going on Monday to see the pain doctor and hopefully get injections. He wakes up stiff and has a really hard time moving this moving.  He would like to keep his other appointme
27-May-2024 19:28

## 2024-05-27 NOTE — ED PROVIDER NOTE - PHYSICAL EXAMINATION
Vital Signs: I have reviewed the initial vital signs.  Constitutional: unkempt, alert and oriented  Eyes: PERRLA, EOMI,  clear conjunctiva   Cardiovascular: regular rate, regular rhythm, no murmur appreciated  Respiratory: unlabored respiratory effort, clear to auscultation bilaterally  Gastrointestinal: soft, non-tender, non-distended  abdomen, no pulsatile mass  Musculoskeletal: supple neck, +b/l pitting lower extremity edema, no bony tenderness  Integumentary b/l lower extremity swelling, edema, weeping superficial ulcerations and warmth , stasis dermatitis  Neurologic: awake, alert, cranial nerves II-XII grossly intact, extremities’ motor and sensory functions grossly intact, no focal deficits, GCS 15

## 2024-05-27 NOTE — ED PROVIDER NOTE - ATTENDING APP SHARED VISIT CONTRIBUTION OF CARE
53yF DM on metformin HepB&C former IVDU now on suboxone p/w acute on chronic leg swelling - reports pain from legs keeping her up at night - also c/o chest pain.

## 2024-05-27 NOTE — ED ADULT NURSE REASSESSMENT NOTE - NS ED NURSE REASSESS COMMENT FT1
While placing patients IV with US guidance patient became uncooperative and refused IV placement, angio cath removed. Patient yelling and aggressive with staff refusing treatment. Patient educated on risks on not having blood work done, Klerman MD aware.

## 2024-05-27 NOTE — ED ADULT NURSE NOTE - NSFALLUNIVINTERV_ED_ALL_ED
Bed/Stretcher in lowest position, wheels locked, appropriate side rails in place/Call bell, personal items and telephone in reach/Instruct patient to call for assistance before getting out of bed/chair/stretcher/Non-slip footwear applied when patient is off stretcher/[place] to call system/Physically safe environment - no spills, clutter or unnecessary equipment/Purposeful proactive rounding/Room/bathroom lighting operational, light cord in reach

## 2024-05-27 NOTE — ED PROVIDER NOTE - OBJECTIVE STATEMENT
A 53-year-old female with pmh of DM on metformin, hepatitis B&C, asthma, COPD, hypothyroidism, pancreatitis, IV drug user in the past on Suboxone currently, presents to the ED with complaint of worsening lower extremity swelling, erythema and ulcerations. patient discharged from inpatient hospital admission on may 10th. patient dx with stasis dermatitis with pyoderma. patient with poor outpatient follow up . patient with increasing pain to b/l lower extremities, weeping wounds. no fevers or streaking up legs. patient c/o pain with ambulation

## 2024-05-27 NOTE — ED PROVIDER NOTE - CLINICAL SUMMARY MEDICAL DECISION MAKING FREE TEXT BOX
53yF  dm  hep  former  iv drug user ,  leg stasis dermatitis with  pyoderma on admission 5/10 -  reports  chest pain     no stemi on ekg.  trop 36 - repeated ,  no delta but increased to 39.    Pt admitted to tele

## 2024-05-27 NOTE — ED PROVIDER NOTE - WHICH SHOWED
EKG - NSR normal axis normal intervals/borderline prolonged QTc 474 +inferior Q waves and new inferior ST/TW changes compared to prior 4/14/24 no STEMI

## 2024-05-28 DIAGNOSIS — R07.9 CHEST PAIN, UNSPECIFIED: ICD-10-CM

## 2024-05-28 LAB
A1C WITH ESTIMATED AVERAGE GLUCOSE RESULT: 9.1 % — HIGH (ref 4–5.6)
ALBUMIN SERPL ELPH-MCNC: 3.4 G/DL — LOW (ref 3.5–5.2)
ALP SERPL-CCNC: 74 U/L — SIGNIFICANT CHANGE UP (ref 30–115)
ALT FLD-CCNC: <5 U/L — SIGNIFICANT CHANGE UP (ref 0–41)
ANION GAP SERPL CALC-SCNC: 7 MMOL/L — SIGNIFICANT CHANGE UP (ref 7–14)
AST SERPL-CCNC: 16 U/L — SIGNIFICANT CHANGE UP (ref 0–41)
BILIRUB SERPL-MCNC: 0.3 MG/DL — SIGNIFICANT CHANGE UP (ref 0.2–1.2)
BUN SERPL-MCNC: 6 MG/DL — LOW (ref 10–20)
CALCIUM SERPL-MCNC: 8.5 MG/DL — SIGNIFICANT CHANGE UP (ref 8.4–10.5)
CHLORIDE SERPL-SCNC: 101 MMOL/L — SIGNIFICANT CHANGE UP (ref 98–110)
CK MB CFR SERPL CALC: 6.4 NG/ML — HIGH (ref 0.6–6.3)
CK SERPL-CCNC: 210 U/L — SIGNIFICANT CHANGE UP (ref 0–225)
CO2 SERPL-SCNC: 30 MMOL/L — SIGNIFICANT CHANGE UP (ref 17–32)
CREAT SERPL-MCNC: 0.7 MG/DL — SIGNIFICANT CHANGE UP (ref 0.7–1.5)
EGFR: 103 ML/MIN/1.73M2 — SIGNIFICANT CHANGE UP
ESTIMATED AVERAGE GLUCOSE: 214 MG/DL — HIGH (ref 68–114)
GLUCOSE BLDC GLUCOMTR-MCNC: 111 MG/DL — HIGH (ref 70–99)
GLUCOSE BLDC GLUCOMTR-MCNC: 144 MG/DL — HIGH (ref 70–99)
GLUCOSE BLDC GLUCOMTR-MCNC: 161 MG/DL — HIGH (ref 70–99)
GLUCOSE BLDC GLUCOMTR-MCNC: 178 MG/DL — HIGH (ref 70–99)
GLUCOSE BLDC GLUCOMTR-MCNC: 212 MG/DL — HIGH (ref 70–99)
GLUCOSE SERPL-MCNC: 134 MG/DL — HIGH (ref 70–99)
HCT VFR BLD CALC: 31.6 % — LOW (ref 37–47)
HGB BLD-MCNC: 10.1 G/DL — LOW (ref 12–16)
MCHC RBC-ENTMCNC: 26.8 PG — LOW (ref 27–31)
MCHC RBC-ENTMCNC: 32 G/DL — SIGNIFICANT CHANGE UP (ref 32–37)
MCV RBC AUTO: 83.8 FL — SIGNIFICANT CHANGE UP (ref 81–99)
NRBC # BLD: 0 /100 WBCS — SIGNIFICANT CHANGE UP (ref 0–0)
PLATELET # BLD AUTO: 294 K/UL — SIGNIFICANT CHANGE UP (ref 130–400)
PMV BLD: 9.8 FL — SIGNIFICANT CHANGE UP (ref 7.4–10.4)
POTASSIUM SERPL-MCNC: 3.4 MMOL/L — LOW (ref 3.5–5)
POTASSIUM SERPL-SCNC: 3.4 MMOL/L — LOW (ref 3.5–5)
PROT SERPL-MCNC: 6.4 G/DL — SIGNIFICANT CHANGE UP (ref 6–8)
RBC # BLD: 3.77 M/UL — LOW (ref 4.2–5.4)
RBC # FLD: 15.4 % — HIGH (ref 11.5–14.5)
SODIUM SERPL-SCNC: 138 MMOL/L — SIGNIFICANT CHANGE UP (ref 135–146)
TROPONIN T, HIGH SENSITIVITY RESULT: 39 NG/L — HIGH (ref 6–13)
TROPONIN T, HIGH SENSITIVITY RESULT: 43 NG/L — HIGH (ref 6–13)
WBC # BLD: 6.44 K/UL — SIGNIFICANT CHANGE UP (ref 4.8–10.8)
WBC # FLD AUTO: 6.44 K/UL — SIGNIFICANT CHANGE UP (ref 4.8–10.8)

## 2024-05-28 PROCEDURE — 82962 GLUCOSE BLOOD TEST: CPT

## 2024-05-28 PROCEDURE — 80053 COMPREHEN METABOLIC PANEL: CPT

## 2024-05-28 PROCEDURE — 97116 GAIT TRAINING THERAPY: CPT | Mod: GP

## 2024-05-28 PROCEDURE — 84481 FREE ASSAY (FT-3): CPT

## 2024-05-28 PROCEDURE — 93925 LOWER EXTREMITY STUDY: CPT

## 2024-05-28 PROCEDURE — 84443 ASSAY THYROID STIM HORMONE: CPT

## 2024-05-28 PROCEDURE — 97110 THERAPEUTIC EXERCISES: CPT | Mod: GP

## 2024-05-28 PROCEDURE — 93017 CV STRESS TEST TRACING ONLY: CPT

## 2024-05-28 PROCEDURE — 83036 HEMOGLOBIN GLYCOSYLATED A1C: CPT

## 2024-05-28 PROCEDURE — A9500: CPT

## 2024-05-28 PROCEDURE — 93005 ELECTROCARDIOGRAM TRACING: CPT

## 2024-05-28 PROCEDURE — 83735 ASSAY OF MAGNESIUM: CPT

## 2024-05-28 PROCEDURE — 93010 ELECTROCARDIOGRAM REPORT: CPT

## 2024-05-28 PROCEDURE — 36415 COLL VENOUS BLD VENIPUNCTURE: CPT

## 2024-05-28 PROCEDURE — 85027 COMPLETE CBC AUTOMATED: CPT

## 2024-05-28 PROCEDURE — 82550 ASSAY OF CK (CPK): CPT

## 2024-05-28 PROCEDURE — 84484 ASSAY OF TROPONIN QUANT: CPT

## 2024-05-28 PROCEDURE — 84439 ASSAY OF FREE THYROXINE: CPT

## 2024-05-28 PROCEDURE — 97162 PT EVAL MOD COMPLEX 30 MIN: CPT | Mod: GP

## 2024-05-28 PROCEDURE — 87522 HEPATITIS C REVRS TRNSCRPJ: CPT

## 2024-05-28 PROCEDURE — 93970 EXTREMITY STUDY: CPT

## 2024-05-28 PROCEDURE — 82553 CREATINE MB FRACTION: CPT

## 2024-05-28 PROCEDURE — 99222 1ST HOSP IP/OBS MODERATE 55: CPT

## 2024-05-28 PROCEDURE — 93970 EXTREMITY STUDY: CPT | Mod: 26

## 2024-05-28 PROCEDURE — 78452 HT MUSCLE IMAGE SPECT MULT: CPT | Mod: MC

## 2024-05-28 PROCEDURE — 85025 COMPLETE CBC W/AUTO DIFF WBC: CPT

## 2024-05-28 PROCEDURE — 99223 1ST HOSP IP/OBS HIGH 75: CPT

## 2024-05-28 RX ORDER — SODIUM CHLORIDE 9 MG/ML
1000 INJECTION, SOLUTION INTRAVENOUS
Refills: 0 | Status: DISCONTINUED | OUTPATIENT
Start: 2024-05-28 | End: 2024-06-08

## 2024-05-28 RX ORDER — DEXTROSE 50 % IN WATER 50 %
15 SYRINGE (ML) INTRAVENOUS ONCE
Refills: 0 | Status: DISCONTINUED | OUTPATIENT
Start: 2024-05-28 | End: 2024-06-08

## 2024-05-28 RX ORDER — BUPRENORPHINE AND NALOXONE 2; .5 MG/1; MG/1
2.5 TABLET SUBLINGUAL
Qty: 0 | Refills: 0 | DISCHARGE

## 2024-05-28 RX ORDER — INSULIN LISPRO 100/ML
VIAL (ML) SUBCUTANEOUS
Refills: 0 | Status: DISCONTINUED | OUTPATIENT
Start: 2024-05-28 | End: 2024-06-08

## 2024-05-28 RX ORDER — BUPRENORPHINE AND NALOXONE 2; .5 MG/1; MG/1
2.5 TABLET SUBLINGUAL DAILY
Refills: 0 | Status: DISCONTINUED | OUTPATIENT
Start: 2024-05-28 | End: 2024-06-04

## 2024-05-28 RX ORDER — LEVOTHYROXINE SODIUM 125 MCG
150 TABLET ORAL
Refills: 0 | Status: DISCONTINUED | OUTPATIENT
Start: 2024-05-28 | End: 2024-05-31

## 2024-05-28 RX ORDER — FUROSEMIDE 40 MG
40 TABLET ORAL DAILY
Refills: 0 | Status: DISCONTINUED | OUTPATIENT
Start: 2024-05-28 | End: 2024-05-29

## 2024-05-28 RX ORDER — ACETAMINOPHEN 500 MG
650 TABLET ORAL EVERY 6 HOURS
Refills: 0 | Status: DISCONTINUED | OUTPATIENT
Start: 2024-05-28 | End: 2024-05-30

## 2024-05-28 RX ORDER — GLUCAGON INJECTION, SOLUTION 0.5 MG/.1ML
1 INJECTION, SOLUTION SUBCUTANEOUS ONCE
Refills: 0 | Status: DISCONTINUED | OUTPATIENT
Start: 2024-05-28 | End: 2024-06-08

## 2024-05-28 RX ORDER — ACETAMINOPHEN 500 MG
1000 TABLET ORAL EVERY 8 HOURS
Refills: 0 | Status: COMPLETED | OUTPATIENT
Start: 2024-05-28 | End: 2024-05-30

## 2024-05-28 RX ORDER — DEXTROSE 10 % IN WATER 10 %
125 INTRAVENOUS SOLUTION INTRAVENOUS ONCE
Refills: 0 | Status: DISCONTINUED | OUTPATIENT
Start: 2024-05-28 | End: 2024-06-08

## 2024-05-28 RX ORDER — INSULIN GLARGINE 100 [IU]/ML
28 INJECTION, SOLUTION SUBCUTANEOUS AT BEDTIME
Refills: 0 | Status: DISCONTINUED | OUTPATIENT
Start: 2024-05-28 | End: 2024-06-08

## 2024-05-28 RX ORDER — PANTOPRAZOLE SODIUM 20 MG/1
40 TABLET, DELAYED RELEASE ORAL
Refills: 0 | Status: DISCONTINUED | OUTPATIENT
Start: 2024-05-28 | End: 2024-06-08

## 2024-05-28 RX ORDER — POTASSIUM CHLORIDE 20 MEQ
20 PACKET (EA) ORAL ONCE
Refills: 0 | Status: COMPLETED | OUTPATIENT
Start: 2024-05-28 | End: 2024-05-28

## 2024-05-28 RX ORDER — ALBUTEROL 90 UG/1
2 AEROSOL, METERED ORAL EVERY 6 HOURS
Refills: 0 | Status: DISCONTINUED | OUTPATIENT
Start: 2024-05-28 | End: 2024-06-08

## 2024-05-28 RX ORDER — CEFEPIME 1 G/1
2000 INJECTION, POWDER, FOR SOLUTION INTRAMUSCULAR; INTRAVENOUS EVERY 8 HOURS
Refills: 0 | Status: DISCONTINUED | OUTPATIENT
Start: 2024-05-28 | End: 2024-06-03

## 2024-05-28 RX ORDER — DEXTROSE 50 % IN WATER 50 %
12.5 SYRINGE (ML) INTRAVENOUS ONCE
Refills: 0 | Status: DISCONTINUED | OUTPATIENT
Start: 2024-05-28 | End: 2024-06-08

## 2024-05-28 RX ORDER — COLCHICINE 0.6 MG
0.6 TABLET ORAL
Refills: 0 | Status: DISCONTINUED | OUTPATIENT
Start: 2024-05-28 | End: 2024-06-08

## 2024-05-28 RX ORDER — DEXTROSE 50 % IN WATER 50 %
25 SYRINGE (ML) INTRAVENOUS ONCE
Refills: 0 | Status: DISCONTINUED | OUTPATIENT
Start: 2024-05-28 | End: 2024-06-08

## 2024-05-28 RX ADMIN — PANTOPRAZOLE SODIUM 40 MILLIGRAM(S): 20 TABLET, DELAYED RELEASE ORAL at 06:01

## 2024-05-28 RX ADMIN — Medication 0.6 MILLIGRAM(S): at 06:01

## 2024-05-28 RX ADMIN — Medication 0.6 MILLIGRAM(S): at 17:19

## 2024-05-28 RX ADMIN — Medication 1 APPLICATION(S): at 17:19

## 2024-05-28 RX ADMIN — BUPRENORPHINE AND NALOXONE 2.5 TABLET(S): 2; .5 TABLET SUBLINGUAL at 12:26

## 2024-05-28 RX ADMIN — CEFEPIME 100 MILLIGRAM(S): 1 INJECTION, POWDER, FOR SOLUTION INTRAMUSCULAR; INTRAVENOUS at 21:05

## 2024-05-28 RX ADMIN — Medication 150 MICROGRAM(S): at 06:01

## 2024-05-28 RX ADMIN — Medication 1000 MILLIGRAM(S): at 21:15

## 2024-05-28 RX ADMIN — Medication 2: at 12:26

## 2024-05-28 RX ADMIN — Medication 1000 MILLIGRAM(S): at 21:06

## 2024-05-28 RX ADMIN — Medication 650 MILLIGRAM(S): at 06:36

## 2024-05-28 RX ADMIN — INSULIN GLARGINE 28 UNIT(S): 100 INJECTION, SOLUTION SUBCUTANEOUS at 21:04

## 2024-05-28 RX ADMIN — Medication 40 MILLIGRAM(S): at 06:01

## 2024-05-28 RX ADMIN — Medication 1 APPLICATION(S): at 06:31

## 2024-05-28 RX ADMIN — Medication 20 MILLIEQUIVALENT(S): at 23:04

## 2024-05-28 NOTE — PROGRESS NOTE ADULT - SUBJECTIVE AND OBJECTIVE BOX
SUBJECTIVE:    Patient is a 53y old Female who presents with a chief complaint of     HPI:    ·53-year-old female with pmh of DM on metformin, hepatitis B&C, asthma, COPD, hypothyroidism, pancreatitis, IV drug user in the past on Suboxone currently, presents to the ED with complaint of worsening lower extremity pain, swelling, erythema and ulcerations with weeping. Pain is worse with ambulation but no fevers or streaking up her legs. She was  discharged from inpatient hospital admission on May 10th with diagnosis of stasis dermatitis with pyoderma, had been on antibiotics, still on triamcinolone cream.. Patient adds that she has had a constant left sided "numbing"  chest pain present for 4 days. No modifying factors identified. Work up in the ER showed elevated troponin        (28 May 2024 03:08)      Currently admitted to medicine with the primary diagnosis of Chest pain     having bilateral leg pain and tenderness, chest pain resolved    Besides the pertinent positives and negatives described above, the ROS was within normal limits.    PAST MEDICAL & SURGICAL HISTORY  Asthma with COPD    Hypothyroidism    H/O: substance abuse  no longer using    History of pancreatitis    Hepatitis-C    Leukocytoclastic vasculitis    History of appendectomy    History of tonsillectomy      SOCIAL HISTORY:    ALLERGIES:  No Known Allergies    MEDICATIONS:  STANDING MEDICATIONS  buprenorphine 8 mG/naloxone 2 mG SL  Tablet 2.5 Tablet(s) SubLingual daily  cefepime   IVPB 2000 milliGRAM(s) IV Intermittent every 8 hours  colchicine 0.6 milliGRAM(s) Oral two times a day  dextrose 10% Bolus 125 milliLiter(s) IV Bolus once  dextrose 5%. 1000 milliLiter(s) IV Continuous <Continuous>  dextrose 5%. 1000 milliLiter(s) IV Continuous <Continuous>  dextrose 50% Injectable 25 Gram(s) IV Push once  dextrose 50% Injectable 12.5 Gram(s) IV Push once  furosemide    Tablet 40 milliGRAM(s) Oral daily  glucagon  Injectable 1 milliGRAM(s) IntraMuscular once  insulin glargine Injectable (LANTUS) 28 Unit(s) SubCutaneous at bedtime  insulin lispro (ADMELOG) corrective regimen sliding scale   SubCutaneous three times a day before meals  levothyroxine 150 MICROGram(s) Oral <User Schedule>  pantoprazole    Tablet 40 milliGRAM(s) Oral before breakfast  triamcinolone 0.1% Cream 1 Application(s) Topical two times a day    PRN MEDICATIONS  acetaminophen     Tablet .. 650 milliGRAM(s) Oral every 6 hours PRN  albuterol    90 MICROgram(s) HFA Inhaler 2 Puff(s) Inhalation every 6 hours PRN  dextrose Oral Gel 15 Gram(s) Oral once PRN    VITALS:   T(F): 97.8  HR: 72  BP: 107/72  RR: 18  SpO2: 99%    LABS:                        10.1   6.44  )-----------( 294      ( 28 May 2024 06:13 )             31.6     05-28    138  |  101  |  6<L>  ----------------------------<  134<H>  3.4<L>   |  30  |  0.7    Ca    8.5      28 May 2024 06:13    TPro  6.4  /  Alb  3.4<L>  /  TBili  0.3  /  DBili  x   /  AST  16  /  ALT  <5  /  AlkPhos  74  05-28    PT/INR - ( 27 May 2024 21:10 )   PT: 13.10 sec;   INR: 1.15 ratio         PTT - ( 27 May 2024 21:10 )  PTT:37.2 sec  Urinalysis Basic - ( 28 May 2024 06:13 )    Color: x / Appearance: x / SG: x / pH: x  Gluc: 134 mg/dL / Ketone: x  / Bili: x / Urobili: x   Blood: x / Protein: x / Nitrite: x   Leuk Esterase: x / RBC: x / WBC x   Sq Epi: x / Non Sq Epi: x / Bacteria: x        Creatine Kinase, Serum: 210 U/L (05-28-24 @ 06:13)  Lactate, Blood: 1.2 mmol/L (05-27-24 @ 21:10)      CARDIAC MARKERS ( 28 May 2024 06:13 )  x     / x     / 210 U/L / x     / 6.4 ng/mL      Chest pain      RADIOLOGY:    PHYSICAL EXAM:  General: WN/WD NAD  Neurology: A&Ox3, nonfocal, LEMUS x 4  Head:  Normocephalic, atraumatic  ENT:  Mucosa moist, no ulcerations  Neck:  Supple, no sinuses or palpable masses  Lymphatic:  No palpable cervical, supraclavicular, axillary or inguinal adenopathy  Respiratory: CTA B/L  CV: RRR, S1S2, no murmur  Abdominal: Soft, NT, ND no palpable mass  MSK: bl le edema and tenderness with erythema and some ulcers  Incisions: intact, no erythema or drainage    Intravenous access: yes  NG tube: no  Tom Catheter: no

## 2024-05-28 NOTE — PATIENT PROFILE ADULT - FUNCTIONAL ASSESSMENT - BASIC MOBILITY 6.
2-calculated by average/Not able to assess (calculate score using [facility] averaging method)

## 2024-05-28 NOTE — H&P ADULT - HISTORY OF PRESENT ILLNESS
· Chief Complaint: The patient is a 53y Female complaining of wound check.  · HPI Objective Statement: A 53-year-old female with pmh of DM on metformin, hepatitis B&C, asthma, COPD, hypothyroidism, pancreatitis, IV drug user in the past on Suboxone currently, presents to the ED with complaint of worsening lower extremity swelling, erythema and ulcerations. patient discharged from inpatient hospital admission on may 10th. patient dx with stasis dermatitis with pyoderma. patient with poor outpatient follow up . patient with increasing pain to b/l lower extremities, weeping wounds. no fevers or streaking up legs. patient c/o pain with ambulation     ·53-year-old female with pmh of DM on metformin, hepatitis B&C, asthma, COPD, hypothyroidism, pancreatitis, IV drug user in the past on Suboxone currently, presents to the ED with complaint of worsening lower extremity pain, swelling, erythema and ulcerations with weeping. Pain is worse with ambulation but no fevers or streaking up her legs. She was  discharged from inpatient hospital admission on May 10th with diagnosis of stasis dermatitis with pyoderma.. Patient adds that she has had a constant left sided "numbing"  chest pain present for 4 days. No modifying factors identified. Work up in the ER showed elevated troponin          ·53-year-old female with pmh of DM on metformin, hepatitis B&C, asthma, COPD, hypothyroidism, pancreatitis, IV drug user in the past on Suboxone currently, presents to the ED with complaint of worsening lower extremity pain, swelling, erythema and ulcerations with weeping. Pain is worse with ambulation but no fevers or streaking up her legs. She was  discharged from inpatient hospital admission on May 10th with diagnosis of stasis dermatitis with pyoderma, had been on antibiotics, still on triamcinolone cream.. Patient adds that she has had a constant left sided "numbing"  chest pain present for 4 days. No modifying factors identified. Work up in the ER showed elevated troponin

## 2024-05-28 NOTE — PROGRESS NOTE ADULT - ASSESSMENT
53-year-old female with pmh of DM on metformin, hepatitis B&C, asthma, COPD, hypothyroidism, pancreatitis, IV drug user in the past on Suboxone currently, presents to the ED with complaint of worsening lower extremity pain, swelling, erythema and ulcerations with weeping.    Assessment    ·	Possible cellulitis superimposed on leukocytoclastic vasculitis  ·	DM  ·	Hx of hepatitis B and C  ·	Asthma and COPD  ·	Hypothyroidism  ·	Former IV drug abuse on suboxone    Plan    - d/w ID will give cefepime for now, patient was recently admitted to [place] with the same problem / I saw this patient a few months ago when the leukocytoclastic vasculitis diagnosis was confirmed with pathology, at the time the patient did not have this much erythema and open ulcers / will treat as a superimposed cellulitis for now, mrsa swab in [place] was negative  - at [place] patient was seen by derm and ordered for triamcinolone 0.1% cream bid / for now will hold off on it and see if any clinical improvement on abx, would expect improvement in tenderness at least in 24-48 hours if infectious  - patient was seen in the office by rheum and was on prednisone but tapered off as there was concern for the edema worsening her symptoms and her clinical picture at the time didn't fit what was expected of leukocytoclast vasculitis / howver watned colchicine continued  - c/w lasix for edema likely worsening pain, if no improvement in edema tomorrow would consider IV lasix / also patient was added on pioglitazone by endo last admission which can cause/worsen lower extremity edema  - standing tylenol for pain, pain mgmt consult for recommendations considering patient is on suboxone  - TSH with reflex T4  - home inhalers  - insulin 28 / ss    Pending: clinical imrpovement with cefepime, if stalling can consider triamcinolone cream, wound care nurse recs, TSH, pain mgmt, PT    # DVT PPX: contraindications with AC and leukocytoclastic vasculitis, pt is ambulatory

## 2024-05-28 NOTE — CONSULT NOTE ADULT - ASSESSMENT
·53-year-old female with pmh of DM on metformin, hepatitis B&C, asthma, COPD, hypothyroidism, pancreatitis, IV drug user in the past on Suboxone currently, presents to the ED with complaint of worsening lower extremity pain, swelling, erythema and ulcerations with weeping. Pain is worse with ambulation but no fevers or streaking up her legs. She was  discharged from inpatient hospital admission on May 10th with diagnosis of stasis dermatitis with pyoderma, had been on antibiotics, still on triamcinolone cream. She has pleuritic chest pain. Not exertional. Enzymes not consistent me. Rx legs. Can consider Lexiscan when stable

## 2024-05-28 NOTE — H&P ADULT - NSHPLABSRESULTS_GEN_ALL_CORE
10.3   8.95  )-----------( 332      ( 27 May 2024 21:10 )             31.2     05-27    136  |  98  |  6<L>  ----------------------------<  207<H>  3.9   |  27  |  0.7    Ca    9.1      27 May 2024 21:10    TPro  7.3  /  Alb  4.1  /  TBili  0.4  /  DBili  x   /  AST  25  /  ALT  <5  /  AlkPhos  81  05-27          Urinalysis Basic - ( 27 May 2024 21:10 )    Color: x / Appearance: x / SG: x / pH: x  Gluc: 207 mg/dL / Ketone: x  / Bili: x / Urobili: x   Blood: x / Protein: x / Nitrite: x   Leuk Esterase: x / RBC: x / WBC x   Sq Epi: x / Non Sq Epi: x / Bacteria: x      PT/INR - ( 27 May 2024 21:10 )   PT: 13.10 sec;   INR: 1.15 ratio         PTT - ( 27 May 2024 21:10 )  PTT:37.2 sec  Lactate Trend  05-27 @ 21:10 Lactate:1.2     Troponin  -     EKG - NSR, nonspecific ST and T wave abnormality 10.3   8.95  )-----------( 332      ( 27 May 2024 21:10 )             31.2     05-27    136  |  98  |  6<L>  ----------------------------<  207<H>  3.9   |  27  |  0.7    Ca    9.1      27 May 2024 21:10    TPro  7.3  /  Alb  4.1  /  TBili  0.4  /  DBili  x   /  AST  25  /  ALT  <5  /  AlkPhos  81  05-27          Urinalysis Basic - ( 27 May 2024 21:10 )    Color: x / Appearance: x / SG: x / pH: x  Gluc: 207 mg/dL / Ketone: x  / Bili: x / Urobili: x   Blood: x / Protein: x / Nitrite: x   Leuk Esterase: x / RBC: x / WBC x   Sq Epi: x / Non Sq Epi: x / Bacteria: x      PT/INR - ( 27 May 2024 21:10 )   PT: 13.10 sec;   INR: 1.15 ratio         PTT - ( 27 May 2024 21:10 )  PTT:37.2 sec  Lactate Trend  05-27 @ 21:10 Lactate:1.2     Troponin  - 36, 39    EKG - NSR, nonspecific ST and T wave abnormality (interpreted by me)

## 2024-05-28 NOTE — PATIENT PROFILE ADULT - FALL HARM RISK - HARM RISK INTERVENTIONS
Clarified Dr. Abdi's orders:    Weekly Magnesium lab prior to IV infusion of magnesium sulfate 4 g for mag level of 1.4 or less, 2 g for mag level of 1.6 or less. Order for one year, will decrease to every other week after noticing improvement before discontinuing order. Will call [facility] infusion center.    Left voicemail with infusion center at [phone]. Spoke with nurse at infusion center who reports they will speak with Brendan and schedule his first infusion.    Spoke with Brendan's wife, Fide. Informed her of these orders and she is understanding he should have labs done Monday to check magnesium level. She verbalized understanding of the plan.   Assistance with ambulation/Assistance OOB with selected safe patient handling equipment/Communicate Risk of Fall with Harm to all staff/Reinforce activity limits and safety measures with patient and family/Tailored Fall Risk Interventions/Visual Cue: Yellow wristband and red socks/Bed in lowest position, wheels locked, appropriate side rails in place/Call bell, personal items and telephone in reach/Instruct patient to call for assistance before getting out of bed or chair/Non-slip footwear when patient is out of bed/[place] to call system/Physically safe environment - no spills, clutter or unnecessary equipment/Purposeful Proactive Rounding/Room/bathroom lighting operational, light cord in reach

## 2024-05-28 NOTE — CONSULT NOTE ADULT - SUBJECTIVE AND OBJECTIVE BOX
CARDIOLOGY CONSULT NOTE     CHIEF COMPLAINT/REASON FOR CONSULT:    HPI:    ·53-year-old female with pmh of DM on metformin, hepatitis B&C, asthma, COPD, hypothyroidism, pancreatitis, IV drug user in the past on Suboxone currently, presents to the ED with complaint of worsening lower extremity pain, swelling, erythema and ulcerations with weeping. Pain is worse with ambulation but no fevers or streaking up her legs. She was  discharged from inpatient hospital admission on May 10th with diagnosis of stasis dermatitis with pyoderma, had been on antibiotics, still on triamcinolone cream.. Patient adds that she has had a constant left sided "numbing"  chest pain present for 4 days. No modifying factors identified. Work up in the ER showed elevated troponin        (28 May 2024 03:08)      PAST MEDICAL & SURGICAL HISTORY:  Asthma with COPD      Hypothyroidism      H/O: substance abuse  no longer using      History of pancreatitis      Hepatitis-C      Leukocytoclastic vasculitis      History of appendectomy      History of tonsillectomy          Cardiac Risks:   [ ]HTN, [x ] DM, [ ] Smoking, [ ] FH,  [ ] Lipids        MEDICATIONS:  MEDICATIONS  (STANDING):  buprenorphine 8 mG/naloxone 2 mG SL  Tablet 2.5 Tablet(s) SubLingual daily  colchicine 0.6 milliGRAM(s) Oral two times a day  dextrose 10% Bolus 125 milliLiter(s) IV Bolus once  dextrose 5%. 1000 milliLiter(s) (50 mL/Hr) IV Continuous <Continuous>  dextrose 5%. 1000 milliLiter(s) (100 mL/Hr) IV Continuous <Continuous>  dextrose 50% Injectable 12.5 Gram(s) IV Push once  dextrose 50% Injectable 25 Gram(s) IV Push once  furosemide    Tablet 40 milliGRAM(s) Oral daily  glucagon  Injectable 1 milliGRAM(s) IntraMuscular once  insulin glargine Injectable (LANTUS) 28 Unit(s) SubCutaneous at bedtime  insulin lispro (ADMELOG) corrective regimen sliding scale   SubCutaneous three times a day before meals  levothyroxine 150 MICROGram(s) Oral <User Schedule>  pantoprazole    Tablet 40 milliGRAM(s) Oral before breakfast  triamcinolone 0.1% Cream 1 Application(s) Topical two times a day      FAMILY HISTORY:      SOCIAL HISTORY:      Allergies    No Known Allergies        	    REVIEW OF SYSTEMS:  CONSTITUTIONAL: No fever, weight loss, or fatigue  EYES: No eye pain, visual disturbances, or discharge  ENMT:  No difficulty hearing, tinnitus, vertigo; No sinus or throat pain  NECK: No pain or stiffness  RESPIRATORY: No cough, wheezing, chills or hemoptysis; No Shortness of Breath  CARDIOVASCULAR: No chest pain, palpitations, passing out, dizziness, or leg swelling  GASTROINTESTINAL: No abdominal or epigastric pain. No nausea, vomiting, or hematemesis; No diarrhea or constipation. No melena or hematochezia.  GENITOURINARY: No dysuria, frequency, hematuria, or incontinence  NEUROLOGICAL: No headaches, memory loss, loss of strength, numbness, or tremors  SKIN: No itching, burning, rashes, or lesions   	      PHYSICAL EXAM:  T(C): 36.6 (05-28-24 @ 03:20), Max: 36.8 (05-27-24 @ 21:56)  HR: 63 (05-28-24 @ 03:20) (63 - 99)  BP: 103/69 (05-28-24 @ 03:20) (103/69 - 121/71)  RR: 18 (05-28-24 @ 03:20) (16 - 18)  SpO2: 100% (05-28-24 @ 03:20) (95% - 100%)  Wt(kg): --  I&O's Summary      Appearance: Normal	  Psychiatry: A & O x 3, Mood & affect appropriate  HEENT:   Normal oral mucosa, PERRL, EOMI	  Lymphatic: No lymphadenopathy  Cardiovascular: Normal S1 S2,RRR, No JVD, No murmurs  Respiratory: Lungs clear to auscultation	  Gastrointestinal:  Soft, Non-tender, + BS	  Skin: No rashes, No ecchymoses, No cyanosis	  Neurologic: Non-focal  Extremities: Normal range of motion, No clubbing, cyanosis or edema  Vascular: Peripheral pulses palpable 2+ bilaterally      ECG:  	  < from: 12 Lead ECG (05.27.24 @ 18:47) >    Diagnosis Line Normal sinus rhythm  Nonspecific ST and T wave abnormality  Abnormal ECG    Confirmed by TERRENCE NEWBY MD ([id number]) on 5/27/2024 8:22:00 PM    < end of copied text >    	  LABS:	 	    CARDIAC MARKERS:                                    10.1   6.44  )-----------( 294      ( 28 May 2024 06:13 )             31.6     05-28    138  |  101  |  6<L>  ----------------------------<  134<H>  3.4<L>   |  30  |  0.7    Ca    8.5      28 May 2024 06:13    TPro  6.4  /  Alb  3.4<L>  /  TBili  0.3  /  DBili  x   /  AST  16  /  ALT  <5  /  AlkPhos  74  05-28    PT/INR - ( 27 May 2024 21:10 )   PT: 13.10 sec;   INR: 1.15 ratio         PTT - ( 27 May 2024 21:10 )  PTT:37.2 sec

## 2024-05-29 LAB
ALBUMIN SERPL ELPH-MCNC: 4.1 G/DL — SIGNIFICANT CHANGE UP (ref 3.5–5.2)
ALP SERPL-CCNC: 97 U/L — SIGNIFICANT CHANGE UP (ref 30–115)
ALT FLD-CCNC: <5 U/L — SIGNIFICANT CHANGE UP (ref 0–41)
ANION GAP SERPL CALC-SCNC: 12 MMOL/L — SIGNIFICANT CHANGE UP (ref 7–14)
AST SERPL-CCNC: 21 U/L — SIGNIFICANT CHANGE UP (ref 0–41)
BASOPHILS # BLD AUTO: 0.05 K/UL — SIGNIFICANT CHANGE UP (ref 0–0.2)
BASOPHILS NFR BLD AUTO: 0.7 % — SIGNIFICANT CHANGE UP (ref 0–1)
BILIRUB SERPL-MCNC: 0.4 MG/DL — SIGNIFICANT CHANGE UP (ref 0.2–1.2)
BUN SERPL-MCNC: 5 MG/DL — LOW (ref 10–20)
CALCIUM SERPL-MCNC: 9.3 MG/DL — SIGNIFICANT CHANGE UP (ref 8.4–10.5)
CHLORIDE SERPL-SCNC: 96 MMOL/L — LOW (ref 98–110)
CO2 SERPL-SCNC: 29 MMOL/L — SIGNIFICANT CHANGE UP (ref 17–32)
CREAT SERPL-MCNC: 0.9 MG/DL — SIGNIFICANT CHANGE UP (ref 0.7–1.5)
EGFR: 76 ML/MIN/1.73M2 — SIGNIFICANT CHANGE UP
EOSINOPHIL # BLD AUTO: 0.14 K/UL — SIGNIFICANT CHANGE UP (ref 0–0.7)
EOSINOPHIL NFR BLD AUTO: 2 % — SIGNIFICANT CHANGE UP (ref 0–8)
GLUCOSE BLDC GLUCOMTR-MCNC: 166 MG/DL — HIGH (ref 70–99)
GLUCOSE BLDC GLUCOMTR-MCNC: 174 MG/DL — HIGH (ref 70–99)
GLUCOSE BLDC GLUCOMTR-MCNC: 182 MG/DL — HIGH (ref 70–99)
GLUCOSE BLDC GLUCOMTR-MCNC: 190 MG/DL — HIGH (ref 70–99)
GLUCOSE SERPL-MCNC: 193 MG/DL — HIGH (ref 70–99)
HCT VFR BLD CALC: 37.3 % — SIGNIFICANT CHANGE UP (ref 37–47)
HGB BLD-MCNC: 12 G/DL — SIGNIFICANT CHANGE UP (ref 12–16)
IMM GRANULOCYTES NFR BLD AUTO: 0.6 % — HIGH (ref 0.1–0.3)
LYMPHOCYTES # BLD AUTO: 1.48 K/UL — SIGNIFICANT CHANGE UP (ref 1.2–3.4)
LYMPHOCYTES # BLD AUTO: 21.3 % — SIGNIFICANT CHANGE UP (ref 20.5–51.1)
MAGNESIUM SERPL-MCNC: 2 MG/DL — SIGNIFICANT CHANGE UP (ref 1.8–2.4)
MCHC RBC-ENTMCNC: 27.3 PG — SIGNIFICANT CHANGE UP (ref 27–31)
MCHC RBC-ENTMCNC: 32.2 G/DL — SIGNIFICANT CHANGE UP (ref 32–37)
MCV RBC AUTO: 85 FL — SIGNIFICANT CHANGE UP (ref 81–99)
MONOCYTES # BLD AUTO: 0.54 K/UL — SIGNIFICANT CHANGE UP (ref 0.1–0.6)
MONOCYTES NFR BLD AUTO: 7.8 % — SIGNIFICANT CHANGE UP (ref 1.7–9.3)
NEUTROPHILS # BLD AUTO: 4.71 K/UL — SIGNIFICANT CHANGE UP (ref 1.4–6.5)
NEUTROPHILS NFR BLD AUTO: 67.6 % — SIGNIFICANT CHANGE UP (ref 42.2–75.2)
NRBC # BLD: 0 /100 WBCS — SIGNIFICANT CHANGE UP (ref 0–0)
PLATELET # BLD AUTO: 346 K/UL — SIGNIFICANT CHANGE UP (ref 130–400)
PMV BLD: 9.9 FL — SIGNIFICANT CHANGE UP (ref 7.4–10.4)
POTASSIUM SERPL-MCNC: 3.8 MMOL/L — SIGNIFICANT CHANGE UP (ref 3.5–5)
POTASSIUM SERPL-SCNC: 3.8 MMOL/L — SIGNIFICANT CHANGE UP (ref 3.5–5)
PROT SERPL-MCNC: 8.2 G/DL — HIGH (ref 6–8)
RBC # BLD: 4.39 M/UL — SIGNIFICANT CHANGE UP (ref 4.2–5.4)
RBC # FLD: 15.7 % — HIGH (ref 11.5–14.5)
SODIUM SERPL-SCNC: 137 MMOL/L — SIGNIFICANT CHANGE UP (ref 135–146)
T4 FREE SERPL-MCNC: 0.4 NG/DL — LOW (ref 0.9–1.8)
T4 FREE+ TSH PNL SERPL: 119 UIU/ML — HIGH (ref 0.27–4.2)
WBC # BLD: 6.96 K/UL — SIGNIFICANT CHANGE UP (ref 4.8–10.8)
WBC # FLD AUTO: 6.96 K/UL — SIGNIFICANT CHANGE UP (ref 4.8–10.8)

## 2024-05-29 PROCEDURE — 99232 SBSQ HOSP IP/OBS MODERATE 35: CPT

## 2024-05-29 PROCEDURE — 93925 LOWER EXTREMITY STUDY: CPT | Mod: 26

## 2024-05-29 RX ORDER — GABAPENTIN 400 MG/1
300 CAPSULE ORAL
Refills: 0 | Status: DISCONTINUED | OUTPATIENT
Start: 2024-05-29 | End: 2024-06-08

## 2024-05-29 RX ORDER — FUROSEMIDE 40 MG
40 TABLET ORAL DAILY
Refills: 0 | Status: DISCONTINUED | OUTPATIENT
Start: 2024-05-29 | End: 2024-06-06

## 2024-05-29 RX ADMIN — Medication 0.6 MILLIGRAM(S): at 17:15

## 2024-05-29 RX ADMIN — GABAPENTIN 300 MILLIGRAM(S): 400 CAPSULE ORAL at 17:16

## 2024-05-29 RX ADMIN — CEFEPIME 100 MILLIGRAM(S): 1 INJECTION, POWDER, FOR SOLUTION INTRAMUSCULAR; INTRAVENOUS at 05:52

## 2024-05-29 RX ADMIN — INSULIN GLARGINE 28 UNIT(S): 100 INJECTION, SOLUTION SUBCUTANEOUS at 21:06

## 2024-05-29 RX ADMIN — GABAPENTIN 300 MILLIGRAM(S): 400 CAPSULE ORAL at 21:04

## 2024-05-29 RX ADMIN — Medication 1: at 17:16

## 2024-05-29 RX ADMIN — Medication 1000 MILLIGRAM(S): at 21:05

## 2024-05-29 RX ADMIN — BUPRENORPHINE AND NALOXONE 2.5 TABLET(S): 2; .5 TABLET SUBLINGUAL at 12:15

## 2024-05-29 RX ADMIN — PANTOPRAZOLE SODIUM 40 MILLIGRAM(S): 20 TABLET, DELAYED RELEASE ORAL at 05:55

## 2024-05-29 RX ADMIN — Medication 0.6 MILLIGRAM(S): at 05:53

## 2024-05-29 RX ADMIN — Medication 1: at 08:23

## 2024-05-29 RX ADMIN — Medication 40 MILLIGRAM(S): at 05:53

## 2024-05-29 RX ADMIN — Medication 1: at 12:15

## 2024-05-29 RX ADMIN — Medication 1000 MILLIGRAM(S): at 21:15

## 2024-05-29 RX ADMIN — CEFEPIME 100 MILLIGRAM(S): 1 INJECTION, POWDER, FOR SOLUTION INTRAMUSCULAR; INTRAVENOUS at 21:06

## 2024-05-29 RX ADMIN — CEFEPIME 100 MILLIGRAM(S): 1 INJECTION, POWDER, FOR SOLUTION INTRAMUSCULAR; INTRAVENOUS at 15:16

## 2024-05-29 RX ADMIN — Medication 650 MILLIGRAM(S): at 12:15

## 2024-05-29 RX ADMIN — Medication 150 MICROGRAM(S): at 05:53

## 2024-05-29 NOTE — CONSULT NOTE ADULT - SUBJECTIVE AND OBJECTIVE BOX
INFECTIOUS DISEASE CONSULT NOTE    Patient is a 53y old  Female who presents with a chief complaint of   HPI:    ·53-year-old female with pmh of DM on metformin, hepatitis B&C, asthma, COPD, hypothyroidism, pancreatitis, IV drug user in the past on Suboxone currently, presents to the ED with complaint of worsening lower extremity pain, swelling, erythema and ulcerations with weeping. Pain is worse with ambulation but no fevers or streaking up her legs. She was  discharged from inpatient hospital admission on May 10th with diagnosis of stasis dermatitis with pyoderma, had been on antibiotics, still on triamcinolone cream.. Patient adds that she has had a constant left sided "numbing"  chest pain present for 4 days. No modifying factors identified. Work up in the ER showed elevated troponin        (28 May 2024 03:08)         Prior hospital charts reviewed [Yes]  Primary team notes reviewed [Yes]  Other consultant notes reviewed [Yes]    REVIEW OF SYSTEMS:      PAST MEDICAL & SURGICAL HISTORY:  Asthma with COPD      Hypothyroidism      H/O: substance abuse  no longer using      History of pancreatitis      Hepatitis-C      Leukocytoclastic vasculitis      History of appendectomy      History of tonsillectomy          SOCIAL HISTORY:  - Born in _____, migrated to  in 19XX  - Currently working as / Retired  - Lives with _____; no pets  - No recent travel  - Denies tobacco use  - Denies alcohol use  - Denies illicit drug use  - Currently sexually active, has one male/female sexual partner    FAMILY HISTORY:      Allergies:  No Known Allergies      ANTIMICROBIALS:  cefepime   IVPB 2000 every 8 hours      ANTIMICROBIALS (past 90 days):  MEDICATIONS  (STANDING):  cefepime   IVPB   100 mL/Hr IV Intermittent (05-29-24 @ 05:52)   100 mL/Hr IV Intermittent (05-28-24 @ 21:05)        OTHER MEDS:   MEDICATIONS  (STANDING):  acetaminophen     Tablet .. 650 every 6 hours PRN  acetaminophen     Tablet .. 1000 every 8 hours  albuterol    90 MICROgram(s) HFA Inhaler 2 every 6 hours PRN  buprenorphine 8 mG/naloxone 2 mG SL  Tablet 2.5 daily  colchicine 0.6 two times a day  dextrose 50% Injectable 12.5 once  dextrose 50% Injectable 25 once  dextrose Oral Gel 15 once PRN  furosemide    Tablet 40 daily  glucagon  Injectable 1 once  insulin glargine Injectable (LANTUS) 28 at bedtime  insulin lispro (ADMELOG) corrective regimen sliding scale  three times a day before meals  levothyroxine 150 <User Schedule>  pantoprazole    Tablet 40 before breakfast      VITALS:  Vital Signs Last 24 Hrs  T(F): 97.4 (05-29-24 @ 05:18), Max: 98.7 (05-28-24 @ 21:16)    Vital Signs Last 24 Hrs  HR: 65 (05-29-24 @ 05:18) (58 - 72)  BP: 117/76 (05-29-24 @ 05:18) (103/66 - 117/76)  RR: 18 (05-29-24 @ 05:18)  SpO2: 100% (05-29-24 @ 05:18) (94% - 100%)  Wt(kg): --    EXAM:    Labs:                        12.0   6.96  )-----------( 346      ( 29 May 2024 06:26 )             37.3     05-29    137  |  96<L>  |  5<L>  ----------------------------<  193<H>  3.8   |  29  |  0.9    Ca    9.3      29 May 2024 06:26  Mg     2.0     05-29    TPro  8.2<H>  /  Alb  4.1  /  TBili  0.4  /  DBili  x   /  AST  21  /  ALT  <5  /  AlkPhos  97  05-29      WBC Trend:  WBC Count: 6.96 (05-29-24 @ 06:26)  WBC Count: 6.44 (05-28-24 @ 06:13)  WBC Count: 8.95 (05-27-24 @ 21:10)      Auto Neutrophil #: 4.71 K/uL (05-29-24 @ 06:26)  Auto Neutrophil #: 6.68 K/uL (05-27-24 @ 21:10)  Auto Neutrophil #: 5.83 K/uL (05-03-24 @ 17:21)  Auto Neutrophil #: 5.08 K/uL (04-14-24 @ 13:45)  Auto Neutrophil #: 5.98 K/uL (04-05-24 @ 08:05)      Creatine Trend:  Creatinine: 0.9 (05-29)  Creatinine: 0.7 (05-28)  Creatinine: 0.7 (05-27)      Liver Biochemical Testing Trend:  Alanine Aminotransferase (ALT/SGPT): <5 (05-29)  Alanine Aminotransferase (ALT/SGPT): <5 (05-28)  Alanine Aminotransferase (ALT/SGPT): <5 (05-27)  Alanine Aminotransferase (ALT/SGPT): 6 (05-05)  Alanine Aminotransferase (ALT/SGPT): 5 (05-03)  Aspartate Aminotransferase (AST/SGOT): 21 (05-29-24 @ 06:26)  Aspartate Aminotransferase (AST/SGOT): 16 (05-28-24 @ 06:13)  Aspartate Aminotransferase (AST/SGOT): 25 (05-27-24 @ 21:10)  Aspartate Aminotransferase (AST/SGOT): 28 (05-05-24 @ 08:58)  Aspartate Aminotransferase (AST/SGOT): 22 (05-03-24 @ 17:21)  Bilirubin Total: 0.4 (05-29)  Bilirubin Total: 0.3 (05-28)  Bilirubin Total: 0.4 (05-27)  Bilirubin Total: 0.4 (05-05)  Bilirubin Total: 0.5 (05-03)      Trend LDH  01-10-24 @ 06:55  230      Auto Eosinophil %: 2.0 % (05-29-24 @ 06:26)  Auto Eosinophil %: 1.6 % (05-27-24 @ 21:10)      Urinalysis Basic - ( 29 May 2024 06:26 )    Color: x / Appearance: x / SG: x / pH: x  Gluc: 193 mg/dL / Ketone: x  / Bili: x / Urobili: x   Blood: x / Protein: x / Nitrite: x   Leuk Esterase: x / RBC: x / WBC x   Sq Epi: x / Non Sq Epi: x / Bacteria: x        MICROBIOLOGY:    MRSA PCR Result.: Negative (05-04-24 @ 07:00)    HIV-1/2 Combo Result: Nonreact (01-11-24 @ 13:40)      Troponin T, High Sensitivity Result: 43 (05-28)  Troponin T, High Sensitivity Result: 39 (05-28)  Troponin T, High Sensitivity Result: 36 (05-27)    Lactate, Blood: 1.2 (05-27 @ 21:10)    A1C with Estimated Average Glucose Result: 9.1 % (05-28-24 @ 06:13)  A1C with Estimated Average Glucose Result: 9.4 % (05-09-24 @ 07:48)  A1C with Estimated Average Glucose Result: 9.7 % (05-08-24 @ 16:54)  A1C with Estimated Average Glucose Result: 9.7 % (03-19-24 @ 06:00)  A1C with Estimated Average Glucose Result: 6.5 % (01-10-24 @ 06:55)      RADIOLOGY:  imaging below personally reviewed    < from: Xray Chest 1 View AP/PA (05.27.24 @ 19:57) >  Impression:    Stable appearance of the lungs with no acute opacity.    < end of copied text >      < from: [facility] Duplex Lower Ext Vein Scan, Bilat (05.28.24 @ 09:30) >  FINDINGS:    RIGHT:  Normal compressibility of the RIGHT common femoral, femoral and popliteal   veins.  Doppler examination shows normal spontaneous and phasic flow.  No RIGHT calf vein thrombosis is detected.    LEFT:  Normal compressibility of the LEFT common femoral, femoral and popliteal   veins.  Doppler examination shows normal spontaneous and phasic flow.  No LEFT calf vein thrombosis is detected.    IMPRESSION:  No evidence of deep venous thrombosis in either lower extremity.    < end of copied text >   INFECTIOUS DISEASE CONSULT NOTE    Patient is a 53y old  Female who presents with a chief complaint of chest pain    HPI:  53-year-old female with pmh of DM on metformin, hepatitis B&C, asthma, COPD, hypothyroidism, pancreatitis, IV drug user in the past on Suboxone currently, presents to the ED with complaint of worsening lower extremity pain, swelling, erythema and ulcerations with weeping. Pain is worse with ambulation but no fevers or streaking up her legs. She was  discharged from inpatient hospital admission on May 10th with diagnosis of stasis dermatitis with pyoderma, had been on antibiotics, still on triamcinolone cream.. Patient adds that she has had a constant left sided "numbing"  chest pain present for 4 days. No modifying factors identified. Work up in the ER showed elevated troponin        (28 May 2024 03:08)       Prior hospital charts reviewed [Yes]  Primary team notes reviewed [Yes]  Other consultant notes reviewed [Yes]    REVIEW OF SYSTEMS:  CONSTITUTIONAL: No fever or chills  HEAD: No lesion on scalp  EYES: No visual disturbance  ENT: No sore throat  RESPIRATORY: No cough, no shortness of breath  CARDIOVASCULAR: + left chest pain, sharp  GASTROINTESTINAL: No abdominal or epigastric pain  GENITOURINARY: No dysuria  NEUROLOGICAL: No headache/dizziness  MUSCULOSKELETAL: No joint pain, erythema, or swelling; no back pain  SKIN: Multiple wounds on her bilateral LEs below knee, with redness. no significant warmth.  All other ROS negative except noted above      PAST MEDICAL & SURGICAL HISTORY:  Asthma with COPD      Hypothyroidism      H/O: substance abuse  no longer using      History of pancreatitis      Hepatitis-C      Leukocytoclastic vasculitis      History of appendectomy      History of tonsillectomy          SOCIAL HISTORY:  - No recent travel  - Denies tobacco use  - Denies alcohol use  - Denies active illicit drug use    FAMILY HISTORY:  No family history of autoimmune diseases or vasculitis      Allergies:  No Known Allergies      ANTIMICROBIALS:  cefepime   IVPB 2000 every 8 hours      ANTIMICROBIALS (past 90 days):  MEDICATIONS  (STANDING):  cefepime   IVPB   100 mL/Hr IV Intermittent (05-29-24 @ 05:52)   100 mL/Hr IV Intermittent (05-28-24 @ 21:05)        OTHER MEDS:   MEDICATIONS  (STANDING):  acetaminophen     Tablet .. 650 every 6 hours PRN  acetaminophen     Tablet .. 1000 every 8 hours  albuterol    90 MICROgram(s) HFA Inhaler 2 every 6 hours PRN  buprenorphine 8 mG/naloxone 2 mG SL  Tablet 2.5 daily  colchicine 0.6 two times a day  dextrose 50% Injectable 12.5 once  dextrose 50% Injectable 25 once  dextrose Oral Gel 15 once PRN  furosemide    Tablet 40 daily  glucagon  Injectable 1 once  insulin glargine Injectable (LANTUS) 28 at bedtime  insulin lispro (ADMELOG) corrective regimen sliding scale  three times a day before meals  levothyroxine 150 <User Schedule>  pantoprazole    Tablet 40 before breakfast      VITALS:  Vital Signs Last 24 Hrs  T(F): 97.4 (05-29-24 @ 05:18), Max: 98.7 (05-28-24 @ 21:16)    Vital Signs Last 24 Hrs  HR: 65 (05-29-24 @ 05:18) (58 - 72)  BP: 117/76 (05-29-24 @ 05:18) (103/66 - 117/76)  RR: 18 (05-29-24 @ 05:18)  SpO2: 100% (05-29-24 @ 05:18) (94% - 100%)  Wt(kg): --      EXAM:  GENERAL: NAD, lying in bed  HEAD: No head lesions  EYES: Conjunctiva pink and cornea white  EAR, NOSE, MOUTH, THROAT: Normal external ears and nose, no discharges; moist mucous membranes  NECK: Supple, nontender to palpation; no JVD  RESPIRATORY: Clear to auscultation bilaterally  CARDIOVASCULAR: S1 S2  GASTROINTESTINAL: Soft, nontender, nondistended; normoactive bowel sounds  EXTREMITIES: No clubbing, cyanosis, or petal edema  NERVOUS SYSTEM: Alert and oriented to person, time, place and situation, speech clear. No focal deficits   MUSCULOSKELETAL: Left anterior chest tenderness on palpation; no skin manifestation  SKIN: Bilateral LEs erythema with multiple draining wounds, + tender to touch, minimal warmth. , no superficial thrombophlebitis  PSYCH: Normal affect      Labs:                        12.0   6.96  )-----------( 346      ( 29 May 2024 06:26 )             37.3     05-29    137  |  96<L>  |  5<L>  ----------------------------<  193<H>  3.8   |  29  |  0.9    Ca    9.3      29 May 2024 06:26  Mg     2.0     05-29    TPro  8.2<H>  /  Alb  4.1  /  TBili  0.4  /  DBili  x   /  AST  21  /  ALT  <5  /  AlkPhos  97  05-29      WBC Trend:  WBC Count: 6.96 (05-29-24 @ 06:26)  WBC Count: 6.44 (05-28-24 @ 06:13)  WBC Count: 8.95 (05-27-24 @ 21:10)      Auto Neutrophil #: 4.71 K/uL (05-29-24 @ 06:26)  Auto Neutrophil #: 6.68 K/uL (05-27-24 @ 21:10)  Auto Neutrophil #: 5.83 K/uL (05-03-24 @ 17:21)  Auto Neutrophil #: 5.08 K/uL (04-14-24 @ 13:45)  Auto Neutrophil #: 5.98 K/uL (04-05-24 @ 08:05)      Creatine Trend:  Creatinine: 0.9 (05-29)  Creatinine: 0.7 (05-28)  Creatinine: 0.7 (05-27)      Liver Biochemical Testing Trend:  Alanine Aminotransferase (ALT/SGPT): <5 (05-29)  Alanine Aminotransferase (ALT/SGPT): <5 (05-28)  Alanine Aminotransferase (ALT/SGPT): <5 (05-27)  Alanine Aminotransferase (ALT/SGPT): 6 (05-05)  Alanine Aminotransferase (ALT/SGPT): 5 (05-03)  Aspartate Aminotransferase (AST/SGOT): 21 (05-29-24 @ 06:26)  Aspartate Aminotransferase (AST/SGOT): 16 (05-28-24 @ 06:13)  Aspartate Aminotransferase (AST/SGOT): 25 (05-27-24 @ 21:10)  Aspartate Aminotransferase (AST/SGOT): 28 (05-05-24 @ 08:58)  Aspartate Aminotransferase (AST/SGOT): 22 (05-03-24 @ 17:21)  Bilirubin Total: 0.4 (05-29)  Bilirubin Total: 0.3 (05-28)  Bilirubin Total: 0.4 (05-27)  Bilirubin Total: 0.4 (05-05)  Bilirubin Total: 0.5 (05-03)      Trend LDH  01-10-24 @ 06:55  230      Auto Eosinophil %: 2.0 % (05-29-24 @ 06:26)  Auto Eosinophil %: 1.6 % (05-27-24 @ 21:10)      Urinalysis Basic - ( 29 May 2024 06:26 )    Color: x / Appearance: x / SG: x / pH: x  Gluc: 193 mg/dL / Ketone: x  / Bili: x / Urobili: x   Blood: x / Protein: x / Nitrite: x   Leuk Esterase: x / RBC: x / WBC x   Sq Epi: x / Non Sq Epi: x / Bacteria: x        MICROBIOLOGY:    MRSA PCR Result.: Negative (05-04-24 @ 07:00)    HIV-1/2 Combo Result: Nonreact (01-11-24 @ 13:40)      Troponin T, High Sensitivity Result: 43 (05-28)  Troponin T, High Sensitivity Result: 39 (05-28)  Troponin T, High Sensitivity Result: 36 (05-27)    Lactate, Blood: 1.2 (05-27 @ 21:10)    A1C with Estimated Average Glucose Result: 9.1 % (05-28-24 @ 06:13)  A1C with Estimated Average Glucose Result: 9.4 % (05-09-24 @ 07:48)  A1C with Estimated Average Glucose Result: 9.7 % (05-08-24 @ 16:54)  A1C with Estimated Average Glucose Result: 9.7 % (03-19-24 @ 06:00)  A1C with Estimated Average Glucose Result: 6.5 % (01-10-24 @ 06:55)      RADIOLOGY:  imaging below personally reviewed    < from: Xray Chest 1 View AP/PA (05.27.24 @ 19:57) >  Impression:    Stable appearance of the lungs with no acute opacity.    < end of copied text >      < from: VA Duplex Lower Ext Vein Scan, Bilat (05.28.24 @ 09:30) >  FINDINGS:    RIGHT:  Normal compressibility of the RIGHT common femoral, femoral and popliteal   veins.  Doppler examination shows normal spontaneous and phasic flow.  No RIGHT calf vein thrombosis is detected.    LEFT:  Normal compressibility of the LEFT common femoral, femoral and popliteal   veins.  Doppler examination shows normal spontaneous and phasic flow.  No LEFT calf vein thrombosis is detected.    IMPRESSION:  No evidence of deep venous thrombosis in either lower extremity.    < end of copied text >

## 2024-05-29 NOTE — PHYSICAL THERAPY INITIAL EVALUATION ADULT - PERTINENT HX OF CURRENT PROBLEM, REHAB EVAL
53-year-old female with pmh of DM on metformin, hepatitis B&C, asthma, COPD, hypothyroidism, pancreatitis, IV drug user in the past on Suboxone currently, presents to the ED with complaint of worsening lower extremity pain, swelling, erythema and ulcerations with weeping. Pain is worse with ambulation but no fevers or streaking up her legs. She was  discharged from inpatient hospital admission on May 10th with diagnosis of stasis dermatitis with pyoderma, had been on antibiotics, still on triamcinolone cream.. Patient adds that she has had a constant left sided "numbing"  chest pain present for 4 days. No modifying factors identified. Work up in the ER showed elevated troponin

## 2024-05-29 NOTE — CONSULT NOTE ADULT - SUBJECTIVE AND OBJECTIVE BOX
HPI: Patient is a 53M with PMH of DM on metformin, hepatitis B&C, asthma, COPD, hypothyroidism, pancreatitis, IV drug user in the past on Suboxone with complaints of worsening lower extremity pain, swelling, erythema and ulcerations with weeping. Pain is worse with ambulation but no fevers or streaking up her legs. She was discharged from inpatient hospital admission on May 10th with diagnosis of stasis dermatitis with pyoderma, had been on antibiotics, still on triamcinolone cream.. Patient adds that she has had a constant left sided "numbing"  chest pain present for 4 days. Pain medicine services now consulted for assistance managing neuropathic pain,    Pain medicine services at [facility], reviewed chart and communicates with team.    Current Inpatient Medication Regimen:  acetaminophen     Tablet .. 650 milliGRAM(s) Oral every 6 hours PRN  acetaminophen     Tablet .. 1000 milliGRAM(s) Oral every 8 hours  albuterol    90 MICROgram(s) HFA Inhaler 2 Puff(s) Inhalation every 6 hours PRN  buprenorphine 8 mG/naloxone 2 mG SL  Tablet 2.5 Tablet(s) SubLingual daily  cefepime   IVPB 2000 milliGRAM(s) IV Intermittent every 8 hours  colchicine 0.6 milliGRAM(s) Oral two times a day  dextrose 10% Bolus 125 milliLiter(s) IV Bolus once  dextrose 5%. 1000 milliLiter(s) IV Continuous <Continuous>  dextrose 5%. 1000 milliLiter(s) IV Continuous <Continuous>  dextrose 50% Injectable 12.5 Gram(s) IV Push once  dextrose 50% Injectable 25 Gram(s) IV Push once  dextrose Oral Gel 15 Gram(s) Oral once PRN  furosemide   Injectable 40 milliGRAM(s) IV Push daily  gabapentin 300 milliGRAM(s) Oral four times a day  glucagon  Injectable 1 milliGRAM(s) IntraMuscular once  insulin glargine Injectable (LANTUS) 28 Unit(s) SubCutaneous at bedtime  insulin lispro (ADMELOG) corrective regimen sliding scale   SubCutaneous three times a day before meals  levothyroxine 150 MICROGram(s) Oral <User Schedule>  pantoprazole    Tablet 40 milliGRAM(s) Oral before breakfast      Allergies:  No Known Allergies      Past Medical History:  Chest pain    Asthma with COPD    Hypothyroidism    Substance abuse    H/O: substance abuse    History of pancreatitis    Hepatitis-C    Leukocytoclastic vasculitis    Leukocytoclastic vasculitis    Chest pain    History of appendectomy    History of tonsillectomy    Physical Exam:  T(C): 36.3 (05-29-24 @ 05:18), Max: 37.1 (05-28-24 @ 21:16)  HR: 65 (05-29-24 @ 05:18) (58 - 72)  BP: 117/76 (05-29-24 @ 05:18) (103/66 - 117/76)  RR: 18 (05-29-24 @ 05:18) (18 - 18)  SpO2: 100% (05-29-24 @ 05:18) (94% - 100%)      Labs:  CBC  6.96 K/uL [4.80 - 10.80] > 12.0 g/dL [12.0 - 16.0] / 37.3 % [37.0 - 47.0] < 346 K/uL [130 - 400]      BMP  137 mmol/L [135 - 146] | 96 mmol/L<L> [98 - 110] | 5 mg/dL<L> [10 - 20]  3.8 mmol/L [3.5 - 5.0] | 29 mmol/L [17 - 32] | 0.9 mg/dL [0.7 - 1.5]    193 mg/dL<H> [70 - 99]        Opioid Risk Assessment Tool                                                                           Female       Male  Family History  Alcohol                                                              1                3  Illegal drugs                                                       2                3  Rx drugs                                                            4                4    Personal History   Alcohol                                                              3                3  Illegal drugs                                                       4                4  Rx drugs                                                            5                5    Age between 16—45 years                                1                1  History of preadolescent sexual abuse               3                0    Psychological disease  ADD, OCD, bipolar, schizophrenia                      2                2  Depression                                                       1                1    Total Score                                                      4           __    0 - 3 = low risk for future opioid abuse  4 - 7 = moderate risk for future opioid abuse  8+ = high risk for future opioid abuse

## 2024-05-29 NOTE — CONSULT NOTE ADULT - ASSESSMENT
A/P:  Patient is a 53M with PMH of DM on metformin, hepatitis B&C, asthma, COPD, hypothyroidism, pancreatitis, IV drug user in the past on Suboxone with complaints of worsening lower extremity pain, swelling, erythema and ulcerations with weeping. Pain is worse with ambulation but no fevers or streaking up her legs. She was discharged from inpatient hospital admission on May 10th with diagnosis of stasis dermatitis with pyoderma, had been on antibiotics, still on triamcinolone cream.. Patient adds that she has had a constant left sided "numbing"  chest pain present for 4 days. Pain medicine services now consulted for assistance managing pain.    Pain medicine services at [facility], reviewed chart and communicates with team.    #Bilateral leg pain  - tylenol 975mg q8h standing  - duloxetine 30mg daily  - pregabalin 50mg q12h standing   - toradol 15mg q12hr PRN for moderate pain  - wound care consult  - history of polysubstance abuse, avoid narcotics   - bowel regimen as per primary team

## 2024-05-29 NOTE — PHYSICAL THERAPY INITIAL EVALUATION ADULT - ADDITIONAL COMMENTS
as Per pt she lives in  w / 16 steps to enter W/ RT HR and all in one level inside the house , as per pt she was independent W/ Bed mobility , transfer and ambulation W/ RW and need Assistance for  ADLS Which BF help her with

## 2024-05-29 NOTE — PROGRESS NOTE ADULT - SUBJECTIVE AND OBJECTIVE BOX
Progress note    INTERVAL HPI/OVERNIGHT EVENTS:    Patient seen and examined at bedside. She states her CP is pleuritic, not associated with exertion or rest. Worse with coughing.      REVIEW OF SYSTEMS:  All other 13 Review of systems were reviewed and are negative    FAMILY HISTORY:    T(C): 36.3 (05-29-24 @ 05:18), Max: 37.1 (05-28-24 @ 21:16)  HR: 65 (05-29-24 @ 05:18) (58 - 72)  BP: 117/76 (05-29-24 @ 05:18) (103/66 - 117/76)  RR: 18 (05-29-24 @ 05:18) (18 - 18)  SpO2: 100% (05-29-24 @ 05:18) (94% - 100%)  Wt(kg): --Vital Signs Last 24 Hrs  T(C): 36.3 (29 May 2024 05:18), Max: 37.1 (28 May 2024 21:16)  T(F): 97.4 (29 May 2024 05:18), Max: 98.7 (28 May 2024 21:16)  HR: 65 (29 May 2024 05:18) (58 - 72)  BP: 117/76 (29 May 2024 05:18) (103/66 - 117/76)  BP(mean): --  RR: 18 (29 May 2024 05:18) (18 - 18)  SpO2: 100% (29 May 2024 05:18) (94% - 100%)    Parameters below as of 28 May 2024 21:16  Patient On (Oxygen Delivery Method): room air      No Known Allergies      PHYSICAL EXAM:    General: WN/WD NAD  Neurology: A&Ox3, nonfocal, LEMUS x 4  Head:  Normocephalic, atraumatic  ENT:  Mucosa moist, no ulcerations  Neck:  Supple, no sinuses or palpable masses  Lymphatic:  No palpable cervical, supraclavicular, axillary or inguinal adenopathy  Respiratory: CTA B/L  CV: RRR, S1S2, no murmur  Abdominal: Soft, NT, ND no palpable mass  MSK: bl le edema and tenderness with erythema and some ulcers  Incisions: intact, no erythema or drainage    Consultant(s) Notes Reviewed:  [x ] YES  [ ] NO  Care Discussed with Consultants/Other Providers [ x] YES  [ ] NO    LABS:      RADIOLOGY & ADDITIONAL TESTS:    Imaging Personally Reviewed:  [ ] YES  [ ] NO  acetaminophen     Tablet .. 1000 milliGRAM(s) Oral every 8 hours  acetaminophen     Tablet .. 650 milliGRAM(s) Oral every 6 hours PRN  albuterol    90 MICROgram(s) HFA Inhaler 2 Puff(s) Inhalation every 6 hours PRN  buprenorphine 8 mG/naloxone 2 mG SL  Tablet 2.5 Tablet(s) SubLingual daily  cefepime   IVPB 2000 milliGRAM(s) IV Intermittent every 8 hours  colchicine 0.6 milliGRAM(s) Oral two times a day  dextrose 10% Bolus 125 milliLiter(s) IV Bolus once  dextrose 5%. 1000 milliLiter(s) IV Continuous <Continuous>  dextrose 5%. 1000 milliLiter(s) IV Continuous <Continuous>  dextrose 50% Injectable 12.5 Gram(s) IV Push once  dextrose 50% Injectable 25 Gram(s) IV Push once  dextrose Oral Gel 15 Gram(s) Oral once PRN  furosemide    Tablet 40 milliGRAM(s) Oral daily  glucagon  Injectable 1 milliGRAM(s) IntraMuscular once  insulin glargine Injectable (LANTUS) 28 Unit(s) SubCutaneous at bedtime  insulin lispro (ADMELOG) corrective regimen sliding scale   SubCutaneous three times a day before meals  levothyroxine 150 MICROGram(s) Oral <User Schedule>  pantoprazole    Tablet 40 milliGRAM(s) Oral before breakfast      HEALTH ISSUES - PROBLEM Dx:    53-year-old female with pmh of DM on metformin, hepatitis B&C, asthma, COPD, hypothyroidism, pancreatitis, IV drug user in the past on Suboxone currently, presents to the ED with complaint of worsening lower extremity pain, swelling, erythema and ulcerations with weeping.    Assessment    ·	Possible cellulitis superimposed on leukocytoclastic vasculitis  ·	DM  ·	Hx of hepatitis B and C  ·	Asthma and COPD  ·	Hypothyroidism  ·	Former IV drug abuse on suboxone    Plan    - C/w cefepime for now, patient was recently admitted to [place] with the same problem / Previous hospitalist saw this patient a few months ago when the leukocytoclastic vasculitis diagnosis was confirmed with pathology, at the time the patient did not have this much erythema and open ulcers / will treat as a superimposed cellulitis for now, mrsa swab in [place] was negative  - at [place] patient was seen by derm and ordered for triamcinolone 0.1% cream bid / for now will hold off on it and see if any clinical improvement on abx  - patient was seen in the office by rheum and was on prednisone but tapered off as there was concern for the edema worsening her symptoms and her clinical picture at the time didn't fit what was expected of leukocytoclast vasculitis / howver watned colchicine continued  - Start IV furosemide 40mg qd for now. Also patient was added on pioglitazone by endo last admission which can cause/worsen lower extremity edema  - standing tylenol for pain, add gabapentin 300mg qid, pain mgmt consult for recommendations considering patient is on suboxone  - TSH with reflex T4 pending  - PT and wound care pending  - Lexiscan when stable   - home inhalers  - insulin 28 / ss  - Check [facility] art duplex for poor wound healing, repeated cellulitis  -if stalling can consider triamcinolone cream    # DVT PPX: contraindications with AC and leukocytoclastic vasculitis    Dispo: acute

## 2024-05-29 NOTE — CONSULT NOTE ADULT - ASSESSMENT
ID is consulted for bilateral LE wounds  Multiple repeated admissions 2/2 worsening wounds  Was not able to follow up with specialties to address the underlying issue, biopsy confirmed leukocytoclastic vasculitis on 1/10/24  Afebrile, WBC 8.95  BCx NGTD  Prior wound Cx 5/6 with MSSA  LE duplex no DVT  Hepatitis C Ab +, PCR undetectable 2/2/24  Hepatitis B immune  DULCE negative  RF negative  p-ANCA positive, titer 1:40  5/3 ESR 47, CRP 44.1    Antibiotics:  Cefepime 5/28 ->      IMPRESSION:  Bilateral LE wounds  Leukocytoclastic vasculitis  Venous stasis dermatitis  Hx hepatitis C    RECOMMENDATIONS:        * THIS IS AN INCOMPLETE NOTE. FINAL RECOMMENDATION IS PENDING *   53-year-old female with pmh of DM on metformin, hepatitis B&C, asthma, COPD, hypothyroidism, pancreatitis, IV drug user in the past on Suboxone currently, presents to the ED with complaint of worsening lower extremity pain, swelling, erythema and ulcerations with weeping.    ID is consulted for bilateral LE wounds  Multiple repeated admissions 2/2 worsening wounds  Was not able to follow up with specialties to address the underlying issue, biopsy confirmed leukocytoclastic vasculitis on 1/10/24  Afebrile, WBC 8.95  BCx NGTD  Prior wound Cx 5/6 with MSSA  LE duplex no DVT  Hepatitis C Ab +, PCR undetectable 2/2/24  Hepatitis B immune  DULCE negative  RF negative  p-ANCA positive, titer 1:40  5/3 ESR 47, CRP 44.1    Antibiotics:  Cefepime 5/28 ->      IMPRESSION:  Bilateral LE wounds  Leukocytoclastic vasculitis  Venous stasis dermatitis  Hx hepatitis C    RECOMMENDATIONS:  - Continue IV cefepime 2g q8hrs  - Follow up BCx  - Repeat Hepatitis C PCR  - Local wound care; wound care consult if needed  - Given multiple readmissions, patient might benefit from suppressive abx (likely PO Keflex 500mg q12hrs) until she is seen by other specialists to address the underlying vasculitis  - Offloading and frequent position changes, aspiration precaution  - Trend WBC, fever curve, transaminases, creatinine daily      Snow Saleh D.O.  Attending Physician  Division of Infectious Diseases  [facility]  Please contact me via [employer] Teams

## 2024-05-29 NOTE — PHYSICAL THERAPY INITIAL EVALUATION ADULT - GENERAL OBSERVATIONS, REHAB EVAL
14:50 -15:20 Chart reviewed. Order received.  Patient is ok to be  seen for PT,confirmed with RN. pt encountered  Sitting at E[facility], C/o BLE pain, and agrees to participate in session, +  Heplock , + Tele ,NAD.

## 2024-05-29 NOTE — PHYSICAL THERAPY INITIAL EVALUATION ADULT - LEVEL OF INDEPENDENCE: SCOOT/BRIDGE, REHAB EVAL
Have You Had A Chemical Peel Before?: has had a previous peel
When Outside In The Sun, Do You...: always burns, never tans
When Was Your Last Peel?: 8/20/22
stand-by assist

## 2024-05-30 LAB
ALBUMIN SERPL ELPH-MCNC: 3.7 G/DL — SIGNIFICANT CHANGE UP (ref 3.5–5.2)
ALP SERPL-CCNC: 93 U/L — SIGNIFICANT CHANGE UP (ref 30–115)
ALT FLD-CCNC: <5 U/L — SIGNIFICANT CHANGE UP (ref 0–41)
ANION GAP SERPL CALC-SCNC: 14 MMOL/L — SIGNIFICANT CHANGE UP (ref 7–14)
AST SERPL-CCNC: 19 U/L — SIGNIFICANT CHANGE UP (ref 0–41)
BILIRUB SERPL-MCNC: 0.3 MG/DL — SIGNIFICANT CHANGE UP (ref 0.2–1.2)
BUN SERPL-MCNC: 6 MG/DL — LOW (ref 10–20)
CALCIUM SERPL-MCNC: 8.8 MG/DL — SIGNIFICANT CHANGE UP (ref 8.4–10.5)
CHLORIDE SERPL-SCNC: 95 MMOL/L — LOW (ref 98–110)
CO2 SERPL-SCNC: 31 MMOL/L — SIGNIFICANT CHANGE UP (ref 17–32)
CREAT SERPL-MCNC: 0.9 MG/DL — SIGNIFICANT CHANGE UP (ref 0.7–1.5)
EGFR: 76 ML/MIN/1.73M2 — SIGNIFICANT CHANGE UP
GLUCOSE BLDC GLUCOMTR-MCNC: 142 MG/DL — HIGH (ref 70–99)
GLUCOSE BLDC GLUCOMTR-MCNC: 146 MG/DL — HIGH (ref 70–99)
GLUCOSE BLDC GLUCOMTR-MCNC: 216 MG/DL — HIGH (ref 70–99)
GLUCOSE BLDC GLUCOMTR-MCNC: 262 MG/DL — HIGH (ref 70–99)
GLUCOSE BLDC GLUCOMTR-MCNC: 300 MG/DL — HIGH (ref 70–99)
GLUCOSE SERPL-MCNC: 157 MG/DL — HIGH (ref 70–99)
HCT VFR BLD CALC: 36.4 % — LOW (ref 37–47)
HGB BLD-MCNC: 11.2 G/DL — LOW (ref 12–16)
MAGNESIUM SERPL-MCNC: 2 MG/DL — SIGNIFICANT CHANGE UP (ref 1.8–2.4)
MCHC RBC-ENTMCNC: 26.2 PG — LOW (ref 27–31)
MCHC RBC-ENTMCNC: 30.8 G/DL — LOW (ref 32–37)
MCV RBC AUTO: 85.2 FL — SIGNIFICANT CHANGE UP (ref 81–99)
NRBC # BLD: 0 /100 WBCS — SIGNIFICANT CHANGE UP (ref 0–0)
PLATELET # BLD AUTO: 352 K/UL — SIGNIFICANT CHANGE UP (ref 130–400)
PMV BLD: 9.6 FL — SIGNIFICANT CHANGE UP (ref 7.4–10.4)
POTASSIUM SERPL-MCNC: 3.7 MMOL/L — SIGNIFICANT CHANGE UP (ref 3.5–5)
POTASSIUM SERPL-SCNC: 3.7 MMOL/L — SIGNIFICANT CHANGE UP (ref 3.5–5)
PROT SERPL-MCNC: 7.1 G/DL — SIGNIFICANT CHANGE UP (ref 6–8)
RBC # BLD: 4.27 M/UL — SIGNIFICANT CHANGE UP (ref 4.2–5.4)
RBC # FLD: 15.6 % — HIGH (ref 11.5–14.5)
SODIUM SERPL-SCNC: 140 MMOL/L — SIGNIFICANT CHANGE UP (ref 135–146)
WBC # BLD: 6.11 K/UL — SIGNIFICANT CHANGE UP (ref 4.8–10.8)
WBC # FLD AUTO: 6.11 K/UL — SIGNIFICANT CHANGE UP (ref 4.8–10.8)

## 2024-05-30 PROCEDURE — 76937 US GUIDE VASCULAR ACCESS: CPT | Mod: 26

## 2024-05-30 PROCEDURE — 99232 SBSQ HOSP IP/OBS MODERATE 35: CPT

## 2024-05-30 PROCEDURE — 36000 PLACE NEEDLE IN VEIN: CPT

## 2024-05-30 RX ORDER — INSULIN LISPRO 100/ML
6 VIAL (ML) SUBCUTANEOUS ONCE
Refills: 0 | Status: COMPLETED | OUTPATIENT
Start: 2024-05-30 | End: 2024-05-30

## 2024-05-30 RX ORDER — DULOXETINE HYDROCHLORIDE 30 MG/1
30 CAPSULE, DELAYED RELEASE ORAL DAILY
Refills: 0 | Status: DISCONTINUED | OUTPATIENT
Start: 2024-05-30 | End: 2024-06-08

## 2024-05-30 RX ORDER — ONDANSETRON 8 MG/1
4 TABLET, FILM COATED ORAL EVERY 8 HOURS
Refills: 0 | Status: DISCONTINUED | OUTPATIENT
Start: 2024-05-30 | End: 2024-06-08

## 2024-05-30 RX ORDER — TRAZODONE HCL 50 MG
50 TABLET ORAL ONCE
Refills: 0 | Status: COMPLETED | OUTPATIENT
Start: 2024-05-30 | End: 2024-05-30

## 2024-05-30 RX ORDER — KETOROLAC TROMETHAMINE 30 MG/ML
15 SYRINGE (ML) INJECTION EVERY 12 HOURS
Refills: 0 | Status: DISCONTINUED | OUTPATIENT
Start: 2024-05-30 | End: 2024-06-04

## 2024-05-30 RX ADMIN — GABAPENTIN 300 MILLIGRAM(S): 400 CAPSULE ORAL at 05:26

## 2024-05-30 RX ADMIN — Medication 0.6 MILLIGRAM(S): at 05:26

## 2024-05-30 RX ADMIN — CEFEPIME 100 MILLIGRAM(S): 1 INJECTION, POWDER, FOR SOLUTION INTRAMUSCULAR; INTRAVENOUS at 14:15

## 2024-05-30 RX ADMIN — CEFEPIME 100 MILLIGRAM(S): 1 INJECTION, POWDER, FOR SOLUTION INTRAMUSCULAR; INTRAVENOUS at 05:27

## 2024-05-30 RX ADMIN — Medication 1000 MILLIGRAM(S): at 15:49

## 2024-05-30 RX ADMIN — BUPRENORPHINE AND NALOXONE 2.5 TABLET(S): 2; .5 TABLET SUBLINGUAL at 11:19

## 2024-05-30 RX ADMIN — Medication 50 MILLIGRAM(S): at 08:17

## 2024-05-30 RX ADMIN — GABAPENTIN 300 MILLIGRAM(S): 400 CAPSULE ORAL at 18:27

## 2024-05-30 RX ADMIN — Medication 2: at 12:15

## 2024-05-30 RX ADMIN — Medication 6 UNIT(S): at 21:38

## 2024-05-30 RX ADMIN — PANTOPRAZOLE SODIUM 40 MILLIGRAM(S): 20 TABLET, DELAYED RELEASE ORAL at 05:27

## 2024-05-30 RX ADMIN — Medication 1000 MILLIGRAM(S): at 05:26

## 2024-05-30 RX ADMIN — GABAPENTIN 300 MILLIGRAM(S): 400 CAPSULE ORAL at 11:19

## 2024-05-30 RX ADMIN — Medication 50 MILLIGRAM(S): at 18:27

## 2024-05-30 RX ADMIN — Medication 0.6 MILLIGRAM(S): at 18:27

## 2024-05-30 RX ADMIN — Medication 150 MICROGRAM(S): at 05:26

## 2024-05-30 RX ADMIN — Medication 1000 MILLIGRAM(S): at 21:40

## 2024-05-30 RX ADMIN — Medication 40 MILLIGRAM(S): at 05:27

## 2024-05-30 RX ADMIN — Medication 50 MILLIGRAM(S): at 21:40

## 2024-05-30 RX ADMIN — DULOXETINE HYDROCHLORIDE 30 MILLIGRAM(S): 30 CAPSULE, DELAYED RELEASE ORAL at 11:20

## 2024-05-30 RX ADMIN — Medication 1000 MILLIGRAM(S): at 22:49

## 2024-05-30 RX ADMIN — CEFEPIME 100 MILLIGRAM(S): 1 INJECTION, POWDER, FOR SOLUTION INTRAMUSCULAR; INTRAVENOUS at 21:41

## 2024-05-30 RX ADMIN — GABAPENTIN 300 MILLIGRAM(S): 400 CAPSULE ORAL at 23:40

## 2024-05-30 RX ADMIN — Medication 1000 MILLIGRAM(S): at 14:16

## 2024-05-30 RX ADMIN — INSULIN GLARGINE 28 UNIT(S): 100 INJECTION, SOLUTION SUBCUTANEOUS at 21:39

## 2024-05-30 NOTE — PROGRESS NOTE ADULT - ASSESSMENT
53-year-old female with pmh of DM on metformin, hepatitis B&C, asthma, COPD, hypothyroidism, pancreatitis, IV drug user in the past on Suboxone currently, presents to the ED with complaint of worsening lower extremity pain, swelling, erythema and ulcerations with weeping.    ID is following for bilateral LE wounds  Multiple repeated admissions 2/2 worsening wounds  Was not able to follow up with specialties to address the underlying issue, biopsy confirmed leukocytoclastic vasculitis on 1/10/24  Afebrile, WBC 8.95  BCx NGTD  Prior wound Cx 5/6 with MSSA  LE duplex no DVT  Hepatitis C Ab +, PCR undetectable 2/2/24  Hepatitis B immune  DULCE negative  RF negative  p-ANCA positive, titer 1:40  5/3 ESR 47, CRP 44.1    Antibiotics:  Cefepime 5/28 ->      IMPRESSION:  Bilateral LE wounds  Leukocytoclastic vasculitis  Venous stasis dermatitis  Hx hepatitis C    RECOMMENDATIONS:  - Continue IV cefepime 2g q8hrs  - Follow up BCx  - Repeat Hepatitis C PCR (ordered)  - Local wound care; wound care consult if needed  - Patient has high risk for secondary infection. Given multiple readmissions, patient might benefit from suppressive abx (likely PO Keflex 500mg q12hrs) until she is seen by other specialists to address the underlying vasculitis  - Offloading and frequent position changes, aspiration precaution  - Trend WBC, fever curve, transaminases, creatinine daily        * THIS IS AN INCOMPLETE NOTE. FINAL RECOMMENDATION IS PENDING *     53-year-old female with pmh of DM on metformin, hepatitis B&C, asthma, COPD, hypothyroidism, pancreatitis, IV drug user in the past on Suboxone currently, presents to the ED with complaint of worsening lower extremity pain, swelling, erythema and ulcerations with weeping.    ID is following for bilateral LE wounds  Multiple repeated admissions 2/2 worsening wounds  Was not able to follow up with specialties to address the underlying issue, biopsy confirmed leukocytoclastic vasculitis on 1/10/24  Afebrile, WBC 8.95  BCx NGTD  Prior wound Cx 5/6 with MSSA  LE duplex no DVT  Hepatitis C Ab +, PCR undetectable 2/2/24  Cryoglobulin negative 2/2/24  Hepatitis B immune  DULCE negative  RF negative  p-ANCA positive, titer 1:40  5/3 ESR 47, CRP 44.1      Antibiotics:  Cefepime 5/28 ->      IMPRESSION:  Bilateral LE wounds  Leukocytoclastic vasculitis  Venous stasis dermatitis  Hx hepatitis C, not treated so likely spontaneous clearance    RECOMMENDATIONS:  - Continue IV cefepime 2g q8hrs  - On discharge, can switch to PO Keflex 1g q8hrs to complete 7-day treatment (until 6/3), then start PO Keflex 500mg q12hrs for suppression to prevent recurrent infection  - Patient has high risk for secondary infection. Given multiple readmissions, patient might benefit from suppressive abx until she is seen by other specialists to address the underlying vasculitis  - Follow up BCx  - Repeat Hepatitis C PCR (ordered)  - Local wound care; wound care consult if needed  - Offloading and frequent position changes, aspiration precaution  - Trend WBC, fever curve, transaminases, creatinine daily      Snow Saleh D.O.  Attending Physician  Division of Infectious Diseases  [facility]  Please contact me via [employer] Teams

## 2024-05-30 NOTE — PROGRESS NOTE ADULT - SUBJECTIVE AND OBJECTIVE BOX
Progress note    INTERVAL HPI/OVERNIGHT EVENTS:    Patient seen and examined at bedside. No chest pain or shortness of breath.      REVIEW OF SYSTEMS:  All other 13 Review of systems were reviewed and are negative    FAMILY HISTORY:    T(C): 36.5 (05-30-24 @ 14:07), Max: 36.5 (05-30-24 @ 14:07)  HR: 58 (05-30-24 @ 14:07) (52 - 66)  BP: 105/62 (05-30-24 @ 14:07) (105/62 - 113/74)  RR: 16 (05-30-24 @ 14:07) (16 - 18)  SpO2: 98% (05-30-24 @ 14:07) (96% - 98%)  Wt(kg): --Vital Signs Last 24 Hrs  T(C): 36.5 (30 May 2024 14:07), Max: 36.5 (30 May 2024 14:07)  T(F): 97.7 (30 May 2024 14:07), Max: 97.7 (30 May 2024 14:07)  HR: 58 (30 May 2024 14:07) (52 - 66)  BP: 105/62 (30 May 2024 14:07) (105/62 - 113/74)  BP(mean): --  RR: 16 (30 May 2024 14:07) (16 - 18)  SpO2: 98% (30 May 2024 14:07) (96% - 98%)    Parameters below as of 30 May 2024 14:07  Patient On (Oxygen Delivery Method): room air      No Known Allergies      PHYSICAL EXAM:    General: WN/WD NAD  Neurology: A&Ox3, nonfocal, LEMUS x 4  Head:  Normocephalic, atraumatic  ENT:  Mucosa moist, no ulcerations  Neck:  Supple, no sinuses or palpable masses  Lymphatic:  No palpable cervical, supraclavicular, axillary or inguinal adenopathy  Respiratory: CTA B/L  CV: RRR, S1S2, no murmur  Abdominal: Soft, NT, ND no palpable mass  MSK: bl le edema and tenderness with erythema and some ulcers  Incisions: intact, no erythema or drainage    Consultant(s) Notes Reviewed:  [x ] YES  [ ] NO  Care Discussed with Consultants/Other Providers [ x] YES  [ ] NO    LABS:      RADIOLOGY & ADDITIONAL TESTS:    Imaging Personally Reviewed:  [ ] YES  [ ] NO  acetaminophen     Tablet .. 1000 milliGRAM(s) Oral every 8 hours  albuterol    90 MICROgram(s) HFA Inhaler 2 Puff(s) Inhalation every 6 hours PRN  buprenorphine 8 mG/naloxone 2 mG SL  Tablet 2.5 Tablet(s) SubLingual daily  cefepime   IVPB 2000 milliGRAM(s) IV Intermittent every 8 hours  colchicine 0.6 milliGRAM(s) Oral two times a day  dextrose 10% Bolus 125 milliLiter(s) IV Bolus once  dextrose 5%. 1000 milliLiter(s) IV Continuous <Continuous>  dextrose 5%. 1000 milliLiter(s) IV Continuous <Continuous>  dextrose 50% Injectable 25 Gram(s) IV Push once  dextrose 50% Injectable 12.5 Gram(s) IV Push once  dextrose Oral Gel 15 Gram(s) Oral once PRN  DULoxetine 30 milliGRAM(s) Oral daily  furosemide   Injectable 40 milliGRAM(s) IV Push daily  gabapentin 300 milliGRAM(s) Oral four times a day  glucagon  Injectable 1 milliGRAM(s) IntraMuscular once  insulin glargine Injectable (LANTUS) 28 Unit(s) SubCutaneous at bedtime  insulin lispro (ADMELOG) corrective regimen sliding scale   SubCutaneous three times a day before meals  ketorolac   Injectable 15 milliGRAM(s) IV Push every 12 hours PRN  levothyroxine 150 MICROGram(s) Oral <User Schedule>  ondansetron Injectable 4 milliGRAM(s) IV Push every 8 hours PRN  pantoprazole    Tablet 40 milliGRAM(s) Oral before breakfast  pregabalin 50 milliGRAM(s) Oral every 12 hours      HEALTH ISSUES - PROBLEM Dx:    53-year-old female with pmh of DM on metformin, hepatitis B&C, asthma, COPD, hypothyroidism, pancreatitis, IV drug user in the past on Suboxone currently, presents to the ED with complaint of worsening lower extremity pain, swelling, erythema and ulcerations with weeping.    Assessment    ·	Possible cellulitis superimposed on leukocytoclastic vasculitis  ·	DM  ·	Hx of hepatitis B and C  ·	Asthma and COPD  ·	Hypothyroidism  ·	Former IV drug abuse on suboxone    Plan    - C/w cefepime for now, patient was recently admitted to [place] with the same problem / Previous hospitalist saw this patient a few months ago when the leukocytoclastic vasculitis diagnosis was confirmed with pathology, at the time the patient did not have this much erythema and open ulcers / will treat as a superimposed cellulitis for now, mrsa swab in [place] was negative  - at [place] patient was seen by derm and ordered for triamcinolone 0.1% cream bid / for now will hold off on it and see if any clinical improvement on abx  - patient was seen in the office by rheum and was on prednisone but tapered off as there was concern for the edema worsening her symptoms and her clinical picture at the time didn't fit what was expected of leukocytoclast vasculitis / howver watned colchicine continued  - c/w IV furosemide 40mg qd for now. Also patient was added on pioglitazone by endo last admission which can cause/worsen lower extremity edema  - standing tylenol for pain, c/w gabapentin 300mg qid, pain mgmt consult for recommendations considering patient is on suboxone  - , T4 0.4, c/w synthroid 150mcg, consult endocrinology  - PT and wound care pending  - Lexiscan when stable, will f/u w/ cardiology tomorrow if ischemic evaluation inpt vs outpt. Will make NPO pmn   - home inhalers  - insulin 28 / ss  - [facility] art duplex - normal blood flow  -if stalling can consider triamcinolone cream    # DVT PPX: contraindications with AC and leukocytoclastic vasculitis    Dispo: acute  Healthcare maintanence: On discharge, can switch to PO Keflex 1g q8hrs to complete 7-day treatment (until 6/3), then start PO Keflex 500mg q12hrs for suppression to prevent recurrent infection  - Patient has high risk for secondary infection. Given multiple readmissions, patient might benefit from suppressive abx until she is seen by other specialists to address the underlying vasculitis

## 2024-05-30 NOTE — CHART NOTE - NSCHARTNOTEFT_GEN_A_CORE
pt requesting Trazodone 50mg for sleep; states she has taken this before for insomnia.  Will Rx one time dose.

## 2024-05-30 NOTE — PROGRESS NOTE ADULT - SUBJECTIVE AND OBJECTIVE BOX
INFECTIOUS DISEASE FOLLOW UP NOTE:    Interval History/ROS: Patient is a 53y old  Female who presents with a chief complaint of Chest pain (29 May 2024 09:01)      Overnight events:    REVIEW OF SYSTEMS:        Prior hospital charts reviewed [Yes]  Primary team notes reviewed [Yes]  Other consultant notes reviewed [Yes]    Allergies:  No Known Allergies      ANTIMICROBIALS:   cefepime   IVPB 2000 every 8 hours      OTHER MEDS: MEDICATIONS  (STANDING):  acetaminophen     Tablet .. 1000 every 8 hours  albuterol    90 MICROgram(s) HFA Inhaler 2 every 6 hours PRN  buprenorphine 8 mG/naloxone 2 mG SL  Tablet 2.5 daily  colchicine 0.6 two times a day  dextrose 50% Injectable 12.5 once  dextrose 50% Injectable 25 once  dextrose Oral Gel 15 once PRN  DULoxetine 30 daily  furosemide   Injectable 40 daily  gabapentin 300 four times a day  glucagon  Injectable 1 once  insulin glargine Injectable (LANTUS) 28 at bedtime  insulin lispro (ADMELOG) corrective regimen sliding scale  three times a day before meals  ketorolac   Injectable 15 every 12 hours PRN  levothyroxine 150 <User Schedule>  pantoprazole    Tablet 40 before breakfast  pregabalin 50 every 12 hours      Vital Signs Last 24 Hrs  T(F): 97 (05-30-24 @ 05:00), Max: 98.7 (05-28-24 @ 21:16)    Vital Signs Last 24 Hrs  HR: 52 (05-30-24 @ 05:00) (52 - 66)  BP: 106/68 (05-30-24 @ 05:00) (106/68 - 113/74)  RR: 18 (05-30-24 @ 05:00)  SpO2: 96% (05-30-24 @ 05:00) (96% - 99%)  Wt(kg): --    EXAM:      Labs:                        11.2   6.11  )-----------( 352      ( 30 May 2024 06:27 )             36.4     05-30    140  |  95<L>  |  6<L>  ----------------------------<  157<H>  3.7   |  31  |  0.9    Ca    8.8      30 May 2024 06:27  Mg     2.0     05-30    TPro  7.1  /  Alb  3.7  /  TBili  0.3  /  DBili  x   /  AST  19  /  ALT  <5  /  AlkPhos  93  05-30      WBC Trend:  WBC Count: 6.11 (05-30-24 @ 06:27)  WBC Count: 6.96 (05-29-24 @ 06:26)  WBC Count: 6.44 (05-28-24 @ 06:13)  WBC Count: 8.95 (05-27-24 @ 21:10)      Creatine Trend:  Creatinine: 0.9 (05-30)  Creatinine: 0.9 (05-29)  Creatinine: 0.7 (05-28)  Creatinine: 0.7 (05-27)      Liver Biochemical Testing Trend:  Alanine Aminotransferase (ALT/SGPT): <5 (05-30)  Alanine Aminotransferase (ALT/SGPT): <5 (05-29)  Alanine Aminotransferase (ALT/SGPT): <5 (05-28)  Alanine Aminotransferase (ALT/SGPT): <5 (05-27)  Alanine Aminotransferase (ALT/SGPT): 6 (05-05)  Aspartate Aminotransferase (AST/SGOT): 19 (05-30-24 @ 06:27)  Aspartate Aminotransferase (AST/SGOT): 21 (05-29-24 @ 06:26)  Aspartate Aminotransferase (AST/SGOT): 16 (05-28-24 @ 06:13)  Aspartate Aminotransferase (AST/SGOT): 25 (05-27-24 @ 21:10)  Aspartate Aminotransferase (AST/SGOT): 28 (05-05-24 @ 08:58)  Bilirubin Total: 0.3 (05-30)  Bilirubin Total: 0.4 (05-29)  Bilirubin Total: 0.3 (05-28)  Bilirubin Total: 0.4 (05-27)  Bilirubin Total: 0.4 (05-05)      Trend LDH  01-10-24 @ 06:55  230      Urinalysis Basic - ( 30 May 2024 06:27 )    Color: x / Appearance: x / SG: x / pH: x  Gluc: 157 mg/dL / Ketone: x  / Bili: x / Urobili: x   Blood: x / Protein: x / Nitrite: x   Leuk Esterase: x / RBC: x / WBC x   Sq Epi: x / Non Sq Epi: x / Bacteria: x        MICROBIOLOGY:    MRSA PCR Result.: Negative (05-04-24 @ 07:00)      Culture - Blood (collected 27 May 2024 21:10)  Source: .Blood Blood  Preliminary Report:    No growth at 24 hours    Culture - Blood (collected 27 May 2024 21:10)  Source: .Blood Blood  Preliminary Report:    No growth at 24 hours    Culture - Other (collected 06 May 2024 16:16)  Source: .Other LLE  Final Report:    Rare Staphylococcus aureus    Normal skin sumit isolated  Organism: Staphylococcus aureus  Organism: Staphylococcus aureus    Sensitivities:      Method Type: REED      -  Clindamycin: R 0.5 This isolate is presumed to be clindamycin resistant based on detection of inducible resistance. Clindamycin may still be effective in some patients.      -  Erythromycin: R >4      -  Gentamicin: S <=1 Should not be used as monotherapy      -  Oxacillin: S 0.5 Oxacillin predicts susceptibility for dicloxacillin, methicillin, and nafcillin      -  Penicillin: R >8      -  Rifampin: S <=1 Should not be used as monotherapy      -  Tetracycline: S <=1      -  Trimethoprim/Sulfamethoxazole: S <=0.5/9.5      -  Vancomycin: S 2    Culture - Other (collected 06 May 2024 16:16)  Source: .Other RLE  Final Report:    Rare Staphylococcus aureus  Organism: Staphylococcus aureus  Organism: Staphylococcus aureus    Sensitivities:      Method Type: REED      -  Clindamycin: R 0.5 This isolate is presumed to be clindamycin resistant based on detection of inducible resistance. Clindamycin may still be effective in some patients.      -  Erythromycin: R >4      -  Gentamicin: S <=1 Should not be used as monotherapy      -  Oxacillin: S 0.5 Oxacillin predicts susceptibility for dicloxacillin, methicillin, and nafcillin      -  Penicillin: R >8      -  Rifampin: S <=1 Should not be used as monotherapy      -  Tetracycline: S <=1      -  Trimethoprim/Sulfamethoxazole: S <=0.5/9.5      -  Vancomycin: S 2    Culture - Blood (collected 03 May 2024 17:21)  Source: .Blood Blood-Peripheral  Final Report:    No growth at 5 days    Culture - Blood (collected 03 May 2024 17:21)  Source: .Blood Blood-Peripheral  Final Report:    No growth at 5 days    Culture - Urine (collected 14 Apr 2024 13:55)  Source: Clean Catch Clean Catch (Midstream)  Final Report:    >100,000 CFU/ml Escherichia coli  Organism: Escherichia coli  Organism: Escherichia coli    Sensitivities:      Method Type: REED      -  Amoxicillin/Clavulanic Acid: R >16/8      -  Ampicillin: R >16 These ampicillin results predict results for amoxicillin      -  Ampicillin/Sulbactam: R >16/8      -  Aztreonam: S <=4      -  Cefazolin: R >16 For uncomplicated UTI with K. pneumoniae, E. coli, or P. mirablis: REED <=16 is sensitive and REED >=32 is resistant. This also predicts results for oral agents cefaclor, cefdinir, cefpodoxime, cefprozil, cefuroxime axetil, cephalexin and locarbef for uncomplicated UTI. Note that some isolates may be susceptible to these agents while testing resistant to cefazolin.      -  Cefepime: S <=2      -  Cefoxitin: S <=8      -  Ceftriaxone: S <=1      -  Cefuroxime: I 16      -  Ciprofloxacin: S <=0.25      -  Ertapenem: S <=0.5      -  Gentamicin: S <=2      -  Imipenem: S <=1      -  Levofloxacin: S <=0.5      -  Meropenem: S <=1      -  Nitrofurantoin: S <=32 Should not be used to treat pyelonephritis      -  Piperacillin/Tazobactam: S <=8      -  Tobramycin: S <=2      -  Trimethoprim/Sulfamethoxazole: S 2/38    Culture - Blood (collected 04 Apr 2024 12:19)  Source: .Blood Blood-Peripheral  Final Report:    No growth at 5 days    Culture - Blood (collected 18 Mar 2024 21:39)  Source: .Blood Blood  Final Report:    No growth at 5 days    Culture - Blood (collected 18 Mar 2024 21:39)  Source: .Blood Blood  Final Report:    No growth at 5 days      HIV-1/2 Combo Result: Nonreact (01-11-24 @ 13:40)      Troponin T, High Sensitivity Result: 43 (05-28)  Troponin T, High Sensitivity Result: 39 (05-28)  Troponin T, High Sensitivity Result: 36 (05-27)    Lactate, Blood: 1.2 (05-27 @ 21:10)      RADIOLOGY:  imaging below personally reviewed     INFECTIOUS DISEASE FOLLOW UP NOTE:    Interval History/ROS: Patient is a 53y old  Female who presents with a chief complaint of Chest pain (29 May 2024 09:01)      Overnight events: No overnight event. Feels better today. LE wounds are drying up.    REVIEW OF SYSTEMS:  CONSTITUTIONAL: No fever or chills  HEAD: No lesion on scalp  EYES: No visual disturbance  ENT: No sore throat  RESPIRATORY: No cough, no shortness of breath  CARDIOVASCULAR: + improved left chest pain  GASTROINTESTINAL: No abdominal or epigastric pain  GENITOURINARY: No dysuria  NEUROLOGICAL: No headache/dizziness  MUSCULOSKELETAL: No joint pain, erythema, or swelling; no back pain  SKIN: Multiple wounds on her bilateral LEs below knee, with redness. no significant warmth.  All other ROS negative except noted above    Prior hospital charts reviewed [Yes]  Primary team notes reviewed [Yes]  Other consultant notes reviewed [Yes]    Allergies:  No Known Allergies      ANTIMICROBIALS:   cefepime   IVPB 2000 every 8 hours      OTHER MEDS: MEDICATIONS  (STANDING):  acetaminophen     Tablet .. 1000 every 8 hours  albuterol    90 MICROgram(s) HFA Inhaler 2 every 6 hours PRN  buprenorphine 8 mG/naloxone 2 mG SL  Tablet 2.5 daily  colchicine 0.6 two times a day  dextrose 50% Injectable 12.5 once  dextrose 50% Injectable 25 once  dextrose Oral Gel 15 once PRN  DULoxetine 30 daily  furosemide   Injectable 40 daily  gabapentin 300 four times a day  glucagon  Injectable 1 once  insulin glargine Injectable (LANTUS) 28 at bedtime  insulin lispro (ADMELOG) corrective regimen sliding scale  three times a day before meals  ketorolac   Injectable 15 every 12 hours PRN  levothyroxine 150 <User Schedule>  pantoprazole    Tablet 40 before breakfast  pregabalin 50 every 12 hours      Vital Signs Last 24 Hrs  T(F): 97 (05-30-24 @ 05:00), Max: 98.7 (05-28-24 @ 21:16)    Vital Signs Last 24 Hrs  HR: 52 (05-30-24 @ 05:00) (52 - 66)  BP: 106/68 (05-30-24 @ 05:00) (106/68 - 113/74)  RR: 18 (05-30-24 @ 05:00)  SpO2: 96% (05-30-24 @ 05:00) (96% - 99%)  Wt(kg): --    EXAM:  GENERAL: NAD, lying in bed  HEAD: No head lesions  EYES: Conjunctiva pink and cornea white  EAR, NOSE, MOUTH, THROAT: Normal external ears and nose, no discharges; moist mucous membranes  NECK: Supple, nontender to palpation; no JVD  RESPIRATORY: Clear to auscultation bilaterally  CARDIOVASCULAR: S1 S2  GASTROINTESTINAL: Soft, nontender, nondistended; normoactive bowel sounds  EXTREMITIES: No clubbing, cyanosis, or petal edema  NERVOUS SYSTEM: Alert and oriented to person, time, place and situation, speech clear. No focal deficits   MUSCULOSKELETAL: Left anterior chest tenderness on palpation; no skin manifestation  SKIN: Bilateral LEs erythema with multiple draining wounds, + tender to touch, minimal warmth. , no superficial thrombophlebitis  PSYCH: Normal affect    Labs:                        11.2   6.11  )-----------( 352      ( 30 May 2024 06:27 )             36.4     05-30    140  |  95<L>  |  6<L>  ----------------------------<  157<H>  3.7   |  31  |  0.9    Ca    8.8      30 May 2024 06:27  Mg     2.0     05-30    TPro  7.1  /  Alb  3.7  /  TBili  0.3  /  DBili  x   /  AST  19  /  ALT  <5  /  AlkPhos  93  05-30      WBC Trend:  WBC Count: 6.11 (05-30-24 @ 06:27)  WBC Count: 6.96 (05-29-24 @ 06:26)  WBC Count: 6.44 (05-28-24 @ 06:13)  WBC Count: 8.95 (05-27-24 @ 21:10)      Creatine Trend:  Creatinine: 0.9 (05-30)  Creatinine: 0.9 (05-29)  Creatinine: 0.7 (05-28)  Creatinine: 0.7 (05-27)      Liver Biochemical Testing Trend:  Alanine Aminotransferase (ALT/SGPT): <5 (05-30)  Alanine Aminotransferase (ALT/SGPT): <5 (05-29)  Alanine Aminotransferase (ALT/SGPT): <5 (05-28)  Alanine Aminotransferase (ALT/SGPT): <5 (05-27)  Alanine Aminotransferase (ALT/SGPT): 6 (05-05)  Aspartate Aminotransferase (AST/SGOT): 19 (05-30-24 @ 06:27)  Aspartate Aminotransferase (AST/SGOT): 21 (05-29-24 @ 06:26)  Aspartate Aminotransferase (AST/SGOT): 16 (05-28-24 @ 06:13)  Aspartate Aminotransferase (AST/SGOT): 25 (05-27-24 @ 21:10)  Aspartate Aminotransferase (AST/SGOT): 28 (05-05-24 @ 08:58)  Bilirubin Total: 0.3 (05-30)  Bilirubin Total: 0.4 (05-29)  Bilirubin Total: 0.3 (05-28)  Bilirubin Total: 0.4 (05-27)  Bilirubin Total: 0.4 (05-05)      Trend LDH  01-10-24 @ 06:55  230      Urinalysis Basic - ( 30 May 2024 06:27 )    Color: x / Appearance: x / SG: x / pH: x  Gluc: 157 mg/dL / Ketone: x  / Bili: x / Urobili: x   Blood: x / Protein: x / Nitrite: x   Leuk Esterase: x / RBC: x / WBC x   Sq Epi: x / Non Sq Epi: x / Bacteria: x        MICROBIOLOGY:    MRSA PCR Result.: Negative (05-04-24 @ 07:00)      Culture - Blood (collected 27 May 2024 21:10)  Source: .Blood Blood  Preliminary Report:    No growth at 24 hours    Culture - Blood (collected 27 May 2024 21:10)  Source: .Blood Blood  Preliminary Report:    No growth at 24 hours    Culture - Other (collected 06 May 2024 16:16)  Source: .Other LLE  Final Report:    Rare Staphylococcus aureus    Normal skin sumit isolated  Organism: Staphylococcus aureus  Organism: Staphylococcus aureus    Sensitivities:      Method Type: REED      -  Clindamycin: R 0.5 This isolate is presumed to be clindamycin resistant based on detection of inducible resistance. Clindamycin may still be effective in some patients.      -  Erythromycin: R >4      -  Gentamicin: S <=1 Should not be used as monotherapy      -  Oxacillin: S 0.5 Oxacillin predicts susceptibility for dicloxacillin, methicillin, and nafcillin      -  Penicillin: R >8      -  Rifampin: S <=1 Should not be used as monotherapy      -  Tetracycline: S <=1      -  Trimethoprim/Sulfamethoxazole: S <=0.5/9.5      -  Vancomycin: S 2    Culture - Other (collected 06 May 2024 16:16)  Source: .Other RLE  Final Report:    Rare Staphylococcus aureus  Organism: Staphylococcus aureus  Organism: Staphylococcus aureus    Sensitivities:      Method Type: REED      -  Clindamycin: R 0.5 This isolate is presumed to be clindamycin resistant based on detection of inducible resistance. Clindamycin may still be effective in some patients.      -  Erythromycin: R >4      -  Gentamicin: S <=1 Should not be used as monotherapy      -  Oxacillin: S 0.5 Oxacillin predicts susceptibility for dicloxacillin, methicillin, and nafcillin      -  Penicillin: R >8      -  Rifampin: S <=1 Should not be used as monotherapy      -  Tetracycline: S <=1      -  Trimethoprim/Sulfamethoxazole: S <=0.5/9.5      -  Vancomycin: S 2    Culture - Blood (collected 03 May 2024 17:21)  Source: .Blood Blood-Peripheral  Final Report:    No growth at 5 days    Culture - Blood (collected 03 May 2024 17:21)  Source: .Blood Blood-Peripheral  Final Report:    No growth at 5 days    Culture - Urine (collected 14 Apr 2024 13:55)  Source: Clean Catch Clean Catch (Midstream)  Final Report:    >100,000 CFU/ml Escherichia coli  Organism: Escherichia coli  Organism: Escherichia coli    Sensitivities:      Method Type: REED      -  Amoxicillin/Clavulanic Acid: R >16/8      -  Ampicillin: R >16 These ampicillin results predict results for amoxicillin      -  Ampicillin/Sulbactam: R >16/8      -  Aztreonam: S <=4      -  Cefazolin: R >16 For uncomplicated UTI with K. pneumoniae, E. coli, or P. mirablis: REED <=16 is sensitive and REED >=32 is resistant. This also predicts results for oral agents cefaclor, cefdinir, cefpodoxime, cefprozil, cefuroxime axetil, cephalexin and locarbef for uncomplicated UTI. Note that some isolates may be susceptible to these agents while testing resistant to cefazolin.      -  Cefepime: S <=2      -  Cefoxitin: S <=8      -  Ceftriaxone: S <=1      -  Cefuroxime: I 16      -  Ciprofloxacin: S <=0.25      -  Ertapenem: S <=0.5      -  Gentamicin: S <=2      -  Imipenem: S <=1      -  Levofloxacin: S <=0.5      -  Meropenem: S <=1      -  Nitrofurantoin: S <=32 Should not be used to treat pyelonephritis      -  Piperacillin/Tazobactam: S <=8      -  Tobramycin: S <=2      -  Trimethoprim/Sulfamethoxazole: S 2/38    Culture - Blood (collected 04 Apr 2024 12:19)  Source: .Blood Blood-Peripheral  Final Report:    No growth at 5 days    Culture - Blood (collected 18 Mar 2024 21:39)  Source: .Blood Blood  Final Report:    No growth at 5 days    Culture - Blood (collected 18 Mar 2024 21:39)  Source: .Blood Blood  Final Report:    No growth at 5 days      HIV-1/2 Combo Result: Nonreact (01-11-24 @ 13:40)      Troponin T, High Sensitivity Result: 43 (05-28)  Troponin T, High Sensitivity Result: 39 (05-28)  Troponin T, High Sensitivity Result: 36 (05-27)    Lactate, Blood: 1.2 (05-27 @ 21:10)      RADIOLOGY:  imaging below personally reviewed

## 2024-05-31 LAB
ALBUMIN SERPL ELPH-MCNC: 4.3 G/DL — SIGNIFICANT CHANGE UP (ref 3.5–5.2)
ALP SERPL-CCNC: 121 U/L — HIGH (ref 30–115)
ALT FLD-CCNC: 5 U/L — SIGNIFICANT CHANGE UP (ref 0–41)
ANION GAP SERPL CALC-SCNC: 14 MMOL/L — SIGNIFICANT CHANGE UP (ref 7–14)
AST SERPL-CCNC: 28 U/L — SIGNIFICANT CHANGE UP (ref 0–41)
BILIRUB SERPL-MCNC: 0.3 MG/DL — SIGNIFICANT CHANGE UP (ref 0.2–1.2)
BUN SERPL-MCNC: 8 MG/DL — LOW (ref 10–20)
CALCIUM SERPL-MCNC: 9.1 MG/DL — SIGNIFICANT CHANGE UP (ref 8.4–10.5)
CHLORIDE SERPL-SCNC: 95 MMOL/L — LOW (ref 98–110)
CO2 SERPL-SCNC: 31 MMOL/L — SIGNIFICANT CHANGE UP (ref 17–32)
CREAT SERPL-MCNC: 0.8 MG/DL — SIGNIFICANT CHANGE UP (ref 0.7–1.5)
EGFR: 88 ML/MIN/1.73M2 — SIGNIFICANT CHANGE UP
GLUCOSE BLDC GLUCOMTR-MCNC: 136 MG/DL — HIGH (ref 70–99)
GLUCOSE BLDC GLUCOMTR-MCNC: 187 MG/DL — HIGH (ref 70–99)
GLUCOSE BLDC GLUCOMTR-MCNC: 199 MG/DL — HIGH (ref 70–99)
GLUCOSE BLDC GLUCOMTR-MCNC: 318 MG/DL — HIGH (ref 70–99)
GLUCOSE SERPL-MCNC: 118 MG/DL — HIGH (ref 70–99)
HCT VFR BLD CALC: 38.9 % — SIGNIFICANT CHANGE UP (ref 37–47)
HGB BLD-MCNC: 12.3 G/DL — SIGNIFICANT CHANGE UP (ref 12–16)
MCHC RBC-ENTMCNC: 26.9 PG — LOW (ref 27–31)
MCHC RBC-ENTMCNC: 31.6 G/DL — LOW (ref 32–37)
MCV RBC AUTO: 84.9 FL — SIGNIFICANT CHANGE UP (ref 81–99)
NRBC # BLD: 0 /100 WBCS — SIGNIFICANT CHANGE UP (ref 0–0)
PLATELET # BLD AUTO: 341 K/UL — SIGNIFICANT CHANGE UP (ref 130–400)
PMV BLD: 10.2 FL — SIGNIFICANT CHANGE UP (ref 7.4–10.4)
POTASSIUM SERPL-MCNC: 3.9 MMOL/L — SIGNIFICANT CHANGE UP (ref 3.5–5)
POTASSIUM SERPL-SCNC: 3.9 MMOL/L — SIGNIFICANT CHANGE UP (ref 3.5–5)
PROT SERPL-MCNC: 8.3 G/DL — HIGH (ref 6–8)
RBC # BLD: 4.58 M/UL — SIGNIFICANT CHANGE UP (ref 4.2–5.4)
RBC # FLD: 15.7 % — HIGH (ref 11.5–14.5)
SODIUM SERPL-SCNC: 140 MMOL/L — SIGNIFICANT CHANGE UP (ref 135–146)
WBC # BLD: 5.77 K/UL — SIGNIFICANT CHANGE UP (ref 4.8–10.8)
WBC # FLD AUTO: 5.77 K/UL — SIGNIFICANT CHANGE UP (ref 4.8–10.8)

## 2024-05-31 PROCEDURE — 99232 SBSQ HOSP IP/OBS MODERATE 35: CPT

## 2024-05-31 RX ORDER — LEVOTHYROXINE SODIUM 125 MCG
175 TABLET ORAL DAILY
Refills: 0 | Status: DISCONTINUED | OUTPATIENT
Start: 2024-05-31 | End: 2024-06-08

## 2024-05-31 RX ADMIN — GABAPENTIN 300 MILLIGRAM(S): 400 CAPSULE ORAL at 05:30

## 2024-05-31 RX ADMIN — BUPRENORPHINE AND NALOXONE 2.5 TABLET(S): 2; .5 TABLET SUBLINGUAL at 12:33

## 2024-05-31 RX ADMIN — CEFEPIME 100 MILLIGRAM(S): 1 INJECTION, POWDER, FOR SOLUTION INTRAMUSCULAR; INTRAVENOUS at 21:42

## 2024-05-31 RX ADMIN — CEFEPIME 100 MILLIGRAM(S): 1 INJECTION, POWDER, FOR SOLUTION INTRAMUSCULAR; INTRAVENOUS at 13:15

## 2024-05-31 RX ADMIN — PANTOPRAZOLE SODIUM 40 MILLIGRAM(S): 20 TABLET, DELAYED RELEASE ORAL at 05:33

## 2024-05-31 RX ADMIN — DULOXETINE HYDROCHLORIDE 30 MILLIGRAM(S): 30 CAPSULE, DELAYED RELEASE ORAL at 12:33

## 2024-05-31 RX ADMIN — Medication 50 MILLIGRAM(S): at 17:00

## 2024-05-31 RX ADMIN — GABAPENTIN 300 MILLIGRAM(S): 400 CAPSULE ORAL at 23:44

## 2024-05-31 RX ADMIN — Medication 1: at 17:00

## 2024-05-31 RX ADMIN — INSULIN GLARGINE 28 UNIT(S): 100 INJECTION, SOLUTION SUBCUTANEOUS at 21:42

## 2024-05-31 RX ADMIN — Medication 40 MILLIGRAM(S): at 05:31

## 2024-05-31 RX ADMIN — Medication 0.6 MILLIGRAM(S): at 05:33

## 2024-05-31 RX ADMIN — Medication 150 MICROGRAM(S): at 05:33

## 2024-05-31 RX ADMIN — Medication 15 MILLIGRAM(S): at 06:58

## 2024-05-31 RX ADMIN — GABAPENTIN 300 MILLIGRAM(S): 400 CAPSULE ORAL at 17:00

## 2024-05-31 RX ADMIN — GABAPENTIN 300 MILLIGRAM(S): 400 CAPSULE ORAL at 12:34

## 2024-05-31 RX ADMIN — Medication 0.6 MILLIGRAM(S): at 17:00

## 2024-05-31 RX ADMIN — Medication 15 MILLIGRAM(S): at 06:38

## 2024-05-31 RX ADMIN — Medication 50 MILLIGRAM(S): at 05:31

## 2024-05-31 RX ADMIN — CEFEPIME 100 MILLIGRAM(S): 1 INJECTION, POWDER, FOR SOLUTION INTRAMUSCULAR; INTRAVENOUS at 05:30

## 2024-05-31 NOTE — CONSULT NOTE ADULT - SUBJECTIVE AND OBJECTIVE BOX
HPI: 53y  Female with h/o hypothyroidism for 3-4 years admitted with weeping LLE leg lesions requiring IV antibiotics.  Found to have TSH=119 with Free T4=0.4 (5/29/24).  Pt states adherant with lrvothyroxine 137 mcg daily at home.  Has had recent weight gain, myalgias, fatigue; denies constipation or change in neck size.  Pt also states recent BS > 200-300 with polyuria and polydipsia.  (At home on Lantus 30 units qd and metformin 1000 mg bid.)      PAST MEDICAL & SURGICAL HISTORY:  type 2 DM    Asthma with COPD      Hypothyroidism      H/O: substance abuse  no longer using      History of pancreatitis      Hepatitis-C      Leukocytoclastic vasculitis      History of appendectomy      History of tonsillectomy          Home Medications:  acetaminophen 325 mg oral tablet: 2 tab(s) orally every 6 hours As needed Mild Pain (1 - 3) (28 May 2024 03:24)  Suboxone 8 mg-2 mg sublingual tablet: 2.5 film(s) sublingual once a day (28 May 2024 03:24)      Current Meds:  albuterol    90 MICROgram(s) HFA Inhaler 2 Puff(s) Inhalation every 6 hours PRN  buprenorphine 8 mG/naloxone 2 mG SL  Tablet 2.5 Tablet(s) SubLingual daily  cefepime   IVPB 2000 milliGRAM(s) IV Intermittent every 8 hours  colchicine 0.6 milliGRAM(s) Oral two times a day  dextrose 10% Bolus 125 milliLiter(s) IV Bolus once  dextrose 5%. 1000 milliLiter(s) IV Continuous <Continuous>  dextrose 5%. 1000 milliLiter(s) IV Continuous <Continuous>  dextrose 50% Injectable 12.5 Gram(s) IV Push once  dextrose 50% Injectable 25 Gram(s) IV Push once  dextrose Oral Gel 15 Gram(s) Oral once PRN  DULoxetine 30 milliGRAM(s) Oral daily  furosemide   Injectable 40 milliGRAM(s) IV Push daily  gabapentin 300 milliGRAM(s) Oral four times a day  glucagon  Injectable 1 milliGRAM(s) IntraMuscular once  insulin glargine Injectable (LANTUS) 28 Unit(s) SubCutaneous at bedtime  insulin lispro (ADMELOG) corrective regimen sliding scale   SubCutaneous three times a day before meals  ketorolac   Injectable 15 milliGRAM(s) IV Push every 12 hours PRN  levothyroxine 150 MICROGram(s) Oral <User Schedule>  ondansetron Injectable 4 milliGRAM(s) IV Push every 8 hours PRN  pantoprazole    Tablet 40 milliGRAM(s) Oral before breakfast  pregabalin 50 milliGRAM(s) Oral every 12 hours      Allergies:  No Known Allergies      Height (cm): 175.3 (05-28 @ 03:20)  Weight (kg): 93.4 (05-28 @ 03:20)  BMI (kg/m2): 30.4 (05-28 @ 03:20)    Vital Signs Last 24 Hrs  T(C): 36.4 (31 May 2024 04:56), Max: 37 (30 May 2024 20:30)  T(F): 97.5 (31 May 2024 04:56), Max: 98.6 (30 May 2024 20:30)  HR: 69 (31 May 2024 04:56) (58 - 76)  BP: 128/75 (31 May 2024 04:56) (105/62 - 128/75)  BP(mean): --  RR: 18 (31 May 2024 04:56) (16 - 18)  SpO2: 97% (31 May 2024 04:56) (96% - 98%)    Parameters below as of 31 May 2024 04:56  Patient On (Oxygen Delivery Method): room air        Constitutional: WN/WD in NAD.   Neck: no thyromegaly or palpable thyroid nodules   Respiratory: lungs CTAB.  Cardiovascular: regular rate and rhythm, normal S1 and S2, no audible murmurs  Ext: 2+ B/L leg edema with erythema, ulcerations LLE  Psychiatric: A&O x 3, normal affect/mood.    LABS:                        12.3   5.77  )-----------( 341      ( 31 May 2024 06:51 )             38.9     05-31    140  |  95<L>  |  8<L>  ----------------------------<  118<H>  3.9   |  31  |  0.8    Ca    9.1      31 May 2024 06:51  Mg     2.0     05-30    TPro  8.3<H>  /  Alb  4.3  /  TBili  0.3  /  DBili  x   /  AST  28  /  ALT  5   /  AlkPhos  121<H>  05-31      Urinalysis Basic - ( 31 May 2024 06:51 )    Color: x / Appearance: x / SG: x / pH: x  Gluc: 118 mg/dL / Ketone: x  / Bili: x / Urobili: x   Blood: x / Protein: x / Nitrite: x   Leuk Esterase: x / RBC: x / WBC x   Sq Epi: x / Non Sq Epi: x / Bacteria: x                     HPI: 53y  Female with h/o hypothyroidism for 3-4 years admitted with weeping LLE leg lesions requiring IV antibiotics.  Found to have TSH=119 with Free T4=0.4 (5/29/24).  Pt states adherant with levothyroxine 137 mcg daily at home.  Has had recent weight gain, myalgias, fatigue; denies constipation or change in neck size.  Pt also states recent BS > 200-300 with polyuria and polydipsia.  (At home on Lantus 30 units qd and metformin 1000 mg bid.)      PAST MEDICAL & SURGICAL HISTORY:  type 2 DM    Asthma with COPD      Hypothyroidism      H/O: substance abuse  no longer using      History of pancreatitis      Hepatitis-C      Leukocytoclastic vasculitis      History of appendectomy      History of tonsillectomy          Home Medications:  acetaminophen 325 mg oral tablet: 2 tab(s) orally every 6 hours As needed Mild Pain (1 - 3) (28 May 2024 03:24)  Suboxone 8 mg-2 mg sublingual tablet: 2.5 film(s) sublingual once a day (28 May 2024 03:24)      Current Meds:  albuterol    90 MICROgram(s) HFA Inhaler 2 Puff(s) Inhalation every 6 hours PRN  buprenorphine 8 mG/naloxone 2 mG SL  Tablet 2.5 Tablet(s) SubLingual daily  cefepime   IVPB 2000 milliGRAM(s) IV Intermittent every 8 hours  colchicine 0.6 milliGRAM(s) Oral two times a day  dextrose 10% Bolus 125 milliLiter(s) IV Bolus once  dextrose 5%. 1000 milliLiter(s) IV Continuous <Continuous>  dextrose 5%. 1000 milliLiter(s) IV Continuous <Continuous>  dextrose 50% Injectable 12.5 Gram(s) IV Push once  dextrose 50% Injectable 25 Gram(s) IV Push once  dextrose Oral Gel 15 Gram(s) Oral once PRN  DULoxetine 30 milliGRAM(s) Oral daily  furosemide   Injectable 40 milliGRAM(s) IV Push daily  gabapentin 300 milliGRAM(s) Oral four times a day  glucagon  Injectable 1 milliGRAM(s) IntraMuscular once  insulin glargine Injectable (LANTUS) 28 Unit(s) SubCutaneous at bedtime  insulin lispro (ADMELOG) corrective regimen sliding scale   SubCutaneous three times a day before meals  ketorolac   Injectable 15 milliGRAM(s) IV Push every 12 hours PRN  levothyroxine 150 MICROGram(s) Oral <User Schedule>  ondansetron Injectable 4 milliGRAM(s) IV Push every 8 hours PRN  pantoprazole    Tablet 40 milliGRAM(s) Oral before breakfast  pregabalin 50 milliGRAM(s) Oral every 12 hours      Allergies:  No Known Allergies      Height (cm): 175.3 (05-28 @ 03:20)  Weight (kg): 93.4 (05-28 @ 03:20)  BMI (kg/m2): 30.4 (05-28 @ 03:20)    Vital Signs Last 24 Hrs  T(C): 36.4 (31 May 2024 04:56), Max: 37 (30 May 2024 20:30)  T(F): 97.5 (31 May 2024 04:56), Max: 98.6 (30 May 2024 20:30)  HR: 69 (31 May 2024 04:56) (58 - 76)  BP: 128/75 (31 May 2024 04:56) (105/62 - 128/75)  BP(mean): --  RR: 18 (31 May 2024 04:56) (16 - 18)  SpO2: 97% (31 May 2024 04:56) (96% - 98%)    Parameters below as of 31 May 2024 04:56  Patient On (Oxygen Delivery Method): room air        Constitutional: WN/WD in NAD.   Neck: no thyromegaly or palpable thyroid nodules   Respiratory: lungs CTAB.  Cardiovascular: regular rate and rhythm, normal S1 and S2, no audible murmurs  Ext: 2+ B/L leg edema with erythema, ulcerations LLE  Psychiatric: A&O x 3, normal affect/mood.    LABS:                        12.3   5.77  )-----------( 341      ( 31 May 2024 06:51 )             38.9     05-31    140  |  95<L>  |  8<L>  ----------------------------<  118<H>  3.9   |  31  |  0.8    Ca    9.1      31 May 2024 06:51  Mg     2.0     05-30    TPro  8.3<H>  /  Alb  4.3  /  TBili  0.3  /  DBili  x   /  AST  28  /  ALT  5   /  AlkPhos  121<H>  05-31      Urinalysis Basic - ( 31 May 2024 06:51 )    Color: x / Appearance: x / SG: x / pH: x  Gluc: 118 mg/dL / Ketone: x  / Bili: x / Urobili: x   Blood: x / Protein: x / Nitrite: x   Leuk Esterase: x / RBC: x / WBC x   Sq Epi: x / Non Sq Epi: x / Bacteria: x

## 2024-05-31 NOTE — CONSULT NOTE ADULT - ASSESSMENT
1-Hypothyroidism; hemodynamically stable.   Increase levothyroxine to 175 mcg daily (on empty stomach).  Repeat TSH in 4-5 weeks (can f/u in Endocrine clinic [phone] or with PMD.)    2-Type 2 DM-insulin-requiring, uncontrolled.    BS improved in hospital-at discharge can resume metformin 1000 mg bid and Lantus 36 units q24hrs.  (note-pt should not be on pioglitazone with leg edema.)

## 2024-05-31 NOTE — PROGRESS NOTE ADULT - SUBJECTIVE AND OBJECTIVE BOX
Progress note    INTERVAL HPI/OVERNIGHT EVENTS:    Patient seen and examined at bedside. No acute chest pain or shortness of breath.      REVIEW OF SYSTEMS:  All other 13 Review of systems were reviewed and are negative    FAMILY HISTORY:    T(C): 36.6 (05-31-24 @ 14:05), Max: 37 (05-30-24 @ 20:30)  HR: 62 (05-31-24 @ 14:05) (62 - 76)  BP: 105/68 (05-31-24 @ 14:05) (105/68 - 128/75)  RR: 18 (05-31-24 @ 14:05) (18 - 18)  SpO2: 97% (05-31-24 @ 14:05) (96% - 97%)  Wt(kg): --Vital Signs Last 24 Hrs  T(C): 36.6 (31 May 2024 14:05), Max: 37 (30 May 2024 20:30)  T(F): 97.8 (31 May 2024 14:05), Max: 98.6 (30 May 2024 20:30)  HR: 62 (31 May 2024 14:05) (62 - 76)  BP: 105/68 (31 May 2024 14:05) (105/68 - 128/75)  BP(mean): --  RR: 18 (31 May 2024 14:05) (18 - 18)  SpO2: 97% (31 May 2024 14:05) (96% - 97%)    Parameters below as of 31 May 2024 14:05  Patient On (Oxygen Delivery Method): room air      No Known Allergies      PHYSICAL EXAM:    General: WN/WD NAD  Neurology: A&Ox3, nonfocal, LEMUS x 4  Head:  Normocephalic, atraumatic  ENT:  Mucosa moist, no ulcerations  Neck:  Supple, no sinuses or palpable masses  Lymphatic:  No palpable cervical, supraclavicular, axillary or inguinal adenopathy  Respiratory: CTA B/L  CV: RRR, S1S2, no murmur  Abdominal: Soft, NT, ND no palpable mass  MSK: bl le edema and tenderness with erythema and some ulcers  Incisions: intact, no erythema or drainage    Consultant(s) Notes Reviewed:  [x ] YES  [ ] NO  Care Discussed with Consultants/Other Providers [ x] YES  [ ] NO    LABS:      RADIOLOGY & ADDITIONAL TESTS:    Imaging Personally Reviewed:  [ ] YES  [ ] NO  albuterol    90 MICROgram(s) HFA Inhaler 2 Puff(s) Inhalation every 6 hours PRN  buprenorphine 8 mG/naloxone 2 mG SL  Tablet 2.5 Tablet(s) SubLingual daily  cefepime   IVPB 2000 milliGRAM(s) IV Intermittent every 8 hours  colchicine 0.6 milliGRAM(s) Oral two times a day  dextrose 10% Bolus 125 milliLiter(s) IV Bolus once  dextrose 5%. 1000 milliLiter(s) IV Continuous <Continuous>  dextrose 5%. 1000 milliLiter(s) IV Continuous <Continuous>  dextrose 50% Injectable 12.5 Gram(s) IV Push once  dextrose 50% Injectable 25 Gram(s) IV Push once  dextrose Oral Gel 15 Gram(s) Oral once PRN  DULoxetine 30 milliGRAM(s) Oral daily  furosemide   Injectable 40 milliGRAM(s) IV Push daily  gabapentin 300 milliGRAM(s) Oral four times a day  glucagon  Injectable 1 milliGRAM(s) IntraMuscular once  insulin glargine Injectable (LANTUS) 28 Unit(s) SubCutaneous at bedtime  insulin lispro (ADMELOG) corrective regimen sliding scale   SubCutaneous three times a day before meals  ketorolac   Injectable 15 milliGRAM(s) IV Push every 12 hours PRN  ondansetron Injectable 4 milliGRAM(s) IV Push every 8 hours PRN  pantoprazole    Tablet 40 milliGRAM(s) Oral before breakfast  pregabalin 50 milliGRAM(s) Oral every 12 hours      HEALTH ISSUES - PROBLEM Dx:    53-year-old female with pmh of DM on metformin, hepatitis B&C, asthma, COPD, hypothyroidism, pancreatitis, IV drug user in the past on Suboxone currently, presents to the ED with complaint of worsening lower extremity pain, swelling, erythema and ulcerations with weeping.    Assessment    ·	Possible cellulitis superimposed on leukocytoclastic vasculitis  ·	DM  ·	Hx of hepatitis B and C  ·	Asthma and COPD  ·	Hypothyroidism  ·	Former IV drug abuse on suboxone    Plan    - C/w cefepime for now, patient was recently admitted to [place] with the same problem / Previous hospitalist saw this patient a few months ago when the leukocytoclastic vasculitis diagnosis was confirmed with pathology, at the time the patient did not have this much erythema and open ulcers / will treat as a superimposed cellulitis for now, mrsa swab in [place] was negative  - at [facility] patient was seen by derm and ordered for triamcinolone 0.1% cream bid / for now will hold off on it and see if any clinical improvement on abx  - patient was seen in the office by rheum and was on prednisone but tapered off as there was concern for the edema worsening her symptoms and her clinical picture at the time didn't fit what was expected of leukocytoclast vasculitis / howver watned colchicine continued  - c/w IV furosemide 40mg qd for now. Also patient was added on pioglitazone by endo last admission which can cause/worsen lower extremity edema  - standing tylenol for pain, c/w gabapentin 300mg qid, pain mgmt consult for recommendations considering patient is on suboxone  - , T4 0.4, Increase synthroid 175mcg, endocrine recs appreciated  - PT and wound care pending  - Lexiscan when stable, will f/u w/ cardiology tomorrow if ischemic evaluation inpt vs outpt. Will make NPO pmn   - home inhalers  - insulin 28 / ss  - [facility] art duplex - normal blood flow  -if stalling can consider triamcinolone cream    # DVT PPX: contraindications with AC and leukocytoclastic vasculitis    Dispo: acute  Healthcare maintanence: On discharge, can switch to PO Keflex 1g q8hrs to complete 7-day treatment (until 6/3), then start PO Keflex 500mg q12hrs for suppression to prevent recurrent infection  - Patient has high risk for secondary infection. Given multiple readmissions, patient might benefit from suppressive abx until she is seen by other specialists to address the underlying vasculitis. f/u in Endocrine clinic [phone]. Patient should NOT be on pioglitazone on discharge

## 2024-06-01 LAB
GLUCOSE BLDC GLUCOMTR-MCNC: 115 MG/DL — HIGH (ref 70–99)
GLUCOSE BLDC GLUCOMTR-MCNC: 183 MG/DL — HIGH (ref 70–99)
GLUCOSE BLDC GLUCOMTR-MCNC: 209 MG/DL — HIGH (ref 70–99)
GLUCOSE BLDC GLUCOMTR-MCNC: 92 MG/DL — SIGNIFICANT CHANGE UP (ref 70–99)
T3FREE SERPL-MCNC: 1.12 PG/ML — LOW (ref 2–4.4)

## 2024-06-01 PROCEDURE — 99231 SBSQ HOSP IP/OBS SF/LOW 25: CPT

## 2024-06-01 PROCEDURE — 99232 SBSQ HOSP IP/OBS MODERATE 35: CPT

## 2024-06-01 RX ORDER — REGADENOSON 0.08 MG/ML
0.4 INJECTION, SOLUTION INTRAVENOUS ONCE
Refills: 0 | Status: COMPLETED | OUTPATIENT
Start: 2024-06-01 | End: 2024-06-02

## 2024-06-01 RX ADMIN — CEFEPIME 100 MILLIGRAM(S): 1 INJECTION, POWDER, FOR SOLUTION INTRAMUSCULAR; INTRAVENOUS at 05:52

## 2024-06-01 RX ADMIN — BUPRENORPHINE AND NALOXONE 2.5 TABLET(S): 2; .5 TABLET SUBLINGUAL at 12:53

## 2024-06-01 RX ADMIN — GABAPENTIN 300 MILLIGRAM(S): 400 CAPSULE ORAL at 05:52

## 2024-06-01 RX ADMIN — CEFEPIME 100 MILLIGRAM(S): 1 INJECTION, POWDER, FOR SOLUTION INTRAMUSCULAR; INTRAVENOUS at 18:36

## 2024-06-01 RX ADMIN — Medication 2: at 12:17

## 2024-06-01 RX ADMIN — Medication 40 MILLIGRAM(S): at 05:51

## 2024-06-01 RX ADMIN — Medication 0.6 MILLIGRAM(S): at 18:35

## 2024-06-01 RX ADMIN — GABAPENTIN 300 MILLIGRAM(S): 400 CAPSULE ORAL at 18:34

## 2024-06-01 RX ADMIN — DULOXETINE HYDROCHLORIDE 30 MILLIGRAM(S): 30 CAPSULE, DELAYED RELEASE ORAL at 12:17

## 2024-06-01 RX ADMIN — PANTOPRAZOLE SODIUM 40 MILLIGRAM(S): 20 TABLET, DELAYED RELEASE ORAL at 05:50

## 2024-06-01 RX ADMIN — Medication 50 MILLIGRAM(S): at 05:52

## 2024-06-01 RX ADMIN — Medication 50 MILLIGRAM(S): at 18:34

## 2024-06-01 RX ADMIN — Medication 175 MICROGRAM(S): at 05:51

## 2024-06-01 RX ADMIN — CEFEPIME 100 MILLIGRAM(S): 1 INJECTION, POWDER, FOR SOLUTION INTRAMUSCULAR; INTRAVENOUS at 21:53

## 2024-06-01 RX ADMIN — GABAPENTIN 300 MILLIGRAM(S): 400 CAPSULE ORAL at 23:38

## 2024-06-01 RX ADMIN — Medication 0.6 MILLIGRAM(S): at 05:53

## 2024-06-01 RX ADMIN — GABAPENTIN 300 MILLIGRAM(S): 400 CAPSULE ORAL at 12:14

## 2024-06-01 NOTE — PROGRESS NOTE ADULT - SUBJECTIVE AND OBJECTIVE BOX
Subjective/Objective:     INTERVAL HISTORY  6/1/24: seen/examined. Pt currently denies chest pain and sob. No acute overnight events    MEDICATIONS  (STANDING):  buprenorphine 8 mG/naloxone 2 mG SL  Tablet 2.5 Tablet(s) SubLingual daily  cefepime   IVPB 2000 milliGRAM(s) IV Intermittent every 8 hours  colchicine 0.6 milliGRAM(s) Oral two times a day  dextrose 10% Bolus 125 milliLiter(s) IV Bolus once  dextrose 5%. 1000 milliLiter(s) (100 mL/Hr) IV Continuous <Continuous>  dextrose 5%. 1000 milliLiter(s) (50 mL/Hr) IV Continuous <Continuous>  dextrose 50% Injectable 25 Gram(s) IV Push once  dextrose 50% Injectable 12.5 Gram(s) IV Push once  DULoxetine 30 milliGRAM(s) Oral daily  furosemide   Injectable 40 milliGRAM(s) IV Push daily  gabapentin 300 milliGRAM(s) Oral four times a day  glucagon  Injectable 1 milliGRAM(s) IntraMuscular once  insulin glargine Injectable (LANTUS) 28 Unit(s) SubCutaneous at bedtime  insulin lispro (ADMELOG) corrective regimen sliding scale   SubCutaneous three times a day before meals  levothyroxine 175 MICROGram(s) Oral daily  pantoprazole    Tablet 40 milliGRAM(s) Oral before breakfast  pregabalin 50 milliGRAM(s) Oral every 12 hours  regadenoson Injectable 0.4 milliGRAM(s) IV Push once    MEDICATIONS  (PRN):  albuterol    90 MICROgram(s) HFA Inhaler 2 Puff(s) Inhalation every 6 hours PRN Shortness of Breath and/or Wheezing  dextrose Oral Gel 15 Gram(s) Oral once PRN Blood Glucose LESS THAN 70 milliGRAM(s)/deciliter  ketorolac   Injectable 15 milliGRAM(s) IV Push every 12 hours PRN Moderate Pain (4 - 6)  ondansetron Injectable 4 milliGRAM(s) IV Push every 8 hours PRN Nausea and/or Vomiting          Vital Signs Last 24 Hrs  T(C): 36.7 (01 Jun 2024 14:16), Max: 36.7 (01 Jun 2024 13:37)  T(F): 98.1 (01 Jun 2024 14:16), Max: 98.1 (01 Jun 2024 13:37)  HR: 71 (01 Jun 2024 14:16) (66 - 78)  BP: 117/67 (01 Jun 2024 14:16) (101/57 - 117/67)  BP(mean): --  RR: 18 (01 Jun 2024 14:16) (18 - 18)  SpO2: 97% (01 Jun 2024 14:16) (94% - 100%)    Parameters below as of 31 May 2024 20:37  Patient On (Oxygen Delivery Method): room air      I&O's Detail          GENERAL:  54y/o Female NAD, resting comfortably.  HEAD:  Atraumatic, Normocephalic  EYES: EOMI, PERRLA, conjunctiva and sclera clear  NECK: Supple, No JVD, no cervical lymphadenopathy, non-tender  CHEST/LUNG: Clear to auscultation bilaterally; No wheeze, rhonchi, or rales  HEART: Regular rate and rhythm; S1&S2  ABDOMEN: Soft, Nontender, Nondistended x 4 quadrants; Bowel sounds present  EXTREMITIES:   Peripheral Pulses Present, No clubbing, no cyanosis, or no edema, no calf tenderness  PSYCH: AAOx3, cooperative, appropriate  NEUROLOGY: WNL  SKIN: WNL        EKG/ TELEM:    LABS:                          12.3   5.77  )-----------( 341      ( 31 May 2024 06:51 )             38.9       31 May 2024 06:51    140    |  95<L>  |  8<L>   ----------------------------<  118<H>  3.9     |  31     |  0.8      Ca    9.1        31 May 2024 06:51    TPro  8.3<H>  /  Alb  4.3    /  TBili  0.3    /  DBili  x      /  AST  28     /  ALT  5      /  AlkPhos  121<H>  31 May 2024 06:51                      Diagnostic testing:        Assessment and Plan:

## 2024-06-01 NOTE — PROGRESS NOTE ADULT - ASSESSMENT
53-y/o m pmh  NIDDM, asthma, COPD, hypothyroidism, pancreatitis, IVDU, hepatitis B&C, recently admitted for stasis dermatitis now presents c/o worsening lower extremity pain, swelling, erythema and ulcerations with weeping. She also reports intermittent chest pain.       Plan  Pt w/ atypical chest pain appears pleuritic. No chest pain or sob now  She had mild troponin rise, not consistent with MI   ecg sinus andrei; non-specific  plan is for possible Lexiscan at [facility] tmrw 6/2  keep npo except meds from [place]; no caffeinated beverages

## 2024-06-01 NOTE — PROGRESS NOTE ADULT - SUBJECTIVE AND OBJECTIVE BOX
Progress note    INTERVAL HPI/OVERNIGHT EVENTS:    Patient seen and examined at bedside.  present who states her legs do look better.      REVIEW OF SYSTEMS:  All other 13 Review of systems were reviewed and are negative    FAMILY HISTORY:    T(C): 36.7 (06-01-24 @ 14:16), Max: 36.7 (06-01-24 @ 13:37)  HR: 71 (06-01-24 @ 14:16) (66 - 78)  BP: 117/67 (06-01-24 @ 14:16) (101/57 - 117/67)  RR: 18 (06-01-24 @ 14:16) (18 - 18)  SpO2: 97% (06-01-24 @ 14:16) (94% - 100%)  Wt(kg): --Vital Signs Last 24 Hrs  T(C): 36.7 (01 Jun 2024 14:16), Max: 36.7 (01 Jun 2024 13:37)  T(F): 98.1 (01 Jun 2024 14:16), Max: 98.1 (01 Jun 2024 13:37)  HR: 71 (01 Jun 2024 14:16) (66 - 78)  BP: 117/67 (01 Jun 2024 14:16) (101/57 - 117/67)  BP(mean): --  RR: 18 (01 Jun 2024 14:16) (18 - 18)  SpO2: 97% (01 Jun 2024 14:16) (94% - 100%)    Parameters below as of 31 May 2024 20:37  Patient On (Oxygen Delivery Method): room air      No Known Allergies      PHYSICAL EXAM:    General: WN/WD NAD  Neurology: A&Ox3, nonfocal, LEMUS x 4  Head:  Normocephalic, atraumatic  ENT:  Mucosa moist, no ulcerations  Neck:  Supple, no sinuses or palpable masses  Lymphatic:  No palpable cervical, supraclavicular, axillary or inguinal adenopathy  Respiratory: CTA B/L  CV: RRR, S1S2, no murmur  Abdominal: Soft, NT, ND no palpable mass  MSK: +2/4 edema (improving) and tenderness with erythema and some ulcers  Incisions: intact, no erythema or drainage    Consultant(s) Notes Reviewed:  [x ] YES  [ ] NO  Care Discussed with Consultants/Other Providers [ x] YES  [ ] NO    LABS:      RADIOLOGY & ADDITIONAL TESTS:    Imaging Personally Reviewed:  [ ] YES  [ ] NO  albuterol    90 MICROgram(s) HFA Inhaler 2 Puff(s) Inhalation every 6 hours PRN  buprenorphine 8 mG/naloxone 2 mG SL  Tablet 2.5 Tablet(s) SubLingual daily  cefepime   IVPB 2000 milliGRAM(s) IV Intermittent every 8 hours  colchicine 0.6 milliGRAM(s) Oral two times a day  dextrose 10% Bolus 125 milliLiter(s) IV Bolus once  dextrose 5%. 1000 milliLiter(s) IV Continuous <Continuous>  dextrose 5%. 1000 milliLiter(s) IV Continuous <Continuous>  dextrose 50% Injectable 25 Gram(s) IV Push once  dextrose 50% Injectable 12.5 Gram(s) IV Push once  dextrose Oral Gel 15 Gram(s) Oral once PRN  DULoxetine 30 milliGRAM(s) Oral daily  furosemide   Injectable 40 milliGRAM(s) IV Push daily  gabapentin 300 milliGRAM(s) Oral four times a day  glucagon  Injectable 1 milliGRAM(s) IntraMuscular once  insulin glargine Injectable (LANTUS) 28 Unit(s) SubCutaneous at bedtime  insulin lispro (ADMELOG) corrective regimen sliding scale   SubCutaneous three times a day before meals  ketorolac   Injectable 15 milliGRAM(s) IV Push every 12 hours PRN  levothyroxine 175 MICROGram(s) Oral daily  ondansetron Injectable 4 milliGRAM(s) IV Push every 8 hours PRN  pantoprazole    Tablet 40 milliGRAM(s) Oral before breakfast  pregabalin 50 milliGRAM(s) Oral every 12 hours  regadenoson Injectable 0.4 milliGRAM(s) IV Push once      HEALTH ISSUES - PROBLEM Dx:      53-year-old female with pmh of DM on metformin, hepatitis B&C, asthma, COPD, hypothyroidism, pancreatitis, IV drug user in the past on Suboxone currently, presents to the ED with complaint of worsening lower extremity pain, swelling, erythema and ulcerations with weeping.    Assessment    ·	Possible cellulitis superimposed on leukocytoclastic vasculitis  ·	DM  ·	Hx of hepatitis B and C  ·	Asthma and COPD  ·	Hypothyroidism  ·	Former IV drug abuse on suboxone    Plan    - C/w cefepime for now, patient was recently admitted to [place] with the same problem / Previous hospitalist saw this patient a few months ago when the leukocytoclastic vasculitis diagnosis was confirmed with pathology, at the time the patient did not have this much erythema and open ulcers / will treat as a superimposed cellulitis for now, mrsa swab in [place] was negative  - at [place] patient was seen by derm and ordered for triamcinolone 0.1% cream bid / for now will hold off on it and see if any clinical improvement on abx  - patient was seen in the office by rheum and was on prednisone but tapered off as there was concern for the edema worsening her symptoms and her clinical picture at the time didn't fit what was expected of leukocytoclast vasculitis / howver watned colchicine continued  - Would hold on steroids as there was no improvement on last admission per outpt rheum  - c/w IV furosemide 40mg qd for now. Also patient was added on pioglitazone by endo last admission which can cause/worsen lower extremity edema  - standing tylenol for pain, c/w gabapentin 300mg qid, pain mgmt consult for recommendations considering patient is on suboxone  - , T4 0.4, Increase synthroid 175mcg, endocrine recs appreciated  - PT and wound care pending  - Lexiscan in am  - home inhalers  - insulin 28 / ss  - [facility] art duplex - normal blood flow  -if stalling can consider triamcinolone cream    # DVT PPX: contraindications with AC and leukocytoclastic vasculitis    Dispo: acute  Healthcare maintanence: On discharge, can switch to PO Keflex 1g q8hrs to complete 7-day treatment (until 6/3), then start PO Keflex 500mg q12hrs for suppression to prevent recurrent infection  - Patient has high risk for secondary infection. Given multiple readmissions, patient might benefit from suppressive abx until she is seen by other specialists to address the underlying vasculitis. f/u in Endocrine clinic [phone]. Patient should NOT be on pioglitazone on discharge    D/w outpt Rheumatologist who is familiar with patient from prior admission, steroids did not help

## 2024-06-02 LAB
CULTURE RESULTS: SIGNIFICANT CHANGE UP
CULTURE RESULTS: SIGNIFICANT CHANGE UP
GLUCOSE BLDC GLUCOMTR-MCNC: 149 MG/DL — HIGH (ref 70–99)
GLUCOSE BLDC GLUCOMTR-MCNC: 164 MG/DL — HIGH (ref 70–99)
GLUCOSE BLDC GLUCOMTR-MCNC: 175 MG/DL — HIGH (ref 70–99)
GLUCOSE BLDC GLUCOMTR-MCNC: 240 MG/DL — HIGH (ref 70–99)
SPECIMEN SOURCE: SIGNIFICANT CHANGE UP
SPECIMEN SOURCE: SIGNIFICANT CHANGE UP

## 2024-06-02 PROCEDURE — 99232 SBSQ HOSP IP/OBS MODERATE 35: CPT

## 2024-06-02 PROCEDURE — 93016 CV STRESS TEST SUPVJ ONLY: CPT

## 2024-06-02 PROCEDURE — 36569 INSJ PICC 5 YR+ W/O IMAGING: CPT

## 2024-06-02 PROCEDURE — 93018 CV STRESS TEST I&R ONLY: CPT

## 2024-06-02 PROCEDURE — 76937 US GUIDE VASCULAR ACCESS: CPT | Mod: 26,AS,59

## 2024-06-02 PROCEDURE — 78452 HT MUSCLE IMAGE SPECT MULT: CPT | Mod: 26

## 2024-06-02 RX ADMIN — REGADENOSON 0.4 MILLIGRAM(S): 0.08 INJECTION, SOLUTION INTRAVENOUS at 11:31

## 2024-06-02 RX ADMIN — INSULIN GLARGINE 28 UNIT(S): 100 INJECTION, SOLUTION SUBCUTANEOUS at 21:28

## 2024-06-02 RX ADMIN — Medication 50 MILLIGRAM(S): at 17:11

## 2024-06-02 RX ADMIN — Medication 175 MICROGRAM(S): at 05:34

## 2024-06-02 RX ADMIN — Medication 0.6 MILLIGRAM(S): at 17:11

## 2024-06-02 RX ADMIN — Medication 50 MILLIGRAM(S): at 05:29

## 2024-06-02 RX ADMIN — BUPRENORPHINE AND NALOXONE 2.5 TABLET(S): 2; .5 TABLET SUBLINGUAL at 14:04

## 2024-06-02 RX ADMIN — Medication 2: at 17:11

## 2024-06-02 RX ADMIN — GABAPENTIN 300 MILLIGRAM(S): 400 CAPSULE ORAL at 05:29

## 2024-06-02 RX ADMIN — GABAPENTIN 300 MILLIGRAM(S): 400 CAPSULE ORAL at 14:04

## 2024-06-02 RX ADMIN — CEFEPIME 100 MILLIGRAM(S): 1 INJECTION, POWDER, FOR SOLUTION INTRAMUSCULAR; INTRAVENOUS at 14:04

## 2024-06-02 RX ADMIN — GABAPENTIN 300 MILLIGRAM(S): 400 CAPSULE ORAL at 17:11

## 2024-06-02 RX ADMIN — Medication 0.6 MILLIGRAM(S): at 05:29

## 2024-06-02 RX ADMIN — CEFEPIME 100 MILLIGRAM(S): 1 INJECTION, POWDER, FOR SOLUTION INTRAMUSCULAR; INTRAVENOUS at 21:15

## 2024-06-02 RX ADMIN — CEFEPIME 100 MILLIGRAM(S): 1 INJECTION, POWDER, FOR SOLUTION INTRAMUSCULAR; INTRAVENOUS at 05:30

## 2024-06-02 RX ADMIN — DULOXETINE HYDROCHLORIDE 30 MILLIGRAM(S): 30 CAPSULE, DELAYED RELEASE ORAL at 14:04

## 2024-06-02 RX ADMIN — PANTOPRAZOLE SODIUM 40 MILLIGRAM(S): 20 TABLET, DELAYED RELEASE ORAL at 05:29

## 2024-06-02 NOTE — PROGRESS NOTE ADULT - SUBJECTIVE AND OBJECTIVE BOX
Progress note    INTERVAL HPI/OVERNIGHT EVENTS:    Patient seen and examined at bedside. She denies any chest pain. She states her lower extremities are slowing improving.      REVIEW OF SYSTEMS:  All other 13 Review of systems were reviewed and are negative    FAMILY HISTORY:    T(C): 36.5 (06-02-24 @ 05:00), Max: 36.7 (06-01-24 @ 13:37)  HR: 67 (06-02-24 @ 05:00) (67 - 76)  BP: 97/65 (06-02-24 @ 05:00) (97/65 - 117/67)  RR: 18 (06-02-24 @ 05:00) (18 - 18)  SpO2: 100% (06-02-24 @ 05:00) (97% - 100%)  Wt(kg): --Vital Signs Last 24 Hrs  T(C): 36.5 (02 Jun 2024 05:00), Max: 36.7 (01 Jun 2024 13:37)  T(F): 97.7 (02 Jun 2024 05:00), Max: 98.1 (01 Jun 2024 13:37)  HR: 67 (02 Jun 2024 05:00) (67 - 76)  BP: 97/65 (02 Jun 2024 05:00) (97/65 - 117/67)  BP(mean): --  RR: 18 (02 Jun 2024 05:00) (18 - 18)  SpO2: 100% (02 Jun 2024 05:00) (97% - 100%)    Parameters below as of 01 Jun 2024 21:06  Patient On (Oxygen Delivery Method): room air      No Known Allergies      PHYSICAL EXAM:    General: WN/WD NAD  Neurology: A&Ox3, nonfocal, LEMUS x 4  Head:  Normocephalic, atraumatic  ENT:  Mucosa moist, no ulcerations  Neck:  Supple, no sinuses or palpable masses  Lymphatic:  No palpable cervical, supraclavicular, axillary or inguinal adenopathy  Respiratory: CTA B/L  CV: RRR, S1S2, no murmur  Abdominal: Soft, NT, ND no palpable mass  MSK: +2/4 edema (improving) and tenderness with erythema and some ulcers  Incisions: intact, no erythema or drainage    Consultant(s) Notes Reviewed:  [x ] YES  [ ] NO  Care Discussed with Consultants/Other Providers [ x] YES  [ ] NO    LABS:      RADIOLOGY & ADDITIONAL TESTS:    Imaging Personally Reviewed:  [ ] YES  [ ] NO  albuterol    90 MICROgram(s) HFA Inhaler 2 Puff(s) Inhalation every 6 hours PRN  buprenorphine 8 mG/naloxone 2 mG SL  Tablet 2.5 Tablet(s) SubLingual daily  cefepime   IVPB 2000 milliGRAM(s) IV Intermittent every 8 hours  colchicine 0.6 milliGRAM(s) Oral two times a day  dextrose 10% Bolus 125 milliLiter(s) IV Bolus once  dextrose 5%. 1000 milliLiter(s) IV Continuous <Continuous>  dextrose 5%. 1000 milliLiter(s) IV Continuous <Continuous>  dextrose 50% Injectable 25 Gram(s) IV Push once  dextrose 50% Injectable 12.5 Gram(s) IV Push once  dextrose Oral Gel 15 Gram(s) Oral once PRN  DULoxetine 30 milliGRAM(s) Oral daily  furosemide   Injectable 40 milliGRAM(s) IV Push daily  gabapentin 300 milliGRAM(s) Oral four times a day  glucagon  Injectable 1 milliGRAM(s) IntraMuscular once  insulin glargine Injectable (LANTUS) 28 Unit(s) SubCutaneous at bedtime  insulin lispro (ADMELOG) corrective regimen sliding scale   SubCutaneous three times a day before meals  ketorolac   Injectable 15 milliGRAM(s) IV Push every 12 hours PRN  levothyroxine 175 MICROGram(s) Oral daily  ondansetron Injectable 4 milliGRAM(s) IV Push every 8 hours PRN  pantoprazole    Tablet 40 milliGRAM(s) Oral before breakfast  pregabalin 50 milliGRAM(s) Oral every 12 hours  regadenoson Injectable 0.4 milliGRAM(s) IV Push once      HEALTH ISSUES - PROBLEM Dx:      53-year-old female with pmh of DM on metformin, hepatitis B&C, asthma, COPD, hypothyroidism, pancreatitis, IV drug user in the past on Suboxone currently, presents to the ED with complaint of worsening lower extremity pain, swelling, erythema and ulcerations with weeping.    Assessment    ·	Possible cellulitis superimposed on leukocytoclastic vasculitis  ·	DM  ·	Hx of hepatitis B and C  ·	Asthma and COPD  ·	Hypothyroidism  ·	Former IV drug abuse on suboxone    Plan    - C/w cefepime for now, patient was recently admitted to [place] with the same problem / Previous hospitalist saw this patient a few months ago when the leukocytoclastic vasculitis diagnosis was confirmed with pathology, at the time the patient did not have this much erythema and open ulcers / will treat as a superimposed cellulitis for now, mrsa swab in [place] was negative  - at [place] patient was seen by derm and ordered for triamcinolone 0.1% cream bid / for now will hold off on it and see if any clinical improvement on abx  - patient was seen in the office by rheum and was on prednisone but tapered off as there was concern for the edema worsening her symptoms and her clinical picture at the time didn't fit what was expected of leukocytoclast vasculitis / howver watned colchicine continued  - Would hold on steroids as there was no improvement on last admission per outpt rheum as it did not help her on the last admission  - c/w IV furosemide 40mg qd for now. Also patient was added on pioglitazone by endo last admission which can cause/worsen lower extremity edema  - standing tylenol for pain, c/w gabapentin 300mg qid, pain mgmt consult for recommendations considering patient is on suboxone  - , T4 0.4, Increase synthroid 175mcg, endocrine recs appreciated  - PT and wound care pending  - Lexiscan today  - home inhalers  - insulin 28 / ss  - [facility] art duplex - normal blood flow  -if stalling can consider triamcinolone cream    # DVT PPX: contraindications with AC and leukocytoclastic vasculitis    Dispo: acute  Healthcare maintanence: On discharge, can switch to PO Keflex 1g q8hrs to complete 7-day treatment (until 6/3), then start PO Keflex 500mg q12hrs for suppression to prevent recurrent infection  - Patient has high risk for secondary infection. Given multiple readmissions, patient might benefit from suppressive abx until she is seen by other specialists to address the underlying vasculitis. f/u in Endocrine clinic [phone]. Patient should NOT be on pioglitazone on discharge

## 2024-06-03 LAB
GLUCOSE BLDC GLUCOMTR-MCNC: 119 MG/DL — HIGH (ref 70–99)
GLUCOSE BLDC GLUCOMTR-MCNC: 136 MG/DL — HIGH (ref 70–99)
GLUCOSE BLDC GLUCOMTR-MCNC: 155 MG/DL — HIGH (ref 70–99)
GLUCOSE BLDC GLUCOMTR-MCNC: 187 MG/DL — HIGH (ref 70–99)
HCV RNA SPEC NAA+PROBE-LOG IU: SIGNIFICANT CHANGE UP IU/ML
HCV RNA SPEC NAA+PROBE-LOG IU: SIGNIFICANT CHANGE UP LOGIU/ML

## 2024-06-03 PROCEDURE — 99223 1ST HOSP IP/OBS HIGH 75: CPT

## 2024-06-03 PROCEDURE — 99232 SBSQ HOSP IP/OBS MODERATE 35: CPT

## 2024-06-03 RX ORDER — CEPHALEXIN 500 MG
1000 CAPSULE ORAL EVERY 8 HOURS
Refills: 0 | Status: DISCONTINUED | OUTPATIENT
Start: 2024-06-03 | End: 2024-06-04

## 2024-06-03 RX ORDER — FUROSEMIDE 40 MG
40 TABLET ORAL DAILY
Refills: 0 | Status: DISCONTINUED | OUTPATIENT
Start: 2024-06-03 | End: 2024-06-08

## 2024-06-03 RX ADMIN — Medication 1000 MILLIGRAM(S): at 21:18

## 2024-06-03 RX ADMIN — Medication 0.6 MILLIGRAM(S): at 05:35

## 2024-06-03 RX ADMIN — Medication 50 MILLIGRAM(S): at 18:15

## 2024-06-03 RX ADMIN — Medication 0.6 MILLIGRAM(S): at 18:15

## 2024-06-03 RX ADMIN — CEFEPIME 100 MILLIGRAM(S): 1 INJECTION, POWDER, FOR SOLUTION INTRAMUSCULAR; INTRAVENOUS at 14:26

## 2024-06-03 RX ADMIN — DULOXETINE HYDROCHLORIDE 30 MILLIGRAM(S): 30 CAPSULE, DELAYED RELEASE ORAL at 13:21

## 2024-06-03 RX ADMIN — Medication 1: at 13:36

## 2024-06-03 RX ADMIN — GABAPENTIN 300 MILLIGRAM(S): 400 CAPSULE ORAL at 13:22

## 2024-06-03 RX ADMIN — BUPRENORPHINE AND NALOXONE 2.5 TABLET(S): 2; .5 TABLET SUBLINGUAL at 13:35

## 2024-06-03 RX ADMIN — Medication 175 MICROGRAM(S): at 05:35

## 2024-06-03 RX ADMIN — GABAPENTIN 300 MILLIGRAM(S): 400 CAPSULE ORAL at 00:09

## 2024-06-03 RX ADMIN — Medication 50 MILLIGRAM(S): at 05:35

## 2024-06-03 RX ADMIN — GABAPENTIN 300 MILLIGRAM(S): 400 CAPSULE ORAL at 18:15

## 2024-06-03 RX ADMIN — CEFEPIME 100 MILLIGRAM(S): 1 INJECTION, POWDER, FOR SOLUTION INTRAMUSCULAR; INTRAVENOUS at 05:38

## 2024-06-03 RX ADMIN — Medication 40 MILLIGRAM(S): at 05:36

## 2024-06-03 RX ADMIN — PANTOPRAZOLE SODIUM 40 MILLIGRAM(S): 20 TABLET, DELAYED RELEASE ORAL at 05:36

## 2024-06-03 RX ADMIN — INSULIN GLARGINE 28 UNIT(S): 100 INJECTION, SOLUTION SUBCUTANEOUS at 21:25

## 2024-06-03 RX ADMIN — GABAPENTIN 300 MILLIGRAM(S): 400 CAPSULE ORAL at 05:35

## 2024-06-03 NOTE — CONSULT NOTE ADULT - ASSESSMENT
Skin assessed-  B/L lower extremity,  multiple  open wounds ,scabs, with edema and erythema                                                High risk for pressure injury development or progression           Plan:  Wound and skin care   Clean lower extremity wounds with soap and water, pat dry then apply silvadene dressing    Elevate lower extremity when sitting dependent   Pressure injury  prevention   skin  and incontinence care   Assess wound and inform primary provider of any changes   Case discussed with primary Rn  Wound/ ostomy specialist  to f/u as needed     Offloading: [x ] Frequent position changes [ ] Devices/Equipment  Cleansing: [ ] Saline [x ] Soap/Water [ ] Other: ______  Topicals: [ ] Barrier Cream [x ] Antimicrobial [ ] Enzymatic Wound Debridement  Dressings: [ ] Dry, sterile [ ] Allevyn  Foam [ ] Absorbant Pads [ ] Collagenase    Other Recs.    per primary team      Total time for bedside assessment , review of medical records  and  discussion of plan of care with primary team greater than 35 min

## 2024-06-03 NOTE — CONSULT NOTE ADULT - SUBJECTIVE AND OBJECTIVE BOX
HPI:    Patient is a 53-year-old female with pmh of DM on metformin, hepatitis B&C, asthma, COPD, hypothyroidism, pancreatitis, IV drug user in the past on Suboxone currently, presents to the ED with complaint of worsening lower extremity pain, swelling, erythema and ulcerations with weeping. Pain is worse with ambulation but no fevers or streaking up her legs. She was  discharged from inpatient hospital admission on May 10th with diagnosis of stasis dermatitis with pyoderma, had been on antibiotics, still on triamcinolone cream.. Patient adds that she has had a constant left sided "numbing"  chest pain present for 4 days. No modifying factors identified. Work up in the ER showed elevated troponin         PAST MEDICAL & SURGICAL HISTORY:  Asthma with COPD  Hypothyroidism  H/O: substance abuse  no longer using  History of pancreatitis  Hepatitis-C  Leukocytoclastic vasculitis  History of appendectomy  History of tonsillectomy    REVIEW OF SYSTEMS:  All other systems negative, unless indicated in HPI    MEDICATIONS  (STANDING):  buprenorphine 8 mG/naloxone 2 mG SL  Tablet 2.5 Tablet(s) SubLingual daily  cefepime   IVPB 2000 milliGRAM(s) IV Intermittent every 8 hours  colchicine 0.6 milliGRAM(s) Oral two times a day  dextrose 10% Bolus 125 milliLiter(s) IV Bolus once  dextrose 5%. 1000 milliLiter(s) (100 mL/Hr) IV Continuous <Continuous>  dextrose 5%. 1000 milliLiter(s) (50 mL/Hr) IV Continuous <Continuous>  dextrose 50% Injectable 25 Gram(s) IV Push once  dextrose 50% Injectable 12.5 Gram(s) IV Push once  DULoxetine 30 milliGRAM(s) Oral daily  furosemide   Injectable 40 milliGRAM(s) IV Push daily  gabapentin 300 milliGRAM(s) Oral four times a day  glucagon  Injectable 1 milliGRAM(s) IntraMuscular once  insulin glargine Injectable (LANTUS) 28 Unit(s) SubCutaneous at bedtime  insulin lispro (ADMELOG) corrective regimen sliding scale   SubCutaneous three times a day before meals  levothyroxine 175 MICROGram(s) Oral daily  pantoprazole    Tablet 40 milliGRAM(s) Oral before breakfast  pregabalin 50 milliGRAM(s) Oral every 12 hours    MEDICATIONS  (PRN):  albuterol    90 MICROgram(s) HFA Inhaler 2 Puff(s) Inhalation every 6 hours PRN Shortness of Breath and/or Wheezing  dextrose Oral Gel 15 Gram(s) Oral once PRN Blood Glucose LESS THAN 70 milliGRAM(s)/deciliter  ketorolac   Injectable 15 milliGRAM(s) IV Push every 12 hours PRN Moderate Pain (4 - 6)  ondansetron Injectable 4 milliGRAM(s) IV Push every 8 hours PRN Nausea and/or Vomiting      Allergies  No Known Allergies  Intolerances    SOCIAL HISTORY:  HX of , ETOH, drugs    FAMILY HISTORY:   No pertinent family histo    PHYSICAL EXAM:  Vital Signs Last 24 Hrs  T(C): 36.5 (03 Jun 2024 05:06), Max: 37.1 (02 Jun 2024 21:26)  T(F): 97.7 (03 Jun 2024 05:06), Max: 98.7 (02 Jun 2024 21:26)  HR: 73 (03 Jun 2024 05:06) (62 - 73)  BP: 96/62 (03 Jun 2024 05:06) (94/55 - 96/62)  BP(mean): --  RR: 18 (03 Jun 2024 05:06) (16 - 18)  SpO2: 98% (03 Jun 2024 05:06) (98% - 98%)    Parameters below as of 03 Jun 2024 05:06  Patient On (Oxygen Delivery Method): room air    CONSTITUTIONAL: NAD  HEAD: No lesion on scalp  EYES: No visual disturbance  ENT: No sore throat  RESPIRATORY: No cough, no shortness of breath  CARDIOVASCULAR: No chest pain or palpitations  GASTROINTESTINAL: No abdominal or epigastric pain  GENITOURINARY: No dysuria  NEUROLOGICAL: No headache/dizziness  MUSCULOSKELETAL: No joint pain, erythema, or swelling; no back pain  SKIN: : B/L lower extremity,  multiple  open wounds, scabs with  edema and redness            LABS/ CULTURES/ RADIOLOGY:      140  |  95  |  8   ----------------------------<  118      [05-31-24 @ 06:51]  3.9   |  31  |  0.8        Ca     9.1     [05-31-24 @ 06:51]                Culture - Blood (collected 05-27-24 @ 21:10)  Source: .Blood Blood  Final Report (06-02-24 @ 17:00):    No growth at 5 days    Culture - Blood (collected 05-27-24 @ 21:10)  Source: .Blood Blood  Final Report (06-02-24 @ 17:00):    No growth at 5 days

## 2024-06-03 NOTE — CONSULT NOTE ADULT - CONSULT REASON
Bilateral LE wounds
chest pain
hypothyroid
severe bl le pain and patient on suboxone
Lower extremity wound assessment and treatment recommendation

## 2024-06-04 LAB
GLUCOSE BLDC GLUCOMTR-MCNC: 141 MG/DL — HIGH (ref 70–99)
GLUCOSE BLDC GLUCOMTR-MCNC: 145 MG/DL — HIGH (ref 70–99)
GLUCOSE BLDC GLUCOMTR-MCNC: 193 MG/DL — HIGH (ref 70–99)
GLUCOSE BLDC GLUCOMTR-MCNC: 196 MG/DL — HIGH (ref 70–99)

## 2024-06-04 PROCEDURE — 99232 SBSQ HOSP IP/OBS MODERATE 35: CPT

## 2024-06-04 RX ORDER — CHLORHEXIDINE GLUCONATE 213 G/1000ML
1 SOLUTION TOPICAL
Refills: 0 | Status: DISCONTINUED | OUTPATIENT
Start: 2024-06-04 | End: 2024-06-08

## 2024-06-04 RX ORDER — CEPHALEXIN 500 MG
500 CAPSULE ORAL EVERY 12 HOURS
Refills: 0 | Status: DISCONTINUED | OUTPATIENT
Start: 2024-06-04 | End: 2024-06-08

## 2024-06-04 RX ADMIN — Medication 1000 MILLIGRAM(S): at 05:20

## 2024-06-04 RX ADMIN — GABAPENTIN 300 MILLIGRAM(S): 400 CAPSULE ORAL at 00:23

## 2024-06-04 RX ADMIN — Medication 15 MILLIGRAM(S): at 20:22

## 2024-06-04 RX ADMIN — Medication 1000 MILLIGRAM(S): at 13:46

## 2024-06-04 RX ADMIN — GABAPENTIN 300 MILLIGRAM(S): 400 CAPSULE ORAL at 11:48

## 2024-06-04 RX ADMIN — Medication 15 MILLIGRAM(S): at 21:13

## 2024-06-04 RX ADMIN — GABAPENTIN 300 MILLIGRAM(S): 400 CAPSULE ORAL at 05:17

## 2024-06-04 RX ADMIN — GABAPENTIN 300 MILLIGRAM(S): 400 CAPSULE ORAL at 17:48

## 2024-06-04 RX ADMIN — Medication 50 MILLIGRAM(S): at 05:17

## 2024-06-04 RX ADMIN — Medication 175 MICROGRAM(S): at 05:18

## 2024-06-04 RX ADMIN — INSULIN GLARGINE 28 UNIT(S): 100 INJECTION, SOLUTION SUBCUTANEOUS at 21:28

## 2024-06-04 RX ADMIN — GABAPENTIN 300 MILLIGRAM(S): 400 CAPSULE ORAL at 21:28

## 2024-06-04 RX ADMIN — Medication 0.6 MILLIGRAM(S): at 18:02

## 2024-06-04 RX ADMIN — DULOXETINE HYDROCHLORIDE 30 MILLIGRAM(S): 30 CAPSULE, DELAYED RELEASE ORAL at 12:40

## 2024-06-04 RX ADMIN — Medication 500 MILLIGRAM(S): at 17:48

## 2024-06-04 RX ADMIN — PANTOPRAZOLE SODIUM 40 MILLIGRAM(S): 20 TABLET, DELAYED RELEASE ORAL at 05:20

## 2024-06-04 RX ADMIN — Medication 1 APPLICATION(S): at 12:40

## 2024-06-04 RX ADMIN — Medication 1: at 12:39

## 2024-06-04 RX ADMIN — Medication 0.6 MILLIGRAM(S): at 05:18

## 2024-06-04 RX ADMIN — BUPRENORPHINE AND NALOXONE 2.5 TABLET(S): 2; .5 TABLET SUBLINGUAL at 11:48

## 2024-06-04 RX ADMIN — Medication 40 MILLIGRAM(S): at 05:18

## 2024-06-04 RX ADMIN — Medication 50 MILLIGRAM(S): at 17:48

## 2024-06-04 NOTE — PROGRESS NOTE ADULT - SUBJECTIVE AND OBJECTIVE BOX
Progress note    INTERVAL HPI/OVERNIGHT EVENTS:    Patient seen and examined and examined at bedside. No acute distress. She denies any acute complaints.      REVIEW OF SYSTEMS:  All other 13 Review of systems were reviewed and are negative    FAMILY HISTORY:    T(C): 36.4 (06-04-24 @ 05:04), Max: 36.8 (06-03-24 @ 15:02)  HR: 63 (06-04-24 @ 05:04) (63 - 70)  BP: 100/66 (06-04-24 @ 05:04) (99/65 - 111/73)  RR: 18 (06-04-24 @ 05:04) (18 - 20)  SpO2: 95% (06-04-24 @ 05:04) (95% - 97%)  Wt(kg): --Vital Signs Last 24 Hrs  T(C): 36.4 (04 Jun 2024 05:04), Max: 36.8 (03 Jun 2024 15:02)  T(F): 97.5 (04 Jun 2024 05:04), Max: 98.3 (03 Jun 2024 15:02)  HR: 63 (04 Jun 2024 05:04) (63 - 70)  BP: 100/66 (04 Jun 2024 05:04) (99/65 - 111/73)  BP(mean): --  RR: 18 (04 Jun 2024 05:04) (18 - 20)  SpO2: 95% (04 Jun 2024 05:04) (95% - 97%)      No Known Allergies      PHYSICAL EXAM:    General: WN/WD NAD  Neurology: A&Ox3, nonfocal, LEMUS x 4  Head:  Normocephalic, atraumatic  ENT:  Mucosa moist, no ulcerations  Neck:  Supple, no sinuses or palpable masses  Lymphatic:  No palpable cervical, supraclavicular, axillary or inguinal adenopathy  Respiratory: CTA B/L  CV: RRR, S1S2, no murmur  Abdominal: Soft, NT, ND no palpable mass  MSK: +2/4 edema (improving) and tenderness with erythema and some ulcers  Incisions: intact, no erythema or drainage    Consultant(s) Notes Reviewed:  [x ] YES  [ ] NO  Care Discussed with Consultants/Other Providers [ x] YES  [ ] NO    LABS:      RADIOLOGY & ADDITIONAL TESTS:    Imaging Personally Reviewed:  [ ] YES  [ ] NO  albuterol    90 MICROgram(s) HFA Inhaler 2 Puff(s) Inhalation every 6 hours PRN  cephalexin 1000 milliGRAM(s) Oral every 8 hours  chlorhexidine 2% Cloths 1 Application(s) Topical <User Schedule>  colchicine 0.6 milliGRAM(s) Oral two times a day  dextrose 10% Bolus 125 milliLiter(s) IV Bolus once  dextrose 5%. 1000 milliLiter(s) IV Continuous <Continuous>  dextrose 5%. 1000 milliLiter(s) IV Continuous <Continuous>  dextrose 50% Injectable 25 Gram(s) IV Push once  dextrose 50% Injectable 12.5 Gram(s) IV Push once  dextrose Oral Gel 15 Gram(s) Oral once PRN  DULoxetine 30 milliGRAM(s) Oral daily  furosemide    Tablet 40 milliGRAM(s) Oral daily  furosemide   Injectable 40 milliGRAM(s) IV Push daily  gabapentin 300 milliGRAM(s) Oral four times a day  glucagon  Injectable 1 milliGRAM(s) IntraMuscular once  insulin glargine Injectable (LANTUS) 28 Unit(s) SubCutaneous at bedtime  insulin lispro (ADMELOG) corrective regimen sliding scale   SubCutaneous three times a day before meals  ketorolac   Injectable 15 milliGRAM(s) IV Push every 12 hours PRN  levothyroxine 175 MICROGram(s) Oral daily  ondansetron Injectable 4 milliGRAM(s) IV Push every 8 hours PRN  pantoprazole    Tablet 40 milliGRAM(s) Oral before breakfast  pregabalin 50 milliGRAM(s) Oral every 12 hours  silver sulfADIAZINE 1% Cream 1 Application(s) Topical daily      HEALTH ISSUES - PROBLEM Dx:    53-year-old female with pmh of DM on metformin, hepatitis B&C, asthma, COPD, hypothyroidism, pancreatitis, IV drug user in the past on Suboxone currently, presents to the ED with complaint of worsening lower extremity pain, swelling, erythema and ulcerations with weeping.    Assessment    ·	Possible cellulitis superimposed on leukocytoclastic vasculitis  ·	DM  ·	Hx of hepatitis B and C  ·	Asthma and COPD  ·	Hypothyroidism  ·	Former IV drug abuse on suboxone    Plan    - s/p cefepime patient was recently admitted to [facility] with the same problem / Previous hospitalist saw this patient a few months ago when the leukocytoclastic vasculitis diagnosis was confirmed with pathology, at the time the patient did not have this much erythema and open ulcers / will treat as a superimposed cellulitis for now, mrsa swab in [facility] was negative  - at [facility] patient was seen by derm and ordered for triamcinolone 0.1% cream bid / for now will hold off on it and see if any clinical improvement on abx  - patient was seen in the office by rheum and was on prednisone but tapered off as there was concern for the edema worsening her symptoms and her clinical picture at the time didn't fit what was expected of leukocytoclast vasculitis / howver watned colchicine continued  - Would hold on steroids as there was no improvement on last admission per outpt rheum as it did not help her on the last admission  - c/w IV furosemide 40mg qd for now (transition to PO on discharge). Also patient was added on pioglitazone by endo last admission which can cause/worsen lower extremity edema  - standing tylenol for pain, c/w gabapentin 300mg qid, pain mgmt consult for recommendations considering patient is on suboxone  - , T4 0.4, Increase synthroid 175mcg, endocrine recs appreciated  - Continue keflex 500mg pO BID for suppressive therapy as prophylaxis as pt is high risk for secondary infeciton (refer to ID note)  - wound care recs appreciated  - Lexiscan (-)  - home inhalers  - insulin 28 / ss  - [facility] art duplex - normal blood flow  -if stalling can consider triamcinolone cream    # DVT PPX: contraindications with AC and leukocytoclastic vasculitis    Dispo: in   Healthcare maintanence:   . f/u in Endocrine clinic [phone]. Patient should NOT be on pioglitazone on discharge    Handoff: here for cellulitis: with recurrent admission. s/p ABXZ while here. Plan for dc in  pending SNF

## 2024-06-05 ENCOUNTER — TRANSCRIPTION ENCOUNTER (OUTPATIENT)
Age: 54
End: 2024-06-05

## 2024-06-05 LAB
GLUCOSE BLDC GLUCOMTR-MCNC: 142 MG/DL — HIGH (ref 70–99)
GLUCOSE BLDC GLUCOMTR-MCNC: 203 MG/DL — HIGH (ref 70–99)
GLUCOSE BLDC GLUCOMTR-MCNC: 209 MG/DL — HIGH (ref 70–99)
GLUCOSE BLDC GLUCOMTR-MCNC: 89 MG/DL — SIGNIFICANT CHANGE UP (ref 70–99)

## 2024-06-05 PROCEDURE — 99232 SBSQ HOSP IP/OBS MODERATE 35: CPT

## 2024-06-05 RX ORDER — BUPRENORPHINE AND NALOXONE 2; .5 MG/1; MG/1
2.5 TABLET SUBLINGUAL DAILY
Refills: 0 | Status: DISCONTINUED | OUTPATIENT
Start: 2024-06-05 | End: 2024-06-08

## 2024-06-05 RX ADMIN — Medication 500 MILLIGRAM(S): at 17:17

## 2024-06-05 RX ADMIN — PANTOPRAZOLE SODIUM 40 MILLIGRAM(S): 20 TABLET, DELAYED RELEASE ORAL at 06:03

## 2024-06-05 RX ADMIN — GABAPENTIN 300 MILLIGRAM(S): 400 CAPSULE ORAL at 17:18

## 2024-06-05 RX ADMIN — Medication 1 APPLICATION(S): at 11:28

## 2024-06-05 RX ADMIN — GABAPENTIN 300 MILLIGRAM(S): 400 CAPSULE ORAL at 06:03

## 2024-06-05 RX ADMIN — Medication 175 MICROGRAM(S): at 06:03

## 2024-06-05 RX ADMIN — DULOXETINE HYDROCHLORIDE 30 MILLIGRAM(S): 30 CAPSULE, DELAYED RELEASE ORAL at 11:28

## 2024-06-05 RX ADMIN — Medication 0.6 MILLIGRAM(S): at 17:21

## 2024-06-05 RX ADMIN — Medication 50 MILLIGRAM(S): at 17:18

## 2024-06-05 RX ADMIN — Medication 0.6 MILLIGRAM(S): at 06:04

## 2024-06-05 RX ADMIN — GABAPENTIN 300 MILLIGRAM(S): 400 CAPSULE ORAL at 11:28

## 2024-06-05 RX ADMIN — Medication 500 MILLIGRAM(S): at 06:03

## 2024-06-05 RX ADMIN — BUPRENORPHINE AND NALOXONE 2.5 TABLET(S): 2; .5 TABLET SUBLINGUAL at 11:52

## 2024-06-05 RX ADMIN — Medication 50 MILLIGRAM(S): at 06:04

## 2024-06-05 RX ADMIN — INSULIN GLARGINE 28 UNIT(S): 100 INJECTION, SOLUTION SUBCUTANEOUS at 21:24

## 2024-06-05 RX ADMIN — Medication 40 MILLIGRAM(S): at 06:03

## 2024-06-05 NOTE — DISCHARGE NOTE PROVIDER - CARE PROVIDER_API CALL
Saad Cole  Endocrinology/Metab/Diabetes  [address] [place] [address]  Phone: [phone]  Fax: [phone]  Follow Up Time: 2 months    Javier Perez  Geriatric Medicine  [address] [place] [address]  Phone: [phone]  Fax: [phone]  Follow Up Time: Routine   Saad Cole  Endocrinology/Metab/Diabetes  [address] [place] [address]  Phone: [phone]  Fax: [phone]  Established Patient  Follow Up Time: 2 months    Javier Perez  Geriatric Medicine  [address] [place] [address]  Phone: [phone]  Fax: [phone]  Established Patient  Follow Up Time: Routine    Leonardo Walters  Dermatology  [address] [place] [address]  Phone: [phone]  Fax: [phone]  Established Patient  Follow Up Time: 1 week    Snow Saleh  Infectious Disease  [address] [place] [address]  Phone: [phone]  Fax: [phone]  Established Patient  Follow Up Time: 1 month

## 2024-06-05 NOTE — DISCHARGE NOTE PROVIDER - CARE PROVIDERS DIRECT ADDRESSES
,[email or web address]

## 2024-06-05 NOTE — PROGRESS NOTE ADULT - SUBJECTIVE AND OBJECTIVE BOX
Progress note    INTERVAL HPI/OVERNIGHT EVENTS:    Patient seen and examined and examined at bedside. No acute distress. Family at bedside and entire care plan and chronic conditions discussed individually. Pt endorsing improvement of insomnia and BLE pain but persistent. No other acute complaints.      REVIEW OF SYSTEMS:  All other 13 Review of systems were reviewed and are negative    FAMILY HISTORY:    Vital Signs Last 24 Hrs  T(C): 36.1 (05 Jun 2024 13:19), Max: 36.7 (05 Jun 2024 05:00)  T(F): 97 (05 Jun 2024 13:19), Max: 98 (05 Jun 2024 05:00)  HR: 65 (05 Jun 2024 13:19) (64 - 73)  BP: 120/78 (05 Jun 2024 13:19) (100/64 - 131/78)  BP(mean): --  RR: 16 (05 Jun 2024 13:19) (16 - 18)  SpO2: 95% (05 Jun 2024 13:19) (95% - 98%)    Parameters below as of 05 Jun 2024 13:19  Patient On (Oxygen Delivery Method): room air        No Known Allergies      PHYSICAL EXAM:    General: WN/WD NAD  Neurology: A&Ox3, nonfocal, LEMUS x 4  Head:  Normocephalic, atraumatic  ENT:  Mucosa moist, no ulcerations  Neck:  Supple, no sinuses or palpable masses  Lymphatic:  No palpable cervical, supraclavicular, axillary or inguinal adenopathy  Respiratory: CTA B/L  CV: RRR, S1S2, no murmur  Abdominal: Soft, NT, ND no palpable mass  MSK: +2/4 edema (improving) and tenderness with erythema and some ulcers  Incisions: intact, no erythema or drainage    Consultant(s) Notes Reviewed:  [x ] YES  [ ] NO  Care Discussed with Consultants/Other Providers [ x] YES  [ ] NO    LABS:        RADIOLOGY & ADDITIONAL TESTS:    Imaging Personally Reviewed:  [ ] YES  [ ] NO    MEDICATIONS  (STANDING):  buprenorphine 8 mG/naloxone 2 mG SL  Tablet 2.5 Tablet(s) SubLingual daily  cephalexin 500 milliGRAM(s) Oral every 12 hours  chlorhexidine 2% Cloths 1 Application(s) Topical <User Schedule>  colchicine 0.6 milliGRAM(s) Oral two times a day  dextrose 10% Bolus 125 milliLiter(s) IV Bolus once  dextrose 5%. 1000 milliLiter(s) (50 mL/Hr) IV Continuous <Continuous>  dextrose 5%. 1000 milliLiter(s) (100 mL/Hr) IV Continuous <Continuous>  dextrose 50% Injectable 25 Gram(s) IV Push once  dextrose 50% Injectable 12.5 Gram(s) IV Push once  DULoxetine 30 milliGRAM(s) Oral daily  furosemide    Tablet 40 milliGRAM(s) Oral daily  furosemide   Injectable 40 milliGRAM(s) IV Push daily  gabapentin 300 milliGRAM(s) Oral four times a day  glucagon  Injectable 1 milliGRAM(s) IntraMuscular once  insulin glargine Injectable (LANTUS) 28 Unit(s) SubCutaneous at bedtime  insulin lispro (ADMELOG) corrective regimen sliding scale   SubCutaneous three times a day before meals  levothyroxine 175 MICROGram(s) Oral daily  pantoprazole    Tablet 40 milliGRAM(s) Oral before breakfast  pregabalin 50 milliGRAM(s) Oral every 12 hours  silver sulfADIAZINE 1% Cream 1 Application(s) Topical daily    MEDICATIONS  (PRN):  albuterol    90 MICROgram(s) HFA Inhaler 2 Puff(s) Inhalation every 6 hours PRN Shortness of Breath and/or Wheezing  dextrose Oral Gel 15 Gram(s) Oral once PRN Blood Glucose LESS THAN 70 milliGRAM(s)/deciliter  ondansetron Injectable 4 milliGRAM(s) IV Push every 8 hours PRN Nausea and/or Vomiting        HEALTH ISSUES - PROBLEM Dx:    53-year-old female with pmh of DM on metformin, hepatitis B&C, asthma, COPD, hypothyroidism, pancreatitis, IV drug user in the past on Suboxone currently, presents to the ED with complaint of worsening lower extremity pain, swelling, erythema and ulcerations with weeping.    Assessment    ·	Possible cellulitis superimposed on leukocytoclastic vasculitis  ·	DM  ·	Hx of hepatitis B and C  ·	Asthma and COPD  ·	Hypothyroidism  ·	Former IV drug abuse on suboxone    Plan    - s/p cefepime patient was recently admitted to [facility] with the same problem / Previous hospitalist saw this patient a few months ago when the leukocytoclastic vasculitis diagnosis was confirmed with pathology, at the time the patient did not have this much erythema and open ulcers / will treat as a superimposed cellulitis for now, mrsa swab in [facility] was negative  - at [facility] patient was seen by derm and ordered for triamcinolone 0.1% cream bid / for now will hold off on it and see if any clinical improvement on abx  - patient was seen in the office by rheum and was on prednisone but tapered off as there was concern for the edema worsening her symptoms and her clinical picture at the time didn't fit what was expected of leukocytoclast vasculitis / howver watned colchicine continued  - Would hold on steroids as there was no improvement on last admission per outpt rheum as it did not help her on the last admission  - c/w IV furosemide 40mg qd for now (transition to PO on discharge). Also patient was added on pioglitazone by endo last admission which can cause/worsen lower extremity edema  - standing tylenol for pain, c/w gabapentin 300mg qid, pain mgmt consult for recommendations considering patient is on suboxone  - , T4 0.4, Increase synthroid 175mcg, endocrine recs appreciated  - Continue keflex 500mg pO BID for suppressive therapy as prophylaxis as pt is high risk for secondary infeciton (refer to ID note)  - wound care recs appreciated  - Lexiscan (-)  - home inhalers  - insulin 28 / ss  - VA art duplex - normal blood flow  -if stalling can consider triamcinolone cream    # DVT PPX: contraindications with AC and leukocytoclastic vasculitis    Dispo: in am pending bed     Healthcare maintanence: f/u in Endocrine clinic [phone]. Patient should NOT be on pioglitazone on discharge

## 2024-06-05 NOTE — DISCHARGE NOTE PROVIDER - NSDCCPCAREPLAN_GEN_ALL_CORE_FT
PRINCIPAL DISCHARGE DIAGNOSIS  Diagnosis: Vasculitis  Assessment and Plan of Treatment: You were seen and treated by the medical team for possible cellulitis superimposed on leukocytoclastic vasculitis. Standing tylenol for pain, c/w gabapentin 300mg. Continue keflex 500mg pO BID for suppressive therapy as prophylaxis as you're at high risk for secondary infection. Wound care team consulted, recommened clean lower extremity wounds with soap and water, pat dry then apply silvadene dressing, Assess wound and inform primary provider of any changes. Elevate lower extremity when sitting dependent, Pressure injury  prevention   - Outpatient follow up with PCP and rheumatology recommended      SECONDARY DISCHARGE DIAGNOSES  Diagnosis: Diabetes mellitus  Assessment and Plan of Treatment: Endocrinology team consulted, on discharge can resume metformin 1000 mg every 12 hours and Lantus 36 units every 24hrs. Pioglitazone discontinued. Follow up in endocrine clinic [phone].    Diagnosis: Hypothyroid  Assessment and Plan of Treatment: Your synthroid was increased to 175mcg, Repeat TSH in 4-5 weeks (can follow up in Endocrine clinic [phone] or with PMD)    Diagnosis: Atypical chest pain  Assessment and Plan of Treatment: reportedchest pain resolved  Had mild troponin rise, not consistent with MI   ecg sinus andrei; non-specific  Lexiscan done w/out any acute findings  Outpatient follow up with PCP recommended       PRINCIPAL DISCHARGE DIAGNOSIS  Diagnosis: Vasculitis  Assessment and Plan of Treatment: Summary of things to do:  - check legs and feet daily, elevate legs when resting, take a daily weight, and monitor urine volume and color. If you gain 2-3 lbs and/or notice worsening swelling of your legs take an extra Lasix 40mg tablet and alert your Primary Doctor.   - c/w home medications   - antibiotic added to prevent infections (please take as prescribed by pharmacy) - Keflex 500mg by mouth 2 times a day   - Synthroid increased to 175mcg to take once a day before breakfast (30min to 1hr before eating)  - repeat Thyroid levels at Endocrinologist in 4-5 weeks outpatient   - f/u with Pain Management, Endocrinology, Primary, Dermatology, and Infectious Disease.  You were seen and treated by the medical team for possible cellulitis superimposed on leukocytoclastic vasculitis. Standing tylenol for pain, c/w Cymbalta prescribed by Pain Management. Continue keflex 500mg by mouth 2 times a day for suppressive therapy as prophylaxis as you're at high risk for secondary infection. Wound care team consulted, recommened clean lower extremity wounds with soap and water, pat dry then apply silvadene dressing, Assess wound and inform primary provider of any changes. Elevate lower extremity when sitting dependent, Pressure injury  prevention   - Outpatient follow up with PCP and Dermatology/Rheumatology recommended as symptoms have persisted.      SECONDARY DISCHARGE DIAGNOSES  Diagnosis: Diabetes mellitus  Assessment and Plan of Treatment: Endocrinology team consulted, on discharge can resume metformin 1000 mg every 12 hours and Lantus 36 units every 24hrs. Pioglitazone discontinued. Follow up in endocrine clinic [phone].    Diagnosis: Hypothyroid  Assessment and Plan of Treatment: Your synthroid was increased to 175mcg, Repeat TSH in 4-5 weeks (can follow up in Endocrine clinic [phone] or with PMD)    Diagnosis: Atypical chest pain  Assessment and Plan of Treatment: reportedchest pain resolved  Had mild troponin rise, not consistent with MI   ecg sinus andrei; non-specific  Lexiscan done w/out any acute findings  Outpatient follow up with PCP recommended       PRINCIPAL DISCHARGE DIAGNOSIS  Diagnosis: Vasculitis  Assessment and Plan of Treatment: Summary of things to do:  - check legs and feet daily, elevate legs when resting, take a daily weight, and monitor urine volume and color. If you gain 2-3 lbs and/or notice worsening swelling of your legs take an extra Lasix 40mg tablet and alert your Primary Doctor.   - c/w home medications   - antibiotic added to prevent infections (please take as prescribed by pharmacy) - Keflex 500mg by mouth 2 times a day   - Synthroid increased to 175mcg to take once a day before breakfast (30min to 1hr before eating)  - repeat Thyroid levels at Endocrinologist in 4-5 weeks outpatient   - f/u with Pain Management, Endocrinology, Primary, Dermatology (soonest appointment), and Infectious Disease.  You were seen and treated by the medical team for possible cellulitis superimposed on leukocytoclastic vasculitis. Standing tylenol for pain, c/w Cymbalta prescribed by Pain Management. Continue keflex 500mg by mouth 2 times a day for suppressive therapy as prophylaxis as you're at high risk for secondary infection. Wound care team consulted, recommened clean lower extremity wounds with soap and water, pat dry then apply silvadene dressing, Assess wound and inform primary provider of any changes. Elevate lower extremity when sitting dependent, Pressure injury  prevention   - Outpatient follow up with PCP and Dermatology/Rheumatology recommended as symptoms have persisted.      SECONDARY DISCHARGE DIAGNOSES  Diagnosis: Diabetes mellitus  Assessment and Plan of Treatment: Endocrinology team consulted, on discharge can resume metformin 1000 mg every 12 hours and Lantus 36 units every 24hrs. Pioglitazone discontinued. Follow up in endocrine clinic [phone].    Diagnosis: Hypothyroid  Assessment and Plan of Treatment: Your synthroid was increased to 175mcg, Repeat TSH in 4-5 weeks (can follow up in Endocrine clinic [phone] or with PMD)    Diagnosis: Atypical chest pain  Assessment and Plan of Treatment: reportedchest pain resolved  Had mild troponin rise, not consistent with MI   ecg sinus andrei; non-specific  Lexiscan done w/out any acute findings  Outpatient follow up with PCP recommended

## 2024-06-05 NOTE — DISCHARGE NOTE PROVIDER - HOSPITAL COURSE
53-year-old female with pmh of DM on metformin, hepatitis B&C, asthma, COPD, hypothyroidism, pancreatitis, IV drug user in the past on Suboxone currently, presents to the ED with complaint of worsening lower extremity pain, swelling, erythema and ulcerations with weeping.    Assessment    Possible cellulitis superimposed on leukocytoclastic vasculitis  DM  Hx of hepatitis B and C  Asthma and COPD  Hypothyroidism  Former IV drug abuse on suboxone    Plan    - s/p cefepime patient was recently admitted to [place] with the same problem / Previous hospitalist saw this patient a few months ago when the leukocytoclastic vasculitis diagnosis was confirmed with pathology, at the time the patient did not have this much erythema and open ulcers / will treat as a superimposed cellulitis for now, mrsa swab in [place] was negative  - at [place] patient was seen by derm and ordered for triamcinolone 0.1% cream bid / for now will hold off on it and see if any clinical improvement on abx  - patient was seen in the office by rheum and was on prednisone but tapered off as there was concern for the edema worsening her symptoms and her clinical picture at the time didn't fit what was expected of leukocytoclast vasculitis / however watned colchicine continued  - Would hold on steroids as there was no improvement on last admission per outpt rheum as it did not help her on the last admission  - c/w IV furosemide 40mg qd for now (transition to PO on discharge). Also patient was added on pioglitazone by endo last admission which can cause/worsen lower extremity edema  - standing tylenol for pain, c/w gabapentin 300mg qid, pain mgmt consult for recommendations considering patient is on suboxone  - , T4 0.4, Increase synthroid 175mcg, endocrine recs appreciated, Repeat TSH in 4-5 weeks (can f/u in Endocrine clinic [phone] or with PMD  - Continue keflex 500mg pO BID for suppressive therapy as prophylaxis as pt is high risk for secondary infection (refer to ID note)  - wound care recs appreciated- Clean lower extremity wounds with soap and water, pat dry then apply silvadene dressing, Assess wound and inform primary provider of any changes    - Elevate lower extremity when sitting dependent, Pressure injury  prevention   - Lexiscan (-)  - home inhalers  - insulin 28 / ss  - BS improved in hospital-at discharge can resume metformin 1000 mg bid and Lantus 36 units q24hrs  - VA art duplex - normal blood flow  - if stalling can consider triamcinolone crea.   - f/u in Endocrine clinic [phone]. Patient should NOT be on pioglitazone on discharge    # DVT PPX: contraindications with AC and leukocytoclastic vasculitis   53-year-old female with pmh of DM on metformin, hepatitis B&C, asthma, COPD, hypothyroidism, pancreatitis, IV drug user in the past on Suboxone currently, presents to the ED with complaint of worsening lower extremity pain, swelling, erythema and ulcerations with weeping.    Assessment    Possible cellulitis superimposed on leukocytoclastic vasculitis  DM  Hx of hepatitis B and C  Asthma and COPD  Hypothyroidism  Former IV drug abuse on suboxone    Plan    - s/p cefepime patient was recently admitted to [place] with the same problem / Previous hospitalist saw this patient a few months ago when the leukocytoclastic vasculitis diagnosis was confirmed with pathology, at the time the patient did not have this much erythema and open ulcers / will treat as a superimposed cellulitis for now, mrsa swab in [place] was negative  - at [place] patient was seen by derm and ordered for triamcinolone 0.1% cream bid / for now will hold off on it and see if any clinical improvement on abx  - patient was seen in the office by rheum and was on prednisone but tapered off as there was concern for the edema worsening her symptoms and her clinical picture at the time didn't fit what was expected of leukocytoclast vasculitis / however watned colchicine continued  - Would hold on steroids as there was no improvement on last admission per outpt rheum as it did not help her on the last admission  - c/w IV furosemide 40mg qd for now (transition to PO on discharge). Also patient was added on pioglitazone by endo last admission which can cause/worsen lower extremity edema  - standing tylenol for pain, c/w gabapentin 300mg qid, pain mgmt consult for recommendations considering patient is on suboxone  - , T4 0.4, Increase synthroid 175mcg, endocrine recs appreciated, Repeat TSH in 4-5 weeks (can f/u in Endocrine clinic [phone] or with PMD  - Continue keflex 500mg pO BID for suppressive therapy as prophylaxis as pt is high risk for secondary infection (refer to ID note)  - wound care recs appreciated- Clean lower extremity wounds with soap and water, pat dry then apply silvadene dressing, Assess wound and inform primary provider of any changes    - Elevate lower extremity when sitting dependent, Pressure injury  prevention   - Lexiscan (-)  - home inhalers  - insulin 28 / ss  - BS improved in hospital-at discharge can resume metformin 1000 mg bid and Lantus 36 units q24hrs  - VA art duplex - normal blood flow  - if stalling can consider triamcinolone crea.   - f/u in Endocrine clinic [phone]. Patient should NOT be on pioglitazone on discharge    # DVT PPX: contraindications with AC and leukocytoclastic vasculitis   53-year-old female with PMHx of T2DM on metformin, hepatitis B&C, Asthma, COPD, Hypothyroidism, Pancreatitis, IV drug use in the past on Suboxone currently, presents to the ED with complaint of worsening lower extremity pain, swelling, erythema and ulcerations with weeping. Admitted to Medicine for further evaluation and management. Patient was evaluated by Cardiology, ID, Endo, Pain Management, SW, and Wound care. Pt conditions has stabilized to her baseline with medical intervention. Medications adjustments were made, antibiotic course with ongoing ABx PPx was prescribed and patient is medically optimized for discharge with close outpatient f/u,     Assessment    Possible cellulitis superimposed on leukocytoclastic vasculitis  DM  Hx of hepatitis B and C  Asthma and COPD  Hypothyroidism  Former IV drug abuse on suboxone    Plan    - s/p cefepime patient was recently admitted to [place] with the same problem / Previous hospitalist saw this patient a few months ago when the leukocytoclastic vasculitis diagnosis was confirmed with pathology, at the time the patient did not have this much erythema and open ulcers / will treat as a superimposed cellulitis for now, mrsa swab in [place] was negative  - at [place] patient was seen by derm and ordered for triamcinolone 0.1% cream bid / for now will hold off on it and see if any clinical improvement on abx  - patient was seen in the office by rheum and was on prednisone but tapered off as there was concern for the edema worsening her symptoms and her clinical picture at the time didn't fit what was expected of leukocytoclast vasculitis / however watned colchicine continued  - Would hold on steroids as there was no improvement on last admission per outpt rheum as it did not help her on the last admission  - c/w IV furosemide 40mg qd for now (transition to PO on discharge). Also patient was added on pioglitazone by endo last admission which can cause/worsen lower extremity edema  - standing tylenol for pain, c/w gabapentin 300mg qid, pain mgmt consult for recommendations considering patient is on suboxone  - , T4 0.4, Increase synthroid 175mcg, endocrine recs appreciated, Repeat TSH in 4-5 weeks (can f/u in Endocrine clinic [phone] or with PMD  - Continue keflex 500mg pO BID for suppressive therapy as prophylaxis as pt is high risk for secondary infection (refer to ID note)  - wound care recs appreciated- Clean lower extremity wounds with soap and water, pat dry then apply silvadene dressing, Assess wound and inform primary provider of any changes    - Elevate lower extremity when sitting dependent, Pressure injury  prevention   - Lexiscan (-)  - home inhalers  - insulin 28 / ss  - BS improved in hospital-at discharge can resume metformin 1000 mg bid and Lantus 36 units q24hrs  - VA art duplex - normal blood flow  - if stalling can consider triamcinolone crea.   - f/u in Endocrine clinic [phone]. Patient should NOT be on pioglitazone on discharge

## 2024-06-05 NOTE — DISCHARGE NOTE PROVIDER - ATTENDING DISCHARGE PHYSICAL EXAMINATION:
Vital Signs Last 24 Hrs  T(C): 36.3 (06 Jun 2024 05:01), Max: 36.7 (05 Jun 2024 13:04)  T(F): 97.3 (06 Jun 2024 05:01), Max: 98 (05 Jun 2024 13:04)  HR: 72 (06 Jun 2024 05:01) (65 - 72)  BP: 118/75 (06 Jun 2024 05:01) (118/75 - 131/78)  BP(mean): --  RR: 18 (06 Jun 2024 05:01) (16 - 18)  SpO2: 97% (06 Jun 2024 05:01) (95% - 98%)    Parameters below as of 06 Jun 2024 05:01  Patient On (Oxygen Delivery Method): room air    PHYSICAL EXAM:    General: WN/WD NAD  Neurology: A&Ox3, nonfocal, LEMUS x 4  Head:  Normocephalic, atraumatic  ENT:  Mucosa moist, no ulcerations  Neck:  Supple, no sinuses or palpable masses  Lymphatic:  No palpable cervical, supraclavicular, axillary or inguinal adenopathy  Respiratory: CTA B/L  CV: RRR, S1S2, no murmur  Abdominal: Soft, NT, ND no palpable mass  MSK: +2/4 edema (improving) and tenderness with erythema and some ulcers  Incisions: intact, no erythema or drainage Vital Signs Last 24 Hrs  T(C): 36.4 (08 Jun 2024 04:30), Max: 36.6 (07 Jun 2024 13:53)  T(F): 97.5 (08 Jun 2024 04:30), Max: 97.8 (07 Jun 2024 13:53)  HR: 74 (08 Jun 2024 04:30) (69 - 74)  BP: 95/60 (08 Jun 2024 04:30) (95/60 - 103/63) (asymptomatic and patients w/in pt baseline pressure readings)  BP(mean): --  RR: 18 (08 Jun 2024 04:30) (16 - 18)  SpO2: 96% (08 Jun 2024 04:30) (96% - 98%)    Parameters below as of 08 Jun 2024 04:30  Patient On (Oxygen Delivery Method): room air      PHYSICAL EXAM:    General: WN/WD NAD  Neurology: A&Ox3, nonfocal, LEMUS x 4  Head:  Normocephalic, atraumatic  ENT:  Mucosa moist, no ulcerations  Neck:  Supple, no sinuses or palpable masses  Lymphatic:  No palpable cervical, supraclavicular, axillary or inguinal adenopathy  Respiratory: CTA B/L  CV: RRR, S1S2, no murmur  Abdominal: Soft, NT, ND no palpable mass  MSK: +2/4 edema (improving) and tenderness with erythema and some ulcers  Incisions: intact, no erythema or drainage

## 2024-06-05 NOTE — DISCHARGE NOTE PROVIDER - PROVIDER TOKENS
PROVIDER:[TOKEN:[[id number]],FOLLOWUP:[2 months]],PROVIDER:[TOKEN:[[id number]],FOLLOWUP:[Routine]] PROVIDER:[TOKEN:[[id number]],FOLLOWUP:[2 months],ESTABLISHEDPATIENT:[T]],PROVIDER:[TOKEN:[[id number]],FOLLOWUP:[Routine],ESTABLISHEDPATIENT:[T]],PROVIDER:[TOKEN:[[id number]],FOLLOWUP:[1 week],ESTABLISHEDPATIENT:[T]],PROVIDER:[TOKEN:[[id number]],FOLLOWUP:[1 month],ESTABLISHEDPATIENT:[T]]

## 2024-06-05 NOTE — DISCHARGE NOTE PROVIDER - NSDCFUSCHEDAPPT_GEN_ALL_CORE_FT
Greg Adair  [facility]  VASCULAR [address]  Scheduled Appointment: 06/05/2024    Javier Soto  [facility] PreAdmits  Scheduled Appointment: 06/12/2024    Javier Soto  [facility]  PSYCHIATRY  [address]  Scheduled Appointment: 06/12/2024    Greg Adair  [facility]  VASCULAR [address]  Scheduled Appointment: 06/12/2024    Javier Soto  [facility] PreAdmits  Scheduled Appointment: 06/18/2024    [facility]  PSYCHIATRY  [address]  Scheduled Appointment: 06/18/2024     Javier Soto  [facility] PreAdmits  Scheduled Appointment: 06/12/2024    Javier Soto  [facility]  PSYCHIATRY  [address]  Scheduled Appointment: 06/12/2024    Greg Adair  [facility]  VASCULAR [address]  Scheduled Appointment: 06/12/2024    Javier Soto  [facility] PreAdmits  Scheduled Appointment: 06/18/2024    [facility]  PSYCHIATRY  [address]  Scheduled Appointment: 06/18/2024

## 2024-06-05 NOTE — DISCHARGE NOTE PROVIDER - NSDCMRMEDTOKEN_GEN_ALL_CORE_FT
acetaminophen 325 mg oral tablet: 2 tab(s) orally every 6 hours As needed Mild Pain (1 - 3)  albuterol 90 mcg/inh inhalation powder: 2 puff(s) inhaled every 6 hours, As Needed -for bronchospasm   alcohol swabs: Apply topically to affected area 4 times a day  colchicine 0.6 mg oral tablet: 1 tab(s) orally 2 times a day  Freestyle Precision Toni Strips: Test blood sugar four times a day when you see check blood glucose symbol or when symptoms don&#x27;t match Tawanna reading.  furosemide 40 mg oral tablet: 1 tab(s) orally once a day  glucometer (per patient&#x27;s insurance): test blood sugar 4 times a day. Test blood sugars four times a day. Dispense #1 glucometer.  Insulin Pen Needles, 4mm: 1 application subcutaneously 4 times a day. ** Use with insulin pen **  lancets: 1 application subcutaneously 4 times a day  Lantus 100 units/mL subcutaneous solution: 28 unit(s) subcutaneous once a day (at bedtime)  levothyroxine 150 mcg (0.15 mg) oral tablet: 1 tab(s) orally once a day  metFORMIN 500 mg oral tablet: 2 tab(s) orally 2 times a day  pantoprazole 40 mg oral delayed release tablet: 1 tab(s) orally once a day (before a meal) Take once a day while taking prednisone  Suboxone 8 mg-2 mg sublingual tablet: 2.5 film(s) sublingual once a day  test strips (per patient&#x27;s insurance): 1 application subcutaneously 4 times a day. ** Compatible with patient&#x27;s glucometer **  triamcinolone 0.1% topical cream: Apply topically to affected area 2 times a day 1 Apply topically to affected area 2 times a day   acetaminophen 325 mg oral tablet: 2 tab(s) orally every 6 hours As needed Mild Pain (1 - 3)  albuterol 90 mcg/inh inhalation powder: 2 puff(s) inhaled every 6 hours, As Needed -for bronchospasm   alcohol swabs: Apply topically to affected area 4 times a day  cephalexin 500 mg oral capsule: 1 cap(s) orally every 12 hours  colchicine 0.6 mg oral tablet: 1 tab(s) orally 2 times a day  Freestyle Precision Toni Strips: Test blood sugar four times a day when you see check blood glucose symbol or when symptoms don&#x27;t match Tawanna reading.  furosemide 40 mg oral tablet: 1 tab(s) orally once a day  glucometer (per patient&#x27;s insurance): test blood sugar 4 times a day. Test blood sugars four times a day. Dispense #1 glucometer.  Insulin Pen Needles, 4mm: 1 application subcutaneously 4 times a day. ** Use with insulin pen **  lancets: 1 application subcutaneously 4 times a day  Lantus 100 units/mL subcutaneous solution: 28 unit(s) subcutaneous once a day (at bedtime)  metFORMIN 500 mg oral tablet: 2 tab(s) orally 2 times a day  pantoprazole 40 mg oral delayed release tablet: 1 tab(s) orally once a day (before a meal) Take once a day while taking prednisone  pregabalin 50 mg oral capsule: 1 cap(s) orally every 12 hours  silver sulfADIAZINE 1% topical cream: Apply topically to affected area once a day 1 Apply topically to affected area once a day  Suboxone 8 mg-2 mg sublingual tablet: 2.5 film(s) sublingual once a day  Synthroid 175 mcg (0.175 mg) oral tablet: 1 tab(s) orally once a day  test strips (per patient&#x27;s insurance): 1 application subcutaneously 4 times a day. ** Compatible with patient&#x27;s glucometer **  triamcinolone 0.1% topical cream: Apply topically to affected area 2 times a day 1 Apply topically to affected area 2 times a day   acetaminophen 325 mg oral capsule: 3 cap(s) orally every 8 hours  albuterol 90 mcg/inh inhalation powder: 2 puff(s) inhaled every 6 hours, As Needed -for bronchospasm   alcohol swabs: Apply topically to affected area 4 times a day  cephalexin 500 mg oral capsule: 1 cap(s) orally every 12 hours  colchicine 0.6 mg oral tablet: 1 tab(s) orally 2 times a day  Freestyle Precision Toni Strips: Test blood sugar four times a day when you see check blood glucose symbol or when symptoms don&#x27;t match Tawanna reading.  furosemide 40 mg oral tablet: 1 tab(s) orally once a day  glucometer (per patient&#x27;s insurance): test blood sugar 4 times a day. Test blood sugars four times a day. Dispense #1 glucometer.  Insulin Pen Needles, 4mm: 1 application subcutaneously 4 times a day. ** Use with insulin pen **  lancets: 1 application subcutaneously 4 times a day  Lantus 100 units/mL subcutaneous solution: 28 unit(s) subcutaneous once a day (at bedtime)  metFORMIN 500 mg oral tablet: 2 tab(s) orally 2 times a day  pantoprazole 40 mg oral delayed release tablet: 1 tab(s) orally once a day (before a meal) Take once a day while taking prednisone  pregabalin 50 mg oral capsule: 1 cap(s) orally every 12 hours  silver sulfADIAZINE 1% topical cream: Apply topically to affected area once a day 1 Apply topically to affected area once a day  Suboxone 8 mg-2 mg sublingual tablet: 2.5 film(s) sublingual once a day  Synthroid 175 mcg (0.175 mg) oral tablet: 1 tab(s) orally once a day  test strips (per patient&#x27;s insurance): 1 application subcutaneously 4 times a day. ** Compatible with patient&#x27;s glucometer **  triamcinolone 0.1% topical cream: Apply topically to affected area 2 times a day 1 Apply topically to affected area 2 times a day

## 2024-06-06 LAB
ALBUMIN SERPL ELPH-MCNC: 3.8 G/DL — SIGNIFICANT CHANGE UP (ref 3.5–5.2)
ALP SERPL-CCNC: 141 U/L — HIGH (ref 30–115)
ALT FLD-CCNC: 5 U/L — SIGNIFICANT CHANGE UP (ref 0–41)
ANION GAP SERPL CALC-SCNC: 13 MMOL/L — SIGNIFICANT CHANGE UP (ref 7–14)
AST SERPL-CCNC: 24 U/L — SIGNIFICANT CHANGE UP (ref 0–41)
BASOPHILS # BLD AUTO: 0.06 K/UL — SIGNIFICANT CHANGE UP (ref 0–0.2)
BASOPHILS NFR BLD AUTO: 1.2 % — HIGH (ref 0–1)
BILIRUB SERPL-MCNC: <0.2 MG/DL — SIGNIFICANT CHANGE UP (ref 0.2–1.2)
BUN SERPL-MCNC: 17 MG/DL — SIGNIFICANT CHANGE UP (ref 10–20)
CALCIUM SERPL-MCNC: 8.7 MG/DL — SIGNIFICANT CHANGE UP (ref 8.4–10.5)
CHLORIDE SERPL-SCNC: 97 MMOL/L — LOW (ref 98–110)
CO2 SERPL-SCNC: 29 MMOL/L — SIGNIFICANT CHANGE UP (ref 17–32)
CREAT SERPL-MCNC: 0.9 MG/DL — SIGNIFICANT CHANGE UP (ref 0.7–1.5)
EGFR: 76 ML/MIN/1.73M2 — SIGNIFICANT CHANGE UP
EOSINOPHIL # BLD AUTO: 0.2 K/UL — SIGNIFICANT CHANGE UP (ref 0–0.7)
EOSINOPHIL NFR BLD AUTO: 4 % — SIGNIFICANT CHANGE UP (ref 0–8)
GLUCOSE BLDC GLUCOMTR-MCNC: 100 MG/DL — HIGH (ref 70–99)
GLUCOSE BLDC GLUCOMTR-MCNC: 128 MG/DL — HIGH (ref 70–99)
GLUCOSE BLDC GLUCOMTR-MCNC: 144 MG/DL — HIGH (ref 70–99)
GLUCOSE BLDC GLUCOMTR-MCNC: 317 MG/DL — HIGH (ref 70–99)
GLUCOSE SERPL-MCNC: 159 MG/DL — HIGH (ref 70–99)
HCT VFR BLD CALC: 31.8 % — LOW (ref 37–47)
HGB BLD-MCNC: 9.8 G/DL — LOW (ref 12–16)
IMM GRANULOCYTES NFR BLD AUTO: 0.6 % — HIGH (ref 0.1–0.3)
LYMPHOCYTES # BLD AUTO: 1.75 K/UL — SIGNIFICANT CHANGE UP (ref 1.2–3.4)
LYMPHOCYTES # BLD AUTO: 34.6 % — SIGNIFICANT CHANGE UP (ref 20.5–51.1)
MCHC RBC-ENTMCNC: 26.8 PG — LOW (ref 27–31)
MCHC RBC-ENTMCNC: 30.8 G/DL — LOW (ref 32–37)
MCV RBC AUTO: 86.9 FL — SIGNIFICANT CHANGE UP (ref 81–99)
MONOCYTES # BLD AUTO: 0.53 K/UL — SIGNIFICANT CHANGE UP (ref 0.1–0.6)
MONOCYTES NFR BLD AUTO: 10.5 % — HIGH (ref 1.7–9.3)
NEUTROPHILS # BLD AUTO: 2.49 K/UL — SIGNIFICANT CHANGE UP (ref 1.4–6.5)
NEUTROPHILS NFR BLD AUTO: 49.1 % — SIGNIFICANT CHANGE UP (ref 42.2–75.2)
NRBC # BLD: 0 /100 WBCS — SIGNIFICANT CHANGE UP (ref 0–0)
PLATELET # BLD AUTO: 229 K/UL — SIGNIFICANT CHANGE UP (ref 130–400)
PMV BLD: 10 FL — SIGNIFICANT CHANGE UP (ref 7.4–10.4)
POTASSIUM SERPL-MCNC: 3.8 MMOL/L — SIGNIFICANT CHANGE UP (ref 3.5–5)
POTASSIUM SERPL-SCNC: 3.8 MMOL/L — SIGNIFICANT CHANGE UP (ref 3.5–5)
PROT SERPL-MCNC: 6.7 G/DL — SIGNIFICANT CHANGE UP (ref 6–8)
RBC # BLD: 3.66 M/UL — LOW (ref 4.2–5.4)
RBC # FLD: 15.8 % — HIGH (ref 11.5–14.5)
SODIUM SERPL-SCNC: 139 MMOL/L — SIGNIFICANT CHANGE UP (ref 135–146)
WBC # BLD: 5.06 K/UL — SIGNIFICANT CHANGE UP (ref 4.8–10.8)
WBC # FLD AUTO: 5.06 K/UL — SIGNIFICANT CHANGE UP (ref 4.8–10.8)

## 2024-06-06 PROCEDURE — 99239 HOSP IP/OBS DSCHRG MGMT >30: CPT

## 2024-06-06 RX ORDER — LEVOTHYROXINE SODIUM 125 MCG
1 TABLET ORAL
Qty: 60 | Refills: 1
Start: 2024-06-06 | End: 2024-10-03

## 2024-06-06 RX ORDER — CEPHALEXIN 500 MG
1 CAPSULE ORAL
Qty: 60 | Refills: 0
Start: 2024-06-06 | End: 2024-07-05

## 2024-06-06 RX ORDER — ACETAMINOPHEN 500 MG
975 TABLET ORAL EVERY 8 HOURS
Refills: 0 | Status: DISCONTINUED | OUTPATIENT
Start: 2024-06-06 | End: 2024-06-08

## 2024-06-06 RX ORDER — ACETAMINOPHEN 500 MG
3 TABLET ORAL
Qty: 0 | Refills: 0 | DISCHARGE
Start: 2024-06-06

## 2024-06-06 RX ADMIN — PANTOPRAZOLE SODIUM 40 MILLIGRAM(S): 20 TABLET, DELAYED RELEASE ORAL at 05:44

## 2024-06-06 RX ADMIN — Medication 0.6 MILLIGRAM(S): at 17:05

## 2024-06-06 RX ADMIN — Medication 4: at 07:57

## 2024-06-06 RX ADMIN — Medication 500 MILLIGRAM(S): at 17:06

## 2024-06-06 RX ADMIN — Medication 975 MILLIGRAM(S): at 21:43

## 2024-06-06 RX ADMIN — Medication 50 MILLIGRAM(S): at 17:06

## 2024-06-06 RX ADMIN — GABAPENTIN 300 MILLIGRAM(S): 400 CAPSULE ORAL at 17:04

## 2024-06-06 RX ADMIN — INSULIN GLARGINE 28 UNIT(S): 100 INJECTION, SOLUTION SUBCUTANEOUS at 21:43

## 2024-06-06 RX ADMIN — Medication 0.6 MILLIGRAM(S): at 05:42

## 2024-06-06 RX ADMIN — Medication 175 MICROGRAM(S): at 05:42

## 2024-06-06 RX ADMIN — DULOXETINE HYDROCHLORIDE 30 MILLIGRAM(S): 30 CAPSULE, DELAYED RELEASE ORAL at 14:54

## 2024-06-06 RX ADMIN — Medication 40 MILLIGRAM(S): at 05:42

## 2024-06-06 RX ADMIN — Medication 500 MILLIGRAM(S): at 05:42

## 2024-06-06 RX ADMIN — Medication 50 MILLIGRAM(S): at 05:42

## 2024-06-06 RX ADMIN — Medication 1 APPLICATION(S): at 14:55

## 2024-06-06 RX ADMIN — BUPRENORPHINE AND NALOXONE 2.5 TABLET(S): 2; .5 TABLET SUBLINGUAL at 11:30

## 2024-06-06 RX ADMIN — GABAPENTIN 300 MILLIGRAM(S): 400 CAPSULE ORAL at 11:30

## 2024-06-06 RX ADMIN — Medication 975 MILLIGRAM(S): at 23:44

## 2024-06-06 RX ADMIN — GABAPENTIN 300 MILLIGRAM(S): 400 CAPSULE ORAL at 05:41

## 2024-06-06 RX ADMIN — Medication 975 MILLIGRAM(S): at 14:54

## 2024-06-06 NOTE — PROGRESS NOTE ADULT - SUBJECTIVE AND OBJECTIVE BOX
Progress note    INTERVAL HPI/OVERNIGHT EVENTS:    Patient seen and examined and examined at bedside. No acute distress. Family at bedside and entire care plan and chronic conditions discussed individually. Pt endorsing improvement of insomnia and BLE pain but persistent. Plan discussed with patient and informed that Rehab will start once a bed is available. No other acute complaints.      REVIEW OF SYSTEMS:  All other 13 Review of systems were reviewed and are negative    FAMILY HISTORY:    Vital Signs Last 24 Hrs  T(C): 36.4 (07 Jun 2024 04:53), Max: 36.5 (06 Jun 2024 13:14)  T(F): 97.5 (07 Jun 2024 04:53), Max: 97.7 (06 Jun 2024 13:14)  HR: 68 (07 Jun 2024 04:53) (68 - 71)  BP: 120/80 (07 Jun 2024 04:53) (101/66 - 120/80)  BP(mean): --  RR: 18 (07 Jun 2024 04:53) (18 - 18)  SpO2: 96% (07 Jun 2024 04:53) (95% - 96%)    Parameters below as of 07 Jun 2024 04:53  Patient On (Oxygen Delivery Method): room air            No Known Allergies      PHYSICAL EXAM:    General: WN/WD NAD  Neurology: A&Ox3, nonfocal, LEMUS x 4  Head:  Normocephalic, atraumatic  ENT:  Mucosa moist, no ulcerations  Neck:  Supple, no sinuses or palpable masses  Lymphatic:  No palpable cervical, supraclavicular, axillary or inguinal adenopathy  Respiratory: CTA B/L  CV: RRR, S1S2, no murmur  Abdominal: Soft, NT, ND no palpable mass  MSK: +2/4 edema (improving) and tenderness with erythema and some ulcers  Incisions: intact, no erythema or drainage    Consultant(s) Notes Reviewed:  [x ] YES  [ ] NO  Care Discussed with Consultants/Other Providers [ x] YES  [ ] NO    LABS:                          9.8    5.06  )-----------( 229      ( 06 Jun 2024 06:48 )             31.8   06-06    139  |  97<L>  |  17  ----------------------------<  159<H>  3.8   |  29  |  0.9    Ca    8.7      06 Jun 2024 06:48    TPro  6.7  /  Alb  3.8  /  TBili  <0.2  /  DBili  x   /  AST  24  /  ALT  5   /  AlkPhos  141<H>  06-06          RADIOLOGY & ADDITIONAL TESTS:    Imaging Personally Reviewed:  [ ] YES  [ ] NO    MEDICATIONS  (STANDING):  buprenorphine 8 mG/naloxone 2 mG SL  Tablet 2.5 Tablet(s) SubLingual daily  cephalexin 500 milliGRAM(s) Oral every 12 hours  chlorhexidine 2% Cloths 1 Application(s) Topical <User Schedule>  colchicine 0.6 milliGRAM(s) Oral two times a day  dextrose 10% Bolus 125 milliLiter(s) IV Bolus once  dextrose 5%. 1000 milliLiter(s) (50 mL/Hr) IV Continuous <Continuous>  dextrose 5%. 1000 milliLiter(s) (100 mL/Hr) IV Continuous <Continuous>  dextrose 50% Injectable 25 Gram(s) IV Push once  dextrose 50% Injectable 12.5 Gram(s) IV Push once  DULoxetine 30 milliGRAM(s) Oral daily  furosemide    Tablet 40 milliGRAM(s) Oral daily  furosemide   Injectable 40 milliGRAM(s) IV Push daily  gabapentin 300 milliGRAM(s) Oral four times a day  glucagon  Injectable 1 milliGRAM(s) IntraMuscular once  insulin glargine Injectable (LANTUS) 28 Unit(s) SubCutaneous at bedtime  insulin lispro (ADMELOG) corrective regimen sliding scale   SubCutaneous three times a day before meals  levothyroxine 175 MICROGram(s) Oral daily  pantoprazole    Tablet 40 milliGRAM(s) Oral before breakfast  pregabalin 50 milliGRAM(s) Oral every 12 hours  silver sulfADIAZINE 1% Cream 1 Application(s) Topical daily    MEDICATIONS  (PRN):  albuterol    90 MICROgram(s) HFA Inhaler 2 Puff(s) Inhalation every 6 hours PRN Shortness of Breath and/or Wheezing  dextrose Oral Gel 15 Gram(s) Oral once PRN Blood Glucose LESS THAN 70 milliGRAM(s)/deciliter  ondansetron Injectable 4 milliGRAM(s) IV Push every 8 hours PRN Nausea and/or Vomiting        HEALTH ISSUES - PROBLEM Dx:    53-year-old female with pmh of DM on metformin, hepatitis B&C, asthma, COPD, hypothyroidism, pancreatitis, IV drug user in the past on Suboxone currently, presents to the ED with complaint of worsening lower extremity pain, swelling, erythema and ulcerations with weeping.    Assessment    ·	Possible cellulitis superimposed on leukocytoclastic vasculitis  ·	DM  ·	Hx of hepatitis B and C  ·	Asthma and COPD  ·	Hypothyroidism  ·	Former IV drug abuse on suboxone    Plan    - s/p cefepime patient was recently admitted to [place] with the same problem / Previous hospitalist saw this patient a few months ago when the leukocytoclastic vasculitis diagnosis was confirmed with pathology, at the time the patient did not have this much erythema and open ulcers / will treat as a superimposed cellulitis for now, mrsa swab in [place] was negative  - at [place] patient was seen by derm and ordered for triamcinolone 0.1% cream bid / for now will hold off on it and see if any clinical improvement on abx  - patient was seen in the office by rheum and was on prednisone but tapered off as there was concern for the edema worsening her symptoms and her clinical picture at the time didn't fit what was expected of leukocytoclast vasculitis / howver watned colchicine continued  - Would hold on steroids as there was no improvement on last admission per outpt rheum as it did not help her on the last admission  - c/w IV furosemide 40mg qd for now (transition to PO on discharge). Also patient was added on pioglitazone by endo last admission which can cause/worsen lower extremity edema  - standing tylenol for pain, c/w gabapentin 300mg qid, pain mgmt consult for recommendations considering patient is on suboxone  - , T4 0.4, Increase synthroid 175mcg, endocrine recs appreciated  - Continue keflex 500mg pO BID for suppressive therapy as prophylaxis as pt is high risk for secondary infeciton (refer to ID note)  - wound care recs appreciated  - Lexiscan (-)  - home inhalers  - insulin 28 / ss  - VA art duplex - normal blood flow  -if stalling can consider triamcinolone cream    # DVT PPX: contraindications with AC and leukocytoclastic vasculitis    Dispo: in am pending bed     Healthcare maintanence: f/u in Endocrine clinic [phone]. Patient should NOT be on pioglitazone on discharge

## 2024-06-07 ENCOUNTER — TRANSCRIPTION ENCOUNTER (OUTPATIENT)
Age: 54
End: 2024-06-07

## 2024-06-07 LAB
GLUCOSE BLDC GLUCOMTR-MCNC: 101 MG/DL — HIGH (ref 70–99)
GLUCOSE BLDC GLUCOMTR-MCNC: 134 MG/DL — HIGH (ref 70–99)
GLUCOSE BLDC GLUCOMTR-MCNC: 220 MG/DL — HIGH (ref 70–99)
GLUCOSE BLDC GLUCOMTR-MCNC: 226 MG/DL — HIGH (ref 70–99)

## 2024-06-07 PROCEDURE — 99231 SBSQ HOSP IP/OBS SF/LOW 25: CPT

## 2024-06-07 RX ADMIN — Medication 0.6 MILLIGRAM(S): at 05:35

## 2024-06-07 RX ADMIN — Medication 175 MICROGRAM(S): at 05:34

## 2024-06-07 RX ADMIN — Medication 40 MILLIGRAM(S): at 05:34

## 2024-06-07 RX ADMIN — Medication 500 MILLIGRAM(S): at 05:33

## 2024-06-07 RX ADMIN — BUPRENORPHINE AND NALOXONE 2.5 TABLET(S): 2; .5 TABLET SUBLINGUAL at 12:18

## 2024-06-07 RX ADMIN — GABAPENTIN 300 MILLIGRAM(S): 400 CAPSULE ORAL at 05:33

## 2024-06-07 RX ADMIN — Medication 1 APPLICATION(S): at 12:19

## 2024-06-07 RX ADMIN — Medication 975 MILLIGRAM(S): at 18:27

## 2024-06-07 RX ADMIN — Medication 975 MILLIGRAM(S): at 05:33

## 2024-06-07 RX ADMIN — Medication 2: at 12:18

## 2024-06-07 RX ADMIN — DULOXETINE HYDROCHLORIDE 30 MILLIGRAM(S): 30 CAPSULE, DELAYED RELEASE ORAL at 12:19

## 2024-06-07 RX ADMIN — GABAPENTIN 300 MILLIGRAM(S): 400 CAPSULE ORAL at 18:28

## 2024-06-07 RX ADMIN — GABAPENTIN 300 MILLIGRAM(S): 400 CAPSULE ORAL at 14:10

## 2024-06-07 RX ADMIN — Medication 500 MILLIGRAM(S): at 18:26

## 2024-06-07 RX ADMIN — PANTOPRAZOLE SODIUM 40 MILLIGRAM(S): 20 TABLET, DELAYED RELEASE ORAL at 05:33

## 2024-06-07 RX ADMIN — Medication 975 MILLIGRAM(S): at 06:44

## 2024-06-07 RX ADMIN — Medication 0.6 MILLIGRAM(S): at 18:27

## 2024-06-07 RX ADMIN — INSULIN GLARGINE 28 UNIT(S): 100 INJECTION, SOLUTION SUBCUTANEOUS at 21:37

## 2024-06-07 NOTE — PROGRESS NOTE ADULT - SUBJECTIVE AND OBJECTIVE BOX
Progress note    INTERVAL HPI/OVERNIGHT EVENTS:    Patient seen and examined and examined at bedside. No acute complaints or overnight events. Await bed availability for rehab. No other acute complaints.      REVIEW OF SYSTEMS:  All other 13 Review of systems were reviewed and are negative    FAMILY HISTORY:    Vital Signs Last 24 Hrs  T(C): 36.4 (07 Jun 2024 04:53), Max: 36.5 (06 Jun 2024 13:14)  T(F): 97.5 (07 Jun 2024 04:53), Max: 97.7 (06 Jun 2024 13:14)  HR: 68 (07 Jun 2024 04:53) (68 - 71)  BP: 120/80 (07 Jun 2024 04:53) (101/66 - 120/80)  BP(mean): --  RR: 18 (07 Jun 2024 04:53) (18 - 18)  SpO2: 96% (07 Jun 2024 04:53) (95% - 96%)    Parameters below as of 07 Jun 2024 04:53  Patient On (Oxygen Delivery Method): room air            No Known Allergies      PHYSICAL EXAM:    General: WN/WD NAD  Neurology: A&Ox3, nonfocal, LEMUS x 4  Head:  Normocephalic, atraumatic  ENT:  Mucosa moist, no ulcerations  Neck:  Supple, no sinuses or palpable masses  Lymphatic:  No palpable cervical, supraclavicular, axillary or inguinal adenopathy  Respiratory: CTA B/L  CV: RRR, S1S2, no murmur  Abdominal: Soft, NT, ND no palpable mass  MSK: +2/4 edema (improving) and tenderness with erythema and some ulcers  Incisions: intact, no erythema or drainage    Consultant(s) Notes Reviewed:  [x ] YES  [ ] NO  Care Discussed with Consultants/Other Providers [ x] YES  [ ] NO    LABS:                          9.8    5.06  )-----------( 229      ( 06 Jun 2024 06:48 )             31.8   06-06    139  |  97<L>  |  17  ----------------------------<  159<H>  3.8   |  29  |  0.9    Ca    8.7      06 Jun 2024 06:48    TPro  6.7  /  Alb  3.8  /  TBili  <0.2  /  DBili  x   /  AST  24  /  ALT  5   /  AlkPhos  141<H>  06-06          RADIOLOGY & ADDITIONAL TESTS:    Imaging Personally Reviewed:  [ ] YES  [ ] NO    MEDICATIONS  (STANDING):  buprenorphine 8 mG/naloxone 2 mG SL  Tablet 2.5 Tablet(s) SubLingual daily  cephalexin 500 milliGRAM(s) Oral every 12 hours  chlorhexidine 2% Cloths 1 Application(s) Topical <User Schedule>  colchicine 0.6 milliGRAM(s) Oral two times a day  dextrose 10% Bolus 125 milliLiter(s) IV Bolus once  dextrose 5%. 1000 milliLiter(s) (50 mL/Hr) IV Continuous <Continuous>  dextrose 5%. 1000 milliLiter(s) (100 mL/Hr) IV Continuous <Continuous>  dextrose 50% Injectable 25 Gram(s) IV Push once  dextrose 50% Injectable 12.5 Gram(s) IV Push once  DULoxetine 30 milliGRAM(s) Oral daily  furosemide    Tablet 40 milliGRAM(s) Oral daily  furosemide   Injectable 40 milliGRAM(s) IV Push daily  gabapentin 300 milliGRAM(s) Oral four times a day  glucagon  Injectable 1 milliGRAM(s) IntraMuscular once  insulin glargine Injectable (LANTUS) 28 Unit(s) SubCutaneous at bedtime  insulin lispro (ADMELOG) corrective regimen sliding scale   SubCutaneous three times a day before meals  levothyroxine 175 MICROGram(s) Oral daily  pantoprazole    Tablet 40 milliGRAM(s) Oral before breakfast  pregabalin 50 milliGRAM(s) Oral every 12 hours  silver sulfADIAZINE 1% Cream 1 Application(s) Topical daily    MEDICATIONS  (PRN):  albuterol    90 MICROgram(s) HFA Inhaler 2 Puff(s) Inhalation every 6 hours PRN Shortness of Breath and/or Wheezing  dextrose Oral Gel 15 Gram(s) Oral once PRN Blood Glucose LESS THAN 70 milliGRAM(s)/deciliter  ondansetron Injectable 4 milliGRAM(s) IV Push every 8 hours PRN Nausea and/or Vomiting        HEALTH ISSUES - PROBLEM Dx:    53-year-old female with pmh of DM on metformin, hepatitis B&C, asthma, COPD, hypothyroidism, pancreatitis, IV drug user in the past on Suboxone currently, presents to the ED with complaint of worsening lower extremity pain, swelling, erythema and ulcerations with weeping.    Assessment    ·	Possible cellulitis superimposed on leukocytoclastic vasculitis  ·	DM  ·	Hx of hepatitis B and C  ·	Asthma and COPD  ·	Hypothyroidism  ·	Former IV drug abuse on suboxone    Plan    - s/p cefepime patient was recently admitted to [facility] with the same problem / Previous hospitalist saw this patient a few months ago when the leukocytoclastic vasculitis diagnosis was confirmed with pathology, at the time the patient did not have this much erythema and open ulcers / will treat as a superimposed cellulitis for now, mrsa swab in [facility] was negative  - at [facility] patient was seen by derm and ordered for triamcinolone 0.1% cream bid / for now will hold off on it and see if any clinical improvement on abx  - patient was seen in the office by rheum and was on prednisone but tapered off as there was concern for the edema worsening her symptoms and her clinical picture at the time didn't fit what was expected of leukocytoclast vasculitis / howver watned colchicine continued  - Would hold on steroids as there was no improvement on last admission per outpt rheum as it did not help her on the last admission  - Lasix 40mg PO qd. Also patient was added on pioglitazone by endo last admission which can cause/worsen lower extremity edema  - standing tylenol for pain, c/w gabapentin 300mg qid, pain mgmt consult for recommendations considering patient is on suboxone  - , T4 0.4, Increased synthroid 175mcg, endocrine recs appreciated > repeat TFT's out-patient in 4-5 weeks   - Continue keflex 500mg pO BID for suppressive therapy as prophylaxis as pt is high risk for secondary infection (refer to ID note) > out-patient follow-up with Dr. Saleh as well as Dermatology for repeat biopsy and vasculitis work-up  - wound care recs appreciated  - Lexiscan (-)  - home inhalers  - insulin 28 / ss  - VA art duplex - normal blood flow  -if stalling can consider triamcinolone cream    # DVT PPX: contraindications with AC and leukocytoclastic vasculitis    Dispo: Medically optimized pending rehab bed     Healthcare maintanence: Provider contacts provided in discharge paperwork and discussed with patient   - f/u Derm/Rheum for reevaluation of LE pathology   - f/u ID with Dr. Saleh   - f/u in Endocrine clinic [phone]. Patient should NOT be on pioglitazone on discharge.

## 2024-06-07 NOTE — DISCHARGE NOTE NURSING/CASE MANAGEMENT/SOCIAL WORK - PATIENT PORTAL LINK FT
You can access the FollowMyHealth Patient Portal offered by [facility] by registering at the following website: [email or web address]. By joining [employer]’s FollowMyHealth portal, you will also be able to view your health information using other applications (apps) compatible with our system.

## 2024-06-08 VITALS
DIASTOLIC BLOOD PRESSURE: 60 MMHG | SYSTOLIC BLOOD PRESSURE: 95 MMHG | TEMPERATURE: 98 F | HEART RATE: 74 BPM | OXYGEN SATURATION: 96 % | RESPIRATION RATE: 18 BRPM

## 2024-06-08 LAB
GLUCOSE BLDC GLUCOMTR-MCNC: 121 MG/DL — HIGH (ref 70–99)
GLUCOSE BLDC GLUCOMTR-MCNC: 123 MG/DL — HIGH (ref 70–99)

## 2024-06-08 PROCEDURE — 99231 SBSQ HOSP IP/OBS SF/LOW 25: CPT

## 2024-06-08 RX ADMIN — Medication 975 MILLIGRAM(S): at 06:10

## 2024-06-08 RX ADMIN — Medication 175 MICROGRAM(S): at 05:09

## 2024-06-08 RX ADMIN — GABAPENTIN 300 MILLIGRAM(S): 400 CAPSULE ORAL at 12:12

## 2024-06-08 RX ADMIN — DULOXETINE HYDROCHLORIDE 30 MILLIGRAM(S): 30 CAPSULE, DELAYED RELEASE ORAL at 12:13

## 2024-06-08 RX ADMIN — GABAPENTIN 300 MILLIGRAM(S): 400 CAPSULE ORAL at 05:08

## 2024-06-08 RX ADMIN — Medication 975 MILLIGRAM(S): at 05:09

## 2024-06-08 RX ADMIN — BUPRENORPHINE AND NALOXONE 2.5 TABLET(S): 2; .5 TABLET SUBLINGUAL at 12:12

## 2024-06-08 RX ADMIN — PANTOPRAZOLE SODIUM 40 MILLIGRAM(S): 20 TABLET, DELAYED RELEASE ORAL at 05:09

## 2024-06-08 RX ADMIN — Medication 500 MILLIGRAM(S): at 05:09

## 2024-06-08 RX ADMIN — Medication 0.6 MILLIGRAM(S): at 05:09

## 2024-06-08 NOTE — PROGRESS NOTE ADULT - SUBJECTIVE AND OBJECTIVE BOX
Progress note    INTERVAL HPI/OVERNIGHT EVENTS:    Patient seen and examined and examined at bedside. No acute complaints or overnight events. Await bed availability for rehab. No other acute complaints.      REVIEW OF SYSTEMS:  All other 13 Review of systems were reviewed and are negative    FAMILY HISTORY:      Vital Signs Last 24 Hrs  T(C): 36.4 (08 Jun 2024 04:30), Max: 36.6 (07 Jun 2024 13:53)  T(F): 97.5 (08 Jun 2024 04:30), Max: 97.8 (07 Jun 2024 13:53)  HR: 74 (08 Jun 2024 04:30) (69 - 74)  BP: 95/60 (08 Jun 2024 04:30) (95/60 - 103/63)  BP(mean): --  RR: 18 (08 Jun 2024 04:30) (16 - 18)  SpO2: 96% (08 Jun 2024 04:30) (96% - 98%)    Parameters below as of 08 Jun 2024 04:30  Patient On (Oxygen Delivery Method): room air        No Known Allergies      PHYSICAL EXAM:    General: WN/WD NAD  Neurology: A&Ox3, nonfocal, LEMUS x 4  Head:  Normocephalic, atraumatic  ENT:  Mucosa moist, no ulcerations  Neck:  Supple, no sinuses or palpable masses  Lymphatic:  No palpable cervical, supraclavicular, axillary or inguinal adenopathy  Respiratory: CTA B/L  CV: RRR, S1S2, no murmur  Abdominal: Soft, NT, ND no palpable mass  MSK: +2/4 edema (improving) and tenderness with erythema and some ulcers  Incisions: intact, no erythema or drainage    Consultant(s) Notes Reviewed:  [x ] YES  [ ] NO  Care Discussed with Consultants/Other Providers [ x] YES  [ ] NO    LABS:                          9.8    5.06  )-----------( 229      ( 06 Jun 2024 06:48 )             31.8   06-06    139  |  97<L>  |  17  ----------------------------<  159<H>  3.8   |  29  |  0.9    Ca    8.7      06 Jun 2024 06:48    TPro  6.7  /  Alb  3.8  /  TBili  <0.2  /  DBili  x   /  AST  24  /  ALT  5   /  AlkPhos  141<H>  06-06          RADIOLOGY & ADDITIONAL TESTS:    Imaging Personally Reviewed:  [ ] YES  [ ] NO    MEDICATIONS  (STANDING):  buprenorphine 8 mG/naloxone 2 mG SL  Tablet 2.5 Tablet(s) SubLingual daily  cephalexin 500 milliGRAM(s) Oral every 12 hours  chlorhexidine 2% Cloths 1 Application(s) Topical <User Schedule>  colchicine 0.6 milliGRAM(s) Oral two times a day  dextrose 10% Bolus 125 milliLiter(s) IV Bolus once  dextrose 5%. 1000 milliLiter(s) (50 mL/Hr) IV Continuous <Continuous>  dextrose 5%. 1000 milliLiter(s) (100 mL/Hr) IV Continuous <Continuous>  dextrose 50% Injectable 25 Gram(s) IV Push once  dextrose 50% Injectable 12.5 Gram(s) IV Push once  DULoxetine 30 milliGRAM(s) Oral daily  furosemide    Tablet 40 milliGRAM(s) Oral daily  furosemide   Injectable 40 milliGRAM(s) IV Push daily  gabapentin 300 milliGRAM(s) Oral four times a day  glucagon  Injectable 1 milliGRAM(s) IntraMuscular once  insulin glargine Injectable (LANTUS) 28 Unit(s) SubCutaneous at bedtime  insulin lispro (ADMELOG) corrective regimen sliding scale   SubCutaneous three times a day before meals  levothyroxine 175 MICROGram(s) Oral daily  pantoprazole    Tablet 40 milliGRAM(s) Oral before breakfast  pregabalin 50 milliGRAM(s) Oral every 12 hours  silver sulfADIAZINE 1% Cream 1 Application(s) Topical daily    MEDICATIONS  (PRN):  albuterol    90 MICROgram(s) HFA Inhaler 2 Puff(s) Inhalation every 6 hours PRN Shortness of Breath and/or Wheezing  dextrose Oral Gel 15 Gram(s) Oral once PRN Blood Glucose LESS THAN 70 milliGRAM(s)/deciliter  ondansetron Injectable 4 milliGRAM(s) IV Push every 8 hours PRN Nausea and/or Vomiting        HEALTH ISSUES - PROBLEM Dx:    53-year-old female with pmh of DM on metformin, hepatitis B&C, asthma, COPD, hypothyroidism, pancreatitis, IV drug user in the past on Suboxone currently, presents to the ED with complaint of worsening lower extremity pain, swelling, erythema and ulcerations with weeping.    Assessment    ·	Possible cellulitis superimposed on leukocytoclastic vasculitis  ·	DM  ·	Hx of hepatitis B and C  ·	Asthma and COPD  ·	Hypothyroidism  ·	Former IV drug abuse on suboxone    Plan    - s/p cefepime patient was recently admitted to [facility] with the same problem / Previous hospitalist saw this patient a few months ago when the leukocytoclastic vasculitis diagnosis was confirmed with pathology, at the time the patient did not have this much erythema and open ulcers / will treat as a superimposed cellulitis for now, mrsa swab in [place] was negative  - at [facility] patient was seen by derm and ordered for triamcinolone 0.1% cream bid / for now will hold off on it and see if any clinical improvement on abx  - patient was seen in the office by rheum and was on prednisone but tapered off as there was concern for the edema worsening her symptoms and her clinical picture at the time didn't fit what was expected of leukocytoclast vasculitis / howver watned colchicine continued  - Would hold on steroids as there was no improvement on last admission per outpt rheum as it did not help her on the last admission  - Lasix 40mg PO qd. Also patient was added on pioglitazone by endo last admission which can cause/worsen lower extremity edema  - standing tylenol for pain, c/w gabapentin 300mg qid, pain mgmt consult for recommendations considering patient is on suboxone  - , T4 0.4, Increased synthroid 175mcg, endocrine recs appreciated > repeat TFT's out-patient in 4-5 weeks   - Continue keflex 500mg pO BID for suppressive therapy as prophylaxis as pt is high risk for secondary infection (refer to ID note) > out-patient follow-up with Dr. Saleh as well as Dermatology for repeat biopsy and vasculitis work-up  - wound care recs appreciated  - Lexiscan (-)  - home inhalers  - insulin 28 / ss  - VA art duplex - normal blood flow  -if stalling can consider triamcinolone cream    # DVT PPX: contraindications with AC and leukocytoclastic vasculitis    Dispo: Medically optimized pending rehab bed     Healthcare maintanence: Provider contacts provided in discharge paperwork and discussed with patient   - f/u Derm/Rheum for reevaluation of LE pathology   - f/u ID with Dr. Saleh   - f/u in Endocrine clinic [phone]. Patient should NOT be on pioglitazone on discharge.

## 2024-06-08 NOTE — PROGRESS NOTE ADULT - PROVIDER SPECIALTY LIST ADULT
Hospitalist
Cardiology
Hospitalist
Infectious Disease

## 2024-06-12 ENCOUNTER — APPOINTMENT (OUTPATIENT)
Dept: VASCULAR SURGERY | Facility: CLINIC | Age: 54
End: 2024-06-12

## 2024-06-12 ENCOUNTER — APPOINTMENT (OUTPATIENT)
Dept: PSYCHIATRY | Facility: CLINIC | Age: 54
End: 2024-06-12

## 2024-06-17 DIAGNOSIS — L97.919 NON-PRESSURE CHRONIC ULCER OF UNSPECIFIED PART OF RIGHT LOWER LEG WITH UNSPECIFIED SEVERITY: ICD-10-CM

## 2024-06-17 DIAGNOSIS — G47.00 INSOMNIA, UNSPECIFIED: ICD-10-CM

## 2024-06-17 DIAGNOSIS — L03.115 CELLULITIS OF RIGHT LOWER LIMB: ICD-10-CM

## 2024-06-17 DIAGNOSIS — L03.116 CELLULITIS OF LEFT LOWER LIMB: ICD-10-CM

## 2024-06-17 DIAGNOSIS — B19.20 UNSPECIFIED VIRAL HEPATITIS C WITHOUT HEPATIC COMA: ICD-10-CM

## 2024-06-17 DIAGNOSIS — F11.20 OPIOID DEPENDENCE, UNCOMPLICATED: ICD-10-CM

## 2024-06-17 DIAGNOSIS — J44.9 CHRONIC OBSTRUCTIVE PULMONARY DISEASE, UNSPECIFIED: ICD-10-CM

## 2024-06-17 DIAGNOSIS — Z79.4 LONG TERM (CURRENT) USE OF INSULIN: ICD-10-CM

## 2024-06-17 DIAGNOSIS — Z79.84 LONG TERM (CURRENT) USE OF ORAL HYPOGLYCEMIC DRUGS: ICD-10-CM

## 2024-06-17 DIAGNOSIS — L97.929 NON-PRESSURE CHRONIC ULCER OF UNSPECIFIED PART OF LEFT LOWER LEG WITH UNSPECIFIED SEVERITY: ICD-10-CM

## 2024-06-17 DIAGNOSIS — Z79.890 HORMONE REPLACEMENT THERAPY: ICD-10-CM

## 2024-06-17 DIAGNOSIS — R60.0 LOCALIZED EDEMA: ICD-10-CM

## 2024-06-17 DIAGNOSIS — E03.9 HYPOTHYROIDISM, UNSPECIFIED: ICD-10-CM

## 2024-06-17 DIAGNOSIS — M31.0 HYPERSENSITIVITY ANGIITIS: ICD-10-CM

## 2024-06-17 DIAGNOSIS — E11.621 TYPE 2 DIABETES MELLITUS WITH FOOT ULCER: ICD-10-CM

## 2024-06-17 DIAGNOSIS — I87.2 VENOUS INSUFFICIENCY (CHRONIC) (PERIPHERAL): ICD-10-CM

## 2024-06-17 DIAGNOSIS — R07.89 OTHER CHEST PAIN: ICD-10-CM

## 2024-06-17 DIAGNOSIS — E11.65 TYPE 2 DIABETES MELLITUS WITH HYPERGLYCEMIA: ICD-10-CM

## 2024-06-17 DIAGNOSIS — L08.0 PYODERMA: ICD-10-CM

## 2024-06-18 ENCOUNTER — OUTPATIENT (OUTPATIENT)
Dept: OUTPATIENT SERVICES | Facility: HOSPITAL | Age: 54
LOS: 1 days | End: 2024-06-18
Payer: COMMERCIAL

## 2024-06-18 ENCOUNTER — APPOINTMENT (OUTPATIENT)
Dept: PSYCHIATRY | Facility: CLINIC | Age: 54
End: 2024-06-18

## 2024-06-18 DIAGNOSIS — Z90.89 ACQUIRED ABSENCE OF OTHER ORGANS: Chronic | ICD-10-CM

## 2024-06-18 DIAGNOSIS — F11.20 OPIOID DEPENDENCE, UNCOMPLICATED: ICD-10-CM

## 2024-06-18 DIAGNOSIS — Z90.49 ACQUIRED ABSENCE OF OTHER SPECIFIED PARTS OF DIGESTIVE TRACT: Chronic | ICD-10-CM

## 2024-06-18 PROCEDURE — H0004: CPT | Mod: 95

## 2024-06-19 DIAGNOSIS — F11.20 OPIOID DEPENDENCE, UNCOMPLICATED: ICD-10-CM

## 2024-06-24 ENCOUNTER — APPOINTMENT (OUTPATIENT)
Dept: PSYCHIATRY | Facility: CLINIC | Age: 54
End: 2024-06-24
Payer: MEDICAID

## 2024-06-24 ENCOUNTER — OUTPATIENT (OUTPATIENT)
Dept: OUTPATIENT SERVICES | Facility: HOSPITAL | Age: 54
LOS: 1 days | End: 2024-06-24
Payer: MEDICAID

## 2024-06-24 DIAGNOSIS — Z90.89 ACQUIRED ABSENCE OF OTHER ORGANS: Chronic | ICD-10-CM

## 2024-06-24 DIAGNOSIS — F10.20 ALCOHOL DEPENDENCE, UNCOMPLICATED: ICD-10-CM

## 2024-06-24 PROCEDURE — 99214 OFFICE O/P EST MOD 30 MIN: CPT

## 2024-06-24 PROCEDURE — 99214 OFFICE O/P EST MOD 30 MIN: CPT | Mod: 95

## 2024-06-25 DIAGNOSIS — F10.20 ALCOHOL DEPENDENCE, UNCOMPLICATED: ICD-10-CM

## 2024-07-22 ENCOUNTER — APPOINTMENT (OUTPATIENT)
Dept: PSYCHIATRY | Facility: CLINIC | Age: 54
End: 2024-07-22
Payer: MEDICAID

## 2024-07-22 PROCEDURE — 99214 OFFICE O/P EST MOD 30 MIN: CPT | Mod: 95

## 2024-07-23 ENCOUNTER — APPOINTMENT (OUTPATIENT)
Dept: PSYCHIATRY | Facility: CLINIC | Age: 54
End: 2024-07-23

## 2024-07-31 ENCOUNTER — APPOINTMENT (OUTPATIENT)
Dept: VASCULAR SURGERY | Facility: CLINIC | Age: 54
End: 2024-07-31

## 2024-07-31 VITALS — SYSTOLIC BLOOD PRESSURE: 112 MMHG | DIASTOLIC BLOOD PRESSURE: 72 MMHG | HEART RATE: 60 BPM

## 2024-07-31 VITALS — WEIGHT: 200 LBS | BODY MASS INDEX: 30.31 KG/M2 | HEIGHT: 68 IN

## 2024-07-31 DIAGNOSIS — I87.8 OTHER SPECIFIED DISORDERS OF VEINS: ICD-10-CM

## 2024-07-31 PROCEDURE — 29580 STRAPPING UNNA BOOT: CPT | Mod: 50

## 2024-07-31 PROCEDURE — 99204 OFFICE O/P NEW MOD 45 MIN: CPT | Mod: 25

## 2024-07-31 NOTE — DATA REVIEWED
[FreeTextEntry1] : I reviewed the patient's venous duplex from the hospital that showed no evidence of DVT present.  Arterial duplex was performed that showed no evidence of arterial insufficiency present bilaterally.

## 2024-07-31 NOTE — ASSESSMENT
[FreeTextEntry1] : The patient is a 53-year-old female with bilateral lower extremity venous stasis ulcers.  The patient also has an element of lymphedema present.  I recommend bilateral Unna boot therapy.  I will see her back in my office in 1 week's time or sooner if any new symptoms develop.   I, Dr. Adair, personally performed the evaluation and management (E/M) services for this established patient who presents today with (a) new problem(s)/exacerbation of (an) existing condition(s). That E/M includes conducting the clinically appropriate interval history &/or exam, assessing all new/exacerbated conditions, and establishing a new plan of care. Today, my PILY, Cecy Pancho was here to observe my evaluation and management service for this new problem/exacerbated condition and follow the plan of care established by me going forward.  Thank you for allowing me to participate in the care of your patient.   Sincerely,   Greg Adair MD, RPVI, FACS Associate Professor of Surgery , Vascular Fellowship Director of Limb Salvage Surgery [facility] [employer] [facility] at [facility]

## 2024-07-31 NOTE — ASSESSMENT
[FreeTextEntry1] : The patient is a 53-year-old female with bilateral lower extremity venous stasis ulcers.  The patient also has an element of lymphedema present.  I recommend bilateral Unna boot therapy.  I will see her back in my office in 1 week's time or sooner if any new symptoms develop.   I, Dr. Adair, personally performed the evaluation and management (E/M) services for this established patient who presents today with (a) new problem(s)/exacerbation of (an) existing condition(s). That E/M includes conducting the clinically appropriate interval history &/or exam, assessing all new/exacerbated conditions, and establishing a new plan of care. Today, my PILY, Cecy Pancho was here to observe my evaluation and management service for this new problem/exacerbated condition and follow the plan of care established by me going forward.  Thank you for allowing me to participate in the care of your patient.   Sincerely,   Greg Adair MD, RPVI, FACS Associate Professor of Surgery , Vascular Fellowship Director of Limb Salvage Surgery [facility] [employer] [facility] at [facility]

## 2024-07-31 NOTE — PHYSICAL EXAM
[2+] : left 2+ [Ankle Swelling (On Exam)] : present [Ankle Swelling Bilaterally] : severe [Ankle Swelling On The Left] : moderate [] : bilaterally [Varicose Veins Of Lower Extremities] : not present [FreeTextEntry1] : Right lower extremity positive superficial wounds multiple about 2 cm in size circumferential at the anterior tibial region with mild erythema.  Left lower extremity weeping edema with some superficial punctate wounds present.

## 2024-07-31 NOTE — HISTORY OF PRESENT ILLNESS
[FreeTextEntry1] : The patient is a 53-year-old female who was admitted to [facility] 3 weeks ago with lower extremity cellulitis weeping edema and venous stasis ulcers.  The patient was treated with IV antibiotics.  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  She was treated with a short course of prednisone as well.  Today she presents with weeping edema or and superficial wounds.  Patient states she has not used any topical treatment recently.  She was discharged home with Silvadene but was not compliant.

## 2024-07-31 NOTE — PHYSICAL EXAM
[2+] : left 2+ [Ankle Swelling (On Exam)] : present [Ankle Swelling Bilaterally] : severe [] : bilaterally [Ankle Swelling On The Left] : moderate [Varicose Veins Of Lower Extremities] : not present [FreeTextEntry1] : Right lower extremity positive superficial wounds multiple about 2 cm in size circumferential at the anterior tibial region with mild erythema.  Left lower extremity weeping edema with some superficial punctate wounds present.

## 2024-07-31 NOTE — HISTORY OF PRESENT ILLNESS
[FreeTextEntry1] : The patient is a 53-year-old female who was admitted to [facility] 3 weeks ago with lower extremity cellulitis weeping edema and venous stasis ulcers.  The patient was treated with IV antibiotics.  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  She was treated with a short course of prednisone as well.  Today she presents with weeping edema or and superficial wounds.  Patient states she has not used any topical treatment recently.  She was discharged home with Silvadene but was not compliant.

## 2024-08-07 ENCOUNTER — APPOINTMENT (OUTPATIENT)
Dept: VASCULAR SURGERY | Facility: CLINIC | Age: 54
End: 2024-08-07

## 2024-08-07 PROBLEM — I83.019 VENOUS STASIS ULCERS OF BOTH LOWER EXTREMITIES: Status: ACTIVE | Noted: 2024-08-07

## 2024-08-07 PROCEDURE — 29580 STRAPPING UNNA BOOT: CPT | Mod: 50

## 2024-08-07 NOTE — ASSESSMENT
[FreeTextEntry1] : 52 y/o F with venous insufficiency and LE venous ulcers, on unna boot therapy.  Wounds and leg swelling are improved.  Unna boot reapplied bilaterally.  F/u in one week.

## 2024-08-14 ENCOUNTER — APPOINTMENT (OUTPATIENT)
Dept: VASCULAR SURGERY | Facility: CLINIC | Age: 54
End: 2024-08-14
Payer: MEDICAID

## 2024-08-14 DIAGNOSIS — I83.029 VARICOSE VEINS OF RIGHT LOWER EXTREMITY WITH ULCER OF UNSPECIFIED SITE: ICD-10-CM

## 2024-08-14 DIAGNOSIS — L97.919 VARICOSE VEINS OF RIGHT LOWER EXTREMITY WITH ULCER OF UNSPECIFIED SITE: ICD-10-CM

## 2024-08-14 DIAGNOSIS — I83.019 VARICOSE VEINS OF RIGHT LOWER EXTREMITY WITH ULCER OF UNSPECIFIED SITE: ICD-10-CM

## 2024-08-14 DIAGNOSIS — L97.929 VARICOSE VEINS OF RIGHT LOWER EXTREMITY WITH ULCER OF UNSPECIFIED SITE: ICD-10-CM

## 2024-08-14 PROCEDURE — 29580 STRAPPING UNNA BOOT: CPT | Mod: 50

## 2024-08-14 NOTE — ASSESSMENT
[FreeTextEntry1] : 54 y/o F with venous insufficiency and LE venous ulcers, on unna boot therapy.  Wounds and leg swelling are improved.  Unna boot reapplied bilaterally.  F/u in one week.

## 2024-08-19 ENCOUNTER — OUTPATIENT (OUTPATIENT)
Dept: OUTPATIENT SERVICES | Facility: HOSPITAL | Age: 54
LOS: 1 days | End: 2024-08-19
Payer: MEDICAID

## 2024-08-19 ENCOUNTER — APPOINTMENT (OUTPATIENT)
Dept: PSYCHIATRY | Facility: CLINIC | Age: 54
End: 2024-08-19
Payer: MEDICAID

## 2024-08-19 DIAGNOSIS — F10.20 ALCOHOL DEPENDENCE, UNCOMPLICATED: ICD-10-CM

## 2024-08-19 DIAGNOSIS — Z90.49 ACQUIRED ABSENCE OF OTHER SPECIFIED PARTS OF DIGESTIVE TRACT: Chronic | ICD-10-CM

## 2024-08-19 DIAGNOSIS — Z90.89 ACQUIRED ABSENCE OF OTHER ORGANS: Chronic | ICD-10-CM

## 2024-08-19 PROCEDURE — 99214 OFFICE O/P EST MOD 30 MIN: CPT | Mod: 95

## 2024-08-20 DIAGNOSIS — F10.20 ALCOHOL DEPENDENCE, UNCOMPLICATED: ICD-10-CM

## 2024-08-28 ENCOUNTER — APPOINTMENT (OUTPATIENT)
Dept: VASCULAR SURGERY | Facility: CLINIC | Age: 54
End: 2024-08-28
Payer: MEDICAID

## 2024-08-28 DIAGNOSIS — I87.8 OTHER SPECIFIED DISORDERS OF VEINS: ICD-10-CM

## 2024-08-28 PROCEDURE — 29580 STRAPPING UNNA BOOT: CPT | Mod: 50

## 2024-08-28 NOTE — HISTORY OF PRESENT ILLNESS
[FreeTextEntry1] : 54 y/o F with venous insufficiency and LE venous ulcers, on unna boot therapy.  The patient has missed her last week's appointment and has taken a break from the Unna boot.  Her wounds are not improved at this time.  She has bilateral lower extremity edema with superficial venous stasis wounds present.

## 2024-08-28 NOTE — ASSESSMENT
[FreeTextEntry1] : 54 y/o F with venous insufficiency and LE venous ulcers, on unna boot therapy. The patient has been noncompliant with Unna boot therapy.  In her legs today are more swollen and wounds or not improved.  I recommend reapplying Unna boot therapy Unna boot reapplied bilaterally. F/u in one week.   I, Dr. Adair, personally performed the evaluation and management (E/M) services for this established patient who presents today with (a) new problem(s)/exacerbation of (an) existing condition(s). That E/M includes conducting the clinically appropriate interval history &/or exam, assessing all new/exacerbated conditions, and establishing a new plan of care. Today, my PILY, Cecy Deleon was here to observe my evaluation and management service for this new problem/exacerbated condition and follow the plan of care established by me going forward.  Thank you for allowing me to participate in the care of your patient.   Sincerely,   Greg Adair MD, RPVI, FACS Associate Professor of Surgery , Vascular Fellowship Director of Limb Salvage Surgery [facility] [employer] [facility] at [facility]

## 2024-08-28 NOTE — PHYSICAL EXAM
[Ankle Swelling (On Exam)] : present [Ankle Swelling Bilaterally] : severe [FreeTextEntry1] : multiple healing wounds on anterior legs bilaterally, no cellulitis.

## 2024-09-04 ENCOUNTER — APPOINTMENT (OUTPATIENT)
Dept: VASCULAR SURGERY | Facility: CLINIC | Age: 54
End: 2024-09-04
Payer: MEDICAID

## 2024-09-04 DIAGNOSIS — I83.019 VARICOSE VEINS OF RIGHT LOWER EXTREMITY WITH ULCER OF UNSPECIFIED SITE: ICD-10-CM

## 2024-09-04 DIAGNOSIS — L97.929 VARICOSE VEINS OF RIGHT LOWER EXTREMITY WITH ULCER OF UNSPECIFIED SITE: ICD-10-CM

## 2024-09-04 DIAGNOSIS — I83.029 VARICOSE VEINS OF RIGHT LOWER EXTREMITY WITH ULCER OF UNSPECIFIED SITE: ICD-10-CM

## 2024-09-04 DIAGNOSIS — L97.919 VARICOSE VEINS OF RIGHT LOWER EXTREMITY WITH ULCER OF UNSPECIFIED SITE: ICD-10-CM

## 2024-09-04 PROCEDURE — 29580 STRAPPING UNNA BOOT: CPT | Mod: 50

## 2024-09-04 NOTE — ASSESSMENT
[FreeTextEntry1] : 52 y/o F with venous insufficiency and LE venous ulcers, on unna boot therapy. The patient has been noncompliant with Unna boot therapy.  In her legs today are more swollen and wounds or not improved.  I recommend reapplying Unna boot therapy Unna boot reapplied bilaterally. F/u in one week.

## 2024-09-05 NOTE — ED ADULT TRIAGE NOTE - NS ED TRIAGE AVPU SCALE
No protocol for requested medication.    Medication: cetaminophen (TYLENOL) 325 MG tablet   Last office visit date: 5/6/24  [facility] [place] [facility] [address] [address] [facility]    Order pended, routed to clinician for review.     Medication: aspirin 81 MG chewable tablet   Last office visit date: 5/6/24  Medication Refill Protocol Failed.   Medication (including dose and sig) on current meds list      Alert-The patient is alert, awake and responds to voice. The patient is oriented to time, place, and person. The triage nurse is able to obtain subjective information.

## 2024-09-11 ENCOUNTER — APPOINTMENT (OUTPATIENT)
Dept: VASCULAR SURGERY | Facility: CLINIC | Age: 54
End: 2024-09-11
Payer: MEDICAID

## 2024-09-11 DIAGNOSIS — M79.89 OTHER SPECIFIED SOFT TISSUE DISORDERS: ICD-10-CM

## 2024-09-11 PROCEDURE — 29580 STRAPPING UNNA BOOT: CPT | Mod: 50

## 2024-09-11 PROCEDURE — 93970 EXTREMITY STUDY: CPT

## 2024-09-11 NOTE — PHYSICAL EXAM
[Ankle Swelling (On Exam)] : present [Ankle Swelling Bilaterally] : severe [FreeTextEntry1] : multiple healing wounds on anterior legs bilaterally, no cellulitis. Positive extensive right lower extremity swelling which propagated up to the thigh

## 2024-09-11 NOTE — ASSESSMENT
[FreeTextEntry1] : 54 y/o F with venous insufficiency and LE venous ulcers, on unna boot therapy. The patient has been noncompliant with Unna boot therapy.  In her legs today are more swollen and wounds or not improved.  I recommend reapplying Unna boot therapy Positive leg swelling I performed a venous duplex that shows no evidence of lower extremity DVT bilaterally Unna boot reapplied bilaterally. F/u in one week.

## 2024-09-11 NOTE — DATA REVIEWED
[FreeTextEntry1] : Venous duplex shows no evidence of bilateral lower extremity DVT present limited study below the knee secondary to the edema

## 2024-09-11 NOTE — HISTORY OF PRESENT ILLNESS
[FreeTextEntry1] : 54 y/o F with venous insufficiency and LE venous ulcers, on unna boot therapy.  The patient has missed her last week's appointment and has taken a break from the Unna boot.  Her wounds are not improved at this time.  She has bilateral lower extremity edema with superficial venous stasis wounds present.  The patient presents with extensive left leg swelling which radiates up to the thighs

## 2024-09-18 ENCOUNTER — APPOINTMENT (OUTPATIENT)
Dept: PSYCHIATRY | Facility: CLINIC | Age: 54
End: 2024-09-18
Payer: MEDICAID

## 2024-09-18 ENCOUNTER — APPOINTMENT (OUTPATIENT)
Dept: VASCULAR SURGERY | Facility: CLINIC | Age: 54
End: 2024-09-18
Payer: MEDICAID

## 2024-09-18 ENCOUNTER — OUTPATIENT (OUTPATIENT)
Dept: OUTPATIENT SERVICES | Facility: HOSPITAL | Age: 54
LOS: 1 days | End: 2024-09-18
Payer: MEDICAID

## 2024-09-18 DIAGNOSIS — Z90.49 ACQUIRED ABSENCE OF OTHER SPECIFIED PARTS OF DIGESTIVE TRACT: Chronic | ICD-10-CM

## 2024-09-18 DIAGNOSIS — F10.20 ALCOHOL DEPENDENCE, UNCOMPLICATED: ICD-10-CM

## 2024-09-18 PROCEDURE — 99214 OFFICE O/P EST MOD 30 MIN: CPT | Mod: 95

## 2024-09-18 PROCEDURE — 29580 STRAPPING UNNA BOOT: CPT | Mod: RT

## 2024-09-18 NOTE — ASSESSMENT
[FreeTextEntry1] : 52 y/o F with venous insufficiency and LE venous ulcers, on unna boot therapy. The patient has been noncompliant with Unna boot therapy.  In her legs are improved.  I recommend reapplying Unna boot therapy to the right lower extremity.  Left lower extremity Ace wrap compression daily  F/u in one week.

## 2024-09-19 DIAGNOSIS — F10.20 ALCOHOL DEPENDENCE, UNCOMPLICATED: ICD-10-CM

## 2024-09-24 ENCOUNTER — APPOINTMENT (OUTPATIENT)
Dept: PSYCHIATRY | Facility: CLINIC | Age: 54
End: 2024-09-24

## 2024-09-25 ENCOUNTER — APPOINTMENT (OUTPATIENT)
Dept: VASCULAR SURGERY | Facility: CLINIC | Age: 54
End: 2024-09-25
Payer: MEDICAID

## 2024-09-25 PROCEDURE — 29580 STRAPPING UNNA BOOT: CPT | Mod: 50

## 2024-09-25 NOTE — HISTORY OF PRESENT ILLNESS
[FreeTextEntry1] : 52 y/o F with venous insufficiency and LE venous ulcers, on unna boot therapy.  The patient has missed her last week's appointment and has taken a break from the Unna boot.  Her wounds are not improved at this time.  She has bilateral lower extremity edema with superficial venous stasis wounds present.  The patient presents with extensive left leg swelling which radiates up to the thighs

## 2024-10-02 ENCOUNTER — APPOINTMENT (OUTPATIENT)
Dept: VASCULAR SURGERY | Facility: CLINIC | Age: 54
End: 2024-10-02
Payer: MEDICAID

## 2024-10-02 PROCEDURE — 29580 STRAPPING UNNA BOOT: CPT | Mod: RT

## 2024-10-02 NOTE — HISTORY OF PRESENT ILLNESS
[FreeTextEntry1] : 52 y/o F with venous insufficiency and LE venous ulcers, on unna boot therapy.  The patient has missed her last week's appointment and has taken a break from the Unna boot.  Her wounds are not improved at this time.  She has bilateral lower extremity edema with superficial venous stasis wounds present.  With overall improvement of her left leg wounds.  Her swelling is also improved.  Her right leg she has some superficial sloughing of the skin however the the wounds are improving.

## 2024-10-02 NOTE — ASSESSMENT
[FreeTextEntry1] : 54 y/o F with venous insufficiency and LE venous ulcers, on unna boot therapy. The patient has been noncompliant with Unna boot therapy.  In her legs are improved.  I recommend reapplying Unna boot therapy to the right lower extremity.  Left lower extremity Ace wrap compression daily  F/u in one week.

## 2024-10-09 ENCOUNTER — APPOINTMENT (OUTPATIENT)
Dept: VASCULAR SURGERY | Facility: CLINIC | Age: 54
End: 2024-10-09
Payer: MEDICAID

## 2024-10-09 PROCEDURE — 99213 OFFICE O/P EST LOW 20 MIN: CPT

## 2024-10-16 ENCOUNTER — APPOINTMENT (OUTPATIENT)
Dept: VASCULAR SURGERY | Facility: CLINIC | Age: 54
End: 2024-10-16
Payer: MEDICAID

## 2024-10-16 ENCOUNTER — OUTPATIENT (OUTPATIENT)
Dept: OUTPATIENT SERVICES | Facility: HOSPITAL | Age: 54
LOS: 1 days | End: 2024-10-16
Payer: MEDICAID

## 2024-10-16 ENCOUNTER — APPOINTMENT (OUTPATIENT)
Dept: PSYCHIATRY | Facility: CLINIC | Age: 54
End: 2024-10-16
Payer: MEDICAID

## 2024-10-16 DIAGNOSIS — F10.20 ALCOHOL DEPENDENCE, UNCOMPLICATED: ICD-10-CM

## 2024-10-16 PROCEDURE — 99213 OFFICE O/P EST LOW 20 MIN: CPT

## 2024-10-16 PROCEDURE — 99214 OFFICE O/P EST MOD 30 MIN: CPT | Mod: 95

## 2024-10-16 RX ORDER — CEPHALEXIN 500 MG/1
500 CAPSULE ORAL 3 TIMES DAILY
Qty: 30 | Refills: 0 | Status: ACTIVE | COMMUNITY
Start: 2024-10-16 | End: 1900-01-01

## 2024-10-17 DIAGNOSIS — F10.20 ALCOHOL DEPENDENCE, UNCOMPLICATED: ICD-10-CM

## 2024-10-23 ENCOUNTER — APPOINTMENT (OUTPATIENT)
Dept: VASCULAR SURGERY | Facility: CLINIC | Age: 54
End: 2024-10-23
Payer: MEDICAID

## 2024-10-23 DIAGNOSIS — R60.9 EDEMA, UNSPECIFIED: ICD-10-CM

## 2024-10-23 DIAGNOSIS — L03.115 CELLULITIS OF RIGHT LOWER LIMB: ICD-10-CM

## 2024-10-23 PROCEDURE — 29580 STRAPPING UNNA BOOT: CPT | Mod: 50

## 2024-10-23 RX ORDER — SULFAMETHOXAZOLE AND TRIMETHOPRIM 400; 80 MG/1; MG/1
400-80 TABLET ORAL TWICE DAILY
Qty: 20 | Refills: 0 | Status: ACTIVE | COMMUNITY
Start: 2024-10-23 | End: 1900-01-01

## 2024-10-23 RX ORDER — FUROSEMIDE 20 MG/1
20 TABLET ORAL
Qty: 10 | Refills: 0 | Status: ACTIVE | COMMUNITY
Start: 2024-10-23 | End: 1900-01-01

## 2024-10-25 ENCOUNTER — APPOINTMENT (OUTPATIENT)
Dept: VASCULAR SURGERY | Facility: CLINIC | Age: 54
End: 2024-10-25
Payer: MEDICAID

## 2024-10-25 PROCEDURE — 29580 STRAPPING UNNA BOOT: CPT | Mod: 50

## 2024-11-01 ENCOUNTER — APPOINTMENT (OUTPATIENT)
Dept: VASCULAR SURGERY | Facility: CLINIC | Age: 54
End: 2024-11-01
Payer: MEDICAID

## 2024-11-01 DIAGNOSIS — I83.019 VARICOSE VEINS OF RIGHT LOWER EXTREMITY WITH ULCER OF UNSPECIFIED SITE: ICD-10-CM

## 2024-11-01 DIAGNOSIS — L97.929 VARICOSE VEINS OF RIGHT LOWER EXTREMITY WITH ULCER OF UNSPECIFIED SITE: ICD-10-CM

## 2024-11-01 DIAGNOSIS — I83.029 VARICOSE VEINS OF RIGHT LOWER EXTREMITY WITH ULCER OF UNSPECIFIED SITE: ICD-10-CM

## 2024-11-01 DIAGNOSIS — L97.919 VARICOSE VEINS OF RIGHT LOWER EXTREMITY WITH ULCER OF UNSPECIFIED SITE: ICD-10-CM

## 2024-11-01 PROCEDURE — 29580 STRAPPING UNNA BOOT: CPT | Mod: 50

## 2024-11-05 ENCOUNTER — OUTPATIENT (OUTPATIENT)
Dept: OUTPATIENT SERVICES | Facility: HOSPITAL | Age: 54
LOS: 1 days | End: 2024-11-05
Payer: MEDICAID

## 2024-11-05 ENCOUNTER — APPOINTMENT (OUTPATIENT)
Dept: PSYCHIATRY | Facility: CLINIC | Age: 54
End: 2024-11-05

## 2024-11-05 DIAGNOSIS — F11.20 OPIOID DEPENDENCE, UNCOMPLICATED: ICD-10-CM

## 2024-11-05 DIAGNOSIS — Z90.49 ACQUIRED ABSENCE OF OTHER SPECIFIED PARTS OF DIGESTIVE TRACT: Chronic | ICD-10-CM

## 2024-11-05 DIAGNOSIS — Z90.89 ACQUIRED ABSENCE OF OTHER ORGANS: Chronic | ICD-10-CM

## 2024-11-05 PROCEDURE — H0004: CPT | Mod: 95

## 2024-11-06 DIAGNOSIS — F11.20 OPIOID DEPENDENCE, UNCOMPLICATED: ICD-10-CM

## 2024-11-08 ENCOUNTER — APPOINTMENT (OUTPATIENT)
Dept: VASCULAR SURGERY | Facility: CLINIC | Age: 54
End: 2024-11-08
Payer: MEDICAID

## 2024-11-08 DIAGNOSIS — I87.8 OTHER SPECIFIED DISORDERS OF VEINS: ICD-10-CM

## 2024-11-08 PROCEDURE — 99212 OFFICE O/P EST SF 10 MIN: CPT

## 2024-11-11 ENCOUNTER — OUTPATIENT (OUTPATIENT)
Dept: OUTPATIENT SERVICES | Facility: HOSPITAL | Age: 54
LOS: 1 days | End: 2024-11-11
Payer: COMMERCIAL

## 2024-11-11 ENCOUNTER — APPOINTMENT (OUTPATIENT)
Dept: PSYCHIATRY | Facility: CLINIC | Age: 54
End: 2024-11-11
Payer: COMMERCIAL

## 2024-11-11 DIAGNOSIS — Z90.89 ACQUIRED ABSENCE OF OTHER ORGANS: Chronic | ICD-10-CM

## 2024-11-11 DIAGNOSIS — F10.20 ALCOHOL DEPENDENCE, UNCOMPLICATED: ICD-10-CM

## 2024-11-11 DIAGNOSIS — Z90.49 ACQUIRED ABSENCE OF OTHER SPECIFIED PARTS OF DIGESTIVE TRACT: Chronic | ICD-10-CM

## 2024-11-11 PROCEDURE — 99214 OFFICE O/P EST MOD 30 MIN: CPT | Mod: 95

## 2024-11-12 DIAGNOSIS — F10.20 ALCOHOL DEPENDENCE, UNCOMPLICATED: ICD-10-CM

## 2024-11-14 ENCOUNTER — INPATIENT (INPATIENT)
Facility: HOSPITAL | Age: 54
LOS: 0 days | Discharge: HOME | DRG: 190 | End: 2024-11-15
Attending: STUDENT IN AN ORGANIZED HEALTH CARE EDUCATION/TRAINING PROGRAM | Admitting: FAMILY MEDICINE
Payer: MEDICAID

## 2024-11-14 VITALS
OXYGEN SATURATION: 97 % | SYSTOLIC BLOOD PRESSURE: 140 MMHG | WEIGHT: 199.96 LBS | HEART RATE: 70 BPM | DIASTOLIC BLOOD PRESSURE: 84 MMHG | RESPIRATION RATE: 18 BRPM | TEMPERATURE: 97 F

## 2024-11-14 DIAGNOSIS — Z90.49 ACQUIRED ABSENCE OF OTHER SPECIFIED PARTS OF DIGESTIVE TRACT: Chronic | ICD-10-CM

## 2024-11-14 DIAGNOSIS — Z90.89 ACQUIRED ABSENCE OF OTHER ORGANS: Chronic | ICD-10-CM

## 2024-11-14 LAB
ALBUMIN SERPL ELPH-MCNC: 4.4 G/DL — SIGNIFICANT CHANGE UP (ref 3.5–5.2)
ALP SERPL-CCNC: 78 U/L — SIGNIFICANT CHANGE UP (ref 30–115)
ALT FLD-CCNC: 8 U/L — SIGNIFICANT CHANGE UP (ref 0–41)
ANION GAP SERPL CALC-SCNC: 10 MMOL/L — SIGNIFICANT CHANGE UP (ref 7–14)
APPEARANCE UR: ABNORMAL
AST SERPL-CCNC: 36 U/L — SIGNIFICANT CHANGE UP (ref 0–41)
BACTERIA # UR AUTO: ABNORMAL /HPF
BASE EXCESS BLDV CALC-SCNC: 5.9 MMOL/L — HIGH (ref -2–3)
BASOPHILS # BLD AUTO: 0.04 K/UL — SIGNIFICANT CHANGE UP (ref 0–0.2)
BASOPHILS NFR BLD AUTO: 0.5 % — SIGNIFICANT CHANGE UP (ref 0–1)
BILIRUB SERPL-MCNC: 0.4 MG/DL — SIGNIFICANT CHANGE UP (ref 0.2–1.2)
BILIRUB UR-MCNC: NEGATIVE — SIGNIFICANT CHANGE UP
BUN SERPL-MCNC: 9 MG/DL — LOW (ref 10–20)
CA-I SERPL-SCNC: 1.15 MMOL/L — SIGNIFICANT CHANGE UP (ref 1.15–1.33)
CALCIUM SERPL-MCNC: 9.7 MG/DL — SIGNIFICANT CHANGE UP (ref 8.4–10.5)
CHLORIDE SERPL-SCNC: 94 MMOL/L — LOW (ref 98–110)
CO2 SERPL-SCNC: 31 MMOL/L — SIGNIFICANT CHANGE UP (ref 17–32)
COLOR SPEC: YELLOW — SIGNIFICANT CHANGE UP
CREAT SERPL-MCNC: 1.2 MG/DL — SIGNIFICANT CHANGE UP (ref 0.7–1.5)
DIFF PNL FLD: NEGATIVE — SIGNIFICANT CHANGE UP
EGFR: 54 ML/MIN/1.73M2 — LOW
EOSINOPHIL # BLD AUTO: 0.18 K/UL — SIGNIFICANT CHANGE UP (ref 0–0.7)
EOSINOPHIL NFR BLD AUTO: 2.4 % — SIGNIFICANT CHANGE UP (ref 0–8)
EPI CELLS # UR: SIGNIFICANT CHANGE UP
GAS PNL BLDV: 130 MMOL/L — LOW (ref 136–145)
GAS PNL BLDV: SIGNIFICANT CHANGE UP
GLUCOSE SERPL-MCNC: 168 MG/DL — HIGH (ref 70–99)
GLUCOSE UR QL: NEGATIVE MG/DL — SIGNIFICANT CHANGE UP
HCO3 BLDV-SCNC: 36 MMOL/L — HIGH (ref 22–29)
HCT VFR BLD CALC: 34 % — LOW (ref 37–47)
HCT VFR BLDA CALC: 65 % — CRITICAL HIGH (ref 34.5–46.5)
HGB BLD CALC-MCNC: >20 G/DL — CRITICAL HIGH (ref 11.7–16.1)
HGB BLD-MCNC: 10.7 G/DL — LOW (ref 12–16)
IMM GRANULOCYTES NFR BLD AUTO: 0.3 % — SIGNIFICANT CHANGE UP (ref 0.1–0.3)
KETONES UR-MCNC: NEGATIVE MG/DL — SIGNIFICANT CHANGE UP
LACTATE BLDV-MCNC: 2.2 MMOL/L — HIGH (ref 0.5–2)
LACTATE SERPL-SCNC: 1.2 MMOL/L — SIGNIFICANT CHANGE UP (ref 0.7–2)
LEUKOCYTE ESTERASE UR-ACNC: NEGATIVE — SIGNIFICANT CHANGE UP
LYMPHOCYTES # BLD AUTO: 1.67 K/UL — SIGNIFICANT CHANGE UP (ref 1.2–3.4)
LYMPHOCYTES # BLD AUTO: 22.2 % — SIGNIFICANT CHANGE UP (ref 20.5–51.1)
MAGNESIUM SERPL-MCNC: 2 MG/DL — SIGNIFICANT CHANGE UP (ref 1.8–2.4)
MCHC RBC-ENTMCNC: 27.3 PG — SIGNIFICANT CHANGE UP (ref 27–31)
MCHC RBC-ENTMCNC: 31.5 G/DL — LOW (ref 32–37)
MCV RBC AUTO: 86.7 FL — SIGNIFICANT CHANGE UP (ref 81–99)
MONOCYTES # BLD AUTO: 0.46 K/UL — SIGNIFICANT CHANGE UP (ref 0.1–0.6)
MONOCYTES NFR BLD AUTO: 6.1 % — SIGNIFICANT CHANGE UP (ref 1.7–9.3)
NEUTROPHILS # BLD AUTO: 5.14 K/UL — SIGNIFICANT CHANGE UP (ref 1.4–6.5)
NEUTROPHILS NFR BLD AUTO: 68.5 % — SIGNIFICANT CHANGE UP (ref 42.2–75.2)
NITRITE UR-MCNC: NEGATIVE — SIGNIFICANT CHANGE UP
NRBC # BLD: 0 /100 WBCS — SIGNIFICANT CHANGE UP (ref 0–0)
NT-PROBNP SERPL-SCNC: <36 PG/ML — SIGNIFICANT CHANGE UP (ref 0–300)
PCO2 BLDV: 66 MMHG — HIGH (ref 39–42)
PH BLDV: 7.34 — SIGNIFICANT CHANGE UP (ref 7.32–7.43)
PH UR: 5.5 — SIGNIFICANT CHANGE UP (ref 5–8)
PLATELET # BLD AUTO: 232 K/UL — SIGNIFICANT CHANGE UP (ref 130–400)
PMV BLD: 9.8 FL — SIGNIFICANT CHANGE UP (ref 7.4–10.4)
PO2 BLDV: 20 MMHG — LOW (ref 25–45)
POTASSIUM BLDV-SCNC: 3.7 MMOL/L — SIGNIFICANT CHANGE UP (ref 3.5–5.1)
POTASSIUM SERPL-MCNC: 3.9 MMOL/L — SIGNIFICANT CHANGE UP (ref 3.5–5)
POTASSIUM SERPL-SCNC: 3.9 MMOL/L — SIGNIFICANT CHANGE UP (ref 3.5–5)
PROT SERPL-MCNC: 7.8 G/DL — SIGNIFICANT CHANGE UP (ref 6–8)
PROT UR-MCNC: SIGNIFICANT CHANGE UP MG/DL
RBC # BLD: 3.92 M/UL — LOW (ref 4.2–5.4)
RBC # FLD: 16.2 % — HIGH (ref 11.5–14.5)
RBC CASTS # UR COMP ASSIST: 1 /HPF — SIGNIFICANT CHANGE UP (ref 0–4)
SAO2 % BLDV: 21.3 % — LOW (ref 67–88)
SODIUM SERPL-SCNC: 135 MMOL/L — SIGNIFICANT CHANGE UP (ref 135–146)
SP GR SPEC: 1.03 — SIGNIFICANT CHANGE UP (ref 1–1.03)
TROPONIN T, HIGH SENSITIVITY RESULT: 102 NG/L — CRITICAL HIGH (ref 6–13)
TROPONIN T, HIGH SENSITIVITY RESULT: 110 NG/L — CRITICAL HIGH (ref 6–13)
UROBILINOGEN FLD QL: 1 MG/DL — SIGNIFICANT CHANGE UP (ref 0.2–1)
WBC # BLD: 7.51 K/UL — SIGNIFICANT CHANGE UP (ref 4.8–10.8)
WBC # FLD AUTO: 7.51 K/UL — SIGNIFICANT CHANGE UP (ref 4.8–10.8)
WBC UR QL: 2 /HPF — SIGNIFICANT CHANGE UP (ref 0–5)

## 2024-11-14 PROCEDURE — 93010 ELECTROCARDIOGRAM REPORT: CPT

## 2024-11-14 PROCEDURE — 93308 TTE F-UP OR LMTD: CPT | Mod: 26

## 2024-11-14 PROCEDURE — 99291 CRITICAL CARE FIRST HOUR: CPT

## 2024-11-14 PROCEDURE — 93306 TTE W/DOPPLER COMPLETE: CPT

## 2024-11-14 PROCEDURE — 76937 US GUIDE VASCULAR ACCESS: CPT | Mod: 26

## 2024-11-14 PROCEDURE — 71275 CT ANGIOGRAPHY CHEST: CPT | Mod: MC

## 2024-11-14 PROCEDURE — 36000 PLACE NEEDLE IN VEIN: CPT

## 2024-11-14 PROCEDURE — 71045 X-RAY EXAM CHEST 1 VIEW: CPT | Mod: 26

## 2024-11-14 PROCEDURE — 99222 1ST HOSP IP/OBS MODERATE 55: CPT

## 2024-11-14 PROCEDURE — 93970 EXTREMITY STUDY: CPT | Mod: 26

## 2024-11-14 PROCEDURE — 82962 GLUCOSE BLOOD TEST: CPT

## 2024-11-14 RX ORDER — NITROGLYCERIN 0.6MG/HR
0.4 PATCH, TRANSDERMAL 24 HOURS TRANSDERMAL
Refills: 0 | Status: DISCONTINUED | OUTPATIENT
Start: 2024-11-14 | End: 2024-11-15

## 2024-11-14 RX ORDER — ASPIRIN/MAG CARB/ALUMINUM AMIN 325 MG
324 TABLET ORAL ONCE
Refills: 0 | Status: COMPLETED | OUTPATIENT
Start: 2024-11-14 | End: 2024-11-14

## 2024-11-14 RX ADMIN — Medication 324 MILLIGRAM(S): at 21:06

## 2024-11-14 RX ADMIN — Medication 0.4 MILLIGRAM(S): at 21:47

## 2024-11-14 NOTE — ED PROVIDER NOTE - CLINICAL SUMMARY MEDICAL DECISION MAKING FREE TEXT BOX
Patient seen and evaluated and chest pain for the past 2 days.  Patient is not short of breath.  Patient has chronic venous stasis with wounds of bilateral legs but her legs not more swollen than normal.  No history of DVT or PE.  Patient had multiple Sonos of her legs that were negative and also had a sono today during workup that did not show DVT.  EKG unchanged from prior showing nonspecific changes diffusely.  Of note, patient has T wave inversions of V2-V4.  No fever or signs of infectious etiology and patient's venous stasis seems equal to bilateral legs so we will hold off on antibiotics.  IV placed and labs sent, chest x-ray performed.  Patient had elevated troponin to 102, which is significantly above prior level.  Patient's kidney functions normal.  Patient given full dose aspirin and bedside echo done and shows no evidence of right heart strain, normal EF, no regional wall motion abnormalities.  Troponin was repeated after 2 hours and does not have a delta.  Patient to be admitted to telemetry monitoring for abnormal troponin and chest pain.  Dr. Navarro spoken to for appropriate disposition and recommends low risk telemetry admission.  Case endorsed to Dr. Valle for admission, who requests CTA of chest to evaluate for PE.  He will follow-up this study and I agreed to order this test.    ED work up reviewed and results and plan of care discussed with patient. Patient requires admission for further work up, monitoring, and management. Need for admission discussed with patient.

## 2024-11-14 NOTE — ED PROVIDER NOTE - PHYSICAL EXAMINATION
VITAL SIGNS: I have reviewed nursing notes and confirm.  CONSTITUTIONAL: chronically ill appearing, appears older than stated age  SKIN: Pale  HEAD: NCAT  EYES: EOMI, PERRLA, no scleral icterus  ENT: normal pharynx with no erythema or exudates  NECK: Supple; non tender. Full ROM. No cervical LAD  CARD: RRR, no murmurs, rubs or gallops. Mild left upper chest tenderness.  RESP: clear to ausculation b/l.  No rales, rhonchi, or wheezing.  ABD: soft, + BS, non-tender, non-distended, no rebound or guarding.  EXT: Full ROM, + chronic venous stasis with leg swelling and edema, Open wounds. No pus drainage or crepitus.  NEURO: normal motor. normal sensory.   PSYCH: Cooperative, appropriate.

## 2024-11-14 NOTE — ED PROVIDER NOTE - OBJECTIVE STATEMENT
53-year-old female past medical history of diabetes, COPD, prior polysubstance abuse on Suboxone, hepatitis B, hepatitis C, asthma, hypothyroidism, chronic venous stasis with chronic lymphedema to bilateral legs who presents to the emergency department with left-sided upper chest pain and weakness that has been progressing over the past couple days.  Patient sees Dr. Adair for vascular for chronic lymphedema and was previously in Unna boots.  Patient reports having redness and continued leg swelling with weeping.  Patient feels very weak and reports having chest pain to her left upper chest over the past 2 days.  Patient reports chills but denies any fever, cough, trouble breathing.  Patient is eating and drinking normally.  Patient has no history of DVT or PE.  Patient reports being compliant with medications.  Patient had a nuclear stress test in June of this year that did not show any reversible defects and patient had a normal EF.

## 2024-11-14 NOTE — ED PROVIDER NOTE - CRITICAL CARE ATTENDING CONTRIBUTION TO CARE
I personally evaluated patient. I agree with the findings and plan with all documentation on chart except as documented  in my note.      ED work up reviewed and results and plan of care discussed with patient. Patient requires admission for further work up, monitoring, and management. Need for admission discussed with patient. I personally evaluated patient. I agree with the findings and plan with all documentation on chart except as documented  in my note.    Patient seen and evaluated and chest pain for the past 2 days.  Patient is not short of breath.  Patient has chronic venous stasis with wounds of bilateral legs but her legs not more swollen than normal.  No history of DVT or PE.  Patient had multiple Sonos of her legs that were negative and also had a sono today during workup that did not show DVT.  EKG unchanged from prior showing nonspecific changes diffusely.  Of note, patient has T wave inversions of V2-V4.  No fever or signs of infectious etiology and patient's venous stasis seems equal to bilateral legs so we will hold off on antibiotics.  IV placed and labs sent, chest x-ray performed.  Patient had elevated troponin to 102, which is significantly above prior level.  Patient's kidney functions normal.  Patient given full dose aspirin and bedside echo done and shows no evidence of right heart strain, normal EF, no regional wall motion abnormalities.  Troponin was repeated after 2 hours and does not have a delta.  Patient to be admitted to telemetry monitoring for abnormal troponin and chest pain.  Dr. Navarro spoken to for appropriate disposition and recommends low risk telemetry admission.  Case endorsed to Dr. Valle for admission, who requests CTA of chest to evaluate for PE.  He will follow-up this study and I agreed to order this test.    ED work up reviewed and results and plan of care discussed with patient. Patient requires admission for further work up, monitoring, and management. Need for admission discussed with patient.

## 2024-11-14 NOTE — ED ADULT NURSE NOTE - NSFALLUNIVINTERV_ED_ALL_ED
Bed/Stretcher in lowest position, wheels locked, appropriate side rails in place/Call bell, personal items and telephone in reach/Instruct patient to call for assistance before getting out of bed/chair/stretcher/Non-slip footwear applied when patient is off stretcher/[place] to call system/Physically safe environment - no spills, clutter or unnecessary equipment/Purposeful proactive rounding/Room/bathroom lighting operational, light cord in reach

## 2024-11-14 NOTE — ED PROVIDER NOTE - CARE PLAN
1 Principal Discharge DX:	NSTEMI (non-ST elevation myocardial infarction)  Secondary Diagnosis:	Chronic venous stasis dermatitis  Secondary Diagnosis:	Chest pain

## 2024-11-14 NOTE — ED ADULT TRIAGE NOTE - CHIEF COMPLAINT QUOTE
pt BIBA complaining of chest pain, lower extremity edema/ redness, and "cold" feeling. hx anemia.  EKG delayed due to monitor malfunction

## 2024-11-15 ENCOUNTER — NON-APPOINTMENT (OUTPATIENT)
Age: 54
End: 2024-11-15

## 2024-11-15 ENCOUNTER — RESULT REVIEW (OUTPATIENT)
Age: 54
End: 2024-11-15

## 2024-11-15 ENCOUNTER — TRANSCRIPTION ENCOUNTER (OUTPATIENT)
Age: 54
End: 2024-11-15

## 2024-11-15 VITALS
RESPIRATION RATE: 16 BRPM | HEART RATE: 63 BPM | TEMPERATURE: 97 F | DIASTOLIC BLOOD PRESSURE: 64 MMHG | SYSTOLIC BLOOD PRESSURE: 100 MMHG | OXYGEN SATURATION: 90 %

## 2024-11-15 DIAGNOSIS — I21.4 NON-ST ELEVATION (NSTEMI) MYOCARDIAL INFARCTION: ICD-10-CM

## 2024-11-15 LAB
GLUCOSE BLDC GLUCOMTR-MCNC: 122 MG/DL — HIGH (ref 70–99)
GLUCOSE BLDC GLUCOMTR-MCNC: 168 MG/DL — HIGH (ref 70–99)

## 2024-11-15 PROCEDURE — 99223 1ST HOSP IP/OBS HIGH 75: CPT

## 2024-11-15 PROCEDURE — 71275 CT ANGIOGRAPHY CHEST: CPT | Mod: 26

## 2024-11-15 PROCEDURE — 99232 SBSQ HOSP IP/OBS MODERATE 35: CPT

## 2024-11-15 PROCEDURE — 93306 TTE W/DOPPLER COMPLETE: CPT | Mod: 26

## 2024-11-15 RX ORDER — ONDANSETRON HYDROCHLORIDE 2 MG/ML
4 INJECTION, SOLUTION INTRAMUSCULAR; INTRAVENOUS EVERY 6 HOURS
Refills: 0 | Status: DISCONTINUED | OUTPATIENT
Start: 2024-11-15 | End: 2024-11-15

## 2024-11-15 RX ORDER — INSULIN GLARGINE,HUM.REC.ANLOG 100/ML
15 VIAL (ML) SUBCUTANEOUS AT BEDTIME
Refills: 0 | Status: DISCONTINUED | OUTPATIENT
Start: 2024-11-15 | End: 2024-11-15

## 2024-11-15 RX ORDER — PREGABALIN 150 MG/1
50 CAPSULE ORAL EVERY 12 HOURS
Refills: 0 | Status: DISCONTINUED | OUTPATIENT
Start: 2024-11-15 | End: 2024-11-15

## 2024-11-15 RX ORDER — BUPRENORPHINE HYDROCHLORIDE, NALOXONE HYDROCHLORIDE 12; 3 MG/1; MG/1
2.5 FILM, SOLUBLE BUCCAL; SUBLINGUAL DAILY
Refills: 0 | Status: DISCONTINUED | OUTPATIENT
Start: 2024-11-15 | End: 2024-11-15

## 2024-11-15 RX ORDER — ALBUTEROL 90 MCG
2 AEROSOL (GRAM) INHALATION EVERY 6 HOURS
Refills: 0 | Status: DISCONTINUED | OUTPATIENT
Start: 2024-11-15 | End: 2024-11-15

## 2024-11-15 RX ORDER — ASPIRIN/MAG CARB/ALUMINUM AMIN 325 MG
1 TABLET ORAL
Qty: 30 | Refills: 0
Start: 2024-11-15 | End: 2024-12-14

## 2024-11-15 RX ORDER — LEVOTHYROXINE SODIUM 88 MCG
175 TABLET ORAL DAILY
Refills: 0 | Status: DISCONTINUED | OUTPATIENT
Start: 2024-11-15 | End: 2024-11-15

## 2024-11-15 RX ORDER — SENNA 187 MG
2 TABLET ORAL AT BEDTIME
Refills: 0 | Status: DISCONTINUED | OUTPATIENT
Start: 2024-11-15 | End: 2024-11-15

## 2024-11-15 RX ORDER — FUROSEMIDE 40 MG
40 TABLET ORAL DAILY
Refills: 0 | Status: DISCONTINUED | OUTPATIENT
Start: 2024-11-15 | End: 2024-11-15

## 2024-11-15 RX ORDER — PANTOPRAZOLE SODIUM 40 MG/1
40 TABLET, DELAYED RELEASE ORAL
Refills: 0 | Status: DISCONTINUED | OUTPATIENT
Start: 2024-11-15 | End: 2024-11-15

## 2024-11-15 RX ORDER — INSULIN LISPRO 100/ML
VIAL (ML) SUBCUTANEOUS
Refills: 0 | Status: DISCONTINUED | OUTPATIENT
Start: 2024-11-15 | End: 2024-11-15

## 2024-11-15 RX ORDER — ASPIRIN/MAG CARB/ALUMINUM AMIN 325 MG
81 TABLET ORAL DAILY
Refills: 0 | Status: DISCONTINUED | OUTPATIENT
Start: 2024-11-15 | End: 2024-11-15

## 2024-11-15 RX ORDER — GLUCAGON INJECTION, SOLUTION 1 MG/.2ML
1 INJECTION, SOLUTION SUBCUTANEOUS ONCE
Refills: 0 | Status: DISCONTINUED | OUTPATIENT
Start: 2024-11-15 | End: 2024-11-15

## 2024-11-15 RX ORDER — ACETAMINOPHEN 500 MG
650 TABLET ORAL EVERY 6 HOURS
Refills: 0 | Status: DISCONTINUED | OUTPATIENT
Start: 2024-11-15 | End: 2024-11-15

## 2024-11-15 RX ADMIN — ONDANSETRON HYDROCHLORIDE 4 MILLIGRAM(S): 2 INJECTION, SOLUTION INTRAMUSCULAR; INTRAVENOUS at 11:59

## 2024-11-15 RX ADMIN — Medication 175 MICROGRAM(S): at 05:42

## 2024-11-15 RX ADMIN — BUPRENORPHINE HYDROCHLORIDE, NALOXONE HYDROCHLORIDE 2.5 TABLET(S): 12; 3 FILM, SOLUBLE BUCCAL; SUBLINGUAL at 12:00

## 2024-11-15 RX ADMIN — PANTOPRAZOLE SODIUM 40 MILLIGRAM(S): 40 TABLET, DELAYED RELEASE ORAL at 05:42

## 2024-11-15 RX ADMIN — PREGABALIN 50 MILLIGRAM(S): 150 CAPSULE ORAL at 05:42

## 2024-11-15 RX ADMIN — Medication 0.6 MILLIGRAM(S): at 05:42

## 2024-11-15 RX ADMIN — Medication 40 MILLIGRAM(S): at 05:42

## 2024-11-15 RX ADMIN — Medication 81 MILLIGRAM(S): at 11:59

## 2024-11-15 NOTE — DISCHARGE NOTE PROVIDER - HOSPITAL COURSE
Patient is  53-year-old female past medical history of diabetes, COPD, prior polysubstance abuse on Suboxone, hepatitis B, hepatitis C, asthma, hypothyroidism, chronic venous stasis with chronic lymphedema to bilateral legs who presents     #Chest pain  -EKG shows no acute ischemic changes  -trop level flat  -negative stress test  -CTA chest: no evidence of PE, + emphysema, + interstitial lung changes, follow up with pulm  -Doppler US of LE negative for VTE  -MCKAYLA WNL    #COPD  -continue albuterol PRN    #Hypothyroidism  -Continue Synthroid    #DM2  -Continue with home insulin    #Chronic LE lymphedema  -Continue with po lasix     medically stable for discharge, discharge home with outpatient follow up    Upon DC:  follow up with cardiology Dr. Conner  follow up with pulmonology Dr. Osorio  follow up with vascular Dr. Adair  follow up with PMD Patient is  53-year-old female past medical history of diabetes, COPD, prior polysubstance abuse on Suboxone, hepatitis B, hepatitis C, asthma, hypothyroidism, chronic venous stasis with chronic lymphedema to bilateral legs who presents     #Chest pain  -EKG shows no acute ischemic changes  -trop level flat  -negative stress test  -CTA chest: no evidence of PE, + emphysema, + interstitial lung changes, follow up with pulm outpatient  -Doppler US of LE negative for VTE  -MCKAYLA WNL  - follow up outpatient cardiology    #COPD  -continue albuterol PRN    #Hypothyroidism  -Continue Synthroid    #DM2  -Continue with home insulin    #Chronic LE lymphedema  -Continue with po lasix   - follow up with vascular    # hx substance abuse  - continue suboxone  - follow up outpatient psych/chemical dependency    medically stable for discharge, discharge home with outpatient follow up    Upon DC:  follow up with cardiology Dr. Conner  follow up with pulmonology Dr. Osorio  follow up with vascular Dr. Adair  follow up with PMD  - continue aspirin and lipitor until cardio f/u

## 2024-11-15 NOTE — DISCHARGE NOTE NURSING/CASE MANAGEMENT/SOCIAL WORK - NSTRANSFERBELONGINGSDISPO_GEN_A_NUR
Telephone conversation reported her pro-BNP is 7700 it has gone up advised her to double the furosemide to 40 mg every morning.  her sugar is going up to 168 as we have stopped the metformin due to chronic renal failure creatinine of 2 and pioglitazone due to congestive heart failure so we will start glimepiride 1 mg half tablet every morning before breakfast #30 prescription sent cautioned patient that there may be cross sensitivity between sulfa and glimepiride if she develops any rash or itching she should stop it and call me.  Dr. Bradley's phone number given as they have seen her in the hospital she needs to get erythropoietin injection as she is anemic. with patient

## 2024-11-15 NOTE — DISCHARGE NOTE PROVIDER - NSDCCPCAREPLAN_GEN_ALL_CORE_FT
PRINCIPAL DISCHARGE DIAGNOSIS  Diagnosis: Chest pain  Assessment and Plan of Treatment: troponin remained flat, echo WNL, no events on tele   Upon DC:  follow up with cardiology Dr. Conner  follow up with pulmonology Dr. Osorio  follow up with vascular Dr. Adair  follow up with PMD      SECONDARY DISCHARGE DIAGNOSES  Diagnosis: Chronic venous stasis dermatitis  Assessment and Plan of Treatment:     Diagnosis: Chest pain  Assessment and Plan of Treatment:      PRINCIPAL DISCHARGE DIAGNOSIS  Diagnosis: Chest pain  Assessment and Plan of Treatment: troponin remained flat, echo WNL, no events on tele   Upon DC:  follow up with cardiology Dr. Conner  follow up with pulmonology Dr. Osorio  follow up with vascular Dr. Adair  follow up with PMD  - continue aspirin and lipitor until cardio f/u      SECONDARY DISCHARGE DIAGNOSES  Diagnosis: Chronic venous stasis dermatitis  Assessment and Plan of Treatment:     Diagnosis: Chest pain  Assessment and Plan of Treatment:

## 2024-11-15 NOTE — DISCHARGE NOTE PROVIDER - CARE PROVIDER_API CALL
Jasmeet Conner  Cardiovascular Disease  [address] [place] [address]  Phone: [phone]  Fax: [phone]  Follow Up Time: 1 week    Javier Perez  Geriatric Medicine  [address] [place] [address]  Phone: [phone]  Fax: [phone]  Follow Up Time: 1 week    Kash Osorio  Critical Care Medicine  [address] [place] [address]  Phone: [phone]  Fax: [phone]  Follow Up Time: 1 week    Greg Adair  Vascular Surgery  [address] [place] [address], [address]  [place] [address]  Phone: [phone]  Fax: [phone]  Follow Up Time: 1 week

## 2024-11-15 NOTE — CONSULT NOTE ADULT - SUBJECTIVE AND OBJECTIVE BOX
CARDIOLOGY CONSULT NOTE     CHIEF COMPLAINT/REASON FOR CONSULT:    HPI:  Patient is  53-year-old female past medical history of diabetes, COPD, prior polysubstance abuse on Suboxone, hepatitis B, hepatitis C, asthma, hypothyroidism, chronic venous stasis with chronic lymphedema to bilateral legs who presents to the emergency department with left-sided upper chest pain and weakness that has been progressing over the past couple days.  Patient sees Dr. Adair for vascular for chronic lymphedema and was previously in Unna boots.  Patient reports having redness and continued leg swelling with weeping.  Patient feels very weak and reports having chest pain to her left upper chest over the past 2 days.  Patient reports chills but denies any fever, cough, trouble breathing.  Patient is eating and drinking normally.  Patient has no history of DVT or PE.  Patient reports being compliant with medications.  Patient had a nuclear stress test in June of this year that did not show any reversible defects and patient had a normal EF.     (15 Nov 2024 00:10)      PAST MEDICAL & SURGICAL HISTORY:  Asthma with COPD      Hypothyroidism      H/O: substance abuse  no longer using      History of pancreatitis      Hepatitis-C      Leukocytoclastic vasculitis      History of appendectomy      History of tonsillectomy          Cardiac Risks:   [ ]HTN, [x ] DM, [ ] Smoking, [ ] FH,  [ ] Lipids        MEDICATIONS:  MEDICATIONS  (STANDING):  aspirin enteric coated 81 milliGRAM(s) Oral daily  atorvastatin 20 milliGRAM(s) Oral at bedtime  buprenorphine 8 mG/naloxone 2 mG SL  Tablet 2.5 Tablet(s) SubLingual daily  colchicine 0.6 milliGRAM(s) Oral two times a day  dextrose 5%. 1000 milliLiter(s) (50 mL/Hr) IV Continuous <Continuous>  dextrose 5%. 1000 milliLiter(s) (100 mL/Hr) IV Continuous <Continuous>  dextrose 50% Injectable 25 Gram(s) IV Push once  dextrose 50% Injectable 25 Gram(s) IV Push once  dextrose 50% Injectable 12.5 Gram(s) IV Push once  furosemide    Tablet 40 milliGRAM(s) Oral daily  glucagon  Injectable 1 milliGRAM(s) IntraMuscular once  insulin glargine Injectable (LANTUS) 15 Unit(s) SubCutaneous at bedtime  insulin lispro (ADMELOG) corrective regimen sliding scale   SubCutaneous three times a day before meals  levothyroxine 175 MICROGram(s) Oral daily  pantoprazole    Tablet 40 milliGRAM(s) Oral before breakfast  pregabalin 50 milliGRAM(s) Oral every 12 hours      FAMILY HISTORY:      SOCIAL HISTORY:      Allergies    No Known Allergies        	    REVIEW OF SYSTEMS:  CONSTITUTIONAL: No fever, weight loss, or fatigue  EYES: No eye pain, visual disturbances, or discharge  ENMT:  No difficulty hearing, tinnitus, vertigo; No sinus or throat pain  NECK: No pain or stiffness  RESPIRATORY: No cough, wheezing, chills or hemoptysis; No Shortness of Breath  CARDIOVASCULAR: see above  GASTROINTESTINAL: No abdominal or epigastric pain. No nausea, vomiting, or hematemesis; No diarrhea or constipation. No melena or hematochezia.  GENITOURINARY: No dysuria, frequency, hematuria, or incontinence  NEUROLOGICAL: No headaches, memory loss, loss of strength, numbness, or tremors  SKIN: No itching, burning, rashes, or lesions   	      PHYSICAL EXAM:  T(C): 36.3 (11-15-24 @ 05:00), Max: 36.3 (11-14-24 @ 18:06)  HR: 47 (11-15-24 @ 05:00) (47 - 70)  BP: 95/60 (11-15-24 @ 05:00) (95/60 - 140/84)  RR: 17 (11-15-24 @ 05:00) (16 - 18)  SpO2: 90% (11-15-24 @ 05:00) (90% - 98%)  Wt(kg): --  I&O's Summary    14 Nov 2024 07:01  -  15 Nov 2024 06:47  --------------------------------------------------------  IN: 0 mL / OUT: 75 mL / NET: -75 mL        Appearance: Normal	  Psychiatry: A & O x 3, Mood & affect appropriate  HEENT:   Normal oral mucosa, PERRL, EOMI	  Lymphatic: No lymphadenopathy  Cardiovascular: Normal S1 S2,RRR, No JVD, No murmurs  Respiratory: Lungs clear to auscultation	  Gastrointestinal:  Soft, Non-tender, + BS	  Skin: No rashes, No ecchymoses, No cyanosis	  Neurologic: Non-focal  Extremities: Normal range of motion, No clubbing, cyanosis or edema  Vascular: Peripheral pulses palpable 2+ bilaterally      ECG:  	  < from: 12 Lead ECG (05.28.24 @ 07:45) >  Diagnosis Line Sinus bradycardia  Nonspecific T wave abnormality  Prolonged QT  Abnormal ECG    Confirmed by Stef Murphy ([id number]) on 5/28/2024 8:56:34 AM    < end of copied text >    	  LABS:	 	    CARDIAC MARKERS:                                    10.7   7.51  )-----------( 232      ( 14 Nov 2024 19:26 )             34.0     11-14    135  |  94[L]  |  9[L]  ----------------------------<  168[H]  3.9   |  31  |  1.2    Ca    9.7      14 Nov 2024 19:26  Mg     2.0     11-14    TPro  7.8  /  Alb  4.4  /  TBili  0.4  /  DBili  x   /  AST  36  /  ALT  8   /  AlkPhos  78  11-14

## 2024-11-15 NOTE — H&P ADULT - ASSESSMENT
Patient is  53-year-old female past medical history of diabetes, COPD, prior polysubstance abuse on Suboxone, hepatitis B, hepatitis C, asthma, hypothyroidism, chronic venous stasis with chronic lymphedema to bilateral legs who presents     #Chest pain  -EKG shows no acute ischemic changes  -trop level flat  -negative stress test  -Will obtain serial trop, Echo, and cardiology consult   -F/U CTA chest to r/o PE  -Doppler US of LE negative pending official report    #COPD/Hypothyrodism  -Continue with home medications      #DM2  -Continue with lantus, and ISS  -Monitor POC   #Chronic LE lymphedema  -No sign of infection  -Continue with po lasix     #Progress Note Handoff  Pending (specify):  as above   Family discussion:  plan of care was discussed with patient  y in details.  all questions were answered.  seems to understand, and in agreement  Disposition:  home

## 2024-11-15 NOTE — H&P ADULT - NSHPPHYSICALEXAM_GEN_ALL_CORE
Vital Signs Last 24 Hrs  T(C): 36.3 (14 Nov 2024 18:06), Max: 36.3 (14 Nov 2024 18:06)  T(F): 97.4 (14 Nov 2024 18:06), Max: 97.4 (14 Nov 2024 18:06)  HR: 56 (14 Nov 2024 23:29) (56 - 70)  BP: 102/53 (14 Nov 2024 23:29) (102/53 - 140/84)  BP(mean): --  RR: 18 (14 Nov 2024 23:29) (16 - 18)  SpO2: 97% (14 Nov 2024 23:29) (97% - 98%)    Parameters below as of 14 Nov 2024 23:29  Patient On (Oxygen Delivery Method): room air      PHYSICAL EXAM-  GENERAL: NAD, well-groomed, well-developed  HEAD:  Atraumatic, Normocephalic  EYES: EOMI, PERRLA, conjunctiva and sclera clear  NECK: Supple, No JVD, Normal thyroid  NERVOUS SYSTEM:  Alert & Oriented X3, Motor Strength 5/5 B/L upper and lower extremities; DTRs 2+ intact and symmetric  CHEST/LUNG: Clear to percussion bilaterally; No rales, rhonchi, wheezing, or rubs  HEART: Regular rate and rhythm; No murmurs, rubs, or gallops  ABDOMEN: Soft, Nontender, Nondistended; Bowel sounds present  EXTREMITIES:  2+ Peripheral Pulses, No clubbing, cyanosis.  B/L LE lymphedema no sign of infection  SKIN: No rashes or lesions

## 2024-11-15 NOTE — CONSULT NOTE ADULT - ASSESSMENT
Patient is  53-year-old female past medical history of diabetes, COPD, prior polysubstance abuse on Suboxone, hepatitis B, hepatitis C, asthma, hypothyroidism, chronic venous stasis with chronic lymphedema to bilateral legs who presents to the emergency department with left-sided upper chest pain and weakness that has been progressing over the past couple days.  Patient sees Dr. Adair for vascular for chronic lymphedema and was previously in Unna boots.  Lexiscan 6/24 no ischemia. She has history elevated troponin. Levels higher in past. Chest pain for days. Not exertional or pleuritic .No PE by ct scan . Would echo. Pain appears non cardiac. Can consider cardiac ct. Can do as outpatient

## 2024-11-15 NOTE — DISCHARGE NOTE NURSING/CASE MANAGEMENT/SOCIAL WORK - PATIENT PORTAL LINK FT
You can access the FollowMyHealth Patient Portal offered by [facility] by registering at the following website: [email or web address]. By joining [facility]’s FollowMyHealth portal, you will also be able to view your health information using other applications (apps) compatible with our system.

## 2024-11-15 NOTE — DISCHARGE NOTE NURSING/CASE MANAGEMENT/SOCIAL WORK - FINANCIAL ASSISTANCE
[facility] provides services at a reduced cost to those who are determined to be eligible through [facility]’s financial assistance program. Information regarding [facility]’s financial assistance program can be found by going to [email or web address] or by calling [phone].

## 2024-11-15 NOTE — DISCHARGE NOTE PROVIDER - PROVIDER TOKENS
PROVIDER:[TOKEN:[[id number]],FOLLOWUP:[1 week]],PROVIDER:[TOKEN:[[id number]],FOLLOWUP:[1 week]],PROVIDER:[TOKEN:[[id number]],FOLLOWUP:[1 week]],PROVIDER:[TOKEN:[[id number]],FOLLOWUP:[1 week]]

## 2024-11-15 NOTE — DISCHARGE NOTE PROVIDER - NSDCFUSCHEDAPPT_GEN_ALL_CORE_FT
Javier Soto  [facility] PreAdmits  Scheduled Appointment: 12/10/2024    Javier Soto  [facility]  PSYCHIATRY [address]  Scheduled Appointment: 12/10/2024

## 2024-11-15 NOTE — H&P ADULT - HISTORY OF PRESENT ILLNESS
Patient is  53-year-old female past medical history of diabetes, COPD, prior polysubstance abuse on Suboxone, hepatitis B, hepatitis C, asthma, hypothyroidism, chronic venous stasis with chronic lymphedema to bilateral legs who presents to the emergency department with left-sided upper chest pain and weakness that has been progressing over the past couple days.  Patient sees Dr. Adair for vascular for chronic lymphedema and was previously in Unna boots.  Patient reports having redness and continued leg swelling with weeping.  Patient feels very weak and reports having chest pain to her left upper chest over the past 2 days.  Patient reports chills but denies any fever, cough, trouble breathing.  Patient is eating and drinking normally.  Patient has no history of DVT or PE.  Patient reports being compliant with medications.  Patient had a nuclear stress test in June of this year that did not show any reversible defects and patient had a normal EF.

## 2024-11-15 NOTE — DISCHARGE NOTE PROVIDER - NSDCMRMEDTOKEN_GEN_ALL_CORE_FT
acetaminophen 325 mg oral capsule: 3 cap(s) orally every 8 hours  albuterol 90 mcg/inh inhalation powder: 2 puff(s) inhaled every 6 hours, As Needed -for bronchospasm   alcohol swabs: Apply topically to affected area 4 times a day  cephalexin 500 mg oral capsule: 1 cap(s) orally every 12 hours  colchicine 0.6 mg oral tablet: 1 tab(s) orally 2 times a day  Freestyle Precision Toni Strips: Test blood sugar four times a day when you see check blood glucose symbol or when symptoms don&#x27;t match Tawanna reading.  furosemide 40 mg oral tablet: 1 tab(s) orally once a day  glucometer (per patient&#x27;s insurance): test blood sugar 4 times a day. Test blood sugars four times a day. Dispense #1 glucometer.  Insulin Pen Needles, 4mm: 1 application subcutaneously 4 times a day. ** Use with insulin pen **  lancets: 1 application subcutaneously 4 times a day  Lantus 100 units/mL subcutaneous solution: 28 unit(s) subcutaneous once a day (at bedtime)  metFORMIN 500 mg oral tablet: 2 tab(s) orally 2 times a day  pantoprazole 40 mg oral delayed release tablet: 1 tab(s) orally once a day (before a meal) Take once a day while taking prednisone  pregabalin 50 mg oral capsule: 1 cap(s) orally every 12 hours  silver sulfADIAZINE 1% topical cream: Apply topically to affected area once a day 1 Apply topically to affected area once a day  Suboxone 8 mg-2 mg sublingual tablet: 2.5 film(s) sublingual once a day  Synthroid 175 mcg (0.175 mg) oral tablet: 1 tab(s) orally once a day  test strips (per patient&#x27;s insurance): 1 application subcutaneously 4 times a day. ** Compatible with patient&#x27;s glucometer **  triamcinolone 0.1% topical cream: Apply topically to affected area 2 times a day 1 Apply topically to affected area 2 times a day   acetaminophen 325 mg oral capsule: 3 cap(s) orally every 8 hours  albuterol 90 mcg/inh inhalation powder: 2 puff(s) inhaled every 6 hours, As Needed -for bronchospasm   alcohol swabs: Apply topically to affected area 4 times a day  aspirin 81 mg oral delayed release tablet: 1 tab(s) orally once a day  atorvastatin 20 mg oral tablet: 1 tab(s) orally once a day (at bedtime)  colchicine 0.6 mg oral tablet: 1 tab(s) orally 2 times a day  Freestyle Precision Toni Strips: Test blood sugar four times a day when you see check blood glucose symbol or when symptoms don&#x27;t match Tawanna reading.  furosemide 40 mg oral tablet: 1 tab(s) orally once a day  glucometer (per patient&#x27;s insurance): test blood sugar 4 times a day. Test blood sugars four times a day. Dispense #1 glucometer.  Insulin Pen Needles, 4mm: 1 application subcutaneously 4 times a day. ** Use with insulin pen **  lancets: 1 application subcutaneously 4 times a day  Lantus 100 units/mL subcutaneous solution: 28 unit(s) subcutaneous once a day (at bedtime)  metFORMIN 500 mg oral tablet: 2 tab(s) orally 2 times a day  pantoprazole 40 mg oral delayed release tablet: 1 tab(s) orally once a day (before a meal) Take once a day while taking prednisone  pregabalin 50 mg oral capsule: 1 cap(s) orally every 12 hours  silver sulfADIAZINE 1% topical cream: Apply topically to affected area once a day 1 Apply topically to affected area once a day  Suboxone 8 mg-2 mg sublingual tablet: 2.5 film(s) sublingual once a day  Synthroid 175 mcg (0.175 mg) oral tablet: 1 tab(s) orally once a day  test strips (per patient&#x27;s insurance): 1 application subcutaneously 4 times a day. ** Compatible with patient&#x27;s glucometer **  triamcinolone 0.1% topical cream: Apply topically to affected area 2 times a day 1 Apply topically to affected area 2 times a day

## 2024-11-18 NOTE — DISCHARGE NOTE NURSING/CASE MANAGEMENT/SOCIAL WORK - NSDCFUADDAPPT_GEN_ALL_CORE_FT
Chief Complaint  Diabetes, Asthma, Vitamin D Deficiency, Anxiety, Depression, and Insomnia    SUBJECTIVE  Mary Go presents to [facility]     Pt here today to f/u for medication refills.  States she is doing well overall, no complaints.  Patient is asking if will resume prescriptions of her psychiatric medications.  States she is doing very well and does not wish to continue to see psychiatry    History of Present Illness  Past Medical History:   Diagnosis Date    Allergic     Allergy, unspecified, initial encounter     Anemia     Ankle sprain     Arthritis     Arthritis of back 2002    Arthritis of neck 2002    Asthma     Cervical disc disorder     Colitis, ulcerative 2002    Colon polyp     Depression     Diabetes mellitus type 2 in nonobese 03/06/2018    Fracture of wrist     2nd grade    Fracture, finger 2018    Fracture, foot     Limb swelling     Low back pain     Obesity 03/06/2018    Pain in both knees 03/20/2018    UNSPECIFIED CHRONICITY    Patellar tendinitis 03/20/2018    Psoriasis 07/30/2019    Reflux esophagitis     Seasonal allergies     Sinusitis, chronic     Skin disorder     SOB (shortness of breath)     Tendonitis 03/20/2018    INSERTIONAL PATELLAR TENDONITIS, BILATERAL    Thoracic disc disorder     Wrist sprain       Family History   Problem Relation Age of Onset    Diabetes Mother         UNSPECIFIED TYPE    Arthritis Mother     Osteoporosis Mother     Anesthesia problems Mother         Pseudocholinesterase    Broken bones Mother     Kidney disease Mother     Stroke Mother     Stroke Sister     Diabetes Sister         UNSPECIFIED TYPE    Arthritis Sister     Osteoporosis Sister     Breast cancer Maternal Grandmother         MALIGNANT    Heart disease Maternal Grandmother     Cancer Maternal Grandmother         Breast    Hearing loss Maternal Grandmother     Kidney disease Maternal Grandmother     Stroke Maternal Grandfather     Heart disease Maternal  Grandfather     Breast cancer Paternal Grandmother         MALIGNANT    Cancer Paternal Grandmother     Cancer Paternal Grandfather         UNSPECIFIED    COPD Other     Emphysema Other     Diabetes Other     Diabetes Sister     Liver disease Sister     Miscarriages / Stillbirths Sister       Past Surgical History:   Procedure Laterality Date    CERVICAL POLYPECTOMY  2001    COLONOSCOPY  07/13/2018    REPEAT IN 3 YEARS (ELIZABETH)    DIAGNOSTIC LAPAROSCOPY      ENDOSCOPY  07/31/2018    POLYPECTOMY  2002    STOMACH POLYP REMOVED    WISDOM TOOTH EXTRACTION  1997        Current Outpatient Medications:     albuterol sulfate  (90 Base) MCG/ACT inhaler, Inhale 2 puffs Every 4 (Four) Hours As Needed for Wheezing or Shortness of Air., Disp: 8 g, Rfl: 3    Continuous Blood Gluc Sensor (FreeStyle Terrie 14 Day Sensor) misc, 1 each Every 14 (Fourteen) Days., Disp: 2 each, Rfl: 11    cyclobenzaprine (FLEXERIL) 10 MG tablet, TAKE ONE TABLET BY MOUTH EVERY NIGHT AT BEDTIME IF NEEDED FOR MUSCLE SPASM, Disp: 30 tablet, Rfl: 0    Dulaglutide (Trulicity) 3 MG/0.5ML solution pen-injector, Inject 0.5 mL under the skin into the appropriate area as directed 1 (One) Time Per Week., Disp: 6 mL, Rfl: 2    EPINEPHrine (EPIPEN) 0.3 MG/0.3ML solution auto-injector injection, Inject 0.3 mL into the appropriate muscle as directed by prescriber 1 (One) Time., Disp: , Rfl:     Omeprazole (PRILOSEC PO), Take 1 tablet by mouth Daily As Needed., Disp: , Rfl:     pregabalin (LYRICA) 75 MG capsule, TAKE 1 CAPSULE TWICE A DAY BY ORAL ROUTE AS DIRECTED FOR 30 DAYS., Disp: , Rfl:     traZODone (DESYREL) 50 MG tablet, Take 0.5 to 2 tab PO QHS PRN sleep, Disp: 60 tablet, Rfl: 1    azelastine (ASTELIN) 0.1 % nasal spray, Administer 2 sprays into the nostril(s) as directed by provider 2 (Two) Times a Day., Disp: 30 mL, Rfl: 1    Blood Glucose Monitoring Suppl (D-Care Glucometer) w/Device kit, Use 1 each Daily., Disp: 90 each, Rfl: 1    busPIRone (BUSPAR)  "10 MG tablet, Take 1 tablet by mouth 3 (Three) Times a Day., Disp: 90 tablet, Rfl: 2    citalopram (CeleXA) 40 MG tablet, Take 1 tablet by mouth Daily., Disp: 90 tablet, Rfl: 1    empagliflozin (Jardiance) 10 MG tablet tablet, Take 1 tablet by mouth Daily., Disp: 90 tablet, Rfl: 1    fluticasone (FLONASE) 50 MCG/ACT nasal spray, 1 spray by Each Nare route Daily., Disp: 24 g, Rfl: 1    glucose blood (FREESTYLE LITE) test strip, USE AS DIRECTED TO CHECK BLOOD SUGAR TWO TIMES A DAY, Disp: 100 each, Rfl: 5    hydrOXYzine (ATARAX) 25 MG tablet, Take 1 tablet by mouth 3 (Three) Times a Day As Needed for Anxiety. (Patient not taking: Reported on 11/18/2024), Disp: 30 tablet, Rfl: 0    ibuprofen (ADVIL,MOTRIN) 800 MG tablet, Take 1 tablet by mouth Every 6 (Six) Hours As Needed for Mild Pain. (Patient not taking: Reported on 11/18/2024), Disp: 30 tablet, Rfl: 0    ketoconazole (NIZORAL) 2 % shampoo, Apply  topically to the appropriate area as directed 2 (Two) Times a Week., Disp: 120 mL, Rfl: 2    Lancets (freestyle) lancets, USE TO TEST BLOOD SUGAR TWO TIMES A DAY, Disp: 100 each, Rfl: 5    loratadine (CLARITIN) 10 MG tablet, Take 1 tablet by mouth Daily., Disp: 90 tablet, Rfl: 1    meloxicam (MOBIC) 15 MG tablet, Take 1 tablet by mouth Daily., Disp: 30 tablet, Rfl: 5    montelukast (SINGULAIR) 10 MG tablet, Take 1 tablet by mouth Every Evening., Disp: 90 tablet, Rfl: 1    norgestimate-ethinyl estradiol (Tri-Estarylla) 0.18/0.215/0.25 MG-35 MCG per tablet, Take 1 tablet by mouth Daily., Disp: 28 tablet, Rfl: 3    sodium chloride (Ocean Nasal Spray) 0.65 % nasal spray, 1 spray into the nostril(s) as directed by provider As Needed for Congestion., Disp: 30 mL, Rfl: 1    vitamin D (ERGOCALCIFEROL) 1.25 MG (49233 UT) capsule capsule, Take 1 capsule by mouth Every 7 (Seven) Days., Disp: 13 capsule, Rfl: 1    OBJECTIVE  Vital Signs:   /62   Pulse 92   Ht 160 cm (62.99\")   Wt 103 kg (226 lb)   SpO2 98%   BMI 40.05 kg/m² " "   Estimated body mass index is 40.05 kg/m² as calculated from the following:    Height as of this encounter: 160 cm (62.99\").    Weight as of this encounter: 103 kg (226 lb).     Wt Readings from Last 3 Encounters:   11/18/24 103 kg (226 lb)   09/05/24 106 kg (232 lb 12.8 oz)   07/01/24 107 kg (236 lb)     BP Readings from Last 3 Encounters:   11/18/24 122/62   09/05/24 101/47   07/01/24 105/50       Physical Exam  Vitals reviewed.   Constitutional:       Appearance: Normal appearance. She is well-developed.   HENT:      Head: Normocephalic and atraumatic.      Right Ear: External ear normal.      Left Ear: External ear normal.   Eyes:      Conjunctiva/sclera: Conjunctivae normal.      Pupils: Pupils are equal, round, and reactive to light.   Cardiovascular:      Rate and Rhythm: Normal rate and regular rhythm.      Heart sounds: No murmur heard.     No friction rub. No gallop.   Pulmonary:      Effort: Pulmonary effort is normal.      Breath sounds: Normal breath sounds. No wheezing or rhonchi.   Skin:     General: Skin is warm and dry.   Neurological:      Mental Status: She is alert and oriented to person, place, and time.      Cranial Nerves: No cranial nerve deficit.   Psychiatric:         Mood and Affect: Mood and affect normal.         Behavior: Behavior normal.         Thought Content: Thought content normal.         Judgment: Judgment normal.          Result Review        No Images in the past 120 d  CMP          5/14/2024    11:38   CMP   Glucose 108    BUN 8    Creatinine 0.63    EGFR 112.3    Sodium 138    Potassium 4.3    Chloride 107    Calcium 8.9    Total Protein 6.6    Albumin 4.1    Globulin 2.5    Total Bilirubin 0.4    Alkaline Phosphatase 84    AST (SGOT) 19    ALT (SGPT) 16    Albumin/Globulin Ratio 1.6    BUN/Creatinine Ratio 12.7    Anion Gap 10.5        Lipid Panel          5/14/2024    11:38   Lipid Panel   Total Cholesterol 157    Triglycerides 147    HDL Cholesterol 39    VLDL " Cholesterol 26    LDL Cholesterol  92    LDL/HDL Ratio 2.27        Most Recent A1C          5/14/2024    11:38   HGBA1C Most Recent   Hemoglobin A1C 6.20        A1C Last 3 Results          5/14/2024    11:38   HGBA1C Last 3 Results   Hemoglobin A1C 6.20          The above data has been reviewed by NIKOLE Odom 11/18/2024 10:55 EST.          Patient Care Team:  Nicolle Mcmahon APRN as PCP - General (Nurse Practitioner)       ASSESSMENT & PLAN    Diagnoses and all orders for this visit:    1. Family planning (Primary)  -     Discontinue: norgestimate-ethinyl estradiol (Tri-Estarylla) 0.18/0.215/0.25 MG-35 MCG per tablet; Take 1 tablet by mouth Daily.  Dispense: 28 tablet; Refill: 3    2. Type 2 diabetes mellitus with hyperglycemia, without long-term current use of insulin  Overview:  Stable and well-controlled on last check, continue current medications, recheck A1c today    Orders:  -     Discontinue: empagliflozin (Jardiance) 10 MG tablet tablet; Take 1 tablet by mouth Daily.  Dispense: 30 tablet; Refill: 5  -     Discontinue: glucose blood (FREESTYLE LITE) test strip; USE AS DIRECTED TO CHECK BLOOD SUGAR TWO TIMES A DAY  Dispense: 100 each; Refill: 5  -     Comprehensive Metabolic Panel; Future  -     Lipid Panel; Future  -     Hemoglobin A1c; Future  -     Microalbumin / Creatinine Urine Ratio - Urine, Clean Catch; Future  -     TSH Rfx On Abnormal To Free T4; Future  -     CBC w AUTO Differential; Future    3. Allergic rhinitis, unspecified seasonality, unspecified trigger  -     Discontinue: fluticasone (FLONASE) 50 MCG/ACT nasal spray; 1 spray by Each Nare route Daily.  Dispense: 24 g; Refill: 1  -     Discontinue: loratadine (CLARITIN) 10 MG tablet; Take 1 tablet by mouth Daily.  Dispense: 30 tablet; Refill: 1  -     Discontinue: azelastine (ASTELIN) 0.1 % nasal spray; Administer 2 sprays into the nostril(s) as directed by provider 2 (Two) Times a Day.  Dispense: 30 mL; Refill: 1  -      Discontinue: loratadine (CLARITIN) 10 MG tablet; Take 1 tablet by mouth Daily.  Dispense: 90 tablet; Refill: 1    4. Psoriasis  Overview:  Stable and well-controlled with Nizoral, continue current medication    Orders:  -     Discontinue: ketoconazole (NIZORAL) 2 % shampoo; Apply  topically to the appropriate area as directed 2 (Two) Times a Week.  Dispense: 120 mL; Refill: 2    5. Osteoarthritis, unspecified osteoarthritis type, unspecified site  Overview:  Stable with as needed use of meloxicam, continue current medication    Orders:  -     Discontinue: meloxicam (MOBIC) 15 MG tablet; Take 1 tablet by mouth Daily.  Dispense: 30 tablet; Refill: 5    6. Mild intermittent asthma, unspecified whether complicated  -     Discontinue: montelukast (SINGULAIR) 10 MG tablet; Take 1 tablet by mouth Every Evening.  Dispense: 30 tablet; Refill: 5    7. Vitamin D deficiency  Overview:  Well controlled with Vit D supplementation , cont current dose     Orders:  -     Discontinue: vitamin D (ERGOCALCIFEROL) 1.25 MG (82669 UT) capsule capsule; Take 1 capsule by mouth Every 7 (Seven) Days.  Dispense: 13 capsule; Refill: 1    8. Generalized anxiety disorder  Overview:  Stable and well-controlled at present, continue current medication    Orders:  -     Discontinue: busPIRone (BUSPAR) 10 MG tablet; Take 1 tablet by mouth 3 (Three) Times a Day.  Dispense: 90 tablet; Refill: 2  -     Discontinue: citalopram (CeleXA) 40 MG tablet; Take 1 tablet by mouth Daily.  Dispense: 90 tablet; Refill: 1    9. Recurrent major depressive disorder, in partial remission  Overview:  Stable and well-controlled at present, continue current medication    Assessment & Plan:      Orders:  -     Discontinue: citalopram (CeleXA) 40 MG tablet; Take 1 tablet by mouth Daily.  Dispense: 90 tablet; Refill: 1    10. Breast cancer screening by mammogram  -     Mammo Screening Digital Tomosynthesis Bilateral With CAD; Future    Other orders  -     Blood Glucose  Monitoring Suppl (D-Care Glucometer) w/Device kit; Use 1 each Daily.  Dispense: 90 each; Refill: 1         Tobacco Use: High Risk (11/18/2024)    Patient History     Smoking Tobacco Use: Some Days     Smokeless Tobacco Use: Never     Passive Exposure: Not on file       Follow Up     Return in about 6 months (around 5/18/2025), or if symptoms worsen or fail to improve.        Patient was given instructions and counseling regarding her condition or for health maintenance advice. Please see specific information pulled into the AVS if appropriate.   I have reviewed information obtained and documented by others and I have confirmed the accuracy of this documented note.    Nicolle Mcmahon APRN         APPTS ARE READY TO BE MADE: [x ] YES    Best Family or Patient Contact (if needed):    Additional Information about above appointments (if needed):    1: [facility] - rheumatology - 2 weeks   2:   3:     Other comments or requests:

## 2024-11-22 ENCOUNTER — APPOINTMENT (OUTPATIENT)
Dept: INTERNAL MEDICINE | Facility: CLINIC | Age: 54
End: 2024-11-22

## 2024-11-22 DIAGNOSIS — R07.89 OTHER CHEST PAIN: ICD-10-CM

## 2024-11-22 DIAGNOSIS — E03.9 HYPOTHYROIDISM, UNSPECIFIED: ICD-10-CM

## 2024-11-22 DIAGNOSIS — Z79.4 LONG TERM (CURRENT) USE OF INSULIN: ICD-10-CM

## 2024-11-22 DIAGNOSIS — I87.8 OTHER SPECIFIED DISORDERS OF VEINS: ICD-10-CM

## 2024-11-22 DIAGNOSIS — Z86.19 PERSONAL HISTORY OF OTHER INFECTIOUS AND PARASITIC DISEASES: ICD-10-CM

## 2024-11-22 DIAGNOSIS — J43.9 EMPHYSEMA, UNSPECIFIED: ICD-10-CM

## 2024-11-22 DIAGNOSIS — I89.0 LYMPHEDEMA, NOT ELSEWHERE CLASSIFIED: ICD-10-CM

## 2024-11-22 DIAGNOSIS — Z79.890 HORMONE REPLACEMENT THERAPY: ICD-10-CM

## 2024-11-22 DIAGNOSIS — I87.2 VENOUS INSUFFICIENCY (CHRONIC) (PERIPHERAL): ICD-10-CM

## 2024-11-22 DIAGNOSIS — E11.9 TYPE 2 DIABETES MELLITUS WITHOUT COMPLICATIONS: ICD-10-CM

## 2024-11-22 DIAGNOSIS — J45.909 UNSPECIFIED ASTHMA, UNCOMPLICATED: ICD-10-CM

## 2024-11-27 ENCOUNTER — APPOINTMENT (OUTPATIENT)
Dept: VASCULAR SURGERY | Facility: CLINIC | Age: 54
End: 2024-11-27
Payer: MEDICAID

## 2024-11-27 DIAGNOSIS — I83.019 VARICOSE VEINS OF RIGHT LOWER EXTREMITY WITH ULCER OF UNSPECIFIED SITE: ICD-10-CM

## 2024-11-27 DIAGNOSIS — L97.919 VARICOSE VEINS OF RIGHT LOWER EXTREMITY WITH ULCER OF UNSPECIFIED SITE: ICD-10-CM

## 2024-11-27 DIAGNOSIS — L97.929 VARICOSE VEINS OF RIGHT LOWER EXTREMITY WITH ULCER OF UNSPECIFIED SITE: ICD-10-CM

## 2024-11-27 DIAGNOSIS — I83.029 VARICOSE VEINS OF RIGHT LOWER EXTREMITY WITH ULCER OF UNSPECIFIED SITE: ICD-10-CM

## 2024-11-27 PROCEDURE — 29580 STRAPPING UNNA BOOT: CPT | Mod: 50

## 2024-11-28 ENCOUNTER — INPATIENT (INPATIENT)
Facility: HOSPITAL | Age: 54
LOS: 4 days | Discharge: HOME | DRG: 720 | End: 2024-12-03
Attending: STUDENT IN AN ORGANIZED HEALTH CARE EDUCATION/TRAINING PROGRAM | Admitting: INTERNAL MEDICINE
Payer: MEDICAID

## 2024-11-28 VITALS
TEMPERATURE: 100 F | HEIGHT: 69 IN | DIASTOLIC BLOOD PRESSURE: 76 MMHG | SYSTOLIC BLOOD PRESSURE: 120 MMHG | RESPIRATION RATE: 18 BRPM | OXYGEN SATURATION: 96 % | WEIGHT: 160.06 LBS | HEART RATE: 72 BPM

## 2024-11-28 DIAGNOSIS — Z90.49 ACQUIRED ABSENCE OF OTHER SPECIFIED PARTS OF DIGESTIVE TRACT: Chronic | ICD-10-CM

## 2024-11-28 DIAGNOSIS — Z90.89 ACQUIRED ABSENCE OF OTHER ORGANS: Chronic | ICD-10-CM

## 2024-11-28 LAB
ALBUMIN SERPL ELPH-MCNC: 4.6 G/DL — SIGNIFICANT CHANGE UP (ref 3.5–5.2)
ALP SERPL-CCNC: 86 U/L — SIGNIFICANT CHANGE UP (ref 30–115)
ALT FLD-CCNC: 5 U/L — SIGNIFICANT CHANGE UP (ref 0–41)
ANION GAP SERPL CALC-SCNC: 9 MMOL/L — SIGNIFICANT CHANGE UP (ref 7–14)
APTT BLD: 29.6 SEC — SIGNIFICANT CHANGE UP (ref 27–39.2)
AST SERPL-CCNC: 27 U/L — SIGNIFICANT CHANGE UP (ref 0–41)
BASE EXCESS BLDV CALC-SCNC: 7.2 MMOL/L — HIGH (ref -2–3)
BASOPHILS # BLD AUTO: 0.05 K/UL — SIGNIFICANT CHANGE UP (ref 0–0.2)
BASOPHILS NFR BLD AUTO: 0.6 % — SIGNIFICANT CHANGE UP (ref 0–1)
BILIRUB SERPL-MCNC: 0.9 MG/DL — SIGNIFICANT CHANGE UP (ref 0.2–1.2)
BUN SERPL-MCNC: 15 MG/DL — SIGNIFICANT CHANGE UP (ref 10–20)
CA-I SERPL-SCNC: 1.05 MMOL/L — LOW (ref 1.15–1.33)
CALCIUM SERPL-MCNC: 8.9 MG/DL — SIGNIFICANT CHANGE UP (ref 8.4–10.5)
CHLORIDE SERPL-SCNC: 91 MMOL/L — LOW (ref 98–110)
CO2 SERPL-SCNC: 29 MMOL/L — SIGNIFICANT CHANGE UP (ref 17–32)
CREAT SERPL-MCNC: 1.1 MG/DL — SIGNIFICANT CHANGE UP (ref 0.7–1.5)
EGFR: 60 ML/MIN/1.73M2 — SIGNIFICANT CHANGE UP
EOSINOPHIL # BLD AUTO: 0.01 K/UL — SIGNIFICANT CHANGE UP (ref 0–0.7)
EOSINOPHIL NFR BLD AUTO: 0.1 % — SIGNIFICANT CHANGE UP (ref 0–8)
GAS PNL BLDV: 129 MMOL/L — LOW (ref 136–145)
GAS PNL BLDV: SIGNIFICANT CHANGE UP
GLUCOSE SERPL-MCNC: 182 MG/DL — HIGH (ref 70–99)
HCG SERPL QL: NEGATIVE — SIGNIFICANT CHANGE UP
HCO3 BLDV-SCNC: 33 MMOL/L — HIGH (ref 22–29)
HCT VFR BLD CALC: 30.2 % — LOW (ref 37–47)
HCT VFR BLDA CALC: 62 % — CRITICAL HIGH (ref 34.5–46.5)
HGB BLD CALC-MCNC: >20 G/DL — CRITICAL HIGH (ref 11.7–16.1)
HGB BLD-MCNC: 9.7 G/DL — LOW (ref 12–16)
IMM GRANULOCYTES NFR BLD AUTO: 0.4 % — HIGH (ref 0.1–0.3)
INR BLD: 1.22 RATIO — SIGNIFICANT CHANGE UP (ref 0.65–1.3)
LACTATE BLDV-MCNC: 1.7 MMOL/L — SIGNIFICANT CHANGE UP (ref 0.5–2)
LACTATE SERPL-SCNC: 1.4 MMOL/L — SIGNIFICANT CHANGE UP (ref 0.7–2)
LYMPHOCYTES # BLD AUTO: 0.88 K/UL — LOW (ref 1.2–3.4)
LYMPHOCYTES # BLD AUTO: 11.2 % — LOW (ref 20.5–51.1)
MCHC RBC-ENTMCNC: 28 PG — SIGNIFICANT CHANGE UP (ref 27–31)
MCHC RBC-ENTMCNC: 32.1 G/DL — SIGNIFICANT CHANGE UP (ref 32–37)
MCV RBC AUTO: 87.3 FL — SIGNIFICANT CHANGE UP (ref 81–99)
MONOCYTES # BLD AUTO: 0.43 K/UL — SIGNIFICANT CHANGE UP (ref 0.1–0.6)
MONOCYTES NFR BLD AUTO: 5.5 % — SIGNIFICANT CHANGE UP (ref 1.7–9.3)
NEUTROPHILS # BLD AUTO: 6.44 K/UL — SIGNIFICANT CHANGE UP (ref 1.4–6.5)
NEUTROPHILS NFR BLD AUTO: 82.2 % — HIGH (ref 42.2–75.2)
NRBC # BLD: 0 /100 WBCS — SIGNIFICANT CHANGE UP (ref 0–0)
PCO2 BLDV: 46 MMHG — HIGH (ref 39–42)
PH BLDV: 7.46 — HIGH (ref 7.32–7.43)
PLATELET # BLD AUTO: 239 K/UL — SIGNIFICANT CHANGE UP (ref 130–400)
PMV BLD: 9.8 FL — SIGNIFICANT CHANGE UP (ref 7.4–10.4)
PO2 BLDV: 19 MMHG — LOW (ref 25–45)
POTASSIUM BLDV-SCNC: 4 MMOL/L — SIGNIFICANT CHANGE UP (ref 3.5–5.1)
POTASSIUM SERPL-MCNC: 4.1 MMOL/L — SIGNIFICANT CHANGE UP (ref 3.5–5)
POTASSIUM SERPL-SCNC: 4.1 MMOL/L — SIGNIFICANT CHANGE UP (ref 3.5–5)
PROT SERPL-MCNC: 8.2 G/DL — HIGH (ref 6–8)
PROTHROM AB SERPL-ACNC: 14.5 SEC — HIGH (ref 9.95–12.87)
RBC # BLD: 3.46 M/UL — LOW (ref 4.2–5.4)
RBC # FLD: 16 % — HIGH (ref 11.5–14.5)
SAO2 % BLDV: 25.5 % — LOW (ref 67–88)
SODIUM SERPL-SCNC: 129 MMOL/L — LOW (ref 135–146)
WBC # BLD: 7.84 K/UL — SIGNIFICANT CHANGE UP (ref 4.8–10.8)
WBC # FLD AUTO: 7.84 K/UL — SIGNIFICANT CHANGE UP (ref 4.8–10.8)

## 2024-11-28 PROCEDURE — 93010 ELECTROCARDIOGRAM REPORT: CPT

## 2024-11-28 PROCEDURE — 99285 EMERGENCY DEPT VISIT HI MDM: CPT

## 2024-11-28 PROCEDURE — 71045 X-RAY EXAM CHEST 1 VIEW: CPT | Mod: 26

## 2024-11-28 RX ORDER — CEFTRIAXONE SODIUM 1 G
1000 VIAL (EA) INJECTION ONCE
Refills: 0 | Status: COMPLETED | OUTPATIENT
Start: 2024-11-28 | End: 2024-11-28

## 2024-11-28 RX ORDER — ACETAMINOPHEN 500MG 500 MG/1
975 TABLET, COATED ORAL ONCE
Refills: 0 | Status: COMPLETED | OUTPATIENT
Start: 2024-11-28 | End: 2024-11-28

## 2024-11-28 RX ORDER — 0.9 % SODIUM CHLORIDE 0.9 %
2300 INTRAVENOUS SOLUTION INTRAVENOUS ONCE
Refills: 0 | Status: COMPLETED | OUTPATIENT
Start: 2024-11-28 | End: 2024-11-28

## 2024-11-28 RX ADMIN — ACETAMINOPHEN 500MG 975 MILLIGRAM(S): 500 TABLET, COATED ORAL at 22:17

## 2024-11-28 RX ADMIN — Medication 2300 MILLILITER(S): at 22:10

## 2024-11-28 RX ADMIN — Medication 100 MILLIGRAM(S): at 22:17

## 2024-11-28 NOTE — ED ADULT NURSE NOTE - NSFALLHARMRISKINTERV_ED_ALL_ED
Assistance OOB with selected safe patient handling equipment if applicable/Assistance with ambulation/Communicate risk of Fall with Harm to all staff, patient, and family/Encourage patient to sit up slowly, dangle for a short time, stand at bedside before walking/Monitor gait and stability/Monitor for mental status changes and reorient to person, place, and time, as needed/Move patient closer to nursing station/within visual sight of ED staff/Provide patient with walking aids/Provide visual cue: red socks, yellow wristband, yellow gown, etc/Reinforce activity limits and safety measures with patient and family/Review medications for side effects contributing to fall risk/Toileting schedule using arm’s reach rule for commode and bathroom/Use of alarms - bed, stretcher, chair and/or video monitoring/Bed in lowest position, wheels locked, appropriate side rails in place/Call bell, personal items and telephone in reach/Instruct patient to call for assistance before getting out of bed/chair/stretcher/Non-slip footwear applied when patient is off stretcher/[place] to call system/Physically safe environment - no spills, clutter or unnecessary equipment/Purposeful Proactive Rounding/Room/bathroom lighting operational, light cord in reach

## 2024-11-29 DIAGNOSIS — L03.90 CELLULITIS, UNSPECIFIED: ICD-10-CM

## 2024-11-29 LAB
A1C WITH ESTIMATED AVERAGE GLUCOSE RESULT: 7.9 % — HIGH (ref 4–5.6)
ALBUMIN SERPL ELPH-MCNC: 3.9 G/DL — SIGNIFICANT CHANGE UP (ref 3.5–5.2)
ALP SERPL-CCNC: 75 U/L — SIGNIFICANT CHANGE UP (ref 30–115)
ALT FLD-CCNC: <5 U/L — SIGNIFICANT CHANGE UP (ref 0–41)
ANION GAP SERPL CALC-SCNC: 12 MMOL/L — SIGNIFICANT CHANGE UP (ref 7–14)
APPEARANCE UR: CLEAR — SIGNIFICANT CHANGE UP
APTT BLD: 31.5 SEC — SIGNIFICANT CHANGE UP (ref 27–39.2)
AST SERPL-CCNC: 23 U/L — SIGNIFICANT CHANGE UP (ref 0–41)
BACTERIA # UR AUTO: NEGATIVE /HPF — SIGNIFICANT CHANGE UP
BASOPHILS # BLD AUTO: 0.04 K/UL — SIGNIFICANT CHANGE UP (ref 0–0.2)
BASOPHILS NFR BLD AUTO: 0.5 % — SIGNIFICANT CHANGE UP (ref 0–1)
BILIRUB SERPL-MCNC: 0.6 MG/DL — SIGNIFICANT CHANGE UP (ref 0.2–1.2)
BILIRUB UR-MCNC: NEGATIVE — SIGNIFICANT CHANGE UP
BUN SERPL-MCNC: 12 MG/DL — SIGNIFICANT CHANGE UP (ref 10–20)
CALCIUM SERPL-MCNC: 8.4 MG/DL — SIGNIFICANT CHANGE UP (ref 8.4–10.5)
CHLORIDE SERPL-SCNC: 98 MMOL/L — SIGNIFICANT CHANGE UP (ref 98–110)
CO2 SERPL-SCNC: 24 MMOL/L — SIGNIFICANT CHANGE UP (ref 17–32)
COLOR SPEC: SIGNIFICANT CHANGE UP
CREAT SERPL-MCNC: 0.7 MG/DL — SIGNIFICANT CHANGE UP (ref 0.7–1.5)
DIFF PNL FLD: NEGATIVE — SIGNIFICANT CHANGE UP
EGFR: 103 ML/MIN/1.73M2 — SIGNIFICANT CHANGE UP
EOSINOPHIL # BLD AUTO: 0.04 K/UL — SIGNIFICANT CHANGE UP (ref 0–0.7)
EOSINOPHIL NFR BLD AUTO: 0.5 % — SIGNIFICANT CHANGE UP (ref 0–8)
EPI CELLS # UR: PRESENT
ESTIMATED AVERAGE GLUCOSE: 180 MG/DL — HIGH (ref 68–114)
FLUAV AG NPH QL: SIGNIFICANT CHANGE UP
FLUBV AG NPH QL: SIGNIFICANT CHANGE UP
GLUCOSE BLDC GLUCOMTR-MCNC: 157 MG/DL — HIGH (ref 70–99)
GLUCOSE BLDC GLUCOMTR-MCNC: 175 MG/DL — HIGH (ref 70–99)
GLUCOSE SERPL-MCNC: 146 MG/DL — HIGH (ref 70–99)
GLUCOSE UR QL: NEGATIVE MG/DL — SIGNIFICANT CHANGE UP
HCT VFR BLD CALC: 28.2 % — LOW (ref 37–47)
HGB BLD-MCNC: 9.2 G/DL — LOW (ref 12–16)
IMM GRANULOCYTES NFR BLD AUTO: 0.8 % — HIGH (ref 0.1–0.3)
INR BLD: 1.21 RATIO — SIGNIFICANT CHANGE UP (ref 0.65–1.3)
KETONES UR-MCNC: ABNORMAL MG/DL
LEUKOCYTE ESTERASE UR-ACNC: NEGATIVE — SIGNIFICANT CHANGE UP
LYMPHOCYTES # BLD AUTO: 0.62 K/UL — LOW (ref 1.2–3.4)
LYMPHOCYTES # BLD AUTO: 7.9 % — LOW (ref 20.5–51.1)
MCHC RBC-ENTMCNC: 27.6 PG — SIGNIFICANT CHANGE UP (ref 27–31)
MCHC RBC-ENTMCNC: 32.6 G/DL — SIGNIFICANT CHANGE UP (ref 32–37)
MCV RBC AUTO: 84.7 FL — SIGNIFICANT CHANGE UP (ref 81–99)
MONOCYTES # BLD AUTO: 0.35 K/UL — SIGNIFICANT CHANGE UP (ref 0.1–0.6)
MONOCYTES NFR BLD AUTO: 4.5 % — SIGNIFICANT CHANGE UP (ref 1.7–9.3)
NEUTROPHILS # BLD AUTO: 6.74 K/UL — HIGH (ref 1.4–6.5)
NEUTROPHILS NFR BLD AUTO: 85.8 % — HIGH (ref 42.2–75.2)
NITRITE UR-MCNC: NEGATIVE — SIGNIFICANT CHANGE UP
NRBC # BLD: 0 /100 WBCS — SIGNIFICANT CHANGE UP (ref 0–0)
PH UR: 6 — SIGNIFICANT CHANGE UP (ref 5–8)
PLATELET # BLD AUTO: 214 K/UL — SIGNIFICANT CHANGE UP (ref 130–400)
PMV BLD: 9.8 FL — SIGNIFICANT CHANGE UP (ref 7.4–10.4)
POTASSIUM SERPL-MCNC: 3.8 MMOL/L — SIGNIFICANT CHANGE UP (ref 3.5–5)
POTASSIUM SERPL-SCNC: 3.8 MMOL/L — SIGNIFICANT CHANGE UP (ref 3.5–5)
PROT SERPL-MCNC: 6.9 G/DL — SIGNIFICANT CHANGE UP (ref 6–8)
PROT UR-MCNC: 30 MG/DL
PROTHROM AB SERPL-ACNC: 14.3 SEC — HIGH (ref 9.95–12.87)
RBC # BLD: 3.33 M/UL — LOW (ref 4.2–5.4)
RBC # FLD: 15.9 % — HIGH (ref 11.5–14.5)
RBC CASTS # UR COMP ASSIST: 2 /HPF — SIGNIFICANT CHANGE UP (ref 0–4)
RSV RNA NPH QL NAA+NON-PROBE: SIGNIFICANT CHANGE UP
SARS-COV-2 RNA SPEC QL NAA+PROBE: SIGNIFICANT CHANGE UP
SODIUM SERPL-SCNC: 134 MMOL/L — LOW (ref 135–146)
SP GR SPEC: 1.03 — SIGNIFICANT CHANGE UP (ref 1–1.03)
UROBILINOGEN FLD QL: 1 MG/DL — SIGNIFICANT CHANGE UP (ref 0.2–1)
WBC # BLD: 7.85 K/UL — SIGNIFICANT CHANGE UP (ref 4.8–10.8)
WBC # FLD AUTO: 7.85 K/UL — SIGNIFICANT CHANGE UP (ref 4.8–10.8)
WBC UR QL: 4 /HPF — SIGNIFICANT CHANGE UP (ref 0–5)

## 2024-11-29 PROCEDURE — 85027 COMPLETE CBC AUTOMATED: CPT

## 2024-11-29 PROCEDURE — 85025 COMPLETE CBC W/AUTO DIFF WBC: CPT

## 2024-11-29 PROCEDURE — 80053 COMPREHEN METABOLIC PANEL: CPT

## 2024-11-29 PROCEDURE — 82962 GLUCOSE BLOOD TEST: CPT

## 2024-11-29 PROCEDURE — 80048 BASIC METABOLIC PNL TOTAL CA: CPT

## 2024-11-29 PROCEDURE — 80202 ASSAY OF VANCOMYCIN: CPT

## 2024-11-29 PROCEDURE — 83735 ASSAY OF MAGNESIUM: CPT

## 2024-11-29 PROCEDURE — 76937 US GUIDE VASCULAR ACCESS: CPT | Mod: 26

## 2024-11-29 PROCEDURE — 85730 THROMBOPLASTIN TIME PARTIAL: CPT

## 2024-11-29 PROCEDURE — 36415 COLL VENOUS BLD VENIPUNCTURE: CPT

## 2024-11-29 PROCEDURE — 83036 HEMOGLOBIN GLYCOSYLATED A1C: CPT

## 2024-11-29 PROCEDURE — 36600 WITHDRAWAL OF ARTERIAL BLOOD: CPT

## 2024-11-29 PROCEDURE — 85610 PROTHROMBIN TIME: CPT

## 2024-11-29 PROCEDURE — 99222 1ST HOSP IP/OBS MODERATE 55: CPT

## 2024-11-29 RX ORDER — 0.9 % SODIUM CHLORIDE 0.9 %
1000 INTRAVENOUS SOLUTION INTRAVENOUS
Refills: 0 | Status: DISCONTINUED | OUTPATIENT
Start: 2024-11-29 | End: 2024-12-03

## 2024-11-29 RX ORDER — INSULIN GLARGINE 100 [IU]/ML
28 INJECTION, SOLUTION SUBCUTANEOUS AT BEDTIME
Refills: 0 | Status: DISCONTINUED | OUTPATIENT
Start: 2024-11-29 | End: 2024-12-03

## 2024-11-29 RX ORDER — LEVOTHYROXINE SODIUM 150 MCG
175 TABLET ORAL DAILY
Refills: 0 | Status: DISCONTINUED | OUTPATIENT
Start: 2024-11-29 | End: 2024-12-03

## 2024-11-29 RX ORDER — PREGABALIN 75 MG/1
50 CAPSULE ORAL EVERY 12 HOURS
Refills: 0 | Status: DISCONTINUED | OUTPATIENT
Start: 2024-11-29 | End: 2024-12-03

## 2024-11-29 RX ORDER — KETOROLAC TROMETHAMINE 30 MG/ML
15 INJECTION INTRAMUSCULAR; INTRAVENOUS ONCE
Refills: 0 | Status: DISCONTINUED | OUTPATIENT
Start: 2024-11-29 | End: 2024-11-29

## 2024-11-29 RX ORDER — ACETAMINOPHEN, DIPHENHYDRAMINE HCL, PHENYLEPHRINE HCL 325; 25; 5 MG/1; MG/1; MG/1
5 TABLET ORAL AT BEDTIME
Refills: 0 | Status: DISCONTINUED | OUTPATIENT
Start: 2024-11-29 | End: 2024-12-03

## 2024-11-29 RX ORDER — KETOROLAC TROMETHAMINE 30 MG/ML
10 INJECTION INTRAMUSCULAR; INTRAVENOUS ONCE
Refills: 0 | Status: DISCONTINUED | OUTPATIENT
Start: 2024-11-29 | End: 2024-11-29

## 2024-11-29 RX ORDER — BUPRENORPHINE AND NALOXONE 8; 2 MG/1; MG/1
1 TABLET SUBLINGUAL DAILY
Refills: 0 | Status: DISCONTINUED | OUTPATIENT
Start: 2024-11-29 | End: 2024-12-01

## 2024-11-29 RX ORDER — VANCOMYCIN HCL 900 MCG/MG
750 POWDER (GRAM) MISCELLANEOUS EVERY 12 HOURS
Refills: 0 | Status: DISCONTINUED | OUTPATIENT
Start: 2024-11-29 | End: 2024-12-01

## 2024-11-29 RX ORDER — FUROSEMIDE 40 MG/1
40 TABLET ORAL ONCE
Refills: 0 | Status: COMPLETED | OUTPATIENT
Start: 2024-11-29 | End: 2024-11-29

## 2024-11-29 RX ORDER — FUROSEMIDE 40 MG/1
40 TABLET ORAL DAILY
Refills: 0 | Status: DISCONTINUED | OUTPATIENT
Start: 2024-11-29 | End: 2024-12-03

## 2024-11-29 RX ORDER — CEFTRIAXONE SODIUM 1 G
1000 VIAL (EA) INJECTION EVERY 24 HOURS
Refills: 0 | Status: DISCONTINUED | OUTPATIENT
Start: 2024-11-29 | End: 2024-12-03

## 2024-11-29 RX ORDER — SODIUM CHLORIDE 9 MG/ML
1000 INJECTION, SOLUTION INTRAMUSCULAR; INTRAVENOUS; SUBCUTANEOUS
Refills: 0 | Status: DISCONTINUED | OUTPATIENT
Start: 2024-11-29 | End: 2024-11-29

## 2024-11-29 RX ORDER — VANCOMYCIN HCL 900 MCG/MG
1000 POWDER (GRAM) MISCELLANEOUS ONCE
Refills: 0 | Status: COMPLETED | OUTPATIENT
Start: 2024-11-29 | End: 2024-11-29

## 2024-11-29 RX ORDER — ALBUTEROL 90 MCG
2 AEROSOL (GRAM) INHALATION EVERY 6 HOURS
Refills: 0 | Status: DISCONTINUED | OUTPATIENT
Start: 2024-11-29 | End: 2024-12-03

## 2024-11-29 RX ORDER — MAGNESIUM, ALUMINUM HYDROXIDE 200-225/5
30 SUSPENSION, ORAL (FINAL DOSE FORM) ORAL EVERY 4 HOURS
Refills: 0 | Status: DISCONTINUED | OUTPATIENT
Start: 2024-11-29 | End: 2024-12-03

## 2024-11-29 RX ORDER — ONDANSETRON HYDROCHLORIDE 4 MG/1
4 TABLET, FILM COATED ORAL EVERY 8 HOURS
Refills: 0 | Status: DISCONTINUED | OUTPATIENT
Start: 2024-11-29 | End: 2024-12-03

## 2024-11-29 RX ORDER — PANTOPRAZOLE SODIUM 40 MG/1
40 TABLET, DELAYED RELEASE ORAL
Refills: 0 | Status: DISCONTINUED | OUTPATIENT
Start: 2024-11-29 | End: 2024-12-03

## 2024-11-29 RX ORDER — COLCHICINE 0.6 MG
0.6 TABLET ORAL
Refills: 0 | Status: DISCONTINUED | OUTPATIENT
Start: 2024-11-29 | End: 2024-12-03

## 2024-11-29 RX ORDER — ACETAMINOPHEN 500MG 500 MG/1
650 TABLET, COATED ORAL EVERY 6 HOURS
Refills: 0 | Status: DISCONTINUED | OUTPATIENT
Start: 2024-11-29 | End: 2024-12-03

## 2024-11-29 RX ADMIN — PREGABALIN 50 MILLIGRAM(S): 75 CAPSULE ORAL at 06:35

## 2024-11-29 RX ADMIN — ACETAMINOPHEN 500MG 650 MILLIGRAM(S): 500 TABLET, COATED ORAL at 13:05

## 2024-11-29 RX ADMIN — FUROSEMIDE 40 MILLIGRAM(S): 40 TABLET ORAL at 06:34

## 2024-11-29 RX ADMIN — Medication 0.6 MILLIGRAM(S): at 17:19

## 2024-11-29 RX ADMIN — Medication 100 MILLIGRAM(S): at 21:26

## 2024-11-29 RX ADMIN — Medication 20 MILLIGRAM(S): at 21:25

## 2024-11-29 RX ADMIN — Medication 1: at 16:47

## 2024-11-29 RX ADMIN — SODIUM CHLORIDE 75 MILLILITER(S): 9 INJECTION, SOLUTION INTRAMUSCULAR; INTRAVENOUS; SUBCUTANEOUS at 04:16

## 2024-11-29 RX ADMIN — KETOROLAC TROMETHAMINE 15 MILLIGRAM(S): 30 INJECTION INTRAMUSCULAR; INTRAVENOUS at 20:28

## 2024-11-29 RX ADMIN — Medication 250 MILLIGRAM(S): at 03:17

## 2024-11-29 RX ADMIN — Medication 1000 MILLIGRAM(S): at 17:19

## 2024-11-29 RX ADMIN — ACETAMINOPHEN 500MG 650 MILLIGRAM(S): 500 TABLET, COATED ORAL at 14:05

## 2024-11-29 RX ADMIN — FUROSEMIDE 40 MILLIGRAM(S): 40 TABLET ORAL at 13:43

## 2024-11-29 RX ADMIN — BUPRENORPHINE AND NALOXONE 1 TABLET(S): 8; 2 TABLET SUBLINGUAL at 11:49

## 2024-11-29 RX ADMIN — Medication 1: at 07:59

## 2024-11-29 RX ADMIN — Medication 81 MILLIGRAM(S): at 11:49

## 2024-11-29 RX ADMIN — INSULIN GLARGINE 28 UNIT(S): 100 INJECTION, SOLUTION SUBCUTANEOUS at 21:25

## 2024-11-29 RX ADMIN — Medication 175 MICROGRAM(S): at 06:35

## 2024-11-29 RX ADMIN — Medication 2300 MILLILITER(S): at 00:35

## 2024-11-29 RX ADMIN — Medication 1000 MILLIGRAM(S): at 06:35

## 2024-11-29 RX ADMIN — KETOROLAC TROMETHAMINE 15 MILLIGRAM(S): 30 INJECTION INTRAMUSCULAR; INTRAVENOUS at 21:52

## 2024-11-29 RX ADMIN — Medication 250 MILLIGRAM(S): at 14:06

## 2024-11-29 RX ADMIN — Medication 0.6 MILLIGRAM(S): at 06:34

## 2024-11-29 RX ADMIN — PREGABALIN 50 MILLIGRAM(S): 75 CAPSULE ORAL at 17:18

## 2024-11-29 NOTE — ED PROVIDER NOTE - PHYSICAL EXAMINATION
Patient able to void at this time. VITAL SIGNS: I have reviewed nursing notes and confirm.  CONSTITUTIONAL: ill-appearing  SKIN: track selma upper extremities   HEAD: Normocephalic; atraumatic.  EYES; conjunctiva and sclera clear.  ENT: No nasal discharge; airway clear.  CARD: S1, S2 normal; no murmurs, gallops, or rubs. Regular rate and rhythm.   RESP: No wheezes, rales or rhonchi.  ABD: Normal bowel sounds; soft; non-distended; non-tender  EXT: Normal ROM.  No clubbing, cyanosis or edema.   NEURO: Alert, oriented, grossly unremarkable

## 2024-11-29 NOTE — ED PROVIDER NOTE - ATTENDING APP SHARED VISIT CONTRIBUTION OF CARE
I have personally performed a history and physical exam on this patient and personally directed the management of the patient. Patient is a 53-year-old female presents for evaluation of generalized weakness patient has a history of IVDA on Suboxone patient states he hyperlipidemia hypertension presents for generalized weakness fevers and chills over the past 2 weeks currently denies headache visual changes neck pain chest pain shortness of breath abdominal pain nausea vomiting or diarrhea    On physical exam patient is normocephalic atraumatic pupils equal round and reactive to light and accommodation extraocular muscle intact oropharynx clear S1-S2 noted no murmurs noted regular rate and rhythm abdomen soft nontender nondistended bowel sounds positive no guarding or rebound the patient lower extremities are very erythematous with swelling capillary refills normal    Assessment plan patient presents for evaluation of generalized weakness however found to be febrile patient is meeting septic criteria at which point we initiated septic protocols including IV fluids we obtained blood cultures were initiated IV antibiotics we obtain lactate which is 1.4 patient sources most likely her lower extremity cellulitis however I will admit for further evaluation however I will admit for further evaluation

## 2024-11-29 NOTE — PATIENT PROFILE ADULT - FUNCTIONAL ASSESSMENT - BASIC MOBILITY 6.
Keep dressing clean dry and intact  Knee brace when ambulating. May take off when resting for comfort  Follow up 2 weeks  WBAT RLE  
2 = A lot of assistance

## 2024-11-29 NOTE — ED PROVIDER NOTE - IN ACCORDANCE WITH [place] STATE LAW, WE OFFER EVERY PATIENT A HEPATITIS C TEST. WOULD YOU LIKE TO BE TESTED TODAY?
Previously positive

## 2024-11-29 NOTE — PATIENT PROFILE ADULT - FALL HARM RISK - HARM RISK INTERVENTIONS

## 2024-11-29 NOTE — H&P ADULT - ASSESSMENT
52 y/o Female, IVDA on suboxone, HTN, HLD, Hep C. presents with c/o generalized weakness and subjective fever/chills x 2 weeks and worsening lower extremity cellulitis R>L.   PT is now w/c bound due to lower extremities and sees Dr Adair, [facility].  Pt denies cp, sob, abd pain, n/v/d.  Presently, pt in NAD.       # Cellulitis, bilat lwr extr R>L  - ID  - c/w abx  - RTF    # Hyponatremia  - recheck in am  - NS at 75cc/hr    # IVDA, Hx of  - on Suboxone    # HTN  - VS  - c/w home med    # HLD  - c/w home med   54 y/o Female, IVDA on suboxone, HTN, HLD, Hep C. presents with c/o generalized weakness and subjective fever/chills x 2 weeks and worsening lower extremity cellulitis R>L.   PT is now w/c bound due to lower extremities and sees Dr Adair, [facility].  Pt denies cp, sob, abd pain, n/v/d.  Presently, pt in NAD.       # Cellulitis, bilat lwr extr R>L  - ID  - c/w abx  - RTF    # Hyponatremia  - recheck in am  - NS at 75cc/hr    # IVDA, Hx of  - on Suboxone    # HTN  - VS  - c/w home med    # DM  - c/w lantus 28units hs  - FS with SS  - Metformin     # HLD  - c/w home med

## 2024-11-29 NOTE — ED PROVIDER NOTE - CLINICAL SUMMARY MEDICAL DECISION MAKING FREE TEXT BOX
. Patient is a 53-year-old female presents for evaluation of generalized weakness patient has a history of IVDA on Suboxone patient states he hyperlipidemia hypertension presents for generalized weakness fevers and chills over the past 2 weeks currently denies headache visual changes neck pain chest pain shortness of breath abdominal pain nausea vomiting or diarrhea    On physical exam patient is normocephalic atraumatic pupils equal round and reactive to light and accommodation extraocular muscle intact oropharynx clear S1-S2 noted no murmurs noted regular rate and rhythm abdomen soft nontender nondistended bowel sounds positive no guarding or rebound the patient lower extremities are very erythematous with swelling capillary refills normal    Assessment plan patient presents for evaluation of generalized weakness however found to be febrile patient is meeting septic criteria at which point we initiated septic protocols including IV fluids we obtained blood cultures were initiated IV antibiotics we obtain lactate which is 1.4 patient sources most likely her lower extremity cellulitis however I will admit for further evaluation however I will admit for further evaluation

## 2024-11-29 NOTE — H&P ADULT - HISTORY OF PRESENT ILLNESS
52 y/o Female, IVDA on suboxone, htn, hld, hep c here for eval of generalized weakness and sub jective fever chills x 2 weeks. Pt denies cp, sob, abd pain, n/v/d. 52 y/o Female, IVDA on suboxone, HTN, HLD, Hep C. presents with c/o generalized weakness and subjective fever/chills x 2 weeks and worsening lower extremity cellulitis R>L.   PT is now w/c bound due to lower extremities and sees Dr Adair, [facility].  Pt denies cp, sob, abd pain, n/v/d.  Presently, pt in NAD.

## 2024-11-29 NOTE — PATIENT PROFILE ADULT - VISION (WITH CORRECTIVE LENSES IF THE PATIENT USUALLY WEARS THEM):
Ambulance [id number]/EMS Normal vision: sees adequately in most situations; can see medication labels, newsprint

## 2024-11-29 NOTE — PHARMACOTHERAPY INTERVENTION NOTE - COMMENTS
Patient scheduled for vancomycin 750mg q12h due at 15:00 (s/p x1 dose of vancomycin 1000mg at 03:17 today) for SSTI infection. Precisepk suggests current dose is therapeutic with predicted AUC/REED of 474.78. Recommend to continue current dose and obtain random trough with AM labs tomorrow.

## 2024-11-29 NOTE — H&P ADULT - NS ATTEND AMEND GEN_ALL_CORE FT
Seen while in the ER, old records reviewed Seen while in the ER, old records reviewed, including admission from 2 weeks ago Seen soon after arrival to medical floor, old records reviewed, including admission from 2 weeks ago. Somewhat sleepy but answers appropriately. Findings as noted above. Will ask wound care RN to see. Current BP is 99/63 with MP of 75 Seen soon after arrival to medical floor, old records reviewed, including admission from 2 weeks ago. Somewhat sleepy but answers appropriately. Findings as noted above. Will ask wound care RN to see. Current BP is 99/63 with MAP of 75

## 2024-11-29 NOTE — PATIENT PROFILE ADULT - FUNCTIONAL ASSESSMENT - BASIC MOBILITY 2.
Interval History: No acute events overnight. Pt seen and examined at the bedside. Vital signs stable. No new complaints or concerns.     Medications:  Continuous Infusions:   sodium chloride 0.9% 75 mL/hr at 08/30/17 1800     Scheduled Meds:   enoxaparin  40 mg Subcutaneous Daily    famotidine (PF)  20 mg Intravenous Daily    levetiracetam IVPB  500 mg Intravenous Q12H    metoprolol tartrate  50 mg Oral BID    nystatin  500,000 Units Oral QID (WM & HS)    polyethylene glycol  17 g Oral Daily    sodium chloride 0.9%  3 mL Intravenous Q8H     PRN Meds:sodium chloride, calcium carbonate, magnesium oxide, magnesium oxide, morphine, ondansetron, potassium chloride 10%, potassium chloride 10%, potassium chloride 10%, potassium, sodium phosphates, potassium, sodium phosphates, potassium, sodium phosphates     Review of patient's allergies indicates:   Allergen Reactions    No known drug allergies      Objective:     Vital Signs (Most Recent):  Temp: 97.7 °F (36.5 °C) (08/31/17 0419)  Pulse: 102 (08/31/17 0419)  Resp: 16 (08/31/17 0419)  BP: 122/89 (08/31/17 0419)  SpO2: (!) 93 % (08/31/17 0419) Vital Signs (24h Range):  Temp:  [97.7 °F (36.5 °C)-98.6 °F (37 °C)] 97.7 °F (36.5 °C)  Pulse:  [102-139] 102  Resp:  [15-24] 16  SpO2:  [86 %-99 %] 93 %  BP: (111-144)/() 122/89     Weight: 127.8 kg (281 lb 12 oz)  Body mass index is 35.22 kg/m².    Intake/Output - Last 3 Shifts       08/29 0700 - 08/30 0659 08/30 0700 - 08/31 0659 08/31 0700 - 09/01 0659    P.O.  0     I.V. (mL/kg) 2344 (18.3) 1278 (10)     Blood       IV Piggyback 500      Total Intake(mL/kg) 2844 (22.3) 1278 (10)     Urine (mL/kg/hr) 845 (0.3) 855 (0.3)     Total Output 845 855      Net +1999 +423                 Interval History: No changes overnight.  H/H and thrombocytopenia stable.  Continued abdominal and back pain but controlled.  Has not voided since saleh removed.  Tolerating clear liquids.    Medications:  Continuous Infusions:   sodium  chloride 0.9% 75 mL/hr at 08/30/17 1800     Scheduled Meds:   enoxaparin  40 mg Subcutaneous Daily    famotidine (PF)  20 mg Intravenous Daily    levetiracetam IVPB  500 mg Intravenous Q12H    metoprolol tartrate  50 mg Oral BID    nystatin  500,000 Units Oral QID (WM & HS)    polyethylene glycol  17 g Oral Daily    sodium chloride 0.9%  3 mL Intravenous Q8H     PRN Meds:sodium chloride, calcium carbonate, magnesium oxide, magnesium oxide, morphine, ondansetron, potassium chloride 10%, potassium chloride 10%, potassium chloride 10%, potassium, sodium phosphates, potassium, sodium phosphates, potassium, sodium phosphates     Review of patient's allergies indicates:   Allergen Reactions    No known drug allergies      Objective:     Vital Signs (Most Recent):  Temp: 97.7 °F (36.5 °C) (08/31/17 0419)  Pulse: 102 (08/31/17 0419)  Resp: 16 (08/31/17 0419)  BP: 122/89 (08/31/17 0419)  SpO2: (!) 93 % (08/31/17 0419) Vital Signs (24h Range):  Temp:  [97.7 °F (36.5 °C)-98.6 °F (37 °C)] 97.7 °F (36.5 °C)  Pulse:  [102-139] 102  Resp:  [15-24] 16  SpO2:  [86 %-99 %] 93 %  BP: (111-144)/() 122/89     Weight: 127.8 kg (281 lb 12 oz)  Body mass index is 35.22 kg/m².    Intake/Output - Last 3 Shifts       08/29 0700 - 08/30 0659 08/30 0700 - 08/31 0659 08/31 0700 - 09/01 0659    P.O.  0     I.V. (mL/kg) 2344 (18.3) 1278 (10)     Blood       IV Piggyback 500      Total Intake(mL/kg) 2844 (22.3) 1278 (10)     Urine (mL/kg/hr) 845 (0.3) 855 (0.3)     Total Output 845 855      Net +1999 +423                   Physical Exam  NAD, AAOx3  Tachycardic, regular rhythm  CTAB  Abd S/ND/Mildly TTP    Significant Labs:  CBC:     Recent Labs  Lab 08/31/17  0353   WBC 4.61   RBC 3.15*   HGB 9.4*   HCT 26.7*   PLT 92*   MCV 85   MCH 29.8   MCHC 35.2     CMP:   Recent Labs  Lab 08/28/17  1557  08/30/17  0323   *  < > 115*   CALCIUM 7.6*  < > 7.9*   ALBUMIN 3.0*  --   --    PROT 5.6*  --   --      < > 136   K 3.5  < > 3.6    CO2 26  < > 29     < > 103   BUN 18  < > 13   CREATININE 0.7  < > 0.6   ALKPHOS 62  --   --    ALT 33  --   --    AST 18  --   --    BILITOT 1.2*  --   --    < > = values in this interval not displayed.  Coagulation:   Recent Labs  Lab 08/28/17  1556  08/29/17  0845   LABPROT 22.0*  < > 15.7*   INR 2.2*  < > 1.5*   APTT 28.6  --   --    < > = values in this interval not displayed.      Physical Exam   Constitutional: He is oriented to person, place, and time. He appears well-developed and well-nourished. No distress.   HENT:   Head: Normocephalic and atraumatic.   Eyes: Pupils are equal, round, and reactive to light. No scleral icterus.   Neck: No tracheal deviation present.   Cardiovascular: Normal rate.    Pulmonary/Chest: Effort normal. No respiratory distress.   Abdominal: Soft. He exhibits no distension.   Neurological: He is alert and oriented to person, place, and time. No cranial nerve deficit.     NAD, AAOx3  Tachycardic, regular rhythm  CTAB  Abd S/ND/Mildly TTP    Significant Labs:  CBC:     Recent Labs  Lab 08/31/17  0353   WBC 4.61   RBC 3.15*   HGB 9.4*   HCT 26.7*   PLT 92*   MCV 85   MCH 29.8   MCHC 35.2     CMP:   Recent Labs  Lab 08/28/17  1557  08/30/17  0323   *  < > 115*   CALCIUM 7.6*  < > 7.9*   ALBUMIN 3.0*  --   --    PROT 5.6*  --   --      < > 136   K 3.5  < > 3.6   CO2 26  < > 29     < > 103   BUN 18  < > 13   CREATININE 0.7  < > 0.6   ALKPHOS 62  --   --    ALT 33  --   --    AST 18  --   --    BILITOT 1.2*  --   --    < > = values in this interval not displayed.  Coagulation:   Recent Labs  Lab 08/28/17  1556  08/29/17  0845   LABPROT 22.0*  < > 15.7*   INR 2.2*  < > 1.5*   APTT 28.6  --   --    < > = values in this interval not displayed.  Interval History: No changes overnight.  H/H and thrombocytopenia stable.  Continued abdominal and back pain but controlled.  Has not voided since saleh removed.  Tolerating clear liquids.    Medications:  Continuous  Infusions:   sodium chloride 0.9% 75 mL/hr at 08/30/17 1800     Scheduled Meds:   enoxaparin  40 mg Subcutaneous Daily    famotidine (PF)  20 mg Intravenous Daily    levetiracetam IVPB  500 mg Intravenous Q12H    metoprolol tartrate  50 mg Oral BID    nystatin  500,000 Units Oral QID (WM & HS)    polyethylene glycol  17 g Oral Daily    sodium chloride 0.9%  3 mL Intravenous Q8H     PRN Meds:sodium chloride, calcium carbonate, magnesium oxide, magnesium oxide, morphine, ondansetron, potassium chloride 10%, potassium chloride 10%, potassium chloride 10%, potassium, sodium phosphates, potassium, sodium phosphates, potassium, sodium phosphates     Review of patient's allergies indicates:   Allergen Reactions    No known drug allergies      Objective:     Vital Signs (Most Recent):  Temp: 97.7 °F (36.5 °C) (08/31/17 0419)  Pulse: 102 (08/31/17 0419)  Resp: 16 (08/31/17 0419)  BP: 122/89 (08/31/17 0419)  SpO2: (!) 93 % (08/31/17 0419) Vital Signs (24h Range):  Temp:  [97.7 °F (36.5 °C)-98.6 °F (37 °C)] 97.7 °F (36.5 °C)  Pulse:  [102-139] 102  Resp:  [15-24] 16  SpO2:  [86 %-99 %] 93 %  BP: (111-144)/() 122/89     Weight: 127.8 kg (281 lb 12 oz)  Body mass index is 35.22 kg/m².    Intake/Output - Last 3 Shifts       08/29 0700 - 08/30 0659 08/30 0700 - 08/31 0659 08/31 0700 - 09/01 0659    P.O.  0     I.V. (mL/kg) 2344 (18.3) 1278 (10)     Blood       IV Piggyback 500      Total Intake(mL/kg) 2844 (22.3) 1278 (10)     Urine (mL/kg/hr) 845 (0.3) 855 (0.3)     Total Output 845 855      Net +1999 +423                   Physical Exam  NAD, AAOx3  Tachycardic, regular rhythm  CTAB  Abd S/ND/Mildly TTP    Significant Labs:  CBC:     Recent Labs  Lab 08/31/17  0353   WBC 4.61   RBC 3.15*   HGB 9.4*   HCT 26.7*   PLT 92*   MCV 85   MCH 29.8   MCHC 35.2     CMP:   Recent Labs  Lab 08/28/17  1557  08/30/17  0323   *  < > 115*   CALCIUM 7.6*  < > 7.9*   ALBUMIN 3.0*  --   --    PROT 5.6*  --   --      < >  136   K 3.5  < > 3.6   CO2 26  < > 29     < > 103   BUN 18  < > 13   CREATININE 0.7  < > 0.6   ALKPHOS 62  --   --    ALT 33  --   --    AST 18  --   --    BILITOT 1.2*  --   --    < > = values in this interval not displayed.  Coagulation:   Recent Labs  Lab 08/28/17  1556  08/29/17  0845   LABPROT 22.0*  < > 15.7*   INR 2.2*  < > 1.5*   APTT 28.6  --   --    < > = values in this interval not displayed.  Interval History: No changes overnight.  H/H and thrombocytopenia stable.  Continued abdominal and back pain but controlled.  Has not voided since saleh removed.  Tolerating clear liquids.    Medications:  Continuous Infusions:   sodium chloride 0.9% 75 mL/hr at 08/30/17 1800     Scheduled Meds:   enoxaparin  40 mg Subcutaneous Daily    famotidine (PF)  20 mg Intravenous Daily    levetiracetam IVPB  500 mg Intravenous Q12H    metoprolol tartrate  50 mg Oral BID    nystatin  500,000 Units Oral QID (WM & HS)    polyethylene glycol  17 g Oral Daily    sodium chloride 0.9%  3 mL Intravenous Q8H     PRN Meds:sodium chloride, calcium carbonate, magnesium oxide, magnesium oxide, morphine, ondansetron, potassium chloride 10%, potassium chloride 10%, potassium chloride 10%, potassium, sodium phosphates, potassium, sodium phosphates, potassium, sodium phosphates     Review of patient's allergies indicates:   Allergen Reactions    No known drug allergies      Objective:     Vital Signs (Most Recent):  Temp: 97.7 °F (36.5 °C) (08/31/17 0419)  Pulse: 102 (08/31/17 0419)  Resp: 16 (08/31/17 0419)  BP: 122/89 (08/31/17 0419)  SpO2: (!) 93 % (08/31/17 0419) Vital Signs (24h Range):  Temp:  [97.7 °F (36.5 °C)-98.6 °F (37 °C)] 97.7 °F (36.5 °C)  Pulse:  [102-139] 102  Resp:  [15-24] 16  SpO2:  [86 %-99 %] 93 %  BP: (111-144)/() 122/89     Weight: 127.8 kg (281 lb 12 oz)  Body mass index is 35.22 kg/m².    Intake/Output - Last 3 Shifts       08/29 0700 - 08/30 0659 08/30 0700 - 08/31 0659 08/31 0700 - 09/01 0659     P.O.  0     I.V. (mL/kg) 2344 (18.3) 1278 (10)     Blood       IV Piggyback 500      Total Intake(mL/kg) 2844 (22.3) 1278 (10)     Urine (mL/kg/hr) 845 (0.3) 855 (0.3)     Total Output 845 855      Net +1999 +423                   Physical Exam  NAD, AAOx3  Tachycardic, regular rhythm  CTAB  Abd S/ND/Mildly TTP    Significant Labs:  CBC:     Recent Labs  Lab 08/31/17  0353   WBC 4.61   RBC 3.15*   HGB 9.4*   HCT 26.7*   PLT 92*   MCV 85   MCH 29.8   MCHC 35.2     CMP:   Recent Labs  Lab 08/28/17  1557  08/30/17  0323   *  < > 115*   CALCIUM 7.6*  < > 7.9*   ALBUMIN 3.0*  --   --    PROT 5.6*  --   --      < > 136   K 3.5  < > 3.6   CO2 26  < > 29     < > 103   BUN 18  < > 13   CREATININE 0.7  < > 0.6   ALKPHOS 62  --   --    ALT 33  --   --    AST 18  --   --    BILITOT 1.2*  --   --    < > = values in this interval not displayed.  Coagulation:   Recent Labs  Lab 08/28/17  1556  08/29/17  0845   LABPROT 22.0*  < > 15.7*   INR 2.2*  < > 1.5*   APTT 28.6  --   --    < > = values in this interval not displayed.     2 = A lot of assistance

## 2024-11-29 NOTE — CONSULT NOTE ADULT - TIME BILLING
On this date of service, level of risk to patient is considered: Moderate.  I have personally seen and examined this patient.    I have reviewed all pertinent clinical information and reviewed all relevant imaging and diagnostic studies personally.   I discussed recommendations with the primary team. I discussed recommendations with the primary team.

## 2024-11-29 NOTE — ED PROVIDER NOTE - OBJECTIVE STATEMENT
Pt is a 54y/o IVDA on suboxone, htn, hld, hep c here for eval of generalized weakness and sub jective fever chills x 2 weeks. Pt denies cp, sob, abd pain, n/v/d.

## 2024-11-30 LAB
ANION GAP SERPL CALC-SCNC: 15 MMOL/L — HIGH (ref 7–14)
BUN SERPL-MCNC: 14 MG/DL — SIGNIFICANT CHANGE UP (ref 10–20)
CALCIUM SERPL-MCNC: 8.3 MG/DL — LOW (ref 8.4–10.5)
CHLORIDE SERPL-SCNC: 94 MMOL/L — LOW (ref 98–110)
CO2 SERPL-SCNC: 26 MMOL/L — SIGNIFICANT CHANGE UP (ref 17–32)
CREAT SERPL-MCNC: 1 MG/DL — SIGNIFICANT CHANGE UP (ref 0.7–1.5)
EGFR: 67 ML/MIN/1.73M2 — SIGNIFICANT CHANGE UP
GLUCOSE SERPL-MCNC: 134 MG/DL — HIGH (ref 70–99)
HCT VFR BLD CALC: 30.6 % — LOW (ref 37–47)
HGB BLD-MCNC: 9.7 G/DL — LOW (ref 12–16)
MCHC RBC-ENTMCNC: 27.9 PG — SIGNIFICANT CHANGE UP (ref 27–31)
MCHC RBC-ENTMCNC: 31.7 G/DL — LOW (ref 32–37)
MCV RBC AUTO: 87.9 FL — SIGNIFICANT CHANGE UP (ref 81–99)
NRBC # BLD: 0 /100 WBCS — SIGNIFICANT CHANGE UP (ref 0–0)
PLATELET # BLD AUTO: 217 K/UL — SIGNIFICANT CHANGE UP (ref 130–400)
PMV BLD: 10.2 FL — SIGNIFICANT CHANGE UP (ref 7.4–10.4)
POTASSIUM SERPL-MCNC: 3.9 MMOL/L — SIGNIFICANT CHANGE UP (ref 3.5–5)
POTASSIUM SERPL-SCNC: 3.9 MMOL/L — SIGNIFICANT CHANGE UP (ref 3.5–5)
RBC # BLD: 3.48 M/UL — LOW (ref 4.2–5.4)
RBC # FLD: 16 % — HIGH (ref 11.5–14.5)
SODIUM SERPL-SCNC: 135 MMOL/L — SIGNIFICANT CHANGE UP (ref 135–146)
VANCOMYCIN FLD-MCNC: 13 UG/ML — HIGH (ref 5–10)
WBC # BLD: 6.04 K/UL — SIGNIFICANT CHANGE UP (ref 4.8–10.8)
WBC # FLD AUTO: 6.04 K/UL — SIGNIFICANT CHANGE UP (ref 4.8–10.8)

## 2024-11-30 PROCEDURE — 99232 SBSQ HOSP IP/OBS MODERATE 35: CPT

## 2024-11-30 RX ADMIN — PREGABALIN 50 MILLIGRAM(S): 75 CAPSULE ORAL at 06:08

## 2024-11-30 RX ADMIN — Medication 1000 MILLIGRAM(S): at 17:12

## 2024-11-30 RX ADMIN — Medication 20 MILLIGRAM(S): at 21:09

## 2024-11-30 RX ADMIN — Medication 0.6 MILLIGRAM(S): at 17:12

## 2024-11-30 RX ADMIN — Medication 0.6 MILLIGRAM(S): at 06:09

## 2024-11-30 RX ADMIN — FUROSEMIDE 40 MILLIGRAM(S): 40 TABLET ORAL at 08:32

## 2024-11-30 RX ADMIN — BUPRENORPHINE AND NALOXONE 1 TABLET(S): 8; 2 TABLET SUBLINGUAL at 12:05

## 2024-11-30 RX ADMIN — Medication 250 MILLIGRAM(S): at 02:51

## 2024-11-30 RX ADMIN — Medication 1000 MILLIGRAM(S): at 06:08

## 2024-11-30 RX ADMIN — PREGABALIN 50 MILLIGRAM(S): 75 CAPSULE ORAL at 17:12

## 2024-11-30 RX ADMIN — Medication 6: at 12:02

## 2024-11-30 RX ADMIN — Medication 175 MICROGRAM(S): at 06:08

## 2024-11-30 RX ADMIN — PANTOPRAZOLE SODIUM 40 MILLIGRAM(S): 40 TABLET, DELAYED RELEASE ORAL at 06:08

## 2024-11-30 RX ADMIN — Medication 81 MILLIGRAM(S): at 12:05

## 2024-11-30 RX ADMIN — Medication 100 MILLIGRAM(S): at 23:00

## 2024-11-30 RX ADMIN — INSULIN GLARGINE 28 UNIT(S): 100 INJECTION, SOLUTION SUBCUTANEOUS at 21:08

## 2024-12-01 LAB
ALBUMIN SERPL ELPH-MCNC: 3.8 G/DL — SIGNIFICANT CHANGE UP (ref 3.5–5.2)
ALP SERPL-CCNC: 78 U/L — SIGNIFICANT CHANGE UP (ref 30–115)
ALT FLD-CCNC: <5 U/L — SIGNIFICANT CHANGE UP (ref 0–41)
ANION GAP SERPL CALC-SCNC: 13 MMOL/L — SIGNIFICANT CHANGE UP (ref 7–14)
AST SERPL-CCNC: 23 U/L — SIGNIFICANT CHANGE UP (ref 0–41)
BASOPHILS # BLD AUTO: 0.04 K/UL — SIGNIFICANT CHANGE UP (ref 0–0.2)
BASOPHILS NFR BLD AUTO: 0.7 % — SIGNIFICANT CHANGE UP (ref 0–1)
BILIRUB SERPL-MCNC: 0.2 MG/DL — SIGNIFICANT CHANGE UP (ref 0.2–1.2)
BUN SERPL-MCNC: 12 MG/DL — SIGNIFICANT CHANGE UP (ref 10–20)
CALCIUM SERPL-MCNC: 8.5 MG/DL — SIGNIFICANT CHANGE UP (ref 8.4–10.5)
CHLORIDE SERPL-SCNC: 94 MMOL/L — LOW (ref 98–110)
CO2 SERPL-SCNC: 30 MMOL/L — SIGNIFICANT CHANGE UP (ref 17–32)
CREAT SERPL-MCNC: 0.9 MG/DL — SIGNIFICANT CHANGE UP (ref 0.7–1.5)
EGFR: 76 ML/MIN/1.73M2 — SIGNIFICANT CHANGE UP
EOSINOPHIL # BLD AUTO: 0.13 K/UL — SIGNIFICANT CHANGE UP (ref 0–0.7)
EOSINOPHIL NFR BLD AUTO: 2.2 % — SIGNIFICANT CHANGE UP (ref 0–8)
GLUCOSE SERPL-MCNC: 80 MG/DL — SIGNIFICANT CHANGE UP (ref 70–99)
HCT VFR BLD CALC: 29.3 % — LOW (ref 37–47)
HGB BLD-MCNC: 9 G/DL — LOW (ref 12–16)
IMM GRANULOCYTES NFR BLD AUTO: 0.2 % — SIGNIFICANT CHANGE UP (ref 0.1–0.3)
LYMPHOCYTES # BLD AUTO: 1.99 K/UL — SIGNIFICANT CHANGE UP (ref 1.2–3.4)
LYMPHOCYTES # BLD AUTO: 33 % — SIGNIFICANT CHANGE UP (ref 20.5–51.1)
MAGNESIUM SERPL-MCNC: 2 MG/DL — SIGNIFICANT CHANGE UP (ref 1.8–2.4)
MCHC RBC-ENTMCNC: 26.9 PG — LOW (ref 27–31)
MCHC RBC-ENTMCNC: 30.7 G/DL — LOW (ref 32–37)
MCV RBC AUTO: 87.7 FL — SIGNIFICANT CHANGE UP (ref 81–99)
MONOCYTES # BLD AUTO: 0.33 K/UL — SIGNIFICANT CHANGE UP (ref 0.1–0.6)
MONOCYTES NFR BLD AUTO: 5.5 % — SIGNIFICANT CHANGE UP (ref 1.7–9.3)
NEUTROPHILS # BLD AUTO: 3.53 K/UL — SIGNIFICANT CHANGE UP (ref 1.4–6.5)
NEUTROPHILS NFR BLD AUTO: 58.4 % — SIGNIFICANT CHANGE UP (ref 42.2–75.2)
NRBC # BLD: 0 /100 WBCS — SIGNIFICANT CHANGE UP (ref 0–0)
PLATELET # BLD AUTO: 249 K/UL — SIGNIFICANT CHANGE UP (ref 130–400)
PMV BLD: 10 FL — SIGNIFICANT CHANGE UP (ref 7.4–10.4)
POTASSIUM SERPL-MCNC: 3.3 MMOL/L — LOW (ref 3.5–5)
POTASSIUM SERPL-SCNC: 3.3 MMOL/L — LOW (ref 3.5–5)
PROT SERPL-MCNC: 7 G/DL — SIGNIFICANT CHANGE UP (ref 6–8)
RBC # BLD: 3.34 M/UL — LOW (ref 4.2–5.4)
RBC # FLD: 15.9 % — HIGH (ref 11.5–14.5)
SODIUM SERPL-SCNC: 137 MMOL/L — SIGNIFICANT CHANGE UP (ref 135–146)
WBC # BLD: 6.03 K/UL — SIGNIFICANT CHANGE UP (ref 4.8–10.8)
WBC # FLD AUTO: 6.03 K/UL — SIGNIFICANT CHANGE UP (ref 4.8–10.8)

## 2024-12-01 PROCEDURE — 99232 SBSQ HOSP IP/OBS MODERATE 35: CPT

## 2024-12-01 RX ORDER — VANCOMYCIN HCL 900 MCG/MG
1000 POWDER (GRAM) MISCELLANEOUS EVERY 12 HOURS
Refills: 0 | Status: DISCONTINUED | OUTPATIENT
Start: 2024-12-02 | End: 2024-12-03

## 2024-12-01 RX ORDER — BUPRENORPHINE AND NALOXONE 8; 2 MG/1; MG/1
2.5 TABLET SUBLINGUAL DAILY
Refills: 0 | Status: DISCONTINUED | OUTPATIENT
Start: 2024-12-01 | End: 2024-12-03

## 2024-12-01 RX ADMIN — Medication 0.6 MILLIGRAM(S): at 05:48

## 2024-12-01 RX ADMIN — PREGABALIN 50 MILLIGRAM(S): 75 CAPSULE ORAL at 05:48

## 2024-12-01 RX ADMIN — BUPRENORPHINE AND NALOXONE 1 TABLET(S): 8; 2 TABLET SUBLINGUAL at 11:50

## 2024-12-01 RX ADMIN — Medication 250 MILLIGRAM(S): at 05:47

## 2024-12-01 RX ADMIN — Medication 81 MILLIGRAM(S): at 11:46

## 2024-12-01 RX ADMIN — Medication 250 MILLIGRAM(S): at 17:40

## 2024-12-01 RX ADMIN — Medication 100 MILLIGRAM(S): at 21:03

## 2024-12-01 RX ADMIN — Medication 20 MILLIGRAM(S): at 21:02

## 2024-12-01 RX ADMIN — Medication 1000 MILLIGRAM(S): at 17:40

## 2024-12-01 RX ADMIN — INSULIN GLARGINE 28 UNIT(S): 100 INJECTION, SOLUTION SUBCUTANEOUS at 21:03

## 2024-12-01 RX ADMIN — PREGABALIN 50 MILLIGRAM(S): 75 CAPSULE ORAL at 17:40

## 2024-12-01 RX ADMIN — Medication 175 MICROGRAM(S): at 05:47

## 2024-12-01 RX ADMIN — PANTOPRAZOLE SODIUM 40 MILLIGRAM(S): 40 TABLET, DELAYED RELEASE ORAL at 05:48

## 2024-12-01 RX ADMIN — Medication 1000 MILLIGRAM(S): at 05:48

## 2024-12-01 RX ADMIN — Medication 0.6 MILLIGRAM(S): at 17:40

## 2024-12-01 NOTE — PHARMACOTHERAPY INTERVENTION NOTE - COMMENTS
As per policy, ordered a vancomycin trough for 12/3 @1600 in order to assist with vancomycin pharmacokinetic monitoring.      Edie SalvadorD   Clinical Pharmacy Specialist, Infectious Diseases  Tele-Antimicrobial Stewardship Program (Tele-ASP)  Tele-ASP Phone: [phone]   As per policy, ordered a vancomycin trough for 12/3 @1500 in order to assist with vancomycin pharmacokinetic monitoring.      Edie SalvadorD   Clinical Pharmacy Specialist, Infectious Diseases  Tele-Antimicrobial Stewardship Program (Tele-ASP)  Tele-ASP Phone: [phone]

## 2024-12-01 NOTE — PHARMACOTHERAPY INTERVENTION NOTE - COMMENTS
Recommended to adjust vancomycin dose from 750 mg IV q12h to 1000 mg IV q12h since the vancomycin level, 11/30 was 13 mg/L. As per [employer] calculations, current vancomycin AUC/REED is 285 mg/L*h, which is lower than the therapeutic range of 400 - 600 mg/L*h. Increasing the dose is predicted to yield an AUC/REED of 406 mg/L*h. Scr 0.9.    Edie SalvadorD   Clinical Pharmacy Specialist, Infectious Diseases  Tele-Antimicrobial Stewardship Program (Tele-ASP)  Tele-ASP Phone: [phone]

## 2024-12-02 PROCEDURE — 99232 SBSQ HOSP IP/OBS MODERATE 35: CPT

## 2024-12-02 RX ORDER — SENNOSIDES 8.6 MG
2 TABLET ORAL AT BEDTIME
Refills: 0 | Status: DISCONTINUED | OUTPATIENT
Start: 2024-12-02 | End: 2024-12-03

## 2024-12-02 RX ORDER — POLYETHYLENE GLYCOL 3350 17 G/17G
17 POWDER, FOR SOLUTION ORAL DAILY
Refills: 0 | Status: DISCONTINUED | OUTPATIENT
Start: 2024-12-02 | End: 2024-12-03

## 2024-12-02 RX ADMIN — Medication 81 MILLIGRAM(S): at 12:21

## 2024-12-02 RX ADMIN — POLYETHYLENE GLYCOL 3350 17 GRAM(S): 17 POWDER, FOR SOLUTION ORAL at 12:26

## 2024-12-02 RX ADMIN — ACETAMINOPHEN 500MG 650 MILLIGRAM(S): 500 TABLET, COATED ORAL at 06:40

## 2024-12-02 RX ADMIN — PREGABALIN 50 MILLIGRAM(S): 75 CAPSULE ORAL at 17:32

## 2024-12-02 RX ADMIN — Medication 1000 MILLIGRAM(S): at 17:19

## 2024-12-02 RX ADMIN — Medication 1000 MILLIGRAM(S): at 05:40

## 2024-12-02 RX ADMIN — BUPRENORPHINE AND NALOXONE 2.5 TABLET(S): 8; 2 TABLET SUBLINGUAL at 12:20

## 2024-12-02 RX ADMIN — FUROSEMIDE 40 MILLIGRAM(S): 40 TABLET ORAL at 05:40

## 2024-12-02 RX ADMIN — Medication 250 MILLIGRAM(S): at 05:39

## 2024-12-02 RX ADMIN — Medication 250 MILLIGRAM(S): at 17:19

## 2024-12-02 RX ADMIN — INSULIN GLARGINE 28 UNIT(S): 100 INJECTION, SOLUTION SUBCUTANEOUS at 21:05

## 2024-12-02 RX ADMIN — Medication 175 MICROGRAM(S): at 05:40

## 2024-12-02 RX ADMIN — Medication 20 MILLIGRAM(S): at 21:06

## 2024-12-02 RX ADMIN — Medication 2 TABLET(S): at 21:06

## 2024-12-02 RX ADMIN — PANTOPRAZOLE SODIUM 40 MILLIGRAM(S): 40 TABLET, DELAYED RELEASE ORAL at 05:40

## 2024-12-02 RX ADMIN — Medication 100 MILLIGRAM(S): at 21:05

## 2024-12-02 RX ADMIN — PREGABALIN 50 MILLIGRAM(S): 75 CAPSULE ORAL at 05:40

## 2024-12-02 RX ADMIN — Medication 0.6 MILLIGRAM(S): at 17:18

## 2024-12-02 RX ADMIN — Medication 1: at 17:29

## 2024-12-02 RX ADMIN — Medication 0.6 MILLIGRAM(S): at 05:39

## 2024-12-03 ENCOUNTER — NON-APPOINTMENT (OUTPATIENT)
Age: 54
End: 2024-12-03

## 2024-12-03 ENCOUNTER — TRANSCRIPTION ENCOUNTER (OUTPATIENT)
Age: 54
End: 2024-12-03

## 2024-12-03 VITALS
OXYGEN SATURATION: 96 % | HEART RATE: 66 BPM | DIASTOLIC BLOOD PRESSURE: 60 MMHG | SYSTOLIC BLOOD PRESSURE: 90 MMHG | RESPIRATION RATE: 18 BRPM | TEMPERATURE: 98 F

## 2024-12-03 LAB
ALBUMIN SERPL ELPH-MCNC: 3.6 G/DL — SIGNIFICANT CHANGE UP (ref 3.5–5.2)
ALP SERPL-CCNC: 106 U/L — SIGNIFICANT CHANGE UP (ref 30–115)
ALT FLD-CCNC: <5 U/L — SIGNIFICANT CHANGE UP (ref 0–41)
ANION GAP SERPL CALC-SCNC: 13 MMOL/L — SIGNIFICANT CHANGE UP (ref 7–14)
AST SERPL-CCNC: 20 U/L — SIGNIFICANT CHANGE UP (ref 0–41)
BASOPHILS # BLD AUTO: 0.06 K/UL — SIGNIFICANT CHANGE UP (ref 0–0.2)
BASOPHILS NFR BLD AUTO: 0.8 % — SIGNIFICANT CHANGE UP (ref 0–1)
BILIRUB SERPL-MCNC: 0.2 MG/DL — SIGNIFICANT CHANGE UP (ref 0.2–1.2)
BUN SERPL-MCNC: 16 MG/DL — SIGNIFICANT CHANGE UP (ref 10–20)
CALCIUM SERPL-MCNC: 8.4 MG/DL — SIGNIFICANT CHANGE UP (ref 8.4–10.5)
CHLORIDE SERPL-SCNC: 95 MMOL/L — LOW (ref 98–110)
CO2 SERPL-SCNC: 30 MMOL/L — SIGNIFICANT CHANGE UP (ref 17–32)
CREAT SERPL-MCNC: 0.9 MG/DL — SIGNIFICANT CHANGE UP (ref 0.7–1.5)
EGFR: 76 ML/MIN/1.73M2 — SIGNIFICANT CHANGE UP
EOSINOPHIL # BLD AUTO: 0.18 K/UL — SIGNIFICANT CHANGE UP (ref 0–0.7)
EOSINOPHIL NFR BLD AUTO: 2.5 % — SIGNIFICANT CHANGE UP (ref 0–8)
GLUCOSE SERPL-MCNC: 106 MG/DL — HIGH (ref 70–99)
HCT VFR BLD CALC: 27.8 % — LOW (ref 37–47)
HGB BLD-MCNC: 8.8 G/DL — LOW (ref 12–16)
IMM GRANULOCYTES NFR BLD AUTO: 1 % — HIGH (ref 0.1–0.3)
LYMPHOCYTES # BLD AUTO: 1.83 K/UL — SIGNIFICANT CHANGE UP (ref 1.2–3.4)
LYMPHOCYTES # BLD AUTO: 25.8 % — SIGNIFICANT CHANGE UP (ref 20.5–51.1)
MAGNESIUM SERPL-MCNC: 2 MG/DL — SIGNIFICANT CHANGE UP (ref 1.8–2.4)
MCHC RBC-ENTMCNC: 27.8 PG — SIGNIFICANT CHANGE UP (ref 27–31)
MCHC RBC-ENTMCNC: 31.7 G/DL — LOW (ref 32–37)
MCV RBC AUTO: 88 FL — SIGNIFICANT CHANGE UP (ref 81–99)
MONOCYTES # BLD AUTO: 0.3 K/UL — SIGNIFICANT CHANGE UP (ref 0.1–0.6)
MONOCYTES NFR BLD AUTO: 4.2 % — SIGNIFICANT CHANGE UP (ref 1.7–9.3)
NEUTROPHILS # BLD AUTO: 4.65 K/UL — SIGNIFICANT CHANGE UP (ref 1.4–6.5)
NEUTROPHILS NFR BLD AUTO: 65.7 % — SIGNIFICANT CHANGE UP (ref 42.2–75.2)
NRBC # BLD: 0 /100 WBCS — SIGNIFICANT CHANGE UP (ref 0–0)
PLATELET # BLD AUTO: 256 K/UL — SIGNIFICANT CHANGE UP (ref 130–400)
PMV BLD: 9.6 FL — SIGNIFICANT CHANGE UP (ref 7.4–10.4)
POTASSIUM SERPL-MCNC: 3.3 MMOL/L — LOW (ref 3.5–5)
POTASSIUM SERPL-SCNC: 3.3 MMOL/L — LOW (ref 3.5–5)
PROT SERPL-MCNC: 6.7 G/DL — SIGNIFICANT CHANGE UP (ref 6–8)
RBC # BLD: 3.16 M/UL — LOW (ref 4.2–5.4)
RBC # FLD: 15.9 % — HIGH (ref 11.5–14.5)
SODIUM SERPL-SCNC: 138 MMOL/L — SIGNIFICANT CHANGE UP (ref 135–146)
WBC # BLD: 7.09 K/UL — SIGNIFICANT CHANGE UP (ref 4.8–10.8)
WBC # FLD AUTO: 7.09 K/UL — SIGNIFICANT CHANGE UP (ref 4.8–10.8)

## 2024-12-03 PROCEDURE — 99232 SBSQ HOSP IP/OBS MODERATE 35: CPT

## 2024-12-03 PROCEDURE — 99222 1ST HOSP IP/OBS MODERATE 55: CPT

## 2024-12-03 RX ORDER — POTASSIUM CHLORIDE 600 MG/1
20 TABLET, EXTENDED RELEASE ORAL
Refills: 0 | Status: DISCONTINUED | OUTPATIENT
Start: 2024-12-03 | End: 2024-12-03

## 2024-12-03 RX ADMIN — PANTOPRAZOLE SODIUM 40 MILLIGRAM(S): 40 TABLET, DELAYED RELEASE ORAL at 05:32

## 2024-12-03 RX ADMIN — Medication 1000 MILLIGRAM(S): at 05:31

## 2024-12-03 RX ADMIN — Medication 175 MICROGRAM(S): at 05:32

## 2024-12-03 RX ADMIN — Medication 0.6 MILLIGRAM(S): at 05:32

## 2024-12-03 RX ADMIN — Medication 250 MILLIGRAM(S): at 05:31

## 2024-12-03 RX ADMIN — PREGABALIN 50 MILLIGRAM(S): 75 CAPSULE ORAL at 05:31

## 2024-12-03 RX ADMIN — FUROSEMIDE 40 MILLIGRAM(S): 40 TABLET ORAL at 05:32

## 2024-12-03 NOTE — CDI QUERY NOTE - NSCDIOTHERTXTBX_GEN_ALL_CORE_HH
Clinical documentation and/or evidence in the medical record indicates that this patient has sepsis.  In order to ensure accurate coding and accuracy of the clinical record, the documentation in this patient’s medical record requires additional clarification.      Please include more specific documentation as to the type/status of sepsis in your progress note and/or discharge summary.   Please clarify if the patient was found to have:    •	Sepsis ruled in   •	Sepsis ruled out after study    •	Other (specify)    Etiology:   •	Due to local infection (specify infection)  •	Other (specify)    Supporting documentation and/or clinical evidence:   Flow Sheet: V/S: ED: T=  99.6, Tmax= 102.8,   HR=  72  Labs:  WBC= 7.84,   Lactate= 1.7, 1.4    Documentation:   11/29/24:  ED:  Attending: Disposition: Dx:  Cellulitis,  Sepsis   Assessment Plan:: :--- evaluation of generalized weakness however found to be febrile patient is meeting septic criteria at which point we initiated septic protocols including IV fluids we obtained blood cultures were initiated IV antibiotics we obtain lactate which is 1.4 patient sources most likely her lower extremity cellulitis   12/2/24: Progress Note; Attending:   Assessment:  #RLE Cellulitis     #BL LE Stasis dermatitis     Meds: Vancomycin, Ceftriaxone, LRS  2300ml IV bolus ( 11/28/24)  NaCl 0.9% 1L @ 75ml.hr ( 11/29/24)    For reference, please see attached policy:  [facility] sepsis ED sepsis/severe sepsis management algorithm            Thank you.

## 2024-12-03 NOTE — PROGRESS NOTE ADULT - ASSESSMENT
54 y/o Female, IVDA on suboxone, HTN, HLD, Hep C. presents with c/o generalized weakness and subjective fever/chills x 2 weeks and worsening lower extremity cellulitis R>L. Admitted for R LE cellulitis.    #RLE Cellulitis  #BL LE Stasis dermatitis  - ID eval  - c/w vancomycin and ceftriaxone  - wound care eval  - f/u blood cultures  - local wound care    # Hyponatremia  - resolved s/p IV fluids    #hx of IV drug use  - on Suboxone, will confirm dose    # HTN  - monitor BP    # DM  - c/w lantus 28units hs  - FS with SS  - Metformin     # HLD  - c/w atorvastatin 20mg qhs    #hypothyroidism  - c/w synthoid 175mcg daily      Misc:  DVT ppx: None  GI ppx: Protonix  Diet: DASH/TLC, CC  Activity: IAT  Code: Full Code  Dispo: Acute
54 y/o Female, IVDA on suboxone, HTN, HLD, Hep C. presents with c/o generalized weakness and subjective fever/chills x 2 weeks and worsening lower extremity cellulitis R>L. Admitted for R LE cellulitis.    #RLE Cellulitis  #BL LE Stasis dermatitis  - ID eval  - c/w vancomycin and ceftriaxone  - wound care eval  - f/u blood cultures  - local wound care    # Hyponatremia  - resolved s/p IV fluids    #hx of IV drug use  - on Suboxone    # HTN  - monitor BP    # DM  - c/w lantus 28units hs  - FS with SS  - Metformin     # HLD  - c/w atorvastatin 20mg qhs    #hypothyroidism  - c/w synthoid 175mcg daily      Misc:  DVT ppx: None  GI ppx: Protonix  Diet: DASH/TLC, CC  Activity: IAT  Code: Full Code  Dispo: Acute
54 y/o Female, IVDA on suboxone, HTN, HLD, Hep C. presents with c/o generalized weakness and subjective fever/chills x 2 weeks and worsening lower extremity cellulitis R>L. Admitted for R LE cellulitis.    #RLE Cellulitis  #BL LE Stasis dermatitis  - ID eval  - c/w vancomycin and ceftriaxone  - wound care eval  - f/u blood cultures  - local wound care    # Hyponatremia  - resolved s/p IV fluids    #hx of IV drug use  - on Suboxone, will confirm dose    # HTN  - monitor BP    # DM  - c/w lantus 28units hs  - FS with SS  - Metformin     # HLD  - c/w atorvastatin 20mg qhs    #hypothyroidism  - c/w synthoid 175mcg daily      Misc:  DVT ppx: None  GI ppx: Protonix  Diet: DASH/TLC, CC  Activity: IAT  Code: Full Code  Dispo: Acute
ASSESSMENT  This is a 52 y/o Female, IVDA on suboxone, HTN, HLD, Hep C presents with c/o generalized weakness and subjective fever/chills x 2 weeks and worsening lower extremity cellulitis.     IMPRESSION  #Stasis dermatitis with possible superimposed cellulitis, BCx NGTD  #Polysubstance use disorder on Suboxone  #HTN, HLD  #History of Hep C- Self resolved.   #Recurrent admission in the past for lower extremity cellulitis? 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  Hepatitis C Ab +, PCR undetectable 2/2/24  Cryoglobulin negative 2/2/24  Hepatitis B immune, HIV screen negative.   DULCE negative  RF negative  p-ANCA positive, titer 1:40    RECOMMENDATIONS  -D/C on PO Ceftin 500mg q12hrs to complete 7-day treatment  -Local wound care. Vascular follow up to continue Unar boot  -Compression therapy and leg elevation  -Off loading to prevent pressure sores and preventive measures to avoid aspiration      Snow Saleh D.O.  Attending Physician  Division of Infectious Diseases  [facility]  Please contact me via [employer] Teams

## 2024-12-03 NOTE — DISCHARGE NOTE PROVIDER - NSDCCPCAREPLAN_GEN_ALL_CORE_FT
PRINCIPAL DISCHARGE DIAGNOSIS  Diagnosis: Cellulitis  Assessment and Plan of Treatment: You were treated with IV ABX and will change to Oral on discharge.  Make sure to complete the course.  FU outpatient with your PMD on discharge.      SECONDARY DISCHARGE DIAGNOSES  Diagnosis: Sepsis  Assessment and Plan of Treatment: as above     PRINCIPAL DISCHARGE DIAGNOSIS  Diagnosis: Cellulitis  Assessment and Plan of Treatment: You were treated with Intravenous antibiotics for your lower extremity cellulitis. Please take oral antibiotics on discharge as prescribed.  Make sure to complete the course.  Follow-up outpatient with your primary doctor on discharge.

## 2024-12-03 NOTE — DISCHARGE NOTE PROVIDER - CARE PROVIDER_API CALL
Javier Perez  Geriatric Medicine  [address] [place] [address]  Phone: [phone]  Fax: [phone]  Follow Up Time:    Javier Perez  Geriatric Medicine  [address] [place] [address]  Phone: [phone]  Fax: [phone]  Follow Up Time:     Greg Adair  Vascular Surgery  [address] [place] [address], [address]  [place] [address]  Phone: [phone]  Fax: [phone]  Follow Up Time:

## 2024-12-03 NOTE — DISCHARGE NOTE PROVIDER - PROVIDER TOKENS
PROVIDER:[TOKEN:[[id number]]] PROVIDER:[TOKEN:[[id number]]],PROVIDER:[TOKEN:[[id number]]]

## 2024-12-03 NOTE — DISCHARGE NOTE PROVIDER - CARE PROVIDERS DIRECT ADDRESSES
[email or web address]

## 2024-12-03 NOTE — DISCHARGE NOTE NURSING/CASE MANAGEMENT/SOCIAL WORK - NSDCPEFALRISK_GEN_ALL_CORE
For information on Fall & Injury Prevention, visit: [email or web address] OR  [email or web address] OR  https://www.cdc.gov/steadi/patient.html

## 2024-12-03 NOTE — DISCHARGE NOTE PROVIDER - NPI NUMBER (FOR SYSADMIN USE ONLY) :
Prior Authorization Approval    Authorization Effective Date: 4/7/2020  Authorization Expiration Date: 4/6/2021  Medication: testosterone (ANDROGEL 1.62 % PUMP) 20.25 MG/ACT gel- APPROVED   Approved Dose/Quantity:   Reference #:     Insurance Company: [employer] - Phone [phone] Fax [phone]  Expected CoPay:       CoPay Card Available:      Foundation Assistance Needed:    Which Pharmacy is filling the prescription (Not needed for infusion/clinic administered): [id number] [address] AT [facility] [address]  Pharmacy Notified: Yes  Patient Notified: Comment:  **Instructed pharmacy to notify patient when script is ready to /ship.**     [[id number]] [[id number]]

## 2024-12-03 NOTE — DISCHARGE NOTE PROVIDER - NSDCMRMEDTOKEN_GEN_ALL_CORE_FT
albuterol 90 mcg/inh inhalation powder: 2 puff(s) inhaled every 6 hours, As Needed -for bronchospasm   aspirin 81 mg oral delayed release tablet: 1 tab(s) orally once a day  atorvastatin 20 mg oral tablet: 1 tab(s) orally once a day (at bedtime)  cefuroxime 500 mg oral tablet: 1 tab(s) orally 2 times a day  colchicine 0.6 mg oral tablet: 1 tab(s) orally 2 times a day  furosemide 40 mg oral tablet: 1 tab(s) orally once a day  Lantus 100 units/mL subcutaneous solution: 28 unit(s) subcutaneous once a day (at bedtime)  metFORMIN 500 mg oral tablet: 2 tab(s) orally 2 times a day  pantoprazole 40 mg oral delayed release tablet: 1 tab(s) orally once a day (before a meal) Take once a day while taking prednisone  pregabalin 50 mg oral capsule: 1 cap(s) orally every 12 hours  Suboxone 8 mg-2 mg sublingual tablet: 2.5 film(s) sublingual once a day  Synthroid 175 mcg (0.175 mg) oral tablet: 1 tab(s) orally once a day

## 2024-12-03 NOTE — PROGRESS NOTE ADULT - SUBJECTIVE AND OBJECTIVE BOX
EZRA BARCLAY 53y Female  MRN#: [id number]     SUBJECTIVE  Patient is a 53y old Female who presents with a chief complaint of   Interval/Overnight Events:    Today is hospital day 1d, and this morning she is lying in bed without distress.   No acute overnight events.     OBJECTIVE  PAST MEDICAL & SURGICAL HISTORY  Asthma with COPD    Hypothyroidism    H/O: substance abuse  no longer using    History of pancreatitis    Hepatitis-C    Leukocytoclastic vasculitis    History of appendectomy    History of tonsillectomy      ALLERGIES:  No Known Allergies    MEDICATIONS:  STANDING MEDICATIONS  aspirin enteric coated 81 milliGRAM(s) Oral daily  atorvastatin 20 milliGRAM(s) Oral at bedtime  buprenorphine 8 mG/naloxone 2 mG SL  Tablet 1 Tablet(s) SubLingual daily  cefTRIAXone   IVPB 1000 milliGRAM(s) IV Intermittent every 24 hours  colchicine 0.6 milliGRAM(s) Oral two times a day  dextrose 5%. 1000 milliLiter(s) IV Continuous <Continuous>  dextrose 50% Injectable 25 Gram(s) IV Push once  furosemide    Tablet 40 milliGRAM(s) Oral daily  insulin glargine Injectable (LANTUS) 28 Unit(s) SubCutaneous at bedtime  insulin lispro (ADMELOG) corrective regimen sliding scale   SubCutaneous three times a day before meals  levothyroxine 175 MICROGram(s) Oral daily  metFORMIN 1000 milliGRAM(s) Oral two times a day  pantoprazole    Tablet 40 milliGRAM(s) Oral before breakfast  pregabalin 50 milliGRAM(s) Oral every 12 hours  vancomycin  IVPB 750 milliGRAM(s) IV Intermittent every 12 hours    PRN MEDICATIONS  acetaminophen     Tablet .. 650 milliGRAM(s) Oral every 6 hours PRN  albuterol    90 MICROgram(s) HFA Inhaler 2 Puff(s) Inhalation every 6 hours PRN  aluminum hydroxide/magnesium hydroxide/simethicone Suspension 30 milliLiter(s) Oral every 4 hours PRN  dextrose Oral Gel 15 Gram(s) Oral once PRN  melatonin 5 milliGRAM(s) Oral at bedtime PRN  ondansetron Injectable 4 milliGRAM(s) IV Push every 8 hours PRN    HOME MEDICATIONS  Home Medications:  acetaminophen 325 mg oral capsule: 3 cap(s) orally every 8 hours (06 Jun 2024 11:15)  pregabalin 50 mg oral capsule: 1 cap(s) orally every 12 hours (06 Jun 2024 07:48)  Suboxone 8 mg-2 mg sublingual tablet: 2.5 film(s) sublingual once a day (28 May 2024 03:24)      LABS:                        9.7    6.04  )-----------( 217      ( 30 Nov 2024 06:29 )             30.6     11-30    135  |  94[L]  |  14  ----------------------------<  134[H]  3.9   |  26  |  1.0    Ca    8.3[L]      30 Nov 2024 06:29    TPro  6.9  /  Alb  3.9  /  TBili  0.6  /  DBili  x   /  AST  23  /  ALT  <5  /  AlkPhos  75  11-29    LIVER FUNCTIONS - ( 29 Nov 2024 10:30 )  Alb: 3.9 g/dL / Pro: 6.9 g/dL / ALK PHOS: 75 U/L / ALT: <5 U/L / AST: 23 U/L / GGT: x           PT/INR - ( 29 Nov 2024 10:30 )   PT: 14.30 sec;   INR: 1.21 ratio         PTT - ( 29 Nov 2024 10:30 )  PTT:31.5 sec  Urinalysis Basic - ( 30 Nov 2024 06:29 )    Color: x / Appearance: x / SG: x / pH: x  Gluc: 134 mg/dL / Ketone: x  / Bili: x / Urobili: x   Blood: x / Protein: x / Nitrite: x   Leuk Esterase: x / RBC: x / WBC x   Sq Epi: x / Non Sq Epi: x / Bacteria: x            Urinalysis with Rflx Culture (collected 29 Nov 2024 00:13)          CAPILLARY BLOOD GLUCOSE      POCT Blood Glucose.: 412 mg/dL (30 Nov 2024 11:48)      PHYSICAL EXAM:  T(C): 36.3 (11-30-24 @ 14:45), Max: 37.8 (11-29-24 @ 16:22)  HR: 85 (11-30-24 @ 14:45) (62 - 85)  BP: 92/50 (11-30-24 @ 14:45) (92/50 - 98/59)  RR: 18 (11-30-24 @ 14:45) (18 - 18)  SpO2: 95% (11-30-24 @ 14:45) (93% - 96%)    GENERAL: NAD, well-developed, 53y  RESPIRATORY: Clear to auscultation bilaterally  CARDIOVASCULAR: Regular rate and rhythm;  GASTROINTESTINAL: Abdomen Soft, Nontender, Nondistended  MUSCULOSKELETAL:  bilateral stasis dermatitis, RLE erythema with warmth  PSYCH: AAOx3, affect appropriate  NEUROLOGY: non-focal, cognition grossly intact, MAEE    ADMISSION SUMMARY  Patient is a 53y old Female who presents with a chief complaint of  
EZRA BARCLAY 53y Female  MRN#: [id number]     SUBJECTIVE  Patient is a 53y old Female who presents with a chief complaint of   Interval/Overnight Events:    OBJECTIVE  PAST MEDICAL & SURGICAL HISTORY  Asthma with COPD    Hypothyroidism    H/O: substance abuse  no longer using    History of pancreatitis    Hepatitis-C    Leukocytoclastic vasculitis    History of appendectomy    History of tonsillectomy      ALLERGIES:  No Known Allergies    MEDICATIONS:  STANDING MEDICATIONS  aspirin enteric coated 81 milliGRAM(s) Oral daily  atorvastatin 20 milliGRAM(s) Oral at bedtime  buprenorphine 8 mG/naloxone 2 mG SL  Tablet 1 Tablet(s) SubLingual daily  cefTRIAXone   IVPB 1000 milliGRAM(s) IV Intermittent every 24 hours  colchicine 0.6 milliGRAM(s) Oral two times a day  dextrose 5%. 1000 milliLiter(s) IV Continuous <Continuous>  dextrose 50% Injectable 25 Gram(s) IV Push once  furosemide    Tablet 40 milliGRAM(s) Oral daily  insulin glargine Injectable (LANTUS) 28 Unit(s) SubCutaneous at bedtime  insulin lispro (ADMELOG) corrective regimen sliding scale   SubCutaneous three times a day before meals  levothyroxine 175 MICROGram(s) Oral daily  metFORMIN 1000 milliGRAM(s) Oral two times a day  pantoprazole    Tablet 40 milliGRAM(s) Oral before breakfast  pregabalin 50 milliGRAM(s) Oral every 12 hours  vancomycin  IVPB 750 milliGRAM(s) IV Intermittent every 12 hours    PRN MEDICATIONS  acetaminophen     Tablet .. 650 milliGRAM(s) Oral every 6 hours PRN  albuterol    90 MICROgram(s) HFA Inhaler 2 Puff(s) Inhalation every 6 hours PRN  aluminum hydroxide/magnesium hydroxide/simethicone Suspension 30 milliLiter(s) Oral every 4 hours PRN  dextrose Oral Gel 15 Gram(s) Oral once PRN  melatonin 5 milliGRAM(s) Oral at bedtime PRN  ondansetron Injectable 4 milliGRAM(s) IV Push every 8 hours PRN    HOME MEDICATIONS  Home Medications:  acetaminophen 325 mg oral capsule: 3 cap(s) orally every 8 hours (06 Jun 2024 11:15)  pregabalin 50 mg oral capsule: 1 cap(s) orally every 12 hours (06 Jun 2024 07:48)  Suboxone 8 mg-2 mg sublingual tablet: 2.5 film(s) sublingual once a day (28 May 2024 03:24)                              9.0    6.03  )-----------( 249      ( 01 Dec 2024 07:45 )             29.3       12-01    137  |  94[L]  |  12  ----------------------------<  80  3.3[L]   |  30  |  0.9    Ca    8.5      01 Dec 2024 07:45  Mg     2.0     12-01    TPro  7.0  /  Alb  3.8  /  TBili  0.2  /  DBili  x   /  AST  23  /  ALT  <5  /  AlkPhos  78  12-01              Urinalysis Basic - ( 01 Dec 2024 07:45 )    Color: x / Appearance: x / SG: x / pH: x  Gluc: 80 mg/dL / Ketone: x  / Bili: x / Urobili: x   Blood: x / Protein: x / Nitrite: x   Leuk Esterase: x / RBC: x / WBC x   Sq Epi: x / Non Sq Epi: x / Bacteria: x            Lactate Trend  11-28 @ 22:04 Lactate:1.4             CAPILLARY BLOOD GLUCOSE      POCT Blood Glucose.: 128 mg/dL (02 Dec 2024 11:30)        Culture Results:   No growth at 48 Hours (11-28 @ 22:04)  Culture Results:   No growth at 48 Hours (11-28 @ 22:04)  LABS:                   PHYSICAL EXAM:  T(C): 36.3 (11-30-24 @ 14:45), Max: 37.8 (11-29-24 @ 16:22)  HR: 85 (11-30-24 @ 14:45) (62 - 85)  BP: 92/50 (11-30-24 @ 14:45) (92/50 - 98/59)  RR: 18 (11-30-24 @ 14:45) (18 - 18)  SpO2: 95% (11-30-24 @ 14:45) (93% - 96%)    GENERAL: NAD, well-developed  RESPIRATORY: Clear to auscultation bilaterally  CARDIOVASCULAR: Regular rate and rhythm;  GASTROINTESTINAL: Abdomen Soft, Nontender, Nondistended  MUSCULOSKELETAL:  bilateral stasis dermatitis, RLE erythema with warmth  PSYCH: AAOx3, affect appropriate  NEUROLOGY: non-focal, cognition grossly intact, MAEE    ADMISSION SUMMARY  Patient is a 53y old Female who presents with a chief complaint of  
EZRA BARCLAY 53y Female  MRN#: [id number]     SUBJECTIVE  Patient is a 53y old Female who presents with a chief complaint of   Interval/Overnight Events:    OBJECTIVE  PAST MEDICAL & SURGICAL HISTORY  Asthma with COPD    Hypothyroidism    H/O: substance abuse  no longer using    History of pancreatitis    Hepatitis-C    Leukocytoclastic vasculitis    History of appendectomy    History of tonsillectomy      ALLERGIES:  No Known Allergies    MEDICATIONS:  STANDING MEDICATIONS  aspirin enteric coated 81 milliGRAM(s) Oral daily  atorvastatin 20 milliGRAM(s) Oral at bedtime  buprenorphine 8 mG/naloxone 2 mG SL  Tablet 1 Tablet(s) SubLingual daily  cefTRIAXone   IVPB 1000 milliGRAM(s) IV Intermittent every 24 hours  colchicine 0.6 milliGRAM(s) Oral two times a day  dextrose 5%. 1000 milliLiter(s) IV Continuous <Continuous>  dextrose 50% Injectable 25 Gram(s) IV Push once  furosemide    Tablet 40 milliGRAM(s) Oral daily  insulin glargine Injectable (LANTUS) 28 Unit(s) SubCutaneous at bedtime  insulin lispro (ADMELOG) corrective regimen sliding scale   SubCutaneous three times a day before meals  levothyroxine 175 MICROGram(s) Oral daily  metFORMIN 1000 milliGRAM(s) Oral two times a day  pantoprazole    Tablet 40 milliGRAM(s) Oral before breakfast  pregabalin 50 milliGRAM(s) Oral every 12 hours  vancomycin  IVPB 750 milliGRAM(s) IV Intermittent every 12 hours    PRN MEDICATIONS  acetaminophen     Tablet .. 650 milliGRAM(s) Oral every 6 hours PRN  albuterol    90 MICROgram(s) HFA Inhaler 2 Puff(s) Inhalation every 6 hours PRN  aluminum hydroxide/magnesium hydroxide/simethicone Suspension 30 milliLiter(s) Oral every 4 hours PRN  dextrose Oral Gel 15 Gram(s) Oral once PRN  melatonin 5 milliGRAM(s) Oral at bedtime PRN  ondansetron Injectable 4 milliGRAM(s) IV Push every 8 hours PRN    HOME MEDICATIONS  Home Medications:  acetaminophen 325 mg oral capsule: 3 cap(s) orally every 8 hours (06 Jun 2024 11:15)  pregabalin 50 mg oral capsule: 1 cap(s) orally every 12 hours (06 Jun 2024 07:48)  Suboxone 8 mg-2 mg sublingual tablet: 2.5 film(s) sublingual once a day (28 May 2024 03:24)      LABS:                                       9.0    6.03  )-----------( 249      ( 01 Dec 2024 07:45 )             29.3       12-01    137  |  94[L]  |  12  ----------------------------<  80  3.3[L]   |  30  |  0.9    Ca    8.5      01 Dec 2024 07:45  Mg     2.0     12-01    TPro  7.0  /  Alb  3.8  /  TBili  0.2  /  DBili  x   /  AST  23  /  ALT  <5  /  AlkPhos  78  12-01              Urinalysis Basic - ( 01 Dec 2024 07:45 )    Color: x / Appearance: x / SG: x / pH: x  Gluc: 80 mg/dL / Ketone: x  / Bili: x / Urobili: x   Blood: x / Protein: x / Nitrite: x   Leuk Esterase: x / RBC: x / WBC x   Sq Epi: x / Non Sq Epi: x / Bacteria: x            Lactate Trend  11-28 @ 22:04 Lactate:1.4             CAPILLARY BLOOD GLUCOSE      POCT Blood Glucose.: 118 mg/dL (01 Dec 2024 11:21)        Culture Results:   No growth at 24 hours (11-28 @ 22:04)  Culture Results:   No growth at 24 hours (11-28 @ 22:04)        PHYSICAL EXAM:  T(C): 36.3 (11-30-24 @ 14:45), Max: 37.8 (11-29-24 @ 16:22)  HR: 85 (11-30-24 @ 14:45) (62 - 85)  BP: 92/50 (11-30-24 @ 14:45) (92/50 - 98/59)  RR: 18 (11-30-24 @ 14:45) (18 - 18)  SpO2: 95% (11-30-24 @ 14:45) (93% - 96%)    GENERAL: NAD, well-developed, 53y  RESPIRATORY: Clear to auscultation bilaterally  CARDIOVASCULAR: Regular rate and rhythm;  GASTROINTESTINAL: Abdomen Soft, Nontender, Nondistended  MUSCULOSKELETAL:  bilateral stasis dermatitis, RLE erythema with warmth  PSYCH: AAOx3, affect appropriate  NEUROLOGY: non-focal, cognition grossly intact, MAEE    ADMISSION SUMMARY  Patient is a 53y old Female who presents with a chief complaint of  
INFECTIOUS DISEASE FOLLOW UP NOTE:    Interval History/ROS: Patient is a 53y old  Female who presents with a chief complaint of     Overnight events: RLE is still swelling. It is dry, no active wound.     REVIEW OF SYSTEMS:  CONSTITUTIONAL: No fever or chills  HEAD: No lesion on scalp  EYES: No visual disturbance  ENT: No sore throat  RESPIRATORY: No cough, no shortness of breath  CARDIOVASCULAR: No chest pain or palpitations  GASTROINTESTINAL: No abdominal or epigastric pain  GENITOURINARY: No dysuria  NEUROLOGICAL: No headache/dizziness  MUSCULOSKELETAL: No joint pain, erythema, or swelling; no back pain  SKIN: Bilateral LE swelling with mild erythema; no active wound  All other ROS negative except noted above        Prior hospital charts reviewed [Yes]  Primary team notes reviewed [Yes]  Other consultant notes reviewed [Yes]    Allergies:  No Known Allergies      ANTIMICROBIALS:   cefTRIAXone   IVPB 1000 every 24 hours  vancomycin  IVPB 1000 every 12 hours      OTHER MEDS: MEDICATIONS  (STANDING):  acetaminophen     Tablet .. 650 every 6 hours PRN  albuterol    90 MICROgram(s) HFA Inhaler 2 every 6 hours PRN  aluminum hydroxide/magnesium hydroxide/simethicone Suspension 30 every 4 hours PRN  aspirin enteric coated 81 daily  atorvastatin 20 at bedtime  buprenorphine 8 mG/naloxone 2 mG SL  Tablet 2.5 daily  colchicine 0.6 two times a day  dextrose 50% Injectable 25 once  dextrose Oral Gel 15 once PRN  furosemide    Tablet 40 daily  insulin glargine Injectable (LANTUS) 28 at bedtime  insulin lispro (ADMELOG) corrective regimen sliding scale  three times a day before meals  levothyroxine 175 daily  melatonin 5 at bedtime PRN  metFORMIN 1000 two times a day  ondansetron Injectable 4 every 8 hours PRN  pantoprazole    Tablet 40 before breakfast  polyethylene glycol 3350 17 daily PRN  pregabalin 50 every 12 hours  senna 2 at bedtime      Vital Signs Last 24 Hrs  T(F): 98 (12-03-24 @ 04:48), Max: 102.8 (11-28-24 @ 21:52)    Vital Signs Last 24 Hrs  HR: 66 (12-03-24 @ 04:48) (60 - 66)  BP: 90/60 (12-03-24 @ 04:48) (90/60 - 109/67)  RR: 18 (12-03-24 @ 04:48)  SpO2: 96% (12-03-24 @ 04:48) (96% - 97%)  Wt(kg): --    EXAM:  GENERAL: NAD, lying in bed  HEAD: No head lesions  EYES: Conjunctiva pink and cornea white  EAR, NOSE, MOUTH, THROAT: Normal external ears and nose, no discharges; moist mucous membranes  NECK: Supple, nontender to palpation; no JVD  RESPIRATORY: Clear to auscultation bilaterally  CARDIOVASCULAR: S1 S2  GASTROINTESTINAL: Soft, nontender, nondistended; normoactive bowel sounds  GENITOURINARY: No dan catheter, no CVA tenderness  EXTREMITIES: No clubbing, cyanosis, or petal edema  NERVOUS SYSTEM: Alert and oriented to person, time, place and situation, speech clear. No focal deficits   MUSCULOSKELETAL: No joint erythema, swelling or pain  SKIN: Bilateral LE erythema and swelling, worse on RLE;, no superficial thrombophlebitis  PSYCH: Normal affect      Labs:                        8.8    7.09  )-----------( 256      ( 03 Dec 2024 07:21 )             27.8     12-03    138  |  95[L]  |  16  ----------------------------<  106[H]  3.3[L]   |  30  |  0.9    Ca    8.4      03 Dec 2024 07:21  Mg     2.0     12-03    TPro  6.7  /  Alb  3.6  /  TBili  0.2  /  DBili  x   /  AST  20  /  ALT  <5  /  AlkPhos  106  12-03      WBC Trend:  WBC Count: 7.09 (12-03-24 @ 07:21)  WBC Count: 6.03 (12-01-24 @ 07:45)  WBC Count: 6.04 (11-30-24 @ 06:29)  WBC Count: 7.85 (11-29-24 @ 10:30)      Creatine Trend:  Creatinine: 0.9 (12-03)  Creatinine: 0.9 (12-01)  Creatinine: 1.0 (11-30)  Creatinine: 0.7 (11-29)      Liver Biochemical Testing Trend:  Alanine Aminotransferase (ALT/SGPT): <5 (12-03)  Alanine Aminotransferase (ALT/SGPT): <5 (12-01)  Alanine Aminotransferase (ALT/SGPT): <5 (11-29)  Alanine Aminotransferase (ALT/SGPT): 5 (11-28)  Alanine Aminotransferase (ALT/SGPT): 8 (11-14)  Aspartate Aminotransferase (AST/SGOT): 20 (12-03-24 @ 07:21)  Aspartate Aminotransferase (AST/SGOT): 23 (12-01-24 @ 07:45)  Aspartate Aminotransferase (AST/SGOT): 23 (11-29-24 @ 10:30)  Aspartate Aminotransferase (AST/SGOT): 27 (11-28-24 @ 22:04)  Aspartate Aminotransferase (AST/SGOT): 36 (11-14-24 @ 19:26)  Bilirubin Total: 0.2 (12-03)  Bilirubin Total: 0.2 (12-01)  Bilirubin Total: 0.6 (11-29)  Bilirubin Total: 0.9 (11-28)  Bilirubin Total: 0.4 (11-14)      Trend LDH  01-10-24 @ 06:55  230      Urinalysis Basic - ( 03 Dec 2024 07:21 )    Color: x / Appearance: x / SG: x / pH: x  Gluc: 106 mg/dL / Ketone: x  / Bili: x / Urobili: x   Blood: x / Protein: x / Nitrite: x   Leuk Esterase: x / RBC: x / WBC x   Sq Epi: x / Non Sq Epi: x / Bacteria: x        MICROBIOLOGY:  Vancomycin Level, Random: 13.0 (11-30 @ 10:54)    MRSA PCR Result.: Negative (05-04-24 @ 07:00)      Urinalysis with Rflx Culture (collected 29 Nov 2024 00:13)    Culture - Blood (collected 28 Nov 2024 22:04)  Source: .Blood BLOOD  Preliminary Report:    No growth at 72 Hours    Culture - Blood (collected 28 Nov 2024 22:04)  Source: .Blood BLOOD  Preliminary Report:    No growth at 72 Hours    Urinalysis with Rflx Culture (collected 14 Nov 2024 18:42)    Culture - Blood (collected 27 May 2024 21:10)  Source: .Blood Blood  Final Report:    No growth at 5 days    Culture - Blood (collected 27 May 2024 21:10)  Source: .Blood Blood  Final Report:    No growth at 5 days    Culture - Other (collected 06 May 2024 16:16)  Source: .Other LLE  Final Report:    Rare Staphylococcus aureus    Normal skin sumit isolated  Organism: Staphylococcus aureus  Organism: Staphylococcus aureus    Sensitivities:      Method Type: REED      -  Clindamycin: R 0.5 This isolate is presumed to be clindamycin resistant based on detection of inducible resistance. Clindamycin may still be effective in some patients.      -  Erythromycin: R >4      -  Gentamicin: S <=1 Should not be used as monotherapy      -  Oxacillin: S 0.5 Oxacillin predicts susceptibility for dicloxacillin, methicillin, and nafcillin      -  Penicillin: R >8      -  Rifampin: S <=1 Should not be used as monotherapy      -  Tetracycline: S <=1      -  Trimethoprim/Sulfamethoxazole: S <=0.5/9.5      -  Vancomycin: S 2    Culture - Other (collected 06 May 2024 16:16)  Source: .Other RLE  Final Report:    Rare Staphylococcus aureus  Organism: Staphylococcus aureus  Organism: Staphylococcus aureus    Sensitivities:      Method Type: REED      -  Clindamycin: R 0.5 This isolate is presumed to be clindamycin resistant based on detection of inducible resistance. Clindamycin may still be effective in some patients.      -  Erythromycin: R >4      -  Gentamicin: S <=1 Should not be used as monotherapy      -  Oxacillin: S 0.5 Oxacillin predicts susceptibility for dicloxacillin, methicillin, and nafcillin      -  Penicillin: R >8      -  Rifampin: S <=1 Should not be used as monotherapy      -  Tetracycline: S <=1      -  Trimethoprim/Sulfamethoxazole: S <=0.5/9.5      -  Vancomycin: S 2    Culture - Blood (collected 03 May 2024 17:21)  Source: .Blood Blood-Peripheral  Final Report:    No growth at 5 days    Culture - Blood (collected 03 May 2024 17:21)  Source: .Blood Blood-Peripheral  Final Report:    No growth at 5 days      HIV-1/2 Combo Result: Nonreact (01-11-24 @ 13:40)      RADIOLOGY:  imaging below personally reviewed    < from: Xray Chest 1 View- PORTABLE-Urgent (11.28.24 @ 23:00) >    IMPRESSION:    Small right pleural effusion and bibasilar opacities.    < end of copied text >

## 2024-12-03 NOTE — DISCHARGE NOTE NURSING/CASE MANAGEMENT/SOCIAL WORK - PATIENT PORTAL LINK FT
You can access the FollowMyHealth Patient Portal offered by [facility] by registering at the following website: [email or web address]. By joining [email or web address]’s FollowMyHealth portal, you will also be able to view your health information using other applications (apps) compatible with our system.

## 2024-12-03 NOTE — DISCHARGE NOTE NURSING/CASE MANAGEMENT/SOCIAL WORK - FINANCIAL ASSISTANCE
[facility] provides services at a reduced cost to those who are determined to be eligible through [facility]’s financial assistance program. Information regarding [facility]’s financial assistance program can be found by going to [email or web address] or by calling [phone].

## 2024-12-03 NOTE — DISCHARGE NOTE PROVIDER - NSDCFUSCHEDAPPT_GEN_ALL_CORE_FT
[facility] Physician [facility]  VASCULAR [address]  Scheduled Appointment: 12/04/2024    Javier Soto  [facility] PreAdmits  Scheduled Appointment: 12/10/2024    Javier Soto Physician [facility]  PSYCHIATRY [facility] [place]  Scheduled Appointment: 12/10/2024

## 2024-12-03 NOTE — DISCHARGE NOTE PROVIDER - HOSPITAL COURSE
54 y/o Female, IVDA on suboxone, HTN, HLD, Hep C. presents with c/o generalized weakness and subjective fever/chills x 2 weeks and worsening lower extremity cellulitis R>L. Admitted for R LE cellulitis.    #RLE Cellulitis  #BL LE Stasis dermatitis  - s/p vancomycin and ceftriaxone  - seen by ID: transitioned to Ceftin to complete 7 days  - local wound care   - blood cultures NGTD    # Hyponatremia  - resolved s/p IV fluids    #hx of IV drug use  - c/w Suboxone    # HTN  - c/w home meds    # DM  - c/w home meds    # HLD  - c/w atorvastatin 20mg qhs    #hypothyroidism  - c/w synthoid 175mcg daily       54 y/o Female, IVDA on suboxone, HTN, HLD, Hep C. presents with c/o generalized weakness and subjective fever/chills x 2 weeks and worsening lower extremity cellulitis R>L. Admitted for R LE cellulitis.    #RLE Cellulitis  #BL LE Stasis dermatitis  #Septic on admission  - s/p vancomycin and ceftriaxone  - seen by ID: transitioned to Ceftin to complete 7 days  - local wound care   - blood cultures NGTD    # Hyponatremia  - resolved s/p IV fluids    #hx of IV drug use  - c/w Suboxone    # HTN  - c/w home meds    # DM  - c/w home meds    # HLD  - c/w atorvastatin 20mg qhs    #hypothyroidism  - c/w synthoid 175mcg daily

## 2024-12-04 ENCOUNTER — APPOINTMENT (OUTPATIENT)
Dept: VASCULAR SURGERY | Facility: CLINIC | Age: 54
End: 2024-12-04

## 2024-12-04 NOTE — CONSULT NOTE ADULT - ASSESSMENT
ASSESSMENT  This is a 54 y/o Female, IVDA on suboxone, HTN, HLD, Hep C presents with c/o generalized weakness and subjective fever/chills x 2 weeks and worsening lower extremity cellulitis.     IMPRESSION  #Stasis dermatitis with possible superimposed cellulitis  #Fever  #Polysubstance use disorder on Suboxone  #HTN, HLD  #History of Hep C- Self resolved.   #Recurrent admission in the past for lower extremity cellulitis? 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  Hepatitis C Ab +, PCR undetectable 2/2/24  Cryoglobulin negative 2/2/24  Hepatitis B immune, HIV screen negative.   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  -IV Vancomycin. Dosing as per the pharmacy protocol.   -IV ceftriaxone 1 gram q 24 hours.   -Local wound care. Consider obtaining CT of the right lower extremity if swelling worsens.   -Off loading to prevent pressure sores and preventive measures to avoid aspiration    If any questions, please send a message or call on [employer] Teams  Please continue to update ID with any pertinent new laboratory or radiographic findings.    Stella Chavez M.D  Infectious Diseases Attending/   [employer] [facility]  
                       Skin assessed-  B/L lower extremity,  multiple  open wounds dry ,scabs, with edema and erythema                                                High risk for pressure injury development or progression           Plan:  Wound and skin care   Clean lower extremity wounds with soap and water, pat dry then apply silvadene dressing  daily   Elevate lower extremity when sitting dependent   Pressure injury  prevention   skin  and incontinence care   Assess wound and inform primary provider of any changes   Case discussed with primary Rn  Wound/ ostomy specialist  to f/u as needed     Offloading: [x ] Frequent position changes [ ] Devices/Equipment  Cleansing: [ ] Saline [x ] Soap/Water [ ] Other: ______  Topicals: [ ] Barrier Cream [x ] Antimicrobial [ ] Enzymatic Wound Debridement  Dressings: [ ] Dry, sterile [ ] Allevyn  Foam [ ] Absorbant Pads [ ] Collagenase    Other Recs.    per primary team      Total time for bedside assessment , review of medical records  and  discussion of plan of care with primary team greater than 35 min

## 2024-12-04 NOTE — CONSULT NOTE ADULT - SUBJECTIVE AND OBJECTIVE BOX
Patient is a 52 y/o Female, IVDA on suboxone, HTN, HLD, Hep C. presents with c/o generalized weakness and subjective fever/chills x 2 weeks and worsening lower extremity cellulitis R>L.   PT is now w/c bound due to lower extremities and sees Dr Adair, [facility].  Pt denies cp, sob, abd pain, n/v/d.  Presently, pt in NAD.        PAST MEDICAL & SURGICAL HISTORY:  Asthma with COPD  Hypothyroidism  H/O: substance abuse  no longer using  History of pancreatitis  Hepatitis-C  Leukocytoclastic vasculitis  History of appendectomy  History of tonsillectomy          REVIEW OF SYSTEMS:  All other systems negative    MEDICATIONS  (STANDING):  MEDICATIONS  (PRN):      Allergies  No Known Allergies  Intolerances        SOCIAL HISTORY:   From Home     FAMILY HISTORY:         PHYSICAL EXAM:  CONSTITUTIONAL: NAD  HEAD: No lesion on scalp  EYES: No visual disturbance  ENT: No sore throat  RESPIRATORY: No cough, no shortness of breath  CARDIOVASCULAR: No chest pain or palpitations  GASTROINTESTINAL: No abdominal or epigastric pain  GENITOURINARY: No dysuria  NEUROLOGICAL: No headache/dizziness  MUSCULOSKELETAL: No joint pain, erythema, or swelling; no back pain  SKIN: : B/L lower extremity,  multiple  open wounds, scabs with  edema and redness           LABS/ CULTURES/ RADIOLOGY:                        8.8    7.09  )-----------( 256      ( 03 Dec 2024 07:21 )             27.8       138  |  95  |  16  ----------------------------<  106      [12-03-24 @ 07:21]  3.3   |  30  |  0.9        Ca     8.4     [12-03-24 @ 07:21]      Mg     2.0     [12-03-24 @ 07:21]    TPro  6.7  /  Alb  3.6  /  TBili  0.2  /  DBili  x   /  AST  20  /  ALT  <5  /  AlkPhos  106  [12-03-24 @ 07:21]              Culture - Blood (collected 11-28-24 @ 22:04)  Source: .Blood BLOOD  Preliminary Report (12-03-24 @ 17:00):    No growth at 4 days    Culture - Blood (collected 11-28-24 @ 22:04)  Source: .Blood BLOOD  Preliminary Report (12-03-24 @ 17:00):    No growth at 4 days                      
EZRA BARCLAY  53y, Female  Allergies    No Known Allergies    Intolerances      LOS      HPI  HPI:  52 y/o Female, IVDA on suboxone, HTN, HLD, Hep C. presents with c/o generalized weakness and subjective fever/chills x 2 weeks and worsening lower extremity cellulitis R>L.   PT is now w/c bound due to lower extremities and sees Dr Adair, [facility].  Pt denies cp, sob, abd pain, n/v/d.  Presently, pt in NAD.     (29 Nov 2024 00:43)      INFECTIOUS DISEASE HISTORY:  Hospital course-  ID consulted for antimicrobial recommendations.     Prior hospital charts reviewed [Yes]  Primary team notes reviewed [Yes]  Other consultant notes reviewed [Yes]    REVIEW OF SYSTEMS:  CONSTITUTIONAL: No fever or chills  HEENT: No sore throat  RESPIRATORY: No cough, no shortness of breath  CARDIOVASCULAR: No chest pain or palpitations  GASTROINTESTINAL: No abdominal or epigastric pain  GENITOURINARY: No dysuria  NEUROLOGICAL: No headache/dizziness  MSK: No joint pain, erythema, or swelling; no back pain  SKIN: No itching, rashes  All other ROS negative except noted above    PAST MEDICAL & SURGICAL HISTORY:  Asthma with COPD      Hypothyroidism      H/O: substance abuse  no longer using      History of pancreatitis      Hepatitis-C      Leukocytoclastic vasculitis      History of appendectomy      History of tonsillectomy      SOCIAL HISTORY:  - No recent travel    FAMILY HISTORY: No pertinent PMH for first degree relatives.     ANTIMICROBIALS:  cefTRIAXone   IVPB 1000 every 24 hours  vancomycin  IVPB 750 every 12 hours      ANTIMICROBIALS (past 90 days):  MEDICATIONS  (STANDING):  cefTRIAXone   IVPB   100 mL/Hr IV Intermittent (11-28-24 @ 22:17)    vancomycin  IVPB.   250 mL/Hr IV Intermittent (11-29-24 @ 03:17)        OTHER MEDS:   MEDICATIONS  (STANDING):  acetaminophen     Tablet .. 650 every 6 hours PRN  albuterol    90 MICROgram(s) HFA Inhaler 2 every 6 hours PRN  aluminum hydroxide/magnesium hydroxide/simethicone Suspension 30 every 4 hours PRN  aspirin enteric coated 81 daily  atorvastatin 20 at bedtime  buprenorphine 8 mG/naloxone 2 mG SL  Tablet 1 daily  colchicine 0.6 two times a day  dextrose 50% Injectable 25 once  dextrose Oral Gel 15 once PRN  furosemide    Tablet 40 daily  insulin glargine Injectable (LANTUS) 28 at bedtime  insulin lispro (ADMELOG) corrective regimen sliding scale  three times a day before meals  levothyroxine 175 daily  melatonin 5 at bedtime PRN  metFORMIN 1000 two times a day  ondansetron Injectable 4 every 8 hours PRN  pantoprazole    Tablet 40 before breakfast  pregabalin 50 every 12 hours      VITALS:  Vital Signs Last 24 Hrs  T(F): 97.6 (11-29-24 @ 04:49), Max: 102.8 (11-28-24 @ 21:52)    Vital Signs Last 24 Hrs  HR: 65 (11-29-24 @ 04:49) (64 - 80)  BP: 130/76 (11-29-24 @ 04:49) (99/63 - 130/76)  RR: 18 (11-29-24 @ 04:49)  SpO2: 99% (11-29-24 @ 04:49) (96% - 99%)  Wt(kg): --    EXAM:  GENERAL: Somnolent. On NC  HEAD: No head lesions  NECK: Supple  CHEST/LUNG: Clear to auscultation bilaterally  HEART: S1 S2  ABDOMEN: Soft, nontender  EXTREMITIES: Diffuse stasis dermatitis with skin peeling R>L. Warm and tender.   NERVOUS SYSTEM: Somnolent. Able to answer questions.   MSK: No joint erythema, swelling or pain  SKIN: No rashes or lesions, no superficial thrombophlebitis    Labs:                        9.2    7.85  )-----------( 214      ( 29 Nov 2024 10:30 )             28.2     11-29    134[L]  |  98  |  12  ----------------------------<  146[H]  3.8   |  24  |  0.7    Ca    8.4      29 Nov 2024 10:30    TPro  6.9  /  Alb  3.9  /  TBili  0.6  /  DBili  x   /  AST  23  /  ALT  <5  /  AlkPhos  75  11-29      WBC Trend:  WBC Count: 7.85 (11-29-24 @ 10:30)  WBC Count: 7.84 (11-28-24 @ 22:04)      Auto Neutrophil #: 6.74 K/uL (11-29-24 @ 10:30)  Auto Neutrophil #: 6.44 K/uL (11-28-24 @ 22:04)  Auto Neutrophil #: 5.14 K/uL (11-14-24 @ 19:26)  Auto Neutrophil #: 2.49 K/uL (06-06-24 @ 06:48)  Auto Neutrophil #: 4.71 K/uL (05-29-24 @ 06:26)      Creatine Trend:  Creatinine: 0.7 (11-29)  Creatinine: 1.1 (11-28)      Liver Biochemical Testing Trend:  Alanine Aminotransferase (ALT/SGPT): <5 (11-29)  Alanine Aminotransferase (ALT/SGPT): 5 (11-28)  Alanine Aminotransferase (ALT/SGPT): 8 (11-14)  Alanine Aminotransferase (ALT/SGPT): 5 (06-06)  Alanine Aminotransferase (ALT/SGPT): 5 (05-31)  Aspartate Aminotransferase (AST/SGOT): 23 (11-29-24 @ 10:30)  Aspartate Aminotransferase (AST/SGOT): 27 (11-28-24 @ 22:04)  Aspartate Aminotransferase (AST/SGOT): 36 (11-14-24 @ 19:26)  Aspartate Aminotransferase (AST/SGOT): 24 (06-06-24 @ 06:48)  Aspartate Aminotransferase (AST/SGOT): 28 (05-31-24 @ 06:51)  Bilirubin Total: 0.6 (11-29)  Bilirubin Total: 0.9 (11-28)  Bilirubin Total: 0.4 (11-14)  Bilirubin Total: <0.2 (06-06)  Bilirubin Total: 0.3 (05-31)      Trend LDH  01-10-24 @ 06:55  230      Auto Eosinophil %: 0.5 % (11-29-24 @ 10:30)  Auto Eosinophil %: 0.1 % (11-28-24 @ 22:04)      Urinalysis Basic - ( 29 Nov 2024 10:30 )    Color: x / Appearance: x / SG: x / pH: x  Gluc: 146 mg/dL / Ketone: x  / Bili: x / Urobili: x   Blood: x / Protein: x / Nitrite: x   Leuk Esterase: x / RBC: x / WBC x   Sq Epi: x / Non Sq Epi: x / Bacteria: x        MICROBIOLOGY:    Female    Urinalysis with Rflx Culture (collected 29 Nov 2024 00:13)            HIV-1/2 Combo Result: Nonreact (01-11-24 @ 13:40)    Lactate, Blood: 1.4 (11-28 @ 22:04)  Blood Gas Venous - Lactate: 1.7 (11-28 @ 22:04)        INFLAMMATORY MARKERS      RADIOLOGY & ADDITIONAL TESTS:  I have personally reviewed the imagings.  CXR  Xray Chest 1 View- PORTABLE-Urgent:   ACC: [id number] EXAM:  XR CHEST PORTABLE URGENT 1V   ORDERED BY: KEESHA GALAN     PROCEDURE DATE:  11/28/2024          INTERPRETATION:  CLINICAL HISTORY: Sepsis.    COMPARISON: Chest radiograph dated November 14, 2024.    TECHNIQUE: Portable frontal chest radiograph.    FINDINGS:    Support devices: None.    Cardiac/mediastinum/hilum: Stable.    Lung parenchyma/Pleura: Small right pleural effusion and bibasilar   opacities. No pneumothorax.    Skeleton/soft tissues: Stable.      IMPRESSION:    Small right pleural effusion and bibasilar opacities.    --- End of Report ---            RAFALE LOMAX MD; Attending Radiologist  This document has been electronically signed. Nov 29 2024  8:27AM (11-28-24 @ 23:00)      CT  < from: CT Angio Chest PE Protocol w/ IV Cont (11.15.24 @ 01:26) >    IMPRESSION:    No pulmonary embolus seen. No CT evidence of acute right heart strain.    Emphysema. Diffuse cystic changes along some which are irregularly   shaped. There is background lung mosaic attenuation lung parenchyma of   unknown chronicity. The patient history is unclear. Findings are   nonspecific with differential including desquamative interstitial   pneumonia/smoking related interstitial disease, lymphoid interstitial   pneumonia, PCP, and pulmonary langerhans cell histiocytosis. Correlation   with patient's clinical history would be beneficial. Follow-up in 6-12   months with a high-resolution chest CT is suggested to evaluate for   interval change. If the patient is demonstrating chronic shortness of   breath/respiratory symptoms, outpatient pulmonology follow-up is   recommended.    --- End of Report ---    < end of copied text >      CARDIOLOGY TESTING  12 Lead ECG:   Ventricular Rate 75 BPM    Atrial Rate 75 BPM    P-R Interval 134 ms    QRS Duration 112 ms    Q-T Interval 362 ms    QTC Calculation(Bazett) 404 ms    P Axis 68 degrees    R Axis 92 degrees    T Axis -59 degrees    Diagnosis Line    Normal sinus rhythm  Rightward axis  Low voltage QRS  ST & T wave abnormality, consider anterior ischemia  Abnormal ECG    Confirmed by TERRENCE NEWBY MD ([id number]) on 11/29/2024 7:03:57 AM (11-28-24 @ 22:28)

## 2024-12-04 NOTE — CHART NOTE - NSCHARTNOTEFT_GEN_A_CORE
Chart reviewed and patient examined today at bedside. Admitted for B/L worsening lymphedema and cellulitis of R LE. ID consult noted. C/w Vancomycin and Rocephin. Wound care eval.
Patient requires a rolling walker for d/c to home due to diagnosis of : unsteady gait, vasculitis
Phlebotomist was unable to obtain blood work this morning due to pt's poor peripheral vasculature.   Blood work was obtained by a PA from right radial artery with US guidance.
Wound care note noted by a  and was requested to send Rx Silvadene cream for a pt post discharge.   Rx sent.

## 2024-12-10 ENCOUNTER — APPOINTMENT (OUTPATIENT)
Dept: CARE COORDINATION | Facility: HOME HEALTH | Age: 54
End: 2024-12-10

## 2024-12-10 ENCOUNTER — OUTPATIENT (OUTPATIENT)
Dept: OUTPATIENT SERVICES | Facility: HOSPITAL | Age: 54
LOS: 1 days | End: 2024-12-10
Payer: MEDICAID

## 2024-12-10 ENCOUNTER — APPOINTMENT (OUTPATIENT)
Dept: PSYCHIATRY | Facility: CLINIC | Age: 54
End: 2024-12-10
Payer: MEDICAID

## 2024-12-10 ENCOUNTER — APPOINTMENT (OUTPATIENT)
Dept: CARE COORDINATION | Facility: HOME HEALTH | Age: 54
End: 2024-12-10
Payer: MEDICAID

## 2024-12-10 DIAGNOSIS — Z90.49 ACQUIRED ABSENCE OF OTHER SPECIFIED PARTS OF DIGESTIVE TRACT: Chronic | ICD-10-CM

## 2024-12-10 DIAGNOSIS — E11.69 TYPE 2 DIABETES MELLITUS WITH OTHER SPECIFIED COMPLICATION: ICD-10-CM

## 2024-12-10 DIAGNOSIS — L97.929 VARICOSE VEINS OF RIGHT LOWER EXTREMITY WITH ULCER OF UNSPECIFIED SITE: ICD-10-CM

## 2024-12-10 DIAGNOSIS — J45.909 UNSPECIFIED ASTHMA, UNCOMPLICATED: ICD-10-CM

## 2024-12-10 DIAGNOSIS — Z79.4 TYPE 2 DIABETES MELLITUS WITH OTHER SPECIFIED COMPLICATION: ICD-10-CM

## 2024-12-10 DIAGNOSIS — F10.20 ALCOHOL DEPENDENCE, UNCOMPLICATED: ICD-10-CM

## 2024-12-10 DIAGNOSIS — I83.029 VARICOSE VEINS OF RIGHT LOWER EXTREMITY WITH ULCER OF UNSPECIFIED SITE: ICD-10-CM

## 2024-12-10 DIAGNOSIS — E78.5 HYPERLIPIDEMIA, UNSPECIFIED: ICD-10-CM

## 2024-12-10 DIAGNOSIS — E11.43 TYPE 2 DIABETES MELLITUS WITH DIABETIC AUTONOMIC (POLY)NEUROPATHY: ICD-10-CM

## 2024-12-10 DIAGNOSIS — L97.919 VARICOSE VEINS OF RIGHT LOWER EXTREMITY WITH ULCER OF UNSPECIFIED SITE: ICD-10-CM

## 2024-12-10 DIAGNOSIS — J44.9 CHRONIC OBSTRUCTIVE PULMONARY DISEASE, UNSPECIFIED: ICD-10-CM

## 2024-12-10 DIAGNOSIS — I83.019 VARICOSE VEINS OF RIGHT LOWER EXTREMITY WITH ULCER OF UNSPECIFIED SITE: ICD-10-CM

## 2024-12-10 DIAGNOSIS — M1A.09X0 IDIOPATHIC CHRONIC GOUT, MULTIPLE SITES, W/OUT TOPHUS (TOPHI): ICD-10-CM

## 2024-12-10 PROCEDURE — 99214 OFFICE O/P EST MOD 30 MIN: CPT | Mod: 95

## 2024-12-10 PROCEDURE — 99442: CPT

## 2024-12-11 DIAGNOSIS — F10.20 ALCOHOL DEPENDENCE, UNCOMPLICATED: ICD-10-CM

## 2024-12-11 PROBLEM — E78.5 HLD (HYPERLIPIDEMIA): Status: ACTIVE | Noted: 2024-12-11

## 2024-12-11 PROBLEM — M1A.09X0 CHRONIC GOUT OF MULTIPLE SITES, UNSPECIFIED CAUSE: Status: ACTIVE | Noted: 2024-12-11

## 2024-12-11 RX ORDER — ASPIRIN 81 MG/1
81 TABLET, COATED ORAL
Refills: 0 | Status: ACTIVE | COMMUNITY
Start: 2024-12-11

## 2024-12-11 RX ORDER — METFORMIN HYDROCHLORIDE 500 MG/1
500 TABLET, COATED ORAL TWICE DAILY
Refills: 0 | Status: ACTIVE | COMMUNITY
Start: 2024-12-11

## 2024-12-11 RX ORDER — PREGABALIN 50 MG/1
50 CAPSULE ORAL
Refills: 0 | Status: ACTIVE | COMMUNITY
Start: 2024-12-11

## 2024-12-11 RX ORDER — COLCHICINE 0.6 MG/1
0.6 TABLET ORAL TWICE DAILY
Refills: 0 | Status: ACTIVE | COMMUNITY
Start: 2024-12-11

## 2024-12-11 RX ORDER — ATORVASTATIN CALCIUM 20 MG/1
20 TABLET, FILM COATED ORAL
Qty: 30 | Refills: 0 | Status: ACTIVE | COMMUNITY
Start: 2024-12-11

## 2024-12-11 RX ORDER — PANTOPRAZOLE 40 MG/1
40 TABLET, DELAYED RELEASE ORAL DAILY
Qty: 30 | Refills: 1 | Status: ACTIVE | COMMUNITY
Start: 2024-12-11

## 2024-12-11 RX ORDER — INSULIN GLARGINE 100 [IU]/ML
100 INJECTION, SOLUTION SUBCUTANEOUS AT BEDTIME
Refills: 0 | Status: ACTIVE | COMMUNITY
Start: 2024-12-11

## 2024-12-12 PROBLEM — E11.69 TYPE 2 DIABETES MELLITUS WITH OTHER SPECIFIED COMPLICATION, WITH LONG-TERM CURRENT USE OF INSULIN: Status: ACTIVE | Noted: 2024-12-11

## 2024-12-12 PROBLEM — E11.43 DIABETIC AUTONOMIC NEUROPATHY ASSOCIATED WITH TYPE 2 DIABETES MELLITUS: Status: ACTIVE | Noted: 2024-12-11

## 2024-12-12 NOTE — ED PROVIDER NOTE - NSICDXPASTMEDICALHX_GEN_ALL_CORE_FT
PAST MEDICAL HISTORY:  Asthma with COPD     H/O: substance abuse no longer using    Hepatitis-C     History of pancreatitis     Hypothyroidism      no

## 2024-12-17 DIAGNOSIS — L03.116 CELLULITIS OF LEFT LOWER LIMB: ICD-10-CM

## 2024-12-17 DIAGNOSIS — Z86.19 PERSONAL HISTORY OF OTHER INFECTIOUS AND PARASITIC DISEASES: ICD-10-CM

## 2024-12-17 DIAGNOSIS — Z99.3 DEPENDENCE ON WHEELCHAIR: ICD-10-CM

## 2024-12-17 DIAGNOSIS — I10 ESSENTIAL (PRIMARY) HYPERTENSION: ICD-10-CM

## 2024-12-17 DIAGNOSIS — I87.2 VENOUS INSUFFICIENCY (CHRONIC) (PERIPHERAL): ICD-10-CM

## 2024-12-17 DIAGNOSIS — E66.9 OBESITY, UNSPECIFIED: ICD-10-CM

## 2024-12-17 DIAGNOSIS — D84.821 IMMUNODEFICIENCY DUE TO DRUGS: ICD-10-CM

## 2024-12-17 DIAGNOSIS — F11.20 OPIOID DEPENDENCE, UNCOMPLICATED: ICD-10-CM

## 2024-12-17 DIAGNOSIS — E11.9 TYPE 2 DIABETES MELLITUS WITHOUT COMPLICATIONS: ICD-10-CM

## 2024-12-17 DIAGNOSIS — E87.1 HYPO-OSMOLALITY AND HYPONATREMIA: ICD-10-CM

## 2024-12-17 DIAGNOSIS — M31.0 HYPERSENSITIVITY ANGIITIS: ICD-10-CM

## 2024-12-17 DIAGNOSIS — Z87.891 PERSONAL HISTORY OF NICOTINE DEPENDENCE: ICD-10-CM

## 2024-12-17 DIAGNOSIS — E03.9 HYPOTHYROIDISM, UNSPECIFIED: ICD-10-CM

## 2024-12-17 DIAGNOSIS — E78.5 HYPERLIPIDEMIA, UNSPECIFIED: ICD-10-CM

## 2024-12-17 DIAGNOSIS — L03.115 CELLULITIS OF RIGHT LOWER LIMB: ICD-10-CM

## 2024-12-17 DIAGNOSIS — Z79.84 LONG TERM (CURRENT) USE OF ORAL HYPOGLYCEMIC DRUGS: ICD-10-CM

## 2024-12-17 DIAGNOSIS — A41.9 SEPSIS, UNSPECIFIED ORGANISM: ICD-10-CM

## 2024-12-17 DIAGNOSIS — J44.9 CHRONIC OBSTRUCTIVE PULMONARY DISEASE, UNSPECIFIED: ICD-10-CM

## 2024-12-27 ENCOUNTER — INPATIENT (INPATIENT)
Facility: HOSPITAL | Age: 54
LOS: 10 days | Discharge: HOME HEALTH | DRG: 383 | End: 2025-01-07
Attending: STUDENT IN AN ORGANIZED HEALTH CARE EDUCATION/TRAINING PROGRAM | Admitting: STUDENT IN AN ORGANIZED HEALTH CARE EDUCATION/TRAINING PROGRAM
Payer: MEDICAID

## 2024-12-27 VITALS
HEART RATE: 81 BPM | HEIGHT: 68 IN | DIASTOLIC BLOOD PRESSURE: 73 MMHG | OXYGEN SATURATION: 100 % | RESPIRATION RATE: 18 BRPM | SYSTOLIC BLOOD PRESSURE: 114 MMHG | WEIGHT: 199.96 LBS | TEMPERATURE: 98 F

## 2024-12-27 DIAGNOSIS — L03.90 CELLULITIS, UNSPECIFIED: ICD-10-CM

## 2024-12-27 DIAGNOSIS — Z90.49 ACQUIRED ABSENCE OF OTHER SPECIFIED PARTS OF DIGESTIVE TRACT: Chronic | ICD-10-CM

## 2024-12-27 DIAGNOSIS — Z90.89 ACQUIRED ABSENCE OF OTHER ORGANS: Chronic | ICD-10-CM

## 2024-12-27 LAB
ALBUMIN SERPL ELPH-MCNC: 3.9 G/DL — SIGNIFICANT CHANGE UP (ref 3.5–5.2)
ALP SERPL-CCNC: 91 U/L — SIGNIFICANT CHANGE UP (ref 30–115)
ALT FLD-CCNC: <5 U/L — SIGNIFICANT CHANGE UP (ref 0–41)
ANION GAP SERPL CALC-SCNC: 11 MMOL/L — SIGNIFICANT CHANGE UP (ref 7–14)
APTT BLD: 37.7 SEC — SIGNIFICANT CHANGE UP (ref 27–39.2)
AST SERPL-CCNC: 34 U/L — SIGNIFICANT CHANGE UP (ref 0–41)
BASOPHILS # BLD AUTO: 0.06 K/UL — SIGNIFICANT CHANGE UP (ref 0–0.2)
BASOPHILS NFR BLD AUTO: 0.7 % — SIGNIFICANT CHANGE UP (ref 0–1)
BILIRUB SERPL-MCNC: 0.5 MG/DL — SIGNIFICANT CHANGE UP (ref 0.2–1.2)
BUN SERPL-MCNC: 8 MG/DL — LOW (ref 10–20)
CALCIUM SERPL-MCNC: 8.9 MG/DL — SIGNIFICANT CHANGE UP (ref 8.4–10.5)
CHLORIDE SERPL-SCNC: 94 MMOL/L — LOW (ref 98–110)
CO2 SERPL-SCNC: 29 MMOL/L — SIGNIFICANT CHANGE UP (ref 17–32)
CREAT SERPL-MCNC: 0.9 MG/DL — SIGNIFICANT CHANGE UP (ref 0.7–1.5)
EGFR: 76 ML/MIN/1.73M2 — SIGNIFICANT CHANGE UP
EOSINOPHIL # BLD AUTO: 0.06 K/UL — SIGNIFICANT CHANGE UP (ref 0–0.7)
EOSINOPHIL NFR BLD AUTO: 0.7 % — SIGNIFICANT CHANGE UP (ref 0–8)
GLUCOSE SERPL-MCNC: 171 MG/DL — HIGH (ref 70–99)
HCT VFR BLD CALC: 29.8 % — LOW (ref 37–47)
HGB BLD-MCNC: 9.4 G/DL — LOW (ref 12–16)
IMM GRANULOCYTES NFR BLD AUTO: 0.3 % — SIGNIFICANT CHANGE UP (ref 0.1–0.3)
INR BLD: 1.06 RATIO — SIGNIFICANT CHANGE UP (ref 0.65–1.3)
LACTATE SERPL-SCNC: 1.5 MMOL/L — SIGNIFICANT CHANGE UP (ref 0.7–2)
LYMPHOCYTES # BLD AUTO: 1.57 K/UL — SIGNIFICANT CHANGE UP (ref 1.2–3.4)
LYMPHOCYTES # BLD AUTO: 18 % — LOW (ref 20.5–51.1)
MCHC RBC-ENTMCNC: 26.9 PG — LOW (ref 27–31)
MCHC RBC-ENTMCNC: 31.5 G/DL — LOW (ref 32–37)
MCV RBC AUTO: 85.1 FL — SIGNIFICANT CHANGE UP (ref 81–99)
MONOCYTES # BLD AUTO: 0.63 K/UL — HIGH (ref 0.1–0.6)
MONOCYTES NFR BLD AUTO: 7.2 % — SIGNIFICANT CHANGE UP (ref 1.7–9.3)
NEUTROPHILS # BLD AUTO: 6.36 K/UL — SIGNIFICANT CHANGE UP (ref 1.4–6.5)
NEUTROPHILS NFR BLD AUTO: 73.1 % — SIGNIFICANT CHANGE UP (ref 42.2–75.2)
NRBC # BLD: 0 /100 WBCS — SIGNIFICANT CHANGE UP (ref 0–0)
PLATELET # BLD AUTO: 311 K/UL — SIGNIFICANT CHANGE UP (ref 130–400)
PMV BLD: 9.6 FL — SIGNIFICANT CHANGE UP (ref 7.4–10.4)
POTASSIUM SERPL-MCNC: 5.5 MMOL/L — HIGH (ref 3.5–5)
POTASSIUM SERPL-SCNC: 5.5 MMOL/L — HIGH (ref 3.5–5)
PROT SERPL-MCNC: 8.1 G/DL — HIGH (ref 6–8)
PROTHROM AB SERPL-ACNC: 12.5 SEC — SIGNIFICANT CHANGE UP (ref 9.95–12.87)
RBC # BLD: 3.5 M/UL — LOW (ref 4.2–5.4)
RBC # FLD: 15.4 % — HIGH (ref 11.5–14.5)
SODIUM SERPL-SCNC: 134 MMOL/L — LOW (ref 135–146)
WBC # BLD: 8.71 K/UL — SIGNIFICANT CHANGE UP (ref 4.8–10.8)
WBC # FLD AUTO: 8.71 K/UL — SIGNIFICANT CHANGE UP (ref 4.8–10.8)

## 2024-12-27 PROCEDURE — 87389 HIV-1 AG W/HIV-1&-2 AB AG IA: CPT

## 2024-12-27 PROCEDURE — 86612 BLASTOMYCES ANTIBODY: CPT

## 2024-12-27 PROCEDURE — 36415 COLL VENOUS BLD VENIPUNCTURE: CPT

## 2024-12-27 PROCEDURE — 87075 CULTR BACTERIA EXCEPT BLOOD: CPT

## 2024-12-27 PROCEDURE — 86140 C-REACTIVE PROTEIN: CPT

## 2024-12-27 PROCEDURE — 82595 ASSAY OF CRYOGLOBULIN: CPT

## 2024-12-27 PROCEDURE — 97162 PT EVAL MOD COMPLEX 30 MIN: CPT | Mod: GP

## 2024-12-27 PROCEDURE — 93925 LOWER EXTREMITY STUDY: CPT | Mod: 26

## 2024-12-27 PROCEDURE — 73630 X-RAY EXAM OF FOOT: CPT | Mod: RT

## 2024-12-27 PROCEDURE — 29125 APPL SHORT ARM SPLINT STATIC: CPT | Mod: LT

## 2024-12-27 PROCEDURE — 83605 ASSAY OF LACTIC ACID: CPT

## 2024-12-27 PROCEDURE — 82962 GLUCOSE BLOOD TEST: CPT

## 2024-12-27 PROCEDURE — 85027 COMPLETE CBC AUTOMATED: CPT

## 2024-12-27 PROCEDURE — 99285 EMERGENCY DEPT VISIT HI MDM: CPT | Mod: 25

## 2024-12-27 PROCEDURE — 85025 COMPLETE CBC W/AUTO DIFF WBC: CPT

## 2024-12-27 PROCEDURE — 93925 LOWER EXTREMITY STUDY: CPT

## 2024-12-27 PROCEDURE — 85730 THROMBOPLASTIN TIME PARTIAL: CPT

## 2024-12-27 PROCEDURE — 80048 BASIC METABOLIC PNL TOTAL CA: CPT

## 2024-12-27 PROCEDURE — 88305 TISSUE EXAM BY PATHOLOGIST: CPT

## 2024-12-27 PROCEDURE — 87077 CULTURE AEROBIC IDENTIFY: CPT

## 2024-12-27 PROCEDURE — 83735 ASSAY OF MAGNESIUM: CPT

## 2024-12-27 PROCEDURE — 90656 IIV3 VACC NO PRSV 0.5 ML IM: CPT

## 2024-12-27 PROCEDURE — 73590 X-RAY EXAM OF LOWER LEG: CPT | Mod: 26,RT

## 2024-12-27 PROCEDURE — 87040 BLOOD CULTURE FOR BACTERIA: CPT

## 2024-12-27 PROCEDURE — 73080 X-RAY EXAM OF ELBOW: CPT | Mod: LT

## 2024-12-27 PROCEDURE — 87522 HEPATITIS C REVRS TRNSCRPJ: CPT

## 2024-12-27 PROCEDURE — 73130 X-RAY EXAM OF HAND: CPT | Mod: 26,LT

## 2024-12-27 PROCEDURE — 83516 IMMUNOASSAY NONANTIBODY: CPT

## 2024-12-27 PROCEDURE — 85652 RBC SED RATE AUTOMATED: CPT

## 2024-12-27 PROCEDURE — 86635 COCCIDIOIDES ANTIBODY: CPT

## 2024-12-27 PROCEDURE — 85610 PROTHROMBIN TIME: CPT

## 2024-12-27 PROCEDURE — 87070 CULTURE OTHR SPECIMN AEROBIC: CPT

## 2024-12-27 PROCEDURE — 86403 PARTICLE AGGLUT ANTBDY SCRN: CPT

## 2024-12-27 PROCEDURE — 83036 HEMOGLOBIN GLYCOSYLATED A1C: CPT

## 2024-12-27 PROCEDURE — 80053 COMPREHEN METABOLIC PANEL: CPT

## 2024-12-27 PROCEDURE — 80202 ASSAY OF VANCOMYCIN: CPT

## 2024-12-27 RX ORDER — SODIUM CHLORIDE 9 MG/ML
1000 INJECTION, SOLUTION INTRAVENOUS
Refills: 0 | Status: DISCONTINUED | OUTPATIENT
Start: 2024-12-27 | End: 2025-01-07

## 2024-12-27 RX ORDER — VANCOMYCIN HYDROCHLORIDE 5 G/100ML
1250 INJECTION, POWDER, LYOPHILIZED, FOR SOLUTION INTRAVENOUS EVERY 12 HOURS
Refills: 0 | Status: DISCONTINUED | OUTPATIENT
Start: 2024-12-27 | End: 2024-12-28

## 2024-12-27 RX ORDER — INSULIN LISPRO 100/ML
VIAL (ML) SUBCUTANEOUS
Refills: 0 | Status: DISCONTINUED | OUTPATIENT
Start: 2024-12-27 | End: 2025-01-07

## 2024-12-27 RX ORDER — LEVOTHYROXINE SODIUM 175 UG/1
175 TABLET ORAL DAILY
Refills: 0 | Status: DISCONTINUED | OUTPATIENT
Start: 2024-12-27 | End: 2025-01-07

## 2024-12-27 RX ORDER — FUROSEMIDE 20 MG
40 TABLET ORAL DAILY
Refills: 0 | Status: DISCONTINUED | OUTPATIENT
Start: 2024-12-27 | End: 2025-01-07

## 2024-12-27 RX ORDER — ONDANSETRON 4 MG/1
4 TABLET ORAL EVERY 8 HOURS
Refills: 0 | Status: DISCONTINUED | OUTPATIENT
Start: 2024-12-27 | End: 2025-01-07

## 2024-12-27 RX ORDER — COLCHICINE 0.6 MG/1
0.6 CAPSULE ORAL
Refills: 0 | Status: DISCONTINUED | OUTPATIENT
Start: 2024-12-27 | End: 2025-01-07

## 2024-12-27 RX ORDER — BUPRENORPHINE HYDROCHLORIDE AND NALOXONE HYDROCHLORIDE DIHYDRATE 8; 2 MG/1; MG/1
2.5 TABLET SUBLINGUAL DAILY
Refills: 0 | Status: DISCONTINUED | OUTPATIENT
Start: 2024-12-27 | End: 2024-12-27

## 2024-12-27 RX ORDER — CEFTRIAXONE SODIUM 1 G/1
1000 INJECTION, POWDER, FOR SOLUTION INTRAMUSCULAR; INTRAVENOUS EVERY 24 HOURS
Refills: 0 | Status: DISCONTINUED | OUTPATIENT
Start: 2024-12-27 | End: 2024-12-28

## 2024-12-27 RX ORDER — INSULIN GLARGINE-YFGN 100 [IU]/ML
28 INJECTION, SOLUTION SUBCUTANEOUS AT BEDTIME
Refills: 0 | Status: DISCONTINUED | OUTPATIENT
Start: 2024-12-27 | End: 2025-01-07

## 2024-12-27 RX ORDER — GINKGO BILOBA 40 MG
3 CAPSULE ORAL AT BEDTIME
Refills: 0 | Status: DISCONTINUED | OUTPATIENT
Start: 2024-12-27 | End: 2025-01-07

## 2024-12-27 RX ORDER — ALBUTEROL SULFATE 90 UG/1
2 INHALANT RESPIRATORY (INHALATION) EVERY 6 HOURS
Refills: 0 | Status: DISCONTINUED | OUTPATIENT
Start: 2024-12-27 | End: 2025-01-07

## 2024-12-27 RX ORDER — ACETAMINOPHEN 80 MG/.8ML
650 SOLUTION/ DROPS ORAL EVERY 6 HOURS
Refills: 0 | Status: DISCONTINUED | OUTPATIENT
Start: 2024-12-27 | End: 2025-01-07

## 2024-12-27 RX ORDER — ENOXAPARIN SODIUM 60 MG/.6ML
40 INJECTION INTRAVENOUS; SUBCUTANEOUS EVERY 24 HOURS
Refills: 0 | Status: DISCONTINUED | OUTPATIENT
Start: 2024-12-27 | End: 2025-01-07

## 2024-12-27 RX ORDER — DEXTROSE MONOHYDRATE 25 G/50ML
15 INJECTION, SOLUTION INTRAVENOUS ONCE
Refills: 0 | Status: DISCONTINUED | OUTPATIENT
Start: 2024-12-27 | End: 2025-01-07

## 2024-12-27 RX ORDER — INFLUENZA A VIRUS A/WISCONSIN/588/2019 (H1N1) RECOMBINANT HEMAGGLUTININ ANTIGEN, INFLUENZA A VIRUS A/DARWIN/6/2021 (H3N2) RECOMBINANT HEMAGGLUTININ ANTIGEN, INFLUENZA B VIRUS B/AUSTRIA/1359417/2021 RECOMBINANT HEMAGGLUTININ ANTIGEN, AND INFLUENZA B VIRUS B/PHUKET/3073/2013 RECOMBINANT HEMAGGLUTININ ANTIGEN 45; 45; 45; 45 UG/.5ML; UG/.5ML; UG/.5ML; UG/.5ML
0.5 INJECTION INTRAMUSCULAR ONCE
Refills: 0 | Status: COMPLETED | OUTPATIENT
Start: 2024-12-27 | End: 2025-01-07

## 2024-12-27 RX ORDER — BUPRENORPHINE HYDROCHLORIDE AND NALOXONE HYDROCHLORIDE DIHYDRATE 8; 2 MG/1; MG/1
2.5 TABLET SUBLINGUAL DAILY
Refills: 0 | Status: DISCONTINUED | OUTPATIENT
Start: 2024-12-27 | End: 2025-01-03

## 2024-12-27 RX ORDER — MAG HYDROX/ALUMINUM HYD/SIMETH 200-200-20
30 SUSPENSION, ORAL (FINAL DOSE FORM) ORAL EVERY 4 HOURS
Refills: 0 | Status: DISCONTINUED | OUTPATIENT
Start: 2024-12-27 | End: 2025-01-07

## 2024-12-27 RX ORDER — ATORVASTATIN CALCIUM 40 MG/1
20 TABLET, FILM COATED ORAL AT BEDTIME
Refills: 0 | Status: DISCONTINUED | OUTPATIENT
Start: 2024-12-27 | End: 2025-01-07

## 2024-12-27 RX ORDER — PREGABALIN 100 MG/1
50 CAPSULE ORAL
Refills: 0 | Status: DISCONTINUED | OUTPATIENT
Start: 2024-12-27 | End: 2025-01-03

## 2024-12-27 RX ORDER — PANTOPRAZOLE 40 MG/1
40 TABLET, DELAYED RELEASE ORAL
Refills: 0 | Status: DISCONTINUED | OUTPATIENT
Start: 2024-12-27 | End: 2025-01-07

## 2024-12-27 RX ADMIN — ATORVASTATIN CALCIUM 20 MILLIGRAM(S): 40 TABLET, FILM COATED ORAL at 19:41

## 2024-12-27 RX ADMIN — LEVOTHYROXINE SODIUM 175 MICROGRAM(S): 175 TABLET ORAL at 19:41

## 2024-12-27 RX ADMIN — Medication 40 MILLIGRAM(S): at 19:41

## 2024-12-27 RX ADMIN — PANTOPRAZOLE 40 MILLIGRAM(S): 40 TABLET, DELAYED RELEASE ORAL at 19:41

## 2024-12-27 RX ADMIN — INSULIN GLARGINE-YFGN 28 UNIT(S): 100 INJECTION, SOLUTION SUBCUTANEOUS at 22:32

## 2024-12-27 NOTE — ED PROCEDURE NOTE - CPROC ED INFORMED CONSENT1
Benefits, risks, and possible complications of procedure explained to patient/caregiver who verbalized understanding and gave verbal consent. 2024 17:15

## 2024-12-27 NOTE — PATIENT PROFILE ADULT - FALL HARM RISK - HARM RISK INTERVENTIONS

## 2024-12-27 NOTE — ED ADULT NURSE NOTE - NSFALLHARMRISKINTERV_ED_ALL_ED
Assistance OOB with selected safe patient handling equipment if applicable/Communicate risk of Fall with Harm to all staff, patient, and family/Provide patient with walking aids/Provide visual cue: red socks, yellow wristband, yellow gown, etc/Reinforce activity limits and safety measures with patient and family/Bed in lowest position, wheels locked, appropriate side rails in place/Call bell, personal items and telephone in reach/Instruct patient to call for assistance before getting out of bed/chair/stretcher/Non-slip footwear applied when patient is off stretcher/[place] to call system/Physically safe environment - no spills, clutter or unnecessary equipment/Purposeful Proactive Rounding/Room/bathroom lighting operational, light cord in reach

## 2024-12-27 NOTE — H&P ADULT - HISTORY OF PRESENT ILLNESS
54 y/o Female, former IVDA on Suboxone, HTN, HLD, Hep C. , Chronic lower extremity edema with ulceration of skin presents with c/o generalized weakness and subjective fever/chills x 2 weeks and worsening lower extremity cellulitis R>L. , with oozing from RLE.  Admitted 11/29-12/3/2024 with similar scenario. Admitted today for same complaints; denies fever  - completed home Ceftin from last  admission.  - saw Dr Adair [facility] who placed Brenda boot on Right foot x two weeks after last admission.

## 2024-12-27 NOTE — PATIENT PROFILE ADULT - NSPROEXTENSIONSOFSELF_GEN_A_NUR
"Nataliia Rodriguez is a 23 y.o. female on day 8 of admission presenting with ICAO (internal carotid artery occlusion), bilateral.    Subjective   Placed back on cardene. Reports improvement in headache and continued L jaw pain     Objective     Physical Exam  AOx3  Face symmetric, R periorbital edema, mild L jaw swelling  RUE prox 4+ dist 5  RLE 5  LUE/LLE 5  SILT  Incision cdi  Drain in place w/ serosang output  LLE fem 2+ pulse, no hematoma  L fem CVC  R fem trialysis line     Last Recorded Vitals  Blood pressure 148/71, pulse 110, temperature 36.4 °C (97.5 °F), temperature source Temporal, resp. rate 23, height 1.448 m (4' 9\"), weight 46.1 kg (101 lb 10.1 oz), last menstrual period 11/21/2024, SpO2 100%.  Intake/Output last 3 Shifts:  I/O last 3 completed shifts:  In: 4365.3 (94.7 mL/kg) [P.O.:610; I.V.:2475.2 (53.7 mL/kg); Blood:305; IV Piggyback:975.1]  Out: 3354 (72.8 mL/kg) [Urine:535 (0.3 mL/kg/hr); Drains:115; Other:2704]  Weight: 46.1 kg     Relevant Results  Lab Results   Component Value Date    WBC 17.9 (H) 12/26/2024    HGB 8.5 (L) 12/26/2024    HCT 24.8 (L) 12/26/2024    MCV 86 12/26/2024     12/26/2024     Lab Results   Component Value Date    GLUCOSE 51 (LL) 12/25/2024    CALCIUM 8.5 (L) 12/25/2024     12/26/2024    K 3.7 12/26/2024    CO2 25 12/26/2024     12/26/2024    BUN 13 12/26/2024    CREATININE 2.00 (H) 12/26/2024     This patient has a central line   Reason for the central line remaining today? 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, poss Park-Park physiology, post circulation dependent through R pcomm, TTE EF 75%, BUE DVT US no acute DVT, chronic R int jug thrombus, BLE DVT US neg      12/17 s/p angio w b/l intracranial carotid occlusions, b/l anterior circulation supplied by R pcomm, no sig extracranial collateral supply     12/18 MRI NOVA decr L MCA flow, primary flow from post circ through R pcomm, c/f vasculitis, new small L int capsule stroke     12/19 s/p LP by neurology  12/20 trialysis line placed  12/23 s/p L STA-MCA bypass  12/24 angio w/ patent bypass, CTH stable     Plan:  NSU  Maintain drain off suction monitor output  Continue ancef while drain in place  Obtain MR NOVA today  12/26 to assist w/ SBP goal   Cont -140  Wean cardene  Will transition to SLED today and discuss line removal  Appreciate renal recs   Appreciate endo recs - glargine 10uAM, lispro 4u w meals, lispro 3u w snack PRN, SSI #1  Wean fluids, needed for hypoglycemia  Monitor daily weight  Maintain femoral central line at this time d/t limited and difficult peripheral access.   Place RUE midline today  Patient been afebrile and white count downtrending Infectious work up including UA,Bcx  Cont keppra  Cont ASA 81  Jaime Vuong MD       none

## 2024-12-27 NOTE — ED PROVIDER NOTE - OBJECTIVE STATEMENT
53 yo F with PMH of lymphedema and recurrent lower extremity cellulitis presents to the ED complaining of right lower extremity pain that has been worsening for the last 4 days. Patient reports she had a fall today on her outstretched hands secondary to her leg pain. Now also complaining of bruising and pain to the left hand. No head trauma or LOC. Patient reports she has had previous admissions for similar lower extremity symptoms for IV antibiotics.

## 2024-12-27 NOTE — H&P ADULT - NS ATTEND AMEND GEN_ALL_CORE FT
52 y/o Female, former IVDA on Suboxone, HTN, HLD, Hep C. , Chronic lower extremity edema with ulceration of skin presents with c/o generalized weakness and subjective fever/chills x 2 weeks and worsening lower extremity cellulitis R>L. , with oozing from RLE.  Admitted 11/29-12/3/2024 with similar scenario. Admitted today for same complaints; denies fever        # Chronic lower extremity edema with ulceration of skin RLE: Cellulitis Bilaterally  - IV Vancomycin + Rocephin   - ID consult  - Podiatry consult  - CBC in AM  - follow WBC and fever curve  - arterial US LE to access circulation  - OOB to chair with assistance    # Diabetes Type 2  - continue lantus with sliding scale  - CHO consistent diet  - hold metformin    # Opiate dependence  - continue Suboxone     # Gout  -continue Cholichine    # Hypothyroid  - continue Levothyroxine    # hemolyzed Potassium  - repeat K at 10PM tonight

## 2024-12-27 NOTE — H&P ADULT - NSHPPHYSICALEXAM_GEN_ALL_CORE
PHYSICAL EXAM:      Constitutional: NAD, A&O x3    Eyes: PERRLA    Respiratory: +air entry, no rales, no rhonchi, no wheezes    Cardiovascular: +S1 and S2, regular rate and rhythm    Gastrointestinal: +BS, soft, non-tender, not distended    Extremities:  *** pedal edema, no calf tenderness    Neurological: sensation intact, ROM equal B/L, CN II-XII intact    Skin: ** Erythema, swelling bot lower ext, with  ulceration of Anterior/lateral aspect of RLE

## 2024-12-27 NOTE — H&P ADULT - NSHPSOCIALHISTORY_GEN_ALL_CORE
Tobacco use: No, smoked 20 yrs  EtOH use: No  Illicit drug use: No: former IVDA  Marital Status: Single  Ambulates: Canes, walker, wheelchair

## 2024-12-27 NOTE — ED PROVIDER NOTE - CLINICAL SUMMARY MEDICAL DECISION MAKING FREE TEXT BOX
54-year-old female presents to the ED for 4 worsening of chronic venous stasis ulcers.  Emergency department she had screening exam, labs and imaging, found to have 2 fractures of the left hand metacarpals and concern for cellulitis of the right lower extremity.  Will admit for continued management with IV antibiotics, also splint hand for supportive care and pain control.

## 2024-12-27 NOTE — ED PROVIDER NOTE - PHYSICAL EXAMINATION
PHYSICAL EXAM: I have reviewed current vital signs.  GENERAL: NAD, well-nourished; well-developed.  HEAD:  Normocephalic, atraumatic.  EYES: Conjunctiva and sclera clear.  ENT: MMM, no erythema/exudates.  CHEST/LUNG: Clear to auscultation bilaterally; no wheezes, rales, or rhonchi.  HEART: Regular rate and rhythm, normal S1 and S2; no murmurs, rubs, or gallops.  ABDOMEN: Soft, nontender, nondistended.  EXTREMITIES:  Right LE cellulitis with ulcers and pus. Ecchymosis and bruising to the dorsum of the left hand.   PSYCH: Cooperative, appropriate, normal mood and affect.  NEUROLOGY: A&O x 3.   SKIN: Warm and dry.

## 2024-12-27 NOTE — ED PROVIDER NOTE - CARE PLAN
1 Principal Discharge DX:	Cellulitis  Secondary Diagnosis:	Lymphedema  Secondary Diagnosis:	Closed fracture of metacarpal of left hand

## 2024-12-27 NOTE — H&P ADULT - ASSESSMENT
52 y/o Female, former IVDA on Suboxone, HTN, HLD, Hep C. , Chronic lower extremity edema with ulceration of skin presents with c/o generalized weakness and subjective fever/chills x 2 weeks and worsening lower extremity cellulitis R>L. , with oozing from RLE.  Admitted 11/29-12/3/2024 with similar scenario. Admitted today for same complaints; denies fever        # Chronic lower extremity edema with ulceration of skin RLE: Cellulitis Bilaterally  - IV Vancomycin + Rocephin   - ID consult  - Podiatry consult  - CBC in AM  - follow WBC and fever curve  - arterial US LE to access circulation  - OOB to chair with assistence    # Diabetes Typ2  - continue lantus with sliding scale  - CHO consistent diet  - hold metformin    # Opiate dependence  - continue Suboxone     # Gout  -continue Cholichine    # Hypothyroid  - continue Levothyroxine    # hemolyzed Potassium  - repeat K at 10PM tonight                    54 y/o Female, former IVDA on Suboxone, HTN, HLD, Hep C. , Chronic lower extremity edema with ulceration of skin presents with c/o generalized weakness and subjective fever/chills x 2 weeks and worsening lower extremity cellulitis R>L. , with oozing from RLE.  Admitted 11/29-12/3/2024 with similar scenario. Admitted today for same complaints; denies fever        # Chronic lower extremity edema with ulceration of skin RLE: Cellulitis Bilaterally  - IV Vancomycin + Rocephin   - ID consult  - Podiatry consult  - CBC in AM  - follow WBC and fever curve  - arterial US LE to access circulation  - OOB to chair with assistance    # Diabetes Type 2  - continue lantus with sliding scale  - CHO consistent diet  - hold metformin    # Opiate dependence  - continue Suboxone     # Gout  -continue Cholichine    # Hypothyroid  - continue Levothyroxine    # hemolyzed Potassium  - repeat K at 10PM tonight

## 2024-12-27 NOTE — ED PROVIDER NOTE - ATTENDING CONTRIBUTION TO CARE
I personally evaluated the patient. I reviewed the Resident's or Physician Assistant's note (as assigned above), and agree with the findings and plan except as documented in my note.    54-year-old female presents to the emergency department for multiple medical complaints.  She has longstanding lymphedema with chronic right lower extremity wounds and sustained a fall today.  She complains of left hand pain after a fall and ultrasound today.  No other symptoms.  No constitutional complaints.  The review of systems is otherwise unremarkable.    GENERAL: female in no distress.   CHEST: normal work of breathing noted  CV: pulses intact   EXTR: Left hand tender to palpation distally neurovascular intact, venous stasis ulcers noted with cellulitis      Impression: Fall, lymphedema, hand pain    Plan: IV, labs, imaging, supportive care & reevaluation

## 2024-12-27 NOTE — ED ADULT NURSE NOTE - IN ACCORDANCE WITH [place] LAW, WE OFFER EVERY PATIENT A HEPATITIS C TEST. WOULD YOU LIKE TO BE TESTED TODAY?
Called patient and got his surgery scheduled for 12/31/24.    12/31/24 at [facility]  Arrive: 9:55AM  Surgery: 11:55AM  NPO  Hold Eliquis 48 hours prior to surgery    Patient voiced understanding   Previously positive

## 2024-12-27 NOTE — H&P ADULT - NSICDXPASTMEDICALHX_GEN_ALL_CORE_FT
PAST MEDICAL HISTORY:  Asthma with COPD     Bilateral edema of lower extremity     H/O: substance abuse no longer using    Hepatitis-C     History of pancreatitis     HLD (hyperlipidemia)     Hypothyroidism     Leukocytoclastic vasculitis     Type 2 diabetes mellitus

## 2024-12-27 NOTE — H&P ADULT - NSHPLABSRESULTS_GEN_ALL_CORE
9.4    8.71  )-----------( 311      ( 27 Dec 2024 16:00 )             29.8       12-27    134[L]  |  94[L]  |  8[L]  ----------------------------<  171[H]  5.5[H]   |  29  |  0.9    Ca    8.9      27 Dec 2024 16:00    TPro  8.1[H]  /  Alb  3.9  /  TBili  0.5  /  DBili  x   /  AST  34  /  ALT  <5  /  AlkPhos  91  12-27      : Xray Tibia + Fibula 2 Views, Right (12.27.24 @ 14:52) >    Findings/  impression:    No acute fracture or acute articular abnormality. No evidence of   osteomyelitis. Soft tissue reticulation may be secondary to edema or   cellulitis    : Xray Hand 3 Views, Left (12.27.24 @ 14:52) >    FINDINGS/  IMPRESSION:    Acute comminuted fractures of the fourth and fifth metacarpal bodies.  Soft tissue swelling along the dorsal aspect of the hand.  Osteophyte formation at the first MCP joint.                              Urinalysis Basic - ( 27 Dec 2024 16:00 )    Color: x / Appearance: x / SG: x / pH: x  Gluc: 171 mg/dL / Ketone: x  / Bili: x / Urobili: x   Blood: x / Protein: x / Nitrite: x   Leuk Esterase: x / RBC: x / WBC x   Sq Epi: x / Non Sq Epi: x / Bacteria: x        PT/INR - ( 27 Dec 2024 16:00 )   PT: 12.50 sec;   INR: 1.06 ratio         PTT - ( 27 Dec 2024 16:00 )  PTT:37.7 sec    Lactate Trend  12-27 @ 16:00 Lactate:1.5

## 2024-12-28 LAB
ALBUMIN SERPL ELPH-MCNC: 4 G/DL — SIGNIFICANT CHANGE UP (ref 3.5–5.2)
ALP SERPL-CCNC: 92 U/L — SIGNIFICANT CHANGE UP (ref 30–115)
ALT FLD-CCNC: <5 U/L — SIGNIFICANT CHANGE UP (ref 0–41)
ANION GAP SERPL CALC-SCNC: 12 MMOL/L — SIGNIFICANT CHANGE UP (ref 7–14)
AST SERPL-CCNC: 18 U/L — SIGNIFICANT CHANGE UP (ref 0–41)
BASOPHILS # BLD AUTO: 0.08 K/UL — SIGNIFICANT CHANGE UP (ref 0–0.2)
BASOPHILS NFR BLD AUTO: 1 % — SIGNIFICANT CHANGE UP (ref 0–1)
BILIRUB SERPL-MCNC: 0.4 MG/DL — SIGNIFICANT CHANGE UP (ref 0.2–1.2)
BUN SERPL-MCNC: 10 MG/DL — SIGNIFICANT CHANGE UP (ref 10–20)
CALCIUM SERPL-MCNC: 8.9 MG/DL — SIGNIFICANT CHANGE UP (ref 8.4–10.5)
CHLORIDE SERPL-SCNC: 95 MMOL/L — LOW (ref 98–110)
CO2 SERPL-SCNC: 29 MMOL/L — SIGNIFICANT CHANGE UP (ref 17–32)
CREAT SERPL-MCNC: 0.9 MG/DL — SIGNIFICANT CHANGE UP (ref 0.7–1.5)
EGFR: 76 ML/MIN/1.73M2 — SIGNIFICANT CHANGE UP
EOSINOPHIL # BLD AUTO: 0.13 K/UL — SIGNIFICANT CHANGE UP (ref 0–0.7)
EOSINOPHIL NFR BLD AUTO: 1.7 % — SIGNIFICANT CHANGE UP (ref 0–8)
GLUCOSE BLDC GLUCOMTR-MCNC: 144 MG/DL — HIGH (ref 70–99)
GLUCOSE BLDC GLUCOMTR-MCNC: 149 MG/DL — HIGH (ref 70–99)
GLUCOSE BLDC GLUCOMTR-MCNC: 166 MG/DL — HIGH (ref 70–99)
GLUCOSE SERPL-MCNC: 162 MG/DL — HIGH (ref 70–99)
HCT VFR BLD CALC: 28.3 % — LOW (ref 37–47)
HGB BLD-MCNC: 9.1 G/DL — LOW (ref 12–16)
IMM GRANULOCYTES NFR BLD AUTO: 0.4 % — HIGH (ref 0.1–0.3)
LYMPHOCYTES # BLD AUTO: 1.87 K/UL — SIGNIFICANT CHANGE UP (ref 1.2–3.4)
LYMPHOCYTES # BLD AUTO: 23.9 % — SIGNIFICANT CHANGE UP (ref 20.5–51.1)
MAGNESIUM SERPL-MCNC: 1.7 MG/DL — LOW (ref 1.8–2.4)
MCHC RBC-ENTMCNC: 27.2 PG — SIGNIFICANT CHANGE UP (ref 27–31)
MCHC RBC-ENTMCNC: 32.2 G/DL — SIGNIFICANT CHANGE UP (ref 32–37)
MCV RBC AUTO: 84.5 FL — SIGNIFICANT CHANGE UP (ref 81–99)
MONOCYTES # BLD AUTO: 0.6 K/UL — SIGNIFICANT CHANGE UP (ref 0.1–0.6)
MONOCYTES NFR BLD AUTO: 7.7 % — SIGNIFICANT CHANGE UP (ref 1.7–9.3)
NEUTROPHILS # BLD AUTO: 5.1 K/UL — SIGNIFICANT CHANGE UP (ref 1.4–6.5)
NEUTROPHILS NFR BLD AUTO: 65.3 % — SIGNIFICANT CHANGE UP (ref 42.2–75.2)
NRBC # BLD: 0 /100 WBCS — SIGNIFICANT CHANGE UP (ref 0–0)
PLATELET # BLD AUTO: 313 K/UL — SIGNIFICANT CHANGE UP (ref 130–400)
PMV BLD: 9.6 FL — SIGNIFICANT CHANGE UP (ref 7.4–10.4)
POTASSIUM SERPL-MCNC: 4.2 MMOL/L — SIGNIFICANT CHANGE UP (ref 3.5–5)
POTASSIUM SERPL-SCNC: 4.2 MMOL/L — SIGNIFICANT CHANGE UP (ref 3.5–5)
PROT SERPL-MCNC: 7.6 G/DL — SIGNIFICANT CHANGE UP (ref 6–8)
RBC # BLD: 3.35 M/UL — LOW (ref 4.2–5.4)
RBC # FLD: 15.3 % — HIGH (ref 11.5–14.5)
SODIUM SERPL-SCNC: 136 MMOL/L — SIGNIFICANT CHANGE UP (ref 135–146)
WBC # BLD: 7.81 K/UL — SIGNIFICANT CHANGE UP (ref 4.8–10.8)
WBC # FLD AUTO: 7.81 K/UL — SIGNIFICANT CHANGE UP (ref 4.8–10.8)

## 2024-12-28 PROCEDURE — 73630 X-RAY EXAM OF FOOT: CPT | Mod: 26,RT

## 2024-12-28 PROCEDURE — 76937 US GUIDE VASCULAR ACCESS: CPT | Mod: 26,59

## 2024-12-28 PROCEDURE — 99222 1ST HOSP IP/OBS MODERATE 55: CPT | Mod: GC,25

## 2024-12-28 PROCEDURE — 29581 APPL MULTLAYER CMPRN SYS LEG: CPT | Mod: 50,GC

## 2024-12-28 PROCEDURE — 36569 INSJ PICC 5 YR+ W/O IMAGING: CPT

## 2024-12-28 PROCEDURE — 99232 SBSQ HOSP IP/OBS MODERATE 35: CPT

## 2024-12-28 RX ORDER — VANCOMYCIN HYDROCHLORIDE 5 G/100ML
1000 INJECTION, POWDER, LYOPHILIZED, FOR SOLUTION INTRAVENOUS EVERY 12 HOURS
Refills: 0 | Status: DISCONTINUED | OUTPATIENT
Start: 2024-12-28 | End: 2024-12-30

## 2024-12-28 RX ORDER — MAGNESIUM SULFATE 500 MG/ML
1 INJECTION, SOLUTION INTRAMUSCULAR; INTRAVENOUS ONCE
Refills: 0 | Status: COMPLETED | OUTPATIENT
Start: 2024-12-28 | End: 2024-12-28

## 2024-12-28 RX ADMIN — ATORVASTATIN CALCIUM 20 MILLIGRAM(S): 40 TABLET, FILM COATED ORAL at 21:22

## 2024-12-28 RX ADMIN — MAGNESIUM SULFATE 100 GRAM(S): 500 INJECTION, SOLUTION INTRAMUSCULAR; INTRAVENOUS at 18:45

## 2024-12-28 RX ADMIN — PREGABALIN 50 MILLIGRAM(S): 100 CAPSULE ORAL at 05:40

## 2024-12-28 RX ADMIN — VANCOMYCIN HYDROCHLORIDE 166.67 MILLIGRAM(S): 5 INJECTION, POWDER, LYOPHILIZED, FOR SOLUTION INTRAVENOUS at 05:40

## 2024-12-28 RX ADMIN — COLCHICINE 0.6 MILLIGRAM(S): 0.6 CAPSULE ORAL at 05:40

## 2024-12-28 RX ADMIN — Medication 100 MILLIGRAM(S): at 21:53

## 2024-12-28 RX ADMIN — COLCHICINE 0.6 MILLIGRAM(S): 0.6 CAPSULE ORAL at 18:44

## 2024-12-28 RX ADMIN — INSULIN GLARGINE-YFGN 28 UNIT(S): 100 INJECTION, SOLUTION SUBCUTANEOUS at 21:22

## 2024-12-28 RX ADMIN — BUPRENORPHINE HYDROCHLORIDE AND NALOXONE HYDROCHLORIDE DIHYDRATE 2.5 TABLET(S): 8; 2 TABLET SUBLINGUAL at 11:18

## 2024-12-28 RX ADMIN — Medication 100 MILLIGRAM(S): at 15:27

## 2024-12-28 RX ADMIN — PREGABALIN 50 MILLIGRAM(S): 100 CAPSULE ORAL at 18:44

## 2024-12-28 RX ADMIN — VANCOMYCIN HYDROCHLORIDE 250 MILLIGRAM(S): 5 INJECTION, POWDER, LYOPHILIZED, FOR SOLUTION INTRAVENOUS at 18:45

## 2024-12-28 RX ADMIN — CEFTRIAXONE SODIUM 100 MILLIGRAM(S): 1 INJECTION, POWDER, FOR SOLUTION INTRAMUSCULAR; INTRAVENOUS at 00:24

## 2024-12-28 NOTE — CONSULT NOTE ADULT - SUBJECTIVE AND OBJECTIVE BOX
52 y/o Female, former IVDA on Suboxone, HTN, HLD, Hep C, Chronic lower extremity edema w/ ulceration of skin admitted for  lower extremity cellulitis R>L, referred to vascular surgery by primary team for concern for stenosis on arterial doppler;    Per H&P, pt sees Dr Adair [facility]ascular who placed Brenda boot on Right foot x two weeks after last admission.    PER HIE, pt last saw Vasc surgery on 24 for h/o venous insufficiency and LE venous ulcers; b/l unna boots placed w/ recommendation for 1 week f/u    Unable to obtain HPI, PMHx, and ROS from pt, w/ pt appearing very groggy on exam, and  falling asleep mid-sentence, w/ pt then stating " go away and let me sleep." per d/w nursing, pt has been somnolence for most of the day    PAST MEDICAL & SURGICAL HISTORY:  Asthma with COPD  Hypothyroidism  H/O: substance abuse  no longer using  History of pancreatitis  Hepatitis-C  Leukocytoclastic vasculitis  Bilateral edema of lower extremity  HLD (hyperlipidemia)  Type 2 diabetes mellitus  History of appendectomy  History of tonsillectomy    No Known Allergies    Home Medications:  pregabalin 50 mg oral capsule: 1 cap(s) orally every 12 hours (2024 07:48)  Suboxone 8 mg-2 mg sublingual tablet: 2.5 film(s) sublingual once a day (28 May 2024 03:24)        Vital Signs Last 24 Hrs  T(C): 36.3 (28 Dec 2024 14:18), Max: 36.9 (27 Dec 2024 21:40)  T(F): 97.3 (28 Dec 2024 14:18), Max: 98.5 (27 Dec 2024 21:40)  HR: 57 (28 Dec 2024 14:18) (57 - 73)  BP: 101/63 (28 Dec 2024 14:18) (91/49 - 125/83)  BP(mean): --  RR: 18 (28 Dec 2024 14:18) (18 - 18)  SpO2: 99% (28 Dec 2024 14:18) (94% - 99%)          PHYSICAL EXAM:  GENERAL: laying in bed, appearing somnolent and groggy, otherwise  appearing comfortable; in NAD  HEENT: Moist mucous membranes  NECK: Supple  B/L EXTREMITIES:  exam limited 2/2 overlying gauze and dressing from mid distal end of foot to inferior aspect of knees of b/l LE;  some skin discoloration overlying upper leg, superiorly of dressing, appearing with chronic venous stasis changes; no visible areas of ecchymoses, cyanosis, erythema appreciated on limited exam of b/l LEs; skin warmth to touch; b/l palpable dorsal pedal; unable to appropriately assess b/l posterior tibial pulses in setting of thick dressing and gauze overlying respective areas. Unable to assess sensation w/ pt not amendable to answering questions at the time    IMAGIN/27 EXAM:  DUPLEX LOW ARTERIES COMP BILAT     Bilateral common femoral, deep femoral, superficial femoral, and   popliteal arteries were visualized. Bilateral tibial arteries not   visualized    Right Lower extremity:  There is moderate right external iliac and common femoral artery artery   stenosis    Left Lower extremity  There is no evidence of significant arterial occlusive disease or   arterial occlusions in the left lower extremity    Impression:  Moderate right external iliac and common femoral artery stenosis  Normal arterial flow in the left lower extremity  Tibial arteries not visualized bilaterally    --- End of Report ---      EXAM:  DUPLEX LOW ARTERIES COMP BILAT     PROCEDURE DATE:  2024          INTERPRETATION:  Clinical History / Reason for exam: 53 years old female   with lower extremity wounds for evaluationof arterial system.    Right:  Patent common femoral, SFA, popliteal, peroneal, posterior tibial   arteries.    Left:  Patent common femoral, SFA, popliteal, posterior tibial arteries.    IMPRESSION:  Normal blood flow is present in visualized arteries.  Bilateral profunda, anterior tibial and left peroneal arteries are not   visualized.    --- End of Report ---          OBINNA VICENTE MD; Attending Vascular Surgeon  This document has been electronically signed. May 29 2024  2:09PM      MABLE PEREZ   This document has been electronically signed.  MICHEAL BASS MD; Attending Vascular Surgery  This document has been electronically signed. Dec 28 2024 11:09AM    MEDICATIONS:   MEDICATIONS  (STANDING):  atorvastatin 20 milliGRAM(s) Oral at bedtime  buprenorphine 8 mG/naloxone 2 mG SL  Tablet 2.5 Tablet(s) SubLingual daily  cefepime   IVPB      cefepime   IVPB 2000 milliGRAM(s) IV Intermittent once  cefepime   IVPB 2000 milliGRAM(s) IV Intermittent every 8 hours  colchicine 0.6 milliGRAM(s) Oral two times a day  dextrose 5%. 1000 milliLiter(s) (50 mL/Hr) IV Continuous <Continuous>  enoxaparin Injectable 40 milliGRAM(s) SubCutaneous every 24 hours  furosemide    Tablet 40 milliGRAM(s) Oral daily  influenza   Vaccine 0.5 milliLiter(s) IntraMuscular once  insulin glargine Injectable (LANTUS) 28 Unit(s) SubCutaneous at bedtime  insulin lispro (ADMELOG) corrective regimen sliding scale   SubCutaneous three times a day before meals  levothyroxine 175 MICROGram(s) Oral daily  pantoprazole    Tablet 40 milliGRAM(s) Oral before breakfast  pregabalin 50 milliGRAM(s) Oral two times a day  vancomycin  IVPB 1000 milliGRAM(s) IV Intermittent every 12 hours    MEDICATIONS  (PRN):  acetaminophen     Tablet .. 650 milliGRAM(s) Oral every 6 hours PRN Temp greater or equal to 38C (100.4F), Mild Pain (1 - 3)  albuterol    90 MICROgram(s) HFA Inhaler 2 Puff(s) Inhalation every 6 hours PRN Shortness of Breath and/or Wheezing  aluminum hydroxide/magnesium hydroxide/simethicone Suspension 30 milliLiter(s) Oral every 4 hours PRN Dyspepsia  dextrose Oral Gel 15 Gram(s) Oral once PRN Blood Glucose LESS THAN 70 milliGRAM(s)/deciliter  melatonin 3 milliGRAM(s) Oral at bedtime PRN Insomnia  ondansetron Injectable 4 milliGRAM(s) IV Push every 8 hours PRN Nausea and/or Vomiting      LAB/STUDIES:                        9.1    7.81  )-----------( 313      ( 28 Dec 2024 11:22 )             28.3         136  |  95[L]  |  10  ----------------------------<  162[H]  4.2   |  29  |  0.9    Ca    8.9      28 Dec 2024 11:22  Mg     1.7         TPro  7.6  /  Alb  4.0  /  TBili  0.4  /  DBili  x   /  AST  18  /  ALT  <5  /  AlkPhos  92      PT/INR - ( 27 Dec 2024 16:00 )   PT: 12.50 sec;   INR: 1.06 ratio         PTT - ( 27 Dec 2024 16:00 )  PTT:37.7 sec  LIVER FUNCTIONS - ( 28 Dec 2024 11:22 )  Alb: 4.0 g/dL / Pro: 7.6 g/dL / ALK PHOS: 92 U/L / ALT: <5 U/L / AST: 18 U/L / GGT: x             Urinalysis Basic - ( 28 Dec 2024 11:22 )    Color: x / Appearance: x / SG: x / pH: x  Gluc: 162 mg/dL / Ketone: x  / Bili: x / Urobili: x   Blood: x / Protein: x / Nitrite: x   Leuk Esterase: x / RBC: x / WBC x   Sq Epi: x / Non Sq Epi: x / Bacteria: x

## 2024-12-28 NOTE — PHARMACOTHERAPY INTERVENTION NOTE - COMMENTS
Recommended vancomycin 1g IV q12h, in the setting of non healing wounds on leg per ID, which is predicted to result in a future steady-state vancomycin AUC/REED of ~540mg*hr/mL.    Trung Faust, PharmD, [facility]  Clinical Pharmacy Specialist, Infectious Diseases  Tele-Antimicrobial Stewardship Program (Tele-ASP)  Tele-ASP Phone: [phone]

## 2024-12-28 NOTE — PROGRESS NOTE ADULT - ASSESSMENT
54 y/o Female, former IVDA on Suboxone, HTN, HLD, Hep C. , Chronic lower extremity edema with ulceration of skin presents with c/o generalized weakness and subjective fever/chills x 2 weeks and worsening lower extremity cellulitis  with oozing from b/l LE R>L      #b/l LE cellulitis superimposed on chronic LE edema with possible bacteremia  - blood cultures not ordered; ordered now  - VA duplex concerning for stenosis  - vascular consulted  - ID recs appreciated: continue vancomycin and switch to cefepime 2g q 8hrs  - podiatry recs appreciated  - Wound care or podiatry for local care of the wounds.   - XR Imaging Right Foot; Pending Results  - Local Wound Care; Applied - Xeroform / Gauze / DSD / Kerlix / Secured with Tape  - Weight Bearing Status; WBAT   - Reviewed AV Duplex Results - Appreciate Vascular consult and plan  - Please order ESR, CRP, A1C, Coags, Lactate  - Trend WBC daily  - OOB to chair with assistance  - consider midline if difficult access    # Diabetes Type 2  - continue lantus with sliding scale  - CHO consistent diet  - hold metformin    # Opiate dependence  - continue Suboxone     # Gout  -continue colchicine    # Hypothyroid  - continue Levothyroxine    #proph  - vte: heparin    Handoff: cultures, blood work, f/u vascular, podiatry, and ID

## 2024-12-28 NOTE — CONSULT NOTE ADULT - SUBJECTIVE AND OBJECTIVE BOX
Podiatry Consult Note    Subjective:  EZRA BARCLAY  Seen Bedside 54y Female  .   Patient is a 54y old  Female who presents with a chief complaint of RLE Ulceration with oozing (28 Dec 2024 13:37)    HPI:  54 y/o Female, former IVDA on Suboxone, HTN, HLD, Hep C. , Chronic lower extremity edema with ulceration of skin presents with c/o generalized weakness and subjective fever/chills x 2 weeks and worsening lower extremity cellulitis R>L. , with oozing from RLE.  Admitted 11/29-12/3/2024 with similar scenario. Admitted today for same complaints; denies fever  - completed home Ceftin from last  admission.  - saw Dr Adair Vascular who placed Brenda boot on Right foot x two weeks after last admission. (27 Dec 2024 18:38)      Past Medical History and Surgical History  PAST MEDICAL & SURGICAL HISTORY:  Asthma with COPD      Hypothyroidism      H/O: substance abuse  no longer using      History of pancreatitis      Hepatitis-C      Leukocytoclastic vasculitis      Bilateral edema of lower extremity      HLD (hyperlipidemia)      Type 2 diabetes mellitus      History of appendectomy      History of tonsillectomy           Review of Systems:  [X] Ten point review of systems is otherwise negative except as noted     Objective:  Vital Signs Last 24 Hrs  T(C): 36.3 (28 Dec 2024 14:18), Max: 36.9 (27 Dec 2024 21:40)  T(F): 97.3 (28 Dec 2024 14:18), Max: 98.5 (27 Dec 2024 21:40)  HR: 57 (28 Dec 2024 14:18) (57 - 73)  BP: 101/63 (28 Dec 2024 14:18) (91/49 - 125/83)  BP(mean): --  RR: 18 (28 Dec 2024 14:18) (18 - 18)  SpO2: 99% (28 Dec 2024 14:18) (94% - 99%)                            9.1    7.81  )-----------( 313      ( 28 Dec 2024 11:22 )             28.3                 12-28    136  |  95[L]  |  10  ----------------------------<  162[H]  4.2   |  29  |  0.9    Ca    8.9      28 Dec 2024 11:22  Mg     1.7     12-28    TPro  7.6  /  Alb  4.0  /  TBili  0.4  /  DBili  x   /  AST  18  /  ALT  <5  /  AlkPhos  92  12-28        Physical Exam - Lower Extremity Focused:   Derm:   Right Foot - dry, stable wound to dorsolateral aspect of midfoot, no drainage, no purulence, erythema and edema noted   Right Lower Extr. superficial ulceration to skin with drainage and weeping wounds noted, covering large surface area, crusting noted on wound edges with erythema and edema extending from the knee to the foot     Left Foot - dry stable, wound to dorsal 2nd Digit, no drainage, no purulence, erythema and edema noted   Left Lower Extr. yellow crusting noted on medial wound above the ankle with erythema and edema extending from the knee to the foot     Vascular: DP and PT Pulses Diminished; Foot is Warm to the touch; Capillary Refill Time < 3 Seconds;    Neuro: Protective Sensation Diminished / Moderately Neuropathic   MSK: Pain On Palpation at Wound Site     Assessment:  Cellulitic Of B/L Lower Extremity     Plan:  Chart reviewed and Patient evaluated. All Questions and Concerns Addressed and Answered  XR Imaging Right Foot; Pending Results  Local Wound Care; Applied - Xeroform / Gauze / DSD / Kerlix / Secured with Tape  Weight Bearing Status; WBAT   Reviewed AV Duplex Results - Appreciate Vascular consult and plan  Please order ESR, CRP, A1C, Coags, Lactate  Trend WBC daily  Continue IV ABX as per Id recs   Discussed Plan w/ Dr. Yancey    Podiatry   Please message on teams for any further questions  Podiatry Consult Note    Subjective:  EZRA BARCLAY  Seen Bedside 54y Female  .   Patient is a 54y old  Female who presents with a chief complaint of RLE Ulceration with oozing (28 Dec 2024 13:37)    HPI:  52 y/o Female, former IVDA on Suboxone, HTN, HLD, Hep C. , Chronic lower extremity edema with ulceration of skin presents with c/o generalized weakness and subjective fever/chills x 2 weeks and worsening lower extremity cellulitis R>L. , with oozing from RLE.  Admitted 11/29-12/3/2024 with similar scenario. Admitted today for same complaints; denies fever  - completed home Ceftin from last  admission.  - saw Dr Adair Vascular who placed Brenda boot on Right foot x two weeks after last admission. (27 Dec 2024 18:38)      Past Medical History and Surgical History  PAST MEDICAL & SURGICAL HISTORY:  Asthma with COPD      Hypothyroidism      H/O: substance abuse  no longer using      History of pancreatitis      Hepatitis-C      Leukocytoclastic vasculitis      Bilateral edema of lower extremity      HLD (hyperlipidemia)      Type 2 diabetes mellitus      History of appendectomy      History of tonsillectomy           Review of Systems:  [X] Ten point review of systems is otherwise negative except as noted     Objective:  Vital Signs Last 24 Hrs  T(C): 36.3 (28 Dec 2024 14:18), Max: 36.9 (27 Dec 2024 21:40)  T(F): 97.3 (28 Dec 2024 14:18), Max: 98.5 (27 Dec 2024 21:40)  HR: 57 (28 Dec 2024 14:18) (57 - 73)  BP: 101/63 (28 Dec 2024 14:18) (91/49 - 125/83)  BP(mean): --  RR: 18 (28 Dec 2024 14:18) (18 - 18)  SpO2: 99% (28 Dec 2024 14:18) (94% - 99%)                            9.1    7.81  )-----------( 313      ( 28 Dec 2024 11:22 )             28.3                 12-28    136  |  95[L]  |  10  ----------------------------<  162[H]  4.2   |  29  |  0.9    Ca    8.9      28 Dec 2024 11:22  Mg     1.7     12-28    TPro  7.6  /  Alb  4.0  /  TBili  0.4  /  DBili  x   /  AST  18  /  ALT  <5  /  AlkPhos  92  12-28        Physical Exam - Lower Extremity Focused:   Derm:   Right Foot - dry, stable wound to dorsolateral aspect of midfoot, no drainage, no purulence, erythema and edema noted   Right Lower Extr. superficial ulceration to skin with drainage and weeping wounds noted, covering large surface area, crusting noted on wound edges with erythema and edema extending from the knee to the foot     Left Foot - dry stable, wound to dorsal 2nd Digit, no drainage, no purulence, erythema and edema noted   Left Lower Extr. yellow crusting noted on medial wound above the ankle with erythema and edema extending from the knee to the foot     Vascular: DP and PT Pulses Diminished; Foot is Warm to the touch; Capillary Refill Time < 3 Seconds;  Hemosiderosis B/L LE   Neuro: Protective Sensation Diminished / Moderately Neuropathic   MSK: Pain On Palpation at Wound Site     Assessment:  Cellulitic Of B/L Lower Extremity   venous stasis wounds B/L LE     Plan:  Chart reviewed and Patient evaluated. All Questions and Concerns Addressed and Answered  XR Imaging Right Foot; Pending Results  Local Wound Care; Applied - Xeroform / Gauze / DSD / Kerlix / Secured with Tape  Weight Bearing Status; WBAT   Reviewed AV Duplex Results - Appreciate Vascular consult and plan  Please order ESR, CRP, A1C, Coags, Lactate  Trend WBC daily  Continue IV ABX as per Id recs   Discussed Plan w/ Dr. Yancey    Podiatry [phone]  Please message on teams for any further questions

## 2024-12-28 NOTE — PROGRESS NOTE ADULT - SUBJECTIVE AND OBJECTIVE BOX
Patient is a 54y old  Female who presents with a chief complaint of RLE Ulceration with oozing (28 Dec 2024 15:10)      SUBJECTIVE / OVERNIGHT EVENTS: Pt having extensive greenish yellow pus oozing from b/l calves which are swollen, erythematous. She states wound care comes to her home 3-5x a week and her partner helps her clean the other days.   ADDITIONAL REVIEW OF SYSTEMS: as above    MEDICATIONS  (STANDING):  atorvastatin 20 milliGRAM(s) Oral at bedtime  buprenorphine 8 mG/naloxone 2 mG SL  Tablet 2.5 Tablet(s) SubLingual daily  cefepime   IVPB      cefepime   IVPB 2000 milliGRAM(s) IV Intermittent every 8 hours  colchicine 0.6 milliGRAM(s) Oral two times a day  dextrose 5%. 1000 milliLiter(s) (50 mL/Hr) IV Continuous <Continuous>  enoxaparin Injectable 40 milliGRAM(s) SubCutaneous every 24 hours  furosemide    Tablet 40 milliGRAM(s) Oral daily  influenza   Vaccine 0.5 milliLiter(s) IntraMuscular once  insulin glargine Injectable (LANTUS) 28 Unit(s) SubCutaneous at bedtime  insulin lispro (ADMELOG) corrective regimen sliding scale   SubCutaneous three times a day before meals  levothyroxine 175 MICROGram(s) Oral daily  pantoprazole    Tablet 40 milliGRAM(s) Oral before breakfast  pregabalin 50 milliGRAM(s) Oral two times a day  vancomycin  IVPB 1000 milliGRAM(s) IV Intermittent every 12 hours    MEDICATIONS  (PRN):  acetaminophen     Tablet .. 650 milliGRAM(s) Oral every 6 hours PRN Temp greater or equal to 38C (100.4F), Mild Pain (1 - 3)  albuterol    90 MICROgram(s) HFA Inhaler 2 Puff(s) Inhalation every 6 hours PRN Shortness of Breath and/or Wheezing  aluminum hydroxide/magnesium hydroxide/simethicone Suspension 30 milliLiter(s) Oral every 4 hours PRN Dyspepsia  dextrose Oral Gel 15 Gram(s) Oral once PRN Blood Glucose LESS THAN 70 milliGRAM(s)/deciliter  melatonin 3 milliGRAM(s) Oral at bedtime PRN Insomnia  ondansetron Injectable 4 milliGRAM(s) IV Push every 8 hours PRN Nausea and/or Vomiting      CAPILLARY BLOOD GLUCOSE      POCT Blood Glucose.: 144 mg/dL (28 Dec 2024 12:07)  POCT Blood Glucose.: 129 mg/dL (28 Dec 2024 07:58)  POCT Blood Glucose.: 166 mg/dL (27 Dec 2024 22:11)    I&O's Summary      PHYSICAL EXAM:  Vital Signs Last 24 Hrs  T(C): 36.3 (28 Dec 2024 14:18), Max: 36.9 (27 Dec 2024 21:40)  T(F): 97.3 (28 Dec 2024 14:18), Max: 98.5 (27 Dec 2024 21:40)  HR: 57 (28 Dec 2024 14:18) (57 - 73)  BP: 101/63 (28 Dec 2024 14:18) (91/49 - 125/83)  BP(mean): --  RR: 18 (28 Dec 2024 14:18) (18 - 18)  SpO2: 99% (28 Dec 2024 14:18) (94% - 99%)      CONSTITUTIONAL: NAD, well-developed, well-groomed  EYES: PERRLA; conjunctiva and sclera clear  ENMT: Moist oral mucosa, no pharyngeal injection or exudates; normal dentition  NECK: Supple, no palpable masses; no thyromegaly  RESPIRATORY: Normal respiratory effort; lungs are clear to auscultation bilaterally  CARDIOVASCULAR: Regular rate and rhythm, normal S1 and S2, no murmur/rub/gallop; No lower extremity edema; Peripheral pulses are 2+ bilaterally  ABDOMEN: Nontender to palpation, normoactive bowel sounds, no rebound/guarding; No hepatosplenomegaly  MUSCULOSKELETAL:  Normal gait; no clubbing or cyanosis of digits; no joint swelling or tenderness to palpation  PSYCH: A+O to person, place, and time; affect appropriate  NEUROLOGY: CN 2-12 are intact and symmetric; no gross sensory deficits   SKIN: No rashes; no palpable lesions    LABS:                        9.1    7.81  )-----------( 313      ( 28 Dec 2024 11:22 )             28.3     12-28    136  |  95[L]  |  10  ----------------------------<  162[H]  4.2   |  29  |  0.9    Ca    8.9      28 Dec 2024 11:22  Mg     1.7     12-28    TPro  7.6  /  Alb  4.0  /  TBili  0.4  /  DBili  x   /  AST  18  /  ALT  <5  /  AlkPhos  92  12-28    PT/INR - ( 27 Dec 2024 16:00 )   PT: 12.50 sec;   INR: 1.06 ratio         PTT - ( 27 Dec 2024 16:00 )  PTT:37.7 sec      Urinalysis Basic - ( 28 Dec 2024 11:22 )    Color: x / Appearance: x / SG: x / pH: x  Gluc: 162 mg/dL / Ketone: x  / Bili: x / Urobili: x   Blood: x / Protein: x / Nitrite: x   Leuk Esterase: x / RBC: x / WBC x   Sq Epi: x / Non Sq Epi: x / Bacteria: x          RADIOLOGY & ADDITIONAL TESTS:  Results Reviewed:   Imaging Personally Reviewed:  Electrocardiogram Personally Reviewed:    COORDINATION OF CARE:  Care Discussed with Consultants/Other Providers [Y/N]:  Prior or Outpatient Records Reviewed [Y/N]:

## 2024-12-28 NOTE — PHARMACOTHERAPY INTERVENTION NOTE - COMMENTS
As per policy, ordered a vancomycin level prior to the 4th dose of vancomycin.    Trung Faust, PharmD, [facility]  Clinical Pharmacy Specialist, Infectious Diseases  Tele-Antimicrobial Stewardship Program (Tele-ASP)  Tele-ASP Phone: [phone]

## 2024-12-28 NOTE — CONSULT NOTE ADULT - ASSESSMENT
ASSESSMENT  This is a 54 y/o Female, former IVDA on Suboxone, HTN, HLD, Hep C and Chronic lower extremity edema with ulceration of skin presents with c/o generalized weakness worsening lower extremity cellulitis R>L    IMPRESSION  #Bilateral lower extremity wounds/ulcers with underlying stasis dermatitis.   #History of polysubstance use disorder. On Suboxone  #Recurrent admission in the past for lower extremity cellulitis? 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  Hepatitis C Ab +, PCR undetectable 2/2/24  Cryoglobulin negative 2/2/24  Hepatitis B immune, HIV screen negative.   DULCE negative  RF negative  p-ANCA positive, titer 1:40    RECOMMENDATIONS  -Follow up with blood cultures.   -Consider Vascular eval for PAD and non-healing ulcers.   -Wound care or podiatry for local care of the wounds.   -Suspect will need re-biopsy of the wounds for cultures and Pathology.   -IV Vanc. Dosing as per the pharmacy protocol.   -IV cefepime 2 gram q 8 hours.   -Off loading to prevent pressure sores and preventive measures to avoid aspiration    If any questions, please send a message or call on [employer] Teams  Please continue to update ID with any pertinent new laboratory or radiographic findings.    Stella Chavez M.D  Infectious Diseases Attending/   [employer] [facility]

## 2024-12-28 NOTE — CONSULT NOTE ADULT - SUBJECTIVE AND OBJECTIVE BOX
EZRA BARCLAY  54y, Female  Allergies    No Known Allergies    Intolerances        LOS  1d    HPI  HPI:                                              52 y/o Female, former IVDA on Suboxone, HTN, HLD, Hep C. , Chronic lower extremity edema with ulceration of skin presents with c/o generalized weakness and subjective fever/chills x 2 weeks and worsening lower extremity cellulitis R>L. , with oozing from RLE.  Admitted 11/29-12/3/2024 with similar scenario. Admitted today for same complaints; denies fever  - completed home Ceftin from last  admission.  - saw Dr Adair Vascular who placed Brenda boot on Right foot x two weeks after last admission. (27 Dec 2024 18:38)      INFECTIOUS DISEASE HISTORY:  ID consulted for antimicrobial recommendations.     Prior hospital charts reviewed [Yes]  Primary team notes reviewed [Yes]  Other consultant notes reviewed [Yes]    REVIEW OF SYSTEMS:  CONSTITUTIONAL: No fever or chills  HEENT: No sore throat  RESPIRATORY: No cough, no shortness of breath  CARDIOVASCULAR: No chest pain or palpitations  GASTROINTESTINAL: No abdominal or epigastric pain  GENITOURINARY: No dysuria  NEUROLOGICAL: No headache/dizziness  MSK: No joint pain, erythema, or swelling; no back pain  SKIN: No itching, rashes  All other ROS negative except noted above    PAST MEDICAL & SURGICAL HISTORY:  Asthma with COPD      Hypothyroidism      H/O: substance abuse  no longer using      History of pancreatitis      Hepatitis-C      Leukocytoclastic vasculitis      Bilateral edema of lower extremity      HLD (hyperlipidemia)      Type 2 diabetes mellitus      History of appendectomy      History of tonsillectomy          SOCIAL HISTORY:  - No recent travel    FAMILY HISTORY: No pertinent PMH for first degree relatives.     ANTIMICROBIALS:  cefepime   IVPB    vancomycin  IVPB 1000 every 12 hours      ANTIMICROBIALS (past 90 days):  MEDICATIONS  (STANDING):    cefTRIAXone   IVPB   100 mL/Hr IV Intermittent (12-28-24 @ 00:24)    vancomycin  IVPB   166.67 mL/Hr IV Intermittent (12-28-24 @ 05:40)        OTHER MEDS:   MEDICATIONS  (STANDING):  acetaminophen     Tablet .. 650 every 6 hours PRN  albuterol    90 MICROgram(s) HFA Inhaler 2 every 6 hours PRN  aluminum hydroxide/magnesium hydroxide/simethicone Suspension 30 every 4 hours PRN  atorvastatin 20 at bedtime  buprenorphine 8 mG/naloxone 2 mG SL  Tablet 2.5 daily  colchicine 0.6 two times a day  dextrose Oral Gel 15 once PRN  enoxaparin Injectable 40 every 24 hours  furosemide    Tablet 40 daily  influenza   Vaccine 0.5 once  insulin glargine Injectable (LANTUS) 28 at bedtime  insulin lispro (ADMELOG) corrective regimen sliding scale  three times a day before meals  levothyroxine 175 daily  melatonin 3 at bedtime PRN  ondansetron Injectable 4 every 8 hours PRN  pantoprazole    Tablet 40 before breakfast  pregabalin 50 two times a day      VITALS:  Vital Signs Last 24 Hrs  T(F): 98 (12-28-24 @ 05:15), Max: 98.5 (12-27-24 @ 21:40)    Vital Signs Last 24 Hrs  HR: 63 (12-28-24 @ 05:15) (63 - 81)  BP: 125/83 (12-28-24 @ 05:15) (91/49 - 125/83)  RR: 18 (12-28-24 @ 05:15)  SpO2: 98% (12-28-24 @ 05:15) (94% - 100%)  Wt(kg): --    EXAM:  GENERAL: NAD, lying in bed  HEAD: No head lesions  NECK: Supple  CHEST/LUNG: Clear to auscultation bilaterally  HEART: S1 S2  ABDOMEN: Soft, nontender  EXTREMITIES: Bilateral lower extremity draining wounds noted R>L. Left upper extremity wrapped in ace (S/p fall)  NERVOUS SYSTEM: Alert and oriented to person    Labs:                        9.1    7.81  )-----------( 313      ( 28 Dec 2024 11:22 )             28.3     12-28    136  |  95[L]  |  10  ----------------------------<  162[H]  4.2   |  29  |  0.9    Ca    8.9      28 Dec 2024 11:22  Mg     1.7     12-28    TPro  7.6  /  Alb  4.0  /  TBili  0.4  /  DBili  x   /  AST  18  /  ALT  <5  /  AlkPhos  92  12-28      WBC Trend:  WBC Count: 7.81 (12-28-24 @ 11:22)  WBC Count: 8.71 (12-27-24 @ 16:00)      Auto Neutrophil #: 5.10 K/uL (12-28-24 @ 11:22)  Auto Neutrophil #: 6.36 K/uL (12-27-24 @ 16:00)  Auto Neutrophil #: 4.65 K/uL (12-03-24 @ 07:21)  Auto Neutrophil #: 3.53 K/uL (12-01-24 @ 07:45)  Auto Neutrophil #: 6.74 K/uL (11-29-24 @ 10:30)      Creatine Trend:  Creatinine: 0.9 (12-28)  Creatinine: 0.9 (12-27)      Liver Biochemical Testing Trend:  Alanine Aminotransferase (ALT/SGPT): <5 (12-28)  Alanine Aminotransferase (ALT/SGPT): <5 (12-27)  Alanine Aminotransferase (ALT/SGPT): <5 (12-03)  Alanine Aminotransferase (ALT/SGPT): <5 (12-01)  Alanine Aminotransferase (ALT/SGPT): <5 (11-29)  Aspartate Aminotransferase (AST/SGOT): 18 (12-28-24 @ 11:22)  Aspartate Aminotransferase (AST/SGOT): 34 (12-27-24 @ 16:00)  Aspartate Aminotransferase (AST/SGOT): 20 (12-03-24 @ 07:21)  Aspartate Aminotransferase (AST/SGOT): 23 (12-01-24 @ 07:45)  Aspartate Aminotransferase (AST/SGOT): 23 (11-29-24 @ 10:30)  Bilirubin Total: 0.4 (12-28)  Bilirubin Total: 0.5 (12-27)  Bilirubin Total: 0.2 (12-03)  Bilirubin Total: 0.2 (12-01)  Bilirubin Total: 0.6 (11-29)      Trend LDH  01-10-24 @ 06:55  230      Auto Eosinophil %: 1.7 % (12-28-24 @ 11:22)  Auto Eosinophil %: 0.7 % (12-27-24 @ 16:00)      Urinalysis Basic - ( 28 Dec 2024 11:22 )    Color: x / Appearance: x / SG: x / pH: x  Gluc: 162 mg/dL / Ketone: x  / Bili: x / Urobili: x   Blood: x / Protein: x / Nitrite: x   Leuk Esterase: x / RBC: x / WBC x   Sq Epi: x / Non Sq Epi: x / Bacteria: x        MICROBIOLOGY:    Female          HIV-1/2 Combo Result: Nonreact (01-11-24 @ 13:40)    Lactate, Blood: 1.5 (12-27 @ 16:00)    A1C with Estimated Average Glucose Result: 7.9 % (11-29-24 @ 10:30)      INFLAMMATORY MARKERS      RADIOLOGY & ADDITIONAL TESTS:  I have personally reviewed the imagings.  CXR      CT      CARDIOLOGY TESTING  12 Lead ECG:   Ventricular Rate 72 BPM    Atrial Rate 72 BPM    P-R Interval 138 ms    QRS Duration 100 ms    Q-T Interval 416 ms    QTC Calculation(Bazett) 455 ms    P Axis 80 degrees    R Axis 74 degrees    T Axis 15 degrees    Diagnosis Line Normal sinus rhythm  Nonspecific ST and T wave abnormality  Abnormal ECG    Confirmed by TERRENCE NEWBY MD ([id number]) on 12/27/2024 4:42:20 PM (12-27-24 @ 15:34)    < from: VA Duplex Lower Extrem Arterial, Bilat (12.27.24 @ 21:15) >    INTERPRETATION:  This patient is a 54-year-old female with chronic lower   extremity edema and ulcerations. Lower extremity arterial duplex   ultrasound was performed to assess for arterial occlusive disease.    Bilateral common femoral, deep femoral, superficial femoral, and   popliteal arteries were visualized. Bilateral tibial arteries not   visualized    Right Lower extremity:  There is moderate right external iliac and common femoral artery artery   stenosis    Left Lower extremity  There is no evidence of significant arterial occlusive disease or   arterial occlusions in the left lower extremity        Impression:  Moderate right external iliac and common femoral artery stenosis  Normal arterial flow in the left lower extremity  Tibial arteries not visualized bilaterally    --- End of Report ---    < end of copied text >  < from: Xray Tibia + Fibula 2 Views, Right (12.27.24 @ 14:52) >  No acute fracture or acute articular abnormality. No evidence of   osteomyelitis. Soft tissue reticulation may be secondary to edema or   cellulitis    < end of copied text >  < from: Xray Hand 3 Views, Left (12.27.24 @ 14:52) >  IMPRESSION:    Acute comminuted fractures of the fourth and fifth metacarpal bodies.  Soft tissue swelling along the dorsal aspect of the hand.  Osteophyte formation at the first MCP joint.    --- End ofReport ---    < end of copied text >  < from: CT Angio Chest PE Protocol w/ IV Cont (11.15.24 @ 01:26) >    No pulmonary embolus seen. No CT evidence of acute right heart strain.    Emphysema. Diffuse cystic changes along some which are irregularly   shaped. There is background lung mosaic attenuation lung parenchyma of   unknown chronicity. The patient history is unclear. Findings are   nonspecific with differential including desquamative interstitial   pneumonia/smoking related interstitial disease, lymphoid interstitial   pneumonia, PCP, and pulmonary langerhans cell histiocytosis. Correlation   with patient's clinical history would be beneficial. Follow-up in 6-12   months with a high-resolution chest CT is suggested to evaluate for   interval change. If the patient is demonstrating chronic shortness of   breath/respiratory symptoms, outpatient pulmonology follow-up is   recommended.    --- End of Report ---    < end of copied text >

## 2024-12-28 NOTE — CONSULT NOTE ADULT - ASSESSMENT
54 y/o Female, former IVDA on Suboxone, HTN, HLD, Hep C. , Chronic lower extremity edema w/ ulceration of skin admitted for  lower extremity cellulitis R>L, referred to vascular surgery by primary team for concern for stenosis on arterial doppler.    12/27 b/l LE arterial duplex demonstrated moderate right external iliac and common femoral artery stenosis  Normal arterial flow in the left lower extremity        RECS  no acute vascular surgical intervention warranted at this time  - local wound care  - recommend assessing for b/l dorsal pedal and posterior tibial pulses prior to replacing dressing and gauze  - assess b/l LEs pulses routinely  - close outpt follow up w/ pt's outpt Vasc Surgery Dr. Adair     vasc surgery team to sign-off; would recommend recalling vasc surgery as soon as possible if pt develops diminished/ absent pulses; recall vasc surgery prn       pt d/w vascular surgery fellow Dr. Becerril, who is in direct contact w/ vascular attending     HPI, PMHx, ROS, and physical exam limited in nature as pt very groggy at time of exam, w/ stating " go away and let me sleep." Collateral information obtained from medicine team, hospital records, and HIE. Photos of respective areas, received from podiatry via [email or web address], shared w/ Dr. Becerril     54 y/o Female, former IVDA on Suboxone, HTN, HLD, Hep C. , Chronic lower extremity edema w/ ulceration of skin admitted for  lower extremity cellulitis R>L, referred to vascular surgery by primary team for concern for stenosis on arterial doppler.    12/27 b/l LE arterial duplex demonstrated moderate right external iliac and common femoral artery stenosis  Normal arterial flow in the left lower extremity        RECS  no acute vascular surgical intervention warranted at this time  - local wound care  - recommend physically assessing for b/l dorsal pedal and posterior tibial pulses  prior to replacing dressing and gauze  - assess b/l LEs pulses routinely  - close outpt follow up w/ pt's outpt Vasc Surgery Dr. Adair     as d/w Dr. Becerril, vasc surgery team to sign-off; can recall vasc surgery prn- would recommend recalling vasc surgery as soon as possible if pt develops diminished/ absent pulses      pt d/w vascular surgery fellow Dr. Becerril, who is in direct contact w/ vascular attending     HPI, PMHx, ROS, and physical exam limited in nature as pt very groggy at time of exam, w/ stating " go away and let me sleep." Collateral information obtained from medicine team, hospital records, and HIE.     Photos of b/l LEs wounds obtained from podiatry via [email or web address] [email or web address], shared w/ Dr. Becerril

## 2024-12-29 LAB
GLUCOSE BLDC GLUCOMTR-MCNC: 118 MG/DL — HIGH (ref 70–99)
GLUCOSE BLDC GLUCOMTR-MCNC: 190 MG/DL — HIGH (ref 70–99)
GLUCOSE BLDC GLUCOMTR-MCNC: 233 MG/DL — HIGH (ref 70–99)
GLUCOSE BLDC GLUCOMTR-MCNC: 75 MG/DL — SIGNIFICANT CHANGE UP (ref 70–99)
GLUCOSE BLDC GLUCOMTR-MCNC: 99 MG/DL — SIGNIFICANT CHANGE UP (ref 70–99)

## 2024-12-29 PROCEDURE — 99233 SBSQ HOSP IP/OBS HIGH 50: CPT

## 2024-12-29 RX ADMIN — Medication 2: at 11:49

## 2024-12-29 RX ADMIN — PREGABALIN 50 MILLIGRAM(S): 100 CAPSULE ORAL at 05:32

## 2024-12-29 RX ADMIN — BUPRENORPHINE HYDROCHLORIDE AND NALOXONE HYDROCHLORIDE DIHYDRATE 2.5 TABLET(S): 8; 2 TABLET SUBLINGUAL at 11:49

## 2024-12-29 RX ADMIN — PANTOPRAZOLE 40 MILLIGRAM(S): 40 TABLET, DELAYED RELEASE ORAL at 05:32

## 2024-12-29 RX ADMIN — COLCHICINE 0.6 MILLIGRAM(S): 0.6 CAPSULE ORAL at 17:33

## 2024-12-29 RX ADMIN — VANCOMYCIN HYDROCHLORIDE 250 MILLIGRAM(S): 5 INJECTION, POWDER, LYOPHILIZED, FOR SOLUTION INTRAVENOUS at 17:33

## 2024-12-29 RX ADMIN — Medication 100 MILLIGRAM(S): at 21:25

## 2024-12-29 RX ADMIN — VANCOMYCIN HYDROCHLORIDE 250 MILLIGRAM(S): 5 INJECTION, POWDER, LYOPHILIZED, FOR SOLUTION INTRAVENOUS at 06:52

## 2024-12-29 RX ADMIN — COLCHICINE 0.6 MILLIGRAM(S): 0.6 CAPSULE ORAL at 05:32

## 2024-12-29 RX ADMIN — Medication 100 MILLIGRAM(S): at 13:38

## 2024-12-29 RX ADMIN — Medication 40 MILLIGRAM(S): at 05:32

## 2024-12-29 RX ADMIN — ATORVASTATIN CALCIUM 20 MILLIGRAM(S): 40 TABLET, FILM COATED ORAL at 21:26

## 2024-12-29 RX ADMIN — INSULIN GLARGINE-YFGN 28 UNIT(S): 100 INJECTION, SOLUTION SUBCUTANEOUS at 21:23

## 2024-12-29 RX ADMIN — Medication 100 MILLIGRAM(S): at 05:32

## 2024-12-29 RX ADMIN — PREGABALIN 50 MILLIGRAM(S): 100 CAPSULE ORAL at 17:33

## 2024-12-29 RX ADMIN — LEVOTHYROXINE SODIUM 175 MICROGRAM(S): 175 TABLET ORAL at 05:32

## 2024-12-29 RX ADMIN — ENOXAPARIN SODIUM 40 MILLIGRAM(S): 60 INJECTION INTRAVENOUS; SUBCUTANEOUS at 21:24

## 2024-12-29 NOTE — PROGRESS NOTE ADULT - ASSESSMENT
a 52 y/o Female, former IVDA on Suboxone, HTN, HLD, Hep C. , Chronic lower extremity edema with ulceration of skin presents with c/o generalized weakness and subjective fever/chills x 2 weeks and worsening lower extremity cellulitis  with oozing from b/l LE R>L.       #B/l LE cellulitis superimposed on chronic LE edema with possible bacteremia  - ID recs appreciated: continue vancomycin and switch to cefepime 2g q 8hrs  - follow up blood cultures-  No growth at 24 hours  - VA duplex concerning for stenosis  - vascular consulted recs ( no surgical interventions)   - podiatry recs appreciated  - Wound care or podiatry for local care of the wounds.   - XR Imaging Right Foot; Flattening of the normal plantar arch. Midfoot arthrosis with diffuse soft tissue swelling. No fracture or erosion.  - duplex Moderate right external iliac and common femoral artery stenosis, Normal arterial flow in the left lower extremity, Tibial arteries not visualized bilaterally  - Local Wound Care; Applied - Xeroform / Gauze / DSD / Kerlix / Secured with Tape  - Weight Bearing Status; WBAT   - f/u ESR, CRP  - Trend WBC daily  - OOB to chair with assistance  - consider midline if difficult access    # Diabetes Type 2  - continue lantus with sliding scale  - CHO consistent diet  - hold metformin  - A1C,     # Opiate dependence  - continue Suboxone     # Gout  -continue colchicine    # Hypothyroid  - continue Levothyroxine    proph heparin  DM diet   full code  Dispo: [facility] vs home      Handoff: cultures, blood work, f/u vascular, podiatry, and ID

## 2024-12-29 NOTE — PROGRESS NOTE ADULT - SUBJECTIVE AND OBJECTIVE BOX
EZRA BARCLAY 54y Female  MRN#: [id number]   Hospital Day: 2d    SUBJECTIVE  Patient is a 54y old Female who presents with a chief complaint of RLE Ulceration with oozing (28 Dec 2024 16:16)  Currently admitted to medicine with the primary diagnosis of Cellulitis      INTERVAL HPI AND OVERNIGHT EVENTS:  Patient was examined and seen at bedside. This morning she is resting comfortably in bed and reports no issues or overnight events.  chronic lower ext pain 2/2 chronic wounds denies chest pain , sob, n/v/d/c, hematuria or bloody stool.     REVIEW OF SYMPTOMS:  10 point ROS negative except as above.    OBJECTIVE  PAST MEDICAL & SURGICAL HISTORY  Asthma with COPD    Hypothyroidism    H/O: substance abuse  no longer using    History of pancreatitis    Hepatitis-C    Leukocytoclastic vasculitis    Bilateral edema of lower extremity    HLD (hyperlipidemia)    Type 2 diabetes mellitus    History of appendectomy    History of tonsillectomy      ALLERGIES:  No Known Allergies    MEDICATIONS:  STANDING MEDICATIONS  atorvastatin 20 milliGRAM(s) Oral at bedtime  buprenorphine 8 mG/naloxone 2 mG SL  Tablet 2.5 Tablet(s) SubLingual daily  cefepime   IVPB      cefepime   IVPB 2000 milliGRAM(s) IV Intermittent every 8 hours  colchicine 0.6 milliGRAM(s) Oral two times a day  dextrose 5%. 1000 milliLiter(s) IV Continuous <Continuous>  enoxaparin Injectable 40 milliGRAM(s) SubCutaneous every 24 hours  furosemide    Tablet 40 milliGRAM(s) Oral daily  influenza   Vaccine 0.5 milliLiter(s) IntraMuscular once  insulin glargine Injectable (LANTUS) 28 Unit(s) SubCutaneous at bedtime  insulin lispro (ADMELOG) corrective regimen sliding scale   SubCutaneous three times a day before meals  levothyroxine 175 MICROGram(s) Oral daily  pantoprazole    Tablet 40 milliGRAM(s) Oral before breakfast  pregabalin 50 milliGRAM(s) Oral two times a day  vancomycin  IVPB 1000 milliGRAM(s) IV Intermittent every 12 hours    PRN MEDICATIONS  acetaminophen     Tablet .. 650 milliGRAM(s) Oral every 6 hours PRN  albuterol    90 MICROgram(s) HFA Inhaler 2 Puff(s) Inhalation every 6 hours PRN  aluminum hydroxide/magnesium hydroxide/simethicone Suspension 30 milliLiter(s) Oral every 4 hours PRN  dextrose Oral Gel 15 Gram(s) Oral once PRN  melatonin 3 milliGRAM(s) Oral at bedtime PRN  ondansetron Injectable 4 milliGRAM(s) IV Push every 8 hours PRN      VITAL SIGNS: Last 24 Hours  T(C): 36.8 (29 Dec 2024 06:45), Max: 36.8 (29 Dec 2024 06:45)  T(F): 98.3 (29 Dec 2024 06:45), Max: 98.3 (29 Dec 2024 06:45)  HR: 84 (29 Dec 2024 06:45) (57 - 84)  BP: 124/77 (29 Dec 2024 06:45) (101/63 - 133/83)  BP(mean): --  RR: 18 (29 Dec 2024 06:45) (18 - 18)  SpO2: 100% (28 Dec 2024 20:28) (99% - 100%)    PHYSICAL EXAM:  GENERAL: NAD, well-groomed, well-developed  HEENT : NC/AT,   NECK: Supple, No JVD  CHEST/LUNG: Clear to auscultation bilaterally;   HEART: Regular rate and rhythm; No murmurs  ABDOMEN: Soft, Nontender, Nondistended; Bowel sounds present  EXTREMITIES: +edema, chronic venous stasis, wounds dressed   NEURO:  aox3, generalized weakness   SKIN:  b/l lower ext. wounds and chronic venous changes     LABS:                        9.1    7.81  )-----------( 313      ( 28 Dec 2024 11:22 )             28.3     12-28    136  |  95[L]  |  10  ----------------------------<  162[H]  4.2   |  29  |  0.9    Ca    8.9      28 Dec 2024 11:22  Mg     1.7     12-28    TPro  7.6  /  Alb  4.0  /  TBili  0.4  /  DBili  x   /  AST  18  /  ALT  <5  /  AlkPhos  92  12-28    PT/INR - ( 27 Dec 2024 16:00 )   PT: 12.50 sec;   INR: 1.06 ratio         PTT - ( 27 Dec 2024 16:00 )  PTT:37.7 sec  Urinalysis Basic - ( 28 Dec 2024 11:22 )    Color: x / Appearance: x / SG: x / pH: x  Gluc: 162 mg/dL / Ketone: x  / Bili: x / Urobili: x   Blood: x / Protein: x / Nitrite: x   Leuk Esterase: x / RBC: x / WBC x   Sq Epi: x / Non Sq Epi: x / Bacteria: x    Culture - Blood (collected 27 Dec 2024 16:00)  Source: .Blood BLOOD  Preliminary Report (28 Dec 2024 23:08):    No growth at 24 hours    Culture - Blood (collected 27 Dec 2024 16:00)  Source: .Blood BLOOD  Preliminary Report (28 Dec 2024 23:08):    No growth at 24 hours    RADIOLOGY:  < from: Xray Foot AP + Lateral + Oblique, Right (12.28.24 @ 11:58) >  impression:    Flattening of the normal plantar arch.    Midfoot arthrosis with diffuse soft tissue swelling.    No fracture or erosion.    If concern with osteomyelitis, consider correlation with MR scanning.    < end of copied text >  < from: VA Duplex Lower Extrem Arterial, Bilat (12.27.24 @ 21:15) >      Impression:  Moderate right external iliac and common femoral artery stenosis  Normal arterial flow in the left lower extremity  Tibial arteries not visualized bilaterally    < end of copied text >  < from: Xray Hand 3 Views, Left (12.27.24 @ 14:52) >  IMPRESSION:    Acute comminuted fractures of the fourth and fifth metacarpal bodies.  Soft tissue swelling along the dorsal aspect of the hand.  Osteophyte formation at the first MCP joint.    < end of copied text >

## 2024-12-30 LAB
ANION GAP SERPL CALC-SCNC: 12 MMOL/L — SIGNIFICANT CHANGE UP (ref 7–14)
APTT BLD: 36.7 SEC — SIGNIFICANT CHANGE UP (ref 27–39.2)
BASOPHILS # BLD AUTO: 0.07 K/UL — SIGNIFICANT CHANGE UP (ref 0–0.2)
BASOPHILS NFR BLD AUTO: 0.9 % — SIGNIFICANT CHANGE UP (ref 0–1)
BUN SERPL-MCNC: 10 MG/DL — SIGNIFICANT CHANGE UP (ref 10–20)
CALCIUM SERPL-MCNC: 9.3 MG/DL — SIGNIFICANT CHANGE UP (ref 8.4–10.5)
CHLORIDE SERPL-SCNC: 91 MMOL/L — LOW (ref 98–110)
CO2 SERPL-SCNC: 33 MMOL/L — HIGH (ref 17–32)
CREAT SERPL-MCNC: 1.1 MG/DL — SIGNIFICANT CHANGE UP (ref 0.7–1.5)
CRP SERPL-MCNC: 80.4 MG/L — HIGH
EGFR: 60 ML/MIN/1.73M2 — SIGNIFICANT CHANGE UP
EOSINOPHIL # BLD AUTO: 0.21 K/UL — SIGNIFICANT CHANGE UP (ref 0–0.7)
EOSINOPHIL NFR BLD AUTO: 2.8 % — SIGNIFICANT CHANGE UP (ref 0–8)
ERYTHROCYTE [SEDIMENTATION RATE] IN BLOOD: 98 MM/HR — HIGH (ref 0–20)
GLUCOSE BLDC GLUCOMTR-MCNC: 114 MG/DL — HIGH (ref 70–99)
GLUCOSE BLDC GLUCOMTR-MCNC: 124 MG/DL — HIGH (ref 70–99)
GLUCOSE BLDC GLUCOMTR-MCNC: 153 MG/DL — HIGH (ref 70–99)
GLUCOSE BLDC GLUCOMTR-MCNC: 265 MG/DL — HIGH (ref 70–99)
GLUCOSE SERPL-MCNC: 163 MG/DL — HIGH (ref 70–99)
HCT VFR BLD CALC: 34.4 % — LOW (ref 37–47)
HGB BLD-MCNC: 10.7 G/DL — LOW (ref 12–16)
IMM GRANULOCYTES NFR BLD AUTO: 0.5 % — HIGH (ref 0.1–0.3)
INR BLD: 0.89 RATIO — SIGNIFICANT CHANGE UP (ref 0.65–1.3)
LACTATE SERPL-SCNC: 1.7 MMOL/L — SIGNIFICANT CHANGE UP (ref 0.7–2)
LYMPHOCYTES # BLD AUTO: 1.44 K/UL — SIGNIFICANT CHANGE UP (ref 1.2–3.4)
LYMPHOCYTES # BLD AUTO: 19.1 % — LOW (ref 20.5–51.1)
MCHC RBC-ENTMCNC: 26.8 PG — LOW (ref 27–31)
MCHC RBC-ENTMCNC: 31.1 G/DL — LOW (ref 32–37)
MCV RBC AUTO: 86.2 FL — SIGNIFICANT CHANGE UP (ref 81–99)
MONOCYTES # BLD AUTO: 0.31 K/UL — SIGNIFICANT CHANGE UP (ref 0.1–0.6)
MONOCYTES NFR BLD AUTO: 4.1 % — SIGNIFICANT CHANGE UP (ref 1.7–9.3)
NEUTROPHILS # BLD AUTO: 5.47 K/UL — SIGNIFICANT CHANGE UP (ref 1.4–6.5)
NEUTROPHILS NFR BLD AUTO: 72.6 % — SIGNIFICANT CHANGE UP (ref 42.2–75.2)
NRBC # BLD: 0 /100 WBCS — SIGNIFICANT CHANGE UP (ref 0–0)
PLATELET # BLD AUTO: 369 K/UL — SIGNIFICANT CHANGE UP (ref 130–400)
PMV BLD: 9.3 FL — SIGNIFICANT CHANGE UP (ref 7.4–10.4)
POTASSIUM SERPL-MCNC: 3.3 MMOL/L — LOW (ref 3.5–5)
POTASSIUM SERPL-SCNC: 3.3 MMOL/L — LOW (ref 3.5–5)
PROTHROM AB SERPL-ACNC: 10.5 SEC — SIGNIFICANT CHANGE UP (ref 9.95–12.87)
RBC # BLD: 3.99 M/UL — LOW (ref 4.2–5.4)
RBC # FLD: 15.6 % — HIGH (ref 11.5–14.5)
SODIUM SERPL-SCNC: 136 MMOL/L — SIGNIFICANT CHANGE UP (ref 135–146)
VANCOMYCIN TROUGH SERPL-MCNC: 9.3 UG/ML — SIGNIFICANT CHANGE UP (ref 5–10)
WBC # BLD: 7.54 K/UL — SIGNIFICANT CHANGE UP (ref 4.8–10.8)
WBC # FLD AUTO: 7.54 K/UL — SIGNIFICANT CHANGE UP (ref 4.8–10.8)

## 2024-12-30 PROCEDURE — 99233 SBSQ HOSP IP/OBS HIGH 50: CPT

## 2024-12-30 PROCEDURE — 29581 APPL MULTLAYER CMPRN SYS LEG: CPT | Mod: 50,GC

## 2024-12-30 PROCEDURE — 99231 SBSQ HOSP IP/OBS SF/LOW 25: CPT | Mod: GC,25

## 2024-12-30 RX ORDER — VANCOMYCIN HYDROCHLORIDE 5 G/100ML
1500 INJECTION, POWDER, LYOPHILIZED, FOR SOLUTION INTRAVENOUS EVERY 12 HOURS
Refills: 0 | Status: DISCONTINUED | OUTPATIENT
Start: 2024-12-31 | End: 2025-01-02

## 2024-12-30 RX ADMIN — ATORVASTATIN CALCIUM 20 MILLIGRAM(S): 40 TABLET, FILM COATED ORAL at 21:15

## 2024-12-30 RX ADMIN — Medication 1: at 13:00

## 2024-12-30 RX ADMIN — VANCOMYCIN HYDROCHLORIDE 250 MILLIGRAM(S): 5 INJECTION, POWDER, LYOPHILIZED, FOR SOLUTION INTRAVENOUS at 06:02

## 2024-12-30 RX ADMIN — Medication 100 MILLIGRAM(S): at 13:28

## 2024-12-30 RX ADMIN — COLCHICINE 0.6 MILLIGRAM(S): 0.6 CAPSULE ORAL at 18:09

## 2024-12-30 RX ADMIN — Medication 40 MILLIGRAM(S): at 06:00

## 2024-12-30 RX ADMIN — LEVOTHYROXINE SODIUM 175 MICROGRAM(S): 175 TABLET ORAL at 06:00

## 2024-12-30 RX ADMIN — VANCOMYCIN HYDROCHLORIDE 250 MILLIGRAM(S): 5 INJECTION, POWDER, LYOPHILIZED, FOR SOLUTION INTRAVENOUS at 18:39

## 2024-12-30 RX ADMIN — Medication 100 MILLIGRAM(S): at 06:01

## 2024-12-30 RX ADMIN — PREGABALIN 50 MILLIGRAM(S): 100 CAPSULE ORAL at 18:09

## 2024-12-30 RX ADMIN — COLCHICINE 0.6 MILLIGRAM(S): 0.6 CAPSULE ORAL at 06:00

## 2024-12-30 RX ADMIN — INSULIN GLARGINE-YFGN 28 UNIT(S): 100 INJECTION, SOLUTION SUBCUTANEOUS at 21:14

## 2024-12-30 RX ADMIN — BUPRENORPHINE HYDROCHLORIDE AND NALOXONE HYDROCHLORIDE DIHYDRATE 2.5 TABLET(S): 8; 2 TABLET SUBLINGUAL at 13:04

## 2024-12-30 RX ADMIN — PREGABALIN 50 MILLIGRAM(S): 100 CAPSULE ORAL at 06:01

## 2024-12-30 RX ADMIN — Medication 100 MILLIGRAM(S): at 21:15

## 2024-12-30 NOTE — PHARMACOTHERAPY INTERVENTION NOTE - COMMENTS
As per policy, adjusted vancomycin dose from 1000 mg IV q12h to 1500 mg IV q12h since the vancomycin level today, 12/30 was 9.3 mg/L. As per [employer] calculations, the steady-state vancomycin AUC/REED on the former dose is 306 mg/L*h, which is less than the therapeutic range of 400 - 600 mg/L*h. Increasing the dose is predicted to yield an AUC/REED of 459 mg/L*h.    Esther Zhang, PharmD, BCIDP  Clinical Pharmacy Specialist, Infectious Diseases  Tele-Antimicrobial Stewardship Program (Tele-ASP)  Tele-ASP Phone: [phone]

## 2024-12-30 NOTE — CHART NOTE - NSCHARTNOTEFT_GEN_A_CORE
RN at bedside performing woundcare, While passing pt's room, pt noted to more alert & oriented while walking by, compared to consult on Dec 28th. Vasc surg findings d/w pt;  pt reports she sees Dr. Adair outpt and see Dr. Adair outpt for f/u  Dr. Adair after UNNA boots were placed outpt.  pt  states her "feet are cold today because I didn't put on socks for awhile."   Pt reports no acute changes/sxs of b/l LEs from when she last Dr. Adair today to today.      B/l Feet cold today when assessed, consistent w/ pt's statement that she hasn't worn socks for some time, w/ palpable b/l dorsal pedal pulses noted on exam. Discoloration of b/l feet noted, appearing c/w Chronic venous stasis change. Unable to appropriately any acute changes given pt hasn't worn socks for few hours    informed pt to inform staff if she develop any acute changes of b/l feet, w/ pt verbalizing understanding and verbalizing agreement of plan.     RN informed and will reassess pt's b/l feet and palpate for pulses later this evening     above d/w Dr. Laughlin    recall prn vasc surg RN at bedside performing wound care, While passing pt's room, pt noted to more alert & oriented while walking by, compared to consult on Dec 28th. Vasc surg findings d/w pt;  pt reports she sees Dr. Adair outpt and see Dr. Adair outpt for f/u  Dr. Adair after UNNA boots were placed outpt.  pt  states her "feet are cold today because I didn't put on socks for awhile."   Pt reports no acute changes/sxs of b/l LEs from when she last Dr. Adair today to today.      B/l Feet cold today when assessed, consistent w/ pt's statement that she hasn't worn socks for some time, w/ palpable b/l dorsal pedal pulses noted on exam. Discoloration of b/l feet noted, appearing c/w Chronic venous stasis change. Unable to appropriately any acute changes given pt hasn't worn socks for few hours    informed pt to inform staff if she develop any acute changes of b/l feet, w/ pt verbalizing understanding and verbalizing agreement of plan.     RN informed and will reassess pt's b/l feet and palpate for pulses later this evening     above d/w Dr. Laughlin; recall prn vascular surgery    management as per medical team

## 2024-12-30 NOTE — PROGRESS NOTE ADULT - SUBJECTIVE AND OBJECTIVE BOX
EZRA BARCLAY  54y, Female    LOS  3d    INTERVAL EVENTS/HPI  - No acute events overnight  - T(F): , Max: 97.6 (12-30-24 @ 07:14)  - WBC Count: 7.54 (12-30-24 @ 11:00)  WBC Count: 7.81 (12-28-24 @ 11:22)    REVIEW OF SYSTEMS:  CONSTITUTIONAL: No fever or chills  HEENT: No sore throat  RESPIRATORY: No cough, no shortness of breath  CARDIOVASCULAR: No chest pain or palpitations  GASTROINTESTINAL: No abdominal or epigastric pain  GENITOURINARY: No dysuria  NEUROLOGICAL: No headache/dizziness  MSK: No joint pain, erythema, or swelling; no back pain  SKIN: No itching, rashes  All other ROS negative except noted above    Prior hospital charts reviewed [Yes]  Primary team notes reviewed [Yes]  Other consultant notes reviewed [Yes]    ANTIMICROBIALS:   cefepime   IVPB    cefepime   IVPB 2000 every 8 hours  vancomycin  IVPB 1000 every 12 hours      OTHER MEDS: MEDICATIONS  (STANDING):  acetaminophen     Tablet .. 650 every 6 hours PRN  albuterol    90 MICROgram(s) HFA Inhaler 2 every 6 hours PRN  aluminum hydroxide/magnesium hydroxide/simethicone Suspension 30 every 4 hours PRN  atorvastatin 20 at bedtime  buprenorphine 8 mG/naloxone 2 mG SL  Tablet 2.5 daily  colchicine 0.6 two times a day  dextrose Oral Gel 15 once PRN  enoxaparin Injectable 40 every 24 hours  furosemide    Tablet 40 daily  influenza   Vaccine 0.5 once  insulin glargine Injectable (LANTUS) 28 at bedtime  insulin lispro (ADMELOG) corrective regimen sliding scale  three times a day before meals  levothyroxine 175 daily  melatonin 3 at bedtime PRN  ondansetron Injectable 4 every 8 hours PRN  pantoprazole    Tablet 40 before breakfast  pregabalin 50 two times a day      Vital Signs Last 24 Hrs  T(F): 97.6 (12-30-24 @ 07:14), Max: 98.5 (12-27-24 @ 21:40)    Vital Signs Last 24 Hrs  HR: 92 (12-30-24 @ 07:14) (65 - 92)  BP: 107/41 (12-30-24 @ 07:14) (88/57 - 107/41)  RR: 18 (12-30-24 @ 07:14)  SpO2: 96% (12-30-24 @ 07:14) (96% - 97%)  Wt(kg): --    EXAM:  GENERAL: NAD, lying in bed  HEAD: No head lesions  NECK: Supple  CHEST/LUNG: Clear to auscultation bilaterally  HEART: S1 S2  ABDOMEN: Soft, nontender  EXTREMITIES: Bilateral lower extremity wounds noted.   NERVOUS SYSTEM: Alert and oriented to person    Labs:                        10.7   7.54  )-----------( 369      ( 30 Dec 2024 11:00 )             34.4             WBC Trend:  WBC Count: 7.54 (12-30-24 @ 11:00)  WBC Count: 7.81 (12-28-24 @ 11:22)  WBC Count: 8.71 (12-27-24 @ 16:00)      Creatine Trend:  Creatinine: 0.9 (12-28)  Creatinine: 0.9 (12-27)      Liver Biochemical Testing Trend:  Alanine Aminotransferase (ALT/SGPT): <5 (12-28)  Alanine Aminotransferase (ALT/SGPT): <5 (12-27)  Alanine Aminotransferase (ALT/SGPT): <5 (12-03)  Alanine Aminotransferase (ALT/SGPT): <5 (12-01)  Alanine Aminotransferase (ALT/SGPT): <5 (11-29)  Aspartate Aminotransferase (AST/SGOT): 18 (12-28-24 @ 11:22)  Aspartate Aminotransferase (AST/SGOT): 34 (12-27-24 @ 16:00)  Aspartate Aminotransferase (AST/SGOT): 20 (12-03-24 @ 07:21)  Aspartate Aminotransferase (AST/SGOT): 23 (12-01-24 @ 07:45)  Aspartate Aminotransferase (AST/SGOT): 23 (11-29-24 @ 10:30)  Bilirubin Total: 0.4 (12-28)  Bilirubin Total: 0.5 (12-27)  Bilirubin Total: 0.2 (12-03)  Bilirubin Total: 0.2 (12-01)  Bilirubin Total: 0.6 (11-29)      Trend LDH  01-10-24 @ 06:55  230          MICROBIOLOGY:    Female    Culture - Blood (collected 28 Dec 2024 17:10)  Source: .Blood BLOOD  Preliminary Report:    No growth at 24 hours    Culture - Blood (collected 27 Dec 2024 16:00)  Source: .Blood BLOOD  Preliminary Report:    No growth at 48 Hours    Culture - Blood (collected 27 Dec 2024 16:00)  Source: .Blood BLOOD  Preliminary Report:    No growth at 48 Hours    Urinalysis with Rflx Culture (collected 29 Nov 2024 00:13)    Culture - Blood (collected 28 Nov 2024 22:04)  Source: .Blood BLOOD  Final Report:    No growth at 5 days    Culture - Blood (collected 28 Nov 2024 22:04)  Source: .Blood BLOOD  Final Report:    No growth at 5 days    Urinalysis with Rflx Culture (collected 14 Nov 2024 18:42)    Culture - Blood (collected 27 May 2024 21:10)  Source: .Blood Blood  Final Report:    No growth at 5 days    Culture - Blood (collected 27 May 2024 21:10)  Source: .Blood Blood  Final Report:    No growth at 5 days    Culture - Other (collected 06 May 2024 16:16)  Source: .Other LLE  Final Report:    Rare Staphylococcus aureus    Normal skin sumit isolated  Organism: Staphylococcus aureus  Organism: Staphylococcus aureus    Sensitivities:      Method Type: REED      -  Clindamycin: R 0.5 This isolate is presumed to be clindamycin resistant based on detection of inducible resistance. Clindamycin may still be effective in some patients.      -  Erythromycin: R >4      -  Gentamicin: S <=1 Should not be used as monotherapy      -  Oxacillin: S 0.5 Oxacillin predicts susceptibility for dicloxacillin, methicillin, and nafcillin      -  Penicillin: R >8      -  Rifampin: S <=1 Should not be used as monotherapy      -  Tetracycline: S <=1      -  Trimethoprim/Sulfamethoxazole: S <=0.5/9.5      -  Vancomycin: S 2      HIV-1/2 Combo Result: Nonreact (01-11-24 @ 13:40)  Lactate, Blood: 1.5 (12-27 @ 16:00)      RADIOLOGY & ADDITIONAL TESTS:  I have personally reviewed the relevant images.   CXR      CT  < from: Xray Foot AP + Lateral + Oblique, Right (12.28.24 @ 11:58) >  3 views of the right foot.    Findings/  impression:    Flattening of the normal plantar arch.    Midfoot arthrosis with diffuse soft tissue swelling.    No fracture or erosion.    If concern with osteomyelitis, consider correlation with MR scanning.    --- End of Report ---    < end of copied text >        WEIGHT  Weight (kg): 90.7 (12-27-24 @ 14:12)      All available historical records have been reviewed

## 2024-12-30 NOTE — PROGRESS NOTE ADULT - SUBJECTIVE AND OBJECTIVE BOX
Podiatry Progress Note    Subjective:  EZRA BARCLAY is a  54y Female.   Seen bedside.   Patient is a 54y old  Female who presents with a chief complaint of RLE Ulceration with oozing (30 Dec 2024 11:29)      Past Medical History and Surgical History  PAST MEDICAL & SURGICAL HISTORY:  Asthma with COPD      Hypothyroidism      H/O: substance abuse  no longer using      History of pancreatitis      Hepatitis-C      Leukocytoclastic vasculitis      Bilateral edema of lower extremity      HLD (hyperlipidemia)      Type 2 diabetes mellitus      History of appendectomy      History of tonsillectomy           Objective:  Vital Signs Last 24 Hrs  T(C): 36.4 (30 Dec 2024 07:14), Max: 36.4 (30 Dec 2024 07:14)  T(F): 97.6 (30 Dec 2024 07:14), Max: 97.6 (30 Dec 2024 07:14)  HR: 92 (30 Dec 2024 07:14) (65 - 92)  BP: 107/41 (30 Dec 2024 07:14) (88/57 - 107/41)  BP(mean): --  RR: 18 (30 Dec 2024 07:14) (18 - 18)  SpO2: 96% (30 Dec 2024 07:14) (96% - 97%)                            10.7   7.54  )-----------( 369      ( 30 Dec 2024 11:00 )             34.4                 12-30    136  |  91[L]  |  10  ----------------------------<  163[H]  3.3[L]   |  33[H]  |  1.1    Ca    9.3      30 Dec 2024 11:00    X-ray, R Foot, 12/28/2024  impression:  Flattening of the normal plantar arch.  Midfoot arthrosis with diffuse soft tissue swelling.  No fracture or erosion.  If concern with osteomyelitis, consider correlation with MR scanning    A-Duplex, 12/27/2024  Impression:  Moderate right external iliac and common femoral artery stenosis  Normal arterial flow in the left lower extremity  Tibial arteries not visualized bilaterally    Physical Exam - Lower Extremity Focused:   Derm:   Right Foot - dry, stable wound to dorsolateral aspect of midfoot, no drainage, no purulence, erythema and edema noted   Right Lower Extr. superficial ulceration to skin with drainage and weeping wounds noted, covering large surface area, crusting noted on wound edges with erythema and edema extending from the knee to the foot     Left Foot - dry stable, wound to dorsal 2nd Digit, no drainage, no purulence, erythema and edema noted   Left Lower Extr. yellow crusting noted on medial wound above the ankle with erythema and edema extending from the knee to the foot     Vascular: DP and PT Pulses Diminished; Foot is Warm to the touch; Capillary Refill Time < 3 Seconds;    Neuro: Protective Sensation Diminished / Moderately Neuropathic   MSK: Pain On Palpation at Wound Site     Assessment:  BLE Cellulitis     Plan:  Chart reviewed and Patient evaluated. All Questions and Concerns Addressed and Answered  Imaging reviewed as above  Local wound care to BLE: xeroform, DSD, kerlix ACE with light compression   Weight Bearing Status; WBAT   Follow up vascular recommendation   Continue antibiotics per ID recs   Continue w/ Local Wound Care; Q24 Dressing Changes;    Discussed Plan w/ Attending; Dr. Yancey     Podiatry        Podiatry Progress Note    Subjective:  EZRA BARCLAY is a  54y Female.   Seen bedside.   Patient is a 54y old  Female who presents with a chief complaint of RLE Ulceration with oozing (30 Dec 2024 11:29)      Past Medical History and Surgical History  PAST MEDICAL & SURGICAL HISTORY:  Asthma with COPD      Hypothyroidism      H/O: substance abuse  no longer using      History of pancreatitis      Hepatitis-C      Leukocytoclastic vasculitis      Bilateral edema of lower extremity      HLD (hyperlipidemia)      Type 2 diabetes mellitus      History of appendectomy      History of tonsillectomy           Objective:  Vital Signs Last 24 Hrs  T(C): 36.4 (30 Dec 2024 07:14), Max: 36.4 (30 Dec 2024 07:14)  T(F): 97.6 (30 Dec 2024 07:14), Max: 97.6 (30 Dec 2024 07:14)  HR: 92 (30 Dec 2024 07:14) (65 - 92)  BP: 107/41 (30 Dec 2024 07:14) (88/57 - 107/41)  BP(mean): --  RR: 18 (30 Dec 2024 07:14) (18 - 18)  SpO2: 96% (30 Dec 2024 07:14) (96% - 97%)                            10.7   7.54  )-----------( 369      ( 30 Dec 2024 11:00 )             34.4                 12-30    136  |  91[L]  |  10  ----------------------------<  163[H]  3.3[L]   |  33[H]  |  1.1    Ca    9.3      30 Dec 2024 11:00    X-ray, R Foot, 12/28/2024  impression:  Flattening of the normal plantar arch.  Midfoot arthrosis with diffuse soft tissue swelling.  No fracture or erosion.  If concern with osteomyelitis, consider correlation with MR scanning    A-Duplex, 12/27/2024  Impression:  Moderate right external iliac and common femoral artery stenosis  Normal arterial flow in the left lower extremity  Tibial arteries not visualized bilaterally    Physical Exam - Lower Extremity Focused:   Derm:   Right Foot - dry, stable wound to dorsolateral aspect of midfoot, no drainage, no purulence, erythema and edema noted   Right Lower Extr. superficial ulceration to skin with drainage and weeping wounds noted, covering large surface area, crusting noted on wound edges with erythema and edema extending from the knee to the foot     Left Foot - dry stable, wound to dorsal 2nd Digit, no drainage, no purulence, erythema and edema noted   Left Lower Extr. yellow crusting noted on medial wound above the ankle with erythema and edema extending from the knee to the foot     Vascular: DP and PT Pulses Diminished; Foot is Warm to the touch; Capillary Refill Time < 3 Seconds;    Neuro: Protective Sensation Diminished / Moderately Neuropathic   MSK: Pain On Palpation at Wound Site     Assessment:  Cellulitic Of B/L Lower Extremity   venous stasis wounds B/L LE     Plan:  Chart reviewed and Patient evaluated. All Questions and Concerns Addressed and Answered  Imaging reviewed as above  Local wound care to BLE: xeroform, DSD, kerlix ACE with light compression - Multilayer compression dressing B/L LE   Weight Bearing Status; WBAT   Follow up vascular recommendation   Continue antibiotics per ID recs   Continue w/ Local Wound Care; Q24 Dressing Changes;    Discussed Plan w/ Attending; Dr. Yancey     Podiatry

## 2024-12-30 NOTE — PROGRESS NOTE ADULT - ASSESSMENT
This is a 54 y/o Female, former IVDA on Suboxone, HTN, HLD, Hep C and Chronic lower extremity edema with ulceration of skin presents with c/o generalized weakness worsening lower extremity cellulitis R>L    IMPRESSION  #Bilateral lower extremity wounds/ulcers with underlying stasis dermatitis.   #History of polysubstance use disorder. On Suboxone  #Recurrent admission in the past for lower extremity cellulitis? 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  Hepatitis C Ab +, PCR undetectable 2/2/24  Cryoglobulin negative 2/2/24  Hepatitis B immune, HIV screen negative.   DULCE negative  RF negative  p-ANCA positive, titer 1:40  Blood cultures NGTD    RECOMMENDATIONS  -Vascular eval noted.   -Local wound care as per the podiatry.   -Suspect will need re-biopsy of the wounds for cultures and Pathology.   -IV Vanc. Dosing as per the pharmacy protocol.   -IV cefepime 2 gram q 8 hours. (S/D 12/27)  -Off loading to prevent pressure sores and preventive measures to avoid aspiration    If any questions, please send a message or call on [employer] Teams  Please continue to update ID with any pertinent new laboratory or radiographic findings.    Stella Chavez M.D  Infectious Diseases Attending/   [employer] [facility] [place]   This is a 52 y/o Female, former IVDA on Suboxone, HTN, HLD, Hep C and Chronic lower extremity edema with ulceration of skin presents with c/o generalized weakness worsening lower extremity cellulitis R>L    IMPRESSION  #Bilateral lower extremity wounds/ulcers with underlying stasis dermatitis.  pyoderma gangrenosum vs Vasculitis   #History of polysubstance use disorder. On Suboxone  #Recurrent admission in the past for lower extremity cellulitis? 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  Hepatitis C Ab +, PCR undetectable 2/2/24  Cryoglobulin negative 2/2/24  Hepatitis B immune, HIV screen negative.   DULCE negative  RF negative  p-ANCA positive, titer 1:40  Blood cultures NGTD    RECOMMENDATIONS  -Vascular eval noted.   -Local wound care as per the podiatry.   -Will need re-biopsy of the wounds for cultures and Pathology. Consider Derm evaluation.   -IV Vanc. Dosing as per the pharmacy protocol.   -IV cefepime 2 gram q 8 hours. (S/D 12/27)  -Off loading to prevent pressure sores and preventive measures to avoid aspiration    If any questions, please send a message or call on [employer] Teams  Please continue to update ID with any pertinent new laboratory or radiographic findings.    Stella Chavez M.D  Infectious Diseases Attending/   [employer] at [facility]

## 2024-12-30 NOTE — PHARMACOTHERAPY INTERVENTION NOTE - COMMENTS
As per policy, ordered a vancomycin level for 1/1 at 11am in order to assist with vancomycin pharmacokinetic monitoring.    Esther Zhang, PharmD, [facility]  Clinical Pharmacy Specialist, Infectious Diseases  Tele-Antimicrobial Stewardship Program (Tele-ASP)  Tele-ASP Phone: [phone]

## 2024-12-30 NOTE — PROGRESS NOTE ADULT - SUBJECTIVE AND OBJECTIVE BOX
EZRA BARCLAY 54y Female  MRN#: [id number]   Hospital Day: 2d    SUBJECTIVE  Patient is a 54y old Female who presents with a chief complaint of RLE Ulceration with oozing (28 Dec 2024 16:16)  Currently admitted to medicine with the primary diagnosis of Cellulitis      INTERVAL HPI AND OVERNIGHT EVENTS:  Patient was examined and seen at bedside. This morning she is resting comfortably in bed and reports no issues or overnight events.  chronic lower ext pain 2/2 chronic wounds, denies chest pain , sob, n/v/d/c, hematuria or bloody stool.     REVIEW OF SYMPTOMS:  10 point ROS negative except as above.    OBJECTIVE  PAST MEDICAL & SURGICAL HISTORY  Asthma with COPD    Hypothyroidism    H/O: substance abuse  no longer using    History of pancreatitis    Hepatitis-C    Leukocytoclastic vasculitis    Bilateral edema of lower extremity    HLD (hyperlipidemia)    Type 2 diabetes mellitus    History of appendectomy    History of tonsillectomy      ALLERGIES:  No Known Allergies    MEDICATIONS:  STANDING MEDICATIONS  atorvastatin 20 milliGRAM(s) Oral at bedtime  buprenorphine 8 mG/naloxone 2 mG SL  Tablet 2.5 Tablet(s) SubLingual daily  cefepime   IVPB      cefepime   IVPB 2000 milliGRAM(s) IV Intermittent every 8 hours  colchicine 0.6 milliGRAM(s) Oral two times a day  dextrose 5%. 1000 milliLiter(s) IV Continuous <Continuous>  enoxaparin Injectable 40 milliGRAM(s) SubCutaneous every 24 hours  furosemide    Tablet 40 milliGRAM(s) Oral daily  influenza   Vaccine 0.5 milliLiter(s) IntraMuscular once  insulin glargine Injectable (LANTUS) 28 Unit(s) SubCutaneous at bedtime  insulin lispro (ADMELOG) corrective regimen sliding scale   SubCutaneous three times a day before meals  levothyroxine 175 MICROGram(s) Oral daily  pantoprazole    Tablet 40 milliGRAM(s) Oral before breakfast  pregabalin 50 milliGRAM(s) Oral two times a day  vancomycin  IVPB 1000 milliGRAM(s) IV Intermittent every 12 hours    PRN MEDICATIONS  acetaminophen     Tablet .. 650 milliGRAM(s) Oral every 6 hours PRN  albuterol    90 MICROgram(s) HFA Inhaler 2 Puff(s) Inhalation every 6 hours PRN  aluminum hydroxide/magnesium hydroxide/simethicone Suspension 30 milliLiter(s) Oral every 4 hours PRN  dextrose Oral Gel 15 Gram(s) Oral once PRN  melatonin 3 milliGRAM(s) Oral at bedtime PRN  ondansetron Injectable 4 milliGRAM(s) IV Push every 8 hours PRN      VITAL SIGNS: Last 24 Hours  Vital Signs Last 24 Hrs  T(C): 36.5 (30 Dec 2024 13:39), Max: 36.5 (30 Dec 2024 13:39)  T(F): 97.7 (30 Dec 2024 13:39), Max: 97.7 (30 Dec 2024 13:39)  HR: 68 (30 Dec 2024 13:39) (66 - 92)  BP: 125/72 (30 Dec 2024 13:39) (105/68 - 125/72)  BP(mean): --  RR: 18 (30 Dec 2024 13:39) (18 - 18)  SpO2: 99% (30 Dec 2024 13:39) (96% - 99%)      PHYSICAL EXAM:  GENERAL: NAD, well-groomed, well-developed  HEENT : NC/AT,   NECK: Supple, No JVD  CHEST/LUNG: Clear to auscultation bilaterally;   HEART: Regular rate and rhythm; No murmurs  ABDOMEN: Soft, Nontender, Nondistended; Bowel sounds present  EXTREMITIES: +edema, chronic venous stasis, wounds dressed   NEURO:  aox3, generalized weakness   SKIN:  b/l lower ext. wounds and chronic venous changes     LABS:                                 10.7   7.54  )-----------( 369      ( 30 Dec 2024 11:00 )             34.4     12-30    136  |  91[L]  |  10  ----------------------------<  163[H]  3.3[L]   |  33[H]  |  1.1    Ca    9.3      30 Dec 2024 11:00      PT/INR - ( 30 Dec 2024 11:00 )   PT: 10.50 sec;   INR: 0.89 ratio         PTT - ( 30 Dec 2024 11:00 )  PTT:36.7 sec  Urinalysis Basic - ( 30 Dec 2024 11:00 )    Color: x / Appearance: x / SG: x / pH: x  Gluc: 163 mg/dL / Ketone: x  / Bili: x / Urobili: x   Blood: x / Protein: x / Nitrite: x   Leuk Esterase: x / RBC: x / WBC x   Sq Epi: x / Non Sq Epi: x / Bacteria: x    Lactate, Blood: 1.7 mmol/L (12-30-24 @ 11:00)    Culture - Blood (collected 28 Dec 2024 17:10)  Source: .Blood BLOOD  Preliminary Report (29 Dec 2024 23:01):    No growth at 24 hours      RADIOLOGY:  < from: Xray Foot AP + Lateral + Oblique, Right (12.28.24 @ 11:58) >  impression:    Flattening of the normal plantar arch.    Midfoot arthrosis with diffuse soft tissue swelling.    No fracture or erosion.    If concern with osteomyelitis, consider correlation with MR scanning.    < end of copied text >  < from: VA Duplex Lower Extrem Arterial, Bilat (12.27.24 @ 21:15) >      Impression:  Moderate right external iliac and common femoral artery stenosis  Normal arterial flow in the left lower extremity  Tibial arteries not visualized bilaterally    < end of copied text >  < from: Xray Hand 3 Views, Left (12.27.24 @ 14:52) >  IMPRESSION:    Acute comminuted fractures of the fourth and fifth metacarpal bodies.  Soft tissue swelling along the dorsal aspect of the hand.  Osteophyte formation at the first MCP joint.    < end of copied text >

## 2024-12-30 NOTE — PROGRESS NOTE ADULT - ASSESSMENT
a 52 y/o Female, former IVDA on Suboxone, HTN, HLD, Hep C. , Chronic lower extremity edema with ulceration of skin presents with c/o generalized weakness and subjective fever/chills x 2 weeks and worsening lower extremity cellulitis  with oozing from b/l LE R>L.       #B/l LE cellulitis superimposed on chronic LE edema with possible bacteremia  - ID recs appreciated: continue vancomycin and switch to cefepime 2g q 8hrs  - follow up blood cultures-  No growth at 24 hours  - VA duplex concerning for stenosis  - vascular consulted recs ( no surgical interventions)   - podiatry recs appreciated  - Wound care or podiatry for local care of the wounds.   -  re-biopsy of the wounds for cultures and Pathology. Consulted Derm for re-evaluation.   - XR Imaging Right Foot; Flattening of the normal plantar arch. Midfoot arthrosis with diffuse soft tissue swelling. No fracture or erosion.  - duplex Moderate right external iliac and common femoral artery stenosis, Normal arterial flow in the left lower extremity, Tibial arteries not visualized bilaterally  - Local Wound Care; Applied - Xeroform / Gauze / DSD / Kerlix / Secured with Tape  - Weight Bearing Status; WBAT   - f/u ESR, CRP  - Trend WBC daily  - OOB to chair with assistance  - will consider midline if difficult access    # Diabetes Type 2  - continue lantus with sliding scale  - CHO consistent diet  - hold metformin  - A1C,     # Opiate dependence  - continue Suboxone     # Gout  -continue colchicine    # Hypothyroid  - continue Levothyroxine    proph heparin  DM diet   full code  Dispo: [facility] vs home      Handoff: cultures, blood work, f/u  ID and derm

## 2024-12-30 NOTE — PHARMACOTHERAPY INTERVENTION NOTE - COMMENTS
Patient on vancomycin 1g q12h with a predicted AUC/REED of 554.75. Recommend to continue with current dose- Level pending from this morning- TeleASP to f/u

## 2024-12-31 LAB
GLUCOSE BLDC GLUCOMTR-MCNC: 104 MG/DL — HIGH (ref 70–99)
GLUCOSE BLDC GLUCOMTR-MCNC: 109 MG/DL — HIGH (ref 70–99)
GLUCOSE BLDC GLUCOMTR-MCNC: 179 MG/DL — HIGH (ref 70–99)
GLUCOSE BLDC GLUCOMTR-MCNC: 188 MG/DL — HIGH (ref 70–99)
GLUCOSE BLDC GLUCOMTR-MCNC: 199 MG/DL — HIGH (ref 70–99)

## 2024-12-31 PROCEDURE — 29581 APPL MULTLAYER CMPRN SYS LEG: CPT | Mod: 50,GC

## 2024-12-31 PROCEDURE — 99233 SBSQ HOSP IP/OBS HIGH 50: CPT

## 2024-12-31 PROCEDURE — 99231 SBSQ HOSP IP/OBS SF/LOW 25: CPT | Mod: GC,25

## 2024-12-31 RX ORDER — POTASSIUM CHLORIDE 600 MG/1
40 TABLET, FILM COATED, EXTENDED RELEASE ORAL ONCE
Refills: 0 | Status: COMPLETED | OUTPATIENT
Start: 2024-12-31 | End: 2024-12-31

## 2024-12-31 RX ADMIN — VANCOMYCIN HYDROCHLORIDE 300 MILLIGRAM(S): 5 INJECTION, POWDER, LYOPHILIZED, FOR SOLUTION INTRAVENOUS at 05:44

## 2024-12-31 RX ADMIN — LEVOTHYROXINE SODIUM 175 MICROGRAM(S): 175 TABLET ORAL at 05:42

## 2024-12-31 RX ADMIN — COLCHICINE 0.6 MILLIGRAM(S): 0.6 CAPSULE ORAL at 05:42

## 2024-12-31 RX ADMIN — Medication 1: at 12:13

## 2024-12-31 RX ADMIN — INSULIN GLARGINE-YFGN 28 UNIT(S): 100 INJECTION, SOLUTION SUBCUTANEOUS at 21:41

## 2024-12-31 RX ADMIN — PANTOPRAZOLE 40 MILLIGRAM(S): 40 TABLET, DELAYED RELEASE ORAL at 05:45

## 2024-12-31 RX ADMIN — Medication 40 MILLIGRAM(S): at 05:42

## 2024-12-31 RX ADMIN — PREGABALIN 50 MILLIGRAM(S): 100 CAPSULE ORAL at 17:40

## 2024-12-31 RX ADMIN — ATORVASTATIN CALCIUM 20 MILLIGRAM(S): 40 TABLET, FILM COATED ORAL at 21:17

## 2024-12-31 RX ADMIN — ENOXAPARIN SODIUM 40 MILLIGRAM(S): 60 INJECTION INTRAVENOUS; SUBCUTANEOUS at 21:17

## 2024-12-31 RX ADMIN — Medication 30 MILLILITER(S): at 05:42

## 2024-12-31 RX ADMIN — Medication 100 MILLIGRAM(S): at 13:50

## 2024-12-31 RX ADMIN — BUPRENORPHINE HYDROCHLORIDE AND NALOXONE HYDROCHLORIDE DIHYDRATE 2.5 TABLET(S): 8; 2 TABLET SUBLINGUAL at 12:19

## 2024-12-31 RX ADMIN — Medication 100 MILLIGRAM(S): at 21:17

## 2024-12-31 RX ADMIN — POTASSIUM CHLORIDE 40 MILLIEQUIVALENT(S): 600 TABLET, FILM COATED, EXTENDED RELEASE ORAL at 21:16

## 2024-12-31 RX ADMIN — Medication 100 MILLIGRAM(S): at 05:43

## 2024-12-31 RX ADMIN — PREGABALIN 50 MILLIGRAM(S): 100 CAPSULE ORAL at 05:44

## 2024-12-31 RX ADMIN — VANCOMYCIN HYDROCHLORIDE 300 MILLIGRAM(S): 5 INJECTION, POWDER, LYOPHILIZED, FOR SOLUTION INTRAVENOUS at 17:42

## 2024-12-31 RX ADMIN — COLCHICINE 0.6 MILLIGRAM(S): 0.6 CAPSULE ORAL at 17:42

## 2024-12-31 NOTE — PHARMACOTHERAPY INTERVENTION NOTE - COMMENTS
Patient on vancomycin 1500mg q12h with a predicted therapeutic AUC/REED of 452.38. Recommend to continue dose and reassess with trough ordered for 01/01 at 11:00am

## 2024-12-31 NOTE — PROGRESS NOTE ADULT - ASSESSMENT
a 54 y/o Female, former IVDA on Suboxone, HTN, HLD, Hep C. , Chronic lower extremity edema with ulceration of skin presents with c/o generalized weakness and subjective fever/chills x 2 weeks and worsening lower extremity cellulitis  with oozing from b/l LE R>L.       #B/l LE cellulitis superimposed on chronic LE edema with possible bacteremia  - ID recs appreciated: continue vancomycin and switch to cefepime 2g q 8hrs  - follow up blood cultures-  No growth at 24 hours  - VA duplex concerning for stenosis  - vascular consulted recs ( no surgical interventions) , reconsulted given leg pain   - podiatry recs appreciated  - Wound care or podiatry for local care of the wounds.   -  re-biopsy of the wounds for cultures and Pathology. Consulted Derm for re-evaluation.   - XR Imaging Right Foot; Flattening of the normal plantar arch. Midfoot arthrosis with diffuse soft tissue swelling. No fracture or erosion.  - duplex Moderate right external iliac and common femoral artery stenosis, Normal arterial flow in the left lower extremity, Tibial arteries not visualized bilaterally  - Local Wound Care; Applied - Xeroform / Gauze / DSD / Kerlix / Secured with Tape  - Weight Bearing Status; WBAT   - f/u ESR, CRP  - Trend WBC daily  - OOB to chair with assistance  - will consider midline if difficult access  - legs dressed and wrapped today  - derm reconsulted for biopsy     #Acute comminuted fractures of the fourth and fifth metacarpal bodies  -xray Acute comminuted fractures of the fourth and fifth metacarpal bodies.Soft tissue swelling along the dorsal aspect of the hand.  Osteophyte formation at the first MCP joint.  -splint and cast in place POA  - ortho consulted     # Diabetes Type 2  - continue lantus with sliding scale  - CHO consistent diet  - hold metformin  - A1C,     # Opiate dependence  - continue Suboxone     # Gout  -continue colchicine    # Hypothyroid  - continue Levothyroxine    proph heparin  DM diet   full code    Dispo: [facility] vs home      Handoff: cultures, blood work, f/u Ortho vascular,  ID and derm

## 2024-12-31 NOTE — PROGRESS NOTE ADULT - SUBJECTIVE AND OBJECTIVE BOX
Podiatry Progress Note    Subjective:  EZRA BARCLAY is a  54y Female.   Seen bedside.   Patient is a 54y old  Female who presents with a chief complaint of RLE Ulceration with oozing (31 Dec 2024 10:12)      Past Medical History and Surgical History  PAST MEDICAL & SURGICAL HISTORY:  Asthma with COPD      Hypothyroidism      H/O: substance abuse  no longer using      History of pancreatitis      Hepatitis-C      Leukocytoclastic vasculitis      Bilateral edema of lower extremity      HLD (hyperlipidemia)      Type 2 diabetes mellitus      History of appendectomy      History of tonsillectomy           Objective:  Vital Signs Last 24 Hrs  T(C): 36.6 (31 Dec 2024 05:02), Max: 36.6 (31 Dec 2024 05:02)  T(F): 97.9 (31 Dec 2024 05:02), Max: 97.9 (31 Dec 2024 05:02)  HR: 66 (31 Dec 2024 05:02) (66 - 85)  BP: 100/68 (31 Dec 2024 05:02) (100/68 - 125/79)  BP(mean): --  RR: 18 (31 Dec 2024 05:02) (18 - 18)  SpO2: 99% (31 Dec 2024 05:02) (99% - 99%)                            10.7   7.54  )-----------( 369      ( 30 Dec 2024 11:00 )             34.4                 12-30    136  |  91[L]  |  10  ----------------------------<  163[H]  3.3[L]   |  33[H]  |  1.1    Ca    9.3      30 Dec 2024 11:00      Physical Exam - Lower Extremity Focused:   Derm:   Right Foot - dry, stable wound to dorsolateral aspect of midfoot, no drainage, no purulence, erythema and edema noted   Right Lower Extr. superficial ulceration to skin with drainage and weeping wounds noted, covering large surface area, crusting noted on wound edges with erythema and edema extending from the knee to the foot     Left Foot - dry stable, wound to dorsal 2nd Digit, no drainage, no purulence, erythema and edema noted   Left Lower Extr. yellow crusting noted on medial wound above the ankle with erythema and edema extending from the knee to the foot     Vascular: DP and PT Pulses Diminished; Foot is Warm to the touch; Capillary Refill Time < 3 Seconds;    Neuro: Protective Sensation Diminished / Moderately Neuropathic   MSK: Pain On Palpation at Wound Site     Assessment:  Cellulitic Of B/L Lower Extremity   Venous stasis wounds B/L LE     Plan:  Chart reviewed and Patient evaluated. All Questions and Concerns Addressed and Answered  Imaging reviewed as above  Local wound care to BLE: xeroform, DSD, kerlix ACE - Multilayer compression dressing B/L LE   Weight Bearing Status; WBAT bilateral lower extremity  Discussed case with vascular team - no intervention from their standpoint  Continue antibiotics per ID recs   Continue w/ Local Wound Care; Q24 Dressing Changes;  Emphasized importance of compression dressing to improve swelling of lower legs  If wounds worsen, may consider bilateral lower leg CT w/contrast  No further intervention from podiatry at this time; will continue to monitor  Discussed Plan w/ Attending; Dr. Yancey     Podiatry

## 2024-12-31 NOTE — PROGRESS NOTE ADULT - ATTENDING COMMENTS
Agree with above plan  Cont wound care and compression  Abx as per ID
Agree with above plan  Cont wound care  Cont abx as per ID

## 2024-12-31 NOTE — PROGRESS NOTE ADULT - SUBJECTIVE AND OBJECTIVE BOX
EZRA BARCLAY 54y Female  MRN#: [id number]   Hospital Day: 2d    SUBJECTIVE  Patient is a 54y old Female who presents with a chief complaint of RLE Ulceration with oozing (28 Dec 2024 16:16)  Currently admitted to medicine with the primary diagnosis of Cellulitis    INTERVAL HPI AND OVERNIGHT EVENTS:  Patient was examined and seen at bedside. This morning she is resting comfortably in bed and reports no issues or overnight events.  chronic lower ext pain 2/2 chronic wounds, denies chest pain , sob, n/v/d/c, hematuria or bloody stool.     REVIEW OF SYMPTOMS:  10 point ROS negative except as above.    OBJECTIVE  PAST MEDICAL & SURGICAL HISTORY  Asthma with COPD    Hypothyroidism    H/O: substance abuse  no longer using    History of pancreatitis    Hepatitis-C    Leukocytoclastic vasculitis    Bilateral edema of lower extremity    HLD (hyperlipidemia)    Type 2 diabetes mellitus    History of appendectomy    History of tonsillectomy      ALLERGIES:  No Known Allergies    MEDICATIONS:  STANDING MEDICATIONS  atorvastatin 20 milliGRAM(s) Oral at bedtime  buprenorphine 8 mG/naloxone 2 mG SL  Tablet 2.5 Tablet(s) SubLingual daily  cefepime   IVPB      cefepime   IVPB 2000 milliGRAM(s) IV Intermittent every 8 hours  colchicine 0.6 milliGRAM(s) Oral two times a day  dextrose 5%. 1000 milliLiter(s) IV Continuous <Continuous>  enoxaparin Injectable 40 milliGRAM(s) SubCutaneous every 24 hours  furosemide    Tablet 40 milliGRAM(s) Oral daily  influenza   Vaccine 0.5 milliLiter(s) IntraMuscular once  insulin glargine Injectable (LANTUS) 28 Unit(s) SubCutaneous at bedtime  insulin lispro (ADMELOG) corrective regimen sliding scale   SubCutaneous three times a day before meals  levothyroxine 175 MICROGram(s) Oral daily  pantoprazole    Tablet 40 milliGRAM(s) Oral before breakfast  pregabalin 50 milliGRAM(s) Oral two times a day  vancomycin  IVPB 1000 milliGRAM(s) IV Intermittent every 12 hours    PRN MEDICATIONS  acetaminophen     Tablet .. 650 milliGRAM(s) Oral every 6 hours PRN  albuterol    90 MICROgram(s) HFA Inhaler 2 Puff(s) Inhalation every 6 hours PRN  aluminum hydroxide/magnesium hydroxide/simethicone Suspension 30 milliLiter(s) Oral every 4 hours PRN  dextrose Oral Gel 15 Gram(s) Oral once PRN  melatonin 3 milliGRAM(s) Oral at bedtime PRN  ondansetron Injectable 4 milliGRAM(s) IV Push every 8 hours PRN      VITAL SIGNS: Last 24 Hours  Vital Signs Last 24 Hrs  T(C): 36.4 (31 Dec 2024 14:41), Max: 36.6 (31 Dec 2024 05:02)  T(F): 97.5 (31 Dec 2024 14:41), Max: 97.9 (31 Dec 2024 05:02)  HR: 77 (31 Dec 2024 14:41) (66 - 85)  BP: 105/72 (31 Dec 2024 14:41) (100/68 - 125/79)  BP(mean): --  RR: 18 (31 Dec 2024 14:41) (18 - 18)  SpO2: 99% (31 Dec 2024 05:02) (99% - 99%)    PHYSICAL EXAM:  GENERAL: NAD, well-groomed, well-developed  HEENT : NC/AT,   NECK: Supple, No JVD  CHEST/LUNG: Clear to auscultation bilaterally;   HEART: Regular rate and rhythm; No murmurs  ABDOMEN: Soft, Nontender, Nondistended; Bowel sounds present  EXTREMITIES: +edema, chronic venous stasis, wounds dressed   NEURO:  aox3, generalized weakness   SKIN:  b/l lower ext. wounds and chronic venous changes     LABS:                          10.7   7.54  )-----------( 369      ( 30 Dec 2024 11:00 )             34.4     12-30    136  |  91[L]  |  10  ----------------------------<  163[H]  3.3[L]   |  33[H]  |  1.1    Ca    9.3      30 Dec 2024 11:00    PT/INR - ( 30 Dec 2024 11:00 )   PT: 10.50 sec;   INR: 0.89 ratio      PTT - ( 30 Dec 2024 11:00 )  PTT:36.7 sec  Urinalysis Basic - ( 30 Dec 2024 11:00 )    Color: x / Appearance: x / SG: x / pH: x  Gluc: 163 mg/dL / Ketone: x  / Bili: x / Urobili: x   Blood: x / Protein: x / Nitrite: x   Leuk Esterase: x / RBC: x / WBC x   Sq Epi: x / Non Sq Epi: x / Bacteria: x    Culture - Blood (collected 28 Dec 2024 17:10)  Source: .Blood BLOOD  Preliminary Report (30 Dec 2024 23:02):    No growth at 48 Hours    RADIOLOGY:  < from: Xray Foot AP + Lateral + Oblique, Right (12.28.24 @ 11:58) >  impression:    Flattening of the normal plantar arch.    Midfoot arthrosis with diffuse soft tissue swelling.    No fracture or erosion.    If concern with osteomyelitis, consider correlation with MR scanning.    < end of copied text >  < from: VA Duplex Lower Extrem Arterial, Bilat (12.27.24 @ 21:15) >      Impression:  Moderate right external iliac and common femoral artery stenosis  Normal arterial flow in the left lower extremity  Tibial arteries not visualized bilaterally    < end of copied text >  < from: Xray Hand 3 Views, Left (12.27.24 @ 14:52) >  IMPRESSION:    Acute comminuted fractures of the fourth and fifth metacarpal bodies.  Soft tissue swelling along the dorsal aspect of the hand.  Osteophyte formation at the first MCP joint.    < end of copied text >

## 2024-12-31 NOTE — PROGRESS NOTE ADULT - ASSESSMENT
This is a 54 y/o Female, former IVDA on Suboxone, HTN, HLD, Hep C and Chronic lower extremity edema with ulceration of skin presents with c/o generalized weakness worsening lower extremity cellulitis R>L    IMPRESSION  #Bilateral lower extremity wounds/ulcers with underlying stasis dermatitis.  pyoderma gangrenosum vs Vasculitis   #History of polysubstance use disorder. On Suboxone  #Recurrent admission in the past for lower extremity cellulitis? 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  Hepatitis C Ab +, PCR undetectable 2/2/24  Cryoglobulin negative 2/2/24  Hepatitis B immune, HIV screen negative.   DULCE negative  RF negative  p-ANCA positive, titer 1:40  Blood cultures NGTD    RECOMMENDATIONS  -Vascular eval noted.   -Local wound care as per the podiatry.   -Will need re-biopsy of the wounds for cultures and Pathology. Consider Derm evaluation.   -IV Vanc. Dosing as per the pharmacy protocol.   -IV cefepime 2 gram q 8 hours. (S/D 12/27)  -Off loading to prevent pressure sores and preventive measures to avoid aspiration    If any questions, please send a message or call on [employer]  Please continue to update ID with any pertinent new laboratory or radiographic findings.    Stella Chavez M.D  Infectious Diseases Attending/   [employer] [facility]

## 2024-12-31 NOTE — PROGRESS NOTE ADULT - SUBJECTIVE AND OBJECTIVE BOX
EZRA BARCLAY  54y, Female    LOS  4d    INTERVAL EVENTS/HPI  - No acute events overnight  - T(F): , Max: 97.9 (12-31-24 @ 05:02)  - WBC Count: 7.54 (12-30-24 @ 11:00)  WBC Count: 7.81 (12-28-24 @ 11:22)  - Creatinine: 1.1 (12-30-24 @ 11:00)    REVIEW OF SYSTEMS:  CONSTITUTIONAL: No fever or chills  HEENT: No sore throat  RESPIRATORY: No cough, no shortness of breath  CARDIOVASCULAR: No chest pain or palpitations  GASTROINTESTINAL: No abdominal or epigastric pain  GENITOURINARY: No dysuria  NEUROLOGICAL: No headache/dizziness  MSK: No joint pain, erythema, or swelling; no back pain  SKIN: No itching, rashes  All other ROS negative except noted above    Prior hospital charts reviewed [Yes]  Primary team notes reviewed [Yes]  Other consultant notes reviewed [Yes]    ANTIMICROBIALS:   cefepime   IVPB    cefepime   IVPB 2000 every 8 hours  vancomycin  IVPB 1500 every 12 hours      OTHER MEDS: MEDICATIONS  (STANDING):  acetaminophen     Tablet .. 650 every 6 hours PRN  albuterol    90 MICROgram(s) HFA Inhaler 2 every 6 hours PRN  aluminum hydroxide/magnesium hydroxide/simethicone Suspension 30 every 4 hours PRN  atorvastatin 20 at bedtime  buprenorphine 8 mG/naloxone 2 mG SL  Tablet 2.5 daily  colchicine 0.6 two times a day  dextrose Oral Gel 15 once PRN  enoxaparin Injectable 40 every 24 hours  furosemide    Tablet 40 daily  influenza   Vaccine 0.5 once  insulin glargine Injectable (LANTUS) 28 at bedtime  insulin lispro (ADMELOG) corrective regimen sliding scale  three times a day before meals  levothyroxine 175 daily  melatonin 3 at bedtime PRN  ondansetron Injectable 4 every 8 hours PRN  pantoprazole    Tablet 40 before breakfast  pregabalin 50 two times a day      Vital Signs Last 24 Hrs  T(F): 97.9 (12-31-24 @ 05:02), Max: 98.5 (12-27-24 @ 21:40)    Vital Signs Last 24 Hrs  HR: 66 (12-31-24 @ 05:02) (66 - 85)  BP: 100/68 (12-31-24 @ 05:02) (100/68 - 125/79)  RR: 18 (12-31-24 @ 05:02)  SpO2: 99% (12-31-24 @ 05:02) (99% - 99%)  Wt(kg): --    EXAM:  GENERAL: NAD, lying in bed  HEAD: No head lesions  NECK: Supple  CHEST/LUNG: Clear to auscultation bilaterally  HEART: S1 S2  ABDOMEN: Soft, nontender  EXTREMITIES: Bilateral lower extremity wounds noted.   NERVOUS SYSTEM: Alert and oriented to person      Labs:                        10.7   7.54  )-----------( 369      ( 30 Dec 2024 11:00 )             34.4     12-30    136  |  91[L]  |  10  ----------------------------<  163[H]  3.3[L]   |  33[H]  |  1.1    Ca    9.3      30 Dec 2024 11:00        WBC Trend:  WBC Count: 7.54 (12-30-24 @ 11:00)  WBC Count: 7.81 (12-28-24 @ 11:22)  WBC Count: 8.71 (12-27-24 @ 16:00)      Creatine Trend:  Creatinine: 1.1 (12-30)  Creatinine: 0.9 (12-28)  Creatinine: 0.9 (12-27)      Liver Biochemical Testing Trend:  Alanine Aminotransferase (ALT/SGPT): <5 (12-28)  Alanine Aminotransferase (ALT/SGPT): <5 (12-27)  Alanine Aminotransferase (ALT/SGPT): <5 (12-03)  Alanine Aminotransferase (ALT/SGPT): <5 (12-01)  Alanine Aminotransferase (ALT/SGPT): <5 (11-29)  Aspartate Aminotransferase (AST/SGOT): 18 (12-28-24 @ 11:22)  Aspartate Aminotransferase (AST/SGOT): 34 (12-27-24 @ 16:00)  Aspartate Aminotransferase (AST/SGOT): 20 (12-03-24 @ 07:21)  Aspartate Aminotransferase (AST/SGOT): 23 (12-01-24 @ 07:45)  Aspartate Aminotransferase (AST/SGOT): 23 (11-29-24 @ 10:30)  Bilirubin Total: 0.4 (12-28)  Bilirubin Total: 0.5 (12-27)  Bilirubin Total: 0.2 (12-03)  Bilirubin Total: 0.2 (12-01)  Bilirubin Total: 0.6 (11-29)      Trend LDH  01-10-24 @ 06:55  230      Urinalysis Basic - ( 30 Dec 2024 11:00 )    Color: x / Appearance: x / SG: x / pH: x  Gluc: 163 mg/dL / Ketone: x  / Bili: x / Urobili: x   Blood: x / Protein: x / Nitrite: x   Leuk Esterase: x / RBC: x / WBC x   Sq Epi: x / Non Sq Epi: x / Bacteria: x        MICROBIOLOGY:  Vancomycin Level, Trough: 9.3 (12-30 @ 11:00)    Female    Culture - Blood (collected 28 Dec 2024 17:10)  Source: .Blood BLOOD  Preliminary Report:    No growth at 48 Hours    Culture - Blood (collected 27 Dec 2024 16:00)  Source: .Blood BLOOD  Preliminary Report:    No growth at 72 Hours    Culture - Blood (collected 27 Dec 2024 16:00)  Source: .Blood BLOOD  Preliminary Report:    No growth at 72 Hours    Urinalysis with Rflx Culture (collected 29 Nov 2024 00:13)    Culture - Blood (collected 28 Nov 2024 22:04)  Source: .Blood BLOOD  Final Report:    No growth at 5 days    Culture - Blood (collected 28 Nov 2024 22:04)  Source: .Blood BLOOD  Final Report:    No growth at 5 days    Urinalysis with Rflx Culture (collected 14 Nov 2024 18:42)    Culture - Blood (collected 27 May 2024 21:10)  Source: .Blood Blood  Final Report:    No growth at 5 days    Culture - Blood (collected 27 May 2024 21:10)  Source: .Blood Blood  Final Report:    No growth at 5 days    Culture - Other (collected 06 May 2024 16:16)  Source: .Other LLE  Final Report:    Rare Staphylococcus aureus    Normal skin sumit isolated  Organism: Staphylococcus aureus  Organism: Staphylococcus aureus    Sensitivities:      Method Type: REED      -  Clindamycin: R 0.5 This isolate is presumed to be clindamycin resistant based on detection of inducible resistance. Clindamycin may still be effective in some patients.      -  Erythromycin: R >4      -  Gentamicin: S <=1 Should not be used as monotherapy      -  Oxacillin: S 0.5 Oxacillin predicts susceptibility for dicloxacillin, methicillin, and nafcillin      -  Penicillin: R >8      -  Rifampin: S <=1 Should not be used as monotherapy      -  Tetracycline: S <=1      -  Trimethoprim/Sulfamethoxazole: S <=0.5/9.5      -  Vancomycin: S 2      HIV-1/2 Combo Result: Nonreact (01-11-24 @ 13:40)    C-Reactive Protein: 80.4 (12-30)  Lactate, Blood: 1.7 (12-30 @ 11:00)        RADIOLOGY & ADDITIONAL TESTS:  I have personally reviewed the relevant images.   CXR      CT    < from: Xray Foot AP + Lateral + Oblique, Right (12.28.24 @ 11:58) >  Findings/  impression:    Flattening of the normal plantar arch.    Midfoot arthrosis with diffuse soft tissue swelling.    No fracture or erosion.    If concern with osteomyelitis, consider correlation with MR scanning.    --- End of Report ---    < end of copied text >      WEIGHT  Weight (kg): 90.7 (12-27-24 @ 14:12)  Creatinine: 1.1 mg/dL (12-30-24 @ 11:00)      All available historical records have been reviewed

## 2025-01-01 LAB
CULTURE RESULTS: SIGNIFICANT CHANGE UP
CULTURE RESULTS: SIGNIFICANT CHANGE UP
GLUCOSE BLDC GLUCOMTR-MCNC: 117 MG/DL — HIGH (ref 70–99)
GLUCOSE BLDC GLUCOMTR-MCNC: 134 MG/DL — HIGH (ref 70–99)
GLUCOSE BLDC GLUCOMTR-MCNC: 177 MG/DL — HIGH (ref 70–99)
GLUCOSE BLDC GLUCOMTR-MCNC: 84 MG/DL — SIGNIFICANT CHANGE UP (ref 70–99)
HIV 1+2 AB+HIV1 P24 AG SERPL QL IA: SIGNIFICANT CHANGE UP
SPECIMEN SOURCE: SIGNIFICANT CHANGE UP
SPECIMEN SOURCE: SIGNIFICANT CHANGE UP
VANCOMYCIN FLD-MCNC: 37.6 UG/ML — HIGH (ref 5–10)

## 2025-01-01 PROCEDURE — 99233 SBSQ HOSP IP/OBS HIGH 50: CPT

## 2025-01-01 RX ADMIN — Medication 1: at 17:14

## 2025-01-01 RX ADMIN — Medication 40 MILLIGRAM(S): at 05:09

## 2025-01-01 RX ADMIN — Medication 100 MILLIGRAM(S): at 21:50

## 2025-01-01 RX ADMIN — ENOXAPARIN SODIUM 40 MILLIGRAM(S): 60 INJECTION INTRAVENOUS; SUBCUTANEOUS at 21:52

## 2025-01-01 RX ADMIN — PREGABALIN 50 MILLIGRAM(S): 100 CAPSULE ORAL at 17:18

## 2025-01-01 RX ADMIN — Medication 3 MILLIGRAM(S): at 21:49

## 2025-01-01 RX ADMIN — ACETAMINOPHEN 650 MILLIGRAM(S): 80 SOLUTION/ DROPS ORAL at 22:09

## 2025-01-01 RX ADMIN — COLCHICINE 0.6 MILLIGRAM(S): 0.6 CAPSULE ORAL at 17:14

## 2025-01-01 RX ADMIN — INSULIN GLARGINE-YFGN 28 UNIT(S): 100 INJECTION, SOLUTION SUBCUTANEOUS at 21:53

## 2025-01-01 RX ADMIN — Medication 100 MILLIGRAM(S): at 13:26

## 2025-01-01 RX ADMIN — LEVOTHYROXINE SODIUM 175 MICROGRAM(S): 175 TABLET ORAL at 05:10

## 2025-01-01 RX ADMIN — ATORVASTATIN CALCIUM 20 MILLIGRAM(S): 40 TABLET, FILM COATED ORAL at 21:49

## 2025-01-01 RX ADMIN — PREGABALIN 50 MILLIGRAM(S): 100 CAPSULE ORAL at 05:09

## 2025-01-01 RX ADMIN — BUPRENORPHINE HYDROCHLORIDE AND NALOXONE HYDROCHLORIDE DIHYDRATE 2.5 TABLET(S): 8; 2 TABLET SUBLINGUAL at 12:02

## 2025-01-01 RX ADMIN — PANTOPRAZOLE 40 MILLIGRAM(S): 40 TABLET, DELAYED RELEASE ORAL at 05:10

## 2025-01-01 RX ADMIN — VANCOMYCIN HYDROCHLORIDE 300 MILLIGRAM(S): 5 INJECTION, POWDER, LYOPHILIZED, FOR SOLUTION INTRAVENOUS at 17:15

## 2025-01-01 RX ADMIN — ACETAMINOPHEN 650 MILLIGRAM(S): 80 SOLUTION/ DROPS ORAL at 21:49

## 2025-01-01 RX ADMIN — VANCOMYCIN HYDROCHLORIDE 300 MILLIGRAM(S): 5 INJECTION, POWDER, LYOPHILIZED, FOR SOLUTION INTRAVENOUS at 05:17

## 2025-01-01 RX ADMIN — Medication 100 MILLIGRAM(S): at 05:08

## 2025-01-01 RX ADMIN — COLCHICINE 0.6 MILLIGRAM(S): 0.6 CAPSULE ORAL at 05:09

## 2025-01-01 NOTE — PROGRESS NOTE ADULT - SUBJECTIVE AND OBJECTIVE BOX
EZRA BARCLAY 54y Female  MRN#: [id number]   Hospital Day: 2d    SUBJECTIVE  Patient is a 54y old Female who presents with a chief complaint of RLE Ulceration with oozing (28 Dec 2024 16:16)  Currently admitted to medicine with the primary diagnosis of Cellulitis    INTERVAL HPI AND OVERNIGHT EVENTS:  Patient was examined and seen at bedside. This morning she is resting comfortably in bed and reports no issues or overnight events.  chronic lower ext pain 2/2 chronic wounds, denies chest pain , sob, n/v/d/c, hematuria or bloody stool.     REVIEW OF SYMPTOMS:  10 point ROS negative except as above.    OBJECTIVE  PAST MEDICAL & SURGICAL HISTORY  Asthma with COPD    Hypothyroidism    H/O: substance abuse  no longer using    History of pancreatitis    Hepatitis-C    Leukocytoclastic vasculitis    Bilateral edema of lower extremity    HLD (hyperlipidemia)    Type 2 diabetes mellitus    History of appendectomy    History of tonsillectomy      ALLERGIES:  No Known Allergies    MEDICATIONS:  STANDING MEDICATIONS  atorvastatin 20 milliGRAM(s) Oral at bedtime  buprenorphine 8 mG/naloxone 2 mG SL  Tablet 2.5 Tablet(s) SubLingual daily  cefepime   IVPB      cefepime   IVPB 2000 milliGRAM(s) IV Intermittent every 8 hours  colchicine 0.6 milliGRAM(s) Oral two times a day  dextrose 5%. 1000 milliLiter(s) IV Continuous <Continuous>  enoxaparin Injectable 40 milliGRAM(s) SubCutaneous every 24 hours  furosemide    Tablet 40 milliGRAM(s) Oral daily  influenza   Vaccine 0.5 milliLiter(s) IntraMuscular once  insulin glargine Injectable (LANTUS) 28 Unit(s) SubCutaneous at bedtime  insulin lispro (ADMELOG) corrective regimen sliding scale   SubCutaneous three times a day before meals  levothyroxine 175 MICROGram(s) Oral daily  pantoprazole    Tablet 40 milliGRAM(s) Oral before breakfast  pregabalin 50 milliGRAM(s) Oral two times a day  vancomycin  IVPB 1000 milliGRAM(s) IV Intermittent every 12 hours    PRN MEDICATIONS  acetaminophen     Tablet .. 650 milliGRAM(s) Oral every 6 hours PRN  albuterol    90 MICROgram(s) HFA Inhaler 2 Puff(s) Inhalation every 6 hours PRN  aluminum hydroxide/magnesium hydroxide/simethicone Suspension 30 milliLiter(s) Oral every 4 hours PRN  dextrose Oral Gel 15 Gram(s) Oral once PRN  melatonin 3 milliGRAM(s) Oral at bedtime PRN  ondansetron Injectable 4 milliGRAM(s) IV Push every 8 hours PRN      VITAL SIGNS: Last 24 Hours  Vital Signs Last 24 Hrs  T(C): 36.3 (01 Jan 2025 14:57), Max: 36.7 (01 Jan 2025 05:50)  T(F): 97.4 (01 Jan 2025 14:57), Max: 98.1 (01 Jan 2025 05:50)  HR: 73 (01 Jan 2025 14:57) (68 - 73)  BP: 106/69 (01 Jan 2025 14:57) (99/65 - 122/81)  BP(mean): --  RR: 18 (01 Jan 2025 14:57) (18 - 18)  SpO2: 99% (01 Jan 2025 14:57) (92% - 99%)    Parameters below as of 01 Jan 2025 14:57  Patient On (Oxygen Delivery Method): room air    PHYSICAL EXAM:  GENERAL: NAD, well-groomed, well-developed  HEENT : NC/AT,   NECK: Supple, No JVD  CHEST/LUNG: Clear to auscultation bilaterally;   HEART: Regular rate and rhythm; No murmurs  ABDOMEN: Soft, Nontender, Nondistended; Bowel sounds present  EXTREMITIES: +edema, chronic venous stasis, wounds dressed   NEURO:  aox3, generalized weakness   SKIN:  b/l lower ext. wounds and chronic venous changes     LABS:                          10.7   7.54  )-----------( 369      ( 30 Dec 2024 11:00 )             34.4     12-30    136  |  91[L]  |  10  ----------------------------<  163[H]  3.3[L]   |  33[H]  |  1.1    Ca    9.3      30 Dec 2024 11:00    PT/INR - ( 30 Dec 2024 11:00 )   PT: 10.50 sec;   INR: 0.89 ratio      PTT - ( 30 Dec 2024 11:00 )  PTT:36.7 sec  Urinalysis Basic - ( 30 Dec 2024 11:00 )    Color: x / Appearance: x / SG: x / pH: x  Gluc: 163 mg/dL / Ketone: x  / Bili: x / Urobili: x   Blood: x / Protein: x / Nitrite: x   Leuk Esterase: x / RBC: x / WBC x   Sq Epi: x / Non Sq Epi: x / Bacteria: x    Culture - Blood (collected 28 Dec 2024 17:10)  Source: .Blood BLOOD  Preliminary Report (30 Dec 2024 23:02):    No growth at 48 Hours    RADIOLOGY:  < from: Xray Foot AP + Lateral + Oblique, Right (12.28.24 @ 11:58) >  impression:    Flattening of the normal plantar arch.    Midfoot arthrosis with diffuse soft tissue swelling.    No fracture or erosion.    If concern with osteomyelitis, consider correlation with MR scanning.    < end of copied text >  < from: VA Duplex Lower Extrem Arterial, Bilat (12.27.24 @ 21:15) >      Impression:  Moderate right external iliac and common femoral artery stenosis  Normal arterial flow in the left lower extremity  Tibial arteries not visualized bilaterally    < end of copied text >  < from: Xray Hand 3 Views, Left (12.27.24 @ 14:52) >  IMPRESSION:    Acute comminuted fractures of the fourth and fifth metacarpal bodies.  Soft tissue swelling along the dorsal aspect of the hand.  Osteophyte formation at the first MCP joint.    < end of copied text >

## 2025-01-01 NOTE — PROGRESS NOTE ADULT - ASSESSMENT
a 52 y/o Female, former IVDA on Suboxone, HTN, HLD, Hep C. , Chronic lower extremity edema with ulceration of skin presents with c/o generalized weakness and subjective fever/chills x 2 weeks and worsening lower extremity cellulitis  with oozing from b/l LE R>L.       #B/l LE cellulitis superimposed on chronic LE edema with possible bacteremia  - ID recs appreciated: continue vancomycin and switch to cefepime 2g q 8hrs  - follow up blood cultures-  No growth at 24 hours  - VA duplex concerning for stenosis  - vascular consulted recs ( no surgical interventions) , reconsulted given leg pain   - podiatry recs appreciated  - Wound care or podiatry for local care of the wounds.   -  re-biopsy of the wounds for cultures and Pathology. Consulted Derm for re-evaluation.   - XR Imaging Right Foot; Flattening of the normal plantar arch. Midfoot arthrosis with diffuse soft tissue swelling. No fracture or erosion.  - duplex Moderate right external iliac and common femoral artery stenosis, Normal arterial flow in the left lower extremity, Tibial arteries not visualized bilaterally  - Local Wound Care; Applied - Xeroform / Gauze / DSD / Kerlix / Secured with Tape  - Weight Bearing Status; WBAT   - ESR 90  - Trend WBC daily  - OOB to chair with assistance  - will consider midline if difficult access  - legs dressed and wrapped today  - derm reconsulted for biopsy     #Acute comminuted fractures of the fourth and fifth metacarpal bodies  -xray Acute comminuted fractures of the fourth and fifth metacarpal bodies.Soft tissue swelling along the dorsal aspect of the hand.  Osteophyte formation at the first MCP joint.  -splint and cast in place POA  - ortho consulted     # Diabetes Type 2  - continue lantus with sliding scale  - CHO consistent diet  - hold metformin  - A1C,     # Opiate dependence  - continue Suboxone     # Gout  -continue colchicine    # Hypothyroid  - continue Levothyroxine    proph heparin  DM diet   full code    Dispo:  home      Handoff: cultures, blood work, f/u Ortho vascular,  ID and derm

## 2025-01-02 LAB
A1C WITH ESTIMATED AVERAGE GLUCOSE RESULT: 8.1 % — HIGH (ref 4–5.6)
ALBUMIN SERPL ELPH-MCNC: 3.9 G/DL — SIGNIFICANT CHANGE UP (ref 3.5–5.2)
ALP SERPL-CCNC: 137 U/L — HIGH (ref 30–115)
ALT FLD-CCNC: 6 U/L — SIGNIFICANT CHANGE UP (ref 0–41)
ANION GAP SERPL CALC-SCNC: 10 MMOL/L — SIGNIFICANT CHANGE UP (ref 7–14)
ANION GAP SERPL CALC-SCNC: 9 MMOL/L — SIGNIFICANT CHANGE UP (ref 7–14)
AST SERPL-CCNC: 39 U/L — SIGNIFICANT CHANGE UP (ref 0–41)
BASOPHILS # BLD AUTO: 0.08 K/UL — SIGNIFICANT CHANGE UP (ref 0–0.2)
BASOPHILS NFR BLD AUTO: 1.3 % — HIGH (ref 0–1)
BILIRUB SERPL-MCNC: 0.2 MG/DL — SIGNIFICANT CHANGE UP (ref 0.2–1.2)
BUN SERPL-MCNC: 12 MG/DL — SIGNIFICANT CHANGE UP (ref 10–20)
BUN SERPL-MCNC: 12 MG/DL — SIGNIFICANT CHANGE UP (ref 10–20)
CALCIUM SERPL-MCNC: 8.7 MG/DL — SIGNIFICANT CHANGE UP (ref 8.4–10.5)
CALCIUM SERPL-MCNC: 8.8 MG/DL — SIGNIFICANT CHANGE UP (ref 8.4–10.5)
CHLORIDE SERPL-SCNC: 96 MMOL/L — LOW (ref 98–110)
CHLORIDE SERPL-SCNC: 96 MMOL/L — LOW (ref 98–110)
CO2 SERPL-SCNC: 30 MMOL/L — SIGNIFICANT CHANGE UP (ref 17–32)
CO2 SERPL-SCNC: 30 MMOL/L — SIGNIFICANT CHANGE UP (ref 17–32)
CREAT SERPL-MCNC: 0.9 MG/DL — SIGNIFICANT CHANGE UP (ref 0.7–1.5)
CREAT SERPL-MCNC: 1 MG/DL — SIGNIFICANT CHANGE UP (ref 0.7–1.5)
CRP SERPL-MCNC: 39.4 MG/L — HIGH
CRYPTOC AG FLD QL: NEGATIVE — SIGNIFICANT CHANGE UP
CULTURE RESULTS: SIGNIFICANT CHANGE UP
EGFR: 67 ML/MIN/1.73M2 — SIGNIFICANT CHANGE UP
EGFR: 76 ML/MIN/1.73M2 — SIGNIFICANT CHANGE UP
EOSINOPHIL # BLD AUTO: 0.19 K/UL — SIGNIFICANT CHANGE UP (ref 0–0.7)
EOSINOPHIL NFR BLD AUTO: 3.2 % — SIGNIFICANT CHANGE UP (ref 0–8)
ERYTHROCYTE [SEDIMENTATION RATE] IN BLOOD: 76 MM/HR — HIGH (ref 0–20)
ESTIMATED AVERAGE GLUCOSE: 186 MG/DL — HIGH (ref 68–114)
GLUCOSE BLDC GLUCOMTR-MCNC: 136 MG/DL — HIGH (ref 70–99)
GLUCOSE BLDC GLUCOMTR-MCNC: 215 MG/DL — HIGH (ref 70–99)
GLUCOSE BLDC GLUCOMTR-MCNC: 93 MG/DL — SIGNIFICANT CHANGE UP (ref 70–99)
GLUCOSE BLDC GLUCOMTR-MCNC: 98 MG/DL — SIGNIFICANT CHANGE UP (ref 70–99)
GLUCOSE SERPL-MCNC: 85 MG/DL — SIGNIFICANT CHANGE UP (ref 70–99)
GLUCOSE SERPL-MCNC: 90 MG/DL — SIGNIFICANT CHANGE UP (ref 70–99)
HCT VFR BLD CALC: 28.6 % — LOW (ref 37–47)
HCV RNA SPEC NAA+PROBE-LOG IU: SIGNIFICANT CHANGE UP IU/ML
HCV RNA SPEC NAA+PROBE-LOG IU: SIGNIFICANT CHANGE UP LOGIU/ML
HGB BLD-MCNC: 8.9 G/DL — LOW (ref 12–16)
IMM GRANULOCYTES NFR BLD AUTO: 0.5 % — HIGH (ref 0.1–0.3)
LYMPHOCYTES # BLD AUTO: 1.42 K/UL — SIGNIFICANT CHANGE UP (ref 1.2–3.4)
LYMPHOCYTES # BLD AUTO: 23.6 % — SIGNIFICANT CHANGE UP (ref 20.5–51.1)
MCHC RBC-ENTMCNC: 26.6 PG — LOW (ref 27–31)
MCHC RBC-ENTMCNC: 31.1 G/DL — LOW (ref 32–37)
MCV RBC AUTO: 85.6 FL — SIGNIFICANT CHANGE UP (ref 81–99)
MONOCYTES # BLD AUTO: 0.35 K/UL — SIGNIFICANT CHANGE UP (ref 0.1–0.6)
MONOCYTES NFR BLD AUTO: 5.8 % — SIGNIFICANT CHANGE UP (ref 1.7–9.3)
NEUTROPHILS # BLD AUTO: 3.94 K/UL — SIGNIFICANT CHANGE UP (ref 1.4–6.5)
NEUTROPHILS NFR BLD AUTO: 65.6 % — SIGNIFICANT CHANGE UP (ref 42.2–75.2)
NRBC # BLD: 0 /100 WBCS — SIGNIFICANT CHANGE UP (ref 0–0)
PLATELET # BLD AUTO: 300 K/UL — SIGNIFICANT CHANGE UP (ref 130–400)
PMV BLD: 9.1 FL — SIGNIFICANT CHANGE UP (ref 7.4–10.4)
POTASSIUM SERPL-MCNC: 3.7 MMOL/L — SIGNIFICANT CHANGE UP (ref 3.5–5)
POTASSIUM SERPL-MCNC: 5.9 MMOL/L — HIGH (ref 3.5–5)
POTASSIUM SERPL-SCNC: 3.7 MMOL/L — SIGNIFICANT CHANGE UP (ref 3.5–5)
POTASSIUM SERPL-SCNC: 5.9 MMOL/L — HIGH (ref 3.5–5)
PROT SERPL-MCNC: 7.9 G/DL — SIGNIFICANT CHANGE UP (ref 6–8)
RBC # BLD: 3.34 M/UL — LOW (ref 4.2–5.4)
RBC # FLD: 15.9 % — HIGH (ref 11.5–14.5)
SODIUM SERPL-SCNC: 135 MMOL/L — SIGNIFICANT CHANGE UP (ref 135–146)
SODIUM SERPL-SCNC: 136 MMOL/L — SIGNIFICANT CHANGE UP (ref 135–146)
SPECIMEN SOURCE: SIGNIFICANT CHANGE UP
WBC # BLD: 6.01 K/UL — SIGNIFICANT CHANGE UP (ref 4.8–10.8)
WBC # FLD AUTO: 6.01 K/UL — SIGNIFICANT CHANGE UP (ref 4.8–10.8)

## 2025-01-02 PROCEDURE — 36000 PLACE NEEDLE IN VEIN: CPT

## 2025-01-02 PROCEDURE — 99233 SBSQ HOSP IP/OBS HIGH 50: CPT

## 2025-01-02 PROCEDURE — 99222 1ST HOSP IP/OBS MODERATE 55: CPT | Mod: 57

## 2025-01-02 RX ORDER — VANCOMYCIN HYDROCHLORIDE 5 G/100ML
1500 INJECTION, POWDER, LYOPHILIZED, FOR SOLUTION INTRAVENOUS EVERY 12 HOURS
Refills: 0 | Status: DISCONTINUED | OUTPATIENT
Start: 2025-01-02 | End: 2025-01-03

## 2025-01-02 RX ADMIN — PREGABALIN 50 MILLIGRAM(S): 100 CAPSULE ORAL at 05:34

## 2025-01-02 RX ADMIN — PREGABALIN 50 MILLIGRAM(S): 100 CAPSULE ORAL at 17:00

## 2025-01-02 RX ADMIN — LEVOTHYROXINE SODIUM 175 MICROGRAM(S): 175 TABLET ORAL at 05:37

## 2025-01-02 RX ADMIN — Medication 3 MILLIGRAM(S): at 21:46

## 2025-01-02 RX ADMIN — VANCOMYCIN HYDROCHLORIDE 300 MILLIGRAM(S): 5 INJECTION, POWDER, LYOPHILIZED, FOR SOLUTION INTRAVENOUS at 17:09

## 2025-01-02 RX ADMIN — ENOXAPARIN SODIUM 40 MILLIGRAM(S): 60 INJECTION INTRAVENOUS; SUBCUTANEOUS at 21:35

## 2025-01-02 RX ADMIN — Medication 40 MILLIGRAM(S): at 05:37

## 2025-01-02 RX ADMIN — PANTOPRAZOLE 40 MILLIGRAM(S): 40 TABLET, DELAYED RELEASE ORAL at 05:35

## 2025-01-02 RX ADMIN — Medication 2: at 17:05

## 2025-01-02 RX ADMIN — VANCOMYCIN HYDROCHLORIDE 300 MILLIGRAM(S): 5 INJECTION, POWDER, LYOPHILIZED, FOR SOLUTION INTRAVENOUS at 05:42

## 2025-01-02 RX ADMIN — ACETAMINOPHEN 650 MILLIGRAM(S): 80 SOLUTION/ DROPS ORAL at 21:45

## 2025-01-02 RX ADMIN — BUPRENORPHINE HYDROCHLORIDE AND NALOXONE HYDROCHLORIDE DIHYDRATE 2.5 TABLET(S): 8; 2 TABLET SUBLINGUAL at 13:34

## 2025-01-02 RX ADMIN — Medication 100 MILLIGRAM(S): at 13:32

## 2025-01-02 RX ADMIN — Medication 100 MILLIGRAM(S): at 21:35

## 2025-01-02 RX ADMIN — INSULIN GLARGINE-YFGN 28 UNIT(S): 100 INJECTION, SOLUTION SUBCUTANEOUS at 21:57

## 2025-01-02 RX ADMIN — COLCHICINE 0.6 MILLIGRAM(S): 0.6 CAPSULE ORAL at 17:00

## 2025-01-02 RX ADMIN — COLCHICINE 0.6 MILLIGRAM(S): 0.6 CAPSULE ORAL at 05:37

## 2025-01-02 RX ADMIN — ATORVASTATIN CALCIUM 20 MILLIGRAM(S): 40 TABLET, FILM COATED ORAL at 21:36

## 2025-01-02 NOTE — PHARMACOTHERAPY INTERVENTION NOTE - COMMENTS
Level of 37.6 on 1/1 is not a true trough as it was obtained after the morning dose. Renal function is stable. Recommended to continue vancomycin 1500mg IV q12h as it continues to predict a therapeutic AUC of 536. Recommended to order a repeat level for 15:00 tonight to assist with further dosing.

## 2025-01-02 NOTE — PROGRESS NOTE ADULT - ASSESSMENT
This is a 54 y/o Female, former IVDA on Suboxone, HTN, HLD, Hep C and Chronic lower extremity edema with ulceration of skin presents with c/o generalized weakness worsening lower extremity cellulitis R>L    IMPRESSION  #Bilateral lower extremity wounds/ulcers with underlying stasis dermatitis.  pyoderma gangrenosum vs Vasculitis   #History of polysubstance use disorder. On Suboxone  #Recurrent admission in the past for lower extremity cellulitis? 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  Hepatitis C Ab +, PCR undetectable 2/2/24  Cryoglobulin negative 2/2/24  Hepatitis B immune, HIV screen negative.   DULCE negative  RF negative  p-ANCA positive, titer 1:40  Blood cultures NGTD    RECOMMENDATIONS  -Vascular eval noted.   -Local wound care as per the podiatry.   -Will need re-biopsy of the wounds for cultures and Pathology. Consider Derm evaluation.   -IV Vanc. Dosing as per the pharmacy protocol.   -IV cefepime 2 gram q 8 hours. (S/D 12/27)  -Off loading to prevent pressure sores and preventive measures to avoid aspiration    If any questions, please send a message or call on [employer] Teams  Please continue to update ID with any pertinent new laboratory or radiographic findings.    Stella Chavez M.D  Infectious Diseases Attending/   [employer] at [facility]   This is a 52 y/o Female, former IVDA on Suboxone, HTN, HLD, Hep C and Chronic lower extremity edema with ulceration of skin presents with c/o generalized weakness worsening lower extremity cellulitis R>L    IMPRESSION  #Bilateral lower extremity wounds/ulcers with underlying stasis dermatitis.  pyoderma gangrenosum vs Vasculitis   #History of polysubstance use disorder. On Suboxone  #Recurrent admission in the past for lower extremity cellulitis? 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  Hepatitis C Ab +, PCR undetectable 2/2/24  Cryoglobulin negative 2/2/24  Hepatitis B immune, HIV screen negative.   DULCE negative  RF negative  p-ANCA positive, titer 1:40  Blood cultures NGTD    RECOMMENDATIONS  -Vascular eval noted.   -Local wound care as per the podiatry.   -Will need re-biopsy of the wounds for cultures and Pathology. Consider Derm evaluation.   -Wound look slightly improved. Pain persists.   -IV Vanc. Dosing as per the pharmacy protocol.   -IV cefepime 2 gram q 8 hours. (S/D 12/27)  -Off loading to prevent pressure sores and preventive measures to avoid aspiration    If any questions, please send a message or call on [employer] Teams  Please continue to update ID with any pertinent new laboratory or radiographic findings.    Stella Chavez M.D  Infectious Diseases Attending/   [employer] at [facility]   This is a 54 y/o Female, former IVDA on Suboxone, HTN, HLD, Hep C and Chronic lower extremity edema with ulceration of skin presents with c/o generalized weakness worsening lower extremity cellulitis R>L    IMPRESSION  #Bilateral lower extremity wounds/ulcers with underlying stasis dermatitis.  pyoderma gangrenosum vs Vasculitis   #History of polysubstance use disorder. On Suboxone  #Recurrent admission in the past for lower extremity cellulitis? 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  Hepatitis C Ab +, PCR undetectable 2/2/24  Cryoglobulin negative 2/2/24  Hepatitis B immune, HIV screen negative.   DULCE negative  RF negative  p-ANCA positive, titer 1:40  Blood cultures NGTD    RECOMMENDATIONS  -Vascular eval noted.   -Local wound care as per the podiatry.   -Will need re-biopsy of the wounds for cultures and Pathology. If being performed, send for AFB cultures, bacterial cultures and fungal cultures including pathology.   -Wound look slightly improved. Pain persists.   -IV Vanc. Dosing as per the pharmacy protocol.   -IV cefepime 2 gram q 8 hours. (S/D 12/27)  -Off loading to prevent pressure sores and preventive measures to avoid aspiration    If any questions, please send a message or call on [employer] Teams  Please continue to update ID with any pertinent new laboratory or radiographic findings.    Stella Chavez M.D  Infectious Diseases Attending/   [employer] at [facility]

## 2025-01-02 NOTE — PROGRESS NOTE ADULT - SUBJECTIVE AND OBJECTIVE BOX
EZRA BARCLAY  54y, Female    LOS  6d    INTERVAL EVENTS/HPI  - No acute events overnight  - T(F): , Max: 98.4 (01-01-25 @ 21:26)  - WBC Count: 6.01 (01-02-25 @ 09:32)  WBC Count: 7.54 (12-30-24 @ 11:00)  - Creatinine: 1.0 (01-02-25 @ 09:32)    REVIEW OF SYSTEMS:  CONSTITUTIONAL: No fever or chills  HEENT: No sore throat  RESPIRATORY: No cough, no shortness of breath  CARDIOVASCULAR: No chest pain or palpitations  GASTROINTESTINAL: No abdominal or epigastric pain  GENITOURINARY: No dysuria  NEUROLOGICAL: No headache/dizziness  MSK: No joint pain, erythema, or swelling; no back pain  SKIN: No itching, rashes  All other ROS negative except noted above    Prior hospital charts reviewed [Yes]  Primary team notes reviewed [Yes]  Other consultant notes reviewed [Yes]    ANTIMICROBIALS:   cefepime   IVPB    cefepime   IVPB 2000 every 8 hours  vancomycin  IVPB 1500 every 12 hours      OTHER MEDS: MEDICATIONS  (STANDING):  acetaminophen     Tablet .. 650 every 6 hours PRN  albuterol    90 MICROgram(s) HFA Inhaler 2 every 6 hours PRN  aluminum hydroxide/magnesium hydroxide/simethicone Suspension 30 every 4 hours PRN  atorvastatin 20 at bedtime  buprenorphine 8 mG/naloxone 2 mG SL  Tablet 2.5 daily  colchicine 0.6 two times a day  dextrose Oral Gel 15 once PRN  enoxaparin Injectable 40 every 24 hours  furosemide    Tablet 40 daily  influenza   Vaccine 0.5 once  insulin glargine Injectable (LANTUS) 28 at bedtime  insulin lispro (ADMELOG) corrective regimen sliding scale  three times a day before meals  levothyroxine 175 daily  melatonin 3 at bedtime PRN  ondansetron Injectable 4 every 8 hours PRN  pantoprazole    Tablet 40 before breakfast  pregabalin 50 two times a day      Vital Signs Last 24 Hrs  T(F): 97.5 (01-02-25 @ 06:06), Max: 98.5 (12-27-24 @ 21:40)    Vital Signs Last 24 Hrs  HR: 66 (01-02-25 @ 06:06) (66 - 78)  BP: 119/77 (01-02-25 @ 06:06) (106/69 - 135/87)  RR: 18 (01-02-25 @ 06:06)  SpO2: 99% (01-02-25 @ 06:06) (95% - 99%)  Wt(kg): --    EXAM:  GENERAL: NAD, lying in bed  HEAD: No head lesions  NECK: Supple  CHEST/LUNG: Clear to auscultation bilaterally  HEART: S1 S2  ABDOMEN: Soft, nontender  EXTREMITIES: Bilateral lower extremity wounds noted.   NERVOUS SYSTEM: Alert and oriented to person    Labs:                        8.9    6.01  )-----------( 300      ( 02 Jan 2025 09:32 )             28.6     01-02    135  |  96[L]  |  12  ----------------------------<  90  5.9[H]   |  30  |  1.0    Ca    8.7      02 Jan 2025 09:32    TPro  7.9  /  Alb  3.9  /  TBili  0.2  /  DBili  x   /  AST  39  /  ALT  6   /  AlkPhos  137[H]  01-02      WBC Trend:  WBC Count: 6.01 (01-02-25 @ 09:32)  WBC Count: 7.54 (12-30-24 @ 11:00)  WBC Count: 7.81 (12-28-24 @ 11:22)      Creatine Trend:  Creatinine: 1.0 (01-02)  Creatinine: 1.1 (12-30)  Creatinine: 0.9 (12-28)  Creatinine: 0.9 (12-27)      Liver Biochemical Testing Trend:  Alanine Aminotransferase (ALT/SGPT): 6 (01-02)  Alanine Aminotransferase (ALT/SGPT): <5 (12-28)  Alanine Aminotransferase (ALT/SGPT): <5 (12-27)  Alanine Aminotransferase (ALT/SGPT): <5 (12-03)  Alanine Aminotransferase (ALT/SGPT): <5 (12-01)  Aspartate Aminotransferase (AST/SGOT): 39 (01-02-25 @ 09:32)  Aspartate Aminotransferase (AST/SGOT): 18 (12-28-24 @ 11:22)  Aspartate Aminotransferase (AST/SGOT): 34 (12-27-24 @ 16:00)  Aspartate Aminotransferase (AST/SGOT): 20 (12-03-24 @ 07:21)  Aspartate Aminotransferase (AST/SGOT): 23 (12-01-24 @ 07:45)  Bilirubin Total: 0.2 (01-02)  Bilirubin Total: 0.4 (12-28)  Bilirubin Total: 0.5 (12-27)  Bilirubin Total: 0.2 (12-03)  Bilirubin Total: 0.2 (12-01)      Trend LDH  01-10-24 @ 06:55  230      Urinalysis Basic - ( 02 Jan 2025 09:32 )    Color: x / Appearance: x / SG: x / pH: x  Gluc: 90 mg/dL / Ketone: x  / Bili: x / Urobili: x   Blood: x / Protein: x / Nitrite: x   Leuk Esterase: x / RBC: x / WBC x   Sq Epi: x / Non Sq Epi: x / Bacteria: x        MICROBIOLOGY:  Vancomycin Level, Random: 37.6 (01-01 @ 08:19)  Vancomycin Level, Trough: 9.3 (12-30 @ 11:00)    Female    Culture - Blood (collected 28 Dec 2024 17:10)  Source: .Blood BLOOD  Preliminary Report:    No growth at 4 days    Culture - Blood (collected 27 Dec 2024 16:00)  Source: .Blood BLOOD  Final Report:    No growth at 5 days    Culture - Blood (collected 27 Dec 2024 16:00)  Source: .Blood BLOOD  Final Report:    No growth at 5 days    Urinalysis with Rflx Culture (collected 29 Nov 2024 00:13)    Culture - Blood (collected 28 Nov 2024 22:04)  Source: .Blood BLOOD  Final Report:    No growth at 5 days    Culture - Blood (collected 28 Nov 2024 22:04)  Source: .Blood BLOOD  Final Report:    No growth at 5 days    Urinalysis with Rflx Culture (collected 14 Nov 2024 18:42)    Culture - Blood (collected 27 May 2024 21:10)  Source: .Blood Blood  Final Report:    No growth at 5 days    Culture - Blood (collected 27 May 2024 21:10)  Source: .Blood Blood  Final Report:    No growth at 5 days    Culture - Other (collected 06 May 2024 16:16)  Source: .Other LLE  Final Report:    Rare Staphylococcus aureus    Normal skin sumit isolated  Organism: Staphylococcus aureus  Organism: Staphylococcus aureus    Sensitivities:      Method Type: REED      -  Clindamycin: R 0.5 This isolate is presumed to be clindamycin resistant based on detection of inducible resistance. Clindamycin may still be effective in some patients.      -  Erythromycin: R >4      -  Gentamicin: S <=1 Should not be used as monotherapy      -  Oxacillin: S 0.5 Oxacillin predicts susceptibility for dicloxacillin, methicillin, and nafcillin      -  Penicillin: R >8      -  Rifampin: S <=1 Should not be used as monotherapy      -  Tetracycline: S <=1      -  Trimethoprim/Sulfamethoxazole: S <=0.5/9.5      -  Vancomycin: S 2      HIV-1/2 Combo Result: Nonreact (12-31-24 @ 08:00)  HIV-1/2 Combo Result: Nonreact (01-11-24 @ 13:40)  C-Reactive Protein: 39.4 (01-02)  C-Reactive Protein: 80.4 (12-30)  Lactate, Blood: 1.7 (12-30 @ 11:00)    RADIOLOGY & ADDITIONAL TESTS:  I have personally reviewed the relevant images.   CXR      CT    < from: Xray Foot AP + Lateral + Oblique, Right (12.28.24 @ 11:58) >  Findings/  impression:    Flattening of the normal plantar arch.    Midfoot arthrosis with diffuse soft tissue swelling.    No fracture or erosion.    If concern with osteomyelitis, consider correlation with MR scanning.    --- End of Report ---    < end of copied text >      WEIGHT  Weight (kg): 90.7 (12-27-24 @ 14:12)  Creatinine: 1.0 mg/dL (01-02-25 @ 09:32)      All available historical records have been reviewed

## 2025-01-02 NOTE — CONSULT NOTE ADULT - SUBJECTIVE AND OBJECTIVE BOX
EZRA BARCLAY [id number]  54y Female  6d    HPI:                                              52 y/o Female, former IVDA on Suboxone, HTN, HLD, Hep C. , Chronic lower extremity edema with ulceration of skin presents with c/o generalized weakness and subjective fever/chills x 2 weeks and worsening lower extremity cellulitis R>L. , with oozing from RLE.  Admitted 11/29-12/3/2024 with similar scenario. Admitted today for same complaints; denies fever  - completed home Ceftin from last  admission.  - saw Dr [facility]air [facility] who placed Brenda boot on Right foot x two weeks after last admission. (27 Dec 2024 18:38)    SURGERY: PT KNOWN SURGICAL SERVICE (DR. WELLINGTON) - HAD PUNCH BIOPSY OF RLE LESION ON 1/10/24 SHOWINF LEUKOCYTOPLASTIC VASCULITITS. ID AN DERM REQUESTING ADDITIONAL BIOPSY OF RLE LESION    PAST MEDICAL & SURGICAL HISTORY:  Asthma with COPD      Hypothyroidism      H/O: substance abuse  no longer using      History of pancreatitis      Hepatitis-C      Leukocytoclastic vasculitis      Bilateral edema of lower extremity      HLD (hyperlipidemia)      Type 2 diabetes mellitus      History of appendectomy      History of tonsillectomy            MEDICATIONS  (STANDING):  atorvastatin 20 milliGRAM(s) Oral at bedtime  buprenorphine 8 mG/naloxone 2 mG SL  Tablet 2.5 Tablet(s) SubLingual daily  cefepime   IVPB      cefepime   IVPB 2000 milliGRAM(s) IV Intermittent every 8 hours  colchicine 0.6 milliGRAM(s) Oral two times a day  dextrose 5%. 1000 milliLiter(s) (50 mL/Hr) IV Continuous <Continuous>  enoxaparin Injectable 40 milliGRAM(s) SubCutaneous every 24 hours  furosemide    Tablet 40 milliGRAM(s) Oral daily  influenza   Vaccine 0.5 milliLiter(s) IntraMuscular once  insulin glargine Injectable (LANTUS) 28 Unit(s) SubCutaneous at bedtime  insulin lispro (ADMELOG) corrective regimen sliding scale   SubCutaneous three times a day before meals  levothyroxine 175 MICROGram(s) Oral daily  pantoprazole    Tablet 40 milliGRAM(s) Oral before breakfast  pregabalin 50 milliGRAM(s) Oral two times a day  vancomycin  IVPB 1500 milliGRAM(s) IV Intermittent every 12 hours    MEDICATIONS  (PRN):  acetaminophen     Tablet .. 650 milliGRAM(s) Oral every 6 hours PRN Temp greater or equal to 38C (100.4F), Mild Pain (1 - 3)  albuterol    90 MICROgram(s) HFA Inhaler 2 Puff(s) Inhalation every 6 hours PRN Shortness of Breath and/or Wheezing  aluminum hydroxide/magnesium hydroxide/simethicone Suspension 30 milliLiter(s) Oral every 4 hours PRN Dyspepsia  dextrose Oral Gel 15 Gram(s) Oral once PRN Blood Glucose LESS THAN 70 milliGRAM(s)/deciliter  melatonin 3 milliGRAM(s) Oral at bedtime PRN Insomnia  ondansetron Injectable 4 milliGRAM(s) IV Push every 8 hours PRN Nausea and/or Vomiting      Allergies    No Known Allergies    Intolerances        REVIEW OF SYSTEMS    [ ] A ten-point review of systems was otherwise negative except as noted.  [ ] Due to altered mental status/intubation, subjective information were not able to be obtained from the patient. History was obtained, to the extent possible, from review of the chart and collateral sources of information.      Vital Signs Last 24 Hrs  T(C): 37 (02 Jan 2025 14:00), Max: 37 (02 Jan 2025 14:00)  T(F): 98.6 (02 Jan 2025 14:00), Max: 98.6 (02 Jan 2025 14:00)  HR: 70 (02 Jan 2025 14:00) (66 - 78)  BP: 124/81 (02 Jan 2025 14:00) (119/77 - 135/87)  BP(mean): --  RR: 20 (02 Jan 2025 14:00) (18 - 20)  SpO2: 100% (02 Jan 2025 14:00) (95% - 100%)    Parameters below as of 02 Jan 2025 06:06  Patient On (Oxygen Delivery Method): room air        PHYSICAL EXAM:  EXTREMITIES:  B/L LE W/STASIS DERMATITIS/DRY SCABBED ULCERATIONS/SUPERFICIAL LIPID DEPOSITIONS (R>L)      LABS:  Labs:  CAPILLARY BLOOD GLUCOSE      POCT Blood Glucose.: 215 mg/dL (02 Jan 2025 16:37)  POCT Blood Glucose.: 93 mg/dL (02 Jan 2025 11:36)  POCT Blood Glucose.: 98 mg/dL (02 Jan 2025 07:32)  POCT Blood Glucose.: 134 mg/dL (01 Jan 2025 21:22)                          8.9    6.01  )-----------( 300      ( 02 Jan 2025 09:32 )             28.6       Auto Neutrophil %: 65.6 % (01-02-25 @ 09:32)  Auto Immature Granulocyte %: 0.5 % (01-02-25 @ 09:32)    01-02    136  |  96[L]  |  12  ----------------------------<  85  3.7   |  30  |  0.9      Calcium: 8.8 mg/dL (01-02-25 @ 12:51)      LFTs:             7.9  | 0.2  | 39       ------------------[137     ( 02 Jan 2025 09:32 )  3.9  | x    | 6           Lipase:x      Amylase:x             Coags:            Urinalysis Basic - ( 02 Jan 2025 12:51 )    Color: x / Appearance: x / SG: x / pH: x  Gluc: 85 mg/dL / Ketone: x  / Bili: x / Urobili: x   Blood: x / Protein: x / Nitrite: x   Leuk Esterase: x / RBC: x / WBC x   Sq Epi: x / Non Sq Epi: x / Bacteria: x            RADIOLOGY & ADDITIONAL STUDIES:

## 2025-01-02 NOTE — CHART NOTE - NSCHARTNOTEFT_GEN_A_CORE
Patient has developed Patient has developed swelling under left elbow. Feel like fluid, strongly suspect olecranon bursitis but will order ultrasound and orthopedist consult

## 2025-01-02 NOTE — PROGRESS NOTE ADULT - ASSESSMENT
a 54 y/o Female, former IVDA on Suboxone, HTN, HLD, Hep C. , Chronic lower extremity edema with ulceration of skin presents with c/o generalized weakness and subjective fever/chills x 2 weeks and worsening lower extremity cellulitis  with oozing from b/l LE R>L.       #B/l LE cellulitis superimposed on chronic LE edema with possible bacteremia  - ID recs appreciated: continue vancomycin and switch to cefepime 2g q 8hrs  - follow up blood cultures-  No growth at 24 hours  - VA duplex concerning for stenosis  - vascular consulted recs ( no surgical interventions) , reconsulted given leg pain   - podiatry recs appreciated  - Wound care or podiatry for local care of the wounds.   -  re-biopsy of the wounds for cultures and Pathology. Consulted Derm for re-evaluation.   - XR Imaging Right Foot; Flattening of the normal plantar arch. Midfoot arthrosis with diffuse soft tissue swelling. No fracture or erosion.  - duplex Moderate right external iliac and common femoral artery stenosis, Normal arterial flow in the left lower extremity, Tibial arteries not visualized bilaterally  - Local Wound Care; Applied - Xeroform / Gauze / DSD / Kerlix / Secured with Tape  - Weight Bearing Status; WBAT   - ESR 90  - Trend WBC daily  - OOB to chair with assistance  - will consider midline if difficult access  - legs dressed and wrapped today  - derm reconsulted for biopsy   - follow up blood work     #Acute comminuted fractures of the fourth and fifth metacarpal bodies  -xray Acute comminuted fractures of the fourth and fifth metacarpal bodies.Soft tissue swelling along the dorsal aspect of the hand.  Osteophyte formation at the first MCP joint.  -splint and cast in place POA  - ortho consulted     # Diabetes Type 2  - continue lantus with sliding scale  - CHO consistent diet  - hold metformin  - A1C,     # Opiate dependence  - continue Suboxone     # Gout  -continue colchicine    # Hypothyroid  - continue Levothyroxine    proph heparin  DM diet   full code    Dispo:  home does not want [facility]      Handoff:  pending:  continue antibiotic as per ID,  f/u  blood work, f/u Ortho, ID and derm biopsy

## 2025-01-02 NOTE — PROGRESS NOTE ADULT - SUBJECTIVE AND OBJECTIVE BOX
EZRA BARCLAY 54y Female  MRN#: [id number]   Hospital Day: 2d    SUBJECTIVE  Patient is a 54y old Female who presents with a chief complaint of RLE Ulceration with oozing (28 Dec 2024 16:16)  Currently admitted to medicine with the primary diagnosis of Cellulitis    INTERVAL HPI AND OVERNIGHT EVENTS:  Patient was examined and seen at bedside. This morning she is resting comfortably in bed and reports no issues or overnight events.  chronic lower ext pain 2/2 chronic wounds, denies chest pain , sob, n/v/d/c, hematuria or bloody stool.     f/u derm for biopsy     blood draws with us today,     REVIEW OF SYMPTOMS:  10 point ROS negative except as above.    OBJECTIVE  PAST MEDICAL & SURGICAL HISTORY  Asthma with COPD    Hypothyroidism    H/O: substance abuse  no longer using    History of pancreatitis    Hepatitis-C    Leukocytoclastic vasculitis    Bilateral edema of lower extremity    HLD (hyperlipidemia)    Type 2 diabetes mellitus    History of appendectomy    History of tonsillectomy      ALLERGIES:  No Known Allergies    MEDICATIONS:  STANDING MEDICATIONS  atorvastatin 20 milliGRAM(s) Oral at bedtime  buprenorphine 8 mG/naloxone 2 mG SL  Tablet 2.5 Tablet(s) SubLingual daily  cefepime   IVPB      cefepime   IVPB 2000 milliGRAM(s) IV Intermittent every 8 hours  colchicine 0.6 milliGRAM(s) Oral two times a day  dextrose 5%. 1000 milliLiter(s) IV Continuous <Continuous>  enoxaparin Injectable 40 milliGRAM(s) SubCutaneous every 24 hours  furosemide    Tablet 40 milliGRAM(s) Oral daily  influenza   Vaccine 0.5 milliLiter(s) IntraMuscular once  insulin glargine Injectable (LANTUS) 28 Unit(s) SubCutaneous at bedtime  insulin lispro (ADMELOG) corrective regimen sliding scale   SubCutaneous three times a day before meals  levothyroxine 175 MICROGram(s) Oral daily  pantoprazole    Tablet 40 milliGRAM(s) Oral before breakfast  pregabalin 50 milliGRAM(s) Oral two times a day  vancomycin  IVPB 1000 milliGRAM(s) IV Intermittent every 12 hours    PRN MEDICATIONS  acetaminophen     Tablet .. 650 milliGRAM(s) Oral every 6 hours PRN  albuterol    90 MICROgram(s) HFA Inhaler 2 Puff(s) Inhalation every 6 hours PRN  aluminum hydroxide/magnesium hydroxide/simethicone Suspension 30 milliLiter(s) Oral every 4 hours PRN  dextrose Oral Gel 15 Gram(s) Oral once PRN  melatonin 3 milliGRAM(s) Oral at bedtime PRN  ondansetron Injectable 4 milliGRAM(s) IV Push every 8 hours PRN      VITAL SIGNS: Last 24 Hours  Vital Signs Last 24 Hrs  T(C): 36.4 (02 Jan 2025 06:06), Max: 36.9 (01 Jan 2025 21:26)  T(F): 97.5 (02 Jan 2025 06:06), Max: 98.4 (01 Jan 2025 21:26)  HR: 66 (02 Jan 2025 06:06) (66 - 78)  BP: 119/77 (02 Jan 2025 06:06) (106/69 - 135/87)  BP(mean): --  RR: 18 (02 Jan 2025 06:06) (18 - 18)  SpO2: 99% (02 Jan 2025 06:06) (95% - 99%)    Parameters below as of 02 Jan 2025 06:06  Patient On (Oxygen Delivery Method): room air        PHYSICAL EXAM:  GENERAL: NAD, well-groomed, well-developed  HEENT : NC/AT,   NECK: Supple, No JVD  CHEST/LUNG: Clear to auscultation bilaterally;   HEART: Regular rate and rhythm; No murmurs  ABDOMEN: Soft, Nontender, Nondistended; Bowel sounds present  EXTREMITIES: +edema, chronic venous stasis, wounds dressed   NEURO:  aox3, generalized weakness   SKIN:  b/l lower ext. wounds and chronic venous changes     LABS:                          10.7   7.54  )-----------( 369      ( 30 Dec 2024 11:00 )             34.4     12-30    136  |  91[L]  |  10  ----------------------------<  163[H]  3.3[L]   |  33[H]  |  1.1    Ca    9.3      30 Dec 2024 11:00    PT/INR - ( 30 Dec 2024 11:00 )   PT: 10.50 sec;   INR: 0.89 ratio      PTT - ( 30 Dec 2024 11:00 )  PTT:36.7 sec  Urinalysis Basic - ( 30 Dec 2024 11:00 )    Color: x / Appearance: x / SG: x / pH: x  Gluc: 163 mg/dL / Ketone: x  / Bili: x / Urobili: x   Blood: x / Protein: x / Nitrite: x   Leuk Esterase: x / RBC: x / WBC x   Sq Epi: x / Non Sq Epi: x / Bacteria: x    Culture - Blood (collected 28 Dec 2024 17:10)  Source: .Blood BLOOD  Preliminary Report (30 Dec 2024 23:02):    No growth at 48 Hours    RADIOLOGY:  < from: Xray Foot AP + Lateral + Oblique, Right (12.28.24 @ 11:58) >  impression:    Flattening of the normal plantar arch.    Midfoot arthrosis with diffuse soft tissue swelling.    No fracture or erosion.    If concern with osteomyelitis, consider correlation with MR scanning.    < end of copied text >  < from: VA Duplex Lower Extrem Arterial, Bilat (12.27.24 @ 21:15) >      Impression:  Moderate right external iliac and common femoral artery stenosis  Normal arterial flow in the left lower extremity  Tibial arteries not visualized bilaterally    < end of copied text >  < from: Xray Hand 3 Views, Left (12.27.24 @ 14:52) >  IMPRESSION:    Acute comminuted fractures of the fourth and fifth metacarpal bodies.  Soft tissue swelling along the dorsal aspect of the hand.  Osteophyte formation at the first MCP joint.    < end of copied text >

## 2025-01-02 NOTE — CONSULT NOTE ADULT - ASSESSMENT
52 y/o Female, former IVDA on Suboxone, HTN, HLD, Hep C, Chronic lower extremity edema w/ ulceration of skin admitted for  lower extremity cellulitis R>L,    SURGERY REQUESTED FOR SKIN BIOPSY;    CASE D/W DR. WELLINGTON:   - WILL OBTAIN BEDSIDE PUNCH BIOPSY TOMORROW 1/3/24

## 2025-01-03 ENCOUNTER — RESULT REVIEW (OUTPATIENT)
Age: 55
End: 2025-01-03

## 2025-01-03 LAB
ALBUMIN SERPL ELPH-MCNC: 4.2 G/DL — SIGNIFICANT CHANGE UP (ref 3.5–5.2)
ALP SERPL-CCNC: 126 U/L — HIGH (ref 30–115)
ALT FLD-CCNC: <5 U/L — SIGNIFICANT CHANGE UP (ref 0–41)
ANION GAP SERPL CALC-SCNC: 13 MMOL/L — SIGNIFICANT CHANGE UP (ref 7–14)
AST SERPL-CCNC: 28 U/L — SIGNIFICANT CHANGE UP (ref 0–41)
BASOPHILS # BLD AUTO: 0.05 K/UL — SIGNIFICANT CHANGE UP (ref 0–0.2)
BASOPHILS NFR BLD AUTO: 0.8 % — SIGNIFICANT CHANGE UP (ref 0–1)
BILIRUB SERPL-MCNC: 0.3 MG/DL — SIGNIFICANT CHANGE UP (ref 0.2–1.2)
BUN SERPL-MCNC: 13 MG/DL — SIGNIFICANT CHANGE UP (ref 10–20)
CALCIUM SERPL-MCNC: 9.3 MG/DL — SIGNIFICANT CHANGE UP (ref 8.4–10.5)
CHLORIDE SERPL-SCNC: 95 MMOL/L — LOW (ref 98–110)
CO2 SERPL-SCNC: 28 MMOL/L — SIGNIFICANT CHANGE UP (ref 17–32)
CREAT SERPL-MCNC: 1.1 MG/DL — SIGNIFICANT CHANGE UP (ref 0.7–1.5)
EGFR: 60 ML/MIN/1.73M2 — SIGNIFICANT CHANGE UP
EOSINOPHIL # BLD AUTO: 0.18 K/UL — SIGNIFICANT CHANGE UP (ref 0–0.7)
EOSINOPHIL NFR BLD AUTO: 2.7 % — SIGNIFICANT CHANGE UP (ref 0–8)
GLUCOSE BLDC GLUCOMTR-MCNC: 143 MG/DL — HIGH (ref 70–99)
GLUCOSE BLDC GLUCOMTR-MCNC: 190 MG/DL — HIGH (ref 70–99)
GLUCOSE BLDC GLUCOMTR-MCNC: 76 MG/DL — SIGNIFICANT CHANGE UP (ref 70–99)
GLUCOSE SERPL-MCNC: 67 MG/DL — LOW (ref 70–99)
HCT VFR BLD CALC: 32.7 % — LOW (ref 37–47)
HGB BLD-MCNC: 9.8 G/DL — LOW (ref 12–16)
HISTONE AB SER-ACNC: 1.1 UNITS — HIGH (ref 0–0.9)
IMM GRANULOCYTES NFR BLD AUTO: 0.3 % — SIGNIFICANT CHANGE UP (ref 0.1–0.3)
LYMPHOCYTES # BLD AUTO: 1.67 K/UL — SIGNIFICANT CHANGE UP (ref 1.2–3.4)
LYMPHOCYTES # BLD AUTO: 25.3 % — SIGNIFICANT CHANGE UP (ref 20.5–51.1)
MCHC RBC-ENTMCNC: 25.8 PG — LOW (ref 27–31)
MCHC RBC-ENTMCNC: 30 G/DL — LOW (ref 32–37)
MCV RBC AUTO: 86.1 FL — SIGNIFICANT CHANGE UP (ref 81–99)
MONOCYTES # BLD AUTO: 0.42 K/UL — SIGNIFICANT CHANGE UP (ref 0.1–0.6)
MONOCYTES NFR BLD AUTO: 6.4 % — SIGNIFICANT CHANGE UP (ref 1.7–9.3)
NEUTROPHILS # BLD AUTO: 4.26 K/UL — SIGNIFICANT CHANGE UP (ref 1.4–6.5)
NEUTROPHILS NFR BLD AUTO: 64.5 % — SIGNIFICANT CHANGE UP (ref 42.2–75.2)
NRBC # BLD: 0 /100 WBCS — SIGNIFICANT CHANGE UP (ref 0–0)
PLATELET # BLD AUTO: 284 K/UL — SIGNIFICANT CHANGE UP (ref 130–400)
PMV BLD: 9.3 FL — SIGNIFICANT CHANGE UP (ref 7.4–10.4)
POTASSIUM SERPL-MCNC: 3.7 MMOL/L — SIGNIFICANT CHANGE UP (ref 3.5–5)
POTASSIUM SERPL-SCNC: 3.7 MMOL/L — SIGNIFICANT CHANGE UP (ref 3.5–5)
PROT SERPL-MCNC: 8.1 G/DL — HIGH (ref 6–8)
RBC # BLD: 3.8 M/UL — LOW (ref 4.2–5.4)
RBC # FLD: 15.7 % — HIGH (ref 11.5–14.5)
SODIUM SERPL-SCNC: 136 MMOL/L — SIGNIFICANT CHANGE UP (ref 135–146)
VANCOMYCIN FLD-MCNC: 32.1 UG/ML — HIGH (ref 5–10)
VANCOMYCIN TROUGH SERPL-MCNC: 25.2 UG/ML — HIGH (ref 5–10)
WBC # BLD: 6.6 K/UL — SIGNIFICANT CHANGE UP (ref 4.8–10.8)
WBC # FLD AUTO: 6.6 K/UL — SIGNIFICANT CHANGE UP (ref 4.8–10.8)

## 2025-01-03 PROCEDURE — 99232 SBSQ HOSP IP/OBS MODERATE 35: CPT

## 2025-01-03 PROCEDURE — 99222 1ST HOSP IP/OBS MODERATE 55: CPT | Mod: GC

## 2025-01-03 PROCEDURE — 99231 SBSQ HOSP IP/OBS SF/LOW 25: CPT | Mod: 25

## 2025-01-03 PROCEDURE — 11104 PUNCH BX SKIN SINGLE LESION: CPT

## 2025-01-03 PROCEDURE — 88305 TISSUE EXAM BY PATHOLOGIST: CPT | Mod: 26

## 2025-01-03 PROCEDURE — 29581 APPL MULTLAYER CMPRN SYS LEG: CPT | Mod: 50

## 2025-01-03 RX ORDER — VANCOMYCIN HYDROCHLORIDE 5 G/100ML
750 INJECTION, POWDER, LYOPHILIZED, FOR SOLUTION INTRAVENOUS EVERY 12 HOURS
Refills: 0 | Status: DISCONTINUED | OUTPATIENT
Start: 2025-01-04 | End: 2025-01-07

## 2025-01-03 RX ADMIN — Medication 100 MILLIGRAM(S): at 05:37

## 2025-01-03 RX ADMIN — COLCHICINE 0.6 MILLIGRAM(S): 0.6 CAPSULE ORAL at 17:54

## 2025-01-03 RX ADMIN — COLCHICINE 0.6 MILLIGRAM(S): 0.6 CAPSULE ORAL at 05:37

## 2025-01-03 RX ADMIN — PREGABALIN 50 MILLIGRAM(S): 100 CAPSULE ORAL at 05:38

## 2025-01-03 RX ADMIN — VANCOMYCIN HYDROCHLORIDE 300 MILLIGRAM(S): 5 INJECTION, POWDER, LYOPHILIZED, FOR SOLUTION INTRAVENOUS at 07:41

## 2025-01-03 RX ADMIN — ATORVASTATIN CALCIUM 20 MILLIGRAM(S): 40 TABLET, FILM COATED ORAL at 21:11

## 2025-01-03 RX ADMIN — Medication 40 MILLIGRAM(S): at 05:37

## 2025-01-03 RX ADMIN — Medication 100 MILLIGRAM(S): at 21:11

## 2025-01-03 RX ADMIN — ENOXAPARIN SODIUM 40 MILLIGRAM(S): 60 INJECTION INTRAVENOUS; SUBCUTANEOUS at 21:10

## 2025-01-03 RX ADMIN — BUPRENORPHINE HYDROCHLORIDE AND NALOXONE HYDROCHLORIDE DIHYDRATE 2.5 TABLET(S): 8; 2 TABLET SUBLINGUAL at 11:54

## 2025-01-03 RX ADMIN — PANTOPRAZOLE 40 MILLIGRAM(S): 40 TABLET, DELAYED RELEASE ORAL at 05:52

## 2025-01-03 RX ADMIN — INSULIN GLARGINE-YFGN 28 UNIT(S): 100 INJECTION, SOLUTION SUBCUTANEOUS at 21:11

## 2025-01-03 RX ADMIN — PREGABALIN 50 MILLIGRAM(S): 100 CAPSULE ORAL at 17:53

## 2025-01-03 RX ADMIN — Medication 1: at 16:58

## 2025-01-03 RX ADMIN — Medication 100 MILLIGRAM(S): at 14:04

## 2025-01-03 RX ADMIN — LEVOTHYROXINE SODIUM 175 MICROGRAM(S): 175 TABLET ORAL at 05:37

## 2025-01-03 NOTE — PHARMACOTHERAPY INTERVENTION NOTE - INTERVENTION TYPE RECOOMEND
Pharmacokinetics Evaluation
Duration of Therapy Recommended

## 2025-01-03 NOTE — PROGRESS NOTE ADULT - SUBJECTIVE AND OBJECTIVE BOX
EZRA BARCLAY 54y Female  MRN#: [id number]       SUBJECTIVE  Patient is a 54y old Female who presents with a chief complaint of RLE Ulceration with oozing (03 Jan 2025 14:45)  Currently admitted to medicine with the primary diagnosis of Cellulitis      Today is hospital day 7d ,patient reports left elbow swelling ,and lower extremity discomfort.    INTERVAL HISTORY/OVERNIGHT EVENTS:  left elbow swelling and erythema    OBJECTIVE  PAST MEDICAL & SURGICAL HISTORY  Asthma with COPD    Hypothyroidism    H/O: substance abuse  no longer using    History of pancreatitis    Hepatitis-C    Leukocytoclastic vasculitis    Bilateral edema of lower extremity    HLD (hyperlipidemia)    Type 2 diabetes mellitus    History of appendectomy    History of tonsillectomy        ALLERGIES:  No Known Allergies      MEDICATIONS:  STANDING MEDICATIONS  atorvastatin 20 milliGRAM(s) Oral at bedtime  cefepime   IVPB      cefepime   IVPB 2000 milliGRAM(s) IV Intermittent every 8 hours  colchicine 0.6 milliGRAM(s) Oral two times a day  dextrose 5%. 1000 milliLiter(s) IV Continuous <Continuous>  enoxaparin Injectable 40 milliGRAM(s) SubCutaneous every 24 hours  furosemide    Tablet 40 milliGRAM(s) Oral daily  influenza   Vaccine 0.5 milliLiter(s) IntraMuscular once  insulin glargine Injectable (LANTUS) 28 Unit(s) SubCutaneous at bedtime  insulin lispro (ADMELOG) corrective regimen sliding scale   SubCutaneous three times a day before meals  levothyroxine 175 MICROGram(s) Oral daily  pantoprazole    Tablet 40 milliGRAM(s) Oral before breakfast  pregabalin 50 milliGRAM(s) Oral two times a day    PRN MEDICATIONS  acetaminophen     Tablet .. 650 milliGRAM(s) Oral every 6 hours PRN  albuterol    90 MICROgram(s) HFA Inhaler 2 Puff(s) Inhalation every 6 hours PRN  aluminum hydroxide/magnesium hydroxide/simethicone Suspension 30 milliLiter(s) Oral every 4 hours PRN  dextrose Oral Gel 15 Gram(s) Oral once PRN  melatonin 3 milliGRAM(s) Oral at bedtime PRN  ondansetron Injectable 4 milliGRAM(s) IV Push every 8 hours PRN      VITAL SIGNS  VITAL SIGNS: Last 24 Hours  T(C): 36.6 (03 Jan 2025 14:00), Max: 36.7 (02 Jan 2025 20:42)  T(F): 97.8 (03 Jan 2025 14:00), Max: 98 (02 Jan 2025 20:42)  HR: 66 (03 Jan 2025 14:00) (62 - 73)  BP: 96/63 (03 Jan 2025 14:00) (96/63 - 109/69)  BP(mean): --  RR: 18 (03 Jan 2025 14:00) (18 - 18)  SpO2: 97% (03 Jan 2025 14:00) (97% - 98%)    LABS:                        9.8    6.60  )-----------( 284      ( 03 Jan 2025 07:28 )             32.7     01-03    136  |  95[L]  |  13  ----------------------------<  67[L]  3.7   |  28  |  1.1    Ca    9.3      03 Jan 2025 07:28    TPro  8.1[H]  /  Alb  4.2  /  TBili  0.3  /  DBili  x   /  AST  28  /  ALT  <5  /  AlkPhos  126[H]  01-03      Urinalysis Basic - ( 03 Jan 2025 07:28 )    Color: x / Appearance: x / SG: x / pH: x  Gluc: 67 mg/dL / Ketone: x  / Bili: x / Urobili: x   Blood: x / Protein: x / Nitrite: x   Leuk Esterase: x / RBC: x / WBC x   Sq Epi: x / Non Sq Epi: x / Bacteria: x        PHYSICAL EXAM:  CONSTITUTIONAL: Well nourished.  NAD  ENT: Airway patent, No thrush  EYES: Clear bilaterally, pupils equal, round and reactive to light.  CARDIAC: Normal rate, regular rhythm. no edema  RESPIRATORY: No wheezing; Normal chest expansion. Not tachypneic, No use of accessory muscles  GASTROINTESTINAL: Abdomen soft, non-tender, No guarding, Positive BS  MUSCULOSKELETAL: Range of motion is not limited ,left elbow swelling.  NEUROLOGICAL: Alert and oriented, No motor deficits.  SKIN: lower extremity wrapped     RADIOLOGY:  ACC: [id number] EXAM:  XR FOOT COMP MIN 3 VIEWS RT   ORDERED BY: SCOTT MOLINA     PROCEDURE DATE:  12/28/2024   impression:    Flattening of the normal plantar arch.    Midfoot arthrosis with diffuse soft tissue swelling.    No fracture or erosion.    If concern with osteomyelitis, consider correlation with MR scanning.    --- End of Report ---

## 2025-01-03 NOTE — PROGRESS NOTE ADULT - ASSESSMENT
Assessment and Plan:   · Assessment	  a 52 y/o Female, former IVDA on Suboxone, HTN, HLD, Hep C. , Chronic lower extremity edema with ulceration of skin presents with c/o generalized weakness and subjective fever/chills x 2 weeks and worsening lower extremity cellulitis  with oozing from b/l LE R>L.       #B/l LE cellulitis superimposed on chronic LE edema with possible bacteremia  - ID recs appreciated: continue vancomycin and  cefepime 2g q 8hrs  - follow up blood cultures-  No growth at 24 hours  - VA duplex concerning for stenosis  - vascular consulted recs ( no surgical interventions) , reconsulted given leg pain   - podiatry recs appreciated  - Wound care or podiatry for local care of the wounds.   -  re-biopsy of the wounds for cultures and Pathology. Consulted Derm for re-evaluation.   - XR Imaging Right Foot; Flattening of the normal plantar arch. Midfoot arthrosis with diffuse soft tissue swelling. No fracture or erosion.  - duplex Moderate right external iliac and common femoral artery stenosis, Normal arterial flow in the left lower extremity, Tibial arteries not visualized bilaterally  - Local Wound Care; Applied - Xeroform / Gauze / DSD / Kerlix / Secured with Tape  - Weight Bearing Status; WBAT   - ESR 90  - Trend WBC daily  - OOB to chair with assistance  - will consider midline if difficult access  - legs dressed and wrapped today  - biopsy of lower ext wound today ,send for cx and pathology  - follow up blood work     #Acute comminuted fractures of the fourth and fifth metacarpal bodies  -xray Acute comminuted fractures of the fourth and fifth metacarpal bodies.Soft tissue swelling along the dorsal aspect of the hand.  Osteophyte formation at the first MCP joint.  -splint and cast in place POA  - ortho consulted ,No acute orthopaedic surgical intervention   NWB LUE in short arm cast    # Diabetes Type 2  - continue lantus with sliding scale  - CHO consistent diet  - hold metformin  - A1C,     # Opiate dependence  - continue Suboxone     # Gout  -continue colchicine    # Hypothyroid  - continue Levothyroxine    proph heparin  DM diet   full code    Handoff : f/u ortho for left elbow swelling ,cont IV anbx , f/u biopsy results   Dispo:  home does not want M[facility]

## 2025-01-03 NOTE — PROCEDURE NOTE - ADDITIONAL PROCEDURE DETAILS
Punch biopsy performed on right lower extremity wound anterior border. 5cc lidocaine 1% with epinephrine injected. Punch biopsy used x 2 for 2 samples of wound with edge, culture swab obtained. Wound approximated with figure of 8 suture with 3-0 prolene, dressed with patient's lower extremity dressing of Xeroform, Kerlix, and ACE bandage. Specimens brought to pathology. Patient tolerated without complications. Dr. Nelson was present for the entirety of the procedure. Punch biopsy performed on right lower extremity wound anterior border. 5cc lidocaine 1% with epinephrine injected. Punch biopsy used x 2 for 2 samples of wound with edge, culture swab obtained. Wound approximated with figure of 8 suture with 3-0 prolene, dressed with patient's lower extremity dressing of Xeroform, Kerlix, and ACE bandage. Specimens brought to pathology. Patient tolerated without complications. Dr. Nelson was present for the entirety of the procedure.    Stitch ok to remove 2 weeks.

## 2025-01-03 NOTE — PHARMACOTHERAPY INTERVENTION NOTE - COMMENTS
Based on true trough level of 25.2 this AM, dosing of vancomycin 1500mg IV q12h predicts a supratherapeutic AUC of 972. Recommended to decrease dosing to 750mg IV q12h which predicts a therapeutic AUC of 486. Since patient already received a dose of 1500mg today, no further vancomycin is needed today. Recommended to start the new dosing of 750mg IV q12h from tomorrow 1/4 8am. Recommended to obtain a level 1/5 AM labs due to unstable renal function. Obtain level sooner if Scr worsens.

## 2025-01-03 NOTE — PROGRESS NOTE ADULT - SUBJECTIVE AND OBJECTIVE BOX
EZRA BARCLAY  54y, Female    LOS  7d    INTERVAL EVENTS/HPI  - No acute events overnight  - T(F): , Max: 98 (01-02-25 @ 20:42)  - WBC Count: 6.60 (01-03-25 @ 07:28)  WBC Count: 6.01 (01-02-25 @ 09:32)  - Creatinine: 1.1 (01-03-25 @ 07:28)  Creatinine: 0.9 (01-02-25 @ 12:51)    REVIEW OF SYSTEMS:  CONSTITUTIONAL: No fever or chills  HEENT: No sore throat  RESPIRATORY: No cough, no shortness of breath  CARDIOVASCULAR: No chest pain or palpitations  GASTROINTESTINAL: No abdominal or epigastric pain  GENITOURINARY: No dysuria  NEUROLOGICAL: No headache/dizziness  MSK: No joint pain, erythema, or swelling; no back pain  SKIN: No itching, rashes  All other ROS negative except noted above    Prior hospital charts reviewed [Yes]  Primary team notes reviewed [Yes]  Other consultant notes reviewed [Yes]    ANTIMICROBIALS:   cefepime   IVPB    cefepime   IVPB 2000 every 8 hours  vancomycin  IVPB 1500 every 12 hours      OTHER MEDS: MEDICATIONS  (STANDING):  acetaminophen     Tablet .. 650 every 6 hours PRN  albuterol    90 MICROgram(s) HFA Inhaler 2 every 6 hours PRN  aluminum hydroxide/magnesium hydroxide/simethicone Suspension 30 every 4 hours PRN  atorvastatin 20 at bedtime  colchicine 0.6 two times a day  dextrose Oral Gel 15 once PRN  enoxaparin Injectable 40 every 24 hours  furosemide    Tablet 40 daily  influenza   Vaccine 0.5 once  insulin glargine Injectable (LANTUS) 28 at bedtime  insulin lispro (ADMELOG) corrective regimen sliding scale  three times a day before meals  levothyroxine 175 daily  melatonin 3 at bedtime PRN  ondansetron Injectable 4 every 8 hours PRN  pantoprazole    Tablet 40 before breakfast  pregabalin 50 two times a day      Vital Signs Last 24 Hrs  T(F): 97.9 (01-03-25 @ 04:30), Max: 98.6 (01-02-25 @ 14:00)    Vital Signs Last 24 Hrs  HR: 62 (01-03-25 @ 04:30) (62 - 73)  BP: 98/63 (01-03-25 @ 04:30) (98/63 - 109/69)  RR: 18 (01-03-25 @ 04:30)  SpO2: 97% (01-03-25 @ 04:30) (97% - 98%)  Wt(kg): --    EXAM:  GENERAL: NAD, lying in bed  HEAD: No head lesions  NECK: Supple  CHEST/LUNG: Clear to auscultation bilaterally  HEART: S1 S2  ABDOMEN: Soft, nontender  EXTREMITIES: Bilateral lower extremity wounds noted.   NERVOUS SYSTEM: Alert and oriented to person    Labs:                        9.8    6.60  )-----------( 284      ( 03 Jan 2025 07:28 )             32.7     01-03    136  |  95[L]  |  13  ----------------------------<  67[L]  3.7   |  28  |  1.1    Ca    9.3      03 Jan 2025 07:28    TPro  8.1[H]  /  Alb  4.2  /  TBili  0.3  /  DBili  x   /  AST  28  /  ALT  <5  /  AlkPhos  126[H]  01-03      WBC Trend:  WBC Count: 6.60 (01-03-25 @ 07:28)  WBC Count: 6.01 (01-02-25 @ 09:32)  WBC Count: 7.54 (12-30-24 @ 11:00)      Creatine Trend:  Creatinine: 1.1 (01-03)  Creatinine: 0.9 (01-02)  Creatinine: 1.0 (01-02)  Creatinine: 1.1 (12-30)      Liver Biochemical Testing Trend:  Alanine Aminotransferase (ALT/SGPT): <5 (01-03)  Alanine Aminotransferase (ALT/SGPT): 6 (01-02)  Alanine Aminotransferase (ALT/SGPT): <5 (12-28)  Alanine Aminotransferase (ALT/SGPT): <5 (12-27)  Alanine Aminotransferase (ALT/SGPT): <5 (12-03)  Aspartate Aminotransferase (AST/SGOT): 28 (01-03-25 @ 07:28)  Aspartate Aminotransferase (AST/SGOT): 39 (01-02-25 @ 09:32)  Aspartate Aminotransferase (AST/SGOT): 18 (12-28-24 @ 11:22)  Aspartate Aminotransferase (AST/SGOT): 34 (12-27-24 @ 16:00)  Aspartate Aminotransferase (AST/SGOT): 20 (12-03-24 @ 07:21)  Bilirubin Total: 0.3 (01-03)  Bilirubin Total: 0.2 (01-02)  Bilirubin Total: 0.4 (12-28)  Bilirubin Total: 0.5 (12-27)  Bilirubin Total: 0.2 (12-03)      Trend LDH  01-10-24 @ 06:55  230      Urinalysis Basic - ( 03 Jan 2025 07:28 )    Color: x / Appearance: x / SG: x / pH: x  Gluc: 67 mg/dL / Ketone: x  / Bili: x / Urobili: x   Blood: x / Protein: x / Nitrite: x   Leuk Esterase: x / RBC: x / WBC x   Sq Epi: x / Non Sq Epi: x / Bacteria: x        MICROBIOLOGY:  Vancomycin Level, Trough: 25.2 (01-03 @ 06:53)  Vancomycin Level, Random: 32.1 (01-02 @ 16:51)  Vancomycin Level, Random: 37.6 (01-01 @ 08:19)  Vancomycin Level, Trough: 9.3 (12-30 @ 11:00)    Female    Culture - Blood (collected 28 Dec 2024 17:10)  Source: .Blood BLOOD  Final Report:    No growth at 5 days    Culture - Blood (collected 27 Dec 2024 16:00)  Source: .Blood BLOOD  Final Report:    No growth at 5 days    Culture - Blood (collected 27 Dec 2024 16:00)  Source: .Blood BLOOD  Final Report:    No growth at 5 days    Urinalysis with Rflx Culture (collected 29 Nov 2024 00:13)    Culture - Blood (collected 28 Nov 2024 22:04)  Source: .Blood BLOOD  Final Report:    No growth at 5 days    Culture - Blood (collected 28 Nov 2024 22:04)  Source: .Blood BLOOD  Final Report:    No growth at 5 days    Urinalysis with Rflx Culture (collected 14 Nov 2024 18:42)    Culture - Blood (collected 27 May 2024 21:10)  Source: .Blood Blood  Final Report:    No growth at 5 days    Culture - Blood (collected 27 May 2024 21:10)  Source: .Blood Blood  Final Report:    No growth at 5 days    Culture - Other (collected 06 May 2024 16:16)  Source: .Other LLE  Final Report:    Rare Staphylococcus aureus    Normal skin sumit isolated  Organism: Staphylococcus aureus  Organism: Staphylococcus aureus    Sensitivities:      Method Type: REED      -  Clindamycin: R 0.5 This isolate is presumed to be clindamycin resistant based on detection of inducible resistance. Clindamycin may still be effective in some patients.      -  Erythromycin: R >4      -  Gentamicin: S <=1 Should not be used as monotherapy      -  Oxacillin: S 0.5 Oxacillin predicts susceptibility for dicloxacillin, methicillin, and nafcillin      -  Penicillin: R >8      -  Rifampin: S <=1 Should not be used as monotherapy      -  Tetracycline: S <=1      -  Trimethoprim/Sulfamethoxazole: S <=0.5/9.5      -  Vancomycin: S 2      HIV-1/2 Combo Result: Nonreact (12-31-24 @ 08:00)  HIV-1/2 Combo Result: Nonreact (01-11-24 @ 13:40)        Cryptococcal Antigen - Serum: Negative (01-01-25 @ 08:19)              Cryptococcal Antigen - Serum: Negative (01-01 @ 08:19)          C-Reactive Protein: 39.4 (01-02)  C-Reactive Protein: 80.4 (12-30)      RADIOLOGY & ADDITIONAL TESTS:  I have personally reviewed the relevant images.   CXR      CT        WEIGHT  Weight (kg): 90.7 (12-27-24 @ 14:12)  Creatinine: 1.1 mg/dL (01-03-25 @ 07:28)      All available historical records have been reviewed

## 2025-01-03 NOTE — PROGRESS NOTE ADULT - ASSESSMENT
This is a 52 y/o Female, former IVDA on Suboxone, HTN, HLD, Hep C and Chronic lower extremity edema with ulceration of skin presents with c/o generalized weakness worsening lower extremity cellulitis R>L    IMPRESSION  #Bilateral lower extremity wounds/ulcers with underlying stasis dermatitis.  pyoderma gangrenosum vs Vasculitis   #History of polysubstance use disorder. On Suboxone  #Recurrent admission in the past for lower extremity cellulitis? 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  Hepatitis C Ab +, PCR undetectable 2/2/24  Cryoglobulin negative 2/2/24  Hepatitis B immune, HIV screen negative.   DULCE negative  RF negative  p-ANCA positive, titer 1:40  Blood cultures NGTD    RECOMMENDATIONS  -Vascular eval noted.   -Local wound care as per the podiatry.   -Re-biopsy of the wounds for cultures and Pathology. Send for AFB cultures, bacterial cultures and fungal cultures including pathology.   -Wound look slightly improved. Pain persists.   -IV Vanc. Dosing as per the pharmacy protocol.   -IV cefepime 2 gram q 8 hours. (S/D 12/27)  -Off loading to prevent pressure sores and preventive measures to avoid aspiration    If any questions, please send a message or call on [employer] Teams  Please continue to update ID with any pertinent new laboratory or radiographic findings.    Stella Chavez M.D  Infectious Diseases Attending/   [employer] at [facility]

## 2025-01-03 NOTE — PROGRESS NOTE ADULT - SUBJECTIVE AND OBJECTIVE BOX
PROGRESS NOTE   Pt seen @ bedside during AM rounds.  Dressing +soaked, + Intact. No ace bandage.       Vital Signs Last 24 Hrs  T(C): 36.6 (03 Jan 2025 04:30), Max: 37 (02 Jan 2025 14:00)  T(F): 97.9 (03 Jan 2025 04:30), Max: 98.6 (02 Jan 2025 14:00)  HR: 62 (03 Jan 2025 04:30) (62 - 73)  BP: 98/63 (03 Jan 2025 04:30) (98/63 - 124/81)  BP(mean): --  RR: 18 (03 Jan 2025 04:30) (18 - 20)  SpO2: 97% (03 Jan 2025 04:30) (97% - 100%)                              9.8    6.60  )-----------( 284      ( 03 Jan 2025 07:28 )             32.7               01-03    136  |  95[L]  |  13  ----------------------------<  67[L]  3.7   |  28  |  1.1    Ca    9.3      03 Jan 2025 07:28    TPro  8.1[H]  /  Alb  4.2  /  TBili  0.3  /  DBili  x   /  AST  28  /  ALT  <5  /  AlkPhos  126[H]  01-03    Physical Exam - Lower Extremity Focused:   Derm:   Right Foot - dry, stable wound to dorsolateral aspect of midfoot, no drainage, no purulence, erythema and edema noted   Right Lower Extr. superficial ulceration to skin with drainage and weeping wounds noted, covering large surface area, crusting noted on wound edges with erythema and edema extending from the knee to the foot     Left Foot - dry stable, wound to dorsal 2nd Digit, no drainage, no purulence, erythema and edema noted   Left Lower Extr. yellow crusting noted on medial wound above the ankle with erythema and edema extending from the knee to the foot     Vascular: DP and PT Pulses Diminished; Foot is Warm to the touch; Capillary Refill Time < 3 Seconds;    Neuro: Protective Sensation Diminished / Moderately Neuropathic   MSK: Pain On Palpation at Wound Site     Assessment:  Cellulitic Of B/L Lower Extremity   Venous stasis wounds B/L LE     Plan:  Chart reviewed and Patient evaluated. All Questions and Concerns Addressed and Answered  Surgery note appreciated. Biopsy results reviewed .   Local wound care to BLE: xeroform, DSD, kerlix ACE - Multilayer compression dressing B/L LE   Weight Bearing Status; WBAT bilateral lower extremity  Continue antibiotics per ID recs   Continue w/ Local Wound Care; Q24 Dressing Changes;  Follow up with Surgery for further management     Podiatry

## 2025-01-03 NOTE — PROCEDURE NOTE - NSSITEPREP_SKIN_A_CORE
povidone iodine (if allergic to chlorhexidine)
alcohol/Adherence to aseptic technique: hand hygiene prior to donning barriers (gown, gloves), don cap and mask, sterile drape over patient

## 2025-01-03 NOTE — CONSULT NOTE ADULT - CONSULT REASON
concern for stenosis on arterial doppler
B/L LE Cellulitis
Infection
Left elbow bursitis, Left 4th and 5th metacarpal shaft fractures
RLE CELLULITIS - NEEDS BIOPSY

## 2025-01-03 NOTE — CONSULT NOTE ADULT - SUBJECTIVE AND OBJECTIVE BOX
ORTHOPAEDIC SURGERY CONSULT NOTE    Reason for Consult: Left elbow bursitis, Left 4th and 5th metacarpal shaft fractures    HPI: 54yFemale PMHx IVDU, HTN, HLD, Hep C admitted for management of chronic lower extremity edema and ulceration. Orthopaedics consulted for left 4th and 5th MC shaft fractures along with left elbow bursitis. Patient states that she fell 3 days prior injuring left hand. Endorses left hand pain. Denies pain at the elbow and elsewhere. Denies fever, nausea, vomiting. Denies numbness, tingling or weakness. Unclear duration of left elbow bursal swelling. Patient endorses prior episodes of swelling the resolved over time.       PAST MEDICAL & SURGICAL HISTORY:  Asthma with COPD      Hypothyroidism      H/O: substance abuse  no longer using      History of pancreatitis      Hepatitis-C      Leukocytoclastic vasculitis      Bilateral edema of lower extremity      HLD (hyperlipidemia)      Type 2 diabetes mellitus      History of appendectomy      History of tonsillectomy        Allergies: No Known Allergies    Medications: acetaminophen     Tablet .. 650 milliGRAM(s) Oral every 6 hours PRN  albuterol    90 MICROgram(s) HFA Inhaler 2 Puff(s) Inhalation every 6 hours PRN  aluminum hydroxide/magnesium hydroxide/simethicone Suspension 30 milliLiter(s) Oral every 4 hours PRN  atorvastatin 20 milliGRAM(s) Oral at bedtime  cefepime   IVPB      cefepime   IVPB 2000 milliGRAM(s) IV Intermittent every 8 hours  colchicine 0.6 milliGRAM(s) Oral two times a day  dextrose 5%. 1000 milliLiter(s) IV Continuous <Continuous>  dextrose Oral Gel 15 Gram(s) Oral once PRN  enoxaparin Injectable 40 milliGRAM(s) SubCutaneous every 24 hours  furosemide    Tablet 40 milliGRAM(s) Oral daily  influenza   Vaccine 0.5 milliLiter(s) IntraMuscular once  insulin glargine Injectable (LANTUS) 28 Unit(s) SubCutaneous at bedtime  insulin lispro (ADMELOG) corrective regimen sliding scale   SubCutaneous three times a day before meals  levothyroxine 175 MICROGram(s) Oral daily  melatonin 3 milliGRAM(s) Oral at bedtime PRN  ondansetron Injectable 4 milliGRAM(s) IV Push every 8 hours PRN  pantoprazole    Tablet 40 milliGRAM(s) Oral before breakfast  pregabalin 50 milliGRAM(s) Oral two times a day  vancomycin  IVPB 1500 milliGRAM(s) IV Intermittent every 12 hours      PHYSICAL EXAM:  Vital Signs Last 24 Hrs  T(C): 36.6 (03 Jan 2025 04:30), Max: 36.7 (02 Jan 2025 20:42)  T(F): 97.9 (03 Jan 2025 04:30), Max: 98 (02 Jan 2025 20:42)  HR: 62 (03 Jan 2025 04:30) (62 - 73)  BP: 98/63 (03 Jan 2025 04:30) (98/63 - 109/69)  BP(mean): --  RR: 18 (03 Jan 2025 04:30) (18 - 18)  SpO2: 97% (03 Jan 2025 04:30) (97% - 98%)        Physical Exam:  General: NAD.   Resp: NLB on RA.    LUE:  Volar resting splint in place, removed   Ring in place removed   superficial excoriations of the skin   No erythema, warmth, or discharge over the elbow or throughout the upper extremity   ecchymosis and swelling over the 4th and 5th MC   NTTP shoulder, upper arm, elbow, forearm, wrist or hand  Full baseline painless ROM of shoulder, elbow  SILT in Ax/M/U/R distributions.  SILT over radial and ulnar digital nerves of all digits   AIN/PIN/U motor intact.  2+ distal pulses with normal cap refill at distal finger tips.   Compartments soft and compressible.    Labs:                        9.8    6.60  )-----------( 284      ( 03 Jan 2025 07:28 )             32.7     01-03    136  |  95[L]  |  13  ----------------------------<  67[L]  3.7   |  28  |  1.1    Ca    9.3      03 Jan 2025 07:28    TPro  8.1[H]  /  Alb  4.2  /  TBili  0.3  /  DBili  x   /  AST  28  /  ALT  <5  /  AlkPhos  126[H]  01-03        Imaging XR:   Left hand xray - minimally displaced 4th and 5th MC shaft fractures.       A/P: 54y Female with left elbow aspetic versus septic bursitis. Also with left hand 4th and 5th MC shaft fractures. Discussed nature of injury and all questions answered. Patient placed in a short arm cast.     Please obtain left elbow xrays    Plan   No acute orthopaedic surgical intervention   MERRITT SOLIZ in short arm cast  Course IV antibiotics for left elbow bursitis   Ice elevation prn   Keep cast/splint C/D/I. Cast/splint care explained.  If discharged please have patient F/U with Dr. Phelps in 1 week. Please call [phone].   ORTHOPAEDIC SURGERY CONSULT NOTE    Reason for Consult: Left elbow bursitis, Left 4th and 5th metacarpal shaft fractures    HPI: 54yFemale PMHx IVDU, HTN, HLD, Hep C admitted for management of chronic lower extremity edema and ulceration. Orthopaedics consulted for left 4th and 5th MC shaft fractures along with left elbow bursitis. Patient states that she fell 3 days prior injuring left hand. Endorses left hand pain. Denies pain at the elbow and elsewhere. Denies fever, nausea, vomiting. Denies numbness, tingling or weakness. Unclear duration of left elbow bursal swelling. Patient endorses prior episodes of swelling the resolved over time.       PAST MEDICAL & SURGICAL HISTORY:  Asthma with COPD      Hypothyroidism      H/O: substance abuse  no longer using      History of pancreatitis      Hepatitis-C      Leukocytoclastic vasculitis      Bilateral edema of lower extremity      HLD (hyperlipidemia)      Type 2 diabetes mellitus      History of appendectomy      History of tonsillectomy        Allergies: No Known Allergies    Medications: acetaminophen     Tablet .. 650 milliGRAM(s) Oral every 6 hours PRN  albuterol    90 MICROgram(s) HFA Inhaler 2 Puff(s) Inhalation every 6 hours PRN  aluminum hydroxide/magnesium hydroxide/simethicone Suspension 30 milliLiter(s) Oral every 4 hours PRN  atorvastatin 20 milliGRAM(s) Oral at bedtime  cefepime   IVPB      cefepime   IVPB 2000 milliGRAM(s) IV Intermittent every 8 hours  colchicine 0.6 milliGRAM(s) Oral two times a day  dextrose 5%. 1000 milliLiter(s) IV Continuous <Continuous>  dextrose Oral Gel 15 Gram(s) Oral once PRN  enoxaparin Injectable 40 milliGRAM(s) SubCutaneous every 24 hours  furosemide    Tablet 40 milliGRAM(s) Oral daily  influenza   Vaccine 0.5 milliLiter(s) IntraMuscular once  insulin glargine Injectable (LANTUS) 28 Unit(s) SubCutaneous at bedtime  insulin lispro (ADMELOG) corrective regimen sliding scale   SubCutaneous three times a day before meals  levothyroxine 175 MICROGram(s) Oral daily  melatonin 3 milliGRAM(s) Oral at bedtime PRN  ondansetron Injectable 4 milliGRAM(s) IV Push every 8 hours PRN  pantoprazole    Tablet 40 milliGRAM(s) Oral before breakfast  pregabalin 50 milliGRAM(s) Oral two times a day  vancomycin  IVPB 1500 milliGRAM(s) IV Intermittent every 12 hours      PHYSICAL EXAM:  Vital Signs Last 24 Hrs  T(C): 36.6 (03 Jan 2025 04:30), Max: 36.7 (02 Jan 2025 20:42)  T(F): 97.9 (03 Jan 2025 04:30), Max: 98 (02 Jan 2025 20:42)  HR: 62 (03 Jan 2025 04:30) (62 - 73)  BP: 98/63 (03 Jan 2025 04:30) (98/63 - 109/69)  BP(mean): --  RR: 18 (03 Jan 2025 04:30) (18 - 18)  SpO2: 97% (03 Jan 2025 04:30) (97% - 98%)        Physical Exam:  General: NAD.   Resp: NLB on RA.    LUE:  Volar resting splint in place, removed   Ring in place removed   superficial excoriations of the skin of the forearm   No erythema, warmth, or discharge over the elbow or throughout the upper extremity   ecchymosis and swelling over the 4th and 5th MC   NTTP shoulder, upper arm, elbow, forearm, wrist or hand  Full baseline painless ROM of shoulder, elbow  SILT in Ax/M/U/R distributions.  SILT over radial and ulnar digital nerves of all digits   AIN/PIN/U motor intact.  2+ distal pulses with normal cap refill at distal finger tips.   Compartments soft and compressible.    Labs:                        9.8    6.60  )-----------( 284      ( 03 Jan 2025 07:28 )             32.7     01-03    136  |  95[L]  |  13  ----------------------------<  67[L]  3.7   |  28  |  1.1    Ca    9.3      03 Jan 2025 07:28    TPro  8.1[H]  /  Alb  4.2  /  TBili  0.3  /  DBili  x   /  AST  28  /  ALT  <5  /  AlkPhos  126[H]  01-03        Imaging XR:   Left hand xray - minimally displaced 4th and 5th MC shaft fractures.       A/P: 54y Female with left elbow aspetic bursitis. Also with left hand 4th and 5th MC shaft fractures. Discussed nature of injury and all questions answered. Patient placed in a short arm cast.     Please obtain left elbow xrays    Plan   No acute orthopaedic surgical intervention   MERRITT SOLIZ in short arm cast  Pain medication PRN   Ice elevation prn   Keep cast/splint C/D/I. Cast/splint care explained.  If discharged please have patient F/U with Dr. Phelps in 1 week. Please call [phone].

## 2025-01-03 NOTE — CONSULT NOTE ADULT - ATTENDING COMMENTS
pt in cast for left hand    after prepping left elbow   aspirated greater than 30 cc of bloody fluid from left elbow  compression bandage applied    needs elbow ACE bandage for 3 weeks  can be changed as needed    no surgery    do not lean on elbow     cast left hand for 4 weeks from injury
B/L LE Venous stasis wounds   B/L LE edema  B/L LE cellulites    Applied multilayer compression with Xeroform/ABD/Kerlix/ACE with compression from the foot to below the knee   Cont wound care with Xeroform and compression  elevate B/L LE when at rest  F/u with vascular regarding patient circulation status  Abx as per ID

## 2025-01-03 NOTE — CONSULT NOTE ADULT - REASON FOR ADMISSION
RLE Ulceration with oozing

## 2025-01-04 LAB
ALBUMIN SERPL ELPH-MCNC: 4.2 G/DL — SIGNIFICANT CHANGE UP (ref 3.5–5.2)
ALP SERPL-CCNC: 136 U/L — HIGH (ref 30–115)
ALT FLD-CCNC: 7 U/L — SIGNIFICANT CHANGE UP (ref 0–41)
ANION GAP SERPL CALC-SCNC: 13 MMOL/L — SIGNIFICANT CHANGE UP (ref 7–14)
AST SERPL-CCNC: 42 U/L — HIGH (ref 0–41)
BILIRUB SERPL-MCNC: 0.2 MG/DL — SIGNIFICANT CHANGE UP (ref 0.2–1.2)
BUN SERPL-MCNC: 14 MG/DL — SIGNIFICANT CHANGE UP (ref 10–20)
CALCIUM SERPL-MCNC: 9.3 MG/DL — SIGNIFICANT CHANGE UP (ref 8.4–10.5)
CHLORIDE SERPL-SCNC: 98 MMOL/L — SIGNIFICANT CHANGE UP (ref 98–110)
CO2 SERPL-SCNC: 28 MMOL/L — SIGNIFICANT CHANGE UP (ref 17–32)
CREAT SERPL-MCNC: 1 MG/DL — SIGNIFICANT CHANGE UP (ref 0.7–1.5)
EGFR: 67 ML/MIN/1.73M2 — SIGNIFICANT CHANGE UP
GLUCOSE BLDC GLUCOMTR-MCNC: 83 MG/DL — SIGNIFICANT CHANGE UP (ref 70–99)
GLUCOSE SERPL-MCNC: 71 MG/DL — SIGNIFICANT CHANGE UP (ref 70–99)
HCT VFR BLD CALC: 32.8 % — LOW (ref 37–47)
HGB BLD-MCNC: 10 G/DL — LOW (ref 12–16)
MCHC RBC-ENTMCNC: 26.5 PG — LOW (ref 27–31)
MCHC RBC-ENTMCNC: 30.5 G/DL — LOW (ref 32–37)
MCV RBC AUTO: 86.8 FL — SIGNIFICANT CHANGE UP (ref 81–99)
NRBC # BLD: 0 /100 WBCS — SIGNIFICANT CHANGE UP (ref 0–0)
PLATELET # BLD AUTO: 283 K/UL — SIGNIFICANT CHANGE UP (ref 130–400)
PMV BLD: 9.1 FL — SIGNIFICANT CHANGE UP (ref 7.4–10.4)
POTASSIUM SERPL-MCNC: 3.8 MMOL/L — SIGNIFICANT CHANGE UP (ref 3.5–5)
POTASSIUM SERPL-SCNC: 3.8 MMOL/L — SIGNIFICANT CHANGE UP (ref 3.5–5)
PROT SERPL-MCNC: 8.2 G/DL — HIGH (ref 6–8)
RBC # BLD: 3.78 M/UL — LOW (ref 4.2–5.4)
RBC # FLD: 15.9 % — HIGH (ref 11.5–14.5)
SODIUM SERPL-SCNC: 139 MMOL/L — SIGNIFICANT CHANGE UP (ref 135–146)
WBC # BLD: 7.82 K/UL — SIGNIFICANT CHANGE UP (ref 4.8–10.8)
WBC # FLD AUTO: 7.82 K/UL — SIGNIFICANT CHANGE UP (ref 4.8–10.8)

## 2025-01-04 PROCEDURE — 99232 SBSQ HOSP IP/OBS MODERATE 35: CPT

## 2025-01-04 PROCEDURE — 73080 X-RAY EXAM OF ELBOW: CPT | Mod: 26,LT

## 2025-01-04 RX ORDER — BUPRENORPHINE HYDROCHLORIDE AND NALOXONE HYDROCHLORIDE DIHYDRATE 8; 2 MG/1; MG/1
1 TABLET SUBLINGUAL DAILY
Refills: 0 | Status: DISCONTINUED | OUTPATIENT
Start: 2025-01-04 | End: 2025-01-04

## 2025-01-04 RX ORDER — BUPRENORPHINE HYDROCHLORIDE AND NALOXONE HYDROCHLORIDE DIHYDRATE 8; 2 MG/1; MG/1
2.5 TABLET SUBLINGUAL DAILY
Refills: 0 | Status: DISCONTINUED | OUTPATIENT
Start: 2025-01-04 | End: 2025-01-07

## 2025-01-04 RX ADMIN — LEVOTHYROXINE SODIUM 175 MICROGRAM(S): 175 TABLET ORAL at 06:33

## 2025-01-04 RX ADMIN — Medication 100 MILLIGRAM(S): at 21:16

## 2025-01-04 RX ADMIN — VANCOMYCIN HYDROCHLORIDE 250 MILLIGRAM(S): 5 INJECTION, POWDER, LYOPHILIZED, FOR SOLUTION INTRAVENOUS at 19:46

## 2025-01-04 RX ADMIN — Medication 100 MILLIGRAM(S): at 13:38

## 2025-01-04 RX ADMIN — Medication 1: at 17:18

## 2025-01-04 RX ADMIN — COLCHICINE 0.6 MILLIGRAM(S): 0.6 CAPSULE ORAL at 06:33

## 2025-01-04 RX ADMIN — Medication 40 MILLIGRAM(S): at 06:32

## 2025-01-04 RX ADMIN — INSULIN GLARGINE-YFGN 28 UNIT(S): 100 INJECTION, SOLUTION SUBCUTANEOUS at 21:44

## 2025-01-04 RX ADMIN — Medication 100 MILLIGRAM(S): at 06:32

## 2025-01-04 RX ADMIN — VANCOMYCIN HYDROCHLORIDE 250 MILLIGRAM(S): 5 INJECTION, POWDER, LYOPHILIZED, FOR SOLUTION INTRAVENOUS at 12:24

## 2025-01-04 RX ADMIN — ENOXAPARIN SODIUM 40 MILLIGRAM(S): 60 INJECTION INTRAVENOUS; SUBCUTANEOUS at 21:15

## 2025-01-04 RX ADMIN — ATORVASTATIN CALCIUM 20 MILLIGRAM(S): 40 TABLET, FILM COATED ORAL at 21:15

## 2025-01-04 RX ADMIN — BUPRENORPHINE HYDROCHLORIDE AND NALOXONE HYDROCHLORIDE DIHYDRATE 2.5 TABLET(S): 8; 2 TABLET SUBLINGUAL at 20:09

## 2025-01-04 RX ADMIN — PANTOPRAZOLE 40 MILLIGRAM(S): 40 TABLET, DELAYED RELEASE ORAL at 06:33

## 2025-01-04 RX ADMIN — COLCHICINE 0.6 MILLIGRAM(S): 0.6 CAPSULE ORAL at 17:16

## 2025-01-04 NOTE — PROGRESS NOTE ADULT - SUBJECTIVE AND OBJECTIVE BOX
EZRA BARCLAY  [facility] 013 A ([facility] ([facility]))            Patient was evaluated and examined  by bedside,                 REVIEW OF SYSTEMS:  please see pertinent positives mentioned above, all other 12 ROS negative      T(C): , Max: 36.9 (01-03-25 @ 21:11)  HR: 65 (01-04-25 @ 05:27)  BP: 105/72 (01-04-25 @ 05:27)  RR: 18 (01-04-25 @ 05:27)  SpO2: 98% (01-04-25 @ 05:27)  CAPILLARY BLOOD GLUCOSE      POCT Blood Glucose.: 83 mg/dL (04 Jan 2025 07:35)  POCT Blood Glucose.: 143 mg/dL (03 Jan 2025 20:52)  POCT Blood Glucose.: 190 mg/dL (03 Jan 2025 16:51)      PHYSICAL EXAM:  General: NAD, AAOX3, patient is laying comfortably in bed  HEENT: AT, NC, Supple, NO JVD, NO CB  Lungs: CTA B/L, no wheezing, no rhonchi  CVS: normal S1, S2, RRR, NO M/G/R  Abdomen: soft, bowel sounds present, non-tender, non-distended  Extremities: no edema, no clubbing, no cyanosis, positive peripheral pulses b/l  Neuro: no acute focal neurological deficits  Skin: no rush, no ecchymosis      LAB  CBC  Date: 01-03-25 @ 07:28  Mean cell Syikiprdgk18.8  Mean cell Hemoglobin Conc30.0  Mean cell Volum 86.1  Platelet count-Automate 284  RBC Count 3.80  Red Cell Distrib Width15.7  WBC Count6.60  % Albumin, Urine--  Hematocrit 32.7  Hemoglobin 9.8  CBC  Date: 01-02-25 @ 09:32  Mean cell Nkazardace06.6  Mean cell Hemoglobin Conc31.1  Mean cell Volum 85.6  Platelet count-Automate 300  RBC Count 3.34  Red Cell Distrib Width15.9  WBC Count6.01  % Albumin, Urine--  Hematocrit 28.6  Hemoglobin 8.9  CBC  Date: 12-30-24 @ 11:00  Mean cell Qhggevxtiy03.8  Mean cell Hemoglobin Conc31.1  Mean cell Volum 86.2  Platelet count-Automate 369  RBC Count 3.99  Red Cell Distrib Width15.6  WBC Count7.54  % Albumin, Urine--  Hematocrit 34.4  Hemoglobin 10.7  CBC  Date: 12-28-24 @ 11:22  Mean cell Ddyhetopuv83.2  Mean cell Hemoglobin Conc32.2  Mean cell Volum 84.5  Platelet count-Automate 313  RBC Count 3.35  Red Cell Distrib Width15.3  WBC Count7.81  % Albumin, Urine--  Hematocrit 28.3  Hemoglobin 9.1    [facility]  01-03-25 @ 07:28  Blood Gas Arterial-Calcium,Ionized--  Blood Urea Nitrogen, Serum 13 mg/dL [10 - 20]  Carbon Dioxide, Serum28 mmol/L [17 - 32]  Chloride, Serum95 mmol/L[L] [98 - 110]  Creatinie, Serum1.1 mg/dL [0.7 - 1.5]  Glucose, Serum67 mg/dL[L] [70 - 99]  Potassium, Serum3.7 mmol/L [3.5 - 5.0]  Sodium, Serum 136 mmol/L [135 - 146]  [facility]  01-02-25 @ 12:51  Blood Gas Arterial-Calcium,Ionized--  Blood Urea Nitrogen, Serum 12 mg/dL [10 - 20]  Carbon Dioxide, Serum30 mmol/L [17 - 32]  Chloride, Serum96 mmol/L[L] [98 - 110]  Creatinie, Serum0.9 mg/dL [0.7 - 1.5]  Glucose, Serum85 mg/dL [70 - 99]  Potassium, Serum3.7 mmol/L [3.5 - 5.0]  Sodium, Serum 136 mmol/L [135 - 146]  [facility]  01-02-25 @ 09:32  Blood Gas Arterial-Calcium,Ionized--  Blood Urea Nitrogen, Serum 12 mg/dL [10 - 20]  Carbon Dioxide, Serum30 mmol/L [17 - 32]  Chloride, Serum96 mmol/L[L] [98 - 110]  Creatinie, Serum1.0 mg/dL [0.7 - 1.5]  Glucose, Serum90 mg/dL [70 - 99]  Potassium, Serum5.9 mmol/L[H] [3.5 - 5.0] [Hemolyzed. Interpret with caution]  Sodium, Serum 135 mmol/L [135 - 146]  [facility]  12-30-24 @ 11:00  Blood Gas Arterial-Calcium,Ionized--  Blood Urea Nitrogen, Serum 10 mg/dL [10 - 20]  Carbon Dioxide, Serum33 mmol/L[H] [17 - 32]  Chloride, Serum91 mmol/L[L] [98 - 110]  Creatinie, Serum1.1 mg/dL [0.7 - 1.5]  Glucose, Mkmbg463 mg/dL[H] [70 - 99]  Potassium, Serum3.3 mmol/L[L] [3.5 - 5.0]  Sodium, Serum 136 mmol/L [135 - 146]              Microbiology:    Culture - Blood (collected 12-28-24 @ 17:10)  Source: .Blood BLOOD  Final Report (01-02-25 @ 23:00):    No growth at 5 days    Culture - Blood (collected 12-27-24 @ 16:00)  Source: .Blood BLOOD  Final Report (01-01-25 @ 23:06):    No growth at 5 days    Culture - Blood (collected 12-27-24 @ 16:00)  Source: .Blood BLOOD  Final Report (01-01-25 @ 23:06):    No growth at 5 days        RADIOLOGY & ADDITIONAL TESTS:        Medications:  acetaminophen     Tablet .. 650 milliGRAM(s) Oral every 6 hours PRN  albuterol    90 MICROgram(s) HFA Inhaler 2 Puff(s) Inhalation every 6 hours PRN  aluminum hydroxide/magnesium hydroxide/simethicone Suspension 30 milliLiter(s) Oral every 4 hours PRN  atorvastatin 20 milliGRAM(s) Oral at bedtime  cefepime   IVPB      cefepime   IVPB 2000 milliGRAM(s) IV Intermittent every 8 hours  colchicine 0.6 milliGRAM(s) Oral two times a day  dextrose 5%. 1000 milliLiter(s) IV Continuous <Continuous>  dextrose Oral Gel 15 Gram(s) Oral once PRN  enoxaparin Injectable 40 milliGRAM(s) SubCutaneous every 24 hours  furosemide    Tablet 40 milliGRAM(s) Oral daily  influenza   Vaccine 0.5 milliLiter(s) IntraMuscular once  insulin glargine Injectable (LANTUS) 28 Unit(s) SubCutaneous at bedtime  insulin lispro (ADMELOG) corrective regimen sliding scale   SubCutaneous three times a day before meals  levothyroxine 175 MICROGram(s) Oral daily  melatonin 3 milliGRAM(s) Oral at bedtime PRN  ondansetron Injectable 4 milliGRAM(s) IV Push every 8 hours PRN  pantoprazole    Tablet 40 milliGRAM(s) Oral before breakfast  vancomycin  IVPB 750 milliGRAM(s) IV Intermittent every 12 hours        Assessment and Plan:    52 y/o Female, former IVDA on Suboxone, HTN, HLD, Hep C. , Chronic lower extremity edema with ulceration of skin presents with c/o generalized weakness and subjective fever/chills x 2 weeks and worsening lower extremity cellulitis  with oozing from b/l LE R>L.       #B/l LE cellulitis superimposed on chronic LE edema with possible bacteremia  - ID recs appreciated: continue vancomycin and  cefepime 2g q 8hrs  - follow up blood cultures-  No growth at 24 hours  - VA duplex concerning for stenosis  - vascular consulted recs ( no surgical interventions) , reconsulted given leg pain   - podiatry recs appreciated  - Wound care or podiatry for local care of the wounds.   -  re-biopsy of the wounds for cultures and Pathology. Consulted Derm for re-evaluation.   - XR Imaging Right Foot; Flattening of the normal plantar arch. Midfoot arthrosis with diffuse soft tissue swelling. No fracture or erosion.  - duplex Moderate right external iliac and common femoral artery stenosis, Normal arterial flow in the left lower extremity, Tibial arteries not visualized bilaterally  - Local Wound Care; Applied - Xeroform / Gauze / DSD / Kerlix / Secured with Tape  - Weight Bearing Status; WBAT   - ESR 90  - Trend WBC daily  - OOB to chair with assistance  - will consider midline if difficult access  - legs dressed and wrapped today  - biopsy of lower ext wound today ,send for cx and pathology  - follow up blood work     #Acute comminuted fractures of the fourth and fifth metacarpal bodies  -xray Acute comminuted fractures of the fourth and fifth metacarpal bodies.Soft tissue swelling along the dorsal aspect of the hand.  Osteophyte formation at the first MCP joint.  -splint and cast in place POA  - ortho consulted ,No acute orthopaedic surgical intervention   NWB LUE in short arm cast    # Diabetes Type 2  - continue lantus with sliding scale  - CHO consistent diet  - hold metformin  - A1C,     # Opiate dependence  - continue Suboxone     # Gout  -continue colchicine    # Hypothyroid  - continue Levothyroxine    proph heparin  DM diet   full code    Handoff : f/u ortho for left elbow swelling ,cont IV anbx , f/u biopsy results   Dispo:  home does not want MICKI         E[facility]RA BARCLAY  [facility] 013 A ([facility] ([facility]))            Patient was evaluated and examined  by bedside,                 REVIEW OF SYSTEMS:  please see pertinent positives mentioned above, all other 12 ROS negative      T(C): , Max: 36.9 (01-03-25 @ 21:11)  HR: 65 (01-04-25 @ 05:27)  BP: 105/72 (01-04-25 @ 05:27)  RR: 18 (01-04-25 @ 05:27)  SpO2: 98% (01-04-25 @ 05:27)  CAPILLARY BLOOD GLUCOSE      POCT Blood Glucose.: 83 mg/dL (04 Jan 2025 07:35)  POCT Blood Glucose.: 143 mg/dL (03 Jan 2025 20:52)  POCT Blood Glucose.: 190 mg/dL (03 Jan 2025 16:51)      PHYSICAL EXAM:  General: NAD, comfortable in bed  HEENT: NCAT  Lungs: No increased WOB  CVS: RRR  Abdomen: , non-distended  Extremities: LEs; profound bilateral erythema and edema        LAB  CBC  Date: 01-03-25 @ 07:28  Mean cell Lgdzarlvvw54.8  Mean cell Hemoglobin Conc30.0  Mean cell Volum 86.1  Platelet count-Automate 284  RBC Count 3.80  Red Cell Distrib Width15.7  WBC Count6.60  % Albumin, Urine--  Hematocrit 32.7  Hemoglobin 9.8  CBC  Date: 01-02-25 @ 09:32  Mean cell Ugjcamoidf24.6  Mean cell Hemoglobin Conc31.1  Mean cell Volum 85.6  Platelet count-Automate 300  RBC Count 3.34  Red Cell Distrib Width15.9  WBC Count6.01  % Albumin, Urine--  Hematocrit 28.6  Hemoglobin 8.9  CBC  Date: 12-30-24 @ 11:00  Mean cell Ahziydzawg24.8  Mean cell Hemoglobin Conc31.1  Mean cell Volum 86.2  Platelet count-Automate 369  RBC Count 3.99  Red Cell Distrib Width15.6  WBC Count7.54  % Albumin, Urine--  Hematocrit 34.4  Hemoglobin 10.7  CBC  Date: 12-28-24 @ 11:22  Mean cell Nwyzgkgksd23.2  Mean cell Hemoglobin Conc32.2  Mean cell Volum 84.5  Platelet count-Automate 313  RBC Count 3.35  Red Cell Distrib Width15.3  WBC Count7.81  % Albumin, Urine--  Hematocrit 28.3  Hemoglobin 9.1    [facility]  01-03-25 @ 07:28  Blood Gas Arterial-Calcium,Ionized--  Blood Urea Nitrogen, Serum 13 mg/dL [10 - 20]  Carbon Dioxide, Serum28 mmol/L [17 - 32]  Chloride, Serum95 mmol/L[L] [98 - 110]  Creatinie, Serum1.1 mg/dL [0.7 - 1.5]  Glucose, Serum67 mg/dL[L] [70 - 99]  Potassium, Serum3.7 mmol/L [3.5 - 5.0]  Sodium, Serum 136 mmol/L [135 - 146]  [facility]  01-02-25 @ 12:51  Blood Gas Arterial-Calcium,Ionized--  Blood Urea Nitrogen, Serum 12 mg/dL [10 - 20]  Carbon Dioxide, Serum30 mmol/L [17 - 32]  Chloride, Serum96 mmol/L[L] [98 - 110]  Creatinie, Serum0.9 mg/dL [0.7 - 1.5]  Glucose, Serum85 mg/dL [70 - 99]  Potassium, Serum3.7 mmol/L [3.5 - 5.0]  Sodium, Serum 136 mmol/L [135 - 146]  [facility]  01-02-25 @ 09:32  Blood Gas Arterial-Calcium,Ionized--  Blood Urea Nitrogen, Serum 12 mg/dL [10 - 20]  Carbon Dioxide, Serum30 mmol/L [17 - 32]  Chloride, Serum96 mmol/L[L] [98 - 110]  Creatinie, Serum1.0 mg/dL [0.7 - 1.5]  Glucose, Serum90 mg/dL [70 - 99]  Potassium, Serum5.9 mmol/L[H] [3.5 - 5.0] [Hemolyzed. Interpret with caution]  Sodium, Serum 135 mmol/L [135 - 146]  [facility]  12-30-24 @ 11:00  Blood Gas Arterial-Calcium,Ionized--  Blood Urea Nitrogen, Serum 10 mg/dL [10 - 20]  Carbon Dioxide, Serum33 mmol/L[H] [17 - 32]  Chloride, Serum91 mmol/L[L] [98 - 110]  Creatinie, Serum1.1 mg/dL [0.7 - 1.5]  Glucose, Qzivg071 mg/dL[H] [70 - 99]  Potassium, Serum3.3 mmol/L[L] [3.5 - 5.0]  Sodium, Serum 136 mmol/L [135 - 146]              Microbiology:    Culture - Blood (collected 12-28-24 @ 17:10)  Source: .Blood BLOOD  Final Report (01-02-25 @ 23:00):    No growth at 5 days    Culture - Blood (collected 12-27-24 @ 16:00)  Source: .Blood BLOOD  Final Report (01-01-25 @ 23:06):    No growth at 5 days    Culture - Blood (collected 12-27-24 @ 16:00)  Source: .Blood BLOOD  Final Report (01-01-25 @ 23:06):    No growth at 5 days        RADIOLOGY & ADDITIONAL TESTS:        Medications:  acetaminophen     Tablet .. 650 milliGRAM(s) Oral every 6 hours PRN  albuterol    90 MICROgram(s) HFA Inhaler 2 Puff(s) Inhalation every 6 hours PRN  aluminum hydroxide/magnesium hydroxide/simethicone Suspension 30 milliLiter(s) Oral every 4 hours PRN  atorvastatin 20 milliGRAM(s) Oral at bedtime  cefepime   IVPB      cefepime   IVPB 2000 milliGRAM(s) IV Intermittent every 8 hours  colchicine 0.6 milliGRAM(s) Oral two times a day  dextrose 5%. 1000 milliLiter(s) IV Continuous <Continuous>  dextrose Oral Gel 15 Gram(s) Oral once PRN  enoxaparin Injectable 40 milliGRAM(s) SubCutaneous every 24 hours  furosemide    Tablet 40 milliGRAM(s) Oral daily  influenza   Vaccine 0.5 milliLiter(s) IntraMuscular once  insulin glargine Injectable (LANTUS) 28 Unit(s) SubCutaneous at bedtime  insulin lispro (ADMELOG) corrective regimen sliding scale   SubCutaneous three times a day before meals  levothyroxine 175 MICROGram(s) Oral daily  melatonin 3 milliGRAM(s) Oral at bedtime PRN  ondansetron Injectable 4 milliGRAM(s) IV Push every 8 hours PRN  pantoprazole    Tablet 40 milliGRAM(s) Oral before breakfast  vancomycin  IVPB 750 milliGRAM(s) IV Intermittent every 12 hours        Assessment and Plan:    54 y/o Female, former IVDA on Suboxone, HTN, HLD, Hep C. , Chronic lower extremity edema with ulceration of skin presents with c/o generalized weakness and subjective fever/chills x 2 weeks and worsening lower extremity cellulitis  with oozing from b/l LE R>L.       #B/l LE cellulitis superimposed on chronic LE edema with possible bacteremia  - ID recs appreciated: continue vancomycin and  cefepime 2g q 8hrs  - follow up blood cultures-  No growth at 24 hours  - VA duplex concerning for stenosis  - vascular consulted recs ( no surgical interventions) , reconsulted given leg pain   - podiatry recs appreciated  - Wound care or podiatry for local care of the wounds.   -  re-biopsy of the wounds for cultures and Pathology. Consulted Derm for re-evaluation.   - XR Imaging Right Foot; Flattening of the normal plantar arch. Midfoot arthrosis with diffuse soft tissue swelling. No fracture or erosion.  - duplex Moderate right external iliac and common femoral artery stenosis, Normal arterial flow in the left lower extremity, Tibial arteries not visualized bilaterally  - Local Wound Care; Applied - Xeroform / Gauze / DSD / Kerlix / Secured with Tape  - Weight Bearing Status; WBAT   - ESR 90  - Trend WBC daily  - OOB to chair with assistance  - will consider midline if difficult access  - legs dressed and wrapped today  - biopsy of lower ext wound today ,send for cx and pathology  - follow up blood work     #Acute comminuted fractures of the fourth and fifth metacarpal bodies  -xray Acute comminuted fractures of the fourth and fifth metacarpal bodies.Soft tissue swelling along the dorsal aspect of the hand.  Osteophyte formation at the first MCP joint.  -splint and cast in place POA  - ortho consulted ,No acute orthopaedic surgical intervention   NWB LUE in short arm cast    # Diabetes Type 2  - continue lantus with sliding scale  - CHO consistent diet  - hold metformin  - A1C,     # Opiate dependence  - continue Suboxone     # Gout  -continue colchicine    # Hypothyroid  - continue Levothyroxine    proph heparin  DM diet   full code    Handoff : f/u ortho for left elbow swelling ,cont IV anbx , f/u biopsy results   Dispo:  home does not want MICKI

## 2025-01-05 LAB
ALBUMIN SERPL ELPH-MCNC: 4.4 G/DL — SIGNIFICANT CHANGE UP (ref 3.5–5.2)
ALP SERPL-CCNC: 123 U/L — HIGH (ref 30–115)
ALT FLD-CCNC: 9 U/L — SIGNIFICANT CHANGE UP (ref 0–41)
ANION GAP SERPL CALC-SCNC: 13 MMOL/L — SIGNIFICANT CHANGE UP (ref 7–14)
AST SERPL-CCNC: 44 U/L — HIGH (ref 0–41)
B DERMAT AB FLD QL: NEGATIVE — SIGNIFICANT CHANGE UP
BILIRUB SERPL-MCNC: 0.4 MG/DL — SIGNIFICANT CHANGE UP (ref 0.2–1.2)
BUN SERPL-MCNC: 13 MG/DL — SIGNIFICANT CHANGE UP (ref 10–20)
CALCIUM SERPL-MCNC: 9.6 MG/DL — SIGNIFICANT CHANGE UP (ref 8.4–10.5)
CHLORIDE SERPL-SCNC: 95 MMOL/L — LOW (ref 98–110)
CO2 SERPL-SCNC: 31 MMOL/L — SIGNIFICANT CHANGE UP (ref 17–32)
CREAT SERPL-MCNC: 1 MG/DL — SIGNIFICANT CHANGE UP (ref 0.7–1.5)
EGFR: 67 ML/MIN/1.73M2 — SIGNIFICANT CHANGE UP
GLUCOSE SERPL-MCNC: 101 MG/DL — HIGH (ref 70–99)
HCT VFR BLD CALC: 32.6 % — LOW (ref 37–47)
HGB BLD-MCNC: 10.1 G/DL — LOW (ref 12–16)
MCHC RBC-ENTMCNC: 26.7 PG — LOW (ref 27–31)
MCHC RBC-ENTMCNC: 31 G/DL — LOW (ref 32–37)
MCV RBC AUTO: 86.2 FL — SIGNIFICANT CHANGE UP (ref 81–99)
NRBC # BLD: 0 /100 WBCS — SIGNIFICANT CHANGE UP (ref 0–0)
PLATELET # BLD AUTO: 269 K/UL — SIGNIFICANT CHANGE UP (ref 130–400)
PMV BLD: 9.3 FL — SIGNIFICANT CHANGE UP (ref 7.4–10.4)
POTASSIUM SERPL-MCNC: 4.2 MMOL/L — SIGNIFICANT CHANGE UP (ref 3.5–5)
POTASSIUM SERPL-SCNC: 4.2 MMOL/L — SIGNIFICANT CHANGE UP (ref 3.5–5)
PROT SERPL-MCNC: 8.4 G/DL — HIGH (ref 6–8)
RBC # BLD: 3.78 M/UL — LOW (ref 4.2–5.4)
RBC # FLD: 15.9 % — HIGH (ref 11.5–14.5)
SODIUM SERPL-SCNC: 139 MMOL/L — SIGNIFICANT CHANGE UP (ref 135–146)
VANCOMYCIN FLD-MCNC: 14 UG/ML — HIGH (ref 5–10)
WBC # BLD: 6.42 K/UL — SIGNIFICANT CHANGE UP (ref 4.8–10.8)
WBC # FLD AUTO: 6.42 K/UL — SIGNIFICANT CHANGE UP (ref 4.8–10.8)

## 2025-01-05 PROCEDURE — 76937 US GUIDE VASCULAR ACCESS: CPT | Mod: 26

## 2025-01-05 PROCEDURE — 99233 SBSQ HOSP IP/OBS HIGH 50: CPT

## 2025-01-05 PROCEDURE — 36000 PLACE NEEDLE IN VEIN: CPT

## 2025-01-05 RX ADMIN — Medication 100 MILLIGRAM(S): at 15:44

## 2025-01-05 RX ADMIN — Medication 40 MILLIGRAM(S): at 05:04

## 2025-01-05 RX ADMIN — ENOXAPARIN SODIUM 40 MILLIGRAM(S): 60 INJECTION INTRAVENOUS; SUBCUTANEOUS at 21:51

## 2025-01-05 RX ADMIN — VANCOMYCIN HYDROCHLORIDE 250 MILLIGRAM(S): 5 INJECTION, POWDER, LYOPHILIZED, FOR SOLUTION INTRAVENOUS at 20:41

## 2025-01-05 RX ADMIN — PANTOPRAZOLE 40 MILLIGRAM(S): 40 TABLET, DELAYED RELEASE ORAL at 05:07

## 2025-01-05 RX ADMIN — INSULIN GLARGINE-YFGN 28 UNIT(S): 100 INJECTION, SOLUTION SUBCUTANEOUS at 21:50

## 2025-01-05 RX ADMIN — ATORVASTATIN CALCIUM 20 MILLIGRAM(S): 40 TABLET, FILM COATED ORAL at 21:51

## 2025-01-05 RX ADMIN — BUPRENORPHINE HYDROCHLORIDE AND NALOXONE HYDROCHLORIDE DIHYDRATE 2.5 TABLET(S): 8; 2 TABLET SUBLINGUAL at 10:54

## 2025-01-05 RX ADMIN — LEVOTHYROXINE SODIUM 175 MICROGRAM(S): 175 TABLET ORAL at 05:05

## 2025-01-05 RX ADMIN — Medication 100 MILLIGRAM(S): at 05:05

## 2025-01-05 RX ADMIN — COLCHICINE 0.6 MILLIGRAM(S): 0.6 CAPSULE ORAL at 05:05

## 2025-01-05 RX ADMIN — VANCOMYCIN HYDROCHLORIDE 250 MILLIGRAM(S): 5 INJECTION, POWDER, LYOPHILIZED, FOR SOLUTION INTRAVENOUS at 10:51

## 2025-01-05 NOTE — PHARMACOTHERAPY INTERVENTION NOTE - COMMENTS
As per policy discontinued duplicative vancomycin trough level    Tereza Rivera, PharmD  Clinical Pharmacy Specialist, Infectious Diseases  Tele-[facility]ship Program (Tele-[facility])  Tele-ASP Phone: [phone]

## 2025-01-05 NOTE — PHARMACOTHERAPY INTERVENTION NOTE - COMMENTS
Recommended to continue vancomycin 750mg IV q12hrs, as vancomycin trough on 1/5 @ 0807 returned at 14mg/dL. As per [employer] calculations, current vancomycin regimen predicts AUC/REED to be 453.99 mg/L*h, which is within therapeutic range of 400 - 600 mg/L*h.     Tereza Rivera, PharmD  Clinical Pharmacy Specialist, Infectious Diseases  Tele-Antimicrobial Stewardship Program (Tele[facility])  Tele[facility] Phone: [phone]

## 2025-01-05 NOTE — PROGRESS NOTE ADULT - SUBJECTIVE AND OBJECTIVE BOX
EZRA BARCLAY  [facility] 013 A ([facility]            Patient was evaluated and examined  by bedside,                 REVIEW OF SYSTEMS:  please see pertinent positives mentioned above, all other 12 ROS negative      T(C): , Max: 37 (01-04-25 @ 14:31)  HR: 79 (01-05-25 @ 04:41)  BP: 119/75 (01-05-25 @ 04:41)  RR: 18 (01-05-25 @ 04:41)  SpO2: 97% (01-05-25 @ 04:41)  CAPILLARY BLOOD GLUCOSE      POCT Blood Glucose.: 149 mg/dL (05 Jan 2025 11:38)  POCT Blood Glucose.: 113 mg/dL (05 Jan 2025 07:57)  POCT Blood Glucose.: 190 mg/dL (04 Jan 2025 20:49)  POCT Blood Glucose.: 185 mg/dL (04 Jan 2025 16:32)      PHYSICAL EXAM:  General: NAD, comfortable in bed  HEENT: NCAT  Lungs: No increased WOB  CVS: RRR  Abdomen: , non-distended  Extremities: no edema        LAB  CBC  Date: 01-05-25 @ 08:07  Mean cell Nsxxclecmr21.7  Mean cell Hemoglobin Conc31.0  Mean cell Volum 86.2  Platelet count-Automate 269  RBC Count 3.78  Red Cell Distrib Width15.9  WBC Count6.42  % Albumin, Urine--  Hematocrit 32.6  Hemoglobin 10.1  CBC  Date: 01-04-25 @ 08:10  Mean cell Nfiojotolu43.5  Mean cell Hemoglobin Conc30.5  Mean cell Volum 86.8  Platelet count-Automate 283  RBC Count 3.78  Red Cell Distrib Width15.9  WBC Count7.82  % Albumin, Urine--  Hematocrit 32.8  Hemoglobin 10.0  CBC  Date: 01-03-25 @ 07:28  Mean cell Oaazsfexqs07.8  Mean cell Hemoglobin Conc30.0  Mean cell Volum 86.1  Platelet count-Automate 284  RBC Count 3.80  Red Cell Distrib Width15.7  WBC Count6.60  % Albumin, Urine--  Hematocrit 32.7  Hemoglobin 9.8  CBC  Date: 01-02-25 @ 09:32  Mean cell Xtawphmvpg15.6  Mean cell Hemoglobin Conc31.1  Mean cell Volum 85.6  Platelet count-Automate 300  RBC Count 3.34  Red Cell Distrib Width15.9  WBC Count6.01  % Albumin, Urine--  Hematocrit 28.6  Hemoglobin 8.9  CBC  Date: 12-30-24 @ 11:00  Mean cell Nmpsyxapkq31.8  Mean cell Hemoglobin Conc31.1  Mean cell Volum 86.2  Platelet count-Automate 369  RBC Count 3.99  Red Cell Distrib Width15.6  WBC Count7.54  % Albumin, Urine--  Hematocrit 34.4  Hemoglobin 10.7    [facility]  01-05-25 @ 08:07  Blood Gas Arterial-Calcium,Ionized--  Blood Urea Nitrogen, Serum 13 mg/dL [10 - 20]  Carbon Dioxide, Serum31 mmol/L [17 - 32]  Chloride, Serum95 mmol/L[L] [98 - 110]  Creatinie, Serum1.0 mg/dL [0.7 - 1.5]  Glucose, Kbqlw272 mg/dL[H] [70 - 99]  Potassium, Serum4.2 mmol/L [3.5 - 5.0]  Sodium, Serum 139 mmol/L [135 - 146]  [facility]  01-04-25 @ 08:10  Blood Gas Arterial-Calcium,Ionized--  Blood Urea Nitrogen, Serum 14 mg/dL [10 - 20]  Carbon Dioxide, Serum28 mmol/L [17 - 32]  Chloride, Serum98 mmol/L [98 - 110]  Creatinie, Serum1.0 mg/dL [0.7 - 1.5]  Glucose, Serum71 mg/dL [70 - 99]  Potassium, Serum3.8 mmol/L [3.5 - 5.0]  Sodium, Serum 139 mmol/L [135 - 146]  [facility]  01-03-25 @ 07:28  Blood Gas Arterial-Calcium,Ionized--  Blood Urea Nitrogen, Serum 13 mg/dL [10 - 20]  Carbon Dioxide, Serum28 mmol/L [17 - 32]  Chloride, Serum95 mmol/L[L] [98 - 110]  Creatinie, Serum1.1 mg/dL [0.7 - 1.5]  Glucose, Serum67 mg/dL[L] [70 - 99]  Potassium, Serum3.7 mmol/L [3.5 - 5.0]  Sodium, Serum 136 mmol/L [135 - 146]  [facility]  01-02-25 @ 12:51  Blood Gas Arterial-Calcium,Ionized--  Blood Urea Nitrogen, Serum 12 mg/dL [10 - 20]  Carbon Dioxide, Serum30 mmol/L [17 - 32]  Chloride, Serum96 mmol/L[L] [98 - 110]  Creatinie, Serum0.9 mg/dL [0.7 - 1.5]  Glucose, Serum85 mg/dL [70 - 99]  Potassium, Serum3.7 mmol/L [3.5 - 5.0]  Sodium, Serum 136 mmol/L [135 - 146]  [facility]  01-02-25 @ 09:32  Blood Gas Arterial-Calcium,Ionized--  Blood Urea Nitrogen, Serum 12 mg/dL [10 - 20]  Carbon Dioxide, Serum30 mmol/L [17 - 32]  Chloride, Serum96 mmol/L[L] [98 - 110]  Creatinie, Serum1.0 mg/dL [0.7 - 1.5]  Glucose, Serum90 mg/dL [70 - 99]  Potassium, Serum5.9 mmol/L[H] [3.5 - 5.0] [Hemolyzed. Interpret with caution]  Sodium, Serum 135 mmol/L [135 - 146]              Microbiology:    Culture - Blood (collected 12-28-24 @ 17:10)  Source: .Blood BLOOD  Final Report (01-02-25 @ 23:00):    No growth at 5 days    Culture - Blood (collected 12-27-24 @ 16:00)  Source: .Blood BLOOD  Final Report (01-01-25 @ 23:06):    No growth at 5 days    Culture - Blood (collected 12-27-24 @ 16:00)  Source: .Blood BLOOD  Final Report (01-01-25 @ 23:06):    No growth at 5 days        RADIOLOGY & ADDITIONAL TESTS:        Medications:  acetaminophen     Tablet .. 650 milliGRAM(s) Oral every 6 hours PRN  albuterol    90 MICROgram(s) HFA Inhaler 2 Puff(s) Inhalation every 6 hours PRN  aluminum hydroxide/magnesium hydroxide/simethicone Suspension 30 milliLiter(s) Oral every 4 hours PRN  atorvastatin 20 milliGRAM(s) Oral at bedtime  buprenorphine 8 mG/naloxone 2 mG SL  Tablet 2.5 Tablet(s) SubLingual daily  cefepime   IVPB      cefepime   IVPB 2000 milliGRAM(s) IV Intermittent every 8 hours  colchicine 0.6 milliGRAM(s) Oral two times a day  dextrose 5%. 1000 milliLiter(s) IV Continuous <Continuous>  dextrose Oral Gel 15 Gram(s) Oral once PRN  enoxaparin Injectable 40 milliGRAM(s) SubCutaneous every 24 hours  furosemide    Tablet 40 milliGRAM(s) Oral daily  influenza   Vaccine 0.5 milliLiter(s) IntraMuscular once  insulin glargine Injectable (LANTUS) 28 Unit(s) SubCutaneous at bedtime  insulin lispro (ADMELOG) corrective regimen sliding scale   SubCutaneous three times a day before meals  levothyroxine 175 MICROGram(s) Oral daily  melatonin 3 milliGRAM(s) Oral at bedtime PRN  ondansetron Injectable 4 milliGRAM(s) IV Push every 8 hours PRN  pantoprazole    Tablet 40 milliGRAM(s) Oral before breakfast  vancomycin  IVPB 750 milliGRAM(s) IV Intermittent every 12 hours        Assessment and Plan:      54 y/o Female, former IVDA on Suboxone, HTN, HLD, Hep C. , Chronic lower extremity edema with ulceration of skin presents with c/o generalized weakness and subjective fever/chills x 2 weeks and worsening lower extremity cellulitis  with oozing from b/l LE R>L.       #B/l LE cellulitis superimposed on chronic LE edema with possible bacteremia  - ID recs appreciated: continue vancomycin and  cefepime 2g q 8hrs  - follow up blood cultures-  No growth at 24 hours  - VA duplex concerning for stenosis  - vascular consulted recs ( no surgical interventions) , reconsulted given leg pain   - podiatry recs appreciated  - Wound care or podiatry for local care of the wounds.   -  re-biopsy of the wounds for cultures and Pathology. Consulted Derm for re-evaluation.   - XR Imaging Right Foot; Flattening of the normal plantar arch. Midfoot arthrosis with diffuse soft tissue swelling. No fracture or erosion.  - duplex Moderate right external iliac and common femoral artery stenosis, Normal arterial flow in the left lower extremity, Tibial arteries not visualized bilaterally  - Local Wound Care; Applied - Xeroform / Gauze / DSD / Kerlix / Secured with Tape  - Weight Bearing Status; WBAT   - ESR 90  - Trend WBC daily  - OOB to chair with assistance  - will consider midline if difficult access  - legs dressed and wrapped today  - biopsy of lower ext wound today ,send for cx and pathology  - follow up blood work     #Acute comminuted fractures of the fourth and fifth metacarpal bodies  -xray Acute comminuted fractures of the fourth and fifth metacarpal bodies.Soft tissue swelling along the dorsal aspect of the hand.  Osteophyte formation at the first MCP joint.  -splint and cast in place POA  - ortho consulted ,No acute orthopaedic surgical intervention   NWB LUE in short arm cast    # Diabetes Type 2  - continue lantus with sliding scale  - CHO consistent diet  - hold metformin  - A1C,     # Opiate dependence  - continue Suboxone     # Gout  -continue colchicine    # Hypothyroid  - continue Levothyroxine    proph heparin  DM diet   full code    Handoff : f/u ortho for left elbow swelling ,cont IV anbx , f/u biopsy results   Dispo:  home does not want MICKI

## 2025-01-05 NOTE — PROCEDURE NOTE - NSPROCDETAILS_GEN_ALL_CORE
blood seen on insertion/dressing applied/flushes easily/secured in place
location identified, draped/prepped, sterile technique used/blood seen on insertion/dressing applied/flushes easily/secured in place/sterile technique, catheter placed

## 2025-01-05 NOTE — PHARMACOTHERAPY INTERVENTION NOTE - NSPHARMCOMMASP
ASP - Dose optimization/Non-Renal dose adjustment
ASP - Therapy recommended/ Alternative therapy
ASP - Therapy recommended/ Alternative therapy
ASP - Lab/ test recommended

## 2025-01-05 NOTE — PHARMACOTHERAPY INTERVENTION NOTE - COMMENTS
As per policy, ordered a vancomycin trough level for 1/8 @ 1600 to assist with further vancomycin dosing optimization.     Tereza Rivera, PharmD  Clinical Pharmacy Specialist, Infectious Diseases  Tele-Antimicrobial Stewardship Program (Tele-ASP)  Tele-ASP Phone: [phone]  As per policy, ordered a vancomycin trough level for 1/8 @ 1500 to assist with further vancomycin dosing optimization.     Tereza Rivera, PharmD  Clinical Pharmacy Specialist, Infectious Diseases  Tele-Antimicrobial Stewardship Program (Tele-ASP)  Tele-ASP Phone: [phone]

## 2025-01-06 LAB
C IMMITIS AB SER QL IA: REACTIVE — SIGNIFICANT CHANGE UP
CRYOGLOB SERPL-MCNC: NEGATIVE — SIGNIFICANT CHANGE UP
CRYOGLOB SERPL-MCNC: NEGATIVE — SIGNIFICANT CHANGE UP

## 2025-01-06 PROCEDURE — 99232 SBSQ HOSP IP/OBS MODERATE 35: CPT

## 2025-01-06 RX ADMIN — BUPRENORPHINE HYDROCHLORIDE AND NALOXONE HYDROCHLORIDE DIHYDRATE 2.5 TABLET(S): 8; 2 TABLET SUBLINGUAL at 11:48

## 2025-01-06 RX ADMIN — Medication 100 MILLIGRAM(S): at 13:19

## 2025-01-06 RX ADMIN — INSULIN GLARGINE-YFGN 28 UNIT(S): 100 INJECTION, SOLUTION SUBCUTANEOUS at 21:25

## 2025-01-06 RX ADMIN — Medication 100 MILLIGRAM(S): at 05:12

## 2025-01-06 RX ADMIN — COLCHICINE 0.6 MILLIGRAM(S): 0.6 CAPSULE ORAL at 05:13

## 2025-01-06 RX ADMIN — ATORVASTATIN CALCIUM 20 MILLIGRAM(S): 40 TABLET, FILM COATED ORAL at 21:26

## 2025-01-06 RX ADMIN — LEVOTHYROXINE SODIUM 175 MICROGRAM(S): 175 TABLET ORAL at 05:12

## 2025-01-06 RX ADMIN — VANCOMYCIN HYDROCHLORIDE 250 MILLIGRAM(S): 5 INJECTION, POWDER, LYOPHILIZED, FOR SOLUTION INTRAVENOUS at 21:24

## 2025-01-06 RX ADMIN — VANCOMYCIN HYDROCHLORIDE 250 MILLIGRAM(S): 5 INJECTION, POWDER, LYOPHILIZED, FOR SOLUTION INTRAVENOUS at 08:32

## 2025-01-06 RX ADMIN — Medication 100 MILLIGRAM(S): at 21:24

## 2025-01-06 RX ADMIN — COLCHICINE 0.6 MILLIGRAM(S): 0.6 CAPSULE ORAL at 17:26

## 2025-01-06 RX ADMIN — PANTOPRAZOLE 40 MILLIGRAM(S): 40 TABLET, DELAYED RELEASE ORAL at 05:13

## 2025-01-06 RX ADMIN — Medication 1: at 11:48

## 2025-01-06 RX ADMIN — Medication 40 MILLIGRAM(S): at 05:12

## 2025-01-06 RX ADMIN — ENOXAPARIN SODIUM 40 MILLIGRAM(S): 60 INJECTION INTRAVENOUS; SUBCUTANEOUS at 21:25

## 2025-01-06 NOTE — PROGRESS NOTE ADULT - SUBJECTIVE AND OBJECTIVE BOX
54 F with left elbow bursitis, reaccumulation of fluid after Pt removed ACE wrap, not infected    ACE wrap applied, keep it for 3 weeks, can be removed for changing.

## 2025-01-06 NOTE — PROGRESS NOTE ADULT - SUBJECTIVE AND OBJECTIVE BOX
E[facility]RA BARCLAY  [facility] 013 A ([facility] ([facility]))            Patient was evaluated and examined  by bedside,                 REVIEW OF SYSTEMS:  please see pertinent positives mentioned above, all other 12 ROS negative      T(C): , Max: 36.8 (01-05-25 @ 15:14)  HR: 58 (01-06-25 @ 05:09)  BP: 105/66 (01-06-25 @ 05:09)  RR: 18 (01-06-25 @ 05:09)  SpO2: 98% (01-06-25 @ 05:09)  CAPILLARY BLOOD GLUCOSE      POCT Blood Glucose.: 193 mg/dL (06 Jan 2025 11:41)  POCT Blood Glucose.: 80 mg/dL (06 Jan 2025 07:45)  POCT Blood Glucose.: 172 mg/dL (05 Jan 2025 21:01)  POCT Blood Glucose.: 199 mg/dL (05 Jan 2025 16:23)      PHYSICAL EXAM:  General: NAD, AAOX3, patient is laying comfortably in bed  HEENT: AT, NC, Supple, NO JVD, NO CB  Lungs: CTA B/L, no wheezing, no rhonchi  CVS: normal S1, S2, RRR, NO M/G/R  Abdomen: soft, bowel sounds present, non-tender, non-distended  Extremities: no edema, no clubbing, no cyanosis, positive peripheral pulses b/l  Neuro: no acute focal neurological deficits  Skin: no rush, no ecchymosis      LAB  CBC  Date: 01-05-25 @ 08:07  Mean cell Vkfliefrem44.7  Mean cell Hemoglobin Conc31.0  Mean cell Volum 86.2  Platelet count-Automate 269  RBC Count 3.78  Red Cell Distrib Width15.9  WBC Count6.42  % Albumin, Urine--  Hematocrit 32.6  Hemoglobin 10.1  CBC  Date: 01-04-25 @ 08:10  Mean cell Jtfwvnloqd87.5  Mean cell Hemoglobin Conc30.5  Mean cell Volum 86.8  Platelet count-Automate 283  RBC Count 3.78  Red Cell Distrib Width15.9  WBC Count7.82  % Albumin, Urine--  Hematocrit 32.8  Hemoglobin 10.0  CBC  Date: 01-03-25 @ 07:28  Mean cell Yzjnfrrtre20.8  Mean cell Hemoglobin Conc30.0  Mean cell Volum 86.1  Platelet count-Automate 284  RBC Count 3.80  Red Cell Distrib Width15.7  WBC Count6.60  % Albumin, Urine--  Hematocrit 32.7  Hemoglobin 9.8  CBC  Date: 01-02-25 @ 09:32  Mean cell Oxivybiuip82.6  Mean cell Hemoglobin Conc31.1  Mean cell Volum 85.6  Platelet count-Automate 300  RBC Count 3.34  Red Cell Distrib Width15.9  WBC Count6.01  % Albumin, Urine--  Hematocrit 28.6  Hemoglobin 8.9    [facility]  01-05-25 @ 08:07  Blood Gas Arterial-Calcium,Ionized--  Blood Urea Nitrogen, Serum 13 mg/dL [10 - 20]  Carbon Dioxide, Serum31 mmol/L [17 - 32]  Chloride, Serum95 mmol/L[L] [98 - 110]  Creatinie, Serum1.0 mg/dL [0.7 - 1.5]  Glucose, Sfyzy135 mg/dL[H] [70 - 99]  Potassium, Serum4.2 mmol/L [3.5 - 5.0]  Sodium, Serum 139 mmol/L [135 - 146]  [facility]  01-04-25 @ 08:10  Blood Gas Arterial-Calcium,Ionized--  Blood Urea Nitrogen, Serum 14 mg/dL [10 - 20]  Carbon Dioxide, Serum28 mmol/L [17 - 32]  Chloride, Serum98 mmol/L [98 - 110]  Creatinie, Serum1.0 mg/dL [0.7 - 1.5]  Glucose, Serum71 mg/dL [70 - 99]  Potassium, Serum3.8 mmol/L [3.5 - 5.0]  Sodium, Serum 139 mmol/L [135 - 146]  [facility]  01-03-25 @ 07:28  Blood Gas Arterial-Calcium,Ionized--  Blood Urea Nitrogen, Serum 13 mg/dL [10 - 20]  Carbon Dioxide, Serum28 mmol/L [17 - 32]  Chloride, Serum95 mmol/L[L] [98 - 110]  Creatinie, Serum1.1 mg/dL [0.7 - 1.5]  Glucose, Serum67 mg/dL[L] [70 - 99]  Potassium, Serum3.7 mmol/L [3.5 - 5.0]  Sodium, Serum 136 mmol/L [135 - 146]  [facility]  01-02-25 @ 12:51  Blood Gas Arterial-Calcium,Ionized--  Blood Urea Nitrogen, Serum 12 mg/dL [10 - 20]  Carbon Dioxide, Serum30 mmol/L [17 - 32]  Chloride, Serum96 mmol/L[L] [98 - 110]  Creatinie, Serum0.9 mg/dL [0.7 - 1.5]  Glucose, Serum85 mg/dL [70 - 99]  Potassium, Serum3.7 mmol/L [3.5 - 5.0]  Sodium, Serum 136 mmol/L [135 - 146]  [facility]  01-02-25 @ 09:32  Blood Gas Arterial-Calcium,Ionized--  Blood Urea Nitrogen, Serum 12 mg/dL [10 - 20]  Carbon Dioxide, Serum30 mmol/L [17 - 32]  Chloride, Serum96 mmol/L[L] [98 - 110]  Creatinie, Serum1.0 mg/dL [0.7 - 1.5]  Glucose, Serum90 mg/dL [70 - 99]  Potassium, Serum5.9 mmol/L[H] [3.5 - 5.0] [Hemolyzed. Interpret with caution]  Sodium, Serum 135 mmol/L [135 - 146]              Microbiology:    Culture - Blood (collected 12-28-24 @ 17:10)  Source: .Blood BLOOD  Final Report (01-02-25 @ 23:00):    No growth at 5 days    Culture - Blood (collected 12-27-24 @ 16:00)  Source: .Blood BLOOD  Final Report (01-01-25 @ 23:06):    No growth at 5 days    Culture - Blood (collected 12-27-24 @ 16:00)  Source: .Blood BLOOD  Final Report (01-01-25 @ 23:06):    No growth at 5 days        RADIOLOGY & ADDITIONAL TESTS:        Medications:  acetaminophen     Tablet .. 650 milliGRAM(s) Oral every 6 hours PRN  albuterol    90 MICROgram(s) HFA Inhaler 2 Puff(s) Inhalation every 6 hours PRN  aluminum hydroxide/magnesium hydroxide/simethicone Suspension 30 milliLiter(s) Oral every 4 hours PRN  atorvastatin 20 milliGRAM(s) Oral at bedtime  buprenorphine 8 mG/naloxone 2 mG SL  Tablet 2.5 Tablet(s) SubLingual daily  cefepime   IVPB      cefepime   IVPB 2000 milliGRAM(s) IV Intermittent every 8 hours  colchicine 0.6 milliGRAM(s) Oral two times a day  dextrose 5%. 1000 milliLiter(s) IV Continuous <Continuous>  dextrose Oral Gel 15 Gram(s) Oral once PRN  enoxaparin Injectable 40 milliGRAM(s) SubCutaneous every 24 hours  furosemide    Tablet 40 milliGRAM(s) Oral daily  influenza   Vaccine 0.5 milliLiter(s) IntraMuscular once  insulin glargine Injectable (LANTUS) 28 Unit(s) SubCutaneous at bedtime  insulin lispro (ADMELOG) corrective regimen sliding scale   SubCutaneous three times a day before meals  levothyroxine 175 MICROGram(s) Oral daily  melatonin 3 milliGRAM(s) Oral at bedtime PRN  ondansetron Injectable 4 milliGRAM(s) IV Push every 8 hours PRN  pantoprazole    Tablet 40 milliGRAM(s) Oral before breakfast  vancomycin  IVPB 750 milliGRAM(s) IV Intermittent every 12 hours        Assessment and Plan:  52 y/o Female, former IVDA on Suboxone, HTN, HLD, Hep C. , Chronic lower extremity edema with ulceration of skin presents with c/o generalized weakness and subjective fever/chills x 2 weeks and worsening lower extremity cellulitis  with oozing from b/l LE R>L.       #B/l LE cellulitis superimposed on chronic LE edema with possible bacteremia  - ID recs appreciated: continue vancomycin and  cefepime 2g q 8hrs  - follow up blood cultures-  No growth at 24 hours  - VA duplex concerning for stenosis  - vascular consulted recs ( no surgical interventions) , reconsulted given leg pain   - podiatry recs appreciated  - Wound care or podiatry for local care of the wounds.   -  re-biopsy of the wounds for cultures and Pathology. Consulted Derm for re-evaluation.   - XR Imaging Right Foot; Flattening of the normal plantar arch. Midfoot arthrosis with diffuse soft tissue swelling. No fracture or erosion.  - duplex Moderate right external iliac and common femoral artery stenosis, Normal arterial flow in the left lower extremity, Tibial arteries not visualized bilaterally  - Local Wound Care; Applied - Xeroform / Gauze / DSD / Kerlix / Secured with Tape  - Weight Bearing Status; WBAT   - ESR 90  - Trend WBC daily  - OOB to chair with assistance  - will consider midline if difficult access  - legs dressed and wrapped today  - biopsy of lower ext wound today ,send for cx and pathology  - follow up blood work     #Acute comminuted fractures of the fourth and fifth metacarpal bodies  -xray Acute comminuted fractures of the fourth and fifth metacarpal bodies.Soft tissue swelling along the dorsal aspect of the hand.  Osteophyte formation at the first MCP joint.  -splint and cast in place POA  - ortho consulted ,No acute orthopaedic surgical intervention   NWB LUE in short arm cast    # Diabetes Type 2  - continue lantus with sliding scale  - CHO consistent diet  - hold metformin  - A1C,     # Opiate dependence  - continue Suboxone     # Gout  -continue colchicine    # Hypothyroid  - continue Levothyroxine    proph heparin  DM diet   full code    Handoff : f/u ortho for left elbow swelling ,cont IV anbx , f/u biopsy results   Dispo:  home does not want MICKI         EZRA BARCLAY  [facility] 013 A ([facility] ([facility]))            Patient was evaluated and examined  by bedside,                 REVIEW OF SYSTEMS:  please see pertinent positives mentioned above, all other 12 ROS negative      T(C): , Max: 36.8 (01-05-25 @ 15:14)  HR: 58 (01-06-25 @ 05:09)  BP: 105/66 (01-06-25 @ 05:09)  RR: 18 (01-06-25 @ 05:09)  SpO2: 98% (01-06-25 @ 05:09)  CAPILLARY BLOOD GLUCOSE      POCT Blood Glucose.: 193 mg/dL (06 Jan 2025 11:41)  POCT Blood Glucose.: 80 mg/dL (06 Jan 2025 07:45)  POCT Blood Glucose.: 172 mg/dL (05 Jan 2025 21:01)  POCT Blood Glucose.: 199 mg/dL (05 Jan 2025 16:23)      PHYSICAL EXAM:  General: NAD, comfortable in bed  HEENT: NCAT  Lungs: No increased WOB  CVS: RRR  Abdomen: , non-distended  Extremities: significant bilateral erythema/edema improving    LAB  CBC  Date: 01-05-25 @ 08:07  Mean cell Hwyejrtmvj39.7  Mean cell Hemoglobin Conc31.0  Mean cell Volum 86.2  Platelet count-Automate 269  RBC Count 3.78  Red Cell Distrib Width15.9  WBC Count6.42  % Albumin, Urine--  Hematocrit 32.6  Hemoglobin 10.1  CBC  Date: 01-04-25 @ 08:10  Mean cell Lcpxyneaiw19.5  Mean cell Hemoglobin Conc30.5  Mean cell Volum 86.8  Platelet count-Automate 283  RBC Count 3.78  Red Cell Distrib Width15.9  WBC Count7.82  % Albumin, Urine--  Hematocrit 32.8  Hemoglobin 10.0  CBC  Date: 01-03-25 @ 07:28  Mean cell Qplfcucfpq55.8  Mean cell Hemoglobin Conc30.0  Mean cell Volum 86.1  Platelet count-Automate 284  RBC Count 3.80  Red Cell Distrib Width15.7  WBC Count6.60  % Albumin, Urine--  Hematocrit 32.7  Hemoglobin 9.8  CBC  Date: 01-02-25 @ 09:32  Mean cell Fzskwkhshk03.6  Mean cell Hemoglobin Conc31.1  Mean cell Volum 85.6  Platelet count-Automate 300  RBC Count 3.34  Red Cell Distrib Width15.9  WBC Count6.01  % Albumin, Urine--  Hematocrit 28.6  Hemoglobin 8.9    [facility]  01-05-25 @ 08:07  Blood Gas Arterial-Calcium,Ionized--  Blood Urea Nitrogen, Serum 13 mg/dL [10 - 20]  Carbon Dioxide, Serum31 mmol/L [17 - 32]  Chloride, Serum95 mmol/L[L] [98 - 110]  Creatinie, Serum1.0 mg/dL [0.7 - 1.5]  Glucose, Nwurj707 mg/dL[H] [70 - 99]  Potassium, Serum4.2 mmol/L [3.5 - 5.0]  Sodium, Serum 139 mmol/L [135 - 146]  [facility]  01-04-25 @ 08:10  Blood Gas Arterial-Calcium,Ionized--  Blood Urea Nitrogen, Serum 14 mg/dL [10 - 20]  Carbon Dioxide, Serum28 mmol/L [17 - 32]  Chloride, Serum98 mmol/L [98 - 110]  Creatinie, Serum1.0 mg/dL [0.7 - 1.5]  Glucose, Serum71 mg/dL [70 - 99]  Potassium, Serum3.8 mmol/L [3.5 - 5.0]  Sodium, Serum 139 mmol/L [135 - 146]  [facility]  01-03-25 @ 07:28  Blood Gas Arterial-Calcium,Ionized--  Blood Urea Nitrogen, Serum 13 mg/dL [10 - 20]  Carbon Dioxide, Serum28 mmol/L [17 - 32]  Chloride, Serum95 mmol/L[L] [98 - 110]  Creatinie, Serum1.1 mg/dL [0.7 - 1.5]  Glucose, Serum67 mg/dL[L] [70 - 99]  Potassium, Serum3.7 mmol/L [3.5 - 5.0]  Sodium, Serum 136 mmol/L [135 - 146]  [facility]  01-02-25 @ 12:51  Blood Gas Arterial-Calcium,Ionized--  Blood Urea Nitrogen, Serum 12 mg/dL [10 - 20]  Carbon Dioxide, Serum30 mmol/L [17 - 32]  Chloride, Serum96 mmol/L[L] [98 - 110]  Creatinie, Serum0.9 mg/dL [0.7 - 1.5]  Glucose, Serum85 mg/dL [70 - 99]  Potassium, Serum3.7 mmol/L [3.5 - 5.0]  Sodium, Serum 136 mmol/L [135 - 146]  [facility]  01-02-25 @ 09:32  Blood Gas Arterial-Calcium,Ionized--  Blood Urea Nitrogen, Serum 12 mg/dL [10 - 20]  Carbon Dioxide, Serum30 mmol/L [17 - 32]  Chloride, Serum96 mmol/L[L] [98 - 110]  Creatinie, Serum1.0 mg/dL [0.7 - 1.5]  Glucose, Serum90 mg/dL [70 - 99]  Potassium, Serum5.9 mmol/L[H] [3.5 - 5.0] [Hemolyzed. Interpret with caution]  Sodium, Serum 135 mmol/L [135 - 146]              Microbiology:    Culture - Blood (collected 12-28-24 @ 17:10)  Source: .Blood BLOOD  Final Report (01-02-25 @ 23:00):    No growth at 5 days    Culture - Blood (collected 12-27-24 @ 16:00)  Source: .Blood BLOOD  Final Report (01-01-25 @ 23:06):    No growth at 5 days    Culture - Blood (collected 12-27-24 @ 16:00)  Source: .Blood BLOOD  Final Report (01-01-25 @ 23:06):    No growth at 5 days        RADIOLOGY & ADDITIONAL TESTS:        Medications:  acetaminophen     Tablet .. 650 milliGRAM(s) Oral every 6 hours PRN  albuterol    90 MICROgram(s) HFA Inhaler 2 Puff(s) Inhalation every 6 hours PRN  aluminum hydroxide/magnesium hydroxide/simethicone Suspension 30 milliLiter(s) Oral every 4 hours PRN  atorvastatin 20 milliGRAM(s) Oral at bedtime  buprenorphine 8 mG/naloxone 2 mG SL  Tablet 2.5 Tablet(s) SubLingual daily  cefepime   IVPB      cefepime   IVPB 2000 milliGRAM(s) IV Intermittent every 8 hours  colchicine 0.6 milliGRAM(s) Oral two times a day  dextrose 5%. 1000 milliLiter(s) IV Continuous <Continuous>  dextrose Oral Gel 15 Gram(s) Oral once PRN  enoxaparin Injectable 40 milliGRAM(s) SubCutaneous every 24 hours  furosemide    Tablet 40 milliGRAM(s) Oral daily  influenza   Vaccine 0.5 milliLiter(s) IntraMuscular once  insulin glargine Injectable (LANTUS) 28 Unit(s) SubCutaneous at bedtime  insulin lispro (ADMELOG) corrective regimen sliding scale   SubCutaneous three times a day before meals  levothyroxine 175 MICROGram(s) Oral daily  melatonin 3 milliGRAM(s) Oral at bedtime PRN  ondansetron Injectable 4 milliGRAM(s) IV Push every 8 hours PRN  pantoprazole    Tablet 40 milliGRAM(s) Oral before breakfast  vancomycin  IVPB 750 milliGRAM(s) IV Intermittent every 12 hours        Assessment and Plan:  52 y/o Female, former IVDA on Suboxone, HTN, HLD, Hep C. , Chronic lower extremity edema with ulceration of skin presents with c/o generalized weakness and subjective fever/chills x 2 weeks and worsening lower extremity cellulitis  with oozing from b/l LE R>L.       #B/l LE cellulitis superimposed on chronic LE edema with possible bacteremia  - ID recs appreciated: continue vancomycin and  cefepime 2g q 8hrs  - follow up blood cultures-  No growth at 24 hours  - VA duplex concerning for stenosis  - vascular consulted recs ( no surgical interventions) , reconsulted given leg pain   - podiatry recs appreciated  - Wound care or podiatry for local care of the wounds.   -  re-biopsy of the wounds for cultures and Pathology. Consulted Derm for re-evaluation.   - XR Imaging Right Foot; Flattening of the normal plantar arch. Midfoot arthrosis with diffuse soft tissue swelling. No fracture or erosion.  - duplex Moderate right external iliac and common femoral artery stenosis, Normal arterial flow in the left lower extremity, Tibial arteries not visualized bilaterally  - Local Wound Care; Applied - Xeroform / Gauze / DSD / Kerlix / Secured with Tape  - Weight Bearing Status; WBAT   - ESR 90  - Trend WBC daily  - OOB to chair with assistance  - will consider midline if difficult access  - legs dressed and wrapped today  - biopsy of lower ext wound today ,send for cx and pathology  - follow up blood work     #Acute comminuted fractures of the fourth and fifth metacarpal bodies  -xray Acute comminuted fractures of the fourth and fifth metacarpal bodies.Soft tissue swelling along the dorsal aspect of the hand.  Osteophyte formation at the first MCP joint.  -splint and cast in place POA  - ortho consulted ,No acute orthopaedic surgical intervention   NWB LUE in short arm cast    # Diabetes Type 2  - continue lantus with sliding scale  - CHO consistent diet  - hold metformin  - A1C,     # Opiate dependence  - continue Suboxone     # Gout  -continue colchicine    # Hypothyroid  - continue Levothyroxine    proph heparin  DM diet   full code    Handoff : f/u ortho for left elbow swelling ,cont IV anbx , f/u biopsy results   Dispo:  home does not want MICKI

## 2025-01-07 ENCOUNTER — TRANSCRIPTION ENCOUNTER (OUTPATIENT)
Age: 55
End: 2025-01-07

## 2025-01-07 VITALS
TEMPERATURE: 98 F | SYSTOLIC BLOOD PRESSURE: 110 MMHG | RESPIRATION RATE: 17 BRPM | HEART RATE: 66 BPM | DIASTOLIC BLOOD PRESSURE: 73 MMHG | OXYGEN SATURATION: 97 %

## 2025-01-07 PROCEDURE — 99239 HOSP IP/OBS DSCHRG MGMT >30: CPT

## 2025-01-07 RX ORDER — PREDNISONE 5 MG
4 TABLET ORAL
Qty: 30 | Refills: 0
Start: 2025-01-07

## 2025-01-07 RX ADMIN — Medication 40 MILLIGRAM(S): at 06:36

## 2025-01-07 RX ADMIN — INFLUENZA A VIRUS A/WISCONSIN/588/2019 (H1N1) RECOMBINANT HEMAGGLUTININ ANTIGEN, INFLUENZA A VIRUS A/DARWIN/6/2021 (H3N2) RECOMBINANT HEMAGGLUTININ ANTIGEN, INFLUENZA B VIRUS B/AUSTRIA/1359417/2021 RECOMBINANT HEMAGGLUTININ ANTIGEN, AND INFLUENZA B VIRUS B/PHUKET/3073/2013 RECOMBINANT HEMAGGLUTININ ANTIGEN 0.5 MILLILITER(S): 45; 45; 45; 45 INJECTION INTRAMUSCULAR at 14:45

## 2025-01-07 RX ADMIN — Medication 100 MILLIGRAM(S): at 13:22

## 2025-01-07 RX ADMIN — BUPRENORPHINE HYDROCHLORIDE AND NALOXONE HYDROCHLORIDE DIHYDRATE 2.5 TABLET(S): 8; 2 TABLET SUBLINGUAL at 11:39

## 2025-01-07 RX ADMIN — COLCHICINE 0.6 MILLIGRAM(S): 0.6 CAPSULE ORAL at 06:36

## 2025-01-07 RX ADMIN — VANCOMYCIN HYDROCHLORIDE 250 MILLIGRAM(S): 5 INJECTION, POWDER, LYOPHILIZED, FOR SOLUTION INTRAVENOUS at 08:21

## 2025-01-07 RX ADMIN — PANTOPRAZOLE 40 MILLIGRAM(S): 40 TABLET, DELAYED RELEASE ORAL at 06:36

## 2025-01-07 RX ADMIN — LEVOTHYROXINE SODIUM 175 MICROGRAM(S): 175 TABLET ORAL at 06:35

## 2025-01-07 RX ADMIN — Medication 100 MILLIGRAM(S): at 06:36

## 2025-01-07 NOTE — DISCHARGE NOTE PROVIDER - PROVIDER TOKENS
PROVIDER:[TOKEN:[[id number]],FOLLOWUP:[1 week]],PROVIDER:[TOKEN:[[id number]],FOLLOWUP:[1 week]],PROVIDER:[TOKEN:[[id number]],FOLLOWUP:[1 month]] PROVIDER:[TOKEN:[[id number]],FOLLOWUP:[1 week]],PROVIDER:[TOKEN:[[id number]],FOLLOWUP:[1 week]],PROVIDER:[TOKEN:[[id number]],FOLLOWUP:[1 month]],PROVIDER:[TOKEN:[[id number]],FOLLOWUP:[2 weeks]]

## 2025-01-07 NOTE — DISCHARGE NOTE PROVIDER - NSDCCPGOAL_GEN_ALL_CORE_FT
To get better and follow your care plan as instructed. Follow with your primary care doctor, follow with dermatology. Follow with a pulmonologist to evaluate you for interstitial lung disease To get better and follow your care plan as instructed. Follow with your primary care doctor, follow with dermatology (Dr Agarwal). Follow with a pulmonologist to evaluate you for interstitial lung disease (Dr Lozoya). Follow with an orthopedic doctor for your cast (DR Phelps). Do not lean on L elbow

## 2025-01-07 NOTE — PROGRESS NOTE ADULT - REASON FOR ADMISSION
RLE Ulceration with oozing

## 2025-01-07 NOTE — DISCHARGE NOTE NURSING/CASE MANAGEMENT/SOCIAL WORK - NSDCPEFALRISK_GEN_ALL_CORE
For information on Fall & Injury Prevention, visit: [email or web address] OR  [email or web address] OR  https://www.cdc.gov/steadi/patient.html

## 2025-01-07 NOTE — PHYSICAL THERAPY INITIAL EVALUATION ADULT - ADDITIONAL COMMENTS
as Per pt she lives in  w / 16 steps to enter W/ RT HR and all in one level inside the house , as per pt she was independent W/ Bed mobility , transfer and ambulation W/ RW and need Assistance for  ADLS Which BF help her with.  Pt mentioned she was going up and Down Stair W/ Scoot on Buttock technique.

## 2025-01-07 NOTE — DISCHARGE NOTE PROVIDER - NSDCFUSCHEDAPPT_GEN_ALL_CORE_FT
Fer Guerra  [facility] PreAdmits  Scheduled Appointment: 01/08/2025    Fer Guerra  [facility]  PSYCHIATRY [address]  Scheduled Appointment: 01/08/2025

## 2025-01-07 NOTE — DISCHARGE NOTE NURSING/CASE MANAGEMENT/SOCIAL WORK - PATIENT PORTAL LINK FT
You can access the FollowMyHealth Patient Portal offered by [facility] by registering at the following website: [email or web address]. By joining [facility]’s FollowMyHealth portal, you will also be able to view your health information using other applications (apps) compatible with our system.

## 2025-01-07 NOTE — CHART NOTE - NSCHARTNOTEFT_GEN_A_CORE
Patient is in a chronic stable state secondary to her chronic disease: Leukocytoclastic Vasculitis with lower extremity edema and open wounds, Diabetes Type 2  Due to patient's deconditioning and generalized weakness secondary to Leukocytoclastic Vasculitis with lower extremity edema with open wounds.  Patient will require a standard wheelchair. This is necessary to achieve daily tasks and therapies which cannot be achieved with the use of a cane or  rolling walker. Patient is in agreement with wheelchair use at home and assistance will be provided if needed.

## 2025-01-07 NOTE — DISCHARGE NOTE NURSING/CASE MANAGEMENT/SOCIAL WORK - FINANCIAL ASSISTANCE
[facility] provides services at a reduced cost to those who are determined to be eligible through [facility]’s financial assistance program. Information regarding [facility]’s financial assistance program can be found by going to [email or web address] or by calling [phone].

## 2025-01-07 NOTE — DISCHARGE NOTE PROVIDER - NSDCCPCAREPLAN_GEN_ALL_CORE_FT
PRINCIPAL DISCHARGE DIAGNOSIS  Diagnosis: Cellulitis  Assessment and Plan of Treatment:       SECONDARY DISCHARGE DIAGNOSES  Diagnosis: Lymphedema  Assessment and Plan of Treatment:     Diagnosis: Closed fracture of metacarpal of left hand  Assessment and Plan of Treatment:      PRINCIPAL DISCHARGE DIAGNOSIS  Diagnosis: Cellulitis  Assessment and Plan of Treatment: Wash wounds with soap and water   Apply Xeroform dressing over wounds  Then apply Gauze, ABD pad, Kerlix, Secure with tape   cont LWC daily q24H  Thank You      SECONDARY DISCHARGE DIAGNOSES  Diagnosis: Lymphedema  Assessment and Plan of Treatment:     Diagnosis: Closed fracture of metacarpal of left hand  Assessment and Plan of Treatment:

## 2025-01-07 NOTE — PROGRESS NOTE ADULT - TIME BILLING
On this date of service, level of risk to patient is considered: Moderate.     I have personally seen and examined this patient.    I have reviewed all pertinent clinical information and reviewed all relevant imaging and diagnostic studies personally.   I discussed recommendations with the primary team. I discussed recommendations with the primary team.
direct pt care

## 2025-01-07 NOTE — PROGRESS NOTE ADULT - SUBJECTIVE AND OBJECTIVE BOX
DENY EZRA  54y, Female    LOS  11d    INTERVAL EVENTS/HPI  - No acute events overnight  - T(F): , Max: 98.4 (01-07-25 @ 11:41)  - WBC Count: 6.42 (01-05-25 @ 08:07)    REVIEW OF SYSTEMS:  CONSTITUTIONAL: No fever or chills  HEENT: No sore throat  RESPIRATORY: No cough, no shortness of breath  CARDIOVASCULAR: No chest pain or palpitations  GASTROINTESTINAL: No abdominal or epigastric pain  GENITOURINARY: No dysuria  NEUROLOGICAL: No headache/dizziness  MSK: No joint pain, erythema, or swelling; no back pain  SKIN: No itching, rashes  All other ROS negative except noted above    Prior hospital charts reviewed [Yes]  Primary team notes reviewed [Yes]  Other consultant notes reviewed [Yes]    ANTIMICROBIALS:   cefepime   IVPB    cefepime   IVPB 2000 every 8 hours  vancomycin  IVPB 750 every 12 hours      OTHER MEDS: MEDICATIONS  (STANDING):  acetaminophen     Tablet .. 650 every 6 hours PRN  albuterol    90 MICROgram(s) HFA Inhaler 2 every 6 hours PRN  aluminum hydroxide/magnesium hydroxide/simethicone Suspension 30 every 4 hours PRN  atorvastatin 20 at bedtime  buprenorphine 8 mG/naloxone 2 mG SL  Tablet 2.5 daily  colchicine 0.6 two times a day  dextrose Oral Gel 15 once PRN  enoxaparin Injectable 40 every 24 hours  furosemide    Tablet 40 daily  influenza   Vaccine 0.5 once  insulin glargine Injectable (LANTUS) 28 at bedtime  insulin lispro (ADMELOG) corrective regimen sliding scale  three times a day before meals  levothyroxine 175 daily  melatonin 3 at bedtime PRN  ondansetron Injectable 4 every 8 hours PRN  pantoprazole    Tablet 40 before breakfast      Vital Signs Last 24 Hrs  T(F): 98.4 (01-07-25 @ 11:41), Max: 98.6 (01-02-25 @ 14:00)    Vital Signs Last 24 Hrs  HR: 63 (01-07-25 @ 11:41) (63 - 81)  BP: 114/77 (01-07-25 @ 11:41) (109/69 - 120/77)  RR: 16 (01-07-25 @ 11:41)  SpO2: 98% (01-07-25 @ 11:41) (98% - 100%)  Wt(kg): --    EXAM:  GENERAL: NAD, lying in bed  HEAD: No head lesions  NECK: Supple  CHEST/LUNG: Clear to auscultation bilaterally  HEART: S1 S2  ABDOMEN: Soft, nontender  EXTREMITIES: Bilateral lower extremity wounds noted.   NERVOUS SYSTEM: Alert and oriented to person    Labs:            WBC Trend:  WBC Count: 6.42 (01-05-25 @ 08:07)  WBC Count: 7.82 (01-04-25 @ 08:10)  WBC Count: 6.60 (01-03-25 @ 07:28)      Creatine Trend:  Creatinine: 1.0 (01-05)  Creatinine: 1.0 (01-04)  Creatinine: 1.1 (01-03)  Creatinine: 0.9 (01-02)      Liver Biochemical Testing Trend:  Alanine Aminotransferase (ALT/SGPT): 9 (01-05)  Alanine Aminotransferase (ALT/SGPT): 7 (01-04)  Alanine Aminotransferase (ALT/SGPT): <5 (01-03)  Alanine Aminotransferase (ALT/SGPT): 6 (01-02)  Alanine Aminotransferase (ALT/SGPT): <5 (12-28)  Aspartate Aminotransferase (AST/SGOT): 44 (01-05-25 @ 08:07)  Aspartate Aminotransferase (AST/SGOT): 42 (01-04-25 @ 08:10)  Aspartate Aminotransferase (AST/SGOT): 28 (01-03-25 @ 07:28)  Aspartate Aminotransferase (AST/SGOT): 39 (01-02-25 @ 09:32)  Aspartate Aminotransferase (AST/SGOT): 18 (12-28-24 @ 11:22)  Bilirubin Total: 0.4 (01-05)  Bilirubin Total: 0.2 (01-04)  Bilirubin Total: 0.3 (01-03)  Bilirubin Total: 0.2 (01-02)  Bilirubin Total: 0.4 (12-28)      Trend LDH  01-10-24 @ 06:55  230          MICROBIOLOGY:  Vancomycin Level, Random: 14.0 (01-05 @ 08:07)  Vancomycin Level, Trough: 25.2 (01-03 @ 06:53)  Vancomycin Level, Random: 32.1 (01-02 @ 16:51)  Vancomycin Level, Random: 37.6 (01-01 @ 08:19)  Vancomycin Level, Trough: 9.3 (12-30 @ 11:00)    Female    Culture - Blood (collected 28 Dec 2024 17:10)  Source: .Blood BLOOD  Final Report:    No growth at 5 days    Culture - Blood (collected 27 Dec 2024 16:00)  Source: .Blood BLOOD  Final Report:    No growth at 5 days    Culture - Blood (collected 27 Dec 2024 16:00)  Source: .Blood BLOOD  Final Report:    No growth at 5 days    Urinalysis with Rflx Culture (collected 29 Nov 2024 00:13)    Culture - Blood (collected 28 Nov 2024 22:04)  Source: .Blood BLOOD  Final Report:    No growth at 5 days    Culture - Blood (collected 28 Nov 2024 22:04)  Source: .Blood BLOOD  Final Report:    No growth at 5 days    Urinalysis with Rflx Culture (collected 14 Nov 2024 18:42)    Culture - Blood (collected 27 May 2024 21:10)  Source: .Blood Blood  Final Report:    No growth at 5 days    Culture - Blood (collected 27 May 2024 21:10)  Source: .Blood Blood  Final Report:    No growth at 5 days    Culture - Other (collected 06 May 2024 16:16)  Source: .Other LLE  Final Report:    Rare Staphylococcus aureus    Normal skin sumit isolated  Organism: Staphylococcus aureus  Organism: Staphylococcus aureus    Sensitivities:      Method Type: REED      -  Clindamycin: R 0.5 This isolate is presumed to be clindamycin resistant based on detection of inducible resistance. Clindamycin may still be effective in some patients.      -  Erythromycin: R >4      -  Gentamicin: S <=1 Should not be used as monotherapy      -  Oxacillin: S 0.5 Oxacillin predicts susceptibility for dicloxacillin, methicillin, and nafcillin      -  Penicillin: R >8      -  Rifampin: S <=1 Should not be used as monotherapy      -  Tetracycline: S <=1      -  Trimethoprim/Sulfamethoxazole: S <=0.5/9.5      -  Vancomycin: S 2      HIV-1/2 Combo Result: Nonreact (12-31-24 @ 08:00)  HIV-1/2 Combo Result: Nonreact (01-11-24 @ 13:40)        Cryptococcal Antigen - Serum: Negative (01-01-25 @ 08:19)              Cryptococcal Antigen - Serum: Negative (01-01 @ 08:19)          C-Reactive Protein: 39.4 (01-02)  RADIOLOGY & ADDITIONAL TESTS:  I have personally reviewed the relevant images.   CXR      CT  < from: Xray Elbow AP + Lateral + Oblique, Left (01.04.25 @ 10:19) >    IMPRESSION:    No acute displaced fracture.    --- End of Report ---    < end of copied text >        WEIGHT  Weight (kg): 90.7 (12-27-24 @ 14:12)      All available historical records have been reviewed

## 2025-01-07 NOTE — DISCHARGE NOTE PROVIDER - NSDCMRMEDTOKEN_GEN_ALL_CORE_FT
albuterol 90 mcg/inh inhalation powder: 2 puff(s) inhaled every 6 hours, As Needed -for bronchospasm   aspirin 81 mg oral delayed release tablet: 1 tab(s) orally once a day  atorvastatin 20 mg oral tablet: 1 tab(s) orally once a day (at bedtime)  colchicine 0.6 mg oral tablet: 1 tab(s) orally 2 times a day  furosemide 40 mg oral tablet: 1 tab(s) orally once a day  Lantus 100 units/mL subcutaneous solution: 28 unit(s) subcutaneous once a day (at bedtime)  metFORMIN 500 mg oral tablet: 2 tab(s) orally 2 times a day  pantoprazole 40 mg oral delayed release tablet: 1 tab(s) orally once a day (before a meal) Take once a day while taking prednisone  predniSONE 10 mg oral tablet: 4 tab(s) orally once a day Take 4 tabs once a day for 3 days, then 3 tabs once a day for 3 days, then 2 tabs once a day for three days, then 1 tab once a day for 3 days and then stop  pregabalin 50 mg oral capsule: 1 cap(s) orally every 12 hours  silver sulfADIAZINE 1% topical cream: Apply topically to affected area once a day  Suboxone 8 mg-2 mg sublingual tablet: 2.5 film(s) sublingual once a day  Synthroid 175 mcg (0.175 mg) oral tablet: 1 tab(s) orally once a day   Ace Wraps: Apply on the L elbow for 3 weeks  albuterol 90 mcg/inh inhalation powder: 2 puff(s) inhaled every 6 hours, As Needed -for bronchospasm   aspirin 81 mg oral delayed release tablet: 1 tab(s) orally once a day  atorvastatin 20 mg oral tablet: 1 tab(s) orally once a day (at bedtime)  colchicine 0.6 mg oral tablet: 1 tab(s) orally 2 times a day  furosemide 40 mg oral tablet: 1 tab(s) orally once a day  Lantus 100 units/mL subcutaneous solution: 28 unit(s) subcutaneous once a day (at bedtime)  metFORMIN 500 mg oral tablet: 2 tab(s) orally 2 times a day  pantoprazole 40 mg oral delayed release tablet: 1 tab(s) orally once a day (before a meal) Take once a day while taking prednisone  predniSONE 10 mg oral tablet: 4 tab(s) orally once a day Take 4 tabs once a day for 3 days, then 3 tabs once a day for 3 days, then 2 tabs once a day for three days, then 1 tab once a day for 3 days and then stop  pregabalin 50 mg oral capsule: 1 cap(s) orally every 12 hours  silver sulfADIAZINE 1% topical cream: Apply topically to affected area once a day  Suboxone 8 mg-2 mg sublingual tablet: 2.5 film(s) sublingual once a day  Synthroid 175 mcg (0.175 mg) oral tablet: 1 tab(s) orally once a day

## 2025-01-07 NOTE — DISCHARGE NOTE PROVIDER - CARE PROVIDERS DIRECT ADDRESSES
,[email or web address]_Unknown

## 2025-01-07 NOTE — PHYSICAL THERAPY INITIAL EVALUATION ADULT - GENERAL OBSERVATIONS, REHAB EVAL
15:30 -16:00 15:30 -16:00. Chart reviewed. Order received.  Patient is ok to be  seen for PT,confirmed with RN. pt encountered  Semi marks in bed denies pain, and agrees to participate in session, +  Heplock , + BLE Acewrap Dressing  , +  LUE Soft Splint , NWB LUE  ,NAD.

## 2025-01-07 NOTE — PHYSICAL THERAPY INITIAL EVALUATION ADULT - PERTINENT HX OF CURRENT PROBLEM, REHAB EVAL
54 y/o Female, former IVDA on Suboxone, HTN, HLD, Hep C. , Chronic lower extremity edema with ulceration of skin presents with c/o generalized weakness and subjective fever/chills x 2 weeks and worsening lower extremity cellulitis R>L. , with oozing from RLE.  Admitted 11/29-12/3/2024 with similar scenario. Admitted today for same complaints; denies fever

## 2025-01-07 NOTE — PROGRESS NOTE ADULT - PROVIDER SPECIALTY LIST ADULT
"Anesthesia Transfer of Care Note    Patient: Jennifer Prescott    Procedure(s) Performed: Procedure(s) (LRB):  COLONOSCOPY (N/A)    Patient location: PACU    Anesthesia Type: general    Transport from OR: Transported from OR on 6-10 L/min O2 by face mask with adequate spontaneous ventilation    Post pain: adequate analgesia    Post vital signs: stable    Level of consciousness: sedated    Nausea/Vomiting: no nausea/vomiting    Complications: none          Last vitals:   Visit Vitals    BP (!) 98/41    Pulse 73    Temp 36.6 °C (97.9 °F) (Temporal)    Resp 20    Ht 5' 10" (1.778 m)    Wt 108.4 kg (238 lb 15.7 oz)    LMP  (LMP Unknown)    SpO2 99%    Breastfeeding No    BMI 34.29 kg/m2     "
Hospitalist
Hospitalist
Infectious Disease
Infectious Disease
Internal Medicine
Orthopedics
Podiatry
Hospitalist
Internal Medicine
Internal Medicine
Podiatry
Hospitalist
Hospitalist
Infectious Disease
Internal Medicine
Internal Medicine
Podiatry
Infectious Disease
Infectious Disease

## 2025-01-07 NOTE — DISCHARGE NOTE PROVIDER - ATTENDING DISCHARGE PHYSICAL EXAMINATION:
General: NAD, comfortable in bed  HEENT: NCAT  Lungs: No increased WOB  CVS: RRR  Abdomen: , non-distended  Extremities: bilateral edema

## 2025-01-07 NOTE — PHYSICAL THERAPY INITIAL EVALUATION ADULT - NAME OF CLINICIAN
"Chief Complaint   Patient presents with     Consult     Snores per wife, No SS or cpap       Initial BP 97/59  Pulse 66  Resp 18  Ht 1.854 m (6' 1\")  Wt 80.7 kg (178 lb)  SpO2 95%  BMI 23.48 kg/m2 Estimated body mass index is 23.48 kg/(m^2) as calculated from the following:    Height as of this encounter: 1.854 m (6' 1\").    Weight as of this encounter: 80.7 kg (178 lb).  Medication Reconciliation: complete         Neck  39cm  15.5in  Ess 5      Jacqui Langley LPN/CMA  " SCOTT Rm

## 2025-01-07 NOTE — PROGRESS NOTE ADULT - ASSESSMENT
This is a 52 y/o Female, former IVDA on Suboxone, HTN, HLD, Hep C and Chronic lower extremity edema with ulceration of skin presents with c/o generalized weakness worsening lower extremity cellulitis R>L    IMPRESSION  #Bilateral lower extremity wounds/ulcers with underlying stasis dermatitis.  pyoderma gangrenosum vs Vasculitis   #History of polysubstance use disorder. On Suboxone  #Recurrent admission in the past for lower extremity cellulitis? 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  Hepatitis C Ab +, PCR undetectable 2/2/24  Cryoglobulin negative 2/2/24  Hepatitis B immune, HIV screen negative.   DULCE negative  RF negative  p-ANCA positive, titer 1:40  Blood cultures NGTD    RECOMMENDATIONS  -Vascular eval noted.   -Local wound care as per the podiatry.   -Repeat biopsy for pathology is pending.   -Wound look improved. Pain persists but better.  -Has received 10 days of broad spectrum abx. Can Stop abx on discharge.   -Consider small dose of steroids.   -Advised patient she should also follow up with dermatology as well if she is a candidate for biologics.   -Off loading to prevent pressure sores and preventive measures to avoid aspiration    If any questions, please send a message or call on [employer] Teams  Please continue to update ID with any pertinent new laboratory or radiographic findings.    Stella Chavez M.D  Infectious Diseases Attending/   [employer] at [facility]

## 2025-01-07 NOTE — DISCHARGE NOTE PROVIDER - HOSPITAL COURSE
52 y/o Female, former IVDA on Suboxone, HTN, HLD, Hep C. , Chronic lower extremity edema with ulceration of skin presents with c/o generalized weakness and subjective fever/chills x 2 weeks and worsening lower extremity cellulitis  with oozing from b/l LE R>L. Patient was treated for cellulitis with ID consultation. Patient completed IV antibiotic course in the hospital. Patient will be discharged with a short prednisone taper for her leukocytoclastic vasculitis. Patient also found to have L elbow bursitis status post tap, with reaccumulation of fluid afterwards. Patient to follow with PCP, dermatology and ortho as an outpatient. Patient also with evidence of interstitial lung disease on CT scan from 11/2024, provided outpatient pulmonary follow-up.   54 y/o Female, former IVDA on Suboxone, HTN, HLD, Hep C. , Chronic lower extremity edema with ulceration of skin presents with c/o generalized weakness and subjective fever/chills x 2 weeks and worsening lower extremity cellulitis  with oozing from b/l LE R>L. Patient was treated for cellulitis with ID consultation. Patient completed IV antibiotic course in the hospital. Patient will be discharged with a short prednisone taper for her leukocytoclastic vasculitis. Patient also found to have L elbow bursitis status post tap, with reaccumulation of fluid afterwards. Patient to follow with PCP, dermatology and ortho as an outpatient. Patient also with evidence of interstitial lung disease on CT scan from 11/2024, provided outpatient pulmonary follow-up.    Wound care instructions  Chart reviewed and Patient evaluated. All Questions and Concerns Addressed and Answered  Surgery note appreciated. Biopsy results reviewed .   Local wound care to BLE: xeroform, DSD, kerlix ACE - Multilayer compression dressing B/L LE   Weight Bearing Status; WBAT bilateral lower extremity  Continue w/ Local Wound Care; Q24 Dressing Changes;     52 y/o Female, former IVDA on Suboxone, HTN, HLD, Hep C. , Chronic lower extremity edema with ulceration of skin presents with c/o generalized weakness and subjective fever/chills x 2 weeks and worsening lower extremity cellulitis  with oozing from b/l LE R>L. Patient was treated for cellulitis with ID consultation. Patient completed IV antibiotic course in the hospital. Patient will be discharged with a short prednisone taper for her leukocytoclastic vasculitis. Patient also found to have L elbow bursitis status post tap, with reaccumulation of fluid afterwards. Patient to follow with PCP, dermatology and ortho as an outpatient. Patient also with evidence of interstitial lung disease on CT scan from 11/2024, provided outpatient pulmonary follow-up.    Wound care instructions  Wash wounds with soap and water  Apply Xeroform dressing over wounds  Then apply Gauze, ABD pad, Kerlix, secure with tape  Continue LWC daily [id number]

## 2025-01-07 NOTE — PHYSICAL THERAPY INITIAL EVALUATION ADULT - RANGE OF MOTION EXAMINATION, REHAB EVAL
Comprehensive medical record review; Previous H&P note/patient bilateral lower extremity ROM was WFL (within functional limits)

## 2025-01-07 NOTE — DISCHARGE NOTE PROVIDER - CARE PROVIDER_API CALL
Javier Perez  Geriatric Medicine  [address] [place] [address]  Phone: [phone]  Fax: [phone]  Follow Up Time: 1 week    Reginald Agarwal  Dermatology  [address] [place] [address]  Phone: [phone]  Fax: [phone]  Follow Up Time: 1 week    Govind Lozoya [place]  Pulmonary Disease  [address] [place] [address]  Phone: [phone]  Fax: [phone]  Follow Up Time: 1 month   Javier Perez  Geriatric Medicine  [address] [place] [address]  Phone: [phone]  Fax: [phone]  Follow Up Time: 1 week    Reginald Agarwal  Dermatology  [address] [place] [address]  Phone: [phone]  Fax: [phone]  Follow Up Time: 1 week    Govind Lozoya [place]  Pulmonary Disease  [address] [place] [address]  Phone: [phone]  Fax: [phone]  Follow Up Time: 1 month    Arvind Phelps  Orthopaedic Surgery  [address] [place] [address]  Phone: [phone]  Fax: [phone]  Follow Up Time: 2 weeks

## 2025-01-07 NOTE — DISCHARGE NOTE NURSING/CASE MANAGEMENT/SOCIAL WORK - NSDCVIVACCINE_GEN_ALL_CORE_FT
Influenza, split virus, trivalent, preservative free; 07-Jan-2025 14:45; Nic Quintanilla (RN); [employer]; [id number] (Exp. Date: 30-Jun-2025); IntraMuscular; Deltoid Right.; 0.5 milliLiter(s); VIS (VIS Published: 06-Aug-2021, VIS Presented: 07-Jan-2025);

## 2025-01-08 ENCOUNTER — APPOINTMENT (OUTPATIENT)
Dept: PSYCHIATRY | Facility: CLINIC | Age: 55
End: 2025-01-08

## 2025-01-09 PROBLEM — E11.9 TYPE 2 DIABETES MELLITUS WITHOUT COMPLICATIONS: Chronic | Status: ACTIVE | Noted: 2024-12-27

## 2025-01-09 PROBLEM — E78.5 HYPERLIPIDEMIA, UNSPECIFIED: Chronic | Status: ACTIVE | Noted: 2024-12-27

## 2025-01-09 PROBLEM — R60.0 LOCALIZED EDEMA: Chronic | Status: ACTIVE | Noted: 2024-12-27

## 2025-01-11 LAB
CULTURE RESULTS: ABNORMAL
SPECIMEN SOURCE: SIGNIFICANT CHANGE UP

## 2025-01-15 DIAGNOSIS — I10 ESSENTIAL (PRIMARY) HYPERTENSION: ICD-10-CM

## 2025-01-15 DIAGNOSIS — J98.2 INTERSTITIAL EMPHYSEMA: ICD-10-CM

## 2025-01-15 DIAGNOSIS — E11.40 TYPE 2 DIABETES MELLITUS WITH DIABETIC NEUROPATHY, UNSPECIFIED: ICD-10-CM

## 2025-01-15 DIAGNOSIS — Z79.4 LONG TERM (CURRENT) USE OF INSULIN: ICD-10-CM

## 2025-01-15 DIAGNOSIS — L97.929 NON-PRESSURE CHRONIC ULCER OF UNSPECIFIED PART OF LEFT LOWER LEG WITH UNSPECIFIED SEVERITY: ICD-10-CM

## 2025-01-15 DIAGNOSIS — L97.919 NON-PRESSURE CHRONIC ULCER OF UNSPECIFIED PART OF RIGHT LOWER LEG WITH UNSPECIFIED SEVERITY: ICD-10-CM

## 2025-01-15 DIAGNOSIS — E78.5 HYPERLIPIDEMIA, UNSPECIFIED: ICD-10-CM

## 2025-01-15 DIAGNOSIS — J43.9 EMPHYSEMA, UNSPECIFIED: ICD-10-CM

## 2025-01-15 DIAGNOSIS — E03.9 HYPOTHYROIDISM, UNSPECIFIED: ICD-10-CM

## 2025-01-15 DIAGNOSIS — E66.9 OBESITY, UNSPECIFIED: ICD-10-CM

## 2025-01-15 DIAGNOSIS — L03.116 CELLULITIS OF LEFT LOWER LIMB: ICD-10-CM

## 2025-01-15 DIAGNOSIS — I89.0 LYMPHEDEMA, NOT ELSEWHERE CLASSIFIED: ICD-10-CM

## 2025-01-15 DIAGNOSIS — M31.0 HYPERSENSITIVITY ANGIITIS: ICD-10-CM

## 2025-01-15 DIAGNOSIS — Z79.890 HORMONE REPLACEMENT THERAPY: ICD-10-CM

## 2025-01-15 DIAGNOSIS — F11.20 OPIOID DEPENDENCE, UNCOMPLICATED: ICD-10-CM

## 2025-01-15 DIAGNOSIS — I87.2 VENOUS INSUFFICIENCY (CHRONIC) (PERIPHERAL): ICD-10-CM

## 2025-01-15 DIAGNOSIS — Z79.82 LONG TERM (CURRENT) USE OF ASPIRIN: ICD-10-CM
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
## 2025-01-15 DIAGNOSIS — J84.9 INTERSTITIAL PULMONARY DISEASE, UNSPECIFIED: ICD-10-CM

## 2025-01-15 DIAGNOSIS — D84.81 IMMUNODEFICIENCY DUE TO CONDITIONS CLASSIFIED ELSEWHERE: ICD-10-CM

## 2025-01-15 DIAGNOSIS — L88 PYODERMA GANGRENOSUM: ICD-10-CM
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
## 2025-01-15 DIAGNOSIS — L03.115 CELLULITIS OF RIGHT LOWER LIMB: ICD-10-CM
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
## 2025-01-15 DIAGNOSIS — E11.51 TYPE 2 DIABETES MELLITUS WITH DIABETIC PERIPHERAL ANGIOPATHY WITHOUT GANGRENE: ICD-10-CM

## 2025-01-15 DIAGNOSIS — E83.42 HYPOMAGNESEMIA: ICD-10-CM

## 2025-01-15 DIAGNOSIS — M10.9 GOUT, UNSPECIFIED: ICD-10-CM

## 2025-01-15 DIAGNOSIS — S62.309A UNSPECIFIED FRACTURE OF UNSPECIFIED METACARPAL BONE, INITIAL ENCOUNTER FOR CLOSED FRACTURE: ICD-10-CM

## 2025-01-15 DIAGNOSIS — Z79.84 LONG TERM (CURRENT) USE OF ORAL HYPOGLYCEMIC DRUGS: ICD-10-CM

## 2025-01-17 ENCOUNTER — OUTPATIENT (OUTPATIENT)
Dept: OUTPATIENT SERVICES | Facility: HOSPITAL | Age: 55
LOS: 1 days | End: 2025-01-17
Payer: MEDICAID

## 2025-01-17 ENCOUNTER — APPOINTMENT (OUTPATIENT)
Dept: PSYCHIATRY | Facility: CLINIC | Age: 55
End: 2025-01-17

## 2025-01-17 DIAGNOSIS — Z90.89 ACQUIRED ABSENCE OF OTHER ORGANS: Chronic | ICD-10-CM

## 2025-01-17 DIAGNOSIS — Z90.49 ACQUIRED ABSENCE OF OTHER SPECIFIED PARTS OF DIGESTIVE TRACT: Chronic | ICD-10-CM

## 2025-01-17 DIAGNOSIS — F11.20 OPIOID DEPENDENCE, UNCOMPLICATED: ICD-10-CM

## 2025-01-17 PROCEDURE — 99215 OFFICE O/P EST HI 40 MIN: CPT | Mod: 95

## 2025-01-18 DIAGNOSIS — F11.20 OPIOID DEPENDENCE, UNCOMPLICATED: ICD-10-CM

## 2025-02-04 ENCOUNTER — APPOINTMENT (OUTPATIENT)
Dept: PSYCHIATRY | Facility: CLINIC | Age: 55
End: 2025-02-04

## 2025-02-06 ENCOUNTER — OUTPATIENT (OUTPATIENT)
Dept: OUTPATIENT SERVICES | Facility: HOSPITAL | Age: 55
LOS: 1 days | End: 2025-02-06
Payer: MEDICAID

## 2025-02-06 ENCOUNTER — APPOINTMENT (OUTPATIENT)
Dept: PSYCHIATRY | Facility: CLINIC | Age: 55
End: 2025-02-06

## 2025-02-06 DIAGNOSIS — F10.20 ALCOHOL DEPENDENCE, UNCOMPLICATED: ICD-10-CM

## 2025-02-06 DIAGNOSIS — Z90.49 ACQUIRED ABSENCE OF OTHER SPECIFIED PARTS OF DIGESTIVE TRACT: Chronic | ICD-10-CM

## 2025-02-06 DIAGNOSIS — Z90.89 ACQUIRED ABSENCE OF OTHER ORGANS: Chronic | ICD-10-CM

## 2025-02-06 PROCEDURE — H0004: CPT | Mod: 95

## 2025-02-07 DIAGNOSIS — F10.20 ALCOHOL DEPENDENCE, UNCOMPLICATED: ICD-10-CM

## 2025-02-10 ENCOUNTER — INPATIENT (INPATIENT)
Facility: HOSPITAL | Age: 55
LOS: 3 days | Discharge: HOME | DRG: 351 | End: 2025-02-14
Attending: STUDENT IN AN ORGANIZED HEALTH CARE EDUCATION/TRAINING PROGRAM | Admitting: FAMILY MEDICINE
Payer: MEDICAID

## 2025-02-10 VITALS
RESPIRATION RATE: 18 BRPM | WEIGHT: 179.9 LBS | HEIGHT: 68 IN | DIASTOLIC BLOOD PRESSURE: 69 MMHG | TEMPERATURE: 98 F | OXYGEN SATURATION: 100 % | SYSTOLIC BLOOD PRESSURE: 126 MMHG | HEART RATE: 64 BPM

## 2025-02-10 DIAGNOSIS — Z90.49 ACQUIRED ABSENCE OF OTHER SPECIFIED PARTS OF DIGESTIVE TRACT: Chronic | ICD-10-CM

## 2025-02-10 DIAGNOSIS — M79.604 PAIN IN RIGHT LEG: ICD-10-CM

## 2025-02-10 DIAGNOSIS — Z90.89 ACQUIRED ABSENCE OF OTHER ORGANS: Chronic | ICD-10-CM

## 2025-02-10 LAB
ALBUMIN SERPL ELPH-MCNC: 4.5 G/DL — SIGNIFICANT CHANGE UP (ref 3.5–5.2)
ALP SERPL-CCNC: 120 U/L — HIGH (ref 30–115)
ALT FLD-CCNC: <5 U/L — SIGNIFICANT CHANGE UP (ref 0–41)
ANION GAP SERPL CALC-SCNC: 11 MMOL/L — SIGNIFICANT CHANGE UP (ref 7–14)
AST SERPL-CCNC: 16 U/L — SIGNIFICANT CHANGE UP (ref 0–41)
BASOPHILS # BLD AUTO: 0.04 K/UL — SIGNIFICANT CHANGE UP (ref 0–0.2)
BASOPHILS NFR BLD AUTO: 0.5 % — SIGNIFICANT CHANGE UP (ref 0–1)
BILIRUB SERPL-MCNC: 0.4 MG/DL — SIGNIFICANT CHANGE UP (ref 0.2–1.2)
BUN SERPL-MCNC: 15 MG/DL — SIGNIFICANT CHANGE UP (ref 10–20)
CALCIUM SERPL-MCNC: 9.5 MG/DL — SIGNIFICANT CHANGE UP (ref 8.4–10.5)
CHLORIDE SERPL-SCNC: 93 MMOL/L — LOW (ref 98–110)
CO2 SERPL-SCNC: 30 MMOL/L — SIGNIFICANT CHANGE UP (ref 17–32)
CREAT SERPL-MCNC: 0.8 MG/DL — SIGNIFICANT CHANGE UP (ref 0.7–1.5)
EGFR: 88 ML/MIN/1.73M2 — SIGNIFICANT CHANGE UP
EOSINOPHIL # BLD AUTO: 0.14 K/UL — SIGNIFICANT CHANGE UP (ref 0–0.7)
EOSINOPHIL NFR BLD AUTO: 1.7 % — SIGNIFICANT CHANGE UP (ref 0–8)
GLUCOSE SERPL-MCNC: 317 MG/DL — HIGH (ref 70–99)
HCT VFR BLD CALC: 32.5 % — LOW (ref 37–47)
HGB BLD-MCNC: 10 G/DL — LOW (ref 12–16)
IMM GRANULOCYTES NFR BLD AUTO: 0.4 % — HIGH (ref 0.1–0.3)
LACTATE SERPL-SCNC: 0.9 MMOL/L — SIGNIFICANT CHANGE UP (ref 0.7–2)
LYMPHOCYTES # BLD AUTO: 1.69 K/UL — SIGNIFICANT CHANGE UP (ref 1.2–3.4)
LYMPHOCYTES # BLD AUTO: 20.3 % — LOW (ref 20.5–51.1)
MCHC RBC-ENTMCNC: 25.3 PG — LOW (ref 27–31)
MCHC RBC-ENTMCNC: 30.8 G/DL — LOW (ref 32–37)
MCV RBC AUTO: 82.3 FL — SIGNIFICANT CHANGE UP (ref 81–99)
MONOCYTES # BLD AUTO: 0.37 K/UL — SIGNIFICANT CHANGE UP (ref 0.1–0.6)
MONOCYTES NFR BLD AUTO: 4.4 % — SIGNIFICANT CHANGE UP (ref 1.7–9.3)
NEUTROPHILS # BLD AUTO: 6.06 K/UL — SIGNIFICANT CHANGE UP (ref 1.4–6.5)
NEUTROPHILS NFR BLD AUTO: 72.7 % — SIGNIFICANT CHANGE UP (ref 42.2–75.2)
NRBC # BLD: 0 /100 WBCS — SIGNIFICANT CHANGE UP (ref 0–0)
NRBC BLD-RTO: 0 /100 WBCS — SIGNIFICANT CHANGE UP (ref 0–0)
NT-PROBNP SERPL-SCNC: 254 PG/ML — SIGNIFICANT CHANGE UP (ref 0–300)
PLATELET # BLD AUTO: 240 K/UL — SIGNIFICANT CHANGE UP (ref 130–400)
PMV BLD: 9.8 FL — SIGNIFICANT CHANGE UP (ref 7.4–10.4)
POTASSIUM SERPL-MCNC: 4.2 MMOL/L — SIGNIFICANT CHANGE UP (ref 3.5–5)
POTASSIUM SERPL-SCNC: 4.2 MMOL/L — SIGNIFICANT CHANGE UP (ref 3.5–5)
PROT SERPL-MCNC: 8.8 G/DL — HIGH (ref 6–8)
RBC # BLD: 3.95 M/UL — LOW (ref 4.2–5.4)
RBC # FLD: 14.9 % — HIGH (ref 11.5–14.5)
SODIUM SERPL-SCNC: 134 MMOL/L — LOW (ref 135–146)
WBC # BLD: 8.33 K/UL — SIGNIFICANT CHANGE UP (ref 4.8–10.8)
WBC # FLD AUTO: 8.33 K/UL — SIGNIFICANT CHANGE UP (ref 4.8–10.8)

## 2025-02-10 PROCEDURE — 85025 COMPLETE CBC W/AUTO DIFF WBC: CPT

## 2025-02-10 PROCEDURE — 82962 GLUCOSE BLOOD TEST: CPT

## 2025-02-10 PROCEDURE — 97165 OT EVAL LOW COMPLEX 30 MIN: CPT | Mod: GO

## 2025-02-10 PROCEDURE — 99497 ADVNCD CARE PLAN 30 MIN: CPT | Mod: 25

## 2025-02-10 PROCEDURE — 97162 PT EVAL MOD COMPLEX 30 MIN: CPT | Mod: GP

## 2025-02-10 PROCEDURE — 83880 ASSAY OF NATRIURETIC PEPTIDE: CPT

## 2025-02-10 PROCEDURE — 97116 GAIT TRAINING THERAPY: CPT | Mod: GP

## 2025-02-10 PROCEDURE — 36415 COLL VENOUS BLD VENIPUNCTURE: CPT

## 2025-02-10 PROCEDURE — 73120 X-RAY EXAM OF HAND: CPT | Mod: LT

## 2025-02-10 PROCEDURE — 97535 SELF CARE MNGMENT TRAINING: CPT | Mod: GO

## 2025-02-10 PROCEDURE — 99285 EMERGENCY DEPT VISIT HI MDM: CPT

## 2025-02-10 PROCEDURE — 80053 COMPREHEN METABOLIC PANEL: CPT

## 2025-02-10 PROCEDURE — 99222 1ST HOSP IP/OBS MODERATE 55: CPT

## 2025-02-10 PROCEDURE — 83735 ASSAY OF MAGNESIUM: CPT

## 2025-02-10 PROCEDURE — 93970 EXTREMITY STUDY: CPT | Mod: 26

## 2025-02-10 PROCEDURE — 97110 THERAPEUTIC EXERCISES: CPT | Mod: GP

## 2025-02-10 PROCEDURE — 80202 ASSAY OF VANCOMYCIN: CPT

## 2025-02-10 PROCEDURE — 85027 COMPLETE CBC AUTOMATED: CPT

## 2025-02-10 PROCEDURE — 93970 EXTREMITY STUDY: CPT

## 2025-02-10 RX ORDER — ONDANSETRON 4 MG/1
4 TABLET, ORALLY DISINTEGRATING ORAL EVERY 6 HOURS
Refills: 0 | Status: DISCONTINUED | OUTPATIENT
Start: 2025-02-10 | End: 2025-02-14

## 2025-02-10 RX ORDER — HEPARIN SODIUM,PORCINE 10000/ML
5000 VIAL (ML) INJECTION EVERY 12 HOURS
Refills: 0 | Status: DISCONTINUED | OUTPATIENT
Start: 2025-02-10 | End: 2025-02-14

## 2025-02-10 RX ORDER — DM/PSEUDOEPHED/ACETAMINOPHEN 10-30-250
15 CAPSULE ORAL ONCE
Refills: 0 | Status: DISCONTINUED | OUTPATIENT
Start: 2025-02-10 | End: 2025-02-14

## 2025-02-10 RX ORDER — SENNOSIDES 8.6 MG
2 TABLET ORAL AT BEDTIME
Refills: 0 | Status: DISCONTINUED | OUTPATIENT
Start: 2025-02-10 | End: 2025-02-14

## 2025-02-10 RX ORDER — GLUCAGON 3 MG/1
1 POWDER NASAL ONCE
Refills: 0 | Status: DISCONTINUED | OUTPATIENT
Start: 2025-02-10 | End: 2025-02-14

## 2025-02-10 RX ORDER — INSULIN GLARGINE-YFGN 100 [IU]/ML
25 INJECTION, SOLUTION SUBCUTANEOUS AT BEDTIME
Refills: 0 | Status: DISCONTINUED | OUTPATIENT
Start: 2025-02-10 | End: 2025-02-14

## 2025-02-10 RX ORDER — ATORVASTATIN CALCIUM 80 MG/1
20 TABLET, FILM COATED ORAL AT BEDTIME
Refills: 0 | Status: DISCONTINUED | OUTPATIENT
Start: 2025-02-10 | End: 2025-02-14

## 2025-02-10 RX ORDER — VANCOMYCIN HYDROCHLORIDE 50 MG/ML
750 KIT ORAL EVERY 12 HOURS
Refills: 0 | Status: DISCONTINUED | OUTPATIENT
Start: 2025-02-10 | End: 2025-02-13

## 2025-02-10 RX ORDER — DM/PSEUDOEPHED/ACETAMINOPHEN 10-30-250
25 CAPSULE ORAL ONCE
Refills: 0 | Status: DISCONTINUED | OUTPATIENT
Start: 2025-02-10 | End: 2025-02-14

## 2025-02-10 RX ORDER — CEFEPIME HCL 1 G
2000 IV SOLUTION, PIGGYBACK, BOTTLE (EA) INTRAVENOUS ONCE
Refills: 0 | Status: COMPLETED | OUTPATIENT
Start: 2025-02-10 | End: 2025-02-11

## 2025-02-10 RX ORDER — ASPIRIN 81 MG/1
81 TABLET, COATED ORAL DAILY
Refills: 0 | Status: DISCONTINUED | OUTPATIENT
Start: 2025-02-10 | End: 2025-02-14

## 2025-02-10 RX ORDER — LEVOTHYROXINE SODIUM 25 UG/1
175 TABLET ORAL DAILY
Refills: 0 | Status: DISCONTINUED | OUTPATIENT
Start: 2025-02-10 | End: 2025-02-14

## 2025-02-10 RX ORDER — PREGABALIN CAPSULES, CV 225 MG/1
50 CAPSULE ORAL EVERY 12 HOURS
Refills: 0 | Status: DISCONTINUED | OUTPATIENT
Start: 2025-02-10 | End: 2025-02-14

## 2025-02-10 RX ORDER — INSULIN LISPRO 100/ML
VIAL (ML) SUBCUTANEOUS
Refills: 0 | Status: DISCONTINUED | OUTPATIENT
Start: 2025-02-10 | End: 2025-02-14

## 2025-02-10 RX ORDER — ACETAMINOPHEN 160 MG/5ML
650 SUSPENSION ORAL EVERY 6 HOURS
Refills: 0 | Status: DISCONTINUED | OUTPATIENT
Start: 2025-02-10 | End: 2025-02-14

## 2025-02-10 RX ORDER — DM/PSEUDOEPHED/ACETAMINOPHEN 10-30-250
12.5 CAPSULE ORAL ONCE
Refills: 0 | Status: DISCONTINUED | OUTPATIENT
Start: 2025-02-10 | End: 2025-02-14

## 2025-02-10 RX ORDER — SODIUM CHLORIDE 9 G/ML
1000 INJECTION, SOLUTION INTRAVENOUS
Refills: 0 | Status: DISCONTINUED | OUTPATIENT
Start: 2025-02-10 | End: 2025-02-14

## 2025-02-10 RX ORDER — CEFEPIME HCL 1 G
2000 IV SOLUTION, PIGGYBACK, BOTTLE (EA) INTRAVENOUS EVERY 8 HOURS
Refills: 0 | Status: DISCONTINUED | OUTPATIENT
Start: 2025-02-11 | End: 2025-02-11

## 2025-02-10 RX ORDER — BUPRENORPHINE AND NALOXONE 8; 2 MG/1; MG/1
2.5 FILM BUCCAL; SUBLINGUAL DAILY
Refills: 0 | Status: DISCONTINUED | OUTPATIENT
Start: 2025-02-10 | End: 2025-02-14

## 2025-02-10 RX ORDER — KETOROLAC TROMETHAMINE 10 MG
15 TABLET ORAL ONCE
Refills: 0 | Status: DISCONTINUED | OUTPATIENT
Start: 2025-02-10 | End: 2025-02-10

## 2025-02-10 RX ORDER — COLCHICINE 0.6 MG/1
0.6 TABLET ORAL
Refills: 0 | Status: DISCONTINUED | OUTPATIENT
Start: 2025-02-10 | End: 2025-02-14

## 2025-02-10 RX ORDER — PANTOPRAZOLE 20 MG/1
40 TABLET, DELAYED RELEASE ORAL
Refills: 0 | Status: DISCONTINUED | OUTPATIENT
Start: 2025-02-10 | End: 2025-02-14

## 2025-02-10 RX ORDER — CEFEPIME HCL 1 G
IV SOLUTION, PIGGYBACK, BOTTLE (EA) INTRAVENOUS
Refills: 0 | Status: DISCONTINUED | OUTPATIENT
Start: 2025-02-11 | End: 2025-02-11

## 2025-02-10 RX ADMIN — Medication 15 MILLIGRAM(S): at 20:06

## 2025-02-10 NOTE — H&P ADULT - ASSESSMENT
Patient is 54-year-old female with past medical history of DM, COPD not on home O2, opioid dependence on Suboxone, asthma, thyroidism, chronic venous stasis with chronic lymphedema of lower extremities presenting with     #LE cellulitis  -During previous admission patient was dx with leukocytoclastic vasculitis., she was started on predisone  -Will hold off on the steriod pending ID evaluation  -Will start patient on IV antibiotic, and IV lasix  -Wound care consult for wrapping, vascular consult for possible tamiko boot  -follow up doppler US of LE      #DM2  -continue with lantus, and ISS  -Monitor POC     #opioid dependence  -continue with suboxone       #Progress Note Handoff  Pending (specify):  as above   Family discussion:  plan of care was discussed with patient   in details.  all questions were answered.  seems to understand, and in agreement  Disposition:  PT   Patient is 54-year-old female with past medical history of DM, COPD not on home O2, opioid dependence on Suboxone, asthma, thyroidism, chronic venous stasis with chronic lymphedema of lower extremities presenting with     #LE cellulitis  -During previous admission patient was dx with leukocytoclastic vasculitis., she was started on predisone  -Will hold off on the steriod pending ID evaluation  -Will start patient on IV antibiotic, and IV lasix  -Wound care consult for wrapping, vascular consult for possible tamiko boot  -follow up doppler US of LE      #DM2  -continue with lantus, and ISS  -Monitor POC     #opioid dependence  -continue with suboxone     #Left hand fx  -been in the cast for 6 week  -requesting to see ortho      #Progress Note Handoff  Pending (specify):  as above   Family discussion:  plan of care was discussed with patient   in details.  all questions were answered.  seems to understand, and in agreement  Disposition:  PT

## 2025-02-10 NOTE — H&P ADULT - HISTORY OF PRESENT ILLNESS
Patient is 54-year-old female with past medical history of DM, COPD not on home O2, opioid dependence on Suboxone, asthma, thyroidism, chronic venous stasis with chronic lymphedema of lower extremities presents to the ED complaining of worsening swelling/pain of bilateral lower extremities preventing her from walking.  Patient was admitted for similar symptoms recently and improved after being on steroids.  She denies other complaints. Pt denies fever, chills, nausea, vomiting, abdominal pain, diarrhea, headache, dizziness, weakness, chest pain, SOB, back pain, LOC, trauma, urinary symptoms, cough.

## 2025-02-10 NOTE — ED PROVIDER NOTE - OBJECTIVE STATEMENT
54-year-old female with past medical history of DM, COPD not on home O2, opioid dependence on Suboxone, asthma, thyroidism, chronic venous stasis with chronic lymphedema of lower extremities presents to the ED complaining of worsening swelling/pain of bilateral lower extremities preventing her from walking.  Patient was admitted for similar symptoms recently and improved after being on steroids.  She denies other complaints. Pt denies fever, chills, nausea, vomiting, abdominal pain, diarrhea, headache, dizziness, weakness, chest pain, SOB, back pain, LOC, trauma, urinary symptoms, cough.

## 2025-02-10 NOTE — H&P ADULT - NSHPPHYSICALEXAM_GEN_ALL_CORE
Vital Signs Last 24 Hrs  T(C): 36.7 (10 Feb 2025 16:10), Max: 36.7 (10 Feb 2025 16:10)  T(F): 98 (10 Feb 2025 16:10), Max: 98 (10 Feb 2025 16:10)  HR: 64 (10 Feb 2025 16:10) (64 - 64)  BP: 126/69 (10 Feb 2025 16:10) (126/69 - 126/69)  BP(mean): --  RR: 18 (10 Feb 2025 16:10) (18 - 18)  SpO2: 100% (10 Feb 2025 16:10) (100% - 100%)    Parameters below as of 10 Feb 2025 16:10  Patient On (Oxygen Delivery Method): room air      PHYSICAL EXAM-  GENERAL: NAD   HEAD:  Atraumatic, Normocephalic  EYES: EOMI, PERRLA, conjunctiva and sclera clear  NECK: Supple, No JVD, Normal thyroid  NERVOUS SYSTEM:  Alert & Oriented X3, Motor Strength 5/5 B/L upper and lower extremities; DTRs 2+ intact and symmetric  CHEST/LUNG: Clear to percussion bilaterally; No rales, rhonchi, wheezing, or rubs  HEART: Regular rate and rhythm; No murmurs, rubs, or gallops  ABDOMEN: Soft, Nontender, Nondistended; Bowel sounds present  EXTREMITIES:  2+ Peripheral Pulses, B/L LE edema with erythema, and tenderness  SKIN: No rashes or lesions

## 2025-02-10 NOTE — ED PROVIDER NOTE - HIV OFFER
Detail Level: Zone
Initiate Treatment: clobetasol 0.05 % topical gel \\nQuantity: 30.0 g\\nSig: Apply to affected area three to four times a day\\n\\nLidocaine Viscous 2 % mucosal solution \\nQuantity: 100.0 ml\\nSig: Use prior to meals and teeth brushing
Plan: repeat labs in one week\\ndiscussed metal patch testing
Initiate Treatment: prednisone taper
Previously Negative (within the last year)

## 2025-02-10 NOTE — ED PROVIDER NOTE - CLINICAL SUMMARY MEDICAL DECISION MAKING FREE TEXT BOX
53 yo woman w/ hx of methadone dependance, vasculitis, chronic LE swelling here w/ increased swelling and pain  no fevers, no change in redness  pt admitted in december and biopsies showed vasculitis at that time  improved w/ treatment in hospital    wd/wn  nad  rrr s1s2 wnl  cta b/l  b/l LE w/ swelling, venous insufficiency changes, mild warmth. + ulcerations that do not appear acutely infected    pt unable to ambulate secondary to swelling and pain. After last hospitalization, improved to point of being able to walk    53 yo woman w/ worsening swelling and pain of LE consistent w/ vasculitis diagnosis. Will likely need admit as unable to walk  at this time, would not treat for cellulitis as more likely flare of vasculitic condition

## 2025-02-10 NOTE — ED PROVIDER NOTE - PHYSICAL EXAMINATION
VITAL SIGNS: I have reviewed nursing notes and confirm.  CONSTITUTIONAL: 53 yo F laying on stretcher; in no acute distress.  SKIN: Skin exam is warm and dry, no acute rash.  HEAD: Normocephalic; atraumatic.  EYES: Conjunctiva and sclera clear.  ENT: No nasal discharge; airway clear.   CARD: S1, S2 normal; no murmurs, gallops, or rubs. Regular rate and rhythm.  RESP: No wheezes, rales or rhonchi. Speaking in full sentences.   ABD: Normal bowel sounds; soft; non-distended; non-tender; No rebound or guarding. No CVA tenderness.  EXT: Normal ROM. (+) B/L LE edema, chronic venous stasis changes.  NEURO: Alert, oriented. Grossly unremarkable. No focal deficits.

## 2025-02-11 LAB
ALBUMIN SERPL ELPH-MCNC: 4.4 G/DL — SIGNIFICANT CHANGE UP (ref 3.5–5.2)
ALP SERPL-CCNC: 104 U/L — SIGNIFICANT CHANGE UP (ref 30–115)
ALT FLD-CCNC: <5 U/L — SIGNIFICANT CHANGE UP (ref 0–41)
ANION GAP SERPL CALC-SCNC: 13 MMOL/L — SIGNIFICANT CHANGE UP (ref 7–14)
AST SERPL-CCNC: 18 U/L — SIGNIFICANT CHANGE UP (ref 0–41)
BILIRUB SERPL-MCNC: 0.3 MG/DL — SIGNIFICANT CHANGE UP (ref 0.2–1.2)
BUN SERPL-MCNC: 17 MG/DL — SIGNIFICANT CHANGE UP (ref 10–20)
CALCIUM SERPL-MCNC: 9.3 MG/DL — SIGNIFICANT CHANGE UP (ref 8.4–10.5)
CHLORIDE SERPL-SCNC: 93 MMOL/L — LOW (ref 98–110)
CO2 SERPL-SCNC: 28 MMOL/L — SIGNIFICANT CHANGE UP (ref 17–32)
CREAT SERPL-MCNC: 1 MG/DL — SIGNIFICANT CHANGE UP (ref 0.7–1.5)
EGFR: 67 ML/MIN/1.73M2 — SIGNIFICANT CHANGE UP
GLUCOSE BLDC GLUCOMTR-MCNC: 275 MG/DL — HIGH (ref 70–99)
GLUCOSE SERPL-MCNC: 224 MG/DL — HIGH (ref 70–99)
HCT VFR BLD CALC: 31.5 % — LOW (ref 37–47)
HGB BLD-MCNC: 9.8 G/DL — LOW (ref 12–16)
MCHC RBC-ENTMCNC: 25.7 PG — LOW (ref 27–31)
MCHC RBC-ENTMCNC: 31.1 G/DL — LOW (ref 32–37)
MCV RBC AUTO: 82.5 FL — SIGNIFICANT CHANGE UP (ref 81–99)
NRBC BLD AUTO-RTO: 0 /100 WBCS — SIGNIFICANT CHANGE UP (ref 0–0)
PLATELET # BLD AUTO: 251 K/UL — SIGNIFICANT CHANGE UP (ref 130–400)
PMV BLD: 9.7 FL — SIGNIFICANT CHANGE UP (ref 7.4–10.4)
POTASSIUM SERPL-MCNC: 4.1 MMOL/L — SIGNIFICANT CHANGE UP (ref 3.5–5)
POTASSIUM SERPL-SCNC: 4.1 MMOL/L — SIGNIFICANT CHANGE UP (ref 3.5–5)
PROT SERPL-MCNC: 8.6 G/DL — HIGH (ref 6–8)
RBC # BLD: 3.82 M/UL — LOW (ref 4.2–5.4)
RBC # FLD: 15 % — HIGH (ref 11.5–14.5)
SODIUM SERPL-SCNC: 134 MMOL/L — LOW (ref 135–146)
WBC # BLD: 6.05 K/UL — SIGNIFICANT CHANGE UP (ref 4.8–10.8)
WBC # FLD AUTO: 6.05 K/UL — SIGNIFICANT CHANGE UP (ref 4.8–10.8)

## 2025-02-11 PROCEDURE — 99223 1ST HOSP IP/OBS HIGH 75: CPT

## 2025-02-11 PROCEDURE — 99231 SBSQ HOSP IP/OBS SF/LOW 25: CPT

## 2025-02-11 PROCEDURE — 99232 SBSQ HOSP IP/OBS MODERATE 35: CPT

## 2025-02-11 PROCEDURE — 73120 X-RAY EXAM OF HAND: CPT | Mod: 26,LT

## 2025-02-11 RX ORDER — PROCHLORPERAZINE MALEATE 5 MG/1
10 TABLET ORAL ONCE
Refills: 0 | Status: COMPLETED | OUTPATIENT
Start: 2025-02-11 | End: 2025-02-11

## 2025-02-11 RX ORDER — CEFTRIAXONE 250 MG/1
2000 INJECTION, POWDER, FOR SOLUTION INTRAMUSCULAR; INTRAVENOUS EVERY 24 HOURS
Refills: 0 | Status: DISCONTINUED | OUTPATIENT
Start: 2025-02-11 | End: 2025-02-13

## 2025-02-11 RX ADMIN — Medication 100 MILLIGRAM(S): at 00:00

## 2025-02-11 RX ADMIN — Medication 3: at 08:40

## 2025-02-11 RX ADMIN — Medication 100 MILLIGRAM(S): at 05:26

## 2025-02-11 RX ADMIN — VANCOMYCIN HYDROCHLORIDE 250 MILLIGRAM(S): KIT at 03:14

## 2025-02-11 RX ADMIN — COLCHICINE 0.6 MILLIGRAM(S): 0.6 TABLET ORAL at 05:26

## 2025-02-11 RX ADMIN — Medication 3: at 18:01

## 2025-02-11 RX ADMIN — LEVOTHYROXINE SODIUM 175 MICROGRAM(S): 25 TABLET ORAL at 05:26

## 2025-02-11 RX ADMIN — PANTOPRAZOLE 40 MILLIGRAM(S): 20 TABLET, DELAYED RELEASE ORAL at 05:26

## 2025-02-11 RX ADMIN — ATORVASTATIN CALCIUM 20 MILLIGRAM(S): 80 TABLET, FILM COATED ORAL at 21:53

## 2025-02-11 RX ADMIN — VANCOMYCIN HYDROCHLORIDE 250 MILLIGRAM(S): KIT at 23:41

## 2025-02-11 RX ADMIN — PROCHLORPERAZINE MALEATE 10 MILLIGRAM(S): 5 TABLET ORAL at 23:41

## 2025-02-11 RX ADMIN — Medication 20 MILLIGRAM(S): at 16:35

## 2025-02-11 RX ADMIN — BUPRENORPHINE AND NALOXONE 2.5 TABLET(S): 8; 2 FILM BUCCAL; SUBLINGUAL at 11:02

## 2025-02-11 RX ADMIN — CEFTRIAXONE 100 MILLIGRAM(S): 250 INJECTION, POWDER, FOR SOLUTION INTRAMUSCULAR; INTRAVENOUS at 21:53

## 2025-02-11 RX ADMIN — PREGABALIN CAPSULES, CV 50 MILLIGRAM(S): 225 CAPSULE ORAL at 05:33

## 2025-02-11 RX ADMIN — PREGABALIN CAPSULES, CV 50 MILLIGRAM(S): 225 CAPSULE ORAL at 17:55

## 2025-02-11 RX ADMIN — ASPIRIN 81 MILLIGRAM(S): 81 TABLET, COATED ORAL at 11:02

## 2025-02-11 RX ADMIN — VANCOMYCIN HYDROCHLORIDE 250 MILLIGRAM(S): KIT at 11:01

## 2025-02-11 RX ADMIN — COLCHICINE 0.6 MILLIGRAM(S): 0.6 TABLET ORAL at 17:55

## 2025-02-11 RX ADMIN — Medication 5000 UNIT(S): at 05:28

## 2025-02-11 RX ADMIN — Medication 20 MILLIGRAM(S): at 05:26

## 2025-02-11 RX ADMIN — ONDANSETRON 4 MILLIGRAM(S): 4 TABLET, ORALLY DISINTEGRATING ORAL at 22:29

## 2025-02-11 RX ADMIN — Medication 20 MILLIGRAM(S): at 00:00

## 2025-02-11 RX ADMIN — INSULIN GLARGINE-YFGN 25 UNIT(S): 100 INJECTION, SOLUTION SUBCUTANEOUS at 21:53

## 2025-02-11 NOTE — PHYSICAL THERAPY INITIAL EVALUATION ADULT - PERTINENT HX OF CURRENT PROBLEM, REHAB EVAL
History of Present Illness:   Patient is 54-year-old female with past medical history of DM, COPD not on home O2, opioid dependence on Suboxone, asthma, thyroidism, chronic venous stasis with chronic lymphedema of lower extremities presents to the ED complaining of worsening swelling/pain of bilateral lower extremities preventing her from walking.  Patient was admitted for similar symptoms recently and improved after being on steroids.  She denies other complaints. Pt denies fever, chills, nausea, vomiting, abdominal pain, diarrhea, headache, dizziness, weakness, chest pain, SOB, back pain, LOC, trauma, urinary symptoms, cough.

## 2025-02-11 NOTE — PHYSICAL THERAPY INITIAL EVALUATION ADULT - ADDITIONAL COMMENTS
Pt reports she lives with boyfriend in house with 6 [address], then a flight to [facility]. Pt has RW that she uses occasionally as needed.

## 2025-02-11 NOTE — CONSULT NOTE ADULT - ASSESSMENT
54-year-old female with past medical history of DM, COPD not on home O2, opioid dependence on Suboxone, asthma, thyroidism, chronic venous stasis with chronic lymphedema of lower extremities presents to the ED complaining of worsening swelling/pain of bilateral lower extremities preventing her from walking.  Patient was admitted for similar symptoms recently and improved after being on steroids.  She denies other complaints. Pt denies fever, chills, nausea, vomiting, abdominal pain, diarrhea, headache, dizziness, weakness, chest pain, SOB, back pain, LOC, trauma, urinary symptoms, cough. Assessment:   54-year-old female with past medical history of DM, COPD not on home O2, opioid dependence on Suboxone, asthma, thyroidism, chronic venous stasis with chronic lymphedema of lower extremities presents to the ED complaining of worsening swelling/pain of bilateral lower extremities preventing her from walking.  Patient was admitted for similar symptoms recently and improved after being on steroids.  She denies other complaints. Pt denies fever, chills, nausea, vomiting, abdominal pain, diarrhea, headache, dizziness, weakness, chest pain, SOB, back pain, LOC, trauma, urinary symptoms, cough.  On 1/10/2024 Pt seen by SURGICAL SERVICE (DR. WELLINGTON) - HAD PUNCH BIOPSY OF RLE LESION ON 1/10/24 SHOWING -  LEUKOCYTOPLASTIC VASCULITITS.  Pt also had a trial of CAMERON Boot for one week by Dr Adair as outpt in 2024.      Recommendations:  - further recommendations to come.   - ID consult   - Abx Assessment:   54-year-old female with past medical history of DM, COPD not on home O2, opioid dependence on Suboxone, asthma, thyroidism, chronic venous stasis with chronic lymphedema of lower extremities presents to the ED complaining of worsening swelling/pain of bilateral lower extremities preventing her from walking.  Patient was admitted for similar symptoms recently and improved after being on steroids.  She denies other complaints. Pt denies fever, chills, nausea, vomiting, abdominal pain, diarrhea, headache, dizziness, weakness, chest pain, SOB, back pain, LOC, trauma, urinary symptoms, cough.  On 1/10/2024 Pt seen by SURGICAL SERVICE (DR. WELLINGTON) - HAD PUNCH BIOPSY OF RLE LESION ON 1/10/24 SHOWING -  LEUKOCYTOPLASTIC VASCULITITS.  Pt also had a trial of CAMERON Boot for one week by Dr Adair as outpt in 2024.    d/w Dr Becerril at 6:25am     Recommendations:  - further recommendations to come.   - Wound care consult  - ID consult   - Abx Assessment:   54-year-old female with past medical history of DM, COPD not on home O2, opioid dependence on Suboxone, asthma, thyroidism, chronic venous stasis with chronic lymphedema of lower extremities presents to the ED complaining of worsening swelling/pain of bilateral lower extremities preventing her from walking.  Patient was admitted for similar symptoms recently and improved after being on steroids.  She denies other complaints. Pt denies fever, chills, nausea, vomiting, abdominal pain, diarrhea, headache, dizziness, weakness, chest pain, SOB, back pain, LOC, trauma, urinary symptoms, cough.  On 1/10/2024 Pt seen by SURGICAL SERVICE (DR. WELLINGTON) - HAD PUNCH BIOPSY OF RLE LESION ON 1/10/24 SHOWING -  LEUKOCYTOPLASTIC VASCULITITS.  Pt also had a trial of CAMERON Boot for one week by Dr Adair as outpt in 2024.    d/w Dr Becerril at 6:25am .. Sent additional info via Teams.    Recommendations:  - further recommendations to come.   - Wound care consult  - ID consult   - Abx Assessment:   54-year-old female with past medical history of DM, COPD not on home O2, opioid dependence on Suboxone, asthma, thyroidism, chronic venous stasis with chronic lymphedema of lower extremities presents to the ED complaining of worsening swelling/pain of bilateral lower extremities preventing her from walking.  Patient was admitted for similar symptoms recently and improved after being on steroids.  She denies other complaints. Pt denies fever, chills, nausea, vomiting, abdominal pain, diarrhea, headache, dizziness, weakness, chest pain, SOB, back pain, LOC, trauma, urinary symptoms, cough.  On 1/10/2024 Pt seen by SURGICAL SERVICE (DR. WELLINGTON) - HAD PUNCH BIOPSY OF RLE LESION ON 1/10/24 SHOWING -  LEUKOCYTOPLASTIC VASCULITITS.  Pt also had a trial of CAMERON Boot for one week by Dr Adair as outpt in 2024.    d/w Dr Becerril at 6:25am .. Sent additional info via Teams.    Recommendations:  - lower extremities elevation while in bed  - Unna boot for compression  - Burn consult  - Wound care consult  - ID consult   - Abx

## 2025-02-11 NOTE — PATIENT PROFILE ADULT - FUNCTIONAL SCREEN CURRENT LEVEL: SWALLOWING (IF SCORE 2 OR MORE FOR ANY ITEM, CONSULT REHAB SERVICES), MLM)
Called with  services agent Yoni about patient missing her appointment. Mother did not answer, lvm that if she needs to reschedule to give us a call.    0 = swallows foods/liquids without difficulty

## 2025-02-11 NOTE — CONSULT NOTE ADULT - ASSESSMENT
ASSESSMENT  This is a 54-year-old female with past medical history of DM, COPD not on home O2, opioid dependence on Suboxone, asthma, thyroidism, chronic venous stasis with chronic lymphedema of lower extremities presents to the ED complaining of worsening swelling/pain of bilateral lower extremities.     IMPRESSION  #Bilateral lower extremity wounds/ulcers with underlying stasis dermatitis.  pyoderma gangrenosum vs Vasculitis   #Recurrent admission in the past for lower extremity cellulitis? 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 No intact viable epithelium.   #Olecranon Bursitis Left   #PAD  #History of polysubstance use disorder. On Suboxone  #Emphysema and interstitial lung disease noted on CT (11/2024)  #HTN, HLD  #Hep C-- Self resolved.   #Obesity BMI (kg/m2): 30.1  #Immunodeficiency secondary to history of substance use which could result in poor clinical outcome.  Hepatitis C Ab +, PCR undetectable 1/2025  Cryoglobulin negative 1/2025   Hepatitis B immune, HIV screen negative.   DULCE negative  RF negative  p-ANCA positive, titer 1:40    RECOMMENDATIONS  -Vascular eval noted.   -Will need to consider repeating biopsy at some point.   -Wounds look improved from January 2025.   -Follow up with blood cultures.  -Follow up with orthopedics if bursitis could be aspirated. If aspirating, request cultures.   -IV Vanc. Dosing as per the pharmacy protocol.  -IV Ceftriaxone 2 grams q 24 hours.   -Advised patient she should also follow up with dermatology as well if she is a candidate for biologics.   -Off loading to prevent pressure sores and preventive measures to avoid aspiration    If any questions, please send a message or call on [employer] Teams  Please continue to update ID with any pertinent new laboratory or radiographic findings.    Stella Chavez M.D  Infectious Diseases Attending/   [employer] at \[facility]\""/[facility]

## 2025-02-11 NOTE — CONSULT NOTE ADULT - ASSESSMENT
Skin assessed-  B/L lower extremity,  multiple  open wounds dry ,scabs, with edema and erythema                                                High risk for pressure injury development or progression           Plan:  Wound and skin care e  Clean lower extremity wounds with  Vashe wound  pat dry then apply  xeroform with kerlix  dressing  daily   Elevate lower extremity when sitting dependent   Pressure injury  prevention   skin  and incontinence care   Assess wound and inform primary provider of any changes   Case discussed with primary Rn  Wound/ ostomy specialist  to f/u as needed     Offloading: [x ] Frequent position changes [ ] Devices/Equipment  Cleansing: [ ] Saline [x ] Soap/Water [ ] Other: ______  Topicals: [ ] Barrier Cream [x ] Antimicrobial [ ] Enzymatic Wound Debridement  Dressings: [ ] Dry, sterile [ ] Allevyn  Foam [ ] Absorbant Pads [ ] Collagenase    Other Recs.    per primary team      Total time for bedside assessment , review of medical records  and  discussion of plan of care with primary team greater than 35 min

## 2025-02-11 NOTE — CONSULT NOTE ADULT - SUBJECTIVE AND OBJECTIVE BOX
Patient is 54-year-old female with past medical history of DM, COPD not on home O2, opioid dependence on Suboxone, asthma, thyroidism, chronic venous stasis with chronic lymphedema of lower extremities presents to the ED complaining of worsening swelling/pain of bilateral lower extremities preventing her from walking.  Patient was admitted for similar symptoms recently and improved after being on steroids.  She denies other complaints. Pt denies fever, chills, nausea, vomiting, abdominal pain, diarrhea, headache, dizziness, weakness, chest pain, SOB, back pain, LOC, trauma, urinary symptoms, cough.    PAST MEDICAL & SURGICAL HISTORY:  Asthma with COPD  Hypothyroidism  H/O: substance abuse  no longer using  History of pancreatitis  Hepatitis-C  Leukocytoclastic vasculitis  Bilateral edema of lower extremity  HLD (hyperlipidemia)  Type 2 diabetes mellitus  History of appendectomy  History of tonsillectomy    REVIEW OF SYSTEMS:  All other systems negative    MEDICATIONS  (STANDING):  aspirin enteric coated 81 milliGRAM(s) Oral daily  atorvastatin 20 milliGRAM(s) Oral at bedtime  buprenorphine 8 mG/naloxone 2 mG SL  Tablet 2.5 Tablet(s) SubLingual daily  cefTRIAXone   IVPB 2000 milliGRAM(s) IV Intermittent every 24 hours  colchicine 0.6 milliGRAM(s) Oral two times a day  dextrose 5%. 1000 milliLiter(s) (100 mL/Hr) IV Continuous <Continuous>  dextrose 5%. 1000 milliLiter(s) (50 mL/Hr) IV Continuous <Continuous>  dextrose 50% Injectable 25 Gram(s) IV Push once  dextrose 50% Injectable 12.5 Gram(s) IV Push once  dextrose 50% Injectable 25 Gram(s) IV Push once  furosemide   Injectable 20 milliGRAM(s) IV Push two times a day  glucagon  Injectable 1 milliGRAM(s) IntraMuscular once  heparin   Injectable 5000 Unit(s) SubCutaneous every 12 hours  insulin glargine Injectable (LANTUS) 25 Unit(s) SubCutaneous at bedtime  insulin lispro (ADMELOG) corrective regimen sliding scale   SubCutaneous three times a day before meals  levothyroxine 175 MICROGram(s) Oral daily  pantoprazole    Tablet 40 milliGRAM(s) Oral before breakfast  pregabalin 50 milliGRAM(s) Oral every 12 hours  vancomycin  IVPB 750 milliGRAM(s) IV Intermittent every 12 hours    MEDICATIONS  (PRN):  acetaminophen     Tablet .. 650 milliGRAM(s) Oral every 6 hours PRN Temp greater or equal to 38C (100.4F), Mild Pain (1 - 3)  dextrose Oral Gel 15 Gram(s) Oral once PRN Blood Glucose LESS THAN 70 milliGRAM(s)/deciliter  ondansetron Injectable 4 milliGRAM(s) IV Push every 6 hours PRN Nausea  senna 2 Tablet(s) Oral at bedtime PRN Constipation      Allergies  No Known Allergies  Intolerances        SOCIAL HISTORY:  HX  ETOH, drugs    FAMILY HISTORY:      PHYSICAL EXAM:  Vital Signs Last 24 Hrs  T(C): 36.6 (11 Feb 2025 04:45), Max: 36.7 (10 Feb 2025 16:10)  T(F): 97.8 (11 Feb 2025 04:45), Max: 98 (10 Feb 2025 16:10)  HR: 60 (11 Feb 2025 04:45) (57 - 64)  BP: 106/70 (11 Feb 2025 04:45) (106/70 - 126/80)  BP(mean): --  RR: 18 (11 Feb 2025 04:45) (18 - 18)  SpO2: 98% (11 Feb 2025 04:45) (98% - 100%)    Parameters below as of 10 Feb 2025 23:52  Patient On (Oxygen Delivery Method): room air    CONSTITUTIONAL: NAD  HEAD: No lesion on scalp  EYES: No visual disturbance  ENT: No sore throat  RESPIRATORY: No cough, no shortness of breath  CARDIOVASCULAR: No chest pain or palpitations  GASTROINTESTINAL: No abdominal or epigastric pain  GENITOURINARY: No dysuria  NEUROLOGICAL: No headache/dizziness  MUSCULOSKELETAL: No joint pain, erythema, or swelling; no back pain  SKIN: : B/L lower extremity,  multiple  open wounds, scabs with  edema and redness           LABS/ CULTURES/ RADIOLOGY:                        9.8    6.05  )-----------( 251      ( 11 Feb 2025 10:00 )             31.5       134  |  93  |  17  ----------------------------<  224      [02-11-25 @ 10:00]  4.1   |  28  |  1.0        Ca     9.3     [02-11-25 @ 10:00]    TPro  8.6  /  Alb  4.4  /  TBili  0.3  /  DBili  x   /  AST  18  /  ALT  <5  /  AlkPhos  104  [02-11-25 @ 10:00]

## 2025-02-11 NOTE — PHYSICAL THERAPY INITIAL EVALUATION ADULT - GENERAL OBSERVATIONS, REHAB EVAL
13:30-13:54 Chart reviewed. Patient available to be seen for physical therapy, denies pain, confirmed with RN.  Pt rec'd sitting at EOB, +IV in NAD.

## 2025-02-11 NOTE — CONSULT NOTE ADULT - SUBJECTIVE AND OBJECTIVE BOX
Vascular Surgical Attending:  Dr. Brown's vascular surgeon:  Dr Adair        HPI:  Patient is 54-year-old female with past medical history of DM, COPD not on home O2, opioid dependence on Suboxone, asthma, thyroidism, chronic venous stasis with chronic lymphedema of lower extremities presents to the ED complaining of worsening swelling/pain of bilateral lower extremities preventing her from walking.  Patient was admitted for similar symptoms recently and improved after being on steroids.  She denies other complaints. Pt denies fever, chills, nausea, vomiting, abdominal pain, diarrhea, headache, dizziness, weakness, chest pain, SOB, back pain, LOC, trauma, urinary symptoms, cough.      PAST MEDICAL & SURGICAL HISTORY:  Asthma with COPD      Hypothyroidism      H/O: substance abuse  no longer using      History of pancreatitis      Hepatitis-C      Leukocytoclastic vasculitis      Bilateral edema of lower extremity      HLD (hyperlipidemia)      Type 2 diabetes mellitus      History of appendectomy      History of tonsillectomy          MEDICATIONS  (STANDING):  aspirin enteric coated 81 milliGRAM(s) Oral daily  atorvastatin 20 milliGRAM(s) Oral at bedtime  buprenorphine 8 mG/naloxone 2 mG SL  Tablet 2.5 Tablet(s) SubLingual daily  cefepime   IVPB 2000 milliGRAM(s) IV Intermittent every 8 hours  cefepime   IVPB      colchicine 0.6 milliGRAM(s) Oral two times a day  dextrose 5%. 1000 milliLiter(s) (100 mL/Hr) IV Continuous <Continuous>  dextrose 5%. 1000 milliLiter(s) (50 mL/Hr) IV Continuous <Continuous>  dextrose 50% Injectable 25 Gram(s) IV Push once  dextrose 50% Injectable 12.5 Gram(s) IV Push once  dextrose 50% Injectable 25 Gram(s) IV Push once  furosemide   Injectable 20 milliGRAM(s) IV Push two times a day  glucagon  Injectable 1 milliGRAM(s) IntraMuscular once  heparin   Injectable 5000 Unit(s) SubCutaneous every 12 hours  insulin glargine Injectable (LANTUS) 25 Unit(s) SubCutaneous at bedtime  insulin lispro (ADMELOG) corrective regimen sliding scale   SubCutaneous three times a day before meals  levothyroxine 175 MICROGram(s) Oral daily  pantoprazole    Tablet 40 milliGRAM(s) Oral before breakfast  pregabalin 50 milliGRAM(s) Oral every 12 hours  vancomycin  IVPB 750 milliGRAM(s) IV Intermittent every 12 hours    MEDICATIONS  (PRN):  acetaminophen     Tablet .. 650 milliGRAM(s) Oral every 6 hours PRN Temp greater or equal to 38C (100.4F), Mild Pain (1 - 3)  dextrose Oral Gel 15 Gram(s) Oral once PRN Blood Glucose LESS THAN 70 milliGRAM(s)/deciliter  ondansetron Injectable 4 milliGRAM(s) IV Push every 6 hours PRN Nausea  senna 2 Tablet(s) Oral at bedtime PRN Constipation      Allergies    No Known Allergies    Intolerances        SOCIAL HISTORY:  Etoh:                                    Drugs:                                   Smoke:      Vital Signs Last 24 Hrs  T(C): 36.4 (10 Feb 2025 23:52), Max: 36.7 (10 Feb 2025 16:10)  T(F): 97.5 (10 Feb 2025 23:52), Max: 98 (10 Feb 2025 16:10)  HR: 57 (10 Feb 2025 23:52) (57 - 64)  BP: 126/80 (10 Feb 2025 23:52) (126/69 - 126/80)  BP(mean): --  RR: 18 (10 Feb 2025 23:52) (18 - 18)  SpO2: 99% (10 Feb 2025 23:52) (99% - 100%)    Parameters below as of 10 Feb 2025 23:52  Patient On (Oxygen Delivery Method): room air        I&O's Summary      Physical Exam:  General: NAD    Abdominal: +BS, Soft, ND/NT, no organomegaly, no rebound, no guarding.    HEENT: NC/AT, EOMI, normal hearing, no oral lesions, no LAD, neck supple  Pulmonary: normal resp effort, CTA-B  Cardiovascular: NSR, no murmurs  Extremities: WWP, normal strength, no clubbing/cyanosis/edema  Neuro: A/O x 3, CNs II-XII grossly intact, normal sensation, no focal deficits  Pulses: palpable distal pulses    Lines/drains/tubes:    LABS:                        10.0   8.33  )-----------( 240      ( 10 Feb 2025 20:00 )             32.5     02-10    134[L]  |  93[L]  |  15  ----------------------------<  317[H]  4.2   |  30  |  0.8    Ca    9.5      10 Feb 2025 20:00    TPro  8.8[H]  /  Alb  4.5  /  TBili  0.4  /  DBili  x   /  AST  16  /  ALT  <5  /  AlkPhos  120[H]  02-10      Urinalysis Basic - ( 10 Feb 2025 20:00 )    Color: x / Appearance: x / SG: x / pH: x  Gluc: 317 mg/dL / Ketone: x  / Bili: x / Urobili: x   Blood: x / Protein: x / Nitrite: x   Leuk Esterase: x / RBC: x / WBC x   Sq Epi: x / Non Sq Epi: x / Bacteria: x      CAPILLARY BLOOD GLUCOSE        LIVER FUNCTIONS - ( 10 Feb 2025 20:00 )  Alb: 4.5 g/dL / Pro: 8.8 g/dL / ALK PHOS: 120 U/L / ALT: <5 U/L / AST: 16 U/L / GGT: x             Cultures:      RADIOLOGY & ADDITIONAL STUDIES:      Assessment: 54y Female    Recommendations:  -   -                                                                                     Vascular Surgical Attending:  Dr. Brown's vascular surgeon:  Dr Adair        HPI:  Patient is 54-year-old female with past medical history of DM, COPD not on home O2, opioid dependence on Suboxone, asthma, thyroidism, chronic venous stasis with chronic lymphedema of lower extremities presents to the ED complaining of worsening swelling/pain of bilateral lower extremities preventing her from walking.  Patient was admitted for similar symptoms recently and improved after being on steroids.  She denies other complaints. Pt denies fever, chills, nausea, vomiting, abdominal pain, diarrhea, headache, dizziness, weakness, chest pain, SOB, back pain, LOC, trauma, urinary symptoms, cough.  On 1/10/2024 Pt seen by SURGICAL SERVICE (DR. WELLINGTON) - HAD PUNCH BIOPSY OF RLE LESION ON 1/10/24 SHOWING -  LEUKOCYTOPLASTIC VASCULITITS.    PAST MEDICAL & SURGICAL HISTORY:  Asthma with COPD      Hypothyroidism      H/O: substance abuse  no longer using      History of pancreatitis      Hepatitis-C      Leukocytoclastic vasculitis      Bilateral edema of lower extremity      HLD (hyperlipidemia)      Type 2 diabetes mellitus      History of appendectomy      History of tonsillectomy          MEDICATIONS  (STANDING):  aspirin enteric coated 81 milliGRAM(s) Oral daily  atorvastatin 20 milliGRAM(s) Oral at bedtime  buprenorphine 8 mG/naloxone 2 mG SL  Tablet 2.5 Tablet(s) SubLingual daily  cefepime   IVPB 2000 milliGRAM(s) IV Intermittent every 8 hours  cefepime   IVPB      colchicine 0.6 milliGRAM(s) Oral two times a day  dextrose 5%. 1000 milliLiter(s) (100 mL/Hr) IV Continuous <Continuous>  dextrose 5%. 1000 milliLiter(s) (50 mL/Hr) IV Continuous <Continuous>  dextrose 50% Injectable 25 Gram(s) IV Push once  dextrose 50% Injectable 12.5 Gram(s) IV Push once  dextrose 50% Injectable 25 Gram(s) IV Push once  furosemide   Injectable 20 milliGRAM(s) IV Push two times a day  glucagon  Injectable 1 milliGRAM(s) IntraMuscular once  heparin   Injectable 5000 Unit(s) SubCutaneous every 12 hours  insulin glargine Injectable (LANTUS) 25 Unit(s) SubCutaneous at bedtime  insulin lispro (ADMELOG) corrective regimen sliding scale   SubCutaneous three times a day before meals  levothyroxine 175 MICROGram(s) Oral daily  pantoprazole    Tablet 40 milliGRAM(s) Oral before breakfast  pregabalin 50 milliGRAM(s) Oral every 12 hours  vancomycin  IVPB 750 milliGRAM(s) IV Intermittent every 12 hours    MEDICATIONS  (PRN):  acetaminophen     Tablet .. 650 milliGRAM(s) Oral every 6 hours PRN Temp greater or equal to 38C (100.4F), Mild Pain (1 - 3)  dextrose Oral Gel 15 Gram(s) Oral once PRN Blood Glucose LESS THAN 70 milliGRAM(s)/deciliter  ondansetron Injectable 4 milliGRAM(s) IV Push every 6 hours PRN Nausea  senna 2 Tablet(s) Oral at bedtime PRN Constipation      Allergies    No Known Allergies    Intolerances        SOCIAL HISTORY:  Etoh:                                    Drugs:                                   Smoke:      Vital Signs Last 24 Hrs  T(C): 36.4 (10 Feb 2025 23:52), Max: 36.7 (10 Feb 2025 16:10)  T(F): 97.5 (10 Feb 2025 23:52), Max: 98 (10 Feb 2025 16:10)  HR: 57 (10 Feb 2025 23:52) (57 - 64)  BP: 126/80 (10 Feb 2025 23:52) (126/69 - 126/80)  BP(mean): --  RR: 18 (10 Feb 2025 23:52) (18 - 18)  SpO2: 99% (10 Feb 2025 23:52) (99% - 100%)    Parameters below as of 10 Feb 2025 23:52  Patient On (Oxygen Delivery Method): room air        I&O's Summary      Physical Exam:  General: NAD    Abdominal: +BS, Soft, ND/NT, no organomegaly, no rebound, no guarding.    HEENT: NC/AT, EOMI, normal hearing, no oral lesions, no LAD, neck supple  Pulmonary: normal resp effort, CTA-B  Cardiovascular: NSR, no murmurs  Extremities: WWP, normal strength, no clubbing/cyanosis/edema  Neuro: A/O x 3, CNs II-XII grossly intact, normal sensation, no focal deficits  Pulses: palpable distal pulses    Lines/drains/tubes:    LABS:                        10.0   8.33  )-----------( 240      ( 10 Feb 2025 20:00 )             32.5     02-10    134[L]  |  93[L]  |  15  ----------------------------<  317[H]  4.2   |  30  |  0.8    Ca    9.5      10 Feb 2025 20:00    TPro  8.8[H]  /  Alb  4.5  /  TBili  0.4  /  DBili  x   /  AST  16  /  ALT  <5  /  AlkPhos  120[H]  02-10      Urinalysis Basic - ( 10 Feb 2025 20:00 )    Color: x / Appearance: x / SG: x / pH: x  Gluc: 317 mg/dL / Ketone: x  / Bili: x / Urobili: x   Blood: x / Protein: x / Nitrite: x   Leuk Esterase: x / RBC: x / WBC x   Sq Epi: x / Non Sq Epi: x / Bacteria: x      CAPILLARY BLOOD GLUCOSE        LIVER FUNCTIONS - ( 10 Feb 2025 20:00 )  Alb: 4.5 g/dL / Pro: 8.8 g/dL / ALK PHOS: 120 U/L / ALT: <5 U/L / AST: 16 U/L / GGT: x             Cultures:      RADIOLOGY & ADDITIONAL STUDIES:      Assessment: 54y Female    Recommendations:  -   -                                                                                     Vascular Surgical Attending:  Dr. Brown's vascular surgeon:  Dr Adair        HPI:  Patient is 54-year-old female with past medical history of DM, COPD not on home O2, opioid dependence on Suboxone, asthma, thyroidism, chronic venous stasis with chronic lymphedema of lower extremities presents to the ED complaining of worsening swelling/pain of bilateral lower extremities preventing her from walking.  Patient was admitted for similar symptoms recently and improved after being on steroids.  She denies other complaints. Pt denies fever, chills, nausea, vomiting, abdominal pain, diarrhea, headache, dizziness, weakness, chest pain, SOB, back pain, LOC, trauma, urinary symptoms, cough.  On 1/10/2024 Pt seen by SURGICAL SERVICE (DR. WELLINGTON) - HAD PUNCH BIOPSY OF RLE LESION ON 1/10/24 SHOWING -  LEUKOCYTOPLASTIC VASCULITITS.  Pt also had a trial of CAMERON Boot for one week by Dr Adair as outpt in 2024.    PAST MEDICAL & SURGICAL HISTORY:  Asthma with COPD      Hypothyroidism      H/O: substance abuse  no longer using      History of pancreatitis      Hepatitis-C      Leukocytoclastic vasculitis      Bilateral edema of lower extremity      HLD (hyperlipidemia)      Type 2 diabetes mellitus      History of appendectomy      History of tonsillectomy          MEDICATIONS  (STANDING):  aspirin enteric coated 81 milliGRAM(s) Oral daily  atorvastatin 20 milliGRAM(s) Oral at bedtime  buprenorphine 8 mG/naloxone 2 mG SL  Tablet 2.5 Tablet(s) SubLingual daily  cefepime   IVPB 2000 milliGRAM(s) IV Intermittent every 8 hours  cefepime   IVPB      colchicine 0.6 milliGRAM(s) Oral two times a day  dextrose 5%. 1000 milliLiter(s) (100 mL/Hr) IV Continuous <Continuous>  dextrose 5%. 1000 milliLiter(s) (50 mL/Hr) IV Continuous <Continuous>  dextrose 50% Injectable 25 Gram(s) IV Push once  dextrose 50% Injectable 12.5 Gram(s) IV Push once  dextrose 50% Injectable 25 Gram(s) IV Push once  furosemide   Injectable 20 milliGRAM(s) IV Push two times a day  glucagon  Injectable 1 milliGRAM(s) IntraMuscular once  heparin   Injectable 5000 Unit(s) SubCutaneous every 12 hours  insulin glargine Injectable (LANTUS) 25 Unit(s) SubCutaneous at bedtime  insulin lispro (ADMELOG) corrective regimen sliding scale   SubCutaneous three times a day before meals  levothyroxine 175 MICROGram(s) Oral daily  pantoprazole    Tablet 40 milliGRAM(s) Oral before breakfast  pregabalin 50 milliGRAM(s) Oral every 12 hours  vancomycin  IVPB 750 milliGRAM(s) IV Intermittent every 12 hours    MEDICATIONS  (PRN):  acetaminophen     Tablet .. 650 milliGRAM(s) Oral every 6 hours PRN Temp greater or equal to 38C (100.4F), Mild Pain (1 - 3)  dextrose Oral Gel 15 Gram(s) Oral once PRN Blood Glucose LESS THAN 70 milliGRAM(s)/deciliter  ondansetron Injectable 4 milliGRAM(s) IV Push every 6 hours PRN Nausea  senna 2 Tablet(s) Oral at bedtime PRN Constipation      Allergies    No Known Allergies    Intolerances        SOCIAL HISTORY:  Etoh:                                    Drugs:                                   Smoke:      Vital Signs Last 24 Hrs  T(C): 36.4 (10 Feb 2025 23:52), Max: 36.7 (10 Feb 2025 16:10)  T(F): 97.5 (10 Feb 2025 23:52), Max: 98 (10 Feb 2025 16:10)  HR: 57 (10 Feb 2025 23:52) (57 - 64)  BP: 126/80 (10 Feb 2025 23:52) (126/69 - 126/80)  BP(mean): --  RR: 18 (10 Feb 2025 23:52) (18 - 18)  SpO2: 99% (10 Feb 2025 23:52) (99% - 100%)    Parameters below as of 10 Feb 2025 23:52  Patient On (Oxygen Delivery Method): room air        I&O's Summary      Physical Exam:  General: NAD    Abdominal: +BS, Soft, ND/NT, no organomegaly, no rebound, no guarding.    HEENT: NC/AT, EOMI, normal hearing, no oral lesions, no LAD, neck supple  Pulmonary: normal resp effort, CTA-B  Cardiovascular: NSR, no murmurs  Extremities: WWP, normal strength, no clubbing/cyanosis/edema  Neuro: A/O x 3, CNs II-XII grossly intact, normal sensation, no focal deficits  Pulses: palpable distal pulses    Lines/drains/tubes:    LABS:                        10.0   8.33  )-----------( 240      ( 10 Feb 2025 20:00 )             32.5     02-10    134[L]  |  93[L]  |  15  ----------------------------<  317[H]  4.2   |  30  |  0.8    Ca    9.5      10 Feb 2025 20:00    TPro  8.8[H]  /  Alb  4.5  /  TBili  0.4  /  DBili  x   /  AST  16  /  ALT  <5  /  AlkPhos  120[H]  02-10      Urinalysis Basic - ( 10 Feb 2025 20:00 )    Color: x / Appearance: x / SG: x / pH: x  Gluc: 317 mg/dL / Ketone: x  / Bili: x / Urobili: x   Blood: x / Protein: x / Nitrite: x   Leuk Esterase: x / RBC: x / WBC x   Sq Epi: x / Non Sq Epi: x / Bacteria: x      CAPILLARY BLOOD GLUCOSE        LIVER FUNCTIONS - ( 10 Feb 2025 20:00 )  Alb: 4.5 g/dL / Pro: 8.8 g/dL / ALK PHOS: 120 U/L / ALT: <5 U/L / AST: 16 U/L / GGT: x             Cultures:      RADIOLOGY & ADDITIONAL STUDIES:                                                                                               Vascular Surgical Attending:  Dr. Brown's vascular surgeon:  Dr Adair        HPI:  Patient is 54-year-old female with past medical history of DM, COPD not on home O2, opioid dependence on Suboxone, asthma, thyroidism, chronic venous stasis with chronic lymphedema of lower extremities presents to the ED complaining of worsening swelling/pain of bilateral lower extremities preventing her from walking.  Patient was admitted for similar symptoms recently and improved after being on steroids.  She denies other complaints. Pt denies fever, chills, nausea, vomiting, abdominal pain, diarrhea, headache, dizziness, weakness, chest pain, SOB, back pain, LOC, trauma, urinary symptoms, cough.  On 1/10/2024 Pt seen by SURGICAL SERVICE (DR. WELLINGTON) - HAD PUNCH BIOPSY OF RLE LESION ON 1/10/24 SHOWING -  LEUKOCYTOPLASTIC VASCULITITS.  Pt also had a trial of CAMERON Boot for one week by Dr Adair as outpt in 2024.    PAST MEDICAL & SURGICAL HISTORY:  Asthma with COPD      Hypothyroidism      H/O: substance abuse  no longer using      History of pancreatitis      Hepatitis-C      Leukocytoclastic vasculitis      Bilateral edema of lower extremity      HLD (hyperlipidemia)      Type 2 diabetes mellitus      History of appendectomy      History of tonsillectomy          MEDICATIONS  (STANDING):  aspirin enteric coated 81 milliGRAM(s) Oral daily  atorvastatin 20 milliGRAM(s) Oral at bedtime  buprenorphine 8 mG/naloxone 2 mG SL  Tablet 2.5 Tablet(s) SubLingual daily  cefepime   IVPB 2000 milliGRAM(s) IV Intermittent every 8 hours  cefepime   IVPB      colchicine 0.6 milliGRAM(s) Oral two times a day  dextrose 5%. 1000 milliLiter(s) (100 mL/Hr) IV Continuous <Continuous>  dextrose 5%. 1000 milliLiter(s) (50 mL/Hr) IV Continuous <Continuous>  dextrose 50% Injectable 25 Gram(s) IV Push once  dextrose 50% Injectable 12.5 Gram(s) IV Push once  dextrose 50% Injectable 25 Gram(s) IV Push once  furosemide   Injectable 20 milliGRAM(s) IV Push two times a day  glucagon  Injectable 1 milliGRAM(s) IntraMuscular once  heparin   Injectable 5000 Unit(s) SubCutaneous every 12 hours  insulin glargine Injectable (LANTUS) 25 Unit(s) SubCutaneous at bedtime  insulin lispro (ADMELOG) corrective regimen sliding scale   SubCutaneous three times a day before meals  levothyroxine 175 MICROGram(s) Oral daily  pantoprazole    Tablet 40 milliGRAM(s) Oral before breakfast  pregabalin 50 milliGRAM(s) Oral every 12 hours  vancomycin  IVPB 750 milliGRAM(s) IV Intermittent every 12 hours    MEDICATIONS  (PRN):  acetaminophen     Tablet .. 650 milliGRAM(s) Oral every 6 hours PRN Temp greater or equal to 38C (100.4F), Mild Pain (1 - 3)  dextrose Oral Gel 15 Gram(s) Oral once PRN Blood Glucose LESS THAN 70 milliGRAM(s)/deciliter  ondansetron Injectable 4 milliGRAM(s) IV Push every 6 hours PRN Nausea  senna 2 Tablet(s) Oral at bedtime PRN Constipation      Allergies    No Known Allergies    Intolerances        SOCIAL HISTORY:  Etoh:                                    Drugs:                                   Smoke:      Vital Signs Last 24 Hrs  T(C): 36.4 (10 Feb 2025 23:52), Max: 36.7 (10 Feb 2025 16:10)  T(F): 97.5 (10 Feb 2025 23:52), Max: 98 (10 Feb 2025 16:10)  HR: 57 (10 Feb 2025 23:52) (57 - 64)  BP: 126/80 (10 Feb 2025 23:52) (126/69 - 126/80)  BP(mean): --  RR: 18 (10 Feb 2025 23:52) (18 - 18)  SpO2: 99% (10 Feb 2025 23:52) (99% - 100%)    Parameters below as of 10 Feb 2025 23:52  Patient On (Oxygen Delivery Method): room air        I&O's Summary      Physical Exam:  General: NAD  Abdominal: +BS, Soft, ND/NT, no organomegaly, no rebound, no guarding.  HEENT: NC/AT, EOMI, normal hearing, no oral lesions, no LAD, neck supple  Pulmonary: normal resp effort, CTA-B  Cardiovascular: NSR, no murmurs  Extremities: WWP, normal strength, no clubbing/cyanosis/edema  Neuro: A/O x 3, CNs II-XII grossly intact, normal sensation, no focal deficits  Pulses: + pulses of DP and PT via bedside doppler  Skin: Bilateral lower extremity swelling, erythema, scaling, ulcerations.      LABS:                        10.0   8.33  )-----------( 240      ( 10 Feb 2025 20:00 )             32.5     02-10    134[L]  |  93[L]  |  15  ----------------------------<  317[H]  4.2   |  30  |  0.8    Ca    9.5      10 Feb 2025 20:00    TPro  8.8[H]  /  Alb  4.5  /  TBili  0.4  /  DBili  x   /  AST  16  /  ALT  <5  /  AlkPhos  120[H]  02-10      Urinalysis Basic - ( 10 Feb 2025 20:00 )    Color: x / Appearance: x / SG: x / pH: x  Gluc: 317 mg/dL / Ketone: x  / Bili: x / Urobili: x   Blood: x / Protein: x / Nitrite: x   Leuk Esterase: x / RBC: x / WBC x   Sq Epi: x / Non Sq Epi: x / Bacteria: x      CAPILLARY BLOOD GLUCOSE        LIVER FUNCTIONS - ( 10 Feb 2025 20:00 )  Alb: 4.5 g/dL / Pro: 8.8 g/dL / ALK PHOS: 120 U/L / ALT: <5 U/L / AST: 16 U/L / GGT: x             Cultures:      RADIOLOGY & ADDITIONAL STUDIES:                                                                                               Vascular Surgical Attending:  Dr. Adair        Pt's vascular surgeon:  Dr Adair        HPI:  Patient is 54-year-old female with past medical history of DM, COPD not on home O2, opioid dependence on Suboxone, asthma, thyroidism, chronic venous stasis with chronic lymphedema of lower extremities presents to the ED complaining of worsening swelling/pain of bilateral lower extremities preventing her from walking.  Patient was admitted for similar symptoms recently and improved after being on steroids.  She denies other complaints. Pt denies fever, chills, nausea, vomiting, abdominal pain, diarrhea, headache, dizziness, weakness, chest pain, SOB, back pain, LOC, trauma, urinary symptoms, cough.  On 1/10/2024 Pt seen by SURGICAL SERVICE (DR. WELLINGTON) - HAD PUNCH BIOPSY OF RLE LESION ON 1/10/24 SHOWING -  LEUKOCYTOPLASTIC VASCULITITS.  Pt also had a trial of CAMERON Boot for one week by Dr Adair as outpt in 2024.    PAST MEDICAL & SURGICAL HISTORY:  Asthma with COPD      Hypothyroidism      H/O: substance abuse  no longer using      History of pancreatitis      Hepatitis-C      Leukocytoclastic vasculitis      Bilateral edema of lower extremity      HLD (hyperlipidemia)      Type 2 diabetes mellitus      History of appendectomy      History of tonsillectomy          MEDICATIONS  (STANDING):  aspirin enteric coated 81 milliGRAM(s) Oral daily  atorvastatin 20 milliGRAM(s) Oral at bedtime  buprenorphine 8 mG/naloxone 2 mG SL  Tablet 2.5 Tablet(s) SubLingual daily  cefepime   IVPB 2000 milliGRAM(s) IV Intermittent every 8 hours  cefepime   IVPB      colchicine 0.6 milliGRAM(s) Oral two times a day  dextrose 5%. 1000 milliLiter(s) (100 mL/Hr) IV Continuous <Continuous>  dextrose 5%. 1000 milliLiter(s) (50 mL/Hr) IV Continuous <Continuous>  dextrose 50% Injectable 25 Gram(s) IV Push once  dextrose 50% Injectable 12.5 Gram(s) IV Push once  dextrose 50% Injectable 25 Gram(s) IV Push once  furosemide   Injectable 20 milliGRAM(s) IV Push two times a day  glucagon  Injectable 1 milliGRAM(s) IntraMuscular once  heparin   Injectable 5000 Unit(s) SubCutaneous every 12 hours  insulin glargine Injectable (LANTUS) 25 Unit(s) SubCutaneous at bedtime  insulin lispro (ADMELOG) corrective regimen sliding scale   SubCutaneous three times a day before meals  levothyroxine 175 MICROGram(s) Oral daily  pantoprazole    Tablet 40 milliGRAM(s) Oral before breakfast  pregabalin 50 milliGRAM(s) Oral every 12 hours  vancomycin  IVPB 750 milliGRAM(s) IV Intermittent every 12 hours    MEDICATIONS  (PRN):  acetaminophen     Tablet .. 650 milliGRAM(s) Oral every 6 hours PRN Temp greater or equal to 38C (100.4F), Mild Pain (1 - 3)  dextrose Oral Gel 15 Gram(s) Oral once PRN Blood Glucose LESS THAN 70 milliGRAM(s)/deciliter  ondansetron Injectable 4 milliGRAM(s) IV Push every 6 hours PRN Nausea  senna 2 Tablet(s) Oral at bedtime PRN Constipation      Allergies    No Known Allergies    Intolerances        SOCIAL HISTORY:  Etoh:                                    Drugs:                                   Smoke:      Vital Signs Last 24 Hrs  T(C): 36.4 (10 Feb 2025 23:52), Max: 36.7 (10 Feb 2025 16:10)  T(F): 97.5 (10 Feb 2025 23:52), Max: 98 (10 Feb 2025 16:10)  HR: 57 (10 Feb 2025 23:52) (57 - 64)  BP: 126/80 (10 Feb 2025 23:52) (126/69 - 126/80)  BP(mean): --  RR: 18 (10 Feb 2025 23:52) (18 - 18)  SpO2: 99% (10 Feb 2025 23:52) (99% - 100%)    Parameters below as of 10 Feb 2025 23:52  Patient On (Oxygen Delivery Method): room air        I&O's Summary      Physical Exam:  General: NAD  Abdominal: +BS, Soft, ND/NT, no organomegaly, no rebound, no guarding.  HEENT: NC/AT, EOMI, normal hearing, no oral lesions, no LAD, neck supple  Pulmonary: normal resp effort, CTA-B  Cardiovascular: NSR, no murmurs  Extremities: WWP, normal strength, no clubbing/cyanosis/edema  Neuro: A/O x 3, CNs II-XII grossly intact, normal sensation, no focal deficits  Pulses: + pulses of DP and PT via bedside doppler  Skin: Bilateral lower extremity swelling, erythema, scaling, ulcerations.      LABS:                        10.0   8.33  )-----------( 240      ( 10 Feb 2025 20:00 )             32.5     02-10    134[L]  |  93[L]  |  15  ----------------------------<  317[H]  4.2   |  30  |  0.8    Ca    9.5      10 Feb 2025 20:00    TPro  8.8[H]  /  Alb  4.5  /  TBili  0.4  /  DBili  x   /  AST  16  /  ALT  <5  /  AlkPhos  120[H]  02-10      Urinalysis Basic - ( 10 Feb 2025 20:00 )    Color: x / Appearance: x / SG: x / pH: x  Gluc: 317 mg/dL / Ketone: x  / Bili: x / Urobili: x   Blood: x / Protein: x / Nitrite: x   Leuk Esterase: x / RBC: x / WBC x   Sq Epi: x / Non Sq Epi: x / Bacteria: x      CAPILLARY BLOOD GLUCOSE        LIVER FUNCTIONS - ( 10 Feb 2025 20:00 )  Alb: 4.5 g/dL / Pro: 8.8 g/dL / ALK PHOS: 120 U/L / ALT: <5 U/L / AST: 16 U/L / GGT: x             Cultures:      RADIOLOGY & ADDITIONAL STUDIES:

## 2025-02-11 NOTE — PROGRESS NOTE ADULT - ASSESSMENT
54-year-old female with past medical history of DM, COPD not on home O2, opioid dependence on Suboxone, asthma, thyroidism, chronic venous stasis with chronic lymphedema of lower extremities presenting with     #LE cellulitis  -IV Ceftriaxone 2 grams q 24 hours  -During previous admission patient was dx with leukocytoclastic vasculitis., she was started on prednisone, holding for now  -Wound care consult for wrapping, vascular consult for possible unna boot  -No intervention as per Burn, images reviewed via teams      #DM2  -continue with lantus, and ISS  -Monitor POC     #LT olecranon bursitis, recurrence  - S/p drainage 1/2025  - No aspiration planned by ortho  - Recommend compression/ACE bandage    #opioid dependence  -continue with suboxone     #Left hand fx  -been in the cast for 6 week  -requesting to see ortho      #Progress Note Handoff  Pending (specify):  as above   Family discussion:  plan of care was discussed with patient   in details.  all questions were answered.  seems to understand, and in agreement  Disposition:  PT

## 2025-02-11 NOTE — CONSULT NOTE ADULT - SUBJECTIVE AND OBJECTIVE BOX
EZRA BARCLAY  54y, Female    Allergies    No Known Allergies    Intolerances        LOS  1d    HPI  HPI:  Patient is 54-year-old female with past medical history of DM, COPD not on home O2, opioid dependence on Suboxone, asthma, thyroidism, chronic venous stasis with chronic lymphedema of lower extremities presents to the ED complaining of worsening swelling/pain of bilateral lower extremities preventing her from walking.  Patient was admitted for similar symptoms recently and improved after being on steroids.  She denies other complaints. Pt denies fever, chills, nausea, vomiting, abdominal pain, diarrhea, headache, dizziness, weakness, chest pain, SOB, back pain, LOC, trauma, urinary symptoms, cough.    INFECTIOUS DISEASE HISTORY:  ID consulted for antimicrobial recommendations.     Prior hospital charts reviewed [Yes]  Primary team notes reviewed [Yes]  Other consultant notes reviewed [Yes]    REVIEW OF SYSTEMS:  CONSTITUTIONAL: No fever or chills  HEENT: No sore throat  RESPIRATORY: No cough, no shortness of breath  CARDIOVASCULAR: No chest pain or palpitations  GASTROINTESTINAL: No abdominal or epigastric pain  GENITOURINARY: No dysuria  NEUROLOGICAL: No headache/dizziness  MSK: No joint pain, erythema, or swelling; no back pain  SKIN: No itching, rashes  All other ROS negative except noted above    PAST MEDICAL & SURGICAL HISTORY:  Asthma with COPD      Hypothyroidism      H/O: substance abuse  no longer using      History of pancreatitis      Hepatitis-C      Leukocytoclastic vasculitis      Bilateral edema of lower extremity      HLD (hyperlipidemia)      Type 2 diabetes mellitus      History of appendectomy      History of tonsillectomy    SOCIAL HISTORY:  - No recent travel    FAMILY HISTORY: No pertinent PMH for first degree relatives.     ANTIMICROBIALS:  cefepime   IVPB 2000 every 8 hours  cefepime   IVPB    vancomycin  IVPB 750 every 12 hours      ANTIMICROBIALS (past 90 days):  MEDICATIONS  (STANDING):  cefepime   IVPB   100 mL/Hr IV Intermittent (02-11-25 @ 00:00)    cefepime   IVPB   100 mL/Hr IV Intermittent (02-11-25 @ 05:26)    vancomycin  IVPB   250 mL/Hr IV Intermittent (02-11-25 @ 11:01)   250 mL/Hr IV Intermittent (02-11-25 @ 03:14)        OTHER MEDS:   MEDICATIONS  (STANDING):  acetaminophen     Tablet .. 650 every 6 hours PRN  aspirin enteric coated 81 daily  atorvastatin 20 at bedtime  buprenorphine 8 mG/naloxone 2 mG SL  Tablet 2.5 daily  colchicine 0.6 two times a day  dextrose 50% Injectable 25 once  dextrose 50% Injectable 12.5 once  dextrose 50% Injectable 25 once  dextrose Oral Gel 15 once PRN  furosemide   Injectable 20 two times a day  glucagon  Injectable 1 once  heparin   Injectable 5000 every 12 hours  insulin glargine Injectable (LANTUS) 25 at bedtime  insulin lispro (ADMELOG) corrective regimen sliding scale  three times a day before meals  levothyroxine 175 daily  ondansetron Injectable 4 every 6 hours PRN  pantoprazole    Tablet 40 before breakfast  pregabalin 50 every 12 hours  senna 2 at bedtime PRN      VITALS:  Vital Signs Last 24 Hrs  T(F): 97.8 (02-11-25 @ 04:45), Max: 98 (02-10-25 @ 16:10)    Vital Signs Last 24 Hrs  HR: 60 (02-11-25 @ 04:45) (57 - 64)  BP: 106/70 (02-11-25 @ 04:45) (106/70 - 126/80)  RR: 18 (02-11-25 @ 04:45)  SpO2: 98% (02-11-25 @ 04:45) (98% - 100%)  Wt(kg): --    EXAM:  GENERAL: NAD  HEAD: No head lesions  NECK: Supple  CHEST/LUNG: Shallow breath sounds.   HEART: S1 S2  ABDOMEN: Soft, nontender  EXTREMITIES: Left swollen bursitis, bilateral lower extremity chronic venous stasis. R>L, mild weeping.   NERVOUS SYSTEM: Alert and oriented to person  MSK: No joint erythema, swelling or pain  SKIN: No rashes or lesions, no superficial thrombophlebitis    Labs:                        9.8    6.05  )-----------( 251      ( 11 Feb 2025 10:00 )             31.5     02-11    134[L]  |  93[L]  |  17  ----------------------------<  224[H]  4.1   |  28  |  1.0    Ca    9.3      11 Feb 2025 10:00    TPro  8.6[H]  /  Alb  4.4  /  TBili  0.3  /  DBili  x   /  AST  18  /  ALT  <5  /  AlkPhos  104  02-11      WBC Trend:  WBC Count: 6.05 (02-11-25 @ 10:00)  WBC Count: 8.33 (02-10-25 @ 20:00)      Auto Neutrophil #: 6.06 K/uL (02-10-25 @ 20:00)  Auto Neutrophil #: 4.26 K/uL (01-03-25 @ 07:28)  Auto Neutrophil #: 3.94 K/uL (01-02-25 @ 09:32)  Auto Neutrophil #: 5.47 K/uL (12-30-24 @ 11:00)  Auto Neutrophil #: 5.10 K/uL (12-28-24 @ 11:22)      Creatine Trend:  Creatinine: 1.0 (02-11)  Creatinine: 0.8 (02-10)      Liver Biochemical Testing Trend:  Alanine Aminotransferase (ALT/SGPT): <5 (02-11)  Alanine Aminotransferase (ALT/SGPT): <5 (02-10)  Alanine Aminotransferase (ALT/SGPT): 9 (01-05)  Alanine Aminotransferase (ALT/SGPT): 7 (01-04)  Alanine Aminotransferase (ALT/SGPT): <5 (01-03)  Aspartate Aminotransferase (AST/SGOT): 18 (02-11-25 @ 10:00)  Aspartate Aminotransferase (AST/SGOT): 16 (02-10-25 @ 20:00)  Aspartate Aminotransferase (AST/SGOT): 44 (01-05-25 @ 08:07)  Aspartate Aminotransferase (AST/SGOT): 42 (01-04-25 @ 08:10)  Aspartate Aminotransferase (AST/SGOT): 28 (01-03-25 @ 07:28)  Bilirubin Total: 0.3 (02-11)  Bilirubin Total: 0.4 (02-10)  Bilirubin Total: 0.4 (01-05)  Bilirubin Total: 0.2 (01-04)  Bilirubin Total: 0.3 (01-03)      Trend LDH  01-10-24 @ 06:55  230      Auto Eosinophil %: 1.7 % (02-10-25 @ 20:00)      Urinalysis Basic - ( 11 Feb 2025 10:00 )    Color: x / Appearance: x / SG: x / pH: x  Gluc: 224 mg/dL / Ketone: x  / Bili: x / Urobili: x   Blood: x / Protein: x / Nitrite: x   Leuk Esterase: x / RBC: x / WBC x   Sq Epi: x / Non Sq Epi: x / Bacteria: x        MICROBIOLOGY:    Female          HIV-1/2 Combo Result: Nonreact (12-31-24 @ 08:00)  HIV-1/2 Combo Result: Nonreact (01-11-24 @ 13:40)  Lactate, Blood: 0.9 (02-10 @ 20:00)    A1C with Estimated Average Glucose Result: 8.1 % (12-30-24 @ 11:00)  A1C with Estimated Average Glucose Result: 7.9 % (11-29-24 @ 10:30)      INFLAMMATORY MARKERS      RADIOLOGY & ADDITIONAL TESTS:  I have personally reviewed the imagings.  CXR      CT      < from: [facility] Duplex Lower Ext Vein Scan, Bilat (02.10.25 @ 22:08) >    RIGHT:  Normal compressibility of the RIGHT common femoral, femoral and popliteal   veins.  Doppler examination shows normal spontaneous and phasic flow.  No RIGHT calf vein thrombosis is detected.    LEFT:  Normal compressibility of the LEFT common femoral, femoral and popliteal   veins.  Doppler examination shows normal spontaneous and phasic flow.  No LEFT calf vein thrombosis is detected.    IMPRESSION:  No evidence of deep venous thrombosis in either lower extremity.    < end of copied text >  < from: Xray Elbow AP + Lateral + Oblique, Left (01.04.25 @ 10:19) >    INTERPRETATION:  CLINICAL HISTORY / REASON FOR EXAM: Pain    COMPARISON: None    TECHNIQUE: Three views, left elbow radiographs    FINDINGS:    No acute displaced fracture. The radiocapitellar joint is aligned.      IMPRESSION:    No acute displaced fracture.    --- End of Report ---    < end of copied text >      CARDIOLOGY TESTING

## 2025-02-11 NOTE — CONSULT NOTE ADULT - CONSULT REASON
Lower extremity wound assessment and treatment recommendation
Was in clinicals when she had more cp and sob, had labs EKG chest XR and CT w/ contrast to r/o PE- on scan was told enlarged thyroid and no other abnl. Rec she come in to see PCP for thyroid panel. Pt says she can sleep can't breath, feels like not moving enough air, feels anxiety is bad. Asthma exacerbated yesterday had to get neb tx. Thinks maybe her reflux, but is on dexilant. Does have hiatal hernia w/ surgeon contact info she is considering. Has f/u with Dr. Humberto Grijalva tomorrow.
chronic venous stasis/cellultiis
Infection
h/o 3/4 [facility] fxs

## 2025-02-11 NOTE — CONSULT NOTE ADULT - SUBJECTIVE AND OBJECTIVE BOX
HPI:  Patient is 54-year-old female with past medical history of DM, COPD not on home O2, opioid dependence on Suboxone, asthma, thyroidism, chronic venous stasis with chronic lymphedema of lower extremities presents to the ED complaining of worsening swelling/pain of bilateral lower extremities preventing her from walking.  Patient was admitted for similar symptoms recently and improved after being on steroids.  She denies other complaints. Pt denies fever, chills, nausea, vomiting, abdominal pain, diarrhea, headache, dizziness, weakness, chest pain, SOB, back pain, LOC, trauma, urinary symptoms, cough.     (10 Feb 2025 22:42)      PT reports that she fractured her hand 12/27 and has not had any follow up due to issues with transportation to the office. She reports her hand feels good, no pain, and has kept it clean and dry. Ortho consulted for cast removal. Also has a history of left elbow aseptic olecranon bursitis- it was drained in the beginning of January but has reaccumulated- this is a chronic issue.      PAST MEDICAL & SURGICAL HISTORY:  Asthma with COPD      Hypothyroidism      H/O: substance abuse  no longer using      History of pancreatitis      Hepatitis-C      Leukocytoclastic vasculitis      Bilateral edema of lower extremity      HLD (hyperlipidemia)      Type 2 diabetes mellitus      History of appendectomy      History of tonsillectomy      MEDICATIONS  (STANDING):  aspirin enteric coated 81 milliGRAM(s) Oral daily  atorvastatin 20 milliGRAM(s) Oral at bedtime  buprenorphine 8 mG/naloxone 2 mG SL  Tablet 2.5 Tablet(s) SubLingual daily  cefepime   IVPB 2000 milliGRAM(s) IV Intermittent every 8 hours  cefepime   IVPB      colchicine 0.6 milliGRAM(s) Oral two times a day  dextrose 5%. 1000 milliLiter(s) (100 mL/Hr) IV Continuous <Continuous>  dextrose 5%. 1000 milliLiter(s) (50 mL/Hr) IV Continuous <Continuous>  dextrose 50% Injectable 25 Gram(s) IV Push once  dextrose 50% Injectable 12.5 Gram(s) IV Push once  dextrose 50% Injectable 25 Gram(s) IV Push once  furosemide   Injectable 20 milliGRAM(s) IV Push two times a day  glucagon  Injectable 1 milliGRAM(s) IntraMuscular once  heparin   Injectable 5000 Unit(s) SubCutaneous every 12 hours  insulin glargine Injectable (LANTUS) 25 Unit(s) SubCutaneous at bedtime  insulin lispro (ADMELOG) corrective regimen sliding scale   SubCutaneous three times a day before meals  levothyroxine 175 MICROGram(s) Oral daily  pantoprazole    Tablet 40 milliGRAM(s) Oral before breakfast  pregabalin 50 milliGRAM(s) Oral every 12 hours  vancomycin  IVPB 750 milliGRAM(s) IV Intermittent every 12 hours    MEDICATIONS  (PRN):  acetaminophen     Tablet .. 650 milliGRAM(s) Oral every 6 hours PRN Temp greater or equal to 38C (100.4F), Mild Pain (1 - 3)  dextrose Oral Gel 15 Gram(s) Oral once PRN Blood Glucose LESS THAN 70 milliGRAM(s)/deciliter  ondansetron Injectable 4 milliGRAM(s) IV Push every 6 hours PRN Nausea  senna 2 Tablet(s) Oral at bedtime PRN Constipation      NO NEW XRAYS SINCE INJURY, NONE ORDERED DURING THIS ADMISSION     Vital Signs Last 24 Hrs  T(C): 36.6 (11 Feb 2025 04:45), Max: 36.7 (10 Feb 2025 16:10)  T(F): 97.8 (11 Feb 2025 04:45), Max: 98 (10 Feb 2025 16:10)  HR: 60 (11 Feb 2025 04:45) (57 - 64)  BP: 106/70 (11 Feb 2025 04:45) (106/70 - 126/80)  BP(mean): --  RR: 18 (11 Feb 2025 04:45) (18 - 18)  SpO2: 98% (11 Feb 2025 04:45) (98% - 100%)    Parameters below as of 10 Feb 2025 23:52  Patient On (Oxygen Delivery Method): room air    PHYSICAL EXAM:  Left elbow + olecranon buristis- large amount of fluid, full rom, no pain   left hand cast in place, in good condition, moves all digits, sensation intact   cast removed, no ttp over fracture sites, no edema

## 2025-02-11 NOTE — PROGRESS NOTE ADULT - SUBJECTIVE AND OBJECTIVE BOX
EZRA BARCLAY 54y Female  MRN#: [id number]       SUBJECTIVE  Patient is a 54y old Female who presents with a chief complaint of LE swelling and redness (11 Feb 2025 14:32)  Currently admitted to medicine with the primary diagnosis of Bilateral leg pain      OBJECTIVE  PAST MEDICAL & SURGICAL HISTORY  Asthma with COPD    Hypothyroidism    H/O: substance abuse  no longer using    History of pancreatitis    Hepatitis-C    Leukocytoclastic vasculitis    Bilateral edema of lower extremity    HLD (hyperlipidemia)    Type 2 diabetes mellitus    History of appendectomy    History of tonsillectomy      ALLERGIES:  No Known Allergies    MEDICATIONS:  STANDING MEDICATIONS  aspirin enteric coated 81 milliGRAM(s) Oral daily  atorvastatin 20 milliGRAM(s) Oral at bedtime  buprenorphine 8 mG/naloxone 2 mG SL  Tablet 2.5 Tablet(s) SubLingual daily  cefTRIAXone   IVPB 2000 milliGRAM(s) IV Intermittent every 24 hours  colchicine 0.6 milliGRAM(s) Oral two times a day  dextrose 5%. 1000 milliLiter(s) IV Continuous <Continuous>  dextrose 5%. 1000 milliLiter(s) IV Continuous <Continuous>  dextrose 50% Injectable 25 Gram(s) IV Push once  dextrose 50% Injectable 12.5 Gram(s) IV Push once  dextrose 50% Injectable 25 Gram(s) IV Push once  furosemide   Injectable 20 milliGRAM(s) IV Push two times a day  glucagon  Injectable 1 milliGRAM(s) IntraMuscular once  heparin   Injectable 5000 Unit(s) SubCutaneous every 12 hours  insulin glargine Injectable (LANTUS) 25 Unit(s) SubCutaneous at bedtime  insulin lispro (ADMELOG) corrective regimen sliding scale   SubCutaneous three times a day before meals  levothyroxine 175 MICROGram(s) Oral daily  pantoprazole    Tablet 40 milliGRAM(s) Oral before breakfast  pregabalin 50 milliGRAM(s) Oral every 12 hours  vancomycin  IVPB 750 milliGRAM(s) IV Intermittent every 12 hours    PRN MEDICATIONS  acetaminophen     Tablet .. 650 milliGRAM(s) Oral every 6 hours PRN  dextrose Oral Gel 15 Gram(s) Oral once PRN  ondansetron Injectable 4 milliGRAM(s) IV Push every 6 hours PRN  senna 2 Tablet(s) Oral at bedtime PRN      VITAL SIGNS: Last 24 Hours  T(C): 36.4 (11 Feb 2025 13:00), Max: 36.7 (10 Feb 2025 16:10)  T(F): 97.6 (11 Feb 2025 13:00), Max: 98 (10 Feb 2025 16:10)  HR: 78 (11 Feb 2025 13:00) (57 - 78)  BP: 129/84 (11 Feb 2025 13:00) (106/70 - 129/84)  BP(mean): --  RR: 18 (11 Feb 2025 13:00) (18 - 18)  SpO2: 96% (11 Feb 2025 13:00) (96% - 100%)    LABS:                        9.8    6.05  )-----------( 251      ( 11 Feb 2025 10:00 )             31.5     02-11    134[L]  |  93[L]  |  17  ----------------------------<  224[H]  4.1   |  28  |  1.0    Ca    9.3      11 Feb 2025 10:00    TPro  8.6[H]  /  Alb  4.4  /  TBili  0.3  /  DBili  x   /  AST  18  /  ALT  <5  /  AlkPhos  104  02-11      Urinalysis Basic - ( 11 Feb 2025 10:00 )    Color: x / Appearance: x / SG: x / pH: x  Gluc: 224 mg/dL / Ketone: x  / Bili: x / Urobili: x   Blood: x / Protein: x / Nitrite: x   Leuk Esterase: x / RBC: x / WBC x   Sq Epi: x / Non Sq Epi: x / Bacteria: x        Lactate, Blood: 0.9 mmol/L (02-10-25 @ 20:00)          RADIOLOGY:      PHYSICAL EXAM:    GENERAL: NAD, well-developed  HEENT:  Atraumatic, Normocephalic  PULMONARY: Clear to auscultation bilaterally  CARDIOVASCULAR: Regular rate and rhythm  GASTROINTESTINAL: Soft, Nontender, Nondistended  MUSCULOSKELETAL:  2+ Peripheral Pulses  NEUROLOGY: non-focal  Extremities: LT Olecranon edematous, non painful to touch, bilateral lower extremity chronic venous stasis. R>L, mild weeping.

## 2025-02-11 NOTE — CONSULT NOTE ADULT - ASSESSMENT
Left 4/5 metacarpal fxs treated in cast x 6+ weeks.   - no ttp, no edema  -no restrictions  - xray ordered     Olecranon bursitis- aseptic, chronic  - no indication for aspiration, can use ace wrap to attempt to reduce collection  - outpatient follow up at [address] [place] [address] prn [phone]

## 2025-02-11 NOTE — CONSULT NOTE ADULT - NS PANP COMMENT GEN_ALL_CORE FT
Patient was seen and examined.  Cast was removed.  X-rays were reviewed.  Fractures are healing.  Patient has no pain.  Skin of the left hand is intact.  Patient should start a course of occupational therapy and hand therapy for improvement of her dexterity.  Follow-up with hand specialist.

## 2025-02-11 NOTE — PATIENT PROFILE ADULT - FALL HARM RISK - HARM RISK INTERVENTIONS

## 2025-02-12 LAB
ALBUMIN SERPL ELPH-MCNC: 4.6 G/DL — SIGNIFICANT CHANGE UP (ref 3.5–5.2)
ALP SERPL-CCNC: 124 U/L — HIGH (ref 30–115)
ALT FLD-CCNC: 5 U/L — SIGNIFICANT CHANGE UP (ref 0–41)
ANION GAP SERPL CALC-SCNC: 14 MMOL/L — SIGNIFICANT CHANGE UP (ref 7–14)
AST SERPL-CCNC: 31 U/L — SIGNIFICANT CHANGE UP (ref 0–41)
BASOPHILS # BLD AUTO: 0.07 K/UL — SIGNIFICANT CHANGE UP (ref 0–0.2)
BASOPHILS NFR BLD AUTO: 0.9 % — SIGNIFICANT CHANGE UP (ref 0–1)
BILIRUB SERPL-MCNC: 0.2 MG/DL — SIGNIFICANT CHANGE UP (ref 0.2–1.2)
BUN SERPL-MCNC: 16 MG/DL — SIGNIFICANT CHANGE UP (ref 10–20)
CALCIUM SERPL-MCNC: 9.5 MG/DL — SIGNIFICANT CHANGE UP (ref 8.4–10.5)
CHLORIDE SERPL-SCNC: 93 MMOL/L — LOW (ref 98–110)
CO2 SERPL-SCNC: 26 MMOL/L — SIGNIFICANT CHANGE UP (ref 17–32)
CREAT SERPL-MCNC: 1.1 MG/DL — SIGNIFICANT CHANGE UP (ref 0.7–1.5)
EGFR: 60 ML/MIN/1.73M2 — SIGNIFICANT CHANGE UP
EOSINOPHIL # BLD AUTO: 0.14 K/UL — SIGNIFICANT CHANGE UP (ref 0–0.7)
EOSINOPHIL NFR BLD AUTO: 1.9 % — SIGNIFICANT CHANGE UP (ref 0–8)
GLUCOSE SERPL-MCNC: 205 MG/DL — HIGH (ref 70–99)
HCT VFR BLD CALC: 35.7 % — LOW (ref 37–47)
HGB BLD-MCNC: 11.2 G/DL — LOW (ref 12–16)
IMM GRANULOCYTES NFR BLD AUTO: 0.3 % — SIGNIFICANT CHANGE UP (ref 0.1–0.3)
LYMPHOCYTES # BLD AUTO: 2.09 K/UL — SIGNIFICANT CHANGE UP (ref 1.2–3.4)
LYMPHOCYTES # BLD AUTO: 28.2 % — SIGNIFICANT CHANGE UP (ref 20.5–51.1)
MAGNESIUM SERPL-MCNC: 1.9 MG/DL — SIGNIFICANT CHANGE UP (ref 1.8–2.4)
MCHC RBC-ENTMCNC: 25.7 PG — LOW (ref 27–31)
MCHC RBC-ENTMCNC: 31.4 G/DL — LOW (ref 32–37)
MCV RBC AUTO: 82.1 FL — SIGNIFICANT CHANGE UP (ref 81–99)
MONOCYTES # BLD AUTO: 0.31 K/UL — SIGNIFICANT CHANGE UP (ref 0.1–0.6)
MONOCYTES NFR BLD AUTO: 4.2 % — SIGNIFICANT CHANGE UP (ref 1.7–9.3)
NEUTROPHILS # BLD AUTO: 4.79 K/UL — SIGNIFICANT CHANGE UP (ref 1.4–6.5)
NEUTROPHILS NFR BLD AUTO: 64.5 % — SIGNIFICANT CHANGE UP (ref 42.2–75.2)
NRBC BLD AUTO-RTO: 0 /100 WBCS — SIGNIFICANT CHANGE UP (ref 0–0)
PLATELET # BLD AUTO: 258 K/UL — SIGNIFICANT CHANGE UP (ref 130–400)
PMV BLD: 9.5 FL — SIGNIFICANT CHANGE UP (ref 7.4–10.4)
POTASSIUM SERPL-MCNC: 4.9 MMOL/L — SIGNIFICANT CHANGE UP (ref 3.5–5)
POTASSIUM SERPL-SCNC: 4.9 MMOL/L — SIGNIFICANT CHANGE UP (ref 3.5–5)
PROT SERPL-MCNC: 9.1 G/DL — HIGH (ref 6–8)
RBC # BLD: 4.35 M/UL — SIGNIFICANT CHANGE UP (ref 4.2–5.4)
RBC # FLD: 15 % — HIGH (ref 11.5–14.5)
SODIUM SERPL-SCNC: 133 MMOL/L — LOW (ref 135–146)
VANCOMYCIN FLD-MCNC: 13 UG/ML — HIGH (ref 5–10)
VANCOMYCIN TROUGH SERPL-MCNC: 10.7 UG/ML — HIGH (ref 5–10)
WBC # BLD: 7.42 K/UL — SIGNIFICANT CHANGE UP (ref 4.8–10.8)
WBC # FLD AUTO: 7.42 K/UL — SIGNIFICANT CHANGE UP (ref 4.8–10.8)

## 2025-02-12 PROCEDURE — 99233 SBSQ HOSP IP/OBS HIGH 50: CPT

## 2025-02-12 RX ADMIN — Medication 1: at 12:13

## 2025-02-12 RX ADMIN — Medication 20 MILLIGRAM(S): at 05:19

## 2025-02-12 RX ADMIN — ONDANSETRON 4 MILLIGRAM(S): 4 TABLET, ORALLY DISINTEGRATING ORAL at 17:38

## 2025-02-12 RX ADMIN — PREGABALIN CAPSULES, CV 50 MILLIGRAM(S): 225 CAPSULE ORAL at 05:16

## 2025-02-12 RX ADMIN — ASPIRIN 81 MILLIGRAM(S): 81 TABLET, COATED ORAL at 11:12

## 2025-02-12 RX ADMIN — BUPRENORPHINE AND NALOXONE 2.5 TABLET(S): 8; 2 FILM BUCCAL; SUBLINGUAL at 11:10

## 2025-02-12 RX ADMIN — PANTOPRAZOLE 40 MILLIGRAM(S): 20 TABLET, DELAYED RELEASE ORAL at 05:16

## 2025-02-12 RX ADMIN — LEVOTHYROXINE SODIUM 175 MICROGRAM(S): 25 TABLET ORAL at 05:17

## 2025-02-12 RX ADMIN — CEFTRIAXONE 100 MILLIGRAM(S): 250 INJECTION, POWDER, FOR SOLUTION INTRAMUSCULAR; INTRAVENOUS at 22:42

## 2025-02-12 RX ADMIN — VANCOMYCIN HYDROCHLORIDE 250 MILLIGRAM(S): KIT at 22:43

## 2025-02-12 RX ADMIN — PREGABALIN CAPSULES, CV 50 MILLIGRAM(S): 225 CAPSULE ORAL at 17:38

## 2025-02-12 RX ADMIN — Medication 20 MILLIGRAM(S): at 14:04

## 2025-02-12 RX ADMIN — ATORVASTATIN CALCIUM 20 MILLIGRAM(S): 80 TABLET, FILM COATED ORAL at 22:36

## 2025-02-12 RX ADMIN — INSULIN GLARGINE-YFGN 25 UNIT(S): 100 INJECTION, SOLUTION SUBCUTANEOUS at 22:42

## 2025-02-12 RX ADMIN — VANCOMYCIN HYDROCHLORIDE 250 MILLIGRAM(S): KIT at 11:11

## 2025-02-12 RX ADMIN — ONDANSETRON 4 MILLIGRAM(S): 4 TABLET, ORALLY DISINTEGRATING ORAL at 04:43

## 2025-02-12 RX ADMIN — COLCHICINE 0.6 MILLIGRAM(S): 0.6 TABLET ORAL at 17:38

## 2025-02-12 RX ADMIN — COLCHICINE 0.6 MILLIGRAM(S): 0.6 TABLET ORAL at 05:16

## 2025-02-12 RX ADMIN — Medication 2: at 08:32

## 2025-02-12 NOTE — OCCUPATIONAL THERAPY INITIAL EVALUATION ADULT - LIVES WITH, PROFILE
boy friend in an apartment with 6 steps to enter +16 steps to the main living area +standard toilet +tub/significant other

## 2025-02-12 NOTE — OCCUPATIONAL THERAPY INITIAL EVALUATION ADULT - PERTINENT HX OF CURRENT PROBLEM, REHAB EVAL
Patient is 54-year-old female with past medical history of DM, COPD not on home O2, opioid dependence on Suboxone, asthma, thyroidism, chronic venous stasis with chronic lymphedema of lower extremities presents to the ED complaining of worsening swelling/pain of bilateral lower extremities preventing her from walking.  Patient was admitted for similar symptoms recently and improved after being on steroids.  She denies other complaints. Pt denies fever, chills, nausea, vomiting, abdominal pain, diarrhea, headache, dizziness, weakness, chest pain, SOB, back pain, LOC, trauma, urinary symptoms, cough.    X-ray (L) hand (2/11): Callus formation is seen around the fractures of the fourth and fifth   metacarpals with fracture lines still present.

## 2025-02-12 NOTE — PROGRESS NOTE ADULT - SUBJECTIVE AND OBJECTIVE BOX
EZRA BARCLAY  [facility] ([facility]) 008 A ([facility] ([facility]))            Patient was evaluated and examined  by bedside,                 REVIEW OF SYSTEMS:  please see pertinent positives mentioned above, all other 12 ROS negative      T(C): , Max: 36.4 (02-11-25 @ 13:00)  HR: 63 (02-12-25 @ 04:40)  BP: 115/75 (02-12-25 @ 04:40)  RR: 18 (02-12-25 @ 04:40)  SpO2: 99% (02-12-25 @ 04:40)  CAPILLARY BLOOD GLUCOSE      POCT Blood Glucose.: 171 mg/dL (12 Feb 2025 11:27)  POCT Blood Glucose.: 226 mg/dL (12 Feb 2025 07:47)  POCT Blood Glucose.: 154 mg/dL (11 Feb 2025 21:41)  POCT Blood Glucose.: 287 mg/dL (11 Feb 2025 17:56)  POCT Blood Glucose.: 258 mg/dL (11 Feb 2025 16:23)      PHYSICAL EXAM:  General: NAD, comfortable in bed  HEENT: NCAT  Lungs: No increased WOB  CVS: RRR  Abdomen: , non-distended  Extremities: 3+ bilateral LE edema, R>>L        LAB  CBC  Date: 02-12-25 @ 08:12  Mean cell Zdiqttblbl49.7  Mean cell Hemoglobin Conc31.4  Mean cell Volum 82.1  Platelet count-Automate 258  RBC Count 4.35  Red Cell Distrib Width15.0  WBC Count7.42  % Albumin, Urine--  Hematocrit 35.7  Hemoglobin 11.2  CBC  Date: 02-11-25 @ 10:00  Mean cell Mgdptgylgp55.7  Mean cell Hemoglobin Conc31.1  Mean cell Volum 82.5  Platelet count-Automate 251  RBC Count 3.82  Red Cell Distrib Width15.0  WBC Count6.05  % Albumin, Urine--  Hematocrit 31.5  Hemoglobin 9.8  CBC  Date: 02-10-25 @ 20:00  Mean cell Lwusdaxndp94.3  Mean cell Hemoglobin Conc30.8  Mean cell Volum 82.3  Platelet count-Automate 240  RBC Count 3.95  Red Cell Distrib Width14.9  WBC Count8.33  % Albumin, Urine--  Hematocrit 32.5  Hemoglobin 10.0    BMP  02-12-25 @ 08:12  Blood Gas Arterial-Calcium,Ionized--  Blood Urea Nitrogen, Serum 16 mg/dL [10 - 20]  Carbon Dioxide, Serum26 mmol/L [17 - 32]  Chloride, Serum93 mmol/L[L] [98 - 110]  Creatinie, Serum1.1 mg/dL [0.7 - 1.5]  Glucose, Iqbto894 mg/dL[H] [70 - 99]  Potassium, Serum4.9 mmol/L [3.5 - 5.0] [Slighty Hemolyzed use with Caution]  Sodium, Serum 133 mmol/L[L] [135 - 146]  [facility]  02-11-25 @ 10:00  Blood Gas Arterial-Calcium,Ionized--  Blood Urea Nitrogen, Serum 17 mg/dL [10 - 20]  Carbon Dioxide, Serum28 mmol/L [17 - 32]  Chloride, Serum93 mmol/L[L] [98 - 110]  Creatinie, Serum1.0 mg/dL [0.7 - 1.5]  Glucose, Rkqqf395 mg/dL[H] [70 - 99]  Potassium, Serum4.1 mmol/L [3.5 - 5.0]  Sodium, Serum 134 mmol/L[L] [135 - 146]  [facility]  02-10-25 @ 20:00  Blood Gas Arterial-Calcium,Ionized--  Blood Urea Nitrogen, Serum 15 mg/dL [10 - 20]  Carbon Dioxide, Serum30 mmol/L [17 - 32]  Chloride, Serum93 mmol/L[L] [98 - 110]  Creatinie, Serum0.8 mg/dL [0.7 - 1.5]  Glucose, Vbfja750 mg/dL[H] [70 - 99]  Potassium, Serum4.2 mmol/L [3.5 - 5.0]  Sodium, Serum 134 mmol/L[L] [135 - 146]              Microbiology:    Culture - Blood (collected 02-10-25 @ 20:00)  Source: .Blood BLOOD  Preliminary Report (02-12-25 @ 03:02):    No growth at 24 hours    Culture - Blood (collected 02-10-25 @ 20:00)  Source: .Blood BLOOD  Preliminary Report (02-12-25 @ 03:02):    No growth at 24 hours        RADIOLOGY & ADDITIONAL TESTS:        Medications:  acetaminophen     Tablet .. 650 milliGRAM(s) Oral every 6 hours PRN  aspirin enteric coated 81 milliGRAM(s) Oral daily  atorvastatin 20 milliGRAM(s) Oral at bedtime  buprenorphine 8 mG/naloxone 2 mG SL  Tablet 2.5 Tablet(s) SubLingual daily  cefTRIAXone   IVPB 2000 milliGRAM(s) IV Intermittent every 24 hours  colchicine 0.6 milliGRAM(s) Oral two times a day  dextrose 5%. 1000 milliLiter(s) IV Continuous <Continuous>  dextrose 5%. 1000 milliLiter(s) IV Continuous <Continuous>  dextrose 50% Injectable 25 Gram(s) IV Push once  dextrose 50% Injectable 12.5 Gram(s) IV Push once  dextrose 50% Injectable 25 Gram(s) IV Push once  dextrose Oral Gel 15 Gram(s) Oral once PRN  furosemide   Injectable 20 milliGRAM(s) IV Push two times a day  glucagon  Injectable 1 milliGRAM(s) IntraMuscular once  heparin   Injectable 5000 Unit(s) SubCutaneous every 12 hours  insulin glargine Injectable (LANTUS) 25 Unit(s) SubCutaneous at bedtime  insulin lispro (ADMELOG) corrective regimen sliding scale   SubCutaneous three times a day before meals  levothyroxine 175 MICROGram(s) Oral daily  ondansetron Injectable 4 milliGRAM(s) IV Push every 6 hours PRN  pantoprazole    Tablet 40 milliGRAM(s) Oral before breakfast  pregabalin 50 milliGRAM(s) Oral every 12 hours  senna 2 Tablet(s) Oral at bedtime PRN  vancomycin  IVPB 750 milliGRAM(s) IV Intermittent every 12 hours        Assessment and Plan:    54-year-old female with past medical history of DM, COPD not on home O2, opioid dependence on Suboxone, asthma, thyroidism, chronic venous stasis with chronic lymphedema of lower extremities presenting with suspected bilateral LE cellulitis    #LE cellulitis  #Leukocytoclastic vasculitis  Patient is prone to recurrent cellulitis due to open wounds from leukocytoclastic vasculitis  -IV Ceftriaxone/Vancomycin (S/D 2/10-)  -VA duplex negative for DVT  -Patient was first diagnosed with leukocytoclastic vasculitis in 1/2024 after a skin biopsy was pefromed. Repeat skin biopsy 1/2025 during previous admission was inconclusive  -she was discharged on a prednisone taper that was completed. Does not appear that she has followed with derm or rheumatology  -Derm and rheum consult here to re-establish care, maybe repeat biopsy  -Continue with colchicine  -Wound care consult for wrapping, vascular consult for possible unna boot  -No intervention as per Burn, images reviewed via teams      #DM2  -continue with lantus, and ISS  -Monitor POC     #LT olecranon bursitis, recurrence  - S/p drainage 1/2025  - No aspiration planned by ortho  - Recommend compression/ACE bandage    #opioid dependence  -continue with suboxone     #Left hand fx  -been in the cast for 6 week  -Ortho saw patient, no intervention currently    #Immunodeficiency secondary to history of substance use which could result in poor clinical outcome.  -Management of acute infection as above      #Progress Note Handoff  Pending (specify):  as above   Family discussion:  plan of care was discussed with patient   in details.  all questions were answered.  seems to understand, and in agreement  Disposition:  PT      #Obesity BMI (kg/m2): 30.1

## 2025-02-12 NOTE — OCCUPATIONAL THERAPY INITIAL EVALUATION ADULT - GENERAL OBSERVATIONS, REHAB EVAL
13:40-14:08; Chart reviewed, ok to treat for occupational therapy as per RN Hung pt received seated EOB +semi-fowlers +RUE heplock +LUE IV in NAD. Pt in agreement with OT IE.

## 2025-02-12 NOTE — OCCUPATIONAL THERAPY INITIAL EVALUATION ADULT - MUSCLE TONE ASSESSMENT, REHAB EVAL
returned Pt phone call, advised COVID Test is required and they will call him 7 days before procedure to schedule testing. PT verb understands   bilateral upper extremities/normal

## 2025-02-13 LAB
ALBUMIN SERPL ELPH-MCNC: 4.7 G/DL — SIGNIFICANT CHANGE UP (ref 3.5–5.2)
ALP SERPL-CCNC: 124 U/L — HIGH (ref 30–115)
ALT FLD-CCNC: 5 U/L — SIGNIFICANT CHANGE UP (ref 0–41)
ANION GAP SERPL CALC-SCNC: 15 MMOL/L — HIGH (ref 7–14)
AST SERPL-CCNC: 26 U/L — SIGNIFICANT CHANGE UP (ref 0–41)
BILIRUB SERPL-MCNC: 0.3 MG/DL — SIGNIFICANT CHANGE UP (ref 0.2–1.2)
BUN SERPL-MCNC: 14 MG/DL — SIGNIFICANT CHANGE UP (ref 10–20)
CALCIUM SERPL-MCNC: 9.5 MG/DL — SIGNIFICANT CHANGE UP (ref 8.4–10.5)
CHLORIDE SERPL-SCNC: 93 MMOL/L — LOW (ref 98–110)
CO2 SERPL-SCNC: 29 MMOL/L — SIGNIFICANT CHANGE UP (ref 17–32)
CREAT SERPL-MCNC: 1.2 MG/DL — SIGNIFICANT CHANGE UP (ref 0.7–1.5)
EGFR: 54 ML/MIN/1.73M2 — LOW
GLUCOSE SERPL-MCNC: 140 MG/DL — HIGH (ref 70–99)
HCT VFR BLD CALC: 35.5 % — LOW (ref 37–47)
HGB BLD-MCNC: 11.1 G/DL — LOW (ref 12–16)
MCHC RBC-ENTMCNC: 25.7 PG — LOW (ref 27–31)
MCHC RBC-ENTMCNC: 31.3 G/DL — LOW (ref 32–37)
MCV RBC AUTO: 82.2 FL — SIGNIFICANT CHANGE UP (ref 81–99)
NRBC BLD AUTO-RTO: 0 /100 WBCS — SIGNIFICANT CHANGE UP (ref 0–0)
PLATELET # BLD AUTO: 273 K/UL — SIGNIFICANT CHANGE UP (ref 130–400)
PMV BLD: 10.4 FL — SIGNIFICANT CHANGE UP (ref 7.4–10.4)
POTASSIUM SERPL-MCNC: 4.3 MMOL/L — SIGNIFICANT CHANGE UP (ref 3.5–5)
POTASSIUM SERPL-SCNC: 4.3 MMOL/L — SIGNIFICANT CHANGE UP (ref 3.5–5)
PROT SERPL-MCNC: 9.2 G/DL — HIGH (ref 6–8)
RBC # BLD: 4.32 M/UL — SIGNIFICANT CHANGE UP (ref 4.2–5.4)
RBC # FLD: 15.2 % — HIGH (ref 11.5–14.5)
SODIUM SERPL-SCNC: 137 MMOL/L — SIGNIFICANT CHANGE UP (ref 135–146)
WBC # BLD: 7.09 K/UL — SIGNIFICANT CHANGE UP (ref 4.8–10.8)
WBC # FLD AUTO: 7.09 K/UL — SIGNIFICANT CHANGE UP (ref 4.8–10.8)

## 2025-02-13 PROCEDURE — 99233 SBSQ HOSP IP/OBS HIGH 50: CPT

## 2025-02-13 RX ORDER — LINEZOLID 600 MG/300ML
600 INJECTION, SOLUTION INTRAVENOUS EVERY 12 HOURS
Refills: 0 | Status: DISCONTINUED | OUTPATIENT
Start: 2025-02-13 | End: 2025-02-14

## 2025-02-13 RX ADMIN — PREGABALIN CAPSULES, CV 50 MILLIGRAM(S): 225 CAPSULE ORAL at 05:19

## 2025-02-13 RX ADMIN — Medication 20 MILLIGRAM(S): at 05:12

## 2025-02-13 RX ADMIN — COLCHICINE 0.6 MILLIGRAM(S): 0.6 TABLET ORAL at 17:10

## 2025-02-13 RX ADMIN — Medication 40 MILLIGRAM(S): at 17:46

## 2025-02-13 RX ADMIN — Medication 2 TABLET(S): at 21:27

## 2025-02-13 RX ADMIN — LINEZOLID 600 MILLIGRAM(S): 600 INJECTION, SOLUTION INTRAVENOUS at 17:15

## 2025-02-13 RX ADMIN — INSULIN GLARGINE-YFGN 25 UNIT(S): 100 INJECTION, SOLUTION SUBCUTANEOUS at 21:26

## 2025-02-13 RX ADMIN — Medication 5000 UNIT(S): at 05:11

## 2025-02-13 RX ADMIN — LEVOTHYROXINE SODIUM 175 MICROGRAM(S): 25 TABLET ORAL at 05:15

## 2025-02-13 RX ADMIN — ATORVASTATIN CALCIUM 20 MILLIGRAM(S): 80 TABLET, FILM COATED ORAL at 21:26

## 2025-02-13 RX ADMIN — PANTOPRAZOLE 40 MILLIGRAM(S): 20 TABLET, DELAYED RELEASE ORAL at 05:26

## 2025-02-13 RX ADMIN — Medication 5000 UNIT(S): at 17:10

## 2025-02-13 RX ADMIN — ACETAMINOPHEN 650 MILLIGRAM(S): 160 SUSPENSION ORAL at 21:27

## 2025-02-13 RX ADMIN — ASPIRIN 81 MILLIGRAM(S): 81 TABLET, COATED ORAL at 09:43

## 2025-02-13 RX ADMIN — ACETAMINOPHEN 650 MILLIGRAM(S): 160 SUSPENSION ORAL at 21:59

## 2025-02-13 RX ADMIN — COLCHICINE 0.6 MILLIGRAM(S): 0.6 TABLET ORAL at 05:15

## 2025-02-13 RX ADMIN — PREGABALIN CAPSULES, CV 50 MILLIGRAM(S): 225 CAPSULE ORAL at 17:08

## 2025-02-13 RX ADMIN — BUPRENORPHINE AND NALOXONE 2.5 TABLET(S): 8; 2 FILM BUCCAL; SUBLINGUAL at 09:43

## 2025-02-13 RX ADMIN — Medication 3: at 17:07

## 2025-02-13 NOTE — PROGRESS NOTE ADULT - ASSESSMENT
Assessment:   54-year-old female with past medical history of DM, COPD not on home O2, opioid dependence on Suboxone, asthma, thyroidism, chronic venous stasis with chronic lymphedema of lower extremities presents to the ED complaining of worsening swelling/pain of bilateral lower extremities preventing her from walking.  Patient was admitted for similar symptoms recently and improved after being on steroids.  She denies other complaints. Pt denies fever, chills, nausea, vomiting, abdominal pain, diarrhea, headache, dizziness, weakness, chest pain, SOB, back pain, LOC, trauma, urinary symptoms, cough.  On 1/10/2024 Pt seen by SURGICAL SERVICE (DR. WELLINGTON) - HAD PUNCH BIOPSY OF RLE LESION ON 1/10/24 SHOWING -  LEUKOCYTOPLASTIC VASCULITITS.  Pt also had a trial of CAMERON Boot for one week by Dr Adair as outpt in 2024.    - lower extremities elevation while in bed  - awaiting Unna boot for compression  - C/w IV Abx as per ID   - wound care recs appreciated

## 2025-02-13 NOTE — PROGRESS NOTE ADULT - SUBJECTIVE AND OBJECTIVE BOX
EZRA BARCLAY  54y, Female    LOS  3d    INTERVAL EVENTS/HPI  - No acute events overnight  - T(F): , Max: 98 (02-13-25 @ 04:36)  - WBC Count: 7.09 (02-13-25 @ 04:30)  WBC Count: 7.42 (02-12-25 @ 08:12)  - Creatinine: 1.2 (02-13-25 @ 04:30)  Creatinine: 1.1 (02-12-25 @ 08:12)  REVIEW OF SYSTEMS:  CONSTITUTIONAL: No fever or chills  HEENT: No sore throat  RESPIRATORY: No cough, no shortness of breath  CARDIOVASCULAR: No chest pain or palpitations  GASTROINTESTINAL: No abdominal or epigastric pain  GENITOURINARY: No dysuria  NEUROLOGICAL: No headache/dizziness  MSK: No joint pain, erythema, or swelling; no back pain  SKIN: No itching, rashes  All other ROS negative except noted above    Prior hospital charts reviewed [Yes]  Primary team notes reviewed [Yes]  Other consultant notes reviewed [Yes]      ANTIMICROBIALS:   linezolid    Tablet 600 every 12 hours      OTHER MEDS: MEDICATIONS  (STANDING):  acetaminophen     Tablet .. 650 every 6 hours PRN  aspirin enteric coated 81 daily  atorvastatin 20 at bedtime  buprenorphine 8 mG/naloxone 2 mG SL  Tablet 2.5 daily  colchicine 0.6 two times a day  dextrose 50% Injectable 25 once  dextrose 50% Injectable 12.5 once  dextrose 50% Injectable 25 once  dextrose Oral Gel 15 once PRN  furosemide   Injectable 20 two times a day  glucagon  Injectable 1 once  heparin   Injectable 5000 every 12 hours  insulin glargine Injectable (LANTUS) 25 at bedtime  insulin lispro (ADMELOG) corrective regimen sliding scale  three times a day before meals  levothyroxine 175 daily  ondansetron Injectable 4 every 6 hours PRN  pantoprazole    Tablet 40 before breakfast  pregabalin 50 every 12 hours  senna 2 at bedtime PRN      Vital Signs Last 24 Hrs  T(F): 97.8 (02-13-25 @ 14:25), Max: 98 (02-10-25 @ 16:10)    Vital Signs Last 24 Hrs  HR: 69 (02-13-25 @ 14:25) (64 - 71)  BP: 122/72 (02-13-25 @ 14:25) (119/76 - 140/81)  RR: 18 (02-13-25 @ 14:25)  SpO2: 96% (02-13-25 @ 14:25) (95% - 100%)  Wt(kg): --    EXAM:  GENERAL: NAD  HEAD: No head lesions  NECK: Supple  CHEST/LUNG: Shallow breath sounds.   HEART: S1 S2  ABDOMEN: Soft, nontender  EXTREMITIES: Left swollen bursitis, bilateral lower extremity chronic venous stasis. R>L, mild weeping.   NERVOUS SYSTEM: Alert and oriented to person  MSK: No joint erythema, swelling or pain  SKIN: No rashes or lesions, no superficial thrombophlebitis    Labs:                        11.1   7.09  )-----------( 273      ( 13 Feb 2025 04:30 )             35.5     02-13    137  |  93[L]  |  14  ----------------------------<  140[H]  4.3   |  29  |  1.2    Ca    9.5      13 Feb 2025 04:30  Mg     1.9     02-12    TPro  9.2[H]  /  Alb  4.7  /  TBili  0.3  /  DBili  x   /  AST  26  /  ALT  5   /  AlkPhos  124[H]  02-13      WBC Trend:  WBC Count: 7.09 (02-13-25 @ 04:30)  WBC Count: 7.42 (02-12-25 @ 08:12)  WBC Count: 6.05 (02-11-25 @ 10:00)  WBC Count: 8.33 (02-10-25 @ 20:00)      Creatine Trend:  Creatinine: 1.2 (02-13)  Creatinine: 1.1 (02-12)  Creatinine: 1.0 (02-11)  Creatinine: 0.8 (02-10)      Liver Biochemical Testing Trend:  Alanine Aminotransferase (ALT/SGPT): 5 (02-13)  Alanine Aminotransferase (ALT/SGPT): 5 (02-12)  Alanine Aminotransferase (ALT/SGPT): <5 (02-11)  Alanine Aminotransferase (ALT/SGPT): <5 (02-10)  Alanine Aminotransferase (ALT/SGPT): 9 (01-05)  Aspartate Aminotransferase (AST/SGOT): 26 (02-13-25 @ 04:30)  Aspartate Aminotransferase (AST/SGOT): 31 (02-12-25 @ 08:12)  Aspartate Aminotransferase (AST/SGOT): 18 (02-11-25 @ 10:00)  Aspartate Aminotransferase (AST/SGOT): 16 (02-10-25 @ 20:00)  Aspartate Aminotransferase (AST/SGOT): 44 (01-05-25 @ 08:07)  Bilirubin Total: 0.3 (02-13)  Bilirubin Total: 0.2 (02-12)  Bilirubin Total: 0.3 (02-11)  Bilirubin Total: 0.4 (02-10)  Bilirubin Total: 0.4 (01-05)      Trend LDH  01-10-24 @ 06:55  230      Urinalysis Basic - ( 13 Feb 2025 04:30 )    Color: x / Appearance: x / SG: x / pH: x  Gluc: 140 mg/dL / Ketone: x  / Bili: x / Urobili: x   Blood: x / Protein: x / Nitrite: x   Leuk Esterase: x / RBC: x / WBC x   Sq Epi: x / Non Sq Epi: x / Bacteria: x        MICROBIOLOGY:  Vancomycin Level, Trough: 10.7 (02-12 @ 10:58)  Vancomycin Level, Random: 13.0 (02-12 @ 08:12)    Female    Culture - Blood (collected 10 Feb 2025 20:00)  Source: .Blood BLOOD  Preliminary Report:    No growth at 48 Hours    Culture - Blood (collected 10 Feb 2025 20:00)  Source: .Blood BLOOD  Preliminary Report:    No growth at 48 Hours    Culture - Blood (collected 28 Dec 2024 17:10)  Source: .Blood BLOOD  Final Report:    No growth at 5 days    Culture - Blood (collected 27 Dec 2024 16:00)  Source: .Blood BLOOD  Final Report:    No growth at 5 days    Culture - Blood (collected 27 Dec 2024 16:00)  Source: .Blood BLOOD  Final Report:    No growth at 5 days    Urinalysis with Rflx Culture (collected 29 Nov 2024 00:13)    Culture - Blood (collected 28 Nov 2024 22:04)  Source: .Blood BLOOD  Final Report:    No growth at 5 days    Culture - Blood (collected 28 Nov 2024 22:04)  Source: .Blood BLOOD  Final Report:    No growth at 5 days    Urinalysis with Rflx Culture (collected 14 Nov 2024 18:42)    Culture - Blood (collected 27 May 2024 21:10)  Source: .Blood Blood  Final Report:    No growth at 5 days      HIV-1/2 Combo Result: Nonreact (12-31-24 @ 08:00)  HIV-1/2 Combo Result: Nonreact (01-11-24 @ 13:40)    Lactate, Blood: 0.9 (02-10 @ 20:00)        RADIOLOGY & ADDITIONAL TESTS:  I have personally reviewed the relevant images.   CXR      CT    < from: Xray Hand 2 Views, Left (02.11.25 @ 14:42) >  Finding/  impression:    Callus formation is seen around the fractures of the fourth and fifth   metacarpals with fracture lines still present.    < end of copied text >  < from: VA Duplex Lower Ext Vein Scan, Bilat (02.10.25 @ 22:08) >  RIGHT:  Normal compressibility of the RIGHT common femoral, femoral and popliteal   veins.  Doppler examination shows normal spontaneous and phasic flow.  No RIGHT calf vein thrombosis is detected.    LEFT:  Normal compressibility of the LEFT common femoral, femoral and popliteal   veins.  Doppler examination shows normal spontaneous and phasic flow.  No LEFT calf vein thrombosis is detected.    IMPRESSION:  No evidence of deep venous thrombosis in either lower extremity.    < end of copied text >      WEIGHT  Weight (kg): 92.533 (02-10-25 @ 23:52)  Creatinine: 1.2 mg/dL (02-13-25 @ 04:30)      All available historical records have been reviewed

## 2025-02-13 NOTE — PROGRESS NOTE ADULT - SUBJECTIVE AND OBJECTIVE BOX
S: Seen and examined bedside. Reports RLE more swollen and painful than LLE.     Vital Signs Last 24 Hrs  T(C): 36.7 (13 Feb 2025 04:36), Max: 36.7 (13 Feb 2025 04:36)  T(F): 98 (13 Feb 2025 04:36), Max: 98 (13 Feb 2025 04:36)  HR: 64 (13 Feb 2025 04:36) (64 - 71)  BP: 119/76 (13 Feb 2025 04:36) (109/70 - 140/81)  BP(mean): 90 (13 Feb 2025 04:36) (90 - 101)  RR: 18 (13 Feb 2025 04:36) (18 - 18)  SpO2: 100% (13 Feb 2025 04:36) (95% - 100%)    Parameters below as of 12 Feb 2025 20:41  Patient On (Oxygen Delivery Method): room air    PHYSICAL EXAM:  General: NAD, comfortable in bed  HEENT: NCAT  Lungs: No increased WOB  CVS: RRR  Abdomen: , non-distended  Extremities: 3+ bilateral LE edema, R>L        MEDICATIONS  (STANDING):  aspirin enteric coated 81 milliGRAM(s) Oral daily  atorvastatin 20 milliGRAM(s) Oral at bedtime  buprenorphine 8 mG/naloxone 2 mG SL  Tablet 2.5 Tablet(s) SubLingual daily  cefTRIAXone   IVPB 2000 milliGRAM(s) IV Intermittent every 24 hours  colchicine 0.6 milliGRAM(s) Oral two times a day  dextrose 5%. 1000 milliLiter(s) (100 mL/Hr) IV Continuous <Continuous>  dextrose 5%. 1000 milliLiter(s) (50 mL/Hr) IV Continuous <Continuous>  dextrose 50% Injectable 25 Gram(s) IV Push once  dextrose 50% Injectable 12.5 Gram(s) IV Push once  dextrose 50% Injectable 25 Gram(s) IV Push once  furosemide   Injectable 20 milliGRAM(s) IV Push two times a day  glucagon  Injectable 1 milliGRAM(s) IntraMuscular once  heparin   Injectable 5000 Unit(s) SubCutaneous every 12 hours  insulin glargine Injectable (LANTUS) 25 Unit(s) SubCutaneous at bedtime  insulin lispro (ADMELOG) corrective regimen sliding scale   SubCutaneous three times a day before meals  levothyroxine 175 MICROGram(s) Oral daily  pantoprazole    Tablet 40 milliGRAM(s) Oral before breakfast  pregabalin 50 milliGRAM(s) Oral every 12 hours  vancomycin  IVPB 750 milliGRAM(s) IV Intermittent every 12 hours    MEDICATIONS  (PRN):  acetaminophen     Tablet .. 650 milliGRAM(s) Oral every 6 hours PRN Temp greater or equal to 38C (100.4F), Mild Pain (1 - 3)  dextrose Oral Gel 15 Gram(s) Oral once PRN Blood Glucose LESS THAN 70 milliGRAM(s)/deciliter  ondansetron Injectable 4 milliGRAM(s) IV Push every 6 hours PRN Nausea  senna 2 Tablet(s) Oral at bedtime PRN Constipation      LABS:                        11.1   7.09  )-----------( 273      ( 13 Feb 2025 04:30 )             35.5     02-13    137  |  93[L]  |  14  ----------------------------<  140[H]  4.3   |  29  |  1.2    Ca    9.5      13 Feb 2025 04:30  Mg     1.9     02-12    TPro  9.2[H]  /  Alb  4.7  /  TBili  0.3  /  DBili  x   /  AST  26  /  ALT  5   /  AlkPhos  124[H]  02-13      Urinalysis Basic - ( 13 Feb 2025 04:30 )    Color: x / Appearance: x / SG: x / pH: x  Gluc: 140 mg/dL / Ketone: x  / Bili: x / Urobili: x   Blood: x / Protein: x / Nitrite: x   Leuk Esterase: x / RBC: x / WBC x   Sq Epi: x / Non Sq Epi: x / Bacteria: x        RADIOLOGY & ADDITIONAL STUDIES: Reviewed S: Seen and examined bedside. Reports RLE more swollen and painful than LLE.     Vital Signs Last 24 Hrs  T(C): 36.7 (13 Feb 2025 04:36), Max: 36.7 (13 Feb 2025 04:36)  T(F): 98 (13 Feb 2025 04:36), Max: 98 (13 Feb 2025 04:36)  HR: 64 (13 Feb 2025 04:36) (64 - 71)  BP: 119/76 (13 Feb 2025 04:36) (109/70 - 140/81)  BP(mean): 90 (13 Feb 2025 04:36) (90 - 101)  RR: 18 (13 Feb 2025 04:36) (18 - 18)  SpO2: 100% (13 Feb 2025 04:36) (95% - 100%)    Parameters below as of 12 Feb 2025 20:41  Patient On (Oxygen Delivery Method): room air    PHYSICAL EXAM:  General: NAD, comfortable in bed  HEENT: NCAT  Lungs: No increased WOB  CVS: RRR  Abdomen: , non-distended  Extremities: 3+ bilateral LE edema R>L with erythema, scaling, and ulceration noted        MEDICATIONS  (STANDING):  aspirin enteric coated 81 milliGRAM(s) Oral daily  atorvastatin 20 milliGRAM(s) Oral at bedtime  buprenorphine 8 mG/naloxone 2 mG SL  Tablet 2.5 Tablet(s) SubLingual daily  cefTRIAXone   IVPB 2000 milliGRAM(s) IV Intermittent every 24 hours  colchicine 0.6 milliGRAM(s) Oral two times a day  dextrose 5%. 1000 milliLiter(s) (100 mL/Hr) IV Continuous <Continuous>  dextrose 5%. 1000 milliLiter(s) (50 mL/Hr) IV Continuous <Continuous>  dextrose 50% Injectable 25 Gram(s) IV Push once  dextrose 50% Injectable 12.5 Gram(s) IV Push once  dextrose 50% Injectable 25 Gram(s) IV Push once  furosemide   Injectable 20 milliGRAM(s) IV Push two times a day  glucagon  Injectable 1 milliGRAM(s) IntraMuscular once  heparin   Injectable 5000 Unit(s) SubCutaneous every 12 hours  insulin glargine Injectable (LANTUS) 25 Unit(s) SubCutaneous at bedtime  insulin lispro (ADMELOG) corrective regimen sliding scale   SubCutaneous three times a day before meals  levothyroxine 175 MICROGram(s) Oral daily  pantoprazole    Tablet 40 milliGRAM(s) Oral before breakfast  pregabalin 50 milliGRAM(s) Oral every 12 hours  vancomycin  IVPB 750 milliGRAM(s) IV Intermittent every 12 hours    MEDICATIONS  (PRN):  acetaminophen     Tablet .. 650 milliGRAM(s) Oral every 6 hours PRN Temp greater or equal to 38C (100.4F), Mild Pain (1 - 3)  dextrose Oral Gel 15 Gram(s) Oral once PRN Blood Glucose LESS THAN 70 milliGRAM(s)/deciliter  ondansetron Injectable 4 milliGRAM(s) IV Push every 6 hours PRN Nausea  senna 2 Tablet(s) Oral at bedtime PRN Constipation      LABS:                        11.1   7.09  )-----------( 273      ( 13 Feb 2025 04:30 )             35.5     02-13    137  |  93[L]  |  14  ----------------------------<  140[H]  4.3   |  29  |  1.2    Ca    9.5      13 Feb 2025 04:30  Mg     1.9     02-12    TPro  9.2[H]  /  Alb  4.7  /  TBili  0.3  /  DBili  x   /  AST  26  /  ALT  5   /  AlkPhos  124[H]  02-13      Urinalysis Basic - ( 13 Feb 2025 04:30 )    Color: x / Appearance: x / SG: x / pH: x  Gluc: 140 mg/dL / Ketone: x  / Bili: x / Urobili: x   Blood: x / Protein: x / Nitrite: x   Leuk Esterase: x / RBC: x / WBC x   Sq Epi: x / Non Sq Epi: x / Bacteria: x        RADIOLOGY & ADDITIONAL STUDIES: Reviewed

## 2025-02-13 NOTE — CHART NOTE - NSCHARTNOTEFT_GEN_A_CORE
Attempted to place an IV but pt refused and did not let me try it.   Spoke to ID Dr. Chavez who recommended to switch IV abx to Linezolid 600 mg po BID.     Case was also discussed with Attending. Attempted to place an IV but pt refused and did not let me try it.   Spoke to ID Dr. Chavez who recommended to switch IV abx to Linezolid 600 mg po BID.   Will also change IV Lasix to PO Lasix 40 bid.     Case was also discussed with Attending.

## 2025-02-13 NOTE — PHARMACOTHERAPY INTERVENTION NOTE - COMMENTS
Patient on vancomycin 750mg q12h for SSTI- Per precisepk, current dose is predicted to be therapeutic with an AUC/REED of 418.12.  Recommend to continue current dose and obtain trough with AM labs tomorrow to reassess
Recommended vancomycin 750 mg IV q12h. The vancomycin level 2/12 was 10.7 mg/L. As per PrecisePK calculations, current vancomycin AUC/REED is 429 mg/L*h, which is within the therapeutic range 400 - 600 mg/L*h. Serum creatinine is 1.0 mg/dL.      Edie SalvadorD   Clinical Pharmacy Specialist, Infectious Diseases  Tele-Antimicrobial Stewardship Program (Tele-ASP)  Tele-ASP Phone: [phone]  
As per policy, ordered a vancomycin trough for 2/17 AM in order to assist with vancomycin pharmacokinetic monitoring.      Edie SalvadorD   Clinical Pharmacy Specialist, Infectious Diseases  Tele-Antimicrobial Stewardship Program (Tele-ASP)  Tele-ASP Phone: [phone]

## 2025-02-13 NOTE — PROGRESS NOTE ADULT - ASSESSMENT
PHYSICAL THERAPY EVALUATION  NAME:  Laya Brooks  DATE: 04/03/24    AGE:   81 y.o.  Mrn:   [id number]  ADMIT DX:  Chronic obstructive pulmonary disease with (acute) exacerbation (HCC) [J44.1]  Problem List:   Patient Active Problem List   Diagnosis    History of CVA (cerebrovascular accident)    Hypertensive emergency    Malnutrition (HCC)    Premature atrial complexes    Thrombocytopenia (HCC)    Leukopenia    Neutropenia (HCC)    Hyperphosphatemia    Hypercholesterolemia    Carotid artery stenosis    Nocturnal hypoxia    Shortness of breath    Abnormal echocardiogram    CVA (cerebral vascular accident) (HCC)    Atrophy of right kidney    Stage 3a chronic kidney disease (HCC)    Renal vascular disease    Renovascular hypertension    Intracranial atherosclerosis    Weight loss, non-intentional    Hypervitaminosis D    CAD (coronary artery disease)    Celiac artery stenosis (HCC)    S/P right coronary artery (RCA) stent placement    Tobacco use    Wheezing    Dyslipidemia    Acute renal failure superimposed on chronic kidney disease  (HCC)    Acute diastolic congestive heart failure (HCC)    Hyponatremia    COPD (chronic obstructive pulmonary disease) (HCC)    Acute respiratory failure with hypoxia (HCC)    Toxic metabolic encephalopathy    Elevated d-dimer    Dysphagia    Renal artery stenosis (HCC)    Goals of care, counseling/discussion    Oral candidiasis       Past Medical History  Past Medical History:   Diagnosis Date    CAD (coronary artery disease)     Cardiac disease     Hypercholesteremia     Hyperlipidemia     Hypertension     Hypertension     Renal disorder        Past Surgical History  Past Surgical History:   Procedure Laterality Date    CORONARY ANGIOPLASTY WITH STENT PLACEMENT      IR TEMPORARY DIALYSIS CATHETER PLACEMENT  3/26/2024    WRIST SURGERY         Length Of Stay: 1  Performed at least 2 patient identifiers during session: Name and ID [email or web address]         04/03/24 1144   PT Last Visit   PT  Visit Date 04/03/24   Note Type   Note type Evaluation   Pain Assessment   Pain Assessment Tool 0-10   Pain Score No Pain   Restrictions/Precautions   Weight Bearing Precautions Per Order No   Other Precautions Chair Alarm;Bed Alarm;Fall Risk   Home Living   Type of Home House   Home Layout One level;Stairs to enter without rails  (1 ADONIS)   Bathroom Shower/Tub Tub/shower unit   Bathroom Toilet Standard   Bathroom Equipment   (none reported)   Home Equipment Walker  (no AD used at baseline)   Prior Function   Level of Chambers Independent with ADLs;Independent with functional mobility;Needs assistance with IADLS   Lives With Spouse   Receives Help From Family   Falls in the last 6 months 1 to 4  (2x)   Vocational Retired   General   Family/Caregiver Present No   Cognition   Overall Cognitive Status Impaired   Arousal/Participation Lethargic   Attention Within functional limits   Orientation Level Oriented X4   Memory Within functional limits   Following Commands Follows one step commands without difficulty   Comments pt reporting extreme fatigue   RLE Assessment   RLE Assessment X   Strength RLE   RLE Overall Strength 4-/5   LLE Assessment   LLE Assessment X   Strength LLE   LLE Overall Strength 4-/5   Vision-Basic Assessment   Current Vision Wears glasses for distance only   Coordination   Sensation WFL   Bed Mobility   Supine to Sit 3  Moderate assistance   Additional items HOB elevated;Verbal cues;LE management;Increased time required;Assist x 1   Sit to Supine 4  Minimal assistance   Additional items Assist x 2;Verbal cues;Increased time required   Transfers   Sit to Stand 4  Minimal assistance   Additional items Assist x 1;Increased time required;Verbal cues   Stand to Sit 4  Minimal assistance   Additional items Assist x 1;Verbal cues;Increased time required   Ambulation/Elevation   Gait pattern Improper Weight shift;Decreased foot clearance;Short stride;Excessively slow   Gait Assistance 4  Minimal assist    Additional items Assist x 1;Verbal cues   Assistive Device Rolling walker   Distance 30 ft   Ambulation/Elevation Additional Comments pt required assistance with RW navigation at end of ambulation due to fatigue   Balance   Static Sitting Fair +   Dynamic Sitting Fair   Static Standing Fair -   Dynamic Standing Poor +   Ambulatory Poor +   Endurance Deficit   Endurance Deficit Yes   Endurance Deficit Description PINZON noted; SaO2 decreased to 85 % with ambulation; pt took extended time to recover   Activity Tolerance   Activity Tolerance Patient limited by fatigue   Assessment   Prognosis Fair   Assessment Pt is 81 y.o. female seen for PT evaluation s/p admit to [facility] on 4/2/2024 w/ Acute renal failure superimposed on chronic kidney disease  (HCC). PT consulted to assess pt's functional mobility and d/c needs. Order placed for PT eval and tx, w/ up as tolerated order. Pt agreeable to PT  session upon arrival, pt found supine in bed.  PTA, pt was independent w/ all functional mobility w/ no Ad, has 1 ADONIS, lives w/ spouse in 1 level home, and retired.  Pt to benefit from continued PT tx to address deficits and maximize level of functional independent mobility and consistency. Upon conclusion pt  supine in bed. Complexity: Comorbidities affecting pt's physical performance at time of assessment include: COPD, htn, and CAD. Personal factors affecting pt at time of IE include: advanced age, limited mobility, and steps to enter home. Please find objective findings from PT assessment regarding body systems outlined above with impairments and limitations including impaired balance, decreased endurance, gait deviations, decreased activity tolerance, decreased functional mobility tolerance, and fall risk.  Pt's clinical presentation is currently unstable/unpredictable seen in pt's presentation of abnormal renal values, abnormal H&H, abnormal WBC count, abnormal calcium levels, low SaO2 levels, new onset of O2 use,  and multiple readmissions. The patient's AM-PAC Basic Mobility Inpatient Short Form Raw Score is 17.  Based on patient presentations and impairments, pt would most appropriately benefit from Level 2 resource intensity upon discharge. Please also refer to the recommendation of the Physical Therapist for safe discharge planning. RN verbalized pt appropriate for PT session. Pt seen as a co-eval with OT due to the patient's co-morbidities, clinically unstable presentation, and present impairments which are a regression from the patient's baseline.   Barriers to Discharge Inaccessible home environment;Decreased caregiver support   Goals   Patient Goals to rest   LTG Expiration Date 04/13/24   Long Term Goal #1 Pt will: Perform bed mobility tasks to modified I to improve ease of bed mobility. Perform transfers to modified I to improve ease of transfers. Perform ambulation with MI and RW for 250 feet to  increase Indep in home environment. Increase dynamic standing balance to F+ to decrease fall risk.  Increase BLE strength to 4+/5 to improve functional mobility.  Increase OOB activity tolerance to 10 minutes without s/s of exertion to decrease fall risk.  Navigate up and down  1 step with MI so patient can enter and exit home.   Plan   Treatment/Interventions Functional transfer training;Elevations;Therapeutic exercise;Endurance training;Gait training;Bed mobility;Equipment eval/education   PT Frequency 3-5x/wk   Discharge Recommendation   Rehab Resource Intensity Level, PT II (Moderate Resource Intensity)   Equipment Recommended Walker   Walker Package Recommended Wheeled walker   AM-PAC Basic Mobility Inpatient   Turning in Flat Bed Without Bedrails 3   Lying on Back to Sitting on Edge of Flat Bed Without Bedrails 3   Moving Bed to Chair 3   Standing Up From Chair Using Arms 3   Walk in Room 3   Climb 3-5 Stairs With Railing 2   Basic Mobility Inpatient Raw Score 17   Basic Mobility Standardized Score 39.67   Narciso  Adolfo Highest Level Of Mobility   -HLM Goal 5: Stand one or more mins   JH-HLM Achieved 7: Walk 25 feet or more   RN made aware of SaO2 levels via TT.  Time In: 1128  Time Out: 1144  Total Evaluation Minutes: 16    Nasreen Ortiz, PT     This is a 54-year-old female with past medical history of DM, COPD not on home O2, opioid dependence on Suboxone, asthma, thyroidism, chronic venous stasis with chronic lymphedema of lower extremities presents to the ED complaining of worsening swelling/pain of bilateral lower extremities.     IMPRESSION  #Bilateral lower extremity wounds/ulcers with underlying stasis dermatitis.  pyoderma gangrenosum vs Vasculitis   #Recurrent admission in the past for lower extremity cellulitis? 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 No intact viable epithelium.   #Olecranon Bursitis Left   #PAD  #History of polysubstance use disorder. On Suboxone  #Emphysema and interstitial lung disease noted on CT (11/2024)  #HTN, HLD  #Hep C-- Self resolved.   #Obesity BMI (kg/m2): 30.1  #Immunodeficiency secondary to history of substance use which could result in poor clinical outcome.  Hepatitis C Ab +, PCR undetectable 1/2025  Cryoglobulin negative 1/2025   Hepatitis B immune, HIV screen negative.   DULCE negative  RF negative  p-ANCA positive, titer 1:40  Blood cultures NGTD    RECOMMENDATIONS  -Vascular eval noted.   -Will need to consider repeating biopsy at some point.   -Follow up with orthopedics if bursitis. May need aspiration. Consider sending for cultures.   -IV Vanc. Dosing as per the pharmacy protocol.  -IV Ceftriaxone 2 grams q 24 hours.   -If no IV access, PO linezolid 600mg BID is okay. (Tentative plan 7 days from 2/10)  -Advised patient she should also follow up with dermatology as well if she is a candidate for biologics.   -Off loading to prevent pressure sores and preventive measures to avoid aspiration    If any questions, please send a message or call on [employer] Teams  Please continue to update ID with any pertinent new laboratory or radiographic findings.    Stella Chavez M.D  Infectious Diseases Attending/   [employer] at [facility]

## 2025-02-14 ENCOUNTER — TRANSCRIPTION ENCOUNTER (OUTPATIENT)
Age: 55
End: 2025-02-14

## 2025-02-14 ENCOUNTER — OUTPATIENT (OUTPATIENT)
Dept: OUTPATIENT SERVICES | Facility: HOSPITAL | Age: 55
LOS: 1 days | End: 2025-02-14
Payer: MEDICAID

## 2025-02-14 ENCOUNTER — APPOINTMENT (OUTPATIENT)
Dept: PSYCHIATRY | Facility: CLINIC | Age: 55
End: 2025-02-14
Payer: MEDICAID

## 2025-02-14 VITALS
DIASTOLIC BLOOD PRESSURE: 68 MMHG | SYSTOLIC BLOOD PRESSURE: 109 MMHG | HEART RATE: 72 BPM | TEMPERATURE: 98 F | RESPIRATION RATE: 18 BRPM | OXYGEN SATURATION: 92 %

## 2025-02-14 DIAGNOSIS — F11.20 OPIOID DEPENDENCE, UNCOMPLICATED: ICD-10-CM

## 2025-02-14 DIAGNOSIS — F33.41 MAJOR DEPRESSIVE DISORDER, RECURRENT, IN PARTIAL REMISSION: ICD-10-CM

## 2025-02-14 DIAGNOSIS — Z90.89 ACQUIRED ABSENCE OF OTHER ORGANS: Chronic | ICD-10-CM

## 2025-02-14 DIAGNOSIS — Z90.49 ACQUIRED ABSENCE OF OTHER SPECIFIED PARTS OF DIGESTIVE TRACT: Chronic | ICD-10-CM

## 2025-02-14 LAB
ALBUMIN SERPL ELPH-MCNC: 4.3 G/DL — SIGNIFICANT CHANGE UP (ref 3.5–5.2)
ALP SERPL-CCNC: 129 U/L — HIGH (ref 30–115)
ALT FLD-CCNC: <5 U/L — SIGNIFICANT CHANGE UP (ref 0–41)
ANION GAP SERPL CALC-SCNC: 15 MMOL/L — HIGH (ref 7–14)
AST SERPL-CCNC: 28 U/L — SIGNIFICANT CHANGE UP (ref 0–41)
BILIRUB SERPL-MCNC: 0.2 MG/DL — SIGNIFICANT CHANGE UP (ref 0.2–1.2)
BUN SERPL-MCNC: 15 MG/DL — SIGNIFICANT CHANGE UP (ref 10–20)
CALCIUM SERPL-MCNC: 9.1 MG/DL — SIGNIFICANT CHANGE UP (ref 8.4–10.5)
CHLORIDE SERPL-SCNC: 97 MMOL/L — LOW (ref 98–110)
CO2 SERPL-SCNC: 25 MMOL/L — SIGNIFICANT CHANGE UP (ref 17–32)
CREAT SERPL-MCNC: 1.2 MG/DL — SIGNIFICANT CHANGE UP (ref 0.7–1.5)
EGFR: 54 ML/MIN/1.73M2 — LOW
GLUCOSE SERPL-MCNC: 153 MG/DL — HIGH (ref 70–99)
HCT VFR BLD CALC: 35.8 % — LOW (ref 37–47)
HGB BLD-MCNC: 11.1 G/DL — LOW (ref 12–16)
MCHC RBC-ENTMCNC: 26.1 PG — LOW (ref 27–31)
MCHC RBC-ENTMCNC: 31 G/DL — LOW (ref 32–37)
MCV RBC AUTO: 84 FL — SIGNIFICANT CHANGE UP (ref 81–99)
NRBC BLD AUTO-RTO: 0 /100 WBCS — SIGNIFICANT CHANGE UP (ref 0–0)
PLATELET # BLD AUTO: 231 K/UL — SIGNIFICANT CHANGE UP (ref 130–400)
PMV BLD: 9.9 FL — SIGNIFICANT CHANGE UP (ref 7.4–10.4)
POTASSIUM SERPL-MCNC: 5.1 MMOL/L — HIGH (ref 3.5–5)
POTASSIUM SERPL-SCNC: 5.1 MMOL/L — HIGH (ref 3.5–5)
PROT SERPL-MCNC: 8.4 G/DL — HIGH (ref 6–8)
RBC # BLD: 4.26 M/UL — SIGNIFICANT CHANGE UP (ref 4.2–5.4)
RBC # FLD: 15.3 % — HIGH (ref 11.5–14.5)
SODIUM SERPL-SCNC: 137 MMOL/L — SIGNIFICANT CHANGE UP (ref 135–146)
WBC # BLD: 6.08 K/UL — SIGNIFICANT CHANGE UP (ref 4.8–10.8)
WBC # FLD AUTO: 6.08 K/UL — SIGNIFICANT CHANGE UP (ref 4.8–10.8)

## 2025-02-14 PROCEDURE — 99214 OFFICE O/P EST MOD 30 MIN: CPT

## 2025-02-14 PROCEDURE — 99214 OFFICE O/P EST MOD 30 MIN: CPT | Mod: 95

## 2025-02-14 PROCEDURE — 99239 HOSP IP/OBS DSCHRG MGMT >30: CPT

## 2025-02-14 RX ORDER — LINEZOLID 600 MG/300ML
1 INJECTION, SOLUTION INTRAVENOUS
Qty: 8 | Refills: 0
Start: 2025-02-14 | End: 2025-02-17

## 2025-02-14 RX ADMIN — Medication 40 MILLIGRAM(S): at 14:10

## 2025-02-14 RX ADMIN — BUPRENORPHINE AND NALOXONE 2.5 TABLET(S): 8; 2 FILM BUCCAL; SUBLINGUAL at 11:50

## 2025-02-14 RX ADMIN — Medication 1: at 11:50

## 2025-02-14 RX ADMIN — ASPIRIN 81 MILLIGRAM(S): 81 TABLET, COATED ORAL at 11:49

## 2025-02-14 RX ADMIN — LEVOTHYROXINE SODIUM 175 MICROGRAM(S): 25 TABLET ORAL at 05:56

## 2025-02-14 RX ADMIN — COLCHICINE 0.6 MILLIGRAM(S): 0.6 TABLET ORAL at 05:56

## 2025-02-14 RX ADMIN — PANTOPRAZOLE 40 MILLIGRAM(S): 20 TABLET, DELAYED RELEASE ORAL at 05:56

## 2025-02-14 RX ADMIN — Medication 40 MILLIGRAM(S): at 05:56

## 2025-02-14 RX ADMIN — LINEZOLID 600 MILLIGRAM(S): 600 INJECTION, SOLUTION INTRAVENOUS at 05:55

## 2025-02-14 RX ADMIN — Medication 1: at 07:43

## 2025-02-14 RX ADMIN — PREGABALIN CAPSULES, CV 50 MILLIGRAM(S): 225 CAPSULE ORAL at 05:56

## 2025-02-14 NOTE — PROGRESS NOTE ADULT - ASSESSMENT
This is a 54-year-old female with past medical history of DM, COPD not on home O2, opioid dependence on Suboxone, asthma, thyroidism, chronic venous stasis with chronic lymphedema of lower extremities presents to the ED complaining of worsening swelling/pain of bilateral lower extremities.     IMPRESSION  #Bilateral lower extremity wounds/ulcers with underlying stasis dermatitis.  pyoderma gangrenosum vs Vasculitis   #Recurrent admission in the past for lower extremity cellulitis? 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 No intact viable epithelium.   #Olecranon Bursitis Left   #PAD  #History of polysubstance use disorder. On Suboxone  #Emphysema and interstitial lung disease noted on CT (11/2024)  #HTN, HLD  #Hep C-- Self resolved.   #Obesity BMI (kg/m2): 30.1  #Immunodeficiency secondary to history of substance use which could result in poor clinical outcome.  Hepatitis C Ab +, PCR undetectable 1/2025  Cryoglobulin negative 1/2025   Hepatitis B immune, HIV screen negative.   DULCE negative  RF negative  p-ANCA positive, titer 1:40  Blood cultures NGTD    RECOMMENDATIONS  -Vascular eval noted.   -Will need to consider repeating biopsy at some point.   -Follow up with orthopedics if bursitis. May need aspiration. Consider sending for cultures.   -On PO linezolid 600mg BID.  (Tentative plan 7 days from 2/10)  -Advised patient she should also follow up with dermatology as well if she is a candidate for biologics.   -Off loading to prevent pressure sores and preventive measures to avoid aspiration    If any questions, please send a message or call on [facility] Teams  Please continue to update ID with any pertinent new laboratory or radiographic findings.    Stella Chavez M.D  Infectious Diseases Attending/   [employer] at [facility]   This is a 54-year-old female with past medical history of DM, COPD not on home O2, opioid dependence on Suboxone, asthma, thyroidism, chronic venous stasis with chronic lymphedema of lower extremities presents to the ED complaining of worsening swelling/pain of bilateral lower extremities.     IMPRESSION  #Bilateral lower extremity wounds/ulcers with underlying stasis dermatitis.  pyoderma gangrenosum vs Vasculitis   #Recurrent admission in the past for lower extremity cellulitis? 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 No intact viable epithelium.   #Olecranon Bursitis Left   #PAD  #History of polysubstance use disorder. On Suboxone  #Emphysema and interstitial lung disease noted on CT (11/2024)  #HTN, HLD  #Hep C-- Self resolved.   #Obesity BMI (kg/m2): 30.1  #Immunodeficiency secondary to history of substance use which could result in poor clinical outcome.  Hepatitis C Ab +, PCR undetectable 1/2025  Cryoglobulin negative 1/2025   Hepatitis B immune, HIV screen negative.   DULCE negative  RF negative  p-ANCA positive, titer 1:40  Blood cultures NGTD    RECOMMENDATIONS  -Vascular eval noted.   -Will need to consider repeating biopsy at some point- outpatient follow up.   -Follow up with orthopedics if bursitis. May need aspiration. Consider sending for cultures.   -On PO linezolid 600mg BID.  (Plan 7 days from 2/10)  -Advised patient she should also follow up with dermatology as well if she is a candidate for biologics.   -Off loading to prevent pressure sores and preventive measures to avoid aspiration    If any questions, please send a message or call on [employer] Teams  Please continue to update ID with any pertinent new laboratory or radiographic findings.    Stella Chavez M.D  Infectious Diseases Attending/   [employer] at [facility]

## 2025-02-14 NOTE — DISCHARGE NOTE PROVIDER - NSDCFUADDAPPT_GEN_ALL_CORE_FT
APPTS ARE READY TO BE MADE: [ ] YES    Best Family or Patient Contact (if needed):    Additional Information about above appointments (if needed):    1: Appointment with new PCP at the [facility] clinic, Dr Funez or someone else  2: Appointment with Dermatology in 2-4 weeks (Dr Agarwal or someone else  3: Appoitnment with Rheumatology in 2-4 weeks (Dr Mills)  3: Appointment with orthopedics in 1 week (Dr Jimenez)    Other comments or requests:

## 2025-02-14 NOTE — DISCHARGE NOTE PROVIDER - NSDCFUSCHEDAPPT_GEN_ALL_CORE_FT
Fer Guerra  [facility] PreAdmits  Scheduled Appointment: 02/14/2025    Fer Guerra  [facility]  PSYCHIATRY  [place]  Scheduled Appointment: 02/14/2025    Juany Rossi  [facility] PreAdmits  Scheduled Appointment: 04/02/2025    [facility]  PSYCHIATRY  [place]  Scheduled Appointment: 04/02/2025     Juany Rossi  [facility] PreAdmits  Scheduled Appointment: 04/02/2025    [facility] Physician [facility]  PSYCHIATRY [facility] [place]  Scheduled Appointment: 04/02/2025

## 2025-02-14 NOTE — DISCHARGE NOTE NURSING/CASE MANAGEMENT/SOCIAL WORK - PATIENT PORTAL LINK FT
You can access the FollowMyHealth Patient Portal offered by [facility] by registering at the following website: [email or web address]. By joining [facility]’s FollowMyHealth portal, you will also be able to view your health information using other applications (apps) compatible with our system.

## 2025-02-14 NOTE — DISCHARGE NOTE PROVIDER - NPI NUMBER (FOR SYSADMIN USE ONLY) :
[[id number]],[[id number]],[[id number]] [[id number]],[[id number]],[[id number]],[[id number]],[[id number]]

## 2025-02-14 NOTE — PROGRESS NOTE ADULT - TIME BILLING
On this date of service, level of risk to patient is considered: Moderate.     I have personally seen and examined this patient.    I have reviewed all pertinent clinical information and reviewed all relevant imaging and diagnostic studies personally.   I discussed recommendations with the primary team. I discussed recommendations with the primary team.
On this date of service, level of risk to patient is considered: Moderate.    I have personally seen and examined this patient.    I have reviewed all pertinent clinical information and reviewed all relevant imaging and diagnostic studies personally.   I discussed recommendations with the primary team. I discussed recommendations with the primary team.

## 2025-02-14 NOTE — DISCHARGE NOTE PROVIDER - CARE PROVIDER_API CALL
Saurabh Funez  Internal Medicine  [address]  [place] [address]  Phone: [phone]  Fax: [phone]  Follow Up Time: 1 week    Naye Mills  Rheumatology  [address] [place] [address]  Phone: [phone]  Fax: [phone]  Follow Up Time: 2 weeks    Arvind Phelps  Orthopaedic Surgery  [address] [place] [address]  Phone: [phone]  Fax: [phone]  Follow Up Time: 1 week    Reginald Agarwal  Dermatology  [address] [place] [address]  Phone: [phone]  Fax: [phone]  Follow Up Time: 2 weeks   Saurabh Funez  Internal Medicine  [address]  [place] [address]  Phone: [phone]  Fax: [phone]  Follow Up Time: 1 week    Naye Mills  Rheumatology  [address] [place] [address]  Phone: [phone]  Fax: [phone]  Follow Up Time: 2 weeks    Arvind Phelps  Orthopaedic Surgery  [address] [place] [address]  Phone: [phone]  Fax: [phone]  Follow Up Time: 1 week    Reginald Agarwal [facility]  Dermatology  [address] [place] [address]  Phone: [phone]  Fax: [phone]  Follow Up Time: 2 weeks    Greg Adair  Vascular Surgery  [address]  [place] [address]  Phone: [phone]  Fax: [phone]  Follow Up Time: 1 week

## 2025-02-14 NOTE — PROGRESS NOTE ADULT - SUBJECTIVE AND OBJECTIVE BOX
EZRA BARCLAY  54y, Female    LOS  4d    INTERVAL EVENTS/HPI  - No acute events overnight  - T(F): , Max: 97.9 (02-13-25 @ 20:05)  - WBC Count: 6.08 (02-14-25 @ 07:17)  WBC Count: 7.09 (02-13-25 @ 04:30)  - Creatinine: 1.2 (02-13-25 @ 04:30)    REVIEW OF SYSTEMS:  CONSTITUTIONAL: No fever or chills  HEENT: No sore throat  RESPIRATORY: No cough, no shortness of breath  CARDIOVASCULAR: No chest pain or palpitations  GASTROINTESTINAL: No abdominal or epigastric pain  GENITOURINARY: No dysuria  NEUROLOGICAL: No headache/dizziness  MSK: No joint pain, erythema, or swelling; no back pain  SKIN: No itching, rashes  All other ROS negative except noted above    Prior hospital charts reviewed [Yes]  Primary team notes reviewed [Yes]  Other consultant notes reviewed [Yes]    ANTIMICROBIALS:   linezolid    Tablet 600 every 12 hours      OTHER MEDS: MEDICATIONS  (STANDING):  acetaminophen     Tablet .. 650 every 6 hours PRN  aspirin enteric coated 81 daily  atorvastatin 20 at bedtime  buprenorphine 8 mG/naloxone 2 mG SL  Tablet 2.5 daily  colchicine 0.6 two times a day  dextrose 50% Injectable 25 once  dextrose 50% Injectable 12.5 once  dextrose 50% Injectable 25 once  dextrose Oral Gel 15 once PRN  furosemide    Tablet 40 two times a day  glucagon  Injectable 1 once  heparin   Injectable 5000 every 12 hours  insulin glargine Injectable (LANTUS) 25 at bedtime  insulin lispro (ADMELOG) corrective regimen sliding scale  three times a day before meals  levothyroxine 175 daily  ondansetron Injectable 4 every 6 hours PRN  pantoprazole    Tablet 40 before breakfast  pregabalin 50 every 12 hours  senna 2 at bedtime PRN      Vital Signs Last 24 Hrs  T(F): 97.4 (02-14-25 @ 04:55), Max: 98 (02-10-25 @ 16:10)    Vital Signs Last 24 Hrs  HR: 62 (02-14-25 @ 04:55) (62 - 69)  BP: 134/73 (02-14-25 @ 04:55) (109/72 - 134/73)  RR: 18 (02-14-25 @ 04:55)  SpO2: 96% (02-14-25 @ 04:55) (96% - 97%)  Wt(kg): --    EXAM:  GENERAL: NAD  HEAD: No head lesions  NECK: Supple  CHEST/LUNG: Shallow breath sounds.   HEART: S1 S2  ABDOMEN: Soft, nontender  EXTREMITIES: Left swollen bursitis, bilateral lower extremity chronic venous stasis. R>L, mild weeping.   NERVOUS SYSTEM: Alert and oriented to person  MSK: No joint erythema, swelling or pain  SKIN: No rashes or lesions, no superficial thrombophlebitis    Labs:                        11.1   6.08  )-----------( 231      ( 14 Feb 2025 07:17 )             35.8     02-13    137  |  93[L]  |  14  ----------------------------<  140[H]  4.3   |  29  |  1.2    Ca    9.5      13 Feb 2025 04:30    TPro  9.2[H]  /  Alb  4.7  /  TBili  0.3  /  DBili  x   /  AST  26  /  ALT  5   /  AlkPhos  124[H]  02-13      WBC Trend:  WBC Count: 6.08 (02-14-25 @ 07:17)  WBC Count: 7.09 (02-13-25 @ 04:30)  WBC Count: 7.42 (02-12-25 @ 08:12)  WBC Count: 6.05 (02-11-25 @ 10:00)      Creatine Trend:  Creatinine: 1.2 (02-13)  Creatinine: 1.1 (02-12)  Creatinine: 1.0 (02-11)  Creatinine: 0.8 (02-10)      Liver Biochemical Testing Trend:  Alanine Aminotransferase (ALT/SGPT): 5 (02-13)  Alanine Aminotransferase (ALT/SGPT): 5 (02-12)  Alanine Aminotransferase (ALT/SGPT): <5 (02-11)  Alanine Aminotransferase (ALT/SGPT): <5 (02-10)  Alanine Aminotransferase (ALT/SGPT): 9 (01-05)  Aspartate Aminotransferase (AST/SGOT): 26 (02-13-25 @ 04:30)  Aspartate Aminotransferase (AST/SGOT): 31 (02-12-25 @ 08:12)  Aspartate Aminotransferase (AST/SGOT): 18 (02-11-25 @ 10:00)  Aspartate Aminotransferase (AST/SGOT): 16 (02-10-25 @ 20:00)  Aspartate Aminotransferase (AST/SGOT): 44 (01-05-25 @ 08:07)  Bilirubin Total: 0.3 (02-13)  Bilirubin Total: 0.2 (02-12)  Bilirubin Total: 0.3 (02-11)  Bilirubin Total: 0.4 (02-10)  Bilirubin Total: 0.4 (01-05)      Trend LDH  01-10-24 @ 06:55  230      Urinalysis Basic - ( 13 Feb 2025 04:30 )    Color: x / Appearance: x / SG: x / pH: x  Gluc: 140 mg/dL / Ketone: x  / Bili: x / Urobili: x   Blood: x / Protein: x / Nitrite: x   Leuk Esterase: x / RBC: x / WBC x   Sq Epi: x / Non Sq Epi: x / Bacteria: x        MICROBIOLOGY:  Vancomycin Level, Trough: 10.7 (02-12 @ 10:58)  Vancomycin Level, Random: 13.0 (02-12 @ 08:12)    Female    Culture - Blood (collected 10 Feb 2025 20:00)  Source: .Blood BLOOD  Preliminary Report:    No growth at 72 Hours    Culture - Blood (collected 10 Feb 2025 20:00)  Source: .Blood BLOOD  Preliminary Report:    No growth at 72 Hours    Culture - Blood (collected 28 Dec 2024 17:10)  Source: .Blood BLOOD  Final Report:    No growth at 5 days    Culture - Blood (collected 27 Dec 2024 16:00)  Source: .Blood BLOOD  Final Report:    No growth at 5 days    Culture - Blood (collected 27 Dec 2024 16:00)  Source: .Blood BLOOD  Final Report:    No growth at 5 days    Urinalysis with Rflx Culture (collected 29 Nov 2024 00:13)    Culture - Blood (collected 28 Nov 2024 22:04)  Source: .Blood BLOOD  Final Report:    No growth at 5 days    Culture - Blood (collected 28 Nov 2024 22:04)  Source: .Blood BLOOD  Final Report:    No growth at 5 days    Urinalysis with Rflx Culture (collected 14 Nov 2024 18:42)    Culture - Blood (collected 27 May 2024 21:10)  Source: .Blood Blood  Final Report:    No growth at 5 days      HIV-1/2 Combo Result: Nonreact (12-31-24 @ 08:00)  HIV-1/2 Combo Result: Nonreact (01-11-24 @ 13:40)  RADIOLOGY & ADDITIONAL TESTS:  I have personally reviewed the relevant images.   CXR      CT        WEIGHT  Weight (kg): 92.533 (02-10-25 @ 23:52)      All available historical records have been reviewed

## 2025-02-14 NOTE — DISCHARGE NOTE NURSING/CASE MANAGEMENT/SOCIAL WORK - NSDCVIVACCINE_GEN_ALL_CORE_FT
Influenza, split virus, trivalent, preservative free; 07-Jan-2025 14:45; Nic Quintanilla (RN); [employer]; [id number] (Exp. Date: 30-Jun-2025); IntraMuscular; Deltoid Right.; 0.5 milliLiter(s); VIS (VIS Published: 06-Aug-2021, VIS Presented: 07-Jan-2025);

## 2025-02-14 NOTE — DISCHARGE NOTE PROVIDER - NSDCCPCAREPLAN_GEN_ALL_CORE_FT
PRINCIPAL DISCHARGE DIAGNOSIS  Diagnosis: Cellulitis, diffuse  Assessment and Plan of Treatment:       SECONDARY DISCHARGE DIAGNOSES  Diagnosis: Difficulty walking  Assessment and Plan of Treatment:

## 2025-02-14 NOTE — DISCHARGE NOTE NURSING/CASE MANAGEMENT/SOCIAL WORK - NSDCFUADDAPPT_GEN_ALL_CORE_FT
APPTS ARE READY TO BE MADE: [ ] YES    Best Family or Patient Contact (if needed):    Additional Information about above appointments (if needed):    1: Appointment with new PCP at the [facility] clinic, Dr Funez or someone else  2: Appointment with Dermatology in 2-4 weeks (Dr Agarwal or someone else  3: Appoitnment with Rheumatology in 2-4 weeks (Dr Mills)  3: Appointment with orthopedics in 1 week (Dr Jimenez)    Other comments or requests:

## 2025-02-14 NOTE — DISCHARGE NOTE PROVIDER - CARE PROVIDERS DIRECT ADDRESSES
,[email or web address],DirectAddress_Unknown,[email or web address]

## 2025-02-14 NOTE — DISCHARGE NOTE PROVIDER - PROVIDER TOKENS
PROVIDER:[TOKEN:[[id number]],FOLLOWUP:[1 week]],PROVIDER:[TOKEN:[[id number]],FOLLOWUP:[2 weeks]],PROVIDER:[TOKEN:[[id number]],FOLLOWUP:[1 week]],PROVIDER:[TOKEN:[[id number]],FOLLOWUP:[2 weeks]] PROVIDER:[TOKEN:[[id number]],FOLLOWUP:[1 week]],PROVIDER:[TOKEN:[[id number]],FOLLOWUP:[2 weeks]],PROVIDER:[TOKEN:[[id number]],FOLLOWUP:[1 week]],PROVIDER:[TOKEN:[[id number]],FOLLOWUP:[2 weeks]],PROVIDER:[TOKEN:[[id number]],FOLLOWUP:[1 week]]

## 2025-02-14 NOTE — DISCHARGE NOTE PROVIDER - HOSPITAL COURSE
54-year-old female with past medical history of DM, COPD not on home O2, opioid dependence on Suboxone, asthma, thyroidism, chronic venous stasis with chronic lymphedema of lower extremities presenting with suspected bilateral LE cellulitis    #LE cellulitis  #Leukocytoclastic vasculitis  Patient is prone to recurrent cellulitis due to open wounds from leukocytoclastic vasculitis  -IV Ceftriaxone/Vancomycin (S/D 2/10-)-->switch to linezolid 600mg BID for 4 more days  -VA duplex negative for DVT  -Patient was first diagnosed with leukocytoclastic vasculitis in 1/2024 after a skin biopsy was pefromed. Repeat skin biopsy 1/2025 during previous admission was inconclusive  -she was discharged on a prednisone taper that was completed. Does not appear that she has followed with derm or rheumatology. Provided follow-up contact info for derm and rheumatology  -Continue with colchicine  -Wound care consult for wrapping, vascular consult for possible unna boot  -No intervention as per Burn, images reviewed via teams      #DM2  -continue with lantus, and ISS  -Monitor POC     #LT olecranon bursitis, recurrence  - S/p drainage 1/2025  - No aspiration planned by ortho, follow as outpatient  - Recommend compression/ACE bandage    #opioid dependence  -continue with suboxone     #Left hand fx  -been in the cast for 6 week  -Ortho saw patient, no intervention currently, follow outpatient     #Immunodeficiency secondary to history of substance use which could result in poor clinical outcome.  -Management of acute infection as above      #Obesity BMI (kg/m2): 30.1         54-year-old female with past medical history of DM, COPD not on home O2, opioid dependence on Suboxone, asthma, thyroidism, chronic venous stasis with chronic lymphedema of lower extremities presenting with suspected bilateral LE cellulitis    #LE cellulitis  #Leukocytoclastic vasculitis  Patient is prone to recurrent cellulitis due to open wounds from leukocytoclastic vasculitis  -IV Ceftriaxone/Vancomycin (S/D 2/10-)-->switch to linezolid 600mg BID for 4 more days  -VA duplex negative for DVT  -Patient was first diagnosed with leukocytoclastic vasculitis in 1/2024 after a skin biopsy was pefromed. Repeat skin biopsy 1/2025 during previous admission was inconclusive  -she was discharged on a prednisone taper that was completed. Does not appear that she has followed with derm or rheumatology. Provided follow-up contact info for derm and rheumatology  -Continue with colchicine  -Wound care consult for wrapping, vascular consult for possible unna boot-->recommended outpatient follow up for the boot with Dr Adair  -No intervention as per Burn, images reviewed via teams      #DM2  -continue with lantus, and ISS  -Monitor POC     #LT olecranon bursitis, recurrence  - S/p drainage 1/2025  - No aspiration planned by ortho, follow as outpatient with Dr Phelps  - Recommend compression/ACE bandage    #opioid dependence  -continue with suboxone     #Left hand fx  -been in the cast for 6 week  -Ortho saw patient, no intervention currently, follow outpatient     #Immunodeficiency secondary to history of substance use which could result in poor clinical outcome.  -Management of acute infection as above      #Obesity BMI (kg/m2): 30.1    Wound care instructions  Wash wounds with soap and water  Apply Xeroform dressing over wounds  Then apply Gauze, ABD pad, Kerlix, secure with tape  Continue LWC daily [id number]       54-year-old female with past medical history of DM, COPD not on home O2, opioid dependence on Suboxone, asthma, thyroidism, chronic venous stasis with chronic lymphedema of lower extremities presenting with suspected bilateral LE cellulitis    #LE cellulitis, recurrent  #Leukocytoclastic vasculitis  Patient is prone to recurrent cellulitis due to open wounds from leukocytoclastic vasculitis  -IV Ceftriaxone/Vancomycin (S/D 2/10-)-->switch to linezolid 600mg BID for 4 more days  -VA duplex negative for DVT  -Patient was first diagnosed with leukocytoclastic vasculitis in 1/2024 after a skin biopsy was pefromed. Repeat skin biopsy 1/2025 during previous admission was inconclusive  -No longer taking colchicine and no clear indication, will d/c  -Wound care consult for wrapping, vascular consult for possible unna boot-->recommended outpatient follow up for the boot with Dr Adair  -No intervention as per Burn, images reviewed via teams      #DM2  -continue with lantus, and ISS  -Monitor POC     #LT olecranon bursitis, recurrence  - S/p drainage 1/2025  - No aspiration planned by ortho, follow as outpatient with Dr Phelps  - Recommend compression/ACE bandage    #opioid dependence  -continue with suboxone     #Left hand fx  -been in the cast for 6 week  -Ortho saw patient, no intervention currently, follow outpatient     #Immunodeficiency secondary to history of substance use which could result in poor clinical outcome.  -Management of acute infection as above      #Obesity BMI (kg/m2): 30.1    Wound care instructions  Wash wounds with soap and water  Apply Xeroform dressing over wounds  Then apply Gauze, ABD pad, Kerlix, secure with tape  Continue LWC daily [id number]

## 2025-02-14 NOTE — DISCHARGE NOTE NURSING/CASE MANAGEMENT/SOCIAL WORK - FINANCIAL ASSISTANCE
[facility] provides services at a reduced cost to those who are determined to be eligible through [facility]’s financial assistance program. Information regarding [facility]’s financial assistance program can be found by going to [email or web address] or by calling [phone].

## 2025-02-14 NOTE — DISCHARGE NOTE NURSING/CASE MANAGEMENT/SOCIAL WORK - NSFLUVACAGEDISCH_IMM_ALL_CORE
Adult Number Of Freeze-Thaw Cycles: 1 freeze-thaw cycle Duration Of Freeze Thaw-Cycle (Seconds): 10 Render In Bullet Format When Appropriate: No Total Number Of Aks Treated: 12 Consent: Informed consent obtained including but not limited to risks of pain, blistering, possibly permanent pigmentary change, recurrence and incomplete removal. If lesion(s) remain in 1 month, contact office for further evaluation and treatment. Detail Level: Zone Post-Care Instructions: Reviewed post-care instructions: Wear sunprotection and avoid picking any lesion. May apply Vaseline to crusted or scabbed areas. Medical Necessity Clause: Medical necessity: Spray Paint Text: The liquid nitrogen was applied to the skin utilizing a spray paint frosting technique. Show Aperture Variable?: Yes Post-Care Instructions: Reviewed in detail post-care instructions. Detail Level: Detailed Duration Of Freeze Thaw-Cycle (Seconds): 15-20 Medical Necessity Information: It is in your best interest to select a reason for this procedure from the list below. All of these items fulfill various CMS LCD requirements except the new and changing color options.

## 2025-02-14 NOTE — DISCHARGE NOTE NURSING/CASE MANAGEMENT/SOCIAL WORK - NSDCPEFALRISK_GEN_ALL_CORE
For information on Fall & Injury Prevention, visit: [email or web address] OR  [email or web address] OR  https://www.cdc.gov/steadi/patient.html

## 2025-02-14 NOTE — DISCHARGE NOTE PROVIDER - NSDCMRMEDTOKEN_GEN_ALL_CORE_FT
Ace Wraps: Apply on the L elbow for 3 weeks  albuterol 90 mcg/inh inhalation powder: 2 puff(s) inhaled every 6 hours, As Needed -for bronchospasm   aspirin 81 mg oral delayed release tablet: 1 tab(s) orally once a day  atorvastatin 20 mg oral tablet: 1 tab(s) orally once a day (at bedtime)  colchicine 0.6 mg oral tablet: 1 tab(s) orally 2 times a day  furosemide 40 mg oral tablet: 1 tab(s) orally once a day  Lantus 100 units/mL subcutaneous solution: 28 unit(s) subcutaneous once a day (at bedtime)  metFORMIN 500 mg oral tablet: 2 tab(s) orally 2 times a day  pantoprazole 40 mg oral delayed release tablet: 1 tab(s) orally once a day (before a meal) Take once a day while taking prednisone  predniSONE 10 mg oral tablet: 4 tab(s) orally once a day Take 4 tabs once a day for 3 days, then 3 tabs once a day for 3 days, then 2 tabs once a day for three days, then 1 tab once a day for 3 days and then stop  pregabalin 50 mg oral capsule: 1 cap(s) orally every 12 hours  silver sulfADIAZINE 1% topical cream: Apply topically to affected area once a day  Suboxone 8 mg-2 mg sublingual tablet: 2.5 film(s) sublingual once a day  Synthroid 175 mcg (0.175 mg) oral tablet: 1 tab(s) orally once a day   Ace Wraps: Apply on the L elbow for 3 weeks  albuterol 90 mcg/inh inhalation powder: 2 puff(s) inhaled every 6 hours, As Needed -for bronchospasm   aspirin 81 mg oral delayed release tablet: 1 tab(s) orally once a day  atorvastatin 20 mg oral tablet: 1 tab(s) orally once a day (at bedtime)  colchicine 0.6 mg oral tablet: 1 tab(s) orally 2 times a day  furosemide 40 mg oral tablet: 1 tab(s) orally once a day  Lantus 100 units/mL subcutaneous solution: 28 unit(s) subcutaneous once a day (at bedtime)  linezolid 600 mg oral tablet: 1 tab(s) orally 2 times a day  metFORMIN 500 mg oral tablet: 2 tab(s) orally 2 times a day  pantoprazole 40 mg oral delayed release tablet: 1 tab(s) orally once a day (before a meal) Take once a day while taking prednisone  pregabalin 50 mg oral capsule: 1 cap(s) orally every 12 hours  silver sulfADIAZINE 1% topical cream: Apply topically to affected area once a day  Suboxone 8 mg-2 mg sublingual tablet: 2.5 film(s) sublingual once a day  Synthroid 175 mcg (0.175 mg) oral tablet: 1 tab(s) orally once a day   Ace Wraps: Apply on the L elbow for 3 weeks  albuterol 90 mcg/inh inhalation powder: 2 puff(s) inhaled every 6 hours, As Needed -for bronchospasm   aspirin 81 mg oral delayed release tablet: 1 tab(s) orally once a day  atorvastatin 20 mg oral tablet: 1 tab(s) orally once a day (at bedtime)  furosemide 40 mg oral tablet: 1 tab(s) orally once a day  Lantus 100 units/mL subcutaneous solution: 28 unit(s) subcutaneous once a day (at bedtime)  linezolid 600 mg oral tablet: 1 tab(s) orally 2 times a day  metFORMIN 500 mg oral tablet: 2 tab(s) orally 2 times a day  pantoprazole 40 mg oral delayed release tablet: 1 tab(s) orally once a day (before a meal) Take once a day while taking prednisone  pregabalin 50 mg oral capsule: 1 cap(s) orally every 12 hours  silver sulfADIAZINE 1% topical cream: Apply topically to affected area once a day  Suboxone 8 mg-2 mg sublingual tablet: 2.5 film(s) sublingual once a day  Synthroid 175 mcg (0.175 mg) oral tablet: 1 tab(s) orally once a day

## 2025-02-14 NOTE — DISCHARGE NOTE PROVIDER - ATTENDING DISCHARGE PHYSICAL EXAMINATION:
General: NAD, comfortable in bed  HEENT: NCAT  Lungs: No increased WOB  CVS: RRR  Abdomen: , non-distended

## 2025-02-14 NOTE — PROGRESS NOTE ADULT - REASON FOR ADMISSION
LE swelling and redness

## 2025-02-15 DIAGNOSIS — F11.20 OPIOID DEPENDENCE, UNCOMPLICATED: ICD-10-CM

## 2025-02-15 DIAGNOSIS — F33.41 MAJOR DEPRESSIVE DISORDER, RECURRENT, IN PARTIAL REMISSION: ICD-10-CM

## 2025-02-18 ENCOUNTER — NON-APPOINTMENT (OUTPATIENT)
Age: 55
End: 2025-02-18

## 2025-02-19 DIAGNOSIS — Z87.891 PERSONAL HISTORY OF NICOTINE DEPENDENCE: ICD-10-CM

## 2025-02-19 DIAGNOSIS — M70.22 OLECRANON BURSITIS, LEFT ELBOW: ICD-10-CM

## 2025-02-19 DIAGNOSIS — Z79.52 LONG TERM (CURRENT) USE OF SYSTEMIC STEROIDS: ICD-10-CM

## 2025-02-19 DIAGNOSIS — Z79.84 LONG TERM (CURRENT) USE OF ORAL HYPOGLYCEMIC DRUGS: ICD-10-CM

## 2025-02-19 DIAGNOSIS — I87.2 VENOUS INSUFFICIENCY (CHRONIC) (PERIPHERAL): ICD-10-CM

## 2025-02-19 DIAGNOSIS — Z79.82 LONG TERM (CURRENT) USE OF ASPIRIN: ICD-10-CM

## 2025-02-19 DIAGNOSIS — F11.288 OPIOID DEPENDENCE WITH OTHER OPIOID-INDUCED DISORDER: ICD-10-CM

## 2025-02-19 DIAGNOSIS — J43.8 OTHER EMPHYSEMA: ICD-10-CM

## 2025-02-19 DIAGNOSIS — E11.9 TYPE 2 DIABETES MELLITUS WITHOUT COMPLICATIONS: ICD-10-CM

## 2025-02-19 DIAGNOSIS — J84.89 OTHER SPECIFIED INTERSTITIAL PULMONARY DISEASES: ICD-10-CM

## 2025-02-19 DIAGNOSIS — E66.9 OBESITY, UNSPECIFIED: ICD-10-CM

## 2025-02-19 DIAGNOSIS — L03.116 CELLULITIS OF LEFT LOWER LIMB: ICD-10-CM

## 2025-02-19 DIAGNOSIS — M31.0 HYPERSENSITIVITY ANGIITIS: ICD-10-CM

## 2025-02-19 DIAGNOSIS — D84.89 OTHER IMMUNODEFICIENCIES: ICD-10-CM

## 2025-02-19 DIAGNOSIS — Z79.890 HORMONE REPLACEMENT THERAPY: ICD-10-CM

## 2025-02-19 DIAGNOSIS — Z79.4 LONG TERM (CURRENT) USE OF INSULIN: ICD-10-CM

## 2025-02-19 DIAGNOSIS — L03.115 CELLULITIS OF RIGHT LOWER LIMB: ICD-10-CM

## 2025-02-19 DIAGNOSIS — Z79.51 LONG TERM (CURRENT) USE OF INHALED STEROIDS: ICD-10-CM

## 2025-02-20 NOTE — CHART NOTE - NSCHARTNOTEFT_GEN_A_CORE
left vm 2/18 - DK / wants all but Vasc, sent to Nisa and O&C 2/19 - DK / as per Yeyo, left  for pt to call back and schedule 2/20 - DK (PCP, Rheum, Derm, & Ortho Referral)

## 2025-02-27 ENCOUNTER — APPOINTMENT (OUTPATIENT)
Dept: INTERNAL MEDICINE | Facility: CLINIC | Age: 55
End: 2025-02-27

## 2025-03-10 NOTE — PROGRESS NOTE ADULT - SUBJECTIVE AND OBJECTIVE BOX
Patient, is having to change 2 schedules,   Please call to Recomfirm, that 4/18 is her surgery date,  that is they day she can do it. The Computer sent  message 4/4 was surgery. ? ? WANTS 4/18   Progress note    INTERVAL HPI/OVERNIGHT EVENTS:    Patient seen and examined at bedside. No acute distress.      REVIEW OF SYSTEMS:  All other 13 Review of systems were reviewed and are negative    FAMILY HISTORY:    T(C): 36.8 (06-03-24 @ 15:02), Max: 37.1 (06-02-24 @ 21:26)  HR: 70 (06-03-24 @ 15:02) (70 - 73)  BP: 99/65 (06-03-24 @ 15:02) (95/60 - 99/65)  RR: 20 (06-03-24 @ 15:02) (16 - 20)  SpO2: 95% (06-03-24 @ 15:02) (95% - 98%)  Wt(kg): --Vital Signs Last 24 Hrs  T(C): 36.8 (03 Jun 2024 15:02), Max: 37.1 (02 Jun 2024 21:26)  T(F): 98.3 (03 Jun 2024 15:02), Max: 98.7 (02 Jun 2024 21:26)  HR: 70 (03 Jun 2024 15:02) (70 - 73)  BP: 99/65 (03 Jun 2024 15:02) (95/60 - 99/65)  BP(mean): --  RR: 20 (03 Jun 2024 15:02) (16 - 20)  SpO2: 95% (03 Jun 2024 15:02) (95% - 98%)    Parameters below as of 03 Jun 2024 05:06  Patient On (Oxygen Delivery Method): room air      No Known Allergies      PHYSICAL EXAM:    General: WN/WD NAD  Neurology: A&Ox3, nonfocal, LEMUS x 4  Head:  Normocephalic, atraumatic  ENT:  Mucosa moist, no ulcerations  Neck:  Supple, no sinuses or palpable masses  Lymphatic:  No palpable cervical, supraclavicular, axillary or inguinal adenopathy  Respiratory: CTA B/L  CV: RRR, S1S2, no murmur  Abdominal: Soft, NT, ND no palpable mass  MSK: +2/4 edema (improving) and tenderness with erythema and some ulcers  Incisions: intact, no erythema or drainage    Consultant(s) Notes Reviewed:  [x ] YES  [ ] NO  Care Discussed with Consultants/Other Providers [ x] YES  [ ] NO    LABS:      RADIOLOGY & ADDITIONAL TESTS:    Imaging Personally Reviewed:  [ ] YES  [ ] NO  albuterol    90 MICROgram(s) HFA Inhaler 2 Puff(s) Inhalation every 6 hours PRN  buprenorphine 8 mG/naloxone 2 mG SL  Tablet 2.5 Tablet(s) SubLingual daily  cefepime   IVPB 2000 milliGRAM(s) IV Intermittent every 8 hours  colchicine 0.6 milliGRAM(s) Oral two times a day  dextrose 10% Bolus 125 milliLiter(s) IV Bolus once  dextrose 5%. 1000 milliLiter(s) IV Continuous <Continuous>  dextrose 5%. 1000 milliLiter(s) IV Continuous <Continuous>  dextrose 50% Injectable 25 Gram(s) IV Push once  dextrose 50% Injectable 12.5 Gram(s) IV Push once  dextrose Oral Gel 15 Gram(s) Oral once PRN  DULoxetine 30 milliGRAM(s) Oral daily  furosemide   Injectable 40 milliGRAM(s) IV Push daily  gabapentin 300 milliGRAM(s) Oral four times a day  glucagon  Injectable 1 milliGRAM(s) IntraMuscular once  insulin glargine Injectable (LANTUS) 28 Unit(s) SubCutaneous at bedtime  insulin lispro (ADMELOG) corrective regimen sliding scale   SubCutaneous three times a day before meals  ketorolac   Injectable 15 milliGRAM(s) IV Push every 12 hours PRN  levothyroxine 175 MICROGram(s) Oral daily  ondansetron Injectable 4 milliGRAM(s) IV Push every 8 hours PRN  pantoprazole    Tablet 40 milliGRAM(s) Oral before breakfast  pregabalin 50 milliGRAM(s) Oral every 12 hours      HEALTH ISSUES - PROBLEM Dx:    53-year-old female with pmh of DM on metformin, hepatitis B&C, asthma, COPD, hypothyroidism, pancreatitis, IV drug user in the past on Suboxone currently, presents to the ED with complaint of worsening lower extremity pain, swelling, erythema and ulcerations with weeping.    Assessment    ·	Possible cellulitis superimposed on leukocytoclastic vasculitis  ·	DM  ·	Hx of hepatitis B and C  ·	Asthma and COPD  ·	Hypothyroidism  ·	Former IV drug abuse on suboxone    Plan    - s/p cefepime patient was recently admitted to [facility] with the same problem / Previous hospitalist saw this patient a few months ago when the leukocytoclastic vasculitis diagnosis was confirmed with pathology, at the time the patient did not have this much erythema and open ulcers / will treat as a superimposed cellulitis for now, mrsa swab in [facility] was negative  - at [facility] patient was seen by derm and ordered for triamcinolone 0.1% cream bid / for now will hold off on it and see if any clinical improvement on abx  - patient was seen in the office by rheum and was on prednisone but tapered off as there was concern for the edema worsening her symptoms and her clinical picture at the time didn't fit what was expected of leukocytoclast vasculitis / howver watned colchicine continued  - Would hold on steroids as there was no improvement on last admission per outpt rheum as it did not help her on the last admission  - c/w IV furosemide 40mg qd for now. Also patient was added on pioglitazone by endo last admission which can cause/worsen lower extremity edema  - standing tylenol for pain, c/w gabapentin 300mg qid, pain mgmt consult for recommendations considering patient is on suboxone  - , T4 0.4, Increase synthroid 175mcg, endocrine recs appreciated  - PT   - wound care recs appreciated  - Lexiscan (-)  - home inhalers  - insulin 28 / ss  - VA art duplex - normal blood flow  -if stalling can consider triamcinolone cream    # DVT PPX: contraindications with AC and leukocytoclastic vasculitis    Dispo: Dispo tomorrow  [facility] maintanence: On discharge, can switch to PO Keflex 1g q8hrs to complete 7-day treatment (until 6/3), then start PO Keflex 500mg q12hrs for suppression to prevent recurrent infection  - Patient has high risk for secondary infection. Given multiple readmissions, patient might benefit from suppressive abx until she is seen by other specialists to address the underlying vasculitis. f/u in Endocrine clinic [phone]. Patient should NOT be on pioglitazone on discharge

## 2025-03-12 ENCOUNTER — APPOINTMENT (OUTPATIENT)
Dept: ORTHOPEDIC SURGERY | Facility: CLINIC | Age: 55
End: 2025-03-12

## 2025-03-14 ENCOUNTER — OUTPATIENT (OUTPATIENT)
Dept: OUTPATIENT SERVICES | Facility: HOSPITAL | Age: 55
LOS: 1 days | End: 2025-03-14
Payer: MEDICAID

## 2025-03-14 ENCOUNTER — APPOINTMENT (OUTPATIENT)
Dept: PSYCHIATRY | Facility: CLINIC | Age: 55
End: 2025-03-14

## 2025-03-14 DIAGNOSIS — Z90.89 ACQUIRED ABSENCE OF OTHER ORGANS: Chronic | ICD-10-CM

## 2025-03-14 DIAGNOSIS — Z90.49 ACQUIRED ABSENCE OF OTHER SPECIFIED PARTS OF DIGESTIVE TRACT: Chronic | ICD-10-CM

## 2025-03-14 DIAGNOSIS — F10.20 ALCOHOL DEPENDENCE, UNCOMPLICATED: ICD-10-CM

## 2025-03-14 PROCEDURE — 99214 OFFICE O/P EST MOD 30 MIN: CPT | Mod: 95

## 2025-03-14 PROCEDURE — 99214 OFFICE O/P EST MOD 30 MIN: CPT

## 2025-03-15 DIAGNOSIS — F10.20 ALCOHOL DEPENDENCE, UNCOMPLICATED: ICD-10-CM

## 2025-03-26 ENCOUNTER — INPATIENT (INPATIENT)
Facility: HOSPITAL | Age: 55
LOS: 7 days | Discharge: HOME | DRG: 383 | End: 2025-04-03
Attending: STUDENT IN AN ORGANIZED HEALTH CARE EDUCATION/TRAINING PROGRAM | Admitting: INTERNAL MEDICINE
Payer: MEDICAID

## 2025-03-26 VITALS
OXYGEN SATURATION: 100 % | SYSTOLIC BLOOD PRESSURE: 119 MMHG | RESPIRATION RATE: 19 BRPM | TEMPERATURE: 98 F | HEIGHT: 69 IN | HEART RATE: 60 BPM | DIASTOLIC BLOOD PRESSURE: 81 MMHG

## 2025-03-26 DIAGNOSIS — Z90.89 ACQUIRED ABSENCE OF OTHER ORGANS: Chronic | ICD-10-CM

## 2025-03-26 DIAGNOSIS — Z90.49 ACQUIRED ABSENCE OF OTHER SPECIFIED PARTS OF DIGESTIVE TRACT: Chronic | ICD-10-CM

## 2025-03-26 LAB
ALBUMIN SERPL ELPH-MCNC: 4.2 G/DL — SIGNIFICANT CHANGE UP (ref 3.5–5.2)
ALP SERPL-CCNC: 135 U/L — HIGH (ref 30–115)
ALT FLD-CCNC: 5 U/L — SIGNIFICANT CHANGE UP (ref 0–41)
ANION GAP SERPL CALC-SCNC: 11 MMOL/L — SIGNIFICANT CHANGE UP (ref 7–14)
APTT BLD: 37.2 SEC — SIGNIFICANT CHANGE UP (ref 27–39.2)
AST SERPL-CCNC: 23 U/L — SIGNIFICANT CHANGE UP (ref 0–41)
BASOPHILS # BLD AUTO: 0.06 K/UL — SIGNIFICANT CHANGE UP (ref 0–0.2)
BASOPHILS NFR BLD AUTO: 1.1 % — HIGH (ref 0–1)
BILIRUB SERPL-MCNC: 0.3 MG/DL — SIGNIFICANT CHANGE UP (ref 0.2–1.2)
BUN SERPL-MCNC: 10 MG/DL — SIGNIFICANT CHANGE UP (ref 10–20)
CALCIUM SERPL-MCNC: 9.4 MG/DL — SIGNIFICANT CHANGE UP (ref 8.4–10.5)
CHLORIDE SERPL-SCNC: 89 MMOL/L — LOW (ref 98–110)
CO2 SERPL-SCNC: 30 MMOL/L — SIGNIFICANT CHANGE UP (ref 17–32)
CREAT SERPL-MCNC: 1 MG/DL — SIGNIFICANT CHANGE UP (ref 0.7–1.5)
EGFR: 67 ML/MIN/1.73M2 — SIGNIFICANT CHANGE UP
EGFR: 67 ML/MIN/1.73M2 — SIGNIFICANT CHANGE UP
EOSINOPHIL # BLD AUTO: 0.15 K/UL — SIGNIFICANT CHANGE UP (ref 0–0.7)
EOSINOPHIL NFR BLD AUTO: 2.7 % — SIGNIFICANT CHANGE UP (ref 0–8)
GLUCOSE BLDC GLUCOMTR-MCNC: 500 MG/DL — CRITICAL HIGH (ref 70–99)
GLUCOSE SERPL-MCNC: 490 MG/DL — CRITICAL HIGH (ref 70–99)
HCT VFR BLD CALC: 31.9 % — LOW (ref 37–47)
HGB BLD-MCNC: 10.2 G/DL — LOW (ref 12–16)
IMM GRANULOCYTES NFR BLD AUTO: 0.2 % — SIGNIFICANT CHANGE UP (ref 0.1–0.3)
INR BLD: 0.92 RATIO — SIGNIFICANT CHANGE UP (ref 0.65–1.3)
LYMPHOCYTES # BLD AUTO: 1.87 K/UL — SIGNIFICANT CHANGE UP (ref 1.2–3.4)
LYMPHOCYTES # BLD AUTO: 33.3 % — SIGNIFICANT CHANGE UP (ref 20.5–51.1)
MCHC RBC-ENTMCNC: 26.4 PG — LOW (ref 27–31)
MCHC RBC-ENTMCNC: 32 G/DL — SIGNIFICANT CHANGE UP (ref 32–37)
MCV RBC AUTO: 82.4 FL — SIGNIFICANT CHANGE UP (ref 81–99)
MONOCYTES # BLD AUTO: 0.33 K/UL — SIGNIFICANT CHANGE UP (ref 0.1–0.6)
MONOCYTES NFR BLD AUTO: 5.9 % — SIGNIFICANT CHANGE UP (ref 1.7–9.3)
NEUTROPHILS # BLD AUTO: 3.2 K/UL — SIGNIFICANT CHANGE UP (ref 1.4–6.5)
NEUTROPHILS NFR BLD AUTO: 56.8 % — SIGNIFICANT CHANGE UP (ref 42.2–75.2)
NRBC BLD AUTO-RTO: 0 /100 WBCS — SIGNIFICANT CHANGE UP (ref 0–0)
PLATELET # BLD AUTO: 218 K/UL — SIGNIFICANT CHANGE UP (ref 130–400)
PMV BLD: 10 FL — SIGNIFICANT CHANGE UP (ref 7.4–10.4)
POTASSIUM SERPL-MCNC: 4.7 MMOL/L — SIGNIFICANT CHANGE UP (ref 3.5–5)
POTASSIUM SERPL-SCNC: 4.7 MMOL/L — SIGNIFICANT CHANGE UP (ref 3.5–5)
PROT SERPL-MCNC: 8 G/DL — SIGNIFICANT CHANGE UP (ref 6–8)
PROTHROM AB SERPL-ACNC: 10.8 SEC — SIGNIFICANT CHANGE UP (ref 9.95–12.87)
RBC # BLD: 3.87 M/UL — LOW (ref 4.2–5.4)
RBC # FLD: 16.1 % — HIGH (ref 11.5–14.5)
SODIUM SERPL-SCNC: 130 MMOL/L — LOW (ref 135–146)
WBC # BLD: 5.62 K/UL — SIGNIFICANT CHANGE UP (ref 4.8–10.8)
WBC # FLD AUTO: 5.62 K/UL — SIGNIFICANT CHANGE UP (ref 4.8–10.8)

## 2025-03-26 PROCEDURE — 82962 GLUCOSE BLOOD TEST: CPT

## 2025-03-26 PROCEDURE — 97116 GAIT TRAINING THERAPY: CPT | Mod: GP

## 2025-03-26 PROCEDURE — 85027 COMPLETE CBC AUTOMATED: CPT

## 2025-03-26 PROCEDURE — 83036 HEMOGLOBIN GLYCOSYLATED A1C: CPT

## 2025-03-26 PROCEDURE — 97110 THERAPEUTIC EXERCISES: CPT | Mod: GP

## 2025-03-26 PROCEDURE — 85025 COMPLETE CBC W/AUTO DIFF WBC: CPT

## 2025-03-26 PROCEDURE — 93970 EXTREMITY STUDY: CPT | Mod: 26

## 2025-03-26 PROCEDURE — 73080 X-RAY EXAM OF ELBOW: CPT | Mod: LT

## 2025-03-26 PROCEDURE — 83735 ASSAY OF MAGNESIUM: CPT

## 2025-03-26 PROCEDURE — 82550 ASSAY OF CK (CPK): CPT

## 2025-03-26 PROCEDURE — 80048 BASIC METABOLIC PNL TOTAL CA: CPT

## 2025-03-26 PROCEDURE — 97162 PT EVAL MOD COMPLEX 30 MIN: CPT | Mod: GP

## 2025-03-26 PROCEDURE — 99222 1ST HOSP IP/OBS MODERATE 55: CPT

## 2025-03-26 PROCEDURE — 73701 CT LOWER EXTREMITY W/DYE: CPT | Mod: MC,RT

## 2025-03-26 PROCEDURE — 80053 COMPREHEN METABOLIC PANEL: CPT

## 2025-03-26 PROCEDURE — 73030 X-RAY EXAM OF SHOULDER: CPT | Mod: 26,LT

## 2025-03-26 PROCEDURE — 80202 ASSAY OF VANCOMYCIN: CPT

## 2025-03-26 PROCEDURE — 36415 COLL VENOUS BLD VENIPUNCTURE: CPT

## 2025-03-26 PROCEDURE — 85652 RBC SED RATE AUTOMATED: CPT

## 2025-03-26 PROCEDURE — 99285 EMERGENCY DEPT VISIT HI MDM: CPT

## 2025-03-26 PROCEDURE — 93925 LOWER EXTREMITY STUDY: CPT

## 2025-03-26 PROCEDURE — 86140 C-REACTIVE PROTEIN: CPT

## 2025-03-26 RX ORDER — VANCOMYCIN HCL IN 5 % DEXTROSE 1.5G/250ML
1000 PLASTIC BAG, INJECTION (ML) INTRAVENOUS ONCE
Refills: 0 | Status: COMPLETED | OUTPATIENT
Start: 2025-03-26 | End: 2025-03-26

## 2025-03-26 RX ORDER — HEPARIN SODIUM 1000 [USP'U]/ML
5000 INJECTION INTRAVENOUS; SUBCUTANEOUS EVERY 12 HOURS
Refills: 0 | Status: DISCONTINUED | OUTPATIENT
Start: 2025-03-26 | End: 2025-04-03

## 2025-03-26 RX ORDER — BUPRENORPHINE HYDROCHLORIDE, NALOXONE HYDROCHLORIDE 4; 1 MG/1; MG/1
2.5 FILM, SOLUBLE BUCCAL; SUBLINGUAL ONCE
Refills: 0 | Status: DISCONTINUED | OUTPATIENT
Start: 2025-03-26 | End: 2025-03-27

## 2025-03-26 RX ORDER — VANCOMYCIN HCL IN 5 % DEXTROSE 1.5G/250ML
1000 PLASTIC BAG, INJECTION (ML) INTRAVENOUS EVERY 12 HOURS
Refills: 0 | Status: DISCONTINUED | OUTPATIENT
Start: 2025-03-26 | End: 2025-03-30

## 2025-03-26 RX ORDER — MAGNESIUM, ALUMINUM HYDROXIDE 200-200 MG
30 TABLET,CHEWABLE ORAL EVERY 4 HOURS
Refills: 0 | Status: DISCONTINUED | OUTPATIENT
Start: 2025-03-26 | End: 2025-04-03

## 2025-03-26 RX ORDER — PREGABALIN 75 MG/1
50 CAPSULE ORAL EVERY 12 HOURS
Refills: 0 | Status: DISCONTINUED | OUTPATIENT
Start: 2025-03-26 | End: 2025-04-02

## 2025-03-26 RX ORDER — ASPIRIN 325 MG
81 TABLET ORAL DAILY
Refills: 0 | Status: DISCONTINUED | OUTPATIENT
Start: 2025-03-26 | End: 2025-04-03

## 2025-03-26 RX ORDER — ONDANSETRON HCL/PF 4 MG/2 ML
4 VIAL (ML) INJECTION EVERY 8 HOURS
Refills: 0 | Status: DISCONTINUED | OUTPATIENT
Start: 2025-03-26 | End: 2025-04-03

## 2025-03-26 RX ORDER — CEFEPIME 2 G/20ML
2000 INJECTION, POWDER, FOR SOLUTION INTRAVENOUS EVERY 12 HOURS
Refills: 0 | Status: DISCONTINUED | OUTPATIENT
Start: 2025-03-26 | End: 2025-03-27

## 2025-03-26 RX ORDER — ATORVASTATIN CALCIUM 80 MG/1
20 TABLET, FILM COATED ORAL AT BEDTIME
Refills: 0 | Status: DISCONTINUED | OUTPATIENT
Start: 2025-03-26 | End: 2025-04-03

## 2025-03-26 RX ORDER — FUROSEMIDE 10 MG/ML
40 INJECTION INTRAMUSCULAR; INTRAVENOUS DAILY
Refills: 0 | Status: DISCONTINUED | OUTPATIENT
Start: 2025-03-26 | End: 2025-03-27

## 2025-03-26 RX ORDER — MELATONIN 5 MG
3 TABLET ORAL AT BEDTIME
Refills: 0 | Status: DISCONTINUED | OUTPATIENT
Start: 2025-03-26 | End: 2025-04-03

## 2025-03-26 RX ORDER — LEVOTHYROXINE SODIUM 300 MCG
175 TABLET ORAL DAILY
Refills: 0 | Status: DISCONTINUED | OUTPATIENT
Start: 2025-03-26 | End: 2025-04-03

## 2025-03-26 RX ORDER — CEFEPIME 2 G/20ML
2000 INJECTION, POWDER, FOR SOLUTION INTRAVENOUS ONCE
Refills: 0 | Status: COMPLETED | OUTPATIENT
Start: 2025-03-26 | End: 2025-03-26

## 2025-03-26 RX ORDER — ACETAMINOPHEN 500 MG/5ML
650 LIQUID (ML) ORAL EVERY 6 HOURS
Refills: 0 | Status: DISCONTINUED | OUTPATIENT
Start: 2025-03-26 | End: 2025-04-03

## 2025-03-26 RX ORDER — ALBUTEROL SULFATE 2.5 MG/3ML
1 VIAL, NEBULIZER (ML) INHALATION EVERY 6 HOURS
Refills: 0 | Status: DISCONTINUED | OUTPATIENT
Start: 2025-03-26 | End: 2025-04-03

## 2025-03-26 RX ADMIN — Medication 8 UNIT(S): at 23:52

## 2025-03-26 RX ADMIN — Medication 250 MILLIGRAM(S): at 22:40

## 2025-03-26 RX ADMIN — CEFEPIME 100 MILLIGRAM(S): 2 INJECTION, POWDER, FOR SOLUTION INTRAVENOUS at 22:40

## 2025-03-26 NOTE — ED ADULT TRIAGE NOTE - CHIEF COMPLAINT QUOTE
As per EMS, patient fell 2 days at home, injuring her right leg, presents with swelling to the right lower leg and bruising of the left shoulder.

## 2025-03-26 NOTE — H&P ADULT - HISTORY OF PRESENT ILLNESS
54 Patient is 54-year-old female with past medical history of DM, COPD not on home O2, opioid dependence on Suboxone, asthma, thyroidism, chronic venous stasis with chronic lymphedema of lower extremities presents to the ED complaining of worsening swelling/pain of bilateral lower extremities ( R > L) Pt states the pain and swelling is causing her ambulatory disfunction. Pt denies fever, chills, nausea, vomiting, abdominal pain, diarrhea, headache, dizziness, weakness, chest pain, SOB, back pain, LOC, trauma, urinary symptoms, cough. Patient is 54-year-old female with past medical history of DM, COPD not on home O2, opioid dependence on Suboxone, asthma, thyroidism, chronic venous stasis with chronic lymphedema of lower extremities presents to the ED complaining of worsening swelling/pain of bilateral lower extremities ( R > L) Pt states the pain and swelling is causing her ambulatory disfunction, pt states that she fell yesterday. Pt c/o pain to L shoulder (xrays negative for fx)  Pt denies fever, chills, nausea, vomiting, abdominal pain, diarrhea, headache, dizziness, weakness, chest pain, SOB, back pain, LOC, trauma, urinary symptoms, cough.

## 2025-03-26 NOTE — ED PROVIDER NOTE - CLINICAL SUMMARY MEDICAL DECISION MAKING FREE TEXT BOX
X-ray was obtained and film was interpreted by me.  There is no evidence of fracture or dislocation.  Labs were also obtained.  Patient has normal white blood cell count.  Found to have elevated glucose.  Patient was treated with insulin.  We also started antibiotics for cellulitis.  Patient will require admission at this time for medical optimization.

## 2025-03-26 NOTE — ED ADULT TRIAGE NOTE - NS ED NURSE DIRECT TO ROOM YN
PT in from home. Pt states that the last three days she has had no intake except alcohol. Pt states she drinks about a pint a day, last drink was about 2 hours before triage. Pt states she was last sober about 2 years ago. Pt states she has attempted to quit multiple times. Pt states when she goes through withdrawal the only symptom she gets is nausea. Pt denies any hx of seizures with withdrawals. Pt denies any nausea at this time. Pt with moderate tremor noted. Pt states she feels very weak and tired from no food intake. Pt was 86% on RA upon arrival to room. Pt immediately placed on 2 LPM via NC and oxygen increased to 94%. Pt state she started with SOB when arrived to ED. Pt denies any cough, fever CP or congestion at this time. Pt denies any COPD/emphysema however has not seen any doctors in a long time. Pt denies any abd pain. Pt alert and oriented upon arrival to room. Pt given warm blankets, SERGEY Sheets into assess, states no further needs at this time, will continue to monitor.       175 Sylvie Medellin RN  08/17/20 [id number] Yes

## 2025-03-26 NOTE — ED PROVIDER NOTE - CARE PLAN
1 Principal Discharge DX:	Cellulitis   Principal Discharge DX:	Cellulitis  Secondary Diagnosis:	Hyperglycemia

## 2025-03-26 NOTE — ED ADULT NURSE NOTE - ALCOHOL PRE SCREEN (AUDIT - C)
Per RN, appt has been scheduled 1/27/20, GMOB, 1230. Please update pt upon return call to RN.    Statement Selected

## 2025-03-26 NOTE — H&P ADULT - ASSESSMENT
Patient is 54-year-old female with past medical history of DM, COPD not on home O2, opioid dependence on Suboxone, asthma, thyroidism, chronic venous stasis with chronic lymphedema of lower extremities presenting with     #LE cellulitis   # h/o leukocytoclastic vasculitis  - admit to medicine   - IV antibiotic  - IV lasix   - Wound care consult for wrapping of LE   - follow up doppler US of LE  - PT/rehab     # h/o DM2  -FS AC QHS, CCHO diet, a1c   - continue with lantus, and ISS    #opioid dependence  -continue with suboxone     # dvt ppx   # gi ppx   # full code     Patient is 54-year-old female with past medical history of DM, COPD not on home O2, opioid dependence on Suboxone, asthma, thyroidism, chronic venous stasis with chronic lymphedema of lower extremities presenting with     #LE cellulitis   # h/o leukocytoclastic vasculitis  # fall  - admit to medicine   - IV antibiotics   - Wound care consult for wrapping of LE   - follow up doppler US of LE  - c/w Lasix   - PT/rehab     # h/o DM2  -FS AC QHS, CCHO diet, a1c   - continue with lantus, and ISS    #opioid dependence  -continue with suboxone     # dvt ppx   # gi ppx   # full code

## 2025-03-26 NOTE — ED PROVIDER NOTE - OBJECTIVE STATEMENT
this is 55 yo female presents to ed for evaluation of right leg swelling and erythema. patient states she has noticed drainage from leg

## 2025-03-26 NOTE — ED PROVIDER NOTE - PHYSICAL EXAMINATION
right and left positive swelling and erythema . right leg positive erythema with drainage noted    lungs clear to auscultation    heart normal s1 s2

## 2025-03-26 NOTE — H&P ADULT - NSHPREVIEWOFSYSTEMS_GEN_ALL_CORE
Constitutional: (-) fever (-) chills   Eyes: (-) visual changes  ENMT: (-) nasal or chest congestion (-) runny nose (-) sore throat (-) neck pain (-) neck stiffness  Cardiac: (-) chest pain (-) syncope  Respiratory: (-) cough (-) SOB  GI: (-) nausea (-) vomiting (-) diarrhea  : (-) incontinence  MS:(-) back pain   Neuro: (-) head injury (-) headache (-) dizziness (-) numbness/tingling to extremities B/L (-) LOC   Skin: (-) abrasion (+) rash (-) laceration  Except as documented in the HPI, all other systems are negative.

## 2025-03-26 NOTE — ED PROVIDER NOTE - ATTENDING APP SHARED VISIT CONTRIBUTION OF CARE
54-year-old female past medical history noted including leukocytoclastic vasculitis, diabetes, hepatitis, COPD, history of cellulitis, presents for evaluation via EMS s/p fall yesterday.  Patient states she hit her left shoulder and right knee.  Patient has been having increased drainage from right leg and pain.  Patient is finding it difficult to ambulate.  No fever, no shortness of breath, on exam patient is in NAD, AAOx3, positive healing bruise to left shoulder, good range of motion, positive swollen bursa to left elbow, patient states is chronic, no erythema or warmth, positive bilateral lower extremity swelling right greater than left, positive chronic skin changes with open wounds and drainage with weeping from right extremity, positive warmth

## 2025-03-26 NOTE — H&P ADULT - NSHPPHYSICALEXAM_GEN_ALL_CORE
ICU Vital Signs Last 24 Hrs  T(C): 36.5 (26 Mar 2025 21:28), Max: 36.5 (26 Mar 2025 21:28)  T(F): 97.7 (26 Mar 2025 21:28), Max: 97.7 (26 Mar 2025 21:28)  HR: 60 (26 Mar 2025 21:28) (60 - 60)  BP: 119/81 (26 Mar 2025 21:28) (119/81 - 119/81)  BP(mean): --  ABP: --  ABP(mean): --  RR: 19 (26 Mar 2025 21:28) (19 - 19)  SpO2: 100% (26 Mar 2025 21:28) (100% - 100%)    ICU Vital Signs Last 24 Hrs  T(C): 36.5 (26 Mar 2025 21:28), Max: 36.5 (26 Mar 2025 21:28)  T(F): 97.7 (26 Mar 2025 21:28), Max: 97.7 (26 Mar 2025 21:28)  HR: 60 (26 Mar 2025 21:28) (60 - 60)  BP: 119/81 (26 Mar 2025 21:28) (119/81 - 119/81)  BP(mean): --  ABP: --  ABP(mean): --  RR: 19 (26 Mar 2025 21:28) (19 - 19)  SpO2: 100% (26 Mar 2025 21:28) (100% - 100%)    Constitutional: NAD, well-nourished, non toxic appearing   HEENT: Airway patent, moist MM, no erythema/swelling/deformity of oral structures. EOMI, PERRLA.  CV: regular rate, regular rhythm, well-perfused extremities, 2+ b/l DP and radial pulses equal.  Lungs: BCTA, no increased WOB.  ABD: NTND, no guarding or rebound, no pulsatile mass, no hernias.   MSK: Neck supple, nontender, nl ROM, no stepoff. Chest nontender. Back nontender in TLS spine or to b/l bony structures or flanks. LE edema R > L + erythema and chronic thickening of skin   INTEG: Skin warm, dry, no rash.  NEURO: A&Ox3, normal strength, nl sensation throughout, normal speech.   PSYCH: Denies SI/HI/hallucinations

## 2025-03-26 NOTE — H&P ADULT - NSHPLABSRESULTS_GEN_ALL_CORE
Statement Selected 10.2   5.62  )-----------( 218      ( 26 Mar 2025 22:42 )             31.9   Urinalysis Basic - ( 26 Mar 2025 22:42 )    Color: x / Appearance: x / SG: x / pH: x  Gluc: 490 mg/dL / Ketone: x  / Bili: x / Urobili: x   Blood: x / Protein: x / Nitrite: x   Leuk Esterase: x / RBC: x / WBC x   Sq Epi: x / Non Sq Epi: x / Bacteria: x    03-26    130[L]  |  89[L]  |  10  ----------------------------<  490[HH]  4.7   |  30  |  1.0    Ca    9.4      26 Mar 2025 22:42    TPro  8.0  /  Alb  4.2  /  TBili  0.3  /  DBili  x   /  AST  23  /  ALT  5   /  AlkPhos  135[H]  03-26

## 2025-03-27 DIAGNOSIS — L03.90 CELLULITIS, UNSPECIFIED: ICD-10-CM

## 2025-03-27 LAB
A1C WITH ESTIMATED AVERAGE GLUCOSE RESULT: 12.7 % — HIGH (ref 4–5.6)
ALBUMIN SERPL ELPH-MCNC: 4.5 G/DL — SIGNIFICANT CHANGE UP (ref 3.5–5.2)
ALP SERPL-CCNC: 139 U/L — HIGH (ref 30–115)
ALT FLD-CCNC: 7 U/L — SIGNIFICANT CHANGE UP (ref 0–41)
ANION GAP SERPL CALC-SCNC: 13 MMOL/L — SIGNIFICANT CHANGE UP (ref 7–14)
AST SERPL-CCNC: 25 U/L — SIGNIFICANT CHANGE UP (ref 0–41)
BASOPHILS # BLD AUTO: 0.05 K/UL — SIGNIFICANT CHANGE UP (ref 0–0.2)
BASOPHILS NFR BLD AUTO: 0.7 % — SIGNIFICANT CHANGE UP (ref 0–1)
BILIRUB SERPL-MCNC: 0.3 MG/DL — SIGNIFICANT CHANGE UP (ref 0.2–1.2)
BUN SERPL-MCNC: 12 MG/DL — SIGNIFICANT CHANGE UP (ref 10–20)
CALCIUM SERPL-MCNC: 9.7 MG/DL — SIGNIFICANT CHANGE UP (ref 8.4–10.5)
CHLORIDE SERPL-SCNC: 93 MMOL/L — LOW (ref 98–110)
CO2 SERPL-SCNC: 27 MMOL/L — SIGNIFICANT CHANGE UP (ref 17–32)
CREAT SERPL-MCNC: 1 MG/DL — SIGNIFICANT CHANGE UP (ref 0.7–1.5)
CRP SERPL-MCNC: 26.4 MG/L — HIGH
EGFR: 67 ML/MIN/1.73M2 — SIGNIFICANT CHANGE UP
EGFR: 67 ML/MIN/1.73M2 — SIGNIFICANT CHANGE UP
EOSINOPHIL # BLD AUTO: 0.2 K/UL — SIGNIFICANT CHANGE UP (ref 0–0.7)
EOSINOPHIL NFR BLD AUTO: 2.7 % — SIGNIFICANT CHANGE UP (ref 0–8)
ERYTHROCYTE [SEDIMENTATION RATE] IN BLOOD: 50 MM/HR — HIGH (ref 0–20)
ESTIMATED AVERAGE GLUCOSE: 318 MG/DL — HIGH (ref 68–114)
GLUCOSE BLDC GLUCOMTR-MCNC: 200 MG/DL — HIGH (ref 70–99)
GLUCOSE SERPL-MCNC: 252 MG/DL — HIGH (ref 70–99)
HCT VFR BLD CALC: 34.7 % — LOW (ref 37–47)
HGB BLD-MCNC: 11.1 G/DL — LOW (ref 12–16)
IMM GRANULOCYTES NFR BLD AUTO: 0.3 % — SIGNIFICANT CHANGE UP (ref 0.1–0.3)
LYMPHOCYTES # BLD AUTO: 1.99 K/UL — SIGNIFICANT CHANGE UP (ref 1.2–3.4)
LYMPHOCYTES # BLD AUTO: 27.3 % — SIGNIFICANT CHANGE UP (ref 20.5–51.1)
MCHC RBC-ENTMCNC: 26.2 PG — LOW (ref 27–31)
MCHC RBC-ENTMCNC: 32 G/DL — SIGNIFICANT CHANGE UP (ref 32–37)
MCV RBC AUTO: 82 FL — SIGNIFICANT CHANGE UP (ref 81–99)
MONOCYTES # BLD AUTO: 0.36 K/UL — SIGNIFICANT CHANGE UP (ref 0.1–0.6)
MONOCYTES NFR BLD AUTO: 4.9 % — SIGNIFICANT CHANGE UP (ref 1.7–9.3)
NEUTROPHILS # BLD AUTO: 4.67 K/UL — SIGNIFICANT CHANGE UP (ref 1.4–6.5)
NEUTROPHILS NFR BLD AUTO: 64.1 % — SIGNIFICANT CHANGE UP (ref 42.2–75.2)
NRBC BLD AUTO-RTO: 0 /100 WBCS — SIGNIFICANT CHANGE UP (ref 0–0)
PLATELET # BLD AUTO: 257 K/UL — SIGNIFICANT CHANGE UP (ref 130–400)
PMV BLD: 10.3 FL — SIGNIFICANT CHANGE UP (ref 7.4–10.4)
POTASSIUM SERPL-MCNC: 4.1 MMOL/L — SIGNIFICANT CHANGE UP (ref 3.5–5)
POTASSIUM SERPL-SCNC: 4.1 MMOL/L — SIGNIFICANT CHANGE UP (ref 3.5–5)
PROT SERPL-MCNC: 8.7 G/DL — HIGH (ref 6–8)
RBC # BLD: 4.23 M/UL — SIGNIFICANT CHANGE UP (ref 4.2–5.4)
RBC # FLD: 16 % — HIGH (ref 11.5–14.5)
SODIUM SERPL-SCNC: 133 MMOL/L — LOW (ref 135–146)
WBC # BLD: 7.29 K/UL — SIGNIFICANT CHANGE UP (ref 4.8–10.8)
WBC # FLD AUTO: 7.29 K/UL — SIGNIFICANT CHANGE UP (ref 4.8–10.8)

## 2025-03-27 PROCEDURE — 93925 LOWER EXTREMITY STUDY: CPT | Mod: 26

## 2025-03-27 PROCEDURE — 99232 SBSQ HOSP IP/OBS MODERATE 35: CPT

## 2025-03-27 RX ORDER — BUPRENORPHINE HYDROCHLORIDE, NALOXONE HYDROCHLORIDE 4; 1 MG/1; MG/1
2.5 FILM, SOLUBLE BUCCAL; SUBLINGUAL DAILY
Refills: 0 | Status: DISCONTINUED | OUTPATIENT
Start: 2025-03-27 | End: 2025-03-27

## 2025-03-27 RX ORDER — SODIUM CHLORIDE 9 G/1000ML
1000 INJECTION, SOLUTION INTRAVENOUS
Refills: 0 | Status: DISCONTINUED | OUTPATIENT
Start: 2025-03-27 | End: 2025-04-03

## 2025-03-27 RX ORDER — DEXTROSE 50 % IN WATER 50 %
25 SYRINGE (ML) INTRAVENOUS ONCE
Refills: 0 | Status: DISCONTINUED | OUTPATIENT
Start: 2025-03-27 | End: 2025-04-03

## 2025-03-27 RX ORDER — BUPRENORPHINE HYDROCHLORIDE, NALOXONE HYDROCHLORIDE 4; 1 MG/1; MG/1
2.5 FILM, SOLUBLE BUCCAL; SUBLINGUAL DAILY
Refills: 0 | Status: DISCONTINUED | OUTPATIENT
Start: 2025-03-27 | End: 2025-04-03

## 2025-03-27 RX ORDER — FUROSEMIDE 10 MG/ML
40 INJECTION INTRAMUSCULAR; INTRAVENOUS DAILY
Refills: 0 | Status: DISCONTINUED | OUTPATIENT
Start: 2025-03-27 | End: 2025-03-28

## 2025-03-27 RX ORDER — INSULIN LISPRO 100 U/ML
5 INJECTION, SOLUTION INTRAVENOUS; SUBCUTANEOUS
Refills: 0 | Status: DISCONTINUED | OUTPATIENT
Start: 2025-03-27 | End: 2025-04-03

## 2025-03-27 RX ORDER — INSULIN GLARGINE-YFGN 100 [IU]/ML
20 INJECTION, SOLUTION SUBCUTANEOUS AT BEDTIME
Refills: 0 | Status: DISCONTINUED | OUTPATIENT
Start: 2025-03-27 | End: 2025-03-28

## 2025-03-27 RX ORDER — DEXTROSE 50 % IN WATER 50 %
15 SYRINGE (ML) INTRAVENOUS ONCE
Refills: 0 | Status: DISCONTINUED | OUTPATIENT
Start: 2025-03-27 | End: 2025-04-03

## 2025-03-27 RX ORDER — GLUCAGON 3 MG/1
1 POWDER NASAL ONCE
Refills: 0 | Status: DISCONTINUED | OUTPATIENT
Start: 2025-03-27 | End: 2025-04-03

## 2025-03-27 RX ORDER — LIDOCAINE HCL/PF 10 MG/ML
10 VIAL (ML) INJECTION ONCE
Refills: 0 | Status: DISCONTINUED | OUTPATIENT
Start: 2025-03-27 | End: 2025-04-03

## 2025-03-27 RX ORDER — DEXTROSE 50 % IN WATER 50 %
12.5 SYRINGE (ML) INTRAVENOUS ONCE
Refills: 0 | Status: DISCONTINUED | OUTPATIENT
Start: 2025-03-27 | End: 2025-04-03

## 2025-03-27 RX ORDER — INSULIN LISPRO 100 U/ML
INJECTION, SOLUTION INTRAVENOUS; SUBCUTANEOUS
Refills: 0 | Status: DISCONTINUED | OUTPATIENT
Start: 2025-03-27 | End: 2025-04-03

## 2025-03-27 RX ADMIN — PREGABALIN 50 MILLIGRAM(S): 75 CAPSULE ORAL at 17:13

## 2025-03-27 RX ADMIN — HEPARIN SODIUM 5000 UNIT(S): 1000 INJECTION INTRAVENOUS; SUBCUTANEOUS at 06:22

## 2025-03-27 RX ADMIN — CEFEPIME 100 MILLIGRAM(S): 2 INJECTION, POWDER, FOR SOLUTION INTRAVENOUS at 06:23

## 2025-03-27 RX ADMIN — PREGABALIN 50 MILLIGRAM(S): 75 CAPSULE ORAL at 06:23

## 2025-03-27 RX ADMIN — INSULIN LISPRO 5 UNIT(S): 100 INJECTION, SOLUTION INTRAVENOUS; SUBCUTANEOUS at 08:55

## 2025-03-27 RX ADMIN — Medication 40 MILLIGRAM(S): at 06:25

## 2025-03-27 RX ADMIN — INSULIN LISPRO 5 UNIT(S): 100 INJECTION, SOLUTION INTRAVENOUS; SUBCUTANEOUS at 17:13

## 2025-03-27 RX ADMIN — ATORVASTATIN CALCIUM 20 MILLIGRAM(S): 80 TABLET, FILM COATED ORAL at 21:49

## 2025-03-27 RX ADMIN — FUROSEMIDE 40 MILLIGRAM(S): 10 INJECTION INTRAMUSCULAR; INTRAVENOUS at 06:23

## 2025-03-27 RX ADMIN — INSULIN LISPRO 1: 100 INJECTION, SOLUTION INTRAVENOUS; SUBCUTANEOUS at 08:20

## 2025-03-27 RX ADMIN — INSULIN LISPRO 5 UNIT(S): 100 INJECTION, SOLUTION INTRAVENOUS; SUBCUTANEOUS at 11:49

## 2025-03-27 RX ADMIN — INSULIN LISPRO 3: 100 INJECTION, SOLUTION INTRAVENOUS; SUBCUTANEOUS at 11:50

## 2025-03-27 RX ADMIN — HEPARIN SODIUM 5000 UNIT(S): 1000 INJECTION INTRAVENOUS; SUBCUTANEOUS at 17:17

## 2025-03-27 RX ADMIN — Medication 250 MILLIGRAM(S): at 17:12

## 2025-03-27 RX ADMIN — Medication 650 MILLIGRAM(S): at 02:34

## 2025-03-27 RX ADMIN — Medication 175 MICROGRAM(S): at 06:23

## 2025-03-27 RX ADMIN — Medication 2 MILLIGRAM(S): at 02:35

## 2025-03-27 RX ADMIN — Medication 81 MILLIGRAM(S): at 11:50

## 2025-03-27 RX ADMIN — Medication 250 MILLIGRAM(S): at 06:22

## 2025-03-27 RX ADMIN — BUPRENORPHINE HYDROCHLORIDE, NALOXONE HYDROCHLORIDE 2.5 TABLET(S): 4; 1 FILM, SOLUBLE BUCCAL; SUBLINGUAL at 12:13

## 2025-03-27 NOTE — CONSULT NOTE ADULT - ASSESSMENT
54-year-old female with past medical history of DM, COPD not on home O2, opioid dependence on Suboxone, asthma, thyroidism, chronic venous stasis with chronic lymphedema of lower extremities presents to the ED complaining of worsening swelling/pain of bilateral lower extremities ( R > L).    ID is consulted for cellulitis  Afebrile  WBC 7.29  On room air    US duplex no DVT    Antibiotics:  Cefepime 3/26 ->  Vancomycin 3/26 ->      IMPRESSION:      RECOMMENDATIONS:      * THIS IS AN INCOMPLETE NOTE. FINAL RECOMMENDATION IS PENDING *   54-year-old female with past medical history of DM, COPD not on home O2, opioid dependence on Suboxone, asthma, thyroidism, chronic venous stasis with chronic lymphedema of lower extremities presents to the ED complaining of worsening swelling/pain of bilateral lower extremities ( R > L).    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  Attending Physician  Division of Infectious Diseases  [facility]  Please contact me via [employer] Teams

## 2025-03-27 NOTE — PHYSICAL THERAPY INITIAL EVALUATION ADULT - PERTINENT HX OF CURRENT PROBLEM, REHAB EVAL
54-year-old female with past medical history of DM, COPD not on home O2, opioid dependence on Suboxone, asthma, thyroidism, chronic venous stasis with chronic lymphedema of lower extremities presents to the ED complaining of worsening swelling/pain of bilateral lower extremities ( R > L) Pt states the pain and swelling is causing her ambulatory disfunction, pt states that she fell yesterday. Pt c/o pain to L shoulder (xrays negative for fx)  Pt denies fever, chills, nausea, vomiting, abdominal pain, diarrhea, headache, dizziness, weakness, chest pain, SOB, back pain, LOC, trauma, urinary symptoms, cough.

## 2025-03-27 NOTE — PROGRESS NOTE ADULT - SUBJECTIVE AND OBJECTIVE BOX
EZRA BARCLAY 54y Female  MRN#: [id number]     Hospital Day: 1d      SUBJECTIVE  No acute events overnight, pt seen and examined this morning.                                          ----------------------------------------------------------  OBJECTIVE  PAST MEDICAL & SURGICAL HISTORY  Asthma with COPD    Hypothyroidism    H/O: substance abuse  no longer using    History of pancreatitis    Hepatitis-C    Leukocytoclastic vasculitis    Bilateral edema of lower extremity    HLD (hyperlipidemia)    Type 2 diabetes mellitus    History of appendectomy    History of tonsillectomy                                              -----------------------------------------------------------  ALLERGIES:  No Known Allergies                                            ------------------------------------------------------------    HOME MEDICATIONS  Home Medications:  pregabalin 50 mg oral capsule: 1 cap(s) orally every 12 hours (06 Jun 2024 07:48)  Suboxone 8 mg-2 mg sublingual tablet: 2.5 film(s) sublingual once a day (28 May 2024 03:24)                           MEDICATIONS:  STANDING MEDICATIONS  aspirin enteric coated 81 milliGRAM(s) Oral daily  atorvastatin 20 milliGRAM(s) Oral at bedtime  buprenorphine 8 mG/naloxone 2 mG SL  Tablet 2.5 Tablet(s) SubLingual daily  buprenorphine 8 mG/naloxone 2 mG SL  Tablet 2.5 Tablet(s) SubLingual once  cefepime   IVPB 2000 milliGRAM(s) IV Intermittent every 12 hours  chlorhexidine 2% Cloths 1 Application(s) Topical <User Schedule>  dextrose 5%. 1000 milliLiter(s) IV Continuous <Continuous>  dextrose 5%. 1000 milliLiter(s) IV Continuous <Continuous>  dextrose 50% Injectable 25 Gram(s) IV Push once  dextrose 50% Injectable 12.5 Gram(s) IV Push once  dextrose 50% Injectable 25 Gram(s) IV Push once  furosemide    Tablet 40 milliGRAM(s) Oral daily  glucagon  Injectable 1 milliGRAM(s) IntraMuscular once  heparin   Injectable 5000 Unit(s) SubCutaneous every 12 hours  insulin glargine Injectable (LANTUS) 20 Unit(s) SubCutaneous at bedtime  insulin lispro (ADMELOG) corrective regimen sliding scale   SubCutaneous three times a day before meals  insulin lispro Injectable (ADMELOG) 5 Unit(s) SubCutaneous three times a day before meals  levothyroxine 175 MICROGram(s) Oral daily  pantoprazole    Tablet 40 milliGRAM(s) Oral before breakfast  pregabalin 50 milliGRAM(s) Oral every 12 hours  vancomycin  IVPB 1000 milliGRAM(s) IV Intermittent every 12 hours    PRN MEDICATIONS  acetaminophen     Tablet .. 650 milliGRAM(s) Oral every 6 hours PRN  albuterol    90 MICROgram(s) HFA Inhaler 1 Puff(s) Inhalation every 6 hours PRN  aluminum hydroxide/magnesium hydroxide/simethicone Suspension 30 milliLiter(s) Oral every 4 hours PRN  dextrose Oral Gel 15 Gram(s) Oral once PRN  melatonin 3 milliGRAM(s) Oral at bedtime PRN  morphine  - Injectable 2 milliGRAM(s) IV Push every 6 hours PRN  ondansetron Injectable 4 milliGRAM(s) IV Push every 8 hours PRN                                            ------------------------------------------------------------  VITAL SIGNS: Last 24 Hours  T(C): 36.5 (26 Mar 2025 21:28), Max: 36.5 (26 Mar 2025 21:28)  T(F): 97.7 (26 Mar 2025 21:28), Max: 97.7 (26 Mar 2025 21:28)  HR: 60 (26 Mar 2025 21:28) (60 - 60)  BP: 119/81 (26 Mar 2025 21:28) (119/81 - 119/81)  BP(mean): --  RR: 19 (26 Mar 2025 21:28) (19 - 19)  SpO2: 100% (26 Mar 2025 21:28) (100% - 100%)                                             --------------------------------------------------------------  LABS:                        11.1   7.29  )-----------( 257      ( 27 Mar 2025 05:39 )             34.7     03-27    133[L]  |  93[L]  |  12  ----------------------------<  252[H]  4.1   |  27  |  1.0    Ca    9.7      27 Mar 2025 05:39    TPro  8.7[H]  /  Alb  4.5  /  TBili  0.3  /  DBili  x   /  AST  25  /  ALT  7   /  AlkPhos  139[H]  03-27    PT/INR - ( 26 Mar 2025 22:42 )   PT: 10.80 sec;   INR: 0.92 ratio         PTT - ( 26 Mar 2025 22:42 )  PTT:37.2 sec  Urinalysis Basic - ( 27 Mar 2025 05:39 )    Color: x / Appearance: x / SG: x / pH: x  Gluc: 252 mg/dL / Ketone: x  / Bili: x / Urobili: x   Blood: x / Protein: x / Nitrite: x   Leuk Esterase: x / RBC: x / WBC x   Sq Epi: x / Non Sq Epi: x / Bacteria: x        Sedimentation Rate, Erythrocyte: 50 mm/Hr *H* (03-27-25 @ 05:39)                                                      -------------------------------------------------------------  RADIOLOGY:  CXR      CT                                            --------------------------------------------------------------    PHYSICAL EXAM:  GENERAL: NAD, lying in bed   CHEST/LUNG: Clear to auscultation bilaterally; No rales, rhonchi, wheezing, or rubs. Unlabored respirations  HEART: Regular rate and rhythm; No murmurs, rubs, or gallops  ABDOMEN: Soft, nontender, nondistended  EXTREMITIES:  b/l chronic venous stasis wounds but with overlying cellulitis of RLE with severe edema in RLE, b/l LE pulses, sensation and motor function intact  NERVOUS SYSTEM:  A&Ox3

## 2025-03-27 NOTE — PHYSICAL THERAPY INITIAL EVALUATION ADULT - SITTING BALANCE: STATIC
Medication refill requested for fenofibrate micronized (LOFIBRA) 200 MG capsule   Last Refill/Prescribed: Date 8/10/2023, # of tablets: 90, # of refills approved:3     Medication: fenofibrate micronized (LOFIBRA) 200 MG capsule   Last office visit date: 8/6/2024  Medication Refill Protocol Failed.  Failed criteria: due to statin combination medication on list. Sent to clinician to review.     Preferred pharmacy verified, and selected.   [facility] - [facility] AT PORTAL TO REGISTERED [facility] SITES      Message: have 2days left       good minus

## 2025-03-27 NOTE — CONSULT NOTE ADULT - SUBJECTIVE AND OBJECTIVE BOX
INFECTIOUS DISEASE CONSULT NOTE    Patient is a 54y old  Female who presents with a chief complaint of LE swelling (26 Mar 2025 23:57)    HPI:  Patient is 54-year-old female with past medical history of DM, COPD not on home O2, opioid dependence on Suboxone, asthma, thyroidism, chronic venous stasis with chronic lymphedema of lower extremities presents to the ED complaining of worsening swelling/pain of bilateral lower extremities ( R > L) Pt states the pain and swelling is causing her ambulatory disfunction, pt states that she fell yesterday. Pt c/o pain to L shoulder (xrays negative for fx)  Pt denies fever, chills, nausea, vomiting, abdominal pain, diarrhea, headache, dizziness, weakness, chest pain, SOB, back pain, LOC, trauma, urinary symptoms, cough. (26 Mar 2025 23:57)         Prior hospital charts reviewed [Yes]  Primary team notes reviewed [Yes]  Other consultant notes reviewed [Yes]    REVIEW OF SYSTEMS:      PAST MEDICAL & SURGICAL HISTORY:  Asthma with COPD      Hypothyroidism      H/O: substance abuse  no longer using      History of pancreatitis      Hepatitis-C      Leukocytoclastic vasculitis      Bilateral edema of lower extremity      HLD (hyperlipidemia)      Type 2 diabetes mellitus      History of appendectomy      History of tonsillectomy          SOCIAL HISTORY:  - Born in _____, migrated to [place] in 19XX  - Currently working as / Retired  - Lives with _____; no pets  - No recent travel  - Denies tobacco use  - Denies alcohol use  - Denies illicit drug use  - Currently sexually active, has one male/female sexual partner    FAMILY HISTORY:      Allergies:  No Known Allergies      ANTIMICROBIALS:  cefepime   IVPB 2000 every 12 hours  vancomycin  IVPB 1000 every 12 hours      ANTIMICROBIALS (past 90 days):  MEDICATIONS  (STANDING):  cefepime   IVPB   100 mL/Hr IV Intermittent (03-27-25 @ 06:23)    cefepime   IVPB   100 mL/Hr IV Intermittent (03-26-25 @ 22:40)    vancomycin  IVPB   250 mL/Hr IV Intermittent (03-26-25 @ 22:40)    vancomycin  IVPB   250 mL/Hr IV Intermittent (03-27-25 @ 06:22)        OTHER MEDS:   MEDICATIONS  (STANDING):  acetaminophen     Tablet .. 650 every 6 hours PRN  albuterol    90 MICROgram(s) HFA Inhaler 1 every 6 hours PRN  aluminum hydroxide/magnesium hydroxide/simethicone Suspension 30 every 4 hours PRN  aspirin enteric coated 81 daily  atorvastatin 20 at bedtime  buprenorphine 8 mG/naloxone 2 mG SL  Tablet 2.5 once  dextrose 50% Injectable 25 once  dextrose 50% Injectable 12.5 once  dextrose 50% Injectable 25 once  dextrose Oral Gel 15 once PRN  furosemide    Tablet 40 daily  glucagon  Injectable 1 once  heparin   Injectable 5000 every 12 hours  insulin glargine Injectable (LANTUS) 20 at bedtime  insulin lispro (ADMELOG) corrective regimen sliding scale  three times a day before meals  insulin lispro Injectable (ADMELOG) 5 three times a day before meals  levothyroxine 175 daily  melatonin 3 at bedtime PRN  morphine  - Injectable 2 every 6 hours PRN  ondansetron Injectable 4 every 8 hours PRN  pantoprazole    Tablet 40 before breakfast  pregabalin 50 every 12 hours      VITALS:  Vital Signs Last 24 Hrs  T(F): 97.7 (03-26-25 @ 21:28), Max: 97.7 (03-26-25 @ 21:28)    Vital Signs Last 24 Hrs  HR: 60 (03-26-25 @ 21:28) (60 - 60)  BP: 119/81 (03-26-25 @ 21:28) (119/81 - 119/81)  RR: 19 (03-26-25 @ 21:28)  SpO2: 100% (03-26-25 @ 21:28) (100% - 100%)  Wt(kg): --    EXAM:    Labs:                        11.1   7.29  )-----------( 257      ( 27 Mar 2025 05:39 )             34.7     03-27    133[L]  |  93[L]  |  12  ----------------------------<  252[H]  4.1   |  27  |  1.0    Ca    9.7      27 Mar 2025 05:39    TPro  8.7[H]  /  Alb  4.5  /  TBili  0.3  /  DBili  x   /  AST  25  /  ALT  7   /  AlkPhos  139[H]  03-27      WBC Trend:  WBC Count: 7.29 (03-27-25 @ 05:39)  WBC Count: 5.62 (03-26-25 @ 22:42)      Auto Neutrophil #: 6.06 K/uL (02-10-25 @ 20:00)  Auto Neutrophil #: 4.26 K/uL (01-03-25 @ 07:28)  Auto Neutrophil #: 3.94 K/uL (01-02-25 @ 09:32)  Auto Neutrophil #: 5.47 K/uL (12-30-24 @ 11:00)  Auto Neutrophil #: 5.10 K/uL (12-28-24 @ 11:22)      Creatine Trend:  Creatinine: 1.0 (03-27)  Creatinine: 1.0 (03-26)      Liver Biochemical Testing Trend:  Alanine Aminotransferase (ALT/SGPT): 7 (03-27)  Alanine Aminotransferase (ALT/SGPT): 5 (03-26)  Alanine Aminotransferase (ALT/SGPT): <5 (02-14)  Alanine Aminotransferase (ALT/SGPT): 5 (02-13)  Alanine Aminotransferase (ALT/SGPT): 5 (02-12)  Aspartate Aminotransferase (AST/SGOT): 25 (03-27-25 @ 05:39)  Aspartate Aminotransferase (AST/SGOT): 23 (03-26-25 @ 22:42)  Aspartate Aminotransferase (AST/SGOT): 28 (02-14-25 @ 07:17)  Aspartate Aminotransferase (AST/SGOT): 26 (02-13-25 @ 04:30)  Aspartate Aminotransferase (AST/SGOT): 31 (02-12-25 @ 08:12)  Bilirubin Total: 0.3 (03-27)  Bilirubin Total: 0.3 (03-26)  Bilirubin Total: 0.2 (02-14)  Bilirubin Total: 0.3 (02-13)  Bilirubin Total: 0.2 (02-12)      Trend LDH  01-10-24 @ 06:55  230          Urinalysis Basic - ( 27 Mar 2025 05:39 )    Color: x / Appearance: x / SG: x / pH: x  Gluc: 252 mg/dL / Ketone: x  / Bili: x / Urobili: x   Blood: x / Protein: x / Nitrite: x   Leuk Esterase: x / RBC: x / WBC x   Sq Epi: x / Non Sq Epi: x / Bacteria: x      MICROBIOLOGY:    MRSA PCR Result.: Negative (05-04-24 @ 07:00)    HIV-1/2 Combo Result: Nonreact (12-31-24 @ 08:00)  HIV-1/2 Combo Result: Nonreact (01-11-24 @ 13:40)    C-Reactive Protein: 26.4 (03-27)      A1C with Estimated Average Glucose Result: 8.1 % (12-30-24 @ 11:00)  A1C with Estimated Average Glucose Result: 7.9 % (11-29-24 @ 10:30)    Sedimentation Rate, Erythrocyte: 50 mm/Hr (03-27-25 @ 05:39)      RADIOLOGY:  imaging below personally reviewed    < from: Xray Shoulder 2 Views, Left (03.26.25 @ 22:58) >  Findings/  impression:    No evidence of acute fracture or dislocation.    Degenerative osseous changes.    < end of copied text >    < from: VA Duplex Lower Ext Vein Scan, Bilat (03.26.25 @ 23:42) >  Impression:    No evidence of deep venous thrombosis in the bilateral lower extremities.    < end of copied text >   INFECTIOUS DISEASE CONSULT NOTE    Patient is a 54y old  Female who presents with a chief complaint of LE swelling (26 Mar 2025 23:57)    HPI:  Patient is 54-year-old female with past medical history of DM, COPD not on home O2, opioid dependence on Suboxone, asthma, thyroidism, chronic venous stasis with chronic lymphedema of lower extremities presents to the ED complaining of worsening swelling/pain of bilateral lower extremities ( R > L) Pt states the pain and swelling is causing her ambulatory disfunction, pt states that she fell yesterday. Pt c/o pain to L shoulder (xrays negative for fx)  Pt denies fever, chills, nausea, vomiting, abdominal pain, diarrhea, headache, dizziness, weakness, chest pain, SOB, back pain, LOC, trauma, urinary symptoms, cough. (26 Mar 2025 23:57)      Prior hospital charts reviewed [Yes]  Primary team notes reviewed [Yes]  Other consultant notes reviewed [Yes]    REVIEW OF SYSTEMS:  CONSTITUTIONAL: No fever or chills  HEAD: No lesion on scalp  EYES: No visual disturbance  ENT: No sore throat  RESPIRATORY: No cough, no shortness of breath  CARDIOVASCULAR: No chest pain or palpitations  GASTROINTESTINAL: No abdominal or epigastric pain  GENITOURINARY: No dysuria  NEUROLOGICAL: No headache/dizziness  MUSCULOSKELETAL: No joint pain, erythema, or swelling; no back pain  SKIN: Bilateral LE erythema, R > L;   All other ROS negative except noted above      PAST MEDICAL & SURGICAL HISTORY:  Asthma with COPD      Hypothyroidism      H/O: substance abuse  no longer using      History of pancreatitis      Hepatitis-C      Leukocytoclastic vasculitis      Bilateral edema of lower extremity      HLD (hyperlipidemia)      Type 2 diabetes mellitus      History of appendectomy      History of tonsillectomy          SOCIAL HISTORY:  - No recent travel  - Denies tobacco use  - Denies alcohol use  - Denies illicit drug use    FAMILY HISTORY:  No family history of rheumatologic diseases    Allergies:  No Known Allergies      ANTIMICROBIALS:  cefepime   IVPB 2000 every 12 hours  vancomycin  IVPB 1000 every 12 hours      ANTIMICROBIALS (past 90 days):  MEDICATIONS  (STANDING):  cefepime   IVPB   100 mL/Hr IV Intermittent (03-27-25 @ 06:23)    cefepime   IVPB   100 mL/Hr IV Intermittent (03-26-25 @ 22:40)    vancomycin  IVPB   250 mL/Hr IV Intermittent (03-26-25 @ 22:40)    vancomycin  IVPB   250 mL/Hr IV Intermittent (03-27-25 @ 06:22)        OTHER MEDS:   MEDICATIONS  (STANDING):  acetaminophen     Tablet .. 650 every 6 hours PRN  albuterol    90 MICROgram(s) HFA Inhaler 1 every 6 hours PRN  aluminum hydroxide/magnesium hydroxide/simethicone Suspension 30 every 4 hours PRN  aspirin enteric coated 81 daily  atorvastatin 20 at bedtime  buprenorphine 8 mG/naloxone 2 mG SL  Tablet 2.5 once  dextrose 50% Injectable 25 once  dextrose 50% Injectable 12.5 once  dextrose 50% Injectable 25 once  dextrose Oral Gel 15 once PRN  furosemide    Tablet 40 daily  glucagon  Injectable 1 once  heparin   Injectable 5000 every 12 hours  insulin glargine Injectable (LANTUS) 20 at bedtime  insulin lispro (ADMELOG) corrective regimen sliding scale  three times a day before meals  insulin lispro Injectable (ADMELOG) 5 three times a day before meals  levothyroxine 175 daily  melatonin 3 at bedtime PRN  morphine  - Injectable 2 every 6 hours PRN  ondansetron Injectable 4 every 8 hours PRN  pantoprazole    Tablet 40 before breakfast  pregabalin 50 every 12 hours      VITALS:  Vital Signs Last 24 Hrs  T(F): 97.7 (03-26-25 @ 21:28), Max: 97.7 (03-26-25 @ 21:28)    Vital Signs Last 24 Hrs  HR: 60 (03-26-25 @ 21:28) (60 - 60)  BP: 119/81 (03-26-25 @ 21:28) (119/81 - 119/81)  RR: 19 (03-26-25 @ 21:28)  SpO2: 100% (03-26-25 @ 21:28) (100% - 100%)  Wt(kg): --    EXAM:  GENERAL: NAD, lying in bed  HEAD: No head lesions  EYES: Conjunctiva pink and cornea white  EAR, NOSE, MOUTH, THROAT: Normal external ears and nose, no discharges; moist mucous membranes  NECK: Supple, nontender to palpation; no JVD  RESPIRATORY: Clear to auscultation bilaterally  CARDIOVASCULAR: S1 S2  GASTROINTESTINAL: Soft, nontender, nondistended; normoactive bowel sounds  GENITOURINARY: No dan catheter, no CVA tenderness  EXTREMITIES: No clubbing, cyanosis, or petal edema  NERVOUS SYSTEM: Alert and oriented to person, time, place and situation, speech clear. No focal deficits   MUSCULOSKELETAL: No joint erythema, swelling or pain  SKIN: Bilateral LE erythema with wounds, worse on R, no superficial thrombophlebitis  PSYCH: Normal affect    Labs:                        11.1   7.29  )-----------( 257      ( 27 Mar 2025 05:39 )             34.7     03-27    133[L]  |  93[L]  |  12  ----------------------------<  252[H]  4.1   |  27  |  1.0    Ca    9.7      27 Mar 2025 05:39    TPro  8.7[H]  /  Alb  4.5  /  TBili  0.3  /  DBili  x   /  AST  25  /  ALT  7   /  AlkPhos  139[H]  03-27      WBC Trend:  WBC Count: 7.29 (03-27-25 @ 05:39)  WBC Count: 5.62 (03-26-25 @ 22:42)      Auto Neutrophil #: 6.06 K/uL (02-10-25 @ 20:00)  Auto Neutrophil #: 4.26 K/uL (01-03-25 @ 07:28)  Auto Neutrophil #: 3.94 K/uL (01-02-25 @ 09:32)  Auto Neutrophil #: 5.47 K/uL (12-30-24 @ 11:00)  Auto Neutrophil #: 5.10 K/uL (12-28-24 @ 11:22)      Creatine Trend:  Creatinine: 1.0 (03-27)  Creatinine: 1.0 (03-26)      Liver Biochemical Testing Trend:  Alanine Aminotransferase (ALT/SGPT): 7 (03-27)  Alanine Aminotransferase (ALT/SGPT): 5 (03-26)  Alanine Aminotransferase (ALT/SGPT): <5 (02-14)  Alanine Aminotransferase (ALT/SGPT): 5 (02-13)  Alanine Aminotransferase (ALT/SGPT): 5 (02-12)  Aspartate Aminotransferase (AST/SGOT): 25 (03-27-25 @ 05:39)  Aspartate Aminotransferase (AST/SGOT): 23 (03-26-25 @ 22:42)  Aspartate Aminotransferase (AST/SGOT): 28 (02-14-25 @ 07:17)  Aspartate Aminotransferase (AST/SGOT): 26 (02-13-25 @ 04:30)  Aspartate Aminotransferase (AST/SGOT): 31 (02-12-25 @ 08:12)  Bilirubin Total: 0.3 (03-27)  Bilirubin Total: 0.3 (03-26)  Bilirubin Total: 0.2 (02-14)  Bilirubin Total: 0.3 (02-13)  Bilirubin Total: 0.2 (02-12)      Trend LDH  01-10-24 @ 06:55  230          Urinalysis Basic - ( 27 Mar 2025 05:39 )    Color: x / Appearance: x / SG: x / pH: x  Gluc: 252 mg/dL / Ketone: x  / Bili: x / Urobili: x   Blood: x / Protein: x / Nitrite: x   Leuk Esterase: x / RBC: x / WBC x   Sq Epi: x / Non Sq Epi: x / Bacteria: x      MICROBIOLOGY:    MRSA PCR Result.: Negative (05-04-24 @ 07:00)    HIV-1/2 Combo Result: Nonreact (12-31-24 @ 08:00)  HIV-1/2 Combo Result: Nonreact (01-11-24 @ 13:40)    C-Reactive Protein: 26.4 (03-27)      A1C with Estimated Average Glucose Result: 8.1 % (12-30-24 @ 11:00)  A1C with Estimated Average Glucose Result: 7.9 % (11-29-24 @ 10:30)    Sedimentation Rate, Erythrocyte: 50 mm/Hr (03-27-25 @ 05:39)      RADIOLOGY:  imaging below personally reviewed    < from: Xray Shoulder 2 Views, Left (03.26.25 @ 22:58) >  Findings/  impression:    No evidence of acute fracture or dislocation.    Degenerative osseous changes.    < end of copied text >    < from: VA Duplex Lower Ext Vein Scan, Bilat (03.26.25 @ 23:42) >  Impression:    No evidence of deep venous thrombosis in the bilateral lower extremities.    < end of copied text >

## 2025-03-27 NOTE — PHYSICAL THERAPY INITIAL EVALUATION ADULT - GENERAL OBSERVATIONS, REHAB EVAL
11:45 - 12:20 Chart reviewed. Order received.  Patient is ok to be  seen for PT,confirmed with RN. pt encountered  Semi marks In bed , BLE  pain, and agrees to participate in session, +  Heplock , + Primafit ,NAD.

## 2025-03-27 NOTE — PATIENT PROFILE ADULT - FUNCTIONAL ASSESSMENT - BASIC MOBILITY 6.
2-calculated by average/Not able to assess (calculate score using [facility] averaging method)

## 2025-03-27 NOTE — PATIENT PROFILE ADULT - FALL HARM RISK - HARM RISK INTERVENTIONS

## 2025-03-28 LAB
ALBUMIN SERPL ELPH-MCNC: 3.9 G/DL — SIGNIFICANT CHANGE UP (ref 3.5–5.2)
ALP SERPL-CCNC: 109 U/L — SIGNIFICANT CHANGE UP (ref 30–115)
ALT FLD-CCNC: 5 U/L — SIGNIFICANT CHANGE UP (ref 0–41)
ANION GAP SERPL CALC-SCNC: 11 MMOL/L — SIGNIFICANT CHANGE UP (ref 7–14)
AST SERPL-CCNC: 33 U/L — SIGNIFICANT CHANGE UP (ref 0–41)
BASOPHILS # BLD AUTO: 0.04 K/UL — SIGNIFICANT CHANGE UP (ref 0–0.2)
BASOPHILS NFR BLD AUTO: 0.7 % — SIGNIFICANT CHANGE UP (ref 0–1)
BILIRUB SERPL-MCNC: 0.3 MG/DL — SIGNIFICANT CHANGE UP (ref 0.2–1.2)
BUN SERPL-MCNC: 13 MG/DL — SIGNIFICANT CHANGE UP (ref 10–20)
CALCIUM SERPL-MCNC: 9 MG/DL — SIGNIFICANT CHANGE UP (ref 8.4–10.5)
CHLORIDE SERPL-SCNC: 94 MMOL/L — LOW (ref 98–110)
CK SERPL-CCNC: 897 U/L — HIGH (ref 0–225)
CO2 SERPL-SCNC: 29 MMOL/L — SIGNIFICANT CHANGE UP (ref 17–32)
CREAT SERPL-MCNC: 1.1 MG/DL — SIGNIFICANT CHANGE UP (ref 0.7–1.5)
EGFR: 60 ML/MIN/1.73M2 — SIGNIFICANT CHANGE UP
EGFR: 60 ML/MIN/1.73M2 — SIGNIFICANT CHANGE UP
EOSINOPHIL # BLD AUTO: 0.14 K/UL — SIGNIFICANT CHANGE UP (ref 0–0.7)
EOSINOPHIL NFR BLD AUTO: 2.5 % — SIGNIFICANT CHANGE UP (ref 0–8)
GLUCOSE BLDC GLUCOMTR-MCNC: 159 MG/DL — HIGH (ref 70–99)
GLUCOSE BLDC GLUCOMTR-MCNC: 248 MG/DL — HIGH (ref 70–99)
GLUCOSE BLDC GLUCOMTR-MCNC: 89 MG/DL — SIGNIFICANT CHANGE UP (ref 70–99)
GLUCOSE SERPL-MCNC: 293 MG/DL — HIGH (ref 70–99)
HCT VFR BLD CALC: 33.7 % — LOW (ref 37–47)
HGB BLD-MCNC: 10.5 G/DL — LOW (ref 12–16)
IMM GRANULOCYTES NFR BLD AUTO: 0.4 % — HIGH (ref 0.1–0.3)
LYMPHOCYTES # BLD AUTO: 1.51 K/UL — SIGNIFICANT CHANGE UP (ref 1.2–3.4)
LYMPHOCYTES # BLD AUTO: 26.7 % — SIGNIFICANT CHANGE UP (ref 20.5–51.1)
MAGNESIUM SERPL-MCNC: 1.9 MG/DL — SIGNIFICANT CHANGE UP (ref 1.8–2.4)
MCHC RBC-ENTMCNC: 25.7 PG — LOW (ref 27–31)
MCHC RBC-ENTMCNC: 31.2 G/DL — LOW (ref 32–37)
MCV RBC AUTO: 82.6 FL — SIGNIFICANT CHANGE UP (ref 81–99)
MONOCYTES # BLD AUTO: 0.33 K/UL — SIGNIFICANT CHANGE UP (ref 0.1–0.6)
MONOCYTES NFR BLD AUTO: 5.8 % — SIGNIFICANT CHANGE UP (ref 1.7–9.3)
NEUTROPHILS # BLD AUTO: 3.61 K/UL — SIGNIFICANT CHANGE UP (ref 1.4–6.5)
NEUTROPHILS NFR BLD AUTO: 63.9 % — SIGNIFICANT CHANGE UP (ref 42.2–75.2)
NRBC BLD AUTO-RTO: 0 /100 WBCS — SIGNIFICANT CHANGE UP (ref 0–0)
PLATELET # BLD AUTO: 220 K/UL — SIGNIFICANT CHANGE UP (ref 130–400)
PMV BLD: 10.1 FL — SIGNIFICANT CHANGE UP (ref 7.4–10.4)
POTASSIUM SERPL-MCNC: 4 MMOL/L — SIGNIFICANT CHANGE UP (ref 3.5–5)
POTASSIUM SERPL-SCNC: 4 MMOL/L — SIGNIFICANT CHANGE UP (ref 3.5–5)
PROT SERPL-MCNC: 7.6 G/DL — SIGNIFICANT CHANGE UP (ref 6–8)
RBC # BLD: 4.08 M/UL — LOW (ref 4.2–5.4)
RBC # FLD: 16.2 % — HIGH (ref 11.5–14.5)
SODIUM SERPL-SCNC: 134 MMOL/L — LOW (ref 135–146)
VANCOMYCIN TROUGH SERPL-MCNC: 12.2 UG/ML — HIGH (ref 5–10)
WBC # BLD: 5.65 K/UL — SIGNIFICANT CHANGE UP (ref 4.8–10.8)
WBC # FLD AUTO: 5.65 K/UL — SIGNIFICANT CHANGE UP (ref 4.8–10.8)

## 2025-03-28 PROCEDURE — 73080 X-RAY EXAM OF ELBOW: CPT | Mod: 26,LT

## 2025-03-28 PROCEDURE — 36410 VNPNXR 3YR/> PHY/QHP DX/THER: CPT

## 2025-03-28 PROCEDURE — 99233 SBSQ HOSP IP/OBS HIGH 50: CPT

## 2025-03-28 RX ORDER — INSULIN GLARGINE-YFGN 100 [IU]/ML
25 INJECTION, SOLUTION SUBCUTANEOUS AT BEDTIME
Refills: 0 | Status: DISCONTINUED | OUTPATIENT
Start: 2025-03-28 | End: 2025-03-29

## 2025-03-28 RX ORDER — FUROSEMIDE 10 MG/ML
40 INJECTION INTRAMUSCULAR; INTRAVENOUS DAILY
Refills: 0 | Status: DISCONTINUED | OUTPATIENT
Start: 2025-03-29 | End: 2025-04-03

## 2025-03-28 RX ADMIN — HEPARIN SODIUM 5000 UNIT(S): 1000 INJECTION INTRAVENOUS; SUBCUTANEOUS at 05:52

## 2025-03-28 RX ADMIN — INSULIN LISPRO 5 UNIT(S): 100 INJECTION, SOLUTION INTRAVENOUS; SUBCUTANEOUS at 17:07

## 2025-03-28 RX ADMIN — INSULIN LISPRO 5 UNIT(S): 100 INJECTION, SOLUTION INTRAVENOUS; SUBCUTANEOUS at 08:03

## 2025-03-28 RX ADMIN — INSULIN LISPRO 2: 100 INJECTION, SOLUTION INTRAVENOUS; SUBCUTANEOUS at 12:19

## 2025-03-28 RX ADMIN — FUROSEMIDE 40 MILLIGRAM(S): 10 INJECTION INTRAMUSCULAR; INTRAVENOUS at 05:52

## 2025-03-28 RX ADMIN — INSULIN GLARGINE-YFGN 25 UNIT(S): 100 INJECTION, SOLUTION SUBCUTANEOUS at 22:01

## 2025-03-28 RX ADMIN — Medication 1 APPLICATION(S): at 05:52

## 2025-03-28 RX ADMIN — INSULIN LISPRO 5 UNIT(S): 100 INJECTION, SOLUTION INTRAVENOUS; SUBCUTANEOUS at 12:18

## 2025-03-28 RX ADMIN — PREGABALIN 50 MILLIGRAM(S): 75 CAPSULE ORAL at 05:52

## 2025-03-28 RX ADMIN — PREGABALIN 50 MILLIGRAM(S): 75 CAPSULE ORAL at 17:07

## 2025-03-28 RX ADMIN — Medication 175 MICROGRAM(S): at 05:52

## 2025-03-28 RX ADMIN — Medication 81 MILLIGRAM(S): at 12:18

## 2025-03-28 RX ADMIN — ATORVASTATIN CALCIUM 20 MILLIGRAM(S): 80 TABLET, FILM COATED ORAL at 21:34

## 2025-03-28 RX ADMIN — INSULIN LISPRO 4: 100 INJECTION, SOLUTION INTRAVENOUS; SUBCUTANEOUS at 08:03

## 2025-03-28 RX ADMIN — Medication 250 MILLIGRAM(S): at 05:51

## 2025-03-28 RX ADMIN — HEPARIN SODIUM 5000 UNIT(S): 1000 INJECTION INTRAVENOUS; SUBCUTANEOUS at 17:07

## 2025-03-28 RX ADMIN — BUPRENORPHINE HYDROCHLORIDE, NALOXONE HYDROCHLORIDE 2.5 TABLET(S): 4; 1 FILM, SOLUBLE BUCCAL; SUBLINGUAL at 12:18

## 2025-03-28 RX ADMIN — Medication 40 MILLIGRAM(S): at 05:52

## 2025-03-28 NOTE — CONSULT NOTE ADULT - SUBJECTIVE AND OBJECTIVE BOX
Orthopaedic Surgery Consult Note    HPI:  54yFemale  with past medical history of DM, COPD not on home O2, opioid dependence on Suboxone, asthma, thyroidism, chronic venous stasis with chronic lymphedema of lower extremities, admitted to the hospital for worsening cellulitis of B/L LE. Orthopedics consulted for severe olecranon bursitis. Per patient she has had bursitis prior and was drained in jan by ortho in the hospital. Since then she admits to not wearing compressive bandages on the elbow and thus it has re-accumulated but this is the largest it has ever been she says. She also states she fell yesterday but she did not land on her left elbow. She has no pain in the left arm or elbow just hard for her to use the left arm due to the severity of the bursitis.       Allergies  No Known Allergies    Intolerances      PAST MEDICAL & SURGICAL HISTORY:  Asthma with COPD  Hypothyroidism  H/O: substance abuse  no longer using  History of pancreatitis  Hepatitis-C  Leukocytoclastic vasculitis  Bilateral edema of lower extremity  HLD (hyperlipidemia)  Type 2 diabetes mellitus  History of appendectomy  History of tonsillectomy      MEDICATIONS  (STANDING):  aspirin enteric coated 81 milliGRAM(s) Oral daily  atorvastatin 20 milliGRAM(s) Oral at bedtime  buprenorphine 8 mG/naloxone 2 mG SL  Tablet 2.5 Tablet(s) SubLingual daily  chlorhexidine 2% Cloths 1 Application(s) Topical <User Schedule>  dextrose 5%. 1000 milliLiter(s) (100 mL/Hr) IV Continuous <Continuous>  dextrose 5%. 1000 milliLiter(s) (50 mL/Hr) IV Continuous <Continuous>  dextrose 50% Injectable 25 Gram(s) IV Push once  dextrose 50% Injectable 12.5 Gram(s) IV Push once  dextrose 50% Injectable 25 Gram(s) IV Push once  glucagon  Injectable 1 milliGRAM(s) IntraMuscular once  heparin   Injectable 5000 Unit(s) SubCutaneous every 12 hours  insulin glargine Injectable (LANTUS) 25 Unit(s) SubCutaneous at bedtime  insulin lispro (ADMELOG) corrective regimen sliding scale   SubCutaneous three times a day before meals  insulin lispro Injectable (ADMELOG) 5 Unit(s) SubCutaneous three times a day before meals  levothyroxine 175 MICROGram(s) Oral daily  lidocaine 1% (Preservative-free) Injectable 10 milliLiter(s) Local Injection once  pantoprazole    Tablet 40 milliGRAM(s) Oral before breakfast  pregabalin 50 milliGRAM(s) Oral every 12 hours  vancomycin  IVPB 1000 milliGRAM(s) IV Intermittent every 12 hours    MEDICATIONS  (PRN):  acetaminophen     Tablet .. 650 milliGRAM(s) Oral every 6 hours PRN Temp greater or equal to 38C (100.4F), Mild Pain (1 - 3)  albuterol    90 MICROgram(s) HFA Inhaler 1 Puff(s) Inhalation every 6 hours PRN Shortness of Breath  aluminum hydroxide/magnesium hydroxide/simethicone Suspension 30 milliLiter(s) Oral every 4 hours PRN Dyspepsia  dextrose Oral Gel 15 Gram(s) Oral once PRN Blood Glucose LESS THAN 70 milliGRAM(s)/deciliter  melatonin 3 milliGRAM(s) Oral at bedtime PRN Insomnia  morphine  - Injectable 2 milliGRAM(s) IV Push every 6 hours PRN Severe Pain (7 - 10)  ondansetron Injectable 4 milliGRAM(s) IV Push every 8 hours PRN Nausea and/or Vomiting      Vital Signs Last 24 Hrs  T(C): 36.4 (28 Mar 2025 14:28), Max: 36.6 (27 Mar 2025 20:35)  T(F): 97.6 (28 Mar 2025 14:28), Max: 97.8 (27 Mar 2025 20:35)  HR: 67 (28 Mar 2025 14:28) (66 - 79)  BP: 121/56 (28 Mar 2025 14:28) (119/77 - 124/80)  BP(mean): --  RR: 18 (28 Mar 2025 05:10) (18 - 18)  SpO2: 94% (28 Mar 2025 14:28) (94% - 97%)    Parameters below as of 28 Mar 2025 14:28  Patient On (Oxygen Delivery Method): room air      Physical Exam:  Patient laying in bed in NAD    Left Elbow:  Large boggy olecranon bursitis  no ttp  no open wounds  no erythema no ecchymosis  FROM elbow with no pain   SILT Ax/LABCN/R/M/U  AIN/PIN/Ulnar intact; Firing deltoid/biceps/triceps/wf/we/epl/fpl/fdp/intrinsics  Hand WWP, 2+ radial pulse                            10.5   5.65  )-----------( 220      ( 28 Mar 2025 07:40 )             33.7     03-28    134[L]  |  94[L]  |  13  ----------------------------<  293[H]  4.0   |  29  |  1.1    Ca    9.0      28 Mar 2025 07:40  Mg     1.9     03-28    TPro  7.6  /  Alb  3.9  /  TBili  0.3  /  DBili  x   /  AST  33  /  ALT  5   /  AlkPhos  109  03-28    PT/INR - ( 26 Mar 2025 22:42 )   PT: 10.80 sec;   INR: 0.92 ratio         PTT - ( 26 Mar 2025 22:42 )  PTT:37.2 sec      Imaging:   left elbow xray ordered    A/P: 54yFemale with chronic left olecranon bursitis  -left elbow prepped with betadine and under sterile technique 70cc of bloody fluid aspirated- does not appear infectious at all   compression bandage applied  instructed patient to keep ACE bandage compression for at least 4 weeks- can be changed PRN   -follow up left elbow xray

## 2025-03-28 NOTE — PROGRESS NOTE ADULT - SUBJECTIVE AND OBJECTIVE BOX
SUBJECTIVE:    Patient is a 54y old Female who presents with a chief complaint of LE swelling (27 Mar 2025 12:13)    Currently admitted to medicine with the primary diagnosis of Cellulitis       Today is hospital day 2d. This morning she is resting comfortably in bed         PAST MEDICAL & SURGICAL HISTORY  Asthma with COPD    Hypothyroidism    H/O: substance abuse  no longer using    History of pancreatitis    Hepatitis-C    Leukocytoclastic vasculitis    Bilateral edema of lower extremity    HLD (hyperlipidemia)    Type 2 diabetes mellitus    History of appendectomy    History of tonsillectomy      SOCIAL HISTORY:    ALLERGIES:  No Known Allergies    MEDICATIONS:  STANDING MEDICATIONS  aspirin enteric coated 81 milliGRAM(s) Oral daily  atorvastatin 20 milliGRAM(s) Oral at bedtime  buprenorphine 8 mG/naloxone 2 mG SL  Tablet 2.5 Tablet(s) SubLingual daily  chlorhexidine 2% Cloths 1 Application(s) Topical <User Schedule>  dextrose 5%. 1000 milliLiter(s) IV Continuous <Continuous>  dextrose 5%. 1000 milliLiter(s) IV Continuous <Continuous>  dextrose 50% Injectable 25 Gram(s) IV Push once  dextrose 50% Injectable 12.5 Gram(s) IV Push once  dextrose 50% Injectable 25 Gram(s) IV Push once  furosemide   Injectable 40 milliGRAM(s) IV Push daily  glucagon  Injectable 1 milliGRAM(s) IntraMuscular once  heparin   Injectable 5000 Unit(s) SubCutaneous every 12 hours  insulin glargine Injectable (LANTUS) 20 Unit(s) SubCutaneous at bedtime  insulin lispro (ADMELOG) corrective regimen sliding scale   SubCutaneous three times a day before meals  insulin lispro Injectable (ADMELOG) 5 Unit(s) SubCutaneous three times a day before meals  levothyroxine 175 MICROGram(s) Oral daily  lidocaine 1% (Preservative-free) Injectable 10 milliLiter(s) Local Injection once  pantoprazole    Tablet 40 milliGRAM(s) Oral before breakfast  pregabalin 50 milliGRAM(s) Oral every 12 hours  vancomycin  IVPB 1000 milliGRAM(s) IV Intermittent every 12 hours    PRN MEDICATIONS  acetaminophen     Tablet .. 650 milliGRAM(s) Oral every 6 hours PRN  albuterol    90 MICROgram(s) HFA Inhaler 1 Puff(s) Inhalation every 6 hours PRN  aluminum hydroxide/magnesium hydroxide/simethicone Suspension 30 milliLiter(s) Oral every 4 hours PRN  dextrose Oral Gel 15 Gram(s) Oral once PRN  melatonin 3 milliGRAM(s) Oral at bedtime PRN  morphine  - Injectable 2 milliGRAM(s) IV Push every 6 hours PRN  ondansetron Injectable 4 milliGRAM(s) IV Push every 8 hours PRN    VITALS:   T(F): 97.6  HR: 67  BP: 121/56  RR: 18  SpO2: 94%    LABS:                          10.5   5.65  )-----------( 220      ( 28 Mar 2025 07:40 )             33.7     03-28    134[L]  |  94[L]  |  13  ----------------------------<  293[H]  4.0   |  29  |  1.1    Ca    9.0      28 Mar 2025 07:40  Mg     1.9     03-28    TPro  7.6  /  Alb  3.9  /  TBili  0.3  /  DBili  x   /  AST  33  /  ALT  5   /  AlkPhos  109  03-28    PT/INR - ( 26 Mar 2025 22:42 )   PT: 10.80 sec;   INR: 0.92 ratio         PTT - ( 26 Mar 2025 22:42 )  PTT:37.2 sec  Urinalysis Basic - ( 28 Mar 2025 07:40 )    Color: x / Appearance: x / SG: x / pH: x  Gluc: 293 mg/dL / Ketone: x  / Bili: x / Urobili: x   Blood: x / Protein: x / Nitrite: x   Leuk Esterase: x / RBC: x / WBC x   Sq Epi: x / Non Sq Epi: x / Bacteria: x                RADIOLOGY:    PHYSICAL EXAM:  GEN: No acute distress  HEENT: normocephalic, atraumatic, aniceteric  LUNGS: bl breath sounds   HEART: S1/S2 present. RRR, no murmurs  ABD: Soft, non-tender, non-distended. Bowel sounds present  EXT: right lower extremity minal non pitting with erythema and scabbing   NEURO: AAOX3, normal affect      ASSESSMENT AND PLAN:    54-year-old female with past medical history of DM, COPD not on home O2, opioid dependence on Suboxone, asthma, thyroidism, chronic venous stasis with chronic lymphedema of lower extremities presenting with RLE cellulitis     # RLE cellulitis   # H/O PVD   # H/O BL LE chronic lymphedema and  chronic venous stasis   # H/O leukocytoclastic vasculitis  # H/O Recurrent Cellulitis   - VA duplex bl LE : Impression: Moderate stenosis of the bilateral common femoral arteries (seen on prior study 12/2024)  - VA Duplex bl LE venous   -  , fu repeat   - cw ASA / Statin  ; op FU with Dr. Gioavny Garza   - cw home dose lasix 40 mg QD   - Prior provider discussed with rheum and surgery 3/27 : "Discussed with rheumatology Dr. Vargas as pt has hx of leg biopsy + for leukocytoclastic vasculitis last year. I sent her picture of legs and they do not appear that much different then last year, she believes that her main problem is chronic lymphedema and venous insufficiency as opposed to vasculitis and no need for repeat biopsy or treatment at this time. Discussed with surgery Dr. Nelson that no need for biopsy at this time, can f/u outpt with vascular and pt should have ace wraps and elevation while inpatient.  - ID EVAL -  IV vancomycin 1g q12hrs; dosing per pharmacy protocol   - ACE Wraps;  wound care eval     # Fall - trauma work up negative     # H/O L Elbow bursitis - ortho eval (prior admissions did not tap)     # H/O DM2- Uncontrolled -FS AC QHS, CCHO diet,  insulin inpatient  ; HbA1c 12.7 - endocrinology eval     #H/O Opioid dependence -on  suboxone     dvt/ gi ppx/diet  dispo: acute

## 2025-03-28 NOTE — PROGRESS NOTE ADULT - SUBJECTIVE AND OBJECTIVE BOX
INFECTIOUS DISEASE FOLLOW UP NOTE:    Interval History/ROS: Patient is a 54y old  Female who presents with a chief complaint of LE swelling (28 Mar 2025 17:47)    Overnight events: No overnight event. Right leg is less swollen today    REVIEW OF SYSTEMS:  CONSTITUTIONAL: No fever or chills  HEAD: No lesion on scalp  EYES: No visual disturbance  ENT: No sore throat  RESPIRATORY: No cough, no shortness of breath  CARDIOVASCULAR: No chest pain or palpitations  GASTROINTESTINAL: No abdominal or epigastric pain  GENITOURINARY: No dysuria  NEUROLOGICAL: No headache/dizziness  MUSCULOSKELETAL: Left elbow bursitis  SKIN: Bilateral LE erythema, R > L;   All other ROS negative except noted above    Prior hospital charts reviewed [Yes]  Primary team notes reviewed [Yes]  Other consultant notes reviewed [Yes]    Allergies:  No Known Allergies      ANTIMICROBIALS:   vancomycin  IVPB 1000 every 12 hours      OTHER MEDS: MEDICATIONS  (STANDING):  acetaminophen     Tablet .. 650 every 6 hours PRN  albuterol    90 MICROgram(s) HFA Inhaler 1 every 6 hours PRN  aluminum hydroxide/magnesium hydroxide/simethicone Suspension 30 every 4 hours PRN  aspirin enteric coated 81 daily  atorvastatin 20 at bedtime  buprenorphine 8 mG/naloxone 2 mG SL  Tablet 2.5 daily  dextrose 50% Injectable 25 once  dextrose 50% Injectable 12.5 once  dextrose 50% Injectable 25 once  dextrose Oral Gel 15 once PRN  glucagon  Injectable 1 once  heparin   Injectable 5000 every 12 hours  insulin glargine Injectable (LANTUS) 25 at bedtime  insulin lispro (ADMELOG) corrective regimen sliding scale  three times a day before meals  insulin lispro Injectable (ADMELOG) 5 three times a day before meals  levothyroxine 175 daily  melatonin 3 at bedtime PRN  morphine  - Injectable 2 every 6 hours PRN  ondansetron Injectable 4 every 8 hours PRN  pantoprazole    Tablet 40 before breakfast  pregabalin 50 every 12 hours      Vital Signs Last 24 Hrs  T(F): 97.5 (03-28-25 @ 20:40), Max: 98 (03-27-25 @ 14:02)    Vital Signs Last 24 Hrs  HR: 69 (03-28-25 @ 20:40) (66 - 69)  BP: 108/62 (03-28-25 @ 20:40) (108/62 - 121/56)  RR: 18 (03-28-25 @ 20:40)  SpO2: 100% (03-28-25 @ 20:40) (94% - 100%)  Wt(kg): --    EXAM:  GENERAL: NAD, lying in bed  HEAD: No head lesions  EYES: Conjunctiva pink and cornea white  EAR, NOSE, MOUTH, THROAT: Normal external ears and nose, no discharges; moist mucous membranes  NECK: Supple, nontender to palpation; no JVD  RESPIRATORY: Clear to auscultation bilaterally  CARDIOVASCULAR: S1 S2  GASTROINTESTINAL: Soft, nontender, nondistended; normoactive bowel sounds  GENITOURINARY: No adn catheter, no CVA tenderness  EXTREMITIES: No clubbing, cyanosis, or petal edema  NERVOUS SYSTEM: Alert and oriented to person, time, place and situation, speech clear. No focal deficits   MUSCULOSKELETAL: Large left elbow bursitis  SKIN: Bilateral LE erythema with wounds, worse on R, no superficial thrombophlebitis  PSYCH: Normal affect    Labs:                        10.5   5.65  )-----------( 220      ( 28 Mar 2025 07:40 )             33.7     03-28    134[L]  |  94[L]  |  13  ----------------------------<  293[H]  4.0   |  29  |  1.1    Ca    9.0      28 Mar 2025 07:40  Mg     1.9     03-28    TPro  7.6  /  Alb  3.9  /  TBili  0.3  /  DBili  x   /  AST  33  /  ALT  5   /  AlkPhos  109  03-28      WBC Trend:  WBC Count: 5.65 (03-28-25 @ 07:40)  WBC Count: 7.29 (03-27-25 @ 05:39)  WBC Count: 5.62 (03-26-25 @ 22:42)      Creatine Trend:  Creatinine: 1.1 (03-28)  Creatinine: 1.0 (03-27)  Creatinine: 1.0 (03-26)      Liver Biochemical Testing Trend:  Alanine Aminotransferase (ALT/SGPT): 5 (03-28)  Alanine Aminotransferase (ALT/SGPT): 7 (03-27)  Alanine Aminotransferase (ALT/SGPT): 5 (03-26)  Alanine Aminotransferase (ALT/SGPT): <5 (02-14)  Alanine Aminotransferase (ALT/SGPT): 5 (02-13)  Aspartate Aminotransferase (AST/SGOT): 33 (03-28-25 @ 07:40)  Aspartate Aminotransferase (AST/SGOT): 25 (03-27-25 @ 05:39)  Aspartate Aminotransferase (AST/SGOT): 23 (03-26-25 @ 22:42)  Aspartate Aminotransferase (AST/SGOT): 28 (02-14-25 @ 07:17)  Aspartate Aminotransferase (AST/SGOT): 26 (02-13-25 @ 04:30)  Bilirubin Total: 0.3 (03-28)  Bilirubin Total: 0.3 (03-27)  Bilirubin Total: 0.3 (03-26)  Bilirubin Total: 0.2 (02-14)  Bilirubin Total: 0.3 (02-13)      Trend LDH  01-10-24 @ 06:55  230      Urinalysis Basic - ( 28 Mar 2025 07:40 )    Color: x / Appearance: x / SG: x / pH: x  Gluc: 293 mg/dL / Ketone: x  / Bili: x / Urobili: x   Blood: x / Protein: x / Nitrite: x   Leuk Esterase: x / RBC: x / WBC x   Sq Epi: x / Non Sq Epi: x / Bacteria: x        MICROBIOLOGY:  Vancomycin Level, Trough: 12.2 (03-28 @ 07:40)    MRSA PCR Result.: Negative (05-04-24 @ 07:00)      Culture - Blood (collected 10 Feb 2025 20:00)  Source: .Blood BLOOD  Final Report:    No growth at 5 days    Culture - Blood (collected 10 Feb 2025 20:00)  Source: .Blood BLOOD  Final Report:    No growth at 5 days    Culture - Blood (collected 28 Dec 2024 17:10)  Source: .Blood BLOOD  Final Report:    No growth at 5 days    Culture - Blood (collected 27 Dec 2024 16:00)  Source: .Blood BLOOD  Final Report:    No growth at 5 days    Culture - Blood (collected 27 Dec 2024 16:00)  Source: .Blood BLOOD  Final Report:    No growth at 5 days    Urinalysis with Rflx Culture (collected 29 Nov 2024 00:13)    Culture - Blood (collected 28 Nov 2024 22:04)  Source: .Blood BLOOD  Final Report:    No growth at 5 days    Culture - Blood (collected 28 Nov 2024 22:04)  Source: .Blood BLOOD  Final Report:    No growth at 5 days    Urinalysis with Rflx Culture (collected 14 Nov 2024 18:42)    Culture - Blood (collected 27 May 2024 21:10)  Source: .Blood Blood  Final Report:    No growth at 5 days      HIV-1/2 Combo Result: Nonreact (12-31-24 @ 08:00)  HIV-1/2 Combo Result: Nonreact (01-11-24 @ 13:40)    C-Reactive Protein: 26.4 (03-27)      RADIOLOGY:  imaging below personally reviewed    < from: [facility] Duplex Lower Extrem Arterial, Bilat (03.27.25 @ 13:15) >  Impression:    Moderate stenosis of the bilateral common femoral arteries      < end of copied text >    < from: [facility] Duplex Lower Ext Vein Scan, Bilat (03.26.25 @ 23:42) >  Impression:    No evidence of deep venous thrombosis in the bilateral lower extremities.    < end of copied text >

## 2025-03-29 LAB
ANION GAP SERPL CALC-SCNC: 14 MMOL/L — SIGNIFICANT CHANGE UP (ref 7–14)
BUN SERPL-MCNC: 13 MG/DL — SIGNIFICANT CHANGE UP (ref 10–20)
CALCIUM SERPL-MCNC: 9.4 MG/DL — SIGNIFICANT CHANGE UP (ref 8.4–10.5)
CHLORIDE SERPL-SCNC: 93 MMOL/L — LOW (ref 98–110)
CK SERPL-CCNC: 1095 U/L — HIGH (ref 0–225)
CO2 SERPL-SCNC: 29 MMOL/L — SIGNIFICANT CHANGE UP (ref 17–32)
CREAT SERPL-MCNC: 1.1 MG/DL — SIGNIFICANT CHANGE UP (ref 0.7–1.5)
EGFR: 60 ML/MIN/1.73M2 — SIGNIFICANT CHANGE UP
EGFR: 60 ML/MIN/1.73M2 — SIGNIFICANT CHANGE UP
GLUCOSE BLDC GLUCOMTR-MCNC: 145 MG/DL — HIGH (ref 70–99)
GLUCOSE BLDC GLUCOMTR-MCNC: 265 MG/DL — HIGH (ref 70–99)
GLUCOSE BLDC GLUCOMTR-MCNC: 334 MG/DL — HIGH (ref 70–99)
GLUCOSE BLDC GLUCOMTR-MCNC: 340 MG/DL — HIGH (ref 70–99)
GLUCOSE SERPL-MCNC: 136 MG/DL — HIGH (ref 70–99)
HCT VFR BLD CALC: 34.2 % — LOW (ref 37–47)
HGB BLD-MCNC: 10.8 G/DL — LOW (ref 12–16)
MCHC RBC-ENTMCNC: 25.8 PG — LOW (ref 27–31)
MCHC RBC-ENTMCNC: 31.6 G/DL — LOW (ref 32–37)
MCV RBC AUTO: 81.8 FL — SIGNIFICANT CHANGE UP (ref 81–99)
NRBC BLD AUTO-RTO: 0 /100 WBCS — SIGNIFICANT CHANGE UP (ref 0–0)
PLATELET # BLD AUTO: 273 K/UL — SIGNIFICANT CHANGE UP (ref 130–400)
PMV BLD: 10.3 FL — SIGNIFICANT CHANGE UP (ref 7.4–10.4)
POTASSIUM SERPL-MCNC: 3.9 MMOL/L — SIGNIFICANT CHANGE UP (ref 3.5–5)
POTASSIUM SERPL-SCNC: 3.9 MMOL/L — SIGNIFICANT CHANGE UP (ref 3.5–5)
RBC # BLD: 4.18 M/UL — LOW (ref 4.2–5.4)
RBC # FLD: 16.5 % — HIGH (ref 11.5–14.5)
SODIUM SERPL-SCNC: 136 MMOL/L — SIGNIFICANT CHANGE UP (ref 135–146)
VANCOMYCIN TROUGH SERPL-MCNC: 27.1 UG/ML — HIGH (ref 5–10)
VANCOMYCIN TROUGH SERPL-MCNC: 5.5 UG/ML — SIGNIFICANT CHANGE UP (ref 5–10)
WBC # BLD: 6.36 K/UL — SIGNIFICANT CHANGE UP (ref 4.8–10.8)
WBC # FLD AUTO: 6.36 K/UL — SIGNIFICANT CHANGE UP (ref 4.8–10.8)

## 2025-03-29 PROCEDURE — 99233 SBSQ HOSP IP/OBS HIGH 50: CPT

## 2025-03-29 RX ORDER — INSULIN GLARGINE-YFGN 100 [IU]/ML
20 INJECTION, SOLUTION SUBCUTANEOUS AT BEDTIME
Refills: 0 | Status: DISCONTINUED | OUTPATIENT
Start: 2025-03-29 | End: 2025-04-03

## 2025-03-29 RX ORDER — INSULIN LISPRO 100 U/ML
6 INJECTION, SOLUTION INTRAVENOUS; SUBCUTANEOUS ONCE
Refills: 0 | Status: COMPLETED | OUTPATIENT
Start: 2025-03-29 | End: 2025-03-29

## 2025-03-29 RX ADMIN — HEPARIN SODIUM 5000 UNIT(S): 1000 INJECTION INTRAVENOUS; SUBCUTANEOUS at 06:07

## 2025-03-29 RX ADMIN — INSULIN LISPRO 5 UNIT(S): 100 INJECTION, SOLUTION INTRAVENOUS; SUBCUTANEOUS at 16:24

## 2025-03-29 RX ADMIN — INSULIN LISPRO 5 UNIT(S): 100 INJECTION, SOLUTION INTRAVENOUS; SUBCUTANEOUS at 07:42

## 2025-03-29 RX ADMIN — Medication 1 APPLICATION(S): at 06:07

## 2025-03-29 RX ADMIN — Medication 81 MILLIGRAM(S): at 10:54

## 2025-03-29 RX ADMIN — Medication 175 MICROGRAM(S): at 06:07

## 2025-03-29 RX ADMIN — Medication 250 MILLIGRAM(S): at 16:04

## 2025-03-29 RX ADMIN — Medication 250 MILLIGRAM(S): at 06:07

## 2025-03-29 RX ADMIN — INSULIN GLARGINE-YFGN 20 UNIT(S): 100 INJECTION, SOLUTION SUBCUTANEOUS at 21:10

## 2025-03-29 RX ADMIN — Medication 2 MILLIGRAM(S): at 07:38

## 2025-03-29 RX ADMIN — PREGABALIN 50 MILLIGRAM(S): 75 CAPSULE ORAL at 16:01

## 2025-03-29 RX ADMIN — INSULIN LISPRO 4: 100 INJECTION, SOLUTION INTRAVENOUS; SUBCUTANEOUS at 07:38

## 2025-03-29 RX ADMIN — ATORVASTATIN CALCIUM 20 MILLIGRAM(S): 80 TABLET, FILM COATED ORAL at 21:04

## 2025-03-29 RX ADMIN — INSULIN LISPRO 6 UNIT(S): 100 INJECTION, SOLUTION INTRAVENOUS; SUBCUTANEOUS at 21:10

## 2025-03-29 RX ADMIN — PREGABALIN 50 MILLIGRAM(S): 75 CAPSULE ORAL at 06:07

## 2025-03-29 RX ADMIN — Medication 75 MILLILITER(S): at 21:05

## 2025-03-29 RX ADMIN — Medication 40 MILLIGRAM(S): at 06:07

## 2025-03-29 RX ADMIN — HEPARIN SODIUM 5000 UNIT(S): 1000 INJECTION INTRAVENOUS; SUBCUTANEOUS at 16:01

## 2025-03-29 RX ADMIN — FUROSEMIDE 40 MILLIGRAM(S): 10 INJECTION INTRAMUSCULAR; INTRAVENOUS at 06:07

## 2025-03-29 RX ADMIN — INSULIN LISPRO 3: 100 INJECTION, SOLUTION INTRAVENOUS; SUBCUTANEOUS at 16:23

## 2025-03-29 RX ADMIN — BUPRENORPHINE HYDROCHLORIDE, NALOXONE HYDROCHLORIDE 2.5 TABLET(S): 4; 1 FILM, SOLUBLE BUCCAL; SUBLINGUAL at 10:53

## 2025-03-29 NOTE — PROGRESS NOTE ADULT - SUBJECTIVE AND OBJECTIVE BOX
SUBJECTIVE:    Patient is a 54y old Female who presents with a chief complaint of LE swelling (28 Mar 2025 17:47)    Currently admitted to medicine with the primary diagnosis of Cellulitis       Today is hospital day 3d. This morning she is resting comfortably in bed and reports no new issues or overnight events.           PAST MEDICAL & SURGICAL HISTORY  Asthma with COPD    Hypothyroidism    H/O: substance abuse  no longer using    History of pancreatitis    Hepatitis-C    Leukocytoclastic vasculitis    Bilateral edema of lower extremity    HLD (hyperlipidemia)    Type 2 diabetes mellitus    History of appendectomy    History of tonsillectomy      SOCIAL HISTORY:    ALLERGIES:  No Known Allergies    MEDICATIONS:  STANDING MEDICATIONS  aspirin enteric coated 81 milliGRAM(s) Oral daily  atorvastatin 20 milliGRAM(s) Oral at bedtime  buprenorphine 8 mG/naloxone 2 mG SL  Tablet 2.5 Tablet(s) SubLingual daily  chlorhexidine 2% Cloths 1 Application(s) Topical <User Schedule>  dextrose 5%. 1000 milliLiter(s) IV Continuous <Continuous>  dextrose 5%. 1000 milliLiter(s) IV Continuous <Continuous>  dextrose 50% Injectable 25 Gram(s) IV Push once  dextrose 50% Injectable 12.5 Gram(s) IV Push once  dextrose 50% Injectable 25 Gram(s) IV Push once  furosemide    Tablet 40 milliGRAM(s) Oral daily  glucagon  Injectable 1 milliGRAM(s) IntraMuscular once  heparin   Injectable 5000 Unit(s) SubCutaneous every 12 hours  insulin glargine Injectable (LANTUS) 25 Unit(s) SubCutaneous at bedtime  insulin lispro (ADMELOG) corrective regimen sliding scale   SubCutaneous three times a day before meals  insulin lispro Injectable (ADMELOG) 5 Unit(s) SubCutaneous three times a day before meals  levothyroxine 175 MICROGram(s) Oral daily  lidocaine 1% (Preservative-free) Injectable 10 milliLiter(s) Local Injection once  pantoprazole    Tablet 40 milliGRAM(s) Oral before breakfast  pregabalin 50 milliGRAM(s) Oral every 12 hours  vancomycin  IVPB 1000 milliGRAM(s) IV Intermittent every 12 hours    PRN MEDICATIONS  acetaminophen     Tablet .. 650 milliGRAM(s) Oral every 6 hours PRN  albuterol    90 MICROgram(s) HFA Inhaler 1 Puff(s) Inhalation every 6 hours PRN  aluminum hydroxide/magnesium hydroxide/simethicone Suspension 30 milliLiter(s) Oral every 4 hours PRN  dextrose Oral Gel 15 Gram(s) Oral once PRN  melatonin 3 milliGRAM(s) Oral at bedtime PRN  morphine  - Injectable 2 milliGRAM(s) IV Push every 6 hours PRN  ondansetron Injectable 4 milliGRAM(s) IV Push every 8 hours PRN    VITALS:   T(F): 97.8  HR: 66  BP: 116/75  RR: 18  SpO2: 100%    LABS:  Negative for smoking/alcohol/drug use.                         10.5   5.65  )-----------( 220      ( 28 Mar 2025 07:40 )             33.7     03-28    134[L]  |  94[L]  |  13  ----------------------------<  293[H]  4.0   |  29  |  1.1    Ca    9.0      28 Mar 2025 07:40  Mg     1.9     03-28    TPro  7.6  /  Alb  3.9  /  TBili  0.3  /  DBili  x   /  AST  33  /  ALT  5   /  AlkPhos  109  03-28      Urinalysis Basic - ( 28 Mar 2025 07:40 )    Color: x / Appearance: x / SG: x / pH: x  Gluc: 293 mg/dL / Ketone: x  / Bili: x / Urobili: x   Blood: x / Protein: x / Nitrite: x   Leuk Esterase: x / RBC: x / WBC x   Sq Epi: x / Non Sq Epi: x / Bacteria: x    RADIOLOGY:    PHYSICAL EXAM:  GEN: No acute distress  HEENT: normocephalic, atraumatic, aniceteric  LUNGS: Clear to auscultation bilaterally, no rales/wheezing/ rhonchi  HEART: S1/S2 present. RRR, no murmurs  ABD: Soft, non-tender, non-distended. Bowel sounds present  EXT: RLE edema and erythema slightly improved   NEURO: AAOX3, normal affect      ASSESSMENT AND PLAN:    54-year-old female with past medical history of DM, COPD not on home O2, opioid dependence on Suboxone, asthma, thyroidism, chronic venous stasis with chronic lymphedema of lower extremities presenting with RLE cellulitis     # RLE cellulitis   # H/O PVD   # H/O BL LE chronic lymphedema and  chronic venous stasis   # H/O leukocytoclastic vasculitis  # H/O Recurrent Cellulitis   - [facility] duplex bl LE : Impression: Moderate stenosis of the bilateral common femoral arteries (seen on prior study 12/2024)  - [facility] Duplex bl LE venous   -  , fu repeat   - cw ASA / Statin  ; op FU with Dr. Giovany Garza   - cw home dose lasix 40 mg QD   - Prior provider discussed with rheumatology and surgery services 3/27 : "Discussed with rheumatology Dr. Vargas as pt has hx of leg biopsy + for leukocytoclastic vasculitis last year. I sent her picture of legs and they do not appear that much different then last year, she believes that her main problem is chronic lymphedema and venous insufficiency as opposed to vasculitis and no need for repeat biopsy or treatment at this time. Discussed with surgery Dr. Nelson that no need for biopsy at this time, can f/u outpt with vascular and pt should have ace wraps and elevation while inpatient.  - ID following -  IV vancomycin 1g q12hrs; dosing per pharmacy protocol   - ACE Wraps;  wound care eval     # Fall - trauma work up negative     # H/O Chronic left olecranon bursitis   sp aspiration by ortho on 3/28 -"70cc of bloody fluid aspirated- does not appear infectious at all"   -Ortho recommended :instructed patient to keep ACE bandage compression for at least 4 weeks- can be changed PRN   - fu XRAY L elbow     # H/O DM2- Uncontrolled -FS AC QHS, CCHO diet,  insulin inpatient  ; HbA1c 12.7 - endocrinology eval     #H/O Opioid dependence -on  suboxone     dvt/ gi ppx/diet  dispo: acute  SUBJECTIVE:    Patient is a 54y old Female who presents with a chief complaint of LE swelling (28 Mar 2025 17:47)    Currently admitted to medicine with the primary diagnosis of Cellulitis       Today is hospital day 3d. This morning she is resting comfortably in bed and reports no new issues or overnight events.           PAST MEDICAL & SURGICAL HISTORY  Asthma with COPD    Hypothyroidism    H/O: substance abuse  no longer using    History of pancreatitis    Hepatitis-C    Leukocytoclastic vasculitis    Bilateral edema of lower extremity    HLD (hyperlipidemia)    Type 2 diabetes mellitus    History of appendectomy    History of tonsillectomy      SOCIAL HISTORY:    ALLERGIES:  No Known Allergies    MEDICATIONS:  STANDING MEDICATIONS  aspirin enteric coated 81 milliGRAM(s) Oral daily  atorvastatin 20 milliGRAM(s) Oral at bedtime  buprenorphine 8 mG/naloxone 2 mG SL  Tablet 2.5 Tablet(s) SubLingual daily  chlorhexidine 2% Cloths 1 Application(s) Topical <User Schedule>  dextrose 5%. 1000 milliLiter(s) IV Continuous <Continuous>  dextrose 5%. 1000 milliLiter(s) IV Continuous <Continuous>  dextrose 50% Injectable 25 Gram(s) IV Push once  dextrose 50% Injectable 12.5 Gram(s) IV Push once  dextrose 50% Injectable 25 Gram(s) IV Push once  furosemide    Tablet 40 milliGRAM(s) Oral daily  glucagon  Injectable 1 milliGRAM(s) IntraMuscular once  heparin   Injectable 5000 Unit(s) SubCutaneous every 12 hours  insulin glargine Injectable (LANTUS) 25 Unit(s) SubCutaneous at bedtime  insulin lispro (ADMELOG) corrective regimen sliding scale   SubCutaneous three times a day before meals  insulin lispro Injectable (ADMELOG) 5 Unit(s) SubCutaneous three times a day before meals  levothyroxine 175 MICROGram(s) Oral daily  lidocaine 1% (Preservative-free) Injectable 10 milliLiter(s) Local Injection once  pantoprazole    Tablet 40 milliGRAM(s) Oral before breakfast  pregabalin 50 milliGRAM(s) Oral every 12 hours  vancomycin  IVPB 1000 milliGRAM(s) IV Intermittent every 12 hours    PRN MEDICATIONS  acetaminophen     Tablet .. 650 milliGRAM(s) Oral every 6 hours PRN  albuterol    90 MICROgram(s) HFA Inhaler 1 Puff(s) Inhalation every 6 hours PRN  aluminum hydroxide/magnesium hydroxide/simethicone Suspension 30 milliLiter(s) Oral every 4 hours PRN  dextrose Oral Gel 15 Gram(s) Oral once PRN  melatonin 3 milliGRAM(s) Oral at bedtime PRN  morphine  - Injectable 2 milliGRAM(s) IV Push every 6 hours PRN  ondansetron Injectable 4 milliGRAM(s) IV Push every 8 hours PRN    VITALS:   T(F): 97.8  HR: 66  BP: 116/75  RR: 18  SpO2: 100%    LABS:  Negative for smoking/alcohol/drug use.                         10.5   5.65  )-----------( 220      ( 28 Mar 2025 07:40 )             33.7     03-28    134[L]  |  94[L]  |  13  ----------------------------<  293[H]  4.0   |  29  |  1.1    Ca    9.0      28 Mar 2025 07:40  Mg     1.9     03-28    TPro  7.6  /  Alb  3.9  /  TBili  0.3  /  DBili  x   /  AST  33  /  ALT  5   /  AlkPhos  109  03-28      Urinalysis Basic - ( 28 Mar 2025 07:40 )    Color: x / Appearance: x / SG: x / pH: x  Gluc: 293 mg/dL / Ketone: x  / Bili: x / Urobili: x   Blood: x / Protein: x / Nitrite: x   Leuk Esterase: x / RBC: x / WBC x   Sq Epi: x / Non Sq Epi: x / Bacteria: x    RADIOLOGY:    PHYSICAL EXAM:  GEN: No acute distress  HEENT: normocephalic, atraumatic, aniceteric  LUNGS: Clear to auscultation bilaterally, no rales/wheezing/ rhonchi  HEART: S1/S2 present. RRR, no murmurs  ABD: Soft, non-tender, non-distended. Bowel sounds present  EXT: RLE edema and erythema slightly improved   NEURO: AAOX3, normal affect      ASSESSMENT AND PLAN:    54-year-old female with past medical history of DM, COPD not on home O2, opioid dependence on Suboxone, asthma, thyroidism, chronic venous stasis with chronic lymphedema of lower extremities presenting with RLE cellulitis     # RLE cellulitis   # Elevated CK   # H/O PVD   # H/O BL LE chronic lymphedema and  chronic venous stasis   # H/O leukocytoclastic vasculitis  # H/O Recurrent Cellulitis   - VA duplex bl LE : Impression: Moderate stenosis of the bilateral common femoral arteries (seen on prior study 12/2024)  - VA Duplex bl LE venous   -  , fu repeat   - cw ASA / Statin  ; op FU with Dr. Giovany Garza   - cw home dose lasix 40 mg QD   - Prior provider discussed with rheumatology and surgery services 3/27 : "Discussed with rheumatology Dr. Vargas as pt has hx of leg biopsy + for leukocytoclastic vasculitis last year. I sent her picture of legs and they do not appear that much different then last year, she believes that her main problem is chronic lymphedema and venous insufficiency as opposed to vasculitis and no need for repeat biopsy or treatment at this time. Discussed with surgery Dr. Nelson that no need for biopsy at this time, can f/u outpt with vascular and pt should have ace wraps and elevation while inpatient.  - ID following -  IV vancomycin 1g q12hrs; dosing per pharmacy protocol   - ACE Wraps;  wound care eval   - fu repeat CK level     # Fall - trauma work up negative     # H/O Chronic left olecranon bursitis   sp aspiration by ortho on 3/28 -"70cc of bloody fluid aspirated- does not appear infectious at all"   -Ortho recommended :instructed patient to keep ACE bandage compression for at least 4 weeks- can be changed PRN   - fu XRAY L elbow     # H/O DM2- Uncontrolled -FS AC QHS, CCHO diet,  insulin inpatient  ; HbA1c 12.7 - endocrinology eval     #H/O Opioid dependence -on  suboxone     dvt/ gi ppx/diet  dispo: acute

## 2025-03-29 NOTE — CONSULT NOTE ADULT - SUBJECTIVE AND OBJECTIVE BOX
HPI: 54y Female with type 2 DM for 5 years treated with metformin 500 mg tid and Lantus "as needed" now hospitalized with with worsening lymphedema and cellulits of RLE.  Pt states FBG at home > 200 fasting; has had recent polyuria and polydipsia.  No recent eye doctor visit.  Has chronic pedal paresthesias.   BS > 300 this AM so Lantus increased from 20-25 units qHs; however, did not receive any Lantus last night as it was held for BS=89.    PAST MEDICAL & SURGICAL HISTORY:  Asthma with COPD      Hypothyroidism      H/O: substance abuse  no longer using      History of pancreatitis      Hepatitis-C      Leukocytoclastic vasculitis      Bilateral edema of lower extremity      HLD (hyperlipidemia)      Type 2 diabetes mellitus      History of appendectomy      History of tonsillectomy        Home Medications:  pregabalin 50 mg oral capsule: 1 cap(s) orally every 12 hours (06 Jun 2024 07:48)  Suboxone 8 mg-2 mg sublingual tablet: 2.5 film(s) sublingual once a day (28 May 2024 03:24)      Current (Non-Endocrine) Meds:  acetaminophen     Tablet .. 650 milliGRAM(s) Oral every 6 hours PRN  albuterol    90 MICROgram(s) HFA Inhaler 1 Puff(s) Inhalation every 6 hours PRN  aluminum hydroxide/magnesium hydroxide/simethicone Suspension 30 milliLiter(s) Oral every 4 hours PRN  aspirin enteric coated 81 milliGRAM(s) Oral daily  buprenorphine 8 mG/naloxone 2 mG SL  Tablet 2.5 Tablet(s) SubLingual daily  chlorhexidine 2% Cloths 1 Application(s) Topical <User Schedule>  dextrose 5%. 1000 milliLiter(s) IV Continuous <Continuous>  dextrose 5%. 1000 milliLiter(s) IV Continuous <Continuous>  furosemide    Tablet 40 milliGRAM(s) Oral daily  heparin   Injectable 5000 Unit(s) SubCutaneous every 12 hours  lidocaine 1% (Preservative-free) Injectable 10 milliLiter(s) Local Injection once  melatonin 3 milliGRAM(s) Oral at bedtime PRN  morphine  - Injectable 2 milliGRAM(s) IV Push every 6 hours PRN  ondansetron Injectable 4 milliGRAM(s) IV Push every 8 hours PRN  pantoprazole    Tablet 40 milliGRAM(s) Oral before breakfast  pregabalin 50 milliGRAM(s) Oral every 12 hours  vancomycin  IVPB 1000 milliGRAM(s) IV Intermittent every 12 hours      Current Endocrine Meds:   atorvastatin 20 milliGRAM(s) Oral at bedtime  dextrose 50% Injectable 25 Gram(s) IV Push once  dextrose 50% Injectable 12.5 Gram(s) IV Push once  dextrose 50% Injectable 25 Gram(s) IV Push once  dextrose Oral Gel 15 Gram(s) Oral once PRN  glucagon  Injectable 1 milliGRAM(s) IntraMuscular once  insulin glargine Injectable (LANTUS) 25 Unit(s) SubCutaneous at bedtime  insulin lispro (ADMELOG) corrective regimen sliding scale   SubCutaneous three times a day before meals  insulin lispro Injectable (ADMELOG) 5 Unit(s) SubCutaneous three times a day before meals  levothyroxine 175 MICROGram(s) Oral daily      Allergies:  No Known Allergies      Height (cm): 172.7 (03-27 @ 14:02)  Weight (kg): 98 (03-27 @ 14:02)  BMI (kg/m2): 32.9 (03-27 @ 14:02)    Vital Signs Last 24 Hrs  T(C): 36.8 (29 Mar 2025 13:20), Max: 36.8 (29 Mar 2025 13:20)  T(F): 98.2 (29 Mar 2025 13:20), Max: 98.2 (29 Mar 2025 13:20)  HR: 65 (29 Mar 2025 13:20) (65 - 69)  BP: 133/79 (29 Mar 2025 13:20) (108/62 - 133/79)  BP(mean): --  RR: 16 (29 Mar 2025 13:20) (16 - 18)  SpO2: 100% (29 Mar 2025 13:20) (100% - 100%)    Parameters below as of 29 Mar 2025 04:45  Patient On (Oxygen Delivery Method): room air      Constitutional: WN/WD in NAD.   Neck: ~ 40 gm goiter; no masses/adenopathy  Respiratory: lungs CTAB.  Cardiovascular: regular rate and rhythm, normal S1 and S2, no audible murmurs  Ext: B/L lymphedema/venous stasis changes with erythema of both lower anterior legs R>L  CAPILLARY BLOOD GLUCOSE      POCT Blood Glucose.: 145 mg/dL (29 Mar 2025 11:31)  POCT Blood Glucose.: 334 mg/dL (29 Mar 2025 07:32)  POCT Blood Glucose.: 159 mg/dL (28 Mar 2025 21:22)  POCT Blood Glucose.: 89 mg/dL (28 Mar 2025 16:28)      LABS:                        10.8   6.36  )-----------( 273      ( 29 Mar 2025 13:55 )             34.2     03-29    136  |  93[L]  |  13  ----------------------------<  136[H]  3.9   |  29  |  1.1    Ca    9.4      29 Mar 2025 13:55  Mg     1.9     03-28    TPro  7.6  /  Alb  3.9  /  TBili  0.3  /  DBili  x   /  AST  33  /  ALT  5   /  AlkPhos  109  03-28      Urinalysis Basic - ( 29 Mar 2025 13:55 )    Color: x / Appearance: x / SG: x / pH: x  Gluc: 136 mg/dL / Ketone: x  / Bili: x / Urobili: x   Blood: x / Protein: x / Nitrite: x   Leuk Esterase: x / RBC: x / WBC x   Sq Epi: x / Non Sq Epi: x / Bacteria: x

## 2025-03-29 NOTE — CONSULT NOTE ADULT - ASSESSMENT
Type 2 DM, insulin-requiring, uncontrolled in setting of cellulitis.  Basal insulin (Lantus should not be held when BS is normal.  Go back to Lantus 20 units qHs.  Continue Lispro 5 units tid ac.  For home can resume metformin and Lantus 20 units qHs-pt encouraged to take Lantus every night at home.  Encourage ophthalmology evaluation. Type 2 DM, insulin-requiring, uncontrolled in setting of cellulitis.  Basal insulin (Lantus) should not be held when BS is normal.  Go back to Lantus 20 units qHs.  Continue Lispro 5 units tid ac.  For home can resume metformin and Lantus 20 units qHs-pt encouraged to take Lantus every night at home.  Encourage ophthalmology evaluation.

## 2025-03-30 LAB
ANION GAP SERPL CALC-SCNC: 14 MMOL/L — SIGNIFICANT CHANGE UP (ref 7–14)
BASOPHILS # BLD AUTO: 0.06 K/UL — SIGNIFICANT CHANGE UP (ref 0–0.2)
BASOPHILS NFR BLD AUTO: 0.8 % — SIGNIFICANT CHANGE UP (ref 0–1)
BUN SERPL-MCNC: 14 MG/DL — SIGNIFICANT CHANGE UP (ref 10–20)
CALCIUM SERPL-MCNC: 9.4 MG/DL — SIGNIFICANT CHANGE UP (ref 8.4–10.5)
CHLORIDE SERPL-SCNC: 90 MMOL/L — LOW (ref 98–110)
CK SERPL-CCNC: 1007 U/L — HIGH (ref 0–225)
CO2 SERPL-SCNC: 30 MMOL/L — SIGNIFICANT CHANGE UP (ref 17–32)
CREAT SERPL-MCNC: 1.1 MG/DL — SIGNIFICANT CHANGE UP (ref 0.7–1.5)
EGFR: 60 ML/MIN/1.73M2 — SIGNIFICANT CHANGE UP
EGFR: 60 ML/MIN/1.73M2 — SIGNIFICANT CHANGE UP
EOSINOPHIL # BLD AUTO: 0.21 K/UL — SIGNIFICANT CHANGE UP (ref 0–0.7)
EOSINOPHIL NFR BLD AUTO: 2.9 % — SIGNIFICANT CHANGE UP (ref 0–8)
GLUCOSE BLDC GLUCOMTR-MCNC: 147 MG/DL — HIGH (ref 70–99)
GLUCOSE BLDC GLUCOMTR-MCNC: 161 MG/DL — HIGH (ref 70–99)
GLUCOSE BLDC GLUCOMTR-MCNC: 196 MG/DL — HIGH (ref 70–99)
GLUCOSE BLDC GLUCOMTR-MCNC: 235 MG/DL — HIGH (ref 70–99)
GLUCOSE SERPL-MCNC: 131 MG/DL — HIGH (ref 70–99)
HCT VFR BLD CALC: 34.4 % — LOW (ref 37–47)
HGB BLD-MCNC: 10.8 G/DL — LOW (ref 12–16)
IMM GRANULOCYTES NFR BLD AUTO: 0.3 % — SIGNIFICANT CHANGE UP (ref 0.1–0.3)
LYMPHOCYTES # BLD AUTO: 2.09 K/UL — SIGNIFICANT CHANGE UP (ref 1.2–3.4)
LYMPHOCYTES # BLD AUTO: 28.7 % — SIGNIFICANT CHANGE UP (ref 20.5–51.1)
MCHC RBC-ENTMCNC: 25.9 PG — LOW (ref 27–31)
MCHC RBC-ENTMCNC: 31.4 G/DL — LOW (ref 32–37)
MCV RBC AUTO: 82.5 FL — SIGNIFICANT CHANGE UP (ref 81–99)
MONOCYTES # BLD AUTO: 0.42 K/UL — SIGNIFICANT CHANGE UP (ref 0.1–0.6)
MONOCYTES NFR BLD AUTO: 5.8 % — SIGNIFICANT CHANGE UP (ref 1.7–9.3)
NEUTROPHILS # BLD AUTO: 4.49 K/UL — SIGNIFICANT CHANGE UP (ref 1.4–6.5)
NEUTROPHILS NFR BLD AUTO: 61.5 % — SIGNIFICANT CHANGE UP (ref 42.2–75.2)
NRBC BLD AUTO-RTO: 0 /100 WBCS — SIGNIFICANT CHANGE UP (ref 0–0)
PLATELET # BLD AUTO: 286 K/UL — SIGNIFICANT CHANGE UP (ref 130–400)
PMV BLD: 9.5 FL — SIGNIFICANT CHANGE UP (ref 7.4–10.4)
POTASSIUM SERPL-MCNC: 3.6 MMOL/L — SIGNIFICANT CHANGE UP (ref 3.5–5)
POTASSIUM SERPL-SCNC: 3.6 MMOL/L — SIGNIFICANT CHANGE UP (ref 3.5–5)
RBC # BLD: 4.17 M/UL — LOW (ref 4.2–5.4)
RBC # FLD: 16.4 % — HIGH (ref 11.5–14.5)
SODIUM SERPL-SCNC: 134 MMOL/L — LOW (ref 135–146)
VANCOMYCIN FLD-MCNC: 12.1 UG/ML — HIGH (ref 5–10)
WBC # BLD: 7.29 K/UL — SIGNIFICANT CHANGE UP (ref 4.8–10.8)
WBC # FLD AUTO: 7.29 K/UL — SIGNIFICANT CHANGE UP (ref 4.8–10.8)

## 2025-03-30 PROCEDURE — 73701 CT LOWER EXTREMITY W/DYE: CPT | Mod: 26,RT

## 2025-03-30 PROCEDURE — 36410 VNPNXR 3YR/> PHY/QHP DX/THER: CPT

## 2025-03-30 PROCEDURE — 99233 SBSQ HOSP IP/OBS HIGH 50: CPT

## 2025-03-30 RX ORDER — VANCOMYCIN HCL IN 5 % DEXTROSE 1.5G/250ML
1250 PLASTIC BAG, INJECTION (ML) INTRAVENOUS EVERY 24 HOURS
Refills: 0 | Status: DISCONTINUED | OUTPATIENT
Start: 2025-03-31 | End: 2025-04-02

## 2025-03-30 RX ADMIN — Medication 250 MILLIGRAM(S): at 06:09

## 2025-03-30 RX ADMIN — ATORVASTATIN CALCIUM 20 MILLIGRAM(S): 80 TABLET, FILM COATED ORAL at 21:49

## 2025-03-30 RX ADMIN — Medication 81 MILLIGRAM(S): at 10:19

## 2025-03-30 RX ADMIN — INSULIN LISPRO 1: 100 INJECTION, SOLUTION INTRAVENOUS; SUBCUTANEOUS at 07:51

## 2025-03-30 RX ADMIN — BUPRENORPHINE HYDROCHLORIDE, NALOXONE HYDROCHLORIDE 2.5 TABLET(S): 4; 1 FILM, SOLUBLE BUCCAL; SUBLINGUAL at 10:19

## 2025-03-30 RX ADMIN — FUROSEMIDE 40 MILLIGRAM(S): 10 INJECTION INTRAMUSCULAR; INTRAVENOUS at 06:10

## 2025-03-30 RX ADMIN — INSULIN LISPRO 5 UNIT(S): 100 INJECTION, SOLUTION INTRAVENOUS; SUBCUTANEOUS at 18:22

## 2025-03-30 RX ADMIN — INSULIN LISPRO 1: 100 INJECTION, SOLUTION INTRAVENOUS; SUBCUTANEOUS at 18:22

## 2025-03-30 RX ADMIN — PREGABALIN 50 MILLIGRAM(S): 75 CAPSULE ORAL at 06:10

## 2025-03-30 RX ADMIN — HEPARIN SODIUM 5000 UNIT(S): 1000 INJECTION INTRAVENOUS; SUBCUTANEOUS at 16:28

## 2025-03-30 RX ADMIN — PREGABALIN 50 MILLIGRAM(S): 75 CAPSULE ORAL at 16:29

## 2025-03-30 RX ADMIN — Medication 250 MILLIGRAM(S): at 16:29

## 2025-03-30 RX ADMIN — Medication 1 APPLICATION(S): at 06:09

## 2025-03-30 RX ADMIN — Medication 175 MICROGRAM(S): at 06:10

## 2025-03-30 RX ADMIN — Medication 40 MILLIGRAM(S): at 06:11

## 2025-03-30 RX ADMIN — INSULIN GLARGINE-YFGN 20 UNIT(S): 100 INJECTION, SOLUTION SUBCUTANEOUS at 21:49

## 2025-03-30 RX ADMIN — INSULIN LISPRO 5 UNIT(S): 100 INJECTION, SOLUTION INTRAVENOUS; SUBCUTANEOUS at 07:51

## 2025-03-30 NOTE — CONSULT NOTE ADULT - SUBJECTIVE AND OBJECTIVE BOX
.  Patient is 54-year-old female with past medical history of DM, COPD not on home O2, opioid dependence on Suboxone, asthma, thyroidism, chronic venous stasis with chronic lymphedema of lower extremities presents to the ED complaining of worsening swelling/pain of bilateral lower extremities ( R > L) Pt states the pain and swelling is causing her ambulatory disfunction, pt states that she fell yesterday. Pt c/o pain to L shoulder (xrays negative for fx)  Pt denies fever, chills.     Surgery consulted for worsening lower extremity edema, r/o compartment syndrome.          PAST MEDICAL & SURGICAL HISTORY:  Asthma with COPD    Hypothyroidism    H/O: substance abuse  (no longer using)    History of pancreatitis    Hepatitis-C    Leukocytoclastic vasculitis    Bilateral edema of lower extremity    HLD (hyperlipidemia)    Type 2 diabetes mellitus    History of appendectomy    History of tonsillectomy            MEDICATIONS  (STANDING):  aspirin enteric coated 81 milliGRAM(s) Oral daily  atorvastatin 20 milliGRAM(s) Oral at bedtime  buprenorphine 8 mG/naloxone 2 mG SL  Tablet 2.5 Tablet(s) SubLingual daily  chlorhexidine 2% Cloths 1 Application(s) Topical <User Schedule>  dextrose 5%. 1000 milliLiter(s) (100 mL/Hr) IV Continuous <Continuous>  dextrose 5%. 1000 milliLiter(s) (50 mL/Hr) IV Continuous <Continuous>  dextrose 50% Injectable 25 Gram(s) IV Push once  dextrose 50% Injectable 12.5 Gram(s) IV Push once  dextrose 50% Injectable 25 Gram(s) IV Push once  furosemide    Tablet 40 milliGRAM(s) Oral daily  glucagon  Injectable 1 milliGRAM(s) IntraMuscular once  heparin   Injectable 5000 Unit(s) SubCutaneous every 12 hours  insulin glargine Injectable (LANTUS) 20 Unit(s) SubCutaneous at bedtime  insulin lispro (ADMELOG) corrective regimen sliding scale   SubCutaneous three times a day before meals  insulin lispro Injectable (ADMELOG) 5 Unit(s) SubCutaneous three times a day before meals  levothyroxine 175 MICROGram(s) Oral daily  lidocaine 1% (Preservative-free) Injectable 10 milliLiter(s) Local Injection once  pantoprazole    Tablet 40 milliGRAM(s) Oral before breakfast  pregabalin 50 milliGRAM(s) Oral every 12 hours  sodium chloride 0.9%. 1000 milliLiter(s) (75 mL/Hr) IV Continuous <Continuous>  vancomycin  IVPB 1000 milliGRAM(s) IV Intermittent every 12 hours    MEDICATIONS  (PRN):  acetaminophen     Tablet .. 650 milliGRAM(s) Oral every 6 hours PRN Temp greater or equal to 38C (100.4F), Mild Pain (1 - 3)  albuterol    90 MICROgram(s) HFA Inhaler 1 Puff(s) Inhalation every 6 hours PRN Shortness of Breath  aluminum hydroxide/magnesium hydroxide/simethicone Suspension 30 milliLiter(s) Oral every 4 hours PRN Dyspepsia  dextrose Oral Gel 15 Gram(s) Oral once PRN Blood Glucose LESS THAN 70 milliGRAM(s)/deciliter  melatonin 3 milliGRAM(s) Oral at bedtime PRN Insomnia  morphine  - Injectable 2 milliGRAM(s) IV Push every 6 hours PRN Severe Pain (7 - 10)  ondansetron Injectable 4 milliGRAM(s) IV Push every 8 hours PRN Nausea and/or Vomiting          Allergies  No Known Allergies        SOCIAL HISTORY:  Tobacco use:   Alcohol use:  Drug use:  Prior use, none currently        FAMILY HISTORY:          REVIEW OF SYSTEMS:  CONSTITUTIONAL:  No weakness, fevers or chills  EYES/ENT:  No visual changes;  No vertigo or throat pain   NECK:  No pain or stiffness  RESPIRATORY:  No cough, wheezing, hemoptysis; No shortness of breath  CARDIOVASCULAR:  No chest pain or palpitations  GASTROINTESTINAL:  No abdominal or epigastric pain. No nausea, vomiting, or hematemesis; No diarrhea or constipation. No melena or hematochezia.  GENITOURINARY:  No dysuria, frequency or hematuria  MUSCULOSKELETAL:  (+) Lower extremity Edema / Cellulitis.  FROM all extremities, normal strength, Mild calf tenderness secondary to cellulitis  NEUROLOGICAL:  No numbness or weakness  SKIN:  (+) Erythema (+) Edema, (+) Cellulitis            Vital Signs Last 24 Hrs  T(C): 36.4 (30 Mar 2025 05:28), Max: 36.8 (29 Mar 2025 13:20)  T(F): 97.5 (30 Mar 2025 05:28), Max: 98.2 (29 Mar 2025 13:20)  HR: 71 (30 Mar 2025 05:28) (65 - 75)  BP: 131/71 (30 Mar 2025 05:28) (116/70 - 133/79)  RR: 16 (30 Mar 2025 05:28) (16 - 18)  SpO2: 95% (30 Mar 2025 05:28) (95% - 100%)          Physical Exam:  General:  WD, WN, female conversant in NAD, resting comfortably.  Skin:  ***  HEENT:   NC/AT, EOMI, normal hearing, no oral lesions, no LAD, neck supple.  Pulmonary:   CTA B/L, Normal respiratory effort. No W/R/R.   Cardiovascular:   S1 & S2, RRR, No murmurs, rubs or gallops.  Abdominal:  (+) BS, soft, ND/NT, No rebound, guarding or peritoneal signs.  Genitourinary:  No suprapubic tenderness, No CVAT.   Extremities:   ***  Normal strength, no clubbing/cyanosis/edema.  Palpable distal pulses.   Neuro:  Awake, alert & oriented x 3,  CNs II-XII grossly intact, normal sensation, no focal deficits.          LABS:                        10.8   6.36  )-----------( 273      ( 29 Mar 2025 13:55 )             34.2       03-29    136  |  93[L]  |  13  ----------------------------<  136[H]  3.9   |  29  |  1.1    Ca    9.4      29 Mar 2025 13:55          Urinalysis Basic - ( 29 Mar 2025 13:55 )    Color: x / Appearance: x / SG: x / pH: x  Gluc: 136 mg/dL / Ketone: x  / Bili: x / Urobili: x   Blood: x / Protein: x / Nitrite: x   Leuk Esterase: x / RBC: x / WBC x   Sq Epi: x / Non Sq Epi: x / Bacteria: x        CAPILLARY BLOOD GLUCOSE  POCT Blood Glucose.: 196 mg/dL (30 Mar 2025 07:36)  POCT Blood Glucose.: 340 mg/dL (29 Mar 2025 20:32)  POCT Blood Glucose.: 265 mg/dL (29 Mar 2025 16:17)          Imaging:  CT scan of lower extremity with IV contrast:     Pending           .  Patient is 54-year-old female with past medical history of DM, COPD not on home O2, opioid dependence on Suboxone, asthma, thyroidism, chronic venous stasis with chronic lymphedema of lower extremities presents to the ED complaining of worsening swelling/pain of bilateral lower extremities ( R > L) Pt states the pain and swelling is causing her ambulatory disfunction, pt states that she fell yesterday.  Pt c/o pain to L shoulder (xrays negative for fx)  Pt denies fever, chills.     Surgery consulted for worsening Right lower extremity edema, r/o compartment syndrome.  Patient reports that she has had chronic leg edema and episodes of cellulitis for past few years but worsened about 1 month ago.  Patient states followed by Dr. Adair (vascular) and he applies Unna boots but removed recently due to the cellulitis and skin ulcerations.    Patient reports was admitted recently due to the edema and cellulitis.  Patient with hx of Leukocytoclastic vasculitis.  On questioning, patient reports the swelling and skin ulcerations have not improved but her pain in her lower extremities is slowly improving.    She denies severe pain that is not controlled by pain medications and reports she feels the Ivabx is starting to help.  Denies recent fever or chills.  She reports has intermittent "pins & needles" sensation over the past few months but can feel light touch and move her foot and toes.              PAST MEDICAL & SURGICAL HISTORY:  Asthma with COPD    Hypothyroidism    H/O: substance abuse  (no longer using)    History of pancreatitis    Hepatitis-C    Leukocytoclastic vasculitis    Bilateral edema of lower extremity    HLD (hyperlipidemia)    Type 2 diabetes mellitus    History of appendectomy    History of tonsillectomy            MEDICATIONS  (STANDING):  aspirin enteric coated 81 milliGRAM(s) Oral daily  atorvastatin 20 milliGRAM(s) Oral at bedtime  buprenorphine 8 mG/naloxone 2 mG SL  Tablet 2.5 Tablet(s) SubLingual daily  chlorhexidine 2% Cloths 1 Application(s) Topical <User Schedule>  dextrose 5%. 1000 milliLiter(s) (100 mL/Hr) IV Continuous <Continuous>  dextrose 5%. 1000 milliLiter(s) (50 mL/Hr) IV Continuous <Continuous>  dextrose 50% Injectable 25 Gram(s) IV Push once  dextrose 50% Injectable 12.5 Gram(s) IV Push once  dextrose 50% Injectable 25 Gram(s) IV Push once  furosemide    Tablet 40 milliGRAM(s) Oral daily  glucagon  Injectable 1 milliGRAM(s) IntraMuscular once  heparin   Injectable 5000 Unit(s) SubCutaneous every 12 hours  insulin glargine Injectable (LANTUS) 20 Unit(s) SubCutaneous at bedtime  insulin lispro (ADMELOG) corrective regimen sliding scale   SubCutaneous three times a day before meals  insulin lispro Injectable (ADMELOG) 5 Unit(s) SubCutaneous three times a day before meals  levothyroxine 175 MICROGram(s) Oral daily  lidocaine 1% (Preservative-free) Injectable 10 milliLiter(s) Local Injection once  pantoprazole    Tablet 40 milliGRAM(s) Oral before breakfast  pregabalin 50 milliGRAM(s) Oral every 12 hours  sodium chloride 0.9%. 1000 milliLiter(s) (75 mL/Hr) IV Continuous <Continuous>  vancomycin  IVPB 1000 milliGRAM(s) IV Intermittent every 12 hours    MEDICATIONS  (PRN):  acetaminophen     Tablet .. 650 milliGRAM(s) Oral every 6 hours PRN Temp greater or equal to 38C (100.4F), Mild Pain (1 - 3)  albuterol    90 MICROgram(s) HFA Inhaler 1 Puff(s) Inhalation every 6 hours PRN Shortness of Breath  aluminum hydroxide/magnesium hydroxide/simethicone Suspension 30 milliLiter(s) Oral every 4 hours PRN Dyspepsia  dextrose Oral Gel 15 Gram(s) Oral once PRN Blood Glucose LESS THAN 70 milliGRAM(s)/deciliter  melatonin 3 milliGRAM(s) Oral at bedtime PRN Insomnia  morphine  - Injectable 2 milliGRAM(s) IV Push every 6 hours PRN Severe Pain (7 - 10)  ondansetron Injectable 4 milliGRAM(s) IV Push every 8 hours PRN Nausea and/or Vomiting          Allergies  No Known Allergies        SOCIAL HISTORY:  Tobacco use: Denies  Alcohol use: Denies  Drug use:  Prior use, none currently        FAMILY HISTORY:  Non-contributory        REVIEW OF SYSTEMS:  CONSTITUTIONAL:  No weakness, fevers or chills  EYES/ENT:  No visual changes;  No vertigo or throat pain   NECK:  No pain or stiffness  RESPIRATORY:  No cough, wheezing, hemoptysis; No shortness of breath  CARDIOVASCULAR:  No chest pain or palpitations  GASTROINTESTINAL:  No abdominal or epigastric pain. No nausea, vomiting, or hematemesis; No diarrhea or constipation. No melena or hematochezia.  GENITOURINARY:  No dysuria, frequency or hematuria  MUSCULOSKELETAL:  (+) Lower extremity Edema / Cellulitis.  Hx of Leukocytoclastic vasculitis.  FROM all extremities, normal strength, (+) calf tenderness secondary to cellulitis  NEUROLOGICAL:  No numbness or weakness  SKIN:  (+) Erythema (+) Edema, (+) Purplish discoloration from vasculitis and venous stasis, (+) Cellulitis, (+) Superficial wounds Right lower extremity           Vital Signs Last 24 Hrs  T(C): 36.4 (30 Mar 2025 05:28), Max: 36.8 (29 Mar 2025 13:20)  T(F): 97.5 (30 Mar 2025 05:28), Max: 98.2 (29 Mar 2025 13:20)  HR: 71 (30 Mar 2025 05:28) (65 - 75)  BP: 131/71 (30 Mar 2025 05:28) (116/70 - 133/79)  RR: 16 (30 Mar 2025 05:28) (16 - 18)  SpO2: 95% (30 Mar 2025 05:28) (95% - 100%)          Physical Exam:  General:  WD, WN, obese female conversant in NAD, resting comfortably in bed.  Skin:  Bilateral leg (R>L) Edema, (+) Erythema, (+) Purplish discoloration from vasculitis and chronic venous stasis, (+) Cellulitis, (+) Superficial venous stasis ulcerations on right lower extremity, no necrotic tissue  HEENT:   NC/AT, EOMI, normal hearing, no oral lesions, no LAD, neck supple.  Pulmonary:   CTA B/L, Normal respiratory effort. No W/R/R.   Cardiovascular:   S1 & S2, RRR, No murmurs, rubs or gallops.  Abdominal:  (+) BS, soft, ND/NT, No rebound, guarding or peritoneal signs.  Genitourinary:  No suprapubic tenderness, No CVAT.   Extremities:   (See skin section above), Skin tense but pain is proportional and not severe with palpation of right lower extremity due to cellulitis,  Normal strength, No clubbing/cyanosis.  Foot and toes warm, palpable distal pulses. (+) Foot/ankle dorsiflexion/ plantarflexion.    Neuro:  Awake, alert & oriented x 3,  CNs II-XII grossly intact, normal sensation, no focal deficits.          LABS:                        10.8   6.36  )-----------( 273      ( 29 Mar 2025 13:55 )             34.2       03-29    136  |  93[L]  |  13  ----------------------------<  136[H]  3.9   |  29  |  1.1    Ca    9.4      29 Mar 2025 13:55          Urinalysis Basic - ( 29 Mar 2025 13:55 )    Color: x / Appearance: x / SG: x / pH: x  Gluc: 136 mg/dL / Ketone: x  / Bili: x / Urobili: x   Blood: x / Protein: x / Nitrite: x   Leuk Esterase: x / RBC: x / WBC x   Sq Epi: x / Non Sq Epi: x / Bacteria: x        CAPILLARY BLOOD GLUCOSE  POCT Blood Glucose.: 196 mg/dL (30 Mar 2025 07:36)  POCT Blood Glucose.: 340 mg/dL (29 Mar 2025 20:32)  POCT Blood Glucose.: 265 mg/dL (29 Mar 2025 16:17)          Imaging:  CT scan of lower extremity with IV contrast:     Pending exam           .  Patient is 54-year-old female with past medical history of DM, COPD not on home O2, opioid dependence on Suboxone, asthma, thyroidism, chronic venous stasis with chronic lymphedema of lower extremities presents to the ED complaining of worsening swelling/pain of bilateral lower extremities ( R > L) Pt states the pain and swelling is causing her ambulatory disfunction, pt states that she fell yesterday.  Pt c/o pain to L shoulder (xrays negative for fx)  Pt denies fever, chills.     Surgery consulted for worsening Right lower extremity edema, r/o compartment syndrome.  Patient reports that she has had chronic leg edema and episodes of cellulitis for past few years but worsened about 1 month ago.  Patient states followed by Dr. Adair (vascular) and he applies Unna boots but removed recently due to the cellulitis and skin ulcerations.    Patient reports was admitted recently due to the edema and cellulitis.  Patient with hx of Leukocytoclastic vasculitis.  On questioning, patient reports the swelling and skin ulcerations have not improved but her pain in her lower extremities is slowly improving.    She denies severe pain that is not controlled by pain medications and reports she feels the Ivabx is starting to help.  Denies recent fever or chills.  She reports has intermittent "pins & needles" sensation over the past few months but can feel light touch and move her foot and toes.              PAST MEDICAL & SURGICAL HISTORY:  Asthma with COPD    Hypothyroidism    H/O: substance abuse  (no longer using)    History of pancreatitis    Hepatitis-C    Leukocytoclastic vasculitis    Bilateral edema of lower extremity    HLD (hyperlipidemia)    Type 2 diabetes mellitus    History of appendectomy    History of tonsillectomy            MEDICATIONS  (STANDING):  aspirin enteric coated 81 milliGRAM(s) Oral daily  atorvastatin 20 milliGRAM(s) Oral at bedtime  buprenorphine 8 mG/naloxone 2 mG SL  Tablet 2.5 Tablet(s) SubLingual daily  chlorhexidine 2% Cloths 1 Application(s) Topical <User Schedule>  dextrose 5%. 1000 milliLiter(s) (100 mL/Hr) IV Continuous <Continuous>  dextrose 5%. 1000 milliLiter(s) (50 mL/Hr) IV Continuous <Continuous>  dextrose 50% Injectable 25 Gram(s) IV Push once  dextrose 50% Injectable 12.5 Gram(s) IV Push once  dextrose 50% Injectable 25 Gram(s) IV Push once  furosemide    Tablet 40 milliGRAM(s) Oral daily  glucagon  Injectable 1 milliGRAM(s) IntraMuscular once  heparin   Injectable 5000 Unit(s) SubCutaneous every 12 hours  insulin glargine Injectable (LANTUS) 20 Unit(s) SubCutaneous at bedtime  insulin lispro (ADMELOG) corrective regimen sliding scale   SubCutaneous three times a day before meals  insulin lispro Injectable (ADMELOG) 5 Unit(s) SubCutaneous three times a day before meals  levothyroxine 175 MICROGram(s) Oral daily  lidocaine 1% (Preservative-free) Injectable 10 milliLiter(s) Local Injection once  pantoprazole    Tablet 40 milliGRAM(s) Oral before breakfast  pregabalin 50 milliGRAM(s) Oral every 12 hours  sodium chloride 0.9%. 1000 milliLiter(s) (75 mL/Hr) IV Continuous <Continuous>  vancomycin  IVPB 1000 milliGRAM(s) IV Intermittent every 12 hours    MEDICATIONS  (PRN):  acetaminophen     Tablet .. 650 milliGRAM(s) Oral every 6 hours PRN Temp greater or equal to 38C (100.4F), Mild Pain (1 - 3)  albuterol    90 MICROgram(s) HFA Inhaler 1 Puff(s) Inhalation every 6 hours PRN Shortness of Breath  aluminum hydroxide/magnesium hydroxide/simethicone Suspension 30 milliLiter(s) Oral every 4 hours PRN Dyspepsia  dextrose Oral Gel 15 Gram(s) Oral once PRN Blood Glucose LESS THAN 70 milliGRAM(s)/deciliter  melatonin 3 milliGRAM(s) Oral at bedtime PRN Insomnia  morphine  - Injectable 2 milliGRAM(s) IV Push every 6 hours PRN Severe Pain (7 - 10)  ondansetron Injectable 4 milliGRAM(s) IV Push every 8 hours PRN Nausea and/or Vomiting          Allergies  No Known Allergies        SOCIAL HISTORY:  Tobacco use: Denies  Alcohol use: Denies  Drug use:  Prior use, none currently        FAMILY HISTORY:  Non-contributory        REVIEW OF SYSTEMS:  CONSTITUTIONAL:  No weakness, fevers or chills  EYES/ENT:  No visual changes;  No vertigo or throat pain   NECK:  No pain or stiffness  RESPIRATORY:  No cough, wheezing, hemoptysis; No shortness of breath  CARDIOVASCULAR:  No chest pain or palpitations  GASTROINTESTINAL:  No abdominal or epigastric pain. No nausea, vomiting, or hematemesis; No diarrhea or constipation. No melena or hematochezia.  GENITOURINARY:  No dysuria, frequency or hematuria  MUSCULOSKELETAL:  (+) Lower extremity Edema / Cellulitis.  Hx of Leukocytoclastic vasculitis.  FROM all extremities, normal strength, (+) calf tenderness secondary to cellulitis  NEUROLOGICAL:  No numbness or weakness  SKIN:  (+) Erythema (+) Edema, (+) Purplish discoloration from vasculitis and venous stasis, (+) Cellulitis, (+) Superficial wounds Right lower extremity           Vital Signs Last 24 Hrs  T(C): 36.4 (30 Mar 2025 05:28), Max: 36.8 (29 Mar 2025 13:20)  T(F): 97.5 (30 Mar 2025 05:28), Max: 98.2 (29 Mar 2025 13:20)  HR: 71 (30 Mar 2025 05:28) (65 - 75)  BP: 131/71 (30 Mar 2025 05:28) (116/70 - 133/79)  RR: 16 (30 Mar 2025 05:28) (16 - 18)  SpO2: 95% (30 Mar 2025 05:28) (95% - 100%)          Physical Exam:  General:  WD, WN, obese female conversant in NAD, resting comfortably in bed.  Skin:  Bilateral leg (R>L) Edema, (+) Erythema, (+) Purplish discoloration from vasculitis and chronic venous stasis, (+) Cellulitis, (+) Superficial venous stasis ulcerations on right lower extremity, no necrotic tissue  HEENT:   NC/AT, EOMI, normal hearing, no oral lesions, no LAD, neck supple.  Pulmonary:   CTA B/L, Normal respiratory effort. No W/R/R.   Cardiovascular:   S1 & S2, RRR, No murmurs, rubs or gallops.  Abdominal:  (+) BS, soft, ND/NT, No rebound, guarding or peritoneal signs.  Genitourinary:  No suprapubic tenderness, No CVAT.   Extremities:   (See skin section above), Skin tense but pain is proportional and not severe with palpation of right lower extremity due to cellulitis,  Normal strength, No clubbing/cyanosis.  Foot and toes warm, palpable distal pulses. (+) Foot/ankle dorsiflexion/ plantarflexion.    Neuro:  Awake, alert & oriented x 3,  CNs II-XII grossly intact, normal sensation, no focal deficits.          LABS:                        10.8   6.36  )-----------( 273      ( 29 Mar 2025 13:55 )             34.2       03-29    136  |  93[L]  |  13  ----------------------------<  136[H]  3.9   |  29  |  1.1    Ca    9.4      29 Mar 2025 13:55          Urinalysis Basic - ( 29 Mar 2025 13:55 )    Color: x / Appearance: x / SG: x / pH: x  Gluc: 136 mg/dL / Ketone: x  / Bili: x / Urobili: x   Blood: x / Protein: x / Nitrite: x   Leuk Esterase: x / RBC: x / WBC x   Sq Epi: x / Non Sq Epi: x / Bacteria: x        CAPILLARY BLOOD GLUCOSE  POCT Blood Glucose.: 196 mg/dL (30 Mar 2025 07:36)  POCT Blood Glucose.: 340 mg/dL (29 Mar 2025 20:32)  POCT Blood Glucose.: 265 mg/dL (29 Mar 2025 16:17)          VA Duplex Lower Ext Vein Scan, Bilat (03.26.25 @ 23:42) >    Impression:  No evidence of deep venous thrombosis in the bilateral lower extremities.          VA Duplex Lower Extrem Arterial, Bilat (03.27.25 @ 13:15)   Impression:  Moderate stenosis of the bilateral common femoral arteries          Imaging:  CT scan of lower extremity with IV contrast:     Pending exam

## 2025-03-30 NOTE — PHARMACOTHERAPY INTERVENTION NOTE - COMMENTS
Patient is on vancomycin 1000mg IV q12h which predicts a therapeutic AUC of 592. Recommended to continue current dosing for now and obtain a trough level before 4th dose which is tomorrow 3/28 AM labs. 
Recommended to obtain an updated weight to assist with vancomycin dosing.
As per policy, discontinued duplicate vancomycin level.
As per policy, ordered a vancomycin level for 3/29 AM to assist with further dosing recommendations in setting of patient evening levels not being collected.    Trung Faust, PharmD, [facility]  Clinical Pharmacy Specialist, Infectious Diseases  Tele-Antimicrobial Stewardship Program (Tele-ASP)  Tele-ASP Phone: [phone] 
As per policy, ordered a vancomycin trough level for 4/2 @ 1900 prior to the 3rd dose on vancomycin regimen 1250mg IV q24hrs to assist with further vancomycin dosing optimization.    Tereza Rivera, PharmD  Clinical Pharmacy Specialist, Infectious Diseases  Tele-Antimicrobial Stewardship Program (Tele-ASP)  Tele-ASP Phone: [phone]  
Recommended to adjust vancomycin dose to from 1000mg q12hrs to 1250mg q24hrs.  Vancomycin trough on 3/30 returned at 12.1mg/dL. As per [employer] calculations, current vancomycin regimen predicts AUC/REED to be 660.40 mg/L*h, which is above the therapeutic range of 400 - 600 mg/L*h. Decreasing the dose to 1250mg IV q24hrs is predicted to yield an AUC/REED of 462.47mg/L*h.    Tereza Rivera, PharmD  Clinical Pharmacy Specialist, Infectious Diseases  Tele-Antimicrobial Stewardship Program (Tele-ASP)  Tele-ASP Phone: [phone] 
As per policy, ordered a vancomycin level for 3/30 at 11am in order to assist with vancomycin pharmacokinetic monitoring.    Esther Zhang, PharmD, BCIDP  Clinical Pharmacy Specialist, Infectious Diseases  Tele-Antimicrobial Stewardship Program (Tele-ASP)  Available on [employer] Teams  
As per policy, ordered a vancomycin trough level for 4/2 @ 1900 prior to the 3rd dose on vancomycin regimen 1250mg IV q24hrs to assist with further vancomycin dosing optimization.     Tereza Rivera, PharmD  Clinical Pharmacy Specialist, Infectious Diseases  Tele-Antimicrobial Stewardship Program (Tele-ASP)  Tele-ASP Phone: [phone]

## 2025-03-30 NOTE — CONSULT NOTE ADULT - CONSULT REASON
Worsening lower extremity edema, r/o compartment syndrome Worsening lower extremity edema, r/o RLE compartment syndrome

## 2025-03-30 NOTE — PROGRESS NOTE ADULT - SUBJECTIVE AND OBJECTIVE BOX
SUBJECTIVE:    Patient is a 54y old Female who presents with a chief complaint of LE swelling (30 Mar 2025 11:43)    Currently admitted to medicine with the primary diagnosis of Cellulitis       Today is hospital day 4d. This morning she is resting comfortably in bed - reports slightly worsening swelling of RLE but pain is not worse           PAST MEDICAL & SURGICAL HISTORY  Asthma with COPD    Hypothyroidism    H/O: substance abuse  no longer using    History of pancreatitis    Hepatitis-C    Leukocytoclastic vasculitis    Bilateral edema of lower extremity    HLD (hyperlipidemia)    Type 2 diabetes mellitus    History of appendectomy    History of tonsillectomy      SOCIAL HISTORY:    ALLERGIES:  No Known Allergies    MEDICATIONS:  STANDING MEDICATIONS  aspirin enteric coated 81 milliGRAM(s) Oral daily  atorvastatin 20 milliGRAM(s) Oral at bedtime  buprenorphine 8 mG/naloxone 2 mG SL  Tablet 2.5 Tablet(s) SubLingual daily  chlorhexidine 2% Cloths 1 Application(s) Topical <User Schedule>  dextrose 5%. 1000 milliLiter(s) IV Continuous <Continuous>  dextrose 5%. 1000 milliLiter(s) IV Continuous <Continuous>  dextrose 50% Injectable 25 Gram(s) IV Push once  dextrose 50% Injectable 12.5 Gram(s) IV Push once  dextrose 50% Injectable 25 Gram(s) IV Push once  furosemide    Tablet 40 milliGRAM(s) Oral daily  glucagon  Injectable 1 milliGRAM(s) IntraMuscular once  heparin   Injectable 5000 Unit(s) SubCutaneous every 12 hours  insulin glargine Injectable (LANTUS) 20 Unit(s) SubCutaneous at bedtime  insulin lispro (ADMELOG) corrective regimen sliding scale   SubCutaneous three times a day before meals  insulin lispro Injectable (ADMELOG) 5 Unit(s) SubCutaneous three times a day before meals  levothyroxine 175 MICROGram(s) Oral daily  lidocaine 1% (Preservative-free) Injectable 10 milliLiter(s) Local Injection once  pantoprazole    Tablet 40 milliGRAM(s) Oral before breakfast  pregabalin 50 milliGRAM(s) Oral every 12 hours  sodium chloride 0.9%. 1000 milliLiter(s) IV Continuous <Continuous>  vancomycin  IVPB 1000 milliGRAM(s) IV Intermittent every 12 hours    PRN MEDICATIONS  acetaminophen     Tablet .. 650 milliGRAM(s) Oral every 6 hours PRN  albuterol    90 MICROgram(s) HFA Inhaler 1 Puff(s) Inhalation every 6 hours PRN  aluminum hydroxide/magnesium hydroxide/simethicone Suspension 30 milliLiter(s) Oral every 4 hours PRN  dextrose Oral Gel 15 Gram(s) Oral once PRN  melatonin 3 milliGRAM(s) Oral at bedtime PRN  morphine  - Injectable 2 milliGRAM(s) IV Push every 6 hours PRN  ondansetron Injectable 4 milliGRAM(s) IV Push every 8 hours PRN    VITALS:   T(F): 97.5  HR: 71  BP: 131/71  RR: 16  SpO2: 95%    LABS:  Negative for smoking/alcohol/drug use.                         10.8   7.29  )-----------( 286      ( 30 Mar 2025 12:00 )             34.4     03-30    134[L]  |  90[L]  |  14  ----------------------------<  131[H]  3.6   |  30  |  1.1    Ca    9.4      30 Mar 2025 12:00        Urinalysis Basic - ( 30 Mar 2025 12:00 )    Color: x / Appearance: x / SG: x / pH: x  Gluc: 131 mg/dL / Ketone: x  / Bili: x / Urobili: x   Blood: x / Protein: x / Nitrite: x   Leuk Esterase: x / RBC: x / WBC x   Sq Epi: x / Non Sq Epi: x / Bacteria: x                RADIOLOGY:    PHYSICAL EXAM:  GEN: No acute distress  HEENT: normocephalic, atraumatic, aniceteric  LUNGS: bl breath sounds   HEART: S1/S2 present. RRR, no murmurs  ABD: Soft, non-tender, non-distended. Bowel sounds present  EXT: RLE edema slightly worsened , erythema improved, tenderness improved   NEURO: AAOX3, normal affect      ASSESSMENT AND PLAN:     SUBJECTIVE:    Patient is a 54y old Female who presents with a chief complaint of LE swelling (30 Mar 2025 11:43)    Currently admitted to medicine with the primary diagnosis of Cellulitis       Today is hospital day 4d. This morning she is resting comfortably in bed - reports slightly worsening swelling of RLE but pain is not worse           PAST MEDICAL & SURGICAL HISTORY  Asthma with COPD    Hypothyroidism    H/O: substance abuse  no longer using    History of pancreatitis    Hepatitis-C    Leukocytoclastic vasculitis    Bilateral edema of lower extremity    HLD (hyperlipidemia)    Type 2 diabetes mellitus    History of appendectomy    History of tonsillectomy      SOCIAL HISTORY:    ALLERGIES:  No Known Allergies    MEDICATIONS:  STANDING MEDICATIONS  aspirin enteric coated 81 milliGRAM(s) Oral daily  atorvastatin 20 milliGRAM(s) Oral at bedtime  buprenorphine 8 mG/naloxone 2 mG SL  Tablet 2.5 Tablet(s) SubLingual daily  chlorhexidine 2% Cloths 1 Application(s) Topical <User Schedule>  dextrose 5%. 1000 milliLiter(s) IV Continuous <Continuous>  dextrose 5%. 1000 milliLiter(s) IV Continuous <Continuous>  dextrose 50% Injectable 25 Gram(s) IV Push once  dextrose 50% Injectable 12.5 Gram(s) IV Push once  dextrose 50% Injectable 25 Gram(s) IV Push once  furosemide    Tablet 40 milliGRAM(s) Oral daily  glucagon  Injectable 1 milliGRAM(s) IntraMuscular once  heparin   Injectable 5000 Unit(s) SubCutaneous every 12 hours  insulin glargine Injectable (LANTUS) 20 Unit(s) SubCutaneous at bedtime  insulin lispro (ADMELOG) corrective regimen sliding scale   SubCutaneous three times a day before meals  insulin lispro Injectable (ADMELOG) 5 Unit(s) SubCutaneous three times a day before meals  levothyroxine 175 MICROGram(s) Oral daily  lidocaine 1% (Preservative-free) Injectable 10 milliLiter(s) Local Injection once  pantoprazole    Tablet 40 milliGRAM(s) Oral before breakfast  pregabalin 50 milliGRAM(s) Oral every 12 hours  sodium chloride 0.9%. 1000 milliLiter(s) IV Continuous <Continuous>  vancomycin  IVPB 1000 milliGRAM(s) IV Intermittent every 12 hours    PRN MEDICATIONS  acetaminophen     Tablet .. 650 milliGRAM(s) Oral every 6 hours PRN  albuterol    90 MICROgram(s) HFA Inhaler 1 Puff(s) Inhalation every 6 hours PRN  aluminum hydroxide/magnesium hydroxide/simethicone Suspension 30 milliLiter(s) Oral every 4 hours PRN  dextrose Oral Gel 15 Gram(s) Oral once PRN  melatonin 3 milliGRAM(s) Oral at bedtime PRN  morphine  - Injectable 2 milliGRAM(s) IV Push every 6 hours PRN  ondansetron Injectable 4 milliGRAM(s) IV Push every 8 hours PRN    VITALS:   T(F): 97.5  HR: 71  BP: 131/71  RR: 16  SpO2: 95%    LABS:  Negative for smoking/alcohol/drug use.                         10.8   7.29  )-----------( 286      ( 30 Mar 2025 12:00 )             34.4     03-30    134[L]  |  90[L]  |  14  ----------------------------<  131[H]  3.6   |  30  |  1.1    Ca    9.4      30 Mar 2025 12:00        Urinalysis Basic - ( 30 Mar 2025 12:00 )    Color: x / Appearance: x / SG: x / pH: x  Gluc: 131 mg/dL / Ketone: x  / Bili: x / Urobili: x   Blood: x / Protein: x / Nitrite: x   Leuk Esterase: x / RBC: x / WBC x   Sq Epi: x / Non Sq Epi: x / Bacteria: x                RADIOLOGY:    PHYSICAL EXAM:  GEN: No acute distress  HEENT: normocephalic, atraumatic, aniceteric  LUNGS: bl breath sounds   HEART: S1/S2 present. RRR, no murmurs  ABD: Soft, non-tender, non-distended. Bowel sounds present  EXT: RLE edema slightly worsened , erythema improved, tenderness improved   NEURO: AAOX3, normal affect      ASSESSMENT AND PLAN:    54-year-old female with past medical history of DM, COPD not on home O2, opioid dependence on Suboxone, asthma, thyroidism, chronic venous stasis with chronic lymphedema of lower extremities presenting with RLE cellulitis     # RLE cellulitis   # Elevated CK   # H/O PVD   # H/O BL LE chronic lymphedema and  chronic venous stasis   # H/O leukocytoclastic vasculitis  # H/O Recurrent Cellulitis   - [facility] duplex bl LE : Impression: Moderate stenosis of the bilateral common femoral arteries (seen on prior study 12/2024)  - [facility] Duplex bl LE venous   -  , fu repeat   - cw ASA / Statin  ; op FU with Dr. Giovany Garza   - cw home dose lasix 40 mg QD   - Prior provider discussed with rheumatology and surgery services 3/27 : "Discussed with rheumatology Dr. Vargas as pt has hx of leg biopsy + for leukocytoclastic vasculitis last year. I sent her picture of legs and they do not appear that much different then last year, she believes that her main problem is chronic lymphedema and venous insufficiency as opposed to vasculitis and no need for repeat biopsy or treatment at this time. Discussed with surgery Dr. Nelson that no need for biopsy at this time, can f/u outpt with vascular and pt should have ace wraps and elevation while inpatient.  - ID following -  IV vancomycin 1g q12hrs; dosing per pharmacy protocol   - ACE Wraps;  wound care eval   - monitor ck   - CT RLE   - SX eval appreciated - Serial lower extremity exams and if pain worsening and not relieved by pain medications or any N/V deficits, notify Surgery team ASAP    # Fall - trauma work up negative     # H/O Chronic left olecranon bursitis   sp aspiration by ortho on 3/28 -"70cc of bloody fluid aspirated- does not appear infectious at all"   -Ortho recommended :instructed patient to keep ACE bandage compression for at least 4 weeks- can be changed PRN   - XRAY L elbow - No evidence of acute fracture or dislocation. Degenerative osseous changes.    # H/O DM2- Uncontrolled (? non compliance) -FS AC QHS, CCHO diet,  insulin inpatient  ; HbA1c 12.7 - endocrinology eval appreciated- For home can resume metformin and Lantus 20 units qHs-pt encouraged to take Lantus every night at home    #H/O Opioid dependence -on suboxone     dvt/ gi ppx/diet  dispo: acute   handoff: fu CT RLE  / ID FU - if CT negative >> possible dc in 24-48 hrs pending clinical improvement

## 2025-03-30 NOTE — PHARMACOTHERAPY INTERVENTION NOTE - NSPHARMCOMMASP
ASP - Lab/ test recommended
ASP - Therapy recommended/ Alternative therapy
ASP - Lab/ test recommended

## 2025-03-30 NOTE — CONSULT NOTE ADULT - ASSESSMENT
.  Impression:  Patient is 54-year-old female with past medical history of DM, COPD not on home O2, opioid dependence on Suboxone, asthma, thyroidism, chronic venous stasis with chronic lymphedema of lower extremities presents to the ED complaining of worsening swelling/pain of bilateral lower extremities ( R > L) Pt states the pain and swelling is causing her ambulatory disfunction, pt states that she fell yesterday. Pt c/o pain to L shoulder (xrays negative for fx)  Pt denies fever, chills.     Surgery consulted for worsening lower Cellulitis with extremity edema, r/o compartment syndrome.        Plan:  - Follow up CT scan lower extremity  - Continue Ivabx  - ID follow up.  - Trend WBC count  - Pain medications as needed  - Serial lower extremity exams and if pain worsening and not relieved by pain medications or any N/V deficits, notify Surgery team ASAP  - Elevate lower extremities while in bed.  - Remaining medical mgt per medical team.    .  Impression:  Patient is 54-year-old female with past medical history of DM, COPD not on home O2, opioid dependence on Suboxone, asthma, thyroidism, chronic venous stasis with chronic lymphedema of lower extremities presents to the ED complaining of worsening swelling/pain of bilateral lower extremities ( R > L) Pt states the pain and swelling is causing her ambulatory disfunction, pt states that she fell yesterday. Pt c/o pain to L shoulder (xrays negative for fx)  Pt denies fever, chills.     Surgery consulted for worsening lower Cellulitis with extremity edema, r/o compartment syndrome.  No current clinical S&S of compartment syndrome in bilateral lower extremities.         Plan:  - Follow up CT scan of Right lower extremity once completed  - Continue Ivabx:  Patient currently on Vancomycin.  - ID follow up.  - Trend WBC count  - Pain medications as needed  - Serial lower extremity exams and if pain worsening and not relieved by pain medications or any N/V deficits develop, notify Surgery team ASAP  - Elevate lower extremities while in bed.  - Recommend wound care consult to treat superficial wounds  - Remaining medical mgt per medical team  - I discussed case and findings with Dr. Omer and he agrees with above and will follow  - I discussed no clinical signs of compartment syndrome with Dr. Molina and told her Dr. Omer will follow    - Patient verbalizes understanding of plan & recommendations and all questions answered to patient's satisfaction.

## 2025-03-31 LAB
ALBUMIN SERPL ELPH-MCNC: 4.3 G/DL — SIGNIFICANT CHANGE UP (ref 3.5–5.2)
ALP SERPL-CCNC: 109 U/L — SIGNIFICANT CHANGE UP (ref 30–115)
ALT FLD-CCNC: 6 U/L — SIGNIFICANT CHANGE UP (ref 0–41)
ANION GAP SERPL CALC-SCNC: 15 MMOL/L — HIGH (ref 7–14)
AST SERPL-CCNC: 38 U/L — SIGNIFICANT CHANGE UP (ref 0–41)
BASOPHILS # BLD AUTO: 0.04 K/UL — SIGNIFICANT CHANGE UP (ref 0–0.2)
BASOPHILS NFR BLD AUTO: 0.5 % — SIGNIFICANT CHANGE UP (ref 0–1)
BILIRUB SERPL-MCNC: 0.3 MG/DL — SIGNIFICANT CHANGE UP (ref 0.2–1.2)
BUN SERPL-MCNC: 18 MG/DL — SIGNIFICANT CHANGE UP (ref 10–20)
CALCIUM SERPL-MCNC: 9.3 MG/DL — SIGNIFICANT CHANGE UP (ref 8.4–10.5)
CHLORIDE SERPL-SCNC: 93 MMOL/L — LOW (ref 98–110)
CK SERPL-CCNC: 794 U/L — HIGH (ref 0–225)
CO2 SERPL-SCNC: 30 MMOL/L — SIGNIFICANT CHANGE UP (ref 17–32)
CREAT SERPL-MCNC: 1.1 MG/DL — SIGNIFICANT CHANGE UP (ref 0.7–1.5)
EGFR: 60 ML/MIN/1.73M2 — SIGNIFICANT CHANGE UP
EGFR: 60 ML/MIN/1.73M2 — SIGNIFICANT CHANGE UP
EOSINOPHIL # BLD AUTO: 0.22 K/UL — SIGNIFICANT CHANGE UP (ref 0–0.7)
EOSINOPHIL NFR BLD AUTO: 2.9 % — SIGNIFICANT CHANGE UP (ref 0–8)
GLUCOSE BLDC GLUCOMTR-MCNC: 149 MG/DL — HIGH (ref 70–99)
GLUCOSE SERPL-MCNC: 101 MG/DL — HIGH (ref 70–99)
HCT VFR BLD CALC: 31.6 % — LOW (ref 37–47)
HGB BLD-MCNC: 10.1 G/DL — LOW (ref 12–16)
IMM GRANULOCYTES NFR BLD AUTO: 0.1 % — SIGNIFICANT CHANGE UP (ref 0.1–0.3)
LYMPHOCYTES # BLD AUTO: 1.93 K/UL — SIGNIFICANT CHANGE UP (ref 1.2–3.4)
LYMPHOCYTES # BLD AUTO: 25.3 % — SIGNIFICANT CHANGE UP (ref 20.5–51.1)
MCHC RBC-ENTMCNC: 26.2 PG — LOW (ref 27–31)
MCHC RBC-ENTMCNC: 32 G/DL — SIGNIFICANT CHANGE UP (ref 32–37)
MCV RBC AUTO: 82.1 FL — SIGNIFICANT CHANGE UP (ref 81–99)
MONOCYTES # BLD AUTO: 0.44 K/UL — SIGNIFICANT CHANGE UP (ref 0.1–0.6)
MONOCYTES NFR BLD AUTO: 5.8 % — SIGNIFICANT CHANGE UP (ref 1.7–9.3)
NEUTROPHILS # BLD AUTO: 4.99 K/UL — SIGNIFICANT CHANGE UP (ref 1.4–6.5)
NEUTROPHILS NFR BLD AUTO: 65.4 % — SIGNIFICANT CHANGE UP (ref 42.2–75.2)
NRBC BLD AUTO-RTO: 0 /100 WBCS — SIGNIFICANT CHANGE UP (ref 0–0)
PLATELET # BLD AUTO: 280 K/UL — SIGNIFICANT CHANGE UP (ref 130–400)
PMV BLD: 9.9 FL — SIGNIFICANT CHANGE UP (ref 7.4–10.4)
POTASSIUM SERPL-MCNC: 3.5 MMOL/L — SIGNIFICANT CHANGE UP (ref 3.5–5)
POTASSIUM SERPL-SCNC: 3.5 MMOL/L — SIGNIFICANT CHANGE UP (ref 3.5–5)
PROT SERPL-MCNC: 8.1 G/DL — HIGH (ref 6–8)
RBC # BLD: 3.85 M/UL — LOW (ref 4.2–5.4)
RBC # FLD: 16.4 % — HIGH (ref 11.5–14.5)
SODIUM SERPL-SCNC: 138 MMOL/L — SIGNIFICANT CHANGE UP (ref 135–146)
WBC # BLD: 7.63 K/UL — SIGNIFICANT CHANGE UP (ref 4.8–10.8)
WBC # FLD AUTO: 7.63 K/UL — SIGNIFICANT CHANGE UP (ref 4.8–10.8)

## 2025-03-31 PROCEDURE — 99232 SBSQ HOSP IP/OBS MODERATE 35: CPT

## 2025-03-31 RX ORDER — BUPRENORPHINE HYDROCHLORIDE, NALOXONE HYDROCHLORIDE 4; 1 MG/1; MG/1
1 FILM, SOLUBLE BUCCAL; SUBLINGUAL ONCE
Refills: 0 | Status: DISCONTINUED | OUTPATIENT
Start: 2025-03-31 | End: 2025-03-31

## 2025-03-31 RX ADMIN — INSULIN GLARGINE-YFGN 20 UNIT(S): 100 INJECTION, SOLUTION SUBCUTANEOUS at 21:17

## 2025-03-31 RX ADMIN — BUPRENORPHINE HYDROCHLORIDE, NALOXONE HYDROCHLORIDE 1 TABLET(S): 4; 1 FILM, SOLUBLE BUCCAL; SUBLINGUAL at 12:39

## 2025-03-31 RX ADMIN — INSULIN LISPRO 1: 100 INJECTION, SOLUTION INTRAVENOUS; SUBCUTANEOUS at 12:44

## 2025-03-31 RX ADMIN — PREGABALIN 50 MILLIGRAM(S): 75 CAPSULE ORAL at 17:17

## 2025-03-31 RX ADMIN — Medication 1 APPLICATION(S): at 06:00

## 2025-03-31 RX ADMIN — INSULIN LISPRO 5 UNIT(S): 100 INJECTION, SOLUTION INTRAVENOUS; SUBCUTANEOUS at 08:10

## 2025-03-31 RX ADMIN — Medication 166.67 MILLIGRAM(S): at 21:08

## 2025-03-31 RX ADMIN — HEPARIN SODIUM 5000 UNIT(S): 1000 INJECTION INTRAVENOUS; SUBCUTANEOUS at 06:01

## 2025-03-31 RX ADMIN — Medication 81 MILLIGRAM(S): at 11:26

## 2025-03-31 RX ADMIN — ATORVASTATIN CALCIUM 20 MILLIGRAM(S): 80 TABLET, FILM COATED ORAL at 21:09

## 2025-03-31 RX ADMIN — Medication 75 MILLILITER(S): at 21:09

## 2025-03-31 RX ADMIN — BUPRENORPHINE HYDROCHLORIDE, NALOXONE HYDROCHLORIDE 2.5 TABLET(S): 4; 1 FILM, SOLUBLE BUCCAL; SUBLINGUAL at 11:27

## 2025-03-31 RX ADMIN — Medication 175 MICROGRAM(S): at 06:01

## 2025-03-31 RX ADMIN — Medication 40 MILLIGRAM(S): at 06:01

## 2025-03-31 RX ADMIN — INSULIN LISPRO 5 UNIT(S): 100 INJECTION, SOLUTION INTRAVENOUS; SUBCUTANEOUS at 12:45

## 2025-03-31 RX ADMIN — FUROSEMIDE 40 MILLIGRAM(S): 10 INJECTION INTRAMUSCULAR; INTRAVENOUS at 06:01

## 2025-03-31 RX ADMIN — PREGABALIN 50 MILLIGRAM(S): 75 CAPSULE ORAL at 06:01

## 2025-03-31 NOTE — PROGRESS NOTE ADULT - SUBJECTIVE AND OBJECTIVE BOX
EZRA BARCLAY 54y Female  MRN#: [id number]     Hospital Day: 5d      SUBJECTIVE  No acute events overnight, pt seen and examined this morning.                                          ----------------------------------------------------------  OBJECTIVE  PAST MEDICAL & SURGICAL HISTORY  Asthma with COPD    Hypothyroidism    H/O: substance abuse  no longer using    History of pancreatitis    Hepatitis-C    Leukocytoclastic vasculitis    Bilateral edema of lower extremity    HLD (hyperlipidemia)    Type 2 diabetes mellitus    History of appendectomy    History of tonsillectomy                                              -----------------------------------------------------------  ALLERGIES:  No Known Allergies                                            ------------------------------------------------------------    HOME MEDICATIONS  Home Medications:  pregabalin 50 mg oral capsule: 1 cap(s) orally every 12 hours (06 Jun 2024 07:48)  Suboxone 8 mg-2 mg sublingual tablet: 2.5 film(s) sublingual once a day (28 May 2024 03:24)                           MEDICATIONS:  STANDING MEDICATIONS  aspirin enteric coated 81 milliGRAM(s) Oral daily  atorvastatin 20 milliGRAM(s) Oral at bedtime  buprenorphine 8 mG/naloxone 2 mG SL  Tablet 2.5 Tablet(s) SubLingual daily  chlorhexidine 2% Cloths 1 Application(s) Topical <User Schedule>  dextrose 5%. 1000 milliLiter(s) IV Continuous <Continuous>  dextrose 5%. 1000 milliLiter(s) IV Continuous <Continuous>  dextrose 50% Injectable 25 Gram(s) IV Push once  dextrose 50% Injectable 12.5 Gram(s) IV Push once  dextrose 50% Injectable 25 Gram(s) IV Push once  furosemide    Tablet 40 milliGRAM(s) Oral daily  glucagon  Injectable 1 milliGRAM(s) IntraMuscular once  heparin   Injectable 5000 Unit(s) SubCutaneous every 12 hours  insulin glargine Injectable (LANTUS) 20 Unit(s) SubCutaneous at bedtime  insulin lispro (ADMELOG) corrective regimen sliding scale   SubCutaneous three times a day before meals  insulin lispro Injectable (ADMELOG) 5 Unit(s) SubCutaneous three times a day before meals  levothyroxine 175 MICROGram(s) Oral daily  lidocaine 1% (Preservative-free) Injectable 10 milliLiter(s) Local Injection once  pantoprazole    Tablet 40 milliGRAM(s) Oral before breakfast  pregabalin 50 milliGRAM(s) Oral every 12 hours  sodium chloride 0.9%. 1000 milliLiter(s) IV Continuous <Continuous>  vancomycin  IVPB 1250 milliGRAM(s) IV Intermittent every 24 hours    PRN MEDICATIONS  acetaminophen     Tablet .. 650 milliGRAM(s) Oral every 6 hours PRN  albuterol    90 MICROgram(s) HFA Inhaler 1 Puff(s) Inhalation every 6 hours PRN  aluminum hydroxide/magnesium hydroxide/simethicone Suspension 30 milliLiter(s) Oral every 4 hours PRN  dextrose Oral Gel 15 Gram(s) Oral once PRN  melatonin 3 milliGRAM(s) Oral at bedtime PRN  morphine  - Injectable 2 milliGRAM(s) IV Push every 6 hours PRN  ondansetron Injectable 4 milliGRAM(s) IV Push every 8 hours PRN                                            ------------------------------------------------------------  VITAL SIGNS: Last 24 Hours  T(C): 36.6 (31 Mar 2025 05:00), Max: 36.6 (31 Mar 2025 05:00)  T(F): 97.8 (31 Mar 2025 05:00), Max: 97.8 (31 Mar 2025 05:00)  HR: 82 (31 Mar 2025 05:00) (72 - 82)  BP: 106/70 (31 Mar 2025 05:00) (106/70 - 114/79)  BP(mean): --  RR: 16 (31 Mar 2025 05:00) (16 - 16)  SpO2: 95% (31 Mar 2025 05:00) (95% - 97%)                                             --------------------------------------------------------------  LABS:                        10.8   7.29  )-----------( 286      ( 30 Mar 2025 12:00 )             34.4     03-30    134[L]  |  90[L]  |  14  ----------------------------<  131[H]  3.6   |  30  |  1.1    Ca    9.4      30 Mar 2025 12:00        Urinalysis Basic - ( 30 Mar 2025 12:00 )    Color: x / Appearance: x / SG: x / pH: x  Gluc: 131 mg/dL / Ketone: x  / Bili: x / Urobili: x   Blood: x / Protein: x / Nitrite: x   Leuk Esterase: x / RBC: x / WBC x   Sq Epi: x / Non Sq Epi: x / Bacteria: x                                                            -------------------------------------------------------------  RADIOLOGY:  CXR      CT                                            --------------------------------------------------------------    PHYSICAL EXAM:  GENERAL: NAD, lying in bed comfortably  CHEST/LUNG: Clear to auscultation bilaterally; No rales, rhonchi, wheezing, or rubs. Unlabored respirations  HEART: Regular rate and rhythm; No murmurs, rubs, or gallops  ABDOMEN: Soft, nontender, nondistended  EXTREMITIES:b/l Le swelling with dematisis R>L  NERVOUS SYSTEM:  A&Ox3

## 2025-03-31 NOTE — PROGRESS NOTE ADULT - SUBJECTIVE AND OBJECTIVE BOX
INFECTIOUS DISEASE FOLLOW UP NOTE:    Interval History/ROS: Patient is a 54y old  Female who presents with a chief complaint of LE swelling (31 Mar 2025 15:06)    Overnight events: Reports tenderness of right LE. swelling has improved. Wounds are clean, no purulence.    REVIEW OF SYSTEMS:  CONSTITUTIONAL: No fever or chills  HEAD: No lesion on scalp  EYES: No visual disturbance  ENT: No sore throat  RESPIRATORY: No cough, no shortness of breath  CARDIOVASCULAR: No chest pain or palpitations  GASTROINTESTINAL: No abdominal or epigastric pain  GENITOURINARY: No dysuria  NEUROLOGICAL: No headache/dizziness  MUSCULOSKELETAL: Left elbow bursitis  SKIN: Bilateral LE erythema, R > L;   All other ROS negative except noted above      Prior hospital charts reviewed [Yes]  Primary team notes reviewed [Yes]  Other consultant notes reviewed [Yes]    Allergies:  No Known Allergies      ANTIMICROBIALS:   vancomycin  IVPB 1250 every 24 hours      OTHER MEDS: MEDICATIONS  (STANDING):  acetaminophen     Tablet .. 650 every 6 hours PRN  albuterol    90 MICROgram(s) HFA Inhaler 1 every 6 hours PRN  aluminum hydroxide/magnesium hydroxide/simethicone Suspension 30 every 4 hours PRN  aspirin enteric coated 81 daily  atorvastatin 20 at bedtime  buprenorphine 8 mG/naloxone 2 mG SL  Tablet 2.5 daily  dextrose 50% Injectable 25 once  dextrose 50% Injectable 12.5 once  dextrose 50% Injectable 25 once  dextrose Oral Gel 15 once PRN  furosemide    Tablet 40 daily  glucagon  Injectable 1 once  heparin   Injectable 5000 every 12 hours  insulin glargine Injectable (LANTUS) 20 at bedtime  insulin lispro (ADMELOG) corrective regimen sliding scale  three times a day before meals  insulin lispro Injectable (ADMELOG) 5 three times a day before meals  levothyroxine 175 daily  melatonin 3 at bedtime PRN  morphine  - Injectable 2 every 6 hours PRN  ondansetron Injectable 4 every 8 hours PRN  pantoprazole    Tablet 40 before breakfast  pregabalin 50 every 12 hours      Vital Signs Last 24 Hrs  T(F): 97.3 (03-31-25 @ 20:15), Max: 98.2 (03-29-25 @ 13:20)    Vital Signs Last 24 Hrs  HR: 65 (03-31-25 @ 20:15) (63 - 82)  BP: 122/67 (03-31-25 @ 20:15) (106/70 - 122/67)  RR: 18 (03-31-25 @ 20:15)  SpO2: 93% (03-31-25 @ 20:15) (93% - 99%)  Wt(kg): --    EXAM:  GENERAL: NAD, lying in bed  HEAD: No head lesions  EYES: Conjunctiva pink and cornea white  EAR, NOSE, MOUTH, THROAT: Normal external ears and nose, no discharges; moist mucous membranes  NECK: Supple, nontender to palpation; no JVD  RESPIRATORY: Clear to auscultation bilaterally  CARDIOVASCULAR: S1 S2  GASTROINTESTINAL: Soft, nontender, nondistended; normoactive bowel sounds  GENITOURINARY: No dan catheter, no CVA tenderness  EXTREMITIES: No clubbing, cyanosis, or petal edema  NERVOUS SYSTEM: Alert and oriented to person, time, place and situation, speech clear. No focal deficits   MUSCULOSKELETAL: Large left elbow bursitis  SKIN: Bilateral LE erythema with wounds, worse on R, no superficial thrombophlebitis  PSYCH: Normal affect    Labs:                        10.1   7.63  )-----------( 280      ( 31 Mar 2025 15:40 )             31.6     03-31    138  |  93[L]  |  18  ----------------------------<  101[H]  3.5   |  30  |  1.1    Ca    9.3      31 Mar 2025 15:40    TPro  8.1[H]  /  Alb  4.3  /  TBili  0.3  /  DBili  x   /  AST  38  /  ALT  6   /  AlkPhos  109  03-31      WBC Trend:  WBC Count: 7.63 (03-31-25 @ 15:40)  WBC Count: 7.29 (03-30-25 @ 12:00)  WBC Count: 6.36 (03-29-25 @ 13:55)  WBC Count: 5.65 (03-28-25 @ 07:40)      Creatine Trend:  Creatinine: 1.1 (03-31)  Creatinine: 1.1 (03-30)  Creatinine: 1.1 (03-29)  Creatinine: 1.1 (03-28)      Liver Biochemical Testing Trend:  Alanine Aminotransferase (ALT/SGPT): 6 (03-31)  Alanine Aminotransferase (ALT/SGPT): 5 (03-28)  Alanine Aminotransferase (ALT/SGPT): 7 (03-27)  Alanine Aminotransferase (ALT/SGPT): 5 (03-26)  Alanine Aminotransferase (ALT/SGPT): <5 (02-14)  Aspartate Aminotransferase (AST/SGOT): 38 (03-31-25 @ 15:40)  Aspartate Aminotransferase (AST/SGOT): 33 (03-28-25 @ 07:40)  Aspartate Aminotransferase (AST/SGOT): 25 (03-27-25 @ 05:39)  Aspartate Aminotransferase (AST/SGOT): 23 (03-26-25 @ 22:42)  Aspartate Aminotransferase (AST/SGOT): 28 (02-14-25 @ 07:17)  Bilirubin Total: 0.3 (03-31)  Bilirubin Total: 0.3 (03-28)  Bilirubin Total: 0.3 (03-27)  Bilirubin Total: 0.3 (03-26)  Bilirubin Total: 0.2 (02-14)      Trend LDH  01-10-24 @ 06:55  230      Urinalysis Basic - ( 31 Mar 2025 15:40 )    Color: x / Appearance: x / SG: x / pH: x  Gluc: 101 mg/dL / Ketone: x  / Bili: x / Urobili: x   Blood: x / Protein: x / Nitrite: x   Leuk Esterase: x / RBC: x / WBC x   Sq Epi: x / Non Sq Epi: x / Bacteria: x        MICROBIOLOGY:  Vancomycin Level, Random: 12.1 (03-30 @ 12:00)  Vancomycin Level, Trough: 27.1 (03-29 @ 08:40)  Vancomycin Level, Trough: 5.5 (03-28 @ 19:00)  Vancomycin Level, Trough: 12.2 (03-28 @ 07:40)    MRSA PCR Result.: Negative (05-04-24 @ 07:00)      Culture - Blood (collected 10 Feb 2025 20:00)  Source: .Blood BLOOD  Final Report:    No growth at 5 days    Culture - Blood (collected 10 Feb 2025 20:00)  Source: .Blood BLOOD  Final Report:    No growth at 5 days    Culture - Blood (collected 28 Dec 2024 17:10)  Source: .Blood BLOOD  Final Report:    No growth at 5 days    Culture - Blood (collected 27 Dec 2024 16:00)  Source: .Blood BLOOD  Final Report:    No growth at 5 days    Culture - Blood (collected 27 Dec 2024 16:00)  Source: .Blood BLOOD  Final Report:    No growth at 5 days    Urinalysis with Rflx Culture (collected 29 Nov 2024 00:13)    Culture - Blood (collected 28 Nov 2024 22:04)  Source: .Blood BLOOD  Final Report:    No growth at 5 days    Culture - Blood (collected 28 Nov 2024 22:04)  Source: .Blood BLOOD  Final Report:    No growth at 5 days    Urinalysis with Rflx Culture (collected 14 Nov 2024 18:42)    Culture - Blood (collected 27 May 2024 21:10)  Source: .Blood Blood  Final Report:    No growth at 5 days      HIV-1/2 Combo Result: Nonreact (12-31-24 @ 08:00)  HIV-1/2 Combo Result: Nonreact (01-11-24 @ 13:40)      C-Reactive Protein: 26.4 (03-27)      RADIOLOGY:  imaging below personally reviewed    < from: CT Lower Extremity w/ IV Cont, Right (03.30.25 @ 15:46) >    IMPRESSION:    Diffuse subcutaneous soft tissue edema with skin and fascial thickening   of the right lower extremity, compatible with patient's clinical   diagnosis of cellulitis. No drainable fluid collection or subcutaneous   emphysema. No erosive osseous changes.    Moderate degenerative changes of the ankle and midfoot are noted.    < end of copied text >    < from: Xray Elbow AP + Lateral + Oblique, Left (03.28.25 @ 18:41) >    Impression:  No evidence of acute osseous abnormality.      < end of copied text >

## 2025-03-31 NOTE — PROGRESS NOTE ADULT - SUBJECTIVE AND OBJECTIVE BOX
patient complaining of continued pain and swelling of the lower extremities. She reports that she sometimes feels pins and needles, but not currently. patient also notes that wounds occasionally drain yellow fluid.     MEDICATIONS  (STANDING):  aspirin enteric coated 81 milliGRAM(s) Oral daily  atorvastatin 20 milliGRAM(s) Oral at bedtime  buprenorphine 8 mG/naloxone 2 mG SL  Tablet 2.5 Tablet(s) SubLingual daily  chlorhexidine 2% Cloths 1 Application(s) Topical <User Schedule>  dextrose 5%. 1000 milliLiter(s) (50 mL/Hr) IV Continuous <Continuous>  dextrose 5%. 1000 milliLiter(s) (100 mL/Hr) IV Continuous <Continuous>  dextrose 50% Injectable 25 Gram(s) IV Push once  dextrose 50% Injectable 12.5 Gram(s) IV Push once  dextrose 50% Injectable 25 Gram(s) IV Push once  furosemide    Tablet 40 milliGRAM(s) Oral daily  glucagon  Injectable 1 milliGRAM(s) IntraMuscular once  heparin   Injectable 5000 Unit(s) SubCutaneous every 12 hours  insulin glargine Injectable (LANTUS) 20 Unit(s) SubCutaneous at bedtime  insulin lispro (ADMELOG) corrective regimen sliding scale   SubCutaneous three times a day before meals  insulin lispro Injectable (ADMELOG) 5 Unit(s) SubCutaneous three times a day before meals  levothyroxine 175 MICROGram(s) Oral daily  lidocaine 1% (Preservative-free) Injectable 10 milliLiter(s) Local Injection once  pantoprazole    Tablet 40 milliGRAM(s) Oral before breakfast  pregabalin 50 milliGRAM(s) Oral every 12 hours  sodium chloride 0.9%. 1000 milliLiter(s) (75 mL/Hr) IV Continuous <Continuous>  vancomycin  IVPB 1250 milliGRAM(s) IV Intermittent every 24 hours    MEDICATIONS  (PRN):  acetaminophen     Tablet .. 650 milliGRAM(s) Oral every 6 hours PRN Temp greater or equal to 38C (100.4F), Mild Pain (1 - 3)  albuterol    90 MICROgram(s) HFA Inhaler 1 Puff(s) Inhalation every 6 hours PRN Shortness of Breath  aluminum hydroxide/magnesium hydroxide/simethicone Suspension 30 milliLiter(s) Oral every 4 hours PRN Dyspepsia  dextrose Oral Gel 15 Gram(s) Oral once PRN Blood Glucose LESS THAN 70 milliGRAM(s)/deciliter  melatonin 3 milliGRAM(s) Oral at bedtime PRN Insomnia  morphine  - Injectable 2 milliGRAM(s) IV Push every 6 hours PRN Severe Pain (7 - 10)  ondansetron Injectable 4 milliGRAM(s) IV Push every 8 hours PRN Nausea and/or Vomiting    Vital Signs Last 24 Hrs  T(C): 36.6 (31 Mar 2025 05:00), Max: 36.6 (30 Mar 2025 13:35)  T(F): 97.8 (31 Mar 2025 05:00), Max: 97.8 (30 Mar 2025 13:35)  HR: 82 (31 Mar 2025 05:00) (68 - 82)  BP: 106/70 (31 Mar 2025 05:00) (106/70 - 125/81)  RR: 16 (31 Mar 2025 05:00) (16 - 16)  SpO2: 95% (31 Mar 2025 05:00) (95% - 97%)        Physical Exam:  General:  NAD   Chest:  normal resp effort   Extremities: signifiacnt lower extremity swelling bilaterally. lower extremities wrapped in ace bandages with evidence of yellow serous fluid drained from open ulcers   neuro: senation intact b/l lower extremities, motor function intact   cv: distal pulses palpable       LABS:                        10.8   7.29  )-----------( 286      ( 30 Mar 2025 12:00 )             34.4     03-30    134[L]  |  90[L]  |  14  ----------------------------<  131[H]  3.6   |  30  |  1.1    Ca    9.4      30 Mar 2025 12:00    < from: CT Lower Extremity w/ IV Cont, Right (03.30.25 @ 15:46) >  IMPRESSION:    Diffuse subcutaneous soft tissue edema with skin and fascial thickening   of the right lower extremity, compatible with patient's clinical   diagnosis of cellulitis. No drainable fluid collection or subcutaneous   emphysema. No erosive osseous changes.    Moderate degenerative changes of the ankle and midfoot are noted    < end of copied text >

## 2025-04-01 LAB
BASOPHILS # BLD AUTO: 0.06 K/UL — SIGNIFICANT CHANGE UP (ref 0–0.2)
BASOPHILS NFR BLD AUTO: 1 % — SIGNIFICANT CHANGE UP (ref 0–1)
EOSINOPHIL # BLD AUTO: 0.15 K/UL — SIGNIFICANT CHANGE UP (ref 0–0.7)
EOSINOPHIL NFR BLD AUTO: 2.4 % — SIGNIFICANT CHANGE UP (ref 0–8)
HCT VFR BLD CALC: 36.8 % — LOW (ref 37–47)
HGB BLD-MCNC: 11.3 G/DL — LOW (ref 12–16)
IMM GRANULOCYTES NFR BLD AUTO: 0.2 % — SIGNIFICANT CHANGE UP (ref 0.1–0.3)
LYMPHOCYTES # BLD AUTO: 2.68 K/UL — SIGNIFICANT CHANGE UP (ref 1.2–3.4)
LYMPHOCYTES # BLD AUTO: 43.2 % — SIGNIFICANT CHANGE UP (ref 20.5–51.1)
MCHC RBC-ENTMCNC: 26.1 PG — LOW (ref 27–31)
MCHC RBC-ENTMCNC: 30.7 G/DL — LOW (ref 32–37)
MCV RBC AUTO: 85 FL — SIGNIFICANT CHANGE UP (ref 81–99)
MONOCYTES # BLD AUTO: 0.47 K/UL — SIGNIFICANT CHANGE UP (ref 0.1–0.6)
MONOCYTES NFR BLD AUTO: 7.6 % — SIGNIFICANT CHANGE UP (ref 1.7–9.3)
NEUTROPHILS # BLD AUTO: 2.84 K/UL — SIGNIFICANT CHANGE UP (ref 1.4–6.5)
NEUTROPHILS NFR BLD AUTO: 45.6 % — SIGNIFICANT CHANGE UP (ref 42.2–75.2)
NRBC BLD AUTO-RTO: 0 /100 WBCS — SIGNIFICANT CHANGE UP (ref 0–0)
PLATELET # BLD AUTO: 205 K/UL — SIGNIFICANT CHANGE UP (ref 130–400)
PMV BLD: 10.2 FL — SIGNIFICANT CHANGE UP (ref 7.4–10.4)
RBC # BLD: 4.33 M/UL — SIGNIFICANT CHANGE UP (ref 4.2–5.4)
RBC # FLD: 16.7 % — HIGH (ref 11.5–14.5)
WBC # BLD: 6.21 K/UL — SIGNIFICANT CHANGE UP (ref 4.8–10.8)
WBC # FLD AUTO: 6.21 K/UL — SIGNIFICANT CHANGE UP (ref 4.8–10.8)

## 2025-04-01 PROCEDURE — 99223 1ST HOSP IP/OBS HIGH 75: CPT

## 2025-04-01 PROCEDURE — 76937 US GUIDE VASCULAR ACCESS: CPT | Mod: 26

## 2025-04-01 PROCEDURE — 99232 SBSQ HOSP IP/OBS MODERATE 35: CPT

## 2025-04-01 PROCEDURE — 36000 PLACE NEEDLE IN VEIN: CPT

## 2025-04-01 RX ADMIN — INSULIN LISPRO 5 UNIT(S): 100 INJECTION, SOLUTION INTRAVENOUS; SUBCUTANEOUS at 12:00

## 2025-04-01 RX ADMIN — Medication 40 MILLIGRAM(S): at 05:15

## 2025-04-01 RX ADMIN — Medication 1 APPLICATION(S): at 05:15

## 2025-04-01 RX ADMIN — INSULIN GLARGINE-YFGN 20 UNIT(S): 100 INJECTION, SOLUTION SUBCUTANEOUS at 22:19

## 2025-04-01 RX ADMIN — PREGABALIN 50 MILLIGRAM(S): 75 CAPSULE ORAL at 05:16

## 2025-04-01 RX ADMIN — INSULIN LISPRO 5 UNIT(S): 100 INJECTION, SOLUTION INTRAVENOUS; SUBCUTANEOUS at 07:28

## 2025-04-01 RX ADMIN — BUPRENORPHINE HYDROCHLORIDE, NALOXONE HYDROCHLORIDE 2.5 TABLET(S): 4; 1 FILM, SOLUBLE BUCCAL; SUBLINGUAL at 11:30

## 2025-04-01 RX ADMIN — Medication 75 MILLILITER(S): at 22:19

## 2025-04-01 RX ADMIN — Medication 175 MICROGRAM(S): at 05:15

## 2025-04-01 RX ADMIN — INSULIN LISPRO 2: 100 INJECTION, SOLUTION INTRAVENOUS; SUBCUTANEOUS at 12:00

## 2025-04-01 RX ADMIN — PREGABALIN 50 MILLIGRAM(S): 75 CAPSULE ORAL at 17:17

## 2025-04-01 RX ADMIN — FUROSEMIDE 40 MILLIGRAM(S): 10 INJECTION INTRAMUSCULAR; INTRAVENOUS at 05:15

## 2025-04-01 RX ADMIN — ATORVASTATIN CALCIUM 20 MILLIGRAM(S): 80 TABLET, FILM COATED ORAL at 22:19

## 2025-04-01 NOTE — PROCEDURE NOTE - NSPROCNAME_GEN_A_CORE
Peripheral Line Insertion

## 2025-04-01 NOTE — PROCEDURE NOTE - NSPROCDETAILS_GEN_ALL_CORE
location identified, draped/prepped, sterile technique used/blood seen on insertion/dressing applied/flushes easily/secured in place/sterile technique, catheter placed
location identified, draped/prepped, sterile technique used/blood seen on insertion/dressing applied/flushes easily/secured in place

## 2025-04-01 NOTE — PROCEDURE NOTE - NSINDICATIONS_GEN_A_CORE
emergency venous access
antibiotic therapy
antibiotic therapy/fluid administration
emergency venous access

## 2025-04-01 NOTE — PROGRESS NOTE ADULT - SUBJECTIVE AND OBJECTIVE BOX
EZRA BARCLAY 54y Female  MRN#: [id number]     Hospital Day: 6d      SUBJECTIVE  No acute events overnight, pt seen and examined this morning.                                          ----------------------------------------------------------  OBJECTIVE  PAST MEDICAL & SURGICAL HISTORY  Asthma with COPD    Hypothyroidism    H/O: substance abuse  no longer using    History of pancreatitis    Hepatitis-C    Leukocytoclastic vasculitis    Bilateral edema of lower extremity    HLD (hyperlipidemia)    Type 2 diabetes mellitus    History of appendectomy    History of tonsillectomy                                              -----------------------------------------------------------  ALLERGIES:  No Known Allergies                                            ------------------------------------------------------------    HOME MEDICATIONS  Home Medications:  pregabalin 50 mg oral capsule: 1 cap(s) orally every 12 hours (06 Jun 2024 07:48)  Suboxone 8 mg-2 mg sublingual tablet: 2.5 film(s) sublingual once a day (28 May 2024 03:24)                           MEDICATIONS:  STANDING MEDICATIONS  aspirin enteric coated 81 milliGRAM(s) Oral daily  atorvastatin 20 milliGRAM(s) Oral at bedtime  buprenorphine 8 mG/naloxone 2 mG SL  Tablet 2.5 Tablet(s) SubLingual daily  chlorhexidine 2% Cloths 1 Application(s) Topical <User Schedule>  dextrose 5%. 1000 milliLiter(s) IV Continuous <Continuous>  dextrose 5%. 1000 milliLiter(s) IV Continuous <Continuous>  dextrose 50% Injectable 25 Gram(s) IV Push once  dextrose 50% Injectable 12.5 Gram(s) IV Push once  dextrose 50% Injectable 25 Gram(s) IV Push once  furosemide    Tablet 40 milliGRAM(s) Oral daily  glucagon  Injectable 1 milliGRAM(s) IntraMuscular once  heparin   Injectable 5000 Unit(s) SubCutaneous every 12 hours  insulin glargine Injectable (LANTUS) 20 Unit(s) SubCutaneous at bedtime  insulin lispro (ADMELOG) corrective regimen sliding scale   SubCutaneous three times a day before meals  insulin lispro Injectable (ADMELOG) 5 Unit(s) SubCutaneous three times a day before meals  levothyroxine 175 MICROGram(s) Oral daily  lidocaine 1% (Preservative-free) Injectable 10 milliLiter(s) Local Injection once  pantoprazole    Tablet 40 milliGRAM(s) Oral before breakfast  pregabalin 50 milliGRAM(s) Oral every 12 hours  sodium chloride 0.9%. 1000 milliLiter(s) IV Continuous <Continuous>  vancomycin  IVPB 1250 milliGRAM(s) IV Intermittent every 24 hours    PRN MEDICATIONS  acetaminophen     Tablet .. 650 milliGRAM(s) Oral every 6 hours PRN  albuterol    90 MICROgram(s) HFA Inhaler 1 Puff(s) Inhalation every 6 hours PRN  aluminum hydroxide/magnesium hydroxide/simethicone Suspension 30 milliLiter(s) Oral every 4 hours PRN  dextrose Oral Gel 15 Gram(s) Oral once PRN  melatonin 3 milliGRAM(s) Oral at bedtime PRN  morphine  - Injectable 2 milliGRAM(s) IV Push every 6 hours PRN  ondansetron Injectable 4 milliGRAM(s) IV Push every 8 hours PRN                                            ------------------------------------------------------------  VITAL SIGNS: Last 24 Hours  T(C): 36.3 (01 Apr 2025 04:32), Max: 36.4 (31 Mar 2025 14:09)  T(F): 97.3 (01 Apr 2025 04:32), Max: 97.5 (31 Mar 2025 14:09)  HR: 60 (01 Apr 2025 04:32) (60 - 65)  BP: 131/72 (01 Apr 2025 04:32) (116/76 - 131/72)  BP(mean): --  RR: 18 (01 Apr 2025 04:32) (18 - 18)  SpO2: 94% (01 Apr 2025 04:32) (93% - 99%)                                             --------------------------------------------------------------  LABS:                        11.3   6.21  )-----------( 205      ( 01 Apr 2025 08:22 )             36.8     03-31    138  |  93[L]  |  18  ----------------------------<  101[H]  3.5   |  30  |  1.1    Ca    9.3      31 Mar 2025 15:40    TPro  8.1[H]  /  Alb  4.3  /  TBili  0.3  /  DBili  x   /  AST  38  /  ALT  6   /  AlkPhos  109  03-31      Urinalysis Basic - ( 31 Mar 2025 15:40 )    Color: x / Appearance: x / SG: x / pH: x  Gluc: 101 mg/dL / Ketone: x  / Bili: x / Urobili: x   Blood: x / Protein: x / Nitrite: x   Leuk Esterase: x / RBC: x / WBC x   Sq Epi: x / Non Sq Epi: x / Bacteria: x                                                            -------------------------------------------------------------  RADIOLOGY:  CXR      CT                                            --------------------------------------------------------------    PHYSICAL EXAM:  GENERAL: NAD, lying in bed comfortably  CHEST/LUNG: Clear to auscultation bilaterally; No rales, rhonchi, wheezing, or rubs. Unlabored respirations  HEART: Regular rate and rhythm; No murmurs, rubs, or gallops  ABDOMEN: Soft, nontender, nondistended  EXTREMITIES:  LE edema b/l with overying dermatitis R>L, L elbow fluid collection   NERVOUS SYSTEM:  A&Ox3

## 2025-04-01 NOTE — CONSULT NOTE ADULT - SUBJECTIVE AND OBJECTIVE BOX
Patient is 54-year-old female with past medical history of DM, COPD not on home O2, opioid dependence on Suboxone, asthma, thyroidism, chronic venous stasis with chronic lymphedema of lower extremities presents to the ED complaining of worsening swelling/pain of bilateral lower extremities ( R > L) Pt states the pain and swelling is causing her ambulatory disfunction, pt states that she fell yesterday. Pt c/o pain to L shoulder (xrays negative for fx)  Pt denies fever, chills, nausea, vomiting, abdominal pain, diarrhea, headache, dizziness, weakness, chest pain, SOB, back pain, LOC, trauma, urinary symptoms, cough.         PAST MEDICAL & SURGICAL HISTORY:  Asthma with COPD  Hypothyroidism  H/O: substance abuse  no longer using  History of pancreatitis  Hepatitis-C  Leukocytoclastic vasculitis  Bilateral edema of lower extremity  HLD (hyperlipidemia)  Type 2 diabetes mellitus  History of appendectomy  History of tonsillectomy    REVIEW OF SYSTEMS: All other systems negative    MEDICATIONS  (STANDING):  aspirin enteric coated 81 milliGRAM(s) Oral daily  atorvastatin 20 milliGRAM(s) Oral at bedtime  buprenorphine 8 mG/naloxone 2 mG SL  Tablet 2.5 Tablet(s) SubLingual daily  chlorhexidine 2% Cloths 1 Application(s) Topical <User Schedule>  dextrose 5%. 1000 milliLiter(s) (50 mL/Hr) IV Continuous <Continuous>  dextrose 5%. 1000 milliLiter(s) (100 mL/Hr) IV Continuous <Continuous>  dextrose 50% Injectable 25 Gram(s) IV Push once  dextrose 50% Injectable 12.5 Gram(s) IV Push once  dextrose 50% Injectable 25 Gram(s) IV Push once  furosemide    Tablet 40 milliGRAM(s) Oral daily  glucagon  Injectable 1 milliGRAM(s) IntraMuscular once  heparin   Injectable 5000 Unit(s) SubCutaneous every 12 hours  insulin glargine Injectable (LANTUS) 20 Unit(s) SubCutaneous at bedtime  insulin lispro (ADMELOG) corrective regimen sliding scale   SubCutaneous three times a day before meals  insulin lispro Injectable (ADMELOG) 5 Unit(s) SubCutaneous three times a day before meals  levothyroxine 175 MICROGram(s) Oral daily  lidocaine 1% (Preservative-free) Injectable 10 milliLiter(s) Local Injection once  pantoprazole    Tablet 40 milliGRAM(s) Oral before breakfast  pregabalin 50 milliGRAM(s) Oral every 12 hours  sodium chloride 0.9%. 1000 milliLiter(s) (75 mL/Hr) IV Continuous <Continuous>  vancomycin  IVPB 1250 milliGRAM(s) IV Intermittent every 24 hours    MEDICATIONS  (PRN):  acetaminophen     Tablet .. 650 milliGRAM(s) Oral every 6 hours PRN Temp greater or equal to 38C (100.4F), Mild Pain (1 - 3)  albuterol    90 MICROgram(s) HFA Inhaler 1 Puff(s) Inhalation every 6 hours PRN Shortness of Breath  aluminum hydroxide/magnesium hydroxide/simethicone Suspension 30 milliLiter(s) Oral every 4 hours PRN Dyspepsia  dextrose Oral Gel 15 Gram(s) Oral once PRN Blood Glucose LESS THAN 70 milliGRAM(s)/deciliter  melatonin 3 milliGRAM(s) Oral at bedtime PRN Insomnia  morphine  - Injectable 2 milliGRAM(s) IV Push every 6 hours PRN Severe Pain (7 - 10)  ondansetron Injectable 4 milliGRAM(s) IV Push every 8 hours PRN Nausea and/or Vomiting      Allergies  No Known Allergies  Intolerances        SOCIAL HISTORY:   HX ETOH, drugs    FAMILY HISTORY:         PHYSICAL EXAM:  Vital Signs Last 24 Hrs  T(C): 36.6 (01 Apr 2025 13:44), Max: 36.6 (01 Apr 2025 13:44)  T(F): 97.9 (01 Apr 2025 13:44), Max: 97.9 (01 Apr 2025 13:44)  HR: 62 (01 Apr 2025 13:44) (60 - 65)  BP: 135/78 (01 Apr 2025 13:44) (116/76 - 135/78)  BP(mean): --  RR: 16 (01 Apr 2025 13:44) (16 - 18)  SpO2: 95% (01 Apr 2025 13:44) (93% - 99%)    Parameters below as of 01 Apr 2025 04:32  Patient On (Oxygen Delivery Method): room air    CONSTITUTIONAL: NAD  HEAD: No lesion on scalp  EYES: No visual disturbance  ENT: No sore throat  RESPIRATORY: No cough, no shortness of breath  CARDIOVASCULAR: No chest pain or palpitations  GASTROINTESTINAL: No abdominal or epigastric pain  GENITOURINARY: No dysuria  NEUROLOGICAL: No headache/dizziness  MUSCULOSKELETAL: No joint pain, erythema, or swelling; no back pain  SKIN: : B/L lower extremity,  multiple  open wounds, scabs with  edema and redness          LABS/ CULTURES/ RADIOLOGY:                        11.3   6.21  )-----------( 205      ( 01 Apr 2025 08:22 )             36.8       138  |  93  |  18  ----------------------------<  101      [03-31-25 @ 15:40]  3.5   |  30  |  1.1        Ca     9.3     [03-31-25 @ 15:40]    TPro  8.1  /  Alb  4.3  /  TBili  0.3  /  DBili  x   /  AST  38  /  ALT  6   /  AlkPhos  109  [03-31-25 @ 15:40]

## 2025-04-01 NOTE — PROCEDURE NOTE - NSSITEPREP_SKIN_A_CORE
chlorhexidine
alcohol/Adherence to aseptic technique: hand hygiene prior to donning barriers (gown, gloves), don cap and mask, sterile drape over patient

## 2025-04-01 NOTE — CHART NOTE - NSCHARTNOTEFT_GEN_A_CORE
Discussed with rheumatology Dr. Vargas as pt has hx of leg biopsy + for leukocytoclastic vasculitis last year. I sent her picture of legs and they do not appear that much different then last year, she believes that her main problem is chronic lymphedema and venous insufficiency as opposed to vasculitis and no need for repeat biopsy or treatment at this time.  Discussed with ID above discussion with rheum, c/w abx for now.  Discussed with surgery Dr. Nelson that no need for biopsy at this time, can f/u outpt with vascular and pt should have ace wraps and elevation while inpatient.
MD obtained blood for vancomycin level via femoral vein puncture
Patient with very poor vascular access, has had 3 IV lines placed with ultrasound recently the last done 2 hours ago which infiltrated while Vancomycin was infusing (ordered arm elevation and warm compresses). Have asked unit PA to discuss central line placement option with patient
reconsulted secondary to reaccumulation of left olecranon fluid  compressive wrap removed secondary to irritation   no indication for to aspirate again  recommend compressive sleeve to reduce/prevent reaccumulation if tolerated   no further ortho intervention

## 2025-04-01 NOTE — CONSULT NOTE ADULT - ASSESSMENT
Skin assessed-  B/L lower extremity, chronic   multiple  open wounds dry ,scabs, with edema and erythema                                                High risk for pressure injury development or progression           Plan:  Wound and skin care  Clean lower extremity wounds with soap and water  wound  pat dry then apply  xeroform with Kerlix  dressing  daily   Elevate lower extremity when sitting dependent   Pressure injury  prevention   skin  and incontinence care   Assess wound and inform primary provider of any changes   Case discussed with primary Rn  Wound/ ostomy specialist  to f/u as needed     Offloading: [x ] Frequent position changes [ ] Devices/Equipment  Cleansing: [ ] Saline [x ] Soap/Water [ ] Other: ______  Topicals: [ ] Barrier Cream [x ] Antimicrobial [ ] Enzymatic Wound Debridement  Dressings: [ ] Dry, sterile [ ] Allevyn  Foam [ ] Absorbant Pads [ ] Collagenase    Other Recs.    per primary team      Total time for bedside assessment , review of medical records  and  discussion of plan of care with primary team greater than 35 min

## 2025-04-02 LAB
ALBUMIN SERPL ELPH-MCNC: 4.1 G/DL — SIGNIFICANT CHANGE UP (ref 3.5–5.2)
ALP SERPL-CCNC: 93 U/L — SIGNIFICANT CHANGE UP (ref 30–115)
ALT FLD-CCNC: 5 U/L — SIGNIFICANT CHANGE UP (ref 0–41)
ANION GAP SERPL CALC-SCNC: 14 MMOL/L — SIGNIFICANT CHANGE UP (ref 7–14)
AST SERPL-CCNC: 35 U/L — SIGNIFICANT CHANGE UP (ref 0–41)
BASOPHILS # BLD AUTO: 0.05 K/UL — SIGNIFICANT CHANGE UP (ref 0–0.2)
BASOPHILS NFR BLD AUTO: 1 % — SIGNIFICANT CHANGE UP (ref 0–1)
BILIRUB SERPL-MCNC: 0.3 MG/DL — SIGNIFICANT CHANGE UP (ref 0.2–1.2)
BUN SERPL-MCNC: 17 MG/DL — SIGNIFICANT CHANGE UP (ref 10–20)
CALCIUM SERPL-MCNC: 9.2 MG/DL — SIGNIFICANT CHANGE UP (ref 8.4–10.5)
CHLORIDE SERPL-SCNC: 98 MMOL/L — SIGNIFICANT CHANGE UP (ref 98–110)
CK SERPL-CCNC: 593 U/L — HIGH (ref 0–225)
CO2 SERPL-SCNC: 28 MMOL/L — SIGNIFICANT CHANGE UP (ref 17–32)
CREAT SERPL-MCNC: 1.1 MG/DL — SIGNIFICANT CHANGE UP (ref 0.7–1.5)
EGFR: 60 ML/MIN/1.73M2 — SIGNIFICANT CHANGE UP
EGFR: 60 ML/MIN/1.73M2 — SIGNIFICANT CHANGE UP
EOSINOPHIL # BLD AUTO: 0.13 K/UL — SIGNIFICANT CHANGE UP (ref 0–0.7)
EOSINOPHIL NFR BLD AUTO: 2.6 % — SIGNIFICANT CHANGE UP (ref 0–8)
GLUCOSE SERPL-MCNC: 120 MG/DL — HIGH (ref 70–99)
HCT VFR BLD CALC: 31.6 % — LOW (ref 37–47)
HGB BLD-MCNC: 9.9 G/DL — LOW (ref 12–16)
IMM GRANULOCYTES NFR BLD AUTO: 0.6 % — HIGH (ref 0.1–0.3)
LYMPHOCYTES # BLD AUTO: 2.06 K/UL — SIGNIFICANT CHANGE UP (ref 1.2–3.4)
LYMPHOCYTES # BLD AUTO: 41 % — SIGNIFICANT CHANGE UP (ref 20.5–51.1)
MAGNESIUM SERPL-MCNC: 2 MG/DL — SIGNIFICANT CHANGE UP (ref 1.8–2.4)
MCHC RBC-ENTMCNC: 26.1 PG — LOW (ref 27–31)
MCHC RBC-ENTMCNC: 31.3 G/DL — LOW (ref 32–37)
MCV RBC AUTO: 83.2 FL — SIGNIFICANT CHANGE UP (ref 81–99)
MONOCYTES # BLD AUTO: 0.32 K/UL — SIGNIFICANT CHANGE UP (ref 0.1–0.6)
MONOCYTES NFR BLD AUTO: 6.4 % — SIGNIFICANT CHANGE UP (ref 1.7–9.3)
NEUTROPHILS # BLD AUTO: 2.43 K/UL — SIGNIFICANT CHANGE UP (ref 1.4–6.5)
NEUTROPHILS NFR BLD AUTO: 48.4 % — SIGNIFICANT CHANGE UP (ref 42.2–75.2)
NRBC BLD AUTO-RTO: 0 /100 WBCS — SIGNIFICANT CHANGE UP (ref 0–0)
PLATELET # BLD AUTO: 218 K/UL — SIGNIFICANT CHANGE UP (ref 130–400)
PMV BLD: 9.9 FL — SIGNIFICANT CHANGE UP (ref 7.4–10.4)
POTASSIUM SERPL-MCNC: 3.9 MMOL/L — SIGNIFICANT CHANGE UP (ref 3.5–5)
POTASSIUM SERPL-SCNC: 3.9 MMOL/L — SIGNIFICANT CHANGE UP (ref 3.5–5)
PROT SERPL-MCNC: 7.7 G/DL — SIGNIFICANT CHANGE UP (ref 6–8)
RBC # BLD: 3.8 M/UL — LOW (ref 4.2–5.4)
RBC # FLD: 16.6 % — HIGH (ref 11.5–14.5)
SODIUM SERPL-SCNC: 140 MMOL/L — SIGNIFICANT CHANGE UP (ref 135–146)
WBC # BLD: 5.02 K/UL — SIGNIFICANT CHANGE UP (ref 4.8–10.8)
WBC # FLD AUTO: 5.02 K/UL — SIGNIFICANT CHANGE UP (ref 4.8–10.8)

## 2025-04-02 PROCEDURE — 99232 SBSQ HOSP IP/OBS MODERATE 35: CPT

## 2025-04-02 RX ORDER — SULFAMETHOXAZOLE/TRIMETHOPRIM 800-160 MG
2 TABLET ORAL
Refills: 0 | Status: DISCONTINUED | OUTPATIENT
Start: 2025-04-02 | End: 2025-04-03

## 2025-04-02 RX ADMIN — Medication 75 MILLILITER(S): at 05:04

## 2025-04-02 RX ADMIN — PREGABALIN 50 MILLIGRAM(S): 75 CAPSULE ORAL at 17:24

## 2025-04-02 RX ADMIN — Medication 40 MILLIGRAM(S): at 05:03

## 2025-04-02 RX ADMIN — Medication 175 MICROGRAM(S): at 05:02

## 2025-04-02 RX ADMIN — INSULIN LISPRO 5 UNIT(S): 100 INJECTION, SOLUTION INTRAVENOUS; SUBCUTANEOUS at 08:21

## 2025-04-02 RX ADMIN — ATORVASTATIN CALCIUM 20 MILLIGRAM(S): 80 TABLET, FILM COATED ORAL at 21:33

## 2025-04-02 RX ADMIN — Medication 1 APPLICATION(S): at 05:04

## 2025-04-02 RX ADMIN — Medication 2 TABLET(S): at 18:26

## 2025-04-02 RX ADMIN — BUPRENORPHINE HYDROCHLORIDE, NALOXONE HYDROCHLORIDE 2.5 TABLET(S): 4; 1 FILM, SOLUBLE BUCCAL; SUBLINGUAL at 11:17

## 2025-04-02 RX ADMIN — INSULIN LISPRO 5 UNIT(S): 100 INJECTION, SOLUTION INTRAVENOUS; SUBCUTANEOUS at 12:06

## 2025-04-02 RX ADMIN — FUROSEMIDE 40 MILLIGRAM(S): 10 INJECTION INTRAMUSCULAR; INTRAVENOUS at 05:02

## 2025-04-02 RX ADMIN — Medication 166.67 MILLIGRAM(S): at 05:02

## 2025-04-02 RX ADMIN — Medication 81 MILLIGRAM(S): at 11:16

## 2025-04-02 RX ADMIN — PREGABALIN 50 MILLIGRAM(S): 75 CAPSULE ORAL at 05:02

## 2025-04-02 RX ADMIN — INSULIN LISPRO 5 UNIT(S): 100 INJECTION, SOLUTION INTRAVENOUS; SUBCUTANEOUS at 17:22

## 2025-04-02 NOTE — PROGRESS NOTE ADULT - PROVIDER SPECIALTY LIST ADULT
Hospitalist
Surgery
Hospitalist
Hospitalist
Infectious Disease

## 2025-04-02 NOTE — PROGRESS NOTE ADULT - REASON FOR ADMISSION
LE swelling

## 2025-04-02 NOTE — PROGRESS NOTE ADULT - ASSESSMENT
54-year-old female with past medical history of DM, COPD not on home O2, opioid dependence on Suboxone, asthma, thyroidism,  leukocytoclastic vasculitis, chronic venous stasis with chronic lymphedema of lower extremities    Surgery consulted for worsening lower Cellulitis with extremity edema, r/o compartment syndrome. No current clinical S&S of compartment syndrome in bilateral lower extremities.     #leukocytoclastic vasculitis  #lower extremity cellulitis and edema, r/o compartment syndrome    CT scan of Right lower extremity - no drainable fluid collection or emphysema   - Continue Ivabx:  Patient currently on Vancomycin.  - Elevate lower extremities while in bed.  - Local wound care   - recall surgery team if patient develops worsening leg pain not relieved by prn pain medications or neurovascular deficits concerning for compartment syndrome 
54-year-old female with past medical history of DM, COPD not on home O2, opioid dependence on Suboxone, asthma, thyroidism, chronic venous stasis with chronic lymphedema of lower extremities presents to the ED complaining of worsening swelling/pain of bilateral lower extremities ( R > L).    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  Attending Physician  Division of Infectious Diseases  [facility]  Please contact me via Microsoft Teams  
  54-year-old female with past medical history of DM, COPD not on home O2, opioid dependence on Suboxone, asthma, thyroidism, chronic venous stasis with chronic lymphedema of lower extremities presenting with RLE cellulitis     # RLE cellulitis   # Elevated CK   # H/O PVD   # H/O BL LE chronic lymphedema and  chronic venous stasis   # H/O leukocytoclastic vasculitis  # H/O Recurrent Cellulitis   - VA duplex bl LE : Impression: Moderate stenosis of the bilateral common femoral arteries (seen on prior study 12/2024)  - VA Duplex bl LE venous   - CK 1007, fu repeat (having difficulty getting labs, pt hard stick)  - cw ASA / Statin  ; op FU with Dr. Giovany Garza   - cw home dose lasix 40 mg QD   -  3/27 : "Discussed with rheumatology Dr. Vargas as pt has hx of leg biopsy + for leukocytoclastic vasculitis last year. I sent her picture of legs and they do not appear that much different then last year, she believes that her main problem is chronic lymphedema and venous insufficiency as opposed to vasculitis and no need for repeat biopsy or treatment at this time. Discussed with surgery Dr. Nelson that no need for biopsy at this time, can f/u outpt with vascular and pt should have ace wraps and elevation while inpatient.  - ID following -  IV vancomycin 1g q12hrs; dosing per pharmacy protocol   - ACE Wraps;  elevation   - monitor ck, keep on IVF   - CT RLE noted with significant edema, no fluid colllections  - SX eval appreciated - no evidence of compartement syndrome, Serial lower extremity exams and if pain worsening and not relieved by pain medications or any N/V deficits, notify Surgery team ASAP    # Fall - trauma work up negative     # H/O Chronic left olecranon bursitis   sp aspiration by ortho on 3/28 -"70cc of bloody fluid aspirated- does not appear infectious at all"   -Ortho recommended :instructed patient to keep ACE bandage compression for at least 4 weeks- can be changed PRN   - XRAY L elbow - No evidence of acute fracture or dislocation. Degenerative osseous changes.    # H/O DM2- Uncontrolled (? non compliance) -FS AC QHS, CCHO diet,  insulin inpatient  ; HbA1c 12.7 - endocrinology eval appreciated- For home can resume metformin and Lantus 20 units qHs-pt encouraged to take Lantus every night at home    #H/O Opioid dependence -on suboxone     dvt/ gi ppx/diet  dispo: acute   handoff: pending clinical improvement, c/w IVF, repeat labs/CK, c/w IV abx, f/u ID 
54-year-old female with past medical history of DM, COPD not on home O2, opioid dependence on Suboxone, asthma, thyroidism, chronic venous stasis with chronic lymphedema of lower extremities presenting with RLE cellulitis     # RLE cellulitis   # Elevated CK   # H/O PVD   # H/O BL LE chronic lymphedema and  chronic venous stasis   # H/O leukocytoclastic vasculitis  # H/O Recurrent Cellulitis   - VA duplex bl LE : Impression: Moderate stenosis of the bilateral common femoral arteries (seen on prior study 12/2024)  - VA Duplex bl LE venous   - CK repeat 700s, fu repeat (having difficulty getting labs, pt hard stick)  - cw ASA / Statin  ; op FU with Dr. Giovany Garza   - cw home dose lasix 40 mg QD   -  3/27 : "Discussed with rheumatology Dr. Vargas as pt has hx of leg biopsy + for leukocytoclastic vasculitis last year. I sent her picture of legs and they do not appear that much different then last year, she believes that her main problem is chronic lymphedema and venous insufficiency as opposed to vasculitis and no need for repeat biopsy or treatment at this time. Discussed with surgery Dr. Nelson that no need for biopsy at this time, can f/u outpt with vascular and pt should have ace wraps and elevation while inpatient.  - ID following -  IV vancomycin 1g q12hrs; dosing per pharmacy protocol   - ACE Wraps;  elevation   - monitor ck, keep on IVF   - CT RLE noted with significant edema, no fluid colllections  - SX eval appreciated - no evidence of compartement syndrome, Serial lower extremity exams and if pain worsening and not relieved by pain medications or any N/V deficits, notify Surgery team ASAP    # Fall - trauma work up negative     # H/O Chronic left olecranon bursitis   sp aspiration by ortho on 3/28 -"70cc of bloody fluid aspirated- does not appear infectious at all"   -Ortho recommended :instructed patient to keep ACE bandage compression for at least 4 weeks- can be changed PRN   - XRAY L elbow - No evidence of acute fracture or dislocation. Degenerative osseous changes.  -look slike fluid re-accumulated, will reconsult ortho     # H/O DM2- Uncontrolled (? non compliance) -FS AC QHS, CCHO diet,  insulin inpatient  ; HbA1c 12.7 - endocrinology eval appreciated- For home can resume metformin and Lantus 20 units qHs-pt encouraged to take Lantus every night at home    #H/O Opioid dependence -on suboxone     dvt/ gi ppx/diet  handoff: pending clinical improvement, c/w IVF, repeat labs/CK, c/w IV abx, f/u ID, f/u new ortho consult if need to re-tap elbow   
54-year-old female with past medical history of DM, COPD not on home O2, opioid dependence on Suboxone, asthma, thyroidism, chronic venous stasis with chronic lymphedema of lower extremities presenting with RLE cellulitis.    Cellulitis  -2/2 chronic venous stasis, lymphedema  -on vancomycin, switch to bactrim per ID recommendation  -need vascular surgery follow up outpt, following Dr. Adair    Elevated CK  -2/2 fall  -CK has down trended    left olecranon bursitis  -evaluated by orthopedics, no acute intervention  -outpt follow up    DM  -continue insulin    Handoff: likely DC in am with oral abx, vascular and ortho follow up
54-year-old female with past medical history of DM, COPD not on home O2, opioid dependence on Suboxone, asthma, thyroidism, chronic venous stasis with chronic lymphedema of lower extremities presents to the ED complaining of worsening swelling/pain of bilateral lower extremities ( R > L).    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  Attending Physician  Division of Infectious Diseases  [facility]  Please contact me via Microsoft Teams  
Patient is 54-year-old female with past medical history of DM, COPD not on home O2, opioid dependence on Suboxone, asthma, thyroidism, chronic venous stasis with chronic lymphedema of lower extremities presenting with RLE cellulitis     #RLE cellulitis on top of chronic lymphedema and venous stasis   # h/o leukocytoclastic vasculitis  # fall  - IV antibiotics, cefipime and vanc for now until seen by ID   - Wound care eval  -Surgery eval, RLE has very significant swelling compared to left, need for debridement? of wounds  -No evidence of compartment syndrome at this time, neurovascularly intact, has full motor and sensation along with pulses, though continue to monitor very closely as very significant edema in RLE  -check CPK    -venous duplex no clots  -check art duplex to eval bloodflow in light of edema (prior in dec noted)  -will trial switching lasix po to 40 IVP daily to see if improves edema   - PT/rehab   -morphine prn for pain     # h/o DM2  -FS AC QHS, CCHO diet, a1c   - continue with lantus, and ISS    #opioid dependence  -continue with suboxone     # dvt ppx heparin    HAnodff: f/u ID, c/w abx, trial IV lasix, check art duplex, monitor peripheral pulses LE, check CK, surgery eval, PT, active for now 
54-year-old female with past medical history of DM, COPD not on home O2, opioid dependence on Suboxone, asthma, thyroidism, chronic venous stasis with chronic lymphedema of lower extremities presents to the ED complaining of worsening swelling/pain of bilateral lower extremities ( R > L).    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  Attending Physician  Division of Infectious Diseases  [facility]  Please contact me via [employer] Teams

## 2025-04-02 NOTE — PROGRESS NOTE ADULT - SUBJECTIVE AND OBJECTIVE BOX
CHIEF COMPLAINT:    Patient is a 54y old  Female who presents with a chief complaint of LE swelling (01 Apr 2025 13:17)      INTERVAL HPI/OVERNIGHT EVENTS:    Patient seen and examined at bedside. No acute overnight events occurred.     ROS: All other systems are negative.    Medications:  Standing  aspirin enteric coated 81 milliGRAM(s) Oral daily  atorvastatin 20 milliGRAM(s) Oral at bedtime  buprenorphine 8 mG/naloxone 2 mG SL  Tablet 2.5 Tablet(s) SubLingual daily  chlorhexidine 2% Cloths 1 Application(s) Topical <User Schedule>  dextrose 5%. 1000 milliLiter(s) IV Continuous <Continuous>  dextrose 5%. 1000 milliLiter(s) IV Continuous <Continuous>  dextrose 50% Injectable 25 Gram(s) IV Push once  dextrose 50% Injectable 12.5 Gram(s) IV Push once  dextrose 50% Injectable 25 Gram(s) IV Push once  furosemide    Tablet 40 milliGRAM(s) Oral daily  glucagon  Injectable 1 milliGRAM(s) IntraMuscular once  heparin   Injectable 5000 Unit(s) SubCutaneous every 12 hours  insulin glargine Injectable (LANTUS) 20 Unit(s) SubCutaneous at bedtime  insulin lispro (ADMELOG) corrective regimen sliding scale   SubCutaneous three times a day before meals  insulin lispro Injectable (ADMELOG) 5 Unit(s) SubCutaneous three times a day before meals  levothyroxine 175 MICROGram(s) Oral daily  lidocaine 1% (Preservative-free) Injectable 10 milliLiter(s) Local Injection once  pantoprazole    Tablet 40 milliGRAM(s) Oral before breakfast  trimethoprim  160 mG/sulfamethoxazole 800 mG 2 Tablet(s) Oral two times a day    PRN Meds  acetaminophen     Tablet .. 650 milliGRAM(s) Oral every 6 hours PRN  albuterol    90 MICROgram(s) HFA Inhaler 1 Puff(s) Inhalation every 6 hours PRN  aluminum hydroxide/magnesium hydroxide/simethicone Suspension 30 milliLiter(s) Oral every 4 hours PRN  dextrose Oral Gel 15 Gram(s) Oral once PRN  melatonin 3 milliGRAM(s) Oral at bedtime PRN  morphine  - Injectable 2 milliGRAM(s) IV Push every 6 hours PRN  ondansetron Injectable 4 milliGRAM(s) IV Push every 8 hours PRN        Vital Signs:    T(F): 97.8 (04-02-25 @ 14:01), Max: 97.8 (04-02-25 @ 14:01)  HR: 68 (04-02-25 @ 14:01) (67 - 68)  BP: 100/58 (04-02-25 @ 14:01) (100/58 - 116/75)  RR: 18 (04-02-25 @ 14:01) (18 - 18)  SpO2: 98% (04-02-25 @ 14:01) (96% - 98%)  I&O's Summary    Daily     Daily   CAPILLARY BLOOD GLUCOSE      POCT Blood Glucose.: 97 mg/dL (02 Apr 2025 16:34)  POCT Blood Glucose.: 105 mg/dL (02 Apr 2025 11:27)  POCT Blood Glucose.: 125 mg/dL (02 Apr 2025 07:35)  POCT Blood Glucose.: 151 mg/dL (01 Apr 2025 21:15)      PHYSICAL EXAM:  GENERAL:  NAD  SKIN: No rashes or lesions  HEENT: Atraumatic. Normocephalic. Anicteric  NECK:  No JVD.   PULMONARY: Clear to ausculation bilaterally. No wheezing. No rales  CVS: Normal S1, S2. Regular rate and rhythm. No murmurs.  ABDOMEN/GI: Soft, Nontender, Nondistended; Bowel sounds are present  EXTREMITIES:  chronic leg swelling with R worse than L, skin hyperpigmentation  NEUROLOGIC:  No motor deficit.  PSYCH: Alert & oriented x 3, normal affect      LABS:                        9.9    5.02  )-----------( 218      ( 02 Apr 2025 07:20 )             31.6     04-02    140  |  98  |  17  ----------------------------<  120[H]  3.9   |  28  |  1.1    Ca    9.2      02 Apr 2025 07:20  Mg     2.0     04-02    TPro  7.7  /  Alb  4.1  /  TBili  0.3  /  DBili  x   /  AST  35  /  ALT  5   /  AlkPhos  93  04-02      RADIOLOGY & ADDITIONAL TESTS:  Imaging or report Personally Reviewed:  [ ] YES  [ ] NO -->no new images

## 2025-04-02 NOTE — PROGRESS NOTE ADULT - SUBJECTIVE AND OBJECTIVE BOX
INFECTIOUS DISEASE FOLLOW UP NOTE:    Interval History/ROS: Patient is a 54y old  Female who presents with a chief complaint of LE swelling (02 Apr 2025 17:57)      Overnight events: No overnight event.     REVIEW OF SYSTEMS:  CONSTITUTIONAL: No fever or chills  HEAD: No lesion on scalp  EYES: No visual disturbance  ENT: No sore throat  RESPIRATORY: No cough, no shortness of breath  CARDIOVASCULAR: No chest pain or palpitations  GASTROINTESTINAL: No abdominal or epigastric pain  GENITOURINARY: No dysuria  NEUROLOGICAL: No headache/dizziness  MUSCULOSKELETAL: Left elbow bursitis  SKIN: Bilateral LE erythema, R > L;   All other ROS negative except noted above      Prior hospital charts reviewed [Yes]  Primary team notes reviewed [Yes]  Other consultant notes reviewed [Yes]    Allergies:  No Known Allergies      ANTIMICROBIALS:   trimethoprim  160 mG/sulfamethoxazole 800 mG 2 two times a day      OTHER MEDS: MEDICATIONS  (STANDING):  acetaminophen     Tablet .. 650 every 6 hours PRN  albuterol    90 MICROgram(s) HFA Inhaler 1 every 6 hours PRN  aluminum hydroxide/magnesium hydroxide/simethicone Suspension 30 every 4 hours PRN  aspirin enteric coated 81 daily  atorvastatin 20 at bedtime  buprenorphine 8 mG/naloxone 2 mG SL  Tablet 2.5 daily  dextrose 50% Injectable 25 once  dextrose 50% Injectable 12.5 once  dextrose 50% Injectable 25 once  dextrose Oral Gel 15 once PRN  furosemide    Tablet 40 daily  glucagon  Injectable 1 once  heparin   Injectable 5000 every 12 hours  insulin glargine Injectable (LANTUS) 20 at bedtime  insulin lispro (ADMELOG) corrective regimen sliding scale  three times a day before meals  insulin lispro Injectable (ADMELOG) 5 three times a day before meals  levothyroxine 175 daily  melatonin 3 at bedtime PRN  morphine  - Injectable 2 every 6 hours PRN  ondansetron Injectable 4 every 8 hours PRN  pantoprazole    Tablet 40 before breakfast      Vital Signs Last 24 Hrs  T(F): 97.6 (04-02-25 @ 20:58), Max: 98.2 (03-29-25 @ 13:20)    Vital Signs Last 24 Hrs  HR: 63 (04-02-25 @ 20:58) (63 - 68)  BP: 124/84 (04-02-25 @ 20:58) (100/58 - 124/84)  RR: 18 (04-02-25 @ 20:58)  SpO2: 94% (04-02-25 @ 20:58) (94% - 98%)  Wt(kg): --    EXAM:  GENERAL: NAD, lying in bed  HEAD: No head lesions  EYES: Conjunctiva pink and cornea white  EAR, NOSE, MOUTH, THROAT: Normal external ears and nose, no discharges; moist mucous membranes  NECK: Supple, nontender to palpation; no JVD  RESPIRATORY: Clear to auscultation bilaterally  CARDIOVASCULAR: S1 S2  GASTROINTESTINAL: Soft, nontender, nondistended; normoactive bowel sounds  GENITOURINARY: No dan catheter, no CVA tenderness  EXTREMITIES: No clubbing, cyanosis, or petal edema  NERVOUS SYSTEM: Alert and oriented to person, time, place and situation, speech clear. No focal deficits   MUSCULOSKELETAL: Large left elbow bursitis  SKIN: Bilateral LE erythema with wounds, worse on R, no superficial thrombophlebitis  PSYCH: Normal affect    Labs:                        9.9    5.02  )-----------( 218      ( 02 Apr 2025 07:20 )             31.6     04-02    140  |  98  |  17  ----------------------------<  120[H]  3.9   |  28  |  1.1    Ca    9.2      02 Apr 2025 07:20  Mg     2.0     04-02    TPro  7.7  /  Alb  4.1  /  TBili  0.3  /  DBili  x   /  AST  35  /  ALT  5   /  AlkPhos  93  04-02      WBC Trend:  WBC Count: 5.02 (04-02-25 @ 07:20)  WBC Count: 6.21 (04-01-25 @ 08:22)  WBC Count: 7.63 (03-31-25 @ 15:40)  WBC Count: 7.29 (03-30-25 @ 12:00)      Creatine Trend:  Creatinine: 1.1 (04-02)  Creatinine: 1.1 (03-31)  Creatinine: 1.1 (03-30)  Creatinine: 1.1 (03-29)      Liver Biochemical Testing Trend:  Alanine Aminotransferase (ALT/SGPT): 5 (04-02)  Alanine Aminotransferase (ALT/SGPT): 6 (03-31)  Alanine Aminotransferase (ALT/SGPT): 5 (03-28)  Alanine Aminotransferase (ALT/SGPT): 7 (03-27)  Alanine Aminotransferase (ALT/SGPT): 5 (03-26)  Aspartate Aminotransferase (AST/SGOT): 35 (04-02-25 @ 07:20)  Aspartate Aminotransferase (AST/SGOT): 38 (03-31-25 @ 15:40)  Aspartate Aminotransferase (AST/SGOT): 33 (03-28-25 @ 07:40)  Aspartate Aminotransferase (AST/SGOT): 25 (03-27-25 @ 05:39)  Aspartate Aminotransferase (AST/SGOT): 23 (03-26-25 @ 22:42)  Bilirubin Total: 0.3 (04-02)  Bilirubin Total: 0.3 (03-31)  Bilirubin Total: 0.3 (03-28)  Bilirubin Total: 0.3 (03-27)  Bilirubin Total: 0.3 (03-26)      Trend LDH  01-10-24 @ 06:55  230      Urinalysis Basic - ( 02 Apr 2025 07:20 )    Color: x / Appearance: x / SG: x / pH: x  Gluc: 120 mg/dL / Ketone: x  / Bili: x / Urobili: x   Blood: x / Protein: x / Nitrite: x   Leuk Esterase: x / RBC: x / WBC x   Sq Epi: x / Non Sq Epi: x / Bacteria: x        MICROBIOLOGY:  Vancomycin Level, Random: 12.1 (03-30 @ 12:00)  Vancomycin Level, Trough: 27.1 (03-29 @ 08:40)  Vancomycin Level, Trough: 5.5 (03-28 @ 19:00)  Vancomycin Level, Trough: 12.2 (03-28 @ 07:40)    MRSA PCR Result.: Negative (05-04-24 @ 07:00)      Culture - Blood (collected 10 Feb 2025 20:00)  Source: .Blood BLOOD  Final Report:    No growth at 5 days    Culture - Blood (collected 10 Feb 2025 20:00)  Source: .Blood BLOOD  Final Report:    No growth at 5 days    Culture - Blood (collected 28 Dec 2024 17:10)  Source: .Blood BLOOD  Final Report:    No growth at 5 days    Culture - Blood (collected 27 Dec 2024 16:00)  Source: .Blood BLOOD  Final Report:    No growth at 5 days    Culture - Blood (collected 27 Dec 2024 16:00)  Source: .Blood BLOOD  Final Report:    No growth at 5 days    Urinalysis with Rflx Culture (collected 29 Nov 2024 00:13)    Culture - Blood (collected 28 Nov 2024 22:04)  Source: .Blood BLOOD  Final Report:    No growth at 5 days    Culture - Blood (collected 28 Nov 2024 22:04)  Source: .Blood BLOOD  Final Report:    No growth at 5 days    Urinalysis with Rflx Culture (collected 14 Nov 2024 18:42)    Culture - Blood (collected 27 May 2024 21:10)  Source: .Blood Blood  Final Report:    No growth at 5 days      HIV-1/2 Combo Result: Nonreact (12-31-24 @ 08:00)  HIV-1/2 Combo Result: Nonreact (01-11-24 @ 13:40)      C-Reactive Protein: 26.4 (03-27)    RADIOLOGY:  imaging below personally reviewed    < from: CT Lower Extremity w/ IV Cont, Right (03.30.25 @ 15:46) >    IMPRESSION:    Diffuse subcutaneous soft tissue edema with skin and fascial thickening   of the right lower extremity, compatible with patient's clinical   diagnosis of cellulitis. No drainable fluid collection or subcutaneous   emphysema. No erosive osseous changes.    Moderate degenerative changes of the ankle and midfoot are noted.    < end of copied text >

## 2025-04-03 ENCOUNTER — NON-APPOINTMENT (OUTPATIENT)
Age: 55
End: 2025-04-03

## 2025-04-03 ENCOUNTER — TRANSCRIPTION ENCOUNTER (OUTPATIENT)
Age: 55
End: 2025-04-03

## 2025-04-03 VITALS
SYSTOLIC BLOOD PRESSURE: 124 MMHG | RESPIRATION RATE: 18 BRPM | DIASTOLIC BLOOD PRESSURE: 75 MMHG | OXYGEN SATURATION: 94 % | HEART RATE: 68 BPM | TEMPERATURE: 98 F

## 2025-04-03 LAB — VANCOMYCIN TROUGH SERPL-MCNC: 14 UG/ML — HIGH (ref 5–10)

## 2025-04-03 PROCEDURE — 99239 HOSP IP/OBS DSCHRG MGMT >30: CPT

## 2025-04-03 RX ORDER — FUROSEMIDE 10 MG/ML
1 INJECTION INTRAMUSCULAR; INTRAVENOUS
Qty: 30 | Refills: 0
Start: 2025-04-03 | End: 2025-05-02

## 2025-04-03 RX ORDER — SULFAMETHOXAZOLE/TRIMETHOPRIM 800-160 MG
2 TABLET ORAL
Qty: 20 | Refills: 0
Start: 2025-04-03 | End: 2025-04-07

## 2025-04-03 RX ADMIN — Medication 175 MICROGRAM(S): at 05:12

## 2025-04-03 RX ADMIN — Medication 40 MILLIGRAM(S): at 05:12

## 2025-04-03 RX ADMIN — Medication 2 TABLET(S): at 05:12

## 2025-04-03 RX ADMIN — FUROSEMIDE 40 MILLIGRAM(S): 10 INJECTION INTRAMUSCULAR; INTRAVENOUS at 05:12

## 2025-04-03 NOTE — DISCHARGE NOTE PROVIDER - NSDCMRMEDTOKEN_GEN_ALL_CORE_FT
Ace Wraps: Apply on the L elbow for 3 weeks  albuterol 90 mcg/inh inhalation powder: 2 puff(s) inhaled every 6 hours, As Needed -for bronchospasm   aspirin 81 mg oral delayed release tablet: 1 tab(s) orally once a day  atorvastatin 20 mg oral tablet: 1 tab(s) orally once a day (at bedtime)  furosemide 40 mg oral tablet: 1 tab(s) orally once a day  Lantus 100 units/mL subcutaneous solution: 28 unit(s) subcutaneous once a day (at bedtime)  metFORMIN 500 mg oral tablet: 2 tab(s) orally 2 times a day  pantoprazole 40 mg oral delayed release tablet: 1 tab(s) orally once a day (before a meal) Take once a day while taking prednisone  pregabalin 50 mg oral capsule: 1 cap(s) orally every 12 hours  silver sulfADIAZINE 1% topical cream: Apply topically to affected area once a day  Suboxone 8 mg-2 mg sublingual tablet: 2.5 film(s) sublingual once a day  Synthroid 175 mcg (0.175 mg) oral tablet: 1 tab(s) orally once a day   Ace Wraps: Apply on the L elbow for 3 weeks  albuterol 90 mcg/inh inhalation powder: 2 puff(s) inhaled every 6 hours, As Needed -for bronchospasm   aspirin 81 mg oral delayed release tablet: 1 tab(s) orally once a day  atorvastatin 20 mg oral tablet: 1 tab(s) orally once a day (at bedtime)  furosemide 40 mg oral tablet: 1 tab(s) orally once a day as needed for leg swelling  Lantus 100 units/mL subcutaneous solution: 28 unit(s) subcutaneous once a day (at bedtime)  metFORMIN 500 mg oral tablet: 2 tab(s) orally 2 times a day  pantoprazole 40 mg oral delayed release tablet: 1 tab(s) orally once a day (before a meal) Take once a day while taking prednisone  pregabalin 50 mg oral capsule: 1 cap(s) orally every 12 hours  Suboxone 8 mg-2 mg sublingual tablet: 2.5 film(s) sublingual once a day  sulfamethoxazole-trimethoprim 800 mg-160 mg oral tablet: 2 tab(s) orally 2 times a day  Synthroid 175 mcg (0.175 mg) oral tablet: 1 tab(s) orally once a day

## 2025-04-03 NOTE — DISCHARGE NOTE NURSING/CASE MANAGEMENT/SOCIAL WORK - NSDCVIVACCINE_GEN_ALL_CORE_FT
Influenza, split virus, trivalent, preservative free; 07-Jan-2025 14:45; Nic Quintanilla (RN); [employer]; [id number] (Exp. Date: 30-Jun-2025); IntraMuscular; Deltoid Right.; 0.5 milliLiter(s); VIS (VIS Published: 06-Aug-2021, VIS Presented: 07-Jan-2025);

## 2025-04-03 NOTE — DISCHARGE NOTE PROVIDER - ATTENDING DISCHARGE PHYSICAL EXAMINATION:
T(C): 36.7 (04-03-25 @ 04:38), Max: 36.7 (04-03-25 @ 04:38)  HR: 68 (04-03-25 @ 04:38) (63 - 68)  BP: 124/75 (04-03-25 @ 04:38) (100/58 - 124/84)  RR: 18 (04-03-25 @ 04:38) (18 - 18)  SpO2: 94% (04-03-25 @ 04:38) (94% - 98%)    CONSTITUTIONAL: Well groomed, no apparent distress  EYES: PERRLA and symmetric, EOMI  RESP: No respiratory distress, no use of accessory muscles; CTA b/l, no WRR  CV: RRR, +S1S2, no MRG; no JVD; no peripheral edema  GI: Soft, NT, ND, no rebound, no guarding  MSK: B/L LE chronic venous stasis changes with hyperpigmentation, lymphedema, right worse than left, left elbow cyst noted  NEURO: CN II-XII intact; no focal deficit   PSYCH: Appropriate insight/judgment; A+O x 3, mood and affect appropriate

## 2025-04-03 NOTE — DISCHARGE NOTE PROVIDER - NSDCCPCAREPLAN_GEN_ALL_CORE_FT
PRINCIPAL DISCHARGE DIAGNOSIS  Diagnosis: Cellulitis  Assessment and Plan of Treatment: You presented to the ED with pain and swelling in bilateral lower extremeties. An x-ray was performed and ruled out a fracture. You were diagnosed with cellulitis, which is a bacterial skin infection. You were given IV antibiotics for treatment. Orthopedics and surgery were consulted, reported no intervention needed at this time. Continue with oral antibiotics upon discharge. It is reccomended you keep your leg elevated, follow up with your PCP, vascular and orthopedics outpatient. If you experience worsening pain in legs, SOB, chest pain or fever you should go directly to the ED.      SECONDARY DISCHARGE DIAGNOSES  Diagnosis: Hyperglycemia  Assessment and Plan of Treatment:      PRINCIPAL DISCHARGE DIAGNOSIS  Diagnosis: Cellulitis  Assessment and Plan of Treatment: You presented to the ED with pain and swelling in bilateral lower extremeties. An x-ray was performed and ruled out a fracture. You were diagnosed with cellulitis, which is a bacterial skin infection. You were given IV antibiotics for treatment. Vascular surgery were consulted, reported no intervention needed at this time. Continue with oral antibiotics upon discharge. It is reccomended you keep your leg elevated, follow up with your PCP, vascular surgery outpatient. If you experience worsening pain in legs, redness, swelling,  SOB, chest pain or fever you should go directly to the ED.      SECONDARY DISCHARGE DIAGNOSES  Diagnosis: Olecranon bursitis  Assessment and Plan of Treatment: Orthopedic team was consulted. You underwent an aspiration. Continue with ace wrap. Follow up with orthopedics as needed. Follow up with primary care doctor.    Diagnosis: Venous insufficiency  Assessment and Plan of Treatment: leg elevation, lasix daily as needed for swelling. Follow up with vascular surgery.

## 2025-04-03 NOTE — DISCHARGE NOTE NURSING/CASE MANAGEMENT/SOCIAL WORK - FINANCIAL ASSISTANCE
[facility] provides services at a reduced cost to those who are determined to be eligible through [facility]’s financial assistance program. Information regarding [facility]’s financial assistance program can be found by going to [email or web address] or by calling [phone].

## 2025-04-03 NOTE — DISCHARGE NOTE NURSING/CASE MANAGEMENT/SOCIAL WORK - PATIENT PORTAL LINK FT
You can access the FollowMyHealth Patient Portal offered by [facility] by registering at the following website: [email or web address]. By joining [employer]’s FollowMyHealth portal, you will also be able to view your health information using other applications (apps) compatible with our system.

## 2025-04-03 NOTE — DISCHARGE NOTE PROVIDER - NSDCFUSCHEDAPPT_GEN_ALL_CORE_FT
Fer Guerra  [facility] PreAdmits  Scheduled Appointment: 04/10/2025    Fer Guerra  [facility]  PSYCHIATRY  [address]  Scheduled Appointment: 04/10/2025    Juany Rossi  [facility] PreAdmits  Scheduled Appointment: 04/16/2025    [facility]  PSYCHIATRY [facility] [address]  Scheduled Appointment: 04/16/2025

## 2025-04-03 NOTE — DISCHARGE NOTE PROVIDER - CARE PROVIDER_API CALL
Javier Perez  Geriatric Medicine  [address] [place] [address]  Phone: [phone]  Fax: [phone]  Follow Up Time: 1 week   Javier Perez  Geriatric Medicine  [address] [place] [address]  Phone: [phone]  Fax: [phone]  Follow Up Time: 1 week    Greg Adair  Vascular Surgery  [address], [address]  [place] [address]  Phone: [phone]  Fax: [phone]  Follow Up Time: 2 weeks    Arvind Phelps  Orthopaedic Surgery  [address] [place] [address]  Phone: [phone]  Fax: [phone]  Follow Up Time: 2 weeks

## 2025-04-03 NOTE — DISCHARGE NOTE PROVIDER - PROVIDER TOKENS
PROVIDER:[TOKEN:[[id number]],FOLLOWUP:[1 week]] PROVIDER:[TOKEN:[[id number]],FOLLOWUP:[1 week]],PROVIDER:[TOKEN:[[id number]],FOLLOWUP:[2 weeks]],PROVIDER:[TOKEN:[[id number]],FOLLOWUP:[2 weeks]]

## 2025-04-03 NOTE — DISCHARGE NOTE PROVIDER - CARE PROVIDERS DIRECT ADDRESSES
,[email or web address] [email or web address]

## 2025-04-08 DIAGNOSIS — E78.5 HYPERLIPIDEMIA, UNSPECIFIED: ICD-10-CM

## 2025-04-08 DIAGNOSIS — Z79.890 HORMONE REPLACEMENT THERAPY: ICD-10-CM

## 2025-04-08 DIAGNOSIS — M31.0 HYPERSENSITIVITY ANGIITIS: ICD-10-CM

## 2025-04-08 DIAGNOSIS — Z79.84 LONG TERM (CURRENT) USE OF ORAL HYPOGLYCEMIC DRUGS: ICD-10-CM

## 2025-04-08 DIAGNOSIS — D84.81 IMMUNODEFICIENCY DUE TO CONDITIONS CLASSIFIED ELSEWHERE: ICD-10-CM

## 2025-04-08 DIAGNOSIS — I89.0 LYMPHEDEMA, NOT ELSEWHERE CLASSIFIED: ICD-10-CM

## 2025-04-08 DIAGNOSIS — E03.9 HYPOTHYROIDISM, UNSPECIFIED: ICD-10-CM

## 2025-04-08 DIAGNOSIS — B19.20 UNSPECIFIED VIRAL HEPATITIS C WITHOUT HEPATIC COMA: ICD-10-CM

## 2025-04-08 DIAGNOSIS — L03.115 CELLULITIS OF RIGHT LOWER LIMB: ICD-10-CM

## 2025-04-08 DIAGNOSIS — M70.22 OLECRANON BURSITIS, LEFT ELBOW: ICD-10-CM

## 2025-04-08 DIAGNOSIS — F11.20 OPIOID DEPENDENCE, UNCOMPLICATED: ICD-10-CM

## 2025-04-08 DIAGNOSIS — E11.65 TYPE 2 DIABETES MELLITUS WITH HYPERGLYCEMIA: ICD-10-CM

## 2025-04-08 DIAGNOSIS — Z79.4 LONG TERM (CURRENT) USE OF INSULIN: ICD-10-CM

## 2025-04-08 DIAGNOSIS — Z79.82 LONG TERM (CURRENT) USE OF ASPIRIN: ICD-10-CM

## 2025-04-08 DIAGNOSIS — J44.9 CHRONIC OBSTRUCTIVE PULMONARY DISEASE, UNSPECIFIED: ICD-10-CM

## 2025-04-08 DIAGNOSIS — L97.811 NON-PRESSURE CHRONIC ULCER OF OTHER PART OF RIGHT LOWER LEG LIMITED TO BREAKDOWN OF SKIN: ICD-10-CM

## 2025-04-08 DIAGNOSIS — I87.2 VENOUS INSUFFICIENCY (CHRONIC) (PERIPHERAL): ICD-10-CM

## 2025-04-10 ENCOUNTER — APPOINTMENT (OUTPATIENT)
Dept: PSYCHIATRY | Facility: CLINIC | Age: 55
End: 2025-04-10
Payer: MEDICAID

## 2025-04-10 ENCOUNTER — OUTPATIENT (OUTPATIENT)
Dept: OUTPATIENT SERVICES | Facility: HOSPITAL | Age: 55
LOS: 1 days | End: 2025-04-10
Payer: MEDICAID

## 2025-04-10 ENCOUNTER — APPOINTMENT (OUTPATIENT)
Dept: PSYCHIATRY | Facility: CLINIC | Age: 55
End: 2025-04-10

## 2025-04-10 DIAGNOSIS — Z90.49 ACQUIRED ABSENCE OF OTHER SPECIFIED PARTS OF DIGESTIVE TRACT: Chronic | ICD-10-CM

## 2025-04-10 DIAGNOSIS — F10.20 ALCOHOL DEPENDENCE, UNCOMPLICATED: ICD-10-CM

## 2025-04-10 PROCEDURE — 99214 OFFICE O/P EST MOD 30 MIN: CPT | Mod: 95

## 2025-04-11 DIAGNOSIS — F10.20 ALCOHOL DEPENDENCE, UNCOMPLICATED: ICD-10-CM

## 2025-04-16 ENCOUNTER — OUTPATIENT (OUTPATIENT)
Dept: OUTPATIENT SERVICES | Facility: HOSPITAL | Age: 55
LOS: 1 days | End: 2025-04-16
Payer: MEDICAID

## 2025-04-16 ENCOUNTER — APPOINTMENT (OUTPATIENT)
Dept: PSYCHIATRY | Facility: CLINIC | Age: 55
End: 2025-04-16

## 2025-04-16 DIAGNOSIS — Z90.89 ACQUIRED ABSENCE OF OTHER ORGANS: Chronic | ICD-10-CM

## 2025-04-16 DIAGNOSIS — Z90.49 ACQUIRED ABSENCE OF OTHER SPECIFIED PARTS OF DIGESTIVE TRACT: Chronic | ICD-10-CM

## 2025-04-16 DIAGNOSIS — F10.20 ALCOHOL DEPENDENCE, UNCOMPLICATED: ICD-10-CM

## 2025-04-16 PROCEDURE — H0004: CPT | Mod: 95

## 2025-04-17 ENCOUNTER — APPOINTMENT (OUTPATIENT)
Dept: PSYCHIATRY | Facility: CLINIC | Age: 55
End: 2025-04-17

## 2025-04-17 DIAGNOSIS — F10.20 ALCOHOL DEPENDENCE, UNCOMPLICATED: ICD-10-CM

## 2025-04-29 ENCOUNTER — OUTPATIENT (OUTPATIENT)
Dept: OUTPATIENT SERVICES | Facility: HOSPITAL | Age: 55
LOS: 1 days | End: 2025-04-29
Payer: MEDICAID

## 2025-04-29 ENCOUNTER — APPOINTMENT (OUTPATIENT)
Dept: PSYCHIATRY | Facility: CLINIC | Age: 55
End: 2025-04-29
Payer: MEDICAID

## 2025-04-29 DIAGNOSIS — Z90.89 ACQUIRED ABSENCE OF OTHER ORGANS: Chronic | ICD-10-CM

## 2025-04-29 DIAGNOSIS — Z90.49 ACQUIRED ABSENCE OF OTHER SPECIFIED PARTS OF DIGESTIVE TRACT: Chronic | ICD-10-CM

## 2025-04-29 DIAGNOSIS — F11.20 OPIOID DEPENDENCE, UNCOMPLICATED: ICD-10-CM

## 2025-04-29 PROCEDURE — 99214 OFFICE O/P EST MOD 30 MIN: CPT | Mod: 95

## 2025-04-30 DIAGNOSIS — F11.20 OPIOID DEPENDENCE, UNCOMPLICATED: ICD-10-CM

## 2025-05-06 ENCOUNTER — APPOINTMENT (OUTPATIENT)
Dept: ORTHOPEDIC SURGERY | Facility: CLINIC | Age: 55
End: 2025-05-06

## 2025-05-06 DIAGNOSIS — M70.22 OLECRANON BURSITIS, LEFT ELBOW: ICD-10-CM

## 2025-05-06 PROCEDURE — 20605 DRAIN/INJ JOINT/BURSA W/O US: CPT | Mod: LT

## 2025-05-06 PROCEDURE — 99213 OFFICE O/P EST LOW 20 MIN: CPT | Mod: 25

## 2025-05-07 ENCOUNTER — APPOINTMENT (OUTPATIENT)
Dept: VASCULAR SURGERY | Facility: CLINIC | Age: 55
End: 2025-05-07
Payer: MEDICAID

## 2025-05-07 DIAGNOSIS — L03.119 CELLULITIS OF UNSPECIFIED PART OF LIMB: ICD-10-CM

## 2025-05-07 DIAGNOSIS — L97.919 VARICOSE VEINS OF RIGHT LOWER EXTREMITY WITH ULCER OF UNSPECIFIED SITE: ICD-10-CM

## 2025-05-07 DIAGNOSIS — L97.929 VARICOSE VEINS OF RIGHT LOWER EXTREMITY WITH ULCER OF UNSPECIFIED SITE: ICD-10-CM

## 2025-05-07 DIAGNOSIS — I83.029 VARICOSE VEINS OF RIGHT LOWER EXTREMITY WITH ULCER OF UNSPECIFIED SITE: ICD-10-CM

## 2025-05-07 DIAGNOSIS — I83.019 VARICOSE VEINS OF RIGHT LOWER EXTREMITY WITH ULCER OF UNSPECIFIED SITE: ICD-10-CM

## 2025-05-07 PROCEDURE — 99212 OFFICE O/P EST SF 10 MIN: CPT

## 2025-05-21 ENCOUNTER — APPOINTMENT (OUTPATIENT)
Dept: ORTHOPEDIC SURGERY | Facility: CLINIC | Age: 55
End: 2025-05-21

## 2025-05-21 DIAGNOSIS — M70.22 OLECRANON BURSITIS, LEFT ELBOW: ICD-10-CM

## 2025-05-21 PROCEDURE — 99213 OFFICE O/P EST LOW 20 MIN: CPT | Mod: 25

## 2025-05-21 PROCEDURE — 20610 DRAIN/INJ JOINT/BURSA W/O US: CPT | Mod: LT

## 2025-05-27 ENCOUNTER — OUTPATIENT (OUTPATIENT)
Dept: OUTPATIENT SERVICES | Facility: HOSPITAL | Age: 55
LOS: 1 days | End: 2025-05-27
Payer: COMMERCIAL

## 2025-05-27 ENCOUNTER — APPOINTMENT (OUTPATIENT)
Dept: PSYCHIATRY | Facility: CLINIC | Age: 55
End: 2025-05-27

## 2025-05-27 DIAGNOSIS — Z90.49 ACQUIRED ABSENCE OF OTHER SPECIFIED PARTS OF DIGESTIVE TRACT: Chronic | ICD-10-CM

## 2025-05-27 DIAGNOSIS — Z90.89 ACQUIRED ABSENCE OF OTHER ORGANS: Chronic | ICD-10-CM

## 2025-05-27 DIAGNOSIS — F10.20 ALCOHOL DEPENDENCE, UNCOMPLICATED: ICD-10-CM

## 2025-05-27 PROCEDURE — 99214 OFFICE O/P EST MOD 30 MIN: CPT | Mod: 95

## 2025-05-28 ENCOUNTER — OUTPATIENT (OUTPATIENT)
Dept: OUTPATIENT SERVICES | Facility: HOSPITAL | Age: 55
LOS: 1 days | End: 2025-05-28
Payer: COMMERCIAL

## 2025-05-28 ENCOUNTER — APPOINTMENT (OUTPATIENT)
Dept: PSYCHIATRY | Facility: CLINIC | Age: 55
End: 2025-05-28

## 2025-05-28 DIAGNOSIS — F10.20 ALCOHOL DEPENDENCE, UNCOMPLICATED: ICD-10-CM

## 2025-05-28 DIAGNOSIS — Z90.49 ACQUIRED ABSENCE OF OTHER SPECIFIED PARTS OF DIGESTIVE TRACT: Chronic | ICD-10-CM

## 2025-05-28 DIAGNOSIS — Z90.89 ACQUIRED ABSENCE OF OTHER ORGANS: Chronic | ICD-10-CM

## 2025-05-28 PROCEDURE — H0004: CPT | Mod: 95

## 2025-05-29 DIAGNOSIS — F10.20 ALCOHOL DEPENDENCE, UNCOMPLICATED: ICD-10-CM

## 2025-06-17 ENCOUNTER — INPATIENT (INPATIENT)
Facility: HOSPITAL | Age: 55
LOS: 7 days | Discharge: HOME | DRG: 383 | End: 2025-06-25
Attending: STUDENT IN AN ORGANIZED HEALTH CARE EDUCATION/TRAINING PROGRAM | Admitting: INTERNAL MEDICINE
Payer: MEDICAID

## 2025-06-17 VITALS
DIASTOLIC BLOOD PRESSURE: 73 MMHG | RESPIRATION RATE: 20 BRPM | TEMPERATURE: 98 F | HEART RATE: 59 BPM | WEIGHT: 190.04 LBS | SYSTOLIC BLOOD PRESSURE: 110 MMHG | HEIGHT: 68 IN | OXYGEN SATURATION: 99 %

## 2025-06-17 DIAGNOSIS — Z90.89 ACQUIRED ABSENCE OF OTHER ORGANS: Chronic | ICD-10-CM

## 2025-06-17 DIAGNOSIS — M79.89 OTHER SPECIFIED SOFT TISSUE DISORDERS: ICD-10-CM

## 2025-06-17 DIAGNOSIS — Z90.49 ACQUIRED ABSENCE OF OTHER SPECIFIED PARTS OF DIGESTIVE TRACT: Chronic | ICD-10-CM

## 2025-06-17 LAB
ALBUMIN SERPL ELPH-MCNC: 4.6 G/DL — SIGNIFICANT CHANGE UP (ref 3.5–5.2)
ALP SERPL-CCNC: 113 U/L — SIGNIFICANT CHANGE UP (ref 30–115)
ALT FLD-CCNC: 6 U/L — SIGNIFICANT CHANGE UP (ref 0–41)
ANION GAP SERPL CALC-SCNC: 14 MMOL/L — SIGNIFICANT CHANGE UP (ref 7–14)
AST SERPL-CCNC: 32 U/L — SIGNIFICANT CHANGE UP (ref 0–41)
BASOPHILS # BLD AUTO: 0.05 K/UL — SIGNIFICANT CHANGE UP (ref 0–0.2)
BASOPHILS NFR BLD AUTO: 0.6 % — SIGNIFICANT CHANGE UP (ref 0–1)
BILIRUB SERPL-MCNC: 0.3 MG/DL — SIGNIFICANT CHANGE UP (ref 0.2–1.2)
BUN SERPL-MCNC: 12 MG/DL — SIGNIFICANT CHANGE UP (ref 10–20)
CALCIUM SERPL-MCNC: 9.9 MG/DL — SIGNIFICANT CHANGE UP (ref 8.4–10.5)
CHLORIDE SERPL-SCNC: 94 MMOL/L — LOW (ref 98–110)
CO2 SERPL-SCNC: 29 MMOL/L — SIGNIFICANT CHANGE UP (ref 17–32)
CREAT SERPL-MCNC: 1.1 MG/DL — SIGNIFICANT CHANGE UP (ref 0.7–1.5)
EGFR: 60 ML/MIN/1.73M2 — SIGNIFICANT CHANGE UP
EGFR: 60 ML/MIN/1.73M2 — SIGNIFICANT CHANGE UP
EOSINOPHIL # BLD AUTO: 0.22 K/UL — SIGNIFICANT CHANGE UP (ref 0–0.7)
EOSINOPHIL NFR BLD AUTO: 2.7 % — SIGNIFICANT CHANGE UP (ref 0–8)
GLUCOSE BLDC GLUCOMTR-MCNC: 100 MG/DL — HIGH (ref 70–99)
GLUCOSE BLDC GLUCOMTR-MCNC: 126 MG/DL — HIGH (ref 70–99)
GLUCOSE BLDC GLUCOMTR-MCNC: 257 MG/DL — HIGH (ref 70–99)
GLUCOSE SERPL-MCNC: 117 MG/DL — HIGH (ref 70–99)
HCG SERPL QL: NEGATIVE — SIGNIFICANT CHANGE UP
HCT VFR BLD CALC: 33.6 % — LOW (ref 37–47)
HGB BLD-MCNC: 10.5 G/DL — LOW (ref 12–16)
IMM GRANULOCYTES NFR BLD AUTO: 0.5 % — HIGH (ref 0.1–0.3)
LACTATE SERPL-SCNC: 1.7 MMOL/L — SIGNIFICANT CHANGE UP (ref 0.7–2)
LYMPHOCYTES # BLD AUTO: 2.13 K/UL — SIGNIFICANT CHANGE UP (ref 1.2–3.4)
LYMPHOCYTES # BLD AUTO: 25.9 % — SIGNIFICANT CHANGE UP (ref 20.5–51.1)
MCHC RBC-ENTMCNC: 26.9 PG — LOW (ref 27–31)
MCHC RBC-ENTMCNC: 31.3 G/DL — LOW (ref 32–37)
MCV RBC AUTO: 85.9 FL — SIGNIFICANT CHANGE UP (ref 81–99)
MONOCYTES # BLD AUTO: 0.46 K/UL — SIGNIFICANT CHANGE UP (ref 0.1–0.6)
MONOCYTES NFR BLD AUTO: 5.6 % — SIGNIFICANT CHANGE UP (ref 1.7–9.3)
NEUTROPHILS # BLD AUTO: 5.33 K/UL — SIGNIFICANT CHANGE UP (ref 1.4–6.5)
NEUTROPHILS NFR BLD AUTO: 64.7 % — SIGNIFICANT CHANGE UP (ref 42.2–75.2)
NRBC BLD AUTO-RTO: 0 /100 WBCS — SIGNIFICANT CHANGE UP (ref 0–0)
PLATELET # BLD AUTO: 259 K/UL — SIGNIFICANT CHANGE UP (ref 130–400)
PMV BLD: 9.2 FL — SIGNIFICANT CHANGE UP (ref 7.4–10.4)
POTASSIUM SERPL-MCNC: 4.3 MMOL/L — SIGNIFICANT CHANGE UP (ref 3.5–5)
POTASSIUM SERPL-SCNC: 4.3 MMOL/L — SIGNIFICANT CHANGE UP (ref 3.5–5)
PROT SERPL-MCNC: 8.8 G/DL — HIGH (ref 6–8)
RBC # BLD: 3.91 M/UL — LOW (ref 4.2–5.4)
RBC # FLD: 15.9 % — HIGH (ref 11.5–14.5)
SODIUM SERPL-SCNC: 137 MMOL/L — SIGNIFICANT CHANGE UP (ref 135–146)
WBC # BLD: 8.23 K/UL — SIGNIFICANT CHANGE UP (ref 4.8–10.8)
WBC # FLD AUTO: 8.23 K/UL — SIGNIFICANT CHANGE UP (ref 4.8–10.8)

## 2025-06-17 PROCEDURE — 80349 CANNABINOIDS NATURAL: CPT

## 2025-06-17 PROCEDURE — 83036 HEMOGLOBIN GLYCOSYLATED A1C: CPT

## 2025-06-17 PROCEDURE — 99285 EMERGENCY DEPT VISIT HI MDM: CPT

## 2025-06-17 PROCEDURE — 93970 EXTREMITY STUDY: CPT | Mod: 26

## 2025-06-17 PROCEDURE — 97162 PT EVAL MOD COMPLEX 30 MIN: CPT | Mod: GP

## 2025-06-17 PROCEDURE — 36410 VNPNXR 3YR/> PHY/QHP DX/THER: CPT

## 2025-06-17 PROCEDURE — 73720 MRI LWR EXTREMITY W/O&W/DYE: CPT | Mod: LT

## 2025-06-17 PROCEDURE — 85025 COMPLETE CBC W/AUTO DIFF WBC: CPT

## 2025-06-17 PROCEDURE — 82962 GLUCOSE BLOOD TEST: CPT

## 2025-06-17 PROCEDURE — 86140 C-REACTIVE PROTEIN: CPT

## 2025-06-17 PROCEDURE — 80307 DRUG TEST PRSMV CHEM ANLYZR: CPT

## 2025-06-17 PROCEDURE — 36415 COLL VENOUS BLD VENIPUNCTURE: CPT

## 2025-06-17 PROCEDURE — 85652 RBC SED RATE AUTOMATED: CPT

## 2025-06-17 PROCEDURE — 85027 COMPLETE CBC AUTOMATED: CPT

## 2025-06-17 PROCEDURE — 80354 DRUG SCREENING FENTANYL: CPT

## 2025-06-17 PROCEDURE — A9579: CPT

## 2025-06-17 PROCEDURE — 80053 COMPREHEN METABOLIC PANEL: CPT

## 2025-06-17 PROCEDURE — 80202 ASSAY OF VANCOMYCIN: CPT

## 2025-06-17 PROCEDURE — 99233 SBSQ HOSP IP/OBS HIGH 50: CPT

## 2025-06-17 PROCEDURE — 93306 TTE W/DOPPLER COMPLETE: CPT

## 2025-06-17 RX ORDER — LEVOTHYROXINE SODIUM 300 MCG
175 TABLET ORAL DAILY
Refills: 0 | Status: DISCONTINUED | OUTPATIENT
Start: 2025-06-17 | End: 2025-06-25

## 2025-06-17 RX ORDER — ASPIRIN 325 MG
81 TABLET ORAL DAILY
Refills: 0 | Status: DISCONTINUED | OUTPATIENT
Start: 2025-06-17 | End: 2025-06-25

## 2025-06-17 RX ORDER — GLUCAGON 3 MG/1
1 POWDER NASAL ONCE
Refills: 0 | Status: DISCONTINUED | OUTPATIENT
Start: 2025-06-17 | End: 2025-06-25

## 2025-06-17 RX ORDER — SODIUM CHLORIDE 9 G/1000ML
1500 INJECTION, SOLUTION INTRAVENOUS ONCE
Refills: 0 | Status: COMPLETED | OUTPATIENT
Start: 2025-06-17 | End: 2025-06-17

## 2025-06-17 RX ORDER — VANCOMYCIN HCL IN 5 % DEXTROSE 1.5G/250ML
750 PLASTIC BAG, INJECTION (ML) INTRAVENOUS EVERY 12 HOURS
Refills: 0 | Status: DISCONTINUED | OUTPATIENT
Start: 2025-06-17 | End: 2025-06-19

## 2025-06-17 RX ORDER — INSULIN GLARGINE-YFGN 100 [IU]/ML
20 INJECTION, SOLUTION SUBCUTANEOUS AT BEDTIME
Refills: 0 | Status: DISCONTINUED | OUTPATIENT
Start: 2025-06-17 | End: 2025-06-25

## 2025-06-17 RX ORDER — DEXTROSE 50 % IN WATER 50 %
15 SYRINGE (ML) INTRAVENOUS ONCE
Refills: 0 | Status: DISCONTINUED | OUTPATIENT
Start: 2025-06-17 | End: 2025-06-25

## 2025-06-17 RX ORDER — FUROSEMIDE 10 MG/ML
40 INJECTION INTRAMUSCULAR; INTRAVENOUS ONCE
Refills: 0 | Status: DISCONTINUED | OUTPATIENT
Start: 2025-06-18 | End: 2025-06-25

## 2025-06-17 RX ORDER — DEXTROSE 50 % IN WATER 50 %
25 SYRINGE (ML) INTRAVENOUS ONCE
Refills: 0 | Status: DISCONTINUED | OUTPATIENT
Start: 2025-06-17 | End: 2025-06-25

## 2025-06-17 RX ORDER — CEFEPIME 2 G/20ML
2000 INJECTION, POWDER, FOR SOLUTION INTRAVENOUS ONCE
Refills: 0 | Status: DISCONTINUED | OUTPATIENT
Start: 2025-06-17 | End: 2025-06-25

## 2025-06-17 RX ORDER — INSULIN LISPRO 100 U/ML
INJECTION, SOLUTION INTRAVENOUS; SUBCUTANEOUS
Refills: 0 | Status: DISCONTINUED | OUTPATIENT
Start: 2025-06-17 | End: 2025-06-25

## 2025-06-17 RX ORDER — SODIUM CHLORIDE 9 G/1000ML
1000 INJECTION, SOLUTION INTRAVENOUS
Refills: 0 | Status: DISCONTINUED | OUTPATIENT
Start: 2025-06-17 | End: 2025-06-25

## 2025-06-17 RX ORDER — FUROSEMIDE 10 MG/ML
40 INJECTION INTRAMUSCULAR; INTRAVENOUS DAILY
Refills: 0 | Status: DISCONTINUED | OUTPATIENT
Start: 2025-06-17 | End: 2025-06-17

## 2025-06-17 RX ORDER — ACETAMINOPHEN 500 MG/5ML
650 LIQUID (ML) ORAL EVERY 6 HOURS
Refills: 0 | Status: DISCONTINUED | OUTPATIENT
Start: 2025-06-17 | End: 2025-06-25

## 2025-06-17 RX ORDER — CEFEPIME 2 G/20ML
2000 INJECTION, POWDER, FOR SOLUTION INTRAVENOUS EVERY 8 HOURS
Refills: 0 | Status: DISCONTINUED | OUTPATIENT
Start: 2025-06-17 | End: 2025-06-25

## 2025-06-17 RX ORDER — FUROSEMIDE 10 MG/ML
40 INJECTION INTRAMUSCULAR; INTRAVENOUS AT BEDTIME
Refills: 0 | Status: DISCONTINUED | OUTPATIENT
Start: 2025-06-17 | End: 2025-06-19

## 2025-06-17 RX ORDER — INSULIN LISPRO 100 U/ML
5 INJECTION, SOLUTION INTRAVENOUS; SUBCUTANEOUS
Refills: 0 | Status: DISCONTINUED | OUTPATIENT
Start: 2025-06-17 | End: 2025-06-25

## 2025-06-17 RX ORDER — PREGABALIN 50 MG/1
50 CAPSULE ORAL EVERY 12 HOURS
Refills: 0 | Status: DISCONTINUED | OUTPATIENT
Start: 2025-06-17 | End: 2025-06-24

## 2025-06-17 RX ORDER — INSULIN LISPRO 100 U/ML
INJECTION, SOLUTION INTRAVENOUS; SUBCUTANEOUS AT BEDTIME
Refills: 0 | Status: DISCONTINUED | OUTPATIENT
Start: 2025-06-17 | End: 2025-06-25

## 2025-06-17 RX ORDER — BUPRENORPHINE HYDROCHLORIDE, NALOXONE HYDROCHLORIDE 4; 1 MG/1; MG/1
3 FILM, SOLUBLE BUCCAL; SUBLINGUAL DAILY
Refills: 0 | Status: DISCONTINUED | OUTPATIENT
Start: 2025-06-17 | End: 2025-06-24

## 2025-06-17 RX ORDER — VANCOMYCIN HCL IN 5 % DEXTROSE 1.5G/250ML
1000 PLASTIC BAG, INJECTION (ML) INTRAVENOUS ONCE
Refills: 0 | Status: COMPLETED | OUTPATIENT
Start: 2025-06-17 | End: 2025-06-17

## 2025-06-17 RX ORDER — ALBUTEROL SULFATE 2.5 MG/3ML
2 VIAL, NEBULIZER (ML) INHALATION EVERY 6 HOURS
Refills: 0 | Status: DISCONTINUED | OUTPATIENT
Start: 2025-06-17 | End: 2025-06-25

## 2025-06-17 RX ORDER — METRONIDAZOLE 250 MG
500 TABLET ORAL ONCE
Refills: 0 | Status: COMPLETED | OUTPATIENT
Start: 2025-06-17 | End: 2025-06-17

## 2025-06-17 RX ORDER — ATORVASTATIN CALCIUM 80 MG/1
20 TABLET, FILM COATED ORAL AT BEDTIME
Refills: 0 | Status: DISCONTINUED | OUTPATIENT
Start: 2025-06-17 | End: 2025-06-25

## 2025-06-17 RX ORDER — DEXTROSE 50 % IN WATER 50 %
12.5 SYRINGE (ML) INTRAVENOUS ONCE
Refills: 0 | Status: DISCONTINUED | OUTPATIENT
Start: 2025-06-17 | End: 2025-06-25

## 2025-06-17 RX ADMIN — SODIUM CHLORIDE 1500 MILLILITER(S): 9 INJECTION, SOLUTION INTRAVENOUS at 08:03

## 2025-06-17 RX ADMIN — INSULIN LISPRO 3: 100 INJECTION, SOLUTION INTRAVENOUS; SUBCUTANEOUS at 17:16

## 2025-06-17 RX ADMIN — BUPRENORPHINE HYDROCHLORIDE, NALOXONE HYDROCHLORIDE 3 TABLET(S): 4; 1 FILM, SOLUBLE BUCCAL; SUBLINGUAL at 11:50

## 2025-06-17 RX ADMIN — Medication 250 MILLIGRAM(S): at 17:15

## 2025-06-17 RX ADMIN — INSULIN LISPRO 5 UNIT(S): 100 INJECTION, SOLUTION INTRAVENOUS; SUBCUTANEOUS at 17:22

## 2025-06-17 RX ADMIN — ATORVASTATIN CALCIUM 20 MILLIGRAM(S): 80 TABLET, FILM COATED ORAL at 21:13

## 2025-06-17 RX ADMIN — CEFEPIME 100 MILLIGRAM(S): 2 INJECTION, POWDER, FOR SOLUTION INTRAVENOUS at 21:13

## 2025-06-17 RX ADMIN — CEFEPIME 100 MILLIGRAM(S): 2 INJECTION, POWDER, FOR SOLUTION INTRAVENOUS at 13:14

## 2025-06-17 RX ADMIN — Medication 250 MILLIGRAM(S): at 11:31

## 2025-06-17 RX ADMIN — Medication 100 MILLIGRAM(S): at 11:30

## 2025-06-17 RX ADMIN — FUROSEMIDE 40 MILLIGRAM(S): 10 INJECTION INTRAMUSCULAR; INTRAVENOUS at 21:13

## 2025-06-17 RX ADMIN — Medication 81 MILLIGRAM(S): at 11:50

## 2025-06-17 RX ADMIN — PREGABALIN 50 MILLIGRAM(S): 50 CAPSULE ORAL at 17:15

## 2025-06-17 NOTE — PHYSICAL THERAPY INITIAL EVALUATION ADULT - CRITERIA FOR SKILLED THERAPEUTIC INTERVENTIONS
Emailed pt as well    impairments found/functional limitations in following categories/rehab potential/therapy frequency/predicted duration of therapy intervention/anticipated equipment needs at discharge/anticipated discharge recommendation

## 2025-06-17 NOTE — ED ADULT NURSE REASSESSMENT NOTE - NS ED NURSE REASSESS COMMENT FT1
IV fluid bolus not finished before transferring patient, repeat blood pressures endorsed to Christie CHAVEZ.

## 2025-06-17 NOTE — PATIENT PROFILE ADULT - NSPROGENSOURCEINFO_GEN_A_NUR
Detail Level: Zone
Photo Preface (Leave Blank If You Do Not Want): Photographs were obtained today
patient

## 2025-06-17 NOTE — PHYSICAL THERAPY INITIAL EVALUATION ADULT - GENERAL OBSERVATIONS, REHAB EVAL
14:10-14:35 Chart reviewed. Patient available to be seen for physical therapy, denies pain, confirmed with RN.  Pt rec'd in bed +Primafit in NAD.

## 2025-06-17 NOTE — H&P ADULT - HISTORY OF PRESENT ILLNESS
Patient is a 54 year old with hx of of IDDM, COPD not on home O2, opioid dependence on Suboxone, asthma, hypothyroidism, chronic LE lymphedema, LE leukocytoclastic vasculitis presenting to ED with worsening RLE wound. This is a chronic wound for which she is often treated with antibiotics. Her biopsies revealed leukocytoclastic vasculitis, she also has lymphedema. She has difficulty ambulating.  She denies fever, chills, sob, cp, abd pain, n/v/d.

## 2025-06-17 NOTE — PATIENT PROFILE ADULT - LEGAL HELP
Electrophysiology FU Note      Reason for initial consultation:  Palpitations , need for pacemaker    Chief complaint : 6 month follow up fibrillation    Referring physician: Sasha Peres      Primary care physician: Isabelle Altamirano MD      History of Present Illness: This visit 2/15/2023  Patient here today for follow-up on atrial fibrillation. Patient reports she feels well. She denies chest pain, palpitations, shortness of breath, lightheadedness, dizziness, edema or syncope. Patient reports she recently saw her neurologist and she needs to have an echo and a carotid ultrasound. Previous visit 8/11/2022  Patient here today for 6-month follow-up on atrial fibrillation. She reports she is felt well. She denies chest pain, palpitations, shortness of breath, lightheadedness, dizziness, edema or syncope. He denies issues with bleeding. She is taking her medications as prescribed. Previous visit (2/10/2022)      Chief Complaint   Patient presents with    6 Month Follow-Up     pt denies any cardiac symptoms. pt drinks 1 cup of coffee daily. pt does not drink or smoke. pt states decreased excercise           Previous visit:(7/31/2020)      Chief Complaint   Patient presents with    6 Month Follow-Up     patient states is feeling good. Did report a few episodes last month of feeling lightheaded and became hot and mildly sweaty. Denies CP, SOB. palpitations. No edema. Hx pacemaker, PAF. Atrial Fibrillation           Previous visit: (7/10/2019)      Chief Complaint   Patient presents with    1 Month Follow-Up     Mikala/Mervata-1 mo f/u-cardizem decreased PPM 3/30. Patient reports she was feeling good for last 2 weeks and today she feels some haziness. No dizziness  Patient BP has been normal range so she stopped taking midodrine.   No chest pain or shortness of breath    Dizziness           Previous visit:    Chief Complaint   Patient presents with Patient of Dr. Alem Dave, here today for follow up for c/o palpitations and for results on a three week event monitor she wore, results not yet available as patient just removed 5/1/2019, serious event noted on day 4 results in media. Results     3 weeek event monitor results. patient with Hx Atrial fib since 2000. She reports dizziness off and on with out warning but did not pass out. Palpitations     patient states palpitations much better after stopping Zrytec. - still has palpitations every day for short periods. Denies SOB, dizziness, and edema.      Patient with PAF episodes for some time  Patient had been on rhythmol and cardizem before - HR was dropping and BP was low  So she is only on rhythmol    During palpitations, she feels short of breath and tired but other wise she feels okay barring few dizzy spells off and on       Pastmedical history:   Past Medical History:   Diagnosis Date    Arrhythmia     AFIB    Atrial fibrillation ([address]     Cancer ([address]     padgets breast left    Constipation     H/O 24 hour EKG monitoring 12/27/2005 12/27/2005 supraventricular ectopic activity consisted of 1225 beats, five were in one run, 8 were atrial couplets, 200 were late beats, 992 were single PACS, 20 were in trigeminy, there was one dropped beat, fastest supraventricular run consisting of five beats , averaging 148 BPM    H/O echocardiogram 12/16/2015 08/13/2009    12/15 EF 60% mild MR, TR 08/13/2009 LVS is normal, EF > 55%, no pericardial effusion    H/O exercise stress test 5/7/13    History of atrial fibrillation     History of cardiovascular stress test 08/13/2009, 10/10/2007    08/13/2009 protocol Vitaliy, EF 70% global LVS function is normal, no evidence of inducible myocardial ischemia, normal pattern of perfusion in all  regions    History of exercise stress test 02/27/2019    treadmill, normal    History of Holter monitoring 12/16/15    predominant rhythm sinus    History of LIMITED echocardiogram 2019    EF 55- 60 %, No evidence of pericardial effusion, Sclerotic aortic valve, Pacer wire noted passing through tricuspid valve,  mildly elevated pulmonary artery pressure. Hx of cardiovascular stress test 12/10/15    Vitaliy, EF 70%, No ischemia, normal study. Hyperlipidemia     Palpitations     Postoperative hematoma of breast 7/3/2016    Tachycardia     Wears glasses        Surgical history :   Past Surgical History:   Procedure Laterality Date    BREAST SURGERY      left nipple biopsy    CARDIAC PACEMAKER PLACEMENT Left 2019    [employer] Dual [place]     SECTION      HYSTERECTOMY, VAGINAL      JOINT REPLACEMENT      KNEE SURGERY      right knee    KNEE SURGERY Left     OTHER SURGICAL HISTORY Left 2016    Breast exploration and evacuation of hematoma    PRE-MALIGNANT / BENIGN SKIN LESION EXCISION      breast 2016    SHOULDER SURGERY         Family history:   Family History   Problem Relation Age of Onset    Heart Attack Father     Heart Surgery Father         s/p CABG       Social history :  reports that she has never smoked. She has never used smokeless tobacco. She reports that she does not currently use alcohol. She reports that she does not use drugs.     No Known Allergies    Current Outpatient Medications on File Prior to Visit   Medication Sig Dispense Refill    Zolpidem Tartrate (AMBIEN PO) Take by mouth      MAGNESIUM CITRATE PO Take 250 mg by mouth 2 times daily      donepezil (ARICEPT) 5 MG tablet Take 5 mg by mouth nightly      Cyanocobalamin (VITAMIN B-12 PO) Take 2,500 mcg by mouth      magnesium (MAGNESIUM-OXIDE) 250 MG TABS tablet Take 250 mg by mouth daily      diltiazem (CARDIZEM LA) 120 MG TB24 extended release tablet Take 1 capsule by mouth daily 90 capsule 3    montelukast (SINGULAIR) 10 MG tablet Take 10 mg by mouth nightly      Calcium Carbonate-Vitamin D (CALCIUM 500/D PO) Take 500 mg by mouth 2 times daily      aspirin 325 MG tablet Take 325 mg by mouth daily      propafenone (RYTHMOL SR) 225 MG extended release capsule Take 1 capsule by mouth 2 times daily 180 capsule 3    atorvastatin (LIPITOR) 40 MG tablet Take 40 mg by mouth daily. No current facility-administered medications on file prior to visit. Review of Systems:   Review of Systems   Constitutional: Negative for activity change, chills, fatigue and fever. HENT: Negative for congestion, ear pain and tinnitus. Eyes: Negative for photophobia, pain and visual disturbance. Respiratory: Negative for cough, chest tightness, shortness of breath and wheezing. Cardiovascular: Negative for chest pain, palpitations and leg swelling. Gastrointestinal: Negative for abdominal pain, blood in stool, constipation, diarrhea, nausea and vomiting. Endocrine: Negative for cold intolerance and heat intolerance. Genitourinary: Negative for dysuria, flank pain and hematuria. Musculoskeletal: Negative for arthralgias, back pain, myalgias and neck stiffness. Skin: Negative for color change and rash. Allergic/Immunologic: Negative for food allergies. Neurological: Negative for numbness and headaches. Hematological: Does not bruise/bleed easily. Psychiatric/Behavioral: Negative for agitation, behavioral problems and confusion. Physical Examination:    Vitals:    02/15/23 1501   BP: 126/76   Pulse: 64       Physical Exam  Constitutional:       Appearance: She is well-developed. HENT:      Head: Normocephalic and atraumatic. Eyes:      General:         Right eye: No discharge. Conjunctiva/sclera: Conjunctivae normal.      Pupils: Pupils are equal, round, and reactive to light. Neck:      Musculoskeletal: Normal range of motion. Thyroid: No thyromegaly. Vascular: No JVD. Cardiovascular:      Rate and Rhythm: Normal rate and regular rhythm. Heart sounds: Normal heart sounds. No murmur. No friction rub.    Pulmonary:      Effort: Pulmonary effort is normal. No respiratory distress. Breath sounds: Normal breath sounds. No stridor. No wheezing. Abdominal:      General: Bowel sounds are normal. There is no distension. Palpations: Abdomen is soft. Tenderness: There is no abdominal tenderness. Musculoskeletal: Normal range of motion. General: No tenderness. Skin:     General: Skin is warm and dry. Findings: No erythema or rash. Neurological:      Mental Status: She is alert and oriented to person, place, and time. Cranial Nerves: No cranial nerve deficit. Coordination: Coordination normal.      Deep Tendon Reflexes: Reflexes normal.   Psychiatric:         Behavior: Behavior normal.           CBC:   Lab Results   Component Value Date/Time    WBC 7.2 09/07/2020 01:45 PM    HGB 14.6 09/07/2020 01:45 PM    HCT 44.5 09/07/2020 01:45 PM     09/07/2020 01:45 PM     Lipids:  Lab Results   Component Value Date    CHOL 166 11/05/2015    TRIG 237 11/05/2015    HDL 42 11/05/2015    LDLCALC 77 11/05/2015     PT/INR:   Lab Results   Component Value Date/Time    INR 0.98 05/28/2019 04:07 PM        BMP:    Lab Results   Component Value Date     09/07/2020    K 3.9 09/07/2020     09/07/2020    CO2 23 09/07/2020    BUN 17 09/07/2020     CMP:   Lab Results   Component Value Date    AST 25 09/07/2020    PROT 7.1 09/07/2020    BILITOT 0.5 09/07/2020    ALKPHOS 120 09/07/2020     TSH:    Lab Results   Component Value Date/Time    TSH 2.7 11/05/2015 12:00 AM       EKGINTERPRETATION - EKG Interpretation:  atrial paced    IMPRESSION / RECOMMENDATIONS:     Atrial flutter paroxysmal  Tachy zeb / sick sinus sp pacemaker  PAF  Trifascicular block  HLD    EKG obtained and reviewed patient noted to be atrial paced. QTC is 452  Patient did note on pacemaker to have short episodes of atrial fibrillation during November and December. Patient was asymptomatic at that time.   Patient currently atrial paced  We will continue Cardizem 120 mg daily and Rythmol  mg twice daily  Patient is taking aspirin 325 mg daily  We will continue magnesium 250 mg daily    Patient recently saw her neurologist at [address] [place] [address]. Patient needed to have an echo and carotid Doppler completed and she did not want to do this at [address] [place] [address] she wanted to do this here in [facility]. I ordered these tests for patient. She can follow-up with Dr. Jared Lockett to discuss results  I did review neurology note and ultrasound of the carotid Dopplers was ordered for assessment of cognitive impairment and echo to assess for ovular heart disease  Overall patient is doing well. We will follow-up in 6 months or sooner if needed    Thanks again for allowing me to participate in care of this patient. Please call me if you have any questions. With best regards.       RAJIV Park - CNP, 2/15/2023 3:11 PM no

## 2025-06-17 NOTE — PHYSICAL THERAPY INITIAL EVALUATION ADULT - ADDITIONAL COMMENTS
Pt reports she lives with boyfriend in house with [address] outside, 10 more steps into apt, then level living. Pt uses [facility]W for amb PTA.

## 2025-06-17 NOTE — H&P ADULT - NSHPPHYSICALEXAM_GEN_ALL_CORE
Vital Signs Last 24 Hrs  T(C): 36.5 (17 Jun 2025 06:17), Max: 36.5 (17 Jun 2025 06:17)  T(F): 97.7 (17 Jun 2025 06:17), Max: 97.7 (17 Jun 2025 06:17)  HR: 59 (17 Jun 2025 06:17) (59 - 59)  BP: 110/73 (17 Jun 2025 06:17) (110/73 - 110/73)  BP(mean): --  RR: 20 (17 Jun 2025 06:17) (20 - 20)  SpO2: 99% (17 Jun 2025 06:17) (99% - 99%)    Parameters below as of 17 Jun 2025 06:17  Patient On (Oxygen Delivery Method): room air    PHYSICAL EXAM:  GENERAL: NAD, well-developed  HEAD:  Atraumatic, Normocephalic  EYES: EOMI, PERRLA, conjunctiva and sclera clear  CHEST/LUNG: Clear to auscultation bilaterally; No wheeze  HEART: Regular rate and rhythm; No murmurs, rubs, or gallops. distal pulses 2+ and equal bilateral  ABDOMEN: Soft, Nontender, Nondistended; Bowel sounds present  EXTREMITIES:  b/l LE edema and erythema with chronic vnous stasis, ulceration to RLE in multiple areas, largest about 6cm  CNS: AAOx3

## 2025-06-17 NOTE — ED PROVIDER NOTE - PHYSICAL EXAMINATION
GENERAL: NAD, well-developed  HEAD:  Atraumatic, Normocephalic  EYES: EOMI, PERRLA, conjunctiva and sclera clear  CHEST/LUNG: Clear to auscultation bilaterally; No wheeze  HEART: Regular rate and rhythm; No murmurs, rubs, or gallops. distal pulses 2+ and equal bilateral  ABDOMEN: Soft, Nontender, Nondistended; Bowel sounds present  EXTREMITIES:  b/l LE edema and erythema with chronic vnous stasis, ulceration to RLE in multiple areas, largest about 6cm  CNS: AAOx3

## 2025-06-17 NOTE — H&P ADULT - NSHPLABSRESULTS_GEN_ALL_CORE
10.5   8.23  )-----------( 259      ( 17 Jun 2025 07:40 )             33.6       06-17    137  |  94[L]  |  12  ----------------------------<  117[H]  4.3   |  29  |  1.1    Ca    9.9      17 Jun 2025 07:40    TPro  8.8[H]  /  Alb  4.6  /  TBili  0.3  /  DBili  x   /  AST  32  /  ALT  6   /  AlkPhos  113  06-17                  Urinalysis Basic - ( 17 Jun 2025 07:40 )    Color: x / Appearance: x / SG: x / pH: x  Gluc: 117 mg/dL / Ketone: x  / Bili: x / Urobili: x   Blood: x / Protein: x / Nitrite: x   Leuk Esterase: x / RBC: x / WBC x   Sq Epi: x / Non Sq Epi: x / Bacteria: x        Lactate Trend  06-17 @ 07:40 Lactate:1.7         Serum Pregnancy: Negative (06-17-25 @ 07:40)    VA Duplex Lower Ext Vein Scan, Bilat (06.17.25 @ 09:39)     IMPRESSION:  No evidence of deep venous thrombosis in either lower extremity.

## 2025-06-17 NOTE — ED PROVIDER NOTE - OBJECTIVE STATEMENT
Patient is 54-year-old female with past medical history of DM, COPD not on home O2, opioid dependence on Suboxone, asthma, thyroidism, chronic venous stasis with chronic lymphedema of lower extremities presents to the ED complaining of worsening swelling/pain of bilateral lower extremities ( R > L) Pt states the pain and swelling is causing her ambulatory disfunction, States the swelling really appeared a week ago however the last 2 days she has had increased difficulty ambulating.

## 2025-06-17 NOTE — CONSULT NOTE ADULT - ASSESSMENT
ASSESSMENT  This is a 54 year old with hx of of IDDM, COPD not on home O2, opioid dependence on Suboxone, asthma, hypothyroidism, chronic LE lymphedema, LE leukocytoclastic vasculitis presenting to ED with worsening RLE wound.    IMPRESSION  #Bilateral lower extremity wounds/ulcers with underlying stasis dermatitis.  pyoderma gangrenosum vs Vasculitis   #Recurrent admission in the past for lower extremity cellulitis, Biopsy confirmed leukocytoclastic vasculitis  #PAD  #History of polysubstance use disorder. On Suboxone  #Emphysema and interstitial lung disease noted on CT (11/2024)  #HTN, HLD  #Hep C-- Self resolved  #Obesity BMI (kg/m2): 28.9   #Immunodeficiency secondary to history of substance use which could result in poor clinical outcome    Hepatitis C Ab +, PCR undetectable 1/2025  Cryoglobulin negative 1/2025   Hepatitis B immune, HIV screen negative.   DULCE negative  RF negative  p-ANCA positive, titer 1:40    RECOMMENDATIONS  -Follow up with blood cultures.  -IV Vanc. Dosing as per pharmacy protocol.  -IV Cefepime 2 gram q 8 hours (S/D 6/17).  -Diuresis, local care as per primary team.    If any questions, please send a message or call on [employer] Teams  Please continue to update ID with any pertinent new laboratory or radiographic findings.    Stella Chavez M.D  Infectious Diseases Attending/   [employer] at \[facility] [place] [facility]\""/[facility]

## 2025-06-17 NOTE — ED ADULT TRIAGE NOTE - DIRECT TO ROOM CARE INITIATED:
Patient informed that he will need to use OTC protonix 20mg x2 until he receives his medication from [facility] mail order. Patient acknowledged understanding and had no further questions. 17-Jun-2025 06:20

## 2025-06-17 NOTE — ED ADULT TRIAGE NOTE - SPO2 (%)
Mom needs immunizations record sent to her  [facility] in [place].  Phone number is [phone] . Please fax this to her  [phone]   
faxed  
99

## 2025-06-17 NOTE — ED PROVIDER NOTE - CLINICAL SUMMARY MEDICAL DECISION MAKING FREE TEXT BOX
Patient is a 54-year-old female presents for evaluation of bilateral lower extremity edema past medical history of diabetes COPD not requiring home oxygen in addition chronic venous stasis with chronic lymphedema of the bilateral lower extremities presents here for evaluation of worsening bilateral lower extremities with increasing redness bilaterally right greater than left no fevers no chills no vomiting denies any chest pain shortness of breath diaphoresis patient unable to ambulate secondary to worsening swelling and pain    On physical exam patient is normocephalic/atraumatic pupils equal round reactive light accommodation extraocular muscles intact oropharynx clear chest clear to auscultation bilaterally abdomen soft nontender nondistended bowel sounds positive no guarding no rebound extremities patient has bilateral lower extremity edema and swelling worsening pedal pulse 2+ capillary refills normal most consistent with cellulitis    Assessment plan patient presents for evaluation of bilateral lower extremity edema we obtained labs patient does not have large elevation of white blood cell count LFTs negative patient has bilateral lower extremity ultrasound which are negative at this time we initiated IV antibiotics given iv fluids we obtained blood cultures, lactate

## 2025-06-17 NOTE — H&P ADULT - NS ATTEND AMEND GEN_ALL_CORE FT
54-year-old female with past medical history of DM, COPD not on home O2, opioid dependence on Suboxone, asthma, thyroidism, chronic venous stasis with chronic lymphedema of lower extremities presenting with RLE cellulitis.    Cellulitis  -2/2 chronic venous stasis, lymphedema  -Abx, ID consult  -local wound care    Case discussed with PA, agree with A/P listed as above

## 2025-06-17 NOTE — PATIENT PROFILE ADULT - HOW PATIENT ADDRESSED, PROFILE
.You have been seen in the ED today for penile symptoms.  You were tested for sexually transmitted diseases (STDs) during your visit.  Some of the results olena not come back today, and you will recieve a phone call if your testing requires further treatment (if your tests are negative, you will not receive a call).      We typically do not have ability to test for every possible sexually transmitted disease. You should follow-up with your doctor to have your tests reviewed and consider any additional testing.     If you were given any medications, please take them as prescribed for the full duration of treatment.  If you are positive for an STD, please obtain from sexual intercourse until you have completed treatment and do not have any symptoms. You must also notify your sexual partners that they must be checked out to see if they require any testing or treatment.    If you deveop any new or worsening symptoms such as worsening discharge, fevers, significant abdominal pain, feel lightheaded, dizzy, persistent vomiting, or are feeling ill, please return to the ED for further care.      Remember, your care does not end after your emergency department visit today. You must follow up with your primary care provider for further care and management. If you do not have a primary care provider, call [phone] and they will be able to assist you further.     Thank you for visiting [facility] emergency department. We hope you are feeling better.    
Elisabet

## 2025-06-17 NOTE — PHYSICAL THERAPY INITIAL EVALUATION ADULT - PERTINENT HX OF CURRENT PROBLEM, REHAB EVAL
History of Present Illness:   Patient is a 54 year old with hx of of IDDM, COPD not on home O2, opioid dependence on Suboxone, asthma, hypothyroidism, chronic LE lymphedema, LE leukocytoclastic vasculitis presenting to ED with worsening RLE wound. This is a chronic wound for which she is often treated with antibiotics. Her biopsies revealed leukocytoclastic vasculitis, she also has lymphedema. She has difficulty ambulating.  She denies fever, chills, sob, cp, abd pain, n/v/d.

## 2025-06-17 NOTE — H&P ADULT - ASSESSMENT
CHIEF COMPLAINT    Chief Complaint   Patient presents with    Mental Health Problem     Suicidal thoughts, plan to overdose. Previous attempt, 3 weeks ago      KENDAL Ortiz is a 23 y.o. female with history of bipolar disorder who presents to the ED with complaints of worsening depression and suicidal thoughts. Patient states that she has some increasing life stressors recently and is homeless and in addition to this her child was taken from her custody 1 week ago. Since that time she has been feeling more depressed and drinking more alcohol. She has thoughts of wanting to kill herself by overdose. She was evaluated for depression on 08/20 and [facility].  Her symptoms are constant and rated as severe and exacerbated by life stressors. Nothing makes it better. She denies homicidal ideation, hallucinations, chest pain, shortness of breath, nausea, vomiting      REVIEW OF SYSTEMS  Constitutional: No fever, chills or recent illness. Eye: No visual changes  HENT: No earache or sore throat. Resp: No SOB or productive cough. Cardio: No chest pain or palpitations. GI: No abdominal pain, nausea, vomiting, constipation or diarrhea. No melena. : No dysuria, urgency or frequency. Endocrine: No heat intolerance, no cold intolerance, no polydipsia   Lymphatics: No adenopathy  Musculoskeletal: No new muscle aches or joint pain. Neuro: No headaches. Psych: Complains of depression with suicidal thoughts  Skin: No rash, No itching. ?  ?   PAST MEDICAL HISTORY  Past Medical History:   Diagnosis Date    ADD (attention deficit disorder)     Anemia     Anxiety     Bipolar 1 disorder ([facility] [address]     Depression     Dysmenorrhea     Exposure to STD     Infertility counseling     Insomnia     Menorrhagia     Obesity     OCD (obsessive compulsive disorder)     Panic attack      FAMILY HISTORY  Family History   Problem Relation Age of Onset    Hypertension Paternal Grandfather     Hypertension medications, and allergies. PHYSICAL EXAM  VITAL SIGNS: Triage VS:    ED Triage Vitals [09/07/22 2324]   Enc Vitals Group      BP       Pulse       Resp       Temp       Temp src       SpO2       Weight 182 lb (82.6 kg)      Height 5' 3\" (1.6 m)      Head Circumference       Peak Flow       Pain Score       Pain Loc       Pain Edu? Excl. in [address]? Constitutional: Well developed, Well nourished, nontoxic appearing  HENT: Normocephalic, Atraumatic, Bilateral external ears normal, Oropharynx moist, No oral exudates, Nose normal.   Eyes: Conjunctiva normal, No discharge. No scleral icterus. Neck: Normal range of motion, No tenderness, Supple. Lymphatic: No lymphadenopathy noted. Cardiovascular: Normal heart rate, Normal rhythm, No murmurs, gallops or rubs. Thorax & Lungs: Normal breath sounds, No respiratory distress, No wheezing. Skin: Warm, Dry, Pink, No mottling, No erythema, No rash. Back: No tenderness, No CVA tenderness. Extremities: No edema, No tenderness, No cyanosis, Normal perfusion, No clubbing. Musculoskeletal: Good range of motion in all major joints as observed. No major deformities noted. Neurologic: Alert & oriented x 3, Normal motor function, Normal sensory function, CN II-XII grossly intact as tested, No focal deficits noted.    Psychiatric: Flat affect with depressed mood, cooperative      RADIOLOGY  Labs Reviewed   CBC WITH AUTO DIFFERENTIAL - Abnormal; Notable for the following components:       Result Value    Segs Relative 30.6 (*)     Lymphocytes % 59.0 (*)     Monocytes % 8.8 (*)     All other components within normal limits   SALICYLATE LEVEL - Abnormal; Notable for the following components:    Salicylate Lvl <2.9 (*)     All other components within normal limits   ACETAMINOPHEN LEVEL - Abnormal; Notable for the following components:    Acetaminophen Level <5.0 (*)     All other components within normal limits   URINE DRUG SCREEN - Abnormal; Notable for the following components:    Cannabinoid Scrn, Ur UNCONFIRMED POSITIVE (*)     All other components within normal limits   COVID-19, RAPID   BASIC METABOLIC PANEL   HEPATIC FUNCTION PANEL   HCG, SERUM, QUALITATIVE   ETHANOL     I personally reviewed the images. The radiologist's interpretation reveals:  Last Imaging results   No orders to display       MEDS GIVEN IN ED:  Medications - No data to display  [facility]  23 yr old female presents to ED with complaints of increasing depression and suicidal thoughts with a plan Initial vital signs are reassuring. Neurological exam is non-focal. She has flat affect with depressed mood. At this time pink slip has completed. We will obtain medical screening laboratory studies in addition to consult psychiatry. Patient's urine drug screen is positive for marijuana. Remainder of medical screening exam is reassuring and patient is medically cleared for psychiatric hospitalization. Patient evaluated by psychiatry with recommendations for inpatient psychiatric hospitalization. Patient is awaiting psych placement. Signed out to daytime physician. I am primary physician of record for this patient encounter    Amount and/or Complexity of Data Reviewed  Clinical lab tests: reviewed  Decide to obtain previous medical records or to obtain history from someone other than the patient: yes       -  Patient seen and evaluated in the emergency department. -  Triage and nursing notes reviewed and incorporated. -  Old chart records reviewed and incorporated. -  Work-up included:  See above  -  Results discussed with patient. Appropriate PPE utilized as indicated for entire patient encounter? Time of Disposition: See timeline  ? New Prescriptions    No medications on file     FINAL IMPRESSION  1. Depression with suicidal ideation      Electronically signed by:  Alex Ramirez DO, 9/8/2022         Alex Ramirez DO  09/08/22 Hodan Diaz,   09/08/22 3476 Patient is a 54 year old with hx of of IDDM, COPD not on home O2, opioid dependence on Suboxone, asthma, hypothyroidism, chronic LE lymphedema, LE leukocytoclastic vasculitis presenting to ED with worsening RLE wound, admitted for management of cellulitis/lymphedema/vasculitis. Patient is a 54 year old with hx of of IDDM, COPD not on home O2, opioid dependence on Suboxone, asthma, hypothyroidism, chronic LE lymphedema, LE leukocytoclastic vasculitis presenting to ED with worsening RLE wound, admitted for management of cellulitis/lymphedema/vasculitis.    # RLE cellulitis vs lymphedema vs vasculitis  - ID consult: continue vanc and cefepime  - recent consult with rheumatology- patient likely experiencing worsening lymphedema, not vasculitis  - wound care consult  - recent outpatient visit with vascular surgery- no intervention, continue compression wrap  - f/u blood cultures  - continue pregabalin for pain  - continue lasix 40 mg  - PT consult    # hx opioid dependence  - continue suboxone    # IDDM  - continue insulin regimen per last endo note: lantus 20 u at bedtime, admelog 5 units with meals  - FS glucose, SSI    # COPD  - continue albuterol as needed    # CAD  - cont asa 81 mg, statin    # hypothyroidism  - cont synthroid 175 mcg    Diet: DASH, CCHO  Activity: OOB  DVT PPX:   GI PPX: not indicated  CHG:  ordered  Code status: full code  Dispo: acute from home, uses walker at baseline

## 2025-06-17 NOTE — ED PROVIDER NOTE - ATTENDING APP SHARED VISIT CONTRIBUTION OF CARE
I have personally performed a history and physical exam on this patient and personally directed the management of the patient. Patient is a 54-year-old female presents for evaluation of bilateral lower extremity edema past medical history of diabetes COPD not requiring home oxygen in addition chronic venous stasis with chronic lymphedema of the bilateral lower extremities presents here for evaluation of worsening bilateral lower extremities with increasing redness bilaterally right greater than left no fevers no chills no vomiting denies any chest pain shortness of breath diaphoresis patient unable to ambulate secondary to worsening swelling and pain    On physical exam patient is normocephalic/atraumatic pupils equal round reactive light accommodation extraocular muscles intact oropharynx clear chest clear to auscultation bilaterally abdomen soft nontender nondistended bowel sounds positive no guarding no rebound extremities patient has bilateral lower extremity edema and swelling worsening pedal pulse 2+ capillary refills normal most consistent with cellulitis    Assessment plan patient presents for evaluation of bilateral lower extremity edema we obtained labs patient does not have large elevation of white blood cell count LFTs negative patient has bilateral lower extremity ultrasound which are negative at this time we initiated IV antibiotics given iv fluids we obtained blood cultures, lactate

## 2025-06-17 NOTE — CONSULT NOTE ADULT - SUBJECTIVE AND OBJECTIVE BOX
EZRA BARCLAY  54y, Female  Allergies    No Known Allergies    Intolerances    LOS      HPI  HPI:  Patient is a 54 year old with hx of of IDDM, COPD not on home O2, opioid dependence on Suboxone, asthma, hypothyroidism, chronic LE lymphedema, LE leukocytoclastic vasculitis presenting to ED with worsening RLE wound. This is a chronic wound for which she is often treated with antibiotics. Her biopsies revealed leukocytoclastic vasculitis, she also has lymphedema. She has difficulty ambulating.  She denies fever, chills, sob, cp, abd pain, n/v/d. (17 Jun 2025 11:46)      INFECTIOUS DISEASE HISTORY:  ID consulted for antimicrobial recommendations.     Prior hospital charts reviewed [Yes]  Primary team notes reviewed [Yes]  Other consultant notes reviewed [Yes]    REVIEW OF SYSTEMS:  CONSTITUTIONAL: No fever or chills  HEENT: No sore throat  RESPIRATORY: No cough, no shortness of breath  CARDIOVASCULAR: No chest pain or palpitations  GASTROINTESTINAL: No abdominal or epigastric pain  GENITOURINARY: No dysuria  NEUROLOGICAL: No headache/dizziness  MSK: No joint pain, erythema, or swelling; no back pain  SKIN: No itching, rashes  All other ROS negative except noted above    PAST MEDICAL & SURGICAL HISTORY:  Asthma with COPD      Hypothyroidism      H/O: substance abuse  no longer using      History of pancreatitis      Hepatitis-C      Leukocytoclastic vasculitis      Bilateral edema of lower extremity      HLD (hyperlipidemia)      Type 2 diabetes mellitus      History of appendectomy      History of tonsillectomy          SOCIAL HISTORY:  - No recent travel    FAMILY HISTORY: No pertinent PMH for first degree relatives.     ANTIMICROBIALS:  cefepime   IVPB 2000 every 8 hours  cefepime   IVPB 2000 once  vancomycin  IVPB 750 every 12 hours      ANTIMICROBIALS (past 90 days):  MEDICATIONS  (STANDING):  cefepime   IVPB   100 mL/Hr IV Intermittent (06-17-25 @ 13:14)    metroNIDAZOLE  IVPB   100 mL/Hr IV Intermittent (06-17-25 @ 11:30)    vancomycin  IVPB   250 mL/Hr IV Intermittent (06-17-25 @ 17:15)    vancomycin  IVPB.   250 mL/Hr IV Intermittent (06-17-25 @ 11:31)        OTHER MEDS:   MEDICATIONS  (STANDING):  acetaminophen     Tablet .. 650 every 6 hours PRN  albuterol    90 MICROgram(s) HFA Inhaler 2 every 6 hours PRN  aspirin enteric coated 81 daily  atorvastatin 20 at bedtime  buprenorphine 8 mG/naloxone 2 mG SL  Tablet 3 daily  dextrose 50% Injectable 25 once  dextrose 50% Injectable 12.5 once  dextrose 50% Injectable 25 once  dextrose Oral Gel 15 once PRN  furosemide    Tablet 40 daily  glucagon  Injectable 1 once  insulin glargine Injectable (LANTUS) 20 at bedtime  insulin lispro (ADMELOG) corrective regimen sliding scale  three times a day before meals  insulin lispro (ADMELOG) corrective regimen sliding scale  at bedtime  insulin lispro Injectable (ADMELOG) 5 three times a day before meals  levothyroxine 175 daily  pregabalin 50 every 12 hours      VITALS:  Vital Signs Last 24 Hrs  T(F): 98.2 (06-17-25 @ 14:34), Max: 98.2 (06-17-25 @ 14:34)    Vital Signs Last 24 Hrs  HR: 65 (06-17-25 @ 14:34) (59 - 67)  BP: 111/73 (06-17-25 @ 14:34) (110/73 - 125/78)  RR: 18 (06-17-25 @ 14:34)  SpO2: 95% (06-17-25 @ 14:34) (95% - 99%)  Wt(kg): --    EXAM:  GENERAL: In Mild distress.  HEAD: No head lesions  NECK: Supple  CHEST/LUNG: Clear to auscultation bilaterally  HEART: S1 S2  ABDOMEN: Soft, nontender  EXTREMITIES: Diffuse swelling of the lower extremities with weeping ulcers R>L  NERVOUS SYSTEM: Alert and oriented to person    Labs:                        10.5   8.23  )-----------( 259      ( 17 Jun 2025 07:40 )             33.6     06-17    137  |  94[L]  |  12  ----------------------------<  117[H]  4.3   |  29  |  1.1    Ca    9.9      17 Jun 2025 07:40    TPro  8.8[H]  /  Alb  4.6  /  TBili  0.3  /  DBili  x   /  AST  32  /  ALT  6   /  AlkPhos  113  06-17      WBC Trend:  WBC Count: 8.23 (06-17-25 @ 07:40)      Auto Neutrophil #: 6.06 K/uL (02-10-25 @ 20:00)  Auto Neutrophil #: 4.26 K/uL (01-03-25 @ 07:28)  Auto Neutrophil #: 3.94 K/uL (01-02-25 @ 09:32)  Auto Neutrophil #: 5.47 K/uL (12-30-24 @ 11:00)  Auto Neutrophil #: 5.10 K/uL (12-28-24 @ 11:22)      Creatine Trend:  Creatinine: 1.1 (06-17)      Liver Biochemical Testing Trend:  Alanine Aminotransferase (ALT/SGPT): 6 (06-17)  Alanine Aminotransferase (ALT/SGPT): 5 (04-02)  Alanine Aminotransferase (ALT/SGPT): 6 (03-31)  Alanine Aminotransferase (ALT/SGPT): 5 (03-28)  Alanine Aminotransferase (ALT/SGPT): 7 (03-27)  Aspartate Aminotransferase (AST/SGOT): 32 (06-17-25 @ 07:40)  Aspartate Aminotransferase (AST/SGOT): 35 (04-02-25 @ 07:20)  Aspartate Aminotransferase (AST/SGOT): 38 (03-31-25 @ 15:40)  Aspartate Aminotransferase (AST/SGOT): 33 (03-28-25 @ 07:40)  Aspartate Aminotransferase (AST/SGOT): 25 (03-27-25 @ 05:39)  Bilirubin Total: 0.3 (06-17)  Bilirubin Total: 0.3 (04-02)  Bilirubin Total: 0.3 (03-31)  Bilirubin Total: 0.3 (03-28)  Bilirubin Total: 0.3 (03-27)      Trend LDH  01-10-24 @ 06:55  230          Urinalysis Basic - ( 17 Jun 2025 07:40 )    Color: x / Appearance: x / SG: x / pH: x  Gluc: 117 mg/dL / Ketone: x  / Bili: x / Urobili: x   Blood: x / Protein: x / Nitrite: x   Leuk Esterase: x / RBC: x / WBC x   Sq Epi: x / Non Sq Epi: x / Bacteria: x        MICROBIOLOGY:    Female          HIV-1/2 Combo Result: Nonreact (12-31-24 @ 08:00)  HIV-1/2 Combo Result: Nonreact (01-11-24 @ 13:40)                                          Lactate, Blood: 1.7 (06-17 @ 07:40)    A1C with Estimated Average Glucose Result: 12.7 % (03-27-25 @ 05:39)  A1C with Estimated Average Glucose Result: 8.1 % (12-30-24 @ 11:00)      INFLAMMATORY MARKERS      RADIOLOGY & ADDITIONAL TESTS:  I have personally reviewed the imagings.  CXR      CT  < from: [facility] Duplex Lower Ext Vein Scan, Bilat (06.17.25 @ 09:39) >  RIGHT:  Normal compressibility of the RIGHT common femoral, femoral and popliteal   veins.  Doppler examination shows normal spontaneous and phasic flow.  Right calf veins not visualized due to tissue edema    LEFT:  Normal compressibility of the LEFT common femoral, femoral and popliteal   veins.  Doppler examination shows normal spontaneous and phasic flow.  No LEFT calf vein thrombosis is detected. Left anterior tibial vein not   visualized due to tissue edema    IMPRESSION:  No evidence of deep venous thrombosis in either lower extremity.      < end of copied text >  < from: CT Lower Extremity w/ IV Cont, Right (03.30.25 @ 15:46) >  IMPRESSION:    Diffuse subcutaneous soft tissue edema with skin and fascial thickening   of the right lower extremity, compatible with patient's clinical   diagnosis of cellulitis. No drainable fluid collection or subcutaneous   emphysema. No erosive osseous changes.    Moderate degenerative changes of the ankle and midfoot are noted.    --- End of Report ---    < end of copied text >      CARDIOLOGY TESTING

## 2025-06-18 DIAGNOSIS — L03.115 CELLULITIS OF RIGHT LOWER LIMB: ICD-10-CM

## 2025-06-18 LAB
A1C WITH ESTIMATED AVERAGE GLUCOSE RESULT: 10.5 % — HIGH (ref 4–5.6)
ALBUMIN SERPL ELPH-MCNC: 4.9 G/DL — SIGNIFICANT CHANGE UP (ref 3.5–5.2)
ALP SERPL-CCNC: 107 U/L — SIGNIFICANT CHANGE UP (ref 30–115)
ALT FLD-CCNC: 6 U/L — SIGNIFICANT CHANGE UP (ref 0–41)
ANION GAP SERPL CALC-SCNC: 15 MMOL/L — HIGH (ref 7–14)
AST SERPL-CCNC: 35 U/L — SIGNIFICANT CHANGE UP (ref 0–41)
BASOPHILS # BLD AUTO: 0.07 K/UL — SIGNIFICANT CHANGE UP (ref 0–0.2)
BASOPHILS NFR BLD AUTO: 0.9 % — SIGNIFICANT CHANGE UP (ref 0–2)
BILIRUB SERPL-MCNC: 0.4 MG/DL — SIGNIFICANT CHANGE UP (ref 0.2–1.2)
BUN SERPL-MCNC: 13 MG/DL — SIGNIFICANT CHANGE UP (ref 10–20)
CALCIUM SERPL-MCNC: 10.3 MG/DL — SIGNIFICANT CHANGE UP (ref 8.4–10.5)
CHLORIDE SERPL-SCNC: 96 MMOL/L — LOW (ref 98–110)
CO2 SERPL-SCNC: 29 MMOL/L — SIGNIFICANT CHANGE UP (ref 17–32)
CREAT SERPL-MCNC: 1 MG/DL — SIGNIFICANT CHANGE UP (ref 0.7–1.5)
EGFR: 67 ML/MIN/1.73M2 — SIGNIFICANT CHANGE UP
EGFR: 67 ML/MIN/1.73M2 — SIGNIFICANT CHANGE UP
EOSINOPHIL # BLD AUTO: 0.23 K/UL — SIGNIFICANT CHANGE UP (ref 0–0.5)
EOSINOPHIL NFR BLD AUTO: 2.9 % — SIGNIFICANT CHANGE UP (ref 0–6)
ESTIMATED AVERAGE GLUCOSE: 255 MG/DL — HIGH (ref 68–114)
GLUCOSE BLDC GLUCOMTR-MCNC: 183 MG/DL — HIGH (ref 70–99)
GLUCOSE SERPL-MCNC: 78 MG/DL — SIGNIFICANT CHANGE UP (ref 70–99)
HCT VFR BLD CALC: 34.2 % — LOW (ref 34.5–45)
HGB BLD-MCNC: 10.7 G/DL — LOW (ref 11.5–15.5)
IMM GRANULOCYTES # BLD AUTO: 0.03 K/UL — SIGNIFICANT CHANGE UP (ref 0–0.07)
IMM GRANULOCYTES NFR BLD AUTO: 0.4 % — SIGNIFICANT CHANGE UP (ref 0–0.9)
LYMPHOCYTES # BLD AUTO: 1.58 K/UL — SIGNIFICANT CHANGE UP (ref 1–3.3)
LYMPHOCYTES NFR BLD AUTO: 19.7 % — SIGNIFICANT CHANGE UP (ref 13–44)
MCHC RBC-ENTMCNC: 26.4 PG — LOW (ref 27–34)
MCHC RBC-ENTMCNC: 31.3 G/DL — LOW (ref 32–36)
MCV RBC AUTO: 84.4 FL — SIGNIFICANT CHANGE UP (ref 80–100)
MONOCYTES # BLD AUTO: 0.34 K/UL — SIGNIFICANT CHANGE UP (ref 0–0.9)
MONOCYTES NFR BLD AUTO: 4.2 % — SIGNIFICANT CHANGE UP (ref 2–14)
NEUTROPHILS # BLD AUTO: 5.76 K/UL — SIGNIFICANT CHANGE UP (ref 1.8–7.4)
NEUTROPHILS NFR BLD AUTO: 71.9 % — SIGNIFICANT CHANGE UP (ref 43–77)
NRBC # BLD AUTO: 0 K/UL — SIGNIFICANT CHANGE UP (ref 0–0)
NRBC # FLD: 0 K/UL — SIGNIFICANT CHANGE UP (ref 0–0)
NRBC BLD AUTO-RTO: 0 /100 WBCS — SIGNIFICANT CHANGE UP (ref 0–0)
PLATELET # BLD AUTO: 281 K/UL — SIGNIFICANT CHANGE UP (ref 150–400)
PMV BLD: 9.3 FL — SIGNIFICANT CHANGE UP (ref 7–13)
POTASSIUM SERPL-MCNC: 4.1 MMOL/L — SIGNIFICANT CHANGE UP (ref 3.5–5)
POTASSIUM SERPL-SCNC: 4.1 MMOL/L — SIGNIFICANT CHANGE UP (ref 3.5–5)
PROT SERPL-MCNC: 9.3 G/DL — HIGH (ref 6–8)
RBC # BLD: 4.05 M/UL — SIGNIFICANT CHANGE UP (ref 3.8–5.2)
RBC # FLD: 16.2 % — HIGH (ref 10.3–14.5)
SODIUM SERPL-SCNC: 140 MMOL/L — SIGNIFICANT CHANGE UP (ref 135–146)
WBC # BLD: 8.01 K/UL — SIGNIFICANT CHANGE UP (ref 3.8–10.5)
WBC # FLD AUTO: 8.01 K/UL — SIGNIFICANT CHANGE UP (ref 3.8–10.5)

## 2025-06-18 PROCEDURE — 99232 SBSQ HOSP IP/OBS MODERATE 35: CPT

## 2025-06-18 PROCEDURE — 99222 1ST HOSP IP/OBS MODERATE 55: CPT

## 2025-06-18 RX ADMIN — INSULIN LISPRO 2: 100 INJECTION, SOLUTION INTRAVENOUS; SUBCUTANEOUS at 12:00

## 2025-06-18 RX ADMIN — Medication 250 MILLIGRAM(S): at 05:01

## 2025-06-18 RX ADMIN — Medication 1 APPLICATION(S): at 05:01

## 2025-06-18 RX ADMIN — Medication 250 MILLIGRAM(S): at 17:24

## 2025-06-18 RX ADMIN — CEFEPIME 100 MILLIGRAM(S): 2 INJECTION, POWDER, FOR SOLUTION INTRAVENOUS at 14:45

## 2025-06-18 RX ADMIN — FUROSEMIDE 40 MILLIGRAM(S): 10 INJECTION INTRAMUSCULAR; INTRAVENOUS at 21:13

## 2025-06-18 RX ADMIN — CEFEPIME 100 MILLIGRAM(S): 2 INJECTION, POWDER, FOR SOLUTION INTRAVENOUS at 21:12

## 2025-06-18 RX ADMIN — PREGABALIN 50 MILLIGRAM(S): 50 CAPSULE ORAL at 05:00

## 2025-06-18 RX ADMIN — CEFEPIME 100 MILLIGRAM(S): 2 INJECTION, POWDER, FOR SOLUTION INTRAVENOUS at 05:00

## 2025-06-18 RX ADMIN — Medication 175 MICROGRAM(S): at 05:00

## 2025-06-18 RX ADMIN — ATORVASTATIN CALCIUM 20 MILLIGRAM(S): 80 TABLET, FILM COATED ORAL at 21:11

## 2025-06-18 RX ADMIN — Medication 81 MILLIGRAM(S): at 11:34

## 2025-06-18 RX ADMIN — INSULIN GLARGINE-YFGN 20 UNIT(S): 100 INJECTION, SOLUTION SUBCUTANEOUS at 21:11

## 2025-06-18 RX ADMIN — INSULIN LISPRO 5 UNIT(S): 100 INJECTION, SOLUTION INTRAVENOUS; SUBCUTANEOUS at 11:59

## 2025-06-18 RX ADMIN — BUPRENORPHINE HYDROCHLORIDE, NALOXONE HYDROCHLORIDE 3 TABLET(S): 4; 1 FILM, SOLUBLE BUCCAL; SUBLINGUAL at 11:34

## 2025-06-18 RX ADMIN — INSULIN LISPRO 1: 100 INJECTION, SOLUTION INTRAVENOUS; SUBCUTANEOUS at 21:12

## 2025-06-18 RX ADMIN — INSULIN LISPRO 1: 100 INJECTION, SOLUTION INTRAVENOUS; SUBCUTANEOUS at 08:12

## 2025-06-18 RX ADMIN — PREGABALIN 50 MILLIGRAM(S): 50 CAPSULE ORAL at 17:24

## 2025-06-18 NOTE — CONSULT NOTE ADULT - ASSESSMENT
Skin assessed-  B/L lower extremity, chronic   multiple  open wounds dry ,scabs, with edema and erythema                                                High risk for pressure injury development or progression           Plan:  Wound and skin care  Clean lower extremity wounds with soap and water  wound  pat dry then apply  xeroform with Kerlix  dressing  daily   Elevate lower extremity when sitting dependent   Pressure injury  prevention   skin  and incontinence care   Assess wound and inform primary provider of any changes   Case discussed with primary Rn  Wound/ ostomy specialist  to f/u as needed     Offloading: [x ] Frequent position changes [ ] Devices/Equipment  Cleansing: [ ] Saline [x ] Soap/Water [ ] Other: ______  Topicals: [ ] Barrier Cream [x ] Antimicrobial [ ] Enzymatic Wound Debridement  Dressings: [ ] Dry, sterile [ ] Allevyn  Foam [ ] Absorbant Pads [ ] Collagenase    Other Recs.    per primary team    Skin assessed-  B/L lower extremity, chronic  multiple  open wounds dry ,scabs, with edema,. No s/s of infection noted                                                 High risk for pressure injury development or progression           Plan:  Wound and skin care  Clean lower extremity wounds with soap and water  wound  pat dry then apply  xeroform with Kerlix  dressing  daily   Elevate lower extremity when sitting dependent   F/u podiatry and vascula outpt   Pressure injury  prevention   skin  and incontinence care   Assess wound and inform primary provider of any changes   Case discussed with primary Rn  Wound/ ostomy specialist  to f/u as needed     Offloading: [x ] Frequent position changes [ ] Devices/Equipment  Cleansing: [ ] Saline [x ] Soap/Water [ ] Other: ______  Topicals: [ ] Barrier Cream [x ] Antimicrobial [ ] Enzymatic Wound Debridement  Dressings: [ ] Dry, sterile [ ] Allevyn  Foam [ ] Absorbant Pads [ ] Collagenase    Other Recs.    per primary team

## 2025-06-18 NOTE — CONSULT NOTE ADULT - PROBLEM SELECTOR RECOMMENDATION 9
- Continue Ivabx.  Patient currently on Vancomycin and Cefepime  - ID consulted and following  - Continue local wound care   - Recommend Wound care consult  - Elevate extremities while in bed  - Case d/w Dr. Castellanos (vascular fellow) and agrees with above plan and states No acute vascular intervention needed at this time and patient should follow up with Dr. Adair after discharge.  He will d/w Dr. Adair.    - May recall Vascular if needed while in hospital

## 2025-06-18 NOTE — CONSULT NOTE ADULT - SUBJECTIVE AND OBJECTIVE BOX
54 year old with hx of of IDDM, COPD not on home O2, opioid dependence on Suboxone, asthma, hypothyroidism, chronic LE lymphedema, LE leukocytoclastic vasculitis presenting to ED with worsening RLE wound. This is a chronic wound for which she is often treated with antibiotics. Her biopsies revealed leukocytoclastic vasculitis, she also has lymphedema. She has difficulty ambulating.  She denies fever, chills, sob, cp, abd pain, n/v/d.    Vascular consulted for history of Lymphedema with current lower extremity cellulitis (Left > Right).  Patient reports she is known to Dr. Adair and follows regularly.  She reports gets recurrent cellulitis and get hospitalized every 2-3 months for cellulitis.  Last episode she was admitted for 9 days of antibiotics.  Patient states was recently seen by Dr. Adair on May 7th and he put her in Unna boots and it improved so he removed and then about 2 weeks ago she developed this swelling, superficial ulcerations and cellulitis.    Symptoms worsened so she came to ER for Ivabx.     Patient denies fever, chills, N/V/D, paresthesia or numbness.             PAST MEDICAL & SURGICAL HISTORY:  Asthma with COPD    Hypothyroidism    H/O: substance abuse  no longer using    History of pancreatitis    Hepatitis-C    Leukocytoclastic vasculitis    Bilateral edema of lower extremity    History of Lymphedema    History of Lower extremity cellulitis    HLD (hyperlipidemia)    Type 2 diabetes mellitus    History of appendectomy    History of tonsillectomy          Home Medications:  · 	furosemide 40 mg oral tablet: 1 tab(s) orally once a day as needed for leg swelling  · 	Ace Wraps: Apply on the L elbow for 3 weeks  · 	atorvastatin 20 mg oral tablet: 1 tab(s) orally once a day (at bedtime)  · 	aspirin 81 mg oral delayed release tablet: 1 tab(s) orally once a day  · 	Synthroid 175 mcg (0.175 mg) oral tablet: 1 tab(s) orally once a day  · 	pregabalin 50 mg oral capsule: 1 cap(s) orally every 12 hours  · 	Lantus 100 units/mL subcutaneous solution: 28 unit(s) subcutaneous once a day (at bedtime)  · 	metFORMIN 500 mg oral tablet: 2 tab(s) orally 2 times a day  · 	albuterol 90 mcg/inh inhalation powder: 2 puff(s) inhaled every 6 hours, As Needed -for bronchospasm         HOSPITAL MEDICATIONS  (STANDING):  aspirin enteric coated 81 milliGRAM(s) Oral daily  atorvastatin 20 milliGRAM(s) Oral at bedtime  buprenorphine 8 mG/naloxone 2 mG SL  Tablet 3 Tablet(s) SubLingual daily  cefepime   IVPB 2000 milliGRAM(s) IV Intermittent every 8 hours  cefepime   IVPB 2000 milliGRAM(s) IV Intermittent once  chlorhexidine 2% Cloths 1 Application(s) Topical <User Schedule>  dextrose 5%. 1000 milliLiter(s) (50 mL/Hr) IV Continuous <Continuous>  dextrose 5%. 1000 milliLiter(s) (100 mL/Hr) IV Continuous <Continuous>  dextrose 50% Injectable 25 Gram(s) IV Push once  dextrose 50% Injectable 12.5 Gram(s) IV Push once  dextrose 50% Injectable 25 Gram(s) IV Push once  furosemide    Tablet 40 milliGRAM(s) Oral at bedtime  furosemide   Injectable 40 milliGRAM(s) IV Push once  glucagon  Injectable 1 milliGRAM(s) IntraMuscular once  insulin glargine Injectable (LANTUS) 20 Unit(s) SubCutaneous at bedtime  insulin lispro (ADMELOG) corrective regimen sliding scale   SubCutaneous three times a day before meals  insulin lispro (ADMELOG) corrective regimen sliding scale   SubCutaneous at bedtime  insulin lispro Injectable (ADMELOG) 5 Unit(s) SubCutaneous three times a day before meals  levothyroxine 175 MICROGram(s) Oral daily  pregabalin 50 milliGRAM(s) Oral every 12 hours  vancomycin  IVPB 750 milliGRAM(s) IV Intermittent every 12 hours    MEDICATIONS  (PRN):  acetaminophen     Tablet .. 650 milliGRAM(s) Oral every 6 hours PRN Temp greater or equal to 38C (100.4F), Mild Pain (1 - 3)  albuterol    90 MICROgram(s) HFA Inhaler 2 Puff(s) Inhalation every 6 hours PRN Shortness of Breath and/or Wheezing  dextrose Oral Gel 15 Gram(s) Oral once PRN Blood Glucose LESS THAN 70 milliGRAM(s)/deciliter        Allergies  No Known Allergies        SOCIAL HISTORY:  Tobacco use: Former  Alcohol use:  None  Drug use:  Former Opiates , currently on Suboxone        FAMILY HISTORY:  Patient unsure of family history        REVIEW OF SYSTEMS:  CONSTITUTIONAL:  No weakness, fevers or chills  EYES/ENT:  No visual changes;  No vertigo or throat pain   NECK:  No pain or stiffness  RESPIRATORY:  No cough, wheezing, hemoptysis; No shortness of breath  CARDIOVASCULAR:  No chest pain or palpitations  GASTROINTESTINAL:  No abdominal or epigastric pain. No nausea, vomiting, or hematemesis; No diarrhea or constipation. No melena or hematochezia.  GENITOURINARY:  No dysuria, frequency or hematuria  MUSCULOSKELETAL:  (+) Lymphedema,  (+) Cellulitis.  FROM all extremities, normal strength, No calf tenderness  NEUROLOGICAL:  No numbness or weakness  SKIN:  (+) Lymphedema,  (+) Lower extremity Cellulitis        Vital Signs Last 24 Hrs  T(C): 36.8 (18 Jun 2025 13:44), Max: 36.8 (18 Jun 2025 13:44)  T(F): 98.3 (18 Jun 2025 13:44), Max: 98.3 (18 Jun 2025 13:44)  HR: 66 (18 Jun 2025 13:44) (59 - 68)  BP: 126/85 (18 Jun 2025 13:44) (114/67 - 126/85)  RR: 18 (18 Jun 2025 13:44) (18 - 18)  SpO2: 99% (18 Jun 2025 13:44) (96% - 99%)    Parameters below as of 18 Jun 2025 13:44  Patient On (Oxygen Delivery Method): room air          Physical Exam:  General:  WD, WN, female conversant in NAD, resting comfortably.  Skin:  (+) Bilateral lower leg edema,  superficial ulcerations Left > Right, serous weeping of lower legs, (+) Erythema.  HEENT:   NC/AT, EOMI, normal hearing, no oral lesions, no LAD, neck supple.  Pulmonary:   CTA B/L, Normal respiratory effort. No W/R/R.   Cardiovascular:   S1 & S2, RRR, No murmurs, rubs or gallops.  Abdominal:  (+) BS, soft, ND/NT, No rebound, guarding or peritoneal signs.  Genitourinary:  No suprapubic tenderness, No CVAT.   Extremities:   (+) Bilateral lower leg edema,  superficial ulcerations Left > Right, serous weeping of lower legs, (+) Erythema.  Normal strength, 2+ Palpable dorsalis pedis pulse, unable to palpate posterior tibialis due to edema.   Neuro:  Awake, alert & oriented x 3,  CNs II-XII grossly intact, normal sensation, no focal deficits.        LABS:                        10.7   8.01  )-----------( 281      ( 18 Jun 2025 15:55 )             34.2       06-18    140  |  96[L]  |  13  ----------------------------<  78  4.1   |  29  |  1.0    Ca    10.3      18 Jun 2025 15:55    TPro  9.3[H]  /  Alb  4.9  /  TBili  0.4  /  DBili  x   /  AST  35  /  ALT  6   /  AlkPhos  107  06-18          Urinalysis Basic - ( 18 Jun 2025 15:55 )    Color: x / Appearance: x / SG: x / pH: x  Gluc: 78 mg/dL / Ketone: x  / Bili: x / Urobili: x   Blood: x / Protein: x / Nitrite: x   Leuk Esterase: x / RBC: x / WBC x   Sq Epi: x / Non Sq Epi: x / Bacteria: x        CAPILLARY BLOOD GLUCOSE  POCT Blood Glucose.: 105 mg/dL (18 Jun 2025 16:34)  POCT Blood Glucose.: 244 mg/dL (18 Jun 2025 11:54)  POCT Blood Glucose.: 183 mg/dL (18 Jun 2025 07:34)  POCT Blood Glucose.: 100 mg/dL (17 Jun 2025 20:52)        Cultures:  Culture Results:   No growth at 24 hours (06-17 @ 07:40)  Culture Results:   No growth at 24 hours (06-17 @ 07:40)          Imaging:    VA Duplex Lower Ext Vein Scan, Nohelia (06.17.25 @ 09:39)   IMPRESSION:  No evidence of deep venous thrombosis in either lower extremity.         VA Duplex Lower Extrem Arterial, Bilat (03.27.25 @ 13:15)   Impression:    Moderate stenosis of the bilateral common femoral arteries

## 2025-06-18 NOTE — CONSULT NOTE ADULT - ASSESSMENT
Impression:   54 year old with hx of of IDDM, COPD not on home O2, opioid dependence on Suboxone, asthma, hypothyroidism, chronic LE lymphedema, LE leukocytoclastic vasculitis presenting to ED with worsening RLE wound. This is a chronic wound for which she is often treated with antibiotics. Her biopsies revealed leukocytoclastic vasculitis, she also has lymphedema. She has difficulty ambulating.  She denies fever, chills, sob, cp, abd pain, n/v/d.    Vascular consulted for history of Lymphedema with current lower extremity cellulitis (Left > Right).  Patient reports she is known to Dr. Adair and follows regularly.  She reports gets recurrent cellulitis and get hospitalized every 2-3 months for cellulitis.  Last episode she was admitted for 9 days of antibiotics.  Patient states was recently seen by Dr. Adair on May 7th and he put her in Unna boots and it improved so he removed and then about 2 weeks ago she developed this swelling, superficial ulcerations and cellulitis.    Symptoms worsened so she came to ER for Ivabx.

## 2025-06-18 NOTE — CONSULT NOTE ADULT - SUBJECTIVE AND OBJECTIVE BOX
Patient is a 54 year old with hx of of IDDM, COPD not on home O2, opioid dependence on Suboxone, asthma, hypothyroidism, chronic LE lymphedema, LE leukocytoclastic vasculitis presenting to ED with worsening RLE wound. This is a chronic wound for which she is often treated with antibiotics. Her biopsies revealed leukocytoclastic vasculitis, she also has lymphedema. She has difficulty ambulating, pt  denies fever, chills, sob, cp, abd pain, n/v/d.         PAST MEDICAL & SURGICAL HISTORY:  Asthma with COPD  Hypothyroidism  H/O: substance abuse  no longer using  History of pancreatitis  Hepatitis-C  Leukocytoclastic vasculitis  Bilateral edema of lower extremity  HLD (hyperlipidemia)  Type 2 diabetes mellitus  History of appendectomy  History of tonsillectomy    REVIEW OF SYSTEMS:  All other systems negative    MEDICATIONS  (STANDING):  aspirin enteric coated 81 milliGRAM(s) Oral daily  atorvastatin 20 milliGRAM(s) Oral at bedtime  buprenorphine 8 mG/naloxone 2 mG SL  Tablet 3 Tablet(s) SubLingual daily  cefepime   IVPB 2000 milliGRAM(s) IV Intermittent every 8 hours  cefepime   IVPB 2000 milliGRAM(s) IV Intermittent once  chlorhexidine 2% Cloths 1 Application(s) Topical <User Schedule>  dextrose 5%. 1000 milliLiter(s) (50 mL/Hr) IV Continuous <Continuous>  dextrose 5%. 1000 milliLiter(s) (100 mL/Hr) IV Continuous <Continuous>  dextrose 50% Injectable 25 Gram(s) IV Push once  dextrose 50% Injectable 12.5 Gram(s) IV Push once  dextrose 50% Injectable 25 Gram(s) IV Push once  furosemide    Tablet 40 milliGRAM(s) Oral at bedtime  furosemide   Injectable 40 milliGRAM(s) IV Push once  glucagon  Injectable 1 milliGRAM(s) IntraMuscular once  insulin glargine Injectable (LANTUS) 20 Unit(s) SubCutaneous at bedtime  insulin lispro (ADMELOG) corrective regimen sliding scale   SubCutaneous three times a day before meals  insulin lispro (ADMELOG) corrective regimen sliding scale   SubCutaneous at bedtime  insulin lispro Injectable (ADMELOG) 5 Unit(s) SubCutaneous three times a day before meals  levothyroxine 175 MICROGram(s) Oral daily  pregabalin 50 milliGRAM(s) Oral every 12 hours  vancomycin  IVPB 750 milliGRAM(s) IV Intermittent every 12 hours    MEDICATIONS  (PRN):  acetaminophen     Tablet .. 650 milliGRAM(s) Oral every 6 hours PRN Temp greater or equal to 38C (100.4F), Mild Pain (1 - 3)  albuterol    90 MICROgram(s) HFA Inhaler 2 Puff(s) Inhalation every 6 hours PRN Shortness of Breath and/or Wheezing  dextrose Oral Gel 15 Gram(s) Oral once PRN Blood Glucose LESS THAN 70 milliGRAM(s)/deciliter      Allergies  No Known Allergies  Intolerances        SOCIAL HISTORY:    FAMILY HISTORY:       PHYSICAL EXAM:  Vital Signs Last 24 Hrs  T(C): 36.6 (18 Jun 2025 04:37), Max: 36.8 (17 Jun 2025 14:34)  T(F): 97.9 (18 Jun 2025 04:37), Max: 98.2 (17 Jun 2025 14:34)  HR: 68 (18 Jun 2025 04:37) (59 - 68)  BP: 115/79 (18 Jun 2025 04:37) (111/73 - 125/78)  BP(mean): --  RR: 18 (18 Jun 2025 04:37) (18 - 18)  SpO2: 96% (18 Jun 2025 04:37) (95% - 99%)    Parameters below as of 18 Jun 2025 04:37  Patient On (Oxygen Delivery Method): room air    General : NAD  HEAD: No lesion on scalp  EYES: No visual disturbance  ENT: No sore throat  RESPIRATORY: No cough, no shortness of breath  CARDIOVASCULAR: No chest pain or palpitations  GASTROINTESTINAL: No abdominal or epigastric pain  GENITOURINARY: No dysuria  NEUROLOGICAL: No headache/dizziness  MUSCULOSKELETAL: No joint pain, erythema, or swelling; no back pain  SKIN: : B/L lower extremity,  multiple  open wounds, scabs with  edema and redness             LABS/ CULTURES/ RADIOLOGY:                        10.5   8.23  )-----------( 259      ( 17 Jun 2025 07:40 )             33.6       137  |  94  |  12  ----------------------------<  117      [06-17-25 @ 07:40]  4.3   |  29  |  1.1        Ca     9.9     [06-17-25 @ 07:40]    TPro  8.8  /  Alb  4.6  /  TBili  0.3  /  DBili  x   /  AST  32  /  ALT  6   /  AlkPhos  113  [06-17-25 @ 07:40]

## 2025-06-18 NOTE — CHART NOTE - NSCHARTNOTEFT_GEN_A_CORE
phlebectomy unable to draw blood  tried getting blood , could not draw  pt does not want arterial   will reschedule for tomorrow

## 2025-06-18 NOTE — PROGRESS NOTE ADULT - ASSESSMENT
EZRA BARCLAY 54y Female  MRN#: [id number]   CODE STATUS:___full_____      SUBJECTIVE  Patient is a 54y old Female who presents with a chief complaint of Cellulitis/lympedema (18 Jun 2025 11:20)  Currently admitted to medicine with the primary diagnosis of Leg swelling      Today is hospital day 1d, and this morning she is c/o pain in bl legs           OBJECTIVE  PAST MEDICAL & SURGICAL HISTORY  Asthma with COPD    Hypothyroidism    H/O: substance abuse  no longer using    History of pancreatitis    Hepatitis-C    Leukocytoclastic vasculitis    Bilateral edema of lower extremity    HLD (hyperlipidemia)    Type 2 diabetes mellitus    History of appendectomy    History of tonsillectomy      ALLERGIES:  No Known Allergies    MEDICATIONS:  STANDING MEDICATIONS  aspirin enteric coated 81 milliGRAM(s) Oral daily  atorvastatin 20 milliGRAM(s) Oral at bedtime  buprenorphine 8 mG/naloxone 2 mG SL  Tablet 3 Tablet(s) SubLingual daily  cefepime   IVPB 2000 milliGRAM(s) IV Intermittent every 8 hours  cefepime   IVPB 2000 milliGRAM(s) IV Intermittent once  chlorhexidine 2% Cloths 1 Application(s) Topical <User Schedule>  dextrose 5%. 1000 milliLiter(s) IV Continuous <Continuous>  dextrose 5%. 1000 milliLiter(s) IV Continuous <Continuous>  dextrose 50% Injectable 25 Gram(s) IV Push once  dextrose 50% Injectable 12.5 Gram(s) IV Push once  dextrose 50% Injectable 25 Gram(s) IV Push once  furosemide    Tablet 40 milliGRAM(s) Oral at bedtime  furosemide   Injectable 40 milliGRAM(s) IV Push once  glucagon  Injectable 1 milliGRAM(s) IntraMuscular once  insulin glargine Injectable (LANTUS) 20 Unit(s) SubCutaneous at bedtime  insulin lispro (ADMELOG) corrective regimen sliding scale   SubCutaneous three times a day before meals  insulin lispro (ADMELOG) corrective regimen sliding scale   SubCutaneous at bedtime  insulin lispro Injectable (ADMELOG) 5 Unit(s) SubCutaneous three times a day before meals  levothyroxine 175 MICROGram(s) Oral daily  pregabalin 50 milliGRAM(s) Oral every 12 hours  vancomycin  IVPB 750 milliGRAM(s) IV Intermittent every 12 hours    PRN MEDICATIONS  acetaminophen     Tablet .. 650 milliGRAM(s) Oral every 6 hours PRN  albuterol    90 MICROgram(s) HFA Inhaler 2 Puff(s) Inhalation every 6 hours PRN  dextrose Oral Gel 15 Gram(s) Oral once PRN      VITAL SIGNS: Last 24 Hours  T(C): 36.8 (18 Jun 2025 13:44), Max: 36.8 (18 Jun 2025 13:44)  T(F): 98.3 (18 Jun 2025 13:44), Max: 98.3 (18 Jun 2025 13:44)  HR: 66 (18 Jun 2025 13:44) (59 - 68)  BP: 126/85 (18 Jun 2025 13:44) (114/67 - 126/85)  BP(mean): --  RR: 18 (18 Jun 2025 13:44) (18 - 18)  SpO2: 99% (18 Jun 2025 13:44) (96% - 99%)    LABS:                        10.7   8.01  )-----------( 281      ( 18 Jun 2025 15:55 )             34.2     06-17    137  |  94[L]  |  12  ----------------------------<  117[H]  4.3   |  29  |  1.1    Ca    9.9      17 Jun 2025 07:40    TPro  8.8[H]  /  Alb  4.6  /  TBili  0.3  /  DBili  x   /  AST  32  /  ALT  6   /  AlkPhos  113  06-17      Urinalysis Basic - ( 17 Jun 2025 07:40 )    Color: x / Appearance: x / SG: x / pH: x  Gluc: 117 mg/dL / Ketone: x  / Bili: x / Urobili: x   Blood: x / Protein: x / Nitrite: x   Leuk Esterase: x / RBC: x / WBC x   Sq Epi: x / Non Sq Epi: x / Bacteria: x              PHYSICAL EXAM:    GENERAL: NAD, well-developed, AAOx3  HEENT:  Atraumatic, Normocephalic. EOMI, PERRLA, conjunctiva and sclera clear, No JVD  PULMONARY: Clear to auscultation bilaterally; No wheeze  CARDIOVASCULAR: Regular rate and rhythm; No murmurs, rubs, or gallops  GASTROINTESTINAL: Soft, Nontender, Nondistended; Bowel sounds present  MUSCULOSKELETAL:  bl erythematous 2+ edema, minimally pitting, wound on left leg   NEUROLOGY: non-focal  SKIN: No rashes or lesions      Patient is a 54 year old with hx of of IDDM, COPD not on home O2, opioid dependence on Suboxone, asthma, hypothyroidism, chronic LE lymphedema, LE leukocytoclastic vasculitis presenting to ED with worsening RLE wound, admitted for management of cellulitis/lymphedema/vasculitis.    # RLE cellulitis vs lymphedema vs vasculitis  - ID consulted and  continue van0c and cefepime, vanco level to be drawn today   - recent consult with rheumatology- patient likely experiencing worsening lymphedema, not vasculitis  - wound care consulted, dressing changed today   - recent outpatient visit with vascular surgery-  continue compression wrap, consult vascular surgery inpatient   - f/u blood cultures  - continue pregabalin for pain  - continue lasix 40 mg(home med)   - PT consulted    # hx opioid dependence  - continue suboxone    # IDDM  - continue insulin regimen per last endo note: lantus 20 u at bedtime, ADMELOG 5 units with meals  - FS glucose, SSI    # COPD  - continue albuterol as needed    # CAD  - cont asa 81 mg, statin    # hypothyroidism  - cont synthroid 175 mcg    Diet: DASH, CCHO  Activity: OOB  DVT PPX:   GI PPX: not indicated  CHG:  ordered  Code status: full code  Dispo: on IV antibiotics   follow up vascular and ID     ---PB Handoff Note---    Pending/Follow up items (tests, labs, procedures,consults): vascular consult, vanco trough     Indication for continued inpatient management: IV antibiotics     Goals of Care note:     Family contact:  Dispo (home, SNF, etc):    Prepare for DC order [x] - updated LISS is:   DC provider note prep:    -------------------------------Time spent-----------------------------------------------------------------------  Initial visit:             mins [ ] -- 55-74 mins [ ]  Subsequent visit: 50-79 mins[ ]    -- 35-49mins [ x]     --------------------------------# and complexity of problems---------------------------------------------  [ ] chronic illness with severe exacerbation , progression, treatment side effect  [ ] acute or chronic illness with threat to life or bodily funtion                         --------------------------------Complexity of data reviewed ----------------------------------------------  The Patients complexity of data reviewed  is Low [ ] Moderate [x ] High [ ] due to the following:   A:      Reviewed prior external records [ ]      Considering/ Ordered a unique test:  Labs [x ] Imaging [x ] Stress Test  [ ] Other: Specify [ ]      Reviewed each unique test result [x ]      Assessment requiring an independent historian [ ]  B:       Independent interpretation of:   X-Ray [x ] EKG [ ]  Other: Specify  [ ]  C:       Discussion of management of tests with clinician outside of my group [ ]    -------------------------------------Risk of Morbidity-------------------------------------------------------  The Patients risk of morbidity is Low [ ] Moderate [x] High [ ] due to the following:     Parenteral controlled substance [ ]  ------------------------------------------------------------------------------------------------------------------

## 2025-06-19 LAB
ALBUMIN SERPL ELPH-MCNC: 4.8 G/DL — SIGNIFICANT CHANGE UP (ref 3.5–5.2)
ALP SERPL-CCNC: 106 U/L — SIGNIFICANT CHANGE UP (ref 30–115)
ALT FLD-CCNC: 7 U/L — SIGNIFICANT CHANGE UP (ref 0–41)
ANION GAP SERPL CALC-SCNC: 16 MMOL/L — HIGH (ref 7–14)
AST SERPL-CCNC: 36 U/L — SIGNIFICANT CHANGE UP (ref 0–41)
BASOPHILS # BLD AUTO: 0.06 K/UL — SIGNIFICANT CHANGE UP (ref 0–0.2)
BASOPHILS NFR BLD AUTO: 0.9 % — SIGNIFICANT CHANGE UP (ref 0–2)
BILIRUB SERPL-MCNC: 0.4 MG/DL — SIGNIFICANT CHANGE UP (ref 0.2–1.2)
BUN SERPL-MCNC: 15 MG/DL — SIGNIFICANT CHANGE UP (ref 10–20)
CALCIUM SERPL-MCNC: 10.3 MG/DL — SIGNIFICANT CHANGE UP (ref 8.4–10.5)
CHLORIDE SERPL-SCNC: 93 MMOL/L — LOW (ref 98–110)
CO2 SERPL-SCNC: 29 MMOL/L — SIGNIFICANT CHANGE UP (ref 17–32)
CREAT SERPL-MCNC: 1.2 MG/DL — SIGNIFICANT CHANGE UP (ref 0.7–1.5)
EGFR: 54 ML/MIN/1.73M2 — LOW
EGFR: 54 ML/MIN/1.73M2 — LOW
EOSINOPHIL # BLD AUTO: 0.24 K/UL — SIGNIFICANT CHANGE UP (ref 0–0.5)
EOSINOPHIL NFR BLD AUTO: 3.7 % — SIGNIFICANT CHANGE UP (ref 0–6)
GLUCOSE SERPL-MCNC: 139 MG/DL — HIGH (ref 70–99)
HCT VFR BLD CALC: 35 % — SIGNIFICANT CHANGE UP (ref 34.5–45)
HGB BLD-MCNC: 11 G/DL — LOW (ref 11.5–15.5)
IMM GRANULOCYTES # BLD AUTO: 0.03 K/UL — SIGNIFICANT CHANGE UP (ref 0–0.07)
IMM GRANULOCYTES NFR BLD AUTO: 0.5 % — SIGNIFICANT CHANGE UP (ref 0–0.9)
LYMPHOCYTES # BLD AUTO: 1.87 K/UL — SIGNIFICANT CHANGE UP (ref 1–3.3)
LYMPHOCYTES NFR BLD AUTO: 29.2 % — SIGNIFICANT CHANGE UP (ref 13–44)
MCHC RBC-ENTMCNC: 26.5 PG — LOW (ref 27–34)
MCHC RBC-ENTMCNC: 31.4 G/DL — LOW (ref 32–36)
MCV RBC AUTO: 84.3 FL — SIGNIFICANT CHANGE UP (ref 80–100)
MONOCYTES # BLD AUTO: 0.41 K/UL — SIGNIFICANT CHANGE UP (ref 0–0.9)
MONOCYTES NFR BLD AUTO: 6.4 % — SIGNIFICANT CHANGE UP (ref 2–14)
NEUTROPHILS # BLD AUTO: 3.8 K/UL — SIGNIFICANT CHANGE UP (ref 1.8–7.4)
NEUTROPHILS NFR BLD AUTO: 59.3 % — SIGNIFICANT CHANGE UP (ref 43–77)
NRBC # BLD AUTO: 0 K/UL — SIGNIFICANT CHANGE UP (ref 0–0)
NRBC # FLD: 0 K/UL — SIGNIFICANT CHANGE UP (ref 0–0)
NRBC BLD AUTO-RTO: 0 /100 WBCS — SIGNIFICANT CHANGE UP (ref 0–0)
PLATELET # BLD AUTO: 244 K/UL — SIGNIFICANT CHANGE UP (ref 150–400)
PMV BLD: 9.5 FL — SIGNIFICANT CHANGE UP (ref 7–13)
POTASSIUM SERPL-MCNC: 5 MMOL/L — SIGNIFICANT CHANGE UP (ref 3.5–5)
POTASSIUM SERPL-SCNC: 5 MMOL/L — SIGNIFICANT CHANGE UP (ref 3.5–5)
PROT SERPL-MCNC: 8.8 G/DL — HIGH (ref 6–8)
RBC # BLD: 4.15 M/UL — SIGNIFICANT CHANGE UP (ref 3.8–5.2)
RBC # FLD: 16.2 % — HIGH (ref 10.3–14.5)
SODIUM SERPL-SCNC: 138 MMOL/L — SIGNIFICANT CHANGE UP (ref 135–146)
VANCOMYCIN TROUGH SERPL-MCNC: 10.6 UG/ML — HIGH (ref 5–10)
WBC # BLD: 6.41 K/UL — SIGNIFICANT CHANGE UP (ref 3.8–10.5)
WBC # FLD AUTO: 6.41 K/UL — SIGNIFICANT CHANGE UP (ref 3.8–10.5)

## 2025-06-19 PROCEDURE — 99232 SBSQ HOSP IP/OBS MODERATE 35: CPT

## 2025-06-19 RX ORDER — FUROSEMIDE 10 MG/ML
40 INJECTION INTRAMUSCULAR; INTRAVENOUS EVERY 12 HOURS
Refills: 0 | Status: DISCONTINUED | OUTPATIENT
Start: 2025-06-19 | End: 2025-06-21

## 2025-06-19 RX ORDER — ENOXAPARIN SODIUM 100 MG/ML
40 INJECTION SUBCUTANEOUS EVERY 24 HOURS
Refills: 0 | Status: DISCONTINUED | OUTPATIENT
Start: 2025-06-19 | End: 2025-06-25

## 2025-06-19 RX ORDER — VANCOMYCIN HCL IN 5 % DEXTROSE 1.5G/250ML
1000 PLASTIC BAG, INJECTION (ML) INTRAVENOUS EVERY 12 HOURS
Refills: 0 | Status: DISCONTINUED | OUTPATIENT
Start: 2025-06-19 | End: 2025-06-23

## 2025-06-19 RX ADMIN — INSULIN LISPRO 5 UNIT(S): 100 INJECTION, SOLUTION INTRAVENOUS; SUBCUTANEOUS at 08:20

## 2025-06-19 RX ADMIN — ENOXAPARIN SODIUM 40 MILLIGRAM(S): 100 INJECTION SUBCUTANEOUS at 16:49

## 2025-06-19 RX ADMIN — Medication 175 MICROGRAM(S): at 05:16

## 2025-06-19 RX ADMIN — INSULIN LISPRO 1: 100 INJECTION, SOLUTION INTRAVENOUS; SUBCUTANEOUS at 16:48

## 2025-06-19 RX ADMIN — PREGABALIN 50 MILLIGRAM(S): 50 CAPSULE ORAL at 17:32

## 2025-06-19 RX ADMIN — Medication 250 MILLIGRAM(S): at 16:49

## 2025-06-19 RX ADMIN — CEFEPIME 100 MILLIGRAM(S): 2 INJECTION, POWDER, FOR SOLUTION INTRAVENOUS at 14:25

## 2025-06-19 RX ADMIN — INSULIN GLARGINE-YFGN 20 UNIT(S): 100 INJECTION, SOLUTION SUBCUTANEOUS at 21:32

## 2025-06-19 RX ADMIN — ATORVASTATIN CALCIUM 20 MILLIGRAM(S): 80 TABLET, FILM COATED ORAL at 21:32

## 2025-06-19 RX ADMIN — BUPRENORPHINE HYDROCHLORIDE, NALOXONE HYDROCHLORIDE 3 TABLET(S): 4; 1 FILM, SOLUBLE BUCCAL; SUBLINGUAL at 11:50

## 2025-06-19 RX ADMIN — CEFEPIME 100 MILLIGRAM(S): 2 INJECTION, POWDER, FOR SOLUTION INTRAVENOUS at 21:32

## 2025-06-19 RX ADMIN — INSULIN LISPRO 3: 100 INJECTION, SOLUTION INTRAVENOUS; SUBCUTANEOUS at 11:51

## 2025-06-19 RX ADMIN — PREGABALIN 50 MILLIGRAM(S): 50 CAPSULE ORAL at 05:18

## 2025-06-19 RX ADMIN — INSULIN LISPRO 1: 100 INJECTION, SOLUTION INTRAVENOUS; SUBCUTANEOUS at 08:21

## 2025-06-19 RX ADMIN — Medication 250 MILLIGRAM(S): at 04:12

## 2025-06-19 RX ADMIN — Medication 1 APPLICATION(S): at 05:16

## 2025-06-19 RX ADMIN — INSULIN LISPRO 5 UNIT(S): 100 INJECTION, SOLUTION INTRAVENOUS; SUBCUTANEOUS at 16:48

## 2025-06-19 RX ADMIN — Medication 81 MILLIGRAM(S): at 11:50

## 2025-06-19 RX ADMIN — CEFEPIME 100 MILLIGRAM(S): 2 INJECTION, POWDER, FOR SOLUTION INTRAVENOUS at 05:16

## 2025-06-19 RX ADMIN — INSULIN LISPRO 5 UNIT(S): 100 INJECTION, SOLUTION INTRAVENOUS; SUBCUTANEOUS at 11:51

## 2025-06-19 NOTE — PROGRESS NOTE ADULT - ASSESSMENT
This is a 54 year old with hx of of IDDM, COPD not on home O2, opioid dependence on Suboxone, asthma, hypothyroidism, chronic LE lymphedema, LE leukocytoclastic vasculitis presenting to ED with worsening RLE wound.    IMPRESSION  #Bilateral lower extremity wounds/ulcers with underlying stasis dermatitis.  #Recurrent admission in the past for lower extremity cellulitis, Biopsy confirmed leukocytoclastic vasculitis  #PAD  #History of polysubstance use disorder. On Suboxone  #Emphysema and interstitial lung disease noted on CT (11/2024)  #HTN, HLD  #Hep C-- Self resolved  #Obesity BMI (kg/m2): 28.9   #Immunodeficiency secondary to history of substance use which could result in poor clinical outcome    Hepatitis C Ab +, PCR undetectable 1/2025  Cryoglobulin negative 1/2025   Hepatitis B immune, HIV screen negative.   DULCE negative  RF negative  p-ANCA positive, titer 1:40    Blood cultures NGTD    RECOMMENDATIONS  -IV Vanc. Dosing as per pharmacy protocol.  -IV Cefepime 2 gram q 8 hours (S/D 6/17).  -Diuresis, local care as per primary team.    If any questions, please send a message or call on [employer] Teams  Please continue to update ID with any pertinent new laboratory or radiographic findings.    Stella Chavez M.D  Infectious Diseases Attending/   [employer] at hospitals/[facility]

## 2025-06-19 NOTE — PHARMACOTHERAPY INTERVENTION NOTE - NSPHARMCOMMASP
ASP - Lab/ test recommended
ASP - Lab/ test recommended
ASP - Therapy recommended/ Alternative therapy

## 2025-06-19 NOTE — PROGRESS NOTE ADULT - ASSESSMENT
EZRA BARCLAY 54y Female  MRN#: [id number]   CODE STATUS:_____full___      SUBJECTIVE  Patient is a 54y old Female who presents with a chief complaint of cellulitis/lympedema (19 Jun 2025 15:27)  Currently admitted to medicine with the primary diagnosis of Leg swelling      Today is hospital day 2d, and this morning she is c/o pain and worsening edema     OBJECTIVE  PAST MEDICAL & SURGICAL HISTORY  Asthma with COPD    Hypothyroidism    H/O: substance abuse  no longer using    History of pancreatitis    Hepatitis-C    Leukocytoclastic vasculitis    Bilateral edema of lower extremity    HLD (hyperlipidemia)    Type 2 diabetes mellitus    History of appendectomy    History of tonsillectomy      ALLERGIES:  No Known Allergies    MEDICATIONS:  STANDING MEDICATIONS  aspirin enteric coated 81 milliGRAM(s) Oral daily  atorvastatin 20 milliGRAM(s) Oral at bedtime  buprenorphine 8 mG/naloxone 2 mG SL  Tablet 3 Tablet(s) SubLingual daily  cefepime   IVPB 2000 milliGRAM(s) IV Intermittent every 8 hours  cefepime   IVPB 2000 milliGRAM(s) IV Intermittent once  chlorhexidine 2% Cloths 1 Application(s) Topical <User Schedule>  dextrose 5%. 1000 milliLiter(s) IV Continuous <Continuous>  dextrose 5%. 1000 milliLiter(s) IV Continuous <Continuous>  dextrose 50% Injectable 25 Gram(s) IV Push once  dextrose 50% Injectable 12.5 Gram(s) IV Push once  dextrose 50% Injectable 25 Gram(s) IV Push once  enoxaparin Injectable 40 milliGRAM(s) SubCutaneous every 24 hours  furosemide   Injectable 40 milliGRAM(s) IV Push once  furosemide   Injectable 40 milliGRAM(s) IV Push every 12 hours  glucagon  Injectable 1 milliGRAM(s) IntraMuscular once  insulin glargine Injectable (LANTUS) 20 Unit(s) SubCutaneous at bedtime  insulin lispro (ADMELOG) corrective regimen sliding scale   SubCutaneous three times a day before meals  insulin lispro (ADMELOG) corrective regimen sliding scale   SubCutaneous at bedtime  insulin lispro Injectable (ADMELOG) 5 Unit(s) SubCutaneous three times a day before meals  levothyroxine 175 MICROGram(s) Oral daily  pregabalin 50 milliGRAM(s) Oral every 12 hours  vancomycin  IVPB 1000 milliGRAM(s) IV Intermittent every 12 hours    PRN MEDICATIONS  acetaminophen     Tablet .. 650 milliGRAM(s) Oral every 6 hours PRN  albuterol    90 MICROgram(s) HFA Inhaler 2 Puff(s) Inhalation every 6 hours PRN  dextrose Oral Gel 15 Gram(s) Oral once PRN      VITAL SIGNS: Last 24 Hours  T(C): 36.8 (19 Jun 2025 13:06), Max: 36.8 (18 Jun 2025 20:18)  T(F): 98.2 (19 Jun 2025 13:06), Max: 98.2 (18 Jun 2025 20:18)  HR: 67 (19 Jun 2025 13:06) (66 - 69)  BP: 115/71 (19 Jun 2025 13:06) (112/64 - 117/69)  BP(mean): --  RR: 18 (19 Jun 2025 13:06) (18 - 18)  SpO2: 93% (19 Jun 2025 13:06) (93% - 96%)    LABS:                        11.0   6.41  )-----------( 244      ( 19 Jun 2025 06:26 )             35.0     06-19    138  |  93[L]  |  15  ----------------------------<  139[H]  5.0   |  29  |  1.2    Ca    10.3      19 Jun 2025 06:26    TPro  8.8[H]  /  Alb  4.8  /  TBili  0.4  /  DBili  x   /  AST  36  /  ALT  7   /  AlkPhos  106  06-19      Urinalysis Basic - ( 19 Jun 2025 06:26 )    Color: x / Appearance: x / SG: x / pH: x  Gluc: 139 mg/dL / Ketone: x  / Bili: x / Urobili: x   Blood: x / Protein: x / Nitrite: x   Leuk Esterase: x / RBC: x / WBC x   Sq Epi: x / Non Sq Epi: x / Bacteria: x        Culture - Blood (collected 17 Jun 2025 07:40)  Source: Blood Blood-Peripheral  Preliminary Report (19 Jun 2025 17:01):    No growth at 48 Hours    Culture - Blood (collected 17 Jun 2025 07:40)  Source: Blood Blood-Peripheral  Preliminary Report (19 Jun 2025 17:01):    No growth at 48 Hours    PHYSICAL EXAM:  GENERAL: NAD, well-developed, AAOx3  HEENT:  Atraumatic, Normocephalic. EOMI, PERRLA, conjunctiva and sclera clear, No JVD  PULMONARY: Clear to auscultation bilaterally; No wheeze  CARDIOVASCULAR: Regular rate and rhythm; No murmurs, rubs, or gallops  GASTROINTESTINAL: Soft, Nontender, Nondistended; Bowel sounds present  MUSCULOSKELETAL:  bl erythematous 2+ edema, minimally pitting, wound on left leg   NEUROLOGY: non-focal  SKIN: No rashes or lesions      Patient is a 54 year old with hx of of IDDM, COPD not on home O2, opioid dependence on Suboxone, asthma, hypothyroidism, chronic LE lymphedema, LE leukocytoclastic vasculitis presenting to ED with worsening RLE wound, admitted for management of cellulitis/lymphedema/vasculitis.    # RLE cellulitis vs lymphedema vs vasculitis  - ID consulted and  continue vanco and cefepime, vanco level monitoring   - recent consult with rheumatology- patient likely experiencing worsening lymphedema, not vasculitis  - wound care consulted, dressing changed today   - recent outpatient visit with vascular surgery-  continue compression wrap, consulted vascular surgery but no recommendation for surgical intervention at this time   - f/u blood cultures  - continue pregabalin for pain  - worsening edema, start lasix 40mg iv bid for one day , stop po lasix   f/u echo   f/u esr/crp   - PT consulted    # hx opioid dependence  - continue suboxone    # IDDM  - continue insulin regimen per last endo note: lantus 20 u at bedtime, ADMELOG 5 units with meals  - FS glucose, SSI    # COPD  - continue albuterol as needed    # CAD  - cont asa 81 mg, statin    # hypothyroidism  - cont synthroid 175 mcg    Diet: DASH, CCHO  Activity: OOB  DVT PPX:   GI PPX: not indicated  CHG:  ordered  Code status: full code  Dispo: on IV antibiotics   follow up vascular and ID     ---PB Handoff Note---    Pending/Follow up items (tests, labs, procedures,consults): echo, edema     Indication for continued inpatient management: IV antibiotics , iv diuresis     Goals of Care note:     Family contact:  Dispo (home, SNF, etc):    Prepare for DC order [x] - updated LISS is:   DC provider note prep:    -------------------------------Time spent-----------------------------------------------------------------------  Initial visit:             mins [ ] -- 55-74 mins [ ]  Subsequent visit: 50-79 mins[ ]    -- 35-49mins [ x]     --------------------------------# and complexity of problems---------------------------------------------  [ ] chronic illness with severe exacerbation , progression, treatment side effect  [ ] acute or chronic illness with threat to life or bodily funtion                         --------------------------------Complexity of data reviewed ----------------------------------------------  The Patients complexity of data reviewed  is Low [ ] Moderate [x ] High [ ] due to the following:   A:      Reviewed prior external records [ ]      Considering/ Ordered a unique test:  Labs [x ] Imaging [x ] Stress Test  [ ] Other: Specify [ ]      Reviewed each unique test result [x ]      Assessment requiring an independent historian [ ]  B:       Independent interpretation of:   X-Ray [x ] EKG [ ]  Other: Specify  [ ]  C:       Discussion of management of tests with clinician outside of my group [ ]    -------------------------------------Risk of Morbidity-------------------------------------------------------  The Patients risk of morbidity is Low [ ] Moderate [x] High [ ] due to the following:     Parenteral controlled substance [ ]  ------------------------------------------------------------------------------------------------------------------

## 2025-06-20 ENCOUNTER — RESULT REVIEW (OUTPATIENT)
Age: 55
End: 2025-06-20

## 2025-06-20 LAB
ALBUMIN SERPL ELPH-MCNC: 4.6 G/DL — SIGNIFICANT CHANGE UP (ref 3.5–5.2)
ALP SERPL-CCNC: 106 U/L — SIGNIFICANT CHANGE UP (ref 30–115)
ALT FLD-CCNC: 7 U/L — SIGNIFICANT CHANGE UP (ref 0–41)
ANION GAP SERPL CALC-SCNC: 15 MMOL/L — HIGH (ref 7–14)
AST SERPL-CCNC: 40 U/L — SIGNIFICANT CHANGE UP (ref 0–41)
BILIRUB SERPL-MCNC: 0.3 MG/DL — SIGNIFICANT CHANGE UP (ref 0.2–1.2)
BUN SERPL-MCNC: 17 MG/DL — SIGNIFICANT CHANGE UP (ref 10–20)
CALCIUM SERPL-MCNC: 9.8 MG/DL — SIGNIFICANT CHANGE UP (ref 8.4–10.5)
CHLORIDE SERPL-SCNC: 94 MMOL/L — LOW (ref 98–110)
CO2 SERPL-SCNC: 28 MMOL/L — SIGNIFICANT CHANGE UP (ref 17–32)
CREAT SERPL-MCNC: 1.1 MG/DL — SIGNIFICANT CHANGE UP (ref 0.7–1.5)
CRP SERPL-MCNC: 33.9 MG/L — HIGH
EGFR: 60 ML/MIN/1.73M2 — SIGNIFICANT CHANGE UP
EGFR: 60 ML/MIN/1.73M2 — SIGNIFICANT CHANGE UP
ERYTHROCYTE [SEDIMENTATION RATE] IN BLOOD: 75 MM/HR — HIGH (ref 0–20)
GLUCOSE SERPL-MCNC: 150 MG/DL — HIGH (ref 70–99)
HCT VFR BLD CALC: 32 % — LOW (ref 34.5–45)
HGB BLD-MCNC: 10.4 G/DL — LOW (ref 11.5–15.5)
MCHC RBC-ENTMCNC: 26.9 PG — LOW (ref 27–34)
MCHC RBC-ENTMCNC: 32.5 G/DL — SIGNIFICANT CHANGE UP (ref 32–36)
MCV RBC AUTO: 82.9 FL — SIGNIFICANT CHANGE UP (ref 80–100)
NRBC # BLD AUTO: 0 K/UL — SIGNIFICANT CHANGE UP (ref 0–0)
NRBC # FLD: 0 K/UL — SIGNIFICANT CHANGE UP (ref 0–0)
NRBC BLD AUTO-RTO: 0 /100 WBCS — SIGNIFICANT CHANGE UP (ref 0–0)
PLATELET # BLD AUTO: 264 K/UL — SIGNIFICANT CHANGE UP (ref 150–400)
PMV BLD: 9.8 FL — SIGNIFICANT CHANGE UP (ref 7–13)
POTASSIUM SERPL-MCNC: 4.7 MMOL/L — SIGNIFICANT CHANGE UP (ref 3.5–5)
POTASSIUM SERPL-SCNC: 4.7 MMOL/L — SIGNIFICANT CHANGE UP (ref 3.5–5)
PROT SERPL-MCNC: 8.7 G/DL — HIGH (ref 6–8)
RBC # BLD: 3.86 M/UL — SIGNIFICANT CHANGE UP (ref 3.8–5.2)
RBC # FLD: 16.2 % — HIGH (ref 10.3–14.5)
SODIUM SERPL-SCNC: 137 MMOL/L — SIGNIFICANT CHANGE UP (ref 135–146)
WBC # BLD: 6.87 K/UL — SIGNIFICANT CHANGE UP (ref 3.8–10.5)
WBC # FLD AUTO: 6.87 K/UL — SIGNIFICANT CHANGE UP (ref 3.8–10.5)

## 2025-06-20 PROCEDURE — 99232 SBSQ HOSP IP/OBS MODERATE 35: CPT

## 2025-06-20 PROCEDURE — 93306 TTE W/DOPPLER COMPLETE: CPT | Mod: 26

## 2025-06-20 RX ADMIN — Medication 81 MILLIGRAM(S): at 12:09

## 2025-06-20 RX ADMIN — PREGABALIN 50 MILLIGRAM(S): 50 CAPSULE ORAL at 05:09

## 2025-06-20 RX ADMIN — CEFEPIME 100 MILLIGRAM(S): 2 INJECTION, POWDER, FOR SOLUTION INTRAVENOUS at 22:16

## 2025-06-20 RX ADMIN — FUROSEMIDE 40 MILLIGRAM(S): 10 INJECTION INTRAMUSCULAR; INTRAVENOUS at 05:08

## 2025-06-20 RX ADMIN — FUROSEMIDE 40 MILLIGRAM(S): 10 INJECTION INTRAMUSCULAR; INTRAVENOUS at 17:09

## 2025-06-20 RX ADMIN — CEFEPIME 100 MILLIGRAM(S): 2 INJECTION, POWDER, FOR SOLUTION INTRAVENOUS at 05:08

## 2025-06-20 RX ADMIN — INSULIN GLARGINE-YFGN 20 UNIT(S): 100 INJECTION, SOLUTION SUBCUTANEOUS at 22:15

## 2025-06-20 RX ADMIN — Medication 250 MILLIGRAM(S): at 17:12

## 2025-06-20 RX ADMIN — Medication 175 MICROGRAM(S): at 05:21

## 2025-06-20 RX ADMIN — Medication 250 MILLIGRAM(S): at 05:08

## 2025-06-20 RX ADMIN — BUPRENORPHINE HYDROCHLORIDE, NALOXONE HYDROCHLORIDE 3 TABLET(S): 4; 1 FILM, SOLUBLE BUCCAL; SUBLINGUAL at 12:08

## 2025-06-20 RX ADMIN — Medication 1 APPLICATION(S): at 05:07

## 2025-06-20 RX ADMIN — INSULIN LISPRO 5 UNIT(S): 100 INJECTION, SOLUTION INTRAVENOUS; SUBCUTANEOUS at 08:04

## 2025-06-20 RX ADMIN — PREGABALIN 50 MILLIGRAM(S): 50 CAPSULE ORAL at 17:09

## 2025-06-20 RX ADMIN — INSULIN LISPRO 1: 100 INJECTION, SOLUTION INTRAVENOUS; SUBCUTANEOUS at 12:09

## 2025-06-20 RX ADMIN — INSULIN LISPRO 5 UNIT(S): 100 INJECTION, SOLUTION INTRAVENOUS; SUBCUTANEOUS at 12:10

## 2025-06-20 RX ADMIN — INSULIN LISPRO 1: 100 INJECTION, SOLUTION INTRAVENOUS; SUBCUTANEOUS at 08:04

## 2025-06-20 RX ADMIN — CEFEPIME 100 MILLIGRAM(S): 2 INJECTION, POWDER, FOR SOLUTION INTRAVENOUS at 13:18

## 2025-06-20 RX ADMIN — ENOXAPARIN SODIUM 40 MILLIGRAM(S): 100 INJECTION SUBCUTANEOUS at 17:10

## 2025-06-20 RX ADMIN — ATORVASTATIN CALCIUM 20 MILLIGRAM(S): 80 TABLET, FILM COATED ORAL at 22:16

## 2025-06-20 NOTE — PROGRESS NOTE ADULT - ASSESSMENT
This is a 54 year old with hx of of IDDM, COPD not on home O2, opioid dependence on Suboxone, asthma, hypothyroidism, chronic LE lymphedema, LE leukocytoclastic vasculitis presenting to ED with worsening RLE wound.    IMPRESSION  #Bilateral lower extremity wounds/ulcers with underlying stasis dermatitis.  #Recurrent admission in the past for lower extremity cellulitis, Biopsy confirmed leukocytoclastic vasculitis  #PAD  #History of polysubstance use disorder. On Suboxone  #Emphysema and interstitial lung disease noted on CT (11/2024)  #HTN, HLD  #Hep C-- Self resolved  #Obesity BMI (kg/m2): 28.9   #Immunodeficiency secondary to history of substance use which could result in poor clinical outcome    Hepatitis C Ab +, PCR undetectable 1/2025  Cryoglobulin negative 1/2025   Hepatitis B immune, HIV screen negative.   DULCE negative  RF negative  p-ANCA positive, titer 1:40    Blood cultures NGTD    RECOMMENDATIONS  -IV Vanc. Dosing as per pharmacy protocol.  -IV Cefepime 2 gram q 8 hours (S/D 6/17).  -Diuresis, local care as per primary team.    If any questions, please send a message or call on [employer]  Please continue to update ID with any pertinent new laboratory or radiographic findings.    Stella Chavez M.D  Infectious Diseases Attending/   [employer] at [facility] This is a 54 year old with hx of of IDDM, COPD not on home O2, opioid dependence on Suboxone, asthma, hypothyroidism, chronic LE lymphedema, LE leukocytoclastic vasculitis presenting to ED with worsening RLE wound.    IMPRESSION  #Bilateral lower extremity wounds/ulcers with underlying stasis dermatitis.  #Recurrent admission in the past for lower extremity cellulitis, Biopsy confirmed leukocytoclastic vasculitis  #PAD  #History of polysubstance use disorder. On Suboxone  #Emphysema and interstitial lung disease noted on CT (11/2024)  #HTN, HLD  #Hep C-- Self resolved  #Obesity BMI (kg/m2): 28.9   #Immunodeficiency secondary to history of substance use which could result in poor clinical outcome    Hepatitis C Ab +, PCR undetectable 1/2025  Cryoglobulin negative 1/2025   Hepatitis B immune, HIV screen negative.   DULCE negative  RF negative  p-ANCA positive, titer 1:40    Blood cultures NGTD    RECOMMENDATIONS  -IV Vanc. Dosing as per pharmacy protocol.  -IV Cefepime 2 gram q 8 hours (S/D 6/17).  -Can do a trial of steroids to help with the inflammation.   -Diuresis, local care as per primary team.    If any questions, please send a message or call on [employer] Teams  Please continue to update ID with any pertinent new laboratory or radiographic findings.    Stella Chavez M.D  Infectious Diseases Attending/   [employer] at [facility]

## 2025-06-20 NOTE — PROGRESS NOTE ADULT - ASSESSMENT
EZRA BARCLAY 54y Female  MRN#: [id number]   CODE STATUS:________      SUBJECTIVE  Patient is a 54y old Female who presents with a chief complaint of cellulitis/lympedema (20 Jun 2025 08:54)  Currently admitted to medicine with the primary diagnosis of Leg swelling      Today is hospital day 3d, and this morning she is c/o pain and swelling not improved  urinating well with diuresis       OBJECTIVE  PAST MEDICAL & SURGICAL HISTORY  Asthma with COPD    Hypothyroidism    H/O: substance abuse  no longer using    History of pancreatitis    Hepatitis-C    Leukocytoclastic vasculitis    Bilateral edema of lower extremity    HLD (hyperlipidemia)    Type 2 diabetes mellitus    History of appendectomy    History of tonsillectomy      ALLERGIES:  No Known Allergies    MEDICATIONS:  STANDING MEDICATIONS  aspirin enteric coated 81 milliGRAM(s) Oral daily  atorvastatin 20 milliGRAM(s) Oral at bedtime  buprenorphine 8 mG/naloxone 2 mG SL  Tablet 3 Tablet(s) SubLingual daily  cefepime   IVPB 2000 milliGRAM(s) IV Intermittent every 8 hours  cefepime   IVPB 2000 milliGRAM(s) IV Intermittent once  chlorhexidine 2% Cloths 1 Application(s) Topical <User Schedule>  dextrose 5%. 1000 milliLiter(s) IV Continuous <Continuous>  dextrose 5%. 1000 milliLiter(s) IV Continuous <Continuous>  dextrose 50% Injectable 25 Gram(s) IV Push once  dextrose 50% Injectable 12.5 Gram(s) IV Push once  dextrose 50% Injectable 25 Gram(s) IV Push once  enoxaparin Injectable 40 milliGRAM(s) SubCutaneous every 24 hours  furosemide   Injectable 40 milliGRAM(s) IV Push once  furosemide   Injectable 40 milliGRAM(s) IV Push every 12 hours  glucagon  Injectable 1 milliGRAM(s) IntraMuscular once  insulin glargine Injectable (LANTUS) 20 Unit(s) SubCutaneous at bedtime  insulin lispro (ADMELOG) corrective regimen sliding scale   SubCutaneous three times a day before meals  insulin lispro (ADMELOG) corrective regimen sliding scale   SubCutaneous at bedtime  insulin lispro Injectable (ADMELOG) 5 Unit(s) SubCutaneous three times a day before meals  levothyroxine 175 MICROGram(s) Oral daily  pregabalin 50 milliGRAM(s) Oral every 12 hours  vancomycin  IVPB 1000 milliGRAM(s) IV Intermittent every 12 hours    PRN MEDICATIONS  acetaminophen     Tablet .. 650 milliGRAM(s) Oral every 6 hours PRN  albuterol    90 MICROgram(s) HFA Inhaler 2 Puff(s) Inhalation every 6 hours PRN  dextrose Oral Gel 15 Gram(s) Oral once PRN      VITAL SIGNS: Last 24 Hours  T(C): 36.4 (20 Jun 2025 14:06), Max: 37 (19 Jun 2025 20:43)  T(F): 97.5 (20 Jun 2025 14:06), Max: 98.6 (19 Jun 2025 20:43)  HR: 70 (20 Jun 2025 14:06) (45 - 70)  BP: 116/82 (20 Jun 2025 14:06) (94/55 - 116/82)  BP(mean): --  RR: 16 (20 Jun 2025 14:06) (16 - 18)  SpO2: 97% (20 Jun 2025 14:06) (92% - 98%)    LABS:                        10.4   6.87  )-----------( 264      ( 20 Jun 2025 12:39 )             32.0     06-20    137  |  94[L]  |  17  ----------------------------<  150[H]  4.7   |  28  |  1.1    Ca    9.8      20 Jun 2025 12:39    TPro  8.7[H]  /  Alb  4.6  /  TBili  0.3  /  DBili  x   /  AST  40  /  ALT  7   /  AlkPhos  106  06-20      Urinalysis Basic - ( 20 Jun 2025 12:39 )    Color: x / Appearance: x / SG: x / pH: x  Gluc: 150 mg/dL / Ketone: x  / Bili: x / Urobili: x   Blood: x / Protein: x / Nitrite: x   Leuk Esterase: x / RBC: x / WBC x   Sq Epi: x / Non Sq Epi: x / Bacteria: x      Sedimentation Rate, Erythrocyte: 75 mm/hr *H* (06-20-25 @ 12:39)    PHYSICAL EXAM:  GENERAL: NAD, well-developed, AAOx3  HEENT:  Atraumatic, Normocephalic. EOMI, PERRLA, conjunctiva and sclera clear, No JVD  PULMONARY: Clear to auscultation bilaterally; No wheeze  CARDIOVASCULAR: Regular rate and rhythm; No murmurs, rubs, or gallops  GASTROINTESTINAL: Soft, Nontender, Nondistended; Bowel sounds present  MUSCULOSKELETAL:  bl erythematous 3+ edema, minimally pitting, wound on left leg   NEUROLOGY: non-focal  SKIN: No rashes or lesions    echo:. Left ventricular systolic function is normal with an ejection   fraction of 54 % by Acevedo's method of disks.   2. There is normal LV mass and concentric remodeling.   3. Normal left ventricular diastolic function.   4. Normal right ventricular cavity size, with normal wall thickness, and   normal right ventricular systolic function. Tricuspid annular plane   systolic excursion (TAPSE) is 2.2 cm (normal >=1.7 cm).   5. Normal left and right atrial size.   6. Trace mitral regurgitation at a blood pressure of 105/72 mmHg.   7. Estimated pulmonary artery systolic pressure is 22 mmHg, consistent   with normal pulmonary artery pressure.   8. No pericardial effusion seen.      Patient is a 54 year old with hx of of IDDM, COPD not on home O2, opioid dependence on Suboxone, asthma, hypothyroidism, chronic LE lymphedema, LE leukocytoclastic vasculitis presenting to ED with worsening RLE wound, admitted for management of cellulitis/lymphedema/vasculitis.    # RLE cellulitis vs lymphedema vs vasculitis  - ID consulted and  continue vanco and cefepime, vanco level monitoring   - recent consult with rheumatology- patient likely experiencing worsening lymphedema, not vasculitis  - wound care consulted, dressing changed today   - recent outpatient visit with vascular surgery-  continue compression wrap, consulted vascular surgery but no recommendation for surgical intervention at this time   - f/u blood cultures  - continue pregabalin for pain  - worsening edema, start lasix 40mg iv bid for one day , stop po lasix    echo does not show signs of chf    f/u esr/crp   discussed case with rheumatology who does not believe this is acute vasculitis and no recommendation for steriods at this time  discussed with podiatry and they state uniboot can be placed outpatient only  will continue compression wrapping   - PT consulted  as per discussion with ID will cont cefepime and vanco. F/u vanco trough and pharmacy to adjust dose     # hx opioid dependence  - continue suboxone    # IDDM  - continue insulin regimen per last endo note: lantus 20 u at bedtime, ADMELOG 5 units with meals  - FS glucose, SSI    # COPD  - continue albuterol as needed    # CAD  - cont asa 81 mg, statin    # hypothyroidism  - cont synthroid 175 mcg    Diet: DASH, CCHO  Activity: OOB  DVT PPX:   GI PPX: not indicated  CHG:  ordered  Code status: full code  Dispo: on IV antibiotics   follow up vascular and ID     ---PB Handoff Note---    Pending/Follow up items (tests, labs, procedures,consults):edema  and infection improvement     Indication for continued inpatient management: IV antibiotics , iv diuresis     Goals of Care note:     Family contact:  Dispo (home, SNF, etc):    Prepare for DC order [x] - updated LISS is:   DC provider note prep:    -------------------------------Time spent-----------------------------------------------------------------------  Initial visit:             mins [ ] -- 55-74 mins [ ]  Subsequent visit: 50-79 mins[ ]    -- 35-49mins [ x]     --------------------------------# and complexity of problems---------------------------------------------  [ ] chronic illness with severe exacerbation , progression, treatment side effect  [ ] acute or chronic illness with threat to life or bodily funtion                         --------------------------------Complexity of data reviewed ----------------------------------------------  The Patients complexity of data reviewed  is Low [ ] Moderate [x ] High [ ] due to the following:   A:      Reviewed prior external records [ ]      Considering/ Ordered a unique test:  Labs [x ] Imaging [x ] Stress Test  [ ] Other: Specify [ ]      Reviewed each unique test result [x ]      Assessment requiring an independent historian [ ]  B:       Independent interpretation of:   X-Ray [x ] EKG [ ]  Other: Specify  [ ]  C:       Discussion of management of tests with clinician outside of my group [ ]    -------------------------------------Risk of Morbidity-------------------------------------------------------  The Patients risk of morbidity is Low [ ] Moderate [x] High [ ] due to the following:     Parenteral controlled substance [ ]  ------------------------------------------------------------------------------------------------------------------

## 2025-06-21 PROCEDURE — 99232 SBSQ HOSP IP/OBS MODERATE 35: CPT

## 2025-06-21 RX ORDER — FUROSEMIDE 10 MG/ML
40 INJECTION INTRAMUSCULAR; INTRAVENOUS DAILY
Refills: 0 | Status: DISCONTINUED | OUTPATIENT
Start: 2025-06-22 | End: 2025-06-25

## 2025-06-21 RX ADMIN — INSULIN LISPRO 0: 100 INJECTION, SOLUTION INTRAVENOUS; SUBCUTANEOUS at 08:12

## 2025-06-21 RX ADMIN — Medication 175 MICROGRAM(S): at 05:35

## 2025-06-21 RX ADMIN — Medication 250 MILLIGRAM(S): at 17:03

## 2025-06-21 RX ADMIN — PREGABALIN 50 MILLIGRAM(S): 50 CAPSULE ORAL at 16:59

## 2025-06-21 RX ADMIN — FUROSEMIDE 40 MILLIGRAM(S): 10 INJECTION INTRAMUSCULAR; INTRAVENOUS at 05:01

## 2025-06-21 RX ADMIN — CEFEPIME 100 MILLIGRAM(S): 2 INJECTION, POWDER, FOR SOLUTION INTRAVENOUS at 13:33

## 2025-06-21 RX ADMIN — ATORVASTATIN CALCIUM 20 MILLIGRAM(S): 80 TABLET, FILM COATED ORAL at 22:13

## 2025-06-21 RX ADMIN — CEFEPIME 100 MILLIGRAM(S): 2 INJECTION, POWDER, FOR SOLUTION INTRAVENOUS at 04:59

## 2025-06-21 RX ADMIN — Medication 1 APPLICATION(S): at 05:00

## 2025-06-21 RX ADMIN — INSULIN LISPRO 5 UNIT(S): 100 INJECTION, SOLUTION INTRAVENOUS; SUBCUTANEOUS at 17:00

## 2025-06-21 RX ADMIN — INSULIN LISPRO 0: 100 INJECTION, SOLUTION INTRAVENOUS; SUBCUTANEOUS at 22:13

## 2025-06-21 RX ADMIN — Medication 250 MILLIGRAM(S): at 05:00

## 2025-06-21 RX ADMIN — BUPRENORPHINE HYDROCHLORIDE, NALOXONE HYDROCHLORIDE 3 TABLET(S): 4; 1 FILM, SOLUBLE BUCCAL; SUBLINGUAL at 12:10

## 2025-06-21 RX ADMIN — Medication 81 MILLIGRAM(S): at 12:09

## 2025-06-21 RX ADMIN — CEFEPIME 100 MILLIGRAM(S): 2 INJECTION, POWDER, FOR SOLUTION INTRAVENOUS at 22:13

## 2025-06-21 RX ADMIN — INSULIN GLARGINE-YFGN 20 UNIT(S): 100 INJECTION, SOLUTION SUBCUTANEOUS at 23:29

## 2025-06-21 RX ADMIN — INSULIN LISPRO 2: 100 INJECTION, SOLUTION INTRAVENOUS; SUBCUTANEOUS at 17:01

## 2025-06-21 RX ADMIN — PREGABALIN 50 MILLIGRAM(S): 50 CAPSULE ORAL at 05:00

## 2025-06-21 RX ADMIN — INSULIN LISPRO 5 UNIT(S): 100 INJECTION, SOLUTION INTRAVENOUS; SUBCUTANEOUS at 08:12

## 2025-06-21 RX ADMIN — INSULIN LISPRO 5 UNIT(S): 100 INJECTION, SOLUTION INTRAVENOUS; SUBCUTANEOUS at 12:11

## 2025-06-21 NOTE — CHART NOTE - NSCHARTNOTEFT_GEN_A_CORE
FSBS results during the day reviewed (tonight's FSBS results during the day reviewed (tonight's FSBS IS 81) Will give Lantus dose as scheduled

## 2025-06-21 NOTE — PROGRESS NOTE ADULT - ASSESSMENT
PHYSICAL EXAM:  GENERAL: NAD, well-developed, AAOx3  HEENT:  Atraumatic, Normocephalic. EOMI, PERRLA, conjunctiva and sclera clear, No JVD  PULMONARY: Clear to auscultation bilaterally; No wheeze  CARDIOVASCULAR: Regular rate and rhythm; No murmurs, rubs, or gallops  GASTROINTESTINAL: Soft, Nontender, Nondistended; Bowel sounds present  MUSCULOSKELETAL:  bl erythematous 3+ edema, minimally pitting, wound on left leg   NEUROLOGY: non-focal  SKIN: No rashes or lesions    echo:. Left ventricular systolic function is normal with an ejection   fraction of 54 % by Acevedo's method of disks.   2. There is normal LV mass and concentric remodeling.   3. Normal left ventricular diastolic function.   4. Normal right ventricular cavity size, with normal wall thickness, and   normal right ventricular systolic function. Tricuspid annular plane   systolic excursion (TAPSE) is 2.2 cm (normal >=1.7 cm).   5. Normal left and right atrial size.   6. Trace mitral regurgitation at a blood pressure of 105/72 mmHg.   7. Estimated pulmonary artery systolic pressure is 22 mmHg, consistent   with normal pulmonary artery pressure.   8. No pericardial effusion seen.      Patient is a 54 year old with hx of of IDDM, COPD not on home O2, opioid dependence on Suboxone, asthma, hypothyroidism, chronic LE lymphedema, LE leukocytoclastic vasculitis presenting to ED with worsening RLE wound, admitted for management of cellulitis/lymphedema/vasculitis.    # RLE cellulitis vs lymphedema vs vasculitis  - ID consulted and  continue vanco and cefepime, vanco level monitoring   - recent consult with rheumatology- patient likely experiencing worsening lymphedema, not vasculitis  - wound care consulted, dressing changed today   - recent outpatient visit with vascular surgery-  continue compression wrap, consulted vascular surgery but no recommendation for surgical intervention at this time   - f/u blood cultures  - continue pregabalin for pain  - edema improved today , dc lasix 40mg iv bid , transition back to lasix 40mg po daily and monitor edema    echo does not show signs of chf     esr elevated /crp   discussed case with rheumatology who does not believe this is acute vasculitis and no recommendation for steriods at this time  discussed with podiatry and they state uniboot can be placed outpatient only  will continue compression wrapping   - PT consulted  as per discussion with ID will cont cefepime and vanco. F/u vanco trough and pharmacy to adjust dose     # hx opioid dependence  - continue suboxone    # IDDM  - continue insulin regimen per last endo note: lantus 20 u at bedtime, ADMELOG 5 units with meals  - FS glucose, SSI    # COPD  - continue albuterol as needed    # CAD  - cont asa 81 mg, statin    # hypothyroidism  - cont synthroid 175 mcg    Diet: DASH, CCHO  Activity: OOB  DVT PPX:   GI PPX: not indicated  CHG:  ordered  Code status: full code  Dispo: on IV antibiotics   follow up vascular and ID  needs legs rewrapped tight for compression      ---PB Handoff Note---    Pending/Follow up items (tests, labs, procedures,consults):edema  and infection improvement     Indication for continued inpatient management: IV antibiotics     Goals of Care note:     Family contact:  Dispo (home, SNF, etc):    Prepare for DC order [x] - updated LISS is:   DC provider note prep:    -------------------------------Time spent-----------------------------------------------------------------------  Initial visit:             mins [ ] -- 55-74 mins [ ]  Subsequent visit: 50-79 mins[ ]    -- 35-49mins [ x]     --------------------------------# and complexity of problems---------------------------------------------  [ ] chronic illness with severe exacerbation , progression, treatment side effect  [ ] acute or chronic illness with threat to life or bodily funtion                         --------------------------------Complexity of data reviewed ----------------------------------------------  The Patients complexity of data reviewed  is Low [ ] Moderate [x ] High [ ] due to the following:   A:      Reviewed prior external records [ ]      Considering/ Ordered a unique test:  Labs [x ] Imaging [x ] Stress Test  [ ] Other: Specify [ ]      Reviewed each unique test result [x ]      Assessment requiring an independent historian [ ]  B:       Independent interpretation of:   X-Ray [x ] EKG [ ]  Other: Specify  [ ]  C:       Discussion of management of tests with clinician outside of my group [ ]    -------------------------------------Risk of Morbidity-------------------------------------------------------  The Patients risk of morbidity is Low [ ] Moderate [x] High [ ] due to the following:     Parenteral controlled substance [ ]  ------------------------------------------------------------------------------------------------------------------

## 2025-06-22 LAB
ALBUMIN SERPL ELPH-MCNC: 4.4 G/DL — SIGNIFICANT CHANGE UP (ref 3.5–5.2)
ALP SERPL-CCNC: 111 U/L — SIGNIFICANT CHANGE UP (ref 30–115)
ALT FLD-CCNC: 7 U/L — SIGNIFICANT CHANGE UP (ref 0–41)
ANION GAP SERPL CALC-SCNC: 13 MMOL/L — SIGNIFICANT CHANGE UP (ref 7–14)
AST SERPL-CCNC: 37 U/L — SIGNIFICANT CHANGE UP (ref 0–41)
BILIRUB SERPL-MCNC: 0.3 MG/DL — SIGNIFICANT CHANGE UP (ref 0.2–1.2)
BUN SERPL-MCNC: 19 MG/DL — SIGNIFICANT CHANGE UP (ref 10–20)
CALCIUM SERPL-MCNC: 9.6 MG/DL — SIGNIFICANT CHANGE UP (ref 8.4–10.5)
CHLORIDE SERPL-SCNC: 94 MMOL/L — LOW (ref 98–110)
CO2 SERPL-SCNC: 31 MMOL/L — SIGNIFICANT CHANGE UP (ref 17–32)
CREAT SERPL-MCNC: 1 MG/DL — SIGNIFICANT CHANGE UP (ref 0.7–1.5)
CULTURE RESULTS: SIGNIFICANT CHANGE UP
CULTURE RESULTS: SIGNIFICANT CHANGE UP
EGFR: 67 ML/MIN/1.73M2 — SIGNIFICANT CHANGE UP
EGFR: 67 ML/MIN/1.73M2 — SIGNIFICANT CHANGE UP
GLUCOSE SERPL-MCNC: 124 MG/DL — HIGH (ref 70–99)
HCT VFR BLD CALC: 31.4 % — LOW (ref 34.5–45)
HGB BLD-MCNC: 9.6 G/DL — LOW (ref 11.5–15.5)
MCHC RBC-ENTMCNC: 26.5 PG — LOW (ref 27–34)
MCHC RBC-ENTMCNC: 30.6 G/DL — LOW (ref 32–36)
MCV RBC AUTO: 86.7 FL — SIGNIFICANT CHANGE UP (ref 80–100)
NRBC # BLD AUTO: 0 K/UL — SIGNIFICANT CHANGE UP (ref 0–0)
NRBC # FLD: 0 K/UL — SIGNIFICANT CHANGE UP (ref 0–0)
NRBC BLD AUTO-RTO: 0 /100 WBCS — SIGNIFICANT CHANGE UP (ref 0–0)
PLATELET # BLD AUTO: 228 K/UL — SIGNIFICANT CHANGE UP (ref 150–400)
PMV BLD: 10 FL — SIGNIFICANT CHANGE UP (ref 7–13)
POTASSIUM SERPL-MCNC: 4.3 MMOL/L — SIGNIFICANT CHANGE UP (ref 3.5–5)
POTASSIUM SERPL-SCNC: 4.3 MMOL/L — SIGNIFICANT CHANGE UP (ref 3.5–5)
PROT SERPL-MCNC: 8.3 G/DL — HIGH (ref 6–8)
RBC # BLD: 3.62 M/UL — LOW (ref 3.8–5.2)
RBC # FLD: 16.3 % — HIGH (ref 10.3–14.5)
SODIUM SERPL-SCNC: 138 MMOL/L — SIGNIFICANT CHANGE UP (ref 135–146)
SPECIMEN SOURCE: SIGNIFICANT CHANGE UP
SPECIMEN SOURCE: SIGNIFICANT CHANGE UP
WBC # BLD: 6.24 K/UL — SIGNIFICANT CHANGE UP (ref 3.8–10.5)
WBC # FLD AUTO: 6.24 K/UL — SIGNIFICANT CHANGE UP (ref 3.8–10.5)

## 2025-06-22 PROCEDURE — 99232 SBSQ HOSP IP/OBS MODERATE 35: CPT

## 2025-06-22 RX ADMIN — PREGABALIN 50 MILLIGRAM(S): 50 CAPSULE ORAL at 17:02

## 2025-06-22 RX ADMIN — CEFEPIME 100 MILLIGRAM(S): 2 INJECTION, POWDER, FOR SOLUTION INTRAVENOUS at 21:12

## 2025-06-22 RX ADMIN — INSULIN LISPRO 5 UNIT(S): 100 INJECTION, SOLUTION INTRAVENOUS; SUBCUTANEOUS at 08:03

## 2025-06-22 RX ADMIN — INSULIN LISPRO 1: 100 INJECTION, SOLUTION INTRAVENOUS; SUBCUTANEOUS at 08:03

## 2025-06-22 RX ADMIN — Medication 175 MICROGRAM(S): at 05:14

## 2025-06-22 RX ADMIN — BUPRENORPHINE HYDROCHLORIDE, NALOXONE HYDROCHLORIDE 3 TABLET(S): 4; 1 FILM, SOLUBLE BUCCAL; SUBLINGUAL at 12:12

## 2025-06-22 RX ADMIN — CEFEPIME 100 MILLIGRAM(S): 2 INJECTION, POWDER, FOR SOLUTION INTRAVENOUS at 14:14

## 2025-06-22 RX ADMIN — ATORVASTATIN CALCIUM 20 MILLIGRAM(S): 80 TABLET, FILM COATED ORAL at 21:12

## 2025-06-22 RX ADMIN — Medication 250 MILLIGRAM(S): at 17:02

## 2025-06-22 RX ADMIN — INSULIN LISPRO 1: 100 INJECTION, SOLUTION INTRAVENOUS; SUBCUTANEOUS at 21:39

## 2025-06-22 RX ADMIN — CEFEPIME 100 MILLIGRAM(S): 2 INJECTION, POWDER, FOR SOLUTION INTRAVENOUS at 05:14

## 2025-06-22 RX ADMIN — FUROSEMIDE 40 MILLIGRAM(S): 10 INJECTION INTRAMUSCULAR; INTRAVENOUS at 05:13

## 2025-06-22 RX ADMIN — Medication 1 APPLICATION(S): at 05:13

## 2025-06-22 RX ADMIN — Medication 81 MILLIGRAM(S): at 12:13

## 2025-06-22 RX ADMIN — Medication 250 MILLIGRAM(S): at 05:14

## 2025-06-22 RX ADMIN — INSULIN GLARGINE-YFGN 20 UNIT(S): 100 INJECTION, SOLUTION SUBCUTANEOUS at 21:39

## 2025-06-22 RX ADMIN — PREGABALIN 50 MILLIGRAM(S): 50 CAPSULE ORAL at 05:14

## 2025-06-22 NOTE — PROGRESS NOTE ADULT - ASSESSMENT
EZRA BARCLAY 54y Female  MRN#: [id number]   CODE STATUS:______full__      SUBJECTIVE  Patient is a 54y old Female who presents with a chief complaint of cellulitis/lympedema (21 Jun 2025 15:11)  Currently admitted to medicine with the primary diagnosis of Leg swelling      Today is hospital day 5d, and this morning she is c/o pain in her left send toe stating it feels more swollen and painful/difficult to move          OBJECTIVE  PAST MEDICAL & SURGICAL HISTORY  Asthma with COPD    Hypothyroidism    H/O: substance abuse  no longer using    History of pancreatitis    Hepatitis-C    Leukocytoclastic vasculitis    Bilateral edema of lower extremity    HLD (hyperlipidemia)    Type 2 diabetes mellitus    History of appendectomy    History of tonsillectomy      ALLERGIES:  No Known Allergies    MEDICATIONS:  STANDING MEDICATIONS  aspirin enteric coated 81 milliGRAM(s) Oral daily  atorvastatin 20 milliGRAM(s) Oral at bedtime  buprenorphine 8 mG/naloxone 2 mG SL  Tablet 3 Tablet(s) SubLingual daily  cefepime   IVPB 2000 milliGRAM(s) IV Intermittent once  cefepime   IVPB 2000 milliGRAM(s) IV Intermittent every 8 hours  chlorhexidine 2% Cloths 1 Application(s) Topical <User Schedule>  dextrose 5%. 1000 milliLiter(s) IV Continuous <Continuous>  dextrose 5%. 1000 milliLiter(s) IV Continuous <Continuous>  dextrose 50% Injectable 25 Gram(s) IV Push once  dextrose 50% Injectable 25 Gram(s) IV Push once  dextrose 50% Injectable 12.5 Gram(s) IV Push once  enoxaparin Injectable 40 milliGRAM(s) SubCutaneous every 24 hours  furosemide    Tablet 40 milliGRAM(s) Oral daily  furosemide   Injectable 40 milliGRAM(s) IV Push once  glucagon  Injectable 1 milliGRAM(s) IntraMuscular once  insulin glargine Injectable (LANTUS) 20 Unit(s) SubCutaneous at bedtime  insulin lispro (ADMELOG) corrective regimen sliding scale   SubCutaneous three times a day before meals  insulin lispro (ADMELOG) corrective regimen sliding scale   SubCutaneous at bedtime  insulin lispro Injectable (ADMELOG) 5 Unit(s) SubCutaneous three times a day before meals  levothyroxine 175 MICROGram(s) Oral daily  pregabalin 50 milliGRAM(s) Oral every 12 hours  vancomycin  IVPB 1000 milliGRAM(s) IV Intermittent every 12 hours    PRN MEDICATIONS  acetaminophen     Tablet .. 650 milliGRAM(s) Oral every 6 hours PRN  albuterol    90 MICROgram(s) HFA Inhaler 2 Puff(s) Inhalation every 6 hours PRN  dextrose Oral Gel 15 Gram(s) Oral once PRN      VITAL SIGNS: Last 24 Hours  T(C): 36.7 (22 Jun 2025 14:08), Max: 37.2 (21 Jun 2025 20:12)  T(F): 98 (22 Jun 2025 14:08), Max: 98.9 (21 Jun 2025 20:12)  HR: 70 (22 Jun 2025 14:08) (58 - 74)  BP: 111/71 (22 Jun 2025 14:08) (104/56 - 116/75)  BP(mean): --  RR: 18 (22 Jun 2025 14:08) (17 - 18)  SpO2: 97% (22 Jun 2025 14:08) (97% - 99%)    LABS:                        9.6    6.24  )-----------( 228      ( 22 Jun 2025 06:38 )             31.4     06-22    138  |  94[L]  |  19  ----------------------------<  124[H]  4.3   |  31  |  1.0    Ca    9.6      22 Jun 2025 06:38    TPro  8.3[H]  /  Alb  4.4  /  TBili  0.3  /  DBili  x   /  AST  37  /  ALT  7   /  AlkPhos  111  06-22      Urinalysis Basic - ( 22 Jun 2025 06:38 )    Color: x / Appearance: x / SG: x / pH: x  Gluc: 124 mg/dL / Ketone: x  / Bili: x / Urobili: x   Blood: x / Protein: x / Nitrite: x   Leuk Esterase: x / RBC: x / WBC x   Sq Epi: x / Non Sq Epi: x / Bacteria: x                PHYSICAL EXAM:  GENERAL: NAD, well-developed, AAOx3  HEENT:  Atraumatic, Normocephalic. EOMI, PERRLA, conjunctiva and sclera clear, No JVD  PULMONARY: Clear to auscultation bilaterally; No wheeze  CARDIOVASCULAR: Regular rate and rhythm; No murmurs, rubs, or gallops  GASTROINTESTINAL: Soft, Nontender, Nondistended; Bowel sounds present  MUSCULOSKELETAL:  bl erythematous 3+ edema, minimally pitting, wound on left leg   NEUROLOGY: non-focal  SKIN: legs wounds bl, left 2nd toe appears with dark lesion and very swollen     echo:. Left ventricular systolic function is normal with an ejection   fraction of 54 % by Acevedo's method of disks.   2. There is normal LV mass and concentric remodeling.   3. Normal left ventricular diastolic function.   4. Normal right ventricular cavity size, with normal wall thickness, and   normal right ventricular systolic function. Tricuspid annular plane   systolic excursion (TAPSE) is 2.2 cm (normal >=1.7 cm).   5. Normal left and right atrial size.   6. Trace mitral regurgitation at a blood pressure of 105/72 mmHg.   7. Estimated pulmonary artery systolic pressure is 22 mmHg, consistent   with normal pulmonary artery pressure.   8. No pericardial effusion seen.      Patient is a 54 year old with hx of of IDDM, COPD not on home O2, opioid dependence on Suboxone, asthma, hypothyroidism, chronic LE lymphedema, LE leukocytoclastic vasculitis presenting to ED with worsening RLE wound, admitted for management of cellulitis/lymphedema/vasculitis.    # RLE cellulitis vs lymphedema vs vasculitis  - ID consulted and  continue vanco and cefepime, vanco level monitoring   - recent consult with rheumatology- patient likely experiencing worsening lymphedema, not vasculitis  - wound care consulted, dressing changed today   - recent outpatient visit with vascular surgery-  continue compression wrap, consulted vascular surgery but no recommendation for surgical intervention at this time   - f/u blood cultures  - continue pregabalin for pain  - worsening edema, start lasix 40mg iv bid for one day , stop po lasix    echo does not show signs of chf    f/u esr/crp   discussed case with rheumatology who does not believe this is acute vasculitis and no recommendation for steroids at this time  discussed with podiatry and they state uniboot can be placed outpatient only  will continue compression wrapping   - PT consulted  as per discussion with ID will cont cefepime and vanco. F/u vanco trough and pharmacy to adjust dose   f/u vascular/podiatry  surgery again for recommendation for the left 2nd toe since it is dark and swollen     # hx opioid dependence  - continue suboxone    # IDDM  - continue insulin regimen per last endo note: lantus 20 u at bedtime, ADMELOG 5 units with meals  - FS glucose, SSI    # COPD  - continue albuterol as needed    # CAD  - cont asa 81 mg, statin    # hypothyroidism  - cont synthroid 175 mcg    Diet: DASH, CCHO  Activity: OOB  DVT PPX:   GI PPX: not indicated  CHG:  ordered  Code status: full code  Dispo: on IV antibiotics   follow up vascular and ID     ---PB Handoff Note---    Pending/Follow up items (tests, labs, procedures,consults):edema  and infection improvement , f/u podiatry /vascular  for the foot/toe  MRI of left foot     Indication for continued inpatient management: IV antibiotics     Goals of Care note:     Family contact:  Dispo (home, SNF, etc):    Prepare for DC order [x] - updated LISS is:   DC provider note prep:    -------------------------------Time spent-----------------------------------------------------------------------  Initial visit:             mins [ ] -- 55-74 mins [ ]  Subsequent visit: 50-79 mins[ ]    -- 35-49mins [ x]     --------------------------------# and complexity of problems---------------------------------------------  [ ] chronic illness with severe exacerbation , progression, treatment side effect  [ ] acute or chronic illness with threat to life or bodily funtion                         --------------------------------Complexity of data reviewed ----------------------------------------------  The Patients complexity of data reviewed  is Low [ ] Moderate [x ] High [ ] due to the following:   A:      Reviewed prior external records [ ]      Considering/ Ordered a unique test:  Labs [x ] Imaging [x ] Stress Test  [ ] Other: Specify [ ]      Reviewed each unique test result [x ]      Assessment requiring an independent historian [ ]  B:       Independent interpretation of:   X-Ray [x ] EKG [ ]  Other: Specify  [ ]  C:       Discussion of management of tests with clinician outside of my group [ ]    -------------------------------------Risk of Morbidity-------------------------------------------------------  The Patients risk of morbidity is Low [ ] Moderate [x] High [ ] due to the following:     Parenteral controlled substance [ ]  ------------------------------------------------------------------------------------------------------------------

## 2025-06-23 LAB
DRUG SCREEN 1, URINE RESULT: SIGNIFICANT CHANGE UP
VANCOMYCIN FLD-MCNC: 28.1 UG/ML — HIGH (ref 5–10)

## 2025-06-23 PROCEDURE — 11042 DBRDMT SUBQ TIS 1ST 20SQCM/<: CPT

## 2025-06-23 PROCEDURE — 29581 APPL MULTLAYER CMPRN SYS LEG: CPT | Mod: 50,59

## 2025-06-23 PROCEDURE — 73720 MRI LWR EXTREMITY W/O&W/DYE: CPT | Mod: 26,LT

## 2025-06-23 PROCEDURE — 99232 SBSQ HOSP IP/OBS MODERATE 35: CPT

## 2025-06-23 PROCEDURE — 99221 1ST HOSP IP/OBS SF/LOW 40: CPT | Mod: GC,25

## 2025-06-23 RX ORDER — KETOROLAC TROMETHAMINE 30 MG/ML
15 INJECTION, SOLUTION INTRAMUSCULAR; INTRAVENOUS ONCE
Refills: 0 | Status: DISCONTINUED | OUTPATIENT
Start: 2025-06-23 | End: 2025-06-23

## 2025-06-23 RX ORDER — PREDNISONE 20 MG/1
60 TABLET ORAL DAILY
Refills: 0 | Status: DISCONTINUED | OUTPATIENT
Start: 2025-06-23 | End: 2025-06-25

## 2025-06-23 RX ADMIN — BUPRENORPHINE HYDROCHLORIDE, NALOXONE HYDROCHLORIDE 3 TABLET(S): 4; 1 FILM, SOLUBLE BUCCAL; SUBLINGUAL at 12:07

## 2025-06-23 RX ADMIN — PREGABALIN 50 MILLIGRAM(S): 50 CAPSULE ORAL at 17:49

## 2025-06-23 RX ADMIN — CEFEPIME 100 MILLIGRAM(S): 2 INJECTION, POWDER, FOR SOLUTION INTRAVENOUS at 05:10

## 2025-06-23 RX ADMIN — CEFEPIME 100 MILLIGRAM(S): 2 INJECTION, POWDER, FOR SOLUTION INTRAVENOUS at 14:24

## 2025-06-23 RX ADMIN — INSULIN LISPRO 2: 100 INJECTION, SOLUTION INTRAVENOUS; SUBCUTANEOUS at 12:09

## 2025-06-23 RX ADMIN — INSULIN LISPRO 5 UNIT(S): 100 INJECTION, SOLUTION INTRAVENOUS; SUBCUTANEOUS at 08:08

## 2025-06-23 RX ADMIN — INSULIN GLARGINE-YFGN 20 UNIT(S): 100 INJECTION, SOLUTION SUBCUTANEOUS at 21:51

## 2025-06-23 RX ADMIN — KETOROLAC TROMETHAMINE 15 MILLIGRAM(S): 30 INJECTION, SOLUTION INTRAMUSCULAR; INTRAVENOUS at 13:39

## 2025-06-23 RX ADMIN — KETOROLAC TROMETHAMINE 15 MILLIGRAM(S): 30 INJECTION, SOLUTION INTRAMUSCULAR; INTRAVENOUS at 12:39

## 2025-06-23 RX ADMIN — Medication 250 MILLIGRAM(S): at 05:09

## 2025-06-23 RX ADMIN — Medication 1 APPLICATION(S): at 05:10

## 2025-06-23 RX ADMIN — Medication 250 MILLIGRAM(S): at 17:49

## 2025-06-23 RX ADMIN — INSULIN LISPRO 5 UNIT(S): 100 INJECTION, SOLUTION INTRAVENOUS; SUBCUTANEOUS at 12:08

## 2025-06-23 RX ADMIN — PREGABALIN 50 MILLIGRAM(S): 50 CAPSULE ORAL at 05:09

## 2025-06-23 RX ADMIN — CEFEPIME 100 MILLIGRAM(S): 2 INJECTION, POWDER, FOR SOLUTION INTRAVENOUS at 21:51

## 2025-06-23 RX ADMIN — Medication 81 MILLIGRAM(S): at 12:07

## 2025-06-23 RX ADMIN — FUROSEMIDE 40 MILLIGRAM(S): 10 INJECTION INTRAMUSCULAR; INTRAVENOUS at 05:10

## 2025-06-23 RX ADMIN — ATORVASTATIN CALCIUM 20 MILLIGRAM(S): 80 TABLET, FILM COATED ORAL at 21:51

## 2025-06-23 RX ADMIN — Medication 175 MICROGRAM(S): at 05:10

## 2025-06-23 NOTE — PROGRESS NOTE ADULT - ASSESSMENT
This is a 54 year old with hx of of IDDM, COPD not on home O2, opioid dependence on Suboxone, asthma, hypothyroidism, chronic LE lymphedema, LE leukocytoclastic vasculitis presenting to ED with worsening RLE wound.    IMPRESSION  #Bilateral lower extremity wounds/ulcers with underlying stasis dermatitis.  #Recurrent admission in the past for lower extremity cellulitis, Biopsy confirmed leukocytoclastic vasculitis  #PAD  #History of polysubstance use disorder. On Suboxone  #Emphysema and interstitial lung disease noted on CT (11/2024)  #HTN, HLD  #Hep C-- Self resolved  #Obesity BMI (kg/m2): 28.9   #Immunodeficiency secondary to history of substance use which could result in poor clinical outcome    Hepatitis C Ab +, PCR undetectable 1/2025  Cryoglobulin negative 1/2025   Hepatitis B immune, HIV screen negative.   DULCE negative  RF negative  p-ANCA positive, titer 1:40    Blood cultures NGTD    RECOMMENDATIONS  -IV Vanc. Dosing as per pharmacy protocol.  -IV Cefepime 2 gram q 8 hours (S/D 6/17).  -Can do a trial of steroids to help with the inflammation.   -Pending MRI of the leg.  -Diuresis, local care as per primary team.    If any questions, please send a message or call on [employer] Teams  Please continue to update ID with any pertinent new laboratory or radiographic findings.    Stella Chavez M.D  Infectious Diseases Attending/   [employer] at [facility]

## 2025-06-23 NOTE — PROGRESS NOTE ADULT - ASSESSMENT
EZRA BARCLAY 54y Female  MRN#: [id number]   CODE STATUS: full      SUBJECTIVE  Patient is a 54y old Female who presents with a chief complaint of cellulitis/lympedema (23 Jun 2025 08:08)  Currently admitted to medicine with the primary diagnosis of Leg swelling      Today is hospital day 6d, and this morning she is c/o pain in her food.       OBJECTIVE  PAST MEDICAL & SURGICAL HISTORY  Asthma with COPD    Hypothyroidism    H/O: substance abuse  no longer using    History of pancreatitis    Hepatitis-C    Leukocytoclastic vasculitis    Bilateral edema of lower extremity    HLD (hyperlipidemia)    Type 2 diabetes mellitus    History of appendectomy    History of tonsillectomy      ALLERGIES:  No Known Allergies    MEDICATIONS:  STANDING MEDICATIONS  aspirin enteric coated 81 milliGRAM(s) Oral daily  atorvastatin 20 milliGRAM(s) Oral at bedtime  buprenorphine 8 mG/naloxone 2 mG SL  Tablet 3 Tablet(s) SubLingual daily  cefepime   IVPB 2000 milliGRAM(s) IV Intermittent every 8 hours  cefepime   IVPB 2000 milliGRAM(s) IV Intermittent once  chlorhexidine 2% Cloths 1 Application(s) Topical <User Schedule>  dextrose 5%. 1000 milliLiter(s) IV Continuous <Continuous>  dextrose 5%. 1000 milliLiter(s) IV Continuous <Continuous>  dextrose 50% Injectable 25 Gram(s) IV Push once  dextrose 50% Injectable 12.5 Gram(s) IV Push once  dextrose 50% Injectable 25 Gram(s) IV Push once  enoxaparin Injectable 40 milliGRAM(s) SubCutaneous every 24 hours  furosemide    Tablet 40 milliGRAM(s) Oral daily  furosemide   Injectable 40 milliGRAM(s) IV Push once  glucagon  Injectable 1 milliGRAM(s) IntraMuscular once  insulin glargine Injectable (LANTUS) 20 Unit(s) SubCutaneous at bedtime  insulin lispro (ADMELOG) corrective regimen sliding scale   SubCutaneous three times a day before meals  insulin lispro (ADMELOG) corrective regimen sliding scale   SubCutaneous at bedtime  insulin lispro Injectable (ADMELOG) 5 Unit(s) SubCutaneous three times a day before meals  levothyroxine 175 MICROGram(s) Oral daily  predniSONE   Tablet 60 milliGRAM(s) Oral daily  pregabalin 50 milliGRAM(s) Oral every 12 hours    PRN MEDICATIONS  acetaminophen     Tablet .. 650 milliGRAM(s) Oral every 6 hours PRN  albuterol    90 MICROgram(s) HFA Inhaler 2 Puff(s) Inhalation every 6 hours PRN  dextrose Oral Gel 15 Gram(s) Oral once PRN      VITAL SIGNS: Last 24 Hours  T(C): 36.3 (23 Jun 2025 14:22), Max: 36.7 (22 Jun 2025 20:58)  T(F): 97.4 (23 Jun 2025 14:22), Max: 98 (22 Jun 2025 20:58)  HR: 70 (23 Jun 2025 14:22) (62 - 70)  BP: 92/60 (23 Jun 2025 14:22) (92/60 - 120/79)  BP(mean): --  RR: 18 (23 Jun 2025 04:59) (18 - 18)  SpO2: 100% (23 Jun 2025 04:59) (97% - 100%)    LABS:                        9.6    6.24  )-----------( 228      ( 22 Jun 2025 06:38 )             31.4     06-22    138  |  94[L]  |  19  ----------------------------<  124[H]  4.3   |  31  |  1.0    Ca    9.6      22 Jun 2025 06:38    TPro  8.3[H]  /  Alb  4.4  /  TBili  0.3  /  DBili  x   /  AST  37  /  ALT  7   /  AlkPhos  111  06-22      Urinalysis Basic - ( 22 Jun 2025 06:38 )    Color: x / Appearance: x / SG: x / pH: x  Gluc: 124 mg/dL / Ketone: x  / Bili: x / Urobili: x   Blood: x / Protein: x / Nitrite: x   Leuk Esterase: x / RBC: x / WBC x   Sq Epi: x / Non Sq Epi: x / Bacteria: x    PHYSICAL EXAM:  GENERAL: NAD, well-developed, AAOx3  HEENT:  Atraumatic, Normocephalic. EOMI, PERRLA, conjunctiva and sclera clear, No JVD  PULMONARY: Clear to auscultation bilaterally; No wheeze  CARDIOVASCULAR: Regular rate and rhythm; No murmurs, rubs, or gallops  GASTROINTESTINAL: Soft, Nontender, Nondistended; Bowel sounds present  MUSCULOSKELETAL:  bl erythematous 3+ edema, minimally pitting, wound on left leg   NEUROLOGY: non-focal  SKIN: legs wounds bl, left 2nd toe appears with dark lesion and very swollen     echo:. Left ventricular systolic function is normal with an ejection   fraction of 54 % by Acevedo's method of disks.   2. There is normal LV mass and concentric remodeling.   3. Normal left ventricular diastolic function.   4. Normal right ventricular cavity size, with normal wall thickness, and   normal right ventricular systolic function. Tricuspid annular plane   systolic excursion (TAPSE) is 2.2 cm (normal >=1.7 cm).   5. Normal left and right atrial size.   6. Trace mitral regurgitation at a blood pressure of 105/72 mmHg.   7. Estimated pulmonary artery systolic pressure is 22 mmHg, consistent   with normal pulmonary artery pressure.   8. No pericardial effusion seen.      Patient is a 54 year old with hx of of IDDM, COPD not on home O2, opioid dependence on Suboxone, asthma, hypothyroidism, chronic LE lymphedema, LE leukocytoclastic vasculitis presenting to ED with worsening RLE wound, admitted for management of cellulitis/lymphedema/vasculitis.    # RLE cellulitis vs lymphedema vs vasculitis  - ID consulted and  continue vanco and cefepime, vanco level monitoring   - recent consult with rheumatology- patient likely experiencing worsening lymphedema, not vasculitis  - wound care consulted, dressing changed today   - recent outpatient visit with vascular surgery-  continue compression wrap, consulted vascular surgery but no recommendation for surgical intervention at this time   - f/u blood cultures  - continue pregabalin for pain  - worsening edema, start lasix 40mg iv bid for one day , stop po lasix    echo does not show signs of chf    f/u esr/crp   discussed case with rheumatology who does not believe this is acute vasculitis   discussed with podiatry and they state uniboot can be placed outpatient only  will continue compression wrapping   - PT consulted  as per discussion with ID will cont cefepime and vanco. F/u vanco trough , discussed with ID, random dose yesterday evening is elevated , will adjust dose as per ID recs  discussed with podiatry, no surgical plan at this time   -f/u MRI of left foot to r/o osteo and as per ID discussion will start steriods and see if inflammation improves     # hx opioid dependence  - continue suboxone    # IDDM  - continue insulin regimen per last endo note: lantus 20 u at bedtime, ADMELOG 5 units with meals  - FS glucose, SSI    # COPD  - continue albuterol as needed    # CAD  - cont asa 81 mg, statin    # hypothyroidism  - cont synthroid 175 mcg    Diet: DASH, CCHO  Activity: OOB  DVT PPX:   GI PPX: not indicated  CHG:  ordered  Code status: full code  Dispo: on IV antibiotics   follow up vascular and ID     ---PB Handoff Note---    Pending/Follow up items (tests, labs, procedures,consults):edema  and infection improvement , MRI of left foot     Indication for continued inpatient management: IV antibiotics     Goals of Care note:     Family contact:  Dispo (home, SNF, etc):    Prepare for DC order [x] - updated LISS is:   DC provider note prep:    -------------------------------Time spent-----------------------------------------------------------------------  Initial visit:             mins [ ] -- 55-74 mins [ ]  Subsequent visit: 50-79 mins[ ]    -- 35-49mins [ x]     --------------------------------# and complexity of problems---------------------------------------------  [ ] chronic illness with severe exacerbation , progression, treatment side effect  [ ] acute or chronic illness with threat to life or bodily funtion                         --------------------------------Complexity of data reviewed ----------------------------------------------  The Patients complexity of data reviewed  is Low [ ] Moderate [x ] High [ ] due to the following:   A:      Reviewed prior external records [ ]      Considering/ Ordered a unique test:  Labs [x ] Imaging [x ] Stress Test  [ ] Other: Specify [ ]      Reviewed each unique test result [x ]      Assessment requiring an independent historian [ ]  B:       Independent interpretation of:   X-Ray [x ] EKG [ ]  Other: Specify  [ ]  C:       Discussion of management of tests with clinician outside of my group [ ]    -------------------------------------Risk of Morbidity-------------------------------------------------------  The Patients risk of morbidity is Low [ ] Moderate [x] High [ ] due to the following:     Parenteral controlled substance [ ]  ------------------------------------------------------------------------------------------------------------------

## 2025-06-23 NOTE — CONSULT NOTE ADULT - SUBJECTIVE AND OBJECTIVE BOX
Podiatry Consult Note    Subjective:  HEIDIEZRA SOTO  Seen Bedside 54y Female  .   Patient is a 54y old  Female who presents with a chief complaint of cellulitis/lympedema (23 Jun 2025 07:01). Podiatry was consulted for assessment of cellulitis and lymphedema.     HPI:  Patient is a 54 year old with hx of of IDDM, COPD not on home O2, opioid dependence on Suboxone, asthma, hypothyroidism, chronic LE lymphedema, LE leukocytoclastic vasculitis presenting to ED with worsening RLE wound. This is a chronic wound for which she is often treated with antibiotics. Her biopsies revealed leukocytoclastic vasculitis, she also has lymphedema. She has difficulty ambulating.  She denies fever, chills, sob, cp, abd pain, n/v/d. (17 Jun 2025 11:46)      Past Medical History and Surgical History  PAST MEDICAL & SURGICAL HISTORY:  Asthma with COPD      Hypothyroidism      H/O: substance abuse  no longer using      History of pancreatitis      Hepatitis-C      Leukocytoclastic vasculitis      Bilateral edema of lower extremity      HLD (hyperlipidemia)      Type 2 diabetes mellitus      History of appendectomy      History of tonsillectomy           Review of Systems:  [X] Ten point review of systems is otherwise negative except as noted     Objective:  Vital Signs Last 24 Hrs  T(C): 36.4 (23 Jun 2025 04:59), Max: 36.7 (22 Jun 2025 14:08)  T(F): 97.5 (23 Jun 2025 04:59), Max: 98 (22 Jun 2025 14:08)  HR: 65 (23 Jun 2025 04:59) (62 - 70)  BP: 120/79 (23 Jun 2025 04:59) (105/70 - 120/79)  BP(mean): --  RR: 18 (23 Jun 2025 04:59) (18 - 18)  SpO2: 100% (23 Jun 2025 04:59) (97% - 100%)                            9.6    6.24  )-----------( 228      ( 22 Jun 2025 06:38 )             31.4                 06-22    138  |  94[L]  |  19  ----------------------------<  124[H]  4.3   |  31  |  1.0    Ca    9.6      22 Jun 2025 06:38    TPro  8.3[H]  /  Alb  4.4  /  TBili  0.3  /  DBili  x   /  AST  37  /  ALT  7   /  AlkPhos  111  06-22        Physical Exam - Lower Extremity Focused:   Derm: Minimal localized Erythema on the anterior surfaces of bilateral lower extremities, hyperkeratotic lesion and swelling with erythema on dorsal surface of right second toe, no signs of active drainage or purulence with compression   Vascular: DP and PT Pulses Intact; Foot is Warm to Warm to the touch; Capillary Refill Time < 3 Seconds;  Edema of bilateral lower extremities  Neuro: Protective Sensation Diminished / Moderately Neuropathic   MSK: Minimal Pain On Palpation at lower extremities    Assessment:  Cellulitis, Bilateral lower extremities  Lymphedema, Bilateral lower extremities     Plan:  Chart reviewed and Patient evaluated. All Questions and Concerns Addressed and Answered  Local Wound Care; Wound dressed w/ Xeroform/ Gauze / DSD / Kerlix   Wound Care Dressings: Continue w/ dressing changes q24h  Weight Bearing Status; WBAT  No Sx Debridement.   Patient is stable from Podiatry standpoint and could follow up in outpatient clinic.  Patient seen bedside with Dr. Yancey.     Podiatry  Podiatry Consult Note    Subjective:  EHIDIEZRA SOTO  Seen Bedside 54y Female  .   Patient is a 54y old  Female who presents with a chief complaint of cellulitis/lympedema (23 Jun 2025 07:01). Podiatry was consulted for assessment of cellulitis and lymphedema.     HPI:  Patient is a 54 year old with hx of of IDDM, COPD not on home O2, opioid dependence on Suboxone, asthma, hypothyroidism, chronic LE lymphedema, LE leukocytoclastic vasculitis presenting to ED with worsening RLE wound. This is a chronic wound for which she is often treated with antibiotics. Her biopsies revealed leukocytoclastic vasculitis, she also has lymphedema. She has difficulty ambulating.  She denies fever, chills, sob, cp, abd pain, n/v/d. (17 Jun 2025 11:46)      Past Medical History and Surgical History  PAST MEDICAL & SURGICAL HISTORY:  Asthma with COPD      Hypothyroidism      H/O: substance abuse  no longer using      History of pancreatitis      Hepatitis-C      Leukocytoclastic vasculitis      Bilateral edema of lower extremity      HLD (hyperlipidemia)      Type 2 diabetes mellitus      History of appendectomy      History of tonsillectomy           Review of Systems:  [X] Ten point review of systems is otherwise negative except as noted     Objective:  Vital Signs Last 24 Hrs  T(C): 36.4 (23 Jun 2025 04:59), Max: 36.7 (22 Jun 2025 14:08)  T(F): 97.5 (23 Jun 2025 04:59), Max: 98 (22 Jun 2025 14:08)  HR: 65 (23 Jun 2025 04:59) (62 - 70)  BP: 120/79 (23 Jun 2025 04:59) (105/70 - 120/79)  BP(mean): --  RR: 18 (23 Jun 2025 04:59) (18 - 18)  SpO2: 100% (23 Jun 2025 04:59) (97% - 100%)                            9.6    6.24  )-----------( 228      ( 22 Jun 2025 06:38 )             31.4                 06-22    138  |  94[L]  |  19  ----------------------------<  124[H]  4.3   |  31  |  1.0    Ca    9.6      22 Jun 2025 06:38    TPro  8.3[H]  /  Alb  4.4  /  TBili  0.3  /  DBili  x   /  AST  37  /  ALT  7   /  AlkPhos  111  06-22        Physical Exam - Lower Extremity Focused:   Derm: superficial wounds On the anterior surfaces of bilateral lower extremities with hyperpigmented skin   Small superficial ulceration lesion and swelling with erythema on dorsal surface of right second toe, no signs of active drainage or purulence with compression   Vascular: DP and PT Pulses diminished Foot is Warm to Warm to the touch; Capillary Refill Time < 3 Seconds;  Edema of bilateral lower extremities. Hemosiderosis   Neuro: Protective Sensation Diminished / Moderately Neuropathic    Assessment:  Superficial wounds secondary venous stasis/venous insuffiencey , Bilateral lower extremities - stable   L 2nd toe ulcer - no acute signs of infection     Plan:  Chart reviewed and Patient evaluated. All Questions and Concerns Addressed and Answered  Local Wound Care; Wound dressed w/ Xeroform/ Gauze / DSD / Kerlix /ACE with compressions B/L LE   Mupirocin Xeroform to the L 2nd toe daily   Wound Care Dressings: Continue w/ dressing changes q24h  Weight Bearing Status; WBAT  No indication for deep tissue infection. MRI might not be at benefit in setting of no clinical sings of infection     Patient seen bedside with Dr. Yancey.     Podiatry

## 2025-06-23 NOTE — CONSULT NOTE ADULT - ATTENDING COMMENTS
Agree with above note  Bedside sharp Excisional debridement of fibrotic tissue wound L 2nd toe down and including level of subcutaneous tissue. No pus expressed. Size 0.2cm x 0.2cm   Cont wound care with Mupirocin   Multilayer compression dressing B/L LE

## 2025-06-23 NOTE — CONSULT NOTE ADULT - CONSULT REQUESTED DATE/TIME
18-Jun-2025 11:21
23-Jun-2025 08:09
18-Jun-2025 18:09
17-Jun-2025 18:21
Launch [employer] and print the 'Prescriptions from this Visit' Report

## 2025-06-23 NOTE — CONSULT NOTE ADULT - CONSULT REASON
Infection
Cellulitis and swelling
Hx Lymphedema and B/L lower extremity cellulitis
Skin assessment and treatment recommendation

## 2025-06-24 ENCOUNTER — APPOINTMENT (OUTPATIENT)
Dept: PSYCHIATRY | Facility: CLINIC | Age: 55
End: 2025-06-24
Payer: COMMERCIAL

## 2025-06-24 ENCOUNTER — OUTPATIENT (OUTPATIENT)
Dept: OUTPATIENT SERVICES | Facility: HOSPITAL | Age: 55
LOS: 1 days | End: 2025-06-24
Payer: COMMERCIAL

## 2025-06-24 DIAGNOSIS — Z90.89 ACQUIRED ABSENCE OF OTHER ORGANS: Chronic | ICD-10-CM

## 2025-06-24 DIAGNOSIS — F10.20 ALCOHOL DEPENDENCE, UNCOMPLICATED: ICD-10-CM

## 2025-06-24 DIAGNOSIS — Z90.49 ACQUIRED ABSENCE OF OTHER SPECIFIED PARTS OF DIGESTIVE TRACT: Chronic | ICD-10-CM

## 2025-06-24 LAB
ALBUMIN SERPL ELPH-MCNC: 4.3 G/DL — SIGNIFICANT CHANGE UP (ref 3.5–5.2)
ALP SERPL-CCNC: 92 U/L — SIGNIFICANT CHANGE UP (ref 30–115)
ALT FLD-CCNC: 7 U/L — SIGNIFICANT CHANGE UP (ref 0–41)
ANION GAP SERPL CALC-SCNC: 14 MMOL/L — SIGNIFICANT CHANGE UP (ref 7–14)
AST SERPL-CCNC: 37 U/L — SIGNIFICANT CHANGE UP (ref 0–41)
BILIRUB SERPL-MCNC: 0.4 MG/DL — SIGNIFICANT CHANGE UP (ref 0.2–1.2)
BUN SERPL-MCNC: 23 MG/DL — HIGH (ref 10–20)
CALCIUM SERPL-MCNC: 9.2 MG/DL — SIGNIFICANT CHANGE UP (ref 8.4–10.5)
CHLORIDE SERPL-SCNC: 94 MMOL/L — LOW (ref 98–110)
CO2 SERPL-SCNC: 28 MMOL/L — SIGNIFICANT CHANGE UP (ref 17–32)
CREAT SERPL-MCNC: 0.9 MG/DL — SIGNIFICANT CHANGE UP (ref 0.7–1.5)
EGFR: 76 ML/MIN/1.73M2 — SIGNIFICANT CHANGE UP
EGFR: 76 ML/MIN/1.73M2 — SIGNIFICANT CHANGE UP
GLUCOSE SERPL-MCNC: 111 MG/DL — HIGH (ref 70–99)
HCT VFR BLD CALC: 28.5 % — LOW (ref 34.5–45)
HGB BLD-MCNC: 9 G/DL — LOW (ref 11.5–15.5)
MCHC RBC-ENTMCNC: 26.7 PG — LOW (ref 27–34)
MCHC RBC-ENTMCNC: 31.6 G/DL — LOW (ref 32–36)
MCV RBC AUTO: 84.6 FL — SIGNIFICANT CHANGE UP (ref 80–100)
NRBC # BLD AUTO: 0 K/UL — SIGNIFICANT CHANGE UP (ref 0–0)
NRBC # FLD: 0 K/UL — SIGNIFICANT CHANGE UP (ref 0–0)
NRBC BLD AUTO-RTO: 0 /100 WBCS — SIGNIFICANT CHANGE UP (ref 0–0)
PLATELET # BLD AUTO: 185 K/UL — SIGNIFICANT CHANGE UP (ref 150–400)
PMV BLD: 9.6 FL — SIGNIFICANT CHANGE UP (ref 7–13)
POTASSIUM SERPL-MCNC: 4.6 MMOL/L — SIGNIFICANT CHANGE UP (ref 3.5–5)
POTASSIUM SERPL-SCNC: 4.6 MMOL/L — SIGNIFICANT CHANGE UP (ref 3.5–5)
PROT SERPL-MCNC: 7.9 G/DL — SIGNIFICANT CHANGE UP (ref 6–8)
RBC # BLD: 3.37 M/UL — LOW (ref 3.8–5.2)
RBC # FLD: 16 % — HIGH (ref 10.3–14.5)
SODIUM SERPL-SCNC: 136 MMOL/L — SIGNIFICANT CHANGE UP (ref 135–146)
VANCOMYCIN TROUGH SERPL-MCNC: 17.4 UG/ML — HIGH (ref 5–10)
WBC # BLD: 4.95 K/UL — SIGNIFICANT CHANGE UP (ref 3.8–10.5)
WBC # FLD AUTO: 4.95 K/UL — SIGNIFICANT CHANGE UP (ref 3.8–10.5)

## 2025-06-24 PROCEDURE — 99232 SBSQ HOSP IP/OBS MODERATE 35: CPT

## 2025-06-24 PROCEDURE — 99231 SBSQ HOSP IP/OBS SF/LOW 25: CPT | Mod: 25

## 2025-06-24 PROCEDURE — 99214 OFFICE O/P EST MOD 30 MIN: CPT | Mod: 95

## 2025-06-24 PROCEDURE — 29581 APPL MULTLAYER CMPRN SYS LEG: CPT | Mod: 50

## 2025-06-24 RX ORDER — VANCOMYCIN HCL IN 5 % DEXTROSE 1.5G/250ML
750 PLASTIC BAG, INJECTION (ML) INTRAVENOUS EVERY 12 HOURS
Refills: 0 | Status: DISCONTINUED | OUTPATIENT
Start: 2025-06-24 | End: 2025-06-25

## 2025-06-24 RX ADMIN — PREGABALIN 50 MILLIGRAM(S): 50 CAPSULE ORAL at 16:55

## 2025-06-24 RX ADMIN — PREDNISONE 60 MILLIGRAM(S): 20 TABLET ORAL at 05:06

## 2025-06-24 RX ADMIN — FUROSEMIDE 40 MILLIGRAM(S): 10 INJECTION INTRAMUSCULAR; INTRAVENOUS at 05:06

## 2025-06-24 RX ADMIN — CEFEPIME 100 MILLIGRAM(S): 2 INJECTION, POWDER, FOR SOLUTION INTRAVENOUS at 05:06

## 2025-06-24 RX ADMIN — CEFEPIME 100 MILLIGRAM(S): 2 INJECTION, POWDER, FOR SOLUTION INTRAVENOUS at 21:45

## 2025-06-24 RX ADMIN — PREGABALIN 50 MILLIGRAM(S): 50 CAPSULE ORAL at 05:06

## 2025-06-24 RX ADMIN — INSULIN LISPRO 5 UNIT(S): 100 INJECTION, SOLUTION INTRAVENOUS; SUBCUTANEOUS at 16:50

## 2025-06-24 RX ADMIN — ATORVASTATIN CALCIUM 20 MILLIGRAM(S): 80 TABLET, FILM COATED ORAL at 21:44

## 2025-06-24 RX ADMIN — Medication 81 MILLIGRAM(S): at 11:35

## 2025-06-24 RX ADMIN — Medication 250 MILLIGRAM(S): at 18:11

## 2025-06-24 RX ADMIN — INSULIN GLARGINE-YFGN 20 UNIT(S): 100 INJECTION, SOLUTION SUBCUTANEOUS at 21:45

## 2025-06-24 RX ADMIN — BUPRENORPHINE HYDROCHLORIDE, NALOXONE HYDROCHLORIDE 3 TABLET(S): 4; 1 FILM, SOLUBLE BUCCAL; SUBLINGUAL at 11:30

## 2025-06-24 RX ADMIN — INSULIN LISPRO 5: 100 INJECTION, SOLUTION INTRAVENOUS; SUBCUTANEOUS at 11:58

## 2025-06-24 RX ADMIN — INSULIN LISPRO 5 UNIT(S): 100 INJECTION, SOLUTION INTRAVENOUS; SUBCUTANEOUS at 11:58

## 2025-06-24 RX ADMIN — CEFEPIME 100 MILLIGRAM(S): 2 INJECTION, POWDER, FOR SOLUTION INTRAVENOUS at 11:33

## 2025-06-24 RX ADMIN — INSULIN LISPRO 2: 100 INJECTION, SOLUTION INTRAVENOUS; SUBCUTANEOUS at 16:50

## 2025-06-24 RX ADMIN — Medication 175 MICROGRAM(S): at 05:58

## 2025-06-24 NOTE — PROGRESS NOTE ADULT - REASON FOR ADMISSION
cellulitis/lympedema

## 2025-06-24 NOTE — PHARMACOTHERAPY INTERVENTION NOTE - COMMENTS
As per policy, ordered a vancomycin trough level for 6/18 @ 1500 prior to the 4th dose on vancomycin regimen 750mg IV q12hrs to assist with further vancomycin dosing optimization.     Tereza Rivera, PharmD  Clinical Pharmacy Specialist, Infectious Diseases  Tele-[facility]wardship Program (Tele-[facility])  Tele[facility] Phone: [phone] 
Based on today's vancomycin level, recommended to decrease vancomycin dosing to 750mg IV q12h which predicts a therapeutic AUC of 436, as previous dosing of 1000mg IV q12h predicts an AUC of 582 which is on the higher end of the goal 400 to 600. Obtain a trough level tomorrow 6/25 15:00 before 4th dose.
Appropriate to continue dosing of vancomycin 750mg IV q12h as it predicts a therapeutic AUC of 504. Obtain a trough level before 4th dose.
Recommended to adjust vancomycin dose from 750 mg IV q12h to 1000 mg IV q12h since the vancomycin level today, 6/18 was 10.6 mg/L. As per [employer] calculations, predicted vancomycin AUC/REED is 360 mg/L*h at steady-state, which is lower than the therapeutic range of 400 - 600 mg/L*h. Increasing the dose is predicted to yield an AUC/REED of 481 mg/L*h. Scr 1.0.      Karolina Shook PharmD   Clinical Pharmacy Specialist, Infectious Diseases  Tele-[facility]tim[facility]wardship Program (Tele[facility])  Tele[facility] Phone: [phone]  
As per policy, ordered a vancomycin trough for 6/20 @1500 in order to assist with vancomycin pharmacokinetic monitoring.      Edie SalvadorD   Clinical Pharmacy Specialist, Infectious Diseases  Tele-[facility]timicrobial Stewardship Program (Tele[facility])  Tele[facility] Phone: [phone]

## 2025-06-24 NOTE — PROGRESS NOTE ADULT - ASSESSMENT
EZRA BARCLAY 54y Female  MRN#: [id number]   CODE STATUS:____full____      SUBJECTIVE  Patient is a 54y old Female who presents with a chief complaint of cellulitis/lympedema (24 Jun 2025 16:30)  Currently admitted to medicine with the primary diagnosis of Leg swelling      Today is hospital day 7d, the patient this am states her pain and swelling have improved today           OBJECTIVE  PAST MEDICAL & SURGICAL HISTORY  Asthma with COPD    Hypothyroidism    H/O: substance abuse  no longer using    History of pancreatitis    Hepatitis-C    Leukocytoclastic vasculitis    Bilateral edema of lower extremity    HLD (hyperlipidemia)    Type 2 diabetes mellitus    History of appendectomy    History of tonsillectomy      ALLERGIES:  No Known Allergies    MEDICATIONS:  STANDING MEDICATIONS  aspirin enteric coated 81 milliGRAM(s) Oral daily  atorvastatin 20 milliGRAM(s) Oral at bedtime  cefepime   IVPB 2000 milliGRAM(s) IV Intermittent every 8 hours  cefepime   IVPB 2000 milliGRAM(s) IV Intermittent once  chlorhexidine 2% Cloths 1 Application(s) Topical <User Schedule>  dextrose 5%. 1000 milliLiter(s) IV Continuous <Continuous>  dextrose 5%. 1000 milliLiter(s) IV Continuous <Continuous>  dextrose 50% Injectable 25 Gram(s) IV Push once  dextrose 50% Injectable 12.5 Gram(s) IV Push once  dextrose 50% Injectable 25 Gram(s) IV Push once  enoxaparin Injectable 40 milliGRAM(s) SubCutaneous every 24 hours  furosemide    Tablet 40 milliGRAM(s) Oral daily  furosemide   Injectable 40 milliGRAM(s) IV Push once  glucagon  Injectable 1 milliGRAM(s) IntraMuscular once  insulin glargine Injectable (LANTUS) 20 Unit(s) SubCutaneous at bedtime  insulin lispro (ADMELOG) corrective regimen sliding scale   SubCutaneous three times a day before meals  insulin lispro (ADMELOG) corrective regimen sliding scale   SubCutaneous at bedtime  insulin lispro Injectable (ADMELOG) 5 Unit(s) SubCutaneous three times a day before meals  levothyroxine 175 MICROGram(s) Oral daily  predniSONE   Tablet 60 milliGRAM(s) Oral daily  vancomycin  IVPB 750 milliGRAM(s) IV Intermittent every 12 hours    PRN MEDICATIONS  acetaminophen     Tablet .. 650 milliGRAM(s) Oral every 6 hours PRN  albuterol    90 MICROgram(s) HFA Inhaler 2 Puff(s) Inhalation every 6 hours PRN  dextrose Oral Gel 15 Gram(s) Oral once PRN      VITAL SIGNS: Last 24 Hours  T(C): 36.4 (24 Jun 2025 14:02), Max: 36.4 (24 Jun 2025 05:02)  T(F): 97.6 (24 Jun 2025 14:02), Max: 97.6 (24 Jun 2025 14:02)  HR: 71 (24 Jun 2025 14:02) (59 - 71)  BP: 125/77 (24 Jun 2025 14:02) (99/59 - 125/77)  BP(mean): --  RR: 18 (24 Jun 2025 14:02) (18 - 18)  SpO2: 96% (24 Jun 2025 14:02) (96% - 97%)    LABS:                        9.0    4.95  )-----------( 185      ( 24 Jun 2025 06:40 )             28.5     06-24    136  |  94[L]  |  23[H]  ----------------------------<  111[H]  4.6   |  28  |  0.9    Ca    9.2      24 Jun 2025 06:40    TPro  7.9  /  Alb  4.3  /  TBili  0.4  /  DBili  x   /  AST  37  /  ALT  7   /  AlkPhos  92  06-24      Urinalysis Basic - ( 24 Jun 2025 06:40 )    Color: x / Appearance: x / SG: x / pH: x  Gluc: 111 mg/dL / Ketone: x  / Bili: x / Urobili: x   Blood: x / Protein: x / Nitrite: x   Leuk Esterase: x / RBC: x / WBC x   Sq Epi: x / Non Sq Epi: x / Bacteria: x        PHYSICAL EXAM:  GENERAL: NAD, well-developed, AAOx3  HEENT:  Atraumatic, Normocephalic. EOMI, PERRLA, conjunctiva and sclera clear, No JVD  PULMONARY: Clear to auscultation bilaterally; No wheeze  CARDIOVASCULAR: Regular rate and rhythm; No murmurs, rubs, or gallops  GASTROINTESTINAL: Soft, Nontender, Nondistended; Bowel sounds present  MUSCULOSKELETAL:  bl erythematous 3+ edema, minimally pitting, wound on left leg (edema improved)  NEUROLOGY: non-focal  SKIN: legs wounds bl, left 2nd toe appears with dark lesion and very swollen     echo:. Left ventricular systolic function is normal with an ejection   fraction of 54 % by Acevedo's method of disks.   2. There is normal LV mass and concentric remodeling.   3. Normal left ventricular diastolic function.   4. Normal right ventricular cavity size, with normal wall thickness, and   normal right ventricular systolic function. Tricuspid annular plane   systolic excursion (TAPSE) is 2.2 cm (normal >=1.7 cm).   5. Normal left and right atrial size.   6. Trace mitral regurgitation at a blood pressure of 105/72 mmHg.   7. Estimated pulmonary artery systolic pressure is 22 mmHg, consistent   with normal pulmonary artery pressure.   8. No pericardial effusion seen.      Patient is a 54 year old with hx of of IDDM, COPD not on home O2, opioid dependence on Suboxone, asthma, hypothyroidism, chronic LE lymphedema, LE leukocytoclastic vasculitis presenting to ED with worsening RLE wound, admitted for management of cellulitis/lymphedema/vasculitis.    # RLE cellulitis vs lymphedema vs vasculitis  - ID consulted and  continue vanco and cefepime, vanco level monitoring   - recent consult with rheumatology- patient likely experiencing worsening lymphedema, not vasculitis  - wound care consulted, dressing changed today   - recent outpatient visit with vascular surgery-  continue compression wrap, consulted vascular surgery but no recommendation for surgical intervention at this time   - blood cultures neg   - continue pregabalin for pain  - due to worsening edema pt was given  lasix 40mg iv bid for 2 days, now back on po lasix    echo does not show signs of chf    discussed case with rheumatology who does not believe this is acute vasculitis   discussed with podiatry and they state uniboot can be placed outpatient only  will continue compression wrapping   -as per discussion with ID will cont cefepime and vanco. F/u vanco trough , currently appears therapeutic   adjust dose as per ID recs  discussed with podiatry, no surgical plan at this time   - MRI of left foot ordered to r/o osteo : 1.  Chronic appearing fracture deformity of the first hallux proximal   phalangeal head. 2.  Nondisplaced subacute appearing fifth metatarsal neck fracture and   lateral fifth proximal phalangeal base fracture with mild edema.  3.  Dorsal second toe skin abnormality, with no MRI evidence of   osteomyelitis.  -f/u ortho consult to see if the 5th toe fracture is acute and causing her he pain   podiatry did not comment on this   as per ID discussion will cont po prednisone and see if inflammation improves ( pain and edema improved today )    # hx opioid dependence  - continue suboxone    # IDDM  - continue insulin regimen per last endo note: lantus 20 u at bedtime, ADMELOG 5 units with meals  - FS glucose, SSI    # COPD  - continue albuterol as needed    # CAD  - cont asa 81 mg, statin    # hypothyroidism  - cont synthroid 175 mcg    Diet: DASH, CCHO  Activity: OOB  DVT PPX:   GI PPX: not indicated  CHG:  ordered  Code status: full code  Dispo: on IV antibiotics       ---PB Handoff Note---    Pending/Follow up items (tests, labs, procedures,consults):edema  and infection improvement , ortho consult      Indication for continued inpatient management: IV antibiotics     Goals of Care note:     Family contact:  Dispo (home, SNF, etc):    Prepare for DC order [x] - updated LISS is: 6/26  DC provider note prep:    -------------------------------Time spent-----------------------------------------------------------------------  Initial visit:             mins [ ] -- 55-74 mins [ ]  Subsequent visit: 50-79 mins[ ]    -- 35-49mins [ x]     --------------------------------# and complexity of problems---------------------------------------------  [ ] chronic illness with severe exacerbation , progression, treatment side effect  [ ] acute or chronic illness with threat to life or bodily funtion                         --------------------------------Complexity of data reviewed ----------------------------------------------  The Patients complexity of data reviewed  is Low [ ] Moderate [x ] High [ ] due to the following:   A:      Reviewed prior external records [ ]      Considering/ Ordered a unique test:  Labs [x ] Imaging [x ] Stress Test  [ ] Other: Specify [ ]      Reviewed each unique test result [x ]      Assessment requiring an independent historian [ ]  B:       Independent interpretation of:   X-Ray [x ] EKG [ ]  Other: Specify  [ ]  C:       Discussion of management of tests with clinician outside of my group [ ]    -------------------------------------Risk of Morbidity-------------------------------------------------------  The Patients risk of morbidity is Low [ ] Moderate [x] High [ ] due to the following:     Parenteral controlled substance [ ]

## 2025-06-24 NOTE — PROGRESS NOTE ADULT - SUBJECTIVE AND OBJECTIVE BOX
EZRA BARCLAY  54y, Female    LOS  6d    INTERVAL EVENTS/HPI  - No acute events overnight  - T(F): , Max: 98 (06-22-25 @ 14:08)  - WBC Count: 6.24 (06-22-25 @ 06:38)  WBC Count: 6.87 (06-20-25 @ 12:39)  - Creatinine: 1.0 (06-22-25 @ 06:38)    REVIEW OF SYSTEMS:  CONSTITUTIONAL: No fever or chills  HEENT: No sore throat  RESPIRATORY: No cough, no shortness of breath  CARDIOVASCULAR: No chest pain or palpitations  GASTROINTESTINAL: No abdominal or epigastric pain  GENITOURINARY: No dysuria  NEUROLOGICAL: No headache/dizziness  MSK: No joint pain, erythema, or swelling; no back pain  SKIN: No itching, rashes  All other ROS negative except noted above    Prior hospital charts reviewed [Yes]  Primary team notes reviewed [Yes]  Other consultant notes reviewed [Yes]    ANTIMICROBIALS:   cefepime   IVPB 2000 every 8 hours  cefepime   IVPB 2000 once  vancomycin  IVPB 1000 every 12 hours      OTHER MEDS: MEDICATIONS  (STANDING):  acetaminophen     Tablet .. 650 every 6 hours PRN  albuterol    90 MICROgram(s) HFA Inhaler 2 every 6 hours PRN  aspirin enteric coated 81 daily  atorvastatin 20 at bedtime  buprenorphine 8 mG/naloxone 2 mG SL  Tablet 3 daily  dextrose 50% Injectable 25 once  dextrose 50% Injectable 12.5 once  dextrose 50% Injectable 25 once  dextrose Oral Gel 15 once PRN  enoxaparin Injectable 40 every 24 hours  furosemide    Tablet 40 daily  furosemide   Injectable 40 once  glucagon  Injectable 1 once  insulin glargine Injectable (LANTUS) 20 at bedtime  insulin lispro (ADMELOG) corrective regimen sliding scale  three times a day before meals  insulin lispro (ADMELOG) corrective regimen sliding scale  at bedtime  insulin lispro Injectable (ADMELOG) 5 three times a day before meals  levothyroxine 175 daily  pregabalin 50 every 12 hours      Vital Signs Last 24 Hrs  T(F): 97.5 (06-23-25 @ 04:59), Max: 98.9 (06-21-25 @ 20:12)    Vital Signs Last 24 Hrs  HR: 65 (06-23-25 @ 04:59) (62 - 70)  BP: 120/79 (06-23-25 @ 04:59) (105/70 - 120/79)  RR: 18 (06-23-25 @ 04:59)  SpO2: 100% (06-23-25 @ 04:59) (97% - 100%)  Wt(kg): --    EXAM:  GENERAL: In NAD  HEAD: No head lesions  NECK: Supple  CHEST/LUNG: Clear to auscultation bilaterally  HEART: S1 S2  ABDOMEN: Soft, nontender  EXTREMITIES: Diffuse swelling of the lower extremities with weeping ulcers R>L  NERVOUS SYSTEM: Alert and oriented to person  Labs:                        9.6    6.24  )-----------( 228      ( 22 Jun 2025 06:38 )             31.4     06-22    138  |  94[L]  |  19  ----------------------------<  124[H]  4.3   |  31  |  1.0    Ca    9.6      22 Jun 2025 06:38    TPro  8.3[H]  /  Alb  4.4  /  TBili  0.3  /  DBili  x   /  AST  37  /  ALT  7   /  AlkPhos  111  06-22      WBC Trend:  WBC Count: 6.24 (06-22-25 @ 06:38)  WBC Count: 6.87 (06-20-25 @ 12:39)  WBC Count: 6.41 (06-19-25 @ 06:26)  WBC Count: 8.01 (06-18-25 @ 15:55)      Creatine Trend:  Creatinine: 1.0 (06-22)  Creatinine: 1.1 (06-20)  Creatinine: 1.2 (06-19)  Creatinine: 1.0 (06-18)      Liver Biochemical Testing Trend:  Alanine Aminotransferase (ALT/SGPT): 7 (06-22)  Alanine Aminotransferase (ALT/SGPT): 7 (06-20)  Alanine Aminotransferase (ALT/SGPT): 7 (06-19)  Alanine Aminotransferase (ALT/SGPT): 6 (06-18)  Alanine Aminotransferase (ALT/SGPT): 6 (06-17)  Aspartate Aminotransferase (AST/SGOT): 37 (06-22-25 @ 06:38)  Aspartate Aminotransferase (AST/SGOT): 40 (06-20-25 @ 12:39)  Aspartate Aminotransferase (AST/SGOT): 36 (06-19-25 @ 06:26)  Aspartate Aminotransferase (AST/SGOT): 35 (06-18-25 @ 15:55)  Aspartate Aminotransferase (AST/SGOT): 32 (06-17-25 @ 07:40)  Bilirubin Total: 0.3 (06-22)  Bilirubin Total: 0.3 (06-20)  Bilirubin Total: 0.4 (06-19)  Bilirubin Total: 0.4 (06-18)  Bilirubin Total: 0.3 (06-17)      Trend LDH  01-10-24 @ 06:55  230      Urinalysis Basic - ( 22 Jun 2025 06:38 )    Color: x / Appearance: x / SG: x / pH: x  Gluc: 124 mg/dL / Ketone: x  / Bili: x / Urobili: x   Blood: x / Protein: x / Nitrite: x   Leuk Esterase: x / RBC: x / WBC x   Sq Epi: x / Non Sq Epi: x / Bacteria: x        MICROBIOLOGY:  Vancomycin Level, Trough: 10.6 (06-18 @ 15:55)    Female    Culture - Blood (collected 17 Jun 2025 07:40)  Source: Blood Blood-Peripheral  Final Report:    No growth at 5 days    Culture - Blood (collected 17 Jun 2025 07:40)  Source: Blood Blood-Peripheral  Final Report:    No growth at 5 days    Culture - Blood (collected 10 Feb 2025 20:00)  Source: .Blood BLOOD  Final Report:    No growth at 5 days    Culture - Blood (collected 10 Feb 2025 20:00)  Source: .Blood BLOOD  Final Report:    No growth at 5 days    Culture - Blood (collected 28 Dec 2024 17:10)  Source: .Blood BLOOD  Final Report:    No growth at 5 days    Culture - Blood (collected 27 Dec 2024 16:00)  Source: .Blood BLOOD  Final Report:    No growth at 5 days    Culture - Blood (collected 27 Dec 2024 16:00)  Source: .Blood BLOOD  Final Report:    No growth at 5 days    Urinalysis with Rflx Culture (collected 29 Nov 2024 00:13)    Culture - Blood (collected 28 Nov 2024 22:04)  Source: .Blood BLOOD  Final Report:    No growth at 5 days    Culture - Blood (collected 28 Nov 2024 22:04)  Source: .Blood BLOOD  Final Report:    No growth at 5 days      HIV-1/2 Combo Result: Nonreact (12-31-24 @ 08:00)  HIV-1/2 Combo Result: Nonreact (01-11-24 @ 13:40)                            C-Reactive Protein: 33.9 (06-20)                    RADIOLOGY & ADDITIONAL TESTS:  I have personally reviewed the relevant images.   CXR      CT  < from: [facility] Duplex Lower Ext Vein Scan, [place] (06.17.25 @ 09:39) >    RIGHT:  Normal compressibility of the RIGHT common femoral, femoral and popliteal   veins.  Doppler examination shows normal spontaneous and phasic flow.  Right calf veins not visualized due to tissue edema    LEFT:  Normal compressibility of the LEFT common femoral, femoral and popliteal   veins.  Doppler examination shows normal spontaneous and phasic flow.  No LEFT calf veinthrombosis is detected. Left anterior tibial vein not   visualized due to tissue edema    IMPRESSION:  No evidence of deep venous thrombosis in either lower extremity.      < end of copied text >        WEIGHT  Weight (kg): 86.2 (06-17-25 @ 06:17)      All available historical records have been reviewed      
  PROGRESS NOTE   Pt seen @ bedside during PM rounds. patient had removed her own dressing       Vital Signs Last 24 Hrs  T(C): 36.4 (24 Jun 2025 14:02), Max: 36.4 (24 Jun 2025 05:02)  T(F): 97.6 (24 Jun 2025 14:02), Max: 97.6 (24 Jun 2025 14:02)  HR: 71 (24 Jun 2025 14:02) (59 - 71)  BP: 125/77 (24 Jun 2025 14:02) (99/59 - 125/77)  BP(mean): --  RR: 18 (24 Jun 2025 14:02) (18 - 18)  SpO2: 96% (24 Jun 2025 14:02) (96% - 97%)    Parameters below as of 24 Jun 2025 14:02  Patient On (Oxygen Delivery Method): room air                              9.0    4.95  )-----------( 185      ( 24 Jun 2025 06:40 )             28.5               06-24    136  |  94[L]  |  23[H]  ----------------------------<  111[H]  4.6   |  28  |  0.9    Ca    9.2      24 Jun 2025 06:40    TPro  7.9  /  Alb  4.3  /  TBili  0.4  /  DBili  x   /  AST  37  /  ALT  7   /  AlkPhos  92  06-24    < from: MR Foot w/wo IV Cont, Left (06.23.25 @ 17:10) >    ACC: [id number] EXAM:  MR FOOT WAW IC LT   ORDERED BY: KIMBERLY CARTER     PROCEDURE DATE:  06/23/2025          INTERPRETATION:  CLINICAL HISTORY: History of lymphedema and vasculitis.   Evaluation for infection.    COMPARISON: None.    TECHNIQUE: Multiplanar and multisequence magnetic resonance images of the   left foot were obtained following the administration of 9 mL Gadavist   intravenous contrast (1 mL discarded).    FINDINGS:    There is a chronic appearing fracture deformity of the first hallux   proximal phalangeal head. There is a subacute appearing nondisplaced   fifth metatarsal neck fracture and additional lateral fifth proximal   phalangeal base fracture with mild edema.    Dorsal second toe skin abnormality. Correlate clinically for ulcer. No   MRI evidence of osteomyelitis. No midfoot or forefoot joint effusion.    Moderate hallux MTP and mild/moderate midfoot joint osteoarthritis, with   subchondral bone marrow edema at the cuboid and the fifth metatarsal base.    The Lisfranc ligament complex is intact.    The visualized flexor and extensor tendons are unremarkable. There is   diffuse fatty replacement of the muscles reflecting neuropathy.    IMPRESSION:    1.  Chronic appearing fracture deformity of the first halluxproximal   phalangeal head.  2.  Nondisplaced subacute appearing fifth metatarsal neck fracture and   lateral fifth proximal phalangeal base fracture with mild edema.    3.  Dorsal second toe skin abnormality, with no MRI evidence of   osteomyelitis.    --- End of Report ---          DIAZ VELAZQUEZ MD; Resident Radiologist  This document has been electronically signed.  SHON ADKINS MD; Attending Radiologist  This document has been electronically signed. Jun 24 2025  9:33AM    < end of copied text >      Physical Exam - Lower Extremity Focused:   Derm: superficial wounds On the anterior surfaces of bilateral lower extremities with hyperpigmented skin   Small superficial ulceration lesion and swelling with erythema on dorsal surface of right second toe, no signs of active drainage or purulence with compression   Vascular: DP and PT Pulses diminished Foot is Warm to Warm to the touch; Capillary Refill Time < 3 Seconds;  Edema of bilateral lower extremities. Hemosiderosis   Neuro: Protective Sensation Diminished / Moderately Neuropathic    Assessment:  Superficial wounds secondary venous stasis/venous insuffiencey , Bilateral lower extremities - stable   L 2nd toe ulcer - no acute signs of infection     Plan:  Chart reviewed and Patient evaluated. All Questions and Concerns Addressed and Answered  MRI results reviewed with the patient - see report   Local Wound Care; Wound dressed w/ Xeroform/ Gauze / DSD / Kerlix /ACE with compressions B/L LE   Mupirocin Xeroform to the L 2nd toe daily   Wound Care Dressings: Continue w/ dressing changes q24h  Weight Bearing Status; WBAT      Podiatry       
EZRA BARCLAY  54y, Female    LOS  3d    INTERVAL EVENTS/HPI  - T(F): , Max: 98.6 (06-19-25 @ 20:43)  - WBC Count: 6.41 (06-19-25 @ 06:26)  WBC Count: 8.01 (06-18-25 @ 15:55)  - Creatinine: 1.2 (06-19-25 @ 06:26)  Creatinine: 1.0 (06-18-25 @ 15:55)    REVIEW OF SYSTEMS:  CONSTITUTIONAL: No fever or chills  HEENT: No sore throat  RESPIRATORY: No cough, no shortness of breath  CARDIOVASCULAR: No chest pain or palpitations  GASTROINTESTINAL: No abdominal or epigastric pain  GENITOURINARY: No dysuria  NEUROLOGICAL: No headache/dizziness  MSK: No joint pain, erythema, or swelling; no back pain  SKIN: No itching, rashes  All other ROS negative except noted above    Prior hospital charts reviewed [Yes]  Primary team notes reviewed [Yes]  Other consultant notes reviewed [Yes]    ANTIMICROBIALS:   cefepime   IVPB 2000 every 8 hours  cefepime   IVPB 2000 once  vancomycin  IVPB 1000 every 12 hours      OTHER MEDS: MEDICATIONS  (STANDING):  acetaminophen     Tablet .. 650 every 6 hours PRN  albuterol    90 MICROgram(s) HFA Inhaler 2 every 6 hours PRN  aspirin enteric coated 81 daily  atorvastatin 20 at bedtime  buprenorphine 8 mG/naloxone 2 mG SL  Tablet 3 daily  dextrose 50% Injectable 25 once  dextrose 50% Injectable 12.5 once  dextrose 50% Injectable 25 once  dextrose Oral Gel 15 once PRN  enoxaparin Injectable 40 every 24 hours  furosemide   Injectable 40 once  furosemide   Injectable 40 every 12 hours  glucagon  Injectable 1 once  insulin glargine Injectable (LANTUS) 20 at bedtime  insulin lispro (ADMELOG) corrective regimen sliding scale  three times a day before meals  insulin lispro (ADMELOG) corrective regimen sliding scale  at bedtime  insulin lispro Injectable (ADMELOG) 5 three times a day before meals  levothyroxine 175 daily  pregabalin 50 every 12 hours      Vital Signs Last 24 Hrs  T(F): 97.7 (06-20-25 @ 05:47), Max: 98.6 (06-19-25 @ 20:43)    Vital Signs Last 24 Hrs  HR: 64 (06-20-25 @ 05:47) (45 - 67)  BP: 105/72 (06-20-25 @ 05:47) (94/55 - 115/71)  RR: 16 (06-20-25 @ 05:47)  SpO2: 98% (06-20-25 @ 05:47) (92% - 98%)  Wt(kg): --    EXAM:  GENERAL: In NAD  HEAD: No head lesions  NECK: Supple  CHEST/LUNG: Clear to auscultation bilaterally  HEART: S1 S2  ABDOMEN: Soft, nontender  EXTREMITIES: Diffuse swelling of the lower extremities with weeping ulcers R>L  NERVOUS SYSTEM: Alert and oriented to person    Labs:                        11.0   6.41  )-----------( 244      ( 19 Jun 2025 06:26 )             35.0     06-19    138  |  93[L]  |  15  ----------------------------<  139[H]  5.0   |  29  |  1.2    Ca    10.3      19 Jun 2025 06:26    TPro  8.8[H]  /  Alb  4.8  /  TBili  0.4  /  DBili  x   /  AST  36  /  ALT  7   /  AlkPhos  106  06-19      WBC Trend:  WBC Count: 6.41 (06-19-25 @ 06:26)  WBC Count: 8.01 (06-18-25 @ 15:55)  WBC Count: 8.23 (06-17-25 @ 07:40)      Creatine Trend:  Creatinine: 1.2 (06-19)  Creatinine: 1.0 (06-18)  Creatinine: 1.1 (06-17)      Liver Biochemical Testing Trend:  Alanine Aminotransferase (ALT/SGPT): 7 (06-19)  Alanine Aminotransferase (ALT/SGPT): 6 (06-18)  Alanine Aminotransferase (ALT/SGPT): 6 (06-17)  Alanine Aminotransferase (ALT/SGPT): 5 (04-02)  Alanine Aminotransferase (ALT/SGPT): 6 (03-31)  Aspartate Aminotransferase (AST/SGOT): 36 (06-19-25 @ 06:26)  Aspartate Aminotransferase (AST/SGOT): 35 (06-18-25 @ 15:55)  Aspartate Aminotransferase (AST/SGOT): 32 (06-17-25 @ 07:40)  Aspartate Aminotransferase (AST/SGOT): 35 (04-02-25 @ 07:20)  Aspartate Aminotransferase (AST/SGOT): 38 (03-31-25 @ 15:40)  Bilirubin Total: 0.4 (06-19)  Bilirubin Total: 0.4 (06-18)  Bilirubin Total: 0.3 (06-17)  Bilirubin Total: 0.3 (04-02)  Bilirubin Total: 0.3 (03-31)      Trend LDH  01-10-24 @ 06:55  230      Urinalysis Basic - ( 19 Jun 2025 06:26 )    Color: x / Appearance: x / SG: x / pH: x  Gluc: 139 mg/dL / Ketone: x  / Bili: x / Urobili: x   Blood: x / Protein: x / Nitrite: x   Leuk Esterase: x / RBC: x / WBC x   Sq Epi: x / Non Sq Epi: x / Bacteria: x        MICROBIOLOGY:  Vancomycin Level, Trough: 10.6 (06-18 @ 15:55)    Female    Culture - Blood (collected 17 Jun 2025 07:40)  Source: Blood Blood-Peripheral  Preliminary Report:    No growth at 48 Hours    Culture - Blood (collected 17 Jun 2025 07:40)  Source: Blood Blood-Peripheral  Preliminary Report:    No growth at 48 Hours    Culture - Blood (collected 10 Feb 2025 20:00)  Source: .Blood BLOOD  Final Report:    No growth at 5 days    Culture - Blood (collected 10 Feb 2025 20:00)  Source: .Blood BLOOD  Final Report:    No growth at 5 days    Culture - Blood (collected 28 Dec 2024 17:10)  Source: .Blood BLOOD  Final Report:    No growth at 5 days    Culture - Blood (collected 27 Dec 2024 16:00)  Source: .Blood BLOOD  Final Report:    No growth at 5 days    Culture - Blood (collected 27 Dec 2024 16:00)  Source: .Blood BLOOD  Final Report:    No growth at 5 days    Urinalysis with Rflx Culture (collected 29 Nov 2024 00:13)    Culture - Blood (collected 28 Nov 2024 22:04)  Source: .Blood BLOOD  Final Report:    No growth at 5 days    Culture - Blood (collected 28 Nov 2024 22:04)  Source: .Blood BLOOD  Final Report:    No growth at 5 days      HIV-1/2 Combo Result: Nonreact (12-31-24 @ 08:00)  HIV-1/2 Combo Result: Nonreact (01-11-24 @ 13:40)                                              RADIOLOGY & ADDITIONAL TESTS:  I have personally reviewed the relevant images.   CXR      CT        WEIGHT  Weight (kg): 86.2 (06-17-25 @ 06:17)      All available historical records have been reviewed      
EZRA BARCLAY  54y, Female    LOS  2d    INTERVAL EVENTS/HPI  - No acute events overnight  - T(F): , Max: 98.2 (06-18-25 @ 20:18)  - WBC Count: 6.41 (06-19-25 @ 06:26)  WBC Count: 8.01 (06-18-25 @ 15:55)  - Creatinine: 1.2 (06-19-25 @ 06:26)  Creatinine: 1.0 (06-18-25 @ 15:55)    REVIEW OF SYSTEMS:  CONSTITUTIONAL: No fever or chills  HEENT: No sore throat  RESPIRATORY: No cough, no shortness of breath  CARDIOVASCULAR: No chest pain or palpitations  GASTROINTESTINAL: No abdominal or epigastric pain  GENITOURINARY: No dysuria  NEUROLOGICAL: No headache/dizziness  MSK: No joint pain, erythema, or swelling; no back pain  SKIN: No itching, rashes  All other ROS negative except noted above    Prior hospital charts reviewed [Yes]  Primary team notes reviewed [Yes]  Other consultant notes reviewed [Yes]    ANTIMICROBIALS:   cefepime   IVPB 2000 every 8 hours  cefepime   IVPB 2000 once  vancomycin  IVPB 1000 every 12 hours      OTHER MEDS: MEDICATIONS  (STANDING):  acetaminophen     Tablet .. 650 every 6 hours PRN  albuterol    90 MICROgram(s) HFA Inhaler 2 every 6 hours PRN  aspirin enteric coated 81 daily  atorvastatin 20 at bedtime  buprenorphine 8 mG/naloxone 2 mG SL  Tablet 3 daily  dextrose 50% Injectable 25 once  dextrose 50% Injectable 12.5 once  dextrose 50% Injectable 25 once  dextrose Oral Gel 15 once PRN  furosemide    Tablet 40 at bedtime  furosemide   Injectable 40 once  glucagon  Injectable 1 once  insulin glargine Injectable (LANTUS) 20 at bedtime  insulin lispro (ADMELOG) corrective regimen sliding scale  three times a day before meals  insulin lispro (ADMELOG) corrective regimen sliding scale  at bedtime  insulin lispro Injectable (ADMELOG) 5 three times a day before meals  levothyroxine 175 daily  pregabalin 50 every 12 hours      Vital Signs Last 24 Hrs  T(F): 98.2 (06-19-25 @ 13:06), Max: 98.3 (06-18-25 @ 13:44)    Vital Signs Last 24 Hrs  HR: 67 (06-19-25 @ 13:06) (66 - 69)  BP: 115/71 (06-19-25 @ 13:06) (112/64 - 117/69)  RR: 18 (06-19-25 @ 13:06)  SpO2: 93% (06-19-25 @ 13:06) (93% - 96%)  Wt(kg): --    EXAM:  GENERAL: In Mild distress.  HEAD: No head lesions  NECK: Supple  CHEST/LUNG: Clear to auscultation bilaterally  HEART: S1 S2  ABDOMEN: Soft, nontender  EXTREMITIES: Diffuse swelling of the lower extremities with weeping ulcers R>L  NERVOUS SYSTEM: Alert and oriented to person    Labs:                        11.0   6.41  )-----------( 244      ( 19 Jun 2025 06:26 )             35.0     06-19    138  |  93[L]  |  15  ----------------------------<  139[H]  5.0   |  29  |  1.2    Ca    10.3      19 Jun 2025 06:26    TPro  8.8[H]  /  Alb  4.8  /  TBili  0.4  /  DBili  x   /  AST  36  /  ALT  7   /  AlkPhos  106  06-19      WBC Trend:  WBC Count: 6.41 (06-19-25 @ 06:26)  WBC Count: 8.01 (06-18-25 @ 15:55)  WBC Count: 8.23 (06-17-25 @ 07:40)      Creatine Trend:  Creatinine: 1.2 (06-19)  Creatinine: 1.0 (06-18)  Creatinine: 1.1 (06-17)      Liver Biochemical Testing Trend:  Alanine Aminotransferase (ALT/SGPT): 7 (06-19)  Alanine Aminotransferase (ALT/SGPT): 6 (06-18)  Alanine Aminotransferase (ALT/SGPT): 6 (06-17)  Alanine Aminotransferase (ALT/SGPT): 5 (04-02)  Alanine Aminotransferase (ALT/SGPT): 6 (03-31)  Aspartate Aminotransferase (AST/SGOT): 36 (06-19-25 @ 06:26)  Aspartate Aminotransferase (AST/SGOT): 35 (06-18-25 @ 15:55)  Aspartate Aminotransferase (AST/SGOT): 32 (06-17-25 @ 07:40)  Aspartate Aminotransferase (AST/SGOT): 35 (04-02-25 @ 07:20)  Aspartate Aminotransferase (AST/SGOT): 38 (03-31-25 @ 15:40)  Bilirubin Total: 0.4 (06-19)  Bilirubin Total: 0.4 (06-18)  Bilirubin Total: 0.3 (06-17)  Bilirubin Total: 0.3 (04-02)  Bilirubin Total: 0.3 (03-31)      Trend LDH  01-10-24 @ 06:55  230      Urinalysis Basic - ( 19 Jun 2025 06:26 )    Color: x / Appearance: x / SG: x / pH: x  Gluc: 139 mg/dL / Ketone: x  / Bili: x / Urobili: x   Blood: x / Protein: x / Nitrite: x   Leuk Esterase: x / RBC: x / WBC x   Sq Epi: x / Non Sq Epi: x / Bacteria: x        MICROBIOLOGY:  Vancomycin Level, Trough: 10.6 (06-18 @ 15:55)    Female    Culture - Blood (collected 17 Jun 2025 07:40)  Source: Blood Blood-Peripheral  Preliminary Report:    No growth at 24 hours    Culture - Blood (collected 17 Jun 2025 07:40)  Source: Blood Blood-Peripheral  Preliminary Report:    No growth at 24 hours    Culture - Blood (collected 10 Feb 2025 20:00)  Source: .Blood BLOOD  Final Report:    No growth at 5 days    Culture - Blood (collected 10 Feb 2025 20:00)  Source: .Blood BLOOD  Final Report:    No growth at 5 days    Culture - Blood (collected 28 Dec 2024 17:10)  Source: .Blood BLOOD  Final Report:    No growth at 5 days    Culture - Blood (collected 27 Dec 2024 16:00)  Source: .Blood BLOOD  Final Report:    No growth at 5 days    Culture - Blood (collected 27 Dec 2024 16:00)  Source: .Blood BLOOD  Final Report:    No growth at 5 days    Urinalysis with Rflx Culture (collected 29 Nov 2024 00:13)    Culture - Blood (collected 28 Nov 2024 22:04)  Source: .Blood BLOOD  Final Report:    No growth at 5 days    Culture - Blood (collected 28 Nov 2024 22:04)  Source: .Blood BLOOD  Final Report:    No growth at 5 days      HIV-1/2 Combo Result: Nonreact (12-31-24 @ 08:00)  HIV-1/2 Combo Result: Nonreact (01-11-24 @ 13:40)  Lactate, Blood: 1.7 (06-17 @ 07:40)        RADIOLOGY & ADDITIONAL TESTS:  I have personally reviewed the relevant images.   CXR      CT    < from: [facility] Duplex Lower Ext Vein Scan, [place] (06.17.25 @ 09:39) >    INTERPRETATION:  CLINICAL INFORMATION: Patient is a 54-year-old female   with leg swelling    TECHNIQUE: Duplex sonography of the BILATERAL LOWER extremity veins with   color and spectral Doppler, with and without compression.    FINDINGS:    RIGHT:  Normal compressibility of the RIGHT common femoral, femoral and popliteal   veins.  Doppler examination shows normal spontaneous and phasic flow.  Right calf veins not visualized due to tissue edema    LEFT:  Normal compressibility of the LEFT common femoral, femoral and popliteal   veins.  Doppler examination shows normal spontaneous and phasic flow.  No LEFT calf veinthrombosis is detected. Left anterior tibial vein not   visualized due to tissue edema    IMPRESSION:  No evidence of deep venous thrombosis in either lower extremity.            --- End of Report ---    < end of copied text >      WEIGHT  Weight (kg): 86.2 (06-17-25 @ 06:17)  Creatinine: 1.2 mg/dL (06-19-25 @ 06:26)  Creatinine: 1.0 mg/dL (06-18-25 @ 15:55)      All available historical records have been reviewed      
EZRA BARCLAY  54y, Female    LOS  7d    INTERVAL EVENTS/HPI  - No acute events overnight  - T(F): , Max: 97.5 (06-24-25 @ 05:02)  - WBC Count: 4.95 (06-24-25 @ 06:40)  WBC Count: 6.24 (06-22-25 @ 06:38)  - Creatinine: 0.9 (06-24-25 @ 06:40)    REVIEW OF SYSTEMS:  CONSTITUTIONAL: No fever or chills  HEENT: No sore throat  RESPIRATORY: No cough, no shortness of breath  CARDIOVASCULAR: No chest pain or palpitations  GASTROINTESTINAL: No abdominal or epigastric pain  GENITOURINARY: No dysuria  NEUROLOGICAL: No headache/dizziness  MSK: No joint pain, erythema, or swelling; no back pain  SKIN: No itching, rashes  All other ROS negative except noted above    Prior hospital charts reviewed [Yes]  Primary team notes reviewed [Yes]  Other consultant notes reviewed [Yes]    ANTIMICROBIALS:   cefepime   IVPB 2000 every 8 hours  cefepime   IVPB 2000 once      OTHER MEDS: MEDICATIONS  (STANDING):  acetaminophen     Tablet .. 650 every 6 hours PRN  albuterol    90 MICROgram(s) HFA Inhaler 2 every 6 hours PRN  aspirin enteric coated 81 daily  atorvastatin 20 at bedtime  dextrose 50% Injectable 25 once  dextrose 50% Injectable 12.5 once  dextrose 50% Injectable 25 once  dextrose Oral Gel 15 once PRN  enoxaparin Injectable 40 every 24 hours  furosemide    Tablet 40 daily  furosemide   Injectable 40 once  glucagon  Injectable 1 once  insulin glargine Injectable (LANTUS) 20 at bedtime  insulin lispro (ADMELOG) corrective regimen sliding scale  three times a day before meals  insulin lispro (ADMELOG) corrective regimen sliding scale  at bedtime  insulin lispro Injectable (ADMELOG) 5 three times a day before meals  levothyroxine 175 daily  predniSONE   Tablet 60 daily  pregabalin 50 every 12 hours      Vital Signs Last 24 Hrs  T(F): 97.5 (06-24-25 @ 05:02), Max: 98.9 (06-21-25 @ 20:12)    Vital Signs Last 24 Hrs  HR: 65 (06-24-25 @ 05:02) (59 - 70)  BP: 99/59 (06-24-25 @ 05:02) (92/60 - 119/77)  RR: 18 (06-24-25 @ 05:02)  SpO2: 97% (06-24-25 @ 05:02) (96% - 97%)  Wt(kg): --    EXAM:  GENERAL: In NAD  HEAD: No head lesions  NECK: Supple  CHEST/LUNG: Clear to auscultation bilaterally  HEART: S1 S2  ABDOMEN: Soft, nontender  EXTREMITIES: Diffuse swelling of the lower extremities with ulcers.  NERVOUS SYSTEM: Alert and oriented to person    Labs:                        9.0    4.95  )-----------( 185      ( 24 Jun 2025 06:40 )             28.5     06-24    136  |  94[L]  |  23[H]  ----------------------------<  111[H]  4.6   |  28  |  0.9    Ca    9.2      24 Jun 2025 06:40    TPro  7.9  /  Alb  4.3  /  TBili  0.4  /  DBili  x   /  AST  37  /  ALT  7   /  AlkPhos  92  06-24      WBC Trend:  WBC Count: 4.95 (06-24-25 @ 06:40)  WBC Count: 6.24 (06-22-25 @ 06:38)  WBC Count: 6.87 (06-20-25 @ 12:39)      Creatine Trend:  Creatinine: 0.9 (06-24)  Creatinine: 1.0 (06-22)  Creatinine: 1.1 (06-20)  Creatinine: 1.2 (06-19)      Liver Biochemical Testing Trend:  Alanine Aminotransferase (ALT/SGPT): 7 (06-24)  Alanine Aminotransferase (ALT/SGPT): 7 (06-22)  Alanine Aminotransferase (ALT/SGPT): 7 (06-20)  Alanine Aminotransferase (ALT/SGPT): 7 (06-19)  Alanine Aminotransferase (ALT/SGPT): 6 (06-18)  Aspartate Aminotransferase (AST/SGOT): 37 (06-24-25 @ 06:40)  Aspartate Aminotransferase (AST/SGOT): 37 (06-22-25 @ 06:38)  Aspartate Aminotransferase (AST/SGOT): 40 (06-20-25 @ 12:39)  Aspartate Aminotransferase (AST/SGOT): 36 (06-19-25 @ 06:26)  Aspartate Aminotransferase (AST/SGOT): 35 (06-18-25 @ 15:55)  Bilirubin Total: 0.4 (06-24)  Bilirubin Total: 0.3 (06-22)  Bilirubin Total: 0.3 (06-20)  Bilirubin Total: 0.4 (06-19)  Bilirubin Total: 0.4 (06-18)      Trend LDH  01-10-24 @ 06:55  230      Urinalysis Basic - ( 24 Jun 2025 06:40 )    Color: x / Appearance: x / SG: x / pH: x  Gluc: 111 mg/dL / Ketone: x  / Bili: x / Urobili: x   Blood: x / Protein: x / Nitrite: x   Leuk Esterase: x / RBC: x / WBC x   Sq Epi: x / Non Sq Epi: x / Bacteria: x        MICROBIOLOGY:  Vancomycin Level, Trough: 17.4 (06-24 @ 06:40)  Vancomycin Level, Random: 28.1 (06-22 @ 21:24)  Vancomycin Level, Trough: 10.6 (06-18 @ 15:55)    Female    Culture - Blood (collected 17 Jun 2025 07:40)  Source: Blood Blood-Peripheral  Final Report:    No growth at 5 days    Culture - Blood (collected 17 Jun 2025 07:40)  Source: Blood Blood-Peripheral  Final Report:    No growth at 5 days    Culture - Blood (collected 10 Feb 2025 20:00)  Source: .Blood BLOOD  Final Report:    No growth at 5 days    Culture - Blood (collected 10 Feb 2025 20:00)  Source: .Blood BLOOD  Final Report:    No growth at 5 days    Culture - Blood (collected 28 Dec 2024 17:10)  Source: .Blood BLOOD  Final Report:    No growth at 5 days    Culture - Blood (collected 27 Dec 2024 16:00)  Source: .Blood BLOOD  Final Report:    No growth at 5 days    Culture - Blood (collected 27 Dec 2024 16:00)  Source: .Blood BLOOD  Final Report:    No growth at 5 days    Urinalysis with Rflx Culture (collected 29 Nov 2024 00:13)    Culture - Blood (collected 28 Nov 2024 22:04)  Source: .Blood BLOOD  Final Report:    No growth at 5 days    Culture - Blood (collected 28 Nov 2024 22:04)  Source: .Blood BLOOD  Final Report:    No growth at 5 days      HIV-1/2 Combo Result: Nonreact (12-31-24 @ 08:00)  HIV-1/2 Combo Result: Nonreact (01-11-24 @ 13:40)    C-Reactive Protein: 33.9 (06-20)    RADIOLOGY & ADDITIONAL TESTS:  I have personally reviewed the relevant images.   CXR      CT      < from: MR Foot w/wo IV Cont, Left (06.23.25 @ 17:10) >  IMPRESSION:    1.  Chronic appearing fracture deformity of the first halluxproximal   phalangeal head.  2.  Nondisplaced subacute appearing fifth metatarsal neck fracture and   lateral fifth proximal phalangeal base fracture with mild edema.    3.  Dorsal second toe skin abnormality, with no MRI evidence of   osteomyelitis.    --- End of Report ---    < end of copied text >    WEIGHT  Weight (kg): 86.2 (06-17-25 @ 06:17)  Creatinine: 0.9 mg/dL (06-24-25 @ 06:40)      All available historical records have been reviewed

## 2025-06-24 NOTE — PROGRESS NOTE ADULT - NS ATTEST RISK PROBLEM GEN_ALL_CORE FT
Due to: High risk medications that require intensive monitoring: HA Viera  Reviewed consultant notes  Reviewed labs     I have personally seen and examined this patient.    I have reviewed all pertinent clinical information and reviewed all relevant imaging and diagnostic studies personally.   I discussed recommendations with the primary team. I discussed recommendations with the primary team.
Reviewed consultant notes  Reviewed labs     I have personally seen and examined this patient.    I have reviewed all pertinent clinical information and reviewed all relevant imaging and diagnostic studies personally.   I discussed recommendations with the primary team. I discussed recommendations with the primary team.

## 2025-06-24 NOTE — PROGRESS NOTE ADULT - PROVIDER SPECIALTY LIST ADULT
Infectious Disease
Internal Medicine
Podiatry
Infectious Disease
Infectious Disease
Internal Medicine
Infectious Disease

## 2025-06-24 NOTE — PROGRESS NOTE ADULT - ASSESSMENT
This is a 54 year old with hx of of IDDM, COPD not on home O2, opioid dependence on Suboxone, asthma, hypothyroidism, chronic LE lymphedema, LE leukocytoclastic vasculitis presenting to ED with worsening RLE wound.    IMPRESSION  #Bilateral lower extremity wounds/ulcers with underlying stasis dermatitis.  #Recurrent admission in the past for lower extremity cellulitis, Biopsy confirmed leukocytoclastic vasculitis  #PAD  #History of polysubstance use disorder. On Suboxone  #Emphysema and interstitial lung disease noted on CT (11/2024)  #HTN, HLD  #Hep C-- Self resolved  #Obesity BMI (kg/m2): 28.9   #Immunodeficiency secondary to history of substance use which could result in poor clinical outcome    Hepatitis C Ab +, PCR undetectable 1/2025  Cryoglobulin negative 1/2025   Hepatitis B immune, HIV screen negative.   DULCE negative  RF negative  p-ANCA positive, titer 1:40    Blood cultures NGTD    RECOMMENDATIONS  -IV Vanc. Dosing as per pharmacy protocol.  -IV Cefepime 2 gram q 8 hours (S/D 6/17).  -Trial of steroids to help with the inflammation.   -Diuresis, local care as per primary team.    If any questions, please send a message or call on [employer] Teams  Please continue to update ID with any pertinent new laboratory or radiographic findings.    Stella Chavez M.D  Infectious Diseases Attending/   [employer] [facility]

## 2025-06-25 ENCOUNTER — TRANSCRIPTION ENCOUNTER (OUTPATIENT)
Age: 55
End: 2025-06-25

## 2025-06-25 VITALS
SYSTOLIC BLOOD PRESSURE: 121 MMHG | TEMPERATURE: 97 F | OXYGEN SATURATION: 96 % | RESPIRATION RATE: 18 BRPM | HEART RATE: 74 BPM | DIASTOLIC BLOOD PRESSURE: 80 MMHG

## 2025-06-25 DIAGNOSIS — F10.20 ALCOHOL DEPENDENCE, UNCOMPLICATED: ICD-10-CM

## 2025-06-25 LAB — GLUCOSE BLDC GLUCOMTR-MCNC: 332 MG/DL — HIGH (ref 70–99)

## 2025-06-25 PROCEDURE — 99239 HOSP IP/OBS DSCHRG MGMT >30: CPT

## 2025-06-25 RX ORDER — PREGABALIN 50 MG/1
50 CAPSULE ORAL EVERY 12 HOURS
Refills: 0 | Status: DISCONTINUED | OUTPATIENT
Start: 2025-06-25 | End: 2025-06-25

## 2025-06-25 RX ORDER — BUPRENORPHINE HYDROCHLORIDE, NALOXONE HYDROCHLORIDE 4; 1 MG/1; MG/1
3 FILM, SOLUBLE BUCCAL; SUBLINGUAL DAILY
Refills: 0 | Status: DISCONTINUED | OUTPATIENT
Start: 2025-06-25 | End: 2025-06-25

## 2025-06-25 RX ORDER — DOXYCYCLINE HYCLATE 100 MG
1 TABLET ORAL
Qty: 8 | Refills: 0
Start: 2025-06-25 | End: 2025-06-28

## 2025-06-25 RX ADMIN — FUROSEMIDE 40 MILLIGRAM(S): 10 INJECTION INTRAMUSCULAR; INTRAVENOUS at 05:06

## 2025-06-25 RX ADMIN — INSULIN LISPRO 5 UNIT(S): 100 INJECTION, SOLUTION INTRAVENOUS; SUBCUTANEOUS at 12:09

## 2025-06-25 RX ADMIN — BUPRENORPHINE HYDROCHLORIDE, NALOXONE HYDROCHLORIDE 3 TABLET(S): 4; 1 FILM, SOLUBLE BUCCAL; SUBLINGUAL at 12:37

## 2025-06-25 RX ADMIN — INSULIN LISPRO 5 UNIT(S): 100 INJECTION, SOLUTION INTRAVENOUS; SUBCUTANEOUS at 08:01

## 2025-06-25 RX ADMIN — Medication 81 MILLIGRAM(S): at 12:10

## 2025-06-25 RX ADMIN — PREDNISONE 60 MILLIGRAM(S): 20 TABLET ORAL at 05:06

## 2025-06-25 RX ADMIN — Medication 250 MILLIGRAM(S): at 05:07

## 2025-06-25 RX ADMIN — INSULIN LISPRO 4: 100 INJECTION, SOLUTION INTRAVENOUS; SUBCUTANEOUS at 12:10

## 2025-06-25 RX ADMIN — INSULIN LISPRO 2: 100 INJECTION, SOLUTION INTRAVENOUS; SUBCUTANEOUS at 08:02

## 2025-06-25 RX ADMIN — CEFEPIME 100 MILLIGRAM(S): 2 INJECTION, POWDER, FOR SOLUTION INTRAVENOUS at 05:07

## 2025-06-25 RX ADMIN — Medication 175 MICROGRAM(S): at 05:06

## 2025-06-25 NOTE — DISCHARGE NOTE PROVIDER - NSDCFUSCHEDAPPT_GEN_ALL_CORE_FT
Juany Rossi  [facility] PreAdmits  Scheduled Appointment: 07/30/2025    [facility]  PSYCHIATRY  [place]  Scheduled Appointment: 07/30/2025    Fer Guerra  [facility]  PSYCHIATRY  [place]  Scheduled Appointment: 07/30/2025    Greg Adair  [facility]  VASCULAR [address]  Scheduled Appointment: 08/13/2025

## 2025-06-25 NOTE — DISCHARGE NOTE PROVIDER - CARE PROVIDER_API CALL
Nalini Yancey  Podiatry Foot & Ankle Surgery  [address] [place] [address]  Phone: [phone]  Fax: [phone]  Established Patient  Follow Up Time: 2 weeks

## 2025-06-25 NOTE — CHART NOTE - NSCHARTNOTEFT_GEN_A_CORE
Ortho consulted for fx of metatarsal seen on MRI - podiatry following this patient- defer to them for recommendations

## 2025-06-25 NOTE — DISCHARGE NOTE PROVIDER - HOSPITAL COURSE
# RLE cellulitis vs lymphedema (very low suspicion vasculitis)   - ID consulted and  continue vanco and cefepime; DC ON DOXYCYCLINE PO 100mg BID for 4 days per discussion w ID (Dr Chavez)   - recent consult with rheumatology- patient likely experiencing worsening lymphedema, not vasculitis  - wound care consulted, dressing changed today   - recent outpatient visit with vascular surgery-  continue compression wrap, consulted vascular surgery but no recommendation for surgical intervention at this time   - blood cultures neg   - continue pregabalin for pain  - due to worsening edema pt was given  lasix 40mg iv bid for 2 days, now back on po lasix    echo does not show signs of chf    discussed case with rheumatology who does not believe this is acute vasculitis   discussed with podiatry and they state uniboot can be placed outpatient only  will continue compression wrapping   discussed with podiatry, no surgical plan at this time   - MRI of left foot ordered to r/o osteo : 1.  Chronic appearing fracture deformity of the first hallux proximal   phalangeal head. 2.  Nondisplaced subacute appearing fifth metatarsal neck fracture and   lateral fifth proximal phalangeal base fracture with mild edema.  3.  Dorsal second toe skin abnormality, with no MRI evidence of   osteomyelitis.  -per ortho no intervention at this time,  chronic fracture   as per ID discussion will cont po prednisone and see if inflammation improves ( pain and edema improved today )    # hx opioid dependence  - continue suboxone    # IDDM  - continue insulin regimen per last endo note: lantus 20 u at bedtime, ADMELOG 5 units with meals  - FS glucose, SSI    # COPD  - continue albuterol as needed    # CAD  - cont asa 81 mg, statin    # hypothyroidism  - cont synthroid 175 mcg    Diet: DASH, CCHO  Activity: OOB  DVT PPX:   GI PPX: not indicated  CHG:  ordered  Code status: full code  Dispo: on IV antibiotics

## 2025-06-25 NOTE — DISCHARGE NOTE NURSING/CASE MANAGEMENT/SOCIAL WORK - FINANCIAL ASSISTANCE
[facility] provides services at a reduced cost to those who are determined to be eligible through [facility]’s financial assistance program. Information regarding [facility]’s financial assistance program can be found by going to [email or web address] or by calling [phone].

## 2025-06-25 NOTE — DISCHARGE NOTE NURSING/CASE MANAGEMENT/SOCIAL WORK - NSDCPEFALRISK_GEN_ALL_CORE
For information on Fall & Injury Prevention, visit: [email or web address] OR  [email or web address] OR  https://www.cdc.gov/steadi/patient.html

## 2025-06-25 NOTE — DISCHARGE NOTE NURSING/CASE MANAGEMENT/SOCIAL WORK - PATIENT PORTAL LINK FT
You can access the FollowMyHealth Patient Portal offered by [facility] by registering at the following website: [email or web address]. By joining [email or web address] [email or web address] portal, you will also be able to view your health information using other applications (apps) compatible with our system.

## 2025-06-25 NOTE — DISCHARGE NOTE PROVIDER - ATTENDING DISCHARGE PHYSICAL EXAMINATION:
VITALS:   T(C): 36.9 (06-25-25 @ 04:41), Max: 36.9 (06-25-25 @ 04:41)  HR: 67 (06-25-25 @ 04:41) (66 - 71)  BP: 124/72 (06-25-25 @ 04:41) (124/72 - 139/87)  RR: 18 (06-25-25 @ 04:41) (18 - 18)  SpO2: 98% (06-25-25 @ 04:41) (95% - 98%)    GENERAL: NAD, lying in bed comfortably  HEAD:  Atraumatic, Normocephalic  EYES: EOMI, PERRLA, conjunctiva and sclera clear  ENT: Moist mucous membranes  NECK: Supple, No JVD  CHEST/LUNG: Clear to auscultation bilaterally; No rales, rhonchi, wheezing, or rubs. Unlabored respirations  HEART: Regular rate and rhythm; No murmurs, rubs, or gallops  ABDOMEN: BSx4; Soft, nontender, nondistended  EXTREMITIES:  No clubbing, cyanosis, BL LE EDEMA   NERVOUS SYSTEM:  A&Ox3, no focal deficits   SKIN: chronic BL LE hyperpigmenteation w lichinification, BL nodules, clean based ulcers

## 2025-06-25 NOTE — DISCHARGE NOTE PROVIDER - NSDCCPCAREPLAN_GEN_ALL_CORE_FT
PRINCIPAL DISCHARGE DIAGNOSIS  Diagnosis: Lymphedema due to chronic inflammation  Assessment and Plan of Treatment: # Chronic BL LE Lymphedema  # Veinous stasis dermatitis w lichinification   - ID recomendatins appreciated, were on IV Abx being DC on 4 more days of oral antibiotics   - recent consult with rheumatology- patient likely experiencing worsening lymphedema, not vasculitis, NO STEROIDS NEEDED AT THIS TIME  - wound care consulted, constiue w daily dressing changes   - consulted vascular surgery but no recommendation for surgical intervention at this time   - blood cultures neg   - continue pregabalin for pain  -status post lasix 40mg iv twice daily for 2 days, now back on PO lasix   -echo does not show signs of chf    -discussed with podiatry and they state uniboot can be placed outpatient only and to contunue w local wound care   will continue compression wrapping   -discussed with podiatry, no surgical plan at this time   - MRI of left foot ordered to r/o osteo : 1.  Chronic appearing fracture deformity of the first hallux proximal   phalangeal head. 2.  Nondisplaced subacute appearing fifth metatarsal neck fracture and   lateral fifth proximal phalangeal base fracture with mild edema.  3.  Dorsal second toe skin abnormality, with no MRI evidence of   osteomyelitis.  -per orthopedics no Surgical intervention as it is a chronic fracture

## 2025-06-25 NOTE — DISCHARGE NOTE NURSING/CASE MANAGEMENT/SOCIAL WORK - NSDCVIVACCINE_GEN_ALL_CORE_FT
Influenza, split virus, trivalent, preservative free; 07-Jan-2025 14:45; Nic Quintanilla (RN); [employer]; [id number] (Exp. Date: 30-Jun-2025); IntraMuscular; Deltoid Right.; 0.5 milliLiter(s); VIS (VIS Published: 06-Aug-2021, VIS Presented: 07-Jan-2025);

## 2025-06-25 NOTE — DISCHARGE NOTE PROVIDER - NSDCMRMEDTOKEN_GEN_ALL_CORE_FT
Ace Wraps: Apply on the L elbow for 3 weeks  albuterol 90 mcg/inh inhalation powder: 2 puff(s) inhaled every 6 hours, As Needed -for bronchospasm   aspirin 81 mg oral delayed release tablet: 1 tab(s) orally once a day  atorvastatin 20 mg oral tablet: 1 tab(s) orally once a day (at bedtime)  doxycycline hyclate 100 mg oral capsule: 1 cap(s) orally 2 times a day  furosemide 40 mg oral tablet: 1 tab(s) orally once a day as needed for leg swelling  Lantus 100 units/mL subcutaneous solution: 28 unit(s) subcutaneous once a day (at bedtime)  metFORMIN 500 mg oral tablet: 2 tab(s) orally 2 times a day  pregabalin 50 mg oral capsule: 1 cap(s) orally every 12 hours  Suboxone 8 mg-2 mg sublingual tablet: 3 film(s) sublingual once a day  Synthroid 175 mcg (0.175 mg) oral tablet: 1 tab(s) orally once a day

## 2025-07-03 ENCOUNTER — OUTPATIENT (OUTPATIENT)
Dept: OUTPATIENT SERVICES | Facility: HOSPITAL | Age: 55
LOS: 1 days | End: 2025-07-03
Payer: MEDICAID

## 2025-07-03 ENCOUNTER — APPOINTMENT (OUTPATIENT)
Dept: PODIATRY | Facility: CLINIC | Age: 55
End: 2025-07-03
Payer: MEDICAID

## 2025-07-03 DIAGNOSIS — Z90.49 ACQUIRED ABSENCE OF OTHER SPECIFIED PARTS OF DIGESTIVE TRACT: Chronic | ICD-10-CM

## 2025-07-03 DIAGNOSIS — Z00.00 ENCOUNTER FOR GENERAL ADULT MEDICAL EXAMINATION WITHOUT ABNORMAL FINDINGS: ICD-10-CM

## 2025-07-03 PROCEDURE — 99213 OFFICE O/P EST LOW 20 MIN: CPT | Mod: 25

## 2025-07-03 PROCEDURE — 29581 APPL MULTLAYER CMPRN SYS LEG: CPT | Mod: 50

## 2025-07-03 RX ORDER — MUPIROCIN 20 MG/G
2 OINTMENT TOPICAL 3 TIMES DAILY
Qty: 2 | Refills: 3 | Status: ACTIVE | COMMUNITY
Start: 2025-07-03 | End: 1900-01-01

## 2025-07-03 RX ORDER — PREDNISONE 10 MG/1
10 TABLET ORAL DAILY
Qty: 28 | Refills: 0 | Status: ACTIVE | COMMUNITY
Start: 2025-07-03 | End: 1900-01-01
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
## 2025-07-09 DIAGNOSIS — E66.9 OBESITY, UNSPECIFIED: ICD-10-CM

## 2025-07-09 DIAGNOSIS — I89.0 LYMPHEDEMA, NOT ELSEWHERE CLASSIFIED: ICD-10-CM

## 2025-07-09 DIAGNOSIS — Z79.4 LONG TERM (CURRENT) USE OF INSULIN: ICD-10-CM

## 2025-07-09 DIAGNOSIS — Z79.82 LONG TERM (CURRENT) USE OF ASPIRIN: ICD-10-CM

## 2025-07-09 DIAGNOSIS — J43.9 EMPHYSEMA, UNSPECIFIED: ICD-10-CM

## 2025-07-09 DIAGNOSIS — F11.20 OPIOID DEPENDENCE, UNCOMPLICATED: ICD-10-CM

## 2025-07-09 DIAGNOSIS — Z79.890 HORMONE REPLACEMENT THERAPY: ICD-10-CM

## 2025-07-09 DIAGNOSIS — L03.115 CELLULITIS OF RIGHT LOWER LIMB: ICD-10-CM

## 2025-07-09 DIAGNOSIS — Z79.84 LONG TERM (CURRENT) USE OF ORAL HYPOGLYCEMIC DRUGS: ICD-10-CM
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
## 2025-07-09 DIAGNOSIS — I25.10 ATHEROSCLEROTIC HEART DISEASE OF NATIVE CORONARY ARTERY WITHOUT ANGINA PECTORIS: ICD-10-CM

## 2025-07-09 DIAGNOSIS — M84.472D: ICD-10-CM

## 2025-07-09 DIAGNOSIS — E03.9 HYPOTHYROIDISM, UNSPECIFIED: ICD-10-CM

## 2025-07-09 DIAGNOSIS — M84.478D: ICD-10-CM

## 2025-07-09 DIAGNOSIS — Z79.891 LONG TERM (CURRENT) USE OF OPIATE ANALGESIC: ICD-10-CM

## 2025-07-09 DIAGNOSIS — E11.51 TYPE 2 DIABETES MELLITUS WITH DIABETIC PERIPHERAL ANGIOPATHY WITHOUT GANGRENE: ICD-10-CM

## 2025-07-09 DIAGNOSIS — M31.0 HYPERSENSITIVITY ANGIITIS: ICD-10-CM

## 2025-07-09 DIAGNOSIS — I87.2 VENOUS INSUFFICIENCY (CHRONIC) (PERIPHERAL): ICD-10-CM

## 2025-07-09 DIAGNOSIS — D84.81 IMMUNODEFICIENCY DUE TO CONDITIONS CLASSIFIED ELSEWHERE: ICD-10-CM

## 2025-07-14 ENCOUNTER — APPOINTMENT (OUTPATIENT)
Dept: ORTHOPEDIC SURGERY | Facility: CLINIC | Age: 55
End: 2025-07-14

## 2025-07-18 DIAGNOSIS — L95.9 VASCULITIS LIMITED TO THE SKIN, UNSPECIFIED: ICD-10-CM

## 2025-07-18 DIAGNOSIS — E11.69 TYPE 2 DIABETES MELLITUS WITH OTHER SPECIFIED COMPLICATION: ICD-10-CM
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
## 2025-07-18 DIAGNOSIS — R60.0 LOCALIZED EDEMA: ICD-10-CM
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
## 2025-07-18 DIAGNOSIS — L97.521 NON-PRESSURE CHRONIC ULCER OF OTHER PART OF LEFT FOOT LIMITED TO BREAKDOWN OF SKIN: ICD-10-CM

## 2025-07-18 DIAGNOSIS — I87.8 OTHER SPECIFIED DISORDERS OF VEINS: ICD-10-CM

## 2025-07-18 DIAGNOSIS — I83.019 VARICOSE VEINS OF RIGHT LOWER EXTREMITY WITH ULCER OF UNSPECIFIED SITE: ICD-10-CM

## 2025-07-21 ENCOUNTER — EMERGENCY (EMERGENCY)
Facility: HOSPITAL | Age: 55
LOS: 0 days | Discharge: HOME | End: 2025-07-21
Attending: EMERGENCY MEDICINE
Payer: MEDICAID

## 2025-07-21 VITALS
DIASTOLIC BLOOD PRESSURE: 81 MMHG | HEIGHT: 68 IN | TEMPERATURE: 99 F | OXYGEN SATURATION: 100 % | HEART RATE: 69 BPM | RESPIRATION RATE: 18 BRPM | SYSTOLIC BLOOD PRESSURE: 145 MMHG

## 2025-07-21 VITALS
SYSTOLIC BLOOD PRESSURE: 131 MMHG | TEMPERATURE: 98 F | OXYGEN SATURATION: 100 % | RESPIRATION RATE: 18 BRPM | HEART RATE: 60 BPM | DIASTOLIC BLOOD PRESSURE: 75 MMHG

## 2025-07-21 DIAGNOSIS — Z90.49 ACQUIRED ABSENCE OF OTHER SPECIFIED PARTS OF DIGESTIVE TRACT: Chronic | ICD-10-CM

## 2025-07-21 DIAGNOSIS — Z90.89 ACQUIRED ABSENCE OF OTHER ORGANS: Chronic | ICD-10-CM

## 2025-07-21 DIAGNOSIS — L03.116 CELLULITIS OF LEFT LOWER LIMB: ICD-10-CM

## 2025-07-21 DIAGNOSIS — E11.40 TYPE 2 DIABETES MELLITUS WITH DIABETIC NEUROPATHY, UNSPECIFIED: ICD-10-CM

## 2025-07-21 DIAGNOSIS — M79.89 OTHER SPECIFIED SOFT TISSUE DISORDERS: ICD-10-CM

## 2025-07-21 DIAGNOSIS — I89.0 LYMPHEDEMA, NOT ELSEWHERE CLASSIFIED: ICD-10-CM

## 2025-07-21 DIAGNOSIS — L03.115 CELLULITIS OF RIGHT LOWER LIMB: ICD-10-CM

## 2025-07-21 DIAGNOSIS — E87.70 FLUID OVERLOAD, UNSPECIFIED: ICD-10-CM

## 2025-07-21 LAB
ALBUMIN SERPL ELPH-MCNC: 4.5 G/DL — SIGNIFICANT CHANGE UP (ref 3.5–5.2)
ALP SERPL-CCNC: 100 U/L — SIGNIFICANT CHANGE UP (ref 30–115)
ALT FLD-CCNC: 5 U/L — SIGNIFICANT CHANGE UP (ref 0–41)
ANION GAP SERPL CALC-SCNC: 10 MMOL/L — SIGNIFICANT CHANGE UP (ref 7–14)
AST SERPL-CCNC: 27 U/L — SIGNIFICANT CHANGE UP (ref 0–41)
BASOPHILS # BLD AUTO: 0.07 K/UL — SIGNIFICANT CHANGE UP (ref 0–0.2)
BASOPHILS NFR BLD AUTO: 0.9 % — SIGNIFICANT CHANGE UP (ref 0–2)
BILIRUB SERPL-MCNC: 0.3 MG/DL — SIGNIFICANT CHANGE UP (ref 0.2–1.2)
BUN SERPL-MCNC: 12 MG/DL — SIGNIFICANT CHANGE UP (ref 10–20)
CALCIUM SERPL-MCNC: 9.8 MG/DL — SIGNIFICANT CHANGE UP (ref 8.4–10.5)
CHLORIDE SERPL-SCNC: 93 MMOL/L — LOW (ref 98–110)
CO2 SERPL-SCNC: 29 MMOL/L — SIGNIFICANT CHANGE UP (ref 17–32)
CREAT SERPL-MCNC: 1 MG/DL — SIGNIFICANT CHANGE UP (ref 0.7–1.5)
EGFR: 67 ML/MIN/1.73M2 — SIGNIFICANT CHANGE UP
EGFR: 67 ML/MIN/1.73M2 — SIGNIFICANT CHANGE UP
EOSINOPHIL # BLD AUTO: 0.2 K/UL — SIGNIFICANT CHANGE UP (ref 0–0.5)
EOSINOPHIL NFR BLD AUTO: 2.6 % — SIGNIFICANT CHANGE UP (ref 0–6)
GLUCOSE SERPL-MCNC: 190 MG/DL — HIGH (ref 70–99)
HCG SERPL QL: NEGATIVE — SIGNIFICANT CHANGE UP
HCT VFR BLD CALC: 29.9 % — LOW (ref 34.5–45)
HGB BLD-MCNC: 9.3 G/DL — LOW (ref 11.5–15.5)
IMM GRANULOCYTES # BLD AUTO: 0.02 K/UL — SIGNIFICANT CHANGE UP (ref 0–0.07)
IMM GRANULOCYTES NFR BLD AUTO: 0.3 % — SIGNIFICANT CHANGE UP (ref 0–0.9)
LYMPHOCYTES # BLD AUTO: 1.79 K/UL — SIGNIFICANT CHANGE UP (ref 1–3.3)
LYMPHOCYTES NFR BLD AUTO: 23.3 % — SIGNIFICANT CHANGE UP (ref 13–44)
MCHC RBC-ENTMCNC: 26.3 PG — LOW (ref 27–34)
MCHC RBC-ENTMCNC: 31.1 G/DL — LOW (ref 32–36)
MCV RBC AUTO: 84.7 FL — SIGNIFICANT CHANGE UP (ref 80–100)
MONOCYTES # BLD AUTO: 0.39 K/UL — SIGNIFICANT CHANGE UP (ref 0–0.9)
MONOCYTES NFR BLD AUTO: 5.1 % — SIGNIFICANT CHANGE UP (ref 2–14)
NEUTROPHILS # BLD AUTO: 5.22 K/UL — SIGNIFICANT CHANGE UP (ref 1.8–7.4)
NEUTROPHILS NFR BLD AUTO: 67.8 % — SIGNIFICANT CHANGE UP (ref 43–77)
NRBC # BLD AUTO: 0 K/UL — SIGNIFICANT CHANGE UP (ref 0–0)
NRBC # FLD: 0 K/UL — SIGNIFICANT CHANGE UP (ref 0–0)
NRBC BLD AUTO-RTO: 0 /100 WBCS — SIGNIFICANT CHANGE UP (ref 0–0)
NT-PROBNP SERPL-SCNC: 70 PG/ML — SIGNIFICANT CHANGE UP (ref 0–300)
PLATELET # BLD AUTO: 280 K/UL — SIGNIFICANT CHANGE UP (ref 150–400)
PMV BLD: 9.6 FL — SIGNIFICANT CHANGE UP (ref 7–13)
POTASSIUM SERPL-MCNC: 4.2 MMOL/L — SIGNIFICANT CHANGE UP (ref 3.5–5)
POTASSIUM SERPL-SCNC: 4.2 MMOL/L — SIGNIFICANT CHANGE UP (ref 3.5–5)
PROT SERPL-MCNC: 8.8 G/DL — HIGH (ref 6–8)
RBC # BLD: 3.53 M/UL — LOW (ref 3.8–5.2)
RBC # FLD: 15.5 % — HIGH (ref 10.3–14.5)
SODIUM SERPL-SCNC: 132 MMOL/L — LOW (ref 135–146)
WBC # BLD: 7.69 K/UL — SIGNIFICANT CHANGE UP (ref 3.8–10.5)
WBC # FLD AUTO: 7.69 K/UL — SIGNIFICANT CHANGE UP (ref 3.8–10.5)

## 2025-07-21 PROCEDURE — 83880 ASSAY OF NATRIURETIC PEPTIDE: CPT

## 2025-07-21 PROCEDURE — 73590 X-RAY EXAM OF LOWER LEG: CPT | Mod: 26,50

## 2025-07-21 PROCEDURE — 80053 COMPREHEN METABOLIC PANEL: CPT

## 2025-07-21 PROCEDURE — 99284 EMERGENCY DEPT VISIT MOD MDM: CPT

## 2025-07-21 PROCEDURE — 99283 EMERGENCY DEPT VISIT LOW MDM: CPT | Mod: 25

## 2025-07-21 PROCEDURE — 84703 CHORIONIC GONADOTROPIN ASSAY: CPT

## 2025-07-21 PROCEDURE — 36415 COLL VENOUS BLD VENIPUNCTURE: CPT

## 2025-07-21 PROCEDURE — 85025 COMPLETE CBC W/AUTO DIFF WBC: CPT

## 2025-07-21 PROCEDURE — 73590 X-RAY EXAM OF LOWER LEG: CPT | Mod: 50

## 2025-07-21 RX ORDER — DOXYCYCLINE HYCLATE 100 MG
100 TABLET ORAL ONCE
Refills: 0 | Status: COMPLETED | OUTPATIENT
Start: 2025-07-21 | End: 2025-07-21

## 2025-07-21 RX ORDER — DOXYCYCLINE HYCLATE 100 MG
1 TABLET ORAL
Qty: 20 | Refills: 0
Start: 2025-07-21 | End: 2025-07-30

## 2025-07-21 RX ADMIN — Medication 100 MILLIGRAM(S): at 20:29

## 2025-07-21 NOTE — ED ADULT NURSE NOTE - AS PAIN REST
Tab Weiss (Attending) <<----- Click to Select Surgeon 0 (no pain/absence of nonverbal indicators of pain)

## 2025-07-21 NOTE — ED PROVIDER NOTE - PHYSICAL EXAMINATION
As Follows:  CONST: Well appearing in NAD  EYES: PERRL, EOMI, Sclera and conjunctiva clear.   ENT: No nasal discharge. Oropharynx normal appearing, no erythema / exudates. Uvula midline. Airway intact.   CARD: No murmurs, rubs, or gallops; Normal rate and rhythm  RESP: BS Equal B/L, No wheezes, rhonchi or rales. No distress or accessory breathing  GI: Soft, non-tender, non-distended.  SKIN: Bilateral lower extremity lymphedema and swelling. Mild erythema up to proximal shin with warmth. Warm, dry, no acute rashes. MMM  NEURO: Alert and Oriented, No focal deficits. Strength and sensation intact. Steady gait

## 2025-07-21 NOTE — ED PROVIDER NOTE - OBJECTIVE STATEMENT
Patient is a 54 year old female with pmhx of dm, lymphedema, suboxone use, peripheral neuropathy, fluid overload on lasix presents for evaluation of worsening lower extremity swelling, erythema, and chronic anterior shin wounds. She denies any fever, chills, cough, sore throat, n/v, chest pain, shortness of breath, abdominal pain, or urinary complaints.

## 2025-07-21 NOTE — ED ADULT NURSE NOTE - NSFALLUNIVINTERV_ED_ALL_ED
Bed/Stretcher in lowest position, wheels locked, appropriate side rails in place/Call bell, personal items and telephone in reach/Instruct patient to call for assistance before getting out of bed/chair/stretcher/Non-slip footwear applied when patient is off stretcher/[place] to call system/Physically safe environment - no spills, clutter or unnecessary equipment/Purposeful proactive rounding/Room/bathroom lighting operational, light cord in reach

## 2025-07-21 NOTE — ED PROVIDER NOTE - WR INTERPRETATION DATE TIME  1
You were seen today abdominal pain. Your CT scan was normal. You can continue to take Zofran for nausea and tylenol and ibuprofen for pain. Please return to the ER if you have worsening pain with fevers or vomiting.    21-Jul-2025 20:22

## 2025-07-21 NOTE — ED PROVIDER NOTE - NSFOLLOWUPINSTRUCTIONS_ED_ALL_ED_FT
Cellulitis, Adult  Cellulitis is a skin infection. The infected area is usually red and tender. This condition occurs most often in the arms and lower legs. The infection can travel to the muscles, blood, and underlying tissue and become serious. It is very important to get treated for this condition.    What are the causes?  Cellulitis is caused by bacteria. The bacteria enter through a break in the skin, such as a cut, burn, insect bite, open sore, or crack.    What increases the risk?  This condition is more likely to occur in people who:    Have a weak defense system (immune system).  Have open wounds on the skin such as cuts, burns, bites, and scrapes. Bacteria can enter the body through these open wounds.  Are older.  Have diabetes.  Have a type of long-lasting (chronic) liver disease (cirrhosis) or kidney disease.  Use IV drugs.    What are the signs or symptoms?  Symptoms of this condition include:    Redness, streaking, or spotting on the skin.  Swollen area of the skin.  Tenderness or pain when an area of the skin is touched.  Warm skin.  Fever.  Chills.  Blisters.    How is this diagnosed?  This condition is diagnosed based on a medical history and physical exam. You may also have tests, including:    Blood tests.  Lab tests.  Imaging tests.    How is this treated?  Treatment for this condition may include:    Medicines, such as antibiotic medicines or antihistamines.  Supportive care, such as rest and application of cold or warm cloths (cold or warm compresses) to the skin.  Hospital care, if the condition is severe.    The infection usually gets better within 1–2 days of treatment.    Follow these instructions at home:  Take over-the-counter and prescription medicines only as told by your health care provider.  If you were prescribed an antibiotic medicine, take it as told by your health care provider. Do not stop taking the antibiotic even if you start to feel better.  Drink enough fluid to keep your urine clear or pale yellow.  Do not touch or rub the infected area.  Raise (elevate) the infected area above the level of your heart while you are sitting or lying down.  Apply warm or cold compresses to the affected area as told by your health care provider.  Keep all follow-up visits as told by your health care provider. This is important. These visits let your health care provider make sure a more serious infection is not developing.  Contact a health care provider if:  You have a fever.  Your symptoms do not improve within 1–2 days of starting treatment.  Your bone or joint underneath the infected area becomes painful after the skin has healed.  Your infection returns in the same area or another area.  You notice a swollen bump in the infected area.  You develop new symptoms.  You have a general ill feeling (malaise) with muscle aches and pains.  Get help right away if:  Your symptoms get worse.  You feel very sleepy.  You develop vomiting or diarrhea that persists.  You notice red streaks coming from the infected area.  Your red area gets larger or turns dark in color.  This information is not intended to replace advice given to you by your health care provider. Make sure you discuss any questions you have with your health care provider.

## 2025-07-21 NOTE — ED ADULT NURSE NOTE - NS ED NURSE DC PT EDUCATION RESOURCES
- toradol 30mg ivp q 6 hours atc  - iv acetaminophen 1 gram q 6 hours atc for 4 doses  - pt not tolerating iv opioids  - stool softeners  - iv fluids  - flexeril po atc  -folic acid daily Yes

## 2025-07-21 NOTE — ED PROVIDER NOTE - PATIENT PORTAL LINK FT
You can access the FollowMyHealth Patient Portal offered by [facility] by registering at the following website: [email or web address]. By joining [employer]’s FollowMyHealth portal, you will also be able to view your health information using other applications (apps) compatible with our system.

## 2025-07-21 NOTE — ED PROVIDER NOTE - CLINICAL SUMMARY MEDICAL DECISION MAKING FREE TEXT BOX
pt with lymphedema with chronic wounds with worsening symptoms, on exam has lymphedema with chronic venous stasis changes and ulcers with area of erythema at superior margin on left, no crepitus or lymphangitis no fever labs and studies reviewed, will d/c trial of po abx and wound/vascular followup. Patient counseled regarding conditions which should prompt return.

## 2025-07-24 ENCOUNTER — APPOINTMENT (OUTPATIENT)
Dept: PODIATRY | Facility: CLINIC | Age: 55
End: 2025-07-24
Payer: MEDICAID

## 2025-07-24 ENCOUNTER — OUTPATIENT (OUTPATIENT)
Dept: OUTPATIENT SERVICES | Facility: HOSPITAL | Age: 55
LOS: 1 days | End: 2025-07-24
Payer: MEDICAID

## 2025-07-24 DIAGNOSIS — Z00.00 ENCOUNTER FOR GENERAL ADULT MEDICAL EXAMINATION WITHOUT ABNORMAL FINDINGS: ICD-10-CM

## 2025-07-24 DIAGNOSIS — L03.119 CELLULITIS OF UNSPECIFIED PART OF LIMB: ICD-10-CM

## 2025-07-24 DIAGNOSIS — Z90.89 ACQUIRED ABSENCE OF OTHER ORGANS: Chronic | ICD-10-CM

## 2025-07-24 DIAGNOSIS — I83.019 VARICOSE VEINS OF RIGHT LOWER EXTREMITY WITH ULCER OF UNSPECIFIED SITE: ICD-10-CM

## 2025-07-24 DIAGNOSIS — Z90.49 ACQUIRED ABSENCE OF OTHER SPECIFIED PARTS OF DIGESTIVE TRACT: Chronic | ICD-10-CM

## 2025-07-24 DIAGNOSIS — L97.919 VARICOSE VEINS OF RIGHT LOWER EXTREMITY WITH ULCER OF UNSPECIFIED SITE: ICD-10-CM

## 2025-07-24 DIAGNOSIS — L97.929 VARICOSE VEINS OF RIGHT LOWER EXTREMITY WITH ULCER OF UNSPECIFIED SITE: ICD-10-CM

## 2025-07-24 DIAGNOSIS — L97.521 NON-PRESSURE CHRONIC ULCER OF OTHER PART OF LEFT FOOT LIMITED TO BREAKDOWN OF SKIN: ICD-10-CM

## 2025-07-24 DIAGNOSIS — I83.029 VARICOSE VEINS OF RIGHT LOWER EXTREMITY WITH ULCER OF UNSPECIFIED SITE: ICD-10-CM

## 2025-07-24 DIAGNOSIS — I87.8 OTHER SPECIFIED DISORDERS OF VEINS: ICD-10-CM

## 2025-07-24 PROCEDURE — 29581 APPL MULTLAYER CMPRN SYS LEG: CPT | Mod: 50

## 2025-07-24 PROCEDURE — 99213 OFFICE O/P EST LOW 20 MIN: CPT | Mod: 25

## 2025-07-24 RX ORDER — CLINDAMYCIN HYDROCHLORIDE 300 MG/1
300 CAPSULE ORAL
Qty: 21 | Refills: 0 | Status: ACTIVE | COMMUNITY
Start: 2025-07-24 | End: 1900-01-01

## 2025-07-26 ENCOUNTER — INPATIENT (INPATIENT)
Facility: HOSPITAL | Age: 55
LOS: 3 days | Discharge: HOME HEALTH | DRG: 383 | End: 2025-07-30
Attending: INTERNAL MEDICINE | Admitting: INTERNAL MEDICINE
Payer: MEDICAID

## 2025-07-26 VITALS
TEMPERATURE: 98 F | WEIGHT: 184.97 LBS | DIASTOLIC BLOOD PRESSURE: 72 MMHG | OXYGEN SATURATION: 99 % | HEART RATE: 66 BPM | RESPIRATION RATE: 16 BRPM | HEIGHT: 68 IN | SYSTOLIC BLOOD PRESSURE: 118 MMHG

## 2025-07-26 DIAGNOSIS — L03.90 CELLULITIS, UNSPECIFIED: ICD-10-CM

## 2025-07-26 DIAGNOSIS — Z90.89 ACQUIRED ABSENCE OF OTHER ORGANS: Chronic | ICD-10-CM

## 2025-07-26 DIAGNOSIS — Z90.49 ACQUIRED ABSENCE OF OTHER SPECIFIED PARTS OF DIGESTIVE TRACT: Chronic | ICD-10-CM

## 2025-07-26 LAB
ALBUMIN SERPL ELPH-MCNC: 3.9 G/DL — SIGNIFICANT CHANGE UP (ref 3.5–5.2)
ALP SERPL-CCNC: 107 U/L — SIGNIFICANT CHANGE UP (ref 30–115)
ALT FLD-CCNC: 7 U/L — SIGNIFICANT CHANGE UP (ref 0–41)
ANION GAP SERPL CALC-SCNC: 11 MMOL/L — SIGNIFICANT CHANGE UP (ref 7–14)
AST SERPL-CCNC: 26 U/L — SIGNIFICANT CHANGE UP (ref 0–41)
BASOPHILS # BLD AUTO: 0.06 K/UL — SIGNIFICANT CHANGE UP (ref 0–0.2)
BASOPHILS NFR BLD AUTO: 0.8 % — SIGNIFICANT CHANGE UP (ref 0–1)
BILIRUB SERPL-MCNC: <0.2 MG/DL — SIGNIFICANT CHANGE UP (ref 0.2–1.2)
BUN SERPL-MCNC: 9 MG/DL — LOW (ref 10–20)
CALCIUM SERPL-MCNC: 8.4 MG/DL — SIGNIFICANT CHANGE UP (ref 8.4–10.5)
CHLORIDE SERPL-SCNC: 95 MMOL/L — LOW (ref 98–110)
CO2 SERPL-SCNC: 28 MMOL/L — SIGNIFICANT CHANGE UP (ref 17–32)
CREAT SERPL-MCNC: 0.9 MG/DL — SIGNIFICANT CHANGE UP (ref 0.7–1.5)
EGFR: 76 ML/MIN/1.73M2 — SIGNIFICANT CHANGE UP
EGFR: 76 ML/MIN/1.73M2 — SIGNIFICANT CHANGE UP
EOSINOPHIL # BLD AUTO: 0.17 K/UL — SIGNIFICANT CHANGE UP (ref 0–0.7)
EOSINOPHIL NFR BLD AUTO: 2.4 % — SIGNIFICANT CHANGE UP (ref 0–8)
GLUCOSE BLDC GLUCOMTR-MCNC: 361 MG/DL — HIGH (ref 70–99)
GLUCOSE SERPL-MCNC: 286 MG/DL — HIGH (ref 70–99)
HCT VFR BLD CALC: 30.6 % — LOW (ref 37–47)
HGB BLD-MCNC: 9.6 G/DL — LOW (ref 12–16)
IMM GRANULOCYTES NFR BLD AUTO: 1 % — HIGH (ref 0.1–0.3)
LACTATE SERPL-SCNC: 1.4 MMOL/L — SIGNIFICANT CHANGE UP (ref 0.7–2)
LYMPHOCYTES # BLD AUTO: 2.22 K/UL — SIGNIFICANT CHANGE UP (ref 1.2–3.4)
LYMPHOCYTES # BLD AUTO: 31.4 % — SIGNIFICANT CHANGE UP (ref 20.5–51.1)
MCHC RBC-ENTMCNC: 27 PG — SIGNIFICANT CHANGE UP (ref 27–31)
MCHC RBC-ENTMCNC: 31.4 G/DL — LOW (ref 32–37)
MCV RBC AUTO: 86 FL — SIGNIFICANT CHANGE UP (ref 81–99)
MONOCYTES # BLD AUTO: 0.25 K/UL — SIGNIFICANT CHANGE UP (ref 0.1–0.6)
MONOCYTES NFR BLD AUTO: 3.5 % — SIGNIFICANT CHANGE UP (ref 1.7–9.3)
NEUTROPHILS # BLD AUTO: 4.31 K/UL — SIGNIFICANT CHANGE UP (ref 1.4–6.5)
NEUTROPHILS NFR BLD AUTO: 60.9 % — SIGNIFICANT CHANGE UP (ref 42.2–75.2)
NRBC BLD AUTO-RTO: 0 /100 WBCS — SIGNIFICANT CHANGE UP (ref 0–0)
PLATELET # BLD AUTO: 270 K/UL — SIGNIFICANT CHANGE UP (ref 130–400)
PMV BLD: 9.6 FL — SIGNIFICANT CHANGE UP (ref 7.4–10.4)
POTASSIUM SERPL-MCNC: 4.3 MMOL/L — SIGNIFICANT CHANGE UP (ref 3.5–5)
POTASSIUM SERPL-SCNC: 4.3 MMOL/L — SIGNIFICANT CHANGE UP (ref 3.5–5)
PROT SERPL-MCNC: 7.2 G/DL — SIGNIFICANT CHANGE UP (ref 6–8)
RBC # BLD: 3.56 M/UL — LOW (ref 4.2–5.4)
RBC # FLD: 15.4 % — HIGH (ref 11.5–14.5)
SODIUM SERPL-SCNC: 134 MMOL/L — LOW (ref 135–146)
WBC # BLD: 7.08 K/UL — SIGNIFICANT CHANGE UP (ref 4.8–10.8)
WBC # FLD AUTO: 7.08 K/UL — SIGNIFICANT CHANGE UP (ref 4.8–10.8)

## 2025-07-26 PROCEDURE — 99285 EMERGENCY DEPT VISIT HI MDM: CPT

## 2025-07-26 PROCEDURE — 87040 BLOOD CULTURE FOR BACTERIA: CPT

## 2025-07-26 PROCEDURE — 73590 X-RAY EXAM OF LOWER LEG: CPT | Mod: 26,50

## 2025-07-26 PROCEDURE — 99223 1ST HOSP IP/OBS HIGH 75: CPT

## 2025-07-26 PROCEDURE — 93970 EXTREMITY STUDY: CPT | Mod: 26

## 2025-07-26 PROCEDURE — 36415 COLL VENOUS BLD VENIPUNCTURE: CPT

## 2025-07-26 PROCEDURE — 97162 PT EVAL MOD COMPLEX 30 MIN: CPT | Mod: GP

## 2025-07-26 PROCEDURE — 82962 GLUCOSE BLOOD TEST: CPT

## 2025-07-26 PROCEDURE — 80048 BASIC METABOLIC PNL TOTAL CA: CPT

## 2025-07-26 PROCEDURE — 85027 COMPLETE CBC AUTOMATED: CPT

## 2025-07-26 PROCEDURE — 85025 COMPLETE CBC W/AUTO DIFF WBC: CPT

## 2025-07-26 PROCEDURE — 80053 COMPREHEN METABOLIC PANEL: CPT

## 2025-07-26 RX ORDER — VANCOMYCIN HCL IN 5 % DEXTROSE 1.5G/250ML
1000 PLASTIC BAG, INJECTION (ML) INTRAVENOUS ONCE
Refills: 0 | Status: COMPLETED | OUTPATIENT
Start: 2025-07-26 | End: 2025-07-26

## 2025-07-26 RX ORDER — ATORVASTATIN CALCIUM 80 MG/1
20 TABLET, FILM COATED ORAL AT BEDTIME
Refills: 0 | Status: DISCONTINUED | OUTPATIENT
Start: 2025-07-26 | End: 2025-07-30

## 2025-07-26 RX ORDER — ACETAMINOPHEN 500 MG/5ML
650 LIQUID (ML) ORAL EVERY 6 HOURS
Refills: 0 | Status: DISCONTINUED | OUTPATIENT
Start: 2025-07-26 | End: 2025-07-30

## 2025-07-26 RX ORDER — DEXTROSE 50 % IN WATER 50 %
25 SYRINGE (ML) INTRAVENOUS ONCE
Refills: 0 | Status: DISCONTINUED | OUTPATIENT
Start: 2025-07-26 | End: 2025-07-30

## 2025-07-26 RX ORDER — LEVOTHYROXINE SODIUM 300 MCG
175 TABLET ORAL DAILY
Refills: 0 | Status: DISCONTINUED | OUTPATIENT
Start: 2025-07-26 | End: 2025-07-30

## 2025-07-26 RX ORDER — ENOXAPARIN SODIUM 100 MG/ML
40 INJECTION SUBCUTANEOUS EVERY 24 HOURS
Refills: 0 | Status: DISCONTINUED | OUTPATIENT
Start: 2025-07-26 | End: 2025-07-30

## 2025-07-26 RX ORDER — INSULIN LISPRO 100 U/ML
INJECTION, SOLUTION INTRAVENOUS; SUBCUTANEOUS
Refills: 0 | Status: DISCONTINUED | OUTPATIENT
Start: 2025-07-26 | End: 2025-07-30

## 2025-07-26 RX ORDER — VANCOMYCIN HCL IN 5 % DEXTROSE 1.5G/250ML
1250 PLASTIC BAG, INJECTION (ML) INTRAVENOUS EVERY 12 HOURS
Refills: 0 | Status: DISCONTINUED | OUTPATIENT
Start: 2025-07-26 | End: 2025-07-28

## 2025-07-26 RX ORDER — DEXTROSE 50 % IN WATER 50 %
15 SYRINGE (ML) INTRAVENOUS ONCE
Refills: 0 | Status: DISCONTINUED | OUTPATIENT
Start: 2025-07-26 | End: 2025-07-30

## 2025-07-26 RX ORDER — SODIUM CHLORIDE 9 G/1000ML
1000 INJECTION, SOLUTION INTRAVENOUS
Refills: 0 | Status: DISCONTINUED | OUTPATIENT
Start: 2025-07-26 | End: 2025-07-30

## 2025-07-26 RX ORDER — METRONIDAZOLE 250 MG
500 TABLET ORAL ONCE
Refills: 0 | Status: COMPLETED | OUTPATIENT
Start: 2025-07-26 | End: 2025-07-26

## 2025-07-26 RX ORDER — MELATONIN 5 MG
3 TABLET ORAL AT BEDTIME
Refills: 0 | Status: DISCONTINUED | OUTPATIENT
Start: 2025-07-26 | End: 2025-07-30

## 2025-07-26 RX ORDER — BUPRENORPHINE HYDROCHLORIDE, NALOXONE HYDROCHLORIDE 4; 1 MG/1; MG/1
3 FILM, SOLUBLE BUCCAL; SUBLINGUAL DAILY
Refills: 0 | Status: DISCONTINUED | OUTPATIENT
Start: 2025-07-26 | End: 2025-07-30

## 2025-07-26 RX ORDER — CEFEPIME 2 G/20ML
2000 INJECTION, POWDER, FOR SOLUTION INTRAVENOUS EVERY 8 HOURS
Refills: 0 | Status: DISCONTINUED | OUTPATIENT
Start: 2025-07-26 | End: 2025-07-28

## 2025-07-26 RX ORDER — ALBUTEROL SULFATE 2.5 MG/3ML
2 VIAL, NEBULIZER (ML) INHALATION EVERY 6 HOURS
Refills: 0 | Status: DISCONTINUED | OUTPATIENT
Start: 2025-07-26 | End: 2025-07-30

## 2025-07-26 RX ORDER — PREGABALIN 50 MG/1
50 CAPSULE ORAL EVERY 12 HOURS
Refills: 0 | Status: DISCONTINUED | OUTPATIENT
Start: 2025-07-26 | End: 2025-07-30

## 2025-07-26 RX ORDER — INSULIN GLARGINE-YFGN 100 [IU]/ML
20 INJECTION, SOLUTION SUBCUTANEOUS AT BEDTIME
Refills: 0 | Status: DISCONTINUED | OUTPATIENT
Start: 2025-07-26 | End: 2025-07-30

## 2025-07-26 RX ORDER — FUROSEMIDE 10 MG/ML
40 INJECTION INTRAMUSCULAR; INTRAVENOUS DAILY
Refills: 0 | Status: DISCONTINUED | OUTPATIENT
Start: 2025-07-26 | End: 2025-07-30

## 2025-07-26 RX ORDER — ASPIRIN 325 MG
81 TABLET ORAL DAILY
Refills: 0 | Status: DISCONTINUED | OUTPATIENT
Start: 2025-07-26 | End: 2025-07-30

## 2025-07-26 RX ORDER — GLUCAGON 3 MG/1
1 POWDER NASAL ONCE
Refills: 0 | Status: DISCONTINUED | OUTPATIENT
Start: 2025-07-26 | End: 2025-07-30

## 2025-07-26 RX ORDER — CEFEPIME 2 G/20ML
2000 INJECTION, POWDER, FOR SOLUTION INTRAVENOUS ONCE
Refills: 0 | Status: COMPLETED | OUTPATIENT
Start: 2025-07-26 | End: 2025-07-26

## 2025-07-26 RX ORDER — INSULIN LISPRO 100 U/ML
5 INJECTION, SOLUTION INTRAVENOUS; SUBCUTANEOUS
Refills: 0 | Status: DISCONTINUED | OUTPATIENT
Start: 2025-07-26 | End: 2025-07-30

## 2025-07-26 RX ADMIN — ATORVASTATIN CALCIUM 20 MILLIGRAM(S): 80 TABLET, FILM COATED ORAL at 21:31

## 2025-07-26 RX ADMIN — CEFEPIME 100 MILLIGRAM(S): 2 INJECTION, POWDER, FOR SOLUTION INTRAVENOUS at 21:31

## 2025-07-26 RX ADMIN — INSULIN GLARGINE-YFGN 20 UNIT(S): 100 INJECTION, SOLUTION SUBCUTANEOUS at 21:51

## 2025-07-26 RX ADMIN — CEFEPIME 100 MILLIGRAM(S): 2 INJECTION, POWDER, FOR SOLUTION INTRAVENOUS at 16:25

## 2025-07-26 RX ADMIN — Medication 100 MILLIGRAM(S): at 16:25

## 2025-07-26 RX ADMIN — INSULIN LISPRO 5: 100 INJECTION, SOLUTION INTRAVENOUS; SUBCUTANEOUS at 21:31

## 2025-07-27 LAB
ALBUMIN SERPL ELPH-MCNC: 4 G/DL — SIGNIFICANT CHANGE UP (ref 3.5–5.2)
ALP SERPL-CCNC: 90 U/L — SIGNIFICANT CHANGE UP (ref 30–115)
ALT FLD-CCNC: 6 U/L — SIGNIFICANT CHANGE UP (ref 0–41)
ANION GAP SERPL CALC-SCNC: 13 MMOL/L — SIGNIFICANT CHANGE UP (ref 7–14)
AST SERPL-CCNC: 25 U/L — SIGNIFICANT CHANGE UP (ref 0–41)
BASOPHILS # BLD AUTO: 0.06 K/UL — SIGNIFICANT CHANGE UP (ref 0–0.2)
BASOPHILS NFR BLD AUTO: 0.8 % — SIGNIFICANT CHANGE UP (ref 0–1)
BILIRUB SERPL-MCNC: <0.2 MG/DL — SIGNIFICANT CHANGE UP (ref 0.2–1.2)
BUN SERPL-MCNC: 8 MG/DL — LOW (ref 10–20)
CALCIUM SERPL-MCNC: 8.9 MG/DL — SIGNIFICANT CHANGE UP (ref 8.4–10.5)
CHLORIDE SERPL-SCNC: 97 MMOL/L — LOW (ref 98–110)
CO2 SERPL-SCNC: 28 MMOL/L — SIGNIFICANT CHANGE UP (ref 17–32)
CREAT SERPL-MCNC: 1 MG/DL — SIGNIFICANT CHANGE UP (ref 0.7–1.5)
EGFR: 67 ML/MIN/1.73M2 — SIGNIFICANT CHANGE UP
EGFR: 67 ML/MIN/1.73M2 — SIGNIFICANT CHANGE UP
EOSINOPHIL # BLD AUTO: 0.18 K/UL — SIGNIFICANT CHANGE UP (ref 0–0.7)
EOSINOPHIL NFR BLD AUTO: 2.4 % — SIGNIFICANT CHANGE UP (ref 0–8)
GLUCOSE BLDC GLUCOMTR-MCNC: 128 MG/DL — HIGH (ref 70–99)
GLUCOSE BLDC GLUCOMTR-MCNC: 138 MG/DL — HIGH (ref 70–99)
GLUCOSE BLDC GLUCOMTR-MCNC: 148 MG/DL — HIGH (ref 70–99)
GLUCOSE BLDC GLUCOMTR-MCNC: 220 MG/DL — HIGH (ref 70–99)
GLUCOSE BLDC GLUCOMTR-MCNC: 434 MG/DL — HIGH (ref 70–99)
GLUCOSE SERPL-MCNC: 119 MG/DL — HIGH (ref 70–99)
HCT VFR BLD CALC: 31.6 % — LOW (ref 37–47)
HGB BLD-MCNC: 9.9 G/DL — LOW (ref 12–16)
IMM GRANULOCYTES NFR BLD AUTO: 0.9 % — HIGH (ref 0.1–0.3)
LYMPHOCYTES # BLD AUTO: 1.51 K/UL — SIGNIFICANT CHANGE UP (ref 1.2–3.4)
LYMPHOCYTES # BLD AUTO: 19.9 % — LOW (ref 20.5–51.1)
MCHC RBC-ENTMCNC: 26.9 PG — LOW (ref 27–31)
MCHC RBC-ENTMCNC: 31.3 G/DL — LOW (ref 32–37)
MCV RBC AUTO: 85.9 FL — SIGNIFICANT CHANGE UP (ref 81–99)
MONOCYTES # BLD AUTO: 0.38 K/UL — SIGNIFICANT CHANGE UP (ref 0.1–0.6)
MONOCYTES NFR BLD AUTO: 5 % — SIGNIFICANT CHANGE UP (ref 1.7–9.3)
NEUTROPHILS # BLD AUTO: 5.4 K/UL — SIGNIFICANT CHANGE UP (ref 1.4–6.5)
NEUTROPHILS NFR BLD AUTO: 71 % — SIGNIFICANT CHANGE UP (ref 42.2–75.2)
NRBC BLD AUTO-RTO: 0 /100 WBCS — SIGNIFICANT CHANGE UP (ref 0–0)
PLATELET # BLD AUTO: 266 K/UL — SIGNIFICANT CHANGE UP (ref 130–400)
PMV BLD: 9.7 FL — SIGNIFICANT CHANGE UP (ref 7.4–10.4)
POTASSIUM SERPL-MCNC: 4.5 MMOL/L — SIGNIFICANT CHANGE UP (ref 3.5–5)
POTASSIUM SERPL-SCNC: 4.5 MMOL/L — SIGNIFICANT CHANGE UP (ref 3.5–5)
PROT SERPL-MCNC: 7.8 G/DL — SIGNIFICANT CHANGE UP (ref 6–8)
RBC # BLD: 3.68 M/UL — LOW (ref 4.2–5.4)
RBC # FLD: 15.6 % — HIGH (ref 11.5–14.5)
SODIUM SERPL-SCNC: 138 MMOL/L — SIGNIFICANT CHANGE UP (ref 135–146)
WBC # BLD: 7.6 K/UL — SIGNIFICANT CHANGE UP (ref 4.8–10.8)
WBC # FLD AUTO: 7.6 K/UL — SIGNIFICANT CHANGE UP (ref 4.8–10.8)

## 2025-07-27 PROCEDURE — 99232 SBSQ HOSP IP/OBS MODERATE 35: CPT

## 2025-07-27 PROCEDURE — 29581 APPL MULTLAYER CMPRN SYS LEG: CPT | Mod: 50,GC

## 2025-07-27 PROCEDURE — 99221 1ST HOSP IP/OBS SF/LOW 40: CPT | Mod: GC,25

## 2025-07-27 RX ADMIN — INSULIN LISPRO 5 UNIT(S): 100 INJECTION, SOLUTION INTRAVENOUS; SUBCUTANEOUS at 11:30

## 2025-07-27 RX ADMIN — BUPRENORPHINE HYDROCHLORIDE, NALOXONE HYDROCHLORIDE 3 TABLET(S): 4; 1 FILM, SOLUBLE BUCCAL; SUBLINGUAL at 11:27

## 2025-07-27 RX ADMIN — Medication 166.67 MILLIGRAM(S): at 17:15

## 2025-07-27 RX ADMIN — PREGABALIN 50 MILLIGRAM(S): 50 CAPSULE ORAL at 17:15

## 2025-07-27 RX ADMIN — CEFEPIME 100 MILLIGRAM(S): 2 INJECTION, POWDER, FOR SOLUTION INTRAVENOUS at 05:02

## 2025-07-27 RX ADMIN — INSULIN GLARGINE-YFGN 20 UNIT(S): 100 INJECTION, SOLUTION SUBCUTANEOUS at 21:27

## 2025-07-27 RX ADMIN — Medication 81 MILLIGRAM(S): at 11:27

## 2025-07-27 RX ADMIN — INSULIN LISPRO 5 UNIT(S): 100 INJECTION, SOLUTION INTRAVENOUS; SUBCUTANEOUS at 07:56

## 2025-07-27 RX ADMIN — Medication 175 MICROGRAM(S): at 05:03

## 2025-07-27 RX ADMIN — PREGABALIN 50 MILLIGRAM(S): 50 CAPSULE ORAL at 05:03

## 2025-07-27 RX ADMIN — INSULIN LISPRO 5 UNIT(S): 100 INJECTION, SOLUTION INTRAVENOUS; SUBCUTANEOUS at 17:19

## 2025-07-27 RX ADMIN — INSULIN LISPRO 2: 100 INJECTION, SOLUTION INTRAVENOUS; SUBCUTANEOUS at 21:26

## 2025-07-27 RX ADMIN — ENOXAPARIN SODIUM 40 MILLIGRAM(S): 100 INJECTION SUBCUTANEOUS at 05:07

## 2025-07-27 RX ADMIN — CEFEPIME 100 MILLIGRAM(S): 2 INJECTION, POWDER, FOR SOLUTION INTRAVENOUS at 21:26

## 2025-07-27 RX ADMIN — CEFEPIME 100 MILLIGRAM(S): 2 INJECTION, POWDER, FOR SOLUTION INTRAVENOUS at 13:09

## 2025-07-27 RX ADMIN — ATORVASTATIN CALCIUM 20 MILLIGRAM(S): 80 TABLET, FILM COATED ORAL at 21:37

## 2025-07-27 RX ADMIN — Medication 166.67 MILLIGRAM(S): at 05:02

## 2025-07-28 ENCOUNTER — TRANSCRIPTION ENCOUNTER (OUTPATIENT)
Age: 55
End: 2025-07-28

## 2025-07-28 LAB
ANION GAP SERPL CALC-SCNC: 16 MMOL/L — HIGH (ref 7–14)
BASOPHILS # BLD AUTO: 0.05 K/UL — SIGNIFICANT CHANGE UP (ref 0–0.2)
BASOPHILS NFR BLD AUTO: 0.7 % — SIGNIFICANT CHANGE UP (ref 0–1)
BUN SERPL-MCNC: 12 MG/DL — SIGNIFICANT CHANGE UP (ref 10–20)
CALCIUM SERPL-MCNC: 9.1 MG/DL — SIGNIFICANT CHANGE UP (ref 8.4–10.5)
CHLORIDE SERPL-SCNC: 98 MMOL/L — SIGNIFICANT CHANGE UP (ref 98–110)
CO2 SERPL-SCNC: 23 MMOL/L — SIGNIFICANT CHANGE UP (ref 17–32)
CREAT SERPL-MCNC: 1 MG/DL — SIGNIFICANT CHANGE UP (ref 0.7–1.5)
EGFR: 67 ML/MIN/1.73M2 — SIGNIFICANT CHANGE UP
EGFR: 67 ML/MIN/1.73M2 — SIGNIFICANT CHANGE UP
EOSINOPHIL # BLD AUTO: 0.2 K/UL — SIGNIFICANT CHANGE UP (ref 0–0.7)
EOSINOPHIL NFR BLD AUTO: 2.7 % — SIGNIFICANT CHANGE UP (ref 0–8)
GLUCOSE SERPL-MCNC: 105 MG/DL — HIGH (ref 70–99)
HCT VFR BLD CALC: 34.5 % — LOW (ref 37–47)
HGB BLD-MCNC: 10.6 G/DL — LOW (ref 12–16)
IMM GRANULOCYTES NFR BLD AUTO: 0.7 % — HIGH (ref 0.1–0.3)
LYMPHOCYTES # BLD AUTO: 1.6 K/UL — SIGNIFICANT CHANGE UP (ref 1.2–3.4)
LYMPHOCYTES # BLD AUTO: 22 % — SIGNIFICANT CHANGE UP (ref 20.5–51.1)
MCHC RBC-ENTMCNC: 26.8 PG — LOW (ref 27–31)
MCHC RBC-ENTMCNC: 30.7 G/DL — LOW (ref 32–37)
MCV RBC AUTO: 87.3 FL — SIGNIFICANT CHANGE UP (ref 81–99)
MONOCYTES # BLD AUTO: 0.35 K/UL — SIGNIFICANT CHANGE UP (ref 0.1–0.6)
MONOCYTES NFR BLD AUTO: 4.8 % — SIGNIFICANT CHANGE UP (ref 1.7–9.3)
NEUTROPHILS # BLD AUTO: 5.03 K/UL — SIGNIFICANT CHANGE UP (ref 1.4–6.5)
NEUTROPHILS NFR BLD AUTO: 69.1 % — SIGNIFICANT CHANGE UP (ref 42.2–75.2)
NRBC BLD AUTO-RTO: 0 /100 WBCS — SIGNIFICANT CHANGE UP (ref 0–0)
PLATELET # BLD AUTO: 240 K/UL — SIGNIFICANT CHANGE UP (ref 130–400)
PMV BLD: 10.7 FL — HIGH (ref 7.4–10.4)
POTASSIUM SERPL-MCNC: 4.8 MMOL/L — SIGNIFICANT CHANGE UP (ref 3.5–5)
POTASSIUM SERPL-SCNC: 4.8 MMOL/L — SIGNIFICANT CHANGE UP (ref 3.5–5)
RBC # BLD: 3.95 M/UL — LOW (ref 4.2–5.4)
RBC # FLD: 16 % — HIGH (ref 11.5–14.5)
SODIUM SERPL-SCNC: 137 MMOL/L — SIGNIFICANT CHANGE UP (ref 135–146)
WBC # BLD: 7.28 K/UL — SIGNIFICANT CHANGE UP (ref 4.8–10.8)
WBC # FLD AUTO: 7.28 K/UL — SIGNIFICANT CHANGE UP (ref 4.8–10.8)

## 2025-07-28 PROCEDURE — 99231 SBSQ HOSP IP/OBS SF/LOW 25: CPT | Mod: 25

## 2025-07-28 PROCEDURE — 99497 ADVNCD CARE PLAN 30 MIN: CPT | Mod: 25

## 2025-07-28 PROCEDURE — 99233 SBSQ HOSP IP/OBS HIGH 50: CPT

## 2025-07-28 PROCEDURE — 11042 DBRDMT SUBQ TIS 1ST 20SQCM/<: CPT

## 2025-07-28 PROCEDURE — 29581 APPL MULTLAYER CMPRN SYS LEG: CPT | Mod: 50,59

## 2025-07-28 RX ORDER — CLINDAMYCIN PHOSPHATE 150 MG/ML
900 VIAL (ML) INJECTION EVERY 8 HOURS
Refills: 0 | Status: DISCONTINUED | OUTPATIENT
Start: 2025-07-28 | End: 2025-07-29

## 2025-07-28 RX ORDER — CLINDAMYCIN PHOSPHATE 150 MG/ML
900 VIAL (ML) INJECTION ONCE
Refills: 0 | Status: COMPLETED | OUTPATIENT
Start: 2025-07-28 | End: 2025-07-28

## 2025-07-28 RX ORDER — CLINDAMYCIN PHOSPHATE 150 MG/ML
VIAL (ML) INJECTION
Refills: 0 | Status: DISCONTINUED | OUTPATIENT
Start: 2025-07-28 | End: 2025-07-29

## 2025-07-28 RX ADMIN — ENOXAPARIN SODIUM 40 MILLIGRAM(S): 100 INJECTION SUBCUTANEOUS at 05:04

## 2025-07-28 RX ADMIN — Medication 100 MILLIGRAM(S): at 12:15

## 2025-07-28 RX ADMIN — BUPRENORPHINE HYDROCHLORIDE, NALOXONE HYDROCHLORIDE 3 TABLET(S): 4; 1 FILM, SOLUBLE BUCCAL; SUBLINGUAL at 11:36

## 2025-07-28 RX ADMIN — INSULIN LISPRO 1: 100 INJECTION, SOLUTION INTRAVENOUS; SUBCUTANEOUS at 22:06

## 2025-07-28 RX ADMIN — INSULIN GLARGINE-YFGN 20 UNIT(S): 100 INJECTION, SOLUTION SUBCUTANEOUS at 21:59

## 2025-07-28 RX ADMIN — PREGABALIN 50 MILLIGRAM(S): 50 CAPSULE ORAL at 05:04

## 2025-07-28 RX ADMIN — INSULIN LISPRO 5 UNIT(S): 100 INJECTION, SOLUTION INTRAVENOUS; SUBCUTANEOUS at 08:18

## 2025-07-28 RX ADMIN — INSULIN LISPRO 5 UNIT(S): 100 INJECTION, SOLUTION INTRAVENOUS; SUBCUTANEOUS at 11:56

## 2025-07-28 RX ADMIN — INSULIN LISPRO 5 UNIT(S): 100 INJECTION, SOLUTION INTRAVENOUS; SUBCUTANEOUS at 17:08

## 2025-07-28 RX ADMIN — Medication 175 MICROGRAM(S): at 05:04

## 2025-07-28 RX ADMIN — PREGABALIN 50 MILLIGRAM(S): 50 CAPSULE ORAL at 17:07

## 2025-07-28 RX ADMIN — Medication 166.67 MILLIGRAM(S): at 05:05

## 2025-07-28 RX ADMIN — INSULIN LISPRO 3: 100 INJECTION, SOLUTION INTRAVENOUS; SUBCUTANEOUS at 11:55

## 2025-07-28 RX ADMIN — Medication 100 MILLIGRAM(S): at 22:00

## 2025-07-28 RX ADMIN — CEFEPIME 100 MILLIGRAM(S): 2 INJECTION, POWDER, FOR SOLUTION INTRAVENOUS at 05:05

## 2025-07-28 RX ADMIN — ATORVASTATIN CALCIUM 20 MILLIGRAM(S): 80 TABLET, FILM COATED ORAL at 21:59

## 2025-07-28 RX ADMIN — Medication 81 MILLIGRAM(S): at 11:36

## 2025-07-29 ENCOUNTER — TRANSCRIPTION ENCOUNTER (OUTPATIENT)
Age: 55
End: 2025-07-29

## 2025-07-29 LAB
ANION GAP SERPL CALC-SCNC: 11 MMOL/L — SIGNIFICANT CHANGE UP (ref 7–14)
BUN SERPL-MCNC: 12 MG/DL — SIGNIFICANT CHANGE UP (ref 10–20)
CALCIUM SERPL-MCNC: 9 MG/DL — SIGNIFICANT CHANGE UP (ref 8.4–10.5)
CHLORIDE SERPL-SCNC: 97 MMOL/L — LOW (ref 98–110)
CO2 SERPL-SCNC: 26 MMOL/L — SIGNIFICANT CHANGE UP (ref 17–32)
CREAT SERPL-MCNC: 1.1 MG/DL — SIGNIFICANT CHANGE UP (ref 0.7–1.5)
EGFR: 60 ML/MIN/1.73M2 — SIGNIFICANT CHANGE UP
EGFR: 60 ML/MIN/1.73M2 — SIGNIFICANT CHANGE UP
GLUCOSE SERPL-MCNC: 163 MG/DL — HIGH (ref 70–99)
POTASSIUM SERPL-MCNC: 5.6 MMOL/L — HIGH (ref 3.5–5)
POTASSIUM SERPL-SCNC: 5.6 MMOL/L — HIGH (ref 3.5–5)
SODIUM SERPL-SCNC: 134 MMOL/L — LOW (ref 135–146)

## 2025-07-29 PROCEDURE — 99233 SBSQ HOSP IP/OBS HIGH 50: CPT

## 2025-07-29 RX ORDER — DOXYCYCLINE HYCLATE 100 MG
100 TABLET ORAL EVERY 12 HOURS
Refills: 0 | Status: DISCONTINUED | OUTPATIENT
Start: 2025-07-29 | End: 2025-07-30

## 2025-07-29 RX ORDER — SODIUM ZIRCONIUM CYCLOSILICATE 5 G/5G
10 POWDER, FOR SUSPENSION ORAL ONCE
Refills: 0 | Status: COMPLETED | OUTPATIENT
Start: 2025-07-29 | End: 2025-07-29

## 2025-07-29 RX ADMIN — INSULIN LISPRO 5 UNIT(S): 100 INJECTION, SOLUTION INTRAVENOUS; SUBCUTANEOUS at 07:58

## 2025-07-29 RX ADMIN — Medication 81 MILLIGRAM(S): at 11:52

## 2025-07-29 RX ADMIN — Medication 100 MILLIGRAM(S): at 05:14

## 2025-07-29 RX ADMIN — INSULIN LISPRO 1: 100 INJECTION, SOLUTION INTRAVENOUS; SUBCUTANEOUS at 17:05

## 2025-07-29 RX ADMIN — Medication 100 MILLIGRAM(S): at 11:52

## 2025-07-29 RX ADMIN — SODIUM ZIRCONIUM CYCLOSILICATE 10 GRAM(S): 5 POWDER, FOR SUSPENSION ORAL at 14:35

## 2025-07-29 RX ADMIN — Medication 175 MICROGRAM(S): at 05:16

## 2025-07-29 RX ADMIN — Medication 100 MILLIGRAM(S): at 17:04

## 2025-07-29 RX ADMIN — INSULIN LISPRO 5 UNIT(S): 100 INJECTION, SOLUTION INTRAVENOUS; SUBCUTANEOUS at 17:06

## 2025-07-29 RX ADMIN — BUPRENORPHINE HYDROCHLORIDE, NALOXONE HYDROCHLORIDE 3 TABLET(S): 4; 1 FILM, SOLUBLE BUCCAL; SUBLINGUAL at 11:53

## 2025-07-29 RX ADMIN — ENOXAPARIN SODIUM 40 MILLIGRAM(S): 100 INJECTION SUBCUTANEOUS at 05:15

## 2025-07-29 RX ADMIN — INSULIN GLARGINE-YFGN 20 UNIT(S): 100 INJECTION, SOLUTION SUBCUTANEOUS at 21:52

## 2025-07-29 RX ADMIN — PREGABALIN 50 MILLIGRAM(S): 50 CAPSULE ORAL at 05:15

## 2025-07-29 RX ADMIN — ATORVASTATIN CALCIUM 20 MILLIGRAM(S): 80 TABLET, FILM COATED ORAL at 21:52

## 2025-07-29 RX ADMIN — PREGABALIN 50 MILLIGRAM(S): 50 CAPSULE ORAL at 17:04

## 2025-07-30 ENCOUNTER — APPOINTMENT (OUTPATIENT)
Dept: PSYCHIATRY | Facility: CLINIC | Age: 55
End: 2025-07-30

## 2025-07-30 VITALS
SYSTOLIC BLOOD PRESSURE: 127 MMHG | DIASTOLIC BLOOD PRESSURE: 76 MMHG | HEART RATE: 69 BPM | TEMPERATURE: 98 F | OXYGEN SATURATION: 97 % | RESPIRATION RATE: 18 BRPM

## 2025-07-30 LAB
ANION GAP SERPL CALC-SCNC: 13 MMOL/L — SIGNIFICANT CHANGE UP (ref 7–14)
BUN SERPL-MCNC: 13 MG/DL — SIGNIFICANT CHANGE UP (ref 10–20)
CALCIUM SERPL-MCNC: 8.9 MG/DL — SIGNIFICANT CHANGE UP (ref 8.4–10.5)
CHLORIDE SERPL-SCNC: 97 MMOL/L — LOW (ref 98–110)
CO2 SERPL-SCNC: 28 MMOL/L — SIGNIFICANT CHANGE UP (ref 17–32)
CREAT SERPL-MCNC: 1 MG/DL — SIGNIFICANT CHANGE UP (ref 0.7–1.5)
EGFR: 67 ML/MIN/1.73M2 — SIGNIFICANT CHANGE UP
EGFR: 67 ML/MIN/1.73M2 — SIGNIFICANT CHANGE UP
GLUCOSE BLDC GLUCOMTR-MCNC: 212 MG/DL — HIGH (ref 70–99)
GLUCOSE BLDC GLUCOMTR-MCNC: 64 MG/DL — LOW (ref 70–99)
GLUCOSE SERPL-MCNC: 144 MG/DL — HIGH (ref 70–99)
HCT VFR BLD CALC: 31.7 % — LOW (ref 37–47)
HGB BLD-MCNC: 9.8 G/DL — LOW (ref 12–16)
MCHC RBC-ENTMCNC: 26.8 PG — LOW (ref 27–31)
MCHC RBC-ENTMCNC: 30.9 G/DL — LOW (ref 32–37)
MCV RBC AUTO: 86.6 FL — SIGNIFICANT CHANGE UP (ref 81–99)
NRBC BLD AUTO-RTO: 0 /100 WBCS — SIGNIFICANT CHANGE UP (ref 0–0)
PLATELET # BLD AUTO: 245 K/UL — SIGNIFICANT CHANGE UP (ref 130–400)
PMV BLD: 9.7 FL — SIGNIFICANT CHANGE UP (ref 7.4–10.4)
POTASSIUM SERPL-MCNC: 4.8 MMOL/L — SIGNIFICANT CHANGE UP (ref 3.5–5)
POTASSIUM SERPL-SCNC: 4.8 MMOL/L — SIGNIFICANT CHANGE UP (ref 3.5–5)
RBC # BLD: 3.66 M/UL — LOW (ref 4.2–5.4)
RBC # FLD: 16 % — HIGH (ref 11.5–14.5)
SODIUM SERPL-SCNC: 138 MMOL/L — SIGNIFICANT CHANGE UP (ref 135–146)
WBC # BLD: 5.73 K/UL — SIGNIFICANT CHANGE UP (ref 4.8–10.8)
WBC # FLD AUTO: 5.73 K/UL — SIGNIFICANT CHANGE UP (ref 4.8–10.8)

## 2025-07-30 PROCEDURE — 99239 HOSP IP/OBS DSCHRG MGMT >30: CPT

## 2025-07-30 RX ORDER — INSULIN GLARGINE-YFGN 100 [IU]/ML
20 INJECTION, SOLUTION SUBCUTANEOUS
Qty: 0 | Refills: 0 | DISCHARGE
Start: 2025-07-30

## 2025-07-30 RX ORDER — DOXYCYCLINE HYCLATE 100 MG
2 TABLET ORAL
Qty: 36 | Refills: 0
Start: 2025-07-30 | End: 2025-08-07

## 2025-07-30 RX ADMIN — Medication 100 MILLIGRAM(S): at 17:26

## 2025-07-30 RX ADMIN — Medication 175 MICROGRAM(S): at 05:30

## 2025-07-30 RX ADMIN — INSULIN LISPRO 5 UNIT(S): 100 INJECTION, SOLUTION INTRAVENOUS; SUBCUTANEOUS at 08:13

## 2025-07-30 RX ADMIN — Medication 100 MILLIGRAM(S): at 06:37

## 2025-07-30 RX ADMIN — INSULIN LISPRO 2: 100 INJECTION, SOLUTION INTRAVENOUS; SUBCUTANEOUS at 11:59

## 2025-07-30 RX ADMIN — PREGABALIN 50 MILLIGRAM(S): 50 CAPSULE ORAL at 05:30

## 2025-07-30 RX ADMIN — INSULIN LISPRO 5 UNIT(S): 100 INJECTION, SOLUTION INTRAVENOUS; SUBCUTANEOUS at 12:00

## 2025-07-30 RX ADMIN — BUPRENORPHINE HYDROCHLORIDE, NALOXONE HYDROCHLORIDE 3 TABLET(S): 4; 1 FILM, SOLUBLE BUCCAL; SUBLINGUAL at 11:56

## 2025-07-30 RX ADMIN — PREGABALIN 50 MILLIGRAM(S): 50 CAPSULE ORAL at 17:26

## 2025-07-30 RX ADMIN — ENOXAPARIN SODIUM 40 MILLIGRAM(S): 100 INJECTION SUBCUTANEOUS at 05:30

## 2025-07-30 RX ADMIN — Medication 81 MILLIGRAM(S): at 11:56

## 2025-07-31 LAB
CULTURE RESULTS: SIGNIFICANT CHANGE UP
SPECIMEN SOURCE: SIGNIFICANT CHANGE UP

## 2025-08-01 ENCOUNTER — APPOINTMENT (OUTPATIENT)
Dept: PODIATRY | Facility: CLINIC | Age: 55
End: 2025-08-01

## 2025-08-01 LAB
CULTURE RESULTS: SIGNIFICANT CHANGE UP
SPECIMEN SOURCE: SIGNIFICANT CHANGE UP

## 2025-08-07 DIAGNOSIS — L03.119 CELLULITIS OF UNSPECIFIED PART OF LIMB: ICD-10-CM
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
## 2025-08-07 DIAGNOSIS — I83.019 VARICOSE VEINS OF RIGHT LOWER EXTREMITY WITH ULCER OF UNSPECIFIED SITE: ICD-10-CM
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
## 2025-08-07 DIAGNOSIS — I87.8 OTHER SPECIFIED DISORDERS OF VEINS: ICD-10-CM

## 2025-08-07 DIAGNOSIS — L97.521 NON-PRESSURE CHRONIC ULCER OF OTHER PART OF LEFT FOOT LIMITED TO BREAKDOWN OF SKIN: ICD-10-CM

## 2025-08-08 DIAGNOSIS — M31.0 HYPERSENSITIVITY ANGIITIS: ICD-10-CM

## 2025-08-08 DIAGNOSIS — E66.9 OBESITY, UNSPECIFIED: ICD-10-CM

## 2025-08-08 DIAGNOSIS — E78.5 HYPERLIPIDEMIA, UNSPECIFIED: ICD-10-CM

## 2025-08-08 DIAGNOSIS — E03.9 HYPOTHYROIDISM, UNSPECIFIED: ICD-10-CM

## 2025-08-08 DIAGNOSIS — D50.0 IRON DEFICIENCY ANEMIA SECONDARY TO BLOOD LOSS (CHRONIC): ICD-10-CM

## 2025-08-08 DIAGNOSIS — I89.0 LYMPHEDEMA, NOT ELSEWHERE CLASSIFIED: ICD-10-CM

## 2025-08-08 DIAGNOSIS — I87.2 VENOUS INSUFFICIENCY (CHRONIC) (PERIPHERAL): ICD-10-CM

## 2025-08-08 DIAGNOSIS — L97.929 NON-PRESSURE CHRONIC ULCER OF UNSPECIFIED PART OF LEFT LOWER LEG WITH UNSPECIFIED SEVERITY: ICD-10-CM

## 2025-08-08 DIAGNOSIS — Z90.49 ACQUIRED ABSENCE OF OTHER SPECIFIED PARTS OF DIGESTIVE TRACT: ICD-10-CM

## 2025-08-08 DIAGNOSIS — E11.22 TYPE 2 DIABETES MELLITUS WITH DIABETIC CHRONIC KIDNEY DISEASE: ICD-10-CM

## 2025-08-08 DIAGNOSIS — D84.81 IMMUNODEFICIENCY DUE TO CONDITIONS CLASSIFIED ELSEWHERE: ICD-10-CM

## 2025-08-08 DIAGNOSIS — I25.10 ATHEROSCLEROTIC HEART DISEASE OF NATIVE CORONARY ARTERY WITHOUT ANGINA PECTORIS: ICD-10-CM

## 2025-08-08 DIAGNOSIS — Z86.19 PERSONAL HISTORY OF OTHER INFECTIOUS AND PARASITIC DISEASES: ICD-10-CM

## 2025-08-08 DIAGNOSIS — J44.89 OTHER SPECIFIED CHRONIC OBSTRUCTIVE PULMONARY DISEASE: ICD-10-CM

## 2025-08-08 DIAGNOSIS — F11.20 OPIOID DEPENDENCE, UNCOMPLICATED: ICD-10-CM

## 2025-08-08 DIAGNOSIS — L03.116 CELLULITIS OF LEFT LOWER LIMB: ICD-10-CM

## 2025-08-08 DIAGNOSIS — E11.65 TYPE 2 DIABETES MELLITUS WITH HYPERGLYCEMIA: ICD-10-CM

## 2025-08-08 DIAGNOSIS — L97.919 NON-PRESSURE CHRONIC ULCER OF UNSPECIFIED PART OF RIGHT LOWER LEG WITH UNSPECIFIED SEVERITY: ICD-10-CM

## 2025-08-08 DIAGNOSIS — I87.8 OTHER SPECIFIED DISORDERS OF VEINS: ICD-10-CM

## 2025-08-08 DIAGNOSIS — N18.2 CHRONIC KIDNEY DISEASE, STAGE 2 (MILD): ICD-10-CM

## 2025-08-08 DIAGNOSIS — Z79.84 LONG TERM (CURRENT) USE OF ORAL HYPOGLYCEMIC DRUGS: ICD-10-CM

## 2025-08-08 DIAGNOSIS — Z79.890 HORMONE REPLACEMENT THERAPY: ICD-10-CM

## 2025-08-08 DIAGNOSIS — Z79.4 LONG TERM (CURRENT) USE OF INSULIN: ICD-10-CM

## 2025-08-08 DIAGNOSIS — L03.115 CELLULITIS OF RIGHT LOWER LIMB: ICD-10-CM

## 2025-08-13 ENCOUNTER — APPOINTMENT (OUTPATIENT)
Dept: VASCULAR SURGERY | Facility: CLINIC | Age: 55
End: 2025-08-13

## 2025-08-25 ENCOUNTER — EMERGENCY (EMERGENCY)
Facility: HOSPITAL | Age: 55
LOS: 0 days | Discharge: HOME | End: 2025-08-25
Attending: EMERGENCY MEDICINE
Payer: MEDICAID

## 2025-08-25 VITALS
HEART RATE: 82 BPM | OXYGEN SATURATION: 100 % | WEIGHT: 259.93 LBS | RESPIRATION RATE: 18 BRPM | TEMPERATURE: 98 F | HEIGHT: 68 IN | SYSTOLIC BLOOD PRESSURE: 120 MMHG | DIASTOLIC BLOOD PRESSURE: 73 MMHG

## 2025-08-25 DIAGNOSIS — I87.2 VENOUS INSUFFICIENCY (CHRONIC) (PERIPHERAL): ICD-10-CM

## 2025-08-25 DIAGNOSIS — Z90.49 ACQUIRED ABSENCE OF OTHER SPECIFIED PARTS OF DIGESTIVE TRACT: Chronic | ICD-10-CM

## 2025-08-25 DIAGNOSIS — Z90.89 ACQUIRED ABSENCE OF OTHER ORGANS: Chronic | ICD-10-CM

## 2025-08-25 DIAGNOSIS — M79.89 OTHER SPECIFIED SOFT TISSUE DISORDERS: ICD-10-CM

## 2025-08-25 LAB
ALBUMIN SERPL ELPH-MCNC: 4.1 G/DL — SIGNIFICANT CHANGE UP (ref 3.5–5.2)
ALP SERPL-CCNC: 89 U/L — SIGNIFICANT CHANGE UP (ref 30–115)
ALT FLD-CCNC: 8 U/L — SIGNIFICANT CHANGE UP (ref 0–41)
ANION GAP SERPL CALC-SCNC: 10 MMOL/L — SIGNIFICANT CHANGE UP (ref 7–14)
AST SERPL-CCNC: 45 U/L — HIGH (ref 0–41)
BILIRUB SERPL-MCNC: 0.3 MG/DL — SIGNIFICANT CHANGE UP (ref 0.2–1.2)
BUN SERPL-MCNC: 11 MG/DL — SIGNIFICANT CHANGE UP (ref 10–20)
CALCIUM SERPL-MCNC: 9.6 MG/DL — SIGNIFICANT CHANGE UP (ref 8.4–10.5)
CHLORIDE SERPL-SCNC: 96 MMOL/L — LOW (ref 98–110)
CO2 SERPL-SCNC: 29 MMOL/L — SIGNIFICANT CHANGE UP (ref 17–32)
CREAT SERPL-MCNC: 1.1 MG/DL — SIGNIFICANT CHANGE UP (ref 0.7–1.5)
EGFR: 60 ML/MIN/1.73M2 — SIGNIFICANT CHANGE UP
EGFR: 60 ML/MIN/1.73M2 — SIGNIFICANT CHANGE UP
GLUCOSE SERPL-MCNC: 167 MG/DL — HIGH (ref 70–99)
POTASSIUM SERPL-MCNC: 5.8 MMOL/L — HIGH (ref 3.5–5)
POTASSIUM SERPL-SCNC: 5.8 MMOL/L — HIGH (ref 3.5–5)
PROT SERPL-MCNC: 8.1 G/DL — HIGH (ref 6–8)
SODIUM SERPL-SCNC: 135 MMOL/L — SIGNIFICANT CHANGE UP (ref 135–146)

## 2025-08-25 PROCEDURE — 99284 EMERGENCY DEPT VISIT MOD MDM: CPT | Mod: 25

## 2025-08-25 PROCEDURE — 93970 EXTREMITY STUDY: CPT | Mod: 26

## 2025-08-25 PROCEDURE — 93970 EXTREMITY STUDY: CPT

## 2025-08-25 PROCEDURE — 99284 EMERGENCY DEPT VISIT MOD MDM: CPT

## 2025-08-25 PROCEDURE — 80053 COMPREHEN METABOLIC PANEL: CPT

## 2025-08-25 PROCEDURE — 36415 COLL VENOUS BLD VENIPUNCTURE: CPT

## 2025-08-25 RX ORDER — AMOXICILLIN AND CLAVULANATE POTASSIUM 500; 125 MG/1; MG/1
1 TABLET, FILM COATED ORAL
Qty: 20 | Refills: 0
Start: 2025-08-25

## 2025-08-25 RX ORDER — KETOROLAC TROMETHAMINE 30 MG/ML
30 INJECTION, SOLUTION INTRAMUSCULAR; INTRAVENOUS ONCE
Refills: 0 | Status: DISCONTINUED | OUTPATIENT
Start: 2025-08-25 | End: 2025-08-25

## 2025-08-26 ENCOUNTER — APPOINTMENT (OUTPATIENT)
Dept: PSYCHIATRY | Facility: CLINIC | Age: 55
End: 2025-08-26
Payer: MEDICAID

## 2025-08-26 ENCOUNTER — OUTPATIENT (OUTPATIENT)
Dept: OUTPATIENT SERVICES | Facility: HOSPITAL | Age: 55
LOS: 1 days | End: 2025-08-26
Payer: MEDICAID

## 2025-08-26 DIAGNOSIS — Z90.49 ACQUIRED ABSENCE OF OTHER SPECIFIED PARTS OF DIGESTIVE TRACT: Chronic | ICD-10-CM

## 2025-08-26 DIAGNOSIS — F10.20 ALCOHOL DEPENDENCE, UNCOMPLICATED: ICD-10-CM

## 2025-08-26 DIAGNOSIS — Z90.89 ACQUIRED ABSENCE OF OTHER ORGANS: Chronic | ICD-10-CM

## 2025-08-26 PROCEDURE — 99214 OFFICE O/P EST MOD 30 MIN: CPT | Mod: 95

## 2025-08-27 DIAGNOSIS — F10.20 ALCOHOL DEPENDENCE, UNCOMPLICATED: ICD-10-CM

## 2025-09-04 ENCOUNTER — APPOINTMENT (OUTPATIENT)
Dept: PODIATRY | Facility: CLINIC | Age: 55
End: 2025-09-04

## 2025-09-19 ENCOUNTER — TRANSCRIPTION ENCOUNTER (OUTPATIENT)
Age: 55
End: 2025-09-19

## 2025-09-20 ENCOUNTER — TRANSCRIPTION ENCOUNTER (OUTPATIENT)
Age: 55
End: 2025-09-20